# Patient Record
Sex: FEMALE | Race: WHITE
[De-identification: names, ages, dates, MRNs, and addresses within clinical notes are randomized per-mention and may not be internally consistent; named-entity substitution may affect disease eponyms.]

---

## 2019-10-14 ENCOUNTER — HOSPITAL ENCOUNTER (INPATIENT)
Dept: HOSPITAL 72 - EMR | Age: 64
LOS: 39 days | Discharge: NURSING, REHAB OR LONG-TERM CARE | DRG: 5 | End: 2019-11-22
Payer: COMMERCIAL

## 2019-10-14 VITALS — SYSTOLIC BLOOD PRESSURE: 122 MMHG | DIASTOLIC BLOOD PRESSURE: 65 MMHG

## 2019-10-14 VITALS — DIASTOLIC BLOOD PRESSURE: 51 MMHG | SYSTOLIC BLOOD PRESSURE: 122 MMHG

## 2019-10-14 VITALS — HEIGHT: 65 IN | BODY MASS INDEX: 26.66 KG/M2 | WEIGHT: 160 LBS

## 2019-10-14 VITALS — SYSTOLIC BLOOD PRESSURE: 145 MMHG | DIASTOLIC BLOOD PRESSURE: 65 MMHG

## 2019-10-14 VITALS — DIASTOLIC BLOOD PRESSURE: 74 MMHG | SYSTOLIC BLOOD PRESSURE: 121 MMHG

## 2019-10-14 VITALS — DIASTOLIC BLOOD PRESSURE: 64 MMHG | SYSTOLIC BLOOD PRESSURE: 120 MMHG

## 2019-10-14 VITALS — DIASTOLIC BLOOD PRESSURE: 84 MMHG | SYSTOLIC BLOOD PRESSURE: 176 MMHG

## 2019-10-14 VITALS — SYSTOLIC BLOOD PRESSURE: 139 MMHG | DIASTOLIC BLOOD PRESSURE: 77 MMHG

## 2019-10-14 VITALS — DIASTOLIC BLOOD PRESSURE: 37 MMHG | SYSTOLIC BLOOD PRESSURE: 118 MMHG

## 2019-10-14 DIAGNOSIS — G93.89: ICD-10-CM

## 2019-10-14 DIAGNOSIS — R62.7: ICD-10-CM

## 2019-10-14 DIAGNOSIS — J69.0: ICD-10-CM

## 2019-10-14 DIAGNOSIS — N17.9: ICD-10-CM

## 2019-10-14 DIAGNOSIS — E11.21: ICD-10-CM

## 2019-10-14 DIAGNOSIS — Z99.11: ICD-10-CM

## 2019-10-14 DIAGNOSIS — N39.0: ICD-10-CM

## 2019-10-14 DIAGNOSIS — L89.616: ICD-10-CM

## 2019-10-14 DIAGNOSIS — J96.01: ICD-10-CM

## 2019-10-14 DIAGNOSIS — A41.9: Primary | ICD-10-CM

## 2019-10-14 DIAGNOSIS — E86.0: ICD-10-CM

## 2019-10-14 DIAGNOSIS — R71.0: ICD-10-CM

## 2019-10-14 DIAGNOSIS — R79.89: ICD-10-CM

## 2019-10-14 DIAGNOSIS — D47.3: ICD-10-CM

## 2019-10-14 DIAGNOSIS — G93.41: ICD-10-CM

## 2019-10-14 DIAGNOSIS — N18.9: ICD-10-CM

## 2019-10-14 DIAGNOSIS — K29.70: ICD-10-CM

## 2019-10-14 DIAGNOSIS — R19.00: ICD-10-CM

## 2019-10-14 DIAGNOSIS — D64.9: ICD-10-CM

## 2019-10-14 DIAGNOSIS — B96.20: ICD-10-CM

## 2019-10-14 DIAGNOSIS — E83.52: ICD-10-CM

## 2019-10-14 DIAGNOSIS — I50.33: ICD-10-CM

## 2019-10-14 DIAGNOSIS — I13.0: ICD-10-CM

## 2019-10-14 DIAGNOSIS — J15.9: ICD-10-CM

## 2019-10-14 DIAGNOSIS — E11.65: ICD-10-CM

## 2019-10-14 DIAGNOSIS — R13.10: ICD-10-CM

## 2019-10-14 DIAGNOSIS — E87.5: ICD-10-CM

## 2019-10-14 LAB
% IRON SATURATION: 21 % (ref 15–50)
ADD MANUAL DIFF: YES
ADD MANUAL DIFF: YES
ALBUMIN SERPL-MCNC: 1.6 G/DL (ref 3.4–5)
ALBUMIN SERPL-MCNC: 1.8 G/DL (ref 3.4–5)
ALBUMIN/GLOB SERPL: 0.3 {RATIO} (ref 1–2.7)
ALBUMIN/GLOB SERPL: 0.3 {RATIO} (ref 1–2.7)
ALP SERPL-CCNC: 106 U/L (ref 46–116)
ALP SERPL-CCNC: 119 U/L (ref 46–116)
ALT SERPL-CCNC: 26 U/L (ref 12–78)
ALT SERPL-CCNC: 58 U/L (ref 12–78)
ANION GAP SERPL CALC-SCNC: 17 MMOL/L (ref 5–15)
ANION GAP SERPL CALC-SCNC: 8 MMOL/L (ref 5–15)
APPEARANCE UR: (no result)
APTT PPP: YELLOW S
AST SERPL-CCNC: 22 U/L (ref 15–37)
AST SERPL-CCNC: 67 U/L (ref 15–37)
BILIRUB SERPL-MCNC: 0.3 MG/DL (ref 0.2–1)
BILIRUB SERPL-MCNC: 0.4 MG/DL (ref 0.2–1)
BUN SERPL-MCNC: 52 MG/DL (ref 7–18)
BUN SERPL-MCNC: 52 MG/DL (ref 7–18)
CALCIUM SERPL-MCNC: 8.3 MG/DL (ref 8.5–10.1)
CALCIUM SERPL-MCNC: 9.4 MG/DL (ref 8.5–10.1)
CHLORIDE SERPL-SCNC: 101 MMOL/L (ref 98–107)
CHLORIDE SERPL-SCNC: 104 MMOL/L (ref 98–107)
CHOLEST SERPL-MCNC: 107 MG/DL (ref ?–200)
CK MB SERPL-MCNC: 0.9 NG/ML (ref 0–3.6)
CK SERPL-CCNC: 23 U/L (ref 26–308)
CO2 SERPL-SCNC: 19 MMOL/L (ref 21–32)
CO2 SERPL-SCNC: 22 MMOL/L (ref 21–32)
CREAT SERPL-MCNC: 2 MG/DL (ref 0.55–1.3)
CREAT SERPL-MCNC: 2 MG/DL (ref 0.55–1.3)
ERYTHROCYTE [DISTWIDTH] IN BLOOD BY AUTOMATED COUNT: 11.1 % (ref 11.6–14.8)
ERYTHROCYTE [DISTWIDTH] IN BLOOD BY AUTOMATED COUNT: 12.3 % (ref 11.6–14.8)
FERRITIN SERPL-MCNC: 1443 NG/ML (ref 8–388)
GLOBULIN SER-MCNC: 5.3 G/DL
GLOBULIN SER-MCNC: 6.1 G/DL
GLUCOSE UR STRIP-MCNC: (no result) MG/DL
HCT VFR BLD CALC: 21.1 % (ref 37–47)
HCT VFR BLD CALC: 25.6 % (ref 37–47)
HDLC SERPL-MCNC: 19 MG/DL (ref 40–60)
HGB BLD-MCNC: 6.7 G/DL (ref 12–16)
HGB BLD-MCNC: 8.1 G/DL (ref 12–16)
IRON SERPL-MCNC: 29 UG/DL (ref 50–175)
KETONES UR QL STRIP: NEGATIVE
LEUKOCYTE ESTERASE UR QL STRIP: (no result)
MCV RBC AUTO: 84 FL (ref 80–99)
MCV RBC AUTO: 84 FL (ref 80–99)
NITRITE UR QL STRIP: NEGATIVE
PH UR STRIP: 5 [PH] (ref 4.5–8)
PHOSPHATE SERPL-MCNC: 4.4 MG/DL (ref 2.5–4.9)
PLATELET # BLD: 450 K/UL (ref 150–450)
PLATELET # BLD: 608 K/UL (ref 150–450)
POTASSIUM SERPL-SCNC: 5.7 MMOL/L (ref 3.5–5.1)
POTASSIUM SERPL-SCNC: 6.1 MMOL/L (ref 3.5–5.1)
PROT UR QL STRIP: (no result)
RBC # BLD AUTO: 2.52 M/UL (ref 4.2–5.4)
RBC # BLD AUTO: 3.04 M/UL (ref 4.2–5.4)
SODIUM SERPL-SCNC: 135 MMOL/L (ref 136–145)
SODIUM SERPL-SCNC: 137 MMOL/L (ref 136–145)
SP GR UR STRIP: 1.02 (ref 1–1.03)
TIBC SERPL-MCNC: 141 UG/DL (ref 250–450)
TRIGL SERPL-MCNC: 178 MG/DL (ref 30–150)
UNSATURATED IRON BINDING: 112 UG/DL (ref 112–346)
UROBILINOGEN UR-MCNC: NORMAL MG/DL (ref 0–1)
WBC # BLD AUTO: 33.2 K/UL (ref 4.8–10.8)
WBC # BLD AUTO: 39.4 K/UL (ref 4.8–10.8)

## 2019-10-14 PROCEDURE — 94002 VENT MGMT INPAT INIT DAY: CPT

## 2019-10-14 PROCEDURE — 78807: CPT

## 2019-10-14 PROCEDURE — 76937 US GUIDE VASCULAR ACCESS: CPT

## 2019-10-14 PROCEDURE — 83550 IRON BINDING TEST: CPT

## 2019-10-14 PROCEDURE — 36415 COLL VENOUS BLD VENIPUNCTURE: CPT

## 2019-10-14 PROCEDURE — 36600 WITHDRAWAL OF ARTERIAL BLOOD: CPT

## 2019-10-14 PROCEDURE — 94003 VENT MGMT INPAT SUBQ DAY: CPT

## 2019-10-14 PROCEDURE — 93005 ELECTROCARDIOGRAM TRACING: CPT

## 2019-10-14 PROCEDURE — 87086 URINE CULTURE/COLONY COUNT: CPT

## 2019-10-14 PROCEDURE — 83880 ASSAY OF NATRIURETIC PEPTIDE: CPT

## 2019-10-14 PROCEDURE — 80164 ASSAY DIPROPYLACETIC ACD TOT: CPT

## 2019-10-14 PROCEDURE — 84300 ASSAY OF URINE SODIUM: CPT

## 2019-10-14 PROCEDURE — 94660 CPAP INITIATION&MGMT: CPT

## 2019-10-14 PROCEDURE — 86900 BLOOD TYPING SEROLOGIC ABO: CPT

## 2019-10-14 PROCEDURE — 74177 CT ABD & PELVIS W/CONTRAST: CPT

## 2019-10-14 PROCEDURE — 82248 BILIRUBIN DIRECT: CPT

## 2019-10-14 PROCEDURE — 71045 X-RAY EXAM CHEST 1 VIEW: CPT

## 2019-10-14 PROCEDURE — 80076 HEPATIC FUNCTION PANEL: CPT

## 2019-10-14 PROCEDURE — 76770 US EXAM ABDO BACK WALL COMP: CPT

## 2019-10-14 PROCEDURE — 96368 THER/DIAG CONCURRENT INF: CPT

## 2019-10-14 PROCEDURE — 82533 TOTAL CORTISOL: CPT

## 2019-10-14 PROCEDURE — 81050 URINALYSIS VOLUME MEASURE: CPT

## 2019-10-14 PROCEDURE — 82962 GLUCOSE BLOOD TEST: CPT

## 2019-10-14 PROCEDURE — 94664 DEMO&/EVAL PT USE INHALER: CPT

## 2019-10-14 PROCEDURE — 36569 INSJ PICC 5 YR+ W/O IMAGING: CPT

## 2019-10-14 PROCEDURE — 86140 C-REACTIVE PROTEIN: CPT

## 2019-10-14 PROCEDURE — 84156 ASSAY OF PROTEIN URINE: CPT

## 2019-10-14 PROCEDURE — 76856 US EXAM PELVIC COMPLETE: CPT

## 2019-10-14 PROCEDURE — 99291 CRITICAL CARE FIRST HOUR: CPT

## 2019-10-14 PROCEDURE — 96365 THER/PROPH/DIAG IV INF INIT: CPT

## 2019-10-14 PROCEDURE — 80162 ASSAY OF DIGOXIN TOTAL: CPT

## 2019-10-14 PROCEDURE — 85730 THROMBOPLASTIN TIME PARTIAL: CPT

## 2019-10-14 PROCEDURE — 84443 ASSAY THYROID STIM HORMONE: CPT

## 2019-10-14 PROCEDURE — 84550 ASSAY OF BLOOD/URIC ACID: CPT

## 2019-10-14 PROCEDURE — 82746 ASSAY OF FOLIC ACID SERUM: CPT

## 2019-10-14 PROCEDURE — 74230 X-RAY XM SWLNG FUNCJ C+: CPT

## 2019-10-14 PROCEDURE — 83036 HEMOGLOBIN GLYCOSYLATED A1C: CPT

## 2019-10-14 PROCEDURE — 93306 TTE W/DOPPLER COMPLETE: CPT

## 2019-10-14 PROCEDURE — 81003 URINALYSIS AUTO W/O SCOPE: CPT

## 2019-10-14 PROCEDURE — 83735 ASSAY OF MAGNESIUM: CPT

## 2019-10-14 PROCEDURE — 83615 LACTATE (LD) (LDH) ENZYME: CPT

## 2019-10-14 PROCEDURE — 87070 CULTURE OTHR SPECIMN AEROBIC: CPT

## 2019-10-14 PROCEDURE — 84436 ASSAY OF TOTAL THYROXINE: CPT

## 2019-10-14 PROCEDURE — 82607 VITAMIN B-12: CPT

## 2019-10-14 PROCEDURE — 87205 SMEAR GRAM STAIN: CPT

## 2019-10-14 PROCEDURE — 83690 ASSAY OF LIPASE: CPT

## 2019-10-14 PROCEDURE — 84439 ASSAY OF FREE THYROXINE: CPT

## 2019-10-14 PROCEDURE — 82150 ASSAY OF AMYLASE: CPT

## 2019-10-14 PROCEDURE — 86901 BLOOD TYPING SEROLOGIC RH(D): CPT

## 2019-10-14 PROCEDURE — 85610 PROTHROMBIN TIME: CPT

## 2019-10-14 PROCEDURE — 82270 OCCULT BLOOD FECES: CPT

## 2019-10-14 PROCEDURE — 80202 ASSAY OF VANCOMYCIN: CPT

## 2019-10-14 PROCEDURE — 82977 ASSAY OF GGT: CPT

## 2019-10-14 PROCEDURE — 86304 IMMUNOASSAY TUMOR CA 125: CPT

## 2019-10-14 PROCEDURE — 87081 CULTURE SCREEN ONLY: CPT

## 2019-10-14 PROCEDURE — 84484 ASSAY OF TROPONIN QUANT: CPT

## 2019-10-14 PROCEDURE — 80048 BASIC METABOLIC PNL TOTAL CA: CPT

## 2019-10-14 PROCEDURE — 85025 COMPLETE CBC W/AUTO DIFF WBC: CPT

## 2019-10-14 PROCEDURE — 86376 MICROSOMAL ANTIBODY EACH: CPT

## 2019-10-14 PROCEDURE — 71260 CT THORAX DX C+: CPT

## 2019-10-14 PROCEDURE — 87181 SC STD AGAR DILUTION PER AGT: CPT

## 2019-10-14 PROCEDURE — 82803 BLOOD GASES ANY COMBINATION: CPT

## 2019-10-14 PROCEDURE — 83605 ASSAY OF LACTIC ACID: CPT

## 2019-10-14 PROCEDURE — 82550 ASSAY OF CK (CPK): CPT

## 2019-10-14 PROCEDURE — 86920 COMPATIBILITY TEST SPIN: CPT

## 2019-10-14 PROCEDURE — 85651 RBC SED RATE NONAUTOMATED: CPT

## 2019-10-14 PROCEDURE — 80061 LIPID PANEL: CPT

## 2019-10-14 PROCEDURE — 87040 BLOOD CULTURE FOR BACTERIA: CPT

## 2019-10-14 PROCEDURE — 83540 ASSAY OF IRON: CPT

## 2019-10-14 PROCEDURE — 80053 COMPREHEN METABOLIC PANEL: CPT

## 2019-10-14 PROCEDURE — 83970 ASSAY OF PARATHORMONE: CPT

## 2019-10-14 PROCEDURE — 74018 RADEX ABDOMEN 1 VIEW: CPT

## 2019-10-14 PROCEDURE — 85007 BL SMEAR W/DIFF WBC COUNT: CPT

## 2019-10-14 PROCEDURE — 82728 ASSAY OF FERRITIN: CPT

## 2019-10-14 PROCEDURE — 82553 CREATINE MB FRACTION: CPT

## 2019-10-14 PROCEDURE — 94150 VITAL CAPACITY TEST: CPT

## 2019-10-14 PROCEDURE — 92950 HEART/LUNG RESUSCITATION CPR: CPT

## 2019-10-14 PROCEDURE — 89050 BODY FLUID CELL COUNT: CPT

## 2019-10-14 PROCEDURE — 93970 EXTREMITY STUDY: CPT

## 2019-10-14 PROCEDURE — 86850 RBC ANTIBODY SCREEN: CPT

## 2019-10-14 PROCEDURE — 84100 ASSAY OF PHOSPHORUS: CPT

## 2019-10-14 PROCEDURE — 84481 FREE ASSAY (FT-3): CPT

## 2019-10-14 PROCEDURE — 94640 AIRWAY INHALATION TREATMENT: CPT

## 2019-10-14 RX ADMIN — ENOXAPARIN SODIUM SCH MG: 30 INJECTION SUBCUTANEOUS at 09:30

## 2019-10-14 RX ADMIN — DEXTROSE MONOHYDRATE SCH MLS/HR: 50 INJECTION, SOLUTION INTRAVENOUS at 21:54

## 2019-10-14 RX ADMIN — INSULIN ASPART SCH UNITS: 100 INJECTION, SOLUTION INTRAVENOUS; SUBCUTANEOUS at 11:05

## 2019-10-14 RX ADMIN — DEXTROSE MONOHYDRATE SCH MLS/HR: 50 INJECTION, SOLUTION INTRAVENOUS at 14:00

## 2019-10-14 RX ADMIN — DOCUSATE SODIUM SCH MG: 100 CAPSULE, LIQUID FILLED ORAL at 17:34

## 2019-10-14 RX ADMIN — INSULIN ASPART SCH UNITS: 100 INJECTION, SOLUTION INTRAVENOUS; SUBCUTANEOUS at 21:00

## 2019-10-14 RX ADMIN — INSULIN ASPART SCH UNITS: 100 INJECTION, SOLUTION INTRAVENOUS; SUBCUTANEOUS at 17:36

## 2019-10-14 RX ADMIN — DOCUSATE SODIUM SCH MG: 100 CAPSULE, LIQUID FILLED ORAL at 12:11

## 2019-10-14 RX ADMIN — INSULIN ASPART SCH UNITS: 100 INJECTION, SOLUTION INTRAVENOUS; SUBCUTANEOUS at 12:13

## 2019-10-14 NOTE — CONSULTATION
DATE OF CONSULTATION:  10/14/2019

ENDOCRINOLOGY CONSULTATION



CONSULTING PHYSICIAN:  Riccardo Velez M.D.



REFERRING PHYSICIAN:  Garrick Keith M.D.



REASON FOR CONSULTATION:  Diabetes management.



HISTORY OF PRESENT ILLNESS:  This is a 64-year-old female with

history of diabetes, hypertension, and CHF, who resides in a skilled

nursing facility.  She was brought to the emergency department with

desaturation of 90% on room air.  The patient was placed on non-rebreather

and brought to the emergency room for evaluation and diagnosed with

pneumonia.  Glucose is over 400.  White count is 30,000 and lactic acid is

positive.  History was limited and mostly obtained from review of the

chart and medical records.



ALLERGY TO MEDICATIONS:  None.



MEDICATIONS:  Reviewed and reconciled.



FAMILY HISTORY:  Noncontributory.



PAST MEDICAL HISTORY:

1. Hypertension.

2. CHF.

3. Diabetes.



PAST SURGICAL HISTORY:  None.



SOCIAL HISTORY:  No smoking, alcohol, or drug use.  She is a skilled

nursing facility resident.



PHYSICAL EXAMINATION:

VITAL SIGNS:  Blood pressure is 160/80, pulse of 80, temperature 98.2, and

respiratory rate of 16.

HEENT:  Pupils reactive to light.  Sclerae anicteric.

NECK:  No JVD.

HEART:  Regular.

LUNGS:  Decreased breath sounds.  No accessory muscle use.  With rales and

rhonchi.

ABDOMEN:  Positive bowel sounds.

EXTREMITIES:  No clubbing, cyanosis, or edema.



LABORATORY VALUES:  Sodium 137, potassium 5.7, chloride 101, bicarb 19, BUN

52, creatinine 2.0, and glucose of 451.  Lactic acid 5.8.  WBC of 15,

hemoglobin 8, hematocrit 25, and platelet of 608,000.



DIAGNOSES:

1. Sepsis.

2. Acute kidney injury.

3. Lactic acidosis.

4. Diabetes, out of control.



PLAN:

1. Discontinue metformin.

2. IV hydration.

3. IV antibiotics.

4. Start basal bolus insulin in the form of Levemir and NovoLog.  The

dosage will be adjusted to control.



Thank you, Dr. Keith, for the courtesy of this consultation.









  ______________________________________________

  Riccardo Velez M.D.





DR:  RN/WESLEY

D:  10/14/2019 06:57

T:  10/14/2019 17:09

JOB#:  [id number]

CC:



ARIELLE

## 2019-10-14 NOTE — CONSULTATION
Consult Note


Consult Note





asked to evaluate at the request of Dr Keith for renal failure and 

HyperKalemia





non historian- Lethargic





ER:





Dyspnea/Respdistress





This is a 64-year-old female with history of diabetes, high blood pressure, CHF 

who presents with chief complaint of shortness of breath.  She is a nursing 

home patient.  Per EMS, oxygenation dropped down to 90% on room air.  She was 

placed on a nonrebreather and brought here for evaluation.  Onset tonight.  No 

fever chills but no nausea no vomiting.  No coughing.  Nothing made it better.  

Nothing made it worse.  History is limited in this patient because of her 

condition.


     No Known Allergies (Unverified , 10/14/19)








Past Medical History:  No History, Except For


Hx Cardiac Problems:  Yes - PNA, DYSPHAGIA, PU SACRUM


Hx Hypertension:  Yes


Hx Diabetes:  Yes - TYPE 2





examined


data reviewed





.


Assessment/Plan





Renal failure, Acute on Chronic


Dehydration


Severe Anemia


Sepsis / Pneumonia / UTI


DM, OOC


Proteinuria , Diabetic Nephropathy


Acute encephalopathy











Charles


Avoid Nephrotoxics


Anemia salas


2D echo


24 H urine protein


Keep BP and BS in check


Per orders











Lukasz Vizcaino MD Oct 14, 2019 11:02

## 2019-10-14 NOTE — NUR
ED Nurse Note:

ERMD NOTIFIED  OF REPEAT BG . RECEIVED ORDER FOR 10 UNITS INSULIN IV; 
NOTED CARRIED OUT.

## 2019-10-14 NOTE — NUR
HAND-OFF: 

Report given to CRISTIAN BELTRÁN RN. PATIENT ASLEEP, NO SIGNS OF DISTRESS NOTED. ENDORSED TO FOLLOW 
WITH SNF ON INFLUENZA VAC DATE.

## 2019-10-14 NOTE — NUR
NURSE NOTES:

Received patient from [facility]. Patient is sleeping comfortably in bed. NO signs and 
symptoms of pain or distress at this time. Safety precautions in place. will follow.

## 2019-10-14 NOTE — NUR
NURSE NOTES:

Received call from lab re: critical labs hgb and K level. patient asymptomatic no bleeding 
noted. VSS. 

Dr. Keith made aware of lab results. Md ordered 2 units PRBC and kayexalate 30 g stat. 
noted and carried out.message left to Dr. Vizcaino about lab results as per Dr. Keith. 
will follow.

## 2019-10-14 NOTE — NUR
NURSE NOTES:



CALLED [facility] REGARDING INFLUENZA AND PNA VAC DATES. SPOKE WITH MAY SHE SAID TO 
CALL AFTER 0900 AND ASK FOR CORONA. ENDORSED INCOMING RN TO FOLLOW UP

## 2019-10-14 NOTE — GENERAL PROGRESS NOTE
Assessment/Plan


Assessment/Plan:


A/P: 


1- Sepsis


2- HyperKalemia


3- RF


4- Heart Failure





Plan:


Start Keyoxalate


Nephro notified


current empirical abx


Ok to proceed with blood transfusion 


Stop ATC


Remains in Serious medical condition





Subjective


Allergies:  


Coded Allergies:  


     No Known Allergies (Unverified , 10/14/19)





Objective





Last 24 Hour Vital Signs








  Date Time  Temp Pulse Resp B/P (MAP) Pulse Ox O2 Delivery O2 Flow Rate FiO2


 


10/14/19 12:00 96.8 67 18 145/65 (91) 94   


 


10/14/19 08:00 98.3 68 20 120/64 (82) 98   


 


10/14/19 08:00  56      


 


10/14/19 05:44      Nasal Cannula 2.0 


 


10/14/19 05:00  67      


 


10/14/19 04:45 97.6 66 20 122/51 (74) 99   


 


10/14/19 04:30 99.4 64 21 122/65 99 Room Air 2.0 28


 


10/14/19 04:15 99.4 64 21 122/65 99 Room Air 2.0 28


 


10/14/19 03:25 99.4 64 21 118/37 99 Room Air  


 


10/14/19 01:39 99.4       


 


10/14/19 01:33 98.1 75 36 176/84 95 Room Air  


 


10/14/19 01:33  75 36   Room Air 2.0 28


 


10/14/19 01:29  80 30  96 Nasal Cannula 2.0 28





  76 30  100   


 


10/14/19 01:29  76 30  100 Nasal Cannula 2.0 28


 


10/14/19 00:54 98.1 76 22 176/84 (114) 90 Room Air  

















Intake and Output  


 


 10/13/19 10/14/19





 19:00 07:00


 


Intake Total  0 ml


 


Balance  0 ml


 


  


 


Intake Oral  0 ml


 


# Voids  1








Laboratory Tests


10/14/19 01:14: Hemoglobin A1c 10.4H


10/14/19 01:15: 


White Blood Count 39.4*H, Red Blood Count 3.04L, Hemoglobin 8.1L, Hematocrit 

25.6L, Mean Corpuscular Volume 84, Mean Corpuscular Hemoglobin 26.7L, Mean 

Corpuscular Hemoglobin Concent 31.8L, Red Cell Distribution Width 12.3, 

Platelet Count 608H, Mean Platelet Volume 8.2, Neutrophils (%) (Auto) , 

Lymphocytes (%) (Auto) , Monocytes (%) (Auto) , Eosinophils (%) (Auto) , 

Basophils (%) (Auto) , Differential Total Cells Counted 100, Neutrophils % (

Manual) 91H, Lymphocytes % (Manual) 5L, Monocytes % (Manual) 4, Eosinophils % (

Manual) 0, Basophils % (Manual) 0, Band Neutrophils 0, Platelet Estimate 

IncreasedH, Platelet Morphology Normal, Hypochromasia 2+, Anisocytosis 1+, 

Urine Color Yellow, Urine Appearance Turbid, Urine pH 5, Urine Specific Gravity 

1.020, Urine Protein 3+H, Urine Glucose (UA) 2+H, Urine Ketones Negative, Urine 

Blood 3+H, Urine Nitrite Negative, Urine Bilirubin Negative, Urine Urobilinogen 

Normal, Urine Leukocyte Esterase 3+H, Urine RBC 20-30H, Urine WBC TntcH, Urine 

Squamous Epithelial Cells Few, Urine Bacteria ModerateH, Sodium Level 137, 

Potassium Level 5.7H, Chloride Level 101, Carbon Dioxide Level 19L, Anion Gap 

17H, Blood Urea Nitrogen 52H, Creatinine 2.0H, Estimat Glomerular Filtration 

Rate 24.9, Glucose Level 451H, Lactic Acid Level 5.80H, Calcium Level 9.4, 

Total Bilirubin 0.4, Aspartate Amino Transf (AST/SGOT) 22, Alanine 

Aminotransferase (ALT/SGPT) 26, Alkaline Phosphatase 119H, Total Creatine 

Kinase 23L, Creatine Kinase MB 0.9, Creatine Kinase MB Relative Index 3.9, 

Troponin I 0.048, Pro-B-Type Natriuretic Peptide 14204M, Total Protein 7.9, 

Albumin 1.8L, Globulin 6.1, Albumin/Globulin Ratio 0.3L


10/14/19 03:00: Lactic Acid Level 2.90H


10/14/19 11:25: 


White Blood Count 33.2*H, Red Blood Count 2.52L, Hemoglobin 6.7*L, Hematocrit 

21.1L, Mean Corpuscular Volume 84, Mean Corpuscular Hemoglobin 26.8L, Mean 

Corpuscular Hemoglobin Concent 31.9L, Red Cell Distribution Width 11.1L, 

Platelet Count 450, Mean Platelet Volume 8.4, Neutrophils (%) (Auto) , 

Lymphocytes (%) (Auto) , Monocytes (%) (Auto) , Eosinophils (%) (Auto) , 

Basophils (%) (Auto) , Differential Total Cells Counted 100, Neutrophils % (

Manual) 93H, Lymphocytes % (Manual) 6L, Monocytes % (Manual) 0L, Eosinophils % (

Manual) 0, Basophils % (Manual) 0, Band Neutrophils 0, Platelet Estimate 

Adequate, Platelet Morphology Normal, Sodium Level 135L, Potassium Level 6.1*H, 

Chloride Level 104, Carbon Dioxide Level 22, Anion Gap 8, Blood Urea Nitrogen 

52H, Creatinine 2.0H, Estimat Glomerular Filtration Rate 24.9, Glucose Level 

391H, Calcium Level 8.3L, Total Bilirubin 0.3, Aspartate Amino Transf (AST/SGOT

) 67H, Alanine Aminotransferase (ALT/SGPT) 58, Alkaline Phosphatase 106, Pro-B-

Type Natriuretic Peptide 07713V, Total Protein 6.9, Albumin 1.6L, Globulin 5.3, 

Albumin/Globulin Ratio 0.3L, Myelocytes % 1H, Red Blood Cell Morphology Normal, 

Uric Acid 10.0H, Phosphorus Level 4.4, Magnesium Level 2.8H, Iron Level 29L, 

Total Iron Binding Capacity 141L, Percent Iron Saturation 21, Unsaturated Iron 

Binding 112, Ferritin 1443H, C-Reactive Protein, Quantitative 28.7H, 

Triglycerides Level 178H, Cholesterol Level 107, LDL Cholesterol 59, HDL 

Cholesterol 19L, Cholesterol/HDL Ratio 5.6H, Vitamin B12 Level 1061H, Folate 

7.2L, Thyroid Stimulating Hormone (TSH) 0.018L


10/14/19 11:29: Urine Random Sodium 25


Height (Feet):  5


Height (Inches):  5.00


Weight (Pounds):  150











Garrick Keith MD Oct 14, 2019 14:21

## 2019-10-14 NOTE — CONSULTATION
History of Present Illness


General


Chief Complaint:  Dyspnea/Respdistress


Referring physician:  Dr. Keith


Reason for Consultation:  Possible pneumonia





Present Illness


Allergies:  


Coded Allergies:  


     No Known Allergies (Unverified , 10/14/19)





Medication History


Scheduled


Ascorbic Acid* (Vitamin C*), 250 MG ORAL BID, (Reported)


Aspirin* (Aspirin*), 81 MG ORAL DAILY, (Reported)


Atorvastatin Calcium* (Atorvastatin Calcium*), 10 MG ORAL BEDTIME, (Reported)


Clopidogrel* (Clopidogrel*), 75 MG ORAL DAILY, (Reported)


Divalproex Sodium* (Depakote*), 500 MG PO Q12HR, (Reported)


Docusate Sodium* (Docusate Sodium*), 100 MG ORAL BID, (Reported)


Donepezil Hcl* (Donepezil Hcl*), 5 MG ORAL DAILY, (Reported)


Furosemide* (Lasix*), 40 MG ORAL DAILY, (Reported)


Magnesium Hydroxide* (Milk Of Magnesia*), Unknown Dose ORAL DAILY, (Reported)


Metformin Hcl* (Metformin Hcl*), 1,000 MG ORAL BID, (Reported)


Olanzapine* (Zyprexa*), 10 MG ORAL DAILY, (Reported)





Scheduled PRN


Acetaminophen* (Acetaminophen 325MG Tablet*), 325 MG ORAL Q4H PRN for For Pain, 

(Reported)


Diphenhydramine Hcl* (Benadryl*), 25 MG ORAL Q6H PRN for Itching, (Reported)





Miscellaneous Medications


Multivitamin W-Minerals/Gin (Super Ginseng Multivit Cap), 1 EACH PO, (Reported)





Patient History


Healthcare decision maker





Resuscitation status


Full Code


Advanced Directive on File








Physical Exam





Last 24 Hour Vital Signs








  Date Time  Temp Pulse Resp B/P (MAP) Pulse Ox O2 Delivery O2 Flow Rate FiO2


 


10/14/19 12:00 96.8 67 18 145/65 (91) 94   


 


10/14/19 08:00 98.3 68 20 120/64 (82) 98   


 


10/14/19 08:00  56      


 


10/14/19 05:44      Nasal Cannula 2.0 


 


10/14/19 05:00  67      


 


10/14/19 04:45 97.6 66 20 122/51 (74) 99   


 


10/14/19 04:30 99.4 64 21 122/65 99 Room Air 2.0 28


 


10/14/19 04:15 99.4 64 21 122/65 99 Room Air 2.0 28


 


10/14/19 03:25 99.4 64 21 118/37 99 Room Air  


 


10/14/19 01:39 99.4       


 


10/14/19 01:33 98.1 75 36 176/84 95 Room Air  


 


10/14/19 01:33  75 36   Room Air 2.0 28


 


10/14/19 01:29  80 30  96 Nasal Cannula 2.0 28





  76 30  100   


 


10/14/19 01:29  76 30  100 Nasal Cannula 2.0 28


 


10/14/19 00:54 98.1 76 22 176/84 (114) 90 Room Air  

















Intake and Output  


 


 10/13/19 10/14/19





 19:00 07:00


 


Intake Total  0 ml


 


Balance  0 ml


 


  


 


Intake Oral  0 ml


 


# Voids  1











Laboratory Tests








Test


  10/14/19


01:14 10/14/19


01:15 10/14/19


03:00 10/14/19


11:25


 


Hemoglobin A1c


  10.4 %


(4.3-6.0)  H 


  


  


 


 


White Blood Count


  


  39.4 K/UL


(4.8-10.8)  *H 


  33.2 K/UL


(4.8-10.8)  *H


 


Red Blood Count


  


  3.04 M/UL


(4.20-5.40)  L 


  2.52 M/UL


(4.20-5.40)  L


 


Hemoglobin


  


  8.1 G/DL


(12.0-16.0)  L 


  6.7 G/DL


(12.0-16.0)  *L


 


Hematocrit


  


  25.6 %


(37.0-47.0)  L 


  21.1 %


(37.0-47.0)  L


 


Mean Corpuscular Volume  84 FL (80-99)    84 FL (80-99)  


 


Mean Corpuscular Hemoglobin


  


  26.7 PG


(27.0-31.0)  L 


  26.8 PG


(27.0-31.0)  L


 


Mean Corpuscular Hemoglobin


Concent 


  31.8 G/DL


(32.0-36.0)  L 


  31.9 G/DL


(32.0-36.0)  L


 


Red Cell Distribution Width


  


  12.3 %


(11.6-14.8) 


  11.1 %


(11.6-14.8)  L


 


Platelet Count


  


  608 K/UL


(150-450)  H 


  450 K/UL


(150-450)


 


Mean Platelet Volume


  


  8.2 FL


(6.5-10.1) 


  8.4 FL


(6.5-10.1)


 


Neutrophils (%) (Auto)


  


  % (45.0-75.0)


  


  % (45.0-75.0)


 


 


Lymphocytes (%) (Auto)


  


  % (20.0-45.0)


  


  % (20.0-45.0)


 


 


Monocytes (%) (Auto)   % (1.0-10.0)     % (1.0-10.0)  


 


Eosinophils (%) (Auto)   % (0.0-3.0)     % (0.0-3.0)  


 


Basophils (%) (Auto)   % (0.0-2.0)     % (0.0-2.0)  


 


Differential Total Cells


Counted 


  100  


  


  100  


 


 


Neutrophils % (Manual)  91 % (45-75)  H  93 % (45-75)  H


 


Lymphocytes % (Manual)  5 % (20-45)  L  6 % (20-45)  L


 


Monocytes % (Manual)  4 % (1-10)    0 % (1-10)  L


 


Eosinophils % (Manual)  0 % (0-3)    0 % (0-3)  


 


Basophils % (Manual)  0 % (0-2)    0 % (0-2)  


 


Band Neutrophils  0 % (0-8)    0 % (0-8)  


 


Platelet Estimate  Increased  H  Adequate  


 


Platelet Morphology  Normal    Normal  


 


Hypochromasia  2+    


 


Anisocytosis  1+    


 


Urine Color  Yellow    


 


Urine Appearance  Turbid    


 


Urine pH  5 (4.5-8.0)    


 


Urine Specific Gravity


  


  1.020


(1.005-1.035) 


  


 


 


Urine Protein


  


  3+ (NEGATIVE)


H 


  


 


 


Urine Glucose (UA)


  


  2+ (NEGATIVE)


H 


  


 


 


Urine Ketones


  


  Negative


(NEGATIVE) 


  


 


 


Urine Blood


  


  3+ (NEGATIVE)


H 


  


 


 


Urine Nitrite


  


  Negative


(NEGATIVE) 


  


 


 


Urine Bilirubin


  


  Negative


(NEGATIVE) 


  


 


 


Urine Urobilinogen


  


  Normal MG/DL


(0.0-1.0) 


  


 


 


Urine Leukocyte Esterase


  


  3+ (NEGATIVE)


H 


  


 


 


Urine RBC


  


  20-30 /HPF (0


- 2)  H 


  


 


 


Urine WBC


  


  Tntc /HPF (0 -


2)  H 


  


 


 


Urine Squamous Epithelial


Cells 


  Few /LPF


(NONE/OCC) 


  


 


 


Urine Bacteria


  


  Moderate /HPF


(NONE)  H 


  


 


 


Sodium Level


  


  137 MMOL/L


(136-145) 


  135 MMOL/L


(136-145)  L


 


Potassium Level


  


  5.7 MMOL/L


(3.5-5.1)  H 


  6.1 MMOL/L


(3.5-5.1)  *H


 


Chloride Level


  


  101 MMOL/L


() 


  104 MMOL/L


()


 


Carbon Dioxide Level


  


  19 MMOL/L


(21-32)  L 


  22 MMOL/L


(21-32)


 


Anion Gap


  


  17 mmol/L


(5-15)  H 


  8 mmol/L


(5-15)


 


Blood Urea Nitrogen


  


  52 mg/dL


(7-18)  H 


  52 mg/dL


(7-18)  H


 


Creatinine


  


  2.0 MG/DL


(0.55-1.30)  H 


  2.0 MG/DL


(0.55-1.30)  H


 


Estimat Glomerular Filtration


Rate 


  24.9 mL/min


(>60) 


  24.9 mL/min


(>60)


 


Glucose Level


  


  451 MG/DL


()  H 


  391 MG/DL


()  H


 


Lactic Acid Level


  


  5.80 mmol/L


(0.4-2.0)  H 2.90 mmol/L


(0.66-2.22)  H 


 


 


Calcium Level


  


  9.4 MG/DL


(8.5-10.1) 


  8.3 MG/DL


(8.5-10.1)  L


 


Total Bilirubin


  


  0.4 MG/DL


(0.2-1.0) 


  0.3 MG/DL


(0.2-1.0)


 


Aspartate Amino Transf


(AST/SGOT) 


  22 U/L (15-37)


  


  67 U/L (15-37)


H


 


Alanine Aminotransferase


(ALT/SGPT) 


  26 U/L (12-78)


  


  58 U/L (12-78)


 


 


Alkaline Phosphatase


  


  119 U/L


()  H 


  106 U/L


()


 


Total Creatine Kinase


  


  23 U/L


()  L 


  


 


 


Creatine Kinase MB


  


  0.9 NG/ML


(0.0-3.6) 


  


 


 


Creatine Kinase MB Relative


Index 


  3.9  


  


  


 


 


Troponin I


  


  0.048 ng/mL


(0.000-0.056) 


  


 


 


Pro-B-Type Natriuretic Peptide


  


  58836 pg/mL


(0-125)  H 


  00929 pg/mL


(0-125)  H


 


Total Protein


  


  7.9 G/DL


(6.4-8.2) 


  6.9 G/DL


(6.4-8.2)


 


Albumin


  


  1.8 G/DL


(3.4-5.0)  L 


  1.6 G/DL


(3.4-5.0)  L


 


Globulin  6.1 g/dL    5.3 g/dL  


 


Albumin/Globulin Ratio


  


  0.3 (1.0-2.7)


L 


  0.3 (1.0-2.7)


L


 


Myelocytes %    1 % (0-0)  H


 


Red Blood Cell Morphology    Normal  


 


Uric Acid


  


  


  


  10.0 MG/DL


(2.6-7.2)  H


 


Phosphorus Level


  


  


  


  4.4 MG/DL


(2.5-4.9)


 


Magnesium Level


  


  


  


  2.8 MG/DL


(1.8-2.4)  H


 


Iron Level


  


  


  


  29 ug/dL


()  L


 


Total Iron Binding Capacity


  


  


  


  141 ug/dL


(250-450)  L


 


Percent Iron Saturation    21 % (15-50)  


 


Unsaturated Iron Binding


  


  


  


  112 ug/dL


(112-346)


 


Ferritin


  


  


  


  1443 NG/ML


(8-388)  H


 


C-Reactive Protein,


Quantitative 


  


  


  28.7 mg/dL


(0.00-0.90)  H


 


Triglycerides Level


  


  


  


  178 MG/DL


()  H


 


Cholesterol Level


  


  


  


  107 MG/DL (<


200)


 


LDL Cholesterol


  


  


  


  59 mg/dL


(<100)


 


HDL Cholesterol


  


  


  


  19 MG/DL


(40-60)  L


 


Cholesterol/HDL Ratio


  


  


  


  5.6 (3.3-4.4)


H


 


Vitamin B12 Level


  


  


  


  1061 PG/ML


(193-986)  H


 


Folate


  


  


  


  7.2 NG/ML


(8.6-58.9)  L


 


Thyroid Stimulating Hormone


(TSH) 


  


  


  0.018 uiU/mL


(0.358-3.740)


 


Test


  10/14/19


11:29 


  


  


 


 


Urine Random Sodium


  25 mmol/L


() 


  


  


 








Height (Feet):  5


Height (Inches):  5.00


Weight (Pounds):  150


Medications





Current Medications








 Medications


  (Trade)  Dose


 Ordered  Sig/Winnie


 Route


 PRN Reason  Start Time


 Stop Time Status Last Admin


Dose Admin


 


 Acetaminophen


  (Tylenol)  650 mg  PRN  PRN


 RECTAL


 TEMP>100.5  10/14/19 03:00


     


 


 


 Albuterol/


 Ipratropium


  (Albuterol/


 Ipratropium)  3 ml  Q4H  PRN


 HHN


 Shortness of Breath  10/14/19 07:30


 10/19/19 07:29   


 


 


 Dextrose


  (Dextrose 50%)  25 ml  Q30M  PRN


 IV


 Hypoglycemia  10/14/19 07:00


 11/13/19 06:59   


 


 


 Dextrose


  (Dextrose 50%)  50 ml  Q30M  PRN


 IV


 Hypoglycemia  10/14/19 07:00


 11/13/19 06:59   


 


 


 Docusate Sodium


  (Colace)  100 mg  THREE TIMES A  DAY


 ORAL


   10/14/19 13:00


 11/13/19 12:59  10/14/19 12:11


 


 


 Enoxaparin Sodium


  (Lovenox)  30 mg  DAILY


 SUBQ


   10/14/19 09:00


 11/13/19 08:59  10/14/19 09:30


 


 


 Insulin Aspart


  (NovoLOG)    BEFORE MEALS AND  HS


 SUBQ


   10/14/19 11:30


 11/13/19 11:29  10/14/19 11:05


 


 


 Insulin Aspart


  (NovoLOG)  8 units  BEFORE  MEALS


 SUBQ


   10/14/19 11:30


 11/13/19 11:29  10/14/19 12:13


 


 


 Insulin Detemir


  (Levemir)  20 units  DAILY


 SUBQ


   10/14/19 10:30


 11/13/19 10:29  10/14/19 10:16


 


 


 Pantoprazole


  (Protonix)  40 mg  BID


 ORAL


   10/14/19 18:00


 11/13/19 08:59   


 


 


 Piperacillin Sod/


 Tazobactam Sod


 3.375 gm/Sodium


 Chloride  110 ml @ 


 27.5 mls/hr  EVERY 8  HOURS


 IVPB


   10/14/19 14:00


 10/19/19 13:59   


 


 


 Sodium Chloride  1,000 ml @ 


 75 mls/hr  [id number]


 IV


   10/15/19 07:30


 11/13/19 07:29   


 


 


 Vancomycin HCl


  (Vanco rx to


 dose)  1 ea  DAILY  PRN


 MISC


 Per rx protocol  10/14/19 07:30


 11/13/19 07:29   


 


 


 Vancomycin HCl


 750 mg/Sodium


 Chloride  275 ml @ 


 183.333


 mls/hr  Q24H


 IVPB


   10/15/19 09:00


 10/19/19 08:59   


 











Assessment/Plan


Assessment/Plan:


Hematology Consult





DOS: 10/14/19


Chief Complaint:  Dyspnea/Respdistress


RFC: ANemia eval and high wbc


REQ MD: CHERRY Keith





ID


This is a 64-year-old female with history of diabetes, high blood pressure, CHF 

who presents with chief complaint of shortness of breath.  She is a nursing 

home patient.  Per EMS, oxygenation dropped down to 90% on room air.  She was 

placed on a nonrebreather and brought here for evaluation.  Onset tonight.  No 

fever chills but no nausea no vomiting.  No coughing.  Nothing made it better.  

Nothing made it worse.  History is limited in this patient because of her 

condition.


Heme consulted as patient requires blood transfusion, and prbc has been ordered

, also noted to have chance on ckd





Coded Allergies:  


     No Known Allergies (Unverified , 10/14/19)





Patient History


Past Medical History:  see triage record, old chart reviewed, HTN, CHF


Past Surgical History:  other


Pertinent Family History:  none


Social History:  Denies: smoking


Pregnant Now:  No


Immunizations:  UTD


Reviewed Nursing Documentation:  PMH: Agreed; PSxH: Agreed





Nursing Documentation-PMH


Past Medical History:  No History, Except For


Hx Cardiac Problems:  Yes - PNA, DYSPHAGIA, PU SACRUM


Hx Hypertension:  Yes


Hx Diabetes:  Yes - TYPE 2





Review of Systems


Eye:  Denies: eye pain, blurred vision


ENT:  Denies: ear pain, nose congestion, throat swelling


Respiratory:  Reports: shortness of breath; Denies: cough


Cardiovascular:  Denies: chest pain, palpitations


Gastrointestinal:  Denies: abdominal pain, diarrhea, nausea, vomiting


Musculoskeletal:  Denies: back pain, joint pain


Skin:  Denies: rash


Neurological:  Denies: headache, numbness


Endocrine:  Denies: increased thirst, increased urine


Hematologic/Lymphatic:  Denies: easy bruising


All Other Systems:  negative except mentioned in HPI





Physical Exam:


Vitals: reviewed


General Appearance:  NAD


HEENT:  normocephalic, atraumatic


Neck:  non-tender, normal alignment


Respiratory/Chest:  normal breath sounds bilaterally


Cardiovascular/Chest:  normal peripheral pulses, normal rate


Abdomen:  normal bowel sounds, soft, nontender


Extremities:  normal range of motion





Labs: noted





Imaging: noted





Assessment and Recs:


# Anemia of chronic disease (or of iron deficiency) due to underlying chronic 

medical issues, multifactorial 


--> Anemia workup has been ordered, rule out gi bleed 


--> No evidence of hemolysis is noted, peripheral smear has been reviewed.


--> Hgb goal >7. Transfuse prn.


--> Epogen or iron at this time is not particularly indicated


--> Medications have been reviewed


--> low threshold for gi evaluation in case has occult +


--> bone marrow biopsy is not indicated given the other more likely causes


# Hypercalcemia - btw 10-11 range when corrected to alb


--> ivf have been started


--> if remains elevated, will get pth


# Leukocytosis/elevated white blood cell count, unspecified likely related to 

underlying reaction v more likely infection


--> have reviewed peripheral smear and bandemia/neutrophilia noted


--> continue antibiotics if they have been started by ID team


--> monitor for resolution


# Sepsis from pneumonia.  


--> pulm consulted, abx started


# CHANCE (acute kidney injury) on ckd


--> per renal


# UTI (urinary tract infection)


--> on abx


# Dehydration


--> on ivf


# Acute metabolic encephalopathy


--> likely due to pna





The timing of this note does not necessarily reflect the time of the patient 

was seen.





Greatly appreciate consultation.











Mike Pop MD Oct 14, 2019 15:47

## 2019-10-14 NOTE — HISTORY AND PHYSICAL REPORT
DATE OF ADMISSION:  10/14/2019



SOURCE OF INFORMATION:  Patient and EMR.



HISTORY OF PRESENT ILLNESS:  The patient is a 64-year-old misfortunate

white female, who is not verbally communicative.  The patient is suffering

from multiple medical problems including psychiatric and nonpsychiatric

dementia, chronic encephalopathy, and poor historian.  The patient has

been transferred from the facility secondary to the change in mental

status.  At the time of evaluation, the patient is not communicative

verbally.  Appears in mild distress.  At the initial evaluation in the ER,

blood pressure remained uncontrolled with leukocytosis of 39.4, hemoglobin

of 8.1, creatinine of 2, and potassium of 5.7.  Given the gravity of

serious multiple medical conditions, the patient was transferred to the

floor.



PAST MEDICAL HISTORY:  Including but not limited to chronic encephalopathy,

hyperlipidemia, hypertension, diabetes, and psychiatric disorder.



MEDICATIONS:  Current hospital medications including, but not limited to

acetaminophen, Zosyn, vancomycin, and sliding scale insulin.



SOCIAL HISTORY:  The patient is a resident of a skilled nursing facility.

No prior history of illicit drug abuse, smoking, or alcohol abuse has been

reported.



PHYSICAL EXAMINATION:

VITAL SIGNS:  Blood pressure 170/80, temperature 98.2, pulse rate 80, and

respiratory rate 18.

HEAD AND NECK:  Atraumatic and normocephalic.

CHEST:  Bronchial breathing sounds.

ABDOMEN:  Soft.  No organomegaly.

MUSCULOSKELETAL:  Diffuse 1+ or 2+ nonpitting edema in all four contracted

extremities.  The patient is not following the commands.  Limited

evaluation.

NEUROLOGY:  The patient is awake.  Not alert.  Not verbally

communicative.



LABORATORY DATA:  Labs dated October 14, 2019 shows sodium 137, potassium

4.7, BUN 52, and creatinine 2.  Hemoglobin A1c 10.4.  BNP 19,000.  Glucose

of 460.  Lactic acid of 5.8.



ASSESSMENT AND PLAN:

1. Sepsis.  Differential diagnosis is healthcare-associated pneumonia or

healthcare-associated urinary tract infection.  Work in progress.

2. Chronic encephalomalacia.

3. Acute on chronic anemia.

4. Renal failure, age indeterminate.

5. Congestive heart failure.

6. Hyperthyroidism.

7. Hyperkalemia.

8. Diabetes type 2, uncontrolled with A1c of 10.

9. Psychiatric disorder.

10. GI and DVT prophylaxis.



PLAN OF CARE:  I started the empiric antibiotic treatment changing to Zosyn

and vancomycin pending the cultures.  Pulmonary Critical Care, Infectious

Disease, and Nephrology have been consulted.



The time of this dictation does not reflect the actual time of

encounter.









  ______________________________________________

  Garrick Keith M.D.





DR:  ALEXANDER

D:  10/14/2019 14:21

T:  10/14/2019 18:28

JOB#:  [id number]

CC:



ARIELLE

## 2019-10-14 NOTE — NUR
CASE MANAGEMENT: INITIAL REVIEW



64 YR OLD FEMALE BIBA FROM [facility] 



CC: DYSPNEA/ REP. DISTRESS



SI: SEPSIS/ ACUTE RENAL FAILURE/ PNA /UTI/ ANEMIA/ CHANCE/ ACUTE ENCEPHALOPATHY 

98.1  76  22  176/84  90%RA

WBC 39.4; RBC 3.04; H/H 8.1/25.6; ; BUN 52; CREA 2.0; 



IS:      ALBUTEROL HHN X1

VA TYLENOL X1

IVF BOLUS X1

IV CEFEPIME X1

IV LEVOFLOXACIN 



**: 2E TELE UNIT



DCP: RETURN TO [facility] WHEN MEDICALLY CLEARED 



PLAN:  SP CX

START ZOSYN, VANCO

## 2019-10-14 NOTE — DIAGNOSTIC IMAGING REPORT
Indication: Shortness of breath

 

Technique: One view of the chest

 

Comparison: none

 

Findings: There is mild interstitial congestion. There is suggestion of bilateral

left greater than right hazy parenchymal opacities as well. The left hemidiaphragm is

somewhat obscured. The heart is probably normal in size

 

Impression: Evidence of of interstitial congestion and possibly hazy airspace

infiltrates versus edema

## 2019-10-14 NOTE — NUR
HAND-OFF: 

Report given to Anusha Thibodeaux. plan of care endorse. aware patient to receive 2nd unit of PRBC.

## 2019-10-14 NOTE — NUR
NURSE NOTES:

Received report from ELIZABETH Reed.  Pt in bed, resting, in no acute distress.  Breathing even and 
unlabored. will continue plan of care.

## 2019-10-14 NOTE — NUR
NURSE NOTES:



RECEIVED PATIENT FROM ER. PATIENT ASLEEP, PER ER NURSE ATIVAN IV WAS ADMINISTERED. PATIENT 
HAS NO BELONGINGS. PLACED PATIENT ON TELE, SR ON A MONITOR. NOTED REDNESS ON SACRAL AREA AND 
DTI ON RIGHT HEEL. PICTURE TAKEN. FALL PRECAUTIONS IN PLACE: CALL LIGHT WITHIN REACH, BED IN 
LOW POSITION AND BED ALARM ON.

## 2019-10-14 NOTE — HISTORY & PHYSICAL
History and Physical


History & Physicial


Seen and examined. Dict # [id number]











Garrick Keith MD Oct 14, 2019 14:19

## 2019-10-14 NOTE — EMERGENCY ROOM REPORT
History of Present Illness


General


Chief Complaint:  Dyspnea/Respdistress


Source:  Patient, Medical Record, EMS





Present Illness


HPI


This is a 64-year-old female with history of diabetes, high blood pressure, CHF 

who presents with chief complaint of shortness of breath.  She is a nursing 

home patient.  Per EMS, oxygenation dropped down to 90% on room air.  She was 

placed on a nonrebreather and brought here for evaluation.  Onset tonight.  No 

fever chills but no nausea no vomiting.  No coughing.  Nothing made it better.  

Nothing made it worse.  History is limited in this patient because of her 

condition.


Allergies:  


Coded Allergies:  


     No Known Allergies (Unverified , 10/14/19)





Patient History


Past Medical History:  see triage record, old chart reviewed, HTN, CHF


Past Surgical History:  other


Pertinent Family History:  none


Social History:  Denies: smoking


Pregnant Now:  No


Immunizations:  UTD


Reviewed Nursing Documentation:  PMH: Agreed; PSxH: Agreed





Nursing Documentation-PMH


Past Medical History:  No History, Except For


Hx Cardiac Problems:  Yes - PNA, DYSPHAGIA, PU SACRUM


Hx Hypertension:  Yes


Hx Diabetes:  Yes - TYPE 2





Review of Systems


Eye:  Denies: eye pain, blurred vision


ENT:  Denies: ear pain, nose congestion, throat swelling


Respiratory:  Reports: shortness of breath; Denies: cough


Cardiovascular:  Denies: chest pain, palpitations


Gastrointestinal:  Denies: abdominal pain, diarrhea, nausea, vomiting


Musculoskeletal:  Denies: back pain, joint pain


Skin:  Denies: rash


Neurological:  Denies: headache, numbness


Endocrine:  Denies: increased thirst, increased urine


Hematologic/Lymphatic:  Denies: easy bruising


All Other Systems:  negative except mentioned in HPI





Physical Exam





Vital Signs








  Date Time  Temp Pulse Resp B/P (MAP) Pulse Ox O2 Delivery O2 Flow Rate FiO2


 


10/14/19 00:54 98.1 76 22 176/84 (114) 90 Room Air  





Vitals with high blood pressure


Sp02 EP Interpretation:  abnormal


General Appearance:  mild distress


Head:  normocephalic, atraumatic


Eyes:  bilateral eye PERRL, bilateral eye EOMI


ENT:  hearing grossly normal, normal pharynx


Neck:  full range of motion, supple, no meningismus


Respiratory:  chest non-tender, respiratory distress, decreased breath sounds, 

accessory muscle use, rales, rhonchi


Cardiovascular #1:  regular rate, rhythm, no murmur


Gastrointestinal:  normal bowel sounds, non tender, no mass, no organomegaly, 

no bruit, non-distended


Musculoskeletal:  back normal, other - No edema


Psychiatric:  mood/affect normal





Procedures


Critical Care Time


Critical Care Time


Critical care is mandated in this patient who presented with sepsis from 

pneumonia.  Patient require my urgent intervention to attenuate the risks of 

metabolic collapse which may lead to cardiovascular collapse and death.  

Critical care time is 35 minutes excluding any reportable procedure.  Critical 

care time included evaluation, multiple reevaluation, looking at old charts, 

interpreting laboratory and diagnostic data, discussing case with patient and 

family and consultants, and charting.





Medical Decision Making


Diagnostic Impression:  


 Primary Impression:  


 Sepsis


 Qualified Codes:  A41.9 - Sepsis, unspecified organism; R65.20 - Severe sepsis 

without septic shock; N17.9 - Acute kidney failure, unspecified


 Additional Impressions:  


 Healthcare associated bacterial pneumonia


 CHANCE (acute kidney injury)


 UTI (urinary tract infection)


 Qualified Codes:  N30.00 - Acute cystitis without hematuria


 Dehydration


 ARF (acute renal failure)


 Qualified Codes:  N17.9 - Acute kidney failure, unspecified


 Anemia


 Qualified Codes:  D64.9 - Anemia, unspecified


 Acute metabolic encephalopathy


 Hyperglycemia due to type 2 diabetes mellitus


 Qualified Codes:  E11.65 - Type 2 diabetes mellitus with hyperglycemia; Z79.4 

- Long term (current) use of insulin


ER Course


Patient presents with respiratory distress and hypoxia.  Is secondary to sepsis 

H CAP.  White count is elevated.  Lactic acid is elevated.  Patient is more 

alert after IV fluid.  Wide spectrum antibiotic started.  She also has a 

urinary tract infection.  No evidence of TIA or CVA because she has no focal 

deficit.  Condition is guarded.  Patient will be admitted for IV antibiotics 

and IV hydration.  I contacted Dr. Keith for admission.


EKG Diagnostic Results


Rate:  normal


Rhythm:  NSR


ST Segments:  no acute changes





Rhythm Strip Diag. Results


EP Interpretation:  yes


Rate:  80


Rhythm:  NSR, no PVC's, no ectopy





Chest X-Ray Diagnostic Results


Chest X-Ray Diagnostic Results :  


   Chest X-Ray Ordered:  Yes


   # of Views/Limited/Complete:  1 View


   Indication:  Shortness of Breath


   EP Interpretation:  Yes


   Interpretation:  no effusion, other - RLL infiltrate


   Impression:  Other - RLL infiltrate


   Electronically Signed by:  Jorge Lucio MD





Last Vital Signs








  Date Time  Temp Pulse Resp B/P (MAP) Pulse Ox O2 Delivery O2 Flow Rate FiO2


 


10/14/19 00:54 98.1 76 22 176/84 (114) 90 Room Air  








Disposition:  ADMITTED AS INPATIENT


Condition:  Serious











Jorge Lucio MD Oct 14, 2019 01:13

## 2019-10-14 NOTE — CARDIOLOGY PROGRESS NOTE
Assessment/Plan


Assessment/Plan


diuretic if hypoxemic until sepsis resolves 


await echo 








[id number]





Objective





Last 24 Hour Vital Signs








  Date Time  Temp Pulse Resp B/P (MAP) Pulse Ox O2 Delivery O2 Flow Rate FiO2


 


10/14/19 19:49  78 20  96 Nasal Cannula 2.0 28


 


10/14/19 19:49     96 Nasal Cannula 2.0 28


 


10/14/19 16:00  77      


 


10/14/19 16:00 96.7 81 18 121/74 (90) 98   


 


10/14/19 12:00 96.8 67 18 145/65 (91) 94   


 


10/14/19 12:00  66      


 


10/14/19 09:00      Nasal Cannula 2.0 


 


10/14/19 08:00 98.3 68 20 120/64 (82) 98   


 


10/14/19 08:00  56      


 


10/14/19 05:44      Nasal Cannula 2.0 


 


10/14/19 05:00  67      


 


10/14/19 04:45 97.6 66 20 122/51 (74) 99   


 


10/14/19 04:30 99.4 64 21 122/65 99 Room Air 2.0 28


 


10/14/19 04:15 99.4 64 21 122/65 99 Room Air 2.0 28


 


10/14/19 03:25 99.4 64 21 118/37 99 Room Air  


 


10/14/19 01:39 99.4       


 


10/14/19 01:33 98.1 75 36 176/84 95 Room Air  


 


10/14/19 01:33  75 36   Room Air 2.0 28


 


10/14/19 01:29  80 30  96 Nasal Cannula 2.0 28





  76 30  100   


 


10/14/19 01:29  76 30  100 Nasal Cannula 2.0 28


 


10/14/19 00:54 98.1 76 22 176/84 (114) 90 Room Air  

















Intake and Output  


 


 10/13/19 10/14/19





 18:59 06:59


 


Intake Total  0 ml


 


Balance  0 ml


 


  


 


Intake Oral  0 ml


 


# Voids  1











Laboratory Tests








Test


  10/14/19


01:14 10/14/19


01:15 10/14/19


03:00 10/14/19


11:25


 


Hemoglobin A1c


  10.4 %


(4.3-6.0)  H 


  


  


 


 


White Blood Count


  


  39.4 K/UL


(4.8-10.8)  *H 


  33.2 K/UL


(4.8-10.8)  *H


 


Red Blood Count


  


  3.04 M/UL


(4.20-5.40)  L 


  2.52 M/UL


(4.20-5.40)  L


 


Hemoglobin


  


  8.1 G/DL


(12.0-16.0)  L 


  6.7 G/DL


(12.0-16.0)  *L


 


Hematocrit


  


  25.6 %


(37.0-47.0)  L 


  21.1 %


(37.0-47.0)  L


 


Mean Corpuscular Volume  84 FL (80-99)    84 FL (80-99)  


 


Mean Corpuscular Hemoglobin


  


  26.7 PG


(27.0-31.0)  L 


  26.8 PG


(27.0-31.0)  L


 


Mean Corpuscular Hemoglobin


Concent 


  31.8 G/DL


(32.0-36.0)  L 


  31.9 G/DL


(32.0-36.0)  L


 


Red Cell Distribution Width


  


  12.3 %


(11.6-14.8) 


  11.1 %


(11.6-14.8)  L


 


Platelet Count


  


  608 K/UL


(150-450)  H 


  450 K/UL


(150-450)


 


Mean Platelet Volume


  


  8.2 FL


(6.5-10.1) 


  8.4 FL


(6.5-10.1)


 


Neutrophils (%) (Auto)


  


  % (45.0-75.0)


  


  % (45.0-75.0)


 


 


Lymphocytes (%) (Auto)


  


  % (20.0-45.0)


  


  % (20.0-45.0)


 


 


Monocytes (%) (Auto)   % (1.0-10.0)     % (1.0-10.0)  


 


Eosinophils (%) (Auto)   % (0.0-3.0)     % (0.0-3.0)  


 


Basophils (%) (Auto)   % (0.0-2.0)     % (0.0-2.0)  


 


Differential Total Cells


Counted 


  100  


  


  100  


 


 


Neutrophils % (Manual)  91 % (45-75)  H  93 % (45-75)  H


 


Lymphocytes % (Manual)  5 % (20-45)  L  6 % (20-45)  L


 


Monocytes % (Manual)  4 % (1-10)    0 % (1-10)  L


 


Eosinophils % (Manual)  0 % (0-3)    0 % (0-3)  


 


Basophils % (Manual)  0 % (0-2)    0 % (0-2)  


 


Band Neutrophils  0 % (0-8)    0 % (0-8)  


 


Platelet Estimate  Increased  H  Adequate  


 


Platelet Morphology  Normal    Normal  


 


Hypochromasia  2+    


 


Anisocytosis  1+    


 


Urine Color  Yellow    


 


Urine Appearance  Turbid    


 


Urine pH  5 (4.5-8.0)    


 


Urine Specific Gravity


  


  1.020


(1.005-1.035) 


  


 


 


Urine Protein


  


  3+ (NEGATIVE)


H 


  


 


 


Urine Glucose (UA)


  


  2+ (NEGATIVE)


H 


  


 


 


Urine Ketones


  


  Negative


(NEGATIVE) 


  


 


 


Urine Blood


  


  3+ (NEGATIVE)


H 


  


 


 


Urine Nitrite


  


  Negative


(NEGATIVE) 


  


 


 


Urine Bilirubin


  


  Negative


(NEGATIVE) 


  


 


 


Urine Urobilinogen


  


  Normal MG/DL


(0.0-1.0) 


  


 


 


Urine Leukocyte Esterase


  


  3+ (NEGATIVE)


H 


  


 


 


Urine RBC


  


  20-30 /HPF (0


- 2)  H 


  


 


 


Urine WBC


  


  Tntc /HPF (0 -


2)  H 


  


 


 


Urine Squamous Epithelial


Cells 


  Few /LPF


(NONE/OCC) 


  


 


 


Urine Bacteria


  


  Moderate /HPF


(NONE)  H 


  


 


 


Sodium Level


  


  137 MMOL/L


(136-145) 


  135 MMOL/L


(136-145)  L


 


Potassium Level


  


  5.7 MMOL/L


(3.5-5.1)  H 


  6.1 MMOL/L


(3.5-5.1)  *H


 


Chloride Level


  


  101 MMOL/L


() 


  104 MMOL/L


()


 


Carbon Dioxide Level


  


  19 MMOL/L


(21-32)  L 


  22 MMOL/L


(21-32)


 


Anion Gap


  


  17 mmol/L


(5-15)  H 


  8 mmol/L


(5-15)


 


Blood Urea Nitrogen


  


  52 mg/dL


(7-18)  H 


  52 mg/dL


(7-18)  H


 


Creatinine


  


  2.0 MG/DL


(0.55-1.30)  H 


  2.0 MG/DL


(0.55-1.30)  H


 


Estimat Glomerular Filtration


Rate 


  24.9 mL/min


(>60) 


  24.9 mL/min


(>60)


 


Glucose Level


  


  451 MG/DL


()  H 


  391 MG/DL


()  H


 


Lactic Acid Level


  


  5.80 mmol/L


(0.4-2.0)  H 2.90 mmol/L


(0.66-2.22)  H 


 


 


Calcium Level


  


  9.4 MG/DL


(8.5-10.1) 


  8.3 MG/DL


(8.5-10.1)  L


 


Total Bilirubin


  


  0.4 MG/DL


(0.2-1.0) 


  0.3 MG/DL


(0.2-1.0)


 


Aspartate Amino Transf


(AST/SGOT) 


  22 U/L (15-37)


  


  67 U/L (15-37)


H


 


Alanine Aminotransferase


(ALT/SGPT) 


  26 U/L (12-78)


  


  58 U/L (12-78)


 


 


Alkaline Phosphatase


  


  119 U/L


()  H 


  106 U/L


()


 


Total Creatine Kinase


  


  23 U/L


()  L 


  


 


 


Creatine Kinase MB


  


  0.9 NG/ML


(0.0-3.6) 


  


 


 


Creatine Kinase MB Relative


Index 


  3.9  


  


  


 


 


Troponin I


  


  0.048 ng/mL


(0.000-0.056) 


  


 


 


Pro-B-Type Natriuretic Peptide


  


  09699 pg/mL


(0-125)  H 


  08622 pg/mL


(0-125)  H


 


Total Protein


  


  7.9 G/DL


(6.4-8.2) 


  6.9 G/DL


(6.4-8.2)


 


Albumin


  


  1.8 G/DL


(3.4-5.0)  L 


  1.6 G/DL


(3.4-5.0)  L


 


Globulin  6.1 g/dL    5.3 g/dL  


 


Albumin/Globulin Ratio


  


  0.3 (1.0-2.7)


L 


  0.3 (1.0-2.7)


L


 


Myelocytes %    1 % (0-0)  H


 


Red Blood Cell Morphology    Normal  


 


Uric Acid


  


  


  


  10.0 MG/DL


(2.6-7.2)  H


 


Phosphorus Level


  


  


  


  4.4 MG/DL


(2.5-4.9)


 


Magnesium Level


  


  


  


  2.8 MG/DL


(1.8-2.4)  H


 


Iron Level


  


  


  


  29 ug/dL


()  L


 


Total Iron Binding Capacity


  


  


  


  141 ug/dL


(250-450)  L


 


Percent Iron Saturation    21 % (15-50)  


 


Unsaturated Iron Binding


  


  


  


  112 ug/dL


(112-346)


 


Ferritin


  


  


  


  1443 NG/ML


(8-388)  H


 


C-Reactive Protein,


Quantitative 


  


  


  28.7 mg/dL


(0.00-0.90)  H


 


Triglycerides Level


  


  


  


  178 MG/DL


()  H


 


Cholesterol Level


  


  


  


  107 MG/DL (<


200)


 


LDL Cholesterol


  


  


  


  59 mg/dL


(<100)


 


HDL Cholesterol


  


  


  


  19 MG/DL


(40-60)  L


 


Cholesterol/HDL Ratio


  


  


  


  5.6 (3.3-4.4)


H


 


Vitamin B12 Level


  


  


  


  1061 PG/ML


(193-986)  H


 


Folate


  


  


  


  7.2 NG/ML


(8.6-58.9)  L


 


Thyroid Stimulating Hormone


(TSH) 


  


  


  0.018 uiU/mL


(0.358-3.740)


 


Digoxin Level


  


  


  


  < 0.2 NG/ML


(0.9-2.0)  L


 


Test


  10/14/19


11:29 


  


  


 


 


Urine Random Sodium


  25 mmol/L


() 


  


  


 

















Rob May MD Oct 14, 2019 20:56

## 2019-10-14 NOTE — NUR
NURSE NOTES:

Blood transfusion started at 1525. protocol followed. Consent obtained via telephone 
patients daughter. VSS. will folow.

## 2019-10-14 NOTE — NUR
ED Nurse Note:

BROUGHT IN BY AMBULANCE ROXYD RA 26 FROM [facility] WITH RESPIRATORY DISTRESS. SPO2 
90% ROOM AIR; AUDIBLE RHONCHI NOTED. BLOOD GLUCOSE AT TRIAGE 448. OPENS EYES 
SPONTANEOUSLY; REPEATS WORDS; AOX0; RESPONDS TO PAINFUL STIMULI. RECTAL TEMP 
101.4

## 2019-10-14 NOTE — NUR
*-* INSURANCE *-*



ALL AVAILABLE CLINICALS HAVE BEEN FAXED TO:



[facility]: MICHAEL ERVIN#[phone]

FAX#[phone] REVIEWS/CLINICALS 


-------------------------------------------------------------------------------

Addendum: 10/15/19 at 1001 by JACKI COMBS CM

-------------------------------------------------------------------------------

[facility]#[id number]

CM: Michael Ervin#[phone]

Fax#[phone]

## 2019-10-14 NOTE — CONSULTATION
History of Present Illness


General


Date patient seen:  Oct 14, 2019


Chief Complaint:  Dyspnea/Respdistress


Referring physician:  Dr. Keith


Reason for Consultation:  Possible pneumonia





Present Illness


HPI


65 y/o female w/ DM, CHF, HTN admitted with sepsis with possible pneumonia and 

UTI.  Encephalopathic and no further history available.  Noted with lactate 

elevation and severe leukocytosis in ED.  Broad-spectrum abx started.  Was on 

nonrebreather, now tolerating 2L nasal cannula.


Allergies:  


Coded Allergies:  


     No Known Allergies (Unverified , 10/14/19)





Medication History


Scheduled


Ascorbic Acid* (Vitamin C*), 250 MG ORAL BID, (Reported)


Aspirin* (Aspirin*), 81 MG ORAL DAILY, (Reported)


Atorvastatin Calcium* (Atorvastatin Calcium*), 10 MG ORAL BEDTIME, (Reported)


Clopidogrel* (Clopidogrel*), 75 MG ORAL DAILY, (Reported)


Divalproex Sodium* (Depakote*), 500 MG PO Q12HR, (Reported)


Docusate Sodium* (Docusate Sodium*), 100 MG ORAL BID, (Reported)


Donepezil Hcl* (Donepezil Hcl*), 5 MG ORAL DAILY, (Reported)


Furosemide* (Lasix*), 40 MG ORAL DAILY, (Reported)


Magnesium Hydroxide* (Milk Of Magnesia*), Unknown Dose ORAL DAILY, (Reported)


Metformin Hcl* (Metformin Hcl*), 1,000 MG ORAL BID, (Reported)


Olanzapine* (Zyprexa*), 10 MG ORAL DAILY, (Reported)





Scheduled PRN


Acetaminophen* (Acetaminophen 325MG Tablet*), 325 MG ORAL Q4H PRN for For Pain, 

(Reported)


Diphenhydramine Hcl* (Benadryl*), 25 MG ORAL Q6H PRN for Itching, (Reported)





Miscellaneous Medications


Multivitamin W-Minerals/Gin (Super Ginseng Multivit Cap), 1 EACH PO, (Reported)





Patient History


Limited by:  medical condition


History Provided By:  Medical Record


Healthcare decision maker





Resuscitation status


Full Code


Advanced Directive on File








Review of Systems


ROS Narrative


Unable to obtain





Physical Exam


General Appearance:  no apparent distress, confused


HEENT:  mucous membranes moist, supple


Neck:  supple


Respiratory/Chest:  decreased breath sounds


Cardiovascular/Chest:  regular rhythm


Abdomen:  non tender, soft


Extremities:  no edema





Last 24 Hour Vital Signs








  Date Time  Temp Pulse Resp B/P (MAP) Pulse Ox O2 Delivery O2 Flow Rate FiO2


 


10/14/19 08:00 98.3 68 20 120/64 (82) 98   


 


10/14/19 08:00  56      


 


10/14/19 05:44      Nasal Cannula 2.0 


 


10/14/19 05:00  67      


 


10/14/19 04:45 97.6 66 20 122/51 (74) 99   


 


10/14/19 04:30 99.4 64 21 122/65 99 Room Air 2.0 28


 


10/14/19 04:15 99.4 64 21 122/65 99 Room Air 2.0 28


 


10/14/19 03:25 99.4 64 21 118/37 99 Room Air  


 


10/14/19 01:39 99.4       


 


10/14/19 01:33 98.1 75 36 176/84 95 Room Air  


 


10/14/19 01:33  75 36   Room Air 2.0 28


 


10/14/19 01:29  80 30  96 Nasal Cannula 2.0 28





  76 30  100   


 


10/14/19 01:29  76 30  100 Nasal Cannula 2.0 28


 


10/14/19 00:54 98.1 76 22 176/84 (114) 90 Room Air  

















Intake and Output  


 


 10/13/19 10/14/19





 19:00 07:00


 


Intake Total  0 ml


 


Balance  0 ml


 


  


 


Intake Oral  0 ml


 


# Voids  1











Laboratory Tests








Test


  10/14/19


01:14 10/14/19


01:15 10/14/19


03:00 10/14/19


11:25


 


Hemoglobin A1c


  10.4 %


(4.3-6.0)  H 


  


  


 


 


White Blood Count


  


  39.4 K/UL


(4.8-10.8)  *H 


  33.2 K/UL


(4.8-10.8)  *H


 


Red Blood Count


  


  3.04 M/UL


(4.20-5.40)  L 


  2.52 M/UL


(4.20-5.40)  L


 


Hemoglobin


  


  8.1 G/DL


(12.0-16.0)  L 


  6.7 G/DL


(12.0-16.0)  *L


 


Hematocrit


  


  25.6 %


(37.0-47.0)  L 


  21.1 %


(37.0-47.0)  L


 


Mean Corpuscular Volume  84 FL (80-99)    84 FL (80-99)  


 


Mean Corpuscular Hemoglobin


  


  26.7 PG


(27.0-31.0)  L 


  26.8 PG


(27.0-31.0)  L


 


Mean Corpuscular Hemoglobin


Concent 


  31.8 G/DL


(32.0-36.0)  L 


  31.9 G/DL


(32.0-36.0)  L


 


Red Cell Distribution Width


  


  12.3 %


(11.6-14.8) 


  11.1 %


(11.6-14.8)  L


 


Platelet Count


  


  608 K/UL


(150-450)  H 


  450 K/UL


(150-450)


 


Mean Platelet Volume


  


  8.2 FL


(6.5-10.1) 


  8.4 FL


(6.5-10.1)


 


Neutrophils (%) (Auto)


  


  % (45.0-75.0)


  


  % (45.0-75.0)


 


 


Lymphocytes (%) (Auto)


  


  % (20.0-45.0)


  


  % (20.0-45.0)


 


 


Monocytes (%) (Auto)   % (1.0-10.0)     % (1.0-10.0)  


 


Eosinophils (%) (Auto)   % (0.0-3.0)     % (0.0-3.0)  


 


Basophils (%) (Auto)   % (0.0-2.0)     % (0.0-2.0)  


 


Differential Total Cells


Counted 


  100  


  


  


 


 


Neutrophils % (Manual)  91 % (45-75)  H  Pending  


 


Lymphocytes % (Manual)  5 % (20-45)  L  Pending  


 


Monocytes % (Manual)  4 % (1-10)    


 


Eosinophils % (Manual)  0 % (0-3)    


 


Basophils % (Manual)  0 % (0-2)    


 


Band Neutrophils  0 % (0-8)    


 


Platelet Estimate  Increased  H  Pending  


 


Platelet Morphology  Normal    Pending  


 


Hypochromasia  2+    


 


Anisocytosis  1+    


 


Urine Color  Yellow    


 


Urine Appearance  Turbid    


 


Urine pH  5 (4.5-8.0)    


 


Urine Specific Gravity


  


  1.020


(1.005-1.035) 


  


 


 


Urine Protein


  


  3+ (NEGATIVE)


H 


  


 


 


Urine Glucose (UA)


  


  2+ (NEGATIVE)


H 


  


 


 


Urine Ketones


  


  Negative


(NEGATIVE) 


  


 


 


Urine Blood


  


  3+ (NEGATIVE)


H 


  


 


 


Urine Nitrite


  


  Negative


(NEGATIVE) 


  


 


 


Urine Bilirubin


  


  Negative


(NEGATIVE) 


  


 


 


Urine Urobilinogen


  


  Normal MG/DL


(0.0-1.0) 


  


 


 


Urine Leukocyte Esterase


  


  3+ (NEGATIVE)


H 


  


 


 


Urine RBC


  


  20-30 /HPF (0


- 2)  H 


  


 


 


Urine WBC


  


  Tntc /HPF (0 -


2)  H 


  


 


 


Urine Squamous Epithelial


Cells 


  Few /LPF


(NONE/OCC) 


  


 


 


Urine Bacteria


  


  Moderate /HPF


(NONE)  H 


  


 


 


Sodium Level


  


  137 MMOL/L


(136-145) 


  135 MMOL/L


(136-145)  L


 


Potassium Level


  


  5.7 MMOL/L


(3.5-5.1)  H 


  6.1 MMOL/L


(3.5-5.1)  *H


 


Chloride Level


  


  101 MMOL/L


() 


  104 MMOL/L


()


 


Carbon Dioxide Level


  


  19 MMOL/L


(21-32)  L 


  22 MMOL/L


(21-32)


 


Anion Gap


  


  17 mmol/L


(5-15)  H 


  8 mmol/L


(5-15)


 


Blood Urea Nitrogen


  


  52 mg/dL


(7-18)  H 


  52 mg/dL


(7-18)  H


 


Creatinine


  


  2.0 MG/DL


(0.55-1.30)  H 


  2.0 MG/DL


(0.55-1.30)  H


 


Estimat Glomerular Filtration


Rate 


  24.9 mL/min


(>60) 


  24.9 mL/min


(>60)


 


Glucose Level


  


  451 MG/DL


()  H 


  391 MG/DL


()  H


 


Lactic Acid Level


  


  5.80 mmol/L


(0.4-2.0)  H 2.90 mmol/L


(0.66-2.22)  H 


 


 


Calcium Level


  


  9.4 MG/DL


(8.5-10.1) 


  8.3 MG/DL


(8.5-10.1)  L


 


Total Bilirubin


  


  0.4 MG/DL


(0.2-1.0) 


  0.3 MG/DL


(0.2-1.0)


 


Aspartate Amino Transf


(AST/SGOT) 


  22 U/L (15-37)


  


  67 U/L (15-37)


H


 


Alanine Aminotransferase


(ALT/SGPT) 


  26 U/L (12-78)


  


  58 U/L (12-78)


 


 


Alkaline Phosphatase


  


  119 U/L


()  H 


  106 U/L


()


 


Total Creatine Kinase


  


  23 U/L


()  L 


  


 


 


Creatine Kinase MB


  


  0.9 NG/ML


(0.0-3.6) 


  


 


 


Creatine Kinase MB Relative


Index 


  3.9  


  


  


 


 


Troponin I


  


  0.048 ng/mL


(0.000-0.056) 


  


 


 


Pro-B-Type Natriuretic Peptide


  


  65614 pg/mL


(0-125)  H 


  74377 pg/mL


(0-125)  H


 


Total Protein


  


  7.9 G/DL


(6.4-8.2) 


  6.9 G/DL


(6.4-8.2)


 


Albumin


  


  1.8 G/DL


(3.4-5.0)  L 


  1.6 G/DL


(3.4-5.0)  L


 


Globulin  6.1 g/dL    5.3 g/dL  


 


Albumin/Globulin Ratio


  


  0.3 (1.0-2.7)


L 


  0.3 (1.0-2.7)


L


 


Uric Acid


  


  


  


  10.0 MG/DL


(2.6-7.2)  H


 


Phosphorus Level


  


  


  


  4.4 MG/DL


(2.5-4.9)


 


Magnesium Level


  


  


  


  2.8 MG/DL


(1.8-2.4)  H


 


Iron Level


  


  


  


  29 ug/dL


()  L


 


Total Iron Binding Capacity


  


  


  


  141 ug/dL


(250-450)  L


 


Percent Iron Saturation    21 % (15-50)  


 


Unsaturated Iron Binding


  


  


  


  112 ug/dL


(112-346)


 


Ferritin


  


  


  


  1443 NG/ML


(8-388)  H


 


C-Reactive Protein,


Quantitative 


  


  


  28.7 mg/dL


(0.00-0.90)  H


 


Triglycerides Level


  


  


  


  178 MG/DL


()  H


 


Cholesterol Level


  


  


  


  107 MG/DL (<


200)


 


LDL Cholesterol


  


  


  


  59 mg/dL


(<100)


 


HDL Cholesterol


  


  


  


  19 MG/DL


(40-60)  L


 


Cholesterol/HDL Ratio


  


  


  


  5.6 (3.3-4.4)


H


 


Vitamin B12 Level


  


  


  


  1061 PG/ML


(193-986)  H


 


Folate


  


  


  


  7.2 NG/ML


(8.6-58.9)  L


 


Thyroid Stimulating Hormone


(TSH) 


  


  


  0.018 uiU/mL


(0.358-3.740)


 


Test


  10/14/19


11:29 


  


  


 


 


Urine Random Sodium


  25 mmol/L


() 


  


  


 








Height (Feet):  5


Height (Inches):  5.00


Weight (Pounds):  150


Medications





Current Medications








 Medications


  (Trade)  Dose


 Ordered  Sig/Winnie


 Route


 PRN Reason  Start Time


 Stop Time Status Last Admin


Dose Admin


 


 Acetaminophen


  (Tylenol)  650 mg  PRN  PRN


 RECTAL


 TEMP>100.5  10/14/19 03:00


     


 


 


 Albuterol/


 Ipratropium


  (Albuterol/


 Ipratropium)  3 ml  Q4H  PRN


 HHN


 Shortness of Breath  10/14/19 07:30


 10/19/19 07:29   


 


 


 Dextrose


  (Dextrose 50%)  25 ml  Q30M  PRN


 IV


 Hypoglycemia  10/14/19 07:00


 11/13/19 06:59   


 


 


 Dextrose


  (Dextrose 50%)  50 ml  Q30M  PRN


 IV


 Hypoglycemia  10/14/19 07:00


 11/13/19 06:59   


 


 


 Docusate Sodium


  (Colace)  100 mg  THREE TIMES A  DAY


 ORAL


   10/14/19 13:00


 11/13/19 12:59  10/14/19 12:11


 


 


 Enoxaparin Sodium


  (Lovenox)  30 mg  DAILY


 SUBQ


   10/14/19 09:00


 11/13/19 08:59  10/14/19 09:30


 


 


 Insulin Aspart


  (NovoLOG)    BEFORE MEALS AND  HS


 SUBQ


   10/14/19 11:30


 11/13/19 11:29  10/14/19 11:05


 


 


 Insulin Aspart


  (NovoLOG)  8 units  BEFORE  MEALS


 SUBQ


   10/14/19 11:30


 11/13/19 11:29  10/14/19 12:13


 


 


 Insulin Detemir


  (Levemir)  20 units  DAILY


 SUBQ


   10/14/19 10:30


 11/13/19 10:29  10/14/19 10:16


 


 


 Pantoprazole


  (Protonix)  40 mg  BID


 ORAL


   10/14/19 18:00


 11/13/19 08:59   


 


 


 Piperacillin Sod/


 Tazobactam Sod


 3.375 gm/Sodium


 Chloride  110 ml @ 


 27.5 mls/hr  EVERY 8  HOURS


 IVPB


   10/14/19 14:00


 10/19/19 13:59   


 


 


 Sodium Chloride  1,000 ml @ 


 75 mls/hr  [id number]


 IV


   10/15/19 07:30


 11/13/19 07:29   


 


 


 Vancomycin HCl


  (Vanco rx to


 dose)  1 ea  DAILY  PRN


 MISC


 Per rx protocol  10/14/19 07:30


 11/13/19 07:29   


 


 


 Vancomycin HCl


 750 mg/Dextrose  275 ml @ 


 183.333


 mls/hr  Q24H


 IVPB


   10/14/19 09:00


 10/19/19 08:59  10/14/19 09:27


 











Assessment/Plan


Assessment/Plan:


Problem list:


Sepsis


Possible pneumonia


Possible UTI


Acute hypoxemic respiratory failure


Acute encephalopathy


Hx CHF


HTN





Plan:


-cont broad-spectrum abx


-f/u cultures


-f/u CXR when able to be viewed


-monitor leukocytosis


-monitor volumes


-monitor encephalopathy











Kulwant Russ MD Oct 14, 2019 12:38

## 2019-10-14 NOTE — CONSULTATION
History of Present Illness


General


Date patient seen:  Oct 14, 2019


Time patient seen:  13:30


Chief Complaint:  Dyspnea/Respdistress


Referring physician:  Dr. Keith


Reason for Consultation:  Possible pneumonia





Present Illness


HPI


This is a 64-year-old female with PMH of  diabetes, high blood pressure, CHF 

was sent from nursing home for altered mental status and shortness of breath. 

Per EMS report her oxygenation dropped down to 90% on room air, onset last 

night , no report of aspiration .  She was placed on a nonrebreather and 

brought to [facility] ED for evaluation. CXR showed  interstitial congestion and 

possibly hazy airspace infiltrates , her WBC were significantly elevated 

concerning for sepsis , and UA showed strong evidence of infection , so she was 

started empirically on vancomycin and zosyn and admitted to [facility] . 

infectious disease consult was requested for antibiotics guidance and further 

care . as of note patient is nonverbal , couldn't provide any history , most 

details were obtained from medical records .


Allergies:  


Coded Allergies:  


     No Known Allergies (Unverified , 10/14/19)





Medication History


Scheduled


Ascorbic Acid* (Vitamin C*), 250 MG ORAL BID, (Reported)


Aspirin* (Aspirin*), 81 MG ORAL DAILY, (Reported)


Atorvastatin Calcium* (Atorvastatin Calcium*), 10 MG ORAL BEDTIME, (Reported)


Clopidogrel* (Clopidogrel*), 75 MG ORAL DAILY, (Reported)


Divalproex Sodium* (Depakote*), 500 MG PO Q12HR, (Reported)


Docusate Sodium* (Docusate Sodium*), 100 MG ORAL BID, (Reported)


Donepezil Hcl* (Donepezil Hcl*), 5 MG ORAL DAILY, (Reported)


Furosemide* (Lasix*), 40 MG ORAL DAILY, (Reported)


Magnesium Hydroxide* (Milk Of Magnesia*), Unknown Dose ORAL DAILY, (Reported)


Metformin Hcl* (Metformin Hcl*), 1,000 MG ORAL BID, (Reported)


Olanzapine* (Zyprexa*), 10 MG ORAL DAILY, (Reported)





Scheduled PRN


Acetaminophen* (Acetaminophen 325MG Tablet*), 325 MG ORAL Q4H PRN for For Pain, 

(Reported)


Diphenhydramine Hcl* (Benadryl*), 25 MG ORAL Q6H PRN for Itching, (Reported)





Miscellaneous Medications


Multivitamin W-Minerals/Gin (Super Ginseng Multivit Cap), 1 EACH PO, (Reported)





Patient History


Limited by:  medical condition


History Provided By:  Medical Record, EMS


Healthcare decision maker





Resuscitation status


Full Code


Advanced Directive on File








Past Medical/Surgical History


Past Medical/Surgical History:  


(1) Sepsis


(2) UTI (urinary tract infection)


(3) CHANCE (acute kidney injury)





Review of Systems


Constitutional:  Reports: sweats, fever, weakness


Eye:  Reports: no symptoms


ENT:  Reports: no symptoms


Respiratory:  Reports: cough, shortness of breath, wheezing


Cardiovascular:  Reports: no symptoms


Gastrointestinal:  Reports: no symptoms


Genitourinary:  Reports: frequency


Musculoskeletal:  Reports: no symptoms


Skin:  Reports: change in color, other - right heel DTI, sacral redness


Neurological:  Reports: other - encephalopathic, nonverbal


Endocrine:  Reports: no symptoms


Hematologic/Lymphatic:  Reports: anemia





Physical Exam


General Appearance:  WD/WN, no apparent distress, alert, lethargic, confused, 

mild distress


Lines, tubes and drains:  peripheral


HEENT:  normocephalic, atraumatic, anicteric, supple, no JVD


Neck:  non-tender, normal alignment, supple, normal inspection


Respiratory/Chest:  chest wall non-tender, no respiratory distress, no 

accessory muscle use, crackles/rales


Cardiovascular/Chest:  normal peripheral pulses, normal rate, regular rhythm, 

no JVD


Abdomen:  non tender, soft, no organomegaly, no mass, hypoactive bowel sounds, 

distended


Genitourinary/Rectal:  normal genital exam


Extremities:  normal inspection, non-pitting


Skin Exam:  warm/dry, other - red in the sacrum, with discoloration on the 

right heel


Neurologic:  alert, unresponsiveness


Musculoskeletal:  normal muscle bulk, no effusion





Last 24 Hour Vital Signs








  Date Time  Temp Pulse Resp B/P (MAP) Pulse Ox O2 Delivery O2 Flow Rate FiO2


 


10/14/19 16:00  77      


 


10/14/19 16:00 96.7 81 18 121/74 (90) 98   


 


10/14/19 12:00 96.8 67 18 145/65 (91) 94   


 


10/14/19 12:00  66      


 


10/14/19 09:00      Nasal Cannula 2.0 


 


10/14/19 08:00 98.3 68 20 120/64 (82) 98   


 


10/14/19 08:00  56      


 


10/14/19 05:44      Nasal Cannula 2.0 


 


10/14/19 05:00  67      


 


10/14/19 04:45 97.6 66 20 122/51 (74) 99   


 


10/14/19 04:30 99.4 64 21 122/65 99 Room Air 2.0 28


 


10/14/19 04:15 99.4 64 21 122/65 99 Room Air 2.0 28


 


10/14/19 03:25 99.4 64 21 118/37 99 Room Air  


 


10/14/19 01:39 99.4       


 


10/14/19 01:33 98.1 75 36 176/84 95 Room Air  


 


10/14/19 01:33  75 36   Room Air 2.0 28


 


10/14/19 01:29  80 30  96 Nasal Cannula 2.0 28





  76 30  100   


 


10/14/19 01:29  76 30  100 Nasal Cannula 2.0 28


 


10/14/19 00:54 98.1 76 22 176/84 (114) 90 Room Air  

















Intake and Output  


 


 10/13/19 10/14/19





 18:59 06:59


 


Intake Total  0 ml


 


Balance  0 ml


 


  


 


Intake Oral  0 ml


 


# Voids  1











Laboratory Tests








Test


  10/14/19


01:14 10/14/19


01:15 10/14/19


03:00 10/14/19


11:25


 


Hemoglobin A1c


  10.4 %


(4.3-6.0)  H 


  


  


 


 


White Blood Count


  


  39.4 K/UL


(4.8-10.8)  *H 


  33.2 K/UL


(4.8-10.8)  *H


 


Red Blood Count


  


  3.04 M/UL


(4.20-5.40)  L 


  2.52 M/UL


(4.20-5.40)  L


 


Hemoglobin


  


  8.1 G/DL


(12.0-16.0)  L 


  6.7 G/DL


(12.0-16.0)  *L


 


Hematocrit


  


  25.6 %


(37.0-47.0)  L 


  21.1 %


(37.0-47.0)  L


 


Mean Corpuscular Volume  84 FL (80-99)    84 FL (80-99)  


 


Mean Corpuscular Hemoglobin


  


  26.7 PG


(27.0-31.0)  L 


  26.8 PG


(27.0-31.0)  L


 


Mean Corpuscular Hemoglobin


Concent 


  31.8 G/DL


(32.0-36.0)  L 


  31.9 G/DL


(32.0-36.0)  L


 


Red Cell Distribution Width


  


  12.3 %


(11.6-14.8) 


  11.1 %


(11.6-14.8)  L


 


Platelet Count


  


  608 K/UL


(150-450)  H 


  450 K/UL


(150-450)


 


Mean Platelet Volume


  


  8.2 FL


(6.5-10.1) 


  8.4 FL


(6.5-10.1)


 


Neutrophils (%) (Auto)


  


  % (45.0-75.0)


  


  % (45.0-75.0)


 


 


Lymphocytes (%) (Auto)


  


  % (20.0-45.0)


  


  % (20.0-45.0)


 


 


Monocytes (%) (Auto)   % (1.0-10.0)     % (1.0-10.0)  


 


Eosinophils (%) (Auto)   % (0.0-3.0)     % (0.0-3.0)  


 


Basophils (%) (Auto)   % (0.0-2.0)     % (0.0-2.0)  


 


Differential Total Cells


Counted 


  100  


  


  100  


 


 


Neutrophils % (Manual)  91 % (45-75)  H  93 % (45-75)  H


 


Lymphocytes % (Manual)  5 % (20-45)  L  6 % (20-45)  L


 


Monocytes % (Manual)  4 % (1-10)    0 % (1-10)  L


 


Eosinophils % (Manual)  0 % (0-3)    0 % (0-3)  


 


Basophils % (Manual)  0 % (0-2)    0 % (0-2)  


 


Band Neutrophils  0 % (0-8)    0 % (0-8)  


 


Platelet Estimate  Increased  H  Adequate  


 


Platelet Morphology  Normal    Normal  


 


Hypochromasia  2+    


 


Anisocytosis  1+    


 


Urine Color  Yellow    


 


Urine Appearance  Turbid    


 


Urine pH  5 (4.5-8.0)    


 


Urine Specific Gravity


  


  1.020


(1.005-1.035) 


  


 


 


Urine Protein


  


  3+ (NEGATIVE)


H 


  


 


 


Urine Glucose (UA)


  


  2+ (NEGATIVE)


H 


  


 


 


Urine Ketones


  


  Negative


(NEGATIVE) 


  


 


 


Urine Blood


  


  3+ (NEGATIVE)


H 


  


 


 


Urine Nitrite


  


  Negative


(NEGATIVE) 


  


 


 


Urine Bilirubin


  


  Negative


(NEGATIVE) 


  


 


 


Urine Urobilinogen


  


  Normal MG/DL


(0.0-1.0) 


  


 


 


Urine Leukocyte Esterase


  


  3+ (NEGATIVE)


H 


  


 


 


Urine RBC


  


  20-30 /HPF (0


- 2)  H 


  


 


 


Urine WBC


  


  Tntc /HPF (0 -


2)  H 


  


 


 


Urine Squamous Epithelial


Cells 


  Few /LPF


(NONE/OCC) 


  


 


 


Urine Bacteria


  


  Moderate /HPF


(NONE)  H 


  


 


 


Sodium Level


  


  137 MMOL/L


(136-145) 


  135 MMOL/L


(136-145)  L


 


Potassium Level


  


  5.7 MMOL/L


(3.5-5.1)  H 


  6.1 MMOL/L


(3.5-5.1)  *H


 


Chloride Level


  


  101 MMOL/L


() 


  104 MMOL/L


()


 


Carbon Dioxide Level


  


  19 MMOL/L


(21-32)  L 


  22 MMOL/L


(21-32)


 


Anion Gap


  


  17 mmol/L


(5-15)  H 


  8 mmol/L


(5-15)


 


Blood Urea Nitrogen


  


  52 mg/dL


(7-18)  H 


  52 mg/dL


(7-18)  H


 


Creatinine


  


  2.0 MG/DL


(0.55-1.30)  H 


  2.0 MG/DL


(0.55-1.30)  H


 


Estimat Glomerular Filtration


Rate 


  24.9 mL/min


(>60) 


  24.9 mL/min


(>60)


 


Glucose Level


  


  451 MG/DL


()  H 


  391 MG/DL


()  H


 


Lactic Acid Level


  


  5.80 mmol/L


(0.4-2.0)  H 2.90 mmol/L


(0.66-2.22)  H 


 


 


Calcium Level


  


  9.4 MG/DL


(8.5-10.1) 


  8.3 MG/DL


(8.5-10.1)  L


 


Total Bilirubin


  


  0.4 MG/DL


(0.2-1.0) 


  0.3 MG/DL


(0.2-1.0)


 


Aspartate Amino Transf


(AST/SGOT) 


  22 U/L (15-37)


  


  67 U/L (15-37)


H


 


Alanine Aminotransferase


(ALT/SGPT) 


  26 U/L (12-78)


  


  58 U/L (12-78)


 


 


Alkaline Phosphatase


  


  119 U/L


()  H 


  106 U/L


()


 


Total Creatine Kinase


  


  23 U/L


()  L 


  


 


 


Creatine Kinase MB


  


  0.9 NG/ML


(0.0-3.6) 


  


 


 


Creatine Kinase MB Relative


Index 


  3.9  


  


  


 


 


Troponin I


  


  0.048 ng/mL


(0.000-0.056) 


  


 


 


Pro-B-Type Natriuretic Peptide


  


  58969 pg/mL


(0-125)  H 


  32925 pg/mL


(0-125)  H


 


Total Protein


  


  7.9 G/DL


(6.4-8.2) 


  6.9 G/DL


(6.4-8.2)


 


Albumin


  


  1.8 G/DL


(3.4-5.0)  L 


  1.6 G/DL


(3.4-5.0)  L


 


Globulin  6.1 g/dL    5.3 g/dL  


 


Albumin/Globulin Ratio


  


  0.3 (1.0-2.7)


L 


  0.3 (1.0-2.7)


L


 


Myelocytes %    1 % (0-0)  H


 


Red Blood Cell Morphology    Normal  


 


Uric Acid


  


  


  


  10.0 MG/DL


(2.6-7.2)  H


 


Phosphorus Level


  


  


  


  4.4 MG/DL


(2.5-4.9)


 


Magnesium Level


  


  


  


  2.8 MG/DL


(1.8-2.4)  H


 


Iron Level


  


  


  


  29 ug/dL


()  L


 


Total Iron Binding Capacity


  


  


  


  141 ug/dL


(250-450)  L


 


Percent Iron Saturation    21 % (15-50)  


 


Unsaturated Iron Binding


  


  


  


  112 ug/dL


(112-346)


 


Ferritin


  


  


  


  1443 NG/ML


(8-388)  H


 


C-Reactive Protein,


Quantitative 


  


  


  28.7 mg/dL


(0.00-0.90)  H


 


Triglycerides Level


  


  


  


  178 MG/DL


()  H


 


Cholesterol Level


  


  


  


  107 MG/DL (<


200)


 


LDL Cholesterol


  


  


  


  59 mg/dL


(<100)


 


HDL Cholesterol


  


  


  


  19 MG/DL


(40-60)  L


 


Cholesterol/HDL Ratio


  


  


  


  5.6 (3.3-4.4)


H


 


Vitamin B12 Level


  


  


  


  1061 PG/ML


(193-986)  H


 


Folate


  


  


  


  7.2 NG/ML


(8.6-58.9)  L


 


Thyroid Stimulating Hormone


(TSH) 


  


  


  0.018 uiU/mL


(0.358-3.740)


 


Digoxin Level


  


  


  


  < 0.2 NG/ML


(0.9-2.0)  L


 


Test


  10/14/19


11:29 


  


  


 


 


Urine Random Sodium


  25 mmol/L


() 


  


  


 








Height (Feet):  5


Height (Inches):  5.00


Weight (Pounds):  150


Medications





Current Medications








 Medications


  (Trade)  Dose


 Ordered  Sig/Winnie


 Route


 PRN Reason  Start Time


 Stop Time Status Last Admin


Dose Admin


 


 Acetaminophen


  (Tylenol)  650 mg  PRN  PRN


 RECTAL


 TEMP>100.5  10/14/19 03:00


     


 


 


 Albuterol/


 Ipratropium


  (Albuterol/


 Ipratropium)  3 ml  Q4H  PRN


 HHN


 Shortness of Breath  10/14/19 07:30


 10/19/19 07:29   


 


 


 Dextrose


  (Dextrose 50%)  25 ml  Q30M  PRN


 IV


 Hypoglycemia  10/14/19 07:00


 11/13/19 06:59   


 


 


 Dextrose


  (Dextrose 50%)  50 ml  Q30M  PRN


 IV


 Hypoglycemia  10/14/19 07:00


 11/13/19 06:59   


 


 


 Docusate Sodium


  (Colace)  100 mg  THREE TIMES A  DAY


 ORAL


   10/14/19 13:00


 11/13/19 12:59  10/14/19 17:34


 


 


 Enoxaparin Sodium


  (Lovenox)  30 mg  DAILY


 SUBQ


   10/14/19 09:00


 11/13/19 08:59  10/14/19 09:30


 


 


 Insulin Aspart


  (NovoLOG)    BEFORE MEALS AND  HS


 SUBQ


   10/14/19 11:30


 11/13/19 11:29  10/14/19 17:36


 


 


 Insulin Aspart


  (NovoLOG)  8 units  BEFORE  MEALS


 SUBQ


   10/14/19 11:30


 11/13/19 11:29  10/14/19 17:36


 


 


 Insulin Detemir


  (Levemir)  20 units  DAILY


 SUBQ


   10/14/19 10:30


 11/13/19 10:29  10/14/19 10:16


 


 


 Pantoprazole


  (Protonix)  40 mg  BID


 ORAL


   10/14/19 18:00


 11/13/19 08:59  10/14/19 17:34


 


 


 Piperacillin Sod/


 Tazobactam Sod


 3.375 gm/Sodium


 Chloride  110 ml @ 


 27.5 mls/hr  EVERY 8  HOURS


 IVPB


   10/14/19 14:00


 10/19/19 13:59   


 


 


 Sodium Chloride  1,000 ml @ 


 75 mls/hr  [id number]


 IV


   10/15/19 07:30


 11/13/19 07:29   


 


 


 Vancomycin HCl


  (Vanco rx to


 dose)  1 ea  DAILY  PRN


 MISC


 Per rx protocol  10/14/19 07:30


 11/13/19 07:29   


 


 


 Vancomycin HCl


 750 mg/Sodium


 Chloride  275 ml @ 


 183.333


 mls/hr  Q24H


 IVPB


   10/15/19 09:00


 10/19/19 08:59   


 











Assessment/Plan


Problem List:  


(1) Aspiration pneumonia


Assessment & Plan:  with B/L infiltrates, L>R suspect due to altered mental 

status , with hypoxemia and leukocytosis, continue vancomycin and zosyn 

empirically, send sputum culture , aspiration precaution


ICD Codes:  J69.0 - Pneumonitis due to inhalation of food and vomit


SNOMED:  800600622


Qualifiers:  


   


(2) UTI (urinary tract infection)


Assessment & Plan:  already on wide spectrum antibiotics , pending urine culture


ICD Codes:  N39.0 - Urinary tract infection, site not specified


SNOMED:  95356394


Qualifiers:  


   Qualified Codes:  N30.00 - Acute cystitis without hematuria


(3) Sepsis


Assessment & Plan:  due to the above, continue wide spectrum antibiotics 

pending cultures


ICD Codes:  A41.9 - Sepsis, unspecified organism


SNOMED:  18323753


Qualifiers:  


   Qualified Codes:  A41.9 - Sepsis, unspecified organism; R65.20 - Severe 

sepsis without septic shock; N17.9 - Acute kidney failure, unspecified


(4) Acute metabolic encephalopathy


Assessment & Plan:  due to the above, continue hydration and antibiotics, 

monitor in tele


ICD Codes:  G93.41 - Metabolic encephalopathy


SNOMED:  39635232, 904569091


(5) Hyperglycemia due to type 2 diabetes mellitus


Assessment & Plan:  recommend tight glycemic control to keep blood glucose 

between 100-140


ICD Codes:  E11.65 - Type 2 diabetes mellitus with hyperglycemia


SNOMED:  260827057110549, 71136521


Qualifiers:  


   Qualified Codes:  E11.65 - Type 2 diabetes mellitus with hyperglycemia; 

Z79.4 - Long term (current) use of insulin


(6) ARF (acute renal failure)


Assessment & Plan:  due to the above, continue hydration and renally dosed 

antibiotics, close  monitor of renal function


ICD Codes:  N17.9 - Acute kidney failure, unspecified


SNOMED:  60633331


Qualifiers:  


   Qualified Codes:  N17.9 - Acute kidney failure, unspecified


Status:  stable











Amie Burgos M.D. Oct 14, 2019 19:55

## 2019-10-15 VITALS — DIASTOLIC BLOOD PRESSURE: 66 MMHG | SYSTOLIC BLOOD PRESSURE: 139 MMHG

## 2019-10-15 VITALS — DIASTOLIC BLOOD PRESSURE: 57 MMHG | SYSTOLIC BLOOD PRESSURE: 103 MMHG

## 2019-10-15 VITALS — SYSTOLIC BLOOD PRESSURE: 146 MMHG | DIASTOLIC BLOOD PRESSURE: 85 MMHG

## 2019-10-15 VITALS — SYSTOLIC BLOOD PRESSURE: 140 MMHG | DIASTOLIC BLOOD PRESSURE: 72 MMHG

## 2019-10-15 VITALS — DIASTOLIC BLOOD PRESSURE: 64 MMHG | SYSTOLIC BLOOD PRESSURE: 151 MMHG

## 2019-10-15 VITALS — DIASTOLIC BLOOD PRESSURE: 69 MMHG | SYSTOLIC BLOOD PRESSURE: 141 MMHG

## 2019-10-15 LAB
ADD MANUAL DIFF: YES
ADD MANUAL DIFF: YES
ALBUMIN SERPL-MCNC: 1.5 G/DL (ref 3.4–5)
ALBUMIN/GLOB SERPL: 0.3 {RATIO} (ref 1–2.7)
ALP SERPL-CCNC: 105 U/L (ref 46–116)
ALT SERPL-CCNC: 88 U/L (ref 12–78)
ANION GAP SERPL CALC-SCNC: 10 MMOL/L (ref 5–15)
AST SERPL-CCNC: 69 U/L (ref 15–37)
BILIRUB SERPL-MCNC: 0.9 MG/DL (ref 0.2–1)
BUN SERPL-MCNC: 44 MG/DL (ref 7–18)
CALCIUM SERPL-MCNC: 8.5 MG/DL (ref 8.5–10.1)
CHLORIDE SERPL-SCNC: 109 MMOL/L (ref 98–107)
CK SERPL-CCNC: 26 U/L (ref 26–308)
CO2 SERPL-SCNC: 23 MMOL/L (ref 21–32)
CREAT SERPL-MCNC: 1.7 MG/DL (ref 0.55–1.3)
ERYTHROCYTE [DISTWIDTH] IN BLOOD BY AUTOMATED COUNT: 11.3 % (ref 11.6–14.8)
ERYTHROCYTE [DISTWIDTH] IN BLOOD BY AUTOMATED COUNT: 12 % (ref 11.6–14.8)
GAMMA GLUTAMYL TRANSPEPTIDASE: 29 U/L (ref 5–85)
GLOBULIN SER-MCNC: 5.1 G/DL
HCT VFR BLD CALC: 26.2 % (ref 37–47)
HCT VFR BLD CALC: 26.5 % (ref 37–47)
HGB BLD-MCNC: 8.6 G/DL (ref 12–16)
HGB BLD-MCNC: 9.1 G/DL (ref 12–16)
MCV RBC AUTO: 83 FL (ref 80–99)
MCV RBC AUTO: 84 FL (ref 80–99)
PHOSPHATE SERPL-MCNC: 3.7 MG/DL (ref 2.5–4.9)
PLATELET # BLD: 498 K/UL (ref 150–450)
PLATELET # BLD: 501 K/UL (ref 150–450)
POTASSIUM SERPL-SCNC: 4.1 MMOL/L (ref 3.5–5.1)
RBC # BLD AUTO: 3.14 M/UL (ref 4.2–5.4)
RBC # BLD AUTO: 3.18 M/UL (ref 4.2–5.4)
SODIUM SERPL-SCNC: 142 MMOL/L (ref 136–145)
WBC # BLD AUTO: 23.6 K/UL (ref 4.8–10.8)
WBC # BLD AUTO: 28.1 K/UL (ref 4.8–10.8)

## 2019-10-15 RX ADMIN — ENOXAPARIN SODIUM SCH MG: 30 INJECTION SUBCUTANEOUS at 09:02

## 2019-10-15 RX ADMIN — INSULIN ASPART SCH UNITS: 100 INJECTION, SOLUTION INTRAVENOUS; SUBCUTANEOUS at 16:30

## 2019-10-15 RX ADMIN — DEXTROSE MONOHYDRATE SCH MLS/HR: 50 INJECTION, SOLUTION INTRAVENOUS at 21:29

## 2019-10-15 RX ADMIN — DOCUSATE SODIUM SCH MG: 100 CAPSULE, LIQUID FILLED ORAL at 17:43

## 2019-10-15 RX ADMIN — INSULIN DETEMIR SCH UNITS: 100 INJECTION, SOLUTION SUBCUTANEOUS at 09:21

## 2019-10-15 RX ADMIN — INSULIN ASPART SCH UNITS: 100 INJECTION, SOLUTION INTRAVENOUS; SUBCUTANEOUS at 11:30

## 2019-10-15 RX ADMIN — DEXTROSE MONOHYDRATE SCH MLS/HR: 50 INJECTION, SOLUTION INTRAVENOUS at 14:22

## 2019-10-15 RX ADMIN — DIVALPROEX SODIUM SCH MG: 125 CAPSULE ORAL at 21:45

## 2019-10-15 RX ADMIN — INSULIN ASPART SCH UNITS: 100 INJECTION, SOLUTION INTRAVENOUS; SUBCUTANEOUS at 21:00

## 2019-10-15 RX ADMIN — ASPIRIN 81 MG SCH MG: 81 TABLET ORAL at 08:59

## 2019-10-15 RX ADMIN — INSULIN ASPART SCH UNITS: 100 INJECTION, SOLUTION INTRAVENOUS; SUBCUTANEOUS at 11:48

## 2019-10-15 RX ADMIN — DOCUSATE SODIUM SCH MG: 100 CAPSULE, LIQUID FILLED ORAL at 08:59

## 2019-10-15 RX ADMIN — DONEPEZIL HYDROCHLORIDE SCH MG: 5 TABLET, FILM COATED ORAL at 11:33

## 2019-10-15 RX ADMIN — SODIUM CHLORIDE SCH MLS/HR: 9 INJECTION, SOLUTION INTRAVENOUS at 08:59

## 2019-10-15 RX ADMIN — DOCUSATE SODIUM SCH MG: 100 CAPSULE, LIQUID FILLED ORAL at 13:00

## 2019-10-15 RX ADMIN — DEXTROSE MONOHYDRATE SCH MLS/HR: 50 INJECTION, SOLUTION INTRAVENOUS at 06:09

## 2019-10-15 RX ADMIN — INSULIN ASPART SCH UNITS: 100 INJECTION, SOLUTION INTRAVENOUS; SUBCUTANEOUS at 06:10

## 2019-10-15 RX ADMIN — INSULIN DETEMIR SCH UNITS: 100 INJECTION, SOLUTION SUBCUTANEOUS at 09:00

## 2019-10-15 RX ADMIN — INSULIN ASPART SCH UNITS: 100 INJECTION, SOLUTION INTRAVENOUS; SUBCUTANEOUS at 06:30

## 2019-10-15 NOTE — NUR
CASE MANAGEMENT: INITIAL REVIEW



10/15/19



SI: SEPSIS/ ACUTE RENAL FAILURE/ PNA /UTI/ ANEMIA/ CHANCE/ ACUTE ENCEPHALOPATHY 

98.0  85  19  139/66  99% NC 2L

WBC 39.4-28.1; RBC 3.14; H/H 9.1/26.2; ; BUN 44; CREA 1.7;

TROP 0.407; BNP 41124; C-R.PRO 22.7; MG 2.7, URIC ACID 9.1



IS:      IV VANCOMYCIN Q24HR

IV ZOSYN Q8HR

IVF NS @75

LOVENOX SQ QD

PROTONIX PO BID

NOVOLOG SQ AC&HS

LEVEMIR SQ QD

PLAVIX PO QD

ASA PO QD





**: 2E TELE UNIT



DCP: RETURN TO [facility] WHEN MEDICALLY CLEARED 



PLAN:  ECG

## 2019-10-15 NOTE — NUR
NURSE NOTES:WOUND CARE NOTES:Pt presented on admission with DTPI R heel. Blood filled 
blister with marginal erythema noted to heel. Pt exhibited agitation when minimally 
palpated. (L)2.2cm x (W)2.1cm

L heel is blanchable .

Non-blanchable erythema without induration noted to sacral cleft. 

No other areas of skin concerns noted.



Tx.Plan:Apply Betadine to R heel. Cover with Optifoam drsg. Change every 3 days and prn.

           Apply Cavilon Skin Barrier to L heel. Cover with Optifoam drsg. Change every 7 
days and prn.

           Apply Moisture Barrier Paste to buttocks. Cover sacral area with Optifoam drsg. 
Change every 3 days and prn.

           Reposition at least every 2hours or as tolerated.

           Off-load heels with pillow.

## 2019-10-15 NOTE — INFECTIOUS DISEASES PROG NOTE
Assessment/Plan


Problems:  


(1) Aspiration pneumonia


Assessment & Plan:  with B/L infiltrates, L>R suspect due to altered mental 

status , with hypoxemia and leukocytosis, continue vancomycin and zosyn 

empirically, send sputum culture , aspiration precaution 





(2) UTI (urinary tract infection)


Assessment & Plan:  already on wide spectrum antibiotics , pending urine 

culture 





(3) Sepsis


Assessment & Plan:  due to the above, continue wide spectrum antibiotics 

pending cultures 





(4) Acute metabolic encephalopathy


Assessment & Plan:  due to the above, continue hydration and antibiotics, 

monitor in tele 





(5) Hyperglycemia due to type 2 diabetes mellitus


Assessment & Plan:  recommend tight glycemic control to keep blood glucose 

between 100-140 





(6) ARF (acute renal failure)


Assessment & Plan:  due to the above, continue hydration and renally dosed 

antibiotics, close  monitor of renal function 








Subjective


Constitutional:  Reports: fatigue


HEENT:  Reports: no symptoms, congestion


Respiratory:  Reports: dry cough


Breasts:  Reports: no symptoms


Cardiovascular:  Reports: no symptoms


Gastrointestinal/Abdominal:  Reports: no symptoms


Genitourinary:  Reports: no symptoms


Neurologic:  Reports: no symptoms


Psychiatric:  Reports: anxiety


Skin:  Reports: ulcer


Endocrine:  Reports: no symptoms


Hematologic:  Reports: no symptoms


Musculoskeletal:  Reports: no symptoms


Allergies:  


Coded Allergies:  


     No Known Allergies (Unverified , 10/14/19)


Subjective


she was more awake and responsive today, afebrile





Objective


Vital Signs





Last 24 Hour Vital Signs








  Date Time  Temp Pulse Resp B/P (MAP) Pulse Ox O2 Delivery O2 Flow Rate FiO2


 


10/15/19 11:38  88      


 


10/15/19 08:52     98 Nasal Cannula 2.0 28


 


10/15/19 08:51  84 20  98 Nasal Cannula 2.0 28


 


10/15/19 08:00 98.0 85 19 139/66 (90) 99   


 


10/15/19 07:45  85      


 


10/15/19 04:00  81      


 


10/15/19 04:00 98.0 57 18 103/57 (72) 96   


 


10/15/19 00:00  83      


 


10/15/19 00:00 99.1 83 18 140/72 (94) 99   


 


10/14/19 21:00      Nasal Cannula 2.0 


 


10/14/19 20:00  86      


 


10/14/19 20:00 98.2 86 20 139/77 (97) 99   


 


10/14/19 19:49  78 20  96 Nasal Cannula 2.0 28


 


10/14/19 19:49     96 Nasal Cannula 2.0 28


 


10/14/19 16:00  77      


 


10/14/19 16:00 96.7 81 18 121/74 (90) 98   








Height (Feet):  5


Height (Inches):  5.00


Weight (Pounds):  150


General Appearance:  WD/WN, no acute distress


HEENT:  normocephalic, atraumatic, anicteric, mucous membranes moist, PERRL


Respiratory/Chest:  chest wall non-tender, normal breath sounds, no respiratory 

distress, no accessory muscle use, decreased breath sounds, crackles/rales


Cardiovascular:  normal peripheral pulses, normal rate, regular rhythm, no 

gallop/murmur, no JVD


Abdomen:  normal bowel sounds, soft, non tender, no organomegaly, non distended

, no mass, no scars


Genitourinary:  normal external genitalia


Extremities:  no cyanosis, no clubbing


Skin:  no rash, no lesions, ulcers


Neurologic/Psychiatric:  CNs II-XII grossly normal, no motor/sensory deficits, 

alert, responsive


Lymphatic:  no neck adenopathy, no groin adenopathy


Musculoskeletal:  normal muscle bulk, no effusion





Microbiology








 Date/Time


Source Procedure


Growth Status


 


 


 10/14/19 01:30


Blood Blood Culture - Preliminary


NO GROWTH AFTER 24 HOURS Resulted


 


 10/14/19 01:15


Blood Blood Culture - Preliminary


NO GROWTH AFTER 24 HOURS Resulted


 


 10/14/19 01:15


Urine,Clean Catch Urine Culture - Preliminary Resulted











Laboratory Tests








Test


  10/15/19


05:30


 


White Blood Count


  28.1 K/UL


(4.8-10.8)  *H


 


Red Blood Count


  3.14 M/UL


(4.20-5.40)  L


 


Hemoglobin


  9.1 G/DL


(12.0-16.0)  #L


 


Hematocrit


  26.2 %


(37.0-47.0)  L


 


Mean Corpuscular Volume 84 FL (80-99)  


 


Mean Corpuscular Hemoglobin


  28.9 PG


(27.0-31.0)


 


Mean Corpuscular Hemoglobin


Concent 34.6 G/DL


(32.0-36.0)


 


Red Cell Distribution Width


  12.0 %


(11.6-14.8)


 


Platelet Count


  498 K/UL


(150-450)  H


 


Mean Platelet Volume


  8.4 FL


(6.5-10.1)


 


Neutrophils (%) (Auto)


  % (45.0-75.0)


 


 


Lymphocytes (%) (Auto)


  % (20.0-45.0)


 


 


Monocytes (%) (Auto)  % (1.0-10.0)  


 


Eosinophils (%) (Auto)  % (0.0-3.0)  


 


Basophils (%) (Auto)  % (0.0-2.0)  


 


Differential Total Cells


Counted 100  


 


 


Neutrophils % (Manual) 88 % (45-75)  H


 


Lymphocytes % (Manual) 5 % (20-45)  L


 


Monocytes % (Manual) 7 % (1-10)  


 


Eosinophils % (Manual) 0 % (0-3)  


 


Basophils % (Manual) 0 % (0-2)  


 


Band Neutrophils 0 % (0-8)  


 


Platelet Estimate Adequate  


 


Platelet Morphology Normal  


 


Hypochromasia 1+  


 


Sodium Level


  142 MMOL/L


(136-145)


 


Potassium Level


  4.1 MMOL/L


(3.5-5.1)


 


Chloride Level


  109 MMOL/L


()  H


 


Carbon Dioxide Level


  23 MMOL/L


(21-32)


 


Anion Gap


  10 mmol/L


(5-15)


 


Blood Urea Nitrogen


  44 mg/dL


(7-18)  H


 


Creatinine


  1.7 MG/DL


(0.55-1.30)  H


 


Estimat Glomerular Filtration


Rate 30.1 mL/min


(>60)


 


Glucose Level


  78 MG/DL


()  #


 


Lactic Acid Level


  0.90 mmol/L


(0.4-2.0)


 


Uric Acid


  9.1 MG/DL


(2.6-7.2)  H


 


Calcium Level


  8.5 MG/DL


(8.5-10.1)


 


Phosphorus Level


  3.7 MG/DL


(2.5-4.9)


 


Magnesium Level


  2.7 MG/DL


(1.8-2.4)  H


 


Total Bilirubin


  0.9 MG/DL


(0.2-1.0)


 


Gamma Glutamyl Transpeptidase 29 U/L (5-85)  


 


Aspartate Amino Transf


(AST/SGOT) 69 U/L (15-37)


H


 


Alanine Aminotransferase


(ALT/SGPT) 88 U/L (12-78)


H


 


Alkaline Phosphatase


  105 U/L


()


 


Total Creatine Kinase


  26 U/L


()


 


Troponin I


  0.407 ng/mL


(0.000-0.056)


 


C-Reactive Protein,


Quantitative 22.7 mg/dL


(0.00-0.90)  H


 


Pro-B-Type Natriuretic Peptide


  76376 pg/mL


(0-125)  H


 


Total Protein


  6.6 G/DL


(6.4-8.2)


 


Albumin


  1.5 G/DL


(3.4-5.0)  L


 


Globulin 5.1 g/dL  


 


Albumin/Globulin Ratio


  0.3 (1.0-2.7)


L


 


Free Thyroxine


  1.08 NG/DL


(0.76-1.46)


 


Free Triiodothyronine


  1.6 pg/mL


(2.3-4.2)  L


 


Thyroid Stimulating


Immunoglobulin Pending  


 


 


Thyroglobulin Antibody Pending  











Current Medications








 Medications


  (Trade)  Dose


 Ordered  Sig/Winnie


 Route


 PRN Reason  Start Time


 Stop Time Status Last Admin


Dose Admin


 


 Acetaminophen


  (Tylenol)  650 mg  PRN  PRN


 RECTAL


 TEMP>100.5  10/14/19 03:00


     


 


 


 Albuterol/


 Ipratropium


  (Albuterol/


 Ipratropium)  3 ml  Q4H  PRN


 HHN


 Shortness of Breath  10/14/19 07:30


 10/19/19 07:29   


 


 


 Aspirin


  (ASA)  81 mg  DAILY


 ORAL


   10/15/19 09:00


 11/14/19 08:59  10/15/19 08:59


 


 


 Atorvastatin


 Calcium


  (Lipitor)  10 mg  BEDTIME


 ORAL


   10/14/19 21:00


 11/13/19 20:59  10/14/19 21:15


 


 


 Clopidogrel


 Bisulfate


  (Plavix)  75 mg  DAILY


 ORAL


   10/15/19 09:00


 11/14/19 08:59  10/15/19 08:59


 


 


 Dextrose


  (Dextrose 50%)  25 ml  Q30M  PRN


 IV


 Hypoglycemia  10/14/19 07:00


 11/13/19 06:59   


 


 


 Dextrose


  (Dextrose 50%)  50 ml  Q30M  PRN


 IV


 Hypoglycemia  10/14/19 07:00


 11/13/19 06:59   


 


 


 Divalproex Sodium


  (Depakote)  500 mg  Q12HR


 ORAL


   10/15/19 11:00


 11/14/19 10:59  10/15/19 11:33


 


 


 Docusate Sodium


  (Colace)  100 mg  THREE TIMES A  DAY


 ORAL


   10/14/19 13:00


 11/13/19 12:59  10/15/19 08:59


 


 


 Donepezil HCl


  (Aricept)  5 mg  DAILY


 ORAL


   10/15/19 11:00


 11/14/19 10:59  10/15/19 11:33


 


 


 Enoxaparin Sodium


  (Lovenox)  30 mg  DAILY


 SUBQ


   10/14/19 09:00


 11/13/19 08:59  10/15/19 09:02


 


 


 Insulin Aspart


  (NovoLOG)    BEFORE MEALS AND  HS


 SUBQ


   10/14/19 11:30


 11/13/19 11:29  10/14/19 17:36


 


 


 Insulin Aspart


  (NovoLOG)  6 units  BEFORE  MEALS


 SUBQ


   10/15/19 11:30


 11/13/19 11:29  10/15/19 11:48


 


 


 Insulin Detemir


  (Levemir)  18 units  DAILY


 SUBQ


   10/15/19 09:00


 11/13/19 10:29  10/15/19 09:21


 


 


 Olanzapine


  (ZyPREXA)  5 mg  DAILY


 ORAL


   10/15/19 11:00


 11/14/19 10:59   


 


 


 Pantoprazole


  (Protonix)  40 mg  BID


 ORAL


   10/14/19 18:00


 11/13/19 08:59  10/15/19 08:59


 


 


 Piperacillin Sod/


 Tazobactam Sod


 3.375 gm/Sodium


 Chloride  110 ml @ 


 27.5 mls/hr  EVERY 8  HOURS


 IVPB


   10/14/19 14:00


 10/19/19 13:59  10/15/19 06:09


 


 


 Sodium Chloride  1,000 ml @ 


 75 mls/hr  [id number]


 IV


   10/15/19 07:30


 11/13/19 07:29  10/15/19 06:47


 


 


 Vancomycin HCl


  (Vanco rx to


 dose)  1 ea  DAILY  PRN


 MISC


 Per rx protocol  10/14/19 07:30


 11/13/19 07:29   


 


 


 Vancomycin HCl


 750 mg/Sodium


 Chloride  275 ml @ 


 183.333


 mls/hr  Q24H


 IVPB


   10/15/19 09:00


 10/19/19 08:59  10/15/19 08:59


 

















Amie Burgos M.D. Oct 15, 2019 14:17

## 2019-10-15 NOTE — NUR
NURSE NOTES:

Received critical lab value @ 9:03am for troponin 0.407.

@9:08am Spoke with Dr. May, cardiologist.  Received order for EKG.

## 2019-10-15 NOTE — GENERAL PROGRESS NOTE
Assessment/Plan


Problem List:  


(1) Abnormal TSH


ICD Codes:  R79.89 - Other specified abnormal findings of blood chemistry


SNOMED:  084017311


(2) Hyperglycemia due to type 2 diabetes mellitus


ICD Codes:  E11.65 - Type 2 diabetes mellitus with hyperglycemia


SNOMED:  291670881625502, 29056819


Qualifiers:  


   Qualified Codes:  E11.65 - Type 2 diabetes mellitus with hyperglycemia; 

Z79.4 - Long term (current) use of insulin


(3) Acute metabolic encephalopathy


ICD Codes:  G93.41 - Metabolic encephalopathy


SNOMED:  74743495, 610300425


(4) ARF (acute renal failure)


ICD Codes:  N17.9 - Acute kidney failure, unspecified


SNOMED:  23509072


Qualifiers:  


   Qualified Codes:  N17.9 - Acute kidney failure, unspecified


(5) Healthcare associated bacterial pneumonia


ICD Codes:  J15.9 - Unspecified bacterial pneumonia


SNOMED:  701104205


Status:  stable


Assessment/Plan:


reduce Levemir to 18 units qam 


reduce Novolog to 6 units ac tid 


continue NISS ac / hs 





check free T4, free T3, ATPO, TSI





Subjective


Allergies:  


Coded Allergies:  


     No Known Allergies (Unverified , 10/14/19)


Subjective


events noted 


glucose values improved 











Item Value  Date Time


 


Bedside Blood Glucose 88 mg/dl 10/15/19 0630


 


Bedside Blood Glucose 105 mg/dl 10/14/19 2100


 


Bedside Blood Glucose 232 mg/dl H 10/14/19 1736


 


Bedside Blood Glucose 411 mg/dl H 10/14/19 1213


 


Bedside Blood Glucose 350 mg/dl H 10/14/19 1016


 


Bedside Blood Glucose 445 mg/dl H 10/14/19 0419











Objective





Last 24 Hour Vital Signs








  Date Time  Temp Pulse Resp B/P (MAP) Pulse Ox O2 Delivery O2 Flow Rate FiO2


 


10/15/19 04:00  81      


 


10/15/19 04:00 98.0 57 18 103/57 (72) 96   


 


10/15/19 00:00  83      


 


10/15/19 00:00 99.1 83 18 140/72 (94) 99   


 


10/14/19 21:00      Nasal Cannula 2.0 


 


10/14/19 20:00  86      


 


10/14/19 20:00 98.2 86 20 139/77 (97) 99   


 


10/14/19 19:49  78 20  96 Nasal Cannula 2.0 28


 


10/14/19 19:49     96 Nasal Cannula 2.0 28


 


10/14/19 16:00  77      


 


10/14/19 16:00 96.7 81 18 121/74 (90) 98   


 


10/14/19 12:00 96.8 67 18 145/65 (91) 94   


 


10/14/19 12:00  66      


 


10/14/19 09:00      Nasal Cannula 2.0 


 


10/14/19 08:00 98.3 68 20 120/64 (82) 98   


 


10/14/19 08:00  56      

















Intake and Output  


 


 10/14/19 10/15/19





 19:00 07:00


 


Intake Total 360 ml 


 


Output Total 300 ml 


 


Balance 60 ml 


 


  


 


Intake Oral 360 ml 


 


Output Urine Total 300 ml 








Laboratory Tests


10/14/19 11:25: 


White Blood Count 33.2*H, Red Blood Count 2.52L, Hemoglobin 6.7*L, Hematocrit 

21.1L, Mean Corpuscular Volume 84, Mean Corpuscular Hemoglobin 26.8L, Mean 

Corpuscular Hemoglobin Concent 31.9L, Red Cell Distribution Width 11.1L, 

Platelet Count 450, Mean Platelet Volume 8.4, Neutrophils (%) (Auto) , 

Lymphocytes (%) (Auto) , Monocytes (%) (Auto) , Eosinophils (%) (Auto) , 

Basophils (%) (Auto) , Differential Total Cells Counted 100, Neutrophils % (

Manual) 93H, Lymphocytes % (Manual) 6L, Monocytes % (Manual) 0L, Eosinophils % (

Manual) 0, Basophils % (Manual) 0, Myelocytes % 1H, Band Neutrophils 0, 

Platelet Estimate Adequate, Platelet Morphology Normal, Red Blood Cell 

Morphology Normal, Sodium Level 135L, Potassium Level 6.1*H, Chloride Level 104

, Carbon Dioxide Level 22, Anion Gap 8, Blood Urea Nitrogen 52H, Creatinine 2.0H

, Estimat Glomerular Filtration Rate 24.9, Glucose Level 391H, Uric Acid 10.0H, 

Calcium Level 8.3L, Phosphorus Level 4.4, Magnesium Level 2.8H, Iron Level 29L, 

Total Iron Binding Capacity 141L, Percent Iron Saturation 21, Unsaturated Iron 

Binding 112, Ferritin 1443H, Total Bilirubin 0.3, Aspartate Amino Transf (AST/

SGOT) 67H, Alanine Aminotransferase (ALT/SGPT) 58, Alkaline Phosphatase 106, C-

Reactive Protein, Quantitative 28.7H, Pro-B-Type Natriuretic Peptide 43694S, 

Total Protein 6.9, Albumin 1.6L, Globulin 5.3, Albumin/Globulin Ratio 0.3L, 

Triglycerides Level 178H, Cholesterol Level 107, LDL Cholesterol 59, HDL 

Cholesterol 19L, Cholesterol/HDL Ratio 5.6H, Vitamin B12 Level 1061H, Folate 

7.2L, Thyroid Stimulating Hormone (TSH) 0.018L, Digoxin Level < 0.2L


10/14/19 11:29: Urine Random Sodium 25


Height (Feet):  5


Height (Inches):  5.00


Weight (Pounds):  150


General Appearance:  no apparent distress


Neck:  normal alignment


Cardiovascular:  normal rate


Respiratory/Chest:  decreased breath sounds


Abdomen:  normal bowel sounds


Objective





Current Medications








 Medications


  (Trade)  Dose


 Ordered  Sig/Winnie


 Route


 PRN Reason  Start Time


 Stop Time Status Last Admin


Dose Admin


 


 Acetaminophen


  (Tylenol)  650 mg  PRN  PRN


 RECTAL


 TEMP>100.5  10/14/19 03:00


     


 


 


 Albuterol/


 Ipratropium


  (Albuterol/


 Ipratropium)  3 ml  Q4H  PRN


 HHN


 Shortness of Breath  10/14/19 07:30


 10/19/19 07:29   


 


 


 Aspirin


  (ASA)  81 mg  DAILY


 ORAL


   10/15/19 09:00


 11/14/19 08:59   


 


 


 Atorvastatin


 Calcium


  (Lipitor)  10 mg  BEDTIME


 ORAL


   10/14/19 21:00


 11/13/19 20:59  10/14/19 21:15


 


 


 Clopidogrel


 Bisulfate


  (Plavix)  75 mg  DAILY


 ORAL


   10/15/19 09:00


 11/14/19 08:59   


 


 


 Dextrose


  (Dextrose 50%)  25 ml  Q30M  PRN


 IV


 Hypoglycemia  10/14/19 07:00


 11/13/19 06:59   


 


 


 Dextrose


  (Dextrose 50%)  50 ml  Q30M  PRN


 IV


 Hypoglycemia  10/14/19 07:00


 11/13/19 06:59   


 


 


 Docusate Sodium


  (Colace)  100 mg  THREE TIMES A  DAY


 ORAL


   10/14/19 13:00


 11/13/19 12:59  10/14/19 17:34


 


 


 Enoxaparin Sodium


  (Lovenox)  30 mg  DAILY


 SUBQ


   10/14/19 09:00


 11/13/19 08:59  10/14/19 09:30


 


 


 Insulin Aspart


  (NovoLOG)    BEFORE MEALS AND  HS


 SUBQ


   10/14/19 11:30


 11/13/19 11:29  10/14/19 17:36


 


 


 Insulin Aspart


  (NovoLOG)  8 units  BEFORE  MEALS


 SUBQ


   10/14/19 11:30


 11/13/19 11:29  10/14/19 17:36


 


 


 Insulin Detemir


  (Levemir)  20 units  DAILY


 SUBQ


   10/14/19 10:30


 11/13/19 10:29  10/14/19 10:16


 


 


 Pantoprazole


  (Protonix)  40 mg  BID


 ORAL


   10/14/19 18:00


 11/13/19 08:59  10/14/19 17:34


 


 


 Piperacillin Sod/


 Tazobactam Sod


 3.375 gm/Sodium


 Chloride  110 ml @ 


 27.5 mls/hr  EVERY 8  HOURS


 IVPB


   10/14/19 14:00


 10/19/19 13:59  10/15/19 06:09


 


 


 Sodium Chloride  1,000 ml @ 


 75 mls/hr  [id number]


 IV


   10/15/19 07:30


 11/13/19 07:29   


 


 


 Vancomycin HCl


  (Vanco rx to


 dose)  1 ea  DAILY  PRN


 MISC


 Per rx protocol  10/14/19 07:30


 11/13/19 07:29   


 


 


 Vancomycin HCl


 750 mg/Sodium


 Chloride  275 ml @ 


 183.333


 mls/hr  Q24H


 IVPB


   10/15/19 09:00


 10/19/19 08:59   


 

















Riccardo Velez MD Oct 15, 2019 06:46

## 2019-10-15 NOTE — CARDIOLOGY PROGRESS NOTE
Assessment/Plan


Assessment/Plan


1. Reported history of congestive heart failure.


2. Abnormal cardiac enzyme demand related due to infection and profound anemia 


3. Agitation 


4. Urinary tract infection.


5. Acute renal failure.


6. Profound anemia.


7. Diabetes mellitus.


8. History of hypertension.


9. History of mood disorder.





trop min increased 


will check lv fucntion 


is on asa and plavix which i restarted yest 


telel personal reviewed sinus 


ekg reviewed no st t wave abn 


keep on same meds 


iv abx 


hgb is better


heme following





Subjective


ROS Limited/Unobtainable:  Yes


Subjective


agitated , fighting with rn not want to eat just want to drink water





Objective





Last 24 Hour Vital Signs








  Date Time  Temp Pulse Resp B/P (MAP) Pulse Ox O2 Delivery O2 Flow Rate FiO2


 


10/15/19 08:52     98 Nasal Cannula 2.0 28


 


10/15/19 08:51  84 20  98 Nasal Cannula 2.0 28


 


10/15/19 08:00 98.0 85 19 139/66 (90) 99   


 


10/15/19 04:00  81      


 


10/15/19 04:00 98.0 57 18 103/57 (72) 96   


 


10/15/19 00:00  83      


 


10/15/19 00:00 99.1 83 18 140/72 (94) 99   


 


10/14/19 21:00      Nasal Cannula 2.0 


 


10/14/19 20:00  86      


 


10/14/19 20:00 98.2 86 20 139/77 (97) 99   


 


10/14/19 19:49  78 20  96 Nasal Cannula 2.0 28


 


10/14/19 19:49     96 Nasal Cannula 2.0 28


 


10/14/19 16:00  77      


 


10/14/19 16:00 96.7 81 18 121/74 (90) 98   








General Appearance:  no apparent distress, alert, agitated


Cardiovascular:  normal rate


Respiratory/Chest:  lungs clear


Abdomen:  normal bowel sounds, non tender, soft


Extremities:  no swelling











Intake and Output  


 


 10/14/19 10/15/19





 19:00 07:00


 


Intake Total 360 ml 


 


Output Total 300 ml 700 ml


 


Balance 60 ml -700 ml


 


  


 


Intake Oral 360 ml 


 


Output Urine Total 300 ml 700 ml











Laboratory Tests








Test


  10/15/19


05:30


 


White Blood Count


  28.1 K/UL


(4.8-10.8)  *H


 


Red Blood Count


  3.14 M/UL


(4.20-5.40)  L


 


Hemoglobin


  9.1 G/DL


(12.0-16.0)  #L


 


Hematocrit


  26.2 %


(37.0-47.0)  L


 


Mean Corpuscular Volume 84 FL (80-99)  


 


Mean Corpuscular Hemoglobin


  28.9 PG


(27.0-31.0)


 


Mean Corpuscular Hemoglobin


Concent 34.6 G/DL


(32.0-36.0)


 


Red Cell Distribution Width


  12.0 %


(11.6-14.8)


 


Platelet Count


  498 K/UL


(150-450)  H


 


Mean Platelet Volume


  8.4 FL


(6.5-10.1)


 


Neutrophils (%) (Auto)


  % (45.0-75.0)


 


 


Lymphocytes (%) (Auto)


  % (20.0-45.0)


 


 


Monocytes (%) (Auto)  % (1.0-10.0)  


 


Eosinophils (%) (Auto)  % (0.0-3.0)  


 


Basophils (%) (Auto)  % (0.0-2.0)  


 


Differential Total Cells


Counted 100  


 


 


Neutrophils % (Manual) 88 % (45-75)  H


 


Lymphocytes % (Manual) 5 % (20-45)  L


 


Monocytes % (Manual) 7 % (1-10)  


 


Eosinophils % (Manual) 0 % (0-3)  


 


Basophils % (Manual) 0 % (0-2)  


 


Band Neutrophils 0 % (0-8)  


 


Platelet Estimate Adequate  


 


Platelet Morphology Normal  


 


Hypochromasia 1+  


 


Sodium Level


  142 MMOL/L


(136-145)


 


Potassium Level


  4.1 MMOL/L


(3.5-5.1)


 


Chloride Level


  109 MMOL/L


()  H


 


Carbon Dioxide Level


  23 MMOL/L


(21-32)


 


Anion Gap


  10 mmol/L


(5-15)


 


Blood Urea Nitrogen


  44 mg/dL


(7-18)  H


 


Creatinine


  1.7 MG/DL


(0.55-1.30)  H


 


Estimat Glomerular Filtration


Rate 30.1 mL/min


(>60)


 


Glucose Level


  78 MG/DL


()  #


 


Lactic Acid Level


  0.90 mmol/L


(0.4-2.0)


 


Uric Acid


  9.1 MG/DL


(2.6-7.2)  H


 


Calcium Level


  8.5 MG/DL


(8.5-10.1)


 


Phosphorus Level


  3.7 MG/DL


(2.5-4.9)


 


Magnesium Level


  2.7 MG/DL


(1.8-2.4)  H


 


Total Bilirubin


  0.9 MG/DL


(0.2-1.0)


 


Gamma Glutamyl Transpeptidase 29 U/L (5-85)  


 


Aspartate Amino Transf


(AST/SGOT) 69 U/L (15-37)


H


 


Alanine Aminotransferase


(ALT/SGPT) 88 U/L (12-78)


H


 


Alkaline Phosphatase


  105 U/L


()


 


Total Creatine Kinase


  26 U/L


()


 


Troponin I


  0.407 ng/mL


(0.000-0.056)


 


C-Reactive Protein,


Quantitative 22.7 mg/dL


(0.00-0.90)  H


 


Pro-B-Type Natriuretic Peptide


  06425 pg/mL


(0-125)  H


 


Total Protein


  6.6 G/DL


(6.4-8.2)


 


Albumin


  1.5 G/DL


(3.4-5.0)  L


 


Globulin 5.1 g/dL  


 


Albumin/Globulin Ratio


  0.3 (1.0-2.7)


L


 


Free Thyroxine


  1.08 NG/DL


(0.76-1.46)


 


Free Triiodothyronine


  1.6 pg/mL


(2.3-4.2)  L


 


Thyroid Stimulating


Immunoglobulin Pending  


 


 


Thyroglobulin Antibody Pending  











Microbiology








 Date/Time


Source Procedure


Growth Status


 


 


 10/14/19 01:30


Blood Blood Culture - Preliminary


NO GROWTH AFTER 24 HOURS Resulted


 


 10/14/19 01:15


Blood Blood Culture - Preliminary


NO GROWTH AFTER 24 HOURS Resulted


 


 10/14/19 01:15


Urine,Clean Catch Urine Culture - Preliminary Resulted

















Rob May MD Oct 15, 2019 13:04

## 2019-10-15 NOTE — GENERAL PROGRESS NOTE
Assessment/Plan


Status:  stable


Assessment/Plan:


S: Not verbal





O: poor historian, seems comfortable. IV sites are intact. Charles in place





PHYSICAL EXAMINATION:HEAD AND NECK:  Atraumatic and normocephalic.


CHEST:  Bronchial breathing sounds.ABDOMEN:  Soft.  No organomegaly.


MUSCULOSKELETAL:  Diffuse 1+ or 2+ nonpitting edema in all four contracted


extremities.  The patient is not following the commands.  Limited


evaluation.NEUROLOGY:  The patient is awake.  Not alert.  Not verbally


communicative.





LABORATORY DATA:  Labs dated October 15, 2019  reviewed





ASSESSMENT AND PLAN:


1. Sepsis.  Differential diagnosis is healthcare-associated pneumonia or


healthcare-associated urinary tract infection.  Work in progress.


2. Chronic encephalomalacia.


3. Acute on chronic anemia.


4. Renal failure, age indeterminate.


5. Congestive heart failure- preserved EF


6. Hyperthyroidism.


7. Hyperkalemia.


8. Diabetes type 2, uncontrolled with A1c of 10.


9. Psychiatric disorder.


10. GI and DVT prophylaxis.


11. PAH- Severe 





PLAN OF CARE: Notes from  Pulmonary Critical Care, Infectious


Disease, and Nephrology review


post Blood transfusion


improving Cr level





Subjective


Allergies:  


Coded Allergies:  


     No Known Allergies (Unverified , 10/14/19)





Objective





Last 24 Hour Vital Signs








  Date Time  Temp Pulse Resp B/P (MAP) Pulse Ox O2 Delivery O2 Flow Rate FiO2


 


10/15/19 08:52     98 Nasal Cannula 2.0 28


 


10/15/19 08:51  84 20  98 Nasal Cannula 2.0 28


 


10/15/19 08:00 98.0 85 19 139/66 (90) 99   


 


10/15/19 04:00  81      


 


10/15/19 04:00 98.0 57 18 103/57 (72) 96   


 


10/15/19 00:00  83      


 


10/15/19 00:00 99.1 83 18 140/72 (94) 99   


 


10/14/19 21:00      Nasal Cannula 2.0 


 


10/14/19 20:00  86      


 


10/14/19 20:00 98.2 86 20 139/77 (97) 99   


 


10/14/19 19:49  78 20  96 Nasal Cannula 2.0 28


 


10/14/19 19:49     96 Nasal Cannula 2.0 28


 


10/14/19 16:00  77      


 


10/14/19 16:00 96.7 81 18 121/74 (90) 98   


 


10/14/19 12:00 96.8 67 18 145/65 (91) 94   


 


10/14/19 12:00  66      

















Intake and Output  


 


 10/14/19 10/15/19





 19:00 07:00


 


Intake Total 360 ml 


 


Output Total 300 ml 700 ml


 


Balance 60 ml -700 ml


 


  


 


Intake Oral 360 ml 


 


Output Urine Total 300 ml 700 ml








Laboratory Tests


10/14/19 11:25: 


White Blood Count 33.2*H, Red Blood Count 2.52L, Hemoglobin 6.7*L, Hematocrit 

21.1L, Mean Corpuscular Volume 84, Mean Corpuscular Hemoglobin 26.8L, Mean 

Corpuscular Hemoglobin Concent 31.9L, Red Cell Distribution Width 11.1L, 

Platelet Count 450, Mean Platelet Volume 8.4, Neutrophils (%) (Auto) , 

Lymphocytes (%) (Auto) , Monocytes (%) (Auto) , Eosinophils (%) (Auto) , 

Basophils (%) (Auto) , Differential Total Cells Counted 100, Neutrophils % (

Manual) 93H, Lymphocytes % (Manual) 6L, Monocytes % (Manual) 0L, Eosinophils % (

Manual) 0, Basophils % (Manual) 0, Myelocytes % 1H, Band Neutrophils 0, 

Platelet Estimate Adequate, Platelet Morphology Normal, Red Blood Cell 

Morphology Normal, Sodium Level 135L, Potassium Level 6.1*H, Chloride Level 104

, Carbon Dioxide Level 22, Anion Gap 8, Blood Urea Nitrogen 52H, Creatinine 2.0H

, Estimat Glomerular Filtration Rate 24.9, Glucose Level 391H, Uric Acid 10.0H, 

Calcium Level 8.3L, Phosphorus Level 4.4, Magnesium Level 2.8H, Iron Level 29L, 

Total Iron Binding Capacity 141L, Percent Iron Saturation 21, Unsaturated Iron 

Binding 112, Ferritin 1443H, Total Bilirubin 0.3, Aspartate Amino Transf (AST/

SGOT) 67H, Alanine Aminotransferase (ALT/SGPT) 58, Alkaline Phosphatase 106, C-

Reactive Protein, Quantitative 28.7H, Pro-B-Type Natriuretic Peptide 39196A, 

Total Protein 6.9, Albumin 1.6L, Globulin 5.3, Albumin/Globulin Ratio 0.3L, 

Triglycerides Level 178H, Cholesterol Level 107, LDL Cholesterol 59, HDL 

Cholesterol 19L, Cholesterol/HDL Ratio 5.6H, Vitamin B12 Level 1061H, Folate 

7.2L, Thyroid Stimulating Hormone (TSH) 0.018L, Digoxin Level < 0.2L


10/14/19 11:29: Urine Random Sodium 25


10/15/19 05:30: 


White Blood Count 28.1*H, Red Blood Count 3.14L, Hemoglobin 9.1#L, Hematocrit 

26.2L, Mean Corpuscular Volume 84, Mean Corpuscular Hemoglobin 28.9, Mean 

Corpuscular Hemoglobin Concent 34.6, Red Cell Distribution Width 12.0, Platelet 

Count 498H, Mean Platelet Volume 8.4, Neutrophils (%) (Auto) , Lymphocytes (%) (

Auto) , Monocytes (%) (Auto) , Eosinophils (%) (Auto) , Basophils (%) (Auto) , 

Differential Total Cells Counted 100, Neutrophils % (Manual) 88H, Lymphocytes % 

(Manual) 5L, Monocytes % (Manual) 7, Eosinophils % (Manual) 0, Basophils % (

Manual) 0, Band Neutrophils 0, Platelet Estimate Adequate, Platelet Morphology 

Normal, Sodium Level 142, Potassium Level 4.1, Chloride Level 109H, Carbon 

Dioxide Level 23, Anion Gap 10, Blood Urea Nitrogen 44H, Creatinine 1.7H, 

Estimat Glomerular Filtration Rate 30.1, Glucose Level 78#, Uric Acid 9.1H, 

Calcium Level 8.5, Phosphorus Level 3.7, Magnesium Level 2.7H, Total Bilirubin 

0.9, Aspartate Amino Transf (AST/SGOT) 69H, Alanine Aminotransferase (ALT/SGPT) 

88H, Alkaline Phosphatase 105, C-Reactive Protein, Quantitative 22.7H, Pro-B-

Type Natriuretic Peptide 60948N, Total Protein 6.6, Albumin 1.5L, Globulin 5.1, 

Albumin/Globulin Ratio 0.3L, Hypochromasia 1+, Lactic Acid Level 0.90, Gamma 

Glutamyl Transpeptidase 29, Total Creatine Kinase 26, Troponin I 0.407H, Free 

Thyroxine 1.08, Free Triiodothyronine 1.6L, Thyroid Stimulating Immunoglobulin [

Pending], Thyroglobulin Antibody [Pending]


Height (Feet):  5


Height (Inches):  5.00


Weight (Pounds):  150











Garrick Keith MD Oct 15, 2019 10:46

## 2019-10-15 NOTE — NUR
NURSE NOTES:

Received pt from [facility]. Pt asleep. Bed in lowest position. Call light within reach. Will 
continue to monitor.

## 2019-10-15 NOTE — CONSULTATION
DATE OF CONSULTATION:  10/14/2019

CARDIOLOGY CONSULTATION



CONSULTING PHYSICIAN:  Rob May M.D.



REFERRING PHYSICIAN:  Garrick Keith M.D.



REASON FOR REFERRAL:  Possible congestive heart failure.



HISTORY OF PRESENT ILLNESS:  This is an elderly female, who is a resident

of a convalescent facility.  The patient is not able to provide any

meaningful history whatsoever; therefore, information was obtained from

review of patient's chart.  She has had a history of diabetes, heart

failure, and hypertension who has been admitted to the hospital with

possible pneumonia and urinary tract infection, was noted to be

encephalopathic in the emergency room and was not able to provide any

meaningful history.  She was noted to have significant leukocytosis as

well as evidence of lactic acid elevation.  The chest x-ray showed

possibility of congestion; therefore, this consultation requested.  The

patient herself is not able to provide any meaningful history whatsoever.

The chart indicates history of psychiatric disorder, mood disorder, and

agitation, has been on Depakote.  Chart also indicates the patient does

have a history of congestive heart failure, pneumonia, muscle weakness,

lack of coordination, dysphagia _____ to medications, pressure ulcers,

hypertensive heart disease, and diabetes mellitus.



ALLERGIES:  No known drug allergies.



SOCIAL HISTORY:  Resident of a convalescent facility, is again not able to

provide meaningful history.



MEDICATIONS:  Include aspirin 81 mg, Plavix 75 mg, Lasix 40 mg,

multivitamins, olanzapine, donepezil for dementia, Colace, vitamin C,

Depakote 500, Lipitor 10, metformin 1 gram twice a day, Toprol-XL 50 mg

daily, and oxygen as needed.



REVIEW OF SYSTEMS:  Unable to obtain.



PHYSICAL EXAMINATION:

GENERAL:  Shows to be an elderly female, in no respiratory distress.  She

is sleeping, not verbally communicating but easily agitable.

NECK:  Supple.  No jugular venous distention noted.

LUNGS:  Clear to auscultation to the extent able to examine anteriorly.

CARDIAC:  Regular rate and rhythm.  No heaves, thrills, or gallops noted.

ABDOMEN:  Soft, nontender.  Positive bowel sounds.

EXTREMITIES:  There is no edema.

NEUROLOGICALLY:  She is noncommunicative or responsive.



LABORATORY VALUES:  Chest x-ray shows evidence of interstitial congestion,

possibly hazy airspace infiltrate versus edema.  Blood tests, white count

of 39,000 down to 33,000, hemoglobin 8.1 down to 6.7, and platelet count

of 608,000 down to 450,000.  Sodium is 135, potassium 6.0, chloride 104,

bicarb 22, BUN of 52, creatinine 2.0, glucose of 391.  Hemoglobin A1c of

10.4 at the time of admission.  Lactic acid of 5.8, subsequently 2.0.

Uric acid of 10 and magnesium of 2.8.  Iron of 29, 21% saturation,

ferritin 1400.  AST and ALT within normal limits.  Troponin one set of

0.048.  ProBNP of 21,000.  Total cholesterol 107, triglycerides 178, LDL

of 19, HDL 19, LDL of 59.  B12 was normal.  TSH is 0.018.  Tox screen

digoxin level less than 0.12.  Urinalysis shows too numerous to count

WBC.



ASSESSMENT AND PLAN:

1. Reported history of congestive heart failure.

2. Abnormal chest x-ray.

3. Agitation.

4. Urinary tract infection.

5. Acute renal failure.

6. Profound anemia.

7. Diabetes mellitus.

8. History of hypertension.

9. History of mood disorder.



Dr. Keith, this patient was seen in cardiac consultation.

Unfortunately, it is difficult to tell whether she suffers from congestive

heart failure.  Her examination does not show significant crackles on lung

examination, but it is difficult to examine because she becomes agitated

easily.  Certainly does have laboratory values that may be consistent or

suggestive of heart failure, although in the setting of renal

insufficiency, the proBNP may decrease accuracy and specificity.

Electrocardiogram that she has done in the emergency room does not show

any evidence of coronary syndrome.  Her blood pressure at this time is

adequate, I think an echocardiogram should be ordered.  She is oxygenating

adequately at the present time.  If she does have evidence of hypoxemia,

then diuretics will be in order until further testing is carried out to

identify if there is any evidence of congestive heart failure.









  ______________________________________________

  Rob May M.D.





DR:  Leana

D:  10/14/2019 20:58

T:  10/15/2019 00:53

JOB#:  [id number]

CC:

## 2019-10-15 NOTE — NEPHROLOGY PROGRESS NOTE
Assessment/Plan


Problem List:  


(1) Renal failure (ARF), acute on chronic


(2) ARF (acute renal failure)


(3) Dehydration


(4) Acute metabolic encephalopathy


(5) Hyperglycemia due to type 2 diabetes mellitus


(6) UTI (urinary tract infection)


(7) Diabetic nephropathy


Assessment





Renal failure, Acute on Chronic


Dehydration


Severe Anemia


Sepsis / Pneumonia / UTI


DM, OOC


Proteinuria , Diabetic Nephropathy


Acute encephalopathy


Plan





Charles


Avoid Nephrotoxics


Anemia salas


2D echo


24 H urine protein


Keep BP and BS in check


I am resuming her psych meds fro [facility] for now


Per orders





Objective


Objective





Last 24 Hour Vital Signs








  Date Time  Temp Pulse Resp B/P (MAP) Pulse Ox O2 Delivery O2 Flow Rate FiO2


 


10/15/19 08:52     98 Nasal Cannula 2.0 28


 


10/15/19 08:51  84 20  98 Nasal Cannula 2.0 28


 


10/15/19 08:00 98.0 85 19 139/66 (90) 99   


 


10/15/19 04:00  81      


 


10/15/19 04:00 98.0 57 18 103/57 (72) 96   


 


10/15/19 00:00  83      


 


10/15/19 00:00 99.1 83 18 140/72 (94) 99   


 


10/14/19 21:00      Nasal Cannula 2.0 


 


10/14/19 20:00  86      


 


10/14/19 20:00 98.2 86 20 139/77 (97) 99   


 


10/14/19 19:49  78 20  96 Nasal Cannula 2.0 28


 


10/14/19 19:49     96 Nasal Cannula 2.0 28


 


10/14/19 16:00  77      


 


10/14/19 16:00 96.7 81 18 121/74 (90) 98   


 


10/14/19 12:00 96.8 67 18 145/65 (91) 94   


 


10/14/19 12:00  66      

















Intake and Output  


 


 10/14/19 10/15/19





 19:00 07:00


 


Intake Total 360 ml 


 


Output Total 300 ml 700 ml


 


Balance 60 ml -700 ml


 


  


 


Intake Oral 360 ml 


 


Output Urine Total 300 ml 700 ml








Laboratory Tests


10/14/19 11:25: 


White Blood Count 33.2*H, Red Blood Count 2.52L, Hemoglobin 6.7*L, Hematocrit 

21.1L, Mean Corpuscular Volume 84, Mean Corpuscular Hemoglobin 26.8L, Mean 

Corpuscular Hemoglobin Concent 31.9L, Red Cell Distribution Width 11.1L, 

Platelet Count 450, Mean Platelet Volume 8.4, Neutrophils (%) (Auto) , 

Lymphocytes (%) (Auto) , Monocytes (%) (Auto) , Eosinophils (%) (Auto) , 

Basophils (%) (Auto) , Differential Total Cells Counted 100, Neutrophils % (

Manual) 93H, Lymphocytes % (Manual) 6L, Monocytes % (Manual) 0L, Eosinophils % (

Manual) 0, Basophils % (Manual) 0, Myelocytes % 1H, Band Neutrophils 0, 

Platelet Estimate Adequate, Platelet Morphology Normal, Red Blood Cell 

Morphology Normal, Sodium Level 135L, Potassium Level 6.1*H, Chloride Level 104

, Carbon Dioxide Level 22, Anion Gap 8, Blood Urea Nitrogen 52H, Creatinine 2.0H

, Estimat Glomerular Filtration Rate 24.9, Glucose Level 391H, Uric Acid 10.0H, 

Calcium Level 8.3L, Phosphorus Level 4.4, Magnesium Level 2.8H, Iron Level 29L, 

Total Iron Binding Capacity 141L, Percent Iron Saturation 21, Unsaturated Iron 

Binding 112, Ferritin 1443H, Total Bilirubin 0.3, Aspartate Amino Transf (AST/

SGOT) 67H, Alanine Aminotransferase (ALT/SGPT) 58, Alkaline Phosphatase 106, C-

Reactive Protein, Quantitative 28.7H, Pro-B-Type Natriuretic Peptide 17944X, 

Total Protein 6.9, Albumin 1.6L, Globulin 5.3, Albumin/Globulin Ratio 0.3L, 

Triglycerides Level 178H, Cholesterol Level 107, LDL Cholesterol 59, HDL 

Cholesterol 19L, Cholesterol/HDL Ratio 5.6H, Vitamin B12 Level 1061H, Folate 

7.2L, Thyroid Stimulating Hormone (TSH) 0.018L, Digoxin Level < 0.2L


10/14/19 11:29: Urine Random Sodium 25


10/15/19 05:30: 


White Blood Count 28.1*H, Red Blood Count 3.14L, Hemoglobin 9.1#L, Hematocrit 

26.2L, Mean Corpuscular Volume 84, Mean Corpuscular Hemoglobin 28.9, Mean 

Corpuscular Hemoglobin Concent 34.6, Red Cell Distribution Width 12.0, Platelet 

Count 498H, Mean Platelet Volume 8.4, Neutrophils (%) (Auto) , Lymphocytes (%) (

Auto) , Monocytes (%) (Auto) , Eosinophils (%) (Auto) , Basophils (%) (Auto) , 

Differential Total Cells Counted 100, Neutrophils % (Manual) 88H, Lymphocytes % 

(Manual) 5L, Monocytes % (Manual) 7, Eosinophils % (Manual) 0, Basophils % (

Manual) 0, Band Neutrophils 0, Platelet Estimate Adequate, Platelet Morphology 

Normal, Sodium Level 142, Potassium Level 4.1, Chloride Level 109H, Carbon 

Dioxide Level 23, Anion Gap 10, Blood Urea Nitrogen 44H, Creatinine 1.7H, 

Estimat Glomerular Filtration Rate 30.1, Glucose Level 78#, Uric Acid 9.1H, 

Calcium Level 8.5, Phosphorus Level 3.7, Magnesium Level 2.7H, Total Bilirubin 

0.9, Aspartate Amino Transf (AST/SGOT) 69H, Alanine Aminotransferase (ALT/SGPT) 

88H, Alkaline Phosphatase 105, C-Reactive Protein, Quantitative 22.7H, Pro-B-

Type Natriuretic Peptide 68970B, Total Protein 6.6, Albumin 1.5L, Globulin 5.1, 

Albumin/Globulin Ratio 0.3L, Hypochromasia 1+, Lactic Acid Level 0.90, Gamma 

Glutamyl Transpeptidase 29, Total Creatine Kinase 26, Troponin I 0.407H, Free 

Thyroxine 1.08, Free Triiodothyronine 1.6L, Thyroid Stimulating Immunoglobulin [

Pending], Thyroglobulin Antibody [Pending]


Height (Feet):  5


Height (Inches):  5.00


Weight (Pounds):  150











Lukasz Vizcaino MD Oct 15, 2019 10:56

## 2019-10-15 NOTE — PULMONOLOGY PROGRESS NOTE
Assessment/Plan


Problems:  


(1) Healthcare associated bacterial pneumonia


(2) UTI (urinary tract infection)


(3) Sepsis


(4) Dehydration


(5) CHANCE (acute kidney injury)


(6) Acute metabolic encephalopathy


(7) Hyperglycemia due to type 2 diabetes mellitus


(8) Renal failure (ARF), acute on chronic


(9) Aspiration pneumonia


(10) Abnormal TSH


Assessment/Plan


Sepsis


Possible pneumonia


Possible UTI


Acute hypoxemic respiratory failure


Acute encephalopathy


Hx CHF


HTN


CHANCE 





Plan:


-cont broad-spectrum abx per ID


-f/u cultures


-Repeat CXR


-monitor leukocytosis


-monitor volumes and renal function, agree with mIVF


-monitor encephalopathy


-Asp precautions, SLP eval


-DVT Px: LMWH


-FC





Subjective


Allergies:  


Coded Allergies:  


     No Known Allergies (Unverified , 10/14/19)


Subjective


Tm 99.1 Hb 9.1 after PRBC Cr better


Confused TTE with PH no e/o elevated filling pressures


+ cough + SOB no FC





Objective





Last 24 Hour Vital Signs








  Date Time  Temp Pulse Resp B/P (MAP) Pulse Ox O2 Delivery O2 Flow Rate FiO2


 


10/15/19 08:52     98 Nasal Cannula 2.0 28


 


10/15/19 08:51  84 20  98 Nasal Cannula 2.0 28


 


10/15/19 08:00 98.0 85 19 139/66 (90) 99   


 


10/15/19 04:00  81      


 


10/15/19 04:00 98.0 57 18 103/57 (72) 96   


 


10/15/19 00:00  83      


 


10/15/19 00:00 99.1 83 18 140/72 (94) 99   


 


10/14/19 21:00      Nasal Cannula 2.0 


 


10/14/19 20:00  86      


 


10/14/19 20:00 98.2 86 20 139/77 (97) 99   


 


10/14/19 19:49  78 20  96 Nasal Cannula 2.0 28


 


10/14/19 19:49     96 Nasal Cannula 2.0 28


 


10/14/19 16:00  77      


 


10/14/19 16:00 96.7 81 18 121/74 (90) 98   


 


10/14/19 12:00 96.8 67 18 145/65 (91) 94   


 


10/14/19 12:00  66      

















Intake and Output  


 


 10/14/19 10/15/19





 19:00 07:00


 


Intake Total 360 ml 


 


Output Total 300 ml 700 ml


 


Balance 60 ml -700 ml


 


  


 


Intake Oral 360 ml 


 


Output Urine Total 300 ml 700 ml








General Appearance:  no acute distress, other - confused


HEENT:  normocephalic, atraumatic


Respiratory/Chest:  rhonchi


Cardiovascular:  normal peripheral pulses, normal rate, regular rhythm


Abdomen:  normal bowel sounds, soft, non tender, no organomegaly, non distended

, no mass


Extremities:  no cyanosis, no clubbing, no edema





Microbiology








 Date/Time


Source Procedure


Growth Status


 


 


 10/14/19 01:30


Blood Blood Culture - Preliminary


NO GROWTH AFTER 24 HOURS Resulted


 


 10/14/19 01:15


Blood Blood Culture - Preliminary


NO GROWTH AFTER 24 HOURS Resulted


 


 10/14/19 01:15


Urine,Clean Catch Urine Culture - Preliminary Resulted








Laboratory Tests


10/15/19 05:30: 


White Blood Count 28.1*H, Red Blood Count 3.14L, Hemoglobin 9.1#L, Hematocrit 

26.2L, Mean Corpuscular Volume 84, Mean Corpuscular Hemoglobin 28.9, Mean 

Corpuscular Hemoglobin Concent 34.6, Red Cell Distribution Width 12.0, Platelet 

Count 498H, Mean Platelet Volume 8.4, Neutrophils (%) (Auto) , Lymphocytes (%) (

Auto) , Monocytes (%) (Auto) , Eosinophils (%) (Auto) , Basophils (%) (Auto) , 

Differential Total Cells Counted 100, Neutrophils % (Manual) 88H, Lymphocytes % 

(Manual) 5L, Monocytes % (Manual) 7, Eosinophils % (Manual) 0, Basophils % (

Manual) 0, Band Neutrophils 0, Platelet Estimate Adequate, Platelet Morphology 

Normal, Hypochromasia 1+, Sodium Level 142, Potassium Level 4.1, Chloride Level 

109H, Carbon Dioxide Level 23, Anion Gap 10, Blood Urea Nitrogen 44H, 

Creatinine 1.7H, Estimat Glomerular Filtration Rate 30.1, Glucose Level 78#, 

Lactic Acid Level 0.90, Uric Acid 9.1H, Calcium Level 8.5, Phosphorus Level 3.7

, Magnesium Level 2.7H, Total Bilirubin 0.9, Gamma Glutamyl Transpeptidase 29, 

Aspartate Amino Transf (AST/SGOT) 69H, Alanine Aminotransferase (ALT/SGPT) 88H, 

Alkaline Phosphatase 105, Total Creatine Kinase 26, Troponin I 0.407H, C-

Reactive Protein, Quantitative 22.7H, Pro-B-Type Natriuretic Peptide 70808F, 

Total Protein 6.6, Albumin 1.5L, Globulin 5.1, Albumin/Globulin Ratio 0.3L, 

Free Thyroxine 1.08, Free Triiodothyronine 1.6L, Thyroid Stimulating 

Immunoglobulin [Pending], Thyroglobulin Antibody [Pending]





Current Medications








 Medications


  (Trade)  Dose


 Ordered  Sig/Winnie


 Route


 PRN Reason  Start Time


 Stop Time Status Last Admin


Dose Admin


 


 Acetaminophen


  (Tylenol)  650 mg  PRN  PRN


 RECTAL


 TEMP>100.5  10/14/19 03:00


     


 


 


 Albuterol/


 Ipratropium


  (Albuterol/


 Ipratropium)  3 ml  Q4H  PRN


 HHN


 Shortness of Breath  10/14/19 07:30


 10/19/19 07:29   


 


 


 Aspirin


  (ASA)  81 mg  DAILY


 ORAL


   10/15/19 09:00


 11/14/19 08:59  10/15/19 08:59


 


 


 Atorvastatin


 Calcium


  (Lipitor)  10 mg  BEDTIME


 ORAL


   10/14/19 21:00


 11/13/19 20:59  10/14/19 21:15


 


 


 Clopidogrel


 Bisulfate


  (Plavix)  75 mg  DAILY


 ORAL


   10/15/19 09:00


 11/14/19 08:59  10/15/19 08:59


 


 


 Dextrose


  (Dextrose 50%)  25 ml  Q30M  PRN


 IV


 Hypoglycemia  10/14/19 07:00


 11/13/19 06:59   


 


 


 Dextrose


  (Dextrose 50%)  50 ml  Q30M  PRN


 IV


 Hypoglycemia  10/14/19 07:00


 11/13/19 06:59   


 


 


 Divalproex Sodium


  (Depakote)  500 mg  Q12HR


 ORAL


   10/15/19 11:00


 11/14/19 10:59  10/15/19 11:33


 


 


 Docusate Sodium


  (Colace)  100 mg  THREE TIMES A  DAY


 ORAL


   10/14/19 13:00


 11/13/19 12:59  10/15/19 08:59


 


 


 Donepezil HCl


  (Aricept)  5 mg  DAILY


 ORAL


   10/15/19 11:00


 11/14/19 10:59  10/15/19 11:33


 


 


 Enoxaparin Sodium


  (Lovenox)  30 mg  DAILY


 SUBQ


   10/14/19 09:00


 11/13/19 08:59  10/15/19 09:02


 


 


 Insulin Aspart


  (NovoLOG)    BEFORE MEALS AND  HS


 SUBQ


   10/14/19 11:30


 11/13/19 11:29  10/14/19 17:36


 


 


 Insulin Aspart


  (NovoLOG)  6 units  BEFORE  MEALS


 SUBQ


   10/15/19 11:30


 11/13/19 11:29  10/15/19 11:48


 


 


 Insulin Detemir


  (Levemir)  18 units  DAILY


 SUBQ


   10/15/19 09:00


 11/13/19 10:29  10/15/19 09:21


 


 


 Olanzapine


  (ZyPREXA)  5 mg  DAILY


 ORAL


   10/15/19 11:00


 11/14/19 10:59   


 


 


 Pantoprazole


  (Protonix)  40 mg  BID


 ORAL


   10/14/19 18:00


 11/13/19 08:59  10/15/19 08:59


 


 


 Piperacillin Sod/


 Tazobactam Sod


 3.375 gm/Sodium


 Chloride  110 ml @ 


 27.5 mls/hr  EVERY 8  HOURS


 IVPB


   10/14/19 14:00


 10/19/19 13:59  10/15/19 06:09


 


 


 Sodium Chloride  1,000 ml @ 


 75 mls/hr  [id number]


 IV


   10/15/19 07:30


 11/13/19 07:29  10/15/19 06:47


 


 


 Vancomycin HCl


  (Vanco rx to


 dose)  1 ea  DAILY  PRN


 MISC


 Per rx protocol  10/14/19 07:30


 11/13/19 07:29   


 


 


 Vancomycin HCl


 750 mg/Sodium


 Chloride  275 ml @ 


 183.333


 mls/hr  Q24H


 IVPB


   10/15/19 09:00


 10/19/19 08:59  10/15/19 08:59


 

















Sammy Torres MD Oct 15, 2019 11:52

## 2019-10-15 NOTE — DIAGNOSTIC IMAGING REPORT
Indication: Cough

 

Technique: One view of the chest

 

Comparison: 10/14/2019

 

Findings: The heart is enlarged. There are small bilateral pleural effusions. There

is bilateral interstitial congestion and possibly some airspace edema. Findings are

unchanged

 

Impression: Unchanged, over one day, findings as above.

## 2019-10-16 VITALS — DIASTOLIC BLOOD PRESSURE: 67 MMHG | SYSTOLIC BLOOD PRESSURE: 146 MMHG

## 2019-10-16 VITALS — DIASTOLIC BLOOD PRESSURE: 62 MMHG | SYSTOLIC BLOOD PRESSURE: 119 MMHG

## 2019-10-16 VITALS — DIASTOLIC BLOOD PRESSURE: 63 MMHG | SYSTOLIC BLOOD PRESSURE: 136 MMHG

## 2019-10-16 VITALS — SYSTOLIC BLOOD PRESSURE: 121 MMHG | DIASTOLIC BLOOD PRESSURE: 58 MMHG

## 2019-10-16 VITALS — DIASTOLIC BLOOD PRESSURE: 66 MMHG | SYSTOLIC BLOOD PRESSURE: 146 MMHG

## 2019-10-16 VITALS — DIASTOLIC BLOOD PRESSURE: 61 MMHG | SYSTOLIC BLOOD PRESSURE: 141 MMHG

## 2019-10-16 LAB
ADD MANUAL DIFF: YES
ALBUMIN SERPL-MCNC: 1.3 G/DL (ref 3.4–5)
ALBUMIN/GLOB SERPL: 0.3 {RATIO} (ref 1–2.7)
ALP SERPL-CCNC: 99 U/L (ref 46–116)
ALT SERPL-CCNC: 54 U/L (ref 12–78)
ANION GAP SERPL CALC-SCNC: 11 MMOL/L (ref 5–15)
AST SERPL-CCNC: 33 U/L (ref 15–37)
BILIRUB SERPL-MCNC: 0.5 MG/DL (ref 0.2–1)
BUN SERPL-MCNC: 29 MG/DL (ref 7–18)
CALCIUM SERPL-MCNC: 7.9 MG/DL (ref 8.5–10.1)
CHLORIDE SERPL-SCNC: 108 MMOL/L (ref 98–107)
CO2 SERPL-SCNC: 20 MMOL/L (ref 21–32)
CREAT SERPL-MCNC: 1.4 MG/DL (ref 0.55–1.3)
ERYTHROCYTE [DISTWIDTH] IN BLOOD BY AUTOMATED COUNT: 12.8 % (ref 11.6–14.8)
GLOBULIN SER-MCNC: 4.7 G/DL
HCT VFR BLD CALC: 25 % (ref 37–47)
HGB BLD-MCNC: 8.4 G/DL (ref 12–16)
MCV RBC AUTO: 85 FL (ref 80–99)
PHOSPHATE SERPL-MCNC: 3.6 MG/DL (ref 2.5–4.9)
PLATELET # BLD: 484 K/UL (ref 150–450)
POTASSIUM SERPL-SCNC: 3.5 MMOL/L (ref 3.5–5.1)
RBC # BLD AUTO: 2.95 M/UL (ref 4.2–5.4)
SODIUM SERPL-SCNC: 139 MMOL/L (ref 136–145)
WBC # BLD AUTO: 29.3 K/UL (ref 4.8–10.8)

## 2019-10-16 RX ADMIN — DIVALPROEX SODIUM SCH MG: 125 CAPSULE ORAL at 09:38

## 2019-10-16 RX ADMIN — DEXTROSE MONOHYDRATE SCH MLS/HR: 50 INJECTION, SOLUTION INTRAVENOUS at 06:00

## 2019-10-16 RX ADMIN — DOCUSATE SODIUM SCH MG: 100 CAPSULE, LIQUID FILLED ORAL at 18:05

## 2019-10-16 RX ADMIN — DOCUSATE SODIUM SCH MG: 100 CAPSULE, LIQUID FILLED ORAL at 09:37

## 2019-10-16 RX ADMIN — INSULIN ASPART SCH UNITS: 100 INJECTION, SOLUTION INTRAVENOUS; SUBCUTANEOUS at 21:05

## 2019-10-16 RX ADMIN — DIVALPROEX SODIUM SCH MG: 125 CAPSULE ORAL at 21:03

## 2019-10-16 RX ADMIN — DONEPEZIL HYDROCHLORIDE SCH MG: 5 TABLET, FILM COATED ORAL at 09:38

## 2019-10-16 RX ADMIN — ENOXAPARIN SODIUM SCH MG: 30 INJECTION SUBCUTANEOUS at 09:40

## 2019-10-16 RX ADMIN — LACTULOSE PRN GM: 20 SOLUTION ORAL at 15:21

## 2019-10-16 RX ADMIN — MEROPENEM SCH MLS/HR: 1 INJECTION INTRAVENOUS at 13:25

## 2019-10-16 RX ADMIN — INSULIN ASPART SCH UNITS: 100 INJECTION, SOLUTION INTRAVENOUS; SUBCUTANEOUS at 13:23

## 2019-10-16 RX ADMIN — INSULIN ASPART SCH UNITS: 100 INJECTION, SOLUTION INTRAVENOUS; SUBCUTANEOUS at 16:30

## 2019-10-16 RX ADMIN — ASPIRIN 81 MG SCH MG: 81 TABLET ORAL at 09:38

## 2019-10-16 RX ADMIN — SODIUM CHLORIDE SCH MLS/HR: 9 INJECTION, SOLUTION INTRAVENOUS at 10:26

## 2019-10-16 RX ADMIN — DOCUSATE SODIUM SCH MG: 100 CAPSULE, LIQUID FILLED ORAL at 13:24

## 2019-10-16 RX ADMIN — INSULIN DETEMIR SCH UNITS: 100 INJECTION, SOLUTION SUBCUTANEOUS at 10:04

## 2019-10-16 RX ADMIN — INSULIN ASPART SCH UNITS: 100 INJECTION, SOLUTION INTRAVENOUS; SUBCUTANEOUS at 06:30

## 2019-10-16 NOTE — NUR
NURSE NOTES:

Dr. May made aware patient troponin level trending down from yesterday 0.407 to today 
0.200. No order given at this time. Will continue to monitor.

## 2019-10-16 NOTE — GENERAL PROGRESS NOTE
Assessment/Plan


Problem List:  


(1) Abnormal TSH


ICD Codes:  R79.89 - Other specified abnormal findings of blood chemistry


SNOMED:  072081948


(2) Hyperglycemia due to type 2 diabetes mellitus


ICD Codes:  E11.65 - Type 2 diabetes mellitus with hyperglycemia


SNOMED:  236487634549995, [id number]


Qualifiers:  


   Qualified Codes:  E11.65 - Type 2 diabetes mellitus with hyperglycemia; 

Z79.4 - Long term (current) use of insulin


(3) Acute metabolic encephalopathy


ICD Codes:  G93.41 - Metabolic encephalopathy


SNOMED:  31307842, [id number]


(4) ARF (acute renal failure)


ICD Codes:  N17.9 - Acute kidney failure, unspecified


SNOMED:  05265328


Qualifiers:  


   Qualified Codes:  N17.9 - Acute kidney failure, unspecified


(5) Healthcare associated bacterial pneumonia


ICD Codes:  J15.9 - Unspecified bacterial pneumonia


SNOMED:  661799128


Status:  stable


Assessment/Plan:


reduce Levemir to 10 units qam 


DC  Novolog  6 units ac tid 


continue NISS ac / hs 





low TSH, normal free T4 and low free T3 most likely due to non thyroidal 

illness 


no need for thyroid medications for now 


thyroid antibodies are pending





Subjective


Allergies:  


Coded Allergies:  


     No Known Allergies (Unverified , 10/14/19)


All Systems:  reviewed and negative except above


Subjective


events noted 


glucose values on lower side 











Item Value  Date Time


 


Bedside Blood Glucose 86 mg/dl 10/16/19 0617


 


Bedside Blood Glucose 114 mg/dl 10/15/19 2100


 


Bedside Blood Glucose 90 mg/dl 10/15/19 1630


 


Bedside Blood Glucose 104 mg/dl 10/15/19 1148


 


Bedside Blood Glucose 88 mg/dl 10/15/19 0921


 


Bedside Blood Glucose 88 mg/dl 10/15/19 0630











Objective





Last 24 Hour Vital Signs








  Date Time  Temp Pulse Resp B/P (MAP) Pulse Ox O2 Delivery O2 Flow Rate FiO2


 


10/16/19 04:00 100.3 89 24 146/67 (93) 96   


 


10/16/19 04:00  89      


 


10/16/19 00:00  89      


 


10/16/19 00:00 100.1 92 24 136/63 (87) 96   


 


10/15/19 21:00      Nasal Cannula 2.0 


 


10/15/19 20:12     97 Nasal Cannula 2.0 28


 


10/15/19 20:12  90 20  97 Nasal Cannula 2.0 28


 


10/15/19 20:00 99.1 93 24 151/64 (93) 95   


 


10/15/19 20:00  95      


 


10/15/19 16:00 98.0 83 19 146/85 (105) 98   


 


10/15/19 15:17  92      


 


10/15/19 12:00 98.4 80 20 141/69 (93) 99   


 


10/15/19 11:38  88      


 


10/15/19 09:00      Nasal Cannula 2.0 


 


10/15/19 08:52     98 Nasal Cannula 2.0 28


 


10/15/19 08:51  84 20  98 Nasal Cannula 2.0 28


 


10/15/19 08:00 98.0 85 19 139/66 (90) 99   


 


10/15/19 07:45  85      

















Intake and Output  


 


 10/15/19 10/16/19





 18:59 06:59


 


Intake Total 485.0 ml 


 


Output Total  600 ml


 


Balance 485.0 ml -600 ml


 


  


 


Intake Oral 280 ml 


 


IV Total 205.0 ml 


 


Output Urine Total  600 ml








Laboratory Tests


10/15/19 16:00: 


White Blood Count 23.6*H, Red Blood Count 3.18L, Hemoglobin 8.6L, Hematocrit 

26.5L, Mean Corpuscular Volume 83, Mean Corpuscular Hemoglobin 27.1, Mean 

Corpuscular Hemoglobin Concent 32.5, Red Cell Distribution Width 11.3L, 

Platelet Count 501H, Mean Platelet Volume 8.7, Neutrophils (%) (Auto) , 

Lymphocytes (%) (Auto) , Monocytes (%) (Auto) , Eosinophils (%) (Auto) , 

Basophils (%) (Auto) , Differential Total Cells Counted 100, Neutrophils % (

Manual) 83H, Lymphocytes % (Manual) 4L, Monocytes % (Manual) 5, Eosinophils % (

Manual) 1, Basophils % (Manual) 0, Band Neutrophils 7, Platelet Estimate 

IncreasedH, Platelet Morphology Normal, Red Blood Cell Morphology Normal


Height (Feet):  5


Height (Inches):  5.00


Weight (Pounds):  150


General Appearance:  no apparent distress


Neck:  normal alignment


Cardiovascular:  normal rate


Respiratory/Chest:  lungs clear


Abdomen:  normal bowel sounds


Pelvis:  normal external exam


Objective





Current Medications








 Medications


  (Trade)  Dose


 Ordered  Sig/Winnie


 Route


 PRN Reason  Start Time


 Stop Time Status Last Admin


Dose Admin


 


 Acetaminophen


  (Tylenol)  650 mg  PRN  PRN


 RECTAL


 TEMP>100.5  10/14/19 03:00


     


 


 


 Albuterol/


 Ipratropium


  (Albuterol/


 Ipratropium)  3 ml  Q4H  PRN


 HHN


 Shortness of Breath  10/14/19 07:30


 10/19/19 07:29   


 


 


 Aspirin


  (ASA)  81 mg  DAILY


 ORAL


   10/15/19 09:00


 11/14/19 08:59  10/15/19 08:59


 


 


 Atorvastatin


 Calcium


  (Lipitor)  10 mg  BEDTIME


 ORAL


   10/14/19 21:00


 11/13/19 20:59  10/15/19 21:28


 


 


 Clopidogrel


 Bisulfate


  (Plavix)  75 mg  DAILY


 ORAL


   10/15/19 09:00


 11/14/19 08:59  10/15/19 08:59


 


 


 Dextrose


  (Dextrose 50%)  25 ml  Q30M  PRN


 IV


 Hypoglycemia  10/14/19 07:00


 11/13/19 06:59   


 


 


 Dextrose


  (Dextrose 50%)  50 ml  Q30M  PRN


 IV


 Hypoglycemia  10/14/19 07:00


 11/13/19 06:59   


 


 


 Divalproex Sodium


  (Depakote


 Sprinkles)  500 mg  Q12HR


 ORAL


   10/15/19 22:00


 11/14/19 21:59  10/15/19 21:45


 


 


 Docusate Sodium


  (Colace)  100 mg  THREE TIMES A  DAY


 ORAL


   10/14/19 13:00


 11/13/19 12:59  10/15/19 08:59


 


 


 Donepezil HCl


  (Aricept)  5 mg  DAILY


 ORAL


   10/15/19 11:00


 11/14/19 10:59  10/15/19 11:33


 


 


 Enoxaparin Sodium


  (Lovenox)  30 mg  DAILY


 SUBQ


   10/14/19 09:00


 11/13/19 08:59  10/15/19 09:02


 


 


 Insulin Aspart


  (NovoLOG)    BEFORE MEALS AND  HS


 SUBQ


   10/14/19 11:30


 11/13/19 11:29  10/14/19 17:36


 


 


 Insulin Aspart


  (NovoLOG)  6 units  BEFORE  MEALS


 SUBQ


   10/15/19 11:30


 11/13/19 11:29  10/15/19 11:48


 


 


 Insulin Detemir


  (Levemir)  18 units  DAILY


 SUBQ


   10/15/19 09:00


 11/13/19 10:29  10/15/19 09:21


 


 


 Olanzapine


  (ZyPREXA)  5 mg  DAILY


 ORAL


   10/15/19 11:00


 11/14/19 10:59   


 


 


 Pantoprazole


  (Protonix)  40 mg  BID


 ORAL


   10/14/19 18:00


 11/13/19 08:59  10/15/19 08:59


 


 


 Piperacillin Sod/


 Tazobactam Sod


 3.375 gm/Sodium


 Chloride  110 ml @ 


 27.5 mls/hr  EVERY 8  HOURS


 IVPB


   10/14/19 14:00


 10/19/19 13:59  10/15/19 21:29


 


 


 Sodium Chloride  1,000 ml @ 


 75 mls/hr  [id number]


 IV


   10/15/19 07:30


 11/13/19 07:29  10/15/19 21:29


 


 


 Vancomycin HCl


  (Vanco rx to


 dose)  1 ea  DAILY  PRN


 MISC


 Per rx protocol  10/14/19 07:30


 11/13/19 07:29   


 


 


 Vancomycin HCl


 750 mg/Sodium


 Chloride  275 ml @ 


 183.333


 mls/hr  Q24H


 IVPB


   10/15/19 09:00


 10/19/19 08:59  10/15/19 08:59


 

















Riccardo Velez MD Oct 16, 2019 07:11

## 2019-10-16 NOTE — GENERAL PROGRESS NOTE
Assessment/Plan


Status:  stable


Assessment/Plan:


S: Not verbal





O: poor historian, seems comfortable. IV sites are intact. Charles in place





PHYSICAL EXAMINATION:HEAD AND NECK:  Atraumatic and normocephalic.


CHEST:  Bronchial breathing sounds.ABDOMEN:  Soft.  No organomegaly.


MUSCULOSKELETAL:  Diffuse 1+ or 2+ nonpitting edema in all four contracted


extremities.  The patient is not following the commands.  Limited


evaluation.NEUROLOGY:  The patient is awake.  Not alert.  Not verbally


communicative.





LABORATORY DATA:  Labs dated October 16, 2019  reviewed





Meds: Reviewed and reconciled in the chart 





ASSESSMENT AND PLAN:


1. Sepsis.  Differential diagnosis is healthcare-associated pneumonia or


healthcare-associated urinary tract infection.  Work in progress.


2. Chronic encephalomalacia.


3. Acute on chronic anemia.


4. Renal failure, age indeterminate.


5. Congestive heart failure- preserved EF


6. Hyperthyroidism.


7. Hyperkalemia.


8. Diabetes type 2, uncontrolled with A1c of 10.


9. Psychiatric disorder.


10. GI and DVT prophylaxis.


11. PAH- Severe 





PLAN OF CARE: Notes from  Pulmonary Critical Care, Infectious


Disease, and Nephrology reviewed


post Blood transfusion


improving Cr level 


Stool for O/b


Panculture per ID, given the increase in WBC





Subjective


Allergies:  


Coded Allergies:  


     No Known Allergies (Unverified , 10/14/19)





Objective





Last 24 Hour Vital Signs








  Date Time  Temp Pulse Resp B/P (MAP) Pulse Ox O2 Delivery O2 Flow Rate FiO2


 


10/16/19 08:00 98.1 86 20 146/66 (92) 100   


 


10/16/19 04:00 100.3 89 24 146/67 (93) 96   


 


10/16/19 04:00  89      


 


10/16/19 00:00  89      


 


10/16/19 00:00 100.1 92 24 136/63 (87) 96   


 


10/15/19 21:00      Nasal Cannula 2.0 


 


10/15/19 20:12     97 Nasal Cannula 2.0 28


 


10/15/19 20:12  90 20  97 Nasal Cannula 2.0 28


 


10/15/19 20:00 99.1 93 24 151/64 (93) 95   


 


10/15/19 20:00  95      


 


10/15/19 16:00 98.0 83 19 146/85 (105) 98   


 


10/15/19 15:17  92      


 


10/15/19 12:00 98.4 80 20 141/69 (93) 99   


 


10/15/19 11:38  88      

















Intake and Output  


 


 10/15/19 10/16/19





 18:59 06:59


 


Intake Total 485.0 ml 


 


Output Total  600 ml


 


Balance 485.0 ml -600 ml


 


  


 


Intake Oral 280 ml 


 


IV Total 205.0 ml 


 


Output Urine Total  600 ml








Laboratory Tests


10/15/19 16:00: 


White Blood Count 23.6*H, Red Blood Count 3.18L, Hemoglobin 8.6L, Hematocrit 

26.5L, Mean Corpuscular Volume 83, Mean Corpuscular Hemoglobin 27.1, Mean 

Corpuscular Hemoglobin Concent 32.5, Red Cell Distribution Width 11.3L, 

Platelet Count 501H, Mean Platelet Volume 8.7, Neutrophils (%) (Auto) , 

Lymphocytes (%) (Auto) , Monocytes (%) (Auto) , Eosinophils (%) (Auto) , 

Basophils (%) (Auto) , Differential Total Cells Counted 100, Neutrophils % (

Manual) 83H, Lymphocytes % (Manual) 4L, Monocytes % (Manual) 5, Eosinophils % (

Manual) 1, Basophils % (Manual) 0, Band Neutrophils 7, Platelet Estimate 

IncreasedH, Platelet Morphology Normal, Red Blood Cell Morphology Normal


10/16/19 06:47: 


White Blood Count 29.3*H, Red Blood Count 2.95L, Hemoglobin 8.4L, Hematocrit 

25.0L, Mean Corpuscular Volume 85, Mean Corpuscular Hemoglobin 28.4, Mean 

Corpuscular Hemoglobin Concent 33.5, Red Cell Distribution Width 12.8, Platelet 

Count 484H, Mean Platelet Volume 7.9, Neutrophils (%) (Auto) , Lymphocytes (%) (

Auto) , Monocytes (%) (Auto) , Eosinophils (%) (Auto) , Basophils (%) (Auto) , 

Differential Total Cells Counted 100, Neutrophils % (Manual) 85H, Lymphocytes % 

(Manual) 3L, Monocytes % (Manual) 8, Eosinophils % (Manual) 2, Basophils % (

Manual) 1, Band Neutrophils 1, Platelet Estimate Adequate, Platelet Morphology 

Normal, Hypochromasia 2+, Anisocytosis 1+, Sodium Level 139, Potassium Level 3.5

, Chloride Level 108H, Carbon Dioxide Level 20L, Anion Gap 11, Blood Urea 

Nitrogen 29H, Creatinine 1.4H, Estimat Glomerular Filtration Rate 37.6, Glucose 

Level 83, Calcium Level 7.9L, Phosphorus Level 3.6, Magnesium Level 2.4, Total 

Bilirubin 0.5, Aspartate Amino Transf (AST/SGOT) 33, Alanine Aminotransferase (

ALT/SGPT) 54, Alkaline Phosphatase 99, Troponin I 0.200H, C-Reactive Protein, 

Quantitative 18.9H, Total Protein 6.0L, Albumin 1.3L, Globulin 4.7, Albumin/

Globulin Ratio 0.3L


Height (Feet):  5


Height (Inches):  5.00


Weight (Pounds):  151











Garrick Keith MD Oct 16, 2019 10:31

## 2019-10-16 NOTE — NUR
NURSE NOTES:

Dr. Burgos made aware of patient's WBC elevated from yesterday 23.6 to today 29.3, having low 
fever of 100.3 last night and now 99.0. Per Dr. Burgos, repeat CXR and order blood culture 
x2. Order noted, entered, carried out.

## 2019-10-16 NOTE — HEMATOLOGY/ONC PROGRESS NOTE
Assessment/Plan


Assessment/Plan





Assessment and Recs:


# Anemia of chronic disease due to underlying chronic medical issues, 

multifactorial 


--> Anemia workup has been ordered, rule out gi bleed --> ferritin is 1400+


--> No evidence of hemolysis is noted, peripheral smear has been reviewed.


--> Hgb goal >7. Transfuse prn.


--> Epogen or iron at this time is not particularly indicated


--> Medications have been reviewed


--> low threshold for gi evaluation in case has occult +


--> bone marrow biopsy is not indicated given the other more likely causes (if 

recurrent anemia) first time here


# Hypercalcemia - btw 10-11 range when corrected to alb


--> ivf have been started


--> if remains elevated, will get pth


# Leukocytosis/elevated white blood cell count, unspecified likely related to 

underlying reaction v more likely infection


--> have reviewed peripheral smear and bandemia/neutrophilia noted


--> continue antibiotics if they have been started by ID team (VANC/ZOSYN)


--> wbc 39-->28k-->29k


--> monitor for resolution


# Sepsis from pneumonia.  


--> pulm consulted, abx started


# CHANCE (acute kidney injury) on ckd


--> per renal


# UTI (urinary tract infection)


--> on abx per id


# Dehydration


--> on ivf


# Acute metabolic encephalopathy


--> likely due to pna


# Dvt ppx lovenox sq





The timing of this note does not necessarily reflect the time of the patient 

was seen.





Greatly appreciate consultation.





Subjective


Constitutional:  Denies: no symptoms, chills, fever, malaise, weakness, other


HEENT:  Denies: no symptoms, eye pain, blurred vision, tearing, double vision, 

ear pain, ear discharge, nose pain, nose congestion, throat pain, throat 

swelling, mouth pain, mouth swelling, other


Gastrointestinal/Abdominal:  Denies: no symptoms, abdomen distended, abdominal 

pain, black stools, tarry stools, blood in stool, constipated, diarrhea, 

difficulty swallowing, nausea, poor appetite, poor fluid intake, rectal bleeding

, vomiting, other


Genitourinary:  Denies: no symptoms, burning, discharge, frequency, flank pain, 

hematuria, incontinence, pain, urgency, other


Neurologic/Psychiatric:  Denies: no symptoms, anxiety, depressed, emotional 

problems, headache, numbness, paresthesia, pre-existing deficit, seizure, 

tingling, tremors, weakness, other


Endocrine:  Denies: no symptoms, excessive sweating, flushing, intolerance to 

cold, intolerance to heat, increased hunger, increased thirst, increased urine, 

unexplained weight gain, unexplained weight loss, other


Allergies:  


Coded Allergies:  


     No Known Allergies (Unverified , 10/14/19)


Subjective


10/14: hgb has improved, no bleeding, labs reivewed


10/15: no events to report


10/16: a+ o x1, no bleeding or chills noted, no major changes, occult pending





Objective


Objective





Current Medications








 Medications


  (Trade)  Dose


 Ordered  Sig/Winnie


 Route


 PRN Reason  Start Time


 Stop Time Status Last Admin


Dose Admin


 


 Acetaminophen


  (Tylenol)  650 mg  PRN  PRN


 RECTAL


 TEMP>100.5  10/14/19 03:00


     


 


 


 Albuterol/


 Ipratropium


  (Albuterol/


 Ipratropium)  3 ml  Q4H  PRN


 HHN


 Shortness of Breath  10/14/19 07:30


 10/19/19 07:29   


 


 


 Aspirin


  (ASA)  81 mg  DAILY


 ORAL


   10/15/19 09:00


 11/14/19 08:59  10/15/19 08:59


 


 


 Atorvastatin


 Calcium


  (Lipitor)  10 mg  BEDTIME


 ORAL


   10/14/19 21:00


 11/13/19 20:59  10/15/19 21:28


 


 


 Clopidogrel


 Bisulfate


  (Plavix)  75 mg  DAILY


 ORAL


   10/15/19 09:00


 11/14/19 08:59  10/15/19 08:59


 


 


 Dextrose


  (Dextrose 50%)  25 ml  Q30M  PRN


 IV


 Hypoglycemia  10/14/19 07:00


 11/13/19 06:59   


 


 


 Dextrose


  (Dextrose 50%)  50 ml  Q30M  PRN


 IV


 Hypoglycemia  10/14/19 07:00


 11/13/19 06:59   


 


 


 Divalproex Sodium


  (Depakote


 Sprinkles)  500 mg  Q12HR


 ORAL


   10/15/19 22:00


 11/14/19 21:59  10/15/19 21:45


 


 


 Docusate Sodium


  (Colace)  100 mg  THREE TIMES A  DAY


 ORAL


   10/14/19 13:00


 11/13/19 12:59  10/15/19 08:59


 


 


 Donepezil HCl


  (Aricept)  5 mg  DAILY


 ORAL


   10/15/19 11:00


 11/14/19 10:59  10/15/19 11:33


 


 


 Enoxaparin Sodium


  (Lovenox)  30 mg  DAILY


 SUBQ


   10/14/19 09:00


 11/13/19 08:59  10/15/19 09:02


 


 


 Insulin Aspart


  (NovoLOG)    BEFORE MEALS AND  HS


 SUBQ


   10/14/19 11:30


 11/13/19 11:29  10/14/19 17:36


 


 


 Insulin Detemir


  (Levemir)  10 units  DAILY


 SUBQ


   10/16/19 09:00


 11/13/19 10:29   


 


 


 Olanzapine


  (ZyPREXA)  5 mg  DAILY


 ORAL


   10/15/19 11:00


 11/14/19 10:59   


 


 


 Pantoprazole


  (Protonix)  40 mg  BID


 ORAL


   10/14/19 18:00


 11/13/19 08:59  10/15/19 08:59


 


 


 Piperacillin Sod/


 Tazobactam Sod


 3.375 gm/Sodium


 Chloride  110 ml @ 


 27.5 mls/hr  EVERY 8  HOURS


 IVPB


   10/14/19 14:00


 10/19/19 13:59  10/16/19 06:00


 


 


 Sodium Chloride  1,000 ml @ 


 75 mls/hr  [id number]


 IV


   10/15/19 07:30


 11/13/19 07:29  10/15/19 21:29


 


 


 Vancomycin HCl


  (Vanco rx to


 dose)  1 ea  DAILY  PRN


 MISC


 Per rx protocol  10/14/19 07:30


 11/13/19 07:29   


 


 


 Vancomycin HCl


 750 mg/Sodium


 Chloride  275 ml @ 


 183.333


 mls/hr  Q24H


 IVPB


   10/15/19 09:00


 10/19/19 08:59  10/15/19 08:59


 











Last 24 Hour Vital Signs








  Date Time  Temp Pulse Resp B/P (MAP) Pulse Ox O2 Delivery O2 Flow Rate FiO2


 


10/16/19 08:00 98.1 86 20 146/66 (92) 100   


 


10/16/19 04:00 100.3 89 24 146/67 (93) 96   


 


10/16/19 04:00  89      


 


10/16/19 00:00  89      


 


10/16/19 00:00 100.1 92 24 136/63 (87) 96   


 


10/15/19 21:00      Nasal Cannula 2.0 


 


10/15/19 20:12     97 Nasal Cannula 2.0 28


 


10/15/19 20:12  90 20  97 Nasal Cannula 2.0 28


 


10/15/19 20:00 99.1 93 24 151/64 (93) 95   


 


10/15/19 20:00  95      


 


10/15/19 16:00 98.0 83 19 146/85 (105) 98   


 


10/15/19 15:17  92      


 


10/15/19 12:00 98.4 80 20 141/69 (93) 99   


 


10/15/19 11:38  88      


 


10/15/19 09:00      Nasal Cannula 2.0 


 


10/15/19 08:52     98 Nasal Cannula 2.0 28


 


10/15/19 08:51  84 20  98 Nasal Cannula 2.0 28


 


10/15/19 08:00 98.0 85 19 139/66 (90) 99   


 


10/15/19 07:45  85      


 


10/15/19 04:00  81      


 


10/15/19 04:00 98.0 57 18 103/57 (72) 96   


 


10/15/19 00:00  83      


 


10/15/19 00:00 99.1 83 18 140/72 (94) 99   


 


10/14/19 21:00      Nasal Cannula 2.0 


 


10/14/19 20:00  86      


 


10/14/19 20:00 98.2 86 20 139/77 (97) 99   


 


10/14/19 19:49  78 20  96 Nasal Cannula 2.0 28


 


10/14/19 19:49     96 Nasal Cannula 2.0 28


 


10/14/19 16:00  77      


 


10/14/19 16:00 96.7 81 18 121/74 (90) 98   


 


10/14/19 12:00 96.8 67 18 145/65 (91) 94   


 


10/14/19 12:00  66      


 


10/14/19 09:00      Nasal Cannula 2.0 

















Intake and Output  


 


 10/15/19 10/16/19





 18:59 06:59


 


Intake Total 485.0 ml 


 


Output Total  600 ml


 


Balance 485.0 ml -600 ml


 


  


 


Intake Oral 280 ml 


 


IV Total 205.0 ml 


 


Output Urine Total  600 ml











Labs








Test


  10/14/19


01:14 10/14/19


01:15 10/14/19


03:00 10/14/19


11:25


 


Hemoglobin A1c


  10.4 %


(4.3-6.0) 


  


  


 


 


White Blood Count


  


  39.4 K/UL


(4.8-10.8) 


  33.2 K/UL


(4.8-10.8)


 


Red Blood Count


  


  3.04 M/UL


(4.20-5.40) 


  2.52 M/UL


(4.20-5.40)


 


Hemoglobin


  


  8.1 G/DL


(12.0-16.0) 


  6.7 G/DL


(12.0-16.0)


 


Hematocrit


  


  25.6 %


(37.0-47.0) 


  21.1 %


(37.0-47.0)


 


Mean Corpuscular Volume  84 FL (80-99)   84 FL (80-99) 


 


Mean Corpuscular Hemoglobin


  


  26.7 PG


(27.0-31.0) 


  26.8 PG


(27.0-31.0)


 


Mean Corpuscular Hemoglobin


Concent 


  31.8 G/DL


(32.0-36.0) 


  31.9 G/DL


(32.0-36.0)


 


Red Cell Distribution Width


  


  12.3 %


(11.6-14.8) 


  11.1 %


(11.6-14.8)


 


Platelet Count


  


  608 K/UL


(150-450) 


  450 K/UL


(150-450)


 


Mean Platelet Volume


  


  8.2 FL


(6.5-10.1) 


  8.4 FL


(6.5-10.1)


 


Neutrophils (%) (Auto)   % (45.0-75.0)    % (45.0-75.0) 


 


Lymphocytes (%) (Auto)   % (20.0-45.0)    % (20.0-45.0) 


 


Monocytes (%) (Auto)   % (1.0-10.0)    % (1.0-10.0) 


 


Eosinophils (%) (Auto)   % (0.0-3.0)    % (0.0-3.0) 


 


Basophils (%) (Auto)   % (0.0-2.0)    % (0.0-2.0) 


 


Differential Total Cells


Counted 


  100 


  


  100 


 


 


Neutrophils % (Manual)  91 % (45-75)   93 % (45-75) 


 


Lymphocytes % (Manual)  5 % (20-45)   6 % (20-45) 


 


Monocytes % (Manual)  4 % (1-10)   0 % (1-10) 


 


Eosinophils % (Manual)  0 % (0-3)   0 % (0-3) 


 


Basophils % (Manual)  0 % (0-2)   0 % (0-2) 


 


Band Neutrophils  0 % (0-8)   0 % (0-8) 


 


Platelet Estimate  Increased   Adequate 


 


Platelet Morphology  Normal   Normal 


 


Hypochromasia  2+   


 


Anisocytosis  1+   


 


Urine Color  Yellow   


 


Urine Appearance  Turbid   


 


Urine pH  5 (4.5-8.0)   


 


Urine Specific Gravity


  


  1.020


(1.005-1.035) 


  


 


 


Urine Protein  3+ (NEGATIVE)   


 


Urine Glucose (UA)  2+ (NEGATIVE)   


 


Urine Ketones


  


  Negative


(NEGATIVE) 


  


 


 


Urine Blood  3+ (NEGATIVE)   


 


Urine Nitrite


  


  Negative


(NEGATIVE) 


  


 


 


Urine Bilirubin


  


  Negative


(NEGATIVE) 


  


 


 


Urine Urobilinogen


  


  Normal MG/DL


(0.0-1.0) 


  


 


 


Urine Leukocyte Esterase  3+ (NEGATIVE)   


 


Urine RBC


  


  20-30 /HPF (0


- 2) 


  


 


 


Urine WBC


  


  Tntc /HPF (0 -


2) 


  


 


 


Urine Squamous Epithelial


Cells 


  Few /LPF


(NONE/OCC) 


  


 


 


Urine Bacteria


  


  Moderate /HPF


(NONE) 


  


 


 


Sodium Level


  


  137 MMOL/L


(136-145) 


  135 MMOL/L


(136-145)


 


Potassium Level


  


  5.7 MMOL/L


(3.5-5.1) 


  6.1 MMOL/L


(3.5-5.1)


 


Chloride Level


  


  101 MMOL/L


() 


  104 MMOL/L


()


 


Carbon Dioxide Level


  


  19 MMOL/L


(21-32) 


  22 MMOL/L


(21-32)


 


Anion Gap


  


  17 mmol/L


(5-15) 


  8 mmol/L


(5-15)


 


Blood Urea Nitrogen


  


  52 mg/dL


(7-18) 


  52 mg/dL


(7-18)


 


Creatinine


  


  2.0 MG/DL


(0.55-1.30) 


  2.0 MG/DL


(0.55-1.30)


 


Estimat Glomerular Filtration


Rate 


  24.9 mL/min


(>60) 


  24.9 mL/min


(>60)


 


Glucose Level


  


  451 MG/DL


() 


  391 MG/DL


()


 


Lactic Acid Level


  


  5.80 mmol/L


(0.4-2.0) 2.90 mmol/L


(0.66-2.22) 


 


 


Calcium Level


  


  9.4 MG/DL


(8.5-10.1) 


  8.3 MG/DL


(8.5-10.1)


 


Total Bilirubin


  


  0.4 MG/DL


(0.2-1.0) 


  0.3 MG/DL


(0.2-1.0)


 


Aspartate Amino Transf


(AST/SGOT) 


  22 U/L (15-37) 


  


  67 U/L (15-37) 


 


 


Alanine Aminotransferase


(ALT/SGPT) 


  26 U/L (12-78) 


  


  58 U/L (12-78) 


 


 


Alkaline Phosphatase


  


  119 U/L


() 


  106 U/L


()


 


Total Creatine Kinase


  


  23 U/L


() 


  


 


 


Creatine Kinase MB


  


  0.9 NG/ML


(0.0-3.6) 


  


 


 


Creatine Kinase MB Relative


Index 


  3.9 


  


  


 


 


Troponin I


  


  0.048 ng/mL


(0.000-0.056) 


  


 


 


Pro-B-Type Natriuretic Peptide


  


  24671 pg/mL


(0-125) 


  84313 pg/mL


(0-125)


 


Total Protein


  


  7.9 G/DL


(6.4-8.2) 


  6.9 G/DL


(6.4-8.2)


 


Albumin


  


  1.8 G/DL


(3.4-5.0) 


  1.6 G/DL


(3.4-5.0)


 


Globulin  6.1 g/dL   5.3 g/dL 


 


Albumin/Globulin Ratio  0.3 (1.0-2.7)   0.3 (1.0-2.7) 


 


Myelocytes %    1 % (0-0) 


 


Red Blood Cell Morphology    Normal 


 


Uric Acid


  


  


  


  10.0 MG/DL


(2.6-7.2)


 


Phosphorus Level


  


  


  


  4.4 MG/DL


(2.5-4.9)


 


Magnesium Level


  


  


  


  2.8 MG/DL


(1.8-2.4)


 


Iron Level


  


  


  


  29 ug/dL


()


 


Total Iron Binding Capacity


  


  


  


  141 ug/dL


(250-450)


 


Percent Iron Saturation    21 % (15-50) 


 


Unsaturated Iron Binding


  


  


  


  112 ug/dL


(112-346)


 


Ferritin


  


  


  


  1443 NG/ML


(8-388)


 


C-Reactive Protein,


Quantitative 


  


  


  28.7 mg/dL


(0.00-0.90)


 


Triglycerides Level


  


  


  


  178 MG/DL


()


 


Cholesterol Level


  


  


  


  107 MG/DL (<


200)


 


LDL Cholesterol


  


  


  


  59 mg/dL


(<100)


 


HDL Cholesterol


  


  


  


  19 MG/DL


(40-60)


 


Cholesterol/HDL Ratio    5.6 (3.3-4.4) 


 


Vitamin B12 Level


  


  


  


  1061 PG/ML


(193-986)


 


Folate


  


  


  


  7.2 NG/ML


(8.6-58.9)


 


Thyroid Stimulating Hormone


(TSH) 


  


  


  0.018 uiU/mL


(0.358-3.740)


 


Digoxin Level


  


  


  


  < 0.2 NG/ML


(0.9-2.0)


 


Test


  10/14/19


11:29 10/14/19


11:30 10/15/19


05:30 10/15/19


16:00


 


Urine Random Sodium


  25 mmol/L


() 


  


  


 


 


Urine Collection Time  24 HRS   


 


Urine Total Volume  1700 ML   


 


Urine Total Protein mg/dL  76 mg/dL   


 


Urine Total Protein 24 Hour


  


  1292.0 mg/24hr


(< 150) 


  


 


 


White Blood Count


  


  


  28.1 K/UL


(4.8-10.8) 23.6 K/UL


(4.8-10.8)


 


Red Blood Count


  


  


  3.14 M/UL


(4.20-5.40) 3.18 M/UL


(4.20-5.40)


 


Hemoglobin


  


  


  9.1 G/DL


(12.0-16.0) 8.6 G/DL


(12.0-16.0)


 


Hematocrit


  


  


  26.2 %


(37.0-47.0) 26.5 %


(37.0-47.0)


 


Mean Corpuscular Volume   84 FL (80-99)  83 FL (80-99) 


 


Mean Corpuscular Hemoglobin


  


  


  28.9 PG


(27.0-31.0) 27.1 PG


(27.0-31.0)


 


Mean Corpuscular Hemoglobin


Concent 


  


  34.6 G/DL


(32.0-36.0) 32.5 G/DL


(32.0-36.0)


 


Red Cell Distribution Width


  


  


  12.0 %


(11.6-14.8) 11.3 %


(11.6-14.8)


 


Platelet Count


  


  


  498 K/UL


(150-450) 501 K/UL


(150-450)


 


Mean Platelet Volume


  


  


  8.4 FL


(6.5-10.1) 8.7 FL


(6.5-10.1)


 


Neutrophils (%) (Auto)    % (45.0-75.0)   % (45.0-75.0) 


 


Lymphocytes (%) (Auto)    % (20.0-45.0)   % (20.0-45.0) 


 


Monocytes (%) (Auto)    % (1.0-10.0)   % (1.0-10.0) 


 


Eosinophils (%) (Auto)    % (0.0-3.0)   % (0.0-3.0) 


 


Basophils (%) (Auto)    % (0.0-2.0)   % (0.0-2.0) 


 


Differential Total Cells


Counted 


  


  100 


  100 


 


 


Neutrophils % (Manual)   88 % (45-75)  83 % (45-75) 


 


Lymphocytes % (Manual)   5 % (20-45)  4 % (20-45) 


 


Monocytes % (Manual)   7 % (1-10)  5 % (1-10) 


 


Eosinophils % (Manual)   0 % (0-3)  1 % (0-3) 


 


Basophils % (Manual)   0 % (0-2)  0 % (0-2) 


 


Band Neutrophils   0 % (0-8)  7 % (0-8) 


 


Platelet Estimate   Adequate  Increased 


 


Platelet Morphology   Normal  Normal 


 


Hypochromasia   1+  


 


Sodium Level


  


  


  142 MMOL/L


(136-145) 


 


 


Potassium Level


  


  


  4.1 MMOL/L


(3.5-5.1) 


 


 


Chloride Level


  


  


  109 MMOL/L


() 


 


 


Carbon Dioxide Level


  


  


  23 MMOL/L


(21-32) 


 


 


Anion Gap


  


  


  10 mmol/L


(5-15) 


 


 


Blood Urea Nitrogen


  


  


  44 mg/dL


(7-18) 


 


 


Creatinine


  


  


  1.7 MG/DL


(0.55-1.30) 


 


 


Estimat Glomerular Filtration


Rate 


  


  30.1 mL/min


(>60) 


 


 


Glucose Level


  


  


  78 MG/DL


() 


 


 


Lactic Acid Level


  


  


  0.90 mmol/L


(0.4-2.0) 


 


 


Uric Acid


  


  


  9.1 MG/DL


(2.6-7.2) 


 


 


Calcium Level


  


  


  8.5 MG/DL


(8.5-10.1) 


 


 


Phosphorus Level


  


  


  3.7 MG/DL


(2.5-4.9) 


 


 


Magnesium Level


  


  


  2.7 MG/DL


(1.8-2.4) 


 


 


Total Bilirubin


  


  


  0.9 MG/DL


(0.2-1.0) 


 


 


Gamma Glutamyl Transpeptidase   29 U/L (5-85)  


 


Aspartate Amino Transf


(AST/SGOT) 


  


  69 U/L (15-37) 


  


 


 


Alanine Aminotransferase


(ALT/SGPT) 


  


  88 U/L (12-78) 


  


 


 


Alkaline Phosphatase


  


  


  105 U/L


() 


 


 


Total Creatine Kinase


  


  


  26 U/L


() 


 


 


Troponin I


  


  


  0.407 ng/mL


(0.000-0.056) 


 


 


C-Reactive Protein,


Quantitative 


  


  22.7 mg/dL


(0.00-0.90) 


 


 


Pro-B-Type Natriuretic Peptide


  


  


  17718 pg/mL


(0-125) 


 


 


Total Protein


  


  


  6.6 G/DL


(6.4-8.2) 


 


 


Albumin


  


  


  1.5 G/DL


(3.4-5.0) 


 


 


Globulin   5.1 g/dL  


 


Albumin/Globulin Ratio   0.3 (1.0-2.7)  


 


Free Thyroxine


  


  


  1.08 NG/DL


(0.76-1.46) 


 


 


Free Triiodothyronine


  


  


  1.6 pg/mL


(2.3-4.2) 


 


 


Red Blood Cell Morphology    Normal 


 


Test


  10/16/19


06:47 


  


  


 


 


White Blood Count


  29.3 K/UL


(4.8-10.8) 


  


  


 


 


Red Blood Count


  2.95 M/UL


(4.20-5.40) 


  


  


 


 


Hemoglobin


  8.4 G/DL


(12.0-16.0) 


  


  


 


 


Hematocrit


  25.0 %


(37.0-47.0) 


  


  


 


 


Mean Corpuscular Volume 85 FL (80-99)    


 


Mean Corpuscular Hemoglobin


  28.4 PG


(27.0-31.0) 


  


  


 


 


Mean Corpuscular Hemoglobin


Concent 33.5 G/DL


(32.0-36.0) 


  


  


 


 


Red Cell Distribution Width


  12.8 %


(11.6-14.8) 


  


  


 


 


Platelet Count


  484 K/UL


(150-450) 


  


  


 


 


Mean Platelet Volume


  7.9 FL


(6.5-10.1) 


  


  


 


 


Neutrophils (%) (Auto)  % (45.0-75.0)    


 


Lymphocytes (%) (Auto)  % (20.0-45.0)    


 


Monocytes (%) (Auto)  % (1.0-10.0)    


 


Eosinophils (%) (Auto)  % (0.0-3.0)    


 


Basophils (%) (Auto)  % (0.0-2.0)    


 


Sodium Level


  139 MMOL/L


(136-145) 


  


  


 


 


Potassium Level


  3.5 MMOL/L


(3.5-5.1) 


  


  


 


 


Chloride Level


  108 MMOL/L


() 


  


  


 


 


Carbon Dioxide Level


  20 MMOL/L


(21-32) 


  


  


 


 


Anion Gap


  11 mmol/L


(5-15) 


  


  


 


 


Blood Urea Nitrogen


  29 mg/dL


(7-18) 


  


  


 


 


Creatinine


  1.4 MG/DL


(0.55-1.30) 


  


  


 


 


Estimat Glomerular Filtration


Rate 37.6 mL/min


(>60) 


  


  


 


 


Glucose Level


  83 MG/DL


() 


  


  


 


 


Calcium Level


  7.9 MG/DL


(8.5-10.1) 


  


  


 


 


Troponin I


  0.200 ng/mL


(0.000-0.056) 


  


  


 








Height (Feet):  5


Height (Inches):  5.00


Weight (Pounds):  151


Objective





Physical Exam:


Vitals: reviewed


General Appearance:  NAD


HEENT:  normocephalic, atraumatic


Neck:  non-tender, normal alignment


Respiratory/Chest:  normal breath sounds bilaterally


Cardiovascular/Chest: rrr


Abdomen:  normal bowel sounds, soft, nontender


Extremities:  normal range of motion











Mike Pop MD Oct 16, 2019 08:59

## 2019-10-16 NOTE — INFECTIOUS DISEASES PROG NOTE
Assessment/Plan


Problems:  


(1) Aspiration pneumonia


Assessment & Plan:  with B/L infiltrates, L>R suspect due to altered mental 

status , with hypoxemia and leukocytosis, continue vancomycin empirically, 

switch zosyn to meropenem , and repeat CXR  , aspiration precaution 





(2) UTI (urinary tract infection)


Assessment & Plan:  already on wide spectrum antibiotics , pending urine 

culture 





(3) Sepsis


Assessment & Plan:  due to the above, continue wide spectrum antibiotics 

pending cultures 





(4) Acute metabolic encephalopathy


Assessment & Plan:  due to the above, continue hydration and antibiotics, 

monitor in tele 





(5) Hyperglycemia due to type 2 diabetes mellitus


Assessment & Plan:  recommend tight glycemic control to keep blood glucose 

between 100-140 





(6) ARF (acute renal failure)


Assessment & Plan:  due to the above, continue hydration and renally dosed 

antibiotics, close  monitor of renal function 








Subjective


Constitutional:  Reports: fever, fatigue


HEENT:  Reports: no symptoms


Respiratory:  Reports: dry cough


Breasts:  Reports: no symptoms


Cardiovascular:  Reports: no symptoms


Gastrointestinal/Abdominal:  Reports: no symptoms


Genitourinary:  Reports: no symptoms


Neurologic:  Reports: weakness, confusion


Psychiatric:  Reports: no symptoms


Skin:  Reports: no symptoms


Endocrine:  Reports: no symptoms


Hematologic:  Reports: no symptoms


Musculoskeletal:  Reports: no symptoms


Allergies:  


Coded Allergies:  


     No Known Allergies (Unverified , 10/14/19)


Subjective


she was lethargic , but still responsive today, had low grade fever .





Objective


Vital Signs





Last 24 Hour Vital Signs








  Date Time  Temp Pulse Resp B/P (MAP) Pulse Ox O2 Delivery O2 Flow Rate FiO2


 


10/16/19 08:00  80      


 


10/16/19 08:00 98.1 86 20 146/66 (92) 100   


 


10/16/19 04:00 100.3 89 24 146/67 (93) 96   


 


10/16/19 04:00  89      


 


10/16/19 00:00  89      


 


10/16/19 00:00 100.1 92 24 136/63 (87) 96   


 


10/15/19 21:00      Nasal Cannula 2.0 


 


10/15/19 20:12     97 Nasal Cannula 2.0 28


 


10/15/19 20:12  90 20  97 Nasal Cannula 2.0 28


 


10/15/19 20:00 99.1 93 24 151/64 (93) 95   


 


10/15/19 20:00  95      


 


10/15/19 16:00 98.0 83 19 146/85 (105) 98   


 


10/15/19 15:17  92      


 


10/15/19 12:00 98.4 80 20 141/69 (93) 99   


 


10/15/19 11:38  88      








Height (Feet):  5


Height (Inches):  5.00


Weight (Pounds):  151


General Appearance:  WD/WN, no acute distress


HEENT:  normocephalic, atraumatic, anicteric, mucous membranes moist, PERRL, 

EOMI, pharynx normal, supple, no JVD


Respiratory/Chest:  chest wall non-tender, normal breath sounds, no respiratory 

distress, no accessory muscle use, decreased breath sounds


Cardiovascular:  normal peripheral pulses, normal rate, regular rhythm, no 

gallop/murmur, no JVD


Abdomen:  normal bowel sounds, soft, non tender, no organomegaly, non distended

, no mass, no scars


Genitourinary:  normal external genitalia


Extremities:  no cyanosis, no clubbing


Skin:  no rash, no lesions, ulcers


Neurologic/Psychiatric:  alert, responsive


Lymphatic:  no neck adenopathy, no groin adenopathy


Musculoskeletal:  normal muscle bulk, no effusion





Microbiology








 Date/Time


Source Procedure


Growth Status


 


 


 10/14/19 01:30


Blood Blood Culture - Preliminary


NO GROWTH AFTER 48 HOURS Resulted


 


 10/14/19 01:15


Blood Blood Culture - Preliminary


NO GROWTH AFTER 48 HOURS Resulted


 


 10/14/19 01:15


Nasal Nares MRSA Culture - Final


NO METHICILLIN RESISTANT STAPH AUREUS... Complete


 


 10/14/19 01:15


Urine,Clean Catch Urine Culture - Preliminary


Gram Negative Bacillus 1 Resulted


 


 10/14/19 01:15


Rectum - Final


NO CARBAPENEM-RESISTANT ENTEROBACTERI... Complete


 


 10/14/19 01:15


Rectum VRE Culture - Final


Enterococcus Faecalis - Vre Complete











Laboratory Tests








Test


  10/15/19


16:00 10/16/19


06:47


 


White Blood Count


  23.6 K/UL


(4.8-10.8)  *H 29.3 K/UL


(4.8-10.8)  *H


 


Red Blood Count


  3.18 M/UL


(4.20-5.40)  L 2.95 M/UL


(4.20-5.40)  L


 


Hemoglobin


  8.6 G/DL


(12.0-16.0)  L 8.4 G/DL


(12.0-16.0)  L


 


Hematocrit


  26.5 %


(37.0-47.0)  L 25.0 %


(37.0-47.0)  L


 


Mean Corpuscular Volume 83 FL (80-99)   85 FL (80-99)  


 


Mean Corpuscular Hemoglobin


  27.1 PG


(27.0-31.0) 28.4 PG


(27.0-31.0)


 


Mean Corpuscular Hemoglobin


Concent 32.5 G/DL


(32.0-36.0) 33.5 G/DL


(32.0-36.0)


 


Red Cell Distribution Width


  11.3 %


(11.6-14.8)  L 12.8 %


(11.6-14.8)


 


Platelet Count


  501 K/UL


(150-450)  H 484 K/UL


(150-450)  H


 


Mean Platelet Volume


  8.7 FL


(6.5-10.1) 7.9 FL


(6.5-10.1)


 


Neutrophils (%) (Auto)


  % (45.0-75.0)


  % (45.0-75.0)


 


 


Lymphocytes (%) (Auto)


  % (20.0-45.0)


  % (20.0-45.0)


 


 


Monocytes (%) (Auto)  % (1.0-10.0)    % (1.0-10.0)  


 


Eosinophils (%) (Auto)  % (0.0-3.0)    % (0.0-3.0)  


 


Basophils (%) (Auto)  % (0.0-2.0)    % (0.0-2.0)  


 


Differential Total Cells


Counted 100  


  100  


 


 


Neutrophils % (Manual) 83 % (45-75)  H 85 % (45-75)  H


 


Lymphocytes % (Manual) 4 % (20-45)  L 3 % (20-45)  L


 


Monocytes % (Manual) 5 % (1-10)   8 % (1-10)  


 


Eosinophils % (Manual) 1 % (0-3)   2 % (0-3)  


 


Basophils % (Manual) 0 % (0-2)   1 % (0-2)  


 


Band Neutrophils 7 % (0-8)   1 % (0-8)  


 


Platelet Estimate Increased  H Adequate  


 


Platelet Morphology Normal   Normal  


 


Red Blood Cell Morphology Normal   


 


Hypochromasia  2+  


 


Anisocytosis  1+  


 


Sodium Level


  


  139 MMOL/L


(136-145)


 


Potassium Level


  


  3.5 MMOL/L


(3.5-5.1)


 


Chloride Level


  


  108 MMOL/L


()  H


 


Carbon Dioxide Level


  


  20 MMOL/L


(21-32)  L


 


Anion Gap


  


  11 mmol/L


(5-15)


 


Blood Urea Nitrogen


  


  29 mg/dL


(7-18)  H


 


Creatinine


  


  1.4 MG/DL


(0.55-1.30)  H


 


Estimat Glomerular Filtration


Rate 


  37.6 mL/min


(>60)


 


Glucose Level


  


  83 MG/DL


()


 


Calcium Level


  


  7.9 MG/DL


(8.5-10.1)  L


 


Phosphorus Level


  


  3.6 MG/DL


(2.5-4.9)


 


Magnesium Level


  


  2.4 MG/DL


(1.8-2.4)


 


Total Bilirubin


  


  0.5 MG/DL


(0.2-1.0)


 


Aspartate Amino Transf


(AST/SGOT) 


  33 U/L (15-37)


 


 


Alanine Aminotransferase


(ALT/SGPT) 


  54 U/L (12-78)


 


 


Alkaline Phosphatase


  


  99 U/L


()


 


Troponin I


  


  0.200 ng/mL


(0.000-0.056)


 


C-Reactive Protein,


Quantitative 


  18.9 mg/dL


(0.00-0.90)  H


 


Total Protein


  


  6.0 G/DL


(6.4-8.2)  L


 


Albumin


  


  1.3 G/DL


(3.4-5.0)  L


 


Globulin  4.7 g/dL  


 


Albumin/Globulin Ratio


  


  0.3 (1.0-2.7)


L











Current Medications








 Medications


  (Trade)  Dose


 Ordered  Sig/Winnie


 Route


 PRN Reason  Start Time


 Stop Time Status Last Admin


Dose Admin


 


 Acetaminophen


  (Tylenol)  650 mg  PRN  PRN


 RECTAL


 TEMP>100.5  10/14/19 03:00


     


 


 


 Albuterol/


 Ipratropium


  (Albuterol/


 Ipratropium)  3 ml  Q4H  PRN


 HHN


 Shortness of Breath  10/14/19 07:30


 10/19/19 07:29   


 


 


 Aspirin


  (ASA)  81 mg  DAILY


 ORAL


   10/15/19 09:00


 11/14/19 08:59  10/16/19 09:38


 


 


 Atorvastatin


 Calcium


  (Lipitor)  10 mg  BEDTIME


 ORAL


   10/14/19 21:00


 11/13/19 20:59  10/15/19 21:28


 


 


 Clopidogrel


 Bisulfate


  (Plavix)  75 mg  DAILY


 ORAL


   10/15/19 09:00


 11/14/19 08:59  10/16/19 09:37


 


 


 Dextrose


  (Dextrose 50%)  25 ml  Q30M  PRN


 IV


 Hypoglycemia  10/14/19 07:00


 11/13/19 06:59   


 


 


 Dextrose


  (Dextrose 50%)  50 ml  Q30M  PRN


 IV


 Hypoglycemia  10/14/19 07:00


 11/13/19 06:59   


 


 


 Divalproex Sodium


  (Depakote


 Sprinkles)  500 mg  Q12HR


 ORAL


   10/15/19 22:00


 11/14/19 21:59  10/16/19 09:38


 


 


 Docusate Sodium


  (Colace)  100 mg  THREE TIMES A  DAY


 ORAL


   10/14/19 13:00


 11/13/19 12:59  10/16/19 09:37


 


 


 Donepezil HCl


  (Aricept)  5 mg  DAILY


 ORAL


   10/15/19 11:00


 11/14/19 10:59  10/16/19 09:38


 


 


 Enoxaparin Sodium


  (Lovenox)  30 mg  DAILY


 SUBQ


   10/14/19 09:00


 11/13/19 08:59  10/16/19 09:40


 


 


 Insulin Aspart


  (NovoLOG)    BEFORE MEALS AND  HS


 SUBQ


   10/14/19 11:30


 11/13/19 11:29  10/14/19 17:36


 


 


 Insulin Detemir


  (Levemir)  10 units  DAILY


 SUBQ


   10/16/19 09:00


 11/13/19 10:29  10/16/19 10:04


 


 


 Olanzapine


  (ZyPREXA)  5 mg  DAILY


 ORAL


   10/15/19 11:00


 11/14/19 10:59   


 


 


 Pantoprazole


  (Protonix)  40 mg  BID


 ORAL


   10/14/19 18:00


 11/13/19 08:59  10/16/19 09:38


 


 


 Piperacillin Sod/


 Tazobactam Sod


 3.375 gm/Sodium


 Chloride  110 ml @ 


 27.5 mls/hr  EVERY 8  HOURS


 IVPB


   10/14/19 14:00


 10/19/19 13:59  10/16/19 06:00


 


 


 Sodium Chloride  1,000 ml @ 


 75 mls/hr  [id number]


 IV


   10/15/19 07:30


 11/13/19 07:29  10/16/19 10:03


 


 


 Vancomycin HCl


  (Vanco rx to


 dose)  1 ea  DAILY  PRN


 MISC


 Per rx protocol  10/14/19 07:30


 11/13/19 07:29   


 


 


 Vancomycin HCl


 750 mg/Sodium


 Chloride  275 ml @ 


 183.333


 mls/hr  Q24H


 IVPB


   10/15/19 09:00


 10/19/19 08:59  10/16/19 10:26


 

















Amie Burgos M.D. Oct 16, 2019 11:18

## 2019-10-16 NOTE — NUR
CASE MANAGEMENT: REVIEW



10/16/19



SI: SEPSIS/ ACUTE RENAL FAILURE/ PNA /UTI/ ANEMIA/ CHANCE/ ACUTE ENCEPHALOPATHY 

98.1  86  20  146/66  100% NC 2L

WBC 39.4-29.3; RBC 2.95; H/H 8.4/25.0; ; BUN 29; CREA 1.4;

TROP 0.200; C-R.PRO 18.9; CA+ 7.9

**10/15- BNP 23044; TROP 0.407



IS:     * IV MEROPENEM

IV VANCOMYCIN Q24HR

IVF NS @75

LOVENOX SQ QD

PROTONIX PO BID

NOVOLOG SQ AC&HS

LEVEMIR SQ QD

PLAVIX PO QD

ASA PO QD

DEPAKOTE PO Q12HR

OLANZAPINE PO QD

DONEPEZIL PO QD





**: 2E TELE UNIT



DCP: RETURN TO [facility] WHEN MEDICALLY CLEARED 



PLAN:  SP THERAPY

## 2019-10-16 NOTE — NUR
ST NOTES:



REFERRED FOR SWALLOW EVALUATION BY DR MORALES, SEE FULL REPORT.



DYSPHAGIA RISK FACTORS FOR THIS 64 Y.O.F.:



ACUTE ISSUES: SEPSIS AND AMS MET ENCEPHALOPATHY, 

, PNA (HCAP ? ASP) RIGHT LOWER LOBE (AND H/O PNA), SOB RESP PROBLEMS 

(DESAT TO 90s AND ON NONREBREATHER AND RESP UP TO 24 LOW 18 ON 02 AT 2 

LITERS NC), AKF AND ACUTE RENAL FAILURE



RELEVANT MEDS: ZYPREXA AND ALBUTEROL



H/O OP DYSPHAGIA, DEMENTIA, SZ D/O, COGNITIVE-COMMUNICATION DEFICITS, 

HEART FAILURE, CARDIAC FAILURE AND D/O, SCHIOZPHRENIA AND MOOD D/O.



NO POLST/AD REGARDING TUBE FEEDING PREFERENCES.

AT SNF ON A CCHO PUREED AND NECTAR THICK LIQUIDS (UNTIL UPGRADED 10/7/19 

TO THIN LIQUIDS). HAD OROPHARYNGEAL DYSPHAGIA AND COGNITIVE-COMMUNICATIVE 

DEFICITS.  D/C SKILLED SERVICES. NOW ON CCHO-MED PUREED AND NECTAR THICK 

LIQUIDS WITH POOR INTAKE 0 TO 25%. PER RNIGNACIO, SLOW INTAKE BUT NO OVERT 

ASP WITH PUREED AND NECTAR THICK LIQUIDS AND CRUSHED MEDS AND APPLESAUCE

BUT THEN FALLS ASLEEP. PER CNA, MAIKOL, PT TOO SLEEPY TO FEED.



PATIENT WAS ALERT BUT HAS POOR EYE CONTACT, LOOKS STRAIGHT AHEAD AND ONLY 

SAYS A FEW WORDS (INTELLIGIBLE) WITH POOR VERBAL INITIATION. DID NOT 

FOLLOW ORAL COMMANDS BUT DID TAKE PO TRIALS. 2 LITERS NC AND RESP RATE AT 



INITIAL IMPRESSIONS:

S/S OF AT LEAST A MILD-MOD ORAL PREP AND OROPHARYNGEAL DYSPHAGIA WITH 

OVERALL INCREASED TRANSIT TIMES.



GIVEN THIN LIQUIDS VIA STRAW, NOT ABLE TO SUCK THROUGH STRAW. GIVEN SIP OF 

THIN LIQUIDS AND NECTAR THICK LIQUIDS VIA TSP/CUP TOOK 5 TO 10 SECONDS TO 

SWALLOW (WITH FAIR HYOLARYNGEAL EXCURSION) NO ORAL RESIDUE BUT HAD EXTRA 

SWALLOW WITH CUP LEVEL. 



GIVEN PUREED TSP, ALSO TOOK 5-10 SEC TO SWALLOW (WOULD CHEW UNNECESSARILY 

LIKELY ORAL SENSORY AWARENESS AND SENSATION DEFICIT), NO ORAL RESIDUE, 

EXTRA SWALLOW AT TIMES, NO OVERT ASPIRATION



WILL HOLD ON MASTICATED SOLIDS FOR NOW (TAKES TOO LONG WITH OTHER CONSISTENCIES AND HAS NO 
DENTITION/DENTURES).



POOR AND VERY SLOW INTAKE (ALSO PERIODS OF LETHARGY PER STAFF)



RECOMMENDATIONS:

CONSIDER COMPLETING MOD BARIUM SWALLOW STUDY TO FURTHER ASSESS SWALLOW, DETERMINE SILENT ASP 
RISK AND ATTEMPT TRIAL TX TECHNIQUES.



IF PO CONTINUE FOR QUALITY OF LIFE, [facility] 
NECTAR THICK SOUP CONSISTENCY WITH POSTED ASPIRATION PRECAUTIONS AND ONE TO ONE FEEDING.



SEND HIGH CALORIE SUPPLEMENTS AND DIET TYPE PER RD.



SKILLED DYSPHAGIA MANAGEMENT AND TX AND COGNITIVE-COMMUNICATION EVAL/TX FOR COMMUNICATION 
TIPS.



EDUCATED/TRAINED RN IGNACIO AND SLIME LASSITER IN POSTED PRECAUTIONS.



LEFT MESSAGE WITH DR MORALES

## 2019-10-16 NOTE — NEPHROLOGY PROGRESS NOTE
Assessment/Plan


Problem List:  


(1) Dehydration


(2) Renal failure (ARF), acute on chronic


(3) ARF (acute renal failure)


(4) Sepsis


(5) Acute metabolic encephalopathy


(6) Hyperglycemia due to type 2 diabetes mellitus


(7) UTI (urinary tract infection)


(8) Diabetic nephropathy


Assessment





Renal failure, Acute on Chronic


Dehydration


Severe Anemia


Sepsis / Pneumonia / UTI


DM, OOC


Proteinuria , Diabetic Nephropathy


Acute encephalopathy


Plan





WBCs remain elevated


Charles


Avoid Nephrotoxics


Anemia salas


2D echo


24 H urine protein


Keep BP and BS in check


I am resuming her psych meds fro [facility]CF for now


Per orders





Subjective


ROS Limited/Unobtainable:  No


Constitutional:  Reports: malaise





Objective


Objective





Last 24 Hour Vital Signs








  Date Time  Temp Pulse Resp B/P (MAP) Pulse Ox O2 Delivery O2 Flow Rate FiO2


 


10/16/19 11:18  85 18  98 Nasal Cannula 2.0 28


 


10/16/19 11:18     98 Nasal Cannula 2.0 28


 


10/16/19 08:00  80      


 


10/16/19 08:00 98.1 86 20 146/66 (92) 100   


 


10/16/19 04:00 100.3 89 24 146/67 (93) 96   


 


10/16/19 04:00  89      


 


10/16/19 00:00  89      


 


10/16/19 00:00 100.1 92 24 136/63 (87) 96   


 


10/15/19 21:00      Nasal Cannula 2.0 


 


10/15/19 20:12     97 Nasal Cannula 2.0 28


 


10/15/19 20:12  90 20  97 Nasal Cannula 2.0 28


 


10/15/19 20:00 99.1 93 24 151/64 (93) 95   


 


10/15/19 20:00  95      


 


10/15/19 16:00 98.0 83 19 146/85 (105) 98   


 


10/15/19 15:17  92      

















Intake and Output  


 


 10/15/19 10/16/19





 19:00 07:00


 


Intake Total 485.0 ml 


 


Output Total  600 ml


 


Balance 485.0 ml -600 ml


 


  


 


Intake Oral 280 ml 


 


IV Total 205.0 ml 


 


Output Urine Total  600 ml








Laboratory Tests


10/15/19 16:00: 


White Blood Count 23.6*H, Red Blood Count 3.18L, Hemoglobin 8.6L, Hematocrit 

26.5L, Mean Corpuscular Volume 83, Mean Corpuscular Hemoglobin 27.1, Mean 

Corpuscular Hemoglobin Concent 32.5, Red Cell Distribution Width 11.3L, 

Platelet Count 501H, Mean Platelet Volume 8.7, Neutrophils (%) (Auto) , 

Lymphocytes (%) (Auto) , Monocytes (%) (Auto) , Eosinophils (%) (Auto) , 

Basophils (%) (Auto) , Differential Total Cells Counted 100, Neutrophils % (

Manual) 83H, Lymphocytes % (Manual) 4L, Monocytes % (Manual) 5, Eosinophils % (

Manual) 1, Basophils % (Manual) 0, Band Neutrophils 7, Platelet Estimate 

IncreasedH, Platelet Morphology Normal, Red Blood Cell Morphology Normal


10/16/19 06:47: 


White Blood Count 29.3*H, Red Blood Count 2.95L, Hemoglobin 8.4L, Hematocrit 

25.0L, Mean Corpuscular Volume 85, Mean Corpuscular Hemoglobin 28.4, Mean 

Corpuscular Hemoglobin Concent 33.5, Red Cell Distribution Width 12.8, Platelet 

Count 484H, Mean Platelet Volume 7.9, Neutrophils (%) (Auto) , Lymphocytes (%) (

Auto) , Monocytes (%) (Auto) , Eosinophils (%) (Auto) , Basophils (%) (Auto) , 

Differential Total Cells Counted 100, Neutrophils % (Manual) 85H, Lymphocytes % 

(Manual) 3L, Monocytes % (Manual) 8, Eosinophils % (Manual) 2, Basophils % (

Manual) 1, Band Neutrophils 1, Platelet Estimate Adequate, Platelet Morphology 

Normal, Hypochromasia 2+, Anisocytosis 1+, Sodium Level 139, Potassium Level 3.5

, Chloride Level 108H, Carbon Dioxide Level 20L, Anion Gap 11, Blood Urea 

Nitrogen 29H, Creatinine 1.4H, Estimat Glomerular Filtration Rate 37.6, Glucose 

Level 83, Calcium Level 7.9L, Phosphorus Level 3.6, Magnesium Level 2.4, Total 

Bilirubin 0.5, Aspartate Amino Transf (AST/SGOT) 33, Alanine Aminotransferase (

ALT/SGPT) 54, Alkaline Phosphatase 99, Troponin I 0.200H, C-Reactive Protein, 

Quantitative 18.9H, Total Protein 6.0L, Albumin 1.3L, Globulin 4.7, Albumin/

Globulin Ratio 0.3L


Height (Feet):  5


Height (Inches):  5.00


Weight (Pounds):  151


General Appearance:  no apparent distress


Objective


no change











Lukasz Vizcaino MD Oct 16, 2019 12:56

## 2019-10-16 NOTE — NUR
NURSE NOTES:

Received report from ELIZABETH Lemus. Patient in bed resting, no active s/s cardiac, respiratory 
distress noticed at this time. Patient AOx1, SR with HR 89, open eyes spontaneously, on 2L 
oxygen via NC. Endorsed need of sputum culture, OB stool. IV on left FA 20G, Right hand 20G, 
asymptomatic, patent, intact, IV fluid running as prescribed rate. Endorsed MD aware of 
troponin level. Bed in lowest position, side rails upx2, call light within reach. Will 
continue to monitor.

## 2019-10-16 NOTE — NUR
NURSE NOTES:

Dr. Keith made aware patient abdomen distended, no grimace when pressed. Per Dr. Keith, 
lactulose TID 20mg prn, discontinue Charles Catheter and assess for urine, if not void, do 
bladder scanner. Order noted, entered, carried out.

## 2019-10-16 NOTE — NUR
RD ASSESSMENT & RECOMMENDATIONS

SEE CARE ACTIVITY FOR COMPLETE ASSESSMENT



DAILY ESTIMATED NEEDS:

Needs based on Wound, DM, Sepsis/ 68kg 

25-30  kcals/kg 

3063-8315  total kcals

1.25-2  g protein/kg

  g total protein 

25-30  mL/kg

1567-7768  total fluid mLs





NUTRITION DIAGNOSIS:

* Swallowing difficulty R/T dysaphia, poor dentition, dementia as

evidenced by on liquify pureed, NTL texture diet per SLP rec.

* Increased kcal/prot needs R/T wound healing and sepsis as evidenced by

admitted w/ DTPI R heel and non-blanchable sacral erythema, critically

elev wbc (29.3*), elev LA (5.8 -> wnl), Tmax 100.3.





CURRENT DIET:CCHO MED, liquify pureed w/ NTL + Glucerna TID    

 





PO DIET RECOMMENDATIONS:

CCHO MED/ texture per SLP  



 



ADDITIONAL RECOMMENDATIONS:

* Maintain Glucerna TID w/ meals 

* Wound healing: add MVI x1, Vit C 250mg QD 

                          : Zaki 1pkt BID if tolerated 

* Add folic acid 1mg QD (low folate 7.2) 

* Monitor BGs closely, for hypoglycemia w/ poor PO 

* Monitor PO intake and tolerance: poor PO + episodes of lethargy  

* Calibrated bedscale wt

## 2019-10-16 NOTE — PULMONOLOGY PROGRESS NOTE
Assessment/Plan


Problems:  


(1) Healthcare associated bacterial pneumonia


(2) UTI (urinary tract infection)


(3) Sepsis


(4) Dehydration


(5) CHANCE (acute kidney injury)


(6) Acute metabolic encephalopathy


(7) Hyperglycemia due to type 2 diabetes mellitus


(8) Renal failure (ARF), acute on chronic


(9) Aspiration pneumonia


(10) Abnormal TSH


Assessment/Plan


Sepsis


Possible pneumonia


Possible UTI


Acute hypoxemic respiratory failure


Acute encephalopathy


Hx CHF


HTN


CHANCE 





Plan:


-cont broad-spectrum abx per ID


-Monitor CXR


-monitor leukocytosis


-monitor volumes and renal function 


-monitor encephalopathy


-Asp precautions, SLP recs


-DVT Px: LMWH


-FC





Subjective


Allergies:  


Coded Allergies:  


     No Known Allergies (Unverified , 10/14/19)


Subjective


Tm 100.3


+ cough + SOB no FC





Objective





Last 24 Hour Vital Signs








  Date Time  Temp Pulse Resp B/P (MAP) Pulse Ox O2 Delivery O2 Flow Rate FiO2


 


10/16/19 16:00 97.5 86 20 119/62 (81) 100   


 


10/16/19 16:00  83      


 


10/16/19 12:00 97.8 81 20 121/58 (79) 99   


 


10/16/19 12:00  86      


 


10/16/19 11:18  85 18  98 Nasal Cannula 2.0 28


 


10/16/19 11:18     98 Nasal Cannula 2.0 28


 


10/16/19 09:00      Nasal Cannula 2.0 


 


10/16/19 08:00  80      


 


10/16/19 08:00 98.1 86 20 146/66 (92) 100   


 


10/16/19 04:00 100.3 89 24 146/67 (93) 96   


 


10/16/19 04:00  89      


 


10/16/19 00:00  89      


 


10/16/19 00:00 100.1 92 24 136/63 (87) 96   


 


10/15/19 21:00      Nasal Cannula 2.0 


 


10/15/19 20:12     97 Nasal Cannula 2.0 28


 


10/15/19 20:12  90 20  97 Nasal Cannula 2.0 28


 


10/15/19 20:00 99.1 93 24 151/64 (93) 95   


 


10/15/19 20:00  95      

















Intake and Output  


 


 10/15/19 10/16/19





 19:00 07:00


 


Intake Total 485.0 ml 


 


Output Total  600 ml


 


Balance 485.0 ml -600 ml


 


  


 


Intake Oral 280 ml 


 


IV Total 205.0 ml 


 


Output Urine Total  600 ml








General Appearance:  no acute distress, cachetic


HEENT:  normocephalic, atraumatic, anicteric, mucous membranes moist


Respiratory/Chest:  rhonchi


Cardiovascular:  normal peripheral pulses, normal rate, regular rhythm


Abdomen:  normal bowel sounds, soft, non tender, no organomegaly, non distended

, no mass


Extremities:  no cyanosis, no clubbing, no edema





Microbiology








 Date/Time


Source Procedure


Growth Status


 


 


 10/14/19 01:30


Blood Blood Culture - Preliminary


NO GROWTH AFTER 48 HOURS Resulted


 


 10/14/19 01:15


Blood Blood Culture - Preliminary


NO GROWTH AFTER 48 HOURS Resulted


 


 10/14/19 01:15


Nasal Nares MRSA Culture - Final


NO METHICILLIN RESISTANT STAPH AUREUS... Complete


 


 10/14/19 01:15


Urine,Clean Catch Urine Culture - Preliminary


Gram Negative Bacillus 1 Resulted


 


 10/14/19 01:15


Rectum - Final


NO CARBAPENEM-RESISTANT ENTEROBACTERI... Complete


 


 10/14/19 01:15


Rectum VRE Culture - Final


Enterococcus Faecalis - Vre Complete








Laboratory Tests


10/16/19 06:47: 


White Blood Count 29.3*H, Red Blood Count 2.95L, Hemoglobin 8.4L, Hematocrit 

25.0L, Mean Corpuscular Volume 85, Mean Corpuscular Hemoglobin 28.4, Mean 

Corpuscular Hemoglobin Concent 33.5, Red Cell Distribution Width 12.8, Platelet 

Count 484H, Mean Platelet Volume 7.9, Neutrophils (%) (Auto) , Lymphocytes (%) (

Auto) , Monocytes (%) (Auto) , Eosinophils (%) (Auto) , Basophils (%) (Auto) , 

Differential Total Cells Counted 100, Neutrophils % (Manual) 85H, Lymphocytes % 

(Manual) 3L, Monocytes % (Manual) 8, Eosinophils % (Manual) 2, Basophils % (

Manual) 1, Band Neutrophils 1, Platelet Estimate Adequate, Platelet Morphology 

Normal, Hypochromasia 2+, Anisocytosis 1+, Sodium Level 139, Potassium Level 3.5

, Chloride Level 108H, Carbon Dioxide Level 20L, Anion Gap 11, Blood Urea 

Nitrogen 29H, Creatinine 1.4H, Estimat Glomerular Filtration Rate 37.6, Glucose 

Level 83, Calcium Level 7.9L, Phosphorus Level 3.6, Magnesium Level 2.4, Total 

Bilirubin 0.5, Aspartate Amino Transf (AST/SGOT) 33, Alanine Aminotransferase (

ALT/SGPT) 54, Alkaline Phosphatase 99, Troponin I 0.200H, C-Reactive Protein, 

Quantitative 18.9H, Total Protein 6.0L, Albumin 1.3L, Globulin 4.7, Albumin/

Globulin Ratio 0.3L





Current Medications








 Medications


  (Trade)  Dose


 Ordered  Sig/Winnie


 Route


 PRN Reason  Start Time


 Stop Time Status Last Admin


Dose Admin


 


 Acetaminophen


  (Tylenol)  650 mg  PRN  PRN


 RECTAL


 TEMP>100.5  10/14/19 03:00


     


 


 


 Albuterol/


 Ipratropium


  (Albuterol/


 Ipratropium)  3 ml  Q4H  PRN


 HHN


 Shortness of Breath  10/14/19 07:30


 10/19/19 07:29   


 


 


 Aspirin


  (ASA)  81 mg  DAILY


 ORAL


   10/15/19 09:00


 11/14/19 08:59  10/16/19 09:38


 


 


 Atorvastatin


 Calcium


  (Lipitor)  10 mg  BEDTIME


 ORAL


   10/14/19 21:00


 11/13/19 20:59  10/15/19 21:28


 


 


 Clopidogrel


 Bisulfate


  (Plavix)  75 mg  DAILY


 ORAL


   10/15/19 09:00


 11/14/19 08:59  10/16/19 09:37


 


 


 Dextrose


  (Dextrose 50%)  25 ml  Q30M  PRN


 IV


 Hypoglycemia  10/14/19 07:00


 11/13/19 06:59   


 


 


 Dextrose


  (Dextrose 50%)  50 ml  Q30M  PRN


 IV


 Hypoglycemia  10/14/19 07:00


 11/13/19 06:59   


 


 


 Divalproex Sodium


  (Depakote


 Sprinkles)  500 mg  Q12HR


 ORAL


   10/15/19 22:00


 11/14/19 21:59  10/16/19 09:38


 


 


 Docusate Sodium


  (Colace)  100 mg  THREE TIMES A  DAY


 ORAL


   10/14/19 13:00


 11/13/19 12:59  10/16/19 13:24


 


 


 Donepezil HCl


  (Aricept)  5 mg  DAILY


 ORAL


   10/15/19 11:00


 11/14/19 10:59  10/16/19 09:38


 


 


 Enoxaparin Sodium


  (Lovenox)  30 mg  DAILY


 SUBQ


   10/14/19 09:00


 11/13/19 08:59  10/16/19 09:40


 


 


 Insulin Aspart


  (NovoLOG)    BEFORE MEALS AND  HS


 SUBQ


   10/14/19 11:30


 11/13/19 11:29  10/16/19 13:23


 


 


 Insulin Detemir


  (Levemir)  10 units  DAILY


 SUBQ


   10/16/19 09:00


 11/13/19 10:29  10/16/19 10:04


 


 


 Lactulose


  (Cephulac)  20 gm  TIDPRN  PRN


 ORAL


 Constipation  10/16/19 14:45


 11/15/19 14:44  10/16/19 15:21


 


 


 Meropenem 1 gm/


 Sodium Chloride  55 ml @ 


 110 mls/hr  Q12H


 IVPB


   10/16/19 14:00


 10/21/19 13:59  10/16/19 13:25


 


 


 Olanzapine


  (ZyPREXA)  5 mg  DAILY


 ORAL


   10/15/19 11:00


 11/14/19 10:59   


 


 


 Pantoprazole


  (Protonix)  40 mg  BID


 ORAL


   10/14/19 18:00


 11/13/19 08:59  10/16/19 09:38


 


 


 Sodium Chloride  1,000 ml @ 


 50 mls/hr  Q20H


 IV


   10/16/19 13:03


 11/15/19 13:02  10/16/19 13:21


 


 


 Vancomycin HCl


  (Vanco rx to


 dose)  1 ea  DAILY  PRN


 MISC


 Per rx protocol  10/14/19 07:30


 11/13/19 07:29   


 


 


 Vancomycin HCl


 750 mg/Sodium


 Chloride  275 ml @ 


 183.333


 mls/hr  Q24H


 IVPB


   10/15/19 09:00


 10/19/19 08:59  10/16/19 10:26


 

















Sammy Torres MD Oct 16, 2019 17:31

## 2019-10-16 NOTE — NUR
SPEECH PATHOLOGY:

PATIENT CLEARED FOR ST INTERVENTION BY [facility]



S:  PATIENT SEEN AT BEDSIDE.  SHE WAS ALERT/AWAKE, BUT MINIMALLY

    ABLE TO PARTICIPATE IN FORMAL TESTING TASKS



O:  COGNITIVE/LINGUISTIC ASSESSMENT



A:  PATIENT PRESENTS WITH SEVERE COGNITIVE/LINGUISTIC DEFICITS

    ABSENCE OF VISUAL ATTENDING TO SPEAKER OR VISUAL TRACKING SKILLS

    ABSENCE OF SPONTANEOUS SPEECH

    WHEN ASKED TO  IMITATE THE SPEAKER AND COUNT TO 5 SHE BEGAN

    PERSEVERATING ON 1,2....UNABLE TO COMPLETE THE TASK

    UNABLE TO FOLLOW 1/STEP COMMANDS

    ABSENCE OF VISUOCONSTRUCTIONAL SKILLS

 

P:  PATIENT PRESENTS WITH SEVERELY IMPAIRED COGNITIVE/LINGUISTIC

    SKILLS

    ALL WANTS/NEEDS MUST BE ANTICIPATED BY STAFF

    FURTHER SKILLED ST SERVICES ARE NOT INDICATED AT THIS TIME

    FOR COGNITIVE/LINGUISTIC INTERVENTION

## 2019-10-16 NOTE — NUR
NURSE NOTES:

Received patient from [facility]. Patient awake, alert, and talkative, resting comfortably in 
bed. No signs of distress or pain noted. Patient is confused, but able to make needs known 
to a limited degree. IV sites checked, patent and intact, no signs of erythema, bleeding, or 
infiltration. Bed in lowest position, brakes on, and call light within reach. Will continue 
with plan of care.

## 2019-10-16 NOTE — DIAGNOSTIC IMAGING REPORT
Indication: Shortness of breath

 

Technique: One view of the chest

 

Comparison: 10/15/2019

 

Findings: Bilateral interstitial and airspace edema, left pleural effusion persist,

unchanged. The heart is borderline enlarged.

 

Impression: Unchanged, over one day, findings as above.

## 2019-10-17 VITALS — SYSTOLIC BLOOD PRESSURE: 152 MMHG | DIASTOLIC BLOOD PRESSURE: 72 MMHG

## 2019-10-17 VITALS — SYSTOLIC BLOOD PRESSURE: 157 MMHG | DIASTOLIC BLOOD PRESSURE: 75 MMHG

## 2019-10-17 VITALS — DIASTOLIC BLOOD PRESSURE: 72 MMHG | SYSTOLIC BLOOD PRESSURE: 114 MMHG

## 2019-10-17 VITALS — SYSTOLIC BLOOD PRESSURE: 143 MMHG | DIASTOLIC BLOOD PRESSURE: 82 MMHG

## 2019-10-17 VITALS — DIASTOLIC BLOOD PRESSURE: 75 MMHG | SYSTOLIC BLOOD PRESSURE: 150 MMHG

## 2019-10-17 VITALS — DIASTOLIC BLOOD PRESSURE: 74 MMHG | SYSTOLIC BLOOD PRESSURE: 132 MMHG

## 2019-10-17 LAB
ADD MANUAL DIFF: YES
ALBUMIN SERPL-MCNC: 1.4 G/DL (ref 3.4–5)
ALBUMIN/GLOB SERPL: 0.3 {RATIO} (ref 1–2.7)
ALP SERPL-CCNC: 97 U/L (ref 46–116)
ALT SERPL-CCNC: 40 U/L (ref 12–78)
ANION GAP SERPL CALC-SCNC: 13 MMOL/L (ref 5–15)
AST SERPL-CCNC: 20 U/L (ref 15–37)
BILIRUB SERPL-MCNC: 0.4 MG/DL (ref 0.2–1)
BUN SERPL-MCNC: 25 MG/DL (ref 7–18)
CALCIUM SERPL-MCNC: 8.3 MG/DL (ref 8.5–10.1)
CHLORIDE SERPL-SCNC: 108 MMOL/L (ref 98–107)
CO2 SERPL-SCNC: 21 MMOL/L (ref 21–32)
CREAT SERPL-MCNC: 1.3 MG/DL (ref 0.55–1.3)
ERYTHROCYTE [DISTWIDTH] IN BLOOD BY AUTOMATED COUNT: 12.9 % (ref 11.6–14.8)
GLOBULIN SER-MCNC: 4.9 G/DL
HCT VFR BLD CALC: 26.1 % (ref 37–47)
HGB BLD-MCNC: 8.3 G/DL (ref 12–16)
MCV RBC AUTO: 85 FL (ref 80–99)
PHOSPHATE SERPL-MCNC: 3.7 MG/DL (ref 2.5–4.9)
PLATELET # BLD: 493 K/UL (ref 150–450)
POTASSIUM SERPL-SCNC: 3.6 MMOL/L (ref 3.5–5.1)
RBC # BLD AUTO: 3.07 M/UL (ref 4.2–5.4)
SODIUM SERPL-SCNC: 142 MMOL/L (ref 136–145)
WBC # BLD AUTO: 30.6 K/UL (ref 4.8–10.8)

## 2019-10-17 RX ADMIN — DIVALPROEX SODIUM SCH MG: 125 CAPSULE ORAL at 20:28

## 2019-10-17 RX ADMIN — ASPIRIN 81 MG SCH MG: 81 TABLET ORAL at 08:57

## 2019-10-17 RX ADMIN — DOCUSATE SODIUM SCH MG: 100 CAPSULE, LIQUID FILLED ORAL at 08:57

## 2019-10-17 RX ADMIN — INSULIN ASPART SCH UNITS: 100 INJECTION, SOLUTION INTRAVENOUS; SUBCUTANEOUS at 06:34

## 2019-10-17 RX ADMIN — INSULIN DETEMIR SCH UNITS: 100 INJECTION, SOLUTION SUBCUTANEOUS at 09:00

## 2019-10-17 RX ADMIN — INSULIN ASPART SCH UNITS: 100 INJECTION, SOLUTION INTRAVENOUS; SUBCUTANEOUS at 17:25

## 2019-10-17 RX ADMIN — INSULIN ASPART SCH UNITS: 100 INJECTION, SOLUTION INTRAVENOUS; SUBCUTANEOUS at 12:12

## 2019-10-17 RX ADMIN — DOCUSATE SODIUM SCH MG: 100 CAPSULE, LIQUID FILLED ORAL at 12:10

## 2019-10-17 RX ADMIN — MEROPENEM SCH MLS/HR: 1 INJECTION INTRAVENOUS at 13:44

## 2019-10-17 RX ADMIN — MEROPENEM SCH MLS/HR: 1 INJECTION INTRAVENOUS at 01:52

## 2019-10-17 RX ADMIN — DIVALPROEX SODIUM SCH MG: 125 CAPSULE ORAL at 08:58

## 2019-10-17 RX ADMIN — DONEPEZIL HYDROCHLORIDE SCH MG: 5 TABLET, FILM COATED ORAL at 08:57

## 2019-10-17 RX ADMIN — DOCUSATE SODIUM SCH MG: 100 CAPSULE, LIQUID FILLED ORAL at 17:18

## 2019-10-17 RX ADMIN — INSULIN ASPART SCH UNITS: 100 INJECTION, SOLUTION INTRAVENOUS; SUBCUTANEOUS at 20:32

## 2019-10-17 RX ADMIN — SODIUM CHLORIDE SCH MLS/HR: 9 INJECTION, SOLUTION INTRAVENOUS at 09:12

## 2019-10-17 RX ADMIN — ENOXAPARIN SODIUM SCH MG: 30 INJECTION SUBCUTANEOUS at 08:59

## 2019-10-17 NOTE — GENERAL PROGRESS NOTE
Assessment/Plan


Problem List:  


(1) Abnormal TSH


ICD Codes:  R79.89 - Other specified abnormal findings of blood chemistry


SNOMED:  763573181


(2) Hyperglycemia due to type 2 diabetes mellitus


ICD Codes:  E11.65 - Type 2 diabetes mellitus with hyperglycemia


SNOMED:  938688633393572, [id number]


Qualifiers:  


   Qualified Codes:  E11.65 - Type 2 diabetes mellitus with hyperglycemia; 

Z79.4 - Long term (current) use of insulin


(3) Acute metabolic encephalopathy


ICD Codes:  G93.41 - Metabolic encephalopathy


SNOMED:  01815581, [id number]


(4) ARF (acute renal failure)


ICD Codes:  N17.9 - Acute kidney failure, unspecified


SNOMED:  35360365


Qualifiers:  


   Qualified Codes:  N17.9 - Acute kidney failure, unspecified


(5) Healthcare associated bacterial pneumonia


ICD Codes:  J15.9 - Unspecified bacterial pneumonia


SNOMED:  547351741


Status:  stable


Assessment/Plan:


continue Levemir to 10 units qam 


continue NISS ac / hs 





low TSH, normal free T4 and low free T3 most likely due to non thyroidal 

illness 


no need for thyroid medications for now 


thyroid antibodies are pending





Subjective


Allergies:  


Coded Allergies:  


     No Known Allergies (Unverified , 10/14/19)


All Systems:  reviewed and negative except above


Subjective


events noted 


glucose values are stable 











Item Value  Date Time


 


Bedside Blood Glucose 158 mg/dl H 10/17/19 1212


 


Bedside Blood Glucose 141 mg/dl H 10/17/19 0900


 


Bedside Blood Glucose 142 mg/dl H 10/17/19 0634


 


Bedside Blood Glucose 162 mg/dl H 10/16/19 2105


 


Bedside Blood Glucose 91 mg/dl 10/16/19 1630


 


Bedside Blood Glucose 137 mg/dl H 10/16/19 1323


 


Bedside Blood Glucose 90 mg/dl 10/16/19 1004











Objective





Last 24 Hour Vital Signs








  Date Time  Temp Pulse Resp B/P (MAP) Pulse Ox O2 Delivery O2 Flow Rate FiO2


 


10/17/19 09:00      Nasal Cannula 2.0 


 


10/17/19 08:10     97 Nasal Cannula 2.0 28


 


10/17/19 08:10  77 18  96 Nasal Cannula 2.0 28


 


10/17/19 08:00  80      


 


10/17/19 08:00 98.1 87 20 132/74 (93) 95   


 


10/17/19 04:00 98.1 90 18 157/75 (102) 97   


 


10/17/19 04:00  89      


 


10/17/19 00:00  92      


 


10/17/19 00:00 98.0 94 20 152/72 (98) 94   


 


10/16/19 21:00      Nasal Cannula 2.0 


 


10/16/19 20:00 98.1 90 20 141/61 (87) 99   


 


10/16/19 20:00  86      


 


10/16/19 19:30  78 18  97 Nasal Cannula 2.0 28


 


10/16/19 19:30     97 Nasal Cannula 2.0 28


 


10/16/19 16:00 97.5 86 20 119/62 (81) 100   


 


10/16/19 16:00  83      

















Intake and Output  


 


 10/16/19 10/17/19





 18:59 06:59


 


Intake Total 600 ml 605 ml


 


Output Total 350 ml 


 


Balance 250 ml 605 ml


 


  


 


Intake Oral 600 ml 


 


IV Total  605 ml


 


Output Urine Total 350 ml 


 


# Voids  3


 


# Bowel Movements  1








Laboratory Tests


10/17/19 08:15: 


White Blood Count 30.6*H, Red Blood Count 3.07L, Hemoglobin 8.3L, Hematocrit 

26.1L, Mean Corpuscular Volume 85, Mean Corpuscular Hemoglobin 27.0, Mean 

Corpuscular Hemoglobin Concent 31.8L, Red Cell Distribution Width 12.9, 

Platelet Count 493H, Mean Platelet Volume 7.5, Neutrophils (%) (Auto) , 

Lymphocytes (%) (Auto) , Monocytes (%) (Auto) , Eosinophils (%) (Auto) , 

Basophils (%) (Auto) , Differential Total Cells Counted 100, Neutrophils % (

Manual) 96H, Lymphocytes % (Manual) 3L, Monocytes % (Manual) 1, Eosinophils % (

Manual) 0, Basophils % (Manual) 0, Band Neutrophils 0, Platelet Estimate 

Adequate, Platelet Morphology Normal, Hypochromasia 1+, Sodium Level 142, 

Potassium Level 3.6, Chloride Level 108H, Carbon Dioxide Level 21, Anion Gap 13

, Blood Urea Nitrogen 25H, Creatinine 1.3, Estimat Glomerular Filtration Rate 

41.0, Glucose Level 142H, Calcium Level 8.3L, Phosphorus Level 3.7, Magnesium 

Level 2.6H, Total Bilirubin 0.4, Aspartate Amino Transf (AST/SGOT) 20, Alanine 

Aminotransferase (ALT/SGPT) 40, Alkaline Phosphatase 97, C-Reactive Protein, 

Quantitative 20.3H, Pro-B-Type Natriuretic Peptide 05181F, Total Protein 6.3L, 

Albumin 1.4L, Globulin 4.9, Albumin/Globulin Ratio 0.3L, Thyroid Stimulating 

Hormone (TSH) < 0.010L, Vancomycin Level Trough 10.9, Valproic Acid (Depakene) 

Level 11L


Height (Feet):  5


Height (Inches):  5.00


Weight (Pounds):  151


General Appearance:  no apparent distress


Neck:  normal alignment


Cardiovascular:  normal rate


Respiratory/Chest:  lungs clear


Abdomen:  normal bowel sounds


Objective





Current Medications








 Medications


  (Trade)  Dose


 Ordered  Sig/Winnie


 Route


 PRN Reason  Start Time


 Stop Time Status Last Admin


Dose Admin


 


 Acetaminophen


  (Tylenol)  650 mg  PRN  PRN


 RECTAL


 TEMP>100.5  10/14/19 03:00


     


 


 


 Albuterol/


 Ipratropium


  (Albuterol/


 Ipratropium)  3 ml  Q4H  PRN


 HHN


 Shortness of Breath  10/14/19 07:30


 10/19/19 07:29   


 


 


 Aspirin


  (ASA)  81 mg  DAILY


 ORAL


   10/15/19 09:00


 11/14/19 08:59  10/17/19 08:57


 


 


 Atorvastatin


 Calcium


  (Lipitor)  10 mg  BEDTIME


 ORAL


   10/14/19 21:00


 11/13/19 20:59  10/16/19 21:02


 


 


 Clopidogrel


 Bisulfate


  (Plavix)  75 mg  DAILY


 ORAL


   10/15/19 09:00


 11/14/19 08:59  10/17/19 08:57


 


 


 Dextrose


  (Dextrose 50%)  25 ml  Q30M  PRN


 IV


 Hypoglycemia  10/14/19 07:00


 11/13/19 06:59   


 


 


 Dextrose


  (Dextrose 50%)  50 ml  Q30M  PRN


 IV


 Hypoglycemia  10/14/19 07:00


 11/13/19 06:59   


 


 


 Divalproex Sodium


  (Depakote


 Sprinkles)  500 mg  Q12HR


 ORAL


   10/15/19 22:00


 11/14/19 21:59  10/17/19 08:58


 


 


 Docusate Sodium


  (Colace)  100 mg  THREE TIMES A  DAY


 ORAL


   10/14/19 13:00


 11/13/19 12:59  10/17/19 12:10


 


 


 Donepezil HCl


  (Aricept)  5 mg  DAILY


 ORAL


   10/15/19 11:00


 11/14/19 10:59  10/17/19 08:57


 


 


 Enoxaparin Sodium


  (Lovenox)  30 mg  DAILY


 SUBQ


   10/14/19 09:00


 11/13/19 08:59  10/17/19 08:59


 


 


 Insulin Aspart


  (NovoLOG)    BEFORE MEALS AND  HS


 SUBQ


   10/14/19 11:30


 11/13/19 11:29  10/17/19 12:12


 


 


 Insulin Detemir


  (Levemir)  10 units  DAILY


 SUBQ


   10/16/19 09:00


 11/13/19 10:29  10/17/19 09:00


 


 


 Lactulose


  (Cephulac)  20 gm  TIDPRN  PRN


 ORAL


 Constipation  10/16/19 14:45


 11/15/19 14:44  10/16/19 15:21


 


 


 Meropenem 1 gm/


 Sodium Chloride  55 ml @ 


 110 mls/hr  Q12H


 IVPB


   10/16/19 14:00


 10/21/19 13:59  10/17/19 01:52


 


 


 Olanzapine


  (ZyPREXA)  5 mg  DAILY


 ORAL


   10/15/19 11:00


 11/14/19 10:59  10/17/19 08:57


 


 


 Pantoprazole


  (Protonix)  40 mg  BID


 ORAL


   10/14/19 18:00


 11/13/19 08:59  10/17/19 08:57


 


 


 Sodium Chloride  1,000 ml @ 


 50 mls/hr  Q20H


 IV


   10/16/19 13:03


 11/15/19 13:02  10/17/19 03:46


 


 


 Vancomycin HCl


  (Vanco rx to


 dose)  1 ea  DAILY  PRN


 MISC


 Per rx protocol  10/14/19 07:30


 11/13/19 07:29   


 


 


 Vancomycin HCl 1


 gm/Dextrose  275 ml @ 


 183.708


 mls/hr  Q24H


 IVPB


   10/18/19 09:00


 10/23/19 08:59   


 

















Riccardo Velez MD Oct 17, 2019 13:39

## 2019-10-17 NOTE — INFECTIOUS DISEASES PROG NOTE
Assessment/Plan


Problems:  


(1) Aspiration pneumonia


Assessment & Plan:  with B/L infiltrates, L>R suspect due to altered mental 

status , with hypoxemia and leukocytosis, continue vancomycin empirically, 

switch zosyn to meropenem , and repeat CXR  , aspiration precaution 





(2) UTI (urinary tract infection)


Assessment & Plan:  due to ESBL producing E coli already on meropenem to cover 

for sepsis and pneumonia too 





(3) Sepsis


Assessment & Plan:  due to the above, continue wide spectrum antibiotics 

pending repeated cultures 





(4) Acute metabolic encephalopathy


Assessment & Plan:  due to the above, continue hydration and antibiotics, 

monitor in tele 





(5) Hyperglycemia due to type 2 diabetes mellitus


Assessment & Plan:  recommend tight glycemic control to keep blood glucose 

between 100-140 





(6) ARF (acute renal failure)


Assessment & Plan:  due to the above, continue hydration and renally dosed 

antibiotics, close  monitor of renal function 








Subjective


Constitutional:  Reports: no symptoms


HEENT:  Reports: no symptoms


Respiratory:  Reports: no symptoms


Breasts:  Reports: no symptoms


Cardiovascular:  Reports: no symptoms


Gastrointestinal/Abdominal:  Reports: no symptoms


Genitourinary:  Reports: no symptoms


Neurologic:  Reports: no symptoms


Psychiatric:  Reports: no symptoms


Skin:  Reports: no symptoms


Endocrine:  Reports: no symptoms


Hematologic:  Reports: no symptoms


Musculoskeletal:  Reports: no symptoms


Allergies:  


Coded Allergies:  


     No Known Allergies (Unverified , 10/14/19)


Subjective


she was lethargic , but still responsive today, had low grade fever .





Objective


Vital Signs





Last 24 Hour Vital Signs








  Date Time  Temp Pulse Resp B/P (MAP) Pulse Ox O2 Delivery O2 Flow Rate FiO2


 


10/17/19 12:00 97.5 83 20 114/72 (86) 95   


 


10/17/19 12:00  75      


 


10/17/19 09:00      Nasal Cannula 2.0 


 


10/17/19 08:10     97 Nasal Cannula 2.0 28


 


10/17/19 08:10  77 18  96 Nasal Cannula 2.0 28


 


10/17/19 08:00  80      


 


10/17/19 08:00 98.1 87 20 132/74 (93) 95   


 


10/17/19 04:00 98.1 90 18 157/75 (102) 97   


 


10/17/19 04:00  89      


 


10/17/19 00:00  92      


 


10/17/19 00:00 98.0 94 20 152/72 (98) 94   


 


10/16/19 21:00      Nasal Cannula 2.0 


 


10/16/19 20:00 98.1 90 20 141/61 (87) 99   


 


10/16/19 20:00  86      


 


10/16/19 19:30  78 18  97 Nasal Cannula 2.0 28


 


10/16/19 19:30     97 Nasal Cannula 2.0 28


 


10/16/19 16:00 97.5 86 20 119/62 (81) 100   


 


10/16/19 16:00  83      








Height (Feet):  5


Height (Inches):  5.00


Weight (Pounds):  151


General Appearance:  WD/WN, no acute distress


HEENT:  normocephalic, atraumatic, anicteric, mucous membranes moist


Respiratory/Chest:  chest wall non-tender, normal breath sounds, no respiratory 

distress, no accessory muscle use, decreased breath sounds, crackles/rales


Cardiovascular:  normal peripheral pulses, normal rate, regular rhythm, no 

gallop/murmur, no JVD


Abdomen:  normal bowel sounds, soft, non tender, no organomegaly, non distended

, no mass, no scars


Extremities:  no cyanosis, no clubbing


Skin:  no rash, no lesions, ulcers


Neurologic/Psychiatric:  alert, responsive


Lymphatic:  no neck adenopathy, no groin adenopathy


Musculoskeletal:  normal muscle bulk, no effusion





Laboratory Tests








Test


  10/17/19


08:15


 


White Blood Count


  30.6 K/UL


(4.8-10.8)  *H


 


Red Blood Count


  3.07 M/UL


(4.20-5.40)  L


 


Hemoglobin


  8.3 G/DL


(12.0-16.0)  L


 


Hematocrit


  26.1 %


(37.0-47.0)  L


 


Mean Corpuscular Volume 85 FL (80-99)  


 


Mean Corpuscular Hemoglobin


  27.0 PG


(27.0-31.0)


 


Mean Corpuscular Hemoglobin


Concent 31.8 G/DL


(32.0-36.0)  L


 


Red Cell Distribution Width


  12.9 %


(11.6-14.8)


 


Platelet Count


  493 K/UL


(150-450)  H


 


Mean Platelet Volume


  7.5 FL


(6.5-10.1)


 


Neutrophils (%) (Auto)


  % (45.0-75.0)


 


 


Lymphocytes (%) (Auto)


  % (20.0-45.0)


 


 


Monocytes (%) (Auto)  % (1.0-10.0)  


 


Eosinophils (%) (Auto)  % (0.0-3.0)  


 


Basophils (%) (Auto)  % (0.0-2.0)  


 


Differential Total Cells


Counted 100  


 


 


Neutrophils % (Manual) 96 % (45-75)  H


 


Lymphocytes % (Manual) 3 % (20-45)  L


 


Monocytes % (Manual) 1 % (1-10)  


 


Eosinophils % (Manual) 0 % (0-3)  


 


Basophils % (Manual) 0 % (0-2)  


 


Band Neutrophils 0 % (0-8)  


 


Platelet Estimate Adequate  


 


Platelet Morphology Normal  


 


Hypochromasia 1+  


 


Sodium Level


  142 MMOL/L


(136-145)


 


Potassium Level


  3.6 MMOL/L


(3.5-5.1)


 


Chloride Level


  108 MMOL/L


()  H


 


Carbon Dioxide Level


  21 MMOL/L


(21-32)


 


Anion Gap


  13 mmol/L


(5-15)


 


Blood Urea Nitrogen


  25 mg/dL


(7-18)  H


 


Creatinine


  1.3 MG/DL


(0.55-1.30)


 


Estimat Glomerular Filtration


Rate 41.0 mL/min


(>60)


 


Glucose Level


  142 MG/DL


()  H


 


Calcium Level


  8.3 MG/DL


(8.5-10.1)  L


 


Phosphorus Level


  3.7 MG/DL


(2.5-4.9)


 


Magnesium Level


  2.6 MG/DL


(1.8-2.4)  H


 


Total Bilirubin


  0.4 MG/DL


(0.2-1.0)


 


Aspartate Amino Transf


(AST/SGOT) 20 U/L (15-37)


 


 


Alanine Aminotransferase


(ALT/SGPT) 40 U/L (12-78)


 


 


Alkaline Phosphatase


  97 U/L


()


 


C-Reactive Protein,


Quantitative 20.3 mg/dL


(0.00-0.90)  H


 


Pro-B-Type Natriuretic Peptide


  11270 pg/mL


(0-125)  H


 


Total Protein


  6.3 G/DL


(6.4-8.2)  L


 


Albumin


  1.4 G/DL


(3.4-5.0)  L


 


Globulin 4.9 g/dL  


 


Albumin/Globulin Ratio


  0.3 (1.0-2.7)


L


 


Thyroid Stimulating Hormone


(TSH) < 0.010 uiU/mL


(0.358-3.740)


 


Vancomycin Level Trough


  10.9 ug/mL


(5.0-12.0)


 


Valproic Acid (Depakene) Level


  11 MCG/ML


()  L











Current Medications








 Medications


  (Trade)  Dose


 Ordered  Sig/Winnie


 Route


 PRN Reason  Start Time


 Stop Time Status Last Admin


Dose Admin


 


 Acetaminophen


  (Tylenol)  650 mg  PRN  PRN


 RECTAL


 TEMP>100.5  10/14/19 03:00


     


 


 


 Albuterol/


 Ipratropium


  (Albuterol/


 Ipratropium)  3 ml  Q4H  PRN


 HHN


 Shortness of Breath  10/14/19 07:30


 10/19/19 07:29   


 


 


 Aspirin


  (ASA)  81 mg  DAILY


 ORAL


   10/15/19 09:00


 11/14/19 08:59  10/17/19 08:57


 


 


 Atorvastatin


 Calcium


  (Lipitor)  10 mg  BEDTIME


 ORAL


   10/14/19 21:00


 11/13/19 20:59  10/16/19 21:02


 


 


 Clopidogrel


 Bisulfate


  (Plavix)  75 mg  DAILY


 ORAL


   10/15/19 09:00


 11/14/19 08:59  10/17/19 08:57


 


 


 Dextrose


  (Dextrose 50%)  25 ml  Q30M  PRN


 IV


 Hypoglycemia  10/14/19 07:00


 11/13/19 06:59   


 


 


 Dextrose


  (Dextrose 50%)  50 ml  Q30M  PRN


 IV


 Hypoglycemia  10/14/19 07:00


 11/13/19 06:59   


 


 


 Divalproex Sodium


  (Depakote


 Sprinkles)  500 mg  Q12HR


 ORAL


   10/15/19 22:00


 11/14/19 21:59  10/17/19 08:58


 


 


 Docusate Sodium


  (Colace)  100 mg  THREE TIMES A  DAY


 ORAL


   10/14/19 13:00


 11/13/19 12:59  10/17/19 12:10


 


 


 Donepezil HCl


  (Aricept)  5 mg  DAILY


 ORAL


   10/15/19 11:00


 11/14/19 10:59  10/17/19 08:57


 


 


 Enoxaparin Sodium


  (Lovenox)  30 mg  DAILY


 SUBQ


   10/14/19 09:00


 11/13/19 08:59  10/17/19 08:59


 


 


 Insulin Aspart


  (NovoLOG)    BEFORE MEALS AND  HS


 SUBQ


   10/14/19 11:30


 11/13/19 11:29  10/17/19 12:12


 


 


 Insulin Detemir


  (Levemir)  10 units  DAILY


 SUBQ


   10/16/19 09:00


 11/13/19 10:29  10/17/19 09:00


 


 


 Lactulose


  (Cephulac)  20 gm  TIDPRN  PRN


 ORAL


 Constipation  10/16/19 14:45


 11/15/19 14:44  10/16/19 15:21


 


 


 Meropenem 1 gm/


 Sodium Chloride  55 ml @ 


 110 mls/hr  Q12H


 IVPB


   10/16/19 14:00


 10/21/19 13:59  10/17/19 13:44


 


 


 Olanzapine


  (ZyPREXA)  5 mg  DAILY


 ORAL


   10/15/19 11:00


 11/14/19 10:59  10/17/19 08:57


 


 


 Pantoprazole


  (Protonix)  40 mg  BID


 ORAL


   10/14/19 18:00


 11/13/19 08:59  10/17/19 08:57


 


 


 Sodium Chloride  1,000 ml @ 


 50 mls/hr  Q20H


 IV


   10/16/19 13:03


 11/15/19 13:02  10/17/19 03:46


 


 


 Vancomycin HCl


  (Vanco rx to


 dose)  1 ea  DAILY  PRN


 MISC


 Per rx protocol  10/14/19 07:30


 11/13/19 07:29   


 


 


 Vancomycin HCl 1


 gm/Dextrose  275 ml @ 


 183.708


 mls/hr  Q24H


 IVPB


   10/18/19 09:00


 10/23/19 08:59   


 

















Amie Burgos M.D. Oct 17, 2019 15:15

## 2019-10-17 NOTE — HEMATOLOGY/ONC PROGRESS NOTE
Assessment/Plan


Assessment/Plan





Assessment and Recs:


# Anemia of chronic disease due to underlying chronic medical issues, 

multifactorial 


--> Anemia workup has been ordered, rule out gi bleed --> ferritin is 1400+


--> No evidence of hemolysis is noted, peripheral smear has been reviewed.


--> Hgb goal >7. Transfuse prn.


--> Epogen or iron at this time is not particularly indicated


--> Medications have been reviewed


--> low threshold for gi evaluation in case has occult +


--> bone marrow biopsy is not indicated given the other more likely causes (if 

recurrent anemia) first time here


# Hypercalcemia - btw 10-11 range when corrected to alb


--> ivf have been started


--> if remains elevated, 7.9-->8.3


--> will get pth


# Leukocytosis/elevated white blood cell count, unspecified likely related to 

underlying reaction v more likely infection


--> have reviewed peripheral smear and bandemia/neutrophilia noted


--> continue antibiotics if they have been started by ID team (VANC/ZOSYN)


--> wbc 39-->28k-->29k->31k


--> monitor for resolution


# Sepsis from pneumonia.  


--> pulm consulted, abx started


# CHANCE (acute kidney injury) on ckd


--> per renal


# UTI (urinary tract infection)


--> on abx per id


# Dehydration


--> on ivf


# Acute metabolic encephalopathy


--> likely due to pna


# Dvt ppx lovenox sq





The timing of this note does not necessarily reflect the time of the patient 

was seen.





Greatly appreciate consultation.





Subjective


Constitutional:  Denies: no symptoms, chills, fever, malaise, weakness, other


HEENT:  Denies: no symptoms, eye pain, blurred vision, tearing, double vision, 

ear pain, ear discharge, nose pain, nose congestion, throat pain, throat 

swelling, mouth pain, mouth swelling, other


Cardiovascular:  Denies: no symptoms, chest pain, edema, irregular heart rate, 

lightheadedness, palpitations, syncope, other


Respiratory:  Denies: no symptoms, cough, shortness of breath, SOB with 

excertion, SOB at rest, sputum, wheezing, other


Gastrointestinal/Abdominal:  Denies: no symptoms, abdomen distended, abdominal 

pain, black stools, tarry stools, blood in stool, constipated, diarrhea, 

difficulty swallowing, nausea, poor appetite, poor fluid intake, rectal bleeding

, vomiting, other


Genitourinary:  Denies: no symptoms, burning, discharge, frequency, flank pain, 

hematuria, incontinence, pain, urgency, other


Neurologic/Psychiatric:  Denies: no symptoms, anxiety, depressed, emotional 

problems, headache, numbness, paresthesia, pre-existing deficit, seizure, 

tingling, tremors, weakness, other


Endocrine:  Denies: no symptoms, excessive sweating, flushing, intolerance to 

cold, intolerance to heat, increased hunger, increased thirst, increased urine, 

unexplained weight gain, unexplained weight loss, other


Allergies:  


Coded Allergies:  


     No Known Allergies (Unverified , 10/14/19)


Subjective


10/14: hgb has improved, no bleeding, labs reivewed


10/15: no events to report


10/16: a+ o x1, no bleeding or chills noted, no major changes, occult pending


10/17: no events noted, no bleeding, no chills, no hematemesis/hematochezia





Objective


Objective





Current Medications








 Medications


  (Trade)  Dose


 Ordered  Sig/Winnie


 Route


 PRN Reason  Start Time


 Stop Time Status Last Admin


Dose Admin


 


 Acetaminophen


  (Tylenol)  650 mg  PRN  PRN


 RECTAL


 TEMP>100.5  10/14/19 03:00


     


 


 


 Albuterol/


 Ipratropium


  (Albuterol/


 Ipratropium)  3 ml  Q4H  PRN


 HHN


 Shortness of Breath  10/14/19 07:30


 10/19/19 07:29   


 


 


 Aspirin


  (ASA)  81 mg  DAILY


 ORAL


   10/15/19 09:00


 11/14/19 08:59  10/17/19 08:57


 


 


 Atorvastatin


 Calcium


  (Lipitor)  10 mg  BEDTIME


 ORAL


   10/14/19 21:00


 11/13/19 20:59  10/16/19 21:02


 


 


 Clopidogrel


 Bisulfate


  (Plavix)  75 mg  DAILY


 ORAL


   10/15/19 09:00


 11/14/19 08:59  10/17/19 08:57


 


 


 Dextrose


  (Dextrose 50%)  25 ml  Q30M  PRN


 IV


 Hypoglycemia  10/14/19 07:00


 11/13/19 06:59   


 


 


 Dextrose


  (Dextrose 50%)  50 ml  Q30M  PRN


 IV


 Hypoglycemia  10/14/19 07:00


 11/13/19 06:59   


 


 


 Divalproex Sodium


  (Depakote


 Sprinkles)  500 mg  Q12HR


 ORAL


   10/15/19 22:00


 11/14/19 21:59  10/17/19 08:58


 


 


 Docusate Sodium


  (Colace)  100 mg  THREE TIMES A  DAY


 ORAL


   10/14/19 13:00


 11/13/19 12:59  10/17/19 12:10


 


 


 Donepezil HCl


  (Aricept)  5 mg  DAILY


 ORAL


   10/15/19 11:00


 11/14/19 10:59  10/17/19 08:57


 


 


 Enoxaparin Sodium


  (Lovenox)  30 mg  DAILY


 SUBQ


   10/14/19 09:00


 11/13/19 08:59  10/17/19 08:59


 


 


 Insulin Aspart


  (NovoLOG)    BEFORE MEALS AND  HS


 SUBQ


   10/14/19 11:30


 11/13/19 11:29  10/17/19 12:12


 


 


 Insulin Detemir


  (Levemir)  10 units  DAILY


 SUBQ


   10/16/19 09:00


 11/13/19 10:29  10/17/19 09:00


 


 


 Lactulose


  (Cephulac)  20 gm  TIDPRN  PRN


 ORAL


 Constipation  10/16/19 14:45


 11/15/19 14:44  10/16/19 15:21


 


 


 Meropenem 1 gm/


 Sodium Chloride  55 ml @ 


 110 mls/hr  Q12H


 IVPB


   10/16/19 14:00


 10/21/19 13:59  10/17/19 13:44


 


 


 Olanzapine


  (ZyPREXA)  5 mg  DAILY


 ORAL


   10/15/19 11:00


 11/14/19 10:59  10/17/19 08:57


 


 


 Pantoprazole


  (Protonix)  40 mg  BID


 ORAL


   10/14/19 18:00


 11/13/19 08:59  10/17/19 08:57


 


 


 Sodium Chloride  1,000 ml @ 


 50 mls/hr  Q20H


 IV


   10/16/19 13:03


 11/15/19 13:02  10/17/19 03:46


 


 


 Vancomycin HCl


  (Vanco rx to


 dose)  1 ea  DAILY  PRN


 MISC


 Per rx protocol  10/14/19 07:30


 11/13/19 07:29   


 


 


 Vancomycin HCl 1


 gm/Dextrose  275 ml @ 


 183.708


 mls/hr  Q24H


 IVPB


   10/18/19 09:00


 10/23/19 08:59   


 











Last 24 Hour Vital Signs








  Date Time  Temp Pulse Resp B/P (MAP) Pulse Ox O2 Delivery O2 Flow Rate FiO2


 


10/17/19 12:00 97.5 83 20 114/72 (86) 95   


 


10/17/19 12:00  75      


 


10/17/19 09:00      Nasal Cannula 2.0 


 


10/17/19 08:10     97 Nasal Cannula 2.0 28


 


10/17/19 08:10  77 18  96 Nasal Cannula 2.0 28


 


10/17/19 08:00  80      


 


10/17/19 08:00 98.1 87 20 132/74 (93) 95   


 


10/17/19 04:00 98.1 90 18 157/75 (102) 97   


 


10/17/19 04:00  89      


 


10/17/19 00:00  92      


 


10/17/19 00:00 98.0 94 20 152/72 (98) 94   


 


10/16/19 21:00      Nasal Cannula 2.0 


 


10/16/19 20:00 98.1 90 20 141/61 (87) 99   


 


10/16/19 20:00  86      


 


10/16/19 19:30  78 18  97 Nasal Cannula 2.0 28


 


10/16/19 19:30     97 Nasal Cannula 2.0 28


 


10/16/19 16:00 97.5 86 20 119/62 (81) 100   


 


10/16/19 16:00  83      


 


10/16/19 12:00 97.8 81 20 121/58 (79) 99   


 


10/16/19 12:00  86      


 


10/16/19 11:18  85 18  98 Nasal Cannula 2.0 28


 


10/16/19 11:18     98 Nasal Cannula 2.0 28


 


10/16/19 09:00      Nasal Cannula 2.0 


 


10/16/19 08:00  80      


 


10/16/19 08:00 98.1 86 20 146/66 (92) 100   


 


10/16/19 04:00 100.3 89 24 146/67 (93) 96   


 


10/16/19 04:00  89      


 


10/16/19 00:00  89      


 


10/16/19 00:00 100.1 92 24 136/63 (87) 96   


 


10/15/19 21:00      Nasal Cannula 2.0 


 


10/15/19 20:12     97 Nasal Cannula 2.0 28


 


10/15/19 20:12  90 20  97 Nasal Cannula 2.0 28


 


10/15/19 20:00 99.1 93 24 151/64 (93) 95   


 


10/15/19 20:00  95      


 


10/15/19 16:00 98.0 83 19 146/85 (105) 98   


 


10/15/19 15:17  92      

















Intake and Output  


 


 10/16/19 10/17/19





 18:59 06:59


 


Intake Total 600 ml 605 ml


 


Output Total 350 ml 


 


Balance 250 ml 605 ml


 


  


 


Intake Oral 600 ml 


 


IV Total  605 ml


 


Output Urine Total 350 ml 


 


# Voids  3


 


# Bowel Movements  1











Labs








Test


  10/15/19


05:30 10/15/19


16:00 10/16/19


06:47 10/17/19


08:15


 


White Blood Count


  28.1 K/UL


(4.8-10.8) 23.6 K/UL


(4.8-10.8) 29.3 K/UL


(4.8-10.8) 30.6 K/UL


(4.8-10.8)


 


Red Blood Count


  3.14 M/UL


(4.20-5.40) 3.18 M/UL


(4.20-5.40) 2.95 M/UL


(4.20-5.40) 3.07 M/UL


(4.20-5.40)


 


Hemoglobin


  9.1 G/DL


(12.0-16.0) 8.6 G/DL


(12.0-16.0) 8.4 G/DL


(12.0-16.0) 8.3 G/DL


(12.0-16.0)


 


Hematocrit


  26.2 %


(37.0-47.0) 26.5 %


(37.0-47.0) 25.0 %


(37.0-47.0) 26.1 %


(37.0-47.0)


 


Mean Corpuscular Volume 84 FL (80-99)  83 FL (80-99)  85 FL (80-99)  85 FL (80-

99) 


 


Mean Corpuscular Hemoglobin


  28.9 PG


(27.0-31.0) 27.1 PG


(27.0-31.0) 28.4 PG


(27.0-31.0) 27.0 PG


(27.0-31.0)


 


Mean Corpuscular Hemoglobin


Concent 34.6 G/DL


(32.0-36.0) 32.5 G/DL


(32.0-36.0) 33.5 G/DL


(32.0-36.0) 31.8 G/DL


(32.0-36.0)


 


Red Cell Distribution Width


  12.0 %


(11.6-14.8) 11.3 %


(11.6-14.8) 12.8 %


(11.6-14.8) 12.9 %


(11.6-14.8)


 


Platelet Count


  498 K/UL


(150-450) 501 K/UL


(150-450) 484 K/UL


(150-450) 493 K/UL


(150-450)


 


Mean Platelet Volume


  8.4 FL


(6.5-10.1) 8.7 FL


(6.5-10.1) 7.9 FL


(6.5-10.1) 7.5 FL


(6.5-10.1)


 


Neutrophils (%) (Auto)  % (45.0-75.0)   % (45.0-75.0)   % (45.0-75.0)   % (45.0-

75.0) 


 


Lymphocytes (%) (Auto)  % (20.0-45.0)   % (20.0-45.0)   % (20.0-45.0)   % (20.0-

45.0) 


 


Monocytes (%) (Auto)  % (1.0-10.0)   % (1.0-10.0)   % (1.0-10.0)   % (1.0-10.0) 


 


Eosinophils (%) (Auto)  % (0.0-3.0)   % (0.0-3.0)   % (0.0-3.0)   % (0.0-3.0) 


 


Basophils (%) (Auto)  % (0.0-2.0)   % (0.0-2.0)   % (0.0-2.0)   % (0.0-2.0) 


 


Differential Total Cells


Counted 100 


  100 


 


 


Neutrophils % (Manual) 88 % (45-75)  83 % (45-75)  85 % (45-75)  96 % (45-75) 


 


Lymphocytes % (Manual) 5 % (20-45)  4 % (20-45)  3 % (20-45)  3 % (20-45) 


 


Monocytes % (Manual) 7 % (1-10)  5 % (1-10)  8 % (1-10)  1 % (1-10) 


 


Eosinophils % (Manual) 0 % (0-3)  1 % (0-3)  2 % (0-3)  0 % (0-3) 


 


Basophils % (Manual) 0 % (0-2)  0 % (0-2)  1 % (0-2)  0 % (0-2) 


 


Band Neutrophils 0 % (0-8)  7 % (0-8)  1 % (0-8)  0 % (0-8) 


 


Platelet Estimate Adequate  Increased  Adequate  Adequate 


 


Platelet Morphology Normal  Normal  Normal  Normal 


 


Hypochromasia 1+   2+  1+ 


 


Sodium Level


  142 MMOL/L


(136-145) 


  139 MMOL/L


(136-145) 142 MMOL/L


(136-145)


 


Potassium Level


  4.1 MMOL/L


(3.5-5.1) 


  3.5 MMOL/L


(3.5-5.1) 3.6 MMOL/L


(3.5-5.1)


 


Chloride Level


  109 MMOL/L


() 


  108 MMOL/L


() 108 MMOL/L


()


 


Carbon Dioxide Level


  23 MMOL/L


(21-32) 


  20 MMOL/L


(21-32) 21 MMOL/L


(21-32)


 


Anion Gap


  10 mmol/L


(5-15) 


  11 mmol/L


(5-15) 13 mmol/L


(5-15)


 


Blood Urea Nitrogen


  44 mg/dL


(7-18) 


  29 mg/dL


(7-18) 25 mg/dL


(7-18)


 


Creatinine


  1.7 MG/DL


(0.55-1.30) 


  1.4 MG/DL


(0.55-1.30) 1.3 MG/DL


(0.55-1.30)


 


Estimat Glomerular Filtration


Rate 30.1 mL/min


(>60) 


  37.6 mL/min


(>60) 41.0 mL/min


(>60)


 


Glucose Level


  78 MG/DL


() 


  83 MG/DL


() 142 MG/DL


()


 


Lactic Acid Level


  0.90 mmol/L


(0.4-2.0) 


  


  


 


 


Uric Acid


  9.1 MG/DL


(2.6-7.2) 


  


  


 


 


Calcium Level


  8.5 MG/DL


(8.5-10.1) 


  7.9 MG/DL


(8.5-10.1) 8.3 MG/DL


(8.5-10.1)


 


Phosphorus Level


  3.7 MG/DL


(2.5-4.9) 


  3.6 MG/DL


(2.5-4.9) 3.7 MG/DL


(2.5-4.9)


 


Magnesium Level


  2.7 MG/DL


(1.8-2.4) 


  2.4 MG/DL


(1.8-2.4) 2.6 MG/DL


(1.8-2.4)


 


Total Bilirubin


  0.9 MG/DL


(0.2-1.0) 


  0.5 MG/DL


(0.2-1.0) 0.4 MG/DL


(0.2-1.0)


 


Gamma Glutamyl Transpeptidase 29 U/L (5-85)    


 


Aspartate Amino Transf


(AST/SGOT) 69 U/L (15-37) 


  


  33 U/L (15-37) 


  20 U/L (15-37) 


 


 


Alanine Aminotransferase


(ALT/SGPT) 88 U/L (12-78) 


  


  54 U/L (12-78) 


  40 U/L (12-78) 


 


 


Alkaline Phosphatase


  105 U/L


() 


  99 U/L


() 97 U/L


()


 


Total Creatine Kinase


  26 U/L


() 


  


  


 


 


Troponin I


  0.407 ng/mL


(0.000-0.056) 


  0.200 ng/mL


(0.000-0.056) 


 


 


C-Reactive Protein,


Quantitative 22.7 mg/dL


(0.00-0.90) 


  18.9 mg/dL


(0.00-0.90) 20.3 mg/dL


(0.00-0.90)


 


Pro-B-Type Natriuretic Peptide


  60256 pg/mL


(0-125) 


  


  83703 pg/mL


(0-125)


 


Total Protein


  6.6 G/DL


(6.4-8.2) 


  6.0 G/DL


(6.4-8.2) 6.3 G/DL


(6.4-8.2)


 


Albumin


  1.5 G/DL


(3.4-5.0) 


  1.3 G/DL


(3.4-5.0) 1.4 G/DL


(3.4-5.0)


 


Globulin 5.1 g/dL   4.7 g/dL  4.9 g/dL 


 


Albumin/Globulin Ratio 0.3 (1.0-2.7)   0.3 (1.0-2.7)  0.3 (1.0-2.7) 


 


Free Thyroxine


  1.08 NG/DL


(0.76-1.46) 


  


  


 


 


Free Triiodothyronine


  1.6 pg/mL


(2.3-4.2) 


  


  


 


 


Thyroglobulin Antibody


  251.0 IU/mL


(0.0-0.9) 


  


  


 


 


Red Blood Cell Morphology  Normal   


 


Anisocytosis   1+  


 


Thyroid Stimulating Hormone


(TSH) 


  


  


  < 0.010 uiU/mL


(0.358-3.740)


 


Vancomycin Level Trough


  


  


  


  10.9 ug/mL


(5.0-12.0)


 


Valproic Acid (Depakene) Level


  


  


  


  11 MCG/ML


()








Height (Feet):  5


Height (Inches):  5.00


Weight (Pounds):  151


Objective





Physical Exam:


Vitals: reviewed


General Appearance:  NAD


HEENT:  normocephalic, atraumatic


Neck:  non-tender, normal alignment


Respiratory/Chest:  normal breath sounds bilaterally


Cardiovascular/Chest: rrr


Abdomen:  normal bowel sounds, soft, nontender


Extremities:  normal range of motion











Mike Pop MD Oct 17, 2019 14:45

## 2019-10-17 NOTE — PULMONOLOGY PROGRESS NOTE
Assessment/Plan


Problems:  


(1) Healthcare associated bacterial pneumonia


(2) UTI (urinary tract infection)


(3) Sepsis


(4) Dehydration


(5) CHANCE (acute kidney injury)


(6) Acute metabolic encephalopathy


(7) Hyperglycemia due to type 2 diabetes mellitus


(8) Renal failure (ARF), acute on chronic


(9) Aspiration pneumonia


(10) Abnormal TSH


Assessment/Plan


Marked leukocytosis


Sepsis


Possible pneumonia


E coli UTI


Acute hypoxemic respiratory failure


Acute encephalopathy


Hx CHF


HTN


CHANCE 





Plan:


-CT CAP ordered


-Monitor WCt


-cont broad-spectrum abx per ID


-monitor volumes and renal function 


-monitor encephalopathy


-Asp precautions, SLP recs, VSS


-DVT Px: LMWH


-FC





Subjective


Allergies:  


Coded Allergies:  


     No Known Allergies (Unverified , 10/14/19)


Subjective


WCT 30 GINO otherwise


+ cough + SOB no FC





Objective





Last 24 Hour Vital Signs








  Date Time  Temp Pulse Resp B/P (MAP) Pulse Ox O2 Delivery O2 Flow Rate FiO2


 


10/17/19 12:00 97.5 83 20 114/72 (86) 95   


 


10/17/19 12:00  75      


 


10/17/19 09:00      Nasal Cannula 2.0 


 


10/17/19 08:10     97 Nasal Cannula 2.0 28


 


10/17/19 08:10  77 18  96 Nasal Cannula 2.0 28


 


10/17/19 08:00  80      


 


10/17/19 08:00 98.1 87 20 132/74 (93) 95   


 


10/17/19 04:00 98.1 90 18 157/75 (102) 97   


 


10/17/19 04:00  89      


 


10/17/19 00:00  92      


 


10/17/19 00:00 98.0 94 20 152/72 (98) 94   


 


10/16/19 21:00      Nasal Cannula 2.0 


 


10/16/19 20:00 98.1 90 20 141/61 (87) 99   


 


10/16/19 20:00  86      


 


10/16/19 19:30  78 18  97 Nasal Cannula 2.0 28


 


10/16/19 19:30     97 Nasal Cannula 2.0 28

















Intake and Output  


 


 10/16/19 10/17/19





 19:00 07:00


 


Intake Total 650 ml 555 ml


 


Output Total 350 ml 


 


Balance 300 ml 555 ml


 


  


 


Intake Oral 600 ml 


 


IV Total 50 ml 555 ml


 


Output Urine Total 350 ml 


 


# Voids  3


 


# Bowel Movements  1








General Appearance:  no acute distress, cachetic


HEENT:  normocephalic, atraumatic, anicteric, mucous membranes moist


Respiratory/Chest:  rhonchi


Cardiovascular:  normal peripheral pulses, normal rate, regular rhythm


Abdomen:  normal bowel sounds, soft, non tender, no organomegaly, non distended

, no mass


Extremities:  no cyanosis, no clubbing, no edema


Laboratory Tests


10/17/19 08:15: 


White Blood Count 30.6*H, Red Blood Count 3.07L, Hemoglobin 8.3L, Hematocrit 

26.1L, Mean Corpuscular Volume 85, Mean Corpuscular Hemoglobin 27.0, Mean 

Corpuscular Hemoglobin Concent 31.8L, Red Cell Distribution Width 12.9, 

Platelet Count 493H, Mean Platelet Volume 7.5, Neutrophils (%) (Auto) , 

Lymphocytes (%) (Auto) , Monocytes (%) (Auto) , Eosinophils (%) (Auto) , 

Basophils (%) (Auto) , Differential Total Cells Counted 100, Neutrophils % (

Manual) 96H, Lymphocytes % (Manual) 3L, Monocytes % (Manual) 1, Eosinophils % (

Manual) 0, Basophils % (Manual) 0, Band Neutrophils 0, Platelet Estimate 

Adequate, Platelet Morphology Normal, Hypochromasia 1+, Sodium Level 142, 

Potassium Level 3.6, Chloride Level 108H, Carbon Dioxide Level 21, Anion Gap 13

, Blood Urea Nitrogen 25H, Creatinine 1.3, Estimat Glomerular Filtration Rate 

41.0, Glucose Level 142H, Calcium Level 8.3L, Phosphorus Level 3.7, Magnesium 

Level 2.6H, Total Bilirubin 0.4, Aspartate Amino Transf (AST/SGOT) 20, Alanine 

Aminotransferase (ALT/SGPT) 40, Alkaline Phosphatase 97, C-Reactive Protein, 

Quantitative 20.3H, Pro-B-Type Natriuretic Peptide 73459V, Total Protein 6.3L, 

Albumin 1.4L, Globulin 4.9, Albumin/Globulin Ratio 0.3L, Thyroid Stimulating 

Hormone (TSH) < 0.010L, Vancomycin Level Trough 10.9, Valproic Acid (Depakene) 

Level 11L





Current Medications








 Medications


  (Trade)  Dose


 Ordered  Sig/Winnie


 Route


 PRN Reason  Start Time


 Stop Time Status Last Admin


Dose Admin


 


 Acetaminophen


  (Tylenol)  650 mg  PRN  PRN


 RECTAL


 TEMP>100.5  10/14/19 03:00


     


 


 


 Albuterol/


 Ipratropium


  (Albuterol/


 Ipratropium)  3 ml  Q4H  PRN


 HHN


 Shortness of Breath  10/14/19 07:30


 10/19/19 07:29   


 


 


 Aspirin


  (ASA)  81 mg  DAILY


 ORAL


   10/15/19 09:00


 11/14/19 08:59  10/17/19 08:57


 


 


 Atorvastatin


 Calcium


  (Lipitor)  10 mg  BEDTIME


 ORAL


   10/14/19 21:00


 11/13/19 20:59  10/16/19 21:02


 


 


 Clopidogrel


 Bisulfate


  (Plavix)  75 mg  DAILY


 ORAL


   10/15/19 09:00


 11/14/19 08:59  10/17/19 08:57


 


 


 Dextrose


  (Dextrose 50%)  25 ml  Q30M  PRN


 IV


 Hypoglycemia  10/14/19 07:00


 11/13/19 06:59   


 


 


 Dextrose


  (Dextrose 50%)  50 ml  Q30M  PRN


 IV


 Hypoglycemia  10/14/19 07:00


 11/13/19 06:59   


 


 


 Divalproex Sodium


  (Depakote


 Sprinkles)  500 mg  Q12HR


 ORAL


   10/15/19 22:00


 11/14/19 21:59  10/17/19 08:58


 


 


 Docusate Sodium


  (Colace)  100 mg  THREE TIMES A  DAY


 ORAL


   10/14/19 13:00


 11/13/19 12:59  10/17/19 12:10


 


 


 Donepezil HCl


  (Aricept)  5 mg  DAILY


 ORAL


   10/15/19 11:00


 11/14/19 10:59  10/17/19 08:57


 


 


 Enoxaparin Sodium


  (Lovenox)  30 mg  DAILY


 SUBQ


   10/14/19 09:00


 11/13/19 08:59  10/17/19 08:59


 


 


 Insulin Aspart


  (NovoLOG)    BEFORE MEALS AND  HS


 SUBQ


   10/14/19 11:30


 11/13/19 11:29  10/17/19 12:12


 


 


 Insulin Detemir


  (Levemir)  10 units  DAILY


 SUBQ


   10/16/19 09:00


 11/13/19 10:29  10/17/19 09:00


 


 


 Lactulose


  (Cephulac)  20 gm  TIDPRN  PRN


 ORAL


 Constipation  10/16/19 14:45


 11/15/19 14:44  10/16/19 15:21


 


 


 Meropenem 1 gm/


 Sodium Chloride  55 ml @ 


 110 mls/hr  Q12H


 IVPB


   10/16/19 14:00


 10/21/19 13:59  10/17/19 13:44


 


 


 Olanzapine


  (ZyPREXA)  5 mg  DAILY


 ORAL


   10/15/19 11:00


 11/14/19 10:59  10/17/19 08:57


 


 


 Pantoprazole


  (Protonix)  40 mg  BID


 ORAL


   10/14/19 18:00


 11/13/19 08:59  10/17/19 08:57


 


 


 Sodium Chloride  1,000 ml @ 


 50 mls/hr  Q20H


 IV


   10/16/19 13:03


 11/15/19 13:02  10/17/19 03:46


 


 


 Vancomycin HCl


  (Vanco rx to


 dose)  1 ea  DAILY  PRN


 MISC


 Per rx protocol  10/14/19 07:30


 11/13/19 07:29   


 


 


 Vancomycin HCl 1


 gm/Dextrose  275 ml @ 


 183.708


 mls/hr  Q24H


 IVPB


   10/18/19 09:00


 10/23/19 08:59   


 

















Sammy Torres MD Oct 17, 2019 16:18

## 2019-10-17 NOTE — CARDIOLOGY REPORT
--------------- APPROVED REPORT --------------





EXAM: Two-dimensional and M-mode echocardiogram with Doppler and color Doppler.



INDICATION

Congestive Heart Failure 



M-Mode DIMENSIONS 

IVSd1.0 (0.7-1.1cm)Left Atrium (MM)3.8 (1.6-4.0cm)

LVDd5.5 (3.5-5.6cm)Aortic Root2.4 (2.0-3.7cm)

PWd1.1 (0.7-1.1cm)Aortic Cusp Exc.1.6 (1.5-2.0cm)



LVDs4.1 (2.5-4.0cm)

PWs1.5 cm





Normal left ventricular chamber size, there is posterior and mid inferolateral wall 

hypokinesia

Left ventricular ejection fraction estimated to be 45%. Ischemic cardiomyopathy cannot be 

excluded.

Anterior Echo-free space, may be due to pericardial fat or effusion.

Mild left atrial enlargement.

Right cardiac chamber sizes are within normal limits.

Focal aortic valve sclerosis with adequate cusp excursion.

Thickened mitral valve leaflets with normal excursion.

Mitral annulus and aortic root calcification.

Normal pulmonic valve structure. 

Normal tricuspid valve structure. 

Normal IVC with mild physiologic collapse.



A  color flow and spectral Doppler study was performed and revealed:

Severe mitral regurgitation.

Mitral inflow indicate normal left ventricular diastolic function. 

Severe tricuspid regurgitation.

Tricuspid  systolic velocities suggests peak right ventricular systolic pressure of  69 

mmHg, consistent with severe pulmonary hypertension.

Trace pulmonic regurgitation present.

## 2019-10-17 NOTE — NUR
*-* INSURANCE *-*



ALL AVAILABLE CLINICALS HAVE BEEN FAXED TO:





Tracking#[id number]

CM: Lance

#[phone]

Fax#[phone]

## 2019-10-17 NOTE — NUR
NURSE NOTES:

Received report from Goldie/RN, Patient is asleep, Lying semi-rose's, resting comfortably. 
Respiration unlabored and even, No acute distress/SOB noted. IV site patent, asymptomatic. 
no bleeding or infiltration noted. Bed in lowest position and locked. Bed alarm engaged, 
side-rails up x3. Will continue plan of care.

## 2019-10-17 NOTE — NUR
CASE MANAGEMENT: REVIEW



10/17/19



SI: HC ASSOCIATED BACTERIAL PNA/ UTI / SEPSIS / ARF

98.1  87  20  132/74  95% NC 2L

WBC 39.4-29.3-30.6; RBC 3.07; H/H 8.3/26.1; ; BUN 25

TROP 0.200; C-R.PRO 20.3; CA+ 8.3; MG 2.6; BNP 2785

**10/16 TROP 0.200



IS:     IV MEROPENEM Q12HR

LACTULOSE PO TID/PRN

IV VANCOMYCIN Q24HR

IVF NS @75

LOVENOX SQ QD

PROTONIX PO BID

NOVOLOG SQ AC&HS

LEVEMIR SQ QD

PLAVIX PO QD

LOVENOX SQ QD

ASA PO QD

DEPAKOTE PO Q12HR

OLANZAPINE PO QD

DONEPEZIL PO QD

LIPITOR PO HS







**: 2E TELE UNIT



DCP: RETURN TO [facility] WHEN MEDICALLY CLEARED 



PLAN:  VENOUS DUPLEX 

SP THERAPY

## 2019-10-17 NOTE — CARDIOLOGY PROGRESS NOTE
Assessment/Plan


Assessment/Plan


1. acute congestive heart failure.


2. Abnormal cardiac enzyme demand related due to infection and profound anemia 


3. Agitation 


4. Urinary tract infection.


5. Acute renal failure.


6. Profound anemia.


7. Diabetes mellitus.


8. History of hypertension.


9. History of mood disorder.


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 





trop min increased 


echo noted 


is on asa and plavix which i restarted yest 


telel personal reviewed sinus 


ekg reviewed no st t wave abn 


keep on same meds 


iv abx 


wbc sig elevated 


cxr persoanlly reviewed 


will decrease ivf may need diurtics





Subjective


ROS Limited/Unobtainable:  Yes





Objective





Last 24 Hour Vital Signs








  Date Time  Temp Pulse Resp B/P (MAP) Pulse Ox O2 Delivery O2 Flow Rate FiO2


 


10/17/19 16:00  92      


 


10/17/19 16:00 97.2 92 20 143/82 (102) 98   


 


10/17/19 12:00 97.5 83 20 114/72 (86) 95   


 


10/17/19 12:00  75      


 


10/17/19 09:00      Nasal Cannula 2.0 


 


10/17/19 08:10     97 Nasal Cannula 2.0 28


 


10/17/19 08:10  77 18  96 Nasal Cannula 2.0 28


 


10/17/19 08:00  80      


 


10/17/19 08:00 98.1 87 20 132/74 (93) 95   


 


10/17/19 04:00 98.1 90 18 157/75 (102) 97   


 


10/17/19 04:00  89      


 


10/17/19 00:00  92      


 


10/17/19 00:00 98.0 94 20 152/72 (98) 94   


 


10/16/19 21:00      Nasal Cannula 2.0 








General Appearance:  no apparent distress, other - sleeping


Neck:  supple


Cardiovascular:  normal rate, regular rhythm


Respiratory/Chest:  decreased breath sounds - left base


Abdomen:  normal bowel sounds, non tender, soft


Extremities:  no swelling











Intake and Output  


 


 10/16/19 10/17/19





 19:00 07:00


 


Intake Total 650 ml 555 ml


 


Output Total 350 ml 


 


Balance 300 ml 555 ml


 


  


 


Intake Oral 600 ml 


 


IV Total 50 ml 555 ml


 


Output Urine Total 350 ml 


 


# Voids  3


 


# Bowel Movements  1











Laboratory Tests








Test


  10/17/19


08:15


 


White Blood Count


  30.6 K/UL


(4.8-10.8)  *H


 


Red Blood Count


  3.07 M/UL


(4.20-5.40)  L


 


Hemoglobin


  8.3 G/DL


(12.0-16.0)  L


 


Hematocrit


  26.1 %


(37.0-47.0)  L


 


Mean Corpuscular Volume 85 FL (80-99)  


 


Mean Corpuscular Hemoglobin


  27.0 PG


(27.0-31.0)


 


Mean Corpuscular Hemoglobin


Concent 31.8 G/DL


(32.0-36.0)  L


 


Red Cell Distribution Width


  12.9 %


(11.6-14.8)


 


Platelet Count


  493 K/UL


(150-450)  H


 


Mean Platelet Volume


  7.5 FL


(6.5-10.1)


 


Neutrophils (%) (Auto)


  % (45.0-75.0)


 


 


Lymphocytes (%) (Auto)


  % (20.0-45.0)


 


 


Monocytes (%) (Auto)  % (1.0-10.0)  


 


Eosinophils (%) (Auto)  % (0.0-3.0)  


 


Basophils (%) (Auto)  % (0.0-2.0)  


 


Differential Total Cells


Counted 100  


 


 


Neutrophils % (Manual) 96 % (45-75)  H


 


Lymphocytes % (Manual) 3 % (20-45)  L


 


Monocytes % (Manual) 1 % (1-10)  


 


Eosinophils % (Manual) 0 % (0-3)  


 


Basophils % (Manual) 0 % (0-2)  


 


Band Neutrophils 0 % (0-8)  


 


Platelet Estimate Adequate  


 


Platelet Morphology Normal  


 


Hypochromasia 1+  


 


Sodium Level


  142 MMOL/L


(136-145)


 


Potassium Level


  3.6 MMOL/L


(3.5-5.1)


 


Chloride Level


  108 MMOL/L


()  H


 


Carbon Dioxide Level


  21 MMOL/L


(21-32)


 


Anion Gap


  13 mmol/L


(5-15)


 


Blood Urea Nitrogen


  25 mg/dL


(7-18)  H


 


Creatinine


  1.3 MG/DL


(0.55-1.30)


 


Estimat Glomerular Filtration


Rate 41.0 mL/min


(>60)


 


Glucose Level


  142 MG/DL


()  H


 


Calcium Level


  8.3 MG/DL


(8.5-10.1)  L


 


Phosphorus Level


  3.7 MG/DL


(2.5-4.9)


 


Magnesium Level


  2.6 MG/DL


(1.8-2.4)  H


 


Total Bilirubin


  0.4 MG/DL


(0.2-1.0)


 


Aspartate Amino Transf


(AST/SGOT) 20 U/L (15-37)


 


 


Alanine Aminotransferase


(ALT/SGPT) 40 U/L (12-78)


 


 


Alkaline Phosphatase


  97 U/L


()


 


C-Reactive Protein,


Quantitative 20.3 mg/dL


(0.00-0.90)  H


 


Pro-B-Type Natriuretic Peptide


  52664 pg/mL


(0-125)  H


 


Total Protein


  6.3 G/DL


(6.4-8.2)  L


 


Albumin


  1.4 G/DL


(3.4-5.0)  L


 


Globulin 4.9 g/dL  


 


Albumin/Globulin Ratio


  0.3 (1.0-2.7)


L


 


Thyroid Stimulating Hormone


(TSH) < 0.010 uiU/mL


(0.358-3.740)


 


Vancomycin Level Trough


  10.9 ug/mL


(5.0-12.0)


 


Valproic Acid (Depakene) Level


  11 MCG/ML


()  L

















Rob May MD Oct 17, 2019 21:00

## 2019-10-17 NOTE — NEPHROLOGY PROGRESS NOTE
Assessment/Plan


Problem List:  


(1) Dehydration


(2) Renal failure (ARF), acute on chronic


(3) ARF (acute renal failure)


(4) Sepsis


(5) Acute metabolic encephalopathy


(6) Hyperglycemia due to type 2 diabetes mellitus


(7) UTI (urinary tract infection)


(8) Diabetic nephropathy


Assessment





Renal failure, Acute on Chronic


Dehydration


Severe Anemia


Sepsis / Pneumonia / UTI


DM, OOC


Proteinuria , Diabetic Nephropathy


Acute encephalopathy


Plan





WBCs remain elevated


Charles


Avoid Nephrotoxics


Anemia salas


2D echo


24 H urine protein


Keep BP and BS in check


I am resuming her psych meds fro [facility] for now


Per orders





Subjective


ROS Limited/Unobtainable:  No


Constitutional:  Reports: malaise, weakness





Objective


Objective





Last 24 Hour Vital Signs








  Date Time  Temp Pulse Resp B/P (MAP) Pulse Ox O2 Delivery O2 Flow Rate FiO2


 


10/17/19 12:00 97.5 83 20 114/72 (86) 95   


 


10/17/19 12:00  75      


 


10/17/19 09:00      Nasal Cannula 2.0 


 


10/17/19 08:10     97 Nasal Cannula 2.0 28


 


10/17/19 08:10  77 18  96 Nasal Cannula 2.0 28


 


10/17/19 08:00  80      


 


10/17/19 08:00 98.1 87 20 132/74 (93) 95   


 


10/17/19 04:00 98.1 90 18 157/75 (102) 97   


 


10/17/19 04:00  89      


 


10/17/19 00:00  92      


 


10/17/19 00:00 98.0 94 20 152/72 (98) 94   


 


10/16/19 21:00      Nasal Cannula 2.0 


 


10/16/19 20:00 98.1 90 20 141/61 (87) 99   


 


10/16/19 20:00  86      


 


10/16/19 19:30  78 18  97 Nasal Cannula 2.0 28


 


10/16/19 19:30     97 Nasal Cannula 2.0 28


 


10/16/19 16:00 97.5 86 20 119/62 (81) 100   


 


10/16/19 16:00  83      

















Intake and Output  


 


 10/16/19 10/17/19





 19:00 07:00


 


Intake Total 650 ml 555 ml


 


Output Total 350 ml 


 


Balance 300 ml 555 ml


 


  


 


Intake Oral 600 ml 


 


IV Total 50 ml 555 ml


 


Output Urine Total 350 ml 


 


# Voids  3


 


# Bowel Movements  1








Laboratory Tests


10/17/19 08:15: 


White Blood Count 30.6*H, Red Blood Count 3.07L, Hemoglobin 8.3L, Hematocrit 

26.1L, Mean Corpuscular Volume 85, Mean Corpuscular Hemoglobin 27.0, Mean 

Corpuscular Hemoglobin Concent 31.8L, Red Cell Distribution Width 12.9, 

Platelet Count 493H, Mean Platelet Volume 7.5, Neutrophils (%) (Auto) , 

Lymphocytes (%) (Auto) , Monocytes (%) (Auto) , Eosinophils (%) (Auto) , 

Basophils (%) (Auto) , Differential Total Cells Counted 100, Neutrophils % (

Manual) 96H, Lymphocytes % (Manual) 3L, Monocytes % (Manual) 1, Eosinophils % (

Manual) 0, Basophils % (Manual) 0, Band Neutrophils 0, Platelet Estimate 

Adequate, Platelet Morphology Normal, Hypochromasia 1+, Sodium Level 142, 

Potassium Level 3.6, Chloride Level 108H, Carbon Dioxide Level 21, Anion Gap 13

, Blood Urea Nitrogen 25H, Creatinine 1.3, Estimat Glomerular Filtration Rate 

41.0, Glucose Level 142H, Calcium Level 8.3L, Phosphorus Level 3.7, Magnesium 

Level 2.6H, Total Bilirubin 0.4, Aspartate Amino Transf (AST/SGOT) 20, Alanine 

Aminotransferase (ALT/SGPT) 40, Alkaline Phosphatase 97, C-Reactive Protein, 

Quantitative 20.3H, Pro-B-Type Natriuretic Peptide 60427Z, Total Protein 6.3L, 

Albumin 1.4L, Globulin 4.9, Albumin/Globulin Ratio 0.3L, Thyroid Stimulating 

Hormone (TSH) < 0.010L, Vancomycin Level Trough 10.9, Valproic Acid (Depakene) 

Level 11L


Height (Feet):  5


Height (Inches):  5.00


Weight (Pounds):  151


General Appearance:  no apparent distress


Objective


no change











Lukasz Vizcaino MD Oct 17, 2019 15:50

## 2019-10-17 NOTE — NUR
HAND-OFF: 

Report given to Goldie/RN, Patient is asleep, lying semi-rose's, No acute distress noted. 
Endorsed plan of care.

## 2019-10-17 NOTE — NUR
HAND-OFF: 

Report given to ELIZABETH Winters. Patient asleep, no signs of distress or pain. Plan of care 
endorsed.

## 2019-10-17 NOTE — CARDIOLOGY REPORT
--------------- APPROVED REPORT --------------





EKG Measurement

Heart [id number]

IN [id number]

[id number]

[id number]

[id number]





Sinus rhythm with premature atrial complexes



Abnormal ECG

## 2019-10-17 NOTE — NUR
NURSE NOTES:

Notified Dr. Burgos regarding WBC 30.6 and positive for ESBL urine. Patient is on Meropenem, 
No new order at this time.

## 2019-10-17 NOTE — GENERAL PROGRESS NOTE
Assessment/Plan


Status:  stable


Assessment/Plan:


S: Not verbal





O: poor historian, seems comfortable. IV sites are intact. Charles in place





PHYSICAL EXAMINATION:HEAD AND NECK:  Atraumatic and normocephalic.


CHEST:  Bronchial breathing sounds.ABDOMEN:  Soft.  No organomegaly.


MUSCULOSKELETAL:  Diffuse 1+ or 2+ nonpitting edema in all four contracted


extremities.  The patient is not following the commands.  Limited


evaluation.NEUROLOGY:  The patient is awake.  Not alert.  Not verbally


communicative.





LABORATORY DATA:  Labs dated October 16, 2019  reviewed





Meds: Reviewed and reconciled in the chart 





ASSESSMENT AND PLAN:


1. Sepsis.  Differential diagnosis is healthcare-associated pneumonia or


healthcare-associated urinary tract infection.  Work in progress.


2. Chronic encephalomalacia.


3. Acute on chronic anemia.


4. Renal failure, age indeterminate.


5. Congestive heart failure- preserved EF


6. Hyperthyroidism.


7. Hyperkalemia.


8. Diabetes type 2, uncontrolled with A1c of 10.


9. Psychiatric disorder.


10. GI and DVT prophylaxis.


11. PAH- Severe 





PLAN OF CARE:


 Notes from  Pulmonary Critical Care, Infectious


Disease, and Nephrology reviewed


post Blood transfusion


improving Cr level 


Stool for O/b


Panculture per ID, given the increase in WBC and low grade fever


I will check the Dupplex of all extremities





Subjective


Allergies:  


Coded Allergies:  


     No Known Allergies (Unverified , 10/14/19)





Objective





Last 24 Hour Vital Signs








  Date Time  Temp Pulse Resp B/P (MAP) Pulse Ox O2 Delivery O2 Flow Rate FiO2


 


10/17/19 04:00 98.1 90 18 157/75 (102) 97   


 


10/17/19 04:00  89      


 


10/17/19 00:00  92      


 


10/17/19 00:00 98.0 94 20 152/72 (98) 94   


 


10/16/19 21:00      Nasal Cannula 2.0 


 


10/16/19 20:00 98.1 90 20 141/61 (87) 99   


 


10/16/19 20:00  86      


 


10/16/19 19:30  78 18  97 Nasal Cannula 2.0 28


 


10/16/19 19:30     97 Nasal Cannula 2.0 28


 


10/16/19 16:00 97.5 86 20 119/62 (81) 100   


 


10/16/19 16:00  83      


 


10/16/19 12:00 97.8 81 20 121/58 (79) 99   


 


10/16/19 12:00  86      


 


10/16/19 11:18  85 18  98 Nasal Cannula 2.0 28


 


10/16/19 11:18     98 Nasal Cannula 2.0 28

















Intake and Output  


 


 10/16/19 10/17/19





 18:59 06:59


 


Intake Total 600 ml 605 ml


 


Output Total 350 ml 


 


Balance 250 ml 605 ml


 


  


 


Intake Oral 600 ml 


 


IV Total  605 ml


 


Output Urine Total 350 ml 


 


# Voids  3


 


# Bowel Movements  1








Laboratory Tests


10/17/19 08:15: 


White Blood Count 30.6*H, Red Blood Count 3.07L, Hemoglobin 8.3L, Hematocrit 

26.1L, Mean Corpuscular Volume 85, Mean Corpuscular Hemoglobin 27.0, Mean 

Corpuscular Hemoglobin Concent 31.8L, Red Cell Distribution Width 12.9, 

Platelet Count 493H, Mean Platelet Volume 7.5, Neutrophils (%) (Auto) , 

Lymphocytes (%) (Auto) , Monocytes (%) (Auto) , Eosinophils (%) (Auto) , 

Basophils (%) (Auto) , Neutrophils % (Manual) [Pending], Lymphocytes % (Manual) 

[Pending], Platelet Estimate [Pending], Platelet Morphology [Pending], Sodium 

Level 142, Potassium Level 3.6, Chloride Level 108H, Carbon Dioxide Level 21, 

Anion Gap 13, Blood Urea Nitrogen 25H, Creatinine 1.3, Estimat Glomerular 

Filtration Rate 41.0, Glucose Level 142H, Calcium Level 8.3L, Phosphorus Level 

3.7, Magnesium Level 2.6H, Total Bilirubin 0.4, Aspartate Amino Transf (AST/SGOT

) 20, Alanine Aminotransferase (ALT/SGPT) 40, Alkaline Phosphatase 97, C-

Reactive Protein, Quantitative 20.3H, Pro-B-Type Natriuretic Peptide 74727Y, 

Total Protein 6.3L, Albumin 1.4L, Globulin 4.9, Albumin/Globulin Ratio 0.3L, 

Thyroid Stimulating Hormone (TSH) [Pending], Vancomycin Level Trough 10.9, 

Valproic Acid (Depakene) Level 11L


Height (Feet):  5


Height (Inches):  5.00


Weight (Pounds):  151











Garrick Keith MD Oct 17, 2019 09:38

## 2019-10-17 NOTE — NUR
NURSE NOTES:

Received patient from ELIZABETH Winters. Patient resting comfortably in bed, alert, and talkative. 
No signs of distress or pain noted. Patient unable to make needs known. IV site checked, 
intact and patent, no signs of erythema, infiltration, or bleeding. Bed in lowest position, 
brakes on, bed alarm on, and call light within reach. Will continue with plan of care.

## 2019-10-18 VITALS — DIASTOLIC BLOOD PRESSURE: 71 MMHG | SYSTOLIC BLOOD PRESSURE: 159 MMHG

## 2019-10-18 VITALS — DIASTOLIC BLOOD PRESSURE: 71 MMHG | SYSTOLIC BLOOD PRESSURE: 104 MMHG

## 2019-10-18 VITALS — DIASTOLIC BLOOD PRESSURE: 70 MMHG | SYSTOLIC BLOOD PRESSURE: 155 MMHG

## 2019-10-18 VITALS — DIASTOLIC BLOOD PRESSURE: 70 MMHG | SYSTOLIC BLOOD PRESSURE: 145 MMHG

## 2019-10-18 VITALS — SYSTOLIC BLOOD PRESSURE: 128 MMHG | DIASTOLIC BLOOD PRESSURE: 78 MMHG

## 2019-10-18 VITALS — SYSTOLIC BLOOD PRESSURE: 136 MMHG | DIASTOLIC BLOOD PRESSURE: 63 MMHG

## 2019-10-18 RX ADMIN — NATEGLINIDE SCH MG: 60 TABLET ORAL at 11:42

## 2019-10-18 RX ADMIN — INSULIN DETEMIR SCH UNITS: 100 INJECTION, SOLUTION SUBCUTANEOUS at 08:31

## 2019-10-18 RX ADMIN — NATEGLINIDE SCH MG: 60 TABLET ORAL at 16:48

## 2019-10-18 RX ADMIN — DOCUSATE SODIUM SCH MG: 100 CAPSULE, LIQUID FILLED ORAL at 13:00

## 2019-10-18 RX ADMIN — INSULIN ASPART SCH UNITS: 100 INJECTION, SOLUTION INTRAVENOUS; SUBCUTANEOUS at 16:50

## 2019-10-18 RX ADMIN — ENOXAPARIN SODIUM SCH MG: 30 INJECTION SUBCUTANEOUS at 08:26

## 2019-10-18 RX ADMIN — MEROPENEM SCH MLS/HR: 1 INJECTION INTRAVENOUS at 14:57

## 2019-10-18 RX ADMIN — SODIUM CHLORIDE SCH MLS/HR: 9 INJECTION, SOLUTION INTRAVENOUS at 08:26

## 2019-10-18 RX ADMIN — DONEPEZIL HYDROCHLORIDE SCH MG: 5 TABLET, FILM COATED ORAL at 08:24

## 2019-10-18 RX ADMIN — INSULIN ASPART SCH UNITS: 100 INJECTION, SOLUTION INTRAVENOUS; SUBCUTANEOUS at 20:51

## 2019-10-18 RX ADMIN — MEROPENEM SCH MLS/HR: 1 INJECTION INTRAVENOUS at 01:01

## 2019-10-18 RX ADMIN — DIVALPROEX SODIUM SCH MG: 125 CAPSULE ORAL at 20:49

## 2019-10-18 RX ADMIN — DOCUSATE SODIUM SCH MG: 100 CAPSULE, LIQUID FILLED ORAL at 08:24

## 2019-10-18 RX ADMIN — INSULIN ASPART SCH UNITS: 100 INJECTION, SOLUTION INTRAVENOUS; SUBCUTANEOUS at 11:43

## 2019-10-18 RX ADMIN — INSULIN ASPART SCH UNITS: 100 INJECTION, SOLUTION INTRAVENOUS; SUBCUTANEOUS at 07:06

## 2019-10-18 RX ADMIN — ASPIRIN 81 MG SCH MG: 81 TABLET ORAL at 08:24

## 2019-10-18 RX ADMIN — DIVALPROEX SODIUM SCH MG: 125 CAPSULE ORAL at 08:25

## 2019-10-18 RX ADMIN — DOCUSATE SODIUM SCH MG: 100 CAPSULE, LIQUID FILLED ORAL at 17:27

## 2019-10-18 NOTE — NUR
NURSE NOTES: Report received from ELIZABETH Amezcua. Patient is lying comfortably in semi-fowlers 
with no signs of distress. A+Ox1, with no signs of pain/SOB. Respirations are even and 
unlabored on 2 L NC. IV site is intact and saline locked. Bed is at lowest position, brakes 
engaged, siderails x3, bed alarm on, and call light within reach. Pt is in stable condition 
at this time; will continue to monitor.

## 2019-10-18 NOTE — GENERAL PROGRESS NOTE
Assessment/Plan


Problem List:  


(1) Abnormal TSH


ICD Codes:  R79.89 - Other specified abnormal findings of blood chemistry


SNOMED:  284930017


(2) Hyperglycemia due to type 2 diabetes mellitus


ICD Codes:  E11.65 - Type 2 diabetes mellitus with hyperglycemia


SNOMED:  110827445468649, [id number]


Qualifiers:  


   Qualified Codes:  E11.65 - Type 2 diabetes mellitus with hyperglycemia; 

Z79.4 - Long term (current) use of insulin


(3) Acute metabolic encephalopathy


ICD Codes:  G93.41 - Metabolic encephalopathy


SNOMED:  54493208, 778454176


(4) ARF (acute renal failure)


ICD Codes:  N17.9 - Acute kidney failure, unspecified


SNOMED:  25733488


Qualifiers:  


   Qualified Codes:  N17.9 - Acute kidney failure, unspecified


(5) Healthcare associated bacterial pneumonia


ICD Codes:  J15.9 - Unspecified bacterial pneumonia


SNOMED:  332500631


Status:  stable


Assessment/Plan:


increase Levemir to 12 units qam 


add Starlix 60 mg ac tid 


continue NISS ac / hs 





low TSH, normal free T4 and low free T3 most likely due to non thyroidal 

illness 


no need for thyroid medications for now 


thyroid antibodies are pending





Subjective


Allergies:  


Coded Allergies:  


     No Known Allergies (Unverified , 10/14/19)


All Systems:  reviewed and negative except above


Subjective


events noted 


glucose values are elevated 











Item Value  Date Time


 


Bedside Blood Glucose 238 mg/dl H 10/18/19 0630


 


Bedside Blood Glucose 304 mg/dl H 10/17/19 2100


 


Bedside Blood Glucose 208 mg/dl H 10/17/19 1725


 


Bedside Blood Glucose 158 mg/dl H 10/17/19 1212


 


Bedside Blood Glucose 141 mg/dl H 10/17/19 0900


 


Bedside Blood Glucose 142 mg/dl H 10/17/19 0634











Objective





Last 24 Hour Vital Signs








  Date Time  Temp Pulse Resp B/P (MAP) Pulse Ox O2 Delivery O2 Flow Rate FiO2


 


10/18/19 04:00 98.1 77 19 136/63 (87) 99   


 


10/18/19 04:00  74      


 


10/18/19 00:00 98.8 81 18 104/71 (82) 98   


 


10/18/19 00:00  85      


 


10/17/19 21:00      Nasal Cannula 2.0 


 


10/17/19 20:00  109      


 


10/17/19 20:00 98.5 102 22 150/75 (100) 97   


 


10/17/19 19:01     95 Nasal Cannula 2.0 28


 


10/17/19 19:01  124 18  95 Nasal Cannula 2.0 28


 


10/17/19 16:00  92      


 


10/17/19 16:00 97.2 92 20 143/82 (102) 98   


 


10/17/19 12:00 97.5 83 20 114/72 (86) 95   


 


10/17/19 12:00  75      


 


10/17/19 09:00      Nasal Cannula 2.0 


 


10/17/19 08:10     97 Nasal Cannula 2.0 28


 


10/17/19 08:10  77 18  96 Nasal Cannula 2.0 28


 


10/17/19 08:00  80      


 


10/17/19 08:00 98.1 87 20 132/74 (93) 95   

















Intake and Output  


 


 10/17/19 10/18/19





 19:00 07:00


 


Intake Total  55 ml


 


Balance  55 ml


 


  


 


IV Total  55 ml


 


# Voids 2 1








Laboratory Tests


10/17/19 08:15: 


White Blood Count 30.6*H, Red Blood Count 3.07L, Hemoglobin 8.3L, Hematocrit 

26.1L, Mean Corpuscular Volume 85, Mean Corpuscular Hemoglobin 27.0, Mean 

Corpuscular Hemoglobin Concent 31.8L, Red Cell Distribution Width 12.9, 

Platelet Count 493H, Mean Platelet Volume 7.5, Neutrophils (%) (Auto) , 

Lymphocytes (%) (Auto) , Monocytes (%) (Auto) , Eosinophils (%) (Auto) , 

Basophils (%) (Auto) , Differential Total Cells Counted 100, Neutrophils % (

Manual) 96H, Lymphocytes % (Manual) 3L, Monocytes % (Manual) 1, Eosinophils % (

Manual) 0, Basophils % (Manual) 0, Band Neutrophils 0, Platelet Estimate 

Adequate, Platelet Morphology Normal, Hypochromasia 1+, Sodium Level 142, 

Potassium Level 3.6, Chloride Level 108H, Carbon Dioxide Level 21, Anion Gap 13

, Blood Urea Nitrogen 25H, Creatinine 1.3, Estimat Glomerular Filtration Rate 

41.0, Glucose Level 142H, Calcium Level 8.3L, Phosphorus Level 3.7, Magnesium 

Level 2.6H, Total Bilirubin 0.4, Aspartate Amino Transf (AST/SGOT) 20, Alanine 

Aminotransferase (ALT/SGPT) 40, Alkaline Phosphatase 97, C-Reactive Protein, 

Quantitative 20.3H, Pro-B-Type Natriuretic Peptide 26087C, Total Protein 6.3L, 

Albumin 1.4L, Globulin 4.9, Albumin/Globulin Ratio 0.3L, Thyroid Stimulating 

Hormone (TSH) < 0.010L, Vancomycin Level Trough 10.9, Valproic Acid (Depakene) 

Level 11L


Height (Feet):  5


Height (Inches):  5.00


Weight (Pounds):  151


General Appearance:  no apparent distress


Neck:  normal alignment


Cardiovascular:  normal rate


Respiratory/Chest:  lungs clear


Abdomen:  normal bowel sounds


Objective





Current Medications








 Medications


  (Trade)  Dose


 Ordered  Sig/Winnie


 Route


 PRN Reason  Start Time


 Stop Time Status Last Admin


Dose Admin


 


 Acetaminophen


  (Tylenol)  650 mg  PRN  PRN


 RECTAL


 TEMP>100.5  10/14/19 03:00


     


 


 


 Albuterol/


 Ipratropium


  (Albuterol/


 Ipratropium)  3 ml  Q4H  PRN


 HHN


 Shortness of Breath  10/14/19 07:30


 10/19/19 07:29   


 


 


 Aspirin


  (ASA)  81 mg  DAILY


 ORAL


   10/15/19 09:00


 11/14/19 08:59  10/17/19 08:57


 


 


 Atorvastatin


 Calcium


  (Lipitor)  10 mg  BEDTIME


 ORAL


   10/14/19 21:00


 11/13/19 20:59  10/17/19 20:28


 


 


 Clopidogrel


 Bisulfate


  (Plavix)  75 mg  DAILY


 ORAL


   10/15/19 09:00


 11/14/19 08:59  10/17/19 08:57


 


 


 Dextrose


  (Dextrose 50%)  25 ml  Q30M  PRN


 IV


 Hypoglycemia  10/14/19 07:00


 11/13/19 06:59   


 


 


 Dextrose


  (Dextrose 50%)  50 ml  Q30M  PRN


 IV


 Hypoglycemia  10/14/19 07:00


 11/13/19 06:59   


 


 


 Divalproex Sodium


  (Depakote


 Sprinkles)  500 mg  Q12HR


 ORAL


   10/15/19 22:00


 11/14/19 21:59  10/17/19 20:28


 


 


 Docusate Sodium


  (Colace)  100 mg  THREE TIMES A  DAY


 ORAL


   10/14/19 13:00


 11/13/19 12:59  10/17/19 17:18


 


 


 Donepezil HCl


  (Aricept)  5 mg  DAILY


 ORAL


   10/15/19 11:00


 11/14/19 10:59  10/17/19 08:57


 


 


 Enoxaparin Sodium


  (Lovenox)  30 mg  DAILY


 SUBQ


   10/14/19 09:00


 11/13/19 08:59  10/17/19 08:59


 


 


 Insulin Aspart


  (NovoLOG)    BEFORE MEALS AND  HS


 SUBQ


   10/14/19 11:30


 11/13/19 11:29  10/17/19 20:32


 


 


 Insulin Detemir


  (Levemir)  10 units  DAILY


 SUBQ


   10/16/19 09:00


 11/13/19 10:29  10/17/19 09:00


 


 


 Lactulose


  (Cephulac)  20 gm  TIDPRN  PRN


 ORAL


 Constipation  10/16/19 14:45


 11/15/19 14:44  10/16/19 15:21


 


 


 Lorazepam


  (Ativan 2mg/ml


 1ml)  1 mg  Q8HR  PRN


 IV


 For Anxiety  10/17/19 19:15


 10/24/19 19:14  10/18/19 01:12


 


 


 Meropenem 1 gm/


 Sodium Chloride  55 ml @ 


 110 mls/hr  Q12H


 IVPB


   10/16/19 14:00


 10/21/19 13:59  10/18/19 01:01


 


 


 Olanzapine


  (ZyPREXA)  5 mg  DAILY


 ORAL


   10/15/19 11:00


 11/14/19 10:59  10/17/19 08:57


 


 


 Pantoprazole


  (Protonix)  40 mg  BID


 ORAL


   10/14/19 18:00


 11/13/19 08:59  10/17/19 17:18


 


 


 Vancomycin HCl


  (Vanco rx to


 dose)  1 ea  DAILY  PRN


 MISC


 Per rx protocol  10/14/19 07:30


 11/13/19 07:29   


 


 


 Vancomycin HCl 1


 gm/Dextrose  275 ml @ 


 183.708


 mls/hr  Q24H


 IVPB


   10/18/19 09:00


 10/23/19 08:59   


 

















Riccardo Velez MD Oct 18, 2019 06:56

## 2019-10-18 NOTE — HEMATOLOGY/ONC PROGRESS NOTE
Assessment/Plan


Assessment/Plan





Assessment and Recs:


# Anemia of chronic disease due to underlying chronic medical issues, 

multifactorial 


--> Anemia workup has been ordered, rule out gi bleed --> ferritin is 1400+


--> No evidence of hemolysis is noted, peripheral smear has been reviewed.


--> Hgb goal >7. Transfuse prn.


--> Epogen or iron at this time is not particularly indicated


--> Medications have been reviewed


--> low threshold for gi evaluation in case has occult +


--> bone marrow biopsy is not indicated given the other more likely causes (if 

recurrent anemia) first time here


# Hypercalcemia - btw 10-11 range when corrected to alb


--> ivf have been started


--> if remains elevated, 7.9-->8.3


--> will get pth


# Leukocytosis/elevated white blood cell count, unspecified likely related to 

underlying reaction v more likely infection


--> have reviewed peripheral smear and bandemia/neutrophilia noted


--> continue antibiotics if they have been started by ID team (VANC/ZOSYN)--> 

vanc/linda


--> wbc 39-->28k-->29k->31k


--> monitor for resolution


# Sepsis from pneumonia.  


--> pulm consulted, abx started


# CHANCE (acute kidney injury) on ckd


--> per renal


# UTI (urinary tract infection)


--> on abx per id


# Dehydration


--> on ivf


# Acute metabolic encephalopathy


--> likely due to pna


# Dvt ppx lovenox sq





The timing of this note does not necessarily reflect the time of the patient 

was seen.





Greatly appreciate consultation.





Subjective


Constitutional:  Denies: no symptoms, chills, fever, malaise, weakness, other


HEENT:  Denies: no symptoms, eye pain, blurred vision, tearing, double vision, 

ear pain, ear discharge, nose pain, nose congestion, throat pain, throat 

swelling, mouth pain, mouth swelling, other


Cardiovascular:  Denies: no symptoms, chest pain, edema, irregular heart rate, 

lightheadedness, palpitations, syncope, other


Respiratory:  Denies: no symptoms, cough, shortness of breath, SOB with 

excertion, SOB at rest, sputum, wheezing, other


Endocrine:  Denies: no symptoms, excessive sweating, flushing, intolerance to 

cold, intolerance to heat, increased hunger, increased thirst, increased urine, 

unexplained weight gain, unexplained weight loss, other


Hematologic/Lymphatic:  Denies: no symptoms, anemia, easy bleeding, easy 

bruising, adenopathy, other


Allergies:  


Coded Allergies:  


     No Known Allergies (Unverified , 10/14/19)


Subjective


10/14: hgb has improved, no bleeding, labs reivewed


10/15: no events to report


10/16: a+ o x1, no bleeding or chills noted, no major changes, occult pending


10/17: no events noted, no bleeding, no chills, no hematemesis/hematochezia


10/18: remains confused, with abx, on nc





Objective


Objective





Current Medications








 Medications


  (Trade)  Dose


 Ordered  Sig/Winnie


 Route


 PRN Reason  Start Time


 Stop Time Status Last Admin


Dose Admin


 


 Acetaminophen


  (Tylenol)  650 mg  PRN  PRN


 RECTAL


 TEMP>100.5  10/14/19 03:00


     


 


 


 Albuterol/


 Ipratropium


  (Albuterol/


 Ipratropium)  3 ml  Q4H  PRN


 HHN


 Shortness of Breath  10/14/19 07:30


 10/19/19 07:29   


 


 


 Aspirin


  (ASA)  81 mg  DAILY


 ORAL


   10/15/19 09:00


 11/14/19 08:59  10/18/19 08:24


 


 


 Atorvastatin


 Calcium


  (Lipitor)  10 mg  BEDTIME


 ORAL


   10/14/19 21:00


 11/13/19 20:59  10/17/19 20:28


 


 


 Clopidogrel


 Bisulfate


  (Plavix)  75 mg  DAILY


 ORAL


   10/15/19 09:00


 11/14/19 08:59  10/18/19 08:24


 


 


 Dextrose


  (Dextrose 50%)  25 ml  Q30M  PRN


 IV


 Hypoglycemia  10/14/19 07:00


 11/13/19 06:59   


 


 


 Dextrose


  (Dextrose 50%)  50 ml  Q30M  PRN


 IV


 Hypoglycemia  10/14/19 07:00


 11/13/19 06:59   


 


 


 Divalproex Sodium


  (Depakote


 Sprinkles)  500 mg  Q12HR


 ORAL


   10/15/19 22:00


 11/14/19 21:59  10/18/19 08:25


 


 


 Docusate Sodium


  (Colace)  100 mg  THREE TIMES A  DAY


 ORAL


   10/14/19 13:00


 11/13/19 12:59  10/18/19 08:24


 


 


 Donepezil HCl


  (Aricept)  5 mg  DAILY


 ORAL


   10/15/19 11:00


 11/14/19 10:59  10/18/19 08:24


 


 


 Enoxaparin Sodium


  (Lovenox)  30 mg  DAILY


 SUBQ


   10/14/19 09:00


 11/13/19 08:59  10/18/19 08:26


 


 


 Insulin Aspart


  (NovoLOG)    BEFORE MEALS AND  HS


 SUBQ


   10/14/19 11:30


 11/13/19 11:29  10/18/19 11:43


 


 


 Insulin Detemir


  (Levemir)  10 units  DAILY


 SUBQ


   10/16/19 09:00


 11/13/19 10:29  10/18/19 08:31


 


 


 Lactulose


  (Cephulac)  20 gm  TIDPRN  PRN


 ORAL


 Constipation  10/16/19 14:45


 11/15/19 14:44  10/16/19 15:21


 


 


 Lorazepam


  (Ativan 2mg/ml


 1ml)  1 mg  Q8HR  PRN


 IV


 For Anxiety  10/17/19 19:15


 10/24/19 19:14  10/18/19 01:12


 


 


 Meropenem 1 gm/


 Sodium Chloride  55 ml @ 


 110 mls/hr  Q12H


 IVPB


   10/16/19 14:00


 10/21/19 13:59  10/18/19 01:01


 


 


 Nateglinide


  (Starlix)  60 mg  TIAC


 ORAL


   10/18/19 11:30


 11/17/19 11:29  10/18/19 11:42


 


 


 Olanzapine


  (ZyPREXA)  5 mg  DAILY


 ORAL


   10/15/19 11:00


 11/14/19 10:59  10/18/19 08:24


 


 


 Pantoprazole


  (Protonix)  40 mg  BID


 ORAL


   10/14/19 18:00


 11/13/19 08:59  10/18/19 08:35


 


 


 Vancomycin HCl


  (Vanco rx to


 dose)  1 ea  DAILY  PRN


 MISC


 Per rx protocol  10/14/19 07:30


 11/13/19 07:29   


 


 


 Vancomycin HCl 1


 gm/Dextrose  275 ml @ 


 183.708


 mls/hr  Q24H


 IVPB


   10/18/19 09:00


 10/23/19 08:59  10/18/19 08:26


 











Last 24 Hour Vital Signs








  Date Time  Temp Pulse Resp B/P (MAP) Pulse Ox O2 Delivery O2 Flow Rate FiO2


 


10/18/19 09:00      Nasal Cannula 2.0 


 


10/18/19 08:00  76      


 


10/18/19 08:00 98.5 84 20 155/70 (98) 98   


 


10/18/19 04:00 98.1 77 19 136/63 (87) 99   


 


10/18/19 04:00  74      


 


10/18/19 00:00 98.8 81 18 104/71 (82) 98   


 


10/18/19 00:00  85      


 


10/17/19 21:00      Nasal Cannula 2.0 


 


10/17/19 20:00  109      


 


10/17/19 20:00 98.5 102 22 150/75 (100) 97   


 


10/17/19 19:01     95 Nasal Cannula 2.0 28


 


10/17/19 19:01  124 18  95 Nasal Cannula 2.0 28


 


10/17/19 16:00  92      


 


10/17/19 16:00 97.2 92 20 143/82 (102) 98   


 


10/17/19 12:00 97.5 83 20 114/72 (86) 95   


 


10/17/19 12:00  75      


 


10/17/19 09:00      Nasal Cannula 2.0 


 


10/17/19 08:10     97 Nasal Cannula 2.0 28


 


10/17/19 08:10  77 18  96 Nasal Cannula 2.0 28


 


10/17/19 08:00  80      


 


10/17/19 08:00 98.1 87 20 132/74 (93) 95   


 


10/17/19 04:00 98.1 90 18 157/75 (102) 97   


 


10/17/19 04:00  89      


 


10/17/19 00:00  92      


 


10/17/19 00:00 98.0 94 20 152/72 (98) 94   


 


10/16/19 21:00      Nasal Cannula 2.0 


 


10/16/19 20:00 98.1 90 20 141/61 (87) 99   


 


10/16/19 20:00  86      


 


10/16/19 19:30  78 18  97 Nasal Cannula 2.0 28


 


10/16/19 19:30     97 Nasal Cannula 2.0 28


 


10/16/19 16:00 97.5 86 20 119/62 (81) 100   


 


10/16/19 16:00  83      


 


10/16/19 12:00 97.8 81 20 121/58 (79) 99   


 


10/16/19 12:00  86      

















Intake and Output  


 


 10/17/19 10/18/19





 18:59 06:59


 


Intake Total  55 ml


 


Balance  55 ml


 


  


 


IV Total  55 ml


 


# Voids 2 1











Labs








Test


  10/15/19


16:00 10/16/19


06:47 10/17/19


08:15


 


White Blood Count


  23.6 K/UL


(4.8-10.8) 29.3 K/UL


(4.8-10.8) 30.6 K/UL


(4.8-10.8)


 


Red Blood Count


  3.18 M/UL


(4.20-5.40) 2.95 M/UL


(4.20-5.40) 3.07 M/UL


(4.20-5.40)


 


Hemoglobin


  8.6 G/DL


(12.0-16.0) 8.4 G/DL


(12.0-16.0) 8.3 G/DL


(12.0-16.0)


 


Hematocrit


  26.5 %


(37.0-47.0) 25.0 %


(37.0-47.0) 26.1 %


(37.0-47.0)


 


Mean Corpuscular Volume 83 FL (80-99)  85 FL (80-99)  85 FL (80-99) 


 


Mean Corpuscular Hemoglobin


  27.1 PG


(27.0-31.0) 28.4 PG


(27.0-31.0) 27.0 PG


(27.0-31.0)


 


Mean Corpuscular Hemoglobin


Concent 32.5 G/DL


(32.0-36.0) 33.5 G/DL


(32.0-36.0) 31.8 G/DL


(32.0-36.0)


 


Red Cell Distribution Width


  11.3 %


(11.6-14.8) 12.8 %


(11.6-14.8) 12.9 %


(11.6-14.8)


 


Platelet Count


  501 K/UL


(150-450) 484 K/UL


(150-450) 493 K/UL


(150-450)


 


Mean Platelet Volume


  8.7 FL


(6.5-10.1) 7.9 FL


(6.5-10.1) 7.5 FL


(6.5-10.1)


 


Neutrophils (%) (Auto)  % (45.0-75.0)   % (45.0-75.0)   % (45.0-75.0) 


 


Lymphocytes (%) (Auto)  % (20.0-45.0)   % (20.0-45.0)   % (20.0-45.0) 


 


Monocytes (%) (Auto)  % (1.0-10.0)   % (1.0-10.0)   % (1.0-10.0) 


 


Eosinophils (%) (Auto)  % (0.0-3.0)   % (0.0-3.0)   % (0.0-3.0) 


 


Basophils (%) (Auto)  % (0.0-2.0)   % (0.0-2.0)   % (0.0-2.0) 


 


Differential Total Cells


Counted 100 


  100 


  100 


 


 


Neutrophils % (Manual) 83 % (45-75)  85 % (45-75)  96 % (45-75) 


 


Lymphocytes % (Manual) 4 % (20-45)  3 % (20-45)  3 % (20-45) 


 


Monocytes % (Manual) 5 % (1-10)  8 % (1-10)  1 % (1-10) 


 


Eosinophils % (Manual) 1 % (0-3)  2 % (0-3)  0 % (0-3) 


 


Basophils % (Manual) 0 % (0-2)  1 % (0-2)  0 % (0-2) 


 


Band Neutrophils 7 % (0-8)  1 % (0-8)  0 % (0-8) 


 


Platelet Estimate Increased  Adequate  Adequate 


 


Platelet Morphology Normal  Normal  Normal 


 


Red Blood Cell Morphology Normal   


 


Hypochromasia  2+  1+ 


 


Anisocytosis  1+  


 


Sodium Level


  


  139 MMOL/L


(136-145) 142 MMOL/L


(136-145)


 


Potassium Level


  


  3.5 MMOL/L


(3.5-5.1) 3.6 MMOL/L


(3.5-5.1)


 


Chloride Level


  


  108 MMOL/L


() 108 MMOL/L


()


 


Carbon Dioxide Level


  


  20 MMOL/L


(21-32) 21 MMOL/L


(21-32)


 


Anion Gap


  


  11 mmol/L


(5-15) 13 mmol/L


(5-15)


 


Blood Urea Nitrogen


  


  29 mg/dL


(7-18) 25 mg/dL


(7-18)


 


Creatinine


  


  1.4 MG/DL


(0.55-1.30) 1.3 MG/DL


(0.55-1.30)


 


Estimat Glomerular Filtration


Rate 


  37.6 mL/min


(>60) 41.0 mL/min


(>60)


 


Glucose Level


  


  83 MG/DL


() 142 MG/DL


()


 


Calcium Level


  


  7.9 MG/DL


(8.5-10.1) 8.3 MG/DL


(8.5-10.1)


 


Phosphorus Level


  


  3.6 MG/DL


(2.5-4.9) 3.7 MG/DL


(2.5-4.9)


 


Magnesium Level


  


  2.4 MG/DL


(1.8-2.4) 2.6 MG/DL


(1.8-2.4)


 


Total Bilirubin


  


  0.5 MG/DL


(0.2-1.0) 0.4 MG/DL


(0.2-1.0)


 


Aspartate Amino Transf


(AST/SGOT) 


  33 U/L (15-37) 


  20 U/L (15-37) 


 


 


Alanine Aminotransferase


(ALT/SGPT) 


  54 U/L (12-78) 


  40 U/L (12-78) 


 


 


Alkaline Phosphatase


  


  99 U/L


() 97 U/L


()


 


Troponin I


  


  0.200 ng/mL


(0.000-0.056) 


 


 


C-Reactive Protein,


Quantitative 


  18.9 mg/dL


(0.00-0.90) 20.3 mg/dL


(0.00-0.90)


 


Total Protein


  


  6.0 G/DL


(6.4-8.2) 6.3 G/DL


(6.4-8.2)


 


Albumin


  


  1.3 G/DL


(3.4-5.0) 1.4 G/DL


(3.4-5.0)


 


Globulin  4.7 g/dL  4.9 g/dL 


 


Albumin/Globulin Ratio  0.3 (1.0-2.7)  0.3 (1.0-2.7) 


 


Pro-B-Type Natriuretic Peptide


  


  


  82245 pg/mL


(0-125)


 


Thyroid Stimulating Hormone


(TSH) 


  


  < 0.010 uiU/mL


(0.358-3.740)


 


Vancomycin Level Trough


  


  


  10.9 ug/mL


(5.0-12.0)


 


Valproic Acid (Depakene) Level


  


  


  11 MCG/ML


()








Height (Feet):  5


Height (Inches):  5.00


Weight (Pounds):  151


Objective





Physical Exam:


Vitals: reviewed


General Appearance:  NAD


HEENT:  normocephalic, atraumatic


Neck:  non-tender, normal alignment


Respiratory/Chest:  normal breath sounds bilaterally


Cardiovascular/Chest: rrr


Abdomen:  normal bowel sounds, soft, nontender


Extremities:  normal range of motion











Mike Pop MD Oct 18, 2019 11:48

## 2019-10-18 NOTE — GENERAL PROGRESS NOTE
Assessment/Plan


Status:  stable


Assessment/Plan:


S: Not verbal





O: poor historian, seems comfortable. IV sites are intact. Charles in place





PHYSICAL EXAMINATION:HEAD AND NECK:  Atraumatic and normocephalic.


CHEST:  Bronchial breathing sounds.ABDOMEN:  Soft.  No organomegaly.


MUSCULOSKELETAL:  Diffuse 1+ or 2+ nonpitting edema in all four contracted


extremities.  The patient is not following the commands.  Limited


evaluation.NEUROLOGY:  The patient is awake.  Not alert.  Not verbally


communicative.





LABORATORY DATA:  Labs dated October 16, 2019  reviewed





Meds: Reviewed and reconciled in the chart 





ASSESSMENT AND PLAN:


1. Sepsis.  Differential diagnosis is healthcare-associated pneumonia or


healthcare-associated urinary tract infection.  Work in progress.


2. Chronic encephalomalacia.


3. Acute on chronic anemia.


4. Renal failure, age indeterminate.


5. Congestive heart failure- preserved EF


6. Hyperthyroidism.


7. Hyperkalemia.


8. Diabetes type 2, uncontrolled with A1c of 10.


9. Psychiatric disorder.


10. GI and DVT prophylaxis.


11. PAH- Severe 





PLAN OF CARE:


 Notes from  Pulmonary Critical Care, Infectious


Disease, and Nephrology reviewed


post Blood transfusion


improving Cr level 


Stool for O/b


Panculture per ID, given the increase in WBC and low grade fever


pending CT requested by pulmonary


Notes from pulmonary reveiwed 


I will check the Dupplex of all extremities





Subjective


Allergies:  


Coded Allergies:  


     No Known Allergies (Unverified , 10/14/19)





Objective





Last 24 Hour Vital Signs








  Date Time  Temp Pulse Resp B/P (MAP) Pulse Ox O2 Delivery O2 Flow Rate FiO2


 


10/18/19 08:00 98.5 84 20 155/70 (98) 98   


 


10/18/19 04:00 98.1 77 19 136/63 (87) 99   


 


10/18/19 04:00  74      


 


10/18/19 00:00 98.8 81 18 104/71 (82) 98   


 


10/18/19 00:00  85      


 


10/17/19 21:00      Nasal Cannula 2.0 


 


10/17/19 20:00  109      


 


10/17/19 20:00 98.5 102 22 150/75 (100) 97   


 


10/17/19 19:01     95 Nasal Cannula 2.0 28


 


10/17/19 19:01  124 18  95 Nasal Cannula 2.0 28


 


10/17/19 16:00  92      


 


10/17/19 16:00 97.2 92 20 143/82 (102) 98   


 


10/17/19 12:00 97.5 83 20 114/72 (86) 95   


 


10/17/19 12:00  75      

















Intake and Output  


 


 10/17/19 10/18/19





 18:59 06:59


 


Intake Total  55 ml


 


Balance  55 ml


 


  


 


IV Total  55 ml


 


# Voids 2 1








Height (Feet):  5


Height (Inches):  5.00


Weight (Pounds):  151











Garrick Keith MD Oct 18, 2019 10:16

## 2019-10-18 NOTE — DIAGNOSTIC IMAGING REPORT
Indication: Infection, sepsis. Abdominal pain. Pneumonia. 

 

Technique: CT of the chest, abdomen and pelvis utilizing automated exposure control

with intravenous contrast. Venous scanning performed. Axial, sagittal and coronal

reformats presented.

 

CT dose: Total DLP 1862.5 mGycm; CTDI vol 186 mGy

 

Comparison: None

 

Findings: 

Small moderate bilateral pleural effusions with adjacent airspace disease in the

bilateral lower lobes which may be related to compressive atelectasis versus

pneumonia. No evidence of pneumothorax. There is diffuse mild septal thickening

suggesting interstitial edema/fluid overload. Heart size within normal limits. There

are coronary arterial calcifications. No pericardial effusion identified. Thoracic

aorta is normal in caliber. There are mild atherosclerotic calcifications of thoracic

aorta. No evidence of fluoroscopic aortic dissection. The left vertebral artery is

hypoplastic compared to the right, possibly related to anatomic variants (right

vertebral artery dominance) or chronic occlusion of the left vertebral artery. Imaged

portions of the bilateral common carotid arteries patent. Small mediastinal lymph

nodes are noted, likely reactive in etiology. Right lobe of the thyroid is enlarged

and heterogeneous with some low-attenuation nodules and calcifications.

 

Liver, gallbladder, spleen and adrenal glands grossly unremarkable. Pancreas appears

mildly atrophic. Enhancement is uniform. No peripancreatic inflammatory changes or

fluid collections. No hydronephrosis or discrete urinary tract stone is identified.

Note that evaluation for small urinary tract stones is limited due to contrast in the

renal collecting system. There is nonspecific bilateral perinephric stranding. No

perinephric fluid collections. The bladder is mildly distended. A small focus of air

is noted within the bladder which may be related to recent catheterization. If there

is no history of such the possibility of a fistulous connection to the bladder cannot

be excluded. A large, rounded low-attenuation structure is noted in the pelvis

measuring 12.8 cm AP by 14.6 cm transverse and 12.8 cm craniocaudal. This seems to

arise from the left adnexal region although portions of it are inseparable from the

uterus. Additional intermediate to low attenuation structure noted in the adnexal

region measuring up to 3.8 cm. There is no evidence of small bowel obstruction.

Appendix is normal in caliber. No free intraperitoneal air or fluid. Severe

atherosclerotic disease noted of the infrarenal abdominal aorta with luminal

narrowing of the aorta up to 60-70% focally. There are degenerative changes of the

spine. No acute osseous abnormality.

 

 

 

IMPRESSION: 

*  Large low-attenuation mass in the pelvis measuring up to 14.6 cm, favor left

adnexal in origin. The attenuation is slightly higher than expected for simple fluid.

Recommend gynecology consultation/referral. Consider further evaluation with pelvic

ultrasound.

*  Small amount of fluid attenuation is also noted in the endometrial canal. This is

not expected in a postmenopausal female and warrants gynecologic follow-up with

possible further evaluation with ultrasound.

*  Mildly distended bladder with small foci of intraluminal air which may be related

to recent catheterization. If there is no history of such then the possibility of a

fistulous collection of the bladder can be considered.

*  Small to moderate bilateral pleural effusions with adjacent opacities in the

bilateral lower lobes which may be related to compressive atelectasis or pneumonia.

*  Findings suggesting interstitial edema/fluid overload.

*  Coronary arterial calcifications.

*  Left vertebral artery is hypoplastic compared to the right, possibly related to

anatomic variance (right vertebral artery dominance) or chronic occlusion of the left

vertebral artery.

*  Enlarged and heterogeneous right lobe of the thyroid with low-attenuation nodules

and calcifications. Recommend follow-up thyroid ultrasound. 

*  Small mediastinal lymph nodes, possibly reactive in etiology.

*  Severe atherosclerotic calcification of the infrarenal abdominal aorta resulting

in focal luminal narrowing of portions of the aorta up to 60-70% focally. 

Additional findings as above.

 

The CT scanner at [facility] is accredited by the [employer]

[employer] and the scans are performed using protocols designed to limit radiation

exposure to as low as reasonably achievable to attain images of sufficient resolution

adequate for diagnostic evaluation.

## 2019-10-18 NOTE — NUR
NURSE NOTES:

Received pt from [facility]. Pt awake, alert, and repeating words. Bed in lowest 
position. Call light within reach. Will continue to monitor..

## 2019-10-18 NOTE — INFECTIOUS DISEASES PROG NOTE
Assessment/Plan


Problems:  


(1) Aspiration pneumonia


Assessment & Plan:  with B/L infiltrates, L>R suspect due to altered mental 

status , with hypoxemia and leukocytosis, continue vancomycin AND meropenem 

empirically, repeated  CXR  showed no change , aspiration precaution 





(2) UTI (urinary tract infection)


Assessment & Plan:  due to ESBL producing E coli already on meropenem to cover 

for sepsis and pneumonia too 





(3) Sepsis


Assessment & Plan:  due to the above, continue wide spectrum antibiotics 

pending repeated cultures 





(4) Acute metabolic encephalopathy


Assessment & Plan:  due to the above, continue hydration and antibiotics, 

monitor in tele 





(5) Hyperglycemia due to type 2 diabetes mellitus


Assessment & Plan:  recommend tight glycemic control to keep blood glucose 

between 100-140 





(6) ARF (acute renal failure)


Assessment & Plan:  due to the above, continue hydration and renally dosed 

antibiotics, close  monitor of renal function 








Subjective


ROS Limited/Unobtainable:  Yes


Allergies:  


Coded Allergies:  


     No Known Allergies (Unverified , 10/14/19)


Subjective


she was lethargic , but still responsive , no fever or chills , no diarrhea, no 

cough or SOB





Objective


Vital Signs





Last 24 Hour Vital Signs








  Date Time  Temp Pulse Resp B/P (MAP) Pulse Ox O2 Delivery O2 Flow Rate FiO2


 


10/18/19 16:00 98.4 94 20 159/71 (100) 98   


 


10/18/19 12:00  79      


 


10/18/19 12:00 97.2 86 18 145/70 (95) 99   


 


10/18/19 09:00      Nasal Cannula 2.0 


 


10/18/19 08:00  76      


 


10/18/19 08:00 98.5 84 20 155/70 (98) 98   


 


10/18/19 04:00 98.1 77 19 136/63 (87) 99   


 


10/18/19 04:00  74      


 


10/18/19 00:00 98.8 81 18 104/71 (82) 98   


 


10/18/19 00:00  85      


 


10/17/19 21:00      Nasal Cannula 2.0 


 


10/17/19 20:00  109      


 


10/17/19 20:00 98.5 102 22 150/75 (100) 97   


 


10/17/19 19:01     95 Nasal Cannula 2.0 28


 


10/17/19 19:01  124 18  95 Nasal Cannula 2.0 28








Height (Feet):  5


Height (Inches):  5.00


Weight (Pounds):  151


General Appearance:  WD/WN, no acute distress


HEENT:  normocephalic, atraumatic, anicteric, mucous membranes moist, PERRL, 

EOMI, pharynx normal, supple, no JVD


Respiratory/Chest:  chest wall non-tender, no respiratory distress, no 

accessory muscle use, decreased breath sounds, crackles/rales


Cardiovascular:  normal peripheral pulses, normal rate, regular rhythm, no 

gallop/murmur, no JVD


Abdomen:  normal bowel sounds, soft, non tender, no organomegaly, non distended

, no mass, no scars


Genitourinary:  normal external genitalia


Extremities:  no cyanosis, no clubbing


Skin:  no rash, no lesions


Neurologic/Psychiatric:  alert, responsive


Lymphatic:  no neck adenopathy, no groin adenopathy


Musculoskeletal:  normal muscle bulk, no effusion





Microbiology








 Date/Time


Source Procedure


Growth Status


 


 


 10/16/19 11:35


Blood Blood Culture - Preliminary


NO GROWTH AFTER 24 HOURS Resulted


 


 10/16/19 11:20


Blood Blood Culture - Preliminary


NO GROWTH AFTER 24 HOURS Resulted











Current Medications








 Medications


  (Trade)  Dose


 Ordered  Sig/Winnie


 Route


 PRN Reason  Start Time


 Stop Time Status Last Admin


Dose Admin


 


 Acetaminophen


  (Tylenol)  650 mg  PRN  PRN


 RECTAL


 TEMP>100.5  10/14/19 03:00


     


 


 


 Albuterol/


 Ipratropium


  (Albuterol/


 Ipratropium)  3 ml  Q4H  PRN


 HHN


 Shortness of Breath  10/14/19 07:30


 10/19/19 07:29   


 


 


 Aspirin


  (ASA)  81 mg  DAILY


 ORAL


   10/15/19 09:00


 11/14/19 08:59  10/18/19 08:24


 


 


 Atorvastatin


 Calcium


  (Lipitor)  10 mg  BEDTIME


 ORAL


   10/14/19 21:00


 11/13/19 20:59  10/17/19 20:28


 


 


 Clopidogrel


 Bisulfate


  (Plavix)  75 mg  DAILY


 ORAL


   10/15/19 09:00


 11/14/19 08:59  10/18/19 08:24


 


 


 Dextrose


  (Dextrose 50%)  25 ml  Q30M  PRN


 IV


 Hypoglycemia  10/14/19 07:00


 11/13/19 06:59   


 


 


 Dextrose


  (Dextrose 50%)  50 ml  Q30M  PRN


 IV


 Hypoglycemia  10/14/19 07:00


 11/13/19 06:59   


 


 


 Divalproex Sodium


  (Depakote


 Sprinkles)  500 mg  Q12HR


 ORAL


   10/15/19 22:00


 11/14/19 21:59  10/18/19 08:25


 


 


 Docusate Sodium


  (Colace)  100 mg  THREE TIMES A  DAY


 ORAL


   10/14/19 13:00


 11/13/19 12:59  10/18/19 08:24


 


 


 Donepezil HCl


  (Aricept)  5 mg  DAILY


 ORAL


   10/15/19 11:00


 11/14/19 10:59  10/18/19 08:24


 


 


 Enoxaparin Sodium


  (Lovenox)  30 mg  DAILY


 SUBQ


   10/14/19 09:00


 11/13/19 08:59  10/18/19 08:26


 


 


 Insulin Aspart


  (NovoLOG)    BEFORE MEALS AND  HS


 SUBQ


   10/14/19 11:30


 11/13/19 11:29  10/18/19 16:50


 


 


 Insulin Detemir


  (Levemir)  10 units  DAILY


 SUBQ


   10/16/19 09:00


 11/13/19 10:29  10/18/19 08:31


 


 


 Lactulose


  (Cephulac)  20 gm  TIDPRN  PRN


 ORAL


 Constipation  10/16/19 14:45


 11/15/19 14:44  10/16/19 15:21


 


 


 Lorazepam


  (Ativan 2mg/ml


 1ml)  1 mg  Q8HR  PRN


 IV


 For Anxiety  10/17/19 19:15


 10/24/19 19:14  10/18/19 01:12


 


 


 Meropenem 1 gm/


 Sodium Chloride  55 ml @ 


 110 mls/hr  Q12H


 IVPB


   10/16/19 14:00


 10/21/19 13:59  10/18/19 14:57


 


 


 Nateglinide


  (Starlix)  60 mg  TIAC


 ORAL


   10/18/19 11:30


 11/17/19 11:29  10/18/19 16:48


 


 


 Olanzapine


  (ZyPREXA)  5 mg  DAILY


 ORAL


   10/15/19 11:00


 11/14/19 10:59  10/18/19 08:24


 


 


 Pantoprazole


  (Protonix)  40 mg  BID


 ORAL


   10/14/19 18:00


 11/13/19 08:59  10/18/19 08:35


 


 


 Vancomycin HCl


  (Vanco rx to


 dose)  1 ea  DAILY  PRN


 MISC


 Per rx protocol  10/14/19 07:30


 11/13/19 07:29   


 


 


 Vancomycin HCl 1


 gm/Dextrose  275 ml @ 


 183.708


 mls/hr  Q24H


 IVPB


   10/18/19 09:00


 10/23/19 08:59  10/18/19 08:26


 

















Amie Burgos M.D. Oct 18, 2019 17:12

## 2019-10-18 NOTE — NUR
CASE MANAGEMENT: REVIEW



10/18/19



SI: [facility]ACT[facility] PNA/ UTI / SEPSIS / ARF

98.5  84  20  155/70  98% NC 2L

**10/17-WBC 30.6; RBC 3.07; H/H 8.3/26.1; ; BUN 25; TSH 0.010







IS:     IV VANCOMYCIN Q24HR

IV MEROPENEM Q12HR

LACTULOSE PO TID/PRN

LOVENOX SQ QD

PLAVIX PO QD

PROTONIX PO BID

NOVOLOG SQ AC&HS

LEVEMIR SQ QD

STARLIX PO TIAC

DEPAKOTE PO Q12HR

OLANZAPINE PO QD

DONEPEZIL PO QD

LIPITOR PO HS

ASA PO QD



**: 2E TELE UNIT



DCP: RETURN TO [facility] WHEN MEDICALLY CLEARED 



PLAN:  CT ABD-INFECTION

ST VIDEO SWALLOW

STRESS NEG

SP CX

## 2019-10-18 NOTE — CONSULTATION
History of Present Illness


General


Chief Complaint:  Dyspnea/Respdistress


Referring physician:  Dr. Keith





Present Illness


HPI


64-year-old female with multiple medical comorbidities including but not 

limited to history of diabetes, high blood pressure, CHF who presents with 

respiratory insufficiency and shortness of breath.  Patient is a nursing home 

patient.  Per EMS, oxygenation dropped down to 90% on room air.  She was placed 

on a nonrebreather and brought to [facility] for evaluation.  No 

fever chills but no nausea no vomiting.  No coughing.  Nothing made it better.  

Nothing made it worse.  History is limited in this patient because of her 

condition.  During admission identified to have abnormal labs, leukocytosis, 

deep tissue pressure injury.  Surgery called to evaluate and assist with care.  

Patient seen, patient evaluated, chart reviewed.  Exam limited given patient's 

medical history


Allergies:  


Coded Allergies:  


     No Known Allergies (Unverified , 10/14/19)





Medication History


Scheduled


Ascorbic Acid* (Vitamin C*), 250 MG ORAL BID, (Reported)


Aspirin* (Aspirin*), 81 MG ORAL DAILY, (Reported)


Atorvastatin Calcium* (Atorvastatin Calcium*), 10 MG ORAL BEDTIME, (Reported)


Clopidogrel* (Clopidogrel*), 75 MG ORAL DAILY, (Reported)


Divalproex Sodium* (Depakote*), 500 MG PO Q12HR, (Reported)


Docusate Sodium* (Docusate Sodium*), 100 MG ORAL BID, (Reported)


Donepezil Hcl* (Donepezil Hcl*), 5 MG ORAL DAILY, (Reported)


Furosemide* (Lasix*), 40 MG ORAL DAILY, (Reported)


Magnesium Hydroxide* (Milk Of Magnesia*), Unknown Dose ORAL DAILY, (Reported)


Metformin Hcl* (Metformin Hcl*), 1,000 MG ORAL BID, (Reported)


Olanzapine* (Zyprexa*), 10 MG ORAL DAILY, (Reported)





Scheduled PRN


Acetaminophen* (Acetaminophen 325MG Tablet*), 325 MG ORAL Q4H PRN for For Pain, 

(Reported)


Diphenhydramine Hcl* (Benadryl*), 25 MG ORAL Q6H PRN for Itching, (Reported)





Miscellaneous Medications


Multivitamin W-Minerals/Gin (Super Ginseng Multivit Cap), 1 EACH PO, (Reported)





Patient History


Limited by:  medical condition


History Provided By:  Medical Record, PMD


Healthcare decision maker





Resuscitation status


Full Code


Advanced Directive on File








Past Medical/Surgical History


Past Medical/Surgical History:  


(1) Hyperglycemia due to type 2 diabetes mellitus


(2) Acute metabolic encephalopathy


(3) ARF (acute renal failure)


(4) Anemia


(5) UTI (urinary tract infection)


(6) Dehydration


(7) CHANCE (acute kidney injury)


(8) Sepsis


(9) Aspiration pneumonia


(10) Abnormal TSH


(11) Healthcare associated bacterial pneumonia


(12) Diabetic nephropathy


(13) Renal failure (ARF), acute on chronic





Review of Systems


ROS Narrative


Difficult to obtain given patient's medical condition





Physical Exam


General Appearance:  no apparent distress


Lines, tubes and drains:  peripheral


HEENT:  mucous membranes moist


Neck:  normal inspection


Respiratory/Chest:  no respiratory distress, no accessory muscle use, decreased 

breath sounds


Cardiovascular/Chest:  normal rate, regular rhythm


Abdomen:  soft, no organomegaly, no mass


Extremities:  normal inspection, inflammation, other


Skin Exam:  warm/dry


Neurologic:  alert





Last 24 Hour Vital Signs








  Date Time  Temp Pulse Resp B/P (MAP) Pulse Ox O2 Delivery O2 Flow Rate FiO2


 


10/18/19 12:00  79      


 


10/18/19 12:00 97.2 86 18 145/70 (95) 99   


 


10/18/19 09:00      Nasal Cannula 2.0 


 


10/18/19 08:00  76      


 


10/18/19 08:00 98.5 84 20 155/70 (98) 98   


 


10/18/19 04:00 98.1 77 19 136/63 (87) 99   


 


10/18/19 04:00  74      


 


10/18/19 00:00 98.8 81 18 104/71 (82) 98   


 


10/18/19 00:00  85      


 


10/17/19 21:00      Nasal Cannula 2.0 


 


10/17/19 20:00  109      


 


10/17/19 20:00 98.5 102 22 150/75 (100) 97   


 


10/17/19 19:01     95 Nasal Cannula 2.0 28


 


10/17/19 19:01  124 18  95 Nasal Cannula 2.0 28

















Intake and Output  


 


 10/17/19 10/18/19





 18:59 06:59


 


Intake Total  55 ml


 


Balance  55 ml


 


  


 


IV Total  55 ml


 


# Voids 2 1








Height (Feet):  5


Height (Inches):  5.00


Weight (Pounds):  151


Medications





Current Medications








 Medications


  (Trade)  Dose


 Ordered  Sig/Winnie


 Route


 PRN Reason  Start Time


 Stop Time Status Last Admin


Dose Admin


 


 Acetaminophen


  (Tylenol)  650 mg  PRN  PRN


 RECTAL


 TEMP>100.5  10/14/19 03:00


     


 


 


 Albuterol/


 Ipratropium


  (Albuterol/


 Ipratropium)  3 ml  Q4H  PRN


 HHN


 Shortness of Breath  10/14/19 07:30


 10/19/19 07:29   


 


 


 Aspirin


  (ASA)  81 mg  DAILY


 ORAL


   10/15/19 09:00


 11/14/19 08:59  10/18/19 08:24


 


 


 Atorvastatin


 Calcium


  (Lipitor)  10 mg  BEDTIME


 ORAL


   10/14/19 21:00


 11/13/19 20:59  10/17/19 20:28


 


 


 Clopidogrel


 Bisulfate


  (Plavix)  75 mg  DAILY


 ORAL


   10/15/19 09:00


 11/14/19 08:59  10/18/19 08:24


 


 


 Dextrose


  (Dextrose 50%)  25 ml  Q30M  PRN


 IV


 Hypoglycemia  10/14/19 07:00


 11/13/19 06:59   


 


 


 Dextrose


  (Dextrose 50%)  50 ml  Q30M  PRN


 IV


 Hypoglycemia  10/14/19 07:00


 11/13/19 06:59   


 


 


 Divalproex Sodium


  (Depakote


 Sprinkles)  500 mg  Q12HR


 ORAL


   10/15/19 22:00


 11/14/19 21:59  10/18/19 08:25


 


 


 Docusate Sodium


  (Colace)  100 mg  THREE TIMES A  DAY


 ORAL


   10/14/19 13:00


 11/13/19 12:59  10/18/19 08:24


 


 


 Donepezil HCl


  (Aricept)  5 mg  DAILY


 ORAL


   10/15/19 11:00


 11/14/19 10:59  10/18/19 08:24


 


 


 Enoxaparin Sodium


  (Lovenox)  30 mg  DAILY


 SUBQ


   10/14/19 09:00


 11/13/19 08:59  10/18/19 08:26


 


 


 Insulin Aspart


  (NovoLOG)    BEFORE MEALS AND  HS


 SUBQ


   10/14/19 11:30


 11/13/19 11:29  10/18/19 11:43


 


 


 Insulin Detemir


  (Levemir)  10 units  DAILY


 SUBQ


   10/16/19 09:00


 11/13/19 10:29  10/18/19 08:31


 


 


 Lactulose


  (Cephulac)  20 gm  TIDPRN  PRN


 ORAL


 Constipation  10/16/19 14:45


 11/15/19 14:44  10/16/19 15:21


 


 


 Lorazepam


  (Ativan 2mg/ml


 1ml)  1 mg  Q8HR  PRN


 IV


 For Anxiety  10/17/19 19:15


 10/24/19 19:14  10/18/19 01:12


 


 


 Meropenem 1 gm/


 Sodium Chloride  55 ml @ 


 110 mls/hr  Q12H


 IVPB


   10/16/19 14:00


 10/21/19 13:59  10/18/19 14:57


 


 


 Nateglinide


  (Starlix)  60 mg  TIAC


 ORAL


   10/18/19 11:30


 11/17/19 11:29  10/18/19 11:42


 


 


 Olanzapine


  (ZyPREXA)  5 mg  DAILY


 ORAL


   10/15/19 11:00


 11/14/19 10:59  10/18/19 08:24


 


 


 Pantoprazole


  (Protonix)  40 mg  BID


 ORAL


   10/14/19 18:00


 11/13/19 08:59  10/18/19 08:35


 


 


 Vancomycin HCl


  (Vanco rx to


 dose)  1 ea  DAILY  PRN


 MISC


 Per rx protocol  10/14/19 07:30


 11/13/19 07:29   


 


 


 Vancomycin HCl 1


 gm/Dextrose  275 ml @ 


 183.708


 mls/hr  Q24H


 IVPB


   10/18/19 09:00


 10/23/19 08:59  10/18/19 08:26


 











Assessment/Plan


Problem List:  


(1) Pressure injury of deep tissue


Assessment & Plan:  Pt presented on admission with DTPI R heel. Blood filled 

blister with marginal erythema noted to heel. Pt exhibited agitation when 

minimally palpated. (L)2.2cm x (W)2.1cm


L heel is blanchable .


Non-blanchable erythema without induration noted to sacral cleft. 


No other areas of skin concerns noted.





Tx.Plan:


Apply Betadine to R heel. Cover with Optifoam drsg. Change every 3 days and prn.


           


Apply Cavilon Skin Barrier to L heel. Cover with Optifoam drsg. Change every 7 

days and prn.


           


Apply Moisture Barrier Paste to buttocks. Cover sacral area with Optifoam drsg. 

Change every 3 days and prn.


           


Reposition at least every 2hours or as tolerated.


           


Off-load heels with pillow.


ICD Codes:  L89.96 - Pressure-induced deep tissue damage of unspecified site


SNOMED:  134519458


(2) Sepsis


Assessment & Plan:  Patient with low-grade fevers, anemia, leukocytosis 

persistent, elevated platelets, abnormal labs.


Etiology currently unknown and work-up in progress.  Labs noted and imaging 

that is available as noted.  Other imaging is pending and will be reviewed once 

available


Okay for diet


Antibiotics as per infectious disease 


local wound care as wounds do not seem to be the etiology of her sepsis


We will monitor and follow with recommendations


Thank you for allowing me to participate in patient's care


ICD Codes:  A41.9 - Sepsis, unspecified organism


SNOMED:  77855099


Qualifiers:  


   Qualified Codes:  A41.9 - Sepsis, unspecified organism; R65.20 - Severe 

sepsis without septic shock; N17.9 - Acute kidney failure, unspecified











Radhames Blas Oct 18, 2019 16:26

## 2019-10-18 NOTE — NUR
HAND-OFF: 

Report given to ELIZABETH Fine and ELIZABETH Pena. Patient asleep, no signs of distress or pain 
noted. Plan of care endorsed.

## 2019-10-18 NOTE — NUR
NURSE NOTES: Patient's abdomen distended. Bladder scan done and showed +999 ml. Told Dr. Vizcaino and he ordered casas. Casas inserted and drained yellow urine about 500 ml.

## 2019-10-18 NOTE — NUR
RD ASSESSMENT & RECOMMENDATIONS

SEE CARE ACTIVITY FOR COMPLETE ASSESSMENT



DAILY ESTIMATED NEEDS:

Needs based on Wound, DM, Sepsis/ 68kg 

25-30  kcals/kg 

1615-2614  total kcals

1.25-2  g protein/kg

  g total protein 

25-30  mL/kg

2253-6366  total fluid mLs



NUTRITION DIAGNOSIS:

* Swallowing difficulty R/T dysphagia, poor dentition, dementia as

evidenced by on liquify pureed, NTL texture diet per SLP rec.

* Increased kcal/prot needs R/T wound healing and sepsis as evidenced by

admitted w/ DTPI R heel and non-blanchable sacral erythema, critically

elev wbc (30.6*), elev LA (5.8 -> wnl), elev CRP, now afebrile.





CURRENT DIET:CCHO MED, liquify pureed w/ NTL + Glucerna TID    







PO DIET RECOMMENDATIONS:

CCHO MED/ texture per SLP + Maintain Glucerna TID w/ meals 



 



ADDITIONAL RECOMMENDATIONS:

* Calibrated bedscale wt 

* Wound healing: add MVI x1, Vit C 250mg QD 

                      : Zaki 1pkt BID if tolerated 

* Add folic acid 1mg QD (low folate 7.2) 

* Monitor BGs closely -> POC glu 142-304, uncontrolled 

* Monitor PO intake and tolerance: poor PO 

                    -> consider Greg Count x 48 hrs

## 2019-10-18 NOTE — PULMONOLOGY PROGRESS NOTE
Assessment/Plan


Problems:  


(1) Healthcare associated bacterial pneumonia


(2) UTI (urinary tract infection)


(3) Sepsis


(4) Dehydration


(5) CHANCE (acute kidney injury)


(6) Acute metabolic encephalopathy


(7) Hyperglycemia due to type 2 diabetes mellitus


(8) Renal failure (ARF), acute on chronic


(9) Aspiration pneumonia


(10) Abnormal TSH


Assessment/Plan


Marked leukocytosis


Sepsis


Possible pneumonia


E coli UTI


Acute hypoxemic respiratory failure


Acute encephalopathy


Hx CHF


HTN


CHANCE 





Plan:


-F/U CT CAP


-Monitor WCt


-Abx per ID


-monitor volumes and renal function, lasix 20 IV x 1


-monitor encephalopathy


-Asp precautions, SLP recs, VSS


-DVT Px: LMWH


-FC





Subjective


Allergies:  


Coded Allergies:  


     No Known Allergies (Unverified , 10/14/19)


Subjective


WCT 30 GINO otherwise


+ cough + SOB no FC





Objective





Last 24 Hour Vital Signs








  Date Time  Temp Pulse Resp B/P (MAP) Pulse Ox O2 Delivery O2 Flow Rate FiO2


 


10/18/19 12:00  79      


 


10/18/19 12:00 97.2 86 18 145/70 (95) 99   


 


10/18/19 09:00      Nasal Cannula 2.0 


 


10/18/19 08:00  76      


 


10/18/19 08:00 98.5 84 20 155/70 (98) 98   


 


10/18/19 04:00 98.1 77 19 136/63 (87) 99   


 


10/18/19 04:00  74      


 


10/18/19 00:00 98.8 81 18 104/71 (82) 98   


 


10/18/19 00:00  85      


 


10/17/19 21:00      Nasal Cannula 2.0 


 


10/17/19 20:00  109      


 


10/17/19 20:00 98.5 102 22 150/75 (100) 97   


 


10/17/19 19:01     95 Nasal Cannula 2.0 28


 


10/17/19 19:01  124 18  95 Nasal Cannula 2.0 28


 


10/17/19 16:00  92      


 


10/17/19 16:00 97.2 92 20 143/82 (102) 98   

















Intake and Output  


 


 10/17/19 10/18/19





 18:59 06:59


 


Intake Total  55 ml


 


Balance  55 ml


 


  


 


IV Total  55 ml


 


# Voids 2 1








General Appearance:  no acute distress, cachetic


HEENT:  normocephalic, atraumatic, anicteric, mucous membranes moist


Respiratory/Chest:  rhonchi


Cardiovascular:  normal peripheral pulses, normal rate, regular rhythm


Abdomen:  normal bowel sounds, soft, non tender, no organomegaly, non distended

, no mass


Extremities:  no cyanosis, no clubbing, no edema





Microbiology








 Date/Time


Source Procedure


Growth Status


 


 


 10/16/19 11:35


Blood Blood Culture - Preliminary


NO GROWTH AFTER 24 HOURS Resulted


 


 10/16/19 11:20


Blood Blood Culture - Preliminary


NO GROWTH AFTER 24 HOURS Resulted











Current Medications








 Medications


  (Trade)  Dose


 Ordered  Sig/Winnie


 Route


 PRN Reason  Start Time


 Stop Time Status Last Admin


Dose Admin


 


 Acetaminophen


  (Tylenol)  650 mg  PRN  PRN


 RECTAL


 TEMP>100.5  10/14/19 03:00


     


 


 


 Albuterol/


 Ipratropium


  (Albuterol/


 Ipratropium)  3 ml  Q4H  PRN


 HHN


 Shortness of Breath  10/14/19 07:30


 10/19/19 07:29   


 


 


 Aspirin


  (ASA)  81 mg  DAILY


 ORAL


   10/15/19 09:00


 11/14/19 08:59  10/18/19 08:24


 


 


 Atorvastatin


 Calcium


  (Lipitor)  10 mg  BEDTIME


 ORAL


   10/14/19 21:00


 11/13/19 20:59  10/17/19 20:28


 


 


 Clopidogrel


 Bisulfate


  (Plavix)  75 mg  DAILY


 ORAL


   10/15/19 09:00


 11/14/19 08:59  10/18/19 08:24


 


 


 Dextrose


  (Dextrose 50%)  25 ml  Q30M  PRN


 IV


 Hypoglycemia  10/14/19 07:00


 11/13/19 06:59   


 


 


 Dextrose


  (Dextrose 50%)  50 ml  Q30M  PRN


 IV


 Hypoglycemia  10/14/19 07:00


 11/13/19 06:59   


 


 


 Divalproex Sodium


  (Depakote


 Sprinkles)  500 mg  Q12HR


 ORAL


   10/15/19 22:00


 11/14/19 21:59  10/18/19 08:25


 


 


 Docusate Sodium


  (Colace)  100 mg  THREE TIMES A  DAY


 ORAL


   10/14/19 13:00


 11/13/19 12:59  10/18/19 08:24


 


 


 Donepezil HCl


  (Aricept)  5 mg  DAILY


 ORAL


   10/15/19 11:00


 11/14/19 10:59  10/18/19 08:24


 


 


 Enoxaparin Sodium


  (Lovenox)  30 mg  DAILY


 SUBQ


   10/14/19 09:00


 11/13/19 08:59  10/18/19 08:26


 


 


 Insulin Aspart


  (NovoLOG)    BEFORE MEALS AND  HS


 SUBQ


   10/14/19 11:30


 11/13/19 11:29  10/18/19 11:43


 


 


 Insulin Detemir


  (Levemir)  10 units  DAILY


 SUBQ


   10/16/19 09:00


 11/13/19 10:29  10/18/19 08:31


 


 


 Lactulose


  (Cephulac)  20 gm  TIDPRN  PRN


 ORAL


 Constipation  10/16/19 14:45


 11/15/19 14:44  10/16/19 15:21


 


 


 Lorazepam


  (Ativan 2mg/ml


 1ml)  1 mg  Q8HR  PRN


 IV


 For Anxiety  10/17/19 19:15


 10/24/19 19:14  10/18/19 01:12


 


 


 Meropenem 1 gm/


 Sodium Chloride  55 ml @ 


 110 mls/hr  Q12H


 IVPB


   10/16/19 14:00


 10/21/19 13:59  10/18/19 01:01


 


 


 Nateglinide


  (Starlix)  60 mg  TIAC


 ORAL


   10/18/19 11:30


 11/17/19 11:29  10/18/19 11:42


 


 


 Olanzapine


  (ZyPREXA)  5 mg  DAILY


 ORAL


   10/15/19 11:00


 11/14/19 10:59  10/18/19 08:24


 


 


 Pantoprazole


  (Protonix)  40 mg  BID


 ORAL


   10/14/19 18:00


 11/13/19 08:59  10/18/19 08:35


 


 


 Vancomycin HCl


  (Vanco rx to


 dose)  1 ea  DAILY  PRN


 MISC


 Per rx protocol  10/14/19 07:30


 11/13/19 07:29   


 


 


 Vancomycin HCl 1


 gm/Dextrose  275 ml @ 


 183.708


 mls/hr  Q24H


 IVPB


   10/18/19 09:00


 10/23/19 08:59  10/18/19 08:26


 

















Sammy Torres MD Oct 18, 2019 14:36

## 2019-10-18 NOTE — NEPHROLOGY PROGRESS NOTE
Assessment/Plan


Problem List:  


(1) Dehydration


(2) Renal failure (ARF), acute on chronic


(3) ARF (acute renal failure)


(4) Sepsis


(5) Acute metabolic encephalopathy


(6) Hyperglycemia due to type 2 diabetes mellitus


(7) UTI (urinary tract infection)


(8) Diabetic nephropathy


Assessment





Renal failure, Acute on Chronic resolved


Dehydration


Severe Anemia


Sepsis / Pneumonia / UTI


DM, OOC


Proteinuria , Diabetic Nephropathy


Acute encephalopathy


Plan





WBCs remain elevated


off [place], keeps pulling it out


Avoid Nephrotoxics


Anemia salas


2D echo


24 H urine protein


Keep BP and BS in check


I am resuming her psych meds fro [facility] for now


Per orders





Subjective


ROS Limited/Unobtainable:  No


Constitutional:  Reports: malaise, weakness





Objective


Objective





Last 24 Hour Vital Signs








  Date Time  Temp Pulse Resp B/P (MAP) Pulse Ox O2 Delivery O2 Flow Rate FiO2


 


10/18/19 09:00      Nasal Cannula 2.0 


 


10/18/19 08:00 98.5 84 20 155/70 (98) 98   


 


10/18/19 04:00 98.1 77 19 136/63 (87) 99   


 


10/18/19 04:00  74      


 


10/18/19 00:00 98.8 81 18 104/71 (82) 98   


 


10/18/19 00:00  85      


 


10/17/19 21:00      Nasal Cannula 2.0 


 


10/17/19 20:00  109      


 


10/17/19 20:00 98.5 102 22 150/75 (100) 97   


 


10/17/19 19:01     95 Nasal Cannula 2.0 28


 


10/17/19 19:01  124 18  95 Nasal Cannula 2.0 28


 


10/17/19 16:00  92      


 


10/17/19 16:00 97.2 92 20 143/82 (102) 98   


 


10/17/19 12:00 97.5 83 20 114/72 (86) 95   


 


10/17/19 12:00  75      

















Intake and Output  


 


 10/17/19 10/18/19





 18:59 06:59


 


Intake Total  55 ml


 


Balance  55 ml


 


  


 


IV Total  55 ml


 


# Voids 2 1








Height (Feet):  5


Height (Inches):  5.00


Weight (Pounds):  151


General Appearance:  no apparent distress


Cardiovascular:  normal rate


Respiratory/Chest:  decreased breath sounds


Abdomen:  soft


Objective


no change











Lukasz Vizcaino MD Oct 18, 2019 10:32

## 2019-10-18 NOTE — NUR
HAND-OFF: Report given to Mariah GREENBERG RN. Pt is in stable condition, sleeping. Plan of care 
endorsed.

## 2019-10-19 VITALS — DIASTOLIC BLOOD PRESSURE: 86 MMHG | SYSTOLIC BLOOD PRESSURE: 158 MMHG

## 2019-10-19 VITALS — DIASTOLIC BLOOD PRESSURE: 76 MMHG | SYSTOLIC BLOOD PRESSURE: 158 MMHG

## 2019-10-19 VITALS — SYSTOLIC BLOOD PRESSURE: 165 MMHG | DIASTOLIC BLOOD PRESSURE: 87 MMHG

## 2019-10-19 VITALS — SYSTOLIC BLOOD PRESSURE: 131 MMHG | DIASTOLIC BLOOD PRESSURE: 83 MMHG

## 2019-10-19 VITALS — SYSTOLIC BLOOD PRESSURE: 160 MMHG | DIASTOLIC BLOOD PRESSURE: 83 MMHG

## 2019-10-19 VITALS — DIASTOLIC BLOOD PRESSURE: 82 MMHG | SYSTOLIC BLOOD PRESSURE: 140 MMHG

## 2019-10-19 LAB
ADD MANUAL DIFF: YES
ALBUMIN SERPL-MCNC: 1.5 G/DL (ref 3.4–5)
ALBUMIN/GLOB SERPL: 0.3 {RATIO} (ref 1–2.7)
ALP SERPL-CCNC: 110 U/L (ref 46–116)
ALT SERPL-CCNC: 27 U/L (ref 12–78)
AMYLASE SERPL-CCNC: 20 U/L (ref 25–115)
ANION GAP SERPL CALC-SCNC: 13 MMOL/L (ref 5–15)
AST SERPL-CCNC: 16 U/L (ref 15–37)
BILIRUB SERPL-MCNC: 0.4 MG/DL (ref 0.2–1)
BUN SERPL-MCNC: 27 MG/DL (ref 7–18)
CALCIUM SERPL-MCNC: 8.6 MG/DL (ref 8.5–10.1)
CHLORIDE SERPL-SCNC: 111 MMOL/L (ref 98–107)
CO2 SERPL-SCNC: 22 MMOL/L (ref 21–32)
CREAT SERPL-MCNC: 1.6 MG/DL (ref 0.55–1.3)
ERYTHROCYTE [DISTWIDTH] IN BLOOD BY AUTOMATED COUNT: 13.1 % (ref 11.6–14.8)
GLOBULIN SER-MCNC: 4.6 G/DL
HCT VFR BLD CALC: 25 % (ref 37–47)
HGB BLD-MCNC: 8.4 G/DL (ref 12–16)
MCV RBC AUTO: 84 FL (ref 80–99)
PLATELET # BLD: 516 K/UL (ref 150–450)
POTASSIUM SERPL-SCNC: 3.5 MMOL/L (ref 3.5–5.1)
RBC # BLD AUTO: 2.97 M/UL (ref 4.2–5.4)
SODIUM SERPL-SCNC: 146 MMOL/L (ref 136–145)
WBC # BLD AUTO: 23.4 K/UL (ref 4.8–10.8)

## 2019-10-19 RX ADMIN — NATEGLINIDE SCH MG: 60 TABLET ORAL at 06:09

## 2019-10-19 RX ADMIN — DIVALPROEX SODIUM SCH MG: 125 CAPSULE ORAL at 09:14

## 2019-10-19 RX ADMIN — DIVALPROEX SODIUM SCH MG: 125 CAPSULE ORAL at 20:50

## 2019-10-19 RX ADMIN — DOCUSATE SODIUM SCH MG: 100 CAPSULE, LIQUID FILLED ORAL at 17:06

## 2019-10-19 RX ADMIN — INSULIN ASPART SCH UNITS: 100 INJECTION, SOLUTION INTRAVENOUS; SUBCUTANEOUS at 17:07

## 2019-10-19 RX ADMIN — DOCUSATE SODIUM SCH MG: 100 CAPSULE, LIQUID FILLED ORAL at 09:14

## 2019-10-19 RX ADMIN — MEROPENEM SCH MLS/HR: 1 INJECTION INTRAVENOUS at 02:00

## 2019-10-19 RX ADMIN — DOCUSATE SODIUM SCH MG: 100 CAPSULE, LIQUID FILLED ORAL at 13:39

## 2019-10-19 RX ADMIN — INSULIN DETEMIR SCH UNITS: 100 INJECTION, SOLUTION SUBCUTANEOUS at 09:16

## 2019-10-19 RX ADMIN — INSULIN ASPART SCH UNITS: 100 INJECTION, SOLUTION INTRAVENOUS; SUBCUTANEOUS at 21:03

## 2019-10-19 RX ADMIN — DONEPEZIL HYDROCHLORIDE SCH MG: 5 TABLET, FILM COATED ORAL at 09:14

## 2019-10-19 RX ADMIN — MEROPENEM SCH MLS/HR: 1 INJECTION INTRAVENOUS at 13:39

## 2019-10-19 RX ADMIN — ASPIRIN 81 MG SCH MG: 81 TABLET ORAL at 09:14

## 2019-10-19 RX ADMIN — INSULIN ASPART SCH UNITS: 100 INJECTION, SOLUTION INTRAVENOUS; SUBCUTANEOUS at 11:42

## 2019-10-19 RX ADMIN — ENOXAPARIN SODIUM SCH MG: 30 INJECTION SUBCUTANEOUS at 09:15

## 2019-10-19 RX ADMIN — SODIUM CHLORIDE SCH MLS/HR: 9 INJECTION, SOLUTION INTRAVENOUS at 09:15

## 2019-10-19 RX ADMIN — NATEGLINIDE SCH MG: 60 TABLET ORAL at 11:41

## 2019-10-19 RX ADMIN — INSULIN ASPART SCH UNITS: 100 INJECTION, SOLUTION INTRAVENOUS; SUBCUTANEOUS at 06:09

## 2019-10-19 RX ADMIN — NATEGLINIDE SCH MG: 60 TABLET ORAL at 17:06

## 2019-10-19 NOTE — HEMATOLOGY/ONC PROGRESS NOTE
Assessment/Plan


Assessment/Plan





Assessment and Recs:


# Anemia of chronic disease due to underlying chronic medical issues, 

multifactorial 


--> Anemia workup has been ordered, rule out gi bleed --> ferritin is 1400+


--> No evidence of hemolysis is noted, peripheral smear has been reviewed.


--> Hgb goal >7. Transfuse prn.


--> Epogen or iron at this time is not particularly indicated


--> Medications have been reviewed


--> low threshold for gi evaluation in case has occult +


--> bone marrow biopsy is not indicated given the other more likely causes (if 

recurrent anemia) first time here


--> hgb trend 8.4


# Hypercalcemia - btw 10-11 range when corrected to alb


--> ivf have been started


--> if remains elevated, 7.9-->8.3


--> will get pth


# Leukocytosis/elevated white blood cell count, unspecified likely related to 

underlying reaction v more likely infection


--> have reviewed peripheral smear and bandemia/neutrophilia noted


--> continue antibiotics if they have been started by ID team (VANC/ZOSYN)--> 

vanc/linda


--> wbc 39-->28k-->29k->31k-->23


--> monitor for resolution


# Sepsis from pneumonia.  


--> pulm consulted, abx started


# CHANCE (acute kidney injury) on ckd


--> per renal


# UTI (urinary tract infection)


--> on abx per id


# Dehydration


--> on ivf


# Acute metabolic encephalopathy


--> likely due to pna


# Dvt ppx lovenox sq





The timing of this note does not necessarily reflect the time of the patient 

was seen.





Greatly appreciate consultation.





Subjective


Respiratory:  Denies: no symptoms, cough, shortness of breath, SOB with 

excertion, SOB at rest, sputum, wheezing, other


Gastrointestinal/Abdominal:  Denies: no symptoms, abdomen distended, abdominal 

pain, black stools, tarry stools, blood in stool, constipated, diarrhea, 

difficulty swallowing, nausea, poor appetite, poor fluid intake, rectal bleeding

, vomiting, other


Genitourinary:  Denies: no symptoms, burning, discharge, frequency, flank pain, 

hematuria, incontinence, pain, urgency, other


Neurologic/Psychiatric:  Denies: no symptoms, anxiety, depressed, emotional 

problems, headache, numbness, paresthesia, pre-existing deficit, seizure, 

tingling, tremors, weakness, other


Endocrine:  Denies: no symptoms, excessive sweating, flushing, intolerance to 

cold, intolerance to heat, increased hunger, increased thirst, increased urine, 

unexplained weight gain, unexplained weight loss, other


Hematologic/Lymphatic:  Denies: no symptoms, anemia, easy bleeding, easy 

bruising, adenopathy, other


Allergies:  


Coded Allergies:  


     No Known Allergies (Unverified , 10/14/19)


Subjective


10/14: hgb has improved, no bleeding, labs reivewed


10/15: no events to report


10/16: a+ o x1, no bleeding or chills noted, no major changes, occult pending


10/17: no events noted, no bleeding, no chills, no hematemesis/hematochezia


10/18: remains confused, with abx, on nc


10/19: cr is worse, hgb 8.4, no bleeding, night sweats, chills





Objective


Objective





Current Medications








 Medications


  (Trade)  Dose


 Ordered  Sig/Winnie


 Route


 PRN Reason  Start Time


 Stop Time Status Last Admin


Dose Admin


 


 Acetaminophen


  (Tylenol)  650 mg  PRN  PRN


 RECTAL


 TEMP>100.5  10/14/19 03:00


     


 


 


 Aspirin


  (ASA)  81 mg  DAILY


 ORAL


   10/15/19 09:00


 11/14/19 08:59  10/19/19 09:14


 


 


 Atorvastatin


 Calcium


  (Lipitor)  10 mg  BEDTIME


 ORAL


   10/14/19 21:00


 11/13/19 20:59  10/18/19 20:49


 


 


 Clopidogrel


 Bisulfate


  (Plavix)  75 mg  DAILY


 ORAL


   10/15/19 09:00


 11/14/19 08:59  10/19/19 09:14


 


 


 Dextrose


  (Dextrose 50%)  25 ml  Q30M  PRN


 IV


 Hypoglycemia  10/14/19 07:00


 11/13/19 06:59   


 


 


 Dextrose


  (Dextrose 50%)  50 ml  Q30M  PRN


 IV


 Hypoglycemia  10/14/19 07:00


 11/13/19 06:59   


 


 


 Divalproex Sodium


  (Depakote


 Sprinkles)  500 mg  Q12HR


 ORAL


   10/15/19 22:00


 11/14/19 21:59  10/19/19 09:14


 


 


 Docusate Sodium


  (Colace)  100 mg  THREE TIMES A  DAY


 ORAL


   10/14/19 13:00


 11/13/19 12:59  10/19/19 09:14


 


 


 Donepezil HCl


  (Aricept)  5 mg  DAILY


 ORAL


   10/15/19 11:00


 11/14/19 10:59  10/19/19 09:14


 


 


 Enoxaparin Sodium


  (Lovenox)  30 mg  DAILY


 SUBQ


   10/14/19 09:00


 11/13/19 08:59  10/19/19 09:15


 


 


 Insulin Aspart


  (NovoLOG)    BEFORE MEALS AND  HS


 SUBQ


   10/14/19 11:30


 11/13/19 11:29  10/19/19 11:42


 


 


 Insulin Detemir


  (Levemir)  10 units  DAILY


 SUBQ


   10/16/19 09:00


 11/13/19 10:29  10/19/19 09:16


 


 


 Lactulose


  (Cephulac)  20 gm  TIDPRN  PRN


 ORAL


 Constipation  10/16/19 14:45


 11/15/19 14:44  10/16/19 15:21


 


 


 Lorazepam


  (Ativan 2mg/ml


 1ml)  1 mg  Q8HR  PRN


 IV


 For Anxiety  10/17/19 19:15


 10/24/19 19:14  10/18/19 01:12


 


 


 Meropenem 1 gm/


 Sodium Chloride  55 ml @ 


 110 mls/hr  Q12H


 IVPB


   10/16/19 14:00


 10/21/19 13:59  10/19/19 02:00


 


 


 Nateglinide


  (Starlix)  60 mg  TIAC


 ORAL


   10/18/19 11:30


 11/17/19 11:29  10/19/19 11:41


 


 


 Olanzapine


  (ZyPREXA)  5 mg  DAILY


 ORAL


   10/15/19 11:00


 11/14/19 10:59  10/19/19 09:14


 


 


 Pantoprazole


  (Protonix)  40 mg  BID


 ORAL


   10/14/19 18:00


 11/13/19 08:59  10/19/19 09:14


 


 


 Vancomycin HCl


  (Vanco rx to


 dose)  1 ea  DAILY  PRN


 MISC


 Per rx protocol  10/14/19 07:30


 11/13/19 07:29   


 


 


 Vancomycin HCl 1


 gm/Dextrose  275 ml @ 


 183.708


 mls/hr  Q24H


 IVPB


   10/18/19 09:00


 10/23/19 08:59  10/19/19 09:15


 











Last 24 Hour Vital Signs








  Date Time  Temp Pulse Resp B/P (MAP) Pulse Ox O2 Delivery O2 Flow Rate FiO2


 


10/19/19 09:00      Nasal Cannula 2.0 


 


10/19/19 08:00     98 Nasal Cannula 2.0 28


 


10/19/19 08:00  90      


 


10/19/19 08:00  93 20  98 Nasal Cannula 2.0 28


 


10/19/19 08:00 97.9 99 19 165/87 (113) 99   


 


10/19/19 04:00  93      


 


10/19/19 04:00 99.4 95 18 158/76 (103) 97   


 


10/19/19 00:00 98.5 97 20 158/86 (110) 98   


 


10/19/19 00:00  101      


 


10/18/19 21:00      Nasal Cannula 2.0 


 


10/18/19 20:00 99.0 97 20 128/78 (95) 98   


 


10/18/19 20:00  94      


 


10/18/19 19:35  97 18  98 Nasal Cannula 2.0 28


 


10/18/19 19:35     98 Nasal Cannula 2.0 28


 


10/18/19 16:00 98.4 94 20 159/71 (100) 98   


 


10/18/19 16:00  94      


 


10/18/19 12:00  79      


 


10/18/19 12:00 97.2 86 18 145/70 (95) 99   


 


10/18/19 09:00      Nasal Cannula 2.0 


 


10/18/19 08:00  76      


 


10/18/19 08:00 98.5 84 20 155/70 (98) 98   


 


10/18/19 04:00 98.1 77 19 136/63 (87) 99   


 


10/18/19 04:00  74      


 


10/18/19 00:00 98.8 81 18 104/71 (82) 98   


 


10/18/19 00:00  85      


 


10/17/19 21:00      Nasal Cannula 2.0 


 


10/17/19 20:00  109      


 


10/17/19 20:00 98.5 102 22 150/75 (100) 97   


 


10/17/19 19:01     95 Nasal Cannula 2.0 28


 


10/17/19 19:01  124 18  95 Nasal Cannula 2.0 28


 


10/17/19 16:00  92      


 


10/17/19 16:00 97.2 92 20 143/82 (102) 98   

















Intake and Output  


 


 10/18/19 10/19/19





 19:00 07:00


 


Intake Total 240 ml 


 


Output Total 350 ml 100 ml


 


Balance -110 ml -100 ml


 


  


 


Intake Oral 240 ml 


 


Output Urine Total 350 ml 100 ml











Labs








Test


  10/17/19


08:15 10/19/19


07:05


 


White Blood Count


  30.6 K/UL


(4.8-10.8) 23.4 K/UL


(4.8-10.8)


 


Red Blood Count


  3.07 M/UL


(4.20-5.40) 2.97 M/UL


(4.20-5.40)


 


Hemoglobin


  8.3 G/DL


(12.0-16.0) 8.4 G/DL


(12.0-16.0)


 


Hematocrit


  26.1 %


(37.0-47.0) 25.0 %


(37.0-47.0)


 


Mean Corpuscular Volume 85 FL (80-99)  84 FL (80-99) 


 


Mean Corpuscular Hemoglobin


  27.0 PG


(27.0-31.0) 28.2 PG


(27.0-31.0)


 


Mean Corpuscular Hemoglobin


Concent 31.8 G/DL


(32.0-36.0) 33.5 G/DL


(32.0-36.0)


 


Red Cell Distribution Width


  12.9 %


(11.6-14.8) 13.1 %


(11.6-14.8)


 


Platelet Count


  493 K/UL


(150-450) 516 K/UL


(150-450)


 


Mean Platelet Volume


  7.5 FL


(6.5-10.1) 7.3 FL


(6.5-10.1)


 


Neutrophils (%) (Auto)  % (45.0-75.0)   % (45.0-75.0) 


 


Lymphocytes (%) (Auto)  % (20.0-45.0)   % (20.0-45.0) 


 


Monocytes (%) (Auto)  % (1.0-10.0)   % (1.0-10.0) 


 


Eosinophils (%) (Auto)  % (0.0-3.0)   % (0.0-3.0) 


 


Basophils (%) (Auto)  % (0.0-2.0)   % (0.0-2.0) 


 


Differential Total Cells


Counted 100 


  100 


 


 


Neutrophils % (Manual) 96 % (45-75)  86 % (45-75) 


 


Lymphocytes % (Manual) 3 % (20-45)  7 % (20-45) 


 


Monocytes % (Manual) 1 % (1-10)  6 % (1-10) 


 


Eosinophils % (Manual) 0 % (0-3)  1 % (0-3) 


 


Basophils % (Manual) 0 % (0-2)  0 % (0-2) 


 


Band Neutrophils 0 % (0-8)  0 % (0-8) 


 


Platelet Estimate Adequate  Increased 


 


Platelet Morphology Normal  Normal 


 


Hypochromasia 1+  1+ 


 


Sodium Level


  142 MMOL/L


(136-145) 146 MMOL/L


(136-145)


 


Potassium Level


  3.6 MMOL/L


(3.5-5.1) 3.5 MMOL/L


(3.5-5.1)


 


Chloride Level


  108 MMOL/L


() 111 MMOL/L


()


 


Carbon Dioxide Level


  21 MMOL/L


(21-32) 22 MMOL/L


(21-32)


 


Anion Gap


  13 mmol/L


(5-15) 13 mmol/L


(5-15)


 


Blood Urea Nitrogen


  25 mg/dL


(7-18) 27 mg/dL


(7-18)


 


Creatinine


  1.3 MG/DL


(0.55-1.30) 1.6 MG/DL


(0.55-1.30)


 


Estimat Glomerular Filtration


Rate 41.0 mL/min


(>60) 32.3 mL/min


(>60)


 


Glucose Level


  142 MG/DL


() 110 MG/DL


()


 


Calcium Level


  8.3 MG/DL


(8.5-10.1) 8.6 MG/DL


(8.5-10.1)


 


Phosphorus Level


  3.7 MG/DL


(2.5-4.9) 


 


 


Magnesium Level


  2.6 MG/DL


(1.8-2.4) 


 


 


Total Bilirubin


  0.4 MG/DL


(0.2-1.0) 0.4 MG/DL


(0.2-1.0)


 


Aspartate Amino Transf


(AST/SGOT) 20 U/L (15-37) 


  16 U/L (15-37) 


 


 


Alanine Aminotransferase


(ALT/SGPT) 40 U/L (12-78) 


  27 U/L (12-78) 


 


 


Alkaline Phosphatase


  97 U/L


() 110 U/L


()


 


C-Reactive Protein,


Quantitative 20.3 mg/dL


(0.00-0.90) 


 


 


Pro-B-Type Natriuretic Peptide


  08733 pg/mL


(0-125) 


 


 


Total Protein


  6.3 G/DL


(6.4-8.2) 6.1 G/DL


(6.4-8.2)


 


Albumin


  1.4 G/DL


(3.4-5.0) 1.5 G/DL


(3.4-5.0)


 


Globulin 4.9 g/dL  4.6 g/dL 


 


Albumin/Globulin Ratio 0.3 (1.0-2.7)  0.3 (1.0-2.7) 


 


Thyroid Stimulating Hormone


(TSH) < 0.010 uiU/mL


(0.358-3.740) 


 


 


Vancomycin Level Trough


  10.9 ug/mL


(5.0-12.0) 


 


 


Valproic Acid (Depakene) Level


  11 MCG/ML


() 


 


 


Amylase Level


  


  20 U/L


()


 


Lipase


  


  38 U/L


()








Height (Feet):  5


Height (Inches):  5.00


Weight (Pounds):  151


Objective





Physical Exam:


Vitals: reviewed


General Appearance:  NAD


HEENT:  normocephalic, atraumatic


Neck:  non-tender, normal alignment


Respiratory/Chest:  normal breath sounds bilaterally


Cardiovascular/Chest: rrr


Abdomen:  normal bowel sounds, soft, nontender


Extremities:  normal range of motion











Mike Pop MD Oct 19, 2019 12:47

## 2019-10-19 NOTE — NUR
HAND-OFF: 

Report given to ELIZABETH Thibodeaux. Plan of care endorsed. Patient is in stable condition.

## 2019-10-19 NOTE — SURGERY PROGRESS NOTE
Surgery Progress Note


Subjective


Additional Comments


No acute events.  Leukocytosis trending down.  Anemia.  Renal insufficiency.  

Exam otherwise unchanged and stable.  No nausea vomiting fever chills





Objective





Last 24 Hour Vital Signs








  Date Time  Temp Pulse Resp B/P (MAP) Pulse Ox O2 Delivery O2 Flow Rate FiO2


 


10/19/19 08:00  90      


 


10/19/19 08:00 97.9 99 19 165/87 (113) 99   


 


10/19/19 04:00  93      


 


10/19/19 04:00 99.4 95 18 158/76 (103) 97   


 


10/19/19 00:00 98.5 97 20 158/86 (110) 98   


 


10/19/19 00:00  101      


 


10/18/19 21:00      Nasal Cannula 2.0 


 


10/18/19 20:00 99.0 97 20 128/78 (95) 98   


 


10/18/19 20:00  94      


 


10/18/19 19:35  97 18  98 Nasal Cannula 2.0 28


 


10/18/19 19:35     98 Nasal Cannula 2.0 28


 


10/18/19 16:00 98.4 94 20 159/71 (100) 98   


 


10/18/19 16:00  94      


 


10/18/19 12:00  79      


 


10/18/19 12:00 97.2 86 18 145/70 (95) 99   








I&O











Intake and Output  


 


 10/18/19 10/19/19





 19:00 07:00


 


Intake Total 240 ml 


 


Output Total 350 ml 100 ml


 


Balance -110 ml -100 ml


 


  


 


Intake Oral 240 ml 


 


Output Urine Total 350 ml 100 ml








Dressing:  dry


Wound:  clean


Cardiovascular:  RSR


Respiratory:  clear


Abdomen:  soft, non-tender, present bowel sounds


Extremities:  no cyanosis





Laboratory Tests








Test


  10/19/19


07:05


 


White Blood Count


  23.4 K/UL


(4.8-10.8)  *H


 


Red Blood Count


  2.97 M/UL


(4.20-5.40)  L


 


Hemoglobin


  8.4 G/DL


(12.0-16.0)  L


 


Hematocrit


  25.0 %


(37.0-47.0)  L


 


Mean Corpuscular Volume 84 FL (80-99)  


 


Mean Corpuscular Hemoglobin


  28.2 PG


(27.0-31.0)


 


Mean Corpuscular Hemoglobin


Concent 33.5 G/DL


(32.0-36.0)


 


Red Cell Distribution Width


  13.1 %


(11.6-14.8)


 


Platelet Count


  516 K/UL


(150-450)  H


 


Mean Platelet Volume


  7.3 FL


(6.5-10.1)


 


Neutrophils (%) (Auto)


  % (45.0-75.0)


 


 


Lymphocytes (%) (Auto)


  % (20.0-45.0)


 


 


Monocytes (%) (Auto)  % (1.0-10.0)  


 


Eosinophils (%) (Auto)  % (0.0-3.0)  


 


Basophils (%) (Auto)  % (0.0-2.0)  


 


Differential Total Cells


Counted 100  


 


 


Neutrophils % (Manual) 86 % (45-75)  H


 


Lymphocytes % (Manual) 7 % (20-45)  L


 


Monocytes % (Manual) 6 % (1-10)  


 


Eosinophils % (Manual) 1 % (0-3)  


 


Basophils % (Manual) 0 % (0-2)  


 


Band Neutrophils 0 % (0-8)  


 


Platelet Estimate Increased  H


 


Platelet Morphology Normal  


 


Hypochromasia 1+  


 


Sodium Level


  146 MMOL/L


(136-145)  H


 


Potassium Level


  3.5 MMOL/L


(3.5-5.1)


 


Chloride Level


  111 MMOL/L


()  H


 


Carbon Dioxide Level


  22 MMOL/L


(21-32)


 


Anion Gap


  13 mmol/L


(5-15)


 


Blood Urea Nitrogen


  27 mg/dL


(7-18)  H


 


Creatinine Pending  


 


Estimat Glomerular Filtration


Rate Pending  


 


 


Glucose Level


  110 MG/DL


()  H


 


Calcium Level


  8.6 MG/DL


(8.5-10.1)


 


Total Bilirubin


  0.4 MG/DL


(0.2-1.0)


 


Aspartate Amino Transf


(AST/SGOT) 16 U/L (15-37)


 


 


Alanine Aminotransferase


(ALT/SGPT) 27 U/L (12-78)


 


 


Alkaline Phosphatase


  110 U/L


()


 


Total Protein


  6.1 G/DL


(6.4-8.2)  L


 


Albumin


  1.5 G/DL


(3.4-5.0)  L


 


Globulin 4.6 g/dL  


 


Albumin/Globulin Ratio


  0.3 (1.0-2.7)


L


 


Amylase Level


  20 U/L


()  L


 


Lipase


  38 U/L


()  L











Plan


Problems:  


(1) Pressure injury of deep tissue


Assessment & Plan:  Pt presented on admission with DTPI R heel. Blood filled 

blister with marginal erythema noted to heel. Pt exhibited agitation when 

minimally palpated. (L)2.2cm x (W)2.1cm


L heel is blanchable .


Non-blanchable erythema without induration noted to sacral cleft. 


No other areas of skin concerns noted.





Tx.Plan:


Apply Betadine to R heel. Cover with Optifoam drsg. Change every 3 days and prn.


           


Apply Cavilon Skin Barrier to L heel. Cover with Optifoam drsg. Change every 7 

days and prn.


           


Apply Moisture Barrier Paste to buttocks. Cover sacral area with Optifoam drsg. 

Change every 3 days and prn.


           


Reposition at least every 2hours or as tolerated.


           


Off-load heels with pillow.





(2) Sepsis


Assessment & Plan:  Patient with low-grade fevers, anemia, leukocytosis 

persistent, elevated platelets, abnormal labs.


Etiology currently unknown and work-up in progress.  Labs noted and imaging 

that is available as noted.  Other imaging is pending and will be reviewed once 

available


Okay for diet


Antibiotics as per infectious disease 


local wound care as wounds do not seem to be the etiology of her sepsis


We will monitor and follow with recommendations


Thank you for allowing me to participate in patient's care














Radhames Blas Oct 19, 2019 10:35

## 2019-10-19 NOTE — NUR
NURSE NOTES:

Received report from ELIZABETH Lemus. Patient is in bed, awake and responsive. Breathing regular 
with no acute distress noted at this time. Bed is in lowest position, breaks engaged, and 
call light within reach at all times. Patient's IV is intact, running fluids at prescribed 
rate. Will continue to monitor.

## 2019-10-19 NOTE — INFECTIOUS DISEASES PROG NOTE
Assessment/Plan


Problems:  


(1) Aspiration pneumonia


Assessment & Plan:  with B/L infiltrates, L>R suspect due to altered mental 

status , with hypoxemia and leukocytosis, continue vancomycin AND meropenem 

empirically, repeated  CXR  showed no change , pending CT of the chest for 

further eval, aspiration precaution 





(2) UTI (urinary tract infection)


Assessment & Plan:  due to ESBL producing E coli already on meropenem to cover 

for sepsis and pneumonia too 





(3) Sepsis


Assessment & Plan:  due to the above, continue wide spectrum antibiotics 

pending repeated cultures 





(4) Acute metabolic encephalopathy


Assessment & Plan:  due to the above, continue hydration and antibiotics, 

monitor in tele 





(5) Hyperglycemia due to type 2 diabetes mellitus


Assessment & Plan:  recommend tight glycemic control to keep blood glucose 

between 100-140 





(6) ARF (acute renal failure)


Assessment & Plan:  due to the above, continue hydration and renally dosed 

antibiotics, close  monitor of renal function 








Subjective


ROS Limited/Unobtainable:  Yes


Allergies:  


Coded Allergies:  


     No Known Allergies (Unverified , 10/14/19)


Subjective


she was lethargic , but still responsive , no fever or chills , no diarrhea, no 

cough or SOB





Objective


Vital Signs





Last 24 Hour Vital Signs








  Date Time  Temp Pulse Resp B/P (MAP) Pulse Ox O2 Delivery O2 Flow Rate FiO2


 


10/19/19 12:00 97.8 97 18 140/82 (101) 97   


 


10/19/19 09:00      Nasal Cannula 2.0 


 


10/19/19 08:00     98 Nasal Cannula 2.0 28


 


10/19/19 08:00  90      


 


10/19/19 08:00  93 20  98 Nasal Cannula 2.0 28


 


10/19/19 08:00 97.9 99 19 165/87 (113) 99   


 


10/19/19 04:00  93      


 


10/19/19 04:00 99.4 95 18 158/76 (103) 97   


 


10/19/19 00:00 98.5 97 20 158/86 (110) 98   


 


10/19/19 00:00  101      


 


10/18/19 21:00      Nasal Cannula 2.0 


 


10/18/19 20:00 99.0 97 20 128/78 (95) 98   


 


10/18/19 20:00  94      


 


10/18/19 19:35  97 18  98 Nasal Cannula 2.0 28


 


10/18/19 19:35     98 Nasal Cannula 2.0 28


 


10/18/19 16:00 98.4 94 20 159/71 (100) 98   


 


10/18/19 16:00  94      








Height (Feet):  5


Height (Inches):  5.00


Weight (Pounds):  151


General Appearance:  WD/WN, no acute distress


HEENT:  normocephalic, atraumatic, anicteric, mucous membranes moist, EOMI, 

pharynx normal, supple, no JVD


Respiratory/Chest:  chest wall non-tender, no respiratory distress, no 

accessory muscle use, decreased breath sounds


Cardiovascular:  normal peripheral pulses, normal rate, regular rhythm, no 

gallop/murmur, no JVD


Abdomen:  normal bowel sounds, soft, non tender, no organomegaly, non distended

, no mass, no scars


Extremities:  no cyanosis, no clubbing


Skin:  no rash, no lesions, no ulcers


Neurologic/Psychiatric:  alert, responsive


Lymphatic:  no neck adenopathy, no groin adenopathy


Musculoskeletal:  normal muscle bulk, no effusion





Laboratory Tests








Test


  10/19/19


07:05


 


White Blood Count


  23.4 K/UL


(4.8-10.8)  *H


 


Red Blood Count


  2.97 M/UL


(4.20-5.40)  L


 


Hemoglobin


  8.4 G/DL


(12.0-16.0)  L


 


Hematocrit


  25.0 %


(37.0-47.0)  L


 


Mean Corpuscular Volume 84 FL (80-99)  


 


Mean Corpuscular Hemoglobin


  28.2 PG


(27.0-31.0)


 


Mean Corpuscular Hemoglobin


Concent 33.5 G/DL


(32.0-36.0)


 


Red Cell Distribution Width


  13.1 %


(11.6-14.8)


 


Platelet Count


  516 K/UL


(150-450)  H


 


Mean Platelet Volume


  7.3 FL


(6.5-10.1)


 


Neutrophils (%) (Auto)


  % (45.0-75.0)


 


 


Lymphocytes (%) (Auto)


  % (20.0-45.0)


 


 


Monocytes (%) (Auto)  % (1.0-10.0)  


 


Eosinophils (%) (Auto)  % (0.0-3.0)  


 


Basophils (%) (Auto)  % (0.0-2.0)  


 


Differential Total Cells


Counted 100  


 


 


Neutrophils % (Manual) 86 % (45-75)  H


 


Lymphocytes % (Manual) 7 % (20-45)  L


 


Monocytes % (Manual) 6 % (1-10)  


 


Eosinophils % (Manual) 1 % (0-3)  


 


Basophils % (Manual) 0 % (0-2)  


 


Band Neutrophils 0 % (0-8)  


 


Platelet Estimate Increased  H


 


Platelet Morphology Normal  


 


Hypochromasia 1+  


 


Sodium Level


  146 MMOL/L


(136-145)  H


 


Potassium Level


  3.5 MMOL/L


(3.5-5.1)


 


Chloride Level


  111 MMOL/L


()  H


 


Carbon Dioxide Level


  22 MMOL/L


(21-32)


 


Anion Gap


  13 mmol/L


(5-15)


 


Blood Urea Nitrogen


  27 mg/dL


(7-18)  H


 


Creatinine


  1.6 MG/DL


(0.55-1.30)  H


 


Estimat Glomerular Filtration


Rate 32.3 mL/min


(>60)


 


Glucose Level


  110 MG/DL


()  H


 


Calcium Level


  8.6 MG/DL


(8.5-10.1)


 


Calcium (Send out) Pending  


 


Total Bilirubin


  0.4 MG/DL


(0.2-1.0)


 


Aspartate Amino Transf


(AST/SGOT) 16 U/L (15-37)


 


 


Alanine Aminotransferase


(ALT/SGPT) 27 U/L (12-78)


 


 


Alkaline Phosphatase


  110 U/L


()


 


Total Protein


  6.1 G/DL


(6.4-8.2)  L


 


Albumin


  1.5 G/DL


(3.4-5.0)  L


 


Globulin 4.6 g/dL  


 


Albumin/Globulin Ratio


  0.3 (1.0-2.7)


L


 


Amylase Level


  20 U/L


()  L


 


Lipase


  38 U/L


()  L


 


Parathyroid Hormone (Intact) Pending  











Current Medications








 Medications


  (Trade)  Dose


 Ordered  Sig/Winnie


 Route


 PRN Reason  Start Time


 Stop Time Status Last Admin


Dose Admin


 


 Acetaminophen


  (Tylenol)  650 mg  PRN  PRN


 RECTAL


 TEMP>100.5  10/14/19 03:00


     


 


 


 Aspirin


  (ASA)  81 mg  DAILY


 ORAL


   10/15/19 09:00


 11/14/19 08:59  10/19/19 09:14


 


 


 Atorvastatin


 Calcium


  (Lipitor)  10 mg  BEDTIME


 ORAL


   10/14/19 21:00


 11/13/19 20:59  10/18/19 20:49


 


 


 Clopidogrel


 Bisulfate


  (Plavix)  75 mg  DAILY


 ORAL


   10/15/19 09:00


 11/14/19 08:59  10/19/19 09:14


 


 


 Dextrose


  (Dextrose 50%)  25 ml  Q30M  PRN


 IV


 Hypoglycemia  10/14/19 07:00


 11/13/19 06:59   


 


 


 Dextrose


  (Dextrose 50%)  50 ml  Q30M  PRN


 IV


 Hypoglycemia  10/14/19 07:00


 11/13/19 06:59   


 


 


 Divalproex Sodium


  (Depakote


 Sprinkles)  500 mg  Q12HR


 ORAL


   10/15/19 22:00


 11/14/19 21:59  10/19/19 09:14


 


 


 Docusate Sodium


  (Colace)  100 mg  THREE TIMES A  DAY


 ORAL


   10/14/19 13:00


 11/13/19 12:59  10/19/19 13:39


 


 


 Donepezil HCl


  (Aricept)  5 mg  DAILY


 ORAL


   10/15/19 11:00


 11/14/19 10:59  10/19/19 09:14


 


 


 Enoxaparin Sodium


  (Lovenox)  30 mg  DAILY


 SUBQ


   10/14/19 09:00


 11/13/19 08:59  10/19/19 09:15


 


 


 Insulin Aspart


  (NovoLOG)    BEFORE MEALS AND  HS


 SUBQ


   10/14/19 11:30


 11/13/19 11:29  10/19/19 11:42


 


 


 Insulin Detemir


  (Levemir)  10 units  DAILY


 SUBQ


   10/16/19 09:00


 11/13/19 10:29  10/19/19 09:16


 


 


 Lactulose


  (Cephulac)  20 gm  TIDPRN  PRN


 ORAL


 Constipation  10/16/19 14:45


 11/15/19 14:44  10/16/19 15:21


 


 


 Lorazepam


  (Ativan 2mg/ml


 1ml)  1 mg  Q8HR  PRN


 IV


 For Anxiety  10/17/19 19:15


 10/24/19 19:14  10/18/19 01:12


 


 


 Meropenem 1 gm/


 Sodium Chloride  55 ml @ 


 110 mls/hr  Q12H


 IVPB


   10/16/19 14:00


 10/21/19 13:59  10/19/19 13:39


 


 


 Nateglinide


  (Starlix)  60 mg  TIAC


 ORAL


   10/18/19 11:30


 11/17/19 11:29  10/19/19 11:41


 


 


 Olanzapine


  (ZyPREXA)  5 mg  DAILY


 ORAL


   10/15/19 11:00


 11/14/19 10:59  10/19/19 09:14


 


 


 Pantoprazole


  (Protonix)  40 mg  BID


 ORAL


   10/14/19 18:00


 11/13/19 08:59  10/19/19 09:14


 


 


 Vancomycin HCl


  (Vanco rx to


 dose)  1 ea  DAILY  PRN


 MISC


 Per rx protocol  10/14/19 07:30


 11/13/19 07:29   


 


 


 Vancomycin HCl 1


 gm/Dextrose  275 ml @ 


 183.708


 mls/hr  Q24H


 IVPB


   10/18/19 09:00


 10/23/19 08:59  10/19/19 09:15


 

















Amie Burgos M.D. Oct 19, 2019 14:59

## 2019-10-19 NOTE — CARDIOLOGY REPORT
--------------- APPROVED REPORT --------------





EKG Measurement

Heart [id number]

[place] [id number]

[id number]

[id number]

[id number]





Normal sinus rhythm

Possible Left atrial enlargement

Left ventricular hypertrophy

Possible Lateral infarct, age undetermined

Abnormal ECG

## 2019-10-19 NOTE — PULMONOLOGY PROGRESS NOTE
Assessment/Plan


Problems:  


(1) Healthcare associated bacterial pneumonia


(2) UTI (urinary tract infection)


(3) Sepsis


(4) Dehydration


(5) CHANCE (acute kidney injury)


(6) Acute metabolic encephalopathy


(7) Hyperglycemia due to type 2 diabetes mellitus


(8) Renal failure (ARF), acute on chronic


(9) Aspiration pneumonia


(10) Abnormal TSH


Assessment/Plan


Marked leukocytosis


Sepsis


Possible pneumonia


E coli UTI


Acute hypoxemic respiratory failure


Acute encephalopathy


Hx CHF


HTN


CHANCE 





Plan:


-F/U CT CAP


-Monitor WCt


-Abx per ID


-monitor volumes and renal function, PRN lasix   


-monitor encephalopathy


-Asp precautions, SLP recs, VSS


-DVT Px: LMWH


-FC





Subjective


Allergies:  


Coded Allergies:  


     No Known Allergies (Unverified , 10/14/19)


Subjective


WCT 23 CT pending GINO otherwise


+ cough + SOB no FC





Objective





Last 24 Hour Vital Signs








  Date Time  Temp Pulse Resp B/P (MAP) Pulse Ox O2 Delivery O2 Flow Rate FiO2


 


10/19/19 12:00 97.8 97 18 140/82 (101) 97   


 


10/19/19 09:00      Nasal Cannula 2.0 


 


10/19/19 08:00     98 Nasal Cannula 2.0 28


 


10/19/19 08:00  90      


 


10/19/19 08:00  93 20  98 Nasal Cannula 2.0 28


 


10/19/19 08:00 97.9 99 19 165/87 (113) 99   


 


10/19/19 04:00  93      


 


10/19/19 04:00 99.4 95 18 158/76 (103) 97   


 


10/19/19 00:00 98.5 97 20 158/86 (110) 98   


 


10/19/19 00:00  101      


 


10/18/19 21:00      Nasal Cannula 2.0 


 


10/18/19 20:00 99.0 97 20 128/78 (95) 98   


 


10/18/19 20:00  94      


 


10/18/19 19:35  97 18  98 Nasal Cannula 2.0 28


 


10/18/19 19:35     98 Nasal Cannula 2.0 28


 


10/18/19 16:00 98.4 94 20 159/71 (100) 98   


 


10/18/19 16:00  94      

















Intake and Output  


 


 10/18/19 10/19/19





 18:59 06:59


 


Intake Total 240 ml 


 


Output Total 350 ml 100 ml


 


Balance -110 ml -100 ml


 


  


 


Intake Oral 240 ml 


 


Output Urine Total 350 ml 100 ml








General Appearance:  no acute distress, cachetic


HEENT:  normocephalic, atraumatic, anicteric, mucous membranes moist


Respiratory/Chest:  rhonchi


Cardiovascular:  normal peripheral pulses, normal rate, regular rhythm


Abdomen:  normal bowel sounds, soft, non tender, no organomegaly, non distended

, no mass


Extremities:  no cyanosis, no clubbing, no edema


Laboratory Tests


10/19/19 07:05: 


White Blood Count 23.4*H, Red Blood Count 2.97L, Hemoglobin 8.4L, Hematocrit 

25.0L, Mean Corpuscular Volume 84, Mean Corpuscular Hemoglobin 28.2, Mean 

Corpuscular Hemoglobin Concent 33.5, Red Cell Distribution Width 13.1, Platelet 

Count 516H, Mean Platelet Volume 7.3, Neutrophils (%) (Auto) , Lymphocytes (%) (

Auto) , Monocytes (%) (Auto) , Eosinophils (%) (Auto) , Basophils (%) (Auto) , 

Differential Total Cells Counted 100, Neutrophils % (Manual) 86H, Lymphocytes % 

(Manual) 7L, Monocytes % (Manual) 6, Eosinophils % (Manual) 1, Basophils % (

Manual) 0, Band Neutrophils 0, Platelet Estimate IncreasedH, Platelet 

Morphology Normal, Hypochromasia 1+, Sodium Level 146H, Potassium Level 3.5, 

Chloride Level 111H, Carbon Dioxide Level 22, Anion Gap 13, Blood Urea Nitrogen 

27H, Creatinine 1.6H, Estimat Glomerular Filtration Rate 32.3, Glucose Level 

110H, Calcium Level 8.6, Calcium (Send out) [Pending], Total Bilirubin 0.4, 

Aspartate Amino Transf (AST/SGOT) 16, Alanine Aminotransferase (ALT/SGPT) 27, 

Alkaline Phosphatase 110, Total Protein 6.1L, Albumin 1.5L, Globulin 4.6, 

Albumin/Globulin Ratio 0.3L, Amylase Level 20L, Lipase 38L, Parathyroid Hormone 

(Intact) [Pending]





Current Medications








 Medications


  (Trade)  Dose


 Ordered  Sig/Winnie


 Route


 PRN Reason  Start Time


 Stop Time Status Last Admin


Dose Admin


 


 Acetaminophen


  (Tylenol)  650 mg  PRN  PRN


 RECTAL


 TEMP>100.5  10/14/19 03:00


     


 


 


 Aspirin


  (ASA)  81 mg  DAILY


 ORAL


   10/15/19 09:00


 11/14/19 08:59  10/19/19 09:14


 


 


 Atorvastatin


 Calcium


  (Lipitor)  10 mg  BEDTIME


 ORAL


   10/14/19 21:00


 11/13/19 20:59  10/18/19 20:49


 


 


 Clopidogrel


 Bisulfate


  (Plavix)  75 mg  DAILY


 ORAL


   10/15/19 09:00


 11/14/19 08:59  10/19/19 09:14


 


 


 Dextrose


  (Dextrose 50%)  25 ml  Q30M  PRN


 IV


 Hypoglycemia  10/14/19 07:00


 11/13/19 06:59   


 


 


 Dextrose


  (Dextrose 50%)  50 ml  Q30M  PRN


 IV


 Hypoglycemia  10/14/19 07:00


 11/13/19 06:59   


 


 


 Divalproex Sodium


  (Depakote


 Sprinkles)  500 mg  Q12HR


 ORAL


   10/15/19 22:00


 11/14/19 21:59  10/19/19 09:14


 


 


 Docusate Sodium


  (Colace)  100 mg  THREE TIMES A  DAY


 ORAL


   10/14/19 13:00


 11/13/19 12:59  10/19/19 13:39


 


 


 Donepezil HCl


  (Aricept)  5 mg  DAILY


 ORAL


   10/15/19 11:00


 11/14/19 10:59  10/19/19 09:14


 


 


 Enoxaparin Sodium


  (Lovenox)  30 mg  DAILY


 SUBQ


   10/14/19 09:00


 11/13/19 08:59  10/19/19 09:15


 


 


 Insulin Aspart


  (NovoLOG)    BEFORE MEALS AND  HS


 SUBQ


   10/14/19 11:30


 11/13/19 11:29  10/19/19 11:42


 


 


 Insulin Detemir


  (Levemir)  10 units  DAILY


 SUBQ


   10/16/19 09:00


 11/13/19 10:29  10/19/19 09:16


 


 


 Lactulose


  (Cephulac)  20 gm  TIDPRN  PRN


 ORAL


 Constipation  10/16/19 14:45


 11/15/19 14:44  10/16/19 15:21


 


 


 Lorazepam


  (Ativan 2mg/ml


 1ml)  1 mg  Q8HR  PRN


 IV


 For Anxiety  10/17/19 19:15


 10/24/19 19:14  10/18/19 01:12


 


 


 Meropenem 1 gm/


 Sodium Chloride  55 ml @ 


 110 mls/hr  Q12H


 IVPB


   10/16/19 14:00


 10/21/19 13:59  10/19/19 13:39


 


 


 Nateglinide


  (Starlix)  60 mg  TIAC


 ORAL


   10/18/19 11:30


 11/17/19 11:29  10/19/19 11:41


 


 


 Olanzapine


  (ZyPREXA)  5 mg  DAILY


 ORAL


   10/15/19 11:00


 11/14/19 10:59  10/19/19 09:14


 


 


 Pantoprazole


  (Protonix)  40 mg  BID


 ORAL


   10/14/19 18:00


 11/13/19 08:59  10/19/19 09:14


 


 


 Vancomycin HCl


  (Vanco rx to


 dose)  1 ea  DAILY  PRN


 MISC


 Per rx protocol  10/14/19 07:30


 11/13/19 07:29   


 


 


 Vancomycin HCl 1


 gm/Dextrose  275 ml @ 


 183.708


 mls/hr  Q24H


 IVPB


   10/18/19 09:00


 10/23/19 08:59  10/19/19 09:15


 

















Sammy Torres MD Oct 19, 2019 15:04

## 2019-10-19 NOTE — NEPHROLOGY PROGRESS NOTE
Assessment/Plan


Problem List:  


(1) Dehydration


(2) Renal failure (ARF), acute on chronic


(3) ARF (acute renal failure)


(4) Sepsis


(5) Acute metabolic encephalopathy


(6) Hyperglycemia due to type 2 diabetes mellitus


(7) UTI (urinary tract infection)


(8) Diabetic nephropathy


Assessment





Renal failure, Acute on Chronic resolved


Dehydration


Severe Anemia


Sepsis / Pneumonia / UTI


DM, OOC


Proteinuria , Diabetic Nephropathy


Acute encephalopathy


Plan





WBCs remain elevated but decreasing


has casas again due to retention


Avoid Nephrotoxics


Anemia salas


2D echo


24 H urine protein


Keep BP and BS in check


I am resuming her psych meds fro [facility]CF for now


Per orders





Subjective


ROS Limited/Unobtainable:  No


Constitutional:  Reports: malaise, weakness





Objective


Objective





Last 24 Hour Vital Signs








  Date Time  Temp Pulse Resp B/P (MAP) Pulse Ox O2 Delivery O2 Flow Rate FiO2


 


10/19/19 12:00 97.8 97 18 140/82 (101) 97   


 


10/19/19 12:00  97      


 


10/19/19 09:00      Nasal Cannula 2.0 


 


10/19/19 08:00     98 Nasal Cannula 2.0 28


 


10/19/19 08:00  90      


 


10/19/19 08:00  93 20  98 Nasal Cannula 2.0 28


 


10/19/19 08:00 97.9 99 19 165/87 (113) 99   


 


10/19/19 04:00  93      


 


10/19/19 04:00 99.4 95 18 158/76 (103) 97   


 


10/19/19 00:00 98.5 97 20 158/86 (110) 98   


 


10/19/19 00:00  101      


 


10/18/19 21:00      Nasal Cannula 2.0 


 


10/18/19 20:00 99.0 97 20 128/78 (95) 98   


 


10/18/19 20:00  94      


 


10/18/19 19:35  97 18  98 Nasal Cannula 2.0 28


 


10/18/19 19:35     98 Nasal Cannula 2.0 28

















Intake and Output  


 


 10/18/19 10/19/19





 18:59 06:59


 


Intake Total 240 ml 


 


Output Total 350 ml 100 ml


 


Balance -110 ml -100 ml


 


  


 


Intake Oral 240 ml 


 


Output Urine Total 350 ml 100 ml








Laboratory Tests


10/19/19 07:05: 


White Blood Count 23.4*H, Red Blood Count 2.97L, Hemoglobin 8.4L, Hematocrit 

25.0L, Mean Corpuscular Volume 84, Mean Corpuscular Hemoglobin 28.2, Mean 

Corpuscular Hemoglobin Concent 33.5, Red Cell Distribution Width 13.1, Platelet 

Count 516H, Mean Platelet Volume 7.3, Neutrophils (%) (Auto) , Lymphocytes (%) (

Auto) , Monocytes (%) (Auto) , Eosinophils (%) (Auto) , Basophils (%) (Auto) , 

Differential Total Cells Counted 100, Neutrophils % (Manual) 86H, Lymphocytes % 

(Manual) 7L, Monocytes % (Manual) 6, Eosinophils % (Manual) 1, Basophils % (

Manual) 0, Band Neutrophils 0, Platelet Estimate IncreasedH, Platelet 

Morphology Normal, Hypochromasia 1+, Sodium Level 146H, Potassium Level 3.5, 

Chloride Level 111H, Carbon Dioxide Level 22, Anion Gap 13, Blood Urea Nitrogen 

27H, Creatinine 1.6H, Estimat Glomerular Filtration Rate 32.3, Glucose Level 

110H, Calcium Level 8.6, Calcium (Send out) [Pending], Total Bilirubin 0.4, 

Aspartate Amino Transf (AST/SGOT) 16, Alanine Aminotransferase (ALT/SGPT) 27, 

Alkaline Phosphatase 110, Total Protein 6.1L, Albumin 1.5L, Globulin 4.6, 

Albumin/Globulin Ratio 0.3L, Amylase Level 20L, Lipase 38L, Parathyroid Hormone 

(Intact) [Pending]


Height (Feet):  5


Height (Inches):  5.00


Weight (Pounds):  151


General Appearance:  no apparent distress


Cardiovascular:  normal rate


Respiratory/Chest:  decreased breath sounds


Genitourinary/Rectal:  other - has casas


Objective


no change











Lukasz Vizcaino MD Oct 19, 2019 16:53

## 2019-10-19 NOTE — NUR
NURSE NOTES:

Report received from ELIZABETH Reed.  Pt in bed, resting.  In no acute distress noted.  Breathing 
even and unlabored.  Bed in lowest position.  Call lights within reach.  Will continue plan 
of care.

## 2019-10-20 VITALS — DIASTOLIC BLOOD PRESSURE: 54 MMHG | SYSTOLIC BLOOD PRESSURE: 149 MMHG

## 2019-10-20 VITALS — DIASTOLIC BLOOD PRESSURE: 70 MMHG | SYSTOLIC BLOOD PRESSURE: 145 MMHG

## 2019-10-20 VITALS — SYSTOLIC BLOOD PRESSURE: 156 MMHG | DIASTOLIC BLOOD PRESSURE: 83 MMHG

## 2019-10-20 VITALS — SYSTOLIC BLOOD PRESSURE: 148 MMHG | DIASTOLIC BLOOD PRESSURE: 74 MMHG

## 2019-10-20 VITALS — DIASTOLIC BLOOD PRESSURE: 77 MMHG | SYSTOLIC BLOOD PRESSURE: 161 MMHG

## 2019-10-20 VITALS — SYSTOLIC BLOOD PRESSURE: 157 MMHG | DIASTOLIC BLOOD PRESSURE: 71 MMHG

## 2019-10-20 LAB
ADD MANUAL DIFF: YES
ALBUMIN SERPL-MCNC: 1.5 G/DL (ref 3.4–5)
ALBUMIN/GLOB SERPL: 0.3 {RATIO} (ref 1–2.7)
ALP SERPL-CCNC: 110 U/L (ref 46–116)
ALT SERPL-CCNC: 31 U/L (ref 12–78)
ANION GAP SERPL CALC-SCNC: 14 MMOL/L (ref 5–15)
AST SERPL-CCNC: 19 U/L (ref 15–37)
BILIRUB SERPL-MCNC: 0.4 MG/DL (ref 0.2–1)
BUN SERPL-MCNC: 33 MG/DL (ref 7–18)
CALCIUM SERPL-MCNC: 8.4 MG/DL (ref 8.5–10.1)
CHLORIDE SERPL-SCNC: 110 MMOL/L (ref 98–107)
CO2 SERPL-SCNC: 22 MMOL/L (ref 21–32)
CREAT SERPL-MCNC: 2.7 MG/DL (ref 0.55–1.3)
ERYTHROCYTE [DISTWIDTH] IN BLOOD BY AUTOMATED COUNT: 12 % (ref 11.6–14.8)
GLOBULIN SER-MCNC: 4.8 G/DL
HCT VFR BLD CALC: 25.1 % (ref 37–47)
HGB BLD-MCNC: 8.4 G/DL (ref 12–16)
MCV RBC AUTO: 84 FL (ref 80–99)
PHOSPHATE SERPL-MCNC: 3.7 MG/DL (ref 2.5–4.9)
PLATELET # BLD: 467 K/UL (ref 150–450)
POTASSIUM SERPL-SCNC: 4.1 MMOL/L (ref 3.5–5.1)
RBC # BLD AUTO: 2.98 M/UL (ref 4.2–5.4)
SODIUM SERPL-SCNC: 146 MMOL/L (ref 136–145)
WBC # BLD AUTO: 25.8 K/UL (ref 4.8–10.8)

## 2019-10-20 RX ADMIN — DEXTROSE AND SODIUM CHLORIDE SCH MLS/HR: 5; .45 INJECTION, SOLUTION INTRAVENOUS at 12:11

## 2019-10-20 RX ADMIN — DIVALPROEX SODIUM SCH MG: 125 CAPSULE ORAL at 21:19

## 2019-10-20 RX ADMIN — NATEGLINIDE SCH MG: 60 TABLET ORAL at 06:41

## 2019-10-20 RX ADMIN — INSULIN ASPART SCH UNITS: 100 INJECTION, SOLUTION INTRAVENOUS; SUBCUTANEOUS at 17:10

## 2019-10-20 RX ADMIN — MEROPENEM SCH MLS/HR: 1 INJECTION INTRAVENOUS at 13:26

## 2019-10-20 RX ADMIN — DONEPEZIL HYDROCHLORIDE SCH MG: 5 TABLET, FILM COATED ORAL at 08:44

## 2019-10-20 RX ADMIN — INSULIN DETEMIR SCH UNITS: 100 INJECTION, SOLUTION SUBCUTANEOUS at 08:46

## 2019-10-20 RX ADMIN — ASPIRIN 81 MG SCH MG: 81 TABLET ORAL at 08:44

## 2019-10-20 RX ADMIN — DEXTROSE AND SODIUM CHLORIDE SCH MLS/HR: 5; .45 INJECTION, SOLUTION INTRAVENOUS at 21:38

## 2019-10-20 RX ADMIN — ENOXAPARIN SODIUM SCH MG: 30 INJECTION SUBCUTANEOUS at 09:00

## 2019-10-20 RX ADMIN — DOCUSATE SODIUM SCH MG: 100 CAPSULE, LIQUID FILLED ORAL at 17:07

## 2019-10-20 RX ADMIN — DOCUSATE SODIUM SCH MG: 100 CAPSULE, LIQUID FILLED ORAL at 13:25

## 2019-10-20 RX ADMIN — INSULIN ASPART SCH UNITS: 100 INJECTION, SOLUTION INTRAVENOUS; SUBCUTANEOUS at 21:22

## 2019-10-20 RX ADMIN — MEROPENEM SCH MLS/HR: 1 INJECTION INTRAVENOUS at 23:59

## 2019-10-20 RX ADMIN — INSULIN ASPART SCH UNITS: 100 INJECTION, SOLUTION INTRAVENOUS; SUBCUTANEOUS at 12:04

## 2019-10-20 RX ADMIN — DOCUSATE SODIUM SCH MG: 100 CAPSULE, LIQUID FILLED ORAL at 08:44

## 2019-10-20 RX ADMIN — MEROPENEM SCH MLS/HR: 1 INJECTION INTRAVENOUS at 03:19

## 2019-10-20 RX ADMIN — DIVALPROEX SODIUM SCH MG: 125 CAPSULE ORAL at 08:44

## 2019-10-20 RX ADMIN — INSULIN ASPART SCH UNITS: 100 INJECTION, SOLUTION INTRAVENOUS; SUBCUTANEOUS at 06:52

## 2019-10-20 NOTE — PULMONOLOGY PROGRESS NOTE
Assessment/Plan


Problems:  


(1) Healthcare associated bacterial pneumonia


(2) UTI (urinary tract infection)


(3) Sepsis


(4) Dehydration


(5) CHANCE (acute kidney injury)


(6) Acute metabolic encephalopathy


(7) Hyperglycemia due to type 2 diabetes mellitus


(8) Renal failure (ARF), acute on chronic


(9) Aspiration pneumonia


(10) Abnormal TSH


Assessment/Plan


Marked leukocytosis


Sepsis


Possible pneumonia


E coli UTI


Acute hypoxemic respiratory failure


Acute encephalopathy


Hx CHF


HTN


CHANCE 





Plan:


-F/U CT CAP


-Monitor WCt


-Abx per ID


-monitor volumes and renal function, F/U renal recs


-monitor encephalopathy


-Asp precautions, SLP recs 


-DVT Px: LMWH


-FC





Subjective


Allergies:  


Coded Allergies:  


     No Known Allergies (Unverified , 10/14/19)


Subjective


WCT inc Cr inc


+ cough + SOB no FC





Objective





Last 24 Hour Vital Signs








  Date Time  Temp Pulse Resp B/P (MAP) Pulse Ox O2 Delivery O2 Flow Rate FiO2


 


10/20/19 21:00      Nasal Cannula 2.0 


 


10/20/19 20:45     97 Nasal Cannula 2.0 28


 


10/20/19 20:00 99.6 98 20 156/83 (107) 98   


 


10/20/19 20:00  98      


 


10/20/19 16:00  88      


 


10/20/19 16:00 97.9 98 20 157/71 (99) 98   


 


10/20/19 12:00 98.0 99 20 161/77 (105) 99   


 


10/20/19 12:00  93      


 


10/20/19 09:00      Nasal Cannula 2.0 


 


10/20/19 08:00 98.2 94 20 145/70 (95) 98   


 


10/20/19 08:00  100      


 


10/20/19 04:00  109      


 


10/20/19 04:00 98.8 104 18 149/54 (85) 98   


 


10/20/19 00:00 98.9 93 18 148/74 (98) 97   


 


10/20/19 00:00  93      

















Intake and Output  


 


 10/19/19 10/20/19





 18:59 06:59


 


Intake Total  500 ml


 


Output Total  600 ml


 


Balance  -100 ml


 


  


 


Other  500 ml


 


Output Urine Total  600 ml








General Appearance:  no acute distress, cachetic


HEENT:  normocephalic, atraumatic, anicteric, mucous membranes moist


Respiratory/Chest:  rhonchi


Cardiovascular:  normal peripheral pulses, normal rate, regular rhythm


Abdomen:  normal bowel sounds, soft, non tender, no organomegaly, non distended

, no mass


Extremities:  no cyanosis, no clubbing, no edema


Laboratory Tests


10/20/19 06:28: 


White Blood Count 25.8*H, Red Blood Count 2.98L, Hemoglobin 8.4L, Hematocrit 

25.1L, Mean Corpuscular Volume 84, Mean Corpuscular Hemoglobin 28.1, Mean 

Corpuscular Hemoglobin Concent 33.3, Red Cell Distribution Width 12.0, Platelet 

Count 467H, Mean Platelet Volume 7.3, Neutrophils (%) (Auto) , Lymphocytes (%) (

Auto) , Monocytes (%) (Auto) , Eosinophils (%) (Auto) , Basophils (%) (Auto) , 

Differential Total Cells Counted 100, Neutrophils % (Manual) 92H, Lymphocytes % 

(Manual) 4L, Monocytes % (Manual) 4, Eosinophils % (Manual) 0, Basophils % (

Manual) 0, Band Neutrophils 0, Platelet Estimate Adequate, Platelet Morphology 

Normal, Hypochromasia 1+, Sodium Level 146H, Potassium Level 4.1, Chloride 

Level 110H, Carbon Dioxide Level 22, Anion Gap 14, Blood Urea Nitrogen 33H, 

Creatinine 2.7#H, Estimat Glomerular Filtration Rate 17.7, Glucose Level 136H, 

Uric Acid 10.0H, Calcium Level 8.4L, Phosphorus Level 3.7, Magnesium Level 2.6H

, Total Bilirubin 0.4, Aspartate Amino Transf (AST/SGOT) 19, Alanine 

Aminotransferase (ALT/SGPT) 31, Alkaline Phosphatase 110, Total Protein 6.3L, 

Albumin 1.5L, Globulin 4.8, Albumin/Globulin Ratio 0.3L


10/20/19 08:59: Random Vancomycin Level 26.7


10/20/19 10:16: Urine Random Sodium 40





Current Medications








 Medications


  (Trade)  Dose


 Ordered  Sig/Winnie


 Route


 PRN Reason  Start Time


 Stop Time Status Last Admin


Dose Admin


 


 Acetaminophen


  (Tylenol)  650 mg  PRN  PRN


 RECTAL


 TEMP>100.5  10/14/19 03:00


     


 


 


 Aspirin


  (ASA)  81 mg  DAILY


 ORAL


   10/15/19 09:00


 11/14/19 08:59  10/20/19 08:44


 


 


 Atorvastatin


 Calcium


  (Lipitor)  10 mg  BEDTIME


 ORAL


   10/14/19 21:00


 11/13/19 20:59  10/20/19 21:19


 


 


 Clopidogrel


 Bisulfate


  (Plavix)  75 mg  DAILY


 ORAL


   10/15/19 09:00


 11/14/19 08:59  10/20/19 08:44


 


 


 Dextrose


  (Dextrose 50%)  25 ml  Q30M  PRN


 IV


 Hypoglycemia  10/14/19 07:00


 11/13/19 06:59   


 


 


 Dextrose


  (Dextrose 50%)  50 ml  Q30M  PRN


 IV


 Hypoglycemia  10/14/19 07:00


 11/13/19 06:59   


 


 


 Dextrose/Sodium


 Chloride  1,000 ml @ 


 100 mls/hr  Q10H


 IV


   10/20/19 11:45


 11/19/19 11:44  10/20/19 21:38


 


 


 Divalproex Sodium


  (Depakote


 Sprinkles)  500 mg  Q12HR


 ORAL


   10/15/19 22:00


 11/14/19 21:59  10/20/19 21:19


 


 


 Docusate Sodium


  (Colace)  100 mg  THREE TIMES A  DAY


 ORAL


   10/14/19 13:00


 11/13/19 12:59  10/20/19 17:07


 


 


 Donepezil HCl


  (Aricept)  5 mg  DAILY


 ORAL


   10/15/19 11:00


 11/14/19 10:59  10/20/19 08:44


 


 


 Enoxaparin Sodium


  (Lovenox)  30 mg  DAILY


 SUBQ


   10/14/19 09:00


 11/13/19 08:59  10/19/19 09:15


 


 


 Insulin Aspart


  (NovoLOG)    BEFORE MEALS AND  HS


 SUBQ


   10/14/19 11:30


 11/13/19 11:29  10/20/19 21:22


 


 


 Insulin Detemir


  (Levemir)  10 units  DAILY


 SUBQ


   10/16/19 09:00


 11/13/19 10:29  10/20/19 08:46


 


 


 Lactulose


  (Cephulac)  20 gm  TIDPRN  PRN


 ORAL


 Constipation  10/16/19 14:45


 11/15/19 14:44  10/16/19 15:21


 


 


 Lorazepam


  (Ativan 2mg/ml


 1ml)  1 mg  Q8HR  PRN


 IV


 For Anxiety  10/17/19 19:15


 10/24/19 19:14  10/18/19 01:12


 


 


 Meropenem 1 gm/


 Sodium Chloride  55 ml @ 


 110 mls/hr  Q12H


 IVPB


   10/16/19 14:00


 10/23/19 13:59  10/20/19 13:26


 


 


 Nateglinide


  (Starlix)  120 mg  TIAC


 ORAL


   10/20/19 11:30


 11/19/19 11:29  10/20/19 17:07


 


 


 Olanzapine


  (ZyPREXA)  5 mg  DAILY


 ORAL


   10/15/19 11:00


 11/14/19 10:59  10/20/19 08:44


 


 


 Pantoprazole


  (Protonix)  40 mg  BID


 ORAL


   10/14/19 18:00


 11/13/19 08:59  10/20/19 17:07


 

















Sammy Torres MD Oct 20, 2019 22:25

## 2019-10-20 NOTE — GENERAL PROGRESS NOTE
Assessment/Plan


Problem List:  


(1) Abnormal TSH


ICD Codes:  R79.89 - Other specified abnormal findings of blood chemistry


SNOMED:  815471034


(2) Hyperglycemia due to type 2 diabetes mellitus


ICD Codes:  E11.65 - Type 2 diabetes mellitus with hyperglycemia


SNOMED:  674589966953113, [id number]


Qualifiers:  


   Qualified Codes:  E11.65 - Type 2 diabetes mellitus with hyperglycemia; 

Z79.4 - Long term (current) use of insulin


(3) Acute metabolic encephalopathy


ICD Codes:  G93.41 - Metabolic encephalopathy


SNOMED:  68178330, [id number]


(4) ARF (acute renal failure)


ICD Codes:  N17.9 - Acute kidney failure, unspecified


SNOMED:  60371466


Qualifiers:  


   Qualified Codes:  N17.9 - Acute kidney failure, unspecified


(5) Healthcare associated bacterial pneumonia


ICD Codes:  J15.9 - Unspecified bacterial pneumonia


SNOMED:  479697987


Status:  stable


Assessment/Plan:


continue Levemir 10 units qam 


increase Starlix 60 to 120 mg ac tid 


continue NISS ac / hs 





low TSH, normal free T4 and low free T3 most likely due to non thyroidal 

illness 


no need for thyroid medications for now 


thyroid antibodies are pending





Subjective


Allergies:  


Coded Allergies:  


     No Known Allergies (Unverified , 10/14/19)


All Systems:  reviewed and negative except above


Subjective


events noted 


glucose values are elevated before meals 














Item Value  Date Time


 


Bedside Blood Glucose 142 mg/dl H 10/20/19 0652


 


Bedside Blood Glucose 232 mg/dl H 10/19/19 2103


 


Bedside Blood Glucose 224 mg/dl H 10/19/19 1707


 


Bedside Blood Glucose 242 mg/dl H 10/19/19 1142











Objective





Last 24 Hour Vital Signs








  Date Time  Temp Pulse Resp B/P (MAP) Pulse Ox O2 Delivery O2 Flow Rate FiO2


 


10/20/19 00:00 98.9 93 18 148/74 (98) 97   


 


10/20/19 00:00  93      


 


10/19/19 21:00      Nasal Cannula 2.0 


 


10/19/19 20:00  88      


 


10/19/19 20:00 99.7 88 18 160/83 (108) 98   


 


10/19/19 19:48     98 Nasal Cannula 2.0 28


 


10/19/19 19:47  88 18  98 Nasal Cannula 2.0 28


 


10/19/19 16:00  95      


 


10/19/19 16:00 97.9 83 18 131/83 (99) 96   


 


10/19/19 12:00 97.8 97 18 140/82 (101) 97   


 


10/19/19 12:00  97      


 


10/19/19 09:00      Nasal Cannula 2.0 


 


10/19/19 08:00     98 Nasal Cannula 2.0 28


 


10/19/19 08:00  90      


 


10/19/19 08:00  93 20  98 Nasal Cannula 2.0 28


 


10/19/19 08:00 97.9 99 19 165/87 (113) 99   

















Intake and Output  


 


 10/19/19 10/20/19





 19:00 07:00


 


Intake Total  500 ml


 


Output Total  600 ml


 


Balance  -100 ml


 


  


 


Other  500 ml


 


Output Urine Total  600 ml








Height (Feet):  5


Height (Inches):  5.00


Weight (Pounds):  151


General Appearance:  no apparent distress


Neck:  normal alignment


Cardiovascular:  normal rate


Respiratory/Chest:  decreased breath sounds


Objective





Current Medications








 Medications


  (Trade)  Dose


 Ordered  Sig/Winnie


 Route


 PRN Reason  Start Time


 Stop Time Status Last Admin


Dose Admin


 


 Acetaminophen


  (Tylenol)  650 mg  PRN  PRN


 RECTAL


 TEMP>100.5  10/14/19 03:00


     


 


 


 Aspirin


  (ASA)  81 mg  DAILY


 ORAL


   10/15/19 09:00


 11/14/19 08:59  10/19/19 09:14


 


 


 Atorvastatin


 Calcium


  (Lipitor)  10 mg  BEDTIME


 ORAL


   10/14/19 21:00


 11/13/19 20:59  10/19/19 20:49


 


 


 Clopidogrel


 Bisulfate


  (Plavix)  75 mg  DAILY


 ORAL


   10/15/19 09:00


 11/14/19 08:59  10/19/19 09:14


 


 


 Dextrose


  (Dextrose 50%)  25 ml  Q30M  PRN


 IV


 Hypoglycemia  10/14/19 07:00


 11/13/19 06:59   


 


 


 Dextrose


  (Dextrose 50%)  50 ml  Q30M  PRN


 IV


 Hypoglycemia  10/14/19 07:00


 11/13/19 06:59   


 


 


 Divalproex Sodium


  (Depakote


 Sprinkles)  500 mg  Q12HR


 ORAL


   10/15/19 22:00


 11/14/19 21:59  10/19/19 20:50


 


 


 Docusate Sodium


  (Colace)  100 mg  THREE TIMES A  DAY


 ORAL


   10/14/19 13:00


 11/13/19 12:59  10/19/19 17:06


 


 


 Donepezil HCl


  (Aricept)  5 mg  DAILY


 ORAL


   10/15/19 11:00


 11/14/19 10:59  10/19/19 09:14


 


 


 Enoxaparin Sodium


  (Lovenox)  30 mg  DAILY


 SUBQ


   10/14/19 09:00


 11/13/19 08:59  10/19/19 09:15


 


 


 Insulin Aspart


  (NovoLOG)    BEFORE MEALS AND  HS


 SUBQ


   10/14/19 11:30


 11/13/19 11:29  10/20/19 06:52


 


 


 Insulin Detemir


  (Levemir)  10 units  DAILY


 SUBQ


   10/16/19 09:00


 11/13/19 10:29  10/19/19 09:16


 


 


 Lactulose


  (Cephulac)  20 gm  TIDPRN  PRN


 ORAL


 Constipation  10/16/19 14:45


 11/15/19 14:44  10/16/19 15:21


 


 


 Lorazepam


  (Ativan 2mg/ml


 1ml)  1 mg  Q8HR  PRN


 IV


 For Anxiety  10/17/19 19:15


 10/24/19 19:14  10/18/19 01:12


 


 


 Meropenem 1 gm/


 Sodium Chloride  55 ml @ 


 110 mls/hr  Q12H


 IVPB


   10/16/19 14:00


 10/21/19 13:59  10/20/19 03:19


 


 


 Nateglinide


  (Starlix)  60 mg  TIAC


 ORAL


   10/18/19 11:30


 11/17/19 11:29  10/20/19 06:41


 


 


 Olanzapine


  (ZyPREXA)  5 mg  DAILY


 ORAL


   10/15/19 11:00


 11/14/19 10:59  10/19/19 09:14


 


 


 Pantoprazole


  (Protonix)  40 mg  BID


 ORAL


   10/14/19 18:00


 11/13/19 08:59  10/19/19 17:06


 


 


 Vancomycin HCl


  (Vanco rx to


 dose)  1 ea  DAILY  PRN


 MISC


 Per rx protocol  10/14/19 07:30


 11/13/19 07:29   


 


 


 Vancomycin HCl 1


 gm/Dextrose  275 ml @ 


 183.708


 mls/hr  Q24H


 IVPB


   10/18/19 09:00


 10/23/19 08:59  10/19/19 09:15


 

















Riccardo Velez MD Oct 20, 2019 07:12

## 2019-10-20 NOTE — NEPHROLOGY PROGRESS NOTE
Assessment/Plan


Problem List:  


(1) Renal failure (ARF), acute on chronic


(2) Dehydration


(3) ARF (acute renal failure)


(4) Sepsis


(5) Acute metabolic encephalopathy


(6) Hyperglycemia due to type 2 diabetes mellitus


(7) UTI (urinary tract infection)


(8) Diabetic nephropathy


Assessment





Renal failure, Acute on Chronic resolved


Dehydration


Severe Anemia


Sepsis / Pneumonia / UTI


DM, OOC


Proteinuria , Diabetic Nephropathy


Acute encephalopathy


Plan


Cr rising


Will order urine studies and monitor renal parameters


kidney YOANDY


Hydrate


WBCs remain elevated but decreasing


has casas again due to retention


Avoid Nephrotoxics





previously


Anemia salas


2D echo


24 H urine protein


Keep BP and BS in check


I am resuming her psych meds fro [facility] for now


Per orders





Objective


Objective





Last 24 Hour Vital Signs








  Date Time  Temp Pulse Resp B/P (MAP) Pulse Ox O2 Delivery O2 Flow Rate FiO2


 


10/20/19 09:00      Nasal Cannula 2.0 


 


10/20/19 08:00 98.2 94 20 145/70 (95) 98   


 


10/20/19 08:00  100      


 


10/20/19 04:00  109      


 


10/20/19 04:00 98.8 104 18 149/54 (85) 98   


 


10/20/19 00:00 98.9 93 18 148/74 (98) 97   


 


10/20/19 00:00  93      


 


10/19/19 21:00      Nasal Cannula 2.0 


 


10/19/19 20:00  88      


 


10/19/19 20:00 99.7 88 18 160/83 (108) 98   


 


10/19/19 19:48     98 Nasal Cannula 2.0 28


 


10/19/19 19:47  88 18  98 Nasal Cannula 2.0 28


 


10/19/19 16:00  95      


 


10/19/19 16:00 97.9 83 18 131/83 (99) 96   


 


10/19/19 12:00 97.8 97 18 140/82 (101) 97   


 


10/19/19 12:00  97      

















Intake and Output  


 


 10/19/19 10/20/19





 19:00 07:00


 


Intake Total  500 ml


 


Output Total  600 ml


 


Balance  -100 ml


 


  


 


Other  500 ml


 


Output Urine Total  600 ml








Laboratory Tests


10/20/19 06:28: 


White Blood Count 25.8*H, Red Blood Count 2.98L, Hemoglobin 8.4L, Hematocrit 

25.1L, Mean Corpuscular Volume 84, Mean Corpuscular Hemoglobin 28.1, Mean 

Corpuscular Hemoglobin Concent 33.3, Red Cell Distribution Width 12.0, Platelet 

Count 467H, Mean Platelet Volume 7.3, Neutrophils (%) (Auto) , Lymphocytes (%) (

Auto) , Monocytes (%) (Auto) , Eosinophils (%) (Auto) , Basophils (%) (Auto) , 

Differential Total Cells Counted 100, Neutrophils % (Manual) 92H, Lymphocytes % 

(Manual) 4L, Monocytes % (Manual) 4, Eosinophils % (Manual) 0, Basophils % (

Manual) 0, Band Neutrophils 0, Platelet Estimate Adequate, Platelet Morphology 

Normal, Hypochromasia 1+, Sodium Level 146H, Potassium Level 4.1, Chloride 

Level 110H, Carbon Dioxide Level 22, Anion Gap 14, Blood Urea Nitrogen 33H, 

Creatinine 2.7#H, Estimat Glomerular Filtration Rate 17.7, Glucose Level 136H, 

Uric Acid 10.0H, Calcium Level 8.4L, Phosphorus Level 3.7, Magnesium Level 2.6H

, Total Bilirubin 0.4, Aspartate Amino Transf (AST/SGOT) 19, Alanine 

Aminotransferase (ALT/SGPT) 31, Alkaline Phosphatase 110, Total Protein 6.3L, 

Albumin 1.5L, Globulin 4.8, Albumin/Globulin Ratio 0.3L


10/20/19 08:59: Random Vancomycin Level 26.7


10/20/19 10:16: Urine Random Sodium 40


Height (Feet):  5


Height (Inches):  5.00


Weight (Pounds):  151


Objective


no change











Lukasz Vizcaino MD Oct 20, 2019 11:40

## 2019-10-20 NOTE — SURGERY PROGRESS NOTE
Surgery Progress Note


Subjective


Additional Comments


persistent leukocytosis


sandy


exam stable





Objective





Last 24 Hour Vital Signs








  Date Time  Temp Pulse Resp B/P (MAP) Pulse Ox O2 Delivery O2 Flow Rate FiO2


 


10/20/19 12:00 98.0 99 20 161/77 (105) 99   


 


10/20/19 12:00  93      


 


10/20/19 09:00      Nasal Cannula 2.0 


 


10/20/19 08:00 98.2 94 20 145/70 (95) 98   


 


10/20/19 08:00  100      


 


10/20/19 04:00  109      


 


10/20/19 04:00 98.8 104 18 149/54 (85) 98   


 


10/20/19 00:00 98.9 93 18 148/74 (98) 97   


 


10/20/19 00:00  93      


 


10/19/19 21:00      Nasal Cannula 2.0 


 


10/19/19 20:00  88      


 


10/19/19 20:00 99.7 88 18 160/83 (108) 98   


 


10/19/19 19:48     98 Nasal Cannula 2.0 28


 


10/19/19 19:47  88 18  98 Nasal Cannula 2.0 28








I&O











Intake and Output  


 


 10/19/19 10/20/19





 19:00 07:00


 


Intake Total  500 ml


 


Output Total  600 ml


 


Balance  -100 ml


 


  


 


Other  500 ml


 


Output Urine Total  600 ml








Dressing:  saturated


Wound:  clean


Cardiovascular:  RSR


Respiratory:  clear


Abdomen:  soft, non-tender, present bowel sounds


Extremities:  no cyanosis, other





Laboratory Tests








Test


  10/20/19


06:28 10/20/19


08:59 10/20/19


10:16


 


White Blood Count


  25.8 K/UL


(4.8-10.8)  *H 


  


 


 


Red Blood Count


  2.98 M/UL


(4.20-5.40)  L 


  


 


 


Hemoglobin


  8.4 G/DL


(12.0-16.0)  L 


  


 


 


Hematocrit


  25.1 %


(37.0-47.0)  L 


  


 


 


Mean Corpuscular Volume 84 FL (80-99)    


 


Mean Corpuscular Hemoglobin


  28.1 PG


(27.0-31.0) 


  


 


 


Mean Corpuscular Hemoglobin


Concent 33.3 G/DL


(32.0-36.0) 


  


 


 


Red Cell Distribution Width


  12.0 %


(11.6-14.8) 


  


 


 


Platelet Count


  467 K/UL


(150-450)  H 


  


 


 


Mean Platelet Volume


  7.3 FL


(6.5-10.1) 


  


 


 


Neutrophils (%) (Auto)


  % (45.0-75.0)


  


  


 


 


Lymphocytes (%) (Auto)


  % (20.0-45.0)


  


  


 


 


Monocytes (%) (Auto)  % (1.0-10.0)    


 


Eosinophils (%) (Auto)  % (0.0-3.0)    


 


Basophils (%) (Auto)  % (0.0-2.0)    


 


Differential Total Cells


Counted 100  


  


  


 


 


Neutrophils % (Manual) 92 % (45-75)  H  


 


Lymphocytes % (Manual) 4 % (20-45)  L  


 


Monocytes % (Manual) 4 % (1-10)    


 


Eosinophils % (Manual) 0 % (0-3)    


 


Basophils % (Manual) 0 % (0-2)    


 


Band Neutrophils 0 % (0-8)    


 


Platelet Estimate Adequate    


 


Platelet Morphology Normal    


 


Hypochromasia 1+    


 


Sodium Level


  146 MMOL/L


(136-145)  H 


  


 


 


Potassium Level


  4.1 MMOL/L


(3.5-5.1) 


  


 


 


Chloride Level


  110 MMOL/L


()  H 


  


 


 


Carbon Dioxide Level


  22 MMOL/L


(21-32) 


  


 


 


Anion Gap


  14 mmol/L


(5-15) 


  


 


 


Blood Urea Nitrogen


  33 mg/dL


(7-18)  H 


  


 


 


Creatinine


  2.7 MG/DL


(0.55-1.30)  #H 


  


 


 


Estimat Glomerular Filtration


Rate 17.7 mL/min


(>60) 


  


 


 


Glucose Level


  136 MG/DL


()  H 


  


 


 


Uric Acid


  10.0 MG/DL


(2.6-7.2)  H 


  


 


 


Calcium Level


  8.4 MG/DL


(8.5-10.1)  L 


  


 


 


Phosphorus Level


  3.7 MG/DL


(2.5-4.9) 


  


 


 


Magnesium Level


  2.6 MG/DL


(1.8-2.4)  H 


  


 


 


Total Bilirubin


  0.4 MG/DL


(0.2-1.0) 


  


 


 


Aspartate Amino Transf


(AST/SGOT) 19 U/L (15-37)


  


  


 


 


Alanine Aminotransferase


(ALT/SGPT) 31 U/L (12-78)


  


  


 


 


Alkaline Phosphatase


  110 U/L


() 


  


 


 


Total Protein


  6.3 G/DL


(6.4-8.2)  L 


  


 


 


Albumin


  1.5 G/DL


(3.4-5.0)  L 


  


 


 


Globulin 4.8 g/dL    


 


Albumin/Globulin Ratio


  0.3 (1.0-2.7)


L 


  


 


 


Random Vancomycin Level  26.7 ug/mL   


 


Urine Random Sodium


  


  


  40 mmol/L


()











Plan


Problems:  


(1) Pressure injury of deep tissue


Assessment & Plan:  Pt presented on admission with DTPI R heel. Blood filled 

blister with marginal erythema noted to heel. Pt exhibited agitation when 

minimally palpated. (L)2.2cm x (W)2.1cm


L heel is blanchable .


Non-blanchable erythema without induration noted to sacral cleft. 


No other areas of skin concerns noted.





Tx.Plan:


Apply Betadine to R heel. Cover with Optifoam drsg. Change every 3 days and prn.


           


Apply Cavilon Skin Barrier to L heel. Cover with Optifoam drsg. Change every 7 

days and prn.


           


Apply Moisture Barrier Paste to buttocks. Cover sacral area with Optifoam drsg. 

Change every 3 days and prn.


           


Reposition at least every 2hours or as tolerated.


           


Off-load heels with pillow.





(2) Sepsis


Assessment & Plan:  Patient with low-grade fevers, anemia, leukocytosis 

persistent, elevated platelets, abnormal labs.


Etiology currently unknown and work-up in progress.  Labs noted and imaging 

that is available as noted.  Other imaging is pending and will be reviewed once 

available


Okay for diet


Antibiotics as per infectious disease 


local wound care as wounds do not seem to be the etiology of her sepsis


We will monitor and follow with recommendations


Thank you for allowing me to participate in patient's care














Radhames Blas Oct 20, 2019 16:09

## 2019-10-20 NOTE — NUR
NURSE NOTES:

Received report from Anusha Thibodeaux. Patient is sleeping in bed. No signs or symptoms of 
distress. bed locked to lowest position, side rails X3 up. call bell within patients reach. 
will anticipate and attend to patients needs.

## 2019-10-20 NOTE — GENERAL PROGRESS NOTE
Assessment/Plan


Status:  stable


Assessment/Plan:


S: Not verbal





O: poor historian, seems comfortable. IV sites are intact. Charles in place





PHYSICAL EXAMINATION:HEAD AND NECK:  Atraumatic and normocephalic.


CHEST:  Bronchial breathing sounds.ABDOMEN:  Soft.  No organomegaly.


MUSCULOSKELETAL:  Diffuse 1+ or 2+ nonpitting edema in all four contracted


extremities.  The patient is not following the commands.  Limited


evaluation.NEUROLOGY:  The patient is awake.  Not alert.  Not verbally


communicative.





LABORATORY DATA:  Labs dated October 16, 2019  reviewed





Meds: Reviewed and reconciled in the chart 





ASSESSMENT AND PLAN:


1. Sepsis.  Differential diagnosis is healthcare-associated pneumonia or


healthcare-associated urinary tract infection.  Work in progress.


2. Chronic encephalomalacia.


3. Acute on chronic anemia.


4. Renal failure, age indeterminate.


5. Congestive heart failure- preserved EF


6. Hyperthyroidism.


7. Hyperkalemia.


8. Diabetes type 2, uncontrolled with A1c of 10.


9. Psychiatric disorder.


10. GI and DVT prophylaxis.


11. PAH- Severe 





PLAN OF CARE:


 Notes from  Pulmonary Critical Care, Infectious


Disease, and Nephrology reviewed


post Blood transfusion


modified abx per ID, given the increase in WBC and low grade fever


Notes from pulmonary reveiwed 


Worsening renal failure, will discuss with cardiology if could hold ASA





Subjective


Allergies:  


Coded Allergies:  


     No Known Allergies (Unverified , 10/14/19)





Objective





Last 24 Hour Vital Signs








  Date Time  Temp Pulse Resp B/P (MAP) Pulse Ox O2 Delivery O2 Flow Rate FiO2


 


10/20/19 08:00 98.2 94 20 145/70 (95) 98   


 


10/20/19 04:00  109      


 


10/20/19 04:00 98.8 104 18 149/54 (85) 98   


 


10/20/19 00:00 98.9 93 18 148/74 (98) 97   


 


10/20/19 00:00  93      


 


10/19/19 21:00      Nasal Cannula 2.0 


 


10/19/19 20:00  88      


 


10/19/19 20:00 99.7 88 18 160/83 (108) 98   


 


10/19/19 19:48     98 Nasal Cannula 2.0 28


 


10/19/19 19:47  88 18  98 Nasal Cannula 2.0 28


 


10/19/19 16:00  95      


 


10/19/19 16:00 97.9 83 18 131/83 (99) 96   


 


10/19/19 12:00 97.8 97 18 140/82 (101) 97   


 


10/19/19 12:00  97      

















Intake and Output  


 


 10/19/19 10/20/19





 19:00 07:00


 


Intake Total  500 ml


 


Output Total  600 ml


 


Balance  -100 ml


 


  


 


Other  500 ml


 


Output Urine Total  600 ml








Laboratory Tests


10/20/19 06:28: 


White Blood Count 25.8*H, Red Blood Count 2.98L, Hemoglobin 8.4L, Hematocrit 

25.1L, Mean Corpuscular Volume 84, Mean Corpuscular Hemoglobin 28.1, Mean 

Corpuscular Hemoglobin Concent 33.3, Red Cell Distribution Width 12.0, Platelet 

Count 467H, Mean Platelet Volume 7.3, Neutrophils (%) (Auto) , Lymphocytes (%) (

Auto) , Monocytes (%) (Auto) , Eosinophils (%) (Auto) , Basophils (%) (Auto) , 

Differential Total Cells Counted 100, Neutrophils % (Manual) 92H, Lymphocytes % 

(Manual) 4L, Monocytes % (Manual) 4, Eosinophils % (Manual) 0, Basophils % (

Manual) 0, Band Neutrophils 0, Platelet Estimate Adequate, Platelet Morphology 

Normal, Hypochromasia 1+, Sodium Level 146H, Potassium Level 4.1, Chloride 

Level 110H, Carbon Dioxide Level 22, Anion Gap 14, Blood Urea Nitrogen 33H, 

Creatinine 2.7#H, Estimat Glomerular Filtration Rate 17.7, Glucose Level 136H, 

Uric Acid 10.0H, Calcium Level 8.4L, Phosphorus Level 3.7, Magnesium Level 2.6H

, Total Bilirubin 0.4, Aspartate Amino Transf (AST/SGOT) 19, Alanine 

Aminotransferase (ALT/SGPT) 31, Alkaline Phosphatase 110, Total Protein 6.3L, 

Albumin 1.5L, Globulin 4.8, Albumin/Globulin Ratio 0.3L


10/20/19 08:59: Random Vancomycin Level 26.7


Height (Feet):  5


Height (Inches):  5.00


Weight (Pounds):  151











Garrick Keith MD Oct 20, 2019 09:58

## 2019-10-20 NOTE — NUR
NURSE NOTES:

patient noted with epistaxis as per report patient started pt started on night shift. Dr. Keith on the floor seen and examine patient aware of the epistaxis. requested to Rt to put 
patient on humidified oxygen. Ice packs applied to nasal area. will hold lovenox and monitor 
patient.

## 2019-10-20 NOTE — INFECTIOUS DISEASES PROG NOTE
Assessment/Plan


Problems:  


(1) Aspiration pneumonia


Assessment & Plan:  with B/L infiltrates, L>R suspect due to altered mental 

status , with hypoxemia and leukocytosis, continue vancomycin AND meropenem 

empirically, repeated  CXR  showed no change , pending CT of the chest for 

further eval, aspiration precaution 





(2) UTI (urinary tract infection)


Assessment & Plan:  due to ESBL producing E coli already on meropenem to cover 

for sepsis and pneumonia too 





(3) Sepsis


Assessment & Plan:  due to the above, with persistent leukocytosis inspit of 

being on wide spectrum antibiotics and negative repeated blood cultures, 

suspect bone marrow pathology such as CLL , recommend bone marrow eval if WBC 

doesn't normalize  





(4) Acute metabolic encephalopathy


Assessment & Plan:  due to the above, continue hydration and antibiotics, 

monitor in tele 





(5) Hyperglycemia due to type 2 diabetes mellitus


Assessment & Plan:  recommend tight glycemic control to keep blood glucose 

between 100-140 





(6) ARF (acute renal failure)


Assessment & Plan:  due to the above, continue hydration and renally dosed 

antibiotics, close  monitor of renal function , stop vancomycin 








Subjective


ROS Limited/Unobtainable:  Yes


Allergies:  


Coded Allergies:  


     No Known Allergies (Unverified , 10/14/19)


Subjective


she was lethargic , but responsive , no fever or chills , no diarrhea, no cough 

or SOB





Objective


Vital Signs





Last 24 Hour Vital Signs








  Date Time  Temp Pulse Resp B/P (MAP) Pulse Ox O2 Delivery O2 Flow Rate FiO2


 


10/20/19 20:45     97 Nasal Cannula 2.0 28


 


10/20/19 16:00  88      


 


10/20/19 16:00 97.9 98 20 157/71 (99) 98   


 


10/20/19 12:00 98.0 99 20 161/77 (105) 99   


 


10/20/19 12:00  93      


 


10/20/19 09:00      Nasal Cannula 2.0 


 


10/20/19 08:00 98.2 94 20 145/70 (95) 98   


 


10/20/19 08:00  100      


 


10/20/19 04:00  109      


 


10/20/19 04:00 98.8 104 18 149/54 (85) 98   


 


10/20/19 00:00 98.9 93 18 148/74 (98) 97   


 


10/20/19 00:00  93      








Height (Feet):  5


Height (Inches):  5.00


Weight (Pounds):  151


General Appearance:  WD/WN, no acute distress


HEENT:  normocephalic, atraumatic, anicteric, mucous membranes moist, PERRL


Respiratory/Chest:  chest wall non-tender, lungs clear, normal breath sounds, 

no respiratory distress, no accessory muscle use


Cardiovascular:  normal peripheral pulses, normal rate, regular rhythm, no 

gallop/murmur, no JVD


Abdomen:  normal bowel sounds, soft, non tender, no organomegaly, non distended

, no mass, no scars


Genitourinary:  normal external genitalia


Extremities:  no cyanosis, no clubbing


Skin:  no rash, no lesions, ulcers


Neurologic/Psychiatric:  alert, responsive


Lymphatic:  no neck adenopathy, no groin adenopathy


Musculoskeletal:  normal muscle bulk, no effusion





Laboratory Tests








Test


  10/20/19


06:28 10/20/19


08:59 10/20/19


10:16


 


White Blood Count


  25.8 K/UL


(4.8-10.8)  *H 


  


 


 


Red Blood Count


  2.98 M/UL


(4.20-5.40)  L 


  


 


 


Hemoglobin


  8.4 G/DL


(12.0-16.0)  L 


  


 


 


Hematocrit


  25.1 %


(37.0-47.0)  L 


  


 


 


Mean Corpuscular Volume 84 FL (80-99)    


 


Mean Corpuscular Hemoglobin


  28.1 PG


(27.0-31.0) 


  


 


 


Mean Corpuscular Hemoglobin


Concent 33.3 G/DL


(32.0-36.0) 


  


 


 


Red Cell Distribution Width


  12.0 %


(11.6-14.8) 


  


 


 


Platelet Count


  467 K/UL


(150-450)  H 


  


 


 


Mean Platelet Volume


  7.3 FL


(6.5-10.1) 


  


 


 


Neutrophils (%) (Auto)


  % (45.0-75.0)


  


  


 


 


Lymphocytes (%) (Auto)


  % (20.0-45.0)


  


  


 


 


Monocytes (%) (Auto)  % (1.0-10.0)    


 


Eosinophils (%) (Auto)  % (0.0-3.0)    


 


Basophils (%) (Auto)  % (0.0-2.0)    


 


Differential Total Cells


Counted 100  


  


  


 


 


Neutrophils % (Manual) 92 % (45-75)  H  


 


Lymphocytes % (Manual) 4 % (20-45)  L  


 


Monocytes % (Manual) 4 % (1-10)    


 


Eosinophils % (Manual) 0 % (0-3)    


 


Basophils % (Manual) 0 % (0-2)    


 


Band Neutrophils 0 % (0-8)    


 


Platelet Estimate Adequate    


 


Platelet Morphology Normal    


 


Hypochromasia 1+    


 


Sodium Level


  146 MMOL/L


(136-145)  H 


  


 


 


Potassium Level


  4.1 MMOL/L


(3.5-5.1) 


  


 


 


Chloride Level


  110 MMOL/L


()  H 


  


 


 


Carbon Dioxide Level


  22 MMOL/L


(21-32) 


  


 


 


Anion Gap


  14 mmol/L


(5-15) 


  


 


 


Blood Urea Nitrogen


  33 mg/dL


(7-18)  H 


  


 


 


Creatinine


  2.7 MG/DL


(0.55-1.30)  #H 


  


 


 


Estimat Glomerular Filtration


Rate 17.7 mL/min


(>60) 


  


 


 


Glucose Level


  136 MG/DL


()  H 


  


 


 


Uric Acid


  10.0 MG/DL


(2.6-7.2)  H 


  


 


 


Calcium Level


  8.4 MG/DL


(8.5-10.1)  L 


  


 


 


Phosphorus Level


  3.7 MG/DL


(2.5-4.9) 


  


 


 


Magnesium Level


  2.6 MG/DL


(1.8-2.4)  H 


  


 


 


Total Bilirubin


  0.4 MG/DL


(0.2-1.0) 


  


 


 


Aspartate Amino Transf


(AST/SGOT) 19 U/L (15-37)


  


  


 


 


Alanine Aminotransferase


(ALT/SGPT) 31 U/L (12-78)


  


  


 


 


Alkaline Phosphatase


  110 U/L


() 


  


 


 


Total Protein


  6.3 G/DL


(6.4-8.2)  L 


  


 


 


Albumin


  1.5 G/DL


(3.4-5.0)  L 


  


 


 


Globulin 4.8 g/dL    


 


Albumin/Globulin Ratio


  0.3 (1.0-2.7)


L 


  


 


 


Random Vancomycin Level  26.7 ug/mL   


 


Urine Random Sodium


  


  


  40 mmol/L


()











Current Medications








 Medications


  (Trade)  Dose


 Ordered  Sig/Winnie


 Route


 PRN Reason  Start Time


 Stop Time Status Last Admin


Dose Admin


 


 Acetaminophen


  (Tylenol)  650 mg  PRN  PRN


 RECTAL


 TEMP>100.5  10/14/19 03:00


     


 


 


 Aspirin


  (ASA)  81 mg  DAILY


 ORAL


   10/15/19 09:00


 11/14/19 08:59  10/20/19 08:44


 


 


 Atorvastatin


 Calcium


  (Lipitor)  10 mg  BEDTIME


 ORAL


   10/14/19 21:00


 11/13/19 20:59  10/20/19 21:19


 


 


 Clopidogrel


 Bisulfate


  (Plavix)  75 mg  DAILY


 ORAL


   10/15/19 09:00


 11/14/19 08:59  10/20/19 08:44


 


 


 Dextrose


  (Dextrose 50%)  25 ml  Q30M  PRN


 IV


 Hypoglycemia  10/14/19 07:00


 11/13/19 06:59   


 


 


 Dextrose


  (Dextrose 50%)  50 ml  Q30M  PRN


 IV


 Hypoglycemia  10/14/19 07:00


 11/13/19 06:59   


 


 


 Dextrose/Sodium


 Chloride  1,000 ml @ 


 100 mls/hr  Q10H


 IV


   10/20/19 11:45


 11/19/19 11:44  10/20/19 21:38


 


 


 Divalproex Sodium


  (Depakote


 Sprinkles)  500 mg  Q12HR


 ORAL


   10/15/19 22:00


 11/14/19 21:59  10/20/19 21:19


 


 


 Docusate Sodium


  (Colace)  100 mg  THREE TIMES A  DAY


 ORAL


   10/14/19 13:00


 11/13/19 12:59  10/20/19 17:07


 


 


 Donepezil HCl


  (Aricept)  5 mg  DAILY


 ORAL


   10/15/19 11:00


 11/14/19 10:59  10/20/19 08:44


 


 


 Enoxaparin Sodium


  (Lovenox)  30 mg  DAILY


 SUBQ


   10/14/19 09:00


 11/13/19 08:59  10/19/19 09:15


 


 


 Insulin Aspart


  (NovoLOG)    BEFORE MEALS AND  HS


 SUBQ


   10/14/19 11:30


 11/13/19 11:29  10/20/19 21:22


 


 


 Insulin Detemir


  (Levemir)  10 units  DAILY


 SUBQ


   10/16/19 09:00


 11/13/19 10:29  10/20/19 08:46


 


 


 Lactulose


  (Cephulac)  20 gm  TIDPRN  PRN


 ORAL


 Constipation  10/16/19 14:45


 11/15/19 14:44  10/16/19 15:21


 


 


 Lorazepam


  (Ativan 2mg/ml


 1ml)  1 mg  Q8HR  PRN


 IV


 For Anxiety  10/17/19 19:15


 10/24/19 19:14  10/18/19 01:12


 


 


 Meropenem 1 gm/


 Sodium Chloride  55 ml @ 


 110 mls/hr  Q12H


 IVPB


   10/16/19 14:00


 10/23/19 13:59  10/20/19 13:26


 


 


 Nateglinide


  (Starlix)  120 mg  TIAC


 ORAL


   10/20/19 11:30


 11/19/19 11:29  10/20/19 17:07


 


 


 Olanzapine


  (ZyPREXA)  5 mg  DAILY


 ORAL


   10/15/19 11:00


 11/14/19 10:59  10/20/19 08:44


 


 


 Pantoprazole


  (Protonix)  40 mg  BID


 ORAL


   10/14/19 18:00


 11/13/19 08:59  10/20/19 17:07


 


 


 Vancomycin HCl


  (Vanco rx to


 dose)  1 ea  DAILY  PRN


 MISC


 Per rx protocol  10/14/19 07:30


 11/13/19 07:29   


 

















Amie Burgos M.D. Oct 20, 2019 21:57

## 2019-10-20 NOTE — HEMATOLOGY/ONC PROGRESS NOTE
Assessment/Plan


Assessment/Plan





Assessment and Recs:


# Anemia of chronic disease due to underlying chronic medical issues, 

multifactorial 


--> Anemia workup has been ordered, rule out gi bleed --> ferritin is 1400+


--> No evidence of hemolysis is noted, peripheral smear has been reviewed.


--> Hgb goal >7. Transfuse prn.


--> Epogen or iron at this time is not particularly indicated


--> Medications have been reviewed


--> low threshold for gi evaluation in case has occult +


--> bone marrow biopsy is not indicated given the other more likely causes (if 

recurrent anemia) first time here


--> hgb trend 8.4


# Hypercalcemia - btw 10-11 range when corrected to alb


--> ivf have been started


--> if remains elevated, 7.9-->8.3


--> will get pth


# Leukocytosis/elevated white blood cell count, unspecified likely related to 

underlying reaction v more likely infection


--> have reviewed peripheral smear and bandemia/neutrophilia noted


--> continue antibiotics if they have been started by ID team (VANC/ZOSYN)--> 

vanc/linda


--> wbc 39-->28k-->29k->31k-->23-->26k


--> monitor for resolution


# Sepsis from pneumonia.  


--> pulm consulted, abx started


# CHANCE (acute kidney injury) on ckd


--> cr 1.6-->2.7


--> per renal


# UTI (urinary tract infection)


--> on abx per id


# Dehydration


--> on ivf


# Acute metabolic encephalopathy


--> likely due to pna


# Dvt ppx lovenox sq





The timing of this note does not necessarily reflect the time of the patient 

was seen.





Greatly appreciate consultation.





Subjective


Constitutional:  Denies: no symptoms, chills, fever, malaise, weakness, other


HEENT:  Denies: no symptoms, eye pain, blurred vision, tearing, double vision, 

ear pain, ear discharge, nose pain, nose congestion, throat pain, throat 

swelling, mouth pain, mouth swelling, other


Cardiovascular:  Denies: no symptoms, chest pain, edema, irregular heart rate, 

lightheadedness, palpitations, syncope, other


Respiratory:  Denies: no symptoms, cough, shortness of breath, SOB with 

excertion, SOB at rest, sputum, wheezing, other


Genitourinary:  Denies: no symptoms, burning, discharge, frequency, flank pain, 

hematuria, incontinence, pain, urgency, other


Neurologic/Psychiatric:  Denies: no symptoms, anxiety, depressed, emotional 

problems, headache, numbness, paresthesia, pre-existing deficit, seizure, 

tingling, tremors, weakness, other


Allergies:  


Coded Allergies:  


     No Known Allergies (Unverified , 10/14/19)


Subjective


10/14: hgb has improved, no bleeding, labs reivewed


10/15: no events to report


10/16: a+ o x1, no bleeding or chills noted, no major changes, occult pending


10/17: no events noted, no bleeding, no chills, no hematemesis/hematochezia


10/18: remains confused, with abx, on nc


10/19: cr is worse, hgb 8.4, no bleeding, night sweats, chills


10/20: no f/c, no night sweats, labs noted, cr worse





Objective


Objective





Current Medications








 Medications


  (Trade)  Dose


 Ordered  Sig/Winnie


 Route


 PRN Reason  Start Time


 Stop Time Status Last Admin


Dose Admin


 


 Acetaminophen


  (Tylenol)  650 mg  PRN  PRN


 RECTAL


 TEMP>100.5  10/14/19 03:00


     


 


 


 Aspirin


  (ASA)  81 mg  DAILY


 ORAL


   10/15/19 09:00


 11/14/19 08:59  10/20/19 08:44


 


 


 Atorvastatin


 Calcium


  (Lipitor)  10 mg  BEDTIME


 ORAL


   10/14/19 21:00


 11/13/19 20:59  10/19/19 20:49


 


 


 Clopidogrel


 Bisulfate


  (Plavix)  75 mg  DAILY


 ORAL


   10/15/19 09:00


 11/14/19 08:59  10/20/19 08:44


 


 


 Dextrose


  (Dextrose 50%)  25 ml  Q30M  PRN


 IV


 Hypoglycemia  10/14/19 07:00


 11/13/19 06:59   


 


 


 Dextrose


  (Dextrose 50%)  50 ml  Q30M  PRN


 IV


 Hypoglycemia  10/14/19 07:00


 11/13/19 06:59   


 


 


 Divalproex Sodium


  (Depakote


 Sprinkles)  500 mg  Q12HR


 ORAL


   10/15/19 22:00


 11/14/19 21:59  10/20/19 08:44


 


 


 Docusate Sodium


  (Colace)  100 mg  THREE TIMES A  DAY


 ORAL


   10/14/19 13:00


 11/13/19 12:59  10/20/19 08:44


 


 


 Donepezil HCl


  (Aricept)  5 mg  DAILY


 ORAL


   10/15/19 11:00


 11/14/19 10:59  10/20/19 08:44


 


 


 Enoxaparin Sodium


  (Lovenox)  30 mg  DAILY


 SUBQ


   10/14/19 09:00


 11/13/19 08:59  10/19/19 09:15


 


 


 Insulin Aspart


  (NovoLOG)    BEFORE MEALS AND  HS


 SUBQ


   10/14/19 11:30


 11/13/19 11:29  10/20/19 06:52


 


 


 Insulin Detemir


  (Levemir)  10 units  DAILY


 SUBQ


   10/16/19 09:00


 11/13/19 10:29  10/20/19 08:46


 


 


 Lactulose


  (Cephulac)  20 gm  TIDPRN  PRN


 ORAL


 Constipation  10/16/19 14:45


 11/15/19 14:44  10/16/19 15:21


 


 


 Lorazepam


  (Ativan 2mg/ml


 1ml)  1 mg  Q8HR  PRN


 IV


 For Anxiety  10/17/19 19:15


 10/24/19 19:14  10/18/19 01:12


 


 


 Meropenem 1 gm/


 Sodium Chloride  55 ml @ 


 110 mls/hr  Q12H


 IVPB


   10/16/19 14:00


 10/23/19 13:59  10/20/19 03:19


 


 


 Nateglinide


  (Starlix)  120 mg  TIAC


 ORAL


   10/20/19 11:30


 11/19/19 11:29   


 


 


 Olanzapine


  (ZyPREXA)  5 mg  DAILY


 ORAL


   10/15/19 11:00


 11/14/19 10:59  10/20/19 08:44


 


 


 Pantoprazole


  (Protonix)  40 mg  BID


 ORAL


   10/14/19 18:00


 11/13/19 08:59  10/20/19 08:44


 


 


 Vancomycin HCl


  (Vanco rx to


 dose)  1 ea  DAILY  PRN


 MISC


 Per rx protocol  10/14/19 07:30


 11/13/19 07:29   


 











Last 24 Hour Vital Signs








  Date Time  Temp Pulse Resp B/P (MAP) Pulse Ox O2 Delivery O2 Flow Rate FiO2


 


10/20/19 08:00 98.2 94 20 145/70 (95) 98   


 


10/20/19 04:00  109      


 


10/20/19 04:00 98.8 104 18 149/54 (85) 98   


 


10/20/19 00:00 98.9 93 18 148/74 (98) 97   


 


10/20/19 00:00  93      


 


10/19/19 21:00      Nasal Cannula 2.0 


 


10/19/19 20:00  88      


 


10/19/19 20:00 99.7 88 18 160/83 (108) 98   


 


10/19/19 19:48     98 Nasal Cannula 2.0 28


 


10/19/19 19:47  88 18  98 Nasal Cannula 2.0 28


 


10/19/19 16:00  95      


 


10/19/19 16:00 97.9 83 18 131/83 (99) 96   


 


10/19/19 12:00 97.8 97 18 140/82 (101) 97   


 


10/19/19 12:00  97      


 


10/19/19 09:00      Nasal Cannula 2.0 


 


10/19/19 08:00     98 Nasal Cannula 2.0 28


 


10/19/19 08:00  90      


 


10/19/19 08:00  93 20  98 Nasal Cannula 2.0 28


 


10/19/19 08:00 97.9 99 19 165/87 (113) 99   


 


10/19/19 04:00  93      


 


10/19/19 04:00 99.4 95 18 158/76 (103) 97   


 


10/19/19 00:00 98.5 97 20 158/86 (110) 98   


 


10/19/19 00:00  101      


 


10/18/19 21:00      Nasal Cannula 2.0 


 


10/18/19 20:00 99.0 97 20 128/78 (95) 98   


 


10/18/19 20:00  94      


 


10/18/19 19:35  97 18  98 Nasal Cannula 2.0 28


 


10/18/19 19:35     98 Nasal Cannula 2.0 28


 


10/18/19 16:00 98.4 94 20 159/71 (100) 98   


 


10/18/19 16:00  94      


 


10/18/19 12:00  79      


 


10/18/19 12:00 97.2 86 18 145/70 (95) 99   

















Intake and Output  


 


 10/19/19 10/20/19





 19:00 07:00


 


Intake Total  500 ml


 


Output Total  600 ml


 


Balance  -100 ml


 


  


 


Other  500 ml


 


Output Urine Total  600 ml











Labs








Test


  10/19/19


07:05 10/20/19


06:28 10/20/19


08:59


 


White Blood Count


  23.4 K/UL


(4.8-10.8) 25.8 K/UL


(4.8-10.8) 


 


 


Red Blood Count


  2.97 M/UL


(4.20-5.40) 2.98 M/UL


(4.20-5.40) 


 


 


Hemoglobin


  8.4 G/DL


(12.0-16.0) 8.4 G/DL


(12.0-16.0) 


 


 


Hematocrit


  25.0 %


(37.0-47.0) 25.1 %


(37.0-47.0) 


 


 


Mean Corpuscular Volume 84 FL (80-99)  84 FL (80-99)  


 


Mean Corpuscular Hemoglobin


  28.2 PG


(27.0-31.0) 28.1 PG


(27.0-31.0) 


 


 


Mean Corpuscular Hemoglobin


Concent 33.5 G/DL


(32.0-36.0) 33.3 G/DL


(32.0-36.0) 


 


 


Red Cell Distribution Width


  13.1 %


(11.6-14.8) 12.0 %


(11.6-14.8) 


 


 


Platelet Count


  516 K/UL


(150-450) 467 K/UL


(150-450) 


 


 


Mean Platelet Volume


  7.3 FL


(6.5-10.1) 7.3 FL


(6.5-10.1) 


 


 


Neutrophils (%) (Auto)  % (45.0-75.0)   % (45.0-75.0)  


 


Lymphocytes (%) (Auto)  % (20.0-45.0)   % (20.0-45.0)  


 


Monocytes (%) (Auto)  % (1.0-10.0)   % (1.0-10.0)  


 


Eosinophils (%) (Auto)  % (0.0-3.0)   % (0.0-3.0)  


 


Basophils (%) (Auto)  % (0.0-2.0)   % (0.0-2.0)  


 


Differential Total Cells


Counted 100 


  100 


  


 


 


Neutrophils % (Manual) 86 % (45-75)  92 % (45-75)  


 


Lymphocytes % (Manual) 7 % (20-45)  4 % (20-45)  


 


Monocytes % (Manual) 6 % (1-10)  4 % (1-10)  


 


Eosinophils % (Manual) 1 % (0-3)  0 % (0-3)  


 


Basophils % (Manual) 0 % (0-2)  0 % (0-2)  


 


Band Neutrophils 0 % (0-8)  0 % (0-8)  


 


Platelet Estimate Increased  Adequate  


 


Platelet Morphology Normal  Normal  


 


Hypochromasia 1+  1+  


 


Sodium Level


  146 MMOL/L


(136-145) 146 MMOL/L


(136-145) 


 


 


Potassium Level


  3.5 MMOL/L


(3.5-5.1) 4.1 MMOL/L


(3.5-5.1) 


 


 


Chloride Level


  111 MMOL/L


() 110 MMOL/L


() 


 


 


Carbon Dioxide Level


  22 MMOL/L


(21-32) 22 MMOL/L


(21-32) 


 


 


Anion Gap


  13 mmol/L


(5-15) 14 mmol/L


(5-15) 


 


 


Blood Urea Nitrogen


  27 mg/dL


(7-18) 33 mg/dL


(7-18) 


 


 


Creatinine


  1.6 MG/DL


(0.55-1.30) 2.7 MG/DL


(0.55-1.30) 


 


 


Estimat Glomerular Filtration


Rate 32.3 mL/min


(>60) 17.7 mL/min


(>60) 


 


 


Glucose Level


  110 MG/DL


() 136 MG/DL


() 


 


 


Calcium Level


  8.6 MG/DL


(8.5-10.1) 8.4 MG/DL


(8.5-10.1) 


 


 


Total Bilirubin


  0.4 MG/DL


(0.2-1.0) 0.4 MG/DL


(0.2-1.0) 


 


 


Aspartate Amino Transf


(AST/SGOT) 16 U/L (15-37) 


  19 U/L (15-37) 


  


 


 


Alanine Aminotransferase


(ALT/SGPT) 27 U/L (12-78) 


  31 U/L (12-78) 


  


 


 


Alkaline Phosphatase


  110 U/L


() 110 U/L


() 


 


 


Total Protein


  6.1 G/DL


(6.4-8.2) 6.3 G/DL


(6.4-8.2) 


 


 


Albumin


  1.5 G/DL


(3.4-5.0) 1.5 G/DL


(3.4-5.0) 


 


 


Globulin 4.6 g/dL  4.8 g/dL  


 


Albumin/Globulin Ratio 0.3 (1.0-2.7)  0.3 (1.0-2.7)  


 


Amylase Level


  20 U/L


() 


  


 


 


Lipase


  38 U/L


() 


  


 


 


Uric Acid


  


  10.0 MG/DL


(2.6-7.2) 


 


 


Phosphorus Level


  


  3.7 MG/DL


(2.5-4.9) 


 


 


Magnesium Level


  


  2.6 MG/DL


(1.8-2.4) 


 


 


Random Vancomycin Level   26.7 ug/mL 








Height (Feet):  5


Height (Inches):  5.00


Weight (Pounds):  151


Objective





Physical Exam:


Vitals: reviewed


General Appearance:  NAD


HEENT:  normocephalic, atraumatic


Neck:  non-tender, normal alignment


Respiratory/Chest:  normal breath sounds bilaterally


Cardiovascular/Chest: rrr


Abdomen:  normal bowel sounds, soft, nontender


Extremities:  normal range of motion











Mike Pop MD Oct 20, 2019 09:56

## 2019-10-20 NOTE — NUR
NURSE NOTES:

Report received from ELIZABETH Reed.  Pt in bed, resting.  In no acute distress, breathing even and 
unlabored.  On 02 2L/NC.  HOB elevated.  Bed in lowest position.  call lights within reach. 
Will continue plan of care.

## 2019-10-21 VITALS — DIASTOLIC BLOOD PRESSURE: 69 MMHG | SYSTOLIC BLOOD PRESSURE: 142 MMHG

## 2019-10-21 VITALS — SYSTOLIC BLOOD PRESSURE: 137 MMHG | DIASTOLIC BLOOD PRESSURE: 64 MMHG

## 2019-10-21 VITALS — SYSTOLIC BLOOD PRESSURE: 149 MMHG | DIASTOLIC BLOOD PRESSURE: 98 MMHG

## 2019-10-21 VITALS — DIASTOLIC BLOOD PRESSURE: 73 MMHG | SYSTOLIC BLOOD PRESSURE: 160 MMHG

## 2019-10-21 VITALS — DIASTOLIC BLOOD PRESSURE: 70 MMHG | SYSTOLIC BLOOD PRESSURE: 152 MMHG

## 2019-10-21 VITALS — SYSTOLIC BLOOD PRESSURE: 140 MMHG | DIASTOLIC BLOOD PRESSURE: 84 MMHG

## 2019-10-21 LAB
ADD MANUAL DIFF: YES
ALBUMIN SERPL-MCNC: 1.9 G/DL (ref 3.4–5)
ALBUMIN/GLOB SERPL: 0.4 {RATIO} (ref 1–2.7)
ALP SERPL-CCNC: 110 U/L (ref 46–116)
ALT SERPL-CCNC: 23 U/L (ref 12–78)
ANION GAP SERPL CALC-SCNC: 12 MMOL/L (ref 5–15)
AST SERPL-CCNC: 16 U/L (ref 15–37)
BILIRUB SERPL-MCNC: 0.4 MG/DL (ref 0.2–1)
BUN SERPL-MCNC: 38 MG/DL (ref 7–18)
CALCIUM SERPL-MCNC: 8.7 MG/DL (ref 8.5–10.1)
CHLORIDE SERPL-SCNC: 108 MMOL/L (ref 98–107)
CO2 SERPL-SCNC: 21 MMOL/L (ref 21–32)
CREAT SERPL-MCNC: 3.4 MG/DL (ref 0.55–1.3)
ERYTHROCYTE [DISTWIDTH] IN BLOOD BY AUTOMATED COUNT: 12.3 % (ref 11.6–14.8)
GLOBULIN SER-MCNC: 4.7 G/DL
HCT VFR BLD CALC: 25.8 % (ref 37–47)
HGB BLD-MCNC: 8.5 G/DL (ref 12–16)
MCV RBC AUTO: 86 FL (ref 80–99)
PHOSPHATE SERPL-MCNC: 4.3 MG/DL (ref 2.5–4.9)
PLATELET # BLD: 465 K/UL (ref 150–450)
POTASSIUM SERPL-SCNC: 4.3 MMOL/L (ref 3.5–5.1)
RBC # BLD AUTO: 3.02 M/UL (ref 4.2–5.4)
SODIUM SERPL-SCNC: 141 MMOL/L (ref 136–145)
WBC # BLD AUTO: 25.9 K/UL (ref 4.8–10.8)

## 2019-10-21 RX ADMIN — ENOXAPARIN SODIUM SCH MG: 30 INJECTION SUBCUTANEOUS at 08:45

## 2019-10-21 RX ADMIN — INSULIN ASPART SCH UNITS: 100 INJECTION, SOLUTION INTRAVENOUS; SUBCUTANEOUS at 11:46

## 2019-10-21 RX ADMIN — DOCUSATE SODIUM SCH MG: 100 CAPSULE, LIQUID FILLED ORAL at 13:36

## 2019-10-21 RX ADMIN — DONEPEZIL HYDROCHLORIDE SCH MG: 5 TABLET, FILM COATED ORAL at 08:44

## 2019-10-21 RX ADMIN — MEROPENEM SCH MLS/HR: 1 INJECTION INTRAVENOUS at 13:37

## 2019-10-21 RX ADMIN — ASPIRIN 81 MG SCH MG: 81 TABLET ORAL at 08:44

## 2019-10-21 RX ADMIN — MEROPENEM SCH MLS/HR: 1 INJECTION INTRAVENOUS at 01:20

## 2019-10-21 RX ADMIN — DOCUSATE SODIUM SCH MG: 100 CAPSULE, LIQUID FILLED ORAL at 08:44

## 2019-10-21 RX ADMIN — DOCUSATE SODIUM SCH MG: 100 CAPSULE, LIQUID FILLED ORAL at 18:04

## 2019-10-21 RX ADMIN — DIVALPROEX SODIUM SCH MG: 125 CAPSULE ORAL at 08:44

## 2019-10-21 RX ADMIN — DEXTROSE AND SODIUM CHLORIDE SCH MLS/HR: 5; .45 INJECTION, SOLUTION INTRAVENOUS at 18:04

## 2019-10-21 RX ADMIN — DIVALPROEX SODIUM SCH MG: 125 CAPSULE ORAL at 21:48

## 2019-10-21 RX ADMIN — DEXTROSE AND SODIUM CHLORIDE SCH MLS/HR: 5; .45 INJECTION, SOLUTION INTRAVENOUS at 08:44

## 2019-10-21 RX ADMIN — INSULIN DETEMIR SCH UNITS: 100 INJECTION, SOLUTION SUBCUTANEOUS at 11:45

## 2019-10-21 RX ADMIN — INSULIN ASPART SCH UNITS: 100 INJECTION, SOLUTION INTRAVENOUS; SUBCUTANEOUS at 16:28

## 2019-10-21 RX ADMIN — INSULIN ASPART SCH UNITS: 100 INJECTION, SOLUTION INTRAVENOUS; SUBCUTANEOUS at 21:34

## 2019-10-21 RX ADMIN — INSULIN ASPART SCH UNITS: 100 INJECTION, SOLUTION INTRAVENOUS; SUBCUTANEOUS at 05:37

## 2019-10-21 NOTE — NUR
NURSE NOTES:

Received pt and report from ELIZABETH Orona. Observed pt resting in bed with both eyes closed; 
arousable to voice and light shake. Pt is A/Ox0. Cardiac monitor is in placed, IV site 
intact, asymptomatic, and patent; currently running D5 1/2 NS @ 100cc/hr. Bed is in the 
lowest position and locked. Call light within reach. No signs/symptoms of acute distress 
noted at this time. Will continue plan of care.

## 2019-10-21 NOTE — PULMONOLOGY PROGRESS NOTE
Assessment/Plan


Problems:  


(1) Healthcare associated bacterial pneumonia


(2) UTI (urinary tract infection)


(3) Sepsis


(4) Dehydration


(5) CHANCE (acute kidney injury)


(6) Acute metabolic encephalopathy


(7) Hyperglycemia due to type 2 diabetes mellitus


(8) Renal failure (ARF), acute on chronic


(9) Aspiration pneumonia


(10) Abnormal TSH


Assessment/Plan


Marked leukocytosis


Sepsis


Possible pneumonia


E coli UTI


Acute hypoxemic respiratory failure


Acute encephalopathy


Hx CHF


HTN


CHANCE 





Plan:


-F/U CT CAP


-Monitor WCt


-Abx per ID


-monitor volumes and renal function, F/U renal recs, F/U renal US


-monitor encephalopathy


-Asp precautions, SLP recs 


-DVT Px: LMWH


-FC





Subjective


Allergies:  


Coded Allergies:  


     No Known Allergies (Unverified , 10/14/19)


Subjective


WCT inc Cr inc


+ cough + SOB no FC





Objective





Last 24 Hour Vital Signs








  Date Time  Temp Pulse Resp B/P (MAP) Pulse Ox O2 Delivery O2 Flow Rate FiO2


 


10/21/19 09:00      Nasal Cannula 2.0 


 


10/21/19 08:00 98.1 108 25 160/73 (102) 93   


 


10/21/19 04:00  90      


 


10/21/19 04:00 98.2 83 17 137/64 (88) 100   


 


10/21/19 00:00 98.9 95 21 140/84 (102) 99   


 


10/21/19 00:00  95      


 


10/20/19 21:00      Nasal Cannula 2.0 


 


10/20/19 20:45     97 Nasal Cannula 2.0 28


 


10/20/19 20:00 99.6 98 20 156/83 (107) 98   


 


10/20/19 20:00  98      


 


10/20/19 16:00  88      


 


10/20/19 16:00 97.9 98 20 157/71 (99) 98   

















Intake and Output  


 


 10/20/19 10/21/19





 19:00 07:00


 


Intake Total 240 ml 150 ml


 


Output Total 150 ml 200 ml


 


Balance 90 ml -50 ml


 


  


 


Intake Oral 240 ml 150 ml


 


Output Urine Total 150 ml 200 ml








General Appearance:  no acute distress, cachetic


HEENT:  normocephalic, atraumatic, anicteric, mucous membranes moist


Respiratory/Chest:  rhonchi


Cardiovascular:  normal peripheral pulses, normal rate, regular rhythm


Abdomen:  normal bowel sounds, soft, non tender, no organomegaly, non distended


Extremities:  no cyanosis, no clubbing, other - 1+ LETTY


Laboratory Tests


10/21/19 03:00: Urine Eosinophils None seen


10/21/19 06:36: 


White Blood Count 25.9*H, Red Blood Count 3.02L, Hemoglobin 8.5L, Hematocrit 

25.8L, Mean Corpuscular Volume 86, Mean Corpuscular Hemoglobin 28.4, Mean 

Corpuscular Hemoglobin Concent 33.1, Red Cell Distribution Width 12.3, Platelet 

Count 465H, Mean Platelet Volume 7.5, Neutrophils (%) (Auto) , Lymphocytes (%) (

Auto) , Monocytes (%) (Auto) , Eosinophils (%) (Auto) , Basophils (%) (Auto) , 

Differential Total Cells Counted 100, Neutrophils % (Manual) 86H, Lymphocytes % 

(Manual) 10L, Monocytes % (Manual) 3, Eosinophils % (Manual) 1, Basophils % (

Manual) 0, Band Neutrophils 0, Platelet Estimate Adequate, Platelet Morphology 

Normal, Hypochromasia 1+, Sodium Level 141, Potassium Level 4.3, Chloride Level 

108H, Carbon Dioxide Level 21, Anion Gap 12, Blood Urea Nitrogen 38H, 

Creatinine 3.4H, Estimat Glomerular Filtration Rate 13.5, Glucose Level 198H, 

Uric Acid 9.8H, Calcium Level 8.7, Phosphorus Level 4.3, Magnesium Level 2.5H, 

Total Bilirubin 0.4, Aspartate Amino Transf (AST/SGOT) 16, Alanine 

Aminotransferase (ALT/SGPT) 23, Alkaline Phosphatase 110, C-Reactive Protein, 

Quantitative 13.7H, Pro-B-Type Natriuretic Peptide > 45330O, Total Protein 6.6, 

Albumin 1.9L, Globulin 4.7, Albumin/Globulin Ratio 0.4L, Valproic Acid (Depakene

) Level 14L





Current Medications








 Medications


  (Trade)  Dose


 Ordered  Sig/Winnie


 Route


 PRN Reason  Start Time


 Stop Time Status Last Admin


Dose Admin


 


 Acetaminophen


  (Tylenol)  650 mg  PRN  PRN


 RECTAL


 TEMP>100.5  10/14/19 03:00


     


 


 


 Aspirin


  (ASA)  81 mg  DAILY


 ORAL


   10/15/19 09:00


 11/14/19 08:59  10/21/19 08:44


 


 


 Atorvastatin


 Calcium


  (Lipitor)  10 mg  BEDTIME


 ORAL


   10/14/19 21:00


 11/13/19 20:59  10/20/19 21:19


 


 


 Clopidogrel


 Bisulfate


  (Plavix)  75 mg  DAILY


 ORAL


   10/15/19 09:00


 11/14/19 08:59  10/21/19 08:44


 


 


 Dextrose


  (Dextrose 50%)  25 ml  Q30M  PRN


 IV


 Hypoglycemia  10/14/19 07:00


 11/13/19 06:59   


 


 


 Dextrose


  (Dextrose 50%)  50 ml  Q30M  PRN


 IV


 Hypoglycemia  10/14/19 07:00


 11/13/19 06:59   


 


 


 Dextrose/Sodium


 Chloride  1,000 ml @ 


 100 mls/hr  Q10H


 IV


   10/20/19 11:45


 11/19/19 11:44  10/21/19 08:44


 


 


 Divalproex Sodium


  (Depakote


 Sprinkles)  500 mg  Q12HR


 ORAL


   10/15/19 22:00


 11/14/19 21:59  10/21/19 08:44


 


 


 Docusate Sodium


  (Colace)  100 mg  THREE TIMES A  DAY


 ORAL


   10/14/19 13:00


 11/13/19 12:59  10/21/19 08:44


 


 


 Donepezil HCl


  (Aricept)  5 mg  DAILY


 ORAL


   10/15/19 11:00


 11/14/19 10:59  10/21/19 08:44


 


 


 Enoxaparin Sodium


  (Lovenox)  30 mg  DAILY


 SUBQ


   10/14/19 09:00


 11/13/19 08:59  10/21/19 08:45


 


 


 Furosemide


  (Lasix)  100 mg  ONCE


 IV


   10/21/19 11:30


 10/21/19 12:30  10/21/19 11:44


 


 


 Insulin Aspart


  (NovoLOG)    BEFORE MEALS AND  HS


 SUBQ


   10/14/19 11:30


 11/13/19 11:29  10/21/19 11:46


 


 


 Insulin Detemir


  (Levemir)  14 units  DAILY


 SUBQ


   10/21/19 09:00


 11/13/19 10:29  10/21/19 11:45


 


 


 Lactulose


  (Cephulac)  20 gm  TIDPRN  PRN


 ORAL


 Constipation  10/16/19 14:45


 11/15/19 14:44  10/16/19 15:21


 


 


 Lorazepam


  (Ativan 2mg/ml


 1ml)  1 mg  Q8HR  PRN


 IV


 For Anxiety  10/17/19 19:15


 10/24/19 19:14  10/18/19 01:12


 


 


 Meropenem 1 gm/


 Sodium Chloride  55 ml @ 


 110 mls/hr  Q12H


 IVPB


   10/16/19 14:00


 10/23/19 13:59  10/21/19 01:20


 


 


 Nateglinide


  (Starlix)  120 mg  TIAC


 ORAL


   10/20/19 11:30


 11/19/19 11:29  10/21/19 11:44


 


 


 Pantoprazole


  (Protonix)  40 mg  BID


 ORAL


   10/14/19 18:00


 11/13/19 08:59  10/21/19 08:44


 

















Sammy Torres MD Oct 21, 2019 12:19

## 2019-10-21 NOTE — HEMATOLOGY/ONC PROGRESS NOTE
Assessment/Plan


Assessment/Plan





Assessment and Recs:


# Anemia of chronic disease due to underlying chronic medical issues, 

multifactorial 


--> Anemia workup has been ordered, rule out gi bleed --> ferritin is 1400+


--> No evidence of hemolysis is noted, peripheral smear has been reviewed.


--> Hgb goal >7. Transfuse prn.


--> Epogen or iron at this time is not particularly indicated


--> Medications have been reviewed


--> low threshold for gi evaluation in case has occult +


--> bone marrow biopsy is not indicated given the other more likely causes (if 

recurrent anemia) first time here


--> hgb trend 8.4


# Hypercalcemia - btw 10-11 range when corrected to alb


--> ivf have been started


--> if remains elevated, 7.9-->8.3


--> will get pth


# Leukocytosis/elevated white blood cell count, unspecified likely related to 

underlying reaction v more likely infection


--> have reviewed peripheral smear and bandemia/neutrophilia noted


--> continue antibiotics if they have been started by ID team (VANC/ZOSYN)--> 

vanc/linda--> linda


--> wbc 39-->28k-->29k->31k-->23-->26k


--> monitor for resolution


--> CT C/A/P pending


# Sepsis from pneumonia.  


--> pulm consulted, abx started


# CHANCE (acute kidney injury) on ckd


--> cr 1.6-->2.7


--> per renal


# UTI (urinary tract infection)


--> on abx per id


# Dehydration


--> on ivf


# Acute metabolic encephalopathy


--> likely due to pna


# Dvt ppx lovenox sq





The timing of this note does not necessarily reflect the time of the patient 

was seen.





Greatly appreciate consultation.





Subjective


Constitutional:  Denies: no symptoms, chills, fever, malaise, weakness, other


Cardiovascular:  Denies: no symptoms, chest pain, edema, irregular heart rate, 

lightheadedness, palpitations, syncope, other


Neurologic/Psychiatric:  Denies: no symptoms, anxiety, depressed, emotional 

problems, headache, numbness, paresthesia, pre-existing deficit, seizure, 

tingling, tremors, weakness, other


Hematologic/Lymphatic:  Denies: no symptoms, anemia, easy bleeding, easy 

bruising, adenopathy, other


Allergies:  


Coded Allergies:  


     No Known Allergies (Unverified , 10/14/19)


Subjective


10/14: hgb has improved, no bleeding, labs reivewed


10/15: no events to report


10/16: a+ o x1, no bleeding or chills noted, no major changes, occult pending


10/17: no events noted, no bleeding, no chills, no hematemesis/hematochezia


10/18: remains confused, with abx, on nc


10/19: cr is worse, hgb 8.4, no bleeding, night sweats, chills


10/20: no f/c, no night sweats, labs noted, cr worse


10/21: no bleeding or chills, no night sweats, labs pending this am





Objective


Objective





Current Medications








 Medications


  (Trade)  Dose


 Ordered  Sig/Winnie


 Route


 PRN Reason  Start Time


 Stop Time Status Last Admin


Dose Admin


 


 Acetaminophen


  (Tylenol)  650 mg  PRN  PRN


 RECTAL


 TEMP>100.5  10/14/19 03:00


     


 


 


 Aspirin


  (ASA)  81 mg  DAILY


 ORAL


   10/15/19 09:00


 11/14/19 08:59  10/20/19 08:44


 


 


 Atorvastatin


 Calcium


  (Lipitor)  10 mg  BEDTIME


 ORAL


   10/14/19 21:00


 11/13/19 20:59  10/20/19 21:19


 


 


 Clopidogrel


 Bisulfate


  (Plavix)  75 mg  DAILY


 ORAL


   10/15/19 09:00


 11/14/19 08:59  10/20/19 08:44


 


 


 Dextrose


  (Dextrose 50%)  25 ml  Q30M  PRN


 IV


 Hypoglycemia  10/14/19 07:00


 11/13/19 06:59   


 


 


 Dextrose


  (Dextrose 50%)  50 ml  Q30M  PRN


 IV


 Hypoglycemia  10/14/19 07:00


 11/13/19 06:59   


 


 


 Dextrose/Sodium


 Chloride  1,000 ml @ 


 100 mls/hr  Q10H


 IV


   10/20/19 11:45


 11/19/19 11:44  10/20/19 21:38


 


 


 Divalproex Sodium


  (Depakote


 Sprinkles)  500 mg  Q12HR


 ORAL


   10/15/19 22:00


 11/14/19 21:59  10/20/19 21:19


 


 


 Docusate Sodium


  (Colace)  100 mg  THREE TIMES A  DAY


 ORAL


   10/14/19 13:00


 11/13/19 12:59  10/20/19 17:07


 


 


 Donepezil HCl


  (Aricept)  5 mg  DAILY


 ORAL


   10/15/19 11:00


 11/14/19 10:59  10/20/19 08:44


 


 


 Enoxaparin Sodium


  (Lovenox)  30 mg  DAILY


 SUBQ


   10/14/19 09:00


 11/13/19 08:59  10/19/19 09:15


 


 


 Insulin Aspart


  (NovoLOG)    BEFORE MEALS AND  HS


 SUBQ


   10/14/19 11:30


 11/13/19 11:29  10/21/19 05:37


 


 


 Insulin Detemir


  (Levemir)  14 units  DAILY


 SUBQ


   10/21/19 09:00


 11/13/19 10:29   


 


 


 Lactulose


  (Cephulac)  20 gm  TIDPRN  PRN


 ORAL


 Constipation  10/16/19 14:45


 11/15/19 14:44  10/16/19 15:21


 


 


 Lorazepam


  (Ativan 2mg/ml


 1ml)  1 mg  Q8HR  PRN


 IV


 For Anxiety  10/17/19 19:15


 10/24/19 19:14  10/18/19 01:12


 


 


 Meropenem 1 gm/


 Sodium Chloride  55 ml @ 


 110 mls/hr  Q12H


 IVPB


   10/16/19 14:00


 10/23/19 13:59  10/21/19 01:20


 


 


 Nateglinide


  (Starlix)  120 mg  TIAC


 ORAL


   10/20/19 11:30


 11/19/19 11:29  10/21/19 05:20


 


 


 Olanzapine


  (ZyPREXA)  5 mg  DAILY


 ORAL


   10/15/19 11:00


 11/14/19 10:59  10/20/19 08:44


 


 


 Pantoprazole


  (Protonix)  40 mg  BID


 ORAL


   10/14/19 18:00


 11/13/19 08:59  10/20/19 17:07


 











Last 24 Hour Vital Signs








  Date Time  Temp Pulse Resp B/P (MAP) Pulse Ox O2 Delivery O2 Flow Rate FiO2


 


10/21/19 04:00  90      


 


10/21/19 04:00 98.2 83 17 137/64 (88) 100   


 


10/21/19 00:00 98.9 95 21 140/84 (102) 99   


 


10/21/19 00:00  95      


 


10/20/19 21:00      Nasal Cannula 2.0 


 


10/20/19 20:45     97 Nasal Cannula 2.0 28


 


10/20/19 20:00 99.6 98 20 156/83 (107) 98   


 


10/20/19 20:00  98      


 


10/20/19 16:00  88      


 


10/20/19 16:00 97.9 98 20 157/71 (99) 98   


 


10/20/19 12:00 98.0 99 20 161/77 (105) 99   


 


10/20/19 12:00  93      


 


10/20/19 09:00      Nasal Cannula 2.0 


 


10/20/19 08:00 98.2 94 20 145/70 (95) 98   


 


10/20/19 08:00  100      


 


10/20/19 04:00  109      


 


10/20/19 04:00 98.8 104 18 149/54 (85) 98   


 


10/20/19 00:00 98.9 93 18 148/74 (98) 97   


 


10/20/19 00:00  93      


 


10/19/19 21:00      Nasal Cannula 2.0 


 


10/19/19 20:00  88      


 


10/19/19 20:00 99.7 88 18 160/83 (108) 98   


 


10/19/19 19:48     98 Nasal Cannula 2.0 28


 


10/19/19 19:47  88 18  98 Nasal Cannula 2.0 28


 


10/19/19 16:00  95      


 


10/19/19 16:00 97.9 83 18 131/83 (99) 96   


 


10/19/19 12:00 97.8 97 18 140/82 (101) 97   


 


10/19/19 12:00  97      


 


10/19/19 09:00      Nasal Cannula 2.0 


 


10/19/19 08:00     98 Nasal Cannula 2.0 28


 


10/19/19 08:00  90      


 


10/19/19 08:00  93 20  98 Nasal Cannula 2.0 28


 


10/19/19 08:00 97.9 99 19 165/87 (113) 99   

















Intake and Output  


 


 10/20/19 10/21/19





 19:00 07:00


 


Intake Total 240 ml 150 ml


 


Output Total 150 ml 200 ml


 


Balance 90 ml -50 ml


 


  


 


Intake Oral 240 ml 150 ml


 


Output Urine Total 150 ml 200 ml











Labs








Test


  10/19/19


07:05 10/20/19


06:28 10/20/19


08:59 10/20/19


10:16


 


White Blood Count


  23.4 K/UL


(4.8-10.8) 25.8 K/UL


(4.8-10.8) 


  


 


 


Red Blood Count


  2.97 M/UL


(4.20-5.40) 2.98 M/UL


(4.20-5.40) 


  


 


 


Hemoglobin


  8.4 G/DL


(12.0-16.0) 8.4 G/DL


(12.0-16.0) 


  


 


 


Hematocrit


  25.0 %


(37.0-47.0) 25.1 %


(37.0-47.0) 


  


 


 


Mean Corpuscular Volume 84 FL (80-99)  84 FL (80-99)   


 


Mean Corpuscular Hemoglobin


  28.2 PG


(27.0-31.0) 28.1 PG


(27.0-31.0) 


  


 


 


Mean Corpuscular Hemoglobin


Concent 33.5 G/DL


(32.0-36.0) 33.3 G/DL


(32.0-36.0) 


  


 


 


Red Cell Distribution Width


  13.1 %


(11.6-14.8) 12.0 %


(11.6-14.8) 


  


 


 


Platelet Count


  516 K/UL


(150-450) 467 K/UL


(150-450) 


  


 


 


Mean Platelet Volume


  7.3 FL


(6.5-10.1) 7.3 FL


(6.5-10.1) 


  


 


 


Neutrophils (%) (Auto)  % (45.0-75.0)   % (45.0-75.0)   


 


Lymphocytes (%) (Auto)  % (20.0-45.0)   % (20.0-45.0)   


 


Monocytes (%) (Auto)  % (1.0-10.0)   % (1.0-10.0)   


 


Eosinophils (%) (Auto)  % (0.0-3.0)   % (0.0-3.0)   


 


Basophils (%) (Auto)  % (0.0-2.0)   % (0.0-2.0)   


 


Differential Total Cells


Counted 100 


  100 


  


  


 


 


Neutrophils % (Manual) 86 % (45-75)  92 % (45-75)   


 


Lymphocytes % (Manual) 7 % (20-45)  4 % (20-45)   


 


Monocytes % (Manual) 6 % (1-10)  4 % (1-10)   


 


Eosinophils % (Manual) 1 % (0-3)  0 % (0-3)   


 


Basophils % (Manual) 0 % (0-2)  0 % (0-2)   


 


Band Neutrophils 0 % (0-8)  0 % (0-8)   


 


Platelet Estimate Increased  Adequate   


 


Platelet Morphology Normal  Normal   


 


Hypochromasia 1+  1+   


 


Sodium Level


  146 MMOL/L


(136-145) 146 MMOL/L


(136-145) 


  


 


 


Potassium Level


  3.5 MMOL/L


(3.5-5.1) 4.1 MMOL/L


(3.5-5.1) 


  


 


 


Chloride Level


  111 MMOL/L


() 110 MMOL/L


() 


  


 


 


Carbon Dioxide Level


  22 MMOL/L


(21-32) 22 MMOL/L


(21-32) 


  


 


 


Anion Gap


  13 mmol/L


(5-15) 14 mmol/L


(5-15) 


  


 


 


Blood Urea Nitrogen


  27 mg/dL


(7-18) 33 mg/dL


(7-18) 


  


 


 


Creatinine


  1.6 MG/DL


(0.55-1.30) 2.7 MG/DL


(0.55-1.30) 


  


 


 


Estimat Glomerular Filtration


Rate 32.3 mL/min


(>60) 17.7 mL/min


(>60) 


  


 


 


Glucose Level


  110 MG/DL


() 136 MG/DL


() 


  


 


 


Calcium Level


  8.6 MG/DL


(8.5-10.1) 8.4 MG/DL


(8.5-10.1) 


  


 


 


Total Bilirubin


  0.4 MG/DL


(0.2-1.0) 0.4 MG/DL


(0.2-1.0) 


  


 


 


Aspartate Amino Transf


(AST/SGOT) 16 U/L (15-37) 


  19 U/L (15-37) 


  


  


 


 


Alanine Aminotransferase


(ALT/SGPT) 27 U/L (12-78) 


  31 U/L (12-78) 


  


  


 


 


Alkaline Phosphatase


  110 U/L


() 110 U/L


() 


  


 


 


Total Protein


  6.1 G/DL


(6.4-8.2) 6.3 G/DL


(6.4-8.2) 


  


 


 


Albumin


  1.5 G/DL


(3.4-5.0) 1.5 G/DL


(3.4-5.0) 


  


 


 


Globulin 4.6 g/dL  4.8 g/dL   


 


Albumin/Globulin Ratio 0.3 (1.0-2.7)  0.3 (1.0-2.7)   


 


Amylase Level


  20 U/L


() 


  


  


 


 


Lipase


  38 U/L


() 


  


  


 


 


Uric Acid


  


  10.0 MG/DL


(2.6-7.2) 


  


 


 


Phosphorus Level


  


  3.7 MG/DL


(2.5-4.9) 


  


 


 


Magnesium Level


  


  2.6 MG/DL


(1.8-2.4) 


  


 


 


Random Vancomycin Level   26.7 ug/mL  


 


Urine Random Sodium


  


  


  


  40 mmol/L


()








Height (Feet):  5


Height (Inches):  5.00


Weight (Pounds):  151


Objective





Physical Exam:


Vitals: reviewed


General Appearance:  NAD


HEENT:  normocephalic, atraumatic


Neck:  non-tender, normal alignment


Respiratory/Chest:  normal breath sounds bilaterally


Cardiovascular/Chest: rrr


Abdomen:  normal bowel sounds, soft, nontender


Extremities:  normal range of motion











Mike Pop MD Oct 21, 2019 07:29

## 2019-10-21 NOTE — NUR
NURSE NOTES:

Patient stable with no s/sx of pain or distress. RR even and unlabored on RA. Bed low and 
locked. Charles draining well to gravity. Fluids infusing. Dr. Keith notified of WBC 25.9. 
Per MD call Aubrey. Will continue to monitor.

-------------------------------------------------------------------------------

Addendum: 10/21/19 at 1845 by DEAN SKINNER RN

-------------------------------------------------------------------------------

Unable to contact Dr. Burgos x2.

## 2019-10-21 NOTE — CARDIOLOGY REPORT
--------------- APPROVED REPORT --------------





EKG Measurement

Heart [id number]

NC [id number]

[id number]

[id number]

[id number]





Normal sinus rhythm

Possible Lateral infarct, age undetermined

Abnormal ECG

## 2019-10-21 NOTE — NUR
NURSE NOTES:

Pelvic US being performed. Unable to do complete due to pt unable to consent for 
transvaginal US. Dr. Hook called and message was left.

## 2019-10-21 NOTE — GENERAL PROGRESS NOTE
Assessment/Plan


Problem List:  


(1) Abnormal TSH


ICD Codes:  R79.89 - Other specified abnormal findings of blood chemistry


SNOMED:  118969378


(2) Hyperglycemia due to type 2 diabetes mellitus


ICD Codes:  E11.65 - Type 2 diabetes mellitus with hyperglycemia


SNOMED:  647430063629337, 60710808


Qualifiers:  


   Qualified Codes:  E11.65 - Type 2 diabetes mellitus with hyperglycemia; 

Z79.4 - Long term (current) use of insulin


(3) Acute metabolic encephalopathy


ICD Codes:  G93.41 - Metabolic encephalopathy


SNOMED:  77916917, 209703989


(4) ARF (acute renal failure)


ICD Codes:  N17.9 - Acute kidney failure, unspecified


SNOMED:  63210015


Qualifiers:  


   Qualified Codes:  N17.9 - Acute kidney failure, unspecified


(5) Healthcare associated bacterial pneumonia


ICD Codes:  J15.9 - Unspecified bacterial pneumonia


SNOMED:  193122017


Status:  stable


Assessment/Plan:


increase Levemir to 14 units qam 


continue Starlix 120 mg ac tid - hold if not eating 


continue NISS ac / hs 





low TSH, normal free T4 and low free T3 most likely due to non thyroidal 

illness 


no need for thyroid medications for now 


thyroid antibodies are pending





Subjective


ROS Limited/Unobtainable:  Yes


Allergies:  


Coded Allergies:  


     No Known Allergies (Unverified , 10/14/19)


Subjective


events noted 


glucose elevated on Dextrose 














Item Value  Date Time


 


Bedside Blood Glucose 232 mg/dl H 10/21/19 0537


 


Bedside Blood Glucose 194 mg/dl H 10/20/19 2122


 


Bedside Blood Glucose 130 mg/dl H 10/20/19 1710


 


Bedside Blood Glucose 156 mg/dl H 10/20/19 1204


 


Bedside Blood Glucose 142 mg/dl H 10/20/19 0846


 


Bedside Blood Glucose 142 mg/dl H 10/20/19 0652











Objective





Last 24 Hour Vital Signs








  Date Time  Temp Pulse Resp B/P (MAP) Pulse Ox O2 Delivery O2 Flow Rate FiO2


 


10/21/19 04:00  90      


 


10/21/19 00:00 98.9 95 21 140/84 (102) 99   


 


10/21/19 00:00  95      


 


10/20/19 21:00      Nasal Cannula 2.0 


 


10/20/19 20:45     97 Nasal Cannula 2.0 28


 


10/20/19 20:00 99.6 98 20 156/83 (107) 98   


 


10/20/19 20:00  98      


 


10/20/19 16:00  88      


 


10/20/19 16:00 97.9 98 20 157/71 (99) 98   


 


10/20/19 12:00 98.0 99 20 161/77 (105) 99   


 


10/20/19 12:00  93      


 


10/20/19 09:00      Nasal Cannula 2.0 


 


10/20/19 08:00 98.2 94 20 145/70 (95) 98   


 


10/20/19 08:00  100      

















Intake and Output  


 


 10/20/19 10/21/19





 19:00 07:00


 


Intake Total 240 ml 


 


Output Total 150 ml 


 


Balance 90 ml 


 


  


 


Intake Oral 240 ml 


 


Output Urine Total 150 ml 








Laboratory Tests


10/20/19 06:28: 


White Blood Count 25.8*H, Red Blood Count 2.98L, Hemoglobin 8.4L, Hematocrit 

25.1L, Mean Corpuscular Volume 84, Mean Corpuscular Hemoglobin 28.1, Mean 

Corpuscular Hemoglobin Concent 33.3, Red Cell Distribution Width 12.0, Platelet 

Count 467H, Mean Platelet Volume 7.3, Neutrophils (%) (Auto) , Lymphocytes (%) (

Auto) , Monocytes (%) (Auto) , Eosinophils (%) (Auto) , Basophils (%) (Auto) , 

Differential Total Cells Counted 100, Neutrophils % (Manual) 92H, Lymphocytes % 

(Manual) 4L, Monocytes % (Manual) 4, Eosinophils % (Manual) 0, Basophils % (

Manual) 0, Band Neutrophils 0, Platelet Estimate Adequate, Platelet Morphology 

Normal, Hypochromasia 1+, Sodium Level 146H, Potassium Level 4.1, Chloride 

Level 110H, Carbon Dioxide Level 22, Anion Gap 14, Blood Urea Nitrogen 33H, 

Creatinine 2.7#H, Estimat Glomerular Filtration Rate 17.7, Glucose Level 136H, 

Uric Acid 10.0H, Calcium Level 8.4L, Phosphorus Level 3.7, Magnesium Level 2.6H

, Total Bilirubin 0.4, Aspartate Amino Transf (AST/SGOT) 19, Alanine 

Aminotransferase (ALT/SGPT) 31, Alkaline Phosphatase 110, Total Protein 6.3L, 

Albumin 1.5L, Globulin 4.8, Albumin/Globulin Ratio 0.3L


10/20/19 08:59: Random Vancomycin Level 26.7


10/20/19 10:16: Urine Random Sodium 40


Height (Feet):  5


Height (Inches):  5.00


Weight (Pounds):  151


General Appearance:  lethargic


Neck:  normal alignment


Cardiovascular:  normal rate


Respiratory/Chest:  decreased breath sounds


Abdomen:  normal bowel sounds


Objective





Current Medications








 Medications


  (Trade)  Dose


 Ordered  Sig/Winnie


 Route


 PRN Reason  Start Time


 Stop Time Status Last Admin


Dose Admin


 


 Acetaminophen


  (Tylenol)  650 mg  PRN  PRN


 RECTAL


 TEMP>100.5  10/14/19 03:00


     


 


 


 Aspirin


  (ASA)  81 mg  DAILY


 ORAL


   10/15/19 09:00


 11/14/19 08:59  10/20/19 08:44


 


 


 Atorvastatin


 Calcium


  (Lipitor)  10 mg  BEDTIME


 ORAL


   10/14/19 21:00


 11/13/19 20:59  10/20/19 21:19


 


 


 Clopidogrel


 Bisulfate


  (Plavix)  75 mg  DAILY


 ORAL


   10/15/19 09:00


 11/14/19 08:59  10/20/19 08:44


 


 


 Dextrose


  (Dextrose 50%)  25 ml  Q30M  PRN


 IV


 Hypoglycemia  10/14/19 07:00


 11/13/19 06:59   


 


 


 Dextrose


  (Dextrose 50%)  50 ml  Q30M  PRN


 IV


 Hypoglycemia  10/14/19 07:00


 11/13/19 06:59   


 


 


 Dextrose/Sodium


 Chloride  1,000 ml @ 


 100 mls/hr  Q10H


 IV


   10/20/19 11:45


 11/19/19 11:44  10/20/19 21:38


 


 


 Divalproex Sodium


  (Depakote


 Sprinkles)  500 mg  Q12HR


 ORAL


   10/15/19 22:00


 11/14/19 21:59  10/20/19 21:19


 


 


 Docusate Sodium


  (Colace)  100 mg  THREE TIMES A  DAY


 ORAL


   10/14/19 13:00


 11/13/19 12:59  10/20/19 17:07


 


 


 Donepezil HCl


  (Aricept)  5 mg  DAILY


 ORAL


   10/15/19 11:00


 11/14/19 10:59  10/20/19 08:44


 


 


 Enoxaparin Sodium


  (Lovenox)  30 mg  DAILY


 SUBQ


   10/14/19 09:00


 11/13/19 08:59  10/19/19 09:15


 


 


 Insulin Aspart


  (NovoLOG)    BEFORE MEALS AND  HS


 SUBQ


   10/14/19 11:30


 11/13/19 11:29  10/21/19 05:37


 


 


 Insulin Detemir


  (Levemir)  10 units  DAILY


 SUBQ


   10/16/19 09:00


 11/13/19 10:29  10/20/19 08:46


 


 


 Lactulose


  (Cephulac)  20 gm  TIDPRN  PRN


 ORAL


 Constipation  10/16/19 14:45


 11/15/19 14:44  10/16/19 15:21


 


 


 Lorazepam


  (Ativan 2mg/ml


 1ml)  1 mg  Q8HR  PRN


 IV


 For Anxiety  10/17/19 19:15


 10/24/19 19:14  10/18/19 01:12


 


 


 Meropenem 1 gm/


 Sodium Chloride  55 ml @ 


 110 mls/hr  Q12H


 IVPB


   10/16/19 14:00


 10/23/19 13:59  10/21/19 01:20


 


 


 Nateglinide


  (Starlix)  120 mg  TIAC


 ORAL


   10/20/19 11:30


 11/19/19 11:29  10/21/19 05:20


 


 


 Olanzapine


  (ZyPREXA)  5 mg  DAILY


 ORAL


   10/15/19 11:00


 11/14/19 10:59  10/20/19 08:44


 


 


 Pantoprazole


  (Protonix)  40 mg  BID


 ORAL


   10/14/19 18:00


 11/13/19 08:59  10/20/19 17:07


 

















Riccardo Velez MD Oct 21, 2019 06:19

## 2019-10-21 NOTE — GENERAL PROGRESS NOTE
Assessment/Plan


Status:  stable


Assessment/Plan:


S: Not verbal





O: poor historian, seems comfortable. IV sites are intact. Charles in place





PHYSICAL EXAMINATION:HEAD AND NECK:  Atraumatic and normocephalic.


CHEST:  Bronchial breathing sounds.ABDOMEN:  Soft.  No organomegaly.


MUSCULOSKELETAL:  Diffuse 1+ or 2+ nonpitting edema in all four contracted


extremities.  The patient is not following the commands.  Limited


evaluation.NEUROLOGY:  The patient is awake.  Not alert.  Not verbally


communicative.





LABORATORY DATA:  Labs dated October 16, 2019  reviewed





Meds: Reviewed and reconciled in the chart 





Imaging: Pending CT abd





ASSESSMENT AND PLAN:


1. Sepsis.  Differential diagnosis is healthcare-associated pneumonia or


healthcare-associated urinary tract infection.  Work in progress.


2. Chronic encephalomalacia.


3. Acute on chronic anemia.


4. Renal failure, age indeterminate.


5. Congestive heart failure- preserved EF


6. Hyperthyroidism.


7. Hyperkalemia.


8. Diabetes type 2, uncontrolled with A1c of 10.


9. Psychiatric disorder.


10. GI and DVT prophylaxis.


11. PAH- Severe 





PLAN OF CARE:


 Notes from  Pulmonary Critical Care, Infectious


Disease, and Nephrology reviewed


post Blood transfusion


modified abx per ID, given the increase in WBC and low grade fever


Notes from pulmonary reveiwed 


Worsening renal failure





Subjective


Allergies:  


Coded Allergies:  


     No Known Allergies (Unverified , 10/14/19)





Objective





Last 24 Hour Vital Signs








  Date Time  Temp Pulse Resp B/P (MAP) Pulse Ox O2 Delivery O2 Flow Rate FiO2


 


10/21/19 12:00 98.2 91 28 152/70 (97) 95   


 


10/21/19 12:00  89      


 


10/21/19 09:00      Nasal Cannula 2.0 


 


10/21/19 08:00  125      


 


10/21/19 08:00 98.1 108 25 160/73 (102) 93   


 


10/21/19 04:00  90      


 


10/21/19 04:00 98.2 83 17 137/64 (88) 100   


 


10/21/19 00:00 98.9 95 21 140/84 (102) 99   


 


10/21/19 00:00  95      


 


10/20/19 21:00      Nasal Cannula 2.0 


 


10/20/19 20:45     97 Nasal Cannula 2.0 28


 


10/20/19 20:00 99.6 98 20 156/83 (107) 98   


 


10/20/19 20:00  98      


 


10/20/19 16:00  88      


 


10/20/19 16:00 97.9 98 20 157/71 (99) 98   

















Intake and Output  


 


 10/20/19 10/21/19





 19:00 07:00


 


Intake Total 240 ml 150 ml


 


Output Total 150 ml 200 ml


 


Balance 90 ml -50 ml


 


  


 


Intake Oral 240 ml 150 ml


 


Output Urine Total 150 ml 200 ml








Laboratory Tests


10/21/19 03:00: Urine Eosinophils None seen


10/21/19 06:36: 


White Blood Count 25.9*H, Red Blood Count 3.02L, Hemoglobin 8.5L, Hematocrit 

25.8L, Mean Corpuscular Volume 86, Mean Corpuscular Hemoglobin 28.4, Mean 

Corpuscular Hemoglobin Concent 33.1, Red Cell Distribution Width 12.3, Platelet 

Count 465H, Mean Platelet Volume 7.5, Neutrophils (%) (Auto) , Lymphocytes (%) (

Auto) , Monocytes (%) (Auto) , Eosinophils (%) (Auto) , Basophils (%) (Auto) , 

Differential Total Cells Counted 100, Neutrophils % (Manual) 86H, Lymphocytes % 

(Manual) 10L, Monocytes % (Manual) 3, Eosinophils % (Manual) 1, Basophils % (

Manual) 0, Band Neutrophils 0, Platelet Estimate Adequate, Platelet Morphology 

Normal, Hypochromasia 1+, Sodium Level 141, Potassium Level 4.3, Chloride Level 

108H, Carbon Dioxide Level 21, Anion Gap 12, Blood Urea Nitrogen 38H, 

Creatinine 3.4H, Estimat Glomerular Filtration Rate 13.5, Glucose Level 198H, 

Uric Acid 9.8H, Calcium Level 8.7, Phosphorus Level 4.3, Magnesium Level 2.5H, 

Total Bilirubin 0.4, Aspartate Amino Transf (AST/SGOT) 16, Alanine 

Aminotransferase (ALT/SGPT) 23, Alkaline Phosphatase 110, C-Reactive Protein, 

Quantitative 13.7H, Pro-B-Type Natriuretic Peptide > 67027M, Total Protein 6.6, 

Albumin 1.9L, Globulin 4.7, Albumin/Globulin Ratio 0.4L, Valproic Acid (Depakene

) Level 14L


Height (Feet):  5


Height (Inches):  5.00


Weight (Pounds):  151











Garrick Keith MD Oct 21, 2019 15:38

## 2019-10-21 NOTE — NUR
CASE MANAGEMENT: REVIEW



10/19/19



SI: HC ASSOCIATED BACTERIAL PNA/ UTI / SEPSIS / ARF

97.9  99  20  165/87  99% NC 2L

WBC 23.4; RBC 2.97; H/H 8.4/ 25.01; BUN 27. CREAT 1.6; LIPASES 38







IS:     IV VANCOMYCIN Q24HR

IV MEROPENEM Q12HR

LACTULOSE PO TID/PRN

LOVENOX SQ QD

PLAVIX PO QD

PROTONIX PO BID

NOVOLOG SQ AC&HS

LEVEMIR SQ QD

STARLIX PO TIAC

DEPAKOTE PO Q12HR

OLANZAPINE PO QD

DONEPEZIL PO QD

LIPITOR PO HS

ASA PO QD



**: 2E TELE UNIT



DCP: RETURN TO [facility] WHEN MEDICALLY CLEARED 



****************************************************************************



CASE MANAGEMENT: REVIEW



10/20/19



SI: HC ASSOCIATED BACTERIAL PNA/ UTI / SEPSIS / ARF

98.2  94  20  145/70  98% NC 2L

WBC 25.8; RBC 2.98; H/H 8.4/ 25.01; BUN 33; CREAT 2.7; C.R.PROT 13.7





IS:     IV DEXTROSE/NACL @100/HR

IV VANCOMYCIN Q24HR

IV MEROPENEM Q12HR

LACTULOSE PO TID/PRN

LOVENOX SQ QD

PLAVIX PO QD

PROTONIX PO BID

NOVOLOG SQ AC&HS

LEVEMIR SQ QD

STARLIX PO TIAC

DEPAKOTE PO Q12HR

OLANZAPINE PO QD

DONEPEZIL PO QD

LIPITOR PO HS

ASA PO QD



**: 2E TELE UNIT



DCP: RETURN TO [facility] WHEN MEDICALLY CLEARED 





**************************************************************************

CASE MANAGEMENT: REVIEW



10/21/19



SI: ASPIRATION PNA/ UTI / SEPSIS / ARF

98.1  108  25  160  73  93% NC 2L

BNP 70377; WBC 25.9; RBC 3.02; H/H 8.5/ 25.8; BUN 38; CREAT 3.4; C.R.PROT 13.7

; ; URIC ACID 9.8



IS:      IV LASIX X1

IV DEXTROSE/NACL @100/HR

IV VANCOMYCIN Q24HR

IV MEROPENEM Q12HR

LACTULOSE PO TID/PRN

LOVENOX SQ QD

PLAVIX PO QD

PROTONIX PO BID

NOVOLOG SQ AC&HS

LEVEMIR SQ QD

STARLIX PO TIAC

DEPAKOTE PO Q12HR

OLANZAPINE PO QD

DONEPEZIL PO QD

LIPITOR PO HS

ASA PO QD

COLACE PO TID



**: 2E TELE UNIT



DCP: RETURN TO [facility] WHEN MEDICALLY CLEARED 





PLAN: RENAL US

## 2019-10-21 NOTE — SURGERY PROGRESS NOTE
Surgery Progress Note


Subjective


Symptoms:  tolerating diet, passing flatus


Additional Comments


Persistent leukocytosis, anemia, abnormal labs.  Worsening renal insufficiency.

  Pending CT abdomen pelvis





Objective





Last 24 Hour Vital Signs








  Date Time  Temp Pulse Resp B/P (MAP) Pulse Ox O2 Delivery O2 Flow Rate FiO2


 


10/21/19 09:00      Nasal Cannula 2.0 


 


10/21/19 08:00 98.1 108 25 160/73 (102) 93   


 


10/21/19 04:00  90      


 


10/21/19 04:00 98.2 83 17 137/64 (88) 100   


 


10/21/19 00:00 98.9 95 21 140/84 (102) 99   


 


10/21/19 00:00  95      


 


10/20/19 21:00      Nasal Cannula 2.0 


 


10/20/19 20:45     97 Nasal Cannula 2.0 28


 


10/20/19 20:00 99.6 98 20 156/83 (107) 98   


 


10/20/19 20:00  98      


 


10/20/19 16:00  88      


 


10/20/19 16:00 97.9 98 20 157/71 (99) 98   


 


10/20/19 12:00 98.0 99 20 161/77 (105) 99   


 


10/20/19 12:00  93      








I&O











Intake and Output  


 


 10/20/19 10/21/19





 19:00 07:00


 


Intake Total 240 ml 150 ml


 


Output Total 150 ml 200 ml


 


Balance 90 ml -50 ml


 


  


 


Intake Oral 240 ml 150 ml


 


Output Urine Total 150 ml 200 ml








Dressing:  dry


Wound:  clean


Cardiovascular:  RSR


Respiratory:  clear


Abdomen:  soft, non-tender, present bowel sounds


Extremities:  no tenderness, no cyanosis





Laboratory Tests








Test


  10/21/19


03:00 10/21/19


06:36


 


Urine Eosinophils


  None seen


(NONE SEEN) 


 


 


White Blood Count


  


  25.9 K/UL


(4.8-10.8)  *H


 


Red Blood Count


  


  3.02 M/UL


(4.20-5.40)  L


 


Hemoglobin


  


  8.5 G/DL


(12.0-16.0)  L


 


Hematocrit


  


  25.8 %


(37.0-47.0)  L


 


Mean Corpuscular Volume  86 FL (80-99)  


 


Mean Corpuscular Hemoglobin


  


  28.4 PG


(27.0-31.0)


 


Mean Corpuscular Hemoglobin


Concent 


  33.1 G/DL


(32.0-36.0)


 


Red Cell Distribution Width


  


  12.3 %


(11.6-14.8)


 


Platelet Count


  


  465 K/UL


(150-450)  H


 


Mean Platelet Volume


  


  7.5 FL


(6.5-10.1)


 


Neutrophils (%) (Auto)


  


  % (45.0-75.0)


 


 


Lymphocytes (%) (Auto)


  


  % (20.0-45.0)


 


 


Monocytes (%) (Auto)   % (1.0-10.0)  


 


Eosinophils (%) (Auto)   % (0.0-3.0)  


 


Basophils (%) (Auto)   % (0.0-2.0)  


 


Differential Total Cells


Counted 


  100  


 


 


Neutrophils % (Manual)  86 % (45-75)  H


 


Lymphocytes % (Manual)  10 % (20-45)  L


 


Monocytes % (Manual)  3 % (1-10)  


 


Eosinophils % (Manual)  1 % (0-3)  


 


Basophils % (Manual)  0 % (0-2)  


 


Band Neutrophils  0 % (0-8)  


 


Platelet Estimate  Adequate  


 


Platelet Morphology  Normal  


 


Hypochromasia  1+  


 


Sodium Level


  


  141 MMOL/L


(136-145)


 


Potassium Level


  


  4.3 MMOL/L


(3.5-5.1)


 


Chloride Level


  


  108 MMOL/L


()  H


 


Carbon Dioxide Level


  


  21 MMOL/L


(21-32)


 


Anion Gap


  


  12 mmol/L


(5-15)


 


Blood Urea Nitrogen


  


  38 mg/dL


(7-18)  H


 


Creatinine


  


  3.4 MG/DL


(0.55-1.30)  H


 


Estimat Glomerular Filtration


Rate 


  13.5 mL/min


(>60)


 


Glucose Level


  


  198 MG/DL


()  H


 


Uric Acid


  


  9.8 MG/DL


(2.6-7.2)  H


 


Calcium Level


  


  8.7 MG/DL


(8.5-10.1)


 


Phosphorus Level


  


  4.3 MG/DL


(2.5-4.9)


 


Magnesium Level


  


  2.5 MG/DL


(1.8-2.4)  H


 


Total Bilirubin


  


  0.4 MG/DL


(0.2-1.0)


 


Aspartate Amino Transf


(AST/SGOT) 


  16 U/L (15-37)


 


 


Alanine Aminotransferase


(ALT/SGPT) 


  23 U/L (12-78)


 


 


Alkaline Phosphatase


  


  110 U/L


()


 


C-Reactive Protein,


Quantitative 


  13.7 mg/dL


(0.00-0.90)  H


 


Pro-B-Type Natriuretic Peptide


  


  > 15290 pg/mL


(0-125)  H


 


Total Protein


  


  6.6 G/DL


(6.4-8.2)


 


Albumin


  


  1.9 G/DL


(3.4-5.0)  L


 


Globulin  4.7 g/dL  


 


Albumin/Globulin Ratio


  


  0.4 (1.0-2.7)


L











Plan


Problems:  


(1) Pressure injury of deep tissue


Assessment & Plan:  Pt presented on admission with DTPI R heel. Blood filled 

blister with marginal erythema noted to heel. Pt exhibited agitation when 

minimally palpated. (L)2.2cm x (W)2.1cm


L heel is blanchable .


Non-blanchable erythema without induration noted to sacral cleft. 


No other areas of skin concerns noted.





Tx.Plan:


Apply Betadine to R heel. Cover with Optifoam drsg. Change every 3 days and prn.


           


Apply Cavilon Skin Barrier to L heel. Cover with Optifoam drsg. Change every 7 

days and prn.


           


Apply Moisture Barrier Paste to buttocks. Cover sacral area with Optifoam drsg. 

Change every 3 days and prn.


           


Reposition at least every 2hours or as tolerated.


           


Off-load heels with pillow.





(2) Sepsis


Assessment & Plan:  Patient with low-grade fevers, anemia, leukocytosis 

persistent, elevated platelets, abnormal labs.


Etiology currently unknown and work-up in progress.  Labs noted and imaging 

that is available as noted.  


Okay for diet


Pending CT scan


Antibiotics as per infectious disease 


local wound care as wounds do not seem to be the etiology of her sepsis


We will monitor and follow with recommendations


Thank you for allowing me to participate in patient's care














Radhames Blas Oct 21, 2019 11:11

## 2019-10-21 NOTE — DIAGNOSTIC IMAGING REPORT
Indication: Acute renal failure, abnormal renal function tests

 

Technique: Grayscale and duplex images of the kidneys, retroperitoneum, and bladder

were obtained.

 

Comparison: none            

 

Findings: Exam is somewhat limited due to patient condition. Left kidney are not

well-demonstrated. Right kidney measures 10.8 cm in length. Left kidney measures 11.4

cm in length. Both kidneys demonstrate normal echogenicity. No hydronephrosis.  No

focal abnormality. Normal inferior vena cava. Bladder is empty, contains a Charles

catheter. A large pelvic mass is demonstrated. This is described elsewhere. There are

bilateral pleural effusions cyst incidentally noted

 

Impression: Somewhat limited exam, as described

 

Unremarkable kidneys, negative for hydronephrosis

 

Empty bladder with a Charles catheter

 

Bilateral pleural effusions

 

Pelvic mass; see concurrent pelvic ultrasound for detailed description.

## 2019-10-21 NOTE — DIAGNOSTIC IMAGING REPORT
Indication: Abdominal distention, evaluation of abnormality demonstrated on recent CT

scan

 

Technique: Transabdominal images only. Patient unable to consent for transvaginal

images.

 

Comparison: Reference made to CT chest abdomen pelvis dated 10/18/2019

 

Findings: The uterus measures 9.4 cm in length by 2.8 cm AP. The endometrium measures

10 mm thick and is unusually echogenic for age. Neither ovary could be demonstrated.

 

Posterior and superior to the uterus just to the left of midline is a large cystic

mass which measures 13.7 x 12.9 cm. This demonstrates a debris fluid level.

 

Impression: 13 x 7 x 12.9 cm unilocular cystic mass, corresponding to lesion reported

on recent CT scan. Layering low level internal echoes likely represent bladder

debris.. This presumably represents a cystic ovarian lesion with debris or

hemorrhage, possibly neoplastic. Gynecological consultation is recommended

 

Endometrial endometrium is unusually thick for age.

## 2019-10-21 NOTE — CARDIOLOGY PROGRESS NOTE
Assessment/Plan


Assessment/Plan


1. acute congestive heart failure.


2. Abnormal cardiac enzyme demand related due to infection and profound anemia 


3. Agitation 


4. Urinary tract infection.


5. Acute renal failure.


6. Profound anemia.


7. Diabetes mellitus.


8. History of hypertension.


9. History of mood disorder.


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 


11. arf 


12. pelvic mass











not clear why she is dual antiplt agent as such i am not sure hwo safe it will 

be to long term dc both , however id concnern about contribuition of asa to arf 

i will stop the ecotrin short term , can stop plavix short term if surgery 

needed


echo noted 


is on asa and plavix 


telel personal reviewed sinus 


keep on same meds 


iv abx 


wbc sig elevated still  


ct  persoanlly reviewed 


may need diuretics





Subjective


ROS Limited/Unobtainable:  Yes





Objective





Last 24 Hour Vital Signs








  Date Time  Temp Pulse Resp B/P (MAP) Pulse Ox O2 Delivery O2 Flow Rate FiO2


 


10/21/19 16:00 98.2 87 24 142/69 (93) 94   


 


10/21/19 16:00  77      


 


10/21/19 12:00 98.2 91 28 152/70 (97) 95   


 


10/21/19 12:00  89      


 


10/21/19 09:00      Nasal Cannula 2.0 


 


10/21/19 08:00  125      


 


10/21/19 08:00 98.1 108 25 160/73 (102) 93   


 


10/21/19 04:00  90      


 


10/21/19 04:00 98.2 83 17 137/64 (88) 100   


 


10/21/19 00:00 98.9 95 21 140/84 (102) 99   


 


10/21/19 00:00  95      


 


10/20/19 21:00      Nasal Cannula 2.0 


 


10/20/19 20:45     97 Nasal Cannula 2.0 28








General Appearance:  no apparent distress, other - sleep











Intake and Output  


 


 10/20/19 10/21/19





 19:00 07:00


 


Intake Total 240 ml 250 ml


 


Output Total 150 ml 200 ml


 


Balance 90 ml 50 ml


 


  


 


Intake Oral 240 ml 150 ml


 


IV Total  100 ml


 


Output Urine Total 150 ml 200 ml











Laboratory Tests








Test


  10/21/19


03:00 10/21/19


06:36


 


Urine Eosinophils


  None seen


(NONE SEEN) 


 


 


White Blood Count


  


  25.9 K/UL


(4.8-10.8)  *H


 


Red Blood Count


  


  3.02 M/UL


(4.20-5.40)  L


 


Hemoglobin


  


  8.5 G/DL


(12.0-16.0)  L


 


Hematocrit


  


  25.8 %


(37.0-47.0)  L


 


Mean Corpuscular Volume  86 FL (80-99)  


 


Mean Corpuscular Hemoglobin


  


  28.4 PG


(27.0-31.0)


 


Mean Corpuscular Hemoglobin


Concent 


  33.1 G/DL


(32.0-36.0)


 


Red Cell Distribution Width


  


  12.3 %


(11.6-14.8)


 


Platelet Count


  


  465 K/UL


(150-450)  H


 


Mean Platelet Volume


  


  7.5 FL


(6.5-10.1)


 


Neutrophils (%) (Auto)


  


  % (45.0-75.0)


 


 


Lymphocytes (%) (Auto)


  


  % (20.0-45.0)


 


 


Monocytes (%) (Auto)   % (1.0-10.0)  


 


Eosinophils (%) (Auto)   % (0.0-3.0)  


 


Basophils (%) (Auto)   % (0.0-2.0)  


 


Differential Total Cells


Counted 


  100  


 


 


Neutrophils % (Manual)  86 % (45-75)  H


 


Lymphocytes % (Manual)  10 % (20-45)  L


 


Monocytes % (Manual)  3 % (1-10)  


 


Eosinophils % (Manual)  1 % (0-3)  


 


Basophils % (Manual)  0 % (0-2)  


 


Band Neutrophils  0 % (0-8)  


 


Platelet Estimate  Adequate  


 


Platelet Morphology  Normal  


 


Hypochromasia  1+  


 


Sodium Level


  


  141 MMOL/L


(136-145)


 


Potassium Level


  


  4.3 MMOL/L


(3.5-5.1)


 


Chloride Level


  


  108 MMOL/L


()  H


 


Carbon Dioxide Level


  


  21 MMOL/L


(21-32)


 


Anion Gap


  


  12 mmol/L


(5-15)


 


Blood Urea Nitrogen


  


  38 mg/dL


(7-18)  H


 


Creatinine


  


  3.4 MG/DL


(0.55-1.30)  H


 


Estimat Glomerular Filtration


Rate 


  13.5 mL/min


(>60)


 


Glucose Level


  


  198 MG/DL


()  H


 


Uric Acid


  


  9.8 MG/DL


(2.6-7.2)  H


 


Calcium Level


  


  8.7 MG/DL


(8.5-10.1)


 


Phosphorus Level


  


  4.3 MG/DL


(2.5-4.9)


 


Magnesium Level


  


  2.5 MG/DL


(1.8-2.4)  H


 


Total Bilirubin


  


  0.4 MG/DL


(0.2-1.0)


 


Aspartate Amino Transf


(AST/SGOT) 


  16 U/L (15-37)


 


 


Alanine Aminotransferase


(ALT/SGPT) 


  23 U/L (12-78)


 


 


Alkaline Phosphatase


  


  110 U/L


()


 


C-Reactive Protein,


Quantitative 


  13.7 mg/dL


(0.00-0.90)  H


 


Pro-B-Type Natriuretic Peptide


  


  > 38438 pg/mL


(0-125)  H


 


Total Protein


  


  6.6 G/DL


(6.4-8.2)


 


Albumin


  


  1.9 G/DL


(3.4-5.0)  L


 


Globulin  4.7 g/dL  


 


Albumin/Globulin Ratio


  


  0.4 (1.0-2.7)


L


 


Valproic Acid (Depakene) Level


  


  14 MCG/ML


()  L

















Rob May MD Oct 21, 2019 20:41

## 2019-10-21 NOTE — NUR
RD ASSESSMENT & RECOMMENDATIONS

SEE CARE ACTIVITY FOR COMPLETE ASSESSMENT



DAILY ESTIMATED NEEDS:

Needs based on Wound, DM, Sepsis/ 68kg 

25-30  kcals/kg 

8168-9586  total kcals

1.25-2  g protein/kg

  g total protein 

25-30  mL/kg

7768-9812  total fluid mLs





NUTRITION DIAGNOSIS:

* Swallowing difficulty R/T dysphagia, poor dentition, dementia as

evidenced by on liquify pureed, NTL texture diet per SLP rec.

* Increased kcal/prot needs R/T wound healing and sepsis as evidenced by

admitted w/ DTPI R heel and non-blanchable sacral erythema, critically

elev wbc (25.9*), elev LA (5.8 -> wnl), elev CRP, now afebrile.

* Altered nutrition related lab values R/T CHF, diabetes as evidenced by

elev BNP (>33971), A1C of 10.4, elev BGs and POC glu (130-264)





CURRENT DIET:CCHO MED, liquify pureed w/ NTL + Glucerna TID    





 

PO DIET RECOMMENDATIONS:

CCHO MED, LOW NA/ texture per SLP + Maintain Glucerna TID w/ meals 



 



ADDITIONAL RECOMMENDATIONS:

* Calibrated bedscale wt 

* Wound healing: add MVI x1, Vit C 250mg QD 

                      : Zaki 1pkt BID if tolerated 

* Add folic acid 1mg QD (low folate 7.2) 

* Consider adjusting IVF: On D5 IVF, Na now wnl, BGs elevated 

* Monitor PO intake closely -> improved 

* Monitor renal fxn: creat trending up

## 2019-10-21 NOTE — NEPHROLOGY PROGRESS NOTE
Assessment/Plan


Problem List:  


(1) Renal failure (ARF), acute on chronic


(2) Dehydration


(3) ARF (acute renal failure)


(4) Sepsis


(5) Acute metabolic encephalopathy


(6) Hyperglycemia due to type 2 diabetes mellitus


(7) UTI (urinary tract infection)


(8) Diabetic nephropathy


Assessment





Renal failure, Acute on Chronic resolved


Dehydration


Severe Anemia


Sepsis / Pneumonia / UTI


DM, OOC


Proteinuria , Diabetic Nephropathy


Acute encephalopathy


Plan








Cr rising


Will order urine studies and monitor renal parameters


kidney YOANDY


Hydrate, trial IV Lasix


WBCs remain elevated


has casas again due to retention


Avoid Nephrotoxics








previously


Anemia salas


2D echo


24 H urine protein


Keep BP and BS in check


I am holding  her psych meds due to low MS


Per orders





Subjective


ROS Limited/Unobtainable:  No


Constitutional:  Reports: malaise, weakness





Objective


Objective





Last 24 Hour Vital Signs








  Date Time  Temp Pulse Resp B/P (MAP) Pulse Ox O2 Delivery O2 Flow Rate FiO2


 


10/21/19 09:00      Nasal Cannula 2.0 


 


10/21/19 08:00 98.1 108 25 160/73 (102) 93   


 


10/21/19 04:00  90      


 


10/21/19 04:00 98.2 83 17 137/64 (88) 100   


 


10/21/19 00:00 98.9 95 21 140/84 (102) 99   


 


10/21/19 00:00  95      


 


10/20/19 21:00      Nasal Cannula 2.0 


 


10/20/19 20:45     97 Nasal Cannula 2.0 28


 


10/20/19 20:00 99.6 98 20 156/83 (107) 98   


 


10/20/19 20:00  98      


 


10/20/19 16:00  88      


 


10/20/19 16:00 97.9 98 20 157/71 (99) 98   


 


10/20/19 12:00 98.0 99 20 161/77 (105) 99   


 


10/20/19 12:00  93      

















Intake and Output  


 


 10/20/19 10/21/19





 19:00 07:00


 


Intake Total 240 ml 150 ml


 


Output Total 150 ml 200 ml


 


Balance 90 ml -50 ml


 


  


 


Intake Oral 240 ml 150 ml


 


Output Urine Total 150 ml 200 ml








Laboratory Tests


10/21/19 03:00: Urine Eosinophils None seen


10/21/19 06:36: 


White Blood Count 25.9*H, Red Blood Count 3.02L, Hemoglobin 8.5L, Hematocrit 

25.8L, Mean Corpuscular Volume 86, Mean Corpuscular Hemoglobin 28.4, Mean 

Corpuscular Hemoglobin Concent 33.1, Red Cell Distribution Width 12.3, Platelet 

Count 465H, Mean Platelet Volume 7.5, Neutrophils (%) (Auto) , Lymphocytes (%) (

Auto) , Monocytes (%) (Auto) , Eosinophils (%) (Auto) , Basophils (%) (Auto) , 

Differential Total Cells Counted 100, Neutrophils % (Manual) 86H, Lymphocytes % 

(Manual) 10L, Monocytes % (Manual) 3, Eosinophils % (Manual) 1, Basophils % (

Manual) 0, Band Neutrophils 0, Platelet Estimate Adequate, Platelet Morphology 

Normal, Hypochromasia 1+, Sodium Level 141, Potassium Level 4.3, Chloride Level 

108H, Carbon Dioxide Level 21, Anion Gap 12, Blood Urea Nitrogen 38H, 

Creatinine 3.4H, Estimat Glomerular Filtration Rate 13.5, Glucose Level 198H, 

Uric Acid 9.8H, Calcium Level 8.7, Phosphorus Level 4.3, Magnesium Level 2.5H, 

Total Bilirubin 0.4, Aspartate Amino Transf (AST/SGOT) 16, Alanine 

Aminotransferase (ALT/SGPT) 23, Alkaline Phosphatase 110, C-Reactive Protein, 

Quantitative 13.7H, Pro-B-Type Natriuretic Peptide > 50788N, Total Protein 6.6, 

Albumin 1.9L, Globulin 4.7, Albumin/Globulin Ratio 0.4L


Height (Feet):  5


Height (Inches):  5.00


Weight (Pounds):  151


General Appearance:  no apparent distress, lethargic


Cardiovascular:  tachycardia


Respiratory/Chest:  decreased breath sounds


Abdomen:  distended


Objective


no change











Lukasz Vizcaino MD Oct 21, 2019 11:37

## 2019-10-21 NOTE — INFECTIOUS DISEASES PROG NOTE
Assessment/Plan


Problems:  


(1) Aspiration pneumonia


Assessment & Plan:  with B/L infiltrates, L>R suspect due to altered mental 

status , with hypoxemia and leukocytosis, continue vancomycin AND meropenem 

empirically, repeated  CXR  showed no change , pending CT of the chest for 

further eval, aspiration precaution 





(2) UTI (urinary tract infection)


Assessment & Plan:  due to ESBL producing E coli already on meropenem to cover 

for sepsis and pneumonia too 





(3) Sepsis


Assessment & Plan:  due to the above, with persistent leukocytosis inspit of 

being on wide spectrum antibiotics and negative repeated blood cultures, 

suspect pelvic mass related or bone marrow pathology . recommend gynecology 

eval for pelvic mass 





(4) Acute metabolic encephalopathy


Assessment & Plan:  due to the above, continue hydration and antibiotics, 

monitor in tele 





(5) Hyperglycemia due to type 2 diabetes mellitus


Assessment & Plan:  recommend tight glycemic control to keep blood glucose 

between 100-140 





(6) ARF (acute renal failure)


Assessment & Plan:  due to the above, continue hydration and renally dosed 

antibiotics, close  monitor of renal function , stop vancomycin 





(7) Pelvic mass in female


Assessment & Plan:  to the left of the uterus, suspect malignancy , recommend OB

/GYN eval , and tumor markers , oncology is following 








Subjective


ROS Limited/Unobtainable:  Yes


Allergies:  


Coded Allergies:  


     No Known Allergies (Unverified , 10/14/19)


Subjective


she was lethargic , lying in bed,  but responsive , no fever or chills , no 

diarrhea, no cough or SOB





Objective


Vital Signs





Last 24 Hour Vital Signs








  Date Time  Temp Pulse Resp B/P (MAP) Pulse Ox O2 Delivery O2 Flow Rate FiO2


 


10/21/19 16:00 98.2 87 24 142/69 (93) 94   


 


10/21/19 12:00 98.2 91 28 152/70 (97) 95   


 


10/21/19 12:00  89      


 


10/21/19 09:00      Nasal Cannula 2.0 


 


10/21/19 08:00  125      


 


10/21/19 08:00 98.1 108 25 160/73 (102) 93   


 


10/21/19 04:00  90      


 


10/21/19 04:00 98.2 83 17 137/64 (88) 100   


 


10/21/19 00:00 98.9 95 21 140/84 (102) 99   


 


10/21/19 00:00  95      


 


10/20/19 21:00      Nasal Cannula 2.0 


 


10/20/19 20:45     97 Nasal Cannula 2.0 28


 


10/20/19 20:00 99.6 98 20 156/83 (107) 98   


 


10/20/19 20:00  98      








Height (Feet):  5


Height (Inches):  5.00


Weight (Pounds):  151


General Appearance:  WD/WN, no acute distress


HEENT:  normocephalic, atraumatic, anicteric, mucous membranes moist, PERRL


Respiratory/Chest:  chest wall non-tender, lungs clear, normal breath sounds, 

no respiratory distress, no accessory muscle use


Cardiovascular:  normal peripheral pulses, normal rate, regular rhythm, no 

gallop/murmur, no JVD


Abdomen:  normal bowel sounds, soft, non tender, no organomegaly, non distended

, no mass, no scars


Extremities:  no cyanosis, no clubbing


Skin:  no rash, no lesions, no ulcers


Neurologic/Psychiatric:  alert, responsive, other - confused


Lymphatic:  no neck adenopathy, no groin adenopathy


Musculoskeletal:  normal muscle bulk, no effusion





Laboratory Tests








Test


  10/21/19


03:00 10/21/19


06:36


 


Urine Eosinophils


  None seen


(NONE SEEN) 


 


 


White Blood Count


  


  25.9 K/UL


(4.8-10.8)  *H


 


Red Blood Count


  


  3.02 M/UL


(4.20-5.40)  L


 


Hemoglobin


  


  8.5 G/DL


(12.0-16.0)  L


 


Hematocrit


  


  25.8 %


(37.0-47.0)  L


 


Mean Corpuscular Volume  86 FL (80-99)  


 


Mean Corpuscular Hemoglobin


  


  28.4 PG


(27.0-31.0)


 


Mean Corpuscular Hemoglobin


Concent 


  33.1 G/DL


(32.0-36.0)


 


Red Cell Distribution Width


  


  12.3 %


(11.6-14.8)


 


Platelet Count


  


  465 K/UL


(150-450)  H


 


Mean Platelet Volume


  


  7.5 FL


(6.5-10.1)


 


Neutrophils (%) (Auto)


  


  % (45.0-75.0)


 


 


Lymphocytes (%) (Auto)


  


  % (20.0-45.0)


 


 


Monocytes (%) (Auto)   % (1.0-10.0)  


 


Eosinophils (%) (Auto)   % (0.0-3.0)  


 


Basophils (%) (Auto)   % (0.0-2.0)  


 


Differential Total Cells


Counted 


  100  


 


 


Neutrophils % (Manual)  86 % (45-75)  H


 


Lymphocytes % (Manual)  10 % (20-45)  L


 


Monocytes % (Manual)  3 % (1-10)  


 


Eosinophils % (Manual)  1 % (0-3)  


 


Basophils % (Manual)  0 % (0-2)  


 


Band Neutrophils  0 % (0-8)  


 


Platelet Estimate  Adequate  


 


Platelet Morphology  Normal  


 


Hypochromasia  1+  


 


Sodium Level


  


  141 MMOL/L


(136-145)


 


Potassium Level


  


  4.3 MMOL/L


(3.5-5.1)


 


Chloride Level


  


  108 MMOL/L


()  H


 


Carbon Dioxide Level


  


  21 MMOL/L


(21-32)


 


Anion Gap


  


  12 mmol/L


(5-15)


 


Blood Urea Nitrogen


  


  38 mg/dL


(7-18)  H


 


Creatinine


  


  3.4 MG/DL


(0.55-1.30)  H


 


Estimat Glomerular Filtration


Rate 


  13.5 mL/min


(>60)


 


Glucose Level


  


  198 MG/DL


()  H


 


Uric Acid


  


  9.8 MG/DL


(2.6-7.2)  H


 


Calcium Level


  


  8.7 MG/DL


(8.5-10.1)


 


Phosphorus Level


  


  4.3 MG/DL


(2.5-4.9)


 


Magnesium Level


  


  2.5 MG/DL


(1.8-2.4)  H


 


Total Bilirubin


  


  0.4 MG/DL


(0.2-1.0)


 


Aspartate Amino Transf


(AST/SGOT) 


  16 U/L (15-37)


 


 


Alanine Aminotransferase


(ALT/SGPT) 


  23 U/L (12-78)


 


 


Alkaline Phosphatase


  


  110 U/L


()


 


C-Reactive Protein,


Quantitative 


  13.7 mg/dL


(0.00-0.90)  H


 


Pro-B-Type Natriuretic Peptide


  


  > 74631 pg/mL


(0-125)  H


 


Total Protein


  


  6.6 G/DL


(6.4-8.2)


 


Albumin


  


  1.9 G/DL


(3.4-5.0)  L


 


Globulin  4.7 g/dL  


 


Albumin/Globulin Ratio


  


  0.4 (1.0-2.7)


L


 


Valproic Acid (Depakene) Level


  


  14 MCG/ML


()  L











Current Medications








 Medications


  (Trade)  Dose


 Ordered  Sig/Winnie


 Route


 PRN Reason  Start Time


 Stop Time Status Last Admin


Dose Admin


 


 Acetaminophen


  (Tylenol)  650 mg  PRN  PRN


 RECTAL


 TEMP>100.5  10/14/19 03:00


     


 


 


 Aspirin


  (ASA)  81 mg  DAILY


 ORAL


   10/15/19 09:00


 11/14/19 08:59  10/21/19 08:44


 


 


 Atorvastatin


 Calcium


  (Lipitor)  10 mg  BEDTIME


 ORAL


   10/14/19 21:00


 11/13/19 20:59  10/20/19 21:19


 


 


 Clopidogrel


 Bisulfate


  (Plavix)  75 mg  DAILY


 ORAL


   10/15/19 09:00


 11/14/19 08:59  10/21/19 08:44


 


 


 Dextrose


  (Dextrose 50%)  25 ml  Q30M  PRN


 IV


 Hypoglycemia  10/14/19 07:00


 11/13/19 06:59   


 


 


 Dextrose


  (Dextrose 50%)  50 ml  Q30M  PRN


 IV


 Hypoglycemia  10/14/19 07:00


 11/13/19 06:59   


 


 


 Dextrose/Sodium


 Chloride  1,000 ml @ 


 100 mls/hr  Q10H


 IV


   10/20/19 11:45


 11/19/19 11:44  10/21/19 08:44


 


 


 Divalproex Sodium


  (Depakote


 Sprinkles)  500 mg  Q12HR


 ORAL


   10/15/19 22:00


 11/14/19 21:59  10/21/19 08:44


 


 


 Docusate Sodium


  (Colace)  100 mg  THREE TIMES A  DAY


 ORAL


   10/14/19 13:00


 11/13/19 12:59  10/21/19 13:36


 


 


 Donepezil HCl


  (Aricept)  5 mg  DAILY


 ORAL


   10/15/19 11:00


 11/14/19 10:59  10/21/19 08:44


 


 


 Enoxaparin Sodium


  (Lovenox)  30 mg  DAILY


 SUBQ


   10/14/19 09:00


 11/13/19 08:59  10/21/19 08:45


 


 


 Insulin Aspart


  (NovoLOG)    BEFORE MEALS AND  HS


 SUBQ


   10/14/19 11:30


 11/13/19 11:29  10/21/19 11:46


 


 


 Insulin Detemir


  (Levemir)  14 units  DAILY


 SUBQ


   10/21/19 09:00


 11/13/19 10:29  10/21/19 11:45


 


 


 Lactulose


  (Cephulac)  20 gm  TIDPRN  PRN


 ORAL


 Constipation  10/16/19 14:45


 11/15/19 14:44  10/16/19 15:21


 


 


 Lorazepam


  (Ativan 2mg/ml


 1ml)  1 mg  Q8HR  PRN


 IV


 For Anxiety  10/17/19 19:15


 10/24/19 19:14  10/18/19 01:12


 


 


 Meropenem 1 gm/


 Sodium Chloride  55 ml @ 


 110 mls/hr  Q12H


 IVPB


   10/16/19 14:00


 10/23/19 13:59  10/21/19 13:37


 


 


 Nateglinide


  (Starlix)  120 mg  TIAC


 ORAL


   10/20/19 11:30


 11/19/19 11:29  10/21/19 11:44


 


 


 Pantoprazole


  (Protonix)  40 mg  BID


 ORAL


   10/14/19 18:00


 11/13/19 08:59  10/21/19 08:44


 

















Amie Burgos M.D. Oct 21, 2019 16:32

## 2019-10-22 VITALS — DIASTOLIC BLOOD PRESSURE: 66 MMHG | SYSTOLIC BLOOD PRESSURE: 134 MMHG

## 2019-10-22 VITALS — SYSTOLIC BLOOD PRESSURE: 148 MMHG | DIASTOLIC BLOOD PRESSURE: 82 MMHG

## 2019-10-22 VITALS — SYSTOLIC BLOOD PRESSURE: 136 MMHG | DIASTOLIC BLOOD PRESSURE: 70 MMHG

## 2019-10-22 VITALS — DIASTOLIC BLOOD PRESSURE: 86 MMHG | SYSTOLIC BLOOD PRESSURE: 158 MMHG

## 2019-10-22 VITALS — DIASTOLIC BLOOD PRESSURE: 87 MMHG | SYSTOLIC BLOOD PRESSURE: 150 MMHG

## 2019-10-22 VITALS — DIASTOLIC BLOOD PRESSURE: 82 MMHG | SYSTOLIC BLOOD PRESSURE: 136 MMHG

## 2019-10-22 LAB
ADD MANUAL DIFF: YES
ALBUMIN SERPL-MCNC: 1.7 G/DL (ref 3.4–5)
ALBUMIN/GLOB SERPL: 0.4 {RATIO} (ref 1–2.7)
ALP SERPL-CCNC: 93 U/L (ref 46–116)
ALT SERPL-CCNC: 21 U/L (ref 12–78)
ANION GAP SERPL CALC-SCNC: 12 MMOL/L (ref 5–15)
AST SERPL-CCNC: 17 U/L (ref 15–37)
BILIRUB SERPL-MCNC: 0.3 MG/DL (ref 0.2–1)
BUN SERPL-MCNC: 39 MG/DL (ref 7–18)
CALCIUM SERPL-MCNC: 8.5 MG/DL (ref 8.5–10.1)
CHLORIDE SERPL-SCNC: 107 MMOL/L (ref 98–107)
CO2 SERPL-SCNC: 22 MMOL/L (ref 21–32)
CREAT SERPL-MCNC: 3.1 MG/DL (ref 0.55–1.3)
ERYTHROCYTE [DISTWIDTH] IN BLOOD BY AUTOMATED COUNT: 13.6 % (ref 11.6–14.8)
GLOBULIN SER-MCNC: 4.3 G/DL
HCT VFR BLD CALC: 24.8 % (ref 37–47)
HGB BLD-MCNC: 8.2 G/DL (ref 12–16)
MCV RBC AUTO: 85 FL (ref 80–99)
PHOSPHATE SERPL-MCNC: 4.5 MG/DL (ref 2.5–4.9)
PLATELET # BLD: 406 K/UL (ref 150–450)
POTASSIUM SERPL-SCNC: 4.3 MMOL/L (ref 3.5–5.1)
RBC # BLD AUTO: 2.94 M/UL (ref 4.2–5.4)
SODIUM SERPL-SCNC: 141 MMOL/L (ref 136–145)
WBC # BLD AUTO: 22.1 K/UL (ref 4.8–10.8)

## 2019-10-22 RX ADMIN — ENOXAPARIN SODIUM SCH MG: 30 INJECTION SUBCUTANEOUS at 09:00

## 2019-10-22 RX ADMIN — MEROPENEM SCH MLS/HR: 1 INJECTION INTRAVENOUS at 02:14

## 2019-10-22 RX ADMIN — INSULIN ASPART SCH UNITS: 100 INJECTION, SOLUTION INTRAVENOUS; SUBCUTANEOUS at 16:30

## 2019-10-22 RX ADMIN — INSULIN ASPART SCH UNITS: 100 INJECTION, SOLUTION INTRAVENOUS; SUBCUTANEOUS at 06:17

## 2019-10-22 RX ADMIN — DEXTROSE AND SODIUM CHLORIDE SCH MLS/HR: 5; .45 INJECTION, SOLUTION INTRAVENOUS at 03:28

## 2019-10-22 RX ADMIN — MEROPENEM SCH MLS/HR: 1 INJECTION INTRAVENOUS at 15:58

## 2019-10-22 RX ADMIN — DOCUSATE SODIUM SCH MG: 100 CAPSULE, LIQUID FILLED ORAL at 12:16

## 2019-10-22 RX ADMIN — INSULIN ASPART SCH UNITS: 100 INJECTION, SOLUTION INTRAVENOUS; SUBCUTANEOUS at 11:30

## 2019-10-22 RX ADMIN — DEXTROSE AND SODIUM CHLORIDE SCH MLS/HR: 5; .45 INJECTION, SOLUTION INTRAVENOUS at 15:58

## 2019-10-22 RX ADMIN — DIVALPROEX SODIUM SCH MG: 125 CAPSULE ORAL at 09:12

## 2019-10-22 RX ADMIN — ASPIRIN 81 MG SCH MG: 81 TABLET ORAL at 09:11

## 2019-10-22 RX ADMIN — DOCUSATE SODIUM SCH MG: 100 CAPSULE, LIQUID FILLED ORAL at 09:11

## 2019-10-22 RX ADMIN — INSULIN DETEMIR SCH UNITS: 100 INJECTION, SOLUTION SUBCUTANEOUS at 11:14

## 2019-10-22 RX ADMIN — DIVALPROEX SODIUM SCH MG: 125 CAPSULE ORAL at 21:35

## 2019-10-22 RX ADMIN — DOCUSATE SODIUM SCH MG: 100 CAPSULE, LIQUID FILLED ORAL at 17:18

## 2019-10-22 RX ADMIN — DONEPEZIL HYDROCHLORIDE SCH MG: 5 TABLET, FILM COATED ORAL at 09:11

## 2019-10-22 RX ADMIN — INSULIN ASPART SCH UNITS: 100 INJECTION, SOLUTION INTRAVENOUS; SUBCUTANEOUS at 21:00

## 2019-10-22 NOTE — INFECTIOUS DISEASES PROG NOTE
Assessment/Plan


Problems:  


(1) Aspiration pneumonia


Assessment & Plan:  with B/L infiltrates, L>R suspect due to altered mental 

status , with hypoxemia and leukocytosis, continue meropenem empirically, 

repeated  CXR  showed no change , CT of the chest showed B/L effusion with 

basal infiltrates , aspiration precaution. continue meropenem for two weeks 

total . EOT 10/30/19  





(2) UTI (urinary tract infection)


Assessment & Plan:  due to ESBL producing E coli already on meropenem to cover 

for sepsis and pneumonia too for two weeks total  





(3) Sepsis


Assessment & Plan:  due to the above, with persistent leukocytosis inspit of 

being on wide spectrum antibiotics and negative repeated blood cultures, 

suspect pelvic mass related or bone marrow pathology . recommend gynecology 

eval for pelvic mass resection 





(4) Acute metabolic encephalopathy


Assessment & Plan:  due to the above, continue hydration and antibiotics, 

monitor in tele 





(5) Hyperglycemia due to type 2 diabetes mellitus


Assessment & Plan:  recommend tight glycemic control to keep blood glucose 

between 100-140 





(6) ARF (acute renal failure)


Assessment & Plan:  due to the above, continue hydration and renally dosed 

antibiotics, close  monitor of renal function , stop vancomycin 





(7) Pelvic mass in female


Assessment & Plan:  to the left of the uterus, suspect malignancy , recommend OB

/GYN eval , and tumor markers , oncology is following 








Subjective


ROS Limited/Unobtainable:  Yes


Allergies:  


Coded Allergies:  


     No Known Allergies (Unverified , 10/14/19)


Subjective


she was lethargic , lying in bed,  awake, and not  responsive , no fever or 

chills , no diarrhea, no cough or SOB





Objective


Vital Signs





Last 24 Hour Vital Signs








  Date Time  Temp Pulse Resp B/P (MAP) Pulse Ox O2 Delivery O2 Flow Rate FiO2


 


10/22/19 12:00 98.7 79 20 136/70 (92) 97   


 


10/22/19 12:00  76      


 


10/22/19 09:00 98.2 90 20 148/82 (104) 95   


 


10/22/19 09:00      Nasal Cannula 2.0 


 


10/22/19 08:00  88      


 


10/22/19 08:00     98 Nasal Cannula 2.0 28


 


10/22/19 04:00  87      


 


10/22/19 04:00 98.4 87 20 134/66 (88) 98   


 


10/22/19 00:00  87      


 


10/22/19 00:00 98.4 89 19 150/87 (108) 98   


 


10/21/19 21:00      Nasal Cannula 2.0 


 


10/21/19 20:00  86      


 


10/21/19 20:00 98.2 89 20 149/98 (115) 98   








Height (Feet):  5


Height (Inches):  5.00


Weight (Pounds):  151


General Appearance:  WD/WN, no acute distress


HEENT:  normocephalic, atraumatic, anicteric, mucous membranes moist, PERRL


Respiratory/Chest:  chest wall non-tender, lungs clear, normal breath sounds, 

no respiratory distress, no accessory muscle use


Cardiovascular:  normal peripheral pulses, normal rate, regular rhythm, no 

gallop/murmur, no JVD


Abdomen:  normal bowel sounds, soft, non tender, no organomegaly, non distended

, no mass, no scars


Genitourinary:  normal external genitalia


Extremities:  no cyanosis, no clubbing


Skin:  no rash, no lesions, no ulcers


Neurologic/Psychiatric:  alert, unresponsiveness


Lymphatic:  no neck adenopathy, no groin adenopathy


Musculoskeletal:  normal muscle bulk, no effusion





Laboratory Tests








Test


  10/22/19


04:10 10/22/19


06:25


 


Urine Eosinophils


  None seen


(NONE SEEN) 


 


 


White Blood Count


  


  22.1 K/UL


(4.8-10.8)  *H


 


Red Blood Count


  


  2.94 M/UL


(4.20-5.40)  L


 


Hemoglobin


  


  8.2 G/DL


(12.0-16.0)  L


 


Hematocrit


  


  24.8 %


(37.0-47.0)  L


 


Mean Corpuscular Volume  85 FL (80-99)  


 


Mean Corpuscular Hemoglobin


  


  28.1 PG


(27.0-31.0)


 


Mean Corpuscular Hemoglobin


Concent 


  33.2 G/DL


(32.0-36.0)


 


Red Cell Distribution Width


  


  13.6 %


(11.6-14.8)


 


Platelet Count


  


  406 K/UL


(150-450)


 


Mean Platelet Volume


  


  7.4 FL


(6.5-10.1)


 


Neutrophils (%) (Auto)


  


  % (45.0-75.0)


 


 


Lymphocytes (%) (Auto)


  


  % (20.0-45.0)


 


 


Monocytes (%) (Auto)   % (1.0-10.0)  


 


Eosinophils (%) (Auto)   % (0.0-3.0)  


 


Basophils (%) (Auto)   % (0.0-2.0)  


 


Differential Total Cells


Counted 


  100  


 


 


Neutrophils % (Manual)  80 % (45-75)  H


 


Lymphocytes % (Manual)  10 % (20-45)  L


 


Monocytes % (Manual)  5 % (1-10)  


 


Eosinophils % (Manual)  5 % (0-3)  H


 


Basophils % (Manual)  0 % (0-2)  


 


Band Neutrophils  0 % (0-8)  


 


Platelet Estimate  Adequate  


 


Platelet Morphology  Normal  


 


Hypochromasia  1+  


 


Sodium Level


  


  141 MMOL/L


(136-145)


 


Potassium Level


  


  4.3 MMOL/L


(3.5-5.1)


 


Chloride Level


  


  107 MMOL/L


()


 


Carbon Dioxide Level


  


  22 MMOL/L


(21-32)


 


Anion Gap


  


  12 mmol/L


(5-15)


 


Blood Urea Nitrogen


  


  39 mg/dL


(7-18)  H


 


Creatinine


  


  3.1 MG/DL


(0.55-1.30)  H


 


Estimat Glomerular Filtration


Rate 


  15.1 mL/min


(>60)


 


Glucose Level


  


  122 MG/DL


()  H


 


Uric Acid


  


  9.5 MG/DL


(2.6-7.2)  H


 


Calcium Level


  


  8.5 MG/DL


(8.5-10.1)


 


Phosphorus Level


  


  4.5 MG/DL


(2.5-4.9)


 


Magnesium Level


  


  2.3 MG/DL


(1.8-2.4)


 


Total Bilirubin


  


  0.3 MG/DL


(0.2-1.0)


 


Aspartate Amino Transf


(AST/SGOT) 


  17 U/L (15-37)


 


 


Alanine Aminotransferase


(ALT/SGPT) 


  21 U/L (12-78)


 


 


Alkaline Phosphatase


  


  93 U/L


()


 


C-Reactive Protein,


Quantitative 


  9.4 mg/dL


(0.00-0.90)  H


 


Pro-B-Type Natriuretic Peptide


  


  > 44295 pg/mL


(0-125)  H


 


Total Protein


  


  6.0 G/DL


(6.4-8.2)  L


 


Albumin


  


  1.7 G/DL


(3.4-5.0)  L


 


Globulin  4.3 g/dL  


 


Albumin/Globulin Ratio


  


  0.4 (1.0-2.7)


L


 


 Antigen  Pending  











Current Medications








 Medications


  (Trade)  Dose


 Ordered  Sig/Winnie


 Route


 PRN Reason  Start Time


 Stop Time Status Last Admin


Dose Admin


 


 Acetaminophen


  (Tylenol)  650 mg  PRN  PRN


 RECTAL


 TEMP>100.5  10/14/19 03:00


     


 


 


 Aspirin


  (ASA)  81 mg  DAILY


 ORAL


   10/15/19 09:00


 11/14/19 08:59  10/22/19 09:11


 


 


 Atorvastatin


 Calcium


  (Lipitor)  10 mg  BEDTIME


 ORAL


   10/14/19 21:00


 11/13/19 20:59  10/21/19 21:33


 


 


 Clopidogrel


 Bisulfate


  (Plavix)  75 mg  DAILY


 ORAL


   10/15/19 09:00


 11/14/19 08:59  10/22/19 09:11


 


 


 Dextrose


  (Dextrose 50%)  25 ml  Q30M  PRN


 IV


 Hypoglycemia  10/14/19 07:00


 11/13/19 06:59   


 


 


 Dextrose


  (Dextrose 50%)  50 ml  Q30M  PRN


 IV


 Hypoglycemia  10/14/19 07:00


 11/13/19 06:59   


 


 


 Dextrose/Sodium


 Chloride  1,000 ml @ 


 100 mls/hr  Q10H


 IV


   10/20/19 11:45


 11/19/19 11:44  10/22/19 15:58


 


 


 Divalproex Sodium


  (Depakote


 Sprinkles)  500 mg  Q12HR


 ORAL


   10/15/19 22:00


 11/14/19 21:59  10/22/19 09:12


 


 


 Docusate Sodium


  (Colace)  100 mg  THREE TIMES A  DAY


 ORAL


   10/14/19 13:00


 11/13/19 12:59  10/22/19 12:16


 


 


 Donepezil HCl


  (Aricept)  5 mg  DAILY


 ORAL


   10/15/19 11:00


 11/14/19 10:59  10/22/19 09:11


 


 


 Enoxaparin Sodium


  (Lovenox)  30 mg  DAILY


 SUBQ


   10/14/19 09:00


 11/13/19 08:59  10/21/19 08:45


 


 


 Insulin Aspart


  (NovoLOG)    BEFORE MEALS AND  HS


 SUBQ


   10/14/19 11:30


 11/13/19 11:29  10/22/19 06:17


 


 


 Insulin Detemir


  (Levemir)  14 units  DAILY


 SUBQ


   10/21/19 09:00


 11/13/19 10:29  10/22/19 11:14


 


 


 Lactulose


  (Cephulac)  20 gm  TIDPRN  PRN


 ORAL


 Constipation  10/16/19 14:45


 11/15/19 14:44  10/16/19 15:21


 


 


 Lorazepam


  (Ativan 2mg/ml


 1ml)  1 mg  Q8HR  PRN


 IV


 For Anxiety  10/17/19 19:15


 10/24/19 19:14  10/18/19 01:12


 


 


 Meropenem 1 gm/


 Sodium Chloride  55 ml @ 


 110 mls/hr  Q12H


 IVPB


   10/16/19 14:00


 10/23/19 13:59  10/22/19 15:58


 


 


 Nateglinide


  (Starlix)  120 mg  TIAC


 ORAL


   10/20/19 11:30


 11/19/19 11:29  10/22/19 12:16


 


 


 Pantoprazole


  (Protonix)  40 mg  BID


 ORAL


   10/14/19 18:00


 11/13/19 08:59  10/22/19 09:11


 

















Amie Burgos M.D. Oct 22, 2019 16:06

## 2019-10-22 NOTE — PULMONOLOGY PROGRESS NOTE
Assessment/Plan


Problems:  


(1) Healthcare associated bacterial pneumonia


(2) UTI (urinary tract infection)


(3) Sepsis


(4) Dehydration


(5) CHANCE (acute kidney injury)


(6) Acute metabolic encephalopathy


(7) Hyperglycemia due to type 2 diabetes mellitus


(8) Renal failure (ARF), acute on chronic


(9) Aspiration pneumonia


(10) Abnormal TSH


(11) Pelvic mass in female


Assessment/Plan


Marked leukocytosis


Sepsis


Possible pneumonia


E coli UTI


Acute hypoxemic respiratory failure


Acute encephalopathy


Hx CHF


HTN


CHANCE 


Pelvic mass/possible GYN malignancy vs cyst





Plan:


-Unclear why CT CAP not in EMR, stat read is in the paper chart and concern for 

pelvic malignancy noted


-Monitor WCt


-Abx per ID


-monitor volumes and renal function, F/U renal recs


-GYN evaluation


-monitor encephalopathy, ? neuro eval


-Asp precautions, SLP recs 


-DVT Px: LMWH


-FC





Subjective


Allergies:  


Coded Allergies:  


     No Known Allergies (Unverified , 10/14/19)


Subjective


WCT and Cr both better


CT noted with concern for gynecologic malignancy vs cyst


Less cough per RN + SOB no FC





Objective





Last 24 Hour Vital Signs








  Date Time  Temp Pulse Resp B/P (MAP) Pulse Ox O2 Delivery O2 Flow Rate FiO2


 


10/22/19 12:00 98.7 79 20 136/70 (92) 97   


 


10/22/19 12:00  76      


 


10/22/19 09:00 98.2 90 20 148/82 (104) 95   


 


10/22/19 09:00      Nasal Cannula 2.0 


 


10/22/19 08:00  88      


 


10/22/19 08:00     98 Nasal Cannula 2.0 28


 


10/22/19 04:00  87      


 


10/22/19 04:00 98.4 87 20 134/66 (88) 98   


 


10/22/19 00:00  87      


 


10/22/19 00:00 98.4 89 19 150/87 (108) 98   


 


10/21/19 21:00      Nasal Cannula 2.0 


 


10/21/19 20:00  86      


 


10/21/19 20:00 98.2 89 20 149/98 (115) 98   


 


10/21/19 16:00 98.2 87 24 142/69 (93) 94   


 


10/21/19 16:00  77      

















Intake and Output  


 


 10/21/19 10/22/19





 19:00 07:00


 


Intake Total 2255 ml 


 


Output Total 150 ml 400 ml


 


Balance 2105 ml -400 ml


 


  


 


Intake Oral 1150 ml 


 


IV Total 1105 ml 


 


Output Urine Total 150 ml 400 ml


 


# Bowel Movements  1








General Appearance:  no acute distress, cachetic


HEENT:  normocephalic, atraumatic


Respiratory/Chest:  rhonchi


Cardiovascular:  normal peripheral pulses, normal rate, regular rhythm


Abdomen:  normal bowel sounds, soft, non tender, no organomegaly, non distended

, no mass


Extremities:  no cyanosis, no clubbing, no edema


Laboratory Tests


10/22/19 04:10: Urine Eosinophils None seen


10/22/19 06:25: 


White Blood Count 22.1*H, Red Blood Count 2.94L, Hemoglobin 8.2L, Hematocrit 

24.8L, Mean Corpuscular Volume 85, Mean Corpuscular Hemoglobin 28.1, Mean 

Corpuscular Hemoglobin Concent 33.2, Red Cell Distribution Width 13.6, Platelet 

Count 406, Mean Platelet Volume 7.4, Neutrophils (%) (Auto) , Lymphocytes (%) (

Auto) , Monocytes (%) (Auto) , Eosinophils (%) (Auto) , Basophils (%) (Auto) , 

Differential Total Cells Counted 100, Neutrophils % (Manual) 80H, Lymphocytes % 

(Manual) 10L, Monocytes % (Manual) 5, Eosinophils % (Manual) 5H, Basophils % (

Manual) 0, Band Neutrophils 0, Platelet Estimate Adequate, Platelet Morphology 

Normal, Hypochromasia 1+, Sodium Level 141, Potassium Level 4.3, Chloride Level 

107, Carbon Dioxide Level 22, Anion Gap 12, Blood Urea Nitrogen 39H, Creatinine 

3.1H, Estimat Glomerular Filtration Rate 15.1, Glucose Level 122H, Uric Acid 

9.5H, Calcium Level 8.5, Phosphorus Level 4.5, Magnesium Level 2.3, Total 

Bilirubin 0.3, Aspartate Amino Transf (AST/SGOT) 17, Alanine Aminotransferase (

ALT/SGPT) 21, Alkaline Phosphatase 93, C-Reactive Protein, Quantitative 9.4H, 

Pro-B-Type Natriuretic Peptide > 53047H, Total Protein 6.0L, Albumin 1.7L, 

Globulin 4.3, Albumin/Globulin Ratio 0.4L,  Antigen [Pending]





Current Medications








 Medications


  (Trade)  Dose


 Ordered  Sig/Winnie


 Route


 PRN Reason  Start Time


 Stop Time Status Last Admin


Dose Admin


 


 Acetaminophen


  (Tylenol)  650 mg  PRN  PRN


 RECTAL


 TEMP>100.5  10/14/19 03:00


     


 


 


 Aspirin


  (ASA)  81 mg  DAILY


 ORAL


   10/15/19 09:00


 11/14/19 08:59  10/22/19 09:11


 


 


 Atorvastatin


 Calcium


  (Lipitor)  10 mg  BEDTIME


 ORAL


   10/14/19 21:00


 11/13/19 20:59  10/21/19 21:33


 


 


 Clopidogrel


 Bisulfate


  (Plavix)  75 mg  DAILY


 ORAL


   10/15/19 09:00


 11/14/19 08:59  10/22/19 09:11


 


 


 Dextrose


  (Dextrose 50%)  25 ml  Q30M  PRN


 IV


 Hypoglycemia  10/14/19 07:00


 11/13/19 06:59   


 


 


 Dextrose


  (Dextrose 50%)  50 ml  Q30M  PRN


 IV


 Hypoglycemia  10/14/19 07:00


 11/13/19 06:59   


 


 


 Dextrose/Sodium


 Chloride  1,000 ml @ 


 100 mls/hr  Q10H


 IV


   10/20/19 11:45


 11/19/19 11:44  10/22/19 03:28


 


 


 Divalproex Sodium


  (Depakote


 Sprinkles)  500 mg  Q12HR


 ORAL


   10/15/19 22:00


 11/14/19 21:59  10/22/19 09:12


 


 


 Docusate Sodium


  (Colace)  100 mg  THREE TIMES A  DAY


 ORAL


   10/14/19 13:00


 11/13/19 12:59  10/22/19 12:16


 


 


 Donepezil HCl


  (Aricept)  5 mg  DAILY


 ORAL


   10/15/19 11:00


 11/14/19 10:59  10/22/19 09:11


 


 


 Enoxaparin Sodium


  (Lovenox)  30 mg  DAILY


 SUBQ


   10/14/19 09:00


 11/13/19 08:59  10/21/19 08:45


 


 


 Insulin Aspart


  (NovoLOG)    BEFORE MEALS AND  HS


 SUBQ


   10/14/19 11:30


 11/13/19 11:29  10/22/19 06:17


 


 


 Insulin Detemir


  (Levemir)  14 units  DAILY


 SUBQ


   10/21/19 09:00


 11/13/19 10:29  10/22/19 11:14


 


 


 Lactulose


  (Cephulac)  20 gm  TIDPRN  PRN


 ORAL


 Constipation  10/16/19 14:45


 11/15/19 14:44  10/16/19 15:21


 


 


 Lorazepam


  (Ativan 2mg/ml


 1ml)  1 mg  Q8HR  PRN


 IV


 For Anxiety  10/17/19 19:15


 10/24/19 19:14  10/18/19 01:12


 


 


 Meropenem 1 gm/


 Sodium Chloride  55 ml @ 


 110 mls/hr  Q12H


 IVPB


   10/16/19 14:00


 10/23/19 13:59  10/22/19 02:14


 


 


 Nateglinide


  (Starlix)  120 mg  TIAC


 ORAL


   10/20/19 11:30


 11/19/19 11:29  10/22/19 12:16


 


 


 Pantoprazole


  (Protonix)  40 mg  BID


 ORAL


   10/14/19 18:00


 11/13/19 08:59  10/22/19 09:11


 

















Sammy Torres MD Oct 22, 2019 14:42

## 2019-10-22 NOTE — SURGERY PROGRESS NOTE
Surgery Progress Note


Subjective


Additional Comments


no acute events


labs noted


exam stable





Objective





Last 24 Hour Vital Signs








  Date Time  Temp Pulse Resp B/P (MAP) Pulse Ox O2 Delivery O2 Flow Rate FiO2


 


10/22/19 09:00 98.2 90 20 148/82 (104) 95   


 


10/22/19 09:00      Nasal Cannula 2.0 


 


10/22/19 08:00  88      


 


10/22/19 04:00  87      


 


10/22/19 04:00 98.4 87 20 134/66 (88) 98   


 


10/22/19 00:00  87      


 


10/22/19 00:00 98.4 89 19 150/87 (108) 98   


 


10/21/19 21:00      Nasal Cannula 2.0 


 


10/21/19 20:00  86      


 


10/21/19 20:00 98.2 89 20 149/98 (115) 98   


 


10/21/19 16:00 98.2 87 24 142/69 (93) 94   


 


10/21/19 16:00  77      








I&O











Intake and Output  


 


 10/21/19 10/22/19





 19:00 07:00


 


Intake Total 2255 ml 


 


Output Total 150 ml 400 ml


 


Balance 2105 ml -400 ml


 


  


 


Intake Oral 1150 ml 


 


IV Total 1105 ml 


 


Output Urine Total 150 ml 400 ml


 


# Bowel Movements  1








Dressing:  other


Wound:  other


Drains:  other


Cardiovascular:  RSR


Respiratory:  decreased breath sounds


Abdomen:  soft, present bowel sounds


Extremities:  no edema, no tenderness





Laboratory Tests








Test


  10/22/19


04:10 10/22/19


06:25


 


Urine Eosinophils


  None seen


(NONE SEEN) 


 


 


White Blood Count


  


  22.1 K/UL


(4.8-10.8)  *H


 


Red Blood Count


  


  2.94 M/UL


(4.20-5.40)  L


 


Hemoglobin


  


  8.2 G/DL


(12.0-16.0)  L


 


Hematocrit


  


  24.8 %


(37.0-47.0)  L


 


Mean Corpuscular Volume  85 FL (80-99)  


 


Mean Corpuscular Hemoglobin


  


  28.1 PG


(27.0-31.0)


 


Mean Corpuscular Hemoglobin


Concent 


  33.2 G/DL


(32.0-36.0)


 


Red Cell Distribution Width


  


  13.6 %


(11.6-14.8)


 


Platelet Count


  


  406 K/UL


(150-450)


 


Mean Platelet Volume


  


  7.4 FL


(6.5-10.1)


 


Neutrophils (%) (Auto)


  


  % (45.0-75.0)


 


 


Lymphocytes (%) (Auto)


  


  % (20.0-45.0)


 


 


Monocytes (%) (Auto)   % (1.0-10.0)  


 


Eosinophils (%) (Auto)   % (0.0-3.0)  


 


Basophils (%) (Auto)   % (0.0-2.0)  


 


Differential Total Cells


Counted 


  100  


 


 


Neutrophils % (Manual)  80 % (45-75)  H


 


Lymphocytes % (Manual)  10 % (20-45)  L


 


Monocytes % (Manual)  5 % (1-10)  


 


Eosinophils % (Manual)  5 % (0-3)  H


 


Basophils % (Manual)  0 % (0-2)  


 


Band Neutrophils  0 % (0-8)  


 


Platelet Estimate  Adequate  


 


Platelet Morphology  Normal  


 


Hypochromasia  1+  


 


Sodium Level


  


  141 MMOL/L


(136-145)


 


Potassium Level


  


  4.3 MMOL/L


(3.5-5.1)


 


Chloride Level


  


  107 MMOL/L


()


 


Carbon Dioxide Level


  


  22 MMOL/L


(21-32)


 


Anion Gap


  


  12 mmol/L


(5-15)


 


Blood Urea Nitrogen


  


  39 mg/dL


(7-18)  H


 


Creatinine


  


  3.1 MG/DL


(0.55-1.30)  H


 


Estimat Glomerular Filtration


Rate 


  15.1 mL/min


(>60)


 


Glucose Level


  


  122 MG/DL


()  H


 


Uric Acid


  


  9.5 MG/DL


(2.6-7.2)  H


 


Calcium Level


  


  8.5 MG/DL


(8.5-10.1)


 


Phosphorus Level


  


  4.5 MG/DL


(2.5-4.9)


 


Magnesium Level


  


  2.3 MG/DL


(1.8-2.4)


 


Total Bilirubin


  


  0.3 MG/DL


(0.2-1.0)


 


Aspartate Amino Transf


(AST/SGOT) 


  17 U/L (15-37)


 


 


Alanine Aminotransferase


(ALT/SGPT) 


  21 U/L (12-78)


 


 


Alkaline Phosphatase


  


  93 U/L


()


 


C-Reactive Protein,


Quantitative 


  9.4 mg/dL


(0.00-0.90)  H


 


Pro-B-Type Natriuretic Peptide


  


  > 18763 pg/mL


(0-125)  H


 


Total Protein


  


  6.0 G/DL


(6.4-8.2)  L


 


Albumin


  


  1.7 G/DL


(3.4-5.0)  L


 


Globulin  4.3 g/dL  


 


Albumin/Globulin Ratio


  


  0.4 (1.0-2.7)


L


 


 Antigen  Pending  











Plan


Problems:  


(1) Pressure injury of deep tissue


Assessment & Plan:  Pt presented on admission with DTPI R heel. Blood filled 

blister with marginal erythema noted to heel. Pt exhibited agitation when 

minimally palpated. (L)2.2cm x (W)2.1cm


L heel is blanchable .


Non-blanchable erythema without induration noted to sacral cleft. 


No other areas of skin concerns noted.





Tx.Plan:


Apply Betadine to R heel. Cover with Optifoam drsg. Change every 3 days and prn.


           


Apply Cavilon Skin Barrier to L heel. Cover with Optifoam drsg. Change every 7 

days and prn.


           


Apply Moisture Barrier Paste to buttocks. Cover sacral area with Optifoam drsg. 

Change every 3 days and prn.


           


Reposition at least every 2hours or as tolerated.


           


Off-load heels with pillow.





(2) Sepsis


Assessment & Plan:  Patient with low-grade fevers, anemia, leukocytosis 

persistent, elevated platelets, abnormal labs.


Etiology currently unknown and work-up in progress.  Labs noted and imaging 

that is available as noted.  


Okay for diet


Pending CT scan


Antibiotics as per infectious disease 


local wound care as wounds do not seem to be the etiology of her sepsis


We will monitor and follow with recommendations


Thank you for allowing me to participate in patient's care














Radhames Blas Oct 22, 2019 12:22

## 2019-10-22 NOTE — GENERAL PROGRESS NOTE
Assessment/Plan


Status:  stable


Assessment/Plan:


S: Not verbal





O: poor historian, seems comfortable. IV sites are intact. Charles in place





PHYSICAL EXAMINATION:HEAD AND NECK:  Atraumatic and normocephalic.


CHEST:  Bronchial breathing sounds.ABDOMEN:  Soft.  No organomegaly.


MUSCULOSKELETAL:  Diffuse 1+ or 2+ nonpitting edema in all four contracted


extremities.  The patient is not following the commands.  Limited


evaluation.NEUROLOGY:  The patient is awake.  Not alert.  Not verbally


communicative.





LABORATORY DATA:  Labs dated October 16, 2019  reviewed





Meds: Reviewed and reconciled in the chart 





Imaging: Pending CT abd





ASSESSMENT AND PLAN:


1. Sepsis.  Differential diagnosis is healthcare-associated pneumonia or


healthcare-associated urinary tract infection.  Work in progress.


2. Chronic encephalomalacia.


3. Acute on chronic anemia.


4. Renal failure, age indeterminate.


5. Congestive heart failure- preserved EF


6. Hyperthyroidism.


7. Hyperkalemia.


8. Diabetes type 2, uncontrolled with A1c of 10.


9. Psychiatric disorder.


10. GI and DVT prophylaxis.


11. PAH- Severe 





PLAN OF CARE:


 Notes from  Pulmonary Critical Care, Infectious


Disease, and Nephrology reviewed


post Blood transfusion


modified abx per ID, given the increase in WBC and low grade fever


Notes from pulmonary reveiwed 


Worsening renal failure





Subjective


Allergies:  


Coded Allergies:  


     No Known Allergies (Unverified , 10/14/19)





Objective





Last 24 Hour Vital Signs








  Date Time  Temp Pulse Resp B/P (MAP) Pulse Ox O2 Delivery O2 Flow Rate FiO2


 


10/22/19 16:00  89      


 


10/22/19 16:00 98.8 91 18 136/82 (100) 95   


 


10/22/19 12:00 98.7 79 20 136/70 (92) 97   


 


10/22/19 12:00  76      


 


10/22/19 09:00 98.2 90 20 148/82 (104) 95   


 


10/22/19 09:00      Nasal Cannula 2.0 


 


10/22/19 08:00  88      


 


10/22/19 08:00     98 Nasal Cannula 2.0 28


 


10/22/19 04:00  87      


 


10/22/19 04:00 98.4 87 20 134/66 (88) 98   


 


10/22/19 00:00  87      


 


10/22/19 00:00 98.4 89 19 150/87 (108) 98   

















Intake and Output  


 


 10/21/19 10/22/19





 19:00 07:00


 


Intake Total 2255 ml 


 


Output Total 150 ml 400 ml


 


Balance 2105 ml -400 ml


 


  


 


Intake Oral 1150 ml 


 


IV Total 1105 ml 


 


Output Urine Total 150 ml 400 ml


 


# Bowel Movements  1








Laboratory Tests


10/22/19 04:10: Urine Eosinophils None seen


10/22/19 06:25: 


White Blood Count 22.1*H, Red Blood Count 2.94L, Hemoglobin 8.2L, Hematocrit 

24.8L, Mean Corpuscular Volume 85, Mean Corpuscular Hemoglobin 28.1, Mean 

Corpuscular Hemoglobin Concent 33.2, Red Cell Distribution Width 13.6, Platelet 

Count 406, Mean Platelet Volume 7.4, Neutrophils (%) (Auto) , Lymphocytes (%) (

Auto) , Monocytes (%) (Auto) , Eosinophils (%) (Auto) , Basophils (%) (Auto) , 

Differential Total Cells Counted 100, Neutrophils % (Manual) 80H, Lymphocytes % 

(Manual) 10L, Monocytes % (Manual) 5, Eosinophils % (Manual) 5H, Basophils % (

Manual) 0, Band Neutrophils 0, Platelet Estimate Adequate, Platelet Morphology 

Normal, Hypochromasia 1+, Sodium Level 141, Potassium Level 4.3, Chloride Level 

107, Carbon Dioxide Level 22, Anion Gap 12, Blood Urea Nitrogen 39H, Creatinine 

3.1H, Estimat Glomerular Filtration Rate 15.1, Glucose Level 122H, Uric Acid 

9.5H, Calcium Level 8.5, Phosphorus Level 4.5, Magnesium Level 2.3, Total 

Bilirubin 0.3, Aspartate Amino Transf (AST/SGOT) 17, Alanine Aminotransferase (

ALT/SGPT) 21, Alkaline Phosphatase 93, C-Reactive Protein, Quantitative 9.4H, 

Pro-B-Type Natriuretic Peptide > 67243B, Total Protein 6.0L, Albumin 1.7L, 

Globulin 4.3, Albumin/Globulin Ratio 0.4L,  Antigen [Pending]


Height (Feet):  5


Height (Inches):  5.00


Weight (Pounds):  151











Garrick Keith MD Oct 22, 2019 22:03

## 2019-10-22 NOTE — HEMATOLOGY/ONC PROGRESS NOTE
Assessment/Plan


Assessment/Plan





Assessment and Recs:


# Anemia of chronic disease due to underlying chronic medical issues, 

multifactorial 


--> Anemia workup has been ordered, rule out gi bleed --> ferritin is 1400+


--> No evidence of hemolysis is noted, peripheral smear has been reviewed.


--> Hgb goal >7. Transfuse prn.


--> Epogen or iron at this time is not particularly indicated


--> Medications have been reviewed


--> low threshold for gi evaluation in case has occult +


--> bone marrow biopsy is not indicated given the other more likely causes (if 

recurrent anemia) first time here


--> hgb trend 8.4->8.5


# Pelvic mass on ct and us -- 13 x 7 x 12.9 cm unilocular cystic mass, 

corresponding to lesion reported on recent CT scan. Layering low level internal 

echoes likely represent bladder debris.. This presumably represents a cystic 

ovarian lesion with debris or hemorrhage, possibly neoplastic.


--> have ordered ca125


--> consider gyn eval


# Hypercalcemia - btw 10-11 range when corrected to alb


--> ivf have been started


--> if remains elevated, 7.9-->8.3


--> will get pth


# Leukocytosis/elevated white blood cell count, unspecified likely related to 

underlying reaction v more likely infection


--> have reviewed peripheral smear and bandemia/neutrophilia noted


--> continue antibiotics if they have been started by ID team (VANC/ZOSYN)--> 

vanc/linda--> linda


--> wbc 39-->28k-->29k->31k-->23-->26k


--> monitor for resolution


--> CT C/A/P Results pending


# Sepsis from pneumonia.  


--> pulm consulted, abx started


# CHANCE (acute kidney injury) on ckd


--> cr 1.6-->2.7


--> per renal


# UTI (urinary tract infection)


--> on abx per id


# Dehydration


--> on ivf


# Acute metabolic encephalopathy


--> likely due to pna


# Dvt ppx lovenox sq





The timing of this note does not necessarily reflect the time of the patient 

was seen.





Greatly appreciate consultation.





Subjective


Constitutional:  Denies: no symptoms, chills, fever, malaise, weakness, other


Cardiovascular:  Denies: no symptoms, chest pain, edema, irregular heart rate, 

lightheadedness, palpitations, syncope, other


Respiratory:  Denies: no symptoms, cough, shortness of breath, SOB with 

excertion, SOB at rest, sputum, wheezing, other


Gastrointestinal/Abdominal:  Denies: no symptoms, abdomen distended, abdominal 

pain, black stools, tarry stools, blood in stool, constipated, diarrhea, 

difficulty swallowing, nausea, poor appetite, poor fluid intake, rectal bleeding

, vomiting, other


Genitourinary:  Denies: no symptoms, burning, discharge, frequency, flank pain, 

hematuria, incontinence, pain, urgency, other


Endocrine:  Denies: no symptoms, excessive sweating, flushing, intolerance to 

cold, intolerance to heat, increased hunger, increased thirst, increased urine, 

unexplained weight gain, unexplained weight loss, other


Allergies:  


Coded Allergies:  


     No Known Allergies (Unverified , 10/14/19)


Subjective


10/14: hgb has improved, no bleeding, labs reivewed


10/15: no events to report


10/16: a+ o x1, no bleeding or chills noted, no major changes, occult pending


10/17: no events noted, no bleeding, no chills, no hematemesis/hematochezia


10/18: remains confused, with abx, on nc


10/19: cr is worse, hgb 8.4, no bleeding, night sweats, chills


10/20: no f/c, no night sweats, labs noted, cr worse


10/21: no bleeding or chills, no night sweats, labs pending this am


10/22: pelvic mass noted on imaging, no bleeding reported, ordered ca125





Objective


Objective





Current Medications








 Medications


  (Trade)  Dose


 Ordered  Sig/Winnie


 Route


 PRN Reason  Start Time


 Stop Time Status Last Admin


Dose Admin


 


 Acetaminophen


  (Tylenol)  650 mg  PRN  PRN


 RECTAL


 TEMP>100.5  10/14/19 03:00


     


 


 


 Aspirin


  (ASA)  81 mg  DAILY


 ORAL


   10/15/19 09:00


 11/14/19 08:59  10/21/19 08:44


 


 


 Atorvastatin


 Calcium


  (Lipitor)  10 mg  BEDTIME


 ORAL


   10/14/19 21:00


 11/13/19 20:59  10/21/19 21:33


 


 


 Clopidogrel


 Bisulfate


  (Plavix)  75 mg  DAILY


 ORAL


   10/15/19 09:00


 11/14/19 08:59  10/21/19 08:44


 


 


 Dextrose


  (Dextrose 50%)  25 ml  Q30M  PRN


 IV


 Hypoglycemia  10/14/19 07:00


 11/13/19 06:59   


 


 


 Dextrose


  (Dextrose 50%)  50 ml  Q30M  PRN


 IV


 Hypoglycemia  10/14/19 07:00


 11/13/19 06:59   


 


 


 Dextrose/Sodium


 Chloride  1,000 ml @ 


 100 mls/hr  Q10H


 IV


   10/20/19 11:45


 11/19/19 11:44  10/22/19 03:28


 


 


 Divalproex Sodium


  (Depakote


 Sprinkles)  500 mg  Q12HR


 ORAL


   10/15/19 22:00


 11/14/19 21:59  10/21/19 21:48


 


 


 Docusate Sodium


  (Colace)  100 mg  THREE TIMES A  DAY


 ORAL


   10/14/19 13:00


 11/13/19 12:59  10/21/19 18:04


 


 


 Donepezil HCl


  (Aricept)  5 mg  DAILY


 ORAL


   10/15/19 11:00


 11/14/19 10:59  10/21/19 08:44


 


 


 Enoxaparin Sodium


  (Lovenox)  30 mg  DAILY


 SUBQ


   10/14/19 09:00


 11/13/19 08:59  10/21/19 08:45


 


 


 Insulin Aspart


  (NovoLOG)    BEFORE MEALS AND  HS


 SUBQ


   10/14/19 11:30


 11/13/19 11:29  10/21/19 21:34


 


 


 Insulin Detemir


  (Levemir)  14 units  DAILY


 SUBQ


   10/21/19 09:00


 11/13/19 10:29  10/21/19 11:45


 


 


 Lactulose


  (Cephulac)  20 gm  TIDPRN  PRN


 ORAL


 Constipation  10/16/19 14:45


 11/15/19 14:44  10/16/19 15:21


 


 


 Lorazepam


  (Ativan 2mg/ml


 1ml)  1 mg  Q8HR  PRN


 IV


 For Anxiety  10/17/19 19:15


 10/24/19 19:14  10/18/19 01:12


 


 


 Meropenem 1 gm/


 Sodium Chloride  55 ml @ 


 110 mls/hr  Q12H


 IVPB


   10/16/19 14:00


 10/23/19 13:59  10/22/19 02:14


 


 


 Nateglinide


  (Starlix)  120 mg  TIAC


 ORAL


   10/20/19 11:30


 11/19/19 11:29  10/21/19 16:26


 


 


 Pantoprazole


  (Protonix)  40 mg  BID


 ORAL


   10/14/19 18:00


 11/13/19 08:59  10/21/19 18:04


 











Last 24 Hour Vital Signs








  Date Time  Temp Pulse Resp B/P (MAP) Pulse Ox O2 Delivery O2 Flow Rate FiO2


 


10/22/19 04:00  87      


 


10/22/19 04:00 98.4 87 20 134/66 (88) 98   


 


10/22/19 00:00  87      


 


10/22/19 00:00 98.4 89 19 150/87 (108) 98   


 


10/21/19 21:00      Nasal Cannula 2.0 


 


10/21/19 20:00  86      


 


10/21/19 20:00 98.2 89 20 149/98 (115) 98   


 


10/21/19 16:00 98.2 87 24 142/69 (93) 94   


 


10/21/19 16:00  77      


 


10/21/19 12:00 98.2 91 28 152/70 (97) 95   


 


10/21/19 12:00  89      


 


10/21/19 09:00      Nasal Cannula 2.0 


 


10/21/19 08:00  125      


 


10/21/19 08:00 98.1 108 25 160/73 (102) 93   


 


10/21/19 04:00  90      


 


10/21/19 04:00 98.2 83 17 137/64 (88) 100   


 


10/21/19 00:00 98.9 95 21 140/84 (102) 99   


 


10/21/19 00:00  95      


 


10/20/19 21:00      Nasal Cannula 2.0 


 


10/20/19 20:45     97 Nasal Cannula 2.0 28


 


10/20/19 20:00 99.6 98 20 156/83 (107) 98   


 


10/20/19 20:00  98      


 


10/20/19 16:00  88      


 


10/20/19 16:00 97.9 98 20 157/71 (99) 98   


 


10/20/19 12:00 98.0 99 20 161/77 (105) 99   


 


10/20/19 12:00  93      


 


10/20/19 09:00      Nasal Cannula 2.0 


 


10/20/19 08:00 98.2 94 20 145/70 (95) 98   


 


10/20/19 08:00  100      

















Intake and Output  


 


 10/21/19 10/22/19





 19:00 07:00


 


Intake Total 2255 ml 


 


Output Total 150 ml 


 


Balance 2105 ml 


 


  


 


Intake Oral 1150 ml 


 


IV Total 1105 ml 


 


Output Urine Total 150 ml 











Labs








Test


  10/19/19


07:05 10/20/19


06:28 10/20/19


08:59 10/20/19


10:16


 


White Blood Count


  23.4 K/UL


(4.8-10.8) 25.8 K/UL


(4.8-10.8) 


  


 


 


Red Blood Count


  2.97 M/UL


(4.20-5.40) 2.98 M/UL


(4.20-5.40) 


  


 


 


Hemoglobin


  8.4 G/DL


(12.0-16.0) 8.4 G/DL


(12.0-16.0) 


  


 


 


Hematocrit


  25.0 %


(37.0-47.0) 25.1 %


(37.0-47.0) 


  


 


 


Mean Corpuscular Volume 84 FL (80-99)  84 FL (80-99)   


 


Mean Corpuscular Hemoglobin


  28.2 PG


(27.0-31.0) 28.1 PG


(27.0-31.0) 


  


 


 


Mean Corpuscular Hemoglobin


Concent 33.5 G/DL


(32.0-36.0) 33.3 G/DL


(32.0-36.0) 


  


 


 


Red Cell Distribution Width


  13.1 %


(11.6-14.8) 12.0 %


(11.6-14.8) 


  


 


 


Platelet Count


  516 K/UL


(150-450) 467 K/UL


(150-450) 


  


 


 


Mean Platelet Volume


  7.3 FL


(6.5-10.1) 7.3 FL


(6.5-10.1) 


  


 


 


Neutrophils (%) (Auto)  % (45.0-75.0)   % (45.0-75.0)   


 


Lymphocytes (%) (Auto)  % (20.0-45.0)   % (20.0-45.0)   


 


Monocytes (%) (Auto)  % (1.0-10.0)   % (1.0-10.0)   


 


Eosinophils (%) (Auto)  % (0.0-3.0)   % (0.0-3.0)   


 


Basophils (%) (Auto)  % (0.0-2.0)   % (0.0-2.0)   


 


Differential Total Cells


Counted 100 


  100 


  


  


 


 


Neutrophils % (Manual) 86 % (45-75)  92 % (45-75)   


 


Lymphocytes % (Manual) 7 % (20-45)  4 % (20-45)   


 


Monocytes % (Manual) 6 % (1-10)  4 % (1-10)   


 


Eosinophils % (Manual) 1 % (0-3)  0 % (0-3)   


 


Basophils % (Manual) 0 % (0-2)  0 % (0-2)   


 


Band Neutrophils 0 % (0-8)  0 % (0-8)   


 


Platelet Estimate Increased  Adequate   


 


Platelet Morphology Normal  Normal   


 


Hypochromasia 1+  1+   


 


Sodium Level


  146 MMOL/L


(136-145) 146 MMOL/L


(136-145) 


  


 


 


Potassium Level


  3.5 MMOL/L


(3.5-5.1) 4.1 MMOL/L


(3.5-5.1) 


  


 


 


Chloride Level


  111 MMOL/L


() 110 MMOL/L


() 


  


 


 


Carbon Dioxide Level


  22 MMOL/L


(21-32) 22 MMOL/L


(21-32) 


  


 


 


Anion Gap


  13 mmol/L


(5-15) 14 mmol/L


(5-15) 


  


 


 


Blood Urea Nitrogen


  27 mg/dL


(7-18) 33 mg/dL


(7-18) 


  


 


 


Creatinine


  1.6 MG/DL


(0.55-1.30) 2.7 MG/DL


(0.55-1.30) 


  


 


 


Estimat Glomerular Filtration


Rate 32.3 mL/min


(>60) 17.7 mL/min


(>60) 


  


 


 


Glucose Level


  110 MG/DL


() 136 MG/DL


() 


  


 


 


Calcium Level


  8.6 MG/DL


(8.5-10.1) 8.4 MG/DL


(8.5-10.1) 


  


 


 


Total Bilirubin


  0.4 MG/DL


(0.2-1.0) 0.4 MG/DL


(0.2-1.0) 


  


 


 


Aspartate Amino Transf


(AST/SGOT) 16 U/L (15-37) 


  19 U/L (15-37) 


  


  


 


 


Alanine Aminotransferase


(ALT/SGPT) 27 U/L (12-78) 


  31 U/L (12-78) 


  


  


 


 


Alkaline Phosphatase


  110 U/L


() 110 U/L


() 


  


 


 


Total Protein


  6.1 G/DL


(6.4-8.2) 6.3 G/DL


(6.4-8.2) 


  


 


 


Albumin


  1.5 G/DL


(3.4-5.0) 1.5 G/DL


(3.4-5.0) 


  


 


 


Globulin 4.6 g/dL  4.8 g/dL   


 


Albumin/Globulin Ratio 0.3 (1.0-2.7)  0.3 (1.0-2.7)   


 


Amylase Level


  20 U/L


() 


  


  


 


 


Lipase


  38 U/L


() 


  


  


 


 


Uric Acid


  


  10.0 MG/DL


(2.6-7.2) 


  


 


 


Phosphorus Level


  


  3.7 MG/DL


(2.5-4.9) 


  


 


 


Magnesium Level


  


  2.6 MG/DL


(1.8-2.4) 


  


 


 


Random Vancomycin Level   26.7 ug/mL  


 


Urine Random Sodium


  


  


  


  40 mmol/L


()


 


Test


  10/21/19


03:00 10/21/19


06:36 10/22/19


04:10 


 


 


Urine Eosinophils


  None seen


(NONE SEEN) 


  


  


 


 


White Blood Count


  


  25.9 K/UL


(4.8-10.8) 


  


 


 


Red Blood Count


  


  3.02 M/UL


(4.20-5.40) 


  


 


 


Hemoglobin


  


  8.5 G/DL


(12.0-16.0) 


  


 


 


Hematocrit


  


  25.8 %


(37.0-47.0) 


  


 


 


Mean Corpuscular Volume  86 FL (80-99)   


 


Mean Corpuscular Hemoglobin


  


  28.4 PG


(27.0-31.0) 


  


 


 


Mean Corpuscular Hemoglobin


Concent 


  33.1 G/DL


(32.0-36.0) 


  


 


 


Red Cell Distribution Width


  


  12.3 %


(11.6-14.8) 


  


 


 


Platelet Count


  


  465 K/UL


(150-450) 


  


 


 


Mean Platelet Volume


  


  7.5 FL


(6.5-10.1) 


  


 


 


Neutrophils (%) (Auto)   % (45.0-75.0)   


 


Lymphocytes (%) (Auto)   % (20.0-45.0)   


 


Monocytes (%) (Auto)   % (1.0-10.0)   


 


Eosinophils (%) (Auto)   % (0.0-3.0)   


 


Basophils (%) (Auto)   % (0.0-2.0)   


 


Differential Total Cells


Counted 


  100 


  


  


 


 


Neutrophils % (Manual)  86 % (45-75)   


 


Lymphocytes % (Manual)  10 % (20-45)   


 


Monocytes % (Manual)  3 % (1-10)   


 


Eosinophils % (Manual)  1 % (0-3)   


 


Basophils % (Manual)  0 % (0-2)   


 


Band Neutrophils  0 % (0-8)   


 


Platelet Estimate  Adequate   


 


Platelet Morphology  Normal   


 


Hypochromasia  1+   


 


Sodium Level


  


  141 MMOL/L


(136-145) 


  


 


 


Potassium Level


  


  4.3 MMOL/L


(3.5-5.1) 


  


 


 


Chloride Level


  


  108 MMOL/L


() 


  


 


 


Carbon Dioxide Level


  


  21 MMOL/L


(21-32) 


  


 


 


Anion Gap


  


  12 mmol/L


(5-15) 


  


 


 


Blood Urea Nitrogen


  


  38 mg/dL


(7-18) 


  


 


 


Creatinine


  


  3.4 MG/DL


(0.55-1.30) 


  


 


 


Estimat Glomerular Filtration


Rate 


  13.5 mL/min


(>60) 


  


 


 


Glucose Level


  


  198 MG/DL


() 


  


 


 


Uric Acid


  


  9.8 MG/DL


(2.6-7.2) 


  


 


 


Calcium Level


  


  8.7 MG/DL


(8.5-10.1) 


  


 


 


Phosphorus Level


  


  4.3 MG/DL


(2.5-4.9) 


  


 


 


Magnesium Level


  


  2.5 MG/DL


(1.8-2.4) 


  


 


 


Total Bilirubin


  


  0.4 MG/DL


(0.2-1.0) 


  


 


 


Aspartate Amino Transf


(AST/SGOT) 


  16 U/L (15-37) 


  


  


 


 


Alanine Aminotransferase


(ALT/SGPT) 


  23 U/L (12-78) 


  


  


 


 


Alkaline Phosphatase


  


  110 U/L


() 


  


 


 


C-Reactive Protein,


Quantitative 


  13.7 mg/dL


(0.00-0.90) 


  


 


 


Pro-B-Type Natriuretic Peptide


  


  > 04134 pg/mL


(0-125) 


  


 


 


Total Protein


  


  6.6 G/DL


(6.4-8.2) 


  


 


 


Albumin


  


  1.9 G/DL


(3.4-5.0) 


  


 


 


Globulin  4.7 g/dL   


 


Albumin/Globulin Ratio  0.4 (1.0-2.7)   


 


Valproic Acid (Depakene) Level


  


  14 MCG/ML


() 


  


 








Height (Feet):  5


Height (Inches):  5.00


Weight (Pounds):  151


Objective





Physical Exam:


Vitals: reviewed


General Appearance:  NAD


HEENT:  normocephalic, atraumatic


Neck:  non-tender, normal alignment


Respiratory/Chest:  normal breath sounds bilaterally


Cardiovascular/Chest: rrr


Abdomen:  normal bowel sounds, soft, nontender


Extremities:  normal range of motion











Mike Pop MD Oct 22, 2019 05:45

## 2019-10-22 NOTE — NUR
CASE MANAGEMENT: REVIEW



10/22/19



SI: ASPIRATION PNA/ UTI / SEPSIS / ARF

98.2  90  20  134/66  98% NC 2L

WBC 22.1; RBC 2.94; H/H 8.2/24.8; BUN 39; CREA 3.1/ URIC ACID 9.5; 

C-REC PROT 9.4;  BNP >23578



IS:     

IV DEXTROSE/NACL @100/HR

IV VANCOMYCIN Q24HR

IV MEROPENEM Q12HR

LACTULOSE PO TID/PRN

LOVENOX SQ QD

PLAVIX PO QD

PROTONIX PO BID

NOVOLOG SQ AC&HS

LEVEMIR SQ QD

STARLIX PO TIAC

DEPAKOTE PO Q12HR

OLANZAPINE PO QD

LIPITOR PO HS

ASA PO QD

COLACE PO TID

IV ATIVAN Q8HR/PRN



**: 2E TELE UNIT



DCP: RETURN TO [facility] WHEN MEDICALLY CLEARED 





PLAN: LABS CA; GYN CONSULT

## 2019-10-22 NOTE — NUR
NURSE NOTES:

Received pt and report from ELIZABETH Hammer. Observed pt resting in bed with both eyes closed; 
arousable to voice and light shake. Pt is A/Ox0. Cardiac monitor is in placed, IV site 
intact, asymptomatic, and patent; currently running D5 1/2 NS @ 100cc/hr. Bed is in the 
lowest position and locked. Call light within reach. No signs/symptoms of acute distress 
noted at this time. Will continue plan of care.

## 2019-10-22 NOTE — NUR
*-* INSURANCE *-*



ALL AVAILABLE CLINICALS HAVE BEEN FAXED TO:





Tracking#[id number]

CM: Lance

#[phone]

Fax#[phone]

## 2019-10-22 NOTE — NUR
NURSE NOTES:

Report received from ELIZABETH Mason. Pt. AOx1. In 2L NC. No breathing difficulty noted. L FA 22g 
IV running D51/2 NS @ 100cc/hr. Bed on lowest position, side rails upx2, brakes engaged, 
alarm on. Call light placed next to L hand.

## 2019-10-22 NOTE — NEPHROLOGY PROGRESS NOTE
Assessment/Plan


Problem List:  


(1) Renal failure (ARF), acute on chronic


(2) Dehydration


(3) ARF (acute renal failure)


(4) Sepsis


(5) Acute metabolic encephalopathy


(6) Hyperglycemia due to type 2 diabetes mellitus


(7) UTI (urinary tract infection)


(8) Diabetic nephropathy


Assessment





Renal failure, Acute on Chronic resolved


Dehydration


Severe Anemia


Sepsis / Pneumonia / UTI


DM, OOC


Proteinuria , Diabetic Nephropathy


Acute encephalopathy


Plan








Cr lowering from 3.4 to 3.1


Will order urine studies and monitor renal parameters


kidney YOANDY


Hydrate, trial IV Lasix


WBCs remain elevated


has casas again due to retention


Avoid Nephrotoxics








previously


Anemia salas


2D echo


24 H urine protein


Keep BP and BS in check


I am holding  her psych meds due to low MS


Per orders





Subjective


ROS Limited/Unobtainable:  No


Constitutional:  Reports: malaise





Objective


Objective





Last 24 Hour Vital Signs








  Date Time  Temp Pulse Resp B/P (MAP) Pulse Ox O2 Delivery O2 Flow Rate FiO2


 


10/22/19 12:00 98.7 79 20 136/70 (92) 97   


 


10/22/19 09:00 98.2 90 20 148/82 (104) 95   


 


10/22/19 09:00      Nasal Cannula 2.0 


 


10/22/19 08:00  88      


 


10/22/19 04:00  87      


 


10/22/19 04:00 98.4 87 20 134/66 (88) 98   


 


10/22/19 00:00  87      


 


10/22/19 00:00 98.4 89 19 150/87 (108) 98   


 


10/21/19 21:00      Nasal Cannula 2.0 


 


10/21/19 20:00  86      


 


10/21/19 20:00 98.2 89 20 149/98 (115) 98   


 


10/21/19 16:00 98.2 87 24 142/69 (93) 94   


 


10/21/19 16:00  77      

















Intake and Output  


 


 10/21/19 10/22/19





 19:00 07:00


 


Intake Total 2255 ml 


 


Output Total 150 ml 400 ml


 


Balance 2105 ml -400 ml


 


  


 


Intake Oral 1150 ml 


 


IV Total 1105 ml 


 


Output Urine Total 150 ml 400 ml


 


# Bowel Movements  1








Laboratory Tests


10/22/19 04:10: Urine Eosinophils None seen


10/22/19 06:25: 


White Blood Count 22.1*H, Red Blood Count 2.94L, Hemoglobin 8.2L, Hematocrit 

24.8L, Mean Corpuscular Volume 85, Mean Corpuscular Hemoglobin 28.1, Mean 

Corpuscular Hemoglobin Concent 33.2, Red Cell Distribution Width 13.6, Platelet 

Count 406, Mean Platelet Volume 7.4, Neutrophils (%) (Auto) , Lymphocytes (%) (

Auto) , Monocytes (%) (Auto) , Eosinophils (%) (Auto) , Basophils (%) (Auto) , 

Differential Total Cells Counted 100, Neutrophils % (Manual) 80H, Lymphocytes % 

(Manual) 10L, Monocytes % (Manual) 5, Eosinophils % (Manual) 5H, Basophils % (

Manual) 0, Band Neutrophils 0, Platelet Estimate Adequate, Platelet Morphology 

Normal, Hypochromasia 1+, Sodium Level 141, Potassium Level 4.3, Chloride Level 

107, Carbon Dioxide Level 22, Anion Gap 12, Blood Urea Nitrogen 39H, Creatinine 

3.1H, Estimat Glomerular Filtration Rate 15.1, Glucose Level 122H, Uric Acid 

9.5H, Calcium Level 8.5, Phosphorus Level 4.5, Magnesium Level 2.3, Total 

Bilirubin 0.3, Aspartate Amino Transf (AST/SGOT) 17, Alanine Aminotransferase (

ALT/SGPT) 21, Alkaline Phosphatase 93, C-Reactive Protein, Quantitative 9.4H, 

Pro-B-Type Natriuretic Peptide > 33241Q, Total Protein 6.0L, Albumin 1.7L, 

Globulin 4.3, Albumin/Globulin Ratio 0.4L,  Antigen [Pending]


Height (Feet):  5


Height (Inches):  5.00


Weight (Pounds):  151


General Appearance:  no apparent distress


Objective


no change











Lukasz Vizcaino MD Oct 22, 2019 13:22

## 2019-10-22 NOTE — GENERAL PROGRESS NOTE
Assessment/Plan


Problem List:  


(1) Abnormal TSH


ICD Codes:  R79.89 - Other specified abnormal findings of blood chemistry


SNOMED:  551929354


(2) Hyperglycemia due to type 2 diabetes mellitus


ICD Codes:  E11.65 - Type 2 diabetes mellitus with hyperglycemia


SNOMED:  713196204827519, [id number]


Qualifiers:  


   Qualified Codes:  E11.65 - Type 2 diabetes mellitus with hyperglycemia; 

Z79.4 - Long term (current) use of insulin


(3) Acute metabolic encephalopathy


ICD Codes:  G93.41 - Metabolic encephalopathy


SNOMED:  36877695, 201653095


(4) ARF (acute renal failure)


ICD Codes:  N17.9 - Acute kidney failure, unspecified


SNOMED:  83269498


Qualifiers:  


   Qualified Codes:  N17.9 - Acute kidney failure, unspecified


(5) Healthcare associated bacterial pneumonia


ICD Codes:  J15.9 - Unspecified bacterial pneumonia


SNOMED:  186776340


Status:  stable


Assessment/Plan:


continue Levemir 14 units qam 


continue Starlix 120 mg ac tid - hold if not eating 


continue NISS ac / hs 





low TSH, normal free T4 and low free T3 most likely due to non thyroidal 

illness 


no need for thyroid medications for now 


thyroid antibodies are pending





Subjective


Allergies:  


Coded Allergies:  


     No Known Allergies (Unverified , 10/14/19)


Subjective


events noted 











Item Value  Date Time


 


Bedside Blood Glucose 127 mg/dl H 10/22/19 0617


 


Bedside Blood Glucose 183 mg/dl H 10/21/19 2134


 


Bedside Blood Glucose 297 mg/dl H 10/21/19 1630


 


Bedside Blood Glucose 264 mg/dl H 10/21/19 1146


 


Bedside Blood Glucose 232 mg/dl H 10/21/19 0630











Objective





Last 24 Hour Vital Signs








  Date Time  Temp Pulse Resp B/P (MAP) Pulse Ox O2 Delivery O2 Flow Rate FiO2


 


10/22/19 04:00  87      


 


10/22/19 04:00 98.4 87 20 134/66 (88) 98   


 


10/22/19 00:00  87      


 


10/22/19 00:00 98.4 89 19 150/87 (108) 98   


 


10/21/19 21:00      Nasal Cannula 2.0 


 


10/21/19 20:00  86      


 


10/21/19 20:00 98.2 89 20 149/98 (115) 98   


 


10/21/19 16:00 98.2 87 24 142/69 (93) 94   


 


10/21/19 16:00  77      


 


10/21/19 12:00 98.2 91 28 152/70 (97) 95   


 


10/21/19 12:00  89      


 


10/21/19 09:00      Nasal Cannula 2.0 


 


10/21/19 08:00  125      


 


10/21/19 08:00 98.1 108 25 160/73 (102) 93   

















Intake and Output  


 


 10/21/19 10/22/19





 19:00 07:00


 


Intake Total 2255 ml 


 


Output Total 150 ml 


 


Balance 2105 ml 


 


  


 


Intake Oral 1150 ml 


 


IV Total 1105 ml 


 


Output Urine Total 150 ml 








Laboratory Tests


10/21/19 06:36: 


White Blood Count 25.9*H, Red Blood Count 3.02L, Hemoglobin 8.5L, Hematocrit 

25.8L, Mean Corpuscular Volume 86, Mean Corpuscular Hemoglobin 28.4, Mean 

Corpuscular Hemoglobin Concent 33.1, Red Cell Distribution Width 12.3, Platelet 

Count 465H, Mean Platelet Volume 7.5, Neutrophils (%) (Auto) , Lymphocytes (%) (

Auto) , Monocytes (%) (Auto) , Eosinophils (%) (Auto) , Basophils (%) (Auto) , 

Differential Total Cells Counted 100, Neutrophils % (Manual) 86H, Lymphocytes % 

(Manual) 10L, Monocytes % (Manual) 3, Eosinophils % (Manual) 1, Basophils % (

Manual) 0, Band Neutrophils 0, Platelet Estimate Adequate, Platelet Morphology 

Normal, Hypochromasia 1+, Sodium Level 141, Potassium Level 4.3, Chloride Level 

108H, Carbon Dioxide Level 21, Anion Gap 12, Blood Urea Nitrogen 38H, 

Creatinine 3.4H, Estimat Glomerular Filtration Rate 13.5, Glucose Level 198H, 

Uric Acid 9.8H, Calcium Level 8.7, Phosphorus Level 4.3, Magnesium Level 2.5H, 

Total Bilirubin 0.4, Aspartate Amino Transf (AST/SGOT) 16, Alanine 

Aminotransferase (ALT/SGPT) 23, Alkaline Phosphatase 110, C-Reactive Protein, 

Quantitative 13.7H, Pro-B-Type Natriuretic Peptide > 69448P, Total Protein 6.6, 

Albumin 1.9L, Globulin 4.7, Albumin/Globulin Ratio 0.4L, Valproic Acid (Depakene

) Level 14L


10/22/19 04:10: Urine Eosinophils [Pending]


Height (Feet):  5


Height (Inches):  5.00


Weight (Pounds):  151


General Appearance:  no apparent distress


Neck:  normal alignment


Cardiovascular:  normal rate


Respiratory/Chest:  decreased breath sounds


Abdomen:  normal bowel sounds


Pelvis:  normal external exam


Objective





Current Medications








 Medications


  (Trade)  Dose


 Ordered  Sig/Winnie


 Route


 PRN Reason  Start Time


 Stop Time Status Last Admin


Dose Admin


 


 Acetaminophen


  (Tylenol)  650 mg  PRN  PRN


 RECTAL


 TEMP>100.5  10/14/19 03:00


     


 


 


 Aspirin


  (ASA)  81 mg  DAILY


 ORAL


   10/15/19 09:00


 11/14/19 08:59  10/21/19 08:44


 


 


 Atorvastatin


 Calcium


  (Lipitor)  10 mg  BEDTIME


 ORAL


   10/14/19 21:00


 11/13/19 20:59  10/21/19 21:33


 


 


 Clopidogrel


 Bisulfate


  (Plavix)  75 mg  DAILY


 ORAL


   10/15/19 09:00


 11/14/19 08:59  10/21/19 08:44


 


 


 Dextrose


  (Dextrose 50%)  25 ml  Q30M  PRN


 IV


 Hypoglycemia  10/14/19 07:00


 11/13/19 06:59   


 


 


 Dextrose


  (Dextrose 50%)  50 ml  Q30M  PRN


 IV


 Hypoglycemia  10/14/19 07:00


 11/13/19 06:59   


 


 


 Dextrose/Sodium


 Chloride  1,000 ml @ 


 100 mls/hr  Q10H


 IV


   10/20/19 11:45


 11/19/19 11:44  10/22/19 03:28


 


 


 Divalproex Sodium


  (Depakote


 Sprinkles)  500 mg  Q12HR


 ORAL


   10/15/19 22:00


 11/14/19 21:59  10/21/19 21:48


 


 


 Docusate Sodium


  (Colace)  100 mg  THREE TIMES A  DAY


 ORAL


   10/14/19 13:00


 11/13/19 12:59  10/21/19 18:04


 


 


 Donepezil HCl


  (Aricept)  5 mg  DAILY


 ORAL


   10/15/19 11:00


 11/14/19 10:59  10/21/19 08:44


 


 


 Enoxaparin Sodium


  (Lovenox)  30 mg  DAILY


 SUBQ


   10/14/19 09:00


 11/13/19 08:59  10/21/19 08:45


 


 


 Insulin Aspart


  (NovoLOG)    BEFORE MEALS AND  HS


 SUBQ


   10/14/19 11:30


 11/13/19 11:29  10/22/19 06:17


 


 


 Insulin Detemir


  (Levemir)  14 units  DAILY


 SUBQ


   10/21/19 09:00


 11/13/19 10:29  10/21/19 11:45


 


 


 Lactulose


  (Cephulac)  20 gm  TIDPRN  PRN


 ORAL


 Constipation  10/16/19 14:45


 11/15/19 14:44  10/16/19 15:21


 


 


 Lorazepam


  (Ativan 2mg/ml


 1ml)  1 mg  Q8HR  PRN


 IV


 For Anxiety  10/17/19 19:15


 10/24/19 19:14  10/18/19 01:12


 


 


 Meropenem 1 gm/


 Sodium Chloride  55 ml @ 


 110 mls/hr  Q12H


 IVPB


   10/16/19 14:00


 10/23/19 13:59  10/22/19 02:14


 


 


 Nateglinide


  (Starlix)  120 mg  TIAC


 ORAL


   10/20/19 11:30


 11/19/19 11:29  10/22/19 06:16


 


 


 Pantoprazole


  (Protonix)  40 mg  BID


 ORAL


   10/14/19 18:00


 11/13/19 08:59  10/21/19 18:04


 

















Riccardo Velez MD Oct 22, 2019 06:33

## 2019-10-22 NOTE — CARDIOLOGY PROGRESS NOTE
Assessment/Plan


Assessment/Plan


1. acute congestive heart failure.


2. Abnormal cardiac enzyme demand related due to infection and profound anemia 


3. Agitation 


4. Urinary tract infection.


5. Acute renal failure.


6. Profound anemia.


7. Diabetes mellitus.


8. History of hypertension.


9. History of mood disorder.


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 


11. arf 


12. pelvic mass











not clear why she is dual antiplt agent as such i am not sure how safe it will 

be to long term dc both , however if concern about contribution of asa to arf i 

will stop the ecotrin short term , can stop plavix short term if surgery needed


echo noted 


telel personal reviewed sinus 


keep on same meds 


iv abx 


wbc sig elevated still  but improving 


cr improved slightly , renal noted 


lung difficult to examin due to lack of effort


cxr in am





Subjective


ROS Limited/Unobtainable:  Yes





Objective





Last 24 Hour Vital Signs








  Date Time  Temp Pulse Resp B/P (MAP) Pulse Ox O2 Delivery O2 Flow Rate FiO2


 


10/22/19 12:00 98.7 79 20 136/70 (92) 97   


 


10/22/19 12:00  76      


 


10/22/19 09:00 98.2 90 20 148/82 (104) 95   


 


10/22/19 09:00      Nasal Cannula 2.0 


 


10/22/19 08:00  88      


 


10/22/19 08:00     98 Nasal Cannula 2.0 28


 


10/22/19 04:00  87      


 


10/22/19 04:00 98.4 87 20 134/66 (88) 98   


 


10/22/19 00:00  87      


 


10/22/19 00:00 98.4 89 19 150/87 (108) 98   


 


10/21/19 21:00      Nasal Cannula 2.0 


 


10/21/19 20:00  86      


 


10/21/19 20:00 98.2 89 20 149/98 (115) 98   








General Appearance:  no apparent distress, lethargic


Cardiovascular:  normal rate


Respiratory/Chest:  lungs clear


Abdomen:  normal bowel sounds, non tender, soft


Extremities:  no swelling











Intake and Output  


 


 10/21/19 10/22/19





 19:00 07:00


 


Intake Total 2255 ml 


 


Output Total 150 ml 400 ml


 


Balance 2105 ml -400 ml


 


  


 


Intake Oral 1150 ml 


 


IV Total 1105 ml 


 


Output Urine Total 150 ml 400 ml


 


# Bowel Movements  1











Laboratory Tests








Test


  10/22/19


04:10 10/22/19


06:25


 


Urine Eosinophils


  None seen


(NONE SEEN) 


 


 


White Blood Count


  


  22.1 K/UL


(4.8-10.8)  *H


 


Red Blood Count


  


  2.94 M/UL


(4.20-5.40)  L


 


Hemoglobin


  


  8.2 G/DL


(12.0-16.0)  L


 


Hematocrit


  


  24.8 %


(37.0-47.0)  L


 


Mean Corpuscular Volume  85 FL (80-99)  


 


Mean Corpuscular Hemoglobin


  


  28.1 PG


(27.0-31.0)


 


Mean Corpuscular Hemoglobin


Concent 


  33.2 G/DL


(32.0-36.0)


 


Red Cell Distribution Width


  


  13.6 %


(11.6-14.8)


 


Platelet Count


  


  406 K/UL


(150-450)


 


Mean Platelet Volume


  


  7.4 FL


(6.5-10.1)


 


Neutrophils (%) (Auto)


  


  % (45.0-75.0)


 


 


Lymphocytes (%) (Auto)


  


  % (20.0-45.0)


 


 


Monocytes (%) (Auto)   % (1.0-10.0)  


 


Eosinophils (%) (Auto)   % (0.0-3.0)  


 


Basophils (%) (Auto)   % (0.0-2.0)  


 


Differential Total Cells


Counted 


  100  


 


 


Neutrophils % (Manual)  80 % (45-75)  H


 


Lymphocytes % (Manual)  10 % (20-45)  L


 


Monocytes % (Manual)  5 % (1-10)  


 


Eosinophils % (Manual)  5 % (0-3)  H


 


Basophils % (Manual)  0 % (0-2)  


 


Band Neutrophils  0 % (0-8)  


 


Platelet Estimate  Adequate  


 


Platelet Morphology  Normal  


 


Hypochromasia  1+  


 


Sodium Level


  


  141 MMOL/L


(136-145)


 


Potassium Level


  


  4.3 MMOL/L


(3.5-5.1)


 


Chloride Level


  


  107 MMOL/L


()


 


Carbon Dioxide Level


  


  22 MMOL/L


(21-32)


 


Anion Gap


  


  12 mmol/L


(5-15)


 


Blood Urea Nitrogen


  


  39 mg/dL


(7-18)  H


 


Creatinine


  


  3.1 MG/DL


(0.55-1.30)  H


 


Estimat Glomerular Filtration


Rate 


  15.1 mL/min


(>60)


 


Glucose Level


  


  122 MG/DL


()  H


 


Uric Acid


  


  9.5 MG/DL


(2.6-7.2)  H


 


Calcium Level


  


  8.5 MG/DL


(8.5-10.1)


 


Phosphorus Level


  


  4.5 MG/DL


(2.5-4.9)


 


Magnesium Level


  


  2.3 MG/DL


(1.8-2.4)


 


Total Bilirubin


  


  0.3 MG/DL


(0.2-1.0)


 


Aspartate Amino Transf


(AST/SGOT) 


  17 U/L (15-37)


 


 


Alanine Aminotransferase


(ALT/SGPT) 


  21 U/L (12-78)


 


 


Alkaline Phosphatase


  


  93 U/L


()


 


C-Reactive Protein,


Quantitative 


  9.4 mg/dL


(0.00-0.90)  H


 


Pro-B-Type Natriuretic Peptide


  


  > 01422 pg/mL


(0-125)  H


 


Total Protein


  


  6.0 G/DL


(6.4-8.2)  L


 


Albumin


  


  1.7 G/DL


(3.4-5.0)  L


 


Globulin  4.3 g/dL  


 


Albumin/Globulin Ratio


  


  0.4 (1.0-2.7)


L


 


 Antigen  Pending  

















Rob May MD Oct 22, 2019 16:05

## 2019-10-23 VITALS — SYSTOLIC BLOOD PRESSURE: 163 MMHG | DIASTOLIC BLOOD PRESSURE: 79 MMHG

## 2019-10-23 VITALS — SYSTOLIC BLOOD PRESSURE: 129 MMHG | DIASTOLIC BLOOD PRESSURE: 53 MMHG

## 2019-10-23 VITALS — DIASTOLIC BLOOD PRESSURE: 72 MMHG | SYSTOLIC BLOOD PRESSURE: 150 MMHG

## 2019-10-23 VITALS — SYSTOLIC BLOOD PRESSURE: 140 MMHG | DIASTOLIC BLOOD PRESSURE: 70 MMHG

## 2019-10-23 VITALS — SYSTOLIC BLOOD PRESSURE: 153 MMHG | DIASTOLIC BLOOD PRESSURE: 72 MMHG

## 2019-10-23 VITALS — SYSTOLIC BLOOD PRESSURE: 151 MMHG | DIASTOLIC BLOOD PRESSURE: 91 MMHG

## 2019-10-23 LAB
ADD MANUAL DIFF: YES
ANION GAP SERPL CALC-SCNC: 12 MMOL/L (ref 5–15)
BUN SERPL-MCNC: 29 MG/DL (ref 7–18)
CALCIUM SERPL-MCNC: 8.7 MG/DL (ref 8.5–10.1)
CHLORIDE SERPL-SCNC: 106 MMOL/L (ref 98–107)
CO2 SERPL-SCNC: 22 MMOL/L (ref 21–32)
CREAT SERPL-MCNC: 2.2 MG/DL (ref 0.55–1.3)
ERYTHROCYTE [DISTWIDTH] IN BLOOD BY AUTOMATED COUNT: 13.8 % (ref 11.6–14.8)
HCT VFR BLD CALC: 26.7 % (ref 37–47)
HGB BLD-MCNC: 8.7 G/DL (ref 12–16)
MCV RBC AUTO: 84 FL (ref 80–99)
PLATELET # BLD: 440 K/UL (ref 150–450)
POTASSIUM SERPL-SCNC: 4 MMOL/L (ref 3.5–5.1)
RBC # BLD AUTO: 3.17 M/UL (ref 4.2–5.4)
SODIUM SERPL-SCNC: 140 MMOL/L (ref 136–145)
WBC # BLD AUTO: 19.3 K/UL (ref 4.8–10.8)

## 2019-10-23 RX ADMIN — INSULIN ASPART SCH UNITS: 100 INJECTION, SOLUTION INTRAVENOUS; SUBCUTANEOUS at 06:06

## 2019-10-23 RX ADMIN — INSULIN ASPART SCH UNITS: 100 INJECTION, SOLUTION INTRAVENOUS; SUBCUTANEOUS at 21:00

## 2019-10-23 RX ADMIN — INSULIN ASPART SCH UNITS: 100 INJECTION, SOLUTION INTRAVENOUS; SUBCUTANEOUS at 16:30

## 2019-10-23 RX ADMIN — DOCUSATE SODIUM SCH MG: 100 CAPSULE, LIQUID FILLED ORAL at 13:57

## 2019-10-23 RX ADMIN — DOCUSATE SODIUM SCH MG: 100 CAPSULE, LIQUID FILLED ORAL at 09:00

## 2019-10-23 RX ADMIN — DEXTROSE AND SODIUM CHLORIDE SCH MLS/HR: 5; .45 INJECTION, SOLUTION INTRAVENOUS at 00:07

## 2019-10-23 RX ADMIN — DONEPEZIL HYDROCHLORIDE SCH MG: 5 TABLET, FILM COATED ORAL at 09:32

## 2019-10-23 RX ADMIN — LACTULOSE PRN GM: 20 SOLUTION ORAL at 18:09

## 2019-10-23 RX ADMIN — ENOXAPARIN SODIUM SCH MG: 30 INJECTION SUBCUTANEOUS at 09:36

## 2019-10-23 RX ADMIN — DOCUSATE SODIUM SCH MG: 100 CAPSULE, LIQUID FILLED ORAL at 18:09

## 2019-10-23 RX ADMIN — MEROPENEM SCH MLS/HR: 1 INJECTION INTRAVENOUS at 02:41

## 2019-10-23 RX ADMIN — DIVALPROEX SODIUM SCH MG: 125 CAPSULE ORAL at 21:26

## 2019-10-23 RX ADMIN — INSULIN ASPART SCH UNITS: 100 INJECTION, SOLUTION INTRAVENOUS; SUBCUTANEOUS at 12:34

## 2019-10-23 RX ADMIN — DIVALPROEX SODIUM SCH MG: 125 CAPSULE ORAL at 09:33

## 2019-10-23 RX ADMIN — ASPIRIN 81 MG SCH MG: 81 TABLET ORAL at 09:33

## 2019-10-23 RX ADMIN — MEROPENEM SCH MLS/HR: 1 INJECTION INTRAVENOUS at 14:34

## 2019-10-23 NOTE — PULMONOLOGY PROGRESS NOTE
Assessment/Plan


Assessment/Plan


Pulmonary Progress Note





Assessment/Plan


Problems:  


(1) Healthcare associated bacterial pneumonia


(2) UTI (urinary tract infection)


(3) Sepsis


(4) Dehydration


(5) CHANCE (acute kidney injury)


(6) Acute metabolic encephalopathy


(7) Hyperglycemia due to type 2 diabetes mellitus


(8) Renal failure (ARF), acute on chronic


(9) Aspiration pneumonia


(10) Abnormal TSH


(11) Pelvic mass in female


Assessment/Plan


Leukocytosis


Sepsis


Possible pneumonia


E coli UTI


Acute hypoxemic respiratory failure


Acute encephalopathy


Hx CHF


HTN


CHANCE 


Pelvic mass/possible GYN malignancy vs cyst





Plan:


-concern for pelvic malignancy noted on CT AP


-Monitor WCt


-Abx per ID


-monitor volumes and renal function, F/U renal recs


-GYN evaluation


-monitor encephalopathy, ? neuro eval


-Asp precautions, SLP recs 


-DVT Px: LMWH


-FC





Subjective


Allergies:  


Coded Allergies:  


     No Known Allergies (Unverified , 10/14/19)


Subjective


WCT and Cr both better


CT noted with concern for gynecologic malignancy vs cyst


Improved Pulmonary status





Objective





Vital Signs Noted





General Appearance:  no acute distress, cachetic


HEENT:  normocephalic, atraumatic


Respiratory/Chest:  rhonchi


Cardiovascular:  normal peripheral pulses, normal rate, regular rhythm


Abdomen:  normal bowel sounds, soft, non tender, no organomegaly, non distended

, no mass


Extremities:  no cyanosis, no clubbing, no edema





Laboratory Tests Noted








Subjective


ROS Limited/Unobtainable:  No


Allergies:  


Coded Allergies:  


     No Known Allergies (Unverified , 10/14/19)





Objective





Last 24 Hour Vital Signs








  Date Time  Temp Pulse Resp B/P (MAP) Pulse Ox O2 Delivery O2 Flow Rate FiO2


 


10/23/19 08:00 98.3 87 22 153/72 (99) 97   


 


10/23/19 04:00  83      


 


10/23/19 04:00 98.4 81 22 129/53 (78) 99   


 


10/23/19 00:00 97.9 86 21 151/91 (111) 98   


 


10/23/19 00:00  84      


 


10/22/19 21:00      Nasal Cannula 2.0 


 


10/22/19 20:00  85      


 


10/22/19 20:00 97.7 90 22 158/86 (110) 98   


 


10/22/19 16:00  89      


 


10/22/19 16:00 98.8 91 18 136/82 (100) 95   


 


10/22/19 12:00 98.7 79 20 136/70 (92) 97   


 


10/22/19 12:00  76      

















Intake and Output  


 


 10/22/19 10/23/19





 19:00 07:00


 


Intake Total 240 ml 


 


Output Total 1000 ml 600 ml


 


Balance -760 ml -600 ml


 


  


 


Intake Oral 240 ml 


 


Output Urine Total 1000 ml 600 ml


 


# Bowel Movements  1








Laboratory Tests


10/23/19 02:35: Stool Occult Blood [Pending]


10/23/19 05:30: Urine Eosinophils None seen


10/23/19 06:17: 


White Blood Count 19.3H, Red Blood Count 3.17L, Hemoglobin 8.7L, Hematocrit 

26.7L, Mean Corpuscular Volume 84, Mean Corpuscular Hemoglobin 27.6, Mean 

Corpuscular Hemoglobin Concent 32.7, Red Cell Distribution Width 13.8, Platelet 

Count 440, Mean Platelet Volume 6.9, Neutrophils (%) (Auto) , Lymphocytes (%) (

Auto) , Monocytes (%) (Auto) , Eosinophils (%) (Auto) , Basophils (%) (Auto) , 

Differential Total Cells Counted 100, Neutrophils % (Manual) 70, Lymphocytes % (

Manual) 13L, Monocytes % (Manual) 12H, Eosinophils % (Manual) 4H, Basophils % (

Manual) 1, Band Neutrophils 0, Platelet Estimate Adequate, Platelet Morphology 

Normal, Sodium Level 140, Potassium Level 4.0, Chloride Level 106, Carbon 

Dioxide Level 22, Anion Gap 12, Blood Urea Nitrogen 29H, Creatinine 2.2H, 

Estimat Glomerular Filtration Rate 22.3, Glucose Level 91, Calcium Level 8.7





Current Medications








 Medications


  (Trade)  Dose


 Ordered  Sig/Winnie


 Route


 PRN Reason  Start Time


 Stop Time Status Last Admin


Dose Admin


 


 Acetaminophen


  (Tylenol)  650 mg  PRN  PRN


 RECTAL


 TEMP>100.5  10/14/19 03:00


     


 


 


 Aspirin


  (ASA)  81 mg  DAILY


 ORAL


   10/15/19 09:00


 11/14/19 08:59  10/23/19 09:33


 


 


 Atorvastatin


 Calcium


  (Lipitor)  10 mg  BEDTIME


 ORAL


   10/14/19 21:00


 11/13/19 20:59  10/22/19 21:35


 


 


 Clopidogrel


 Bisulfate


  (Plavix)  75 mg  DAILY


 ORAL


   10/15/19 09:00


 11/14/19 08:59  10/23/19 09:32


 


 


 Dextrose


  (Dextrose 50%)  25 ml  Q30M  PRN


 IV


 Hypoglycemia  10/14/19 07:00


 11/13/19 06:59   


 


 


 Dextrose


  (Dextrose 50%)  50 ml  Q30M  PRN


 IV


 Hypoglycemia  10/14/19 07:00


 11/13/19 06:59   


 


 


 Dextrose/Sodium


 Chloride  1,000 ml @ 


 100 mls/hr  Q10H


 IV


   10/20/19 11:45


 11/19/19 11:44  10/23/19 00:07


 


 


 Divalproex Sodium


  (Depakote


 Sprinkles)  500 mg  Q12HR


 ORAL


   10/15/19 22:00


 11/14/19 21:59  10/23/19 09:33


 


 


 Docusate Sodium


  (Colace)  100 mg  THREE TIMES A  DAY


 ORAL


   10/14/19 13:00


 11/13/19 12:59  10/22/19 17:18


 


 


 Donepezil HCl


  (Aricept)  5 mg  DAILY


 ORAL


   10/15/19 11:00


 11/14/19 10:59  10/23/19 09:32


 


 


 Enoxaparin Sodium


  (Lovenox)  30 mg  DAILY


 SUBQ


   10/14/19 09:00


 11/13/19 08:59  10/23/19 09:36


 


 


 Insulin Aspart


  (NovoLOG)    BEFORE MEALS AND  HS


 SUBQ


   10/14/19 11:30


 11/13/19 11:29  10/22/19 06:17


 


 


 Insulin Detemir


  (Levemir)  12 units  DAILY


 SUBQ


   10/23/19 09:00


 11/13/19 10:29  10/23/19 09:45


 


 


 Lactulose


  (Cephulac)  20 gm  TIDPRN  PRN


 ORAL


 Constipation  10/16/19 14:45


 11/15/19 14:44  10/16/19 15:21


 


 


 Lorazepam


  (Ativan 2mg/ml


 1ml)  1 mg  Q8HR  PRN


 IV


 For Anxiety  10/17/19 19:15


 10/24/19 19:14  10/18/19 01:12


 


 


 Meropenem 1 gm/


 Sodium Chloride  55 ml @ 


 110 mls/hr  Q12H


 IVPB


   10/16/19 14:00


 10/30/19 23:59  10/23/19 02:41


 


 


 Pantoprazole


  (Protonix)  40 mg  BID


 ORAL


   10/14/19 18:00


 11/13/19 08:59  10/23/19 09:33


 

















Delmer Main MD Oct 23, 2019 10:18

## 2019-10-23 NOTE — NUR
NURSE NOTES: Report received from ELIZABETH Mason. Patient sleeping comfortably. Easily awakened by 
name and tap on shoulder, denied pain, SOB or discomfort. RR 21. Bed on lowest position, 
side rails upx2, brakes engaged, alarm on. Call light placed within easy reach. New IV 
placed overnight at R FA 22g, site intact, running D5 1/2NS @ 100cc/hr.

## 2019-10-23 NOTE — SURGERY PROGRESS NOTE
Surgery Progress Note


Subjective


Additional Comments


no acute events


labs noted


exam stable





Objective





Last 24 Hour Vital Signs








  Date Time  Temp Pulse Resp B/P (MAP) Pulse Ox O2 Delivery O2 Flow Rate FiO2


 


10/23/19 12:00  89      


 


10/23/19 12:00 97.9 90 22 140/70 (93) 98   


 


10/23/19 09:00      Nasal Cannula 2.0 


 


10/23/19 08:00 98.3 87 22 153/72 (99) 97   


 


10/23/19 08:00  82      


 


10/23/19 04:00  83      


 


10/23/19 04:00 98.4 81 22 129/53 (78) 99   


 


10/23/19 00:00 97.9 86 21 151/91 (111) 98   


 


10/23/19 00:00  84      


 


10/22/19 21:00      Nasal Cannula 2.0 


 


10/22/19 20:00  85      


 


10/22/19 20:00 97.7 90 22 158/86 (110) 98   


 


10/22/19 16:00  89      


 


10/22/19 16:00 98.8 91 18 136/82 (100) 95   








I&O











Intake and Output  


 


 10/22/19 10/23/19





 19:00 07:00


 


Intake Total 240 ml 


 


Output Total 1000 ml 600 ml


 


Balance -760 ml -600 ml


 


  


 


Intake Oral 240 ml 


 


Output Urine Total 1000 ml 600 ml


 


# Bowel Movements  1








Dressing:  saturated


Wound:  other


Drains:  other


Cardiovascular:  RSR


Respiratory:  decreased breath sounds


Abdomen:  soft, present bowel sounds


Extremities:  no cyanosis





Laboratory Tests








Test


  10/23/19


02:35 10/23/19


05:30 10/23/19


06:17


 


Stool Occult Blood


  Negative


(NEGATIVE) 


  


 


 


Urine Eosinophils


  


  None seen


(NONE SEEN) 


 


 


White Blood Count


  


  


  19.3 K/UL


(4.8-10.8)  H


 


Red Blood Count


  


  


  3.17 M/UL


(4.20-5.40)  L


 


Hemoglobin


  


  


  8.7 G/DL


(12.0-16.0)  L


 


Hematocrit


  


  


  26.7 %


(37.0-47.0)  L


 


Mean Corpuscular Volume   84 FL (80-99)  


 


Mean Corpuscular Hemoglobin


  


  


  27.6 PG


(27.0-31.0)


 


Mean Corpuscular Hemoglobin


Concent 


  


  32.7 G/DL


(32.0-36.0)


 


Red Cell Distribution Width


  


  


  13.8 %


(11.6-14.8)


 


Platelet Count


  


  


  440 K/UL


(150-450)


 


Mean Platelet Volume


  


  


  6.9 FL


(6.5-10.1)


 


Neutrophils (%) (Auto)


  


  


  % (45.0-75.0)


 


 


Lymphocytes (%) (Auto)


  


  


  % (20.0-45.0)


 


 


Monocytes (%) (Auto)    % (1.0-10.0)  


 


Eosinophils (%) (Auto)    % (0.0-3.0)  


 


Basophils (%) (Auto)    % (0.0-2.0)  


 


Differential Total Cells


Counted 


  


  100  


 


 


Neutrophils % (Manual)   70 % (45-75)  


 


Lymphocytes % (Manual)   13 % (20-45)  L


 


Monocytes % (Manual)   12 % (1-10)  H


 


Eosinophils % (Manual)   4 % (0-3)  H


 


Basophils % (Manual)   1 % (0-2)  


 


Band Neutrophils   0 % (0-8)  


 


Platelet Estimate   Adequate  


 


Platelet Morphology   Normal  


 


Sodium Level


  


  


  140 MMOL/L


(136-145)


 


Potassium Level


  


  


  4.0 MMOL/L


(3.5-5.1)


 


Chloride Level


  


  


  106 MMOL/L


()


 


Carbon Dioxide Level


  


  


  22 MMOL/L


(21-32)


 


Anion Gap


  


  


  12 mmol/L


(5-15)


 


Blood Urea Nitrogen


  


  


  29 mg/dL


(7-18)  H


 


Creatinine


  


  


  2.2 MG/DL


(0.55-1.30)  H


 


Estimat Glomerular Filtration


Rate 


  


  22.3 mL/min


(>60)


 


Glucose Level


  


  


  91 MG/DL


()


 


Calcium Level


  


  


  8.7 MG/DL


(8.5-10.1)











Plan


Problems:  


(1) Pressure injury of deep tissue


Assessment & Plan:  Pt presented on admission with DTPI R heel. Blood filled 

blister with marginal erythema noted to heel. Pt exhibited agitation when 

minimally palpated. (L)2.2cm x (W)2.1cm


L heel is blanchable .


Non-blanchable erythema without induration noted to sacral cleft. 


No other areas of skin concerns noted.





Tx.Plan:


Apply Betadine to R heel. Cover with Optifoam drsg. Change every 3 days and prn.


           


Apply Cavilon Skin Barrier to L heel. Cover with Optifoam drsg. Change every 7 

days and prn.


           


Apply Moisture Barrier Paste to buttocks. Cover sacral area with Optifoam drsg. 

Change every 3 days and prn.


           


Reposition at least every 2hours or as tolerated.


           


Off-load heels with pillow.





(2) Sepsis


Assessment & Plan:  Patient with low-grade fevers, anemia, leukocytosis 

persistent, elevated platelets, abnormal labs.


Etiology currently unknown and work-up in progress.  Labs noted and imaging 

that is available as noted.  


Okay for diet


Pending CT scan


Antibiotics as per infectious disease 


local wound care as wounds do not seem to be the etiology of her sepsis


We will monitor and follow with recommendations


Thank you for allowing me to participate in patient's care














Radhames Blas Oct 23, 2019 14:36

## 2019-10-23 NOTE — NUR
CASE MANAGEMENT: REVIEW



10/23/19



SI: ASPIRATION PNA/ UTI / SEPSIS / ARF

98.3  87  22  153/72  97% NC 2L

WBC 19.3; RBC 3.17; H/H 8.7/26.7; BUN 29; CREA 2.2



IS:     

IV DEXTROSE/NACL @100/HR

IV VANCOMYCIN Q24HR

IV MEROPENEM Q12HR

LACTULOSE PO TID/PRN

LOVENOX SQ QD

PLAVIX PO QD

PROTONIX PO BID

NOVOLOG SQ AC&HS

LEVEMIR SQ QD

STARLIX PO TIAC

DEPAKOTE PO Q12HR

OLANZAPINE PO QD

LIPITOR PO HS

ASA PO QD

COLACE PO TID

IV ATIVAN Q8HR/PRN

DONEPEZIL PO QD



**: 2E TELE UNIT



DCP: RETURN TO [facility] WHEN MEDICALLY CLEARED 





PLAN: ONCOLOGY; GYN EVAL MASS ; CXR TODAY

## 2019-10-23 NOTE — GENERAL PROGRESS NOTE
Assessment/Plan


Problem List:  


(1) Abnormal TSH


ICD Codes:  R79.89 - Other specified abnormal findings of blood chemistry


SNOMED:  996218931


(2) Hyperglycemia due to type 2 diabetes mellitus


ICD Codes:  E11.65 - Type 2 diabetes mellitus with hyperglycemia


SNOMED:  [id number], [id number]


Qualifiers:  


   Qualified Codes:  E11.65 - Type 2 diabetes mellitus with hyperglycemia; 

Z79.4 - Long term (current) use of insulin


(3) Acute metabolic encephalopathy


ICD Codes:  G93.41 - Metabolic encephalopathy


SNOMED:  15466563, 890620359


(4) ARF (acute renal failure)


ICD Codes:  N17.9 - Acute kidney failure, unspecified


SNOMED:  39857494


Qualifiers:  


   Qualified Codes:  N17.9 - Acute kidney failure, unspecified


(5) Healthcare associated bacterial pneumonia


ICD Codes:  J15.9 - Unspecified bacterial pneumonia


SNOMED:  125217341


Status:  stable


Assessment/Plan:


reduce Levemir to 12 units qam 


DC Starlix 


continue NISS  





low TSH, normal free T4 and low free T3 most likely due to non thyroidal 

illness 


no need for thyroid medications for now 


thyroid antibodies are pending





Subjective


ROS Limited/Unobtainable:  Yes


Allergies:  


Coded Allergies:  


     No Known Allergies (Unverified , 10/14/19)


Subjective


glucose values are trending down 











Item Value  Date Time


 


Bedside Blood Glucose 109 mg/dl 10/23/19 0606


 


Bedside Blood Glucose 80 mg/dl 10/22/19 2100


 


Bedside Blood Glucose 102 mg/dl 10/22/19 1630


 


Bedside Blood Glucose 110 mg/dl 10/22/19 1130


 


Bedside Blood Glucose 127 mg/dl H 10/22/19 0630











Objective





Last 24 Hour Vital Signs








  Date Time  Temp Pulse Resp B/P (MAP) Pulse Ox O2 Delivery O2 Flow Rate FiO2


 


10/23/19 04:00  83      


 


10/23/19 04:00 98.4 81 22 129/53 (78) 99   


 


10/23/19 00:00 97.9 86 21 151/91 (111) 98   


 


10/23/19 00:00  84      


 


10/22/19 21:00      Nasal Cannula 2.0 


 


10/22/19 20:00  85      


 


10/22/19 20:00 97.7 90 22 158/86 (110) 98   


 


10/22/19 16:00  89      


 


10/22/19 16:00 98.8 91 18 136/82 (100) 95   


 


10/22/19 12:00 98.7 79 20 136/70 (92) 97   


 


10/22/19 12:00  76      


 


10/22/19 09:00 98.2 90 20 148/82 (104) 95   


 


10/22/19 09:00      Nasal Cannula 2.0 


 


10/22/19 08:00  88      


 


10/22/19 08:00     98 Nasal Cannula 2.0 28

















Intake and Output  


 


 10/22/19 10/23/19





 18:59 06:59


 


Intake Total 240 ml 


 


Output Total 1000 ml 


 


Balance -760 ml 


 


  


 


Intake Oral 240 ml 


 


Output Urine Total 1000 ml 


 


# Bowel Movements  1








Laboratory Tests


10/22/19 06:25: 


White Blood Count 22.1*H, Red Blood Count 2.94L, Hemoglobin 8.2L, Hematocrit 

24.8L, Mean Corpuscular Volume 85, Mean Corpuscular Hemoglobin 28.1, Mean 

Corpuscular Hemoglobin Concent 33.2, Red Cell Distribution Width 13.6, Platelet 

Count 406, Mean Platelet Volume 7.4, Neutrophils (%) (Auto) , Lymphocytes (%) (

Auto) , Monocytes (%) (Auto) , Eosinophils (%) (Auto) , Basophils (%) (Auto) , 

Differential Total Cells Counted 100, Neutrophils % (Manual) 80H, Lymphocytes % 

(Manual) 10L, Monocytes % (Manual) 5, Eosinophils % (Manual) 5H, Basophils % (

Manual) 0, Band Neutrophils 0, Platelet Estimate Adequate, Platelet Morphology 

Normal, Hypochromasia 1+, Sodium Level 141, Potassium Level 4.3, Chloride Level 

107, Carbon Dioxide Level 22, Anion Gap 12, Blood Urea Nitrogen 39H, Creatinine 

3.1H, Estimat Glomerular Filtration Rate 15.1, Glucose Level 122H, Uric Acid 

9.5H, Calcium Level 8.5, Phosphorus Level 4.5, Magnesium Level 2.3, Total 

Bilirubin 0.3, Aspartate Amino Transf (AST/SGOT) 17, Alanine Aminotransferase (

ALT/SGPT) 21, Alkaline Phosphatase 93, C-Reactive Protein, Quantitative 9.4H, 

Pro-B-Type Natriuretic Peptide > 11417B, Total Protein 6.0L, Albumin 1.7L, 

Globulin 4.3, Albumin/Globulin Ratio 0.4L,  Antigen [Pending]


10/23/19 02:35: Stool Occult Blood [Pending]


10/23/19 05:30: Urine Eosinophils [Pending]


Height (Feet):  5


Height (Inches):  5.00


Weight (Pounds):  151


General Appearance:  lethargic


Neck:  normal alignment


Cardiovascular:  normal rate


Respiratory/Chest:  decreased breath sounds


Abdomen:  normal bowel sounds


Pelvis:  normal external exam


Objective





Current Medications








 Medications


  (Trade)  Dose


 Ordered  Sig/Winnie


 Route


 PRN Reason  Start Time


 Stop Time Status Last Admin


Dose Admin


 


 Acetaminophen


  (Tylenol)  650 mg  PRN  PRN


 RECTAL


 TEMP>100.5  10/14/19 03:00


     


 


 


 Aspirin


  (ASA)  81 mg  DAILY


 ORAL


   10/15/19 09:00


 11/14/19 08:59  10/22/19 09:11


 


 


 Atorvastatin


 Calcium


  (Lipitor)  10 mg  BEDTIME


 ORAL


   10/14/19 21:00


 11/13/19 20:59  10/22/19 21:35


 


 


 Clopidogrel


 Bisulfate


  (Plavix)  75 mg  DAILY


 ORAL


   10/15/19 09:00


 11/14/19 08:59  10/22/19 09:11


 


 


 Dextrose


  (Dextrose 50%)  25 ml  Q30M  PRN


 IV


 Hypoglycemia  10/14/19 07:00


 11/13/19 06:59   


 


 


 Dextrose


  (Dextrose 50%)  50 ml  Q30M  PRN


 IV


 Hypoglycemia  10/14/19 07:00


 11/13/19 06:59   


 


 


 Dextrose/Sodium


 Chloride  1,000 ml @ 


 100 mls/hr  Q10H


 IV


   10/20/19 11:45


 11/19/19 11:44  10/23/19 00:07


 


 


 Divalproex Sodium


  (Depakote


 Sprinkles)  500 mg  Q12HR


 ORAL


   10/15/19 22:00


 11/14/19 21:59  10/22/19 21:35


 


 


 Docusate Sodium


  (Colace)  100 mg  THREE TIMES A  DAY


 ORAL


   10/14/19 13:00


 11/13/19 12:59  10/22/19 17:18


 


 


 Donepezil HCl


  (Aricept)  5 mg  DAILY


 ORAL


   10/15/19 11:00


 11/14/19 10:59  10/22/19 09:11


 


 


 Enoxaparin Sodium


  (Lovenox)  30 mg  DAILY


 SUBQ


   10/14/19 09:00


 11/13/19 08:59  10/21/19 08:45


 


 


 Insulin Aspart


  (NovoLOG)    BEFORE MEALS AND  HS


 SUBQ


   10/14/19 11:30


 11/13/19 11:29  10/22/19 06:17


 


 


 Insulin Detemir


  (Levemir)  14 units  DAILY


 SUBQ


   10/21/19 09:00


 11/13/19 10:29  10/22/19 11:14


 


 


 Lactulose


  (Cephulac)  20 gm  TIDPRN  PRN


 ORAL


 Constipation  10/16/19 14:45


 11/15/19 14:44  10/16/19 15:21


 


 


 Lorazepam


  (Ativan 2mg/ml


 1ml)  1 mg  Q8HR  PRN


 IV


 For Anxiety  10/17/19 19:15


 10/24/19 19:14  10/18/19 01:12


 


 


 Meropenem 1 gm/


 Sodium Chloride  55 ml @ 


 110 mls/hr  Q12H


 IVPB


   10/16/19 14:00


 10/23/19 13:59  10/23/19 02:41


 


 


 Nateglinide


  (Starlix)  120 mg  TIAC


 ORAL


   10/20/19 11:30


 11/19/19 11:29  10/23/19 06:06


 


 


 Pantoprazole


  (Protonix)  40 mg  BID


 ORAL


   10/14/19 18:00


 11/13/19 08:59  10/22/19 17:18


 

















Riccardo Velez MD Oct 23, 2019 06:11

## 2019-10-23 NOTE — NEPHROLOGY PROGRESS NOTE
Assessment/Plan


Problem List:  


(1) Renal failure (ARF), acute on chronic


(2) Dehydration


(3) ARF (acute renal failure)


(4) Sepsis


(5) Acute metabolic encephalopathy


(6) Hyperglycemia due to type 2 diabetes mellitus


(7) UTI (urinary tract infection)


(8) Diabetic nephropathy


Assessment





Renal failure, Acute on Chronic resolved


Dehydration


Severe Anemia


Sepsis / Pneumonia / UTI


DM, OOC


Proteinuria , Diabetic Nephropathy


Acute encephalopathy


Plan








Cr lowering 


Will order urine studies and monitor renal parameters


kidney YOANDY noted


lower iv fluids rate


WBCs lowering


has casas again due to retention


Avoid Nephrotoxics








previously


Anemia salas


2D echo


24 H urine protein


Keep BP and BS in check


I am holding  her psych meds due to low MS


Per orders





Subjective


ROS Limited/Unobtainable:  No


Constitutional:  Reports: malaise





Objective


Objective





Last 24 Hour Vital Signs








  Date Time  Temp Pulse Resp B/P (MAP) Pulse Ox O2 Delivery O2 Flow Rate FiO2


 


10/23/19 08:00 98.3 87 22 153/72 (99) 97   


 


10/23/19 08:00  82      


 


10/23/19 04:00  83      


 


10/23/19 04:00 98.4 81 22 129/53 (78) 99   


 


10/23/19 00:00 97.9 86 21 151/91 (111) 98   


 


10/23/19 00:00  84      


 


10/22/19 21:00      Nasal Cannula 2.0 


 


10/22/19 20:00  85      


 


10/22/19 20:00 97.7 90 22 158/86 (110) 98   


 


10/22/19 16:00  89      


 


10/22/19 16:00 98.8 91 18 136/82 (100) 95   


 


10/22/19 12:00 98.7 79 20 136/70 (92) 97   


 


10/22/19 12:00  76      

















Intake and Output  


 


 10/22/19 10/23/19





 19:00 07:00


 


Intake Total 240 ml 


 


Output Total 1000 ml 600 ml


 


Balance -760 ml -600 ml


 


  


 


Intake Oral 240 ml 


 


Output Urine Total 1000 ml 600 ml


 


# Bowel Movements  1








Laboratory Tests


10/23/19 02:35: Stool Occult Blood [Pending]


10/23/19 05:30: Urine Eosinophils None seen


10/23/19 06:17: 


White Blood Count 19.3H, Red Blood Count 3.17L, Hemoglobin 8.7L, Hematocrit 

26.7L, Mean Corpuscular Volume 84, Mean Corpuscular Hemoglobin 27.6, Mean 

Corpuscular Hemoglobin Concent 32.7, Red Cell Distribution Width 13.8, Platelet 

Count 440, Mean Platelet Volume 6.9, Neutrophils (%) (Auto) , Lymphocytes (%) (

Auto) , Monocytes (%) (Auto) , Eosinophils (%) (Auto) , Basophils (%) (Auto) , 

Differential Total Cells Counted 100, Neutrophils % (Manual) 70, Lymphocytes % (

Manual) 13L, Monocytes % (Manual) 12H, Eosinophils % (Manual) 4H, Basophils % (

Manual) 1, Band Neutrophils 0, Platelet Estimate Adequate, Platelet Morphology 

Normal, Sodium Level 140, Potassium Level 4.0, Chloride Level 106, Carbon 

Dioxide Level 22, Anion Gap 12, Blood Urea Nitrogen 29H, Creatinine 2.2H, 

Estimat Glomerular Filtration Rate 22.3, Glucose Level 91, Calcium Level 8.7


Height (Feet):  5


Height (Inches):  5.00


Weight (Pounds):  180


General Appearance:  no apparent distress, other - more responsive


Cardiovascular:  normal rate


Respiratory/Chest:  decreased breath sounds


Abdomen:  distended


Objective


no change











Lukasz Vizcaino MD Oct 23, 2019 10:26

## 2019-10-23 NOTE — NUR
ST NOTES:

COMPLETED MODIFIED BARIUM SWALLOW STUDY, SEE FULL REPORT TO FOLLOW.



GIVEN THIN LIQUIDS (TSP, TSP, CUP, STRAW 2 SEPARATE SIPS DO NOT COMPLETE 

SEQUENTIALLY)

NECTAR THICK LIQUIDS (TSP, CUP, STRAW ONE SIP ONLY) 

HONEY THICK LIQUID TSP AND PUDDING TSP



INITIAL IMPRESSIONS:

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 

TIMES DUE TO SENSORIMOTOR DEFICITS AND COMPOUNDED BY PSYCHIATRIC, 

COGNITIVE-BEHAVIORAL (poor following commands/strategies and talks with 

food/liquid residue in her mouth) AND RESPIRATORY COORDINATION DEFICITS 

(increased SOB and Resp rate noted needs to rest). 

(LEVEL 4 ON THE DYSPHAGIA OUTCOME SEVERITY SCALE OR ANGEL)



THIN LIQUIDS 

CUP - TRACE AUDIBLE WEAK COUGH WITH ? EJECTION ASPIRATION ON SECOND 

SWALLOW (TO CLEAR MILD ORAL RESIDUE THAT SPILLED OVER INTO PYRIFORM 

SINUSES 2ND SWALLOW ALSO DELAYED DUE TO POOR PHARYNGEAL SENSATION).

TSP AND STRAW ONE SIP AT A TIME (X2) NO ASPIRATION NOR SIGNIFICANT 

PENETRATION.

WATER WASH SIP VIA CUP (TO CLEAR PUDDING MIN RESIDUE - NO ASP/PENETRATION



NECTAR THICK LIQUIDS

TSP/CUP/STRAW ONE SIP (DID NOT COMPLETE SEQUENTIAL)

NO ASPIRATION BUT NOR SIGNIFICANT PENETRATION (ONLY HAD TRACE SILENT 

PENETRATION ON BACKSIDE OF LARYNGEAL SURFACE OF EPIGLOTTIS W/O EJECTION) 



HONEY THICK LIQUID TSP AND PUDDING TSP

NO ASPIRATION BUT NOR SIGNIFICANT PENETRATION (ONLY HAD TRACE SILENT 

PENETRATION ON BACKSIDE OF LARYNGEAL SURFACE OF EPIGLOTTIS W/O EJECTION)



EDENTULOUS AND KEPT SAYING SHE WANTED TO LAY DOWN SO SHE WAS NOT GIVEN 

MASTICATED SOLIDS 



OTHER DEFICITS/COMPONENTS THAT INCREASE ASPIRATION RISK AND REDUCE SWALLOW 

EFFICIENCY:

Oral Impairment 

Lip Closure

Tongue Control

Bolus transport / lingual motion BRIGITTE pureed 4 seconds first swallow and 

more than 4 sec second swallow to clear oral residue. Honey tsp 2 seconds 

first swallow then did not swallow again to clear oral residue even with 

max cues) 

Oral residue and reduced oral sensation for residue

Init. pharyngeal swallow

Pharyngeal Impairment 

Laryngeal elevation (LE)

Ant. hyoid excursion

Epiglottic movement

Late Laryngeal vestibule closure

Pharyngeal stripping wave

Pharyngoesophageal segment opening

Tongue base retraction

Pharyngeal residue

Decreases pharyngeal sensation



GROSSLY FUNCTIONAL ESOPHAGEAL PHASE IN LATERAL LIMITED VIEW.

 

TRIAL TX POOR ABILITY TO FOLLOW COMMANDS FOR CHIN TUCK, EFFORTFUL BREATH 

HOLD, AND SOMETIMES WILL NOT SWALLOW AGAIN TO CLEAR OROPHARYNGEAL RESIDUE

BENEFITS FROM SOME CUES FOR EXTRA SWALLOW, NEEDS MORE TIME TO SWALLOW, 

LIQUID WASHES HELP, CUES TO NOT TALK, SMALLER AMOUNTS TSP SAFER IF SHE 

DOES TAKE WATER AS SHE ALWAYS ASKS FOR WATER BUT AVOID CUP/STRAW FOR NOW),

NECTAR THICK LIQUIDS SAFEST FOR MEAL LEVEL. AVOID CHEWABLE SOLIDS DUE TO 

TALKING WITH FOOD IN MOUTH. 



AT RISK FOR CHRONIC TRACE ASPIRATION WITH ALL CONSISTENCIES PARTICULARLY 

IF SWALLOW STRATEGIES AND ASPIRATION PRECAUTIONS ARE NOT USED.



RECOMMENDATIONS:

CONSIDER CONTINUING WITH PO INTAKE ON CURRENT LIQUIFIED PUREED LIKE NECTAR THICK SOUP DIET 
WITH POSTED AND UPDATED ASPIRATION PRECAUTIONS AND ONE TO ONE FEEDING.

PATIENT IS ALWAYS ASKING FOR WATER AND SHE SHOULD BE ALLOWED TO HAVE THIS AT TSP LEVEL.



D/W RN SOSNA AND EDUCATED/TRAINED HER IN UPDATED PRECAUTIONS.

## 2019-10-23 NOTE — HEMATOLOGY/ONC PROGRESS NOTE
Assessment/Plan


Assessment/Plan





Assessment and Recs:


# Anemia of chronic disease due to underlying chronic medical issues, 

multifactorial 


--> Anemia workup has been ordered, rule out gi bleed --> ferritin is 1400+


--> No evidence of hemolysis is noted, peripheral smear has been reviewed.


--> Hgb goal >7. Transfuse prn.


--> Epogen or iron at this time is not particularly indicated


--> Medications have been reviewed


--> low threshold for gi evaluation in case has occult +


--> bone marrow biopsy is not indicated given the other more likely causes (if 

recurrent anemia) first time here


--> hgb trend 8.4->8.5


# Pelvic mass on ct and us -- 13 x 7 x 12.9 cm unilocular cystic mass, 

corresponding to lesion reported on recent CT scan. Layering low level internal 

echoes likely represent bladder debris.. This presumably represents a cystic 

ovarian lesion with debris or hemorrhage, possibly neoplastic.


--> CA-125 is 216! elevated


--> consider gyn eval


# Hypercalcemia - btw 10-11 range when corrected to alb


--> ivf have been started


--> if remains elevated, 7.9-->8.3


--> will get pth


# Leukocytosis/elevated white blood cell count, unspecified likely related to 

underlying reaction v more likely infection


--> have reviewed peripheral smear and bandemia/neutrophilia noted


--> continue antibiotics if they have been started by ID team (VANC/ZOSYN)--> 

vanc/linda--> linda


--> wbc 39-->28k-->29k->31k-->23-->26k-->19k


--> monitor for resolution


--> CT C/A/P Results reviewed


# Sepsis from pneumonia.  


--> pulm consulted, abx started


# CHANCE (acute kidney injury) on ckd


--> cr 1.6-->2.7-->2.2


--> per renal


# UTI (urinary tract infection)


--> on abx per id


# Dehydration


--> on ivf


# Acute metabolic encephalopathy


--> likely due to pna


# Dvt ppx lovenox sq





The timing of this note does not necessarily reflect the time of the patient 

was seen.





Greatly appreciate consultation.





Subjective


Cardiovascular:  Denies: no symptoms, chest pain, edema, irregular heart rate, 

lightheadedness, palpitations, syncope, other


Respiratory:  Denies: no symptoms, cough, shortness of breath, SOB with 

excertion, SOB at rest, sputum, wheezing, other


Gastrointestinal/Abdominal:  Denies: no symptoms, abdomen distended, abdominal 

pain, black stools, tarry stools, blood in stool, constipated, diarrhea, 

difficulty swallowing, nausea, poor appetite, poor fluid intake, rectal bleeding

, vomiting, other


Genitourinary:  Denies: no symptoms, burning, discharge, frequency, flank pain, 

hematuria, incontinence, pain, urgency, other


Hematologic/Lymphatic:  Denies: no symptoms, anemia, easy bleeding, easy 

bruising, adenopathy, other


Allergies:  


Coded Allergies:  


     No Known Allergies (Unverified , 10/14/19)


Subjective


10/14: hgb has improved, no bleeding, labs reivewed


10/15: no events to report


10/16: a+ o x1, no bleeding or chills noted, no major changes, occult pending


10/17: no events noted, no bleeding, no chills, no hematemesis/hematochezia


10/18: remains confused, with abx, on nc


10/19: cr is worse, hgb 8.4, no bleeding, night sweats, chills


10/20: no f/c, no night sweats, labs noted, cr worse


10/21: no bleeding or chills, no night sweats, labs pending this am


10/22: pelvic mass noted on imaging, no bleeding reported, ordered ca125


10/23: no events, remains lethargic, is on abx, seen by surg





Objective


Objective





Current Medications








 Medications


  (Trade)  Dose


 Ordered  Sig/Winnie


 Route


 PRN Reason  Start Time


 Stop Time Status Last Admin


Dose Admin


 


 Acetaminophen


  (Tylenol)  650 mg  PRN  PRN


 RECTAL


 TEMP>100.5  10/14/19 03:00


     


 


 


 Aspirin


  (ASA)  81 mg  DAILY


 ORAL


   10/15/19 09:00


 11/14/19 08:59  10/23/19 09:33


 


 


 Atorvastatin


 Calcium


  (Lipitor)  10 mg  BEDTIME


 ORAL


   10/14/19 21:00


 11/13/19 20:59  10/22/19 21:35


 


 


 Barium Sulfate


  (Varibar Honey)  250 ml  NOW  PRN


 MC


 RAD  10/23/19 12:00


 10/26/19 11:54   


 


 


 Barium Sulfate


  (Varibar Nectar)  240 ml  NOW  PRN


 


 RAD  10/23/19 12:00


 10/26/19 11:54   


 


 


 Barium Sulfate


  (Varibar Pudding)  230 ml  NOW  PRN


 


 RAD  10/23/19 12:00


 10/26/19 11:54   


 


 


 Clopidogrel


 Bisulfate


  (Plavix)  75 mg  DAILY


 ORAL


   10/15/19 09:00


 11/14/19 08:59  10/23/19 09:32


 


 


 Dextrose


  (Dextrose 50%)  25 ml  Q30M  PRN


 IV


 Hypoglycemia  10/14/19 07:00


 11/13/19 06:59   


 


 


 Dextrose


  (Dextrose 50%)  50 ml  Q30M  PRN


 IV


 Hypoglycemia  10/14/19 07:00


 11/13/19 06:59   


 


 


 Dextrose/Sodium


 Chloride  1,000 ml @ 


 60 mls/hr  [id number]


 IV


   10/23/19 10:25


 11/22/19 10:24  10/23/19 11:42


 


 


 Divalproex Sodium


  (Depakote


 Sprinkles)  500 mg  Q12HR


 ORAL


   10/15/19 22:00


 11/14/19 21:59  10/23/19 09:33


 


 


 Docusate Sodium


  (Colace)  100 mg  THREE TIMES A  DAY


 ORAL


   10/14/19 13:00


 11/13/19 12:59  10/23/19 18:09


 


 


 Donepezil HCl


  (Aricept)  5 mg  DAILY


 ORAL


   10/15/19 11:00


 11/14/19 10:59  10/23/19 09:32


 


 


 Enoxaparin Sodium


  (Lovenox)  30 mg  DAILY


 SUBQ


   10/14/19 09:00


 11/13/19 08:59  10/23/19 09:36


 


 


 Insulin Aspart


  (NovoLOG)    BEFORE MEALS AND  HS


 SUBQ


   10/14/19 11:30


 11/13/19 11:29  10/23/19 12:34


 


 


 Insulin Detemir


  (Levemir)  12 units  DAILY


 SUBQ


   10/23/19 09:00


 11/13/19 10:29  10/23/19 09:45


 


 


 Lactulose


  (Cephulac)  20 gm  TIDPRN  PRN


 ORAL


 Constipation  10/16/19 14:45


 11/15/19 14:44  10/23/19 18:09


 


 


 Lorazepam


  (Ativan 2mg/ml


 1ml)  1 mg  Q8HR  PRN


 IV


 For Anxiety  10/17/19 19:15


 10/24/19 19:14  10/18/19 01:12


 


 


 Meropenem 1 gm/


 Sodium Chloride  55 ml @ 


 110 mls/hr  Q12H


 IVPB


   10/16/19 14:00


 10/30/19 23:59  10/23/19 14:34


 


 


 Pantoprazole


  (Protonix)  40 mg  BID


 ORAL


   10/14/19 18:00


 11/13/19 08:59  10/23/19 18:09


 











Last 24 Hour Vital Signs








  Date Time  Temp Pulse Resp B/P (MAP) Pulse Ox O2 Delivery O2 Flow Rate FiO2


 


10/23/19 16:00 98.1 64 22 150/72 (98) 98   


 


10/23/19 16:00  93      


 


10/23/19 12:00  89      


 


10/23/19 12:00 97.9 90 22 140/70 (93) 98   


 


10/23/19 09:00      Nasal Cannula 2.0 


 


10/23/19 08:00 98.3 87 22 153/72 (99) 97   


 


10/23/19 08:00  82      


 


10/23/19 04:00  83      


 


10/23/19 04:00 98.4 81 22 129/53 (78) 99   


 


10/23/19 00:00 97.9 86 21 151/91 (111) 98   


 


10/23/19 00:00  84      


 


10/22/19 21:00      Nasal Cannula 2.0 


 


10/22/19 20:00  85      


 


10/22/19 20:00 97.7 90 22 158/86 (110) 98   


 


10/22/19 16:00  89      


 


10/22/19 16:00 98.8 91 18 136/82 (100) 95   


 


10/22/19 12:00 98.7 79 20 136/70 (92) 97   


 


10/22/19 12:00  76      


 


10/22/19 09:00 98.2 90 20 148/82 (104) 95   


 


10/22/19 09:00      Nasal Cannula 2.0 


 


10/22/19 08:00  88      


 


10/22/19 08:00     98 Nasal Cannula 2.0 28


 


10/22/19 04:00  87      


 


10/22/19 04:00 98.4 87 20 134/66 (88) 98   


 


10/22/19 00:00  87      


 


10/22/19 00:00 98.4 89 19 150/87 (108) 98   


 


10/21/19 21:00      Nasal Cannula 2.0 


 


10/21/19 20:00  86      


 


10/21/19 20:00 98.2 89 20 149/98 (115) 98   

















Intake and Output  


 


 10/22/19 10/23/19





 19:00 07:00


 


Intake Total 240 ml 


 


Output Total 1000 ml 600 ml


 


Balance -760 ml -600 ml


 


  


 


Intake Oral 240 ml 


 


Output Urine Total 1000 ml 600 ml


 


# Bowel Movements  1











Labs








Test


  10/21/19


03:00 10/21/19


06:36 10/22/19


04:10 10/22/19


06:25


 


Urine Eosinophils


  None seen


(NONE SEEN) 


  None seen


(NONE SEEN) 


 


 


White Blood Count


  


  25.9 K/UL


(4.8-10.8) 


  22.1 K/UL


(4.8-10.8)


 


Red Blood Count


  


  3.02 M/UL


(4.20-5.40) 


  2.94 M/UL


(4.20-5.40)


 


Hemoglobin


  


  8.5 G/DL


(12.0-16.0) 


  8.2 G/DL


(12.0-16.0)


 


Hematocrit


  


  25.8 %


(37.0-47.0) 


  24.8 %


(37.0-47.0)


 


Mean Corpuscular Volume  86 FL (80-99)   85 FL (80-99) 


 


Mean Corpuscular Hemoglobin


  


  28.4 PG


(27.0-31.0) 


  28.1 PG


(27.0-31.0)


 


Mean Corpuscular Hemoglobin


Concent 


  33.1 G/DL


(32.0-36.0) 


  33.2 G/DL


(32.0-36.0)


 


Red Cell Distribution Width


  


  12.3 %


(11.6-14.8) 


  13.6 %


(11.6-14.8)


 


Platelet Count


  


  465 K/UL


(150-450) 


  406 K/UL


(150-450)


 


Mean Platelet Volume


  


  7.5 FL


(6.5-10.1) 


  7.4 FL


(6.5-10.1)


 


Neutrophils (%) (Auto)   % (45.0-75.0)    % (45.0-75.0) 


 


Lymphocytes (%) (Auto)   % (20.0-45.0)    % (20.0-45.0) 


 


Monocytes (%) (Auto)   % (1.0-10.0)    % (1.0-10.0) 


 


Eosinophils (%) (Auto)   % (0.0-3.0)    % (0.0-3.0) 


 


Basophils (%) (Auto)   % (0.0-2.0)    % (0.0-2.0) 


 


Differential Total Cells


Counted 


  100 


  


  100 


 


 


Neutrophils % (Manual)  86 % (45-75)   80 % (45-75) 


 


Lymphocytes % (Manual)  10 % (20-45)   10 % (20-45) 


 


Monocytes % (Manual)  3 % (1-10)   5 % (1-10) 


 


Eosinophils % (Manual)  1 % (0-3)   5 % (0-3) 


 


Basophils % (Manual)  0 % (0-2)   0 % (0-2) 


 


Band Neutrophils  0 % (0-8)   0 % (0-8) 


 


Platelet Estimate  Adequate   Adequate 


 


Platelet Morphology  Normal   Normal 


 


Hypochromasia  1+   1+ 


 


Sodium Level


  


  141 MMOL/L


(136-145) 


  141 MMOL/L


(136-145)


 


Potassium Level


  


  4.3 MMOL/L


(3.5-5.1) 


  4.3 MMOL/L


(3.5-5.1)


 


Chloride Level


  


  108 MMOL/L


() 


  107 MMOL/L


()


 


Carbon Dioxide Level


  


  21 MMOL/L


(21-32) 


  22 MMOL/L


(21-32)


 


Anion Gap


  


  12 mmol/L


(5-15) 


  12 mmol/L


(5-15)


 


Blood Urea Nitrogen


  


  38 mg/dL


(7-18) 


  39 mg/dL


(7-18)


 


Creatinine


  


  3.4 MG/DL


(0.55-1.30) 


  3.1 MG/DL


(0.55-1.30)


 


Estimat Glomerular Filtration


Rate 


  13.5 mL/min


(>60) 


  15.1 mL/min


(>60)


 


Glucose Level


  


  198 MG/DL


() 


  122 MG/DL


()


 


Uric Acid


  


  9.8 MG/DL


(2.6-7.2) 


  9.5 MG/DL


(2.6-7.2)


 


Calcium Level


  


  8.7 MG/DL


(8.5-10.1) 


  8.5 MG/DL


(8.5-10.1)


 


Phosphorus Level


  


  4.3 MG/DL


(2.5-4.9) 


  4.5 MG/DL


(2.5-4.9)


 


Magnesium Level


  


  2.5 MG/DL


(1.8-2.4) 


  2.3 MG/DL


(1.8-2.4)


 


Total Bilirubin


  


  0.4 MG/DL


(0.2-1.0) 


  0.3 MG/DL


(0.2-1.0)


 


Aspartate Amino Transf


(AST/SGOT) 


  16 U/L (15-37) 


  


  17 U/L (15-37) 


 


 


Alanine Aminotransferase


(ALT/SGPT) 


  23 U/L (12-78) 


  


  21 U/L (12-78) 


 


 


Alkaline Phosphatase


  


  110 U/L


() 


  93 U/L


()


 


C-Reactive Protein,


Quantitative 


  13.7 mg/dL


(0.00-0.90) 


  9.4 mg/dL


(0.00-0.90)


 


Pro-B-Type Natriuretic Peptide


  


  > 40921 pg/mL


(0-125) 


  > 86959 pg/mL


(0-125)


 


Total Protein


  


  6.6 G/DL


(6.4-8.2) 


  6.0 G/DL


(6.4-8.2)


 


Albumin


  


  1.9 G/DL


(3.4-5.0) 


  1.7 G/DL


(3.4-5.0)


 


Globulin  4.7 g/dL   4.3 g/dL 


 


Albumin/Globulin Ratio  0.4 (1.0-2.7)   0.4 (1.0-2.7) 


 


Valproic Acid (Depakene) Level


  


  14 MCG/ML


() 


  


 


 


 Antigen


  


  


  


  216.7 U/mL


(0.0-38.1)


 


Test


  10/23/19


02:35 10/23/19


05:30 10/23/19


06:17 


 


 


Stool Occult Blood


  Negative


(NEGATIVE) 


  


  


 


 


Urine Eosinophils


  


  None seen


(NONE SEEN) 


  


 


 


White Blood Count


  


  


  19.3 K/UL


(4.8-10.8) 


 


 


Red Blood Count


  


  


  3.17 M/UL


(4.20-5.40) 


 


 


Hemoglobin


  


  


  8.7 G/DL


(12.0-16.0) 


 


 


Hematocrit


  


  


  26.7 %


(37.0-47.0) 


 


 


Mean Corpuscular Volume   84 FL (80-99)  


 


Mean Corpuscular Hemoglobin


  


  


  27.6 PG


(27.0-31.0) 


 


 


Mean Corpuscular Hemoglobin


Concent 


  


  32.7 G/DL


(32.0-36.0) 


 


 


Red Cell Distribution Width


  


  


  13.8 %


(11.6-14.8) 


 


 


Platelet Count


  


  


  440 K/UL


(150-450) 


 


 


Mean Platelet Volume


  


  


  6.9 FL


(6.5-10.1) 


 


 


Neutrophils (%) (Auto)    % (45.0-75.0)  


 


Lymphocytes (%) (Auto)    % (20.0-45.0)  


 


Monocytes (%) (Auto)    % (1.0-10.0)  


 


Eosinophils (%) (Auto)    % (0.0-3.0)  


 


Basophils (%) (Auto)    % (0.0-2.0)  


 


Differential Total Cells


Counted 


  


  100 


  


 


 


Neutrophils % (Manual)   70 % (45-75)  


 


Lymphocytes % (Manual)   13 % (20-45)  


 


Monocytes % (Manual)   12 % (1-10)  


 


Eosinophils % (Manual)   4 % (0-3)  


 


Basophils % (Manual)   1 % (0-2)  


 


Band Neutrophils   0 % (0-8)  


 


Platelet Estimate   Adequate  


 


Platelet Morphology   Normal  


 


Sodium Level


  


  


  140 MMOL/L


(136-145) 


 


 


Potassium Level


  


  


  4.0 MMOL/L


(3.5-5.1) 


 


 


Chloride Level


  


  


  106 MMOL/L


() 


 


 


Carbon Dioxide Level


  


  


  22 MMOL/L


(21-32) 


 


 


Anion Gap


  


  


  12 mmol/L


(5-15) 


 


 


Blood Urea Nitrogen


  


  


  29 mg/dL


(7-18) 


 


 


Creatinine


  


  


  2.2 MG/DL


(0.55-1.30) 


 


 


Estimat Glomerular Filtration


Rate 


  


  22.3 mL/min


(>60) 


 


 


Glucose Level


  


  


  91 MG/DL


() 


 


 


Calcium Level


  


  


  8.7 MG/DL


(8.5-10.1) 


 








Height (Feet):  5


Height (Inches):  5.00


Weight (Pounds):  180


Objective





Physical Exam:


Vitals: reviewed


General Appearance:  NAD


HEENT:  normocephalic, atraumatic


Neck:  non-tender, normal alignment


Respiratory/Chest:  normal breath sounds bilaterally


Cardiovascular/Chest: rrr


Abdomen:  normal bowel sounds, soft, nontender


Extremities:  normal range of motion











Mike Pop MD Oct 23, 2019 19:53

## 2019-10-23 NOTE — NUR
ST NOTES:



WEEKLY SWALLOW/SPEECH THERAPY SUMMARY:

PATIENT SEEN FOR DYSPHAGIA AND COMMUNICATION-COGNITIVE DEFICITS (SEE 

REPORTS). 



SPEECH STATUS:

THE PATIENT WAS ALERT BUT WOULD SOMETIMES CLOSE HER EYES. SHE SAID 

UNINTELLIGIBLE WORDS "WADI WADI WADI WHY" AND WHEN I ELEVATED HER HOB SHE 

SAID "I JUST LAY DOWN". 



SWALLOW STATUS: TOOK TRIAL PO OF NECTAR THICK ORANGE JUICE VIA CUP WITH GOOD LIP CLOSURE, 
SWALLOWED WITHIN 4 SECONDS  WITH FAIR HYOLARYNGEAL EXCURSION (AFTER MAKING CHEWING MOVEMENTS 
WITH TONGUE AND LIPS), SWALLOWED AGAIN. NO OVERT ASPIRATION (2 TRIALS). 



PER CNA (CHEL) PATIENT WILL KEEP HER MOUTH CLOSED WHEN SHE DOESN'T WANT MORE PO INTAKE. NO 
OVERT S/S OF ASPIRATION AND SWALLOW APPEARED TIMELY PER CNA.



GOALS NOT CONSISTENTLY MET FOR INTAKE 0/10/30/50/100% ON LIQUIFIED PUREED 

LIKE NECTAR THICK LIQUIDS TSP ONLY. 

GOALS MET FOR NEW STAFF EDUCATED/TRAINED IN POSTED ASPIRATION PRECAUTIONS.



MODIFIED BARIUM SWALLOW STUDY ORDERED BY DR MORALES BUT NOT YET COMPLETED 

DUE TO SCHEDULE CONFLICTS.  WILL TRY TO COMPLETE TODAY.



PER MOST RECENT CXR:

Service Date:10/23/19

:1955Report Date:10/23/19

Procedure: XRAY Chest 1v

Indication: Reason For Exam: SOB

Technique:  Single AP view of the chest.

Comparison: Chest radiograph dated 10/16/2019

Findings:

The cardiomediastinal silhouette is unchanged. Persistent interstitial 

edema with worsening layering right pleural effusion and associated patchy 

airspace opacities. Small left pleural effusion with patchy airspace 

opacity. No pneumothorax.. No acute osseous abnormality.

IMPRESSION:

1.  Persistent pulmonary edema with worsening right layering pleural 

effusion and small left pleural effusion.

2.  Patchy bibasilar airspace opacities may represent combination of 

atelectasis and pleural fluid, but pneumonia should be excluded clinically.



PLAN:

MBSS TODAY OR TOMORROW IF POSSIBLE (DO AS OP IF DC DO NOT HOLD UP DC FOR 

THIS STUDY)

DR MORALES WROTE ORDER AND WILL TELL ELIZABETH DAWN.

## 2019-10-23 NOTE — GENERAL PROGRESS NOTE
Assessment/Plan


Status:  stable


Assessment/Plan:


S: Not verbal





O: poor historian, seems comfortable. IV sites are intact. Charles in place





PHYSICAL EXAMINATION:HEAD AND NECK:  Atraumatic and normocephalic.


CHEST:  Bronchial breathing sounds.ABDOMEN:  Soft.  No organomegaly.


MUSCULOSKELETAL:  Diffuse 1+ or 2+ nonpitting edema in all four contracted


extremities.  The patient is not following the commands.  Limited


evaluation.NEUROLOGY:  The patient is awake.  Not alert.  Not verbally


communicative.





LABORATORY DATA:  Labs dated October 16, 2019  reviewed





Meds: Reviewed and reconciled in the chart , including ASA and plavix 





Imaging:  CT abd reviewed 





ASSESSMENT AND PLAN:


1. Sepsis.  Differential diagnosis is healthcare-associated pneumonia or


healthcare-associated urinary tract infection.  Work in progress.


2. Chronic encephalomalacia.


3. Acute on chronic anemia.


4. Renal failure, age indeterminate.


5. Congestive heart failure- preserved EF


6. Hyperthyroidism.


7. Hyperkalemia.


8. Diabetes type 2, uncontrolled with A1c of 10.


9. Psychiatric disorder.


10. GI and DVT prophylaxis.


11. PAH- Severe 





PLAN OF CARE:


 Notes from  Pulmonary Critical Care, Infectious


Disease, and Nephrology reviewed


post Blood transfusion


modified abx per ID, given the increase in WBC and low grade fever


Improving leukocytosis and  renal failure





Subjective


Allergies:  


Coded Allergies:  


     No Known Allergies (Unverified , 10/14/19)





Objective





Last 24 Hour Vital Signs








  Date Time  Temp Pulse Resp B/P (MAP) Pulse Ox O2 Delivery O2 Flow Rate FiO2


 


10/23/19 09:00      Nasal Cannula 2.0 


 


10/23/19 08:00 98.3 87 22 153/72 (99) 97   


 


10/23/19 08:00  82      


 


10/23/19 04:00  83      


 


10/23/19 04:00 98.4 81 22 129/53 (78) 99   


 


10/23/19 00:00 97.9 86 21 151/91 (111) 98   


 


10/23/19 00:00  84      


 


10/22/19 21:00      Nasal Cannula 2.0 


 


10/22/19 20:00  85      


 


10/22/19 20:00 97.7 90 22 158/86 (110) 98   


 


10/22/19 16:00  89      


 


10/22/19 16:00 98.8 91 18 136/82 (100) 95   


 


10/22/19 12:00 98.7 79 20 136/70 (92) 97   


 


10/22/19 12:00  76      

















Intake and Output  


 


 10/22/19 10/23/19





 19:00 07:00


 


Intake Total 240 ml 


 


Output Total 1000 ml 600 ml


 


Balance -760 ml -600 ml


 


  


 


Intake Oral 240 ml 


 


Output Urine Total 1000 ml 600 ml


 


# Bowel Movements  1








Laboratory Tests


10/23/19 02:35: Stool Occult Blood [Pending]


10/23/19 05:30: Urine Eosinophils None seen


10/23/19 06:17: 


White Blood Count 19.3H, Red Blood Count 3.17L, Hemoglobin 8.7L, Hematocrit 

26.7L, Mean Corpuscular Volume 84, Mean Corpuscular Hemoglobin 27.6, Mean 

Corpuscular Hemoglobin Concent 32.7, Red Cell Distribution Width 13.8, Platelet 

Count 440, Mean Platelet Volume 6.9, Neutrophils (%) (Auto) , Lymphocytes (%) (

Auto) , Monocytes (%) (Auto) , Eosinophils (%) (Auto) , Basophils (%) (Auto) , 

Differential Total Cells Counted 100, Neutrophils % (Manual) 70, Lymphocytes % (

Manual) 13L, Monocytes % (Manual) 12H, Eosinophils % (Manual) 4H, Basophils % (

Manual) 1, Band Neutrophils 0, Platelet Estimate Adequate, Platelet Morphology 

Normal, Sodium Level 140, Potassium Level 4.0, Chloride Level 106, Carbon 

Dioxide Level 22, Anion Gap 12, Blood Urea Nitrogen 29H, Creatinine 2.2H, 

Estimat Glomerular Filtration Rate 22.3, Glucose Level 91, Calcium Level 8.7


Height (Feet):  5


Height (Inches):  5.00


Weight (Pounds):  180











Garrick Keith MD Oct 23, 2019 10:42

## 2019-10-23 NOTE — NUR
*-* INSURANCE *-*



UPDATED CLINICALS AND REVIEWS HAVE BEEN FAXED TO:



Tracking#[id number]

CM: Lance

#[phone]

Fax#[phone]

-------------------------------------------------------------------------------

Addendum: 10/23/19 at 1442 by JACKI COMBS CM

-------------------------------------------------------------------------------

SPOKE TO LEROY:LANCE AND HE STATED HE HAS BEEN RECEIVING CLINICALS

## 2019-10-23 NOTE — DIAGNOSTIC IMAGING REPORT
Indication: Reason For Exam: SOB

 

Technique:  Single AP view of the chest.

 

Comparison: Chest radiograph dated 10/16/2019

 

Findings:

 

The cardiomediastinal silhouette is unchanged. Persistent interstitial edema with

worsening layering right pleural effusion and associated patchy airspace opacities.

Small left pleural effusion with patchy airspace opacity. No pneumothorax.. No acute

osseous abnormality.

 

IMPRESSION:

 

1.  Persistent pulmonary edema with worsening right layering pleural effusion and

small left pleural effusion.

2.  Patchy bibasilar airspace opacities may represent combination of atelectasis and

pleural fluid, but pneumonia should be excluded clinically.

## 2019-10-23 NOTE — INFECTIOUS DISEASES PROG NOTE
Assessment/Plan


Problems:  


(1) Aspiration pneumonia


Assessment & Plan:  with B/L infiltrates, L>R suspect due to altered mental 

status , with hypoxemia and leukocytosis, continue meropenem empirically, 

repeated  CXR  showed no change , CT of the chest showed B/L effusion with 

basal infiltrates , aspiration precaution. continue meropenem for two weeks 

total . EOT 10/30/19  





(2) UTI (urinary tract infection)


Assessment & Plan:  due to ESBL producing E coli already on meropenem to cover 

for sepsis and pneumonia too for two weeks total  





(3) Sepsis


Assessment & Plan:  due to the above, with persistent leukocytosis inspit of 

being on wide spectrum antibiotics and negative repeated blood cultures, 

suspect pelvic mass related or bone marrow pathology . recommend gynecology 

eval for pelvic mass resection 





(4) Acute metabolic encephalopathy


Assessment & Plan:  due to the above, continue hydration and antibiotics, 

monitor in tele 





(5) Hyperglycemia due to type 2 diabetes mellitus


Assessment & Plan:  recommend tight glycemic control to keep blood glucose 

between 100-140 





(6) ARF (acute renal failure)


Assessment & Plan:  due to the above, continue hydration and renally dosed 

antibiotics, close  monitor of renal function , stop vancomycin 





(7) Pelvic mass in female


Assessment & Plan:  to the left of the uterus, suspect malignancy , recommend OB

/GYN eval , and tumor markers , oncology is following 








Subjective


ROS Limited/Unobtainable:  Yes


Allergies:  


Coded Allergies:  


     No Known Allergies (Unverified , 10/14/19)


Subjective


she was lethargic , lying in bed,  awake, and not  responsive , no fever or 

chills , no diarrhea, no cough or SOB





Objective


Vital Signs





Last 24 Hour Vital Signs








  Date Time  Temp Pulse Resp B/P (MAP) Pulse Ox O2 Delivery O2 Flow Rate FiO2


 


10/23/19 12:00  89      


 


10/23/19 12:00 97.9 90 22 140/70 (93) 98   


 


10/23/19 09:00      Nasal Cannula 2.0 


 


10/23/19 08:00 98.3 87 22 153/72 (99) 97   


 


10/23/19 08:00  82      


 


10/23/19 04:00  83      


 


10/23/19 04:00 98.4 81 22 129/53 (78) 99   


 


10/23/19 00:00 97.9 86 21 151/91 (111) 98   


 


10/23/19 00:00  84      


 


10/22/19 21:00      Nasal Cannula 2.0 


 


10/22/19 20:00  85      


 


10/22/19 20:00 97.7 90 22 158/86 (110) 98   








Height (Feet):  5


Height (Inches):  5.00


Weight (Pounds):  180


General Appearance:  WD/WN, no acute distress


HEENT:  normocephalic, atraumatic, anicteric, mucous membranes moist, PERRL


Respiratory/Chest:  chest wall non-tender, lungs clear, normal breath sounds, 

no respiratory distress, no accessory muscle use


Cardiovascular:  normal peripheral pulses, normal rate, regular rhythm, no 

gallop/murmur, no JVD


Abdomen:  normal bowel sounds, soft, non tender, no organomegaly, non distended

, no mass, no scars


Extremities:  no cyanosis, no clubbing


Skin:  no rash, no lesions, no ulcers


Neurologic/Psychiatric:  alert, unresponsiveness


Lymphatic:  no neck adenopathy, no groin adenopathy


Musculoskeletal:  normal muscle bulk, no effusion





Laboratory Tests








Test


  10/23/19


02:35 10/23/19


05:30 10/23/19


06:17


 


Stool Occult Blood


  Negative


(NEGATIVE) 


  


 


 


Urine Eosinophils


  


  None seen


(NONE SEEN) 


 


 


White Blood Count


  


  


  19.3 K/UL


(4.8-10.8)  H


 


Red Blood Count


  


  


  3.17 M/UL


(4.20-5.40)  L


 


Hemoglobin


  


  


  8.7 G/DL


(12.0-16.0)  L


 


Hematocrit


  


  


  26.7 %


(37.0-47.0)  L


 


Mean Corpuscular Volume   84 FL (80-99)  


 


Mean Corpuscular Hemoglobin


  


  


  27.6 PG


(27.0-31.0)


 


Mean Corpuscular Hemoglobin


Concent 


  


  32.7 G/DL


(32.0-36.0)


 


Red Cell Distribution Width


  


  


  13.8 %


(11.6-14.8)


 


Platelet Count


  


  


  440 K/UL


(150-450)


 


Mean Platelet Volume


  


  


  6.9 FL


(6.5-10.1)


 


Neutrophils (%) (Auto)


  


  


  % (45.0-75.0)


 


 


Lymphocytes (%) (Auto)


  


  


  % (20.0-45.0)


 


 


Monocytes (%) (Auto)    % (1.0-10.0)  


 


Eosinophils (%) (Auto)    % (0.0-3.0)  


 


Basophils (%) (Auto)    % (0.0-2.0)  


 


Differential Total Cells


Counted 


  


  100  


 


 


Neutrophils % (Manual)   70 % (45-75)  


 


Lymphocytes % (Manual)   13 % (20-45)  L


 


Monocytes % (Manual)   12 % (1-10)  H


 


Eosinophils % (Manual)   4 % (0-3)  H


 


Basophils % (Manual)   1 % (0-2)  


 


Band Neutrophils   0 % (0-8)  


 


Platelet Estimate   Adequate  


 


Platelet Morphology   Normal  


 


Sodium Level


  


  


  140 MMOL/L


(136-145)


 


Potassium Level


  


  


  4.0 MMOL/L


(3.5-5.1)


 


Chloride Level


  


  


  106 MMOL/L


()


 


Carbon Dioxide Level


  


  


  22 MMOL/L


(21-32)


 


Anion Gap


  


  


  12 mmol/L


(5-15)


 


Blood Urea Nitrogen


  


  


  29 mg/dL


(7-18)  H


 


Creatinine


  


  


  2.2 MG/DL


(0.55-1.30)  H


 


Estimat Glomerular Filtration


Rate 


  


  22.3 mL/min


(>60)


 


Glucose Level


  


  


  91 MG/DL


()


 


Calcium Level


  


  


  8.7 MG/DL


(8.5-10.1)











Current Medications








 Medications


  (Trade)  Dose


 Ordered  Sig/Winnie


 Route


 PRN Reason  Start Time


 Stop Time Status Last Admin


Dose Admin


 


 Acetaminophen


  (Tylenol)  650 mg  PRN  PRN


 RECTAL


 TEMP>100.5  10/14/19 03:00


     


 


 


 Aspirin


  (ASA)  81 mg  DAILY


 ORAL


   10/15/19 09:00


 11/14/19 08:59  10/23/19 09:33


 


 


 Atorvastatin


 Calcium


  (Lipitor)  10 mg  BEDTIME


 ORAL


   10/14/19 21:00


 11/13/19 20:59  10/22/19 21:35


 


 


 Barium Sulfate


  (Varibar Honey)  250 ml  NOW  PRN


 MC


 RAD  10/23/19 12:00


 10/26/19 11:54   


 


 


 Barium Sulfate


  (Varibar Nectar)  240 ml  NOW  PRN


 MC


 RAD  10/23/19 12:00


 10/26/19 11:54   


 


 


 Barium Sulfate


  (Varibar Pudding)  230 ml  NOW  PRN


 MC


 RAD  10/23/19 12:00


 10/26/19 11:54   


 


 


 Clopidogrel


 Bisulfate


  (Plavix)  75 mg  DAILY


 ORAL


   10/15/19 09:00


 11/14/19 08:59  10/23/19 09:32


 


 


 Dextrose


  (Dextrose 50%)  25 ml  Q30M  PRN


 IV


 Hypoglycemia  10/14/19 07:00


 11/13/19 06:59   


 


 


 Dextrose


  (Dextrose 50%)  50 ml  Q30M  PRN


 IV


 Hypoglycemia  10/14/19 07:00


 11/13/19 06:59   


 


 


 Dextrose/Sodium


 Chloride  1,000 ml @ 


 60 mls/hr  [id number]


 IV


   10/23/19 10:25


 11/22/19 10:24  10/23/19 11:42


 


 


 Divalproex Sodium


  (Depakote


 Sprinkles)  500 mg  Q12HR


 ORAL


   10/15/19 22:00


 11/14/19 21:59  10/23/19 09:33


 


 


 Docusate Sodium


  (Colace)  100 mg  THREE TIMES A  DAY


 ORAL


   10/14/19 13:00


 11/13/19 12:59  10/23/19 13:57


 


 


 Donepezil HCl


  (Aricept)  5 mg  DAILY


 ORAL


   10/15/19 11:00


 11/14/19 10:59  10/23/19 09:32


 


 


 Enoxaparin Sodium


  (Lovenox)  30 mg  DAILY


 SUBQ


   10/14/19 09:00


 11/13/19 08:59  10/23/19 09:36


 


 


 Insulin Aspart


  (NovoLOG)    BEFORE MEALS AND  HS


 SUBQ


   10/14/19 11:30


 11/13/19 11:29  10/23/19 12:34


 


 


 Insulin Detemir


  (Levemir)  12 units  DAILY


 SUBQ


   10/23/19 09:00


 11/13/19 10:29  10/23/19 09:45


 


 


 Lactulose


  (Cephulac)  20 gm  TIDPRN  PRN


 ORAL


 Constipation  10/16/19 14:45


 11/15/19 14:44  10/16/19 15:21


 


 


 Lorazepam


  (Ativan 2mg/ml


 1ml)  1 mg  Q8HR  PRN


 IV


 For Anxiety  10/17/19 19:15


 10/24/19 19:14  10/18/19 01:12


 


 


 Meropenem 1 gm/


 Sodium Chloride  55 ml @ 


 110 mls/hr  Q12H


 IVPB


   10/16/19 14:00


 10/30/19 23:59  10/23/19 14:34


 


 


 Pantoprazole


  (Protonix)  40 mg  BID


 ORAL


   10/14/19 18:00


 11/13/19 08:59  10/23/19 09:33


 

















Amie Burgos M.D. Oct 23, 2019 16:14

## 2019-10-24 VITALS — DIASTOLIC BLOOD PRESSURE: 57 MMHG | SYSTOLIC BLOOD PRESSURE: 140 MMHG

## 2019-10-24 VITALS — DIASTOLIC BLOOD PRESSURE: 49 MMHG | SYSTOLIC BLOOD PRESSURE: 121 MMHG

## 2019-10-24 VITALS — SYSTOLIC BLOOD PRESSURE: 142 MMHG | DIASTOLIC BLOOD PRESSURE: 79 MMHG

## 2019-10-24 VITALS — SYSTOLIC BLOOD PRESSURE: 140 MMHG | DIASTOLIC BLOOD PRESSURE: 57 MMHG

## 2019-10-24 VITALS — SYSTOLIC BLOOD PRESSURE: 131 MMHG | DIASTOLIC BLOOD PRESSURE: 89 MMHG

## 2019-10-24 VITALS — DIASTOLIC BLOOD PRESSURE: 56 MMHG | SYSTOLIC BLOOD PRESSURE: 136 MMHG

## 2019-10-24 VITALS — SYSTOLIC BLOOD PRESSURE: 134 MMHG | DIASTOLIC BLOOD PRESSURE: 51 MMHG

## 2019-10-24 VITALS — SYSTOLIC BLOOD PRESSURE: 124 MMHG | DIASTOLIC BLOOD PRESSURE: 49 MMHG

## 2019-10-24 VITALS — SYSTOLIC BLOOD PRESSURE: 115 MMHG | DIASTOLIC BLOOD PRESSURE: 47 MMHG

## 2019-10-24 VITALS — DIASTOLIC BLOOD PRESSURE: 60 MMHG | SYSTOLIC BLOOD PRESSURE: 127 MMHG

## 2019-10-24 VITALS — DIASTOLIC BLOOD PRESSURE: 52 MMHG | SYSTOLIC BLOOD PRESSURE: 141 MMHG

## 2019-10-24 VITALS — DIASTOLIC BLOOD PRESSURE: 52 MMHG | SYSTOLIC BLOOD PRESSURE: 133 MMHG

## 2019-10-24 VITALS — DIASTOLIC BLOOD PRESSURE: 42 MMHG | SYSTOLIC BLOOD PRESSURE: 133 MMHG

## 2019-10-24 VITALS — DIASTOLIC BLOOD PRESSURE: 71 MMHG | SYSTOLIC BLOOD PRESSURE: 139 MMHG

## 2019-10-24 VITALS — SYSTOLIC BLOOD PRESSURE: 123 MMHG | DIASTOLIC BLOOD PRESSURE: 49 MMHG

## 2019-10-24 VITALS — DIASTOLIC BLOOD PRESSURE: 58 MMHG | SYSTOLIC BLOOD PRESSURE: 134 MMHG

## 2019-10-24 VITALS — DIASTOLIC BLOOD PRESSURE: 71 MMHG | SYSTOLIC BLOOD PRESSURE: 138 MMHG

## 2019-10-24 VITALS — DIASTOLIC BLOOD PRESSURE: 49 MMHG | SYSTOLIC BLOOD PRESSURE: 124 MMHG

## 2019-10-24 VITALS — DIASTOLIC BLOOD PRESSURE: 55 MMHG | SYSTOLIC BLOOD PRESSURE: 117 MMHG

## 2019-10-24 VITALS — DIASTOLIC BLOOD PRESSURE: 43 MMHG | SYSTOLIC BLOOD PRESSURE: 121 MMHG

## 2019-10-24 VITALS — DIASTOLIC BLOOD PRESSURE: 53 MMHG | SYSTOLIC BLOOD PRESSURE: 144 MMHG

## 2019-10-24 VITALS — DIASTOLIC BLOOD PRESSURE: 54 MMHG | SYSTOLIC BLOOD PRESSURE: 130 MMHG

## 2019-10-24 VITALS — DIASTOLIC BLOOD PRESSURE: 58 MMHG | SYSTOLIC BLOOD PRESSURE: 150 MMHG

## 2019-10-24 LAB
ADD MANUAL DIFF: YES
ALBUMIN SERPL-MCNC: 1.6 G/DL (ref 3.4–5)
ALBUMIN/GLOB SERPL: 0.4 {RATIO} (ref 1–2.7)
ALP SERPL-CCNC: 87 U/L (ref 46–116)
ALT SERPL-CCNC: 18 U/L (ref 12–78)
ANION GAP SERPL CALC-SCNC: 12 MMOL/L (ref 5–15)
AST SERPL-CCNC: 17 U/L (ref 15–37)
BILIRUB SERPL-MCNC: 0.4 MG/DL (ref 0.2–1)
BUN SERPL-MCNC: 22 MG/DL (ref 7–18)
CALCIUM SERPL-MCNC: 8.6 MG/DL (ref 8.5–10.1)
CHLORIDE SERPL-SCNC: 106 MMOL/L (ref 98–107)
CO2 SERPL-SCNC: 22 MMOL/L (ref 21–32)
CREAT SERPL-MCNC: 1.6 MG/DL (ref 0.55–1.3)
ERYTHROCYTE [DISTWIDTH] IN BLOOD BY AUTOMATED COUNT: 13.4 % (ref 11.6–14.8)
GLOBULIN SER-MCNC: 4.4 G/DL
HCT VFR BLD CALC: 24.4 % (ref 37–47)
HGB BLD-MCNC: 8.2 G/DL (ref 12–16)
MCV RBC AUTO: 83 FL (ref 80–99)
PHOSPHATE SERPL-MCNC: 4.4 MG/DL (ref 2.5–4.9)
PLATELET # BLD: 421 K/UL (ref 150–450)
POTASSIUM SERPL-SCNC: 3.6 MMOL/L (ref 3.5–5.1)
RBC # BLD AUTO: 2.94 M/UL (ref 4.2–5.4)
SODIUM SERPL-SCNC: 140 MMOL/L (ref 136–145)
WBC # BLD AUTO: 24 K/UL (ref 4.8–10.8)

## 2019-10-24 RX ADMIN — DOCUSATE SODIUM SCH MG: 100 CAPSULE, LIQUID FILLED ORAL at 13:00

## 2019-10-24 RX ADMIN — INSULIN ASPART SCH UNITS: 100 INJECTION, SOLUTION INTRAVENOUS; SUBCUTANEOUS at 16:30

## 2019-10-24 RX ADMIN — ASPIRIN 81 MG SCH MG: 81 TABLET ORAL at 08:44

## 2019-10-24 RX ADMIN — INSULIN ASPART SCH UNITS: 100 INJECTION, SOLUTION INTRAVENOUS; SUBCUTANEOUS at 11:30

## 2019-10-24 RX ADMIN — MEROPENEM SCH MLS/HR: 1 INJECTION INTRAVENOUS at 14:20

## 2019-10-24 RX ADMIN — DEXTROSE AND SODIUM CHLORIDE SCH MLS/HR: 5; .45 INJECTION, SOLUTION INTRAVENOUS at 03:45

## 2019-10-24 RX ADMIN — DEXTROSE AND SODIUM CHLORIDE SCH MLS/HR: 5; .45 INJECTION, SOLUTION INTRAVENOUS at 16:50

## 2019-10-24 RX ADMIN — DIVALPROEX SODIUM SCH MG: 125 CAPSULE ORAL at 08:45

## 2019-10-24 RX ADMIN — DONEPEZIL HYDROCHLORIDE SCH MG: 5 TABLET, FILM COATED ORAL at 08:44

## 2019-10-24 RX ADMIN — MEROPENEM SCH MLS/HR: 1 INJECTION INTRAVENOUS at 01:11

## 2019-10-24 RX ADMIN — DIVALPROEX SODIUM SCH MG: 125 CAPSULE ORAL at 20:30

## 2019-10-24 RX ADMIN — INSULIN ASPART SCH UNITS: 100 INJECTION, SOLUTION INTRAVENOUS; SUBCUTANEOUS at 20:29

## 2019-10-24 RX ADMIN — DOCUSATE SODIUM SCH MG: 100 CAPSULE, LIQUID FILLED ORAL at 17:01

## 2019-10-24 RX ADMIN — INSULIN ASPART SCH UNITS: 100 INJECTION, SOLUTION INTRAVENOUS; SUBCUTANEOUS at 06:29

## 2019-10-24 RX ADMIN — DOCUSATE SODIUM SCH MG: 100 CAPSULE, LIQUID FILLED ORAL at 08:44

## 2019-10-24 RX ADMIN — INSULIN DETEMIR SCH UNITS: 100 INJECTION, SOLUTION SUBCUTANEOUS at 09:09

## 2019-10-24 RX ADMIN — ENOXAPARIN SODIUM SCH MG: 30 INJECTION SUBCUTANEOUS at 08:46

## 2019-10-24 NOTE — GENERAL PROGRESS NOTE
Assessment/Plan


Status:  stable


Assessment/Plan:


S: Not verbal





O: poor historian, in mild respiratory failure . IV sites are intact. Charles in 

place





PHYSICAL EXAMINATION:HEAD AND NECK:  Atraumatic and normocephalic.


CHEST:  Bronchial breathing sounds.ABDOMEN:  Soft.  No organomegaly.


MUSCULOSKELETAL:  Diffuse 1+ or 2+ nonpitting edema in all four contracted


extremities.  The patient is not following the commands.  Limited


evaluation.NEUROLOGY:  The patient is awake.  Not alert.  Not verbally


communicative.





LABORATORY DATA:  Labs dated October 16, 2019  reviewed





Meds: Reviewed and reconciled in the chart , including ASA and plavix 





Imaging:  CT abd reviewed 





ASSESSMENT AND PLAN:


1. Sepsis.  Differential diagnosis is healthcare-associated pneumonia or


healthcare-associated urinary tract infection.  Work in progress.


2. Acute hypoxemic respiratory failure


3. Acute chf exacerbation - Diastolic


4. Chronic encephalomalacia.


3. Acute on chronic anemia.


4. Renal failure, age indeterminate.


6. Hyperthyroidism.


7. Hyperkalemia.


8. Diabetes type 2, uncontrolled with A1c of 10.


9. Psychiatric disorder.


10. GI and DVT prophylaxis.


11. PAH- Severe 





PLAN OF CARE:


On diuretic


current ICU mgt and empirical abx treatment 


post Blood transfusion


Improving leukocytosis and  renal failure





Subjective


Allergies:  


Coded Allergies:  


     No Known Allergies (Unverified , 10/14/19)





Objective





Last 24 Hour Vital Signs








  Date Time  Temp Pulse Resp B/P (MAP) Pulse Ox O2 Delivery O2 Flow Rate FiO2


 


10/24/19 09:00  71 16 124/49 (74) 100   


 


10/24/19 08:00      Nasal Cannula 2.0 


 


10/24/19 08:00  72      


 


10/24/19 08:00 97.5 68 20 121/43 (69) 99   


 


10/24/19 07:00  74 19 124/49 (74) 99   


 


10/24/19 06:00  76 18 117/55 (75) 98   


 


10/24/19 05:00  75 18 121/49 (73) 97   


 


10/24/19 04:00  86      


 


10/24/19 04:00      Nasal Cannula 2.0 


 


10/24/19 04:00  89 24 133/42 (72) 100   


 


10/24/19 03:51  92      


 


10/24/19 03:30 98.5 106 33 136/56 (82) 100   


 


10/24/19 03:15  106 33 139/71 (93) 98   


 


10/24/19 03:03  120      


 


10/24/19 00:35 97.9 90 22 142/79 (100) 97   


 


10/24/19 00:34  93      


 


10/23/19 20:39      Nasal Cannula 2.0 


 


10/23/19 20:39 99.2 91 20 163/79 (107) 95   


 


10/23/19 20:00  93      


 


10/23/19 19:58     98 Nasal Cannula 2.0 28


 


10/23/19 16:00 98.1 64 22 150/72 (98) 98   


 


10/23/19 16:00  93      


 


10/23/19 12:00  89      


 


10/23/19 12:00 97.9 90 22 140/70 (93) 98   

















Intake and Output  


 


 10/23/19 10/24/19





 19:00 07:00


 


Intake Total  1215 ml


 


Output Total  2300 ml


 


Balance  -1085 ml


 


  


 


Intake Oral  800 ml


 


IV Total  415 ml


 


Output Urine Total  2300 ml


 


# Bowel Movements 1 4








Laboratory Tests


10/24/19 02:36: 


Arterial Blood pH 7.317L, Arterial Blood Partial Pressure CO2 39.1, Arterial 

Blood Partial Pressure O2 77.0, Arterial Blood HCO3 19.6L, Arterial Blood 

Oxygen Saturation 93.7L, Arterial Blood Base Excess -6.1L, Graham Test 


10/24/19 03:55: 


White Blood Count 24.0*H, Red Blood Count 2.94L, Hemoglobin 8.2L, Hematocrit 

24.4L, Mean Corpuscular Volume 83, Mean Corpuscular Hemoglobin 27.9, Mean 

Corpuscular Hemoglobin Concent 33.5, Red Cell Distribution Width 13.4, Platelet 

Count 421, Mean Platelet Volume 7.0, Neutrophils (%) (Auto) , Lymphocytes (%) (

Auto) , Monocytes (%) (Auto) , Eosinophils (%) (Auto) , Basophils (%) (Auto) , 

Differential Total Cells Counted 100, Neutrophils % (Manual) 89H, Lymphocytes % 

(Manual) 6L, Monocytes % (Manual) 4, Eosinophils % (Manual) 1, Basophils % (

Manual) 0, Band Neutrophils 0, Platelet Estimate Adequate, Platelet Morphology 

Normal, Hypochromasia 1+, Sodium Level 140, Potassium Level 3.6, Chloride Level 

106, Carbon Dioxide Level 22, Anion Gap 12, Blood Urea Nitrogen 22H, Creatinine 

1.6H, Estimat Glomerular Filtration Rate 32.3, Glucose Level 124H, Uric Acid 6.8

, Calcium Level 8.6, Phosphorus Level 4.4, Magnesium Level 1.7L, Total 

Bilirubin 0.4, Aspartate Amino Transf (AST/SGOT) 17, Alanine Aminotransferase (

ALT/SGPT) 18, Alkaline Phosphatase 87, C-Reactive Protein, Quantitative 8.6H, 

Pro-B-Type Natriuretic Peptide 94909H, Total Protein 6.0L, Albumin 1.6L, 

Globulin 4.4, Albumin/Globulin Ratio 0.4L


Height (Feet):  5


Height (Inches):  5.00


Weight (Pounds):  180











Garrick Keith MD Oct 24, 2019 09:51

## 2019-10-24 NOTE — NUR
NURSE NOTES:

Dr Vyas here to see pt.   recommends downgrading pt, will ask primary.  Will continue 
to monitor.

## 2019-10-24 NOTE — NUR
Social Work

This SW met with patient who is currently in the ICU; appears to be confused and requires 24 
hour care. This SW spoke with daughter, Aly Perez 2  who explains she and 
son, Speedy Alvarenga () are joint decision makers of patient. Daughter explains they 
are wanting full code, full treatment, but would not want long term intubation. Patient is a 
long term resident at [facility], while family are requesting a transfer back there when 
medically stable.  Daughter explains patient has a long history of mental health concerns 
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 
psychiatric facilities. According to daughter, daughter and son were not raised by patient 
due to this. Daughter resides in [place], while son lives in [place] and more aware of 
patients current circumstances. Daughter however, explains she will contact then nurses here 
and at the nursing to gain reports on patients progress.  No other needs or concerns present 
at this time.

## 2019-10-24 NOTE — NUR
CASE MANAGEMENT: REVIEW



10/24/19



SI: ASPIRATION PNA/ UTI / SEPSIS / ARF

97.5  68  20  121/43  99% NC 2L

BNP 49235;  WBC 24; RBC 2.94; H/H 8.2/24.4; BUN 22; CREA 1.6; C-REC PROT 8.6



IS:     

IV DEXTROSE/NACL @100/HR

IV MEROPENEM Q12HR

LACTULOSE PO TID/PRN

LOVENOX SQ QD

PLAVIX PO QD

PROTONIX PO BID

NOVOLOG SQ AC&HS

LEVEMIR SQ QD

STARLIX PO TIAC

DEPAKOTE PO Q12HR

OLANZAPINE PO QD

LIPITOR PO HS

ASA PO QD

COLACE PO TID

IV ATIVAN Q8HR/PRN

DONEPEZIL PO QD



**: TX ICU



DCP: RETURN TO [facility] WHEN MEDICALLY CLEARED 



RRT CALLED LAST NIGHT

## 2019-10-24 NOTE — NUR
NURSE NOTES:

RECEIVED PATIENT FROM [facility]/TELE NURSE VIA HOSPITAL BED. PATIENT LETHARGIC, NO RESPONSE TO 
NAME, WEAKLY RESPONSE TO TACTILE STIMULI, OPEN EYES, UNDETERMINED TO LIGHT, TACHYPNEA ON O2 
2LPM VIA NC, NO COUGH SIGN, O2 SATURATION OVER 95% NOTED, HEART RATE 120'S, ST NOTED, 
ABDOMEN SOFT, NON TENDER, NO N/V NOTED, NORMAL BOWEL MOVEMENT STATUS, F/C INTACT AND PATENT, 
YELLOW URINE OUTED, PERIPHERAL LINE TO RIGHT FA 22G AND LEFT WRIST AREA 24G, INTACT AND 
PATENT, ON IV FLUID D5W 1/2NS AT 60ML/HR, MADE LOWER BED POSITION, ON BED ALARM AND LOCKED, 
PROVIDED CALL LIGHT WITHIN REACH, TAKEN WOUND PHOTO PER HOSPITAL PROTOCOLS, WILL CONTINUE TO 
MONITOR.

## 2019-10-24 NOTE — NUR
NURSE NOTES:

Dr Burgos here to see pt.  Dr ordered indium scan for pelvic mass and elevated WBC.  Nuc med 
called and they said indium scan process needs to start in the am and takes 2 days to 
process.  Nuc Med also said no consent needs to be signed.  RN to follow up scan in am.  Dr Blas also here to see pt, no new orders.  No acute distress.  Pt able to wake up when 
Dr came to exam but still lethargic.  Will continue to monitor.

## 2019-10-24 NOTE — NUR
NURSE NOTES:

Report received from Bartolo RN.  Pt lethargic, opens eyes at times, nonverbal and doesnt 
follow commands.  Pt SR on cardiac monitor.  Pt on 2L O2 saturating 99%.  Charles noted and 
intact draining clear, yellow urine to gravity.  Safety measures in place with bed locked 
and in lowest position, side rails x3 up and bed alarm on.  Will continue to monitor and 
continue plan of care.

## 2019-10-24 NOTE — SURGERY PROGRESS NOTE
Surgery Progress Note


Subjective


Additional Comments


respiratory congested


leukocytosis


transferred to ICU for monitoring


exam stable


pelvic mass





Objective





Last 24 Hour Vital Signs








  Date Time  Temp Pulse Resp B/P (MAP) Pulse Ox O2 Delivery O2 Flow Rate FiO2


 


10/24/19 17:00  78 20 140/57 (84) 100   


 


10/24/19 16:00 97.5 75 20 144/53 (83) 100   


 


10/24/19 16:00  64      


 


10/24/19 15:00  73 17 141/52 (81) 100   


 


10/24/19 14:00  76 20 138/71 (93) 93   


 


10/24/19 13:00  64 20 115/47 (69) 100   


 


10/24/19 12:00  68      


 


10/24/19 12:00 97.7 70 17 123/49 (73) 100   


 


10/24/19 11:00  75 20 133/52 (79) 99   


 


10/24/19 10:00  81 18 150/58 (88) 100   


 


10/24/19 09:00  71 16 124/49 (74) 100   


 


10/24/19 08:00      Nasal Cannula 2.0 


 


10/24/19 08:00  72      


 


10/24/19 08:00 97.5 68 20 121/43 (69) 99   


 


10/24/19 08:00  66 17  100 Nasal Cannula 2.0 28


 


10/24/19 08:00     100 Nasal Cannula 2.0 28


 


10/24/19 07:00  74 19 124/49 (74) 99   


 


10/24/19 06:00  76 18 117/55 (75) 98   


 


10/24/19 05:00  75 18 121/49 (73) 97   


 


10/24/19 04:00  86      


 


10/24/19 04:00      Nasal Cannula 2.0 


 


10/24/19 04:00  89 24 133/42 (72) 100   


 


10/24/19 03:51  92      


 


10/24/19 03:30 98.5 106 33 136/56 (82) 100   


 


10/24/19 03:15  106 33 139/71 (93) 98   


 


10/24/19 03:03  120      


 


10/24/19 00:35 97.9 90 22 142/79 (100) 97   


 


10/24/19 00:34  93      


 


10/23/19 20:39      Nasal Cannula 2.0 


 


10/23/19 20:39 99.2 91 20 163/79 (107) 95   


 


10/23/19 20:00  93      


 


10/23/19 19:58     98 Nasal Cannula 2.0 28








I&O











Intake and Output  


 


 10/23/19 10/24/19





 18:59 06:59


 


Intake Total  1215 ml


 


Output Total  2100 ml


 


Balance  -885 ml


 


  


 


Intake Oral  800 ml


 


IV Total  415 ml


 


Output Urine Total  2100 ml


 


# Bowel Movements 1 4








Cardiovascular:  RSR


Respiratory:  decreased breath sounds


Abdomen:  soft, present bowel sounds, other


Extremities:  edema, no tenderness, no cyanosis





Laboratory Tests








Test


  10/24/19


02:36 10/24/19


03:55


 


Arterial Blood pH


  7.317


(7.350-7.450) 


 


 


Arterial Blood Partial


Pressure CO2 39.1 mmHg


(35.0-45.0) 


 


 


Arterial Blood Partial


Pressure O2 77.0 mmHg


(75.0-100.0) 


 


 


Arterial Blood HCO3


  19.6 mmol/L


(22.0-26.0)  L 


 


 


Arterial Blood Oxygen


Saturation 93.7 %


()  L 


 


 


Arterial Blood Base Excess -6.1 (-2-2)  L 


 


Graham Test    


 


White Blood Count


  


  24.0 K/UL


(4.8-10.8)  *H


 


Red Blood Count


  


  2.94 M/UL


(4.20-5.40)  L


 


Hemoglobin


  


  8.2 G/DL


(12.0-16.0)  L


 


Hematocrit


  


  24.4 %


(37.0-47.0)  L


 


Mean Corpuscular Volume  83 FL (80-99)  


 


Mean Corpuscular Hemoglobin


  


  27.9 PG


(27.0-31.0)


 


Mean Corpuscular Hemoglobin


Concent 


  33.5 G/DL


(32.0-36.0)


 


Red Cell Distribution Width


  


  13.4 %


(11.6-14.8)


 


Platelet Count


  


  421 K/UL


(150-450)


 


Mean Platelet Volume


  


  7.0 FL


(6.5-10.1)


 


Neutrophils (%) (Auto)


  


  % (45.0-75.0)


 


 


Lymphocytes (%) (Auto)


  


  % (20.0-45.0)


 


 


Monocytes (%) (Auto)   % (1.0-10.0)  


 


Eosinophils (%) (Auto)   % (0.0-3.0)  


 


Basophils (%) (Auto)   % (0.0-2.0)  


 


Differential Total Cells


Counted 


  100  


 


 


Neutrophils % (Manual)  89 % (45-75)  H


 


Lymphocytes % (Manual)  6 % (20-45)  L


 


Monocytes % (Manual)  4 % (1-10)  


 


Eosinophils % (Manual)  1 % (0-3)  


 


Basophils % (Manual)  0 % (0-2)  


 


Band Neutrophils  0 % (0-8)  


 


Platelet Estimate  Adequate  


 


Platelet Morphology  Normal  


 


Hypochromasia  1+  


 


Sodium Level


  


  140 MMOL/L


(136-145)


 


Potassium Level


  


  3.6 MMOL/L


(3.5-5.1)


 


Chloride Level


  


  106 MMOL/L


()


 


Carbon Dioxide Level


  


  22 MMOL/L


(21-32)


 


Anion Gap


  


  12 mmol/L


(5-15)


 


Blood Urea Nitrogen


  


  22 mg/dL


(7-18)  H


 


Creatinine


  


  1.6 MG/DL


(0.55-1.30)  H


 


Estimat Glomerular Filtration


Rate 


  32.3 mL/min


(>60)


 


Glucose Level


  


  124 MG/DL


()  H


 


Uric Acid


  


  6.8 MG/DL


(2.6-7.2)


 


Calcium Level


  


  8.6 MG/DL


(8.5-10.1)


 


Phosphorus Level


  


  4.4 MG/DL


(2.5-4.9)


 


Magnesium Level


  


  1.7 MG/DL


(1.8-2.4)  L


 


Total Bilirubin


  


  0.4 MG/DL


(0.2-1.0)


 


Aspartate Amino Transf


(AST/SGOT) 


  17 U/L (15-37)


 


 


Alanine Aminotransferase


(ALT/SGPT) 


  18 U/L (12-78)


 


 


Alkaline Phosphatase


  


  87 U/L


()


 


C-Reactive Protein,


Quantitative 


  8.6 mg/dL


(0.00-0.90)  H


 


Pro-B-Type Natriuretic Peptide


  


  94470 pg/mL


(0-125)  H


 


Total Protein


  


  6.0 G/DL


(6.4-8.2)  L


 


Albumin


  


  1.6 G/DL


(3.4-5.0)  L


 


Globulin  4.4 g/dL  


 


Albumin/Globulin Ratio


  


  0.4 (1.0-2.7)


L











Plan


Problems:  


(1) Pressure injury of deep tissue


Assessment & Plan:  Pt presented on admission with DTPI R heel. Blood filled 

blister with marginal erythema noted to heel. Pt exhibited agitation when 

minimally palpated. (L)2.2cm x (W)2.1cm


L heel is blanchable .


Non-blanchable erythema without induration noted to sacral cleft. 


No other areas of skin concerns noted.





Tx.Plan:


Apply Betadine to R heel. Cover with Optifoam drsg. Change every 3 days and prn.


           


Apply Cavilon Skin Barrier to L heel. Cover with Optifoam drsg. Change every 7 

days and prn.


           


Apply Moisture Barrier Paste to buttocks. Cover sacral area with Optifoam drsg. 

Change every 3 days and prn.


           


Reposition at least every 2hours or as tolerated.


           


Off-load heels with pillow.





(2) Sepsis


Assessment & Plan:  Patient with low-grade fevers, anemia, leukocytosis 

persistent, elevated platelets, abnormal labs.


Etiology currently unknown and work-up in progress.  Labs noted and imaging 

that is available as noted.  


Okay for diet


CT noted


US noted


Impression: 13 x 7 x 12.9 cm unilocular cystic mass, corresponding to lesion 

reported


on recent CT scan. Layering low level internal echoes likely represent bladder


debris.. This presumably represents a cystic ovarian lesion with debris or


hemorrhage, possibly neoplastic. Gynecological consultation is recommended


Antibiotics as per infectious disease 


local wound care as wounds do not seem to be the etiology of her sepsis


We will monitor and follow with recommendations


Thank you for allowing me to participate in patient's care














Radhames Blas Oct 24, 2019 17:50

## 2019-10-24 NOTE — NUR
NURSE NOTES:

Patient asks for water; nectar thick liquids given by teaspoons. Cleaned and repositioned. 
All needs are met. Patient is comfortable and stable. Bed on locked, low and alarm is 
activated.

## 2019-10-24 NOTE — NUR
NURSE NOTES:

Vital signs are stable. Gag reflex and swallowing assessed; patient able to swallow small 
teaspoons of nectar thick liquids. Patient is able to voice "water", "more" and "that's 
enough". PO meds are given. Patient is stable.

## 2019-10-24 NOTE — NUR
*-* INSURANCE *-*



UPDATED CLINICALS AND REVIEWS HAVE BEEN FAXED TO:



Tracking#[id number]

CM: Lance

#[phone]

Fax#[phone]

## 2019-10-24 NOTE — NUR
NURSE NOTES:

Pt resting comfortably. Awaiting tele bed.  No acute distress.  VSS.  Will continue to 
monitor.

## 2019-10-24 NOTE — DIAGNOSTIC IMAGING REPORT
Indication: Shortness of breath

 

Technique: One view of the chest

 

Comparison: And 20/3/2019

 

Findings: Bilateral pleural effusions and bilateral interstitial congestion persist.

Heart remains enlarged. Findings are unchanged

 

Impression:  Unchanged, over one day, findings as above.

 

This agrees with the preliminary interpretation provided overnight by [facility]

teleradiology service.

## 2019-10-24 NOTE — NEPHROLOGY PROGRESS NOTE
Assessment/Plan


Problem List:  


(1) Renal failure (ARF), acute on chronic


(2) Dehydration


(3) ARF (acute renal failure)


(4) Sepsis


(5) Acute metabolic encephalopathy


(6) Hyperglycemia due to type 2 diabetes mellitus


(7) UTI (urinary tract infection)


(8) Diabetic nephropathy


Assessment





Renal failure, Acute on Chronic resolved


Dehydration


Severe Anemia


Sepsis / Pneumonia / UTI


DM, OOC


Proteinuria , Diabetic Nephropathy


Acute encephalopathy


Plan





in ICu , unclear why





Cr lowering 


Will order urine studies and monitor renal parameters


kidney YOANDY noted


lower iv fluids rate


WBCs rising


has casas again due to retention


Avoid Nephrotoxics








previously


Anemia salas


2D echo


24 H urine protein


Keep BP and BS in check


I am holding  her psych meds due to low MS


Per orders





Subjective


ROS Limited/Unobtainable:  No


Constitutional:  Reports: malaise





Objective


Objective





Last 24 Hour Vital Signs








  Date Time  Temp Pulse Resp B/P (MAP) Pulse Ox O2 Delivery O2 Flow Rate FiO2


 


10/24/19 11:00  75 20 133/52 (79) 99   


 


10/24/19 10:00  81 18 150/58 (88) 100   


 


10/24/19 09:00  71 16 124/49 (74) 100   


 


10/24/19 08:00      Nasal Cannula 2.0 


 


10/24/19 08:00  72      


 


10/24/19 08:00 97.5 68 20 121/43 (69) 99   


 


10/24/19 08:00  66 17  100 Nasal Cannula 2.0 28


 


10/24/19 08:00     100 Nasal Cannula 2.0 28


 


10/24/19 07:00  74 19 124/49 (74) 99   


 


10/24/19 06:00  76 18 117/55 (75) 98   


 


10/24/19 05:00  75 18 121/49 (73) 97   


 


10/24/19 04:00  86      


 


10/24/19 04:00      Nasal Cannula 2.0 


 


10/24/19 04:00  89 24 133/42 (72) 100   


 


10/24/19 03:51  92      


 


10/24/19 03:30 98.5 106 33 136/56 (82) 100   


 


10/24/19 03:15  106 33 139/71 (93) 98   


 


10/24/19 03:03  120      


 


10/24/19 00:35 97.9 90 22 142/79 (100) 97   


 


10/24/19 00:34  93      


 


10/23/19 20:39      Nasal Cannula 2.0 


 


10/23/19 20:39 99.2 91 20 163/79 (107) 95   


 


10/23/19 20:00  93      


 


10/23/19 19:58     98 Nasal Cannula 2.0 28


 


10/23/19 16:00 98.1 64 22 150/72 (98) 98   


 


10/23/19 16:00  93      


 


10/23/19 12:00  89      


 


10/23/19 12:00 97.9 90 22 140/70 (93) 98   

















Intake and Output  


 


 10/23/19 10/24/19





 19:00 07:00


 


Intake Total  1215 ml


 


Output Total  2300 ml


 


Balance  -1085 ml


 


  


 


Intake Oral  800 ml


 


IV Total  415 ml


 


Output Urine Total  2300 ml


 


# Bowel Movements 1 4








Laboratory Tests


10/24/19 02:36: 


Arterial Blood pH 7.317L, Arterial Blood Partial Pressure CO2 39.1, Arterial 

Blood Partial Pressure O2 77.0, Arterial Blood HCO3 19.6L, Arterial Blood 

Oxygen Saturation 93.7L, Arterial Blood Base Excess -6.1L, Graham Test 


10/24/19 03:55: 


White Blood Count 24.0*H, Red Blood Count 2.94L, Hemoglobin 8.2L, Hematocrit 

24.4L, Mean Corpuscular Volume 83, Mean Corpuscular Hemoglobin 27.9, Mean 

Corpuscular Hemoglobin Concent 33.5, Red Cell Distribution Width 13.4, Platelet 

Count 421, Mean Platelet Volume 7.0, Neutrophils (%) (Auto) , Lymphocytes (%) (

Auto) , Monocytes (%) (Auto) , Eosinophils (%) (Auto) , Basophils (%) (Auto) , 

Differential Total Cells Counted 100, Neutrophils % (Manual) 89H, Lymphocytes % 

(Manual) 6L, Monocytes % (Manual) 4, Eosinophils % (Manual) 1, Basophils % (

Manual) 0, Band Neutrophils 0, Platelet Estimate Adequate, Platelet Morphology 

Normal, Hypochromasia 1+, Sodium Level 140, Potassium Level 3.6, Chloride Level 

106, Carbon Dioxide Level 22, Anion Gap 12, Blood Urea Nitrogen 22H, Creatinine 

1.6H, Estimat Glomerular Filtration Rate 32.3, Glucose Level 124H, Uric Acid 6.8

, Calcium Level 8.6, Phosphorus Level 4.4, Magnesium Level 1.7L, Total 

Bilirubin 0.4, Aspartate Amino Transf (AST/SGOT) 17, Alanine Aminotransferase (

ALT/SGPT) 18, Alkaline Phosphatase 87, C-Reactive Protein, Quantitative 8.6H, 

Pro-B-Type Natriuretic Peptide 11381S, Total Protein 6.0L, Albumin 1.6L, 

Globulin 4.4, Albumin/Globulin Ratio 0.4L


Height (Feet):  5


Height (Inches):  5.00


Weight (Pounds):  180


General Appearance:  no apparent distress


Cardiovascular:  normal rate


Respiratory/Chest:  decreased breath sounds


Abdomen:  distended


Objective


no change











Lukasz Vizcaino MD Oct 24, 2019 11:46

## 2019-10-24 NOTE — GENERAL PROGRESS NOTE
Assessment/Plan


Problem List:  


(1) Abnormal TSH


ICD Codes:  R79.89 - Other specified abnormal findings of blood chemistry


SNOMED:  541759365


(2) Hyperglycemia due to type 2 diabetes mellitus


ICD Codes:  E11.65 - Type 2 diabetes mellitus with hyperglycemia


SNOMED:  031922542381679, 80240307


Qualifiers:  


   Qualified Codes:  E11.65 - Type 2 diabetes mellitus with hyperglycemia; 

Z79.4 - Long term (current) use of insulin


(3) Acute metabolic encephalopathy


ICD Codes:  G93.41 - Metabolic encephalopathy


SNOMED:  25126843, 420928890


(4) ARF (acute renal failure)


ICD Codes:  N17.9 - Acute kidney failure, unspecified


SNOMED:  27890164


Qualifiers:  


   Qualified Codes:  N17.9 - Acute kidney failure, unspecified


(5) Healthcare associated bacterial pneumonia


ICD Codes:  J15.9 - Unspecified bacterial pneumonia


SNOMED:  591982304


Status:  stable


Assessment/Plan:


continue Levemir 12 units qam 


continue NISS





Subjective


Allergies:  


Coded Allergies:  


     No Known Allergies (Unverified , 10/14/19)


Subjective


glucose values are stable 











Item Value  Date Time


 


Bedside Blood Glucose 116 mg/dl 10/24/19 0545


 


Bedside Blood Glucose 96 mg/dl 10/23/19 2100


 


Bedside Blood Glucose 89 mg/dl 10/23/19 1630


 


Bedside Blood Glucose 146 mg/dl H 10/23/19 1234


 


Bedside Blood Glucose 122 mg/dl H 10/23/19 0945


 


Bedside Blood Glucose 109 mg/dl 10/23/19 0630











Objective





Last 24 Hour Vital Signs








  Date Time  Temp Pulse Resp B/P (MAP) Pulse Ox O2 Delivery O2 Flow Rate FiO2


 


10/24/19 06:00  76 18 117/55 (75) 98   


 


10/24/19 05:00  75 18 121/49 (73) 97   


 


10/24/19 04:00  86      


 


10/24/19 04:00      Nasal Cannula 2.0 


 


10/24/19 04:00  89 24 133/42 (72) 100   


 


10/24/19 03:51  92      


 


10/24/19 03:30 98.5 106 33 136/56 (82) 100   


 


10/24/19 03:15  106 33 139/71 (93) 98   


 


10/24/19 03:03  120      


 


10/24/19 00:35 97.9 90 22 142/79 (100) 97   


 


10/24/19 00:34  93      


 


10/23/19 20:39      Nasal Cannula 2.0 


 


10/23/19 20:39 99.2 91 20 163/79 (107) 95   


 


10/23/19 20:00  93      


 


10/23/19 19:58     98 Nasal Cannula 2.0 28


 


10/23/19 16:00 98.1 64 22 150/72 (98) 98   


 


10/23/19 16:00  93      


 


10/23/19 12:00  89      


 


10/23/19 12:00 97.9 90 22 140/70 (93) 98   


 


10/23/19 09:00      Nasal Cannula 2.0 


 


10/23/19 08:00 98.3 87 22 153/72 (99) 97   


 


10/23/19 08:00  82      

















Intake and Output  


 


 10/23/19 10/24/19





 19:00 07:00


 


Intake Total  1215 ml


 


Output Total  1850 ml


 


Balance  -635 ml


 


  


 


Intake Oral  800 ml


 


IV Total  415 ml


 


Output Urine Total  1850 ml


 


# Bowel Movements 1 4








Laboratory Tests


10/24/19 02:36: 


Arterial Blood pH 7.317L, Arterial Blood Partial Pressure CO2 39.1, Arterial 

Blood Partial Pressure O2 77.0, Arterial Blood HCO3 19.6L, Arterial Blood 

Oxygen Saturation 93.7L, Arterial Blood Base Excess -6.1L, Graham Test 


10/24/19 03:55: 


White Blood Count 24.0*H, Red Blood Count 2.94L, Hemoglobin 8.2L, Hematocrit 

24.4L, Mean Corpuscular Volume 83, Mean Corpuscular Hemoglobin 27.9, Mean 

Corpuscular Hemoglobin Concent 33.5, Red Cell Distribution Width 13.4, Platelet 

Count 421, Mean Platelet Volume 7.0, Neutrophils (%) (Auto) , Lymphocytes (%) (

Auto) , Monocytes (%) (Auto) , Eosinophils (%) (Auto) , Basophils (%) (Auto) , 

Neutrophils % (Manual) [Pending], Lymphocytes % (Manual) [Pending], Platelet 

Estimate [Pending], Platelet Morphology [Pending], Sodium Level 140, Potassium 

Level 3.6, Chloride Level 106, Carbon Dioxide Level 22, Anion Gap 12, Blood 

Urea Nitrogen 22H, Creatinine 1.6H, Estimat Glomerular Filtration Rate 32.3, 

Glucose Level 124H, Uric Acid 6.8, Calcium Level 8.6, Phosphorus Level 4.4, 

Magnesium Level 1.7L, Total Bilirubin 0.4, Aspartate Amino Transf (AST/SGOT) 17

, Alanine Aminotransferase (ALT/SGPT) 18, Alkaline Phosphatase 87, C-Reactive 

Protein, Quantitative 8.6H, Pro-B-Type Natriuretic Peptide 21620W, Total 

Protein 6.0L, Albumin 1.6L, Globulin 4.4, Albumin/Globulin Ratio 0.4L


Height (Feet):  5


Height (Inches):  5.00


Weight (Pounds):  180


General Appearance:  no apparent distress


Neck:  normal alignment


Cardiovascular:  normal rate


Respiratory/Chest:  decreased breath sounds


Abdomen:  normal bowel sounds


Objective





Current Medications








 Medications


  (Trade)  Dose


 Ordered  Sig/Winnie


 Route


 PRN Reason  Start Time


 Stop Time Status Last Admin


Dose Admin


 


 Acetaminophen


  (Tylenol)  650 mg  PRN  PRN


 RECTAL


 TEMP>100.5  10/25/19 03:00


     


 


 


 Aspirin


  (ASA)  81 mg  DAILY


 ORAL


   10/24/19 09:00


 11/14/19 08:59   


 


 


 Atorvastatin


 Calcium


  (Lipitor)  10 mg  BEDTIME


 ORAL


   10/24/19 21:00


 11/13/19 20:59   


 


 


 Barium Sulfate


  (Varibar Honey)  250 ml  NOW  PRN


 MC


 RAD  10/24/19 12:00


 10/26/19 11:54   


 


 


 Barium Sulfate


  (Varibar Nectar)  240 ml  NOW  PRN


 MC


 RAD  10/24/19 12:00


 10/26/19 11:54   


 


 


 Barium Sulfate


  (Varibar Pudding)  230 ml  NOW  PRN


 MC


 RAD  10/24/19 12:00


 10/26/19 11:54   


 


 


 Clopidogrel


 Bisulfate


  (Plavix)  75 mg  DAILY


 ORAL


   10/24/19 09:00


 11/14/19 08:59   


 


 


 Dextrose


  (Dextrose 50%)  25 ml  Q30M  PRN


 IV


 Hypoglycemia  10/24/19 04:00


 11/13/19 06:59   


 


 


 Dextrose


  (Dextrose 50%)  50 ml  Q30M  PRN


 IV


 Hypoglycemia  10/24/19 04:00


 11/13/19 06:59   


 


 


 Dextrose/Sodium


 Chloride  1,000 ml @ 


 60 mls/hr  [id number]


 IV


   10/24/19 03:45


 11/22/19 10:24   


 


 


 Divalproex Sodium


  (Depakote


 Sprinkles)  500 mg  Q12HR


 ORAL


   10/24/19 09:00


 11/14/19 21:59   


 


 


 Docusate Sodium


  (Colace)  100 mg  THREE TIMES A  DAY


 ORAL


   10/24/19 09:00


 11/13/19 12:59   


 


 


 Donepezil HCl


  (Aricept)  5 mg  DAILY


 ORAL


   10/24/19 09:00


 11/14/19 10:59   


 


 


 Enoxaparin Sodium


  (Lovenox)  30 mg  DAILY


 SUBQ


   10/24/19 09:00


 11/13/19 08:59   


 


 


 Insulin Aspart


  (NovoLOG)    BEFORE MEALS AND  HS


 SUBQ


   10/24/19 06:30


 11/13/19 11:29   


 


 


 Insulin Detemir


  (Levemir)  12 units  DAILY


 SUBQ


   10/24/19 09:00


 11/13/19 10:29   


 


 


 Lactulose


  (Cephulac)  20 gm  TIDPRN  PRN


 ORAL


 Constipation  10/24/19 04:00


 11/15/19 03:59   


 


 


 Lorazepam


  (Ativan 2mg/ml


 1ml)  1 mg  Q8H  PRN


 IV


 For Anxiety  10/24/19 04:00


 10/31/19 03:59   


 


 


 Meropenem 1 gm/


 Sodium Chloride  55 ml @ 


 110 mls/hr  Q12H


 IVPB


   10/24/19 14:00


 10/30/19 23:59   


 


 


 Pantoprazole


  (Protonix)  40 mg  BID


 ORAL


   10/24/19 03:45


 11/13/19 08:59   


 

















Riccardo Velez MD Oct 24, 2019 06:20

## 2019-10-24 NOTE — INFECTIOUS DISEASES PROG NOTE
Assessment/Plan


Problems:  


(1) Aspiration pneumonia


Assessment & Plan:  with B/L infiltrates, L>R suspect due to altered mental 

status , with hypoxemia and leukocytosis, continue meropenem empirically, 

repeated  CXR  showed no change , CT of the chest showed B/L effusion with 

basal infiltrates , aspiration precaution. continue meropenem for two weeks 

total . EOT 10/30/19  





(2) UTI (urinary tract infection)


Assessment & Plan:  due to ESBL producing E coli already on meropenem to cover 

for sepsis and pneumonia too for two weeks total  





(3) Sepsis


Assessment & Plan:  due to the above, with persistent leukocytosis inspit of 

being on wide spectrum antibiotics and negative repeated blood cultures, 

suspect pelvic mass related or bone marrow pathology . recommend gynecology 

eval for pelvic mass resection . will order indium scan to evaluate the pelvic 

mass and rule out deep abscess. unfortunately we are unable to reach her 

daughter to sign the consent, and no other family member is available to do so, 

will order the test as medical necessity. D/W CHARGE NURSE 





(4) Acute metabolic encephalopathy


Assessment & Plan:  due to the above, continue hydration and antibiotics, 

monitor in tele 





(5) Hyperglycemia due to type 2 diabetes mellitus


Assessment & Plan:  recommend tight glycemic control to keep blood glucose 

between 100-140 





(6) ARF (acute renal failure)


Assessment & Plan:  due to the above, continue hydration and renally dosed 

antibiotics, close  monitor of renal function , stop vancomycin 





(7) Pelvic mass in female


Assessment & Plan:  to the left of the uterus, suspect malignancy , recommend OB

/GYN eval , and tumor markers , oncology is following 








Subjective


ROS Limited/Unobtainable:  Yes


Allergies:  


Coded Allergies:  


     No Known Allergies (Unverified , 10/14/19)


Subjective


she was transferred to ICU for hypoxemia , still lethargic , lying in bed,  

awake, and responsive , no fever or chills , no diarrhea, has cough but no SOB





Objective


Vital Signs





Last 24 Hour Vital Signs








  Date Time  Temp Pulse Resp B/P (MAP) Pulse Ox O2 Delivery O2 Flow Rate FiO2


 


10/24/19 15:00  73 17 141/52 (81) 100   


 


10/24/19 14:00  76 20 138/71 (93) 93   


 


10/24/19 13:00  64 20 115/47 (69) 100   


 


10/24/19 12:00  68      


 


10/24/19 12:00 97.7 70 17 123/49 (73) 100   


 


10/24/19 11:00  75 20 133/52 (79) 99   


 


10/24/19 10:00  81 18 150/58 (88) 100   


 


10/24/19 09:00  71 16 124/49 (74) 100   


 


10/24/19 08:00      Nasal Cannula 2.0 


 


10/24/19 08:00  72      


 


10/24/19 08:00 97.5 68 20 121/43 (69) 99   


 


10/24/19 08:00  66 17  100 Nasal Cannula 2.0 28


 


10/24/19 08:00     100 Nasal Cannula 2.0 28


 


10/24/19 07:00  74 19 124/49 (74) 99   


 


10/24/19 06:00  76 18 117/55 (75) 98   


 


10/24/19 05:00  75 18 121/49 (73) 97   


 


10/24/19 04:00  86      


 


10/24/19 04:00      Nasal Cannula 2.0 


 


10/24/19 04:00  89 24 133/42 (72) 100   


 


10/24/19 03:51  92      


 


10/24/19 03:30 98.5 106 33 136/56 (82) 100   


 


10/24/19 03:15  106 33 139/71 (93) 98   


 


10/24/19 03:03  120      


 


10/24/19 00:35 97.9 90 22 142/79 (100) 97   


 


10/24/19 00:34  93      


 


10/23/19 20:39      Nasal Cannula 2.0 


 


10/23/19 20:39 99.2 91 20 163/79 (107) 95   


 


10/23/19 20:00  93      


 


10/23/19 19:58     98 Nasal Cannula 2.0 28


 


10/23/19 16:00 98.1 64 22 150/72 (98) 98   


 


10/23/19 16:00  93      








Height (Feet):  5


Height (Inches):  5.00


Weight (Pounds):  180


General Appearance:  WD/WN, no acute distress


HEENT:  normocephalic, atraumatic, anicteric, mucous membranes moist, EOMI, 

pharynx normal, supple, no JVD


Respiratory/Chest:  chest wall non-tender, no respiratory distress, no 

accessory muscle use, decreased breath sounds, crackles/rales


Cardiovascular:  normal peripheral pulses, normal rate, regular rhythm, no 

gallop/murmur, no JVD


Abdomen:  normal bowel sounds, soft, non tender, no organomegaly, non distended

, no mass, no scars


Extremities:  no cyanosis, no clubbing


Skin:  no rash, no lesions, no ulcers


Neurologic/Psychiatric:  alert, oriented x 3, responsive


Lymphatic:  no neck adenopathy, no groin adenopathy


Musculoskeletal:  normal muscle bulk, no effusion





Laboratory Tests








Test


  10/24/19


02:36 10/24/19


03:55


 


Arterial Blood pH


  7.317


(7.350-7.450) 


 


 


Arterial Blood Partial


Pressure CO2 39.1 mmHg


(35.0-45.0) 


 


 


Arterial Blood Partial


Pressure O2 77.0 mmHg


(75.0-100.0) 


 


 


Arterial Blood HCO3


  19.6 mmol/L


(22.0-26.0)  L 


 


 


Arterial Blood Oxygen


Saturation 93.7 %


()  L 


 


 


Arterial Blood Base Excess -6.1 (-2-2)  L 


 


Graham Test    


 


White Blood Count


  


  24.0 K/UL


(4.8-10.8)  *H


 


Red Blood Count


  


  2.94 M/UL


(4.20-5.40)  L


 


Hemoglobin


  


  8.2 G/DL


(12.0-16.0)  L


 


Hematocrit


  


  24.4 %


(37.0-47.0)  L


 


Mean Corpuscular Volume  83 FL (80-99)  


 


Mean Corpuscular Hemoglobin


  


  27.9 PG


(27.0-31.0)


 


Mean Corpuscular Hemoglobin


Concent 


  33.5 G/DL


(32.0-36.0)


 


Red Cell Distribution Width


  


  13.4 %


(11.6-14.8)


 


Platelet Count


  


  421 K/UL


(150-450)


 


Mean Platelet Volume


  


  7.0 FL


(6.5-10.1)


 


Neutrophils (%) (Auto)


  


  % (45.0-75.0)


 


 


Lymphocytes (%) (Auto)


  


  % (20.0-45.0)


 


 


Monocytes (%) (Auto)   % (1.0-10.0)  


 


Eosinophils (%) (Auto)   % (0.0-3.0)  


 


Basophils (%) (Auto)   % (0.0-2.0)  


 


Differential Total Cells


Counted 


  100  


 


 


Neutrophils % (Manual)  89 % (45-75)  H


 


Lymphocytes % (Manual)  6 % (20-45)  L


 


Monocytes % (Manual)  4 % (1-10)  


 


Eosinophils % (Manual)  1 % (0-3)  


 


Basophils % (Manual)  0 % (0-2)  


 


Band Neutrophils  0 % (0-8)  


 


Platelet Estimate  Adequate  


 


Platelet Morphology  Normal  


 


Hypochromasia  1+  


 


Sodium Level


  


  140 MMOL/L


(136-145)


 


Potassium Level


  


  3.6 MMOL/L


(3.5-5.1)


 


Chloride Level


  


  106 MMOL/L


()


 


Carbon Dioxide Level


  


  22 MMOL/L


(21-32)


 


Anion Gap


  


  12 mmol/L


(5-15)


 


Blood Urea Nitrogen


  


  22 mg/dL


(7-18)  H


 


Creatinine


  


  1.6 MG/DL


(0.55-1.30)  H


 


Estimat Glomerular Filtration


Rate 


  32.3 mL/min


(>60)


 


Glucose Level


  


  124 MG/DL


()  H


 


Uric Acid


  


  6.8 MG/DL


(2.6-7.2)


 


Calcium Level


  


  8.6 MG/DL


(8.5-10.1)


 


Phosphorus Level


  


  4.4 MG/DL


(2.5-4.9)


 


Magnesium Level


  


  1.7 MG/DL


(1.8-2.4)  L


 


Total Bilirubin


  


  0.4 MG/DL


(0.2-1.0)


 


Aspartate Amino Transf


(AST/SGOT) 


  17 U/L (15-37)


 


 


Alanine Aminotransferase


(ALT/SGPT) 


  18 U/L (12-78)


 


 


Alkaline Phosphatase


  


  87 U/L


()


 


C-Reactive Protein,


Quantitative 


  8.6 mg/dL


(0.00-0.90)  H


 


Pro-B-Type Natriuretic Peptide


  


  98442 pg/mL


(0-125)  H


 


Total Protein


  


  6.0 G/DL


(6.4-8.2)  L


 


Albumin


  


  1.6 G/DL


(3.4-5.0)  L


 


Globulin  4.4 g/dL  


 


Albumin/Globulin Ratio


  


  0.4 (1.0-2.7)


L











Current Medications








 Medications


  (Trade)  Dose


 Ordered  Sig/Winnie


 Route


 PRN Reason  Start Time


 Stop Time Status Last Admin


Dose Admin


 


 Acetaminophen


  (Tylenol)  650 mg  PRN  PRN


 RECTAL


 TEMP>100.5  10/25/19 03:00


     


 


 


 Albuterol/


 Ipratropium


  (Albuterol/


 Ipratropium)  3 ml  Q4HRT  PRN


 HHN


 SHORTNESS OF BREATH  10/24/19 07:00


 10/29/19 06:59   


 


 


 Aspirin


  (ASA)  81 mg  DAILY


 ORAL


   10/24/19 09:00


 11/14/19 08:59  10/24/19 08:44


 


 


 Atorvastatin


 Calcium


  (Lipitor)  10 mg  BEDTIME


 ORAL


   10/24/19 21:00


 11/13/19 20:59   


 


 


 Barium Sulfate


  (Varibar Honey)  250 ml  NOW  PRN


 MC


 RAD  10/24/19 12:00


 10/26/19 11:54   


 


 


 Barium Sulfate


  (Varibar Nectar)  240 ml  NOW  PRN


 MC


 RAD  10/24/19 12:00


 10/26/19 11:54   


 


 


 Barium Sulfate


  (Varibar Pudding)  230 ml  NOW  PRN


 MC


 RAD  10/24/19 12:00


 10/26/19 11:54   


 


 


 Clopidogrel


 Bisulfate


  (Plavix)  75 mg  DAILY


 ORAL


   10/24/19 09:00


 11/14/19 08:59  10/24/19 08:45


 


 


 Dextrose


  (Dextrose 50%)  25 ml  Q30M  PRN


 IV


 Hypoglycemia  10/24/19 04:00


 11/13/19 06:59   


 


 


 Dextrose


  (Dextrose 50%)  50 ml  Q30M  PRN


 IV


 Hypoglycemia  10/24/19 04:00


 11/13/19 06:59   


 


 


 Dextrose/Sodium


 Chloride  1,000 ml @ 


 60 mls/hr  [id number]


 IV


   10/24/19 03:45


 11/22/19 10:24   


 


 


 Divalproex Sodium


  (Depakote


 Sprinkles)  500 mg  Q12HR


 ORAL


   10/24/19 09:00


 11/14/19 21:59  10/24/19 08:45


 


 


 Docusate Sodium


  (Colace)  100 mg  THREE TIMES A  DAY


 ORAL


   10/24/19 09:00


 11/13/19 12:59  10/24/19 08:44


 


 


 Donepezil HCl


  (Aricept)  5 mg  DAILY


 ORAL


   10/24/19 09:00


 11/14/19 10:59  10/24/19 08:44


 


 


 Enoxaparin Sodium


  (Lovenox)  30 mg  DAILY


 SUBQ


   10/24/19 09:00


 11/13/19 08:59  10/24/19 08:46


 


 


 Furosemide


  (Lasix)  20 mg  BID


 IV


   10/24/19 09:00


 11/23/19 08:59  10/24/19 08:45


 


 


 Heparin Sodium


  (Porcine)


  (Heparin)  2,000 unit  ONCE  PRN


 INJ


 for scan  10/24/19 13:45


 10/24/19 23:59   


 


 


 Insulin Aspart


  (NovoLOG)    BEFORE MEALS AND  HS


 SUBQ


   10/24/19 06:30


 11/13/19 11:29   


 


 


 Insulin Detemir


  (Levemir)  12 units  DAILY


 SUBQ


   10/24/19 09:00


 11/13/19 10:29  10/24/19 09:09


 


 


 Lactulose


  (Cephulac)  20 gm  TIDPRN  PRN


 ORAL


 Constipation  10/24/19 04:00


 11/15/19 03:59   


 


 


 Lorazepam


  (Ativan 2mg/ml


 1ml)  1 mg  Q8H  PRN


 IV


 For Anxiety  10/24/19 04:00


 10/31/19 03:59   


 


 


 Meropenem 1 gm/


 Sodium Chloride  55 ml @ 


 110 mls/hr  Q12H


 IVPB


   10/24/19 14:00


 10/30/19 23:59  10/24/19 14:20


 


 


 Pantoprazole


  (Protonix)  40 mg  BID


 ORAL


   10/24/19 03:45


 11/13/19 08:59  10/24/19 08:51


 

















Amie Burgos M.D. Oct 24, 2019 15:22

## 2019-10-24 NOTE — NUR
SWALLOW / SPEECH THERAPY NOTE:



SWALLOW STATUS:

PATIENT HAD A CHANGE IN MEDICAL STATUS AND WENT TO ICU. SOB AND OTHER 

ISSUES. PER RNLEIGH, PATIENT STILL ON LIQUIFIED PUREED LIKE NECTAR 

THICK SOUP CONSISTENCY. REPOSTED THE ASPIRATION PRECAUTIONS AND 

EDUCATED/TRAINED RN IN PRECAUTIONS. PATIENT NOT ALERT ENOUGH FOR PO TRIALS 

WITH SLP. REVIEWED VIDEO IMAGES WITH RN AND COMPLETED MODIFIED BARIUM 

SWALLOW STUDY REPORT. 



PLAN: 

CONTINUE WITH CURRENT DIET/LIQUIDS WITH POSTED PRECAUTIONS

CONTINUE WITH PLACE OF CARE

## 2019-10-24 NOTE — HEMATOLOGY/ONC PROGRESS NOTE
Assessment/Plan


Assessment/Plan





Assessment and Recs:


# Anemia of chronic disease due to underlying chronic medical issues, 

multifactorial 


--> Anemia workup has been ordered, rule out gi bleed --> ferritin is 1400+


--> No evidence of hemolysis is noted, peripheral smear has been reviewed.


--> Hgb goal >7. Transfuse prn.


--> Epogen or iron at this time is not particularly indicated


--> Medications have been reviewed


--> low threshold for gi evaluation in case has occult +


--> bone marrow biopsy is not indicated given the other more likely causes (if 

recurrent anemia) first time here


--> hgb trend 8.4->8.5-->8.2


# Pelvic mass on ct and us -- 13 x 7 x 12.9 cm unilocular cystic mass, 

corresponding to lesion reported on recent CT scan. Layering low level internal 

echoes likely represent bladder debris.. This presumably represents a cystic 

ovarian lesion with debris or hemorrhage, possibly neoplastic.


--> CA-125 is 216! elevated


--> consider gyn eval


# Hypercalcemia - btw 10-11 range when corrected to alb


--> ivf have been started


--> if remains elevated, 7.9-->8.3


--> will get pth


# Leukocytosis/elevated white blood cell count, unspecified likely related to 

underlying reaction v more likely infection


--> have reviewed peripheral smear and bandemia/neutrophilia noted


--> continue antibiotics if they have been started by ID team (VANC/ZOSYN)--> 

vanc/linda--> linda


--> wbc 39-->28k-->29k->31k-->23-->26k-->19k-->24k


--> monitor for resolution


--> CT C/A/P Results reviewed


# Sepsis from pneumonia.  


--> pulm consulted, abx started


# CHANCE (acute kidney injury) on ckd


--> cr 1.6-->2.7-->2.2


--> per renal


# UTI (urinary tract infection)


--> on abx per id


# Dehydration


--> on ivf


# Acute metabolic encephalopathy


--> likely due to pna


# Dvt ppx lovenox sq





The timing of this note does not necessarily reflect the time of the patient 

was seen.





Greatly appreciate consultation.





Subjective


Respiratory:  Denies: no symptoms, cough, shortness of breath, SOB with 

excertion, SOB at rest, sputum, wheezing, other


Gastrointestinal/Abdominal:  Denies: no symptoms, abdomen distended, abdominal 

pain, black stools, tarry stools, blood in stool, constipated, diarrhea, 

difficulty swallowing, nausea, poor appetite, poor fluid intake, rectal bleeding

, vomiting, other


Genitourinary:  Denies: no symptoms, burning, discharge, frequency, flank pain, 

hematuria, incontinence, pain, urgency, other


Endocrine:  Denies: no symptoms, excessive sweating, flushing, intolerance to 

cold, intolerance to heat, increased hunger, increased thirst, increased urine, 

unexplained weight gain, unexplained weight loss, other


Hematologic/Lymphatic:  Denies: no symptoms, anemia, easy bleeding, easy 

bruising, adenopathy, other


Allergies:  


Coded Allergies:  


     No Known Allergies (Unverified , 10/14/19)


Subjective


10/14: hgb has improved, no bleeding, labs reivewed


10/15: no events to report


10/16: a+ o x1, no bleeding or chills noted, no major changes, occult pending


10/17: no events noted, no bleeding, no chills, no hematemesis/hematochezia


10/18: remains confused, with abx, on nc


10/19: cr is worse, hgb 8.4, no bleeding, night sweats, chills


10/20: no f/c, no night sweats, labs noted, cr worse


10/21: no bleeding or chills, no night sweats, labs pending this am


10/22: pelvic mass noted on imaging, no bleeding reported, ordered ca125


10/23: no events, remains lethargic, is on abx, seen by surg


10/24: with raid response overnight, rr high as well as bp, vidal to icu





Objective


Objective





Current Medications








 Medications


  (Trade)  Dose


 Ordered  Sig/Winnie


 Route


 PRN Reason  Start Time


 Stop Time Status Last Admin


Dose Admin


 


 Acetaminophen


  (Tylenol)  650 mg  PRN  PRN


 RECTAL


 TEMP>100.5  10/25/19 03:00


     


 


 


 Aspirin


  (ASA)  81 mg  DAILY


 ORAL


   10/24/19 09:00


 11/14/19 08:59   


 


 


 Atorvastatin


 Calcium


  (Lipitor)  10 mg  BEDTIME


 ORAL


   10/24/19 21:00


 11/13/19 20:59   


 


 


 Barium Sulfate


  (Varibar Honey)  250 ml  NOW  PRN


 MC


 RAD  10/24/19 12:00


 10/26/19 11:54   


 


 


 Barium Sulfate


  (Varibar Nectar)  240 ml  NOW  PRN


 MC


 RAD  10/24/19 12:00


 10/26/19 11:54   


 


 


 Barium Sulfate


  (Varibar Pudding)  230 ml  NOW  PRN


 


 RAD  10/24/19 12:00


 10/26/19 11:54   


 


 


 Clopidogrel


 Bisulfate


  (Plavix)  75 mg  DAILY


 ORAL


   10/24/19 09:00


 11/14/19 08:59   


 


 


 Dextrose


  (Dextrose 50%)  25 ml  Q30M  PRN


 IV


 Hypoglycemia  10/24/19 04:00


 11/13/19 06:59   


 


 


 Dextrose


  (Dextrose 50%)  50 ml  Q30M  PRN


 IV


 Hypoglycemia  10/24/19 04:00


 11/13/19 06:59   


 


 


 Dextrose/Sodium


 Chloride  1,000 ml @ 


 60 mls/hr  [id number]


 IV


   10/24/19 03:45


 11/22/19 10:24   


 


 


 Divalproex Sodium


  (Depakote


 Sprinkles)  500 mg  Q12HR


 ORAL


   10/24/19 09:00


 11/14/19 21:59   


 


 


 Docusate Sodium


  (Colace)  100 mg  THREE TIMES A  DAY


 ORAL


   10/24/19 09:00


 11/13/19 12:59   


 


 


 Donepezil HCl


  (Aricept)  5 mg  DAILY


 ORAL


   10/24/19 09:00


 11/14/19 10:59   


 


 


 Enoxaparin Sodium


  (Lovenox)  30 mg  DAILY


 SUBQ


   10/24/19 09:00


 11/13/19 08:59   


 


 


 Insulin Aspart


  (NovoLOG)    BEFORE MEALS AND  HS


 SUBQ


   10/24/19 06:30


 11/13/19 11:29   


 


 


 Insulin Detemir


  (Levemir)  12 units  DAILY


 SUBQ


   10/24/19 09:00


 11/13/19 10:29   


 


 


 Lactulose


  (Cephulac)  20 gm  TIDPRN  PRN


 ORAL


 Constipation  10/24/19 04:00


 11/15/19 03:59   


 


 


 Lorazepam


  (Ativan 2mg/ml


 1ml)  1 mg  Q8H  PRN


 IV


 For Anxiety  10/24/19 04:00


 10/31/19 03:59   


 


 


 Meropenem 1 gm/


 Sodium Chloride  55 ml @ 


 110 mls/hr  Q12H


 IVPB


   10/24/19 14:00


 10/30/19 23:59   


 


 


 Pantoprazole


  (Protonix)  40 mg  BID


 ORAL


   10/24/19 03:45


 11/13/19 08:59   


 











Last 24 Hour Vital Signs








  Date Time  Temp Pulse Resp B/P (MAP) Pulse Ox O2 Delivery O2 Flow Rate FiO2


 


10/24/19 05:00  75 18 121/49 (73) 97   


 


10/24/19 04:00  86      


 


10/24/19 04:00      Nasal Cannula 2.0 


 


10/24/19 04:00  89 24 133/42 (72) 100   


 


10/24/19 03:30 98.5 106 33 136/56 (82) 100   


 


10/24/19 03:15  106 33 139/71 (93) 98   


 


10/24/19 00:35 97.9 90 22 142/79 (100) 97   


 


10/24/19 00:34  93      


 


10/23/19 20:39      Nasal Cannula 2.0 


 


10/23/19 20:39 99.2 91 20 163/79 (107) 95   


 


10/23/19 20:00  93      


 


10/23/19 19:58     98 Nasal Cannula 2.0 28


 


10/23/19 16:00 98.1 64 22 150/72 (98) 98   


 


10/23/19 16:00  93      


 


10/23/19 12:00  89      


 


10/23/19 12:00 97.9 90 22 140/70 (93) 98   


 


10/23/19 09:00      Nasal Cannula 2.0 


 


10/23/19 08:00 98.3 87 22 153/72 (99) 97   


 


10/23/19 08:00  82      


 


10/23/19 04:00  83      


 


10/23/19 04:00 98.4 81 22 129/53 (78) 99   


 


10/23/19 00:00 97.9 86 21 151/91 (111) 98   


 


10/23/19 00:00  84      


 


10/22/19 21:00      Nasal Cannula 2.0 


 


10/22/19 20:00  85      


 


10/22/19 20:00 97.7 90 22 158/86 (110) 98   


 


10/22/19 16:00  89      


 


10/22/19 16:00 98.8 91 18 136/82 (100) 95   


 


10/22/19 12:00 98.7 79 20 136/70 (92) 97   


 


10/22/19 12:00  76      


 


10/22/19 09:00 98.2 90 20 148/82 (104) 95   


 


10/22/19 09:00      Nasal Cannula 2.0 


 


10/22/19 08:00  88      


 


10/22/19 08:00     98 Nasal Cannula 2.0 28

















Intake and Output  


 


 10/23/19 10/24/19





 19:00 07:00


 


Intake Total  1215 ml


 


Output Total  1850 ml


 


Balance  -635 ml


 


  


 


Intake Oral  800 ml


 


IV Total  415 ml


 


Output Urine Total  1850 ml


 


# Bowel Movements 1 4











Labs








Test


  10/21/19


06:36 10/22/19


04:10 10/22/19


06:25 10/23/19


02:35


 


White Blood Count


  25.9 K/UL


(4.8-10.8) 


  22.1 K/UL


(4.8-10.8) 


 


 


Red Blood Count


  3.02 M/UL


(4.20-5.40) 


  2.94 M/UL


(4.20-5.40) 


 


 


Hemoglobin


  8.5 G/DL


(12.0-16.0) 


  8.2 G/DL


(12.0-16.0) 


 


 


Hematocrit


  25.8 %


(37.0-47.0) 


  24.8 %


(37.0-47.0) 


 


 


Mean Corpuscular Volume 86 FL (80-99)   85 FL (80-99)  


 


Mean Corpuscular Hemoglobin


  28.4 PG


(27.0-31.0) 


  28.1 PG


(27.0-31.0) 


 


 


Mean Corpuscular Hemoglobin


Concent 33.1 G/DL


(32.0-36.0) 


  33.2 G/DL


(32.0-36.0) 


 


 


Red Cell Distribution Width


  12.3 %


(11.6-14.8) 


  13.6 %


(11.6-14.8) 


 


 


Platelet Count


  465 K/UL


(150-450) 


  406 K/UL


(150-450) 


 


 


Mean Platelet Volume


  7.5 FL


(6.5-10.1) 


  7.4 FL


(6.5-10.1) 


 


 


Neutrophils (%) (Auto)  % (45.0-75.0)    % (45.0-75.0)  


 


Lymphocytes (%) (Auto)  % (20.0-45.0)    % (20.0-45.0)  


 


Monocytes (%) (Auto)  % (1.0-10.0)    % (1.0-10.0)  


 


Eosinophils (%) (Auto)  % (0.0-3.0)    % (0.0-3.0)  


 


Basophils (%) (Auto)  % (0.0-2.0)    % (0.0-2.0)  


 


Differential Total Cells


Counted 100 


  


  100 


  


 


 


Neutrophils % (Manual) 86 % (45-75)   80 % (45-75)  


 


Lymphocytes % (Manual) 10 % (20-45)   10 % (20-45)  


 


Monocytes % (Manual) 3 % (1-10)   5 % (1-10)  


 


Eosinophils % (Manual) 1 % (0-3)   5 % (0-3)  


 


Basophils % (Manual) 0 % (0-2)   0 % (0-2)  


 


Band Neutrophils 0 % (0-8)   0 % (0-8)  


 


Platelet Estimate Adequate   Adequate  


 


Platelet Morphology Normal   Normal  


 


Hypochromasia 1+   1+  


 


Sodium Level


  141 MMOL/L


(136-145) 


  141 MMOL/L


(136-145) 


 


 


Potassium Level


  4.3 MMOL/L


(3.5-5.1) 


  4.3 MMOL/L


(3.5-5.1) 


 


 


Chloride Level


  108 MMOL/L


() 


  107 MMOL/L


() 


 


 


Carbon Dioxide Level


  21 MMOL/L


(21-32) 


  22 MMOL/L


(21-32) 


 


 


Anion Gap


  12 mmol/L


(5-15) 


  12 mmol/L


(5-15) 


 


 


Blood Urea Nitrogen


  38 mg/dL


(7-18) 


  39 mg/dL


(7-18) 


 


 


Creatinine


  3.4 MG/DL


(0.55-1.30) 


  3.1 MG/DL


(0.55-1.30) 


 


 


Estimat Glomerular Filtration


Rate 13.5 mL/min


(>60) 


  15.1 mL/min


(>60) 


 


 


Glucose Level


  198 MG/DL


() 


  122 MG/DL


() 


 


 


Uric Acid


  9.8 MG/DL


(2.6-7.2) 


  9.5 MG/DL


(2.6-7.2) 


 


 


Calcium Level


  8.7 MG/DL


(8.5-10.1) 


  8.5 MG/DL


(8.5-10.1) 


 


 


Phosphorus Level


  4.3 MG/DL


(2.5-4.9) 


  4.5 MG/DL


(2.5-4.9) 


 


 


Magnesium Level


  2.5 MG/DL


(1.8-2.4) 


  2.3 MG/DL


(1.8-2.4) 


 


 


Total Bilirubin


  0.4 MG/DL


(0.2-1.0) 


  0.3 MG/DL


(0.2-1.0) 


 


 


Aspartate Amino Transf


(AST/SGOT) 16 U/L (15-37) 


  


  17 U/L (15-37) 


  


 


 


Alanine Aminotransferase


(ALT/SGPT) 23 U/L (12-78) 


  


  21 U/L (12-78) 


  


 


 


Alkaline Phosphatase


  110 U/L


() 


  93 U/L


() 


 


 


C-Reactive Protein,


Quantitative 13.7 mg/dL


(0.00-0.90) 


  9.4 mg/dL


(0.00-0.90) 


 


 


Pro-B-Type Natriuretic Peptide


  > 72860 pg/mL


(0-125) 


  > 21375 pg/mL


(0-125) 


 


 


Total Protein


  6.6 G/DL


(6.4-8.2) 


  6.0 G/DL


(6.4-8.2) 


 


 


Albumin


  1.9 G/DL


(3.4-5.0) 


  1.7 G/DL


(3.4-5.0) 


 


 


Globulin 4.7 g/dL   4.3 g/dL  


 


Albumin/Globulin Ratio 0.4 (1.0-2.7)   0.4 (1.0-2.7)  


 


Valproic Acid (Depakene) Level


  14 MCG/ML


() 


  


  


 


 


Urine Eosinophils


  


  None seen


(NONE SEEN) 


  


 


 


 Antigen


  


  


  216.7 U/mL


(0.0-38.1) 


 


 


Stool Occult Blood


  


  


  


  Negative


(NEGATIVE)


 


Test


  10/23/19


05:30 10/23/19


06:17 10/24/19


02:36 10/24/19


03:55


 


Urine Eosinophils


  None seen


(NONE SEEN) 


  


  


 


 


White Blood Count


  


  19.3 K/UL


(4.8-10.8) 


  24.0 K/UL


(4.8-10.8)


 


Red Blood Count


  


  3.17 M/UL


(4.20-5.40) 


  2.94 M/UL


(4.20-5.40)


 


Hemoglobin


  


  8.7 G/DL


(12.0-16.0) 


  8.2 G/DL


(12.0-16.0)


 


Hematocrit


  


  26.7 %


(37.0-47.0) 


  24.4 %


(37.0-47.0)


 


Mean Corpuscular Volume  84 FL (80-99)   83 FL (80-99) 


 


Mean Corpuscular Hemoglobin


  


  27.6 PG


(27.0-31.0) 


  27.9 PG


(27.0-31.0)


 


Mean Corpuscular Hemoglobin


Concent 


  32.7 G/DL


(32.0-36.0) 


  33.5 G/DL


(32.0-36.0)


 


Red Cell Distribution Width


  


  13.8 %


(11.6-14.8) 


  13.4 %


(11.6-14.8)


 


Platelet Count


  


  440 K/UL


(150-450) 


  421 K/UL


(150-450)


 


Mean Platelet Volume


  


  6.9 FL


(6.5-10.1) 


  7.0 FL


(6.5-10.1)


 


Neutrophils (%) (Auto)   % (45.0-75.0)    % (45.0-75.0) 


 


Lymphocytes (%) (Auto)   % (20.0-45.0)    % (20.0-45.0) 


 


Monocytes (%) (Auto)   % (1.0-10.0)    % (1.0-10.0) 


 


Eosinophils (%) (Auto)   % (0.0-3.0)    % (0.0-3.0) 


 


Basophils (%) (Auto)   % (0.0-2.0)    % (0.0-2.0) 


 


Differential Total Cells


Counted 


  100 


  


  


 


 


Neutrophils % (Manual)  70 % (45-75)   


 


Lymphocytes % (Manual)  13 % (20-45)   


 


Monocytes % (Manual)  12 % (1-10)   


 


Eosinophils % (Manual)  4 % (0-3)   


 


Basophils % (Manual)  1 % (0-2)   


 


Band Neutrophils  0 % (0-8)   


 


Platelet Estimate  Adequate   


 


Platelet Morphology  Normal   


 


Sodium Level


  


  140 MMOL/L


(136-145) 


  140 MMOL/L


(136-145)


 


Potassium Level


  


  4.0 MMOL/L


(3.5-5.1) 


  3.6 MMOL/L


(3.5-5.1)


 


Chloride Level


  


  106 MMOL/L


() 


  106 MMOL/L


()


 


Carbon Dioxide Level


  


  22 MMOL/L


(21-32) 


  22 MMOL/L


(21-32)


 


Anion Gap


  


  12 mmol/L


(5-15) 


  12 mmol/L


(5-15)


 


Blood Urea Nitrogen


  


  29 mg/dL


(7-18) 


  22 mg/dL


(7-18)


 


Creatinine


  


  2.2 MG/DL


(0.55-1.30) 


  1.6 MG/DL


(0.55-1.30)


 


Estimat Glomerular Filtration


Rate 


  22.3 mL/min


(>60) 


  32.3 mL/min


(>60)


 


Glucose Level


  


  91 MG/DL


() 


  124 MG/DL


()


 


Calcium Level


  


  8.7 MG/DL


(8.5-10.1) 


  8.6 MG/DL


(8.5-10.1)


 


Arterial Blood pH


  


  


  7.317


(7.350-7.450) 


 


 


Arterial Blood Partial


Pressure CO2 


  


  39.1 mmHg


(35.0-45.0) 


 


 


Arterial Blood Partial


Pressure O2 


  


  77.0 mmHg


(75.0-100.0) 


 


 


Arterial Blood HCO3


  


  


  19.6 mmol/L


(22.0-26.0) 


 


 


Arterial Blood Oxygen


Saturation 


  


  93.7 %


() 


 


 


Arterial Blood Base Excess   -6.1 (-2-2)  


 


Graham Test     


 


Uric Acid


  


  


  


  6.8 MG/DL


(2.6-7.2)


 


Phosphorus Level


  


  


  


  4.4 MG/DL


(2.5-4.9)


 


Magnesium Level


  


  


  


  1.7 MG/DL


(1.8-2.4)


 


Total Bilirubin


  


  


  


  0.4 MG/DL


(0.2-1.0)


 


Aspartate Amino Transf


(AST/SGOT) 


  


  


  17 U/L (15-37) 


 


 


Alanine Aminotransferase


(ALT/SGPT) 


  


  


  18 U/L (12-78) 


 


 


Alkaline Phosphatase


  


  


  


  87 U/L


()


 


C-Reactive Protein,


Quantitative 


  


  


  8.6 mg/dL


(0.00-0.90)


 


Pro-B-Type Natriuretic Peptide


  


  


  


  60373 pg/mL


(0-125)


 


Total Protein


  


  


  


  6.0 G/DL


(6.4-8.2)


 


Albumin


  


  


  


  1.6 G/DL


(3.4-5.0)


 


Globulin    4.4 g/dL 


 


Albumin/Globulin Ratio    0.4 (1.0-2.7) 








Height (Feet):  5


Height (Inches):  5.00


Weight (Pounds):  180


Objective





Physical Exam:


Vitals: reviewed


General Appearance:  NAD


HEENT:  normocephalic, atraumatic


Neck:  non-tender, normal alignment


Respiratory/Chest:  normal breath sounds bilaterally


Cardiovascular/Chest: rrr


Abdomen:  normal bowel sounds, soft, nontender


Extremities:  normal range of motion











Mike Pop MD Oct 24, 2019 05:46

## 2019-10-24 NOTE — NUR
RAPID RESPONSE:

See Rapid Response sheet which remains on paper.rrt called patient in respiratory distress, 
vs taken bp 222/110, hr 132, rr 32/min sat 93 on 2l nc, cxr ordered, abg done, transfered to 
icu per rrt team, Dr Torres called back at 0300 and informed, ordered lasix  iv, message 
left for dr Keith re rrt, no response. Dr Torres informed re abg result and htn . message 
left for Speedy Alvraenga, son.

## 2019-10-24 NOTE — DIAGNOSTIC IMAGING REPORT
Indications: Dysphagia

 

Technique: Patient ingested multiple substances under the supervision of speech

pathology. Video fluoroscopic recording performed.

 

Total fluoroscopy time 219 seconds.

Total dose area product  0.29115 mGycm2

Total number of images-13

 

Comparison: none

 

Findings: At least 2 episodes of aspiration of thin liquid barium is demonstrated.

Multiple episodes of penetration are also demonstrated no definite aspiration or

penetration of other substances is demonstrated. Early pooling of puree in the

piriform sinus is demonstrated.

 

Impression: Positive aspiration of thin liquid barium

 

Please refer to speech pathology report for more detailed analysis

## 2019-10-25 VITALS — SYSTOLIC BLOOD PRESSURE: 144 MMHG | DIASTOLIC BLOOD PRESSURE: 51 MMHG

## 2019-10-25 VITALS — DIASTOLIC BLOOD PRESSURE: 50 MMHG | SYSTOLIC BLOOD PRESSURE: 135 MMHG

## 2019-10-25 VITALS — DIASTOLIC BLOOD PRESSURE: 47 MMHG | SYSTOLIC BLOOD PRESSURE: 134 MMHG

## 2019-10-25 VITALS — SYSTOLIC BLOOD PRESSURE: 122 MMHG | DIASTOLIC BLOOD PRESSURE: 64 MMHG

## 2019-10-25 VITALS — SYSTOLIC BLOOD PRESSURE: 155 MMHG | DIASTOLIC BLOOD PRESSURE: 57 MMHG

## 2019-10-25 VITALS — DIASTOLIC BLOOD PRESSURE: 56 MMHG | SYSTOLIC BLOOD PRESSURE: 162 MMHG

## 2019-10-25 VITALS — SYSTOLIC BLOOD PRESSURE: 136 MMHG | DIASTOLIC BLOOD PRESSURE: 53 MMHG

## 2019-10-25 VITALS — SYSTOLIC BLOOD PRESSURE: 143 MMHG | DIASTOLIC BLOOD PRESSURE: 52 MMHG

## 2019-10-25 VITALS — SYSTOLIC BLOOD PRESSURE: 152 MMHG | DIASTOLIC BLOOD PRESSURE: 58 MMHG

## 2019-10-25 VITALS — SYSTOLIC BLOOD PRESSURE: 140 MMHG | DIASTOLIC BLOOD PRESSURE: 52 MMHG

## 2019-10-25 VITALS — DIASTOLIC BLOOD PRESSURE: 54 MMHG | SYSTOLIC BLOOD PRESSURE: 133 MMHG

## 2019-10-25 VITALS — SYSTOLIC BLOOD PRESSURE: 133 MMHG | DIASTOLIC BLOOD PRESSURE: 60 MMHG

## 2019-10-25 VITALS — SYSTOLIC BLOOD PRESSURE: 143 MMHG | DIASTOLIC BLOOD PRESSURE: 55 MMHG

## 2019-10-25 VITALS — DIASTOLIC BLOOD PRESSURE: 57 MMHG | SYSTOLIC BLOOD PRESSURE: 143 MMHG

## 2019-10-25 VITALS — SYSTOLIC BLOOD PRESSURE: 153 MMHG | DIASTOLIC BLOOD PRESSURE: 56 MMHG

## 2019-10-25 VITALS — DIASTOLIC BLOOD PRESSURE: 54 MMHG | SYSTOLIC BLOOD PRESSURE: 162 MMHG

## 2019-10-25 VITALS — DIASTOLIC BLOOD PRESSURE: 63 MMHG | SYSTOLIC BLOOD PRESSURE: 155 MMHG

## 2019-10-25 VITALS — SYSTOLIC BLOOD PRESSURE: 136 MMHG | DIASTOLIC BLOOD PRESSURE: 61 MMHG

## 2019-10-25 VITALS — DIASTOLIC BLOOD PRESSURE: 87 MMHG | SYSTOLIC BLOOD PRESSURE: 147 MMHG

## 2019-10-25 VITALS — DIASTOLIC BLOOD PRESSURE: 71 MMHG | SYSTOLIC BLOOD PRESSURE: 160 MMHG

## 2019-10-25 VITALS — SYSTOLIC BLOOD PRESSURE: 135 MMHG | DIASTOLIC BLOOD PRESSURE: 50 MMHG

## 2019-10-25 VITALS — DIASTOLIC BLOOD PRESSURE: 76 MMHG | SYSTOLIC BLOOD PRESSURE: 165 MMHG

## 2019-10-25 VITALS — SYSTOLIC BLOOD PRESSURE: 136 MMHG | DIASTOLIC BLOOD PRESSURE: 57 MMHG

## 2019-10-25 VITALS — DIASTOLIC BLOOD PRESSURE: 68 MMHG | SYSTOLIC BLOOD PRESSURE: 147 MMHG

## 2019-10-25 LAB
ANION GAP SERPL CALC-SCNC: 10 MMOL/L (ref 5–15)
BUN SERPL-MCNC: 17 MG/DL (ref 7–18)
CALCIUM SERPL-MCNC: 8.6 MG/DL (ref 8.5–10.1)
CHLORIDE SERPL-SCNC: 107 MMOL/L (ref 98–107)
CO2 SERPL-SCNC: 26 MMOL/L (ref 21–32)
CREAT SERPL-MCNC: 1.2 MG/DL (ref 0.55–1.3)
POTASSIUM SERPL-SCNC: 3.8 MMOL/L (ref 3.5–5.1)
SODIUM SERPL-SCNC: 143 MMOL/L (ref 136–145)

## 2019-10-25 RX ADMIN — DEXTROSE AND SODIUM CHLORIDE SCH MLS/HR: 5; .45 INJECTION, SOLUTION INTRAVENOUS at 09:06

## 2019-10-25 RX ADMIN — INSULIN ASPART SCH UNITS: 100 INJECTION, SOLUTION INTRAVENOUS; SUBCUTANEOUS at 12:06

## 2019-10-25 RX ADMIN — ENOXAPARIN SODIUM SCH MG: 30 INJECTION SUBCUTANEOUS at 09:02

## 2019-10-25 RX ADMIN — DONEPEZIL HYDROCHLORIDE SCH MG: 5 TABLET, FILM COATED ORAL at 09:01

## 2019-10-25 RX ADMIN — DOCUSATE SODIUM SCH MG: 100 CAPSULE, LIQUID FILLED ORAL at 12:53

## 2019-10-25 RX ADMIN — ASPIRIN 81 MG SCH MG: 81 TABLET ORAL at 09:01

## 2019-10-25 RX ADMIN — DIVALPROEX SODIUM SCH MG: 125 CAPSULE ORAL at 09:00

## 2019-10-25 RX ADMIN — DOCUSATE SODIUM SCH MG: 100 CAPSULE, LIQUID FILLED ORAL at 09:01

## 2019-10-25 RX ADMIN — MEROPENEM SCH MLS/HR: 1 INJECTION INTRAVENOUS at 13:50

## 2019-10-25 RX ADMIN — INSULIN ASPART SCH UNITS: 100 INJECTION, SOLUTION INTRAVENOUS; SUBCUTANEOUS at 05:48

## 2019-10-25 RX ADMIN — DIVALPROEX SODIUM SCH MG: 125 CAPSULE ORAL at 20:47

## 2019-10-25 RX ADMIN — MEROPENEM SCH MLS/HR: 1 INJECTION INTRAVENOUS at 01:42

## 2019-10-25 RX ADMIN — INSULIN ASPART SCH UNITS: 100 INJECTION, SOLUTION INTRAVENOUS; SUBCUTANEOUS at 20:47

## 2019-10-25 RX ADMIN — INSULIN DETEMIR SCH UNITS: 100 INJECTION, SOLUTION SUBCUTANEOUS at 09:13

## 2019-10-25 RX ADMIN — DOCUSATE SODIUM SCH MG: 100 CAPSULE, LIQUID FILLED ORAL at 17:21

## 2019-10-25 RX ADMIN — INSULIN ASPART SCH UNITS: 100 INJECTION, SOLUTION INTRAVENOUS; SUBCUTANEOUS at 16:30

## 2019-10-25 NOTE — NUR
NURSE NOTES:

Dr. Connor DC'd maintenance fluids 

Dr. Pimentel ordered a set of Blood cultures 

Patient repositioned and provided oral care. 

Patient is awake at this time. Vitals are stable.

## 2019-10-25 NOTE — NUR
NURSE NOTES:

Adls done, pt turned and repositioned.HOB elevated to prevent aspiration.No significant 
change of condition at this time.Call light within easy reach.Will continue same care plan

## 2019-10-25 NOTE — NUR
NURSE NOTES:

Glucose 97, no coverage provided. Patient awake, vitals are stable. Will continue to 
monitor.

## 2019-10-25 NOTE — NUR
NURSE NOTES:

Spoke with Dr. Burgos regarding the Indium Scan-made him aware that procedure needs consent 
and patient is not able to consent. Stated he will put a note on his progress note regarding 
the necessity for the procedure

## 2019-10-25 NOTE — NUR
NURSE NOTES:

Pt asleep with no apparent acute distress.Dressing changed. HOb elevated to prevent 
aspiration.No major change in condition at this time. Will continue to monitor.Call light 
within easy reach

## 2019-10-25 NOTE — NUR
NURSE NOTES:

Pt turned and repositioned, still noted with poor appetite.Consumed only 10%.HOB elevate to 
prevent aspiration.Will continue to monitor.No significant change of condition at this 
time.Still waiting for tech (Yeny) to draw the blood for indium scan.Dr Burgos wrote the 
consent for Indium scan because family is reachable and does not return call.Will continue 
same care plan

## 2019-10-25 NOTE — NUR
NURSE NOTES:

Called Dr Main regarding pt different anticoagulant Lovenox, Plavix and Aspirin, as stated 
its ok to give.

## 2019-10-25 NOTE — GENERAL PROGRESS NOTE
Assessment/Plan


Problem List:  


(1) Abnormal TSH


ICD Codes:  R79.89 - Other specified abnormal findings of blood chemistry


SNOMED:  351577153


(2) Hyperglycemia due to type 2 diabetes mellitus


ICD Codes:  E11.65 - Type 2 diabetes mellitus with hyperglycemia


SNOMED:  450412066943101, 41160981


Qualifiers:  


   Qualified Codes:  E11.65 - Type 2 diabetes mellitus with hyperglycemia; 

Z79.4 - Long term (current) use of insulin


(3) Acute metabolic encephalopathy


ICD Codes:  G93.41 - Metabolic encephalopathy


SNOMED:  99749984, 881864965


(4) ARF (acute renal failure)


ICD Codes:  N17.9 - Acute kidney failure, unspecified


SNOMED:  54821555


Qualifiers:  


   Qualified Codes:  N17.9 - Acute kidney failure, unspecified


(5) Healthcare associated bacterial pneumonia


ICD Codes:  J15.9 - Unspecified bacterial pneumonia


SNOMED:  294534862


Status:  stable


Assessment/Plan:


continue Levemir 12 units qam 


continue NISS





Subjective


Allergies:  


Coded Allergies:  


     No Known Allergies (Unverified , 10/14/19)


Subjective


glucose values are stable 











Item Value  Date Time


 


Bedside Blood Glucose 136 mg/dl H 10/25/19 0548


 


Bedside Blood Glucose 132 mg/dl H 10/24/19 2100


 


Bedside Blood Glucose 118 mg/dl 10/24/19 1630


 


Bedside Blood Glucose 131 mg/dl H 10/24/19 1130


 


Bedside Blood Glucose 124 mg/dl H 10/24/19 0909


 


Bedside Blood Glucose 116 mg/dl 10/24/19 0629











Objective





Last 24 Hour Vital Signs








  Date Time  Temp Pulse Resp B/P (MAP) Pulse Ox O2 Delivery O2 Flow Rate FiO2


 


10/25/19 06:00  78 24 135/50 (78) 98   


 


10/25/19 05:00  81 22 155/57 (89) 100   


 


10/25/19 04:00 98.7 82 23 122/64 (83) 99   


 


10/25/19 04:00      Nasal Cannula 2.0 


 


10/25/19 03:40  83      


 


10/25/19 03:00  83 21 143/52 (82) 100   


 


10/25/19 02:00  85 22 147/68 (94) 99   


 


10/25/19 01:00  83 23 133/60 (84) 97   


 


10/25/19 00:00      Nasal Cannula 2.0 


 


10/25/19 00:00 98.2 79 24 136/61 (86) 96   


 


10/24/19 23:49  79      


 


10/24/19 23:00  76 21 127/60 (82) 100   


 


10/24/19 22:00  82 21 131/89 (103) 100   


 


10/24/19 21:00  79 21 134/51 (78) 100   


 


10/24/19 20:00      Nasal Cannula 2.0 


 


10/24/19 20:00 98.0 79 20 134/58 (83) 100   


 


10/24/19 19:50  82      


 


10/24/19 19:03     100 Nasal Cannula 2.0 28


 


10/24/19 19:02  78 20  100 Nasal Cannula 2.0 28


 


10/24/19 19:00  81 23 140/57 (84) 100   


 


10/24/19 18:00  75 19 130/54 (79) 100   


 


10/24/19 17:00  78 20 140/57 (84) 100   


 


10/24/19 16:00 97.5 75 20 144/53 (83) 100   


 


10/24/19 16:00  64      


 


10/24/19 15:00  73 17 141/52 (81) 100   


 


10/24/19 14:00  76 20 138/71 (93) 93   


 


10/24/19 13:00  64 20 115/47 (69) 100   


 


10/24/19 12:00  68      


 


10/24/19 12:00 97.7 70 17 123/49 (73) 100   


 


10/24/19 11:00  75 20 133/52 (79) 99   


 


10/24/19 10:00  81 18 150/58 (88) 100   


 


10/24/19 09:00  71 16 124/49 (74) 100   


 


10/24/19 08:00      Nasal Cannula 2.0 


 


10/24/19 08:00  72      


 


10/24/19 08:00 97.5 68 20 121/43 (69) 99   


 


10/24/19 08:00  66 17  100 Nasal Cannula 2.0 28


 


10/24/19 08:00     100 Nasal Cannula 2.0 28


 


10/24/19 07:00  74 19 124/49 (74) 99   

















Intake and Output  


 


 10/24/19 10/25/19





 18:59 06:59


 


Intake Total 125 ml 905 ml


 


Output Total 1500 ml 1450 ml


 


Balance -1375 ml -545 ml


 


  


 


Intake Oral 0 ml 250 ml


 


IV Total 125 ml 655 ml


 


Output Urine Total 1500 ml 1450 ml


 


# Bowel Movements 1 4








Height (Feet):  5


Height (Inches):  5.00


Weight (Pounds):  180


General Appearance:  no apparent distress


Neck:  normal alignment


Cardiovascular:  normal rate


Respiratory/Chest:  lungs clear


Abdomen:  normal bowel sounds


Pelvis:  normal external exam


Objective





Current Medications








 Medications


  (Trade)  Dose


 Ordered  Sig/Winnie


 Route


 PRN Reason  Start Time


 Stop Time Status Last Admin


Dose Admin


 


 Acetaminophen


  (Tylenol)  650 mg  PRN  PRN


 RECTAL


 TEMP>100.5  10/25/19 03:00


     


 


 


 Albuterol/


 Ipratropium


  (Albuterol/


 Ipratropium)  3 ml  Q4HRT  PRN


 HHN


 SHORTNESS OF BREATH  10/24/19 07:00


 10/29/19 06:59   


 


 


 Aspirin


  (ASA)  81 mg  DAILY


 ORAL


   10/24/19 09:00


 11/14/19 08:59  10/24/19 08:44


 


 


 Atorvastatin


 Calcium


  (Lipitor)  10 mg  BEDTIME


 ORAL


   10/24/19 21:00


 11/13/19 20:59  10/24/19 20:30


 


 


 Barium Sulfate


  (Varibar Honey)  250 ml  NOW  PRN


 [facility]


 RAD  10/24/19 12:00


 10/26/19 11:54   


 


 


 Barium Sulfate


  (Varibar Nectar)  240 ml  NOW  PRN


 [facility]


 RAD  10/24/19 12:00


 10/26/19 11:54   


 


 


 Barium Sulfate


  (Varibar Pudding)  230 ml  NOW  PRN


 [facility]


 RAD  10/24/19 12:00


 10/26/19 11:54   


 


 


 Clopidogrel


 Bisulfate


  (Plavix)  75 mg  DAILY


 ORAL


   10/24/19 09:00


 11/14/19 08:59  10/24/19 08:45


 


 


 Dextrose


  (Dextrose 50%)  25 ml  Q30M  PRN


 IV


 Hypoglycemia  10/24/19 04:00


 11/13/19 06:59   


 


 


 Dextrose


  (Dextrose 50%)  50 ml  Q30M  PRN


 IV


 Hypoglycemia  10/24/19 04:00


 11/13/19 06:59   


 


 


 Dextrose/Sodium


 Chloride  1,000 ml @ 


 60 mls/hr  [id number]


 IV


   10/24/19 03:45


 11/22/19 10:24  10/24/19 16:50


 


 


 Divalproex Sodium


  (Depakote


 Sprinkles)  500 mg  Q12HR


 ORAL


   10/24/19 09:00


 11/14/19 21:59  10/24/19 20:30


 


 


 Docusate Sodium


  (Colace)  100 mg  THREE TIMES A  DAY


 ORAL


   10/24/19 09:00


 11/13/19 12:59  10/24/19 08:44


 


 


 Donepezil HCl


  (Aricept)  5 mg  DAILY


 ORAL


   10/24/19 09:00


 11/14/19 10:59  10/24/19 08:44


 


 


 Enoxaparin Sodium


  (Lovenox)  30 mg  DAILY


 SUBQ


   10/24/19 09:00


 11/13/19 08:59  10/24/19 08:46


 


 


 Furosemide


  (Lasix)  20 mg  BID


 IV


   10/24/19 09:00


 11/23/19 08:59  10/24/19 17:06


 


 


 Insulin Aspart


  (NovoLOG)    BEFORE MEALS AND  HS


 SUBQ


   10/24/19 06:30


 11/13/19 11:29   


 


 


 Insulin Detemir


  (Levemir)  12 units  DAILY


 SUBQ


   10/24/19 09:00


 11/13/19 10:29  10/24/19 09:09


 


 


 Lactulose


  (Cephulac)  20 gm  TIDPRN  PRN


 ORAL


 Constipation  10/24/19 04:00


 11/15/19 03:59   


 


 


 Lorazepam


  (Ativan 2mg/ml


 1ml)  1 mg  Q8H  PRN


 IV


 For Anxiety  10/24/19 04:00


 10/31/19 03:59   


 


 


 Meropenem 1 gm/


 Sodium Chloride  55 ml @ 


 110 mls/hr  Q12H


 IVPB


   10/24/19 14:00


 10/30/19 23:59  10/25/19 01:42


 


 


 Pantoprazole


  (Protonix)  40 mg  BID


 ORAL


   10/24/19 03:45


 11/13/19 08:59  10/24/19 08:51


 

















Riccardo Velez MD Oct 25, 2019 06:14

## 2019-10-25 NOTE — NUR
NURSE NOTES:

Patient turned and repositioned.Deep suction done and tolerated well. Per [facility] radiology 
tech, Indium scan can not be done because cut off time is 1230.Charge nurse made aware and 
will follow up with Dr Burgos.

## 2019-10-25 NOTE — NUR
NURSE NOTES:

Patient is unable to swallow on demand. Patient failed video swallow eval today. Patient is 
not fully alert and oriented. inserted NGT at right nare for PO medications only. Abdomen 
Xray ordered for proper confirmation.  Patient was suctioned orally and deep suctione also 
was provided using aseptic technique, minimal secretions noted at Deep suctioning.

## 2019-10-25 NOTE — NUR
NURSE NOTES:

Patient repositioned and given oral care. NGT placement results came back and confirmed 
placement. HR has remained NSR, BP stable, no respiratory distress. Afebrile.

## 2019-10-25 NOTE — NUR
NURSE NOTES:

Pt asleep when received, no apparent acute distress.Pending transfer to tele. Pt is afebrile 
and alert to name only.On nasal canula at 2LPM.HOB elevated to prevent aspiration.Lungs 
sounds slightly diminished, bowel sounds present in all 4 quadrants.Abdomen soft and non 
distended with bowel sounds present in all 4 quadrants.Mouth care done,turned and 
repositioned for skin management.RFA/22G saline lock and LW/24G running D51/2 NS at 
60cc/hr.Pt has F/C draining yellowish urine with no apparent sediments.Received call from 
Yeny from radiology regarding the Indium scan.Per Yeny she needs a consent to proceed with 
the procedure.Left message to daughter Chris Lu regarding the consent.Awaiting call 
back.Bed locked in low position.3/4 siderails up for safety and repositioning.Call light 
within easy reach.Kept clean , dry and comfortable.Will continue to monitor

## 2019-10-25 NOTE — NUR
NURSE NOTES:

Pt awake, noted with poor food intake.Consumed 10% at breakfast.Fluids encouraged as 
tolerated , no significant change at this time.Turned and repositioned.HOB elevated at 35 
degree.Will continue same care plan.Call light within easy reach.

## 2019-10-25 NOTE — NUR
NURSE NOTES:

Dr. Burgos made aware that Indium Scan not done today- per [facility] Med tech([facility]) cut off 
time at 1230. Made aware the Indium Scan will be done on monday.

## 2019-10-25 NOTE — NEPHROLOGY PROGRESS NOTE
Assessment/Plan


Problem List:  


(1) Renal failure (ARF), acute on chronic


(2) Dehydration


(3) ARF (acute renal failure)


(4) Sepsis


(5) Acute metabolic encephalopathy


(6) Hyperglycemia due to type 2 diabetes mellitus


(7) UTI (urinary tract infection)


(8) Diabetic nephropathy


Assessment





Renal failure, Acute on Chronic resolved


Dehydration


Severe Anemia


Sepsis / Pneumonia / UTI


DM, OOC


Proteinuria , Diabetic Nephropathy


Acute encephalopathy


Plan





in ICu , due transfer





Cr lowering 


Will order urine studies and monitor renal parameters


kidney YOANDY noted


lower iv fluids rate


WBCs rising


has casas again due to retention


Avoid Nephrotoxics








previously


Anemia salas


2D echo


24 H urine protein


Keep BP and BS in check


I am holding  her psych meds due to low MS


Per orders





Subjective


ROS Limited/Unobtainable:  No


Constitutional:  Reports: malaise, weakness





Objective


Objective





Last 24 Hour Vital Signs








  Date Time  Temp Pulse Resp B/P (MAP) Pulse Ox O2 Delivery O2 Flow Rate FiO2


 


10/25/19 11:00  82 23 136/53 (80) 95   


 


10/25/19 10:00  85 23 147/87 (107) 94   


 


10/25/19 09:00  78 21 133/54 (80) 100   


 


10/25/19 08:00 98.4 77 20 143/57 (85) 100   


 


10/25/19 08:00  79      


 


10/25/19 07:02     100 Nasal Cannula 2.0 28


 


10/25/19 07:02  80 21  100 Nasal Cannula 2.0 28


 


10/25/19 07:00  81 22 135/50 (78) 99   


 


10/25/19 06:00  78 24 135/50 (78) 98   


 


10/25/19 05:00  81 22 155/57 (89) 100   


 


10/25/19 04:00 98.7 82 23 122/64 (83) 99   


 


10/25/19 04:00      Nasal Cannula 2.0 


 


10/25/19 03:40  83      


 


10/25/19 03:00  83 21 143/52 (82) 100   


 


10/25/19 02:00  85 22 147/68 (94) 99   


 


10/25/19 01:00  83 23 133/60 (84) 97   


 


10/25/19 00:00      Nasal Cannula 2.0 


 


10/25/19 00:00 98.2 79 24 136/61 (86) 96   


 


10/24/19 23:49  79      


 


10/24/19 23:00  76 21 127/60 (82) 100   


 


10/24/19 22:00  82 21 131/89 (103) 100   


 


10/24/19 21:00  79 21 134/51 (78) 100   


 


10/24/19 20:00      Nasal Cannula 2.0 


 


10/24/19 20:00 98.0 79 20 134/58 (83) 100   


 


10/24/19 19:50  82      


 


10/24/19 19:03     100 Nasal Cannula 2.0 28


 


10/24/19 19:02  78 20  100 Nasal Cannula 2.0 28


 


10/24/19 19:00  81 23 140/57 (84) 100   


 


10/24/19 18:00  75 19 130/54 (79) 100   


 


10/24/19 17:00  78 20 140/57 (84) 100   


 


10/24/19 16:00 97.5 75 20 144/53 (83) 100   


 


10/24/19 16:00  64      


 


10/24/19 15:00  73 17 141/52 (81) 100   


 


10/24/19 14:00  76 20 138/71 (93) 93   


 


10/24/19 13:00  64 20 115/47 (69) 100   


 


10/24/19 12:00  68      


 


10/24/19 12:00 97.7 70 17 123/49 (73) 100   

















Intake and Output  


 


 10/24/19 10/25/19





 19:00 07:00


 


Intake Total 185 ml 1025 ml


 


Output Total 1450 ml 1525 ml


 


Balance -1265 ml -500 ml


 


  


 


Intake Oral 0 ml 250 ml


 


IV Total 185 ml 775 ml


 


Output Urine Total 1450 ml 1525 ml


 


# Bowel Movements 1 4








Laboratory Tests


10/25/19 09:30: 


Sodium Level 143, Potassium Level 3.8, Chloride Level 107, Carbon Dioxide Level 

26, Anion Gap 10, Blood Urea Nitrogen 17, Creatinine 1.2, Estimat Glomerular 

Filtration Rate 45.0, Glucose Level 127H, Calcium Level 8.6


Height (Feet):  5


Height (Inches):  5.00


Weight (Pounds):  198


Objective


no change











Lukasz Vizcaino MD Oct 25, 2019 11:36

## 2019-10-25 NOTE — SURGERY PROGRESS NOTE
Surgery Progress Note


Subjective


Additional Comments


afebrile


HD stable


leukocytosis worsening


Scan per ID


will await results


pelvic mass ? possible etiology





Objective





Last 24 Hour Vital Signs








  Date Time  Temp Pulse Resp B/P (MAP) Pulse Ox O2 Delivery O2 Flow Rate FiO2


 


10/25/19 15:00  85 21 162/54 (90) 100   


 


10/25/19 14:00  80 22 152/58 (89) 100   


 


10/25/19 13:00  77 20 134/47 (76) 100   


 


10/25/19 12:00  77 20 143/57 (85) 100   


 


10/25/19 12:00      Nasal Cannula 2.0 


 


10/25/19 12:00 98.1 76 20 136/53 (80) 94   


 


10/25/19 12:00  73      


 


10/25/19 11:00  82 23 136/53 (80) 95   


 


10/25/19 10:00  85 23 147/87 (107) 94   


 


10/25/19 09:00  78 21 133/54 (80) 100   


 


10/25/19 08:00      Nasal Cannula 2.0 


 


10/25/19 08:00 98.4 77 20 143/57 (85) 100   


 


10/25/19 08:00  79      


 


10/25/19 07:02     100 Nasal Cannula 2.0 28


 


10/25/19 07:02  80 21  100 Nasal Cannula 2.0 28


 


10/25/19 07:00  81 22 135/50 (78) 99   


 


10/25/19 06:00  78 24 135/50 (78) 98   


 


10/25/19 05:00  81 22 155/57 (89) 100   


 


10/25/19 04:00 98.7 82 23 122/64 (83) 99   


 


10/25/19 04:00      Nasal Cannula 2.0 


 


10/25/19 03:40  83      


 


10/25/19 03:00  83 21 143/52 (82) 100   


 


10/25/19 02:00  85 22 147/68 (94) 99   


 


10/25/19 01:00  83 23 133/60 (84) 97   


 


10/25/19 00:00      Nasal Cannula 2.0 


 


10/25/19 00:00 98.2 79 24 136/61 (86) 96   


 


10/24/19 23:49  79      


 


10/24/19 23:00  76 21 127/60 (82) 100   


 


10/24/19 22:00  82 21 131/89 (103) 100   


 


10/24/19 21:00  79 21 134/51 (78) 100   


 


10/24/19 20:00      Nasal Cannula 2.0 


 


10/24/19 20:00 98.0 79 20 134/58 (83) 100   


 


10/24/19 19:50  82      


 


10/24/19 19:03     100 Nasal Cannula 2.0 28


 


10/24/19 19:02  78 20  100 Nasal Cannula 2.0 28


 


10/24/19 19:00  81 23 140/57 (84) 100   


 


10/24/19 18:00  75 19 130/54 (79) 100   


 


10/24/19 17:00  78 20 140/57 (84) 100   








I&O











Intake and Output  


 


 10/24/19 10/25/19





 19:00 07:00


 


Intake Total 185 ml 1025 ml


 


Output Total 1450 ml 1525 ml


 


Balance -1265 ml -500 ml


 


  


 


Intake Oral 0 ml 250 ml


 


IV Total 185 ml 775 ml


 


Output Urine Total 1450 ml 1525 ml


 


# Bowel Movements 1 4








Cardiovascular:  RSR


Respiratory:  decreased breath sounds


Abdomen:  soft, non-tender, present bowel sounds, non-distended


Extremities:  no tenderness, no cyanosis





Laboratory Tests








Test


  10/25/19


09:30


 


Sodium Level


  143 MMOL/L


(136-145)


 


Potassium Level


  3.8 MMOL/L


(3.5-5.1)


 


Chloride Level


  107 MMOL/L


()


 


Carbon Dioxide Level


  26 MMOL/L


(21-32)


 


Anion Gap


  10 mmol/L


(5-15)


 


Blood Urea Nitrogen


  17 mg/dL


(7-18)


 


Creatinine


  1.2 MG/DL


(0.55-1.30)


 


Estimat Glomerular Filtration


Rate 45.0 mL/min


(>60)


 


Glucose Level


  127 MG/DL


()  H


 


Calcium Level


  8.6 MG/DL


(8.5-10.1)











Plan


Problems:  


(1) Pressure injury of deep tissue


Assessment & Plan:  Pt presented on admission with DTPI R heel. Blood filled 

blister with marginal erythema noted to heel. Pt exhibited agitation when 

minimally palpated. (L)2.2cm x (W)2.1cm


L heel is blanchable .


Non-blanchable erythema without induration noted to sacral cleft. 


No other areas of skin concerns noted.





Tx.Plan:


Apply Betadine to R heel. Cover with Optifoam drsg. Change every 3 days and prn.


           


Apply Cavilon Skin Barrier to L heel. Cover with Optifoam drsg. Change every 7 

days and prn.


           


Apply Moisture Barrier Paste to buttocks. Cover sacral area with Optifoam drsg. 

Change every 3 days and prn.


           


Reposition at least every 2hours or as tolerated.


           


Off-load heels with pillow.





(2) Sepsis


Assessment & Plan:  Patient with low-grade fevers, anemia, leukocytosis 

persistent, elevated platelets, abnormal labs.


Etiology currently unknown and work-up in progress.  Labs noted and imaging 

that is available as noted.  


Okay for diet


CT noted


US noted


Impression: 13 x 7 x 12.9 cm unilocular cystic mass, corresponding to lesion 

reported


on recent CT scan. Layering low level internal echoes likely represent bladder


debris.. This presumably represents a cystic ovarian lesion with debris or


hemorrhage, possibly neoplastic. Gynecological consultation is recommended


Antibiotics as per infectious disease 


local wound care as wounds do not seem to be the etiology of her sepsis


We will monitor and follow with recommendations


Thank you for allowing me to participate in patient's care














Radhames Blas Oct 25, 2019 16:19

## 2019-10-25 NOTE — PULMONOLOGY PROGRESS NOTE
Assessment/Plan


Assessment/Plan


Pulmonary Progress Note





Assessment/Plan


Problems:  


(1) Healthcare associated bacterial pneumonia, RRT overnight


(2) UTI (urinary tract infection)


(3) Sepsis


(4) Dehydration


(5) CHANCE (acute kidney injury)


(6) Acute metabolic encephalopathy


(7) Hyperglycemia due to type 2 diabetes mellitus


(8) Renal failure (ARF), acute on chronic


(9) Aspiration pneumonia


(10) Abnormal TSH


(11) Pelvic mass in female


912) Congestive Heart Failure


Assessment/Plan


Leukocytosis


Sepsis


Possible pneumonia


E coli UTI


Acute hypoxemic respiratory failure


Acute encephalopathy


Hx CHF - diurese PRN


HTN


CHANCE 


Pelvic mass/possible GYN malignancy vs cyst


PPX on Lovenox





Plan:


-concern for pelvic malignancy noted on CT AP


-Monitor WCt


-Abx per ID


-monitor volumes and renal function, F/U renal recs


-GYN evaluation


-monitor encephalopathy, ? neuro eval


-Asp precautions, SLP recs 


-DVT Px: LMWH


-FC


- Diurese PRN


-BiPAP PRN


- HHN


- O2 PRN





ICU care





Subjective


Allergies:  


Coded Allergies:  


     No Known Allergies (Unverified , 10/14/19)


Subjective


WCT and Cr both better


CT noted with concern for gynecologic malignancy vs cyst


Improved Pulmonary status this morning





Objective





Vital Signs Noted





General Appearance:  no acute distress, cachetic


HEENT:  normocephalic, atraumatic


Respiratory/Chest:  occasional rhonchi


Cardiovascular:  normal peripheral pulses, normal rate, regular rhythm


Abdomen:  normal bowel sounds, soft, non tender, no organomegaly, non distended

, no mass


Extremities:  no cyanosis, no clubbing, no edema





Laboratory Tests Noted





CXR:


Findings: Bilateral pleural effusions and bilateral interstitial congestion 

persist.


Heart remains enlarged. Findings are unchanged


 


Impression:  Unchanged, over one day








Subjective


ROS Limited/Unobtainable:  No


Allergies:  


Coded Allergies:  


     No Known Allergies (Unverified , 10/14/19)





Objective





Last 24 Hour Vital Signs








  Date Time  Temp Pulse Resp B/P (MAP) Pulse Ox O2 Delivery O2 Flow Rate FiO2


 


10/25/19 18:59     99 Nasal Cannula 2.0 28


 


10/25/19 18:59  87 24  98 Nasal Cannula 2.0 28


 


10/25/19 18:00  84 27 140/52 (81) 100   


 


10/25/19 17:00  86 27 153/56 (88) 100   


 


10/25/19 16:00 98.8 93 31 162/56 (91) 99   


 


10/25/19 16:00  91      


 


10/25/19 15:00  85 21 162/54 (90) 100   


 


10/25/19 14:00  80 22 152/58 (89) 100   


 


10/25/19 13:00  77 20 134/47 (76) 100   


 


10/25/19 12:00  77 20 143/57 (85) 100   


 


10/25/19 12:00      Nasal Cannula 2.0 


 


10/25/19 12:00 98.1 76 20 136/53 (80) 94   


 


10/25/19 12:00  73      


 


10/25/19 11:00  82 23 136/53 (80) 95   


 


10/25/19 10:00  85 23 147/87 (107) 94   


 


10/25/19 09:00  78 21 133/54 (80) 100   


 


10/25/19 08:00      Nasal Cannula 2.0 


 


10/25/19 08:00 98.4 77 20 143/57 (85) 100   


 


10/25/19 08:00  79      


 


10/25/19 07:02     100 Nasal Cannula 2.0 28


 


10/25/19 07:02  80 21  100 Nasal Cannula 2.0 28


 


10/25/19 07:00  81 22 135/50 (78) 99   


 


10/25/19 06:00  78 24 135/50 (78) 98   


 


10/25/19 05:00  81 22 155/57 (89) 100   


 


10/25/19 04:00 98.7 82 23 122/64 (83) 99   


 


10/25/19 04:00      Nasal Cannula 2.0 


 


10/25/19 03:40  83      


 


10/25/19 03:00  83 21 143/52 (82) 100   


 


10/25/19 02:00  85 22 147/68 (94) 99   


 


10/25/19 01:00  83 23 133/60 (84) 97   


 


10/25/19 00:00      Nasal Cannula 2.0 


 


10/25/19 00:00 98.2 79 24 136/61 (86) 96   


 


10/24/19 23:49  79      


 


10/24/19 23:00  76 21 127/60 (82) 100   


 


10/24/19 22:00  82 21 131/89 (103) 100   


 


10/24/19 21:00  79 21 134/51 (78) 100   


 


10/24/19 20:00      Nasal Cannula 2.0 


 


10/24/19 20:00 98.0 79 20 134/58 (83) 100   

















Intake and Output  


 


 10/24/19 10/25/19





 18:59 06:59


 


Intake Total 125 ml 1025 ml


 


Output Total 1500 ml 1600 ml


 


Balance -1375 ml -575 ml


 


  


 


Intake Oral 0 ml 250 ml


 


IV Total 125 ml 775 ml


 


Output Urine Total 1500 ml 1600 ml


 


# Bowel Movements 1 4








Laboratory Tests


10/25/19 09:30: 


Sodium Level 143, Potassium Level 3.8, Chloride Level 107, Carbon Dioxide Level 

26, Anion Gap 10, Blood Urea Nitrogen 17, Creatinine 1.2, Estimat Glomerular 

Filtration Rate 45.0, Glucose Level 127H, Calcium Level 8.6





Current Medications








 Medications


  (Trade)  Dose


 Ordered  Sig/Winnie


 Route


 PRN Reason  Start Time


 Stop Time Status Last Admin


Dose Admin


 


 Acetaminophen


  (Tylenol)  650 mg  PRN  PRN


 RECTAL


 TEMP>100.5  10/25/19 03:00


     


 


 


 Acetaminophen


  (Tylenol)  650 mg  Q4H  PRN


 ORAL


 Mild Pain/Temp > 100.5  10/25/19 18:30


 11/24/19 18:29   


 


 


 Albuterol/


 Ipratropium


  (Albuterol/


 Ipratropium)  3 ml  Q4HRT  PRN


 HHN


 SHORTNESS OF BREATH  10/24/19 07:00


 10/29/19 06:59   


 


 


 Aspirin


  (ASA)  81 mg  DAILY


 ORAL


   10/24/19 09:00


 11/14/19 08:59  10/25/19 09:01


 


 


 Atorvastatin


 Calcium


  (Lipitor)  10 mg  BEDTIME


 ORAL


   10/24/19 21:00


 11/13/19 20:59  10/24/19 20:30


 


 


 Barium Sulfate


  (Varibar Honey)  250 ml  NOW  PRN


 MC


 RAD  10/24/19 12:00


 10/26/19 11:54   


 


 


 Barium Sulfate


  (Varibar Nectar)  240 ml  NOW  PRN


 MC


 RAD  10/24/19 12:00


 10/26/19 11:54   


 


 


 Barium Sulfate


  (Varibar Pudding)  230 ml  NOW  PRN


 MC


 RAD  10/24/19 12:00


 10/26/19 11:54   


 


 


 Clopidogrel


 Bisulfate


  (Plavix)  75 mg  DAILY


 ORAL


   10/24/19 09:00


 11/14/19 08:59  10/25/19 09:01


 


 


 Dextrose


  (Dextrose 50%)  25 ml  Q30M  PRN


 IV


 Hypoglycemia  10/24/19 04:00


 11/13/19 06:59   


 


 


 Dextrose


  (Dextrose 50%)  50 ml  Q30M  PRN


 IV


 Hypoglycemia  10/24/19 04:00


 11/13/19 06:59   


 


 


 Dextrose/Sodium


 Chloride  1,000 ml @ 


 60 mls/hr  [id number]


 IV


   10/24/19 03:45


 11/22/19 10:24  10/25/19 09:06


 


 


 Divalproex Sodium


  (Depakote


 Sprinkles)  500 mg  Q12HR


 ORAL


   10/24/19 09:00


 11/14/19 21:59  10/25/19 09:00


 


 


 Docusate Sodium


  (Colace)  100 mg  THREE TIMES A  DAY


 ORAL


   10/24/19 09:00


 11/13/19 12:59  10/25/19 09:01


 


 


 Donepezil HCl


  (Aricept)  5 mg  DAILY


 ORAL


   10/24/19 09:00


 11/14/19 10:59  10/25/19 09:01


 


 


 Enoxaparin Sodium


  (Lovenox)  30 mg  DAILY


 SUBQ


   10/24/19 09:00


 11/13/19 08:59  10/25/19 09:02


 


 


 Furosemide


  (Lasix)  20 mg  BID


 IV


   10/24/19 09:00


 11/23/19 08:59  10/25/19 17:40


 


 


 Heparin Sodium


  (Porcine)


  (Heparin)  2,000 unit  ONCE  PRN


 INJ


 for scan  10/25/19 11:00


 10/25/19 23:59   


 


 


 Insulin Aspart


  (NovoLOG)    BEFORE MEALS AND  HS


 SUBQ


   10/24/19 06:30


 11/13/19 11:29  10/25/19 12:06


 


 


 Insulin Detemir


  (Levemir)  12 units  DAILY


 SUBQ


   10/24/19 09:00


 11/13/19 10:29  10/25/19 09:13


 


 


 Lactulose


  (Cephulac)  20 gm  TIDPRN  PRN


 ORAL


 Constipation  10/24/19 04:00


 11/15/19 03:59   


 


 


 Lorazepam


  (Ativan 2mg/ml


 1ml)  1 mg  Q8H  PRN


 IV


 For Anxiety  10/24/19 04:00


 10/31/19 03:59  10/25/19 16:18


 


 


 Meropenem 1 gm/


 Sodium Chloride  55 ml @ 


 110 mls/hr  Q12H


 IVPB


   10/24/19 14:00


 10/30/19 23:59  10/25/19 13:50


 


 


 Pantoprazole


  (Protonix)  40 mg  BID


 ORAL


   10/24/19 03:45


 11/13/19 08:59  10/25/19 17:40


 

















Delmer Main MD Oct 25, 2019 19:52

## 2019-10-25 NOTE — NUR
NURSE NOTES:

Radiology tech arrived and stated she will not start the procedure of indium scan until 
consent received.Dr Burgos paged by charge nurse, will follow up new order.Still no feedback 
from family.

## 2019-10-25 NOTE — CARDIOLOGY PROGRESS NOTE
Assessment/Plan


Assessment/Plan


1. acute congestive heart failure.


2. Abnormal cardiac enzyme demand related due to infection and profound anemia 


3. Agitation 


4. Urinary tract infection.


5. Acute renal failure.


6. Profound anemia.


7. Diabetes mellitus.


8. History of hypertension.


9. History of mood disorder.


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 


11. arf 


12. pelvic mass


13  sepsis 


14. pneumonia 











not clear why she is dual antiplt agent as such i am not sure how safe it will 

be to long term d


echo noted 


telel personal reviewed sinus 


iv abx 


wbc sig elevated still  but improving 


cr improved 


no in icu 


bp i s hgher 


cxr notred will dc ivf will need to consider diurtics





Subjective


ROS Limited/Unobtainable:  Yes





Objective





Last 24 Hour Vital Signs








  Date Time  Temp Pulse Resp B/P (MAP) Pulse Ox O2 Delivery O2 Flow Rate FiO2


 


10/25/19 18:59     99 Nasal Cannula 2.0 28


 


10/25/19 18:59  87 24  98 Nasal Cannula 2.0 28


 


10/25/19 18:00  84 27 140/52 (81) 100   


 


10/25/19 17:00  86 27 153/56 (88) 100   


 


10/25/19 16:00 98.8 93 31 162/56 (91) 99   


 


10/25/19 16:00  91      


 


10/25/19 15:00  85 21 162/54 (90) 100   


 


10/25/19 14:00  80 22 152/58 (89) 100   


 


10/25/19 13:00  77 20 134/47 (76) 100   


 


10/25/19 12:00  77 20 143/57 (85) 100   


 


10/25/19 12:00      Nasal Cannula 2.0 


 


10/25/19 12:00 98.1 76 20 136/53 (80) 94   


 


10/25/19 12:00  73      


 


10/25/19 11:00  82 23 136/53 (80) 95   


 


10/25/19 10:00  85 23 147/87 (107) 94   


 


10/25/19 09:00  78 21 133/54 (80) 100   


 


10/25/19 08:00      Nasal Cannula 2.0 


 


10/25/19 08:00 98.4 77 20 143/57 (85) 100   


 


10/25/19 08:00  79      


 


10/25/19 07:02     100 Nasal Cannula 2.0 28


 


10/25/19 07:02  80 21  100 Nasal Cannula 2.0 28


 


10/25/19 07:00  81 22 135/50 (78) 99   


 


10/25/19 06:00  78 24 135/50 (78) 98   


 


10/25/19 05:00  81 22 155/57 (89) 100   


 


10/25/19 04:00 98.7 82 23 122/64 (83) 99   


 


10/25/19 04:00      Nasal Cannula 2.0 


 


10/25/19 03:40  83      


 


10/25/19 03:00  83 21 143/52 (82) 100   


 


10/25/19 02:00  85 22 147/68 (94) 99   


 


10/25/19 01:00  83 23 133/60 (84) 97   


 


10/25/19 00:00      Nasal Cannula 2.0 


 


10/25/19 00:00 98.2 79 24 136/61 (86) 96   


 


10/24/19 23:49  79      


 


10/24/19 23:00  76 21 127/60 (82) 100   


 


10/24/19 22:00  82 21 131/89 (103) 100   


 


10/24/19 21:00  79 21 134/51 (78) 100   

















Intake and Output  


 


 10/24/19 10/25/19





 18:59 06:59


 


Intake Total 125 ml 1025 ml


 


Output Total 1500 ml 1600 ml


 


Balance -1375 ml -575 ml


 


  


 


Intake Oral 0 ml 250 ml


 


IV Total 125 ml 775 ml


 


Output Urine Total 1500 ml 1600 ml


 


# Bowel Movements 1 4











Laboratory Tests








Test


  10/25/19


09:30


 


Sodium Level


  143 MMOL/L


(136-145)


 


Potassium Level


  3.8 MMOL/L


(3.5-5.1)


 


Chloride Level


  107 MMOL/L


()


 


Carbon Dioxide Level


  26 MMOL/L


(21-32)


 


Anion Gap


  10 mmol/L


(5-15)


 


Blood Urea Nitrogen


  17 mg/dL


(7-18)


 


Creatinine


  1.2 MG/DL


(0.55-1.30)


 


Estimat Glomerular Filtration


Rate 45.0 mL/min


(>60)


 


Glucose Level


  127 MG/DL


()  H


 


Calcium Level


  8.6 MG/DL


(8.5-10.1)

















Rob May MD Oct 25, 2019 20:04

## 2019-10-25 NOTE — NUR
*-* INSURANCE *-*



UPDATED CLINICALS AND REVIEWS HAVE BEEN FAXED TO:



Tracking#[id number]

CM: Lance

#[phone]

Fax#[phone]

## 2019-10-25 NOTE — NUR
NURSE NOTES:

Patient is talking more, oriented x2. Bed bath, linen change, water, turn, repositioning 
provided. Patient is comfortable showing no signs of pain or distress.

## 2019-10-25 NOTE — NUR
CASE MANAGEMENT:REVIEW



10/25/19

SI: ASPIRATION PNA. SEPSIS

98.1   76  20  136/53  94% ON 2L/NC

WBC+24.0   H/H-8.2/24.4  





IS;IV MEROPENEM Q12

IVF@60/HR

ASA PO QD

PLAVIX PO QD

ARICEPT PO QD

LOVENOX SQ QD

IV LASIX BID

**: ICU STATUS

## 2019-10-25 NOTE — NUR
NURSE NOTES:

Patient is awake and asking for water, turned and repositioned. Shows no signs of pain or 
distress. Will continue care.

## 2019-10-25 NOTE — HEMATOLOGY/ONC PROGRESS NOTE
Assessment/Plan


Assessment/Plan





Assessment and Recs:


# Anemia of chronic disease due to underlying chronic medical issues, 

multifactorial 


--> Anemia workup has been ordered, rule out gi bleed --> ferritin is 1400+


--> No evidence of hemolysis is noted, peripheral smear has been reviewed.


--> Hgb goal >7. Transfuse prn.


--> Epogen or iron at this time is not particularly indicated


--> Medications have been reviewed


--> low threshold for gi evaluation in case has occult +


--> bone marrow biopsy is not indicated given the other more likely causes (if 

recurrent anemia) first time here


--> hgb trend 8.4->8.5-->8.2


# Pelvic mass on ct and us -- 13 x 7 x 12.9 cm unilocular cystic mass, 

corresponding to lesion reported on recent CT scan. Layering low level internal 

echoes likely represent bladder debris.. This presumably represents a cystic 

ovarian lesion with debris or hemorrhage, possibly neoplastic.


--> CA-125 is 216! elevated


--> consider gyn eval


# Hypercalcemia - btw 10-11 range when corrected to alb


--> ivf have been started


--> if remains elevated, 7.9-->8.3


--> will get pth


# Leukocytosis/elevated white blood cell count, unspecified likely related to 

underlying reaction v more likely infection


--> have reviewed peripheral smear and bandemia/neutrophilia noted


--> continue antibiotics if they have been started by ID team (VANC/ZOSYN)--> 

vanc/linda--> linda


--> wbc 39-->28k-->29k->31k-->23-->26k-->19k-->24k


--> monitor for resolution


--> CT C/A/P Results reviewed


--> FOR INdium bone scan


# Sepsis from pneumonia.  


--> pulm consulted, abx started


# CHANCE (acute kidney injury) on ckd


--> cr 1.6-->2.7-->2.2->1.2


--> per renal


# UTI (urinary tract infection)


--> on abx per id


# Dehydration


--> on ivf


# Acute metabolic encephalopathy


--> likely due to pna


# Dvt ppx lovenox sq





The timing of this note does not necessarily reflect the time of the patient 

was seen.





Greatly appreciate consultation.





Subjective


HEENT:  Denies: no symptoms, eye pain, blurred vision, tearing, double vision, 

ear pain, ear discharge, nose pain, nose congestion, throat pain, throat 

swelling, mouth pain, mouth swelling, other


Respiratory:  Denies: no symptoms, cough, shortness of breath, SOB with 

excertion, SOB at rest, sputum, wheezing, other


Gastrointestinal/Abdominal:  Denies: no symptoms, abdomen distended, abdominal 

pain, black stools, tarry stools, blood in stool, constipated, diarrhea, 

difficulty swallowing, nausea, poor appetite, poor fluid intake, rectal bleeding

, vomiting, other


Endocrine:  Denies: no symptoms, excessive sweating, flushing, intolerance to 

cold, intolerance to heat, increased hunger, increased thirst, increased urine, 

unexplained weight gain, unexplained weight loss, other


Hematologic/Lymphatic:  Denies: no symptoms, anemia, easy bleeding, easy 

bruising, adenopathy, other


Allergies:  


Coded Allergies:  


     No Known Allergies (Unverified , 10/14/19)


Subjective


10/14: hgb has improved, no bleeding, labs reivewed


10/15: no events to report


10/16: a+ o x1, no bleeding or chills noted, no major changes, occult pending


10/17: no events noted, no bleeding, no chills, no hematemesis/hematochezia


10/18: remains confused, with abx, on nc


10/19: cr is worse, hgb 8.4, no bleeding, night sweats, chills


10/20: no f/c, no night sweats, labs noted, cr worse


10/21: no bleeding or chills, no night sweats, labs pending this am


10/22: pelvic mass noted on imaging, no bleeding reported, ordered ca125


10/23: no events, remains lethargic, is on abx, seen by surg


10/24: with raid response overnight, rr high as well as bp, vidal to icu


10/25: no events, no bleeding, to undergo a indium scan with id





Objective


Objective





Current Medications








 Medications


  (Trade)  Dose


 Ordered  Sig/Winnie


 Route


 PRN Reason  Start Time


 Stop Time Status Last Admin


Dose Admin


 


 Acetaminophen


  (Tylenol)  650 mg  PRN  PRN


 RECTAL


 TEMP>100.5  10/25/19 03:00


     


 


 


 Albuterol/


 Ipratropium


  (Albuterol/


 Ipratropium)  3 ml  Q4HRT  PRN


 HHN


 SHORTNESS OF BREATH  10/24/19 07:00


 10/29/19 06:59   


 


 


 Aspirin


  (ASA)  81 mg  DAILY


 ORAL


   10/24/19 09:00


 11/14/19 08:59  10/25/19 09:01


 


 


 Atorvastatin


 Calcium


  (Lipitor)  10 mg  BEDTIME


 ORAL


   10/24/19 21:00


 11/13/19 20:59  10/24/19 20:30


 


 


 Barium Sulfate


  (Varibar Honey)  250 ml  NOW  PRN


 MC


 RAD  10/24/19 12:00


 10/26/19 11:54   


 


 


 Barium Sulfate


  (Varibar Nectar)  240 ml  NOW  PRN


 MC


 RAD  10/24/19 12:00


 10/26/19 11:54   


 


 


 Barium Sulfate


  (Varibar Pudding)  230 ml  NOW  PRN


 


 RAD  10/24/19 12:00


 10/26/19 11:54   


 


 


 Clopidogrel


 Bisulfate


  (Plavix)  75 mg  DAILY


 ORAL


   10/24/19 09:00


 11/14/19 08:59  10/25/19 09:01


 


 


 Dextrose


  (Dextrose 50%)  25 ml  Q30M  PRN


 IV


 Hypoglycemia  10/24/19 04:00


 11/13/19 06:59   


 


 


 Dextrose


  (Dextrose 50%)  50 ml  Q30M  PRN


 IV


 Hypoglycemia  10/24/19 04:00


 11/13/19 06:59   


 


 


 Dextrose/Sodium


 Chloride  1,000 ml @ 


 60 mls/hr  [id number]


 IV


   10/24/19 03:45


 11/22/19 10:24  10/25/19 09:06


 


 


 Divalproex Sodium


  (Depakote


 Sprinkles)  500 mg  Q12HR


 ORAL


   10/24/19 09:00


 11/14/19 21:59  10/25/19 09:00


 


 


 Docusate Sodium


  (Colace)  100 mg  THREE TIMES A  DAY


 ORAL


   10/24/19 09:00


 11/13/19 12:59  10/25/19 09:01


 


 


 Donepezil HCl


  (Aricept)  5 mg  DAILY


 ORAL


   10/24/19 09:00


 11/14/19 10:59  10/25/19 09:01


 


 


 Enoxaparin Sodium


  (Lovenox)  30 mg  DAILY


 SUBQ


   10/24/19 09:00


 11/13/19 08:59  10/25/19 09:02


 


 


 Furosemide


  (Lasix)  20 mg  BID


 IV


   10/24/19 09:00


 11/23/19 08:59  10/25/19 09:14


 


 


 Heparin Sodium


  (Porcine)


  (Heparin)  2,000 unit  ONCE  PRN


 INJ


 for scan  10/25/19 11:00


 10/25/19 23:59   


 


 


 Insulin Aspart


  (NovoLOG)    BEFORE MEALS AND  HS


 SUBQ


   10/24/19 06:30


 11/13/19 11:29  10/25/19 12:06


 


 


 Insulin Detemir


  (Levemir)  12 units  DAILY


 SUBQ


   10/24/19 09:00


 11/13/19 10:29  10/25/19 09:13


 


 


 Lactulose


  (Cephulac)  20 gm  TIDPRN  PRN


 ORAL


 Constipation  10/24/19 04:00


 11/15/19 03:59   


 


 


 Lorazepam


  (Ativan 2mg/ml


 1ml)  1 mg  Q8H  PRN


 IV


 For Anxiety  10/24/19 04:00


 10/31/19 03:59   


 


 


 Meropenem 1 gm/


 Sodium Chloride  55 ml @ 


 110 mls/hr  Q12H


 IVPB


   10/24/19 14:00


 10/30/19 23:59  10/25/19 13:50


 


 


 Pantoprazole


  (Protonix)  40 mg  BID


 ORAL


   10/24/19 03:45


 11/13/19 08:59  10/25/19 09:01


 











Last 24 Hour Vital Signs








  Date Time  Temp Pulse Resp B/P (MAP) Pulse Ox O2 Delivery O2 Flow Rate FiO2


 


10/25/19 13:00  77 20 134/47 (76) 100   


 


10/25/19 12:00  77 20 143/57 (85) 100   


 


10/25/19 12:00 98.1 76 20 136/53 (80) 94   


 


10/25/19 12:00  73      


 


10/25/19 11:00  82 23 136/53 (80) 95   


 


10/25/19 10:00  85 23 147/87 (107) 94   


 


10/25/19 09:00  78 21 133/54 (80) 100   


 


10/25/19 08:00 98.4 77 20 143/57 (85) 100   


 


10/25/19 08:00  79      


 


10/25/19 07:02     100 Nasal Cannula 2.0 28


 


10/25/19 07:02  80 21  100 Nasal Cannula 2.0 28


 


10/25/19 07:00  81 22 135/50 (78) 99   


 


10/25/19 06:00  78 24 135/50 (78) 98   


 


10/25/19 05:00  81 22 155/57 (89) 100   


 


10/25/19 04:00 98.7 82 23 122/64 (83) 99   


 


10/25/19 04:00      Nasal Cannula 2.0 


 


10/25/19 03:40  83      


 


10/25/19 03:00  83 21 143/52 (82) 100   


 


10/25/19 02:00  85 22 147/68 (94) 99   


 


10/25/19 01:00  83 23 133/60 (84) 97   


 


10/25/19 00:00      Nasal Cannula 2.0 


 


10/25/19 00:00 98.2 79 24 136/61 (86) 96   


 


10/24/19 23:49  79      


 


10/24/19 23:00  76 21 127/60 (82) 100   


 


10/24/19 22:00  82 21 131/89 (103) 100   


 


10/24/19 21:00  79 21 134/51 (78) 100   


 


10/24/19 20:00      Nasal Cannula 2.0 


 


10/24/19 20:00 98.0 79 20 134/58 (83) 100   


 


10/24/19 19:50  82      


 


10/24/19 19:03     100 Nasal Cannula 2.0 28


 


10/24/19 19:02  78 20  100 Nasal Cannula 2.0 28


 


10/24/19 19:00  81 23 140/57 (84) 100   


 


10/24/19 18:00  75 19 130/54 (79) 100   


 


10/24/19 17:00  78 20 140/57 (84) 100   


 


10/24/19 16:00 97.5 75 20 144/53 (83) 100   


 


10/24/19 16:00  64      


 


10/24/19 15:00  73 17 141/52 (81) 100   


 


10/24/19 14:00  76 20 138/71 (93) 93   


 


10/24/19 13:00  64 20 115/47 (69) 100   


 


10/24/19 12:00  68      


 


10/24/19 12:00 97.7 70 17 123/49 (73) 100   


 


10/24/19 11:00  75 20 133/52 (79) 99   


 


10/24/19 10:00  81 18 150/58 (88) 100   


 


10/24/19 09:00  71 16 124/49 (74) 100   


 


10/24/19 08:00      Nasal Cannula 2.0 


 


10/24/19 08:00  72      


 


10/24/19 08:00 97.5 68 20 121/43 (69) 99   


 


10/24/19 08:00  66 17  100 Nasal Cannula 2.0 28


 


10/24/19 08:00     100 Nasal Cannula 2.0 28


 


10/24/19 07:00  74 19 124/49 (74) 99   


 


10/24/19 06:00  76 18 117/55 (75) 98   


 


10/24/19 05:00  75 18 121/49 (73) 97   


 


10/24/19 04:00  86      


 


10/24/19 04:00      Nasal Cannula 2.0 


 


10/24/19 04:00  89 24 133/42 (72) 100   


 


10/24/19 03:51  92      


 


10/24/19 03:30 98.5 106 33 136/56 (82) 100   


 


10/24/19 03:15  106 33 139/71 (93) 98   


 


10/24/19 03:03  120      


 


10/24/19 00:35 97.9 90 22 142/79 (100) 97   


 


10/24/19 00:34  93      


 


10/23/19 20:39      Nasal Cannula 2.0 


 


10/23/19 20:39 99.2 91 20 163/79 (107) 95   


 


10/23/19 20:00  93      


 


10/23/19 19:58     98 Nasal Cannula 2.0 28


 


10/23/19 16:00 98.1 64 22 150/72 (98) 98   


 


10/23/19 16:00  93      

















Intake and Output  


 


 10/24/19 10/25/19





 19:00 07:00


 


Intake Total 185 ml 1025 ml


 


Output Total 1450 ml 1525 ml


 


Balance -1265 ml -500 ml


 


  


 


Intake Oral 0 ml 250 ml


 


IV Total 185 ml 775 ml


 


Output Urine Total 1450 ml 1525 ml


 


# Bowel Movements 1 4











Labs








Test


  10/23/19


02:35 10/23/19


05:30 10/23/19


06:17 10/24/19


02:36


 


Stool Occult Blood


  Negative


(NEGATIVE) 


  


  


 


 


Urine Eosinophils


  


  None seen


(NONE SEEN) 


  


 


 


White Blood Count


  


  


  19.3 K/UL


(4.8-10.8) 


 


 


Red Blood Count


  


  


  3.17 M/UL


(4.20-5.40) 


 


 


Hemoglobin


  


  


  8.7 G/DL


(12.0-16.0) 


 


 


Hematocrit


  


  


  26.7 %


(37.0-47.0) 


 


 


Mean Corpuscular Volume   84 FL (80-99)  


 


Mean Corpuscular Hemoglobin


  


  


  27.6 PG


(27.0-31.0) 


 


 


Mean Corpuscular Hemoglobin


Concent 


  


  32.7 G/DL


(32.0-36.0) 


 


 


Red Cell Distribution Width


  


  


  13.8 %


(11.6-14.8) 


 


 


Platelet Count


  


  


  440 K/UL


(150-450) 


 


 


Mean Platelet Volume


  


  


  6.9 FL


(6.5-10.1) 


 


 


Neutrophils (%) (Auto)    % (45.0-75.0)  


 


Lymphocytes (%) (Auto)    % (20.0-45.0)  


 


Monocytes (%) (Auto)    % (1.0-10.0)  


 


Eosinophils (%) (Auto)    % (0.0-3.0)  


 


Basophils (%) (Auto)    % (0.0-2.0)  


 


Differential Total Cells


Counted 


  


  100 


  


 


 


Neutrophils % (Manual)   70 % (45-75)  


 


Lymphocytes % (Manual)   13 % (20-45)  


 


Monocytes % (Manual)   12 % (1-10)  


 


Eosinophils % (Manual)   4 % (0-3)  


 


Basophils % (Manual)   1 % (0-2)  


 


Band Neutrophils   0 % (0-8)  


 


Platelet Estimate   Adequate  


 


Platelet Morphology   Normal  


 


Sodium Level


  


  


  140 MMOL/L


(136-145) 


 


 


Potassium Level


  


  


  4.0 MMOL/L


(3.5-5.1) 


 


 


Chloride Level


  


  


  106 MMOL/L


() 


 


 


Carbon Dioxide Level


  


  


  22 MMOL/L


(21-32) 


 


 


Anion Gap


  


  


  12 mmol/L


(5-15) 


 


 


Blood Urea Nitrogen


  


  


  29 mg/dL


(7-18) 


 


 


Creatinine


  


  


  2.2 MG/DL


(0.55-1.30) 


 


 


Estimat Glomerular Filtration


Rate 


  


  22.3 mL/min


(>60) 


 


 


Glucose Level


  


  


  91 MG/DL


() 


 


 


Calcium Level


  


  


  8.7 MG/DL


(8.5-10.1) 


 


 


Arterial Blood pH


  


  


  


  7.317


(7.350-7.450)


 


Arterial Blood Partial


Pressure CO2 


  


  


  39.1 mmHg


(35.0-45.0)


 


Arterial Blood Partial


Pressure O2 


  


  


  77.0 mmHg


(75.0-100.0)


 


Arterial Blood HCO3


  


  


  


  19.6 mmol/L


(22.0-26.0)


 


Arterial Blood Oxygen


Saturation 


  


  


  93.7 %


()


 


Arterial Blood Base Excess    -6.1 (-2-2) 


 


Graham Test     


 


Test


  10/24/19


03:55 10/25/19


09:30 


  


 


 


White Blood Count


  24.0 K/UL


(4.8-10.8) 


  


  


 


 


Red Blood Count


  2.94 M/UL


(4.20-5.40) 


  


  


 


 


Hemoglobin


  8.2 G/DL


(12.0-16.0) 


  


  


 


 


Hematocrit


  24.4 %


(37.0-47.0) 


  


  


 


 


Mean Corpuscular Volume 83 FL (80-99)    


 


Mean Corpuscular Hemoglobin


  27.9 PG


(27.0-31.0) 


  


  


 


 


Mean Corpuscular Hemoglobin


Concent 33.5 G/DL


(32.0-36.0) 


  


  


 


 


Red Cell Distribution Width


  13.4 %


(11.6-14.8) 


  


  


 


 


Platelet Count


  421 K/UL


(150-450) 


  


  


 


 


Mean Platelet Volume


  7.0 FL


(6.5-10.1) 


  


  


 


 


Neutrophils (%) (Auto)  % (45.0-75.0)    


 


Lymphocytes (%) (Auto)  % (20.0-45.0)    


 


Monocytes (%) (Auto)  % (1.0-10.0)    


 


Eosinophils (%) (Auto)  % (0.0-3.0)    


 


Basophils (%) (Auto)  % (0.0-2.0)    


 


Differential Total Cells


Counted 100 


  


  


  


 


 


Neutrophils % (Manual) 89 % (45-75)    


 


Lymphocytes % (Manual) 6 % (20-45)    


 


Monocytes % (Manual) 4 % (1-10)    


 


Eosinophils % (Manual) 1 % (0-3)    


 


Basophils % (Manual) 0 % (0-2)    


 


Band Neutrophils 0 % (0-8)    


 


Platelet Estimate Adequate    


 


Platelet Morphology Normal    


 


Hypochromasia 1+    


 


Sodium Level


  140 MMOL/L


(136-145) 143 MMOL/L


(136-145) 


  


 


 


Potassium Level


  3.6 MMOL/L


(3.5-5.1) 3.8 MMOL/L


(3.5-5.1) 


  


 


 


Chloride Level


  106 MMOL/L


() 107 MMOL/L


() 


  


 


 


Carbon Dioxide Level


  22 MMOL/L


(21-32) 26 MMOL/L


(21-32) 


  


 


 


Anion Gap


  12 mmol/L


(5-15) 10 mmol/L


(5-15) 


  


 


 


Blood Urea Nitrogen


  22 mg/dL


(7-18) 17 mg/dL


(7-18) 


  


 


 


Creatinine


  1.6 MG/DL


(0.55-1.30) 1.2 MG/DL


(0.55-1.30) 


  


 


 


Estimat Glomerular Filtration


Rate 32.3 mL/min


(>60) 45.0 mL/min


(>60) 


  


 


 


Glucose Level


  124 MG/DL


() 127 MG/DL


() 


  


 


 


Uric Acid


  6.8 MG/DL


(2.6-7.2) 


  


  


 


 


Calcium Level


  8.6 MG/DL


(8.5-10.1) 8.6 MG/DL


(8.5-10.1) 


  


 


 


Phosphorus Level


  4.4 MG/DL


(2.5-4.9) 


  


  


 


 


Magnesium Level


  1.7 MG/DL


(1.8-2.4) 


  


  


 


 


Total Bilirubin


  0.4 MG/DL


(0.2-1.0) 


  


  


 


 


Aspartate Amino Transf


(AST/SGOT) 17 U/L (15-37) 


  


  


  


 


 


Alanine Aminotransferase


(ALT/SGPT) 18 U/L (12-78) 


  


  


  


 


 


Alkaline Phosphatase


  87 U/L


() 


  


  


 


 


C-Reactive Protein,


Quantitative 8.6 mg/dL


(0.00-0.90) 


  


  


 


 


Pro-B-Type Natriuretic Peptide


  98860 pg/mL


(0-125) 


  


  


 


 


Total Protein


  6.0 G/DL


(6.4-8.2) 


  


  


 


 


Albumin


  1.6 G/DL


(3.4-5.0) 


  


  


 


 


Globulin 4.4 g/dL    


 


Albumin/Globulin Ratio 0.4 (1.0-2.7)    








Height (Feet):  5


Height (Inches):  5.00


Weight (Pounds):  198


Objective





Physical Exam:


Vitals: reviewed


General Appearance:  NAD


HEENT:  normocephalic, atraumatic


Neck:  non-tender, normal alignment


Respiratory/Chest:  normal breath sounds bilaterally


Cardiovascular/Chest: rrr


Abdomen:  normal bowel sounds, soft, nontender


Extremities:  normal range of motion











Mike Pop MD Oct 25, 2019 15:01

## 2019-10-25 NOTE — NUR
RD ASSESSMENT & RECOMMENDATIONS

SEE CARE ACTIVITY FOR COMPLETE ASSESSMENT



DAILY ESTIMATED NEEDS:

Needs based on Wound, DM, Sepsis/ 68kg 

25-30  kcals/kg 

3508-2829  total kcals

1.25-2  g protein/kg

  g total protein 

25-30  mL/kg

8801-0324  total fluid mLs



NUTRITION DIAGNOSIS:

* Swallowing difficulty R/T dysphagia, poor dentition, dementia as

evidenced by on liquify pureed, NTL texture diet per SLP rec.

* Increased kcal/prot needs R/T wound healing and sepsis as evidenced by

admitted w/ DTPI R heel and non-blanchable sacral erythema, critically

elev wbc (24.0*), elev LA (5.8 -> wnl), elev CRP, now afebrile.

* Altered nutrition related lab values R/T CHF, diabetes as evidenced by

elev BNP (>00477), A1C of 10.4, elev BGs and POC glu (130-264)



(CURRENT DIET:CCHO MED, liquify pureed w/ NTL + Glucerna TID)





PO DIET RECOMMENDATIONS:

Liberalized diet to REGULAR/texture per SLP + Glucerna TID w/ meals 







ADDITIONAL RECOMMENDATIONS:

* Calibrated bedscale wt 

* Wound healing: add MVI x1, Vit C 250mg QD 

                          : Zaki 1pkt BID if tolerated 

* Add folic acid 1mg QD (low folate 7.2) 

* Consider adjusting IVF: On D5 IVF, Na now wnl, BGs elevated 

* Monitor PO intake closely -> remains poor 

* Monitor renal fxn: BUN/ creat wnl  

* Consult RD w/ continued poor po intake for non oral feeds/ TF recs if part of POC

## 2019-10-25 NOTE — INFECTIOUS DISEASES PROG NOTE
Assessment/Plan


Problems:  


(1) Aspiration pneumonia


Assessment & Plan:  with B/L infiltrates, L>R suspect due to altered mental 

status , with hypoxemia and leukocytosis, continue meropenem empirically, 

repeated  CXR  showed no change , CT of the chest showed B/L effusion with 

basal infiltrates , aspiration precaution. continue meropenem for two weeks 

total . EOT 10/30/19  





(2) UTI (urinary tract infection)


Assessment & Plan:  due to ESBL producing E coli already on meropenem to cover 

for sepsis and pneumonia too for two weeks total  





(3) Sepsis


Assessment & Plan:  due to the above, with persistent leukocytosis inspit of 

being on wide spectrum antibiotics and negative repeated blood cultures, 

suspect pelvic mass related or bone marrow pathology . recommend gynecology 

eval for pelvic mass resection . will order indium scan to evaluate the pelvic 

mass and rule out deep abscess. unfortunately we are unable to reach her 

daughter to sign the consent, and no other family member is available to do so, 

will order the test as medical necessity. D/W CHARGE NURSE 





(4) Acute metabolic encephalopathy


Assessment & Plan:  due to the above, continue hydration and antibiotics, 

monitor in tele 





(5) Hyperglycemia due to type 2 diabetes mellitus


Assessment & Plan:  recommend tight glycemic control to keep blood glucose 

between 100-140 





(6) ARF (acute renal failure)


Assessment & Plan:  due to the above, continue hydration and renally dosed 

antibiotics, close  monitor of renal function , stop vancomycin 





(7) Pelvic mass in female


Assessment & Plan:  to the left of the uterus, suspect malignancy , recommend OB

/GYN eval , and tumor markers , oncology is following 








Subjective


ROS Limited/Unobtainable:  Yes


Allergies:  


Coded Allergies:  


     No Known Allergies (Unverified , 10/14/19)


Subjective


she was STILL in  ICU for hypoxemia ,  lethargic , lying in bed,  and minimally 

responsive , no fever or chills , no diarrhea, has cough but no SOB





Objective


Vital Signs





Last 24 Hour Vital Signs








  Date Time  Temp Pulse Resp B/P (MAP) Pulse Ox O2 Delivery O2 Flow Rate FiO2


 


10/25/19 18:59     99 Nasal Cannula 2.0 28


 


10/25/19 18:59  87 24  98 Nasal Cannula 2.0 28


 


10/25/19 18:00  84 27 140/52 (81) 100   


 


10/25/19 17:00  86 27 153/56 (88) 100   


 


10/25/19 16:00 98.8 93 31 162/56 (91) 99   


 


10/25/19 16:00  91      


 


10/25/19 15:00  85 21 162/54 (90) 100   


 


10/25/19 14:00  80 22 152/58 (89) 100   


 


10/25/19 13:00  77 20 134/47 (76) 100   


 


10/25/19 12:00  77 20 143/57 (85) 100   


 


10/25/19 12:00      Nasal Cannula 2.0 


 


10/25/19 12:00 98.1 76 20 136/53 (80) 94   


 


10/25/19 12:00  73      


 


10/25/19 11:00  82 23 136/53 (80) 95   


 


10/25/19 10:00  85 23 147/87 (107) 94   


 


10/25/19 09:00  78 21 133/54 (80) 100   


 


10/25/19 08:00      Nasal Cannula 2.0 


 


10/25/19 08:00 98.4 77 20 143/57 (85) 100   


 


10/25/19 08:00  79      


 


10/25/19 07:02     100 Nasal Cannula 2.0 28


 


10/25/19 07:02  80 21  100 Nasal Cannula 2.0 28


 


10/25/19 07:00  81 22 135/50 (78) 99   


 


10/25/19 06:00  78 24 135/50 (78) 98   


 


10/25/19 05:00  81 22 155/57 (89) 100   


 


10/25/19 04:00 98.7 82 23 122/64 (83) 99   


 


10/25/19 04:00      Nasal Cannula 2.0 


 


10/25/19 03:40  83      


 


10/25/19 03:00  83 21 143/52 (82) 100   


 


10/25/19 02:00  85 22 147/68 (94) 99   


 


10/25/19 01:00  83 23 133/60 (84) 97   


 


10/25/19 00:00      Nasal Cannula 2.0 


 


10/25/19 00:00 98.2 79 24 136/61 (86) 96   


 


10/24/19 23:49  79      


 


10/24/19 23:00  76 21 127/60 (82) 100   


 


10/24/19 22:00  82 21 131/89 (103) 100   


 


10/24/19 21:00  79 21 134/51 (78) 100   








Height (Feet):  5


Height (Inches):  5.00


Weight (Pounds):  198


General Appearance:  WD/WN, no acute distress


HEENT:  normocephalic, atraumatic, anicteric, mucous membranes moist, PERRL


Respiratory/Chest:  no respiratory distress, no accessory muscle use, decreased 

breath sounds, crackles/rales


Cardiovascular:  normal peripheral pulses, normal rate, regular rhythm, no 

gallop/murmur, no JVD


Abdomen:  normal bowel sounds, soft, non tender, no organomegaly, non distended

, no mass, no scars


Genitourinary:  normal external genitalia


Extremities:  no cyanosis, no clubbing


Skin:  no rash, no lesions, no ulcers


Neurologic/Psychiatric:  alert


Lymphatic:  no neck adenopathy, no groin adenopathy


Musculoskeletal:  normal muscle bulk, no effusion





Laboratory Tests








Test


  10/25/19


09:30


 


Sodium Level


  143 MMOL/L


(136-145)


 


Potassium Level


  3.8 MMOL/L


(3.5-5.1)


 


Chloride Level


  107 MMOL/L


()


 


Carbon Dioxide Level


  26 MMOL/L


(21-32)


 


Anion Gap


  10 mmol/L


(5-15)


 


Blood Urea Nitrogen


  17 mg/dL


(7-18)


 


Creatinine


  1.2 MG/DL


(0.55-1.30)


 


Estimat Glomerular Filtration


Rate 45.0 mL/min


(>60)


 


Glucose Level


  127 MG/DL


()  H


 


Calcium Level


  8.6 MG/DL


(8.5-10.1)











Current Medications








 Medications


  (Trade)  Dose


 Ordered  Sig/Winnie


 Route


 PRN Reason  Start Time


 Stop Time Status Last Admin


Dose Admin


 


 Acetaminophen


  (Tylenol)  650 mg  PRN  PRN


 RECTAL


 TEMP>100.5  10/25/19 03:00


     


 


 


 Acetaminophen


  (Tylenol)  650 mg  Q4H  PRN


 ORAL


 Mild Pain/Temp > 100.5  10/25/19 18:30


 11/24/19 18:29   


 


 


 Albuterol/


 Ipratropium


  (Albuterol/


 Ipratropium)  3 ml  Q4HRT  PRN


 HHN


 SHORTNESS OF BREATH  10/24/19 07:00


 10/29/19 06:59   


 


 


 Aspirin


  (ASA)  81 mg  DAILY


 ORAL


   10/24/19 09:00


 11/14/19 08:59  10/25/19 09:01


 


 


 Atorvastatin


 Calcium


  (Lipitor)  10 mg  BEDTIME


 ORAL


   10/24/19 21:00


 11/13/19 20:59  10/24/19 20:30


 


 


 Barium Sulfate


  (Varibar Honey)  250 ml  NOW  PRN


 MC


 RAD  10/24/19 12:00


 10/26/19 11:54   


 


 


 Barium Sulfate


  (Varibar Nectar)  240 ml  NOW  PRN


 MC


 RAD  10/24/19 12:00


 10/26/19 11:54   


 


 


 Barium Sulfate


  (Varibar Pudding)  230 ml  NOW  PRN


 [facility]


 RAD  10/24/19 12:00


 10/26/19 11:54   


 


 


 Clopidogrel


 Bisulfate


  (Plavix)  75 mg  DAILY


 ORAL


   10/24/19 09:00


 11/14/19 08:59  10/25/19 09:01


 


 


 Dextrose


  (Dextrose 50%)  25 ml  Q30M  PRN


 IV


 Hypoglycemia  10/24/19 04:00


 11/13/19 06:59   


 


 


 Dextrose


  (Dextrose 50%)  50 ml  Q30M  PRN


 IV


 Hypoglycemia  10/24/19 04:00


 11/13/19 06:59   


 


 


 Dextrose/Sodium


 Chloride  1,000 ml @ 


 60 mls/hr  [id number]


 IV


   10/24/19 03:45


 11/22/19 10:24  10/25/19 09:06


 


 


 Divalproex Sodium


  (Depakote


 Sprinkles)  500 mg  Q12HR


 ORAL


   10/24/19 09:00


 11/14/19 21:59  10/25/19 09:00


 


 


 Docusate Sodium


  (Colace)  100 mg  THREE TIMES A  DAY


 ORAL


   10/24/19 09:00


 11/13/19 12:59  10/25/19 09:01


 


 


 Donepezil HCl


  (Aricept)  5 mg  DAILY


 ORAL


   10/24/19 09:00


 11/14/19 10:59  10/25/19 09:01


 


 


 Enoxaparin Sodium


  (Lovenox)  30 mg  DAILY


 SUBQ


   10/24/19 09:00


 11/13/19 08:59  10/25/19 09:02


 


 


 Furosemide


  (Lasix)  20 mg  BID


 IV


   10/24/19 09:00


 11/23/19 08:59  10/25/19 17:40


 


 


 Heparin Sodium


  (Porcine)


  (Heparin)  2,000 unit  ONCE  PRN


 INJ


 for scan  10/25/19 11:00


 10/25/19 23:59   


 


 


 Insulin Aspart


  (NovoLOG)    BEFORE MEALS AND  HS


 SUBQ


   10/24/19 06:30


 11/13/19 11:29  10/25/19 12:06


 


 


 Insulin Detemir


  (Levemir)  12 units  DAILY


 SUBQ


   10/24/19 09:00


 11/13/19 10:29  10/25/19 09:13


 


 


 Lactulose


  (Cephulac)  20 gm  TIDPRN  PRN


 ORAL


 Constipation  10/24/19 04:00


 11/15/19 03:59   


 


 


 Lorazepam


  (Ativan 2mg/ml


 1ml)  1 mg  Q8H  PRN


 IV


 For Anxiety  10/24/19 04:00


 10/31/19 03:59  10/25/19 16:18


 


 


 Meropenem 1 gm/


 Sodium Chloride  55 ml @ 


 110 mls/hr  Q12H


 IVPB


   10/24/19 14:00


 10/30/19 23:59  10/25/19 13:50


 


 


 Pantoprazole


  (Protonix)  40 mg  BID


 ORAL


   10/24/19 03:45


 11/13/19 08:59  10/25/19 17:40


 

















Amie Burgos M.D. Oct 25, 2019 20:05

## 2019-10-26 VITALS — DIASTOLIC BLOOD PRESSURE: 51 MMHG | SYSTOLIC BLOOD PRESSURE: 136 MMHG

## 2019-10-26 VITALS — DIASTOLIC BLOOD PRESSURE: 65 MMHG | SYSTOLIC BLOOD PRESSURE: 136 MMHG

## 2019-10-26 VITALS — DIASTOLIC BLOOD PRESSURE: 65 MMHG | SYSTOLIC BLOOD PRESSURE: 101 MMHG

## 2019-10-26 VITALS — SYSTOLIC BLOOD PRESSURE: 150 MMHG | DIASTOLIC BLOOD PRESSURE: 54 MMHG

## 2019-10-26 VITALS — SYSTOLIC BLOOD PRESSURE: 121 MMHG | DIASTOLIC BLOOD PRESSURE: 47 MMHG

## 2019-10-26 VITALS — SYSTOLIC BLOOD PRESSURE: 132 MMHG | DIASTOLIC BLOOD PRESSURE: 49 MMHG

## 2019-10-26 VITALS — SYSTOLIC BLOOD PRESSURE: 99 MMHG | DIASTOLIC BLOOD PRESSURE: 71 MMHG

## 2019-10-26 VITALS — SYSTOLIC BLOOD PRESSURE: 97 MMHG | DIASTOLIC BLOOD PRESSURE: 73 MMHG

## 2019-10-26 VITALS — SYSTOLIC BLOOD PRESSURE: 131 MMHG | DIASTOLIC BLOOD PRESSURE: 53 MMHG

## 2019-10-26 VITALS — DIASTOLIC BLOOD PRESSURE: 56 MMHG | SYSTOLIC BLOOD PRESSURE: 135 MMHG

## 2019-10-26 VITALS — SYSTOLIC BLOOD PRESSURE: 135 MMHG | DIASTOLIC BLOOD PRESSURE: 52 MMHG

## 2019-10-26 VITALS — DIASTOLIC BLOOD PRESSURE: 60 MMHG | SYSTOLIC BLOOD PRESSURE: 138 MMHG

## 2019-10-26 VITALS — SYSTOLIC BLOOD PRESSURE: 131 MMHG | DIASTOLIC BLOOD PRESSURE: 74 MMHG

## 2019-10-26 VITALS — DIASTOLIC BLOOD PRESSURE: 65 MMHG | SYSTOLIC BLOOD PRESSURE: 124 MMHG

## 2019-10-26 VITALS — SYSTOLIC BLOOD PRESSURE: 139 MMHG | DIASTOLIC BLOOD PRESSURE: 59 MMHG

## 2019-10-26 VITALS — DIASTOLIC BLOOD PRESSURE: 59 MMHG | SYSTOLIC BLOOD PRESSURE: 141 MMHG

## 2019-10-26 VITALS — SYSTOLIC BLOOD PRESSURE: 134 MMHG | DIASTOLIC BLOOD PRESSURE: 57 MMHG

## 2019-10-26 VITALS — SYSTOLIC BLOOD PRESSURE: 146 MMHG | DIASTOLIC BLOOD PRESSURE: 62 MMHG

## 2019-10-26 LAB
ADD MANUAL DIFF: YES
ALBUMIN SERPL-MCNC: 1.6 G/DL (ref 3.4–5)
ALP SERPL-CCNC: 79 U/L (ref 46–116)
ALT SERPL-CCNC: 14 U/L (ref 12–78)
ANION GAP SERPL CALC-SCNC: 7 MMOL/L (ref 5–15)
AST SERPL-CCNC: 17 U/L (ref 15–37)
BILIRUB DIRECT SERPL-MCNC: < 0.1 MG/DL (ref 0–0.3)
BILIRUB SERPL-MCNC: 0.3 MG/DL (ref 0.2–1)
BUN SERPL-MCNC: 13 MG/DL (ref 7–18)
CALCIUM SERPL-MCNC: 8 MG/DL (ref 8.5–10.1)
CHLORIDE SERPL-SCNC: 105 MMOL/L (ref 98–107)
CO2 SERPL-SCNC: 27 MMOL/L (ref 21–32)
CREAT SERPL-MCNC: 1.1 MG/DL (ref 0.55–1.3)
ERYTHROCYTE [DISTWIDTH] IN BLOOD BY AUTOMATED COUNT: 13.2 % (ref 11.6–14.8)
HCT VFR BLD CALC: 23.4 % (ref 37–47)
HGB BLD-MCNC: 7.7 G/DL (ref 12–16)
MCV RBC AUTO: 83 FL (ref 80–99)
PHOSPHATE SERPL-MCNC: 2.9 MG/DL (ref 2.5–4.9)
PLATELET # BLD: 384 K/UL (ref 150–450)
POTASSIUM SERPL-SCNC: 3.4 MMOL/L (ref 3.5–5.1)
RBC # BLD AUTO: 2.81 M/UL (ref 4.2–5.4)
SODIUM SERPL-SCNC: 139 MMOL/L (ref 136–145)
WBC # BLD AUTO: 15.4 K/UL (ref 4.8–10.8)

## 2019-10-26 RX ADMIN — LORAZEPAM PRN MG: 2 INJECTION, SOLUTION INTRAMUSCULAR; INTRAVENOUS at 20:11

## 2019-10-26 RX ADMIN — INSULIN ASPART SCH UNITS: 100 INJECTION, SOLUTION INTRAVENOUS; SUBCUTANEOUS at 06:17

## 2019-10-26 RX ADMIN — DOCUSATE SODIUM SCH MG: 50 LIQUID ORAL at 13:00

## 2019-10-26 RX ADMIN — DIVALPROEX SODIUM SCH MG: 125 CAPSULE ORAL at 20:11

## 2019-10-26 RX ADMIN — INSULIN ASPART SCH UNITS: 100 INJECTION, SOLUTION INTRAVENOUS; SUBCUTANEOUS at 11:30

## 2019-10-26 RX ADMIN — MEROPENEM SCH MLS/HR: 1 INJECTION INTRAVENOUS at 02:00

## 2019-10-26 RX ADMIN — INSULIN ASPART SCH UNITS: 100 INJECTION, SOLUTION INTRAVENOUS; SUBCUTANEOUS at 20:12

## 2019-10-26 RX ADMIN — ENOXAPARIN SODIUM SCH MG: 30 INJECTION SUBCUTANEOUS at 08:46

## 2019-10-26 RX ADMIN — INSULIN DETEMIR SCH UNITS: 100 INJECTION, SOLUTION SUBCUTANEOUS at 08:59

## 2019-10-26 RX ADMIN — DOCUSATE SODIUM SCH MG: 50 LIQUID ORAL at 08:43

## 2019-10-26 NOTE — NUR
NURSE NOTES: Patient's having shortness of breath, lungs sound present with crackles and 
diaphoretic. O2 is 85% on 2L nasal cannula, BP was 165/84, , Temp 99.2, RR 22. Nurse 
called respiratory therapist to suction patient. Nurse put her on non-rebreather oxygen 
mask. RT was able to suction out secretion and she's in labor breathing. Dr. Castano 
ordered ABG and X-ray chest STAT. Orders acknowledged and carried out. Nurse contacted Dr. Main regarding the patient's condition. Dr. Main ordered patient to be on continuous BiPAP 
and transferred to [facility] right away, Lasix 40mg IVP once state and another ABG drawn half an 
hour after using BiPap. BP came back to 118/65, O2 100%, RR 20, . Orders acknowledged 
and carried out. Patient is condition going back to stable condition.

## 2019-10-26 NOTE — SURGERY PROGRESS NOTE
Surgery Progress Note


Subjective


Additional Comments


NG tube in place.  Start tube feeds.  Labs noted.  Exam unchanged.





Objective





Last 24 Hour Vital Signs








  Date Time  Temp Pulse Resp B/P (MAP) Pulse Ox O2 Delivery O2 Flow Rate FiO2


 


10/26/19 12:00  81      


 


10/26/19 12:00 98.0 78 19 131/53 (79) 98   


 


10/26/19 11:00  72 18 121/47 (71) 99   


 


10/26/19 10:00  77 21 135/56 (82) 99   


 


10/26/19 09:00  80 22 135/52 (79) 99   


 


10/26/19 08:00  78      


 


10/26/19 08:00 98.2 79 20 132/49 (76) 99   


 


10/26/19 07:00  84 20 138/60 (86) 96   


 


10/26/19 06:00  77 21 134/57 (82) 97   


 


10/26/19 05:00  79 16 136/51 (79) 93   


 


10/26/19 04:00  75      


 


10/26/19 04:00      Nasal Cannula 2.0 


 


10/26/19 04:00 98.6 88 28 139/59 (85) 93   


 


10/26/19 03:00  85 28 136/65 (88) 99   


 


10/26/19 02:00  84 32 146/62 (90) 99   


 


10/26/19 01:00  84 30 141/59 (86) 99   


 


10/26/19 00:00      Nasal Cannula 2.0 


 


10/26/19 00:00  85 31 150/54 (86) 99   


 


10/26/19 00:00  79      


 


10/25/19 23:00  87 26 160/71 (100) 97   


 


10/25/19 22:00  82 25 144/51 (82) 100   


 


10/25/19 21:00  86 29 155/63 (93) 97   


 


10/25/19 20:00      Nasal Cannula 2.0 


 


10/25/19 20:00  85      


 


10/25/19 20:00 98.2 98 29 165/76 (105) 96   


 


10/25/19 19:00  87 28 143/55 (84) 99   


 


10/25/19 18:59     99 Nasal Cannula 2.0 28


 


10/25/19 18:59  87 24  98 Nasal Cannula 2.0 28


 


10/25/19 18:00  84 27 140/52 (81) 100   


 


10/25/19 17:00  86 27 153/56 (88) 100   


 


10/25/19 16:00 98.8 93 31 162/56 (91) 99   


 


10/25/19 16:00  91      


 


10/25/19 15:00  85 21 162/54 (90) 100   








I&O











Intake and Output  


 


 10/25/19 10/26/19





 19:00 07:00


 


Intake Total 895 ml 185 ml


 


Output Total 720 ml 625 ml


 


Balance 175 ml -440 ml


 


  


 


Intake Oral 240 ml 


 


Free Water  130 ml


 


IV Total 655 ml 55 ml


 


Output Urine Total 720 ml 625 ml


 


# Bowel Movements 3 








Dressing:  other


Wound:  other


Cardiovascular:  RSR


Respiratory:  decreased breath sounds


Abdomen:  soft, present bowel sounds, non-distended


Extremities:  no cyanosis





Laboratory Tests








Test


  10/26/19


04:00


 


White Blood Count


  15.4 K/UL


(4.8-10.8)  H


 


Red Blood Count


  2.81 M/UL


(4.20-5.40)  L


 


Hemoglobin


  7.7 G/DL


(12.0-16.0)  L


 


Hematocrit


  23.4 %


(37.0-47.0)  L


 


Mean Corpuscular Volume 83 FL (80-99)  


 


Mean Corpuscular Hemoglobin


  27.3 PG


(27.0-31.0)


 


Mean Corpuscular Hemoglobin


Concent 32.8 G/DL


(32.0-36.0)


 


Red Cell Distribution Width


  13.2 %


(11.6-14.8)


 


Platelet Count


  384 K/UL


(150-450)


 


Mean Platelet Volume


  7.3 FL


(6.5-10.1)


 


Neutrophils (%) (Auto)


  % (45.0-75.0)


 


 


Lymphocytes (%) (Auto)


  % (20.0-45.0)


 


 


Monocytes (%) (Auto)  % (1.0-10.0)  


 


Eosinophils (%) (Auto)  % (0.0-3.0)  


 


Basophils (%) (Auto)  % (0.0-2.0)  


 


Differential Total Cells


Counted 100  


 


 


Neutrophils % (Manual) 76 % (45-75)  H


 


Lymphocytes % (Manual) 16 % (20-45)  L


 


Monocytes % (Manual) 4 % (1-10)  


 


Eosinophils % (Manual) 2 % (0-3)  


 


Basophils % (Manual) 2 % (0-2)  


 


Band Neutrophils 0 % (0-8)  


 


Platelet Estimate Adequate  


 


Platelet Morphology Normal  


 


Hypochromasia 3+  


 


Anisocytosis 1+  


 


Spherocytes 1+  


 


Sodium Level


  139 MMOL/L


(136-145)


 


Potassium Level


  3.4 MMOL/L


(3.5-5.1)  L


 


Chloride Level


  105 MMOL/L


()


 


Carbon Dioxide Level


  27 MMOL/L


(21-32)


 


Anion Gap


  7 mmol/L


(5-15)


 


Blood Urea Nitrogen


  13 mg/dL


(7-18)


 


Creatinine


  1.1 MG/DL


(0.55-1.30)


 


Estimat Glomerular Filtration


Rate 49.7 mL/min


(>60)


 


Glucose Level


  78 MG/DL


()


 


Calcium Level


  8.0 MG/DL


(8.5-10.1)  L


 


Phosphorus Level


  2.9 MG/DL


(2.5-4.9)


 


Magnesium Level


  1.4 MG/DL


(1.8-2.4)  L


 


Total Bilirubin


  0.3 MG/DL


(0.2-1.0)


 


Direct Bilirubin


  < 0.1 MG/DL


(0.0-0.3)


 


Aspartate Amino Transf


(AST/SGOT) 17 U/L (15-37)


 


 


Alanine Aminotransferase


(ALT/SGPT) 14 U/L (12-78)


 


 


Alkaline Phosphatase


  79 U/L


()


 


Total Protein


  5.7 G/DL


(6.4-8.2)  L


 


Albumin


  1.6 G/DL


(3.4-5.0)  L











Plan


Problems:  


(1) Pressure injury of deep tissue


Assessment & Plan:  Pt presented on admission with DTPI R heel. Blood filled 

blister with marginal erythema noted to heel. Pt exhibited agitation when 

minimally palpated. (L)2.2cm x (W)2.1cm


L heel is blanchable .


Non-blanchable erythema without induration noted to sacral cleft. 


No other areas of skin concerns noted.





Tx.Plan:


Apply Betadine to R heel. Cover with Optifoam drsg. Change every 3 days and prn.


           


Apply Cavilon Skin Barrier to L heel. Cover with Optifoam drsg. Change every 7 

days and prn.


           


Apply Moisture Barrier Paste to buttocks. Cover sacral area with Optifoam drsg. 

Change every 3 days and prn.


           


Reposition at least every 2hours or as tolerated.


           


Off-load heels with pillow.





(2) Sepsis


Assessment & Plan:  Patient with low-grade fevers, anemia, leukocytosis 

persistent, elevated platelets, abnormal labs.


Etiology currently unknown and work-up in progress.  Labs noted and imaging 

that is available as noted.  


Okay for diet


CT noted


US noted


Impression: 13 x 7 x 12.9 cm unilocular cystic mass, corresponding to lesion 

reported


on recent CT scan. Layering low level internal echoes likely represent bladder


debris.. This presumably represents a cystic ovarian lesion with debris or


hemorrhage, possibly neoplastic. Gynecological consultation is recommended


Antibiotics as per infectious disease 


local wound care as wounds do not seem to be the etiology of her sepsis


We will monitor and follow with recommendations


Thank you for allowing me to participate in patient's care














Radhames Blas Oct 26, 2019 14:08

## 2019-10-26 NOTE — NUR
NURSE NOTES:

Patient cleaned and repositioned. VSS, no new changes, patient sleeping but easily 
arousable. Blood drawn and sent to lab.

## 2019-10-26 NOTE — NUR
TRANSFER TO FLOOR:

Patient transferred to 2E/206-2. Report given to ELIZABETH Keen. No belongings. Endorsed plan of 
care.

## 2019-10-26 NOTE — INFECTIOUS DISEASES PROG NOTE
Assessment/Plan


Problems:  


(1) Aspiration pneumonia


Assessment & Plan:  with B/L infiltrates, L>R suspect due to altered mental 

status , with hypoxemia and leukocytosis, continue meropenem empirically, 

repeated  CXR  showed no change , CT of the chest showed B/L effusion with 

basal infiltrates , aspiration precaution. continue meropenem for two weeks 

total . EOT 10/30/19  





(2) UTI (urinary tract infection)


Assessment & Plan:  due to ESBL producing E coli already on meropenem to cover 

for sepsis and pneumonia too for two weeks total  





(3) Sepsis


Assessment & Plan:  due to the above, with persistent leukocytosis inspit of 

being on wide spectrum antibiotics and negative repeated blood cultures, 

suspect pelvic mass related or bone marrow pathology . recommend gynecology 

eval for pelvic mass resection . will order indium scan to evaluate the pelvic 

mass and rule out deep abscess. unfortunately we are unable to reach her 

daughter to sign the consent, and no other family member is available to do so, 

will order the test as medical necessity. D/W CHARGE NURSE 





(4) Acute metabolic encephalopathy


Assessment & Plan:  due to the above, continue hydration and antibiotics, 

monitor in tele 





(5) Hyperglycemia due to type 2 diabetes mellitus


Assessment & Plan:  recommend tight glycemic control to keep blood glucose 

between 100-140 





(6) ARF (acute renal failure)


Assessment & Plan:  due to the above, continue hydration and renally dosed 

antibiotics, close  monitor of renal function , stop vancomycin 





(7) Pelvic mass in female


Assessment & Plan:  to the left of the uterus, suspect malignancy , recommend OB

/GYN eval , and tumor markers , oncology is following 








Subjective


ROS Limited/Unobtainable:  Yes


Allergies:  


Coded Allergies:  


     No Known Allergies (Unverified , 10/14/19)


Subjective


she was STILL in  ICU lethargic , lying in bed,  and minimally responsive , no 

fever or chills , no diarrhea, has cough but no SOB





Objective


Vital Signs





Last 24 Hour Vital Signs








  Date Time  Temp Pulse Resp B/P (MAP) Pulse Ox O2 Delivery O2 Flow Rate FiO2


 


10/26/19 12:00  81      


 


10/26/19 12:00 98.0 78 19 131/53 (79) 98   


 


10/26/19 11:00  72 18 121/47 (71) 99   


 


10/26/19 10:00  77 21 135/56 (82) 99   


 


10/26/19 09:00  80 22 135/52 (79) 99   


 


10/26/19 08:00  78      


 


10/26/19 08:00 98.2 79 20 132/49 (76) 99   


 


10/26/19 07:00  84 20 138/60 (86) 96   


 


10/26/19 06:00  77 21 134/57 (82) 97   


 


10/26/19 05:00  79 16 136/51 (79) 93   


 


10/26/19 04:00  75      


 


10/26/19 04:00      Nasal Cannula 2.0 


 


10/26/19 04:00 98.6 88 28 139/59 (85) 93   


 


10/26/19 03:00  85 28 136/65 (88) 99   


 


10/26/19 02:00  84 32 146/62 (90) 99   


 


10/26/19 01:00  84 30 141/59 (86) 99   


 


10/26/19 00:00      Nasal Cannula 2.0 


 


10/26/19 00:00  85 31 150/54 (86) 99   


 


10/26/19 00:00  79      


 


10/25/19 23:00  87 26 160/71 (100) 97   


 


10/25/19 22:00  82 25 144/51 (82) 100   


 


10/25/19 21:00  86 29 155/63 (93) 97   


 


10/25/19 20:00      Nasal Cannula 2.0 


 


10/25/19 20:00  85      


 


10/25/19 20:00 98.2 98 29 165/76 (105) 96   


 


10/25/19 19:00  87 28 143/55 (84) 99   


 


10/25/19 18:59     99 Nasal Cannula 2.0 28


 


10/25/19 18:59  87 24  98 Nasal Cannula 2.0 28


 


10/25/19 18:00  84 27 140/52 (81) 100   


 


10/25/19 17:00  86 27 153/56 (88) 100   


 


10/25/19 16:00 98.8 93 31 162/56 (91) 99   


 


10/25/19 16:00  91      


 


10/25/19 15:00  85 21 162/54 (90) 100   








Height (Feet):  5


Height (Inches):  5.00


Weight (Pounds):  199


General Appearance:  WD/WN, no acute distress


HEENT:  normocephalic, atraumatic, anicteric, mucous membranes moist, PERRL


Respiratory/Chest:  chest wall non-tender, normal breath sounds, no respiratory 

distress, no accessory muscle use, decreased breath sounds, crackles/rales


Cardiovascular:  normal peripheral pulses, normal rate, regular rhythm, no 

gallop/murmur, no JVD


Abdomen:  normal bowel sounds, soft, non tender, no organomegaly, non distended

, no mass, no scars


Genitourinary:  normal external genitalia


Extremities:  no cyanosis, no clubbing


Skin:  no rash, no lesions, ulcers


Neurologic/Psychiatric:  unresponsiveness


Lymphatic:  no neck adenopathy, no groin adenopathy


Musculoskeletal:  normal muscle bulk, no effusion





Laboratory Tests








Test


  10/26/19


04:00


 


White Blood Count


  15.4 K/UL


(4.8-10.8)  H


 


Red Blood Count


  2.81 M/UL


(4.20-5.40)  L


 


Hemoglobin


  7.7 G/DL


(12.0-16.0)  L


 


Hematocrit


  23.4 %


(37.0-47.0)  L


 


Mean Corpuscular Volume 83 FL (80-99)  


 


Mean Corpuscular Hemoglobin


  27.3 PG


(27.0-31.0)


 


Mean Corpuscular Hemoglobin


Concent 32.8 G/DL


(32.0-36.0)


 


Red Cell Distribution Width


  13.2 %


(11.6-14.8)


 


Platelet Count


  384 K/UL


(150-450)


 


Mean Platelet Volume


  7.3 FL


(6.5-10.1)


 


Neutrophils (%) (Auto)


  % (45.0-75.0)


 


 


Lymphocytes (%) (Auto)


  % (20.0-45.0)


 


 


Monocytes (%) (Auto)  % (1.0-10.0)  


 


Eosinophils (%) (Auto)  % (0.0-3.0)  


 


Basophils (%) (Auto)  % (0.0-2.0)  


 


Differential Total Cells


Counted 100  


 


 


Neutrophils % (Manual) 76 % (45-75)  H


 


Lymphocytes % (Manual) 16 % (20-45)  L


 


Monocytes % (Manual) 4 % (1-10)  


 


Eosinophils % (Manual) 2 % (0-3)  


 


Basophils % (Manual) 2 % (0-2)  


 


Band Neutrophils 0 % (0-8)  


 


Platelet Estimate Adequate  


 


Platelet Morphology Normal  


 


Hypochromasia 3+  


 


Anisocytosis 1+  


 


Spherocytes 1+  


 


Sodium Level


  139 MMOL/L


(136-145)


 


Potassium Level


  3.4 MMOL/L


(3.5-5.1)  L


 


Chloride Level


  105 MMOL/L


()


 


Carbon Dioxide Level


  27 MMOL/L


(21-32)


 


Anion Gap


  7 mmol/L


(5-15)


 


Blood Urea Nitrogen


  13 mg/dL


(7-18)


 


Creatinine


  1.1 MG/DL


(0.55-1.30)


 


Estimat Glomerular Filtration


Rate 49.7 mL/min


(>60)


 


Glucose Level


  78 MG/DL


()


 


Calcium Level


  8.0 MG/DL


(8.5-10.1)  L


 


Phosphorus Level


  2.9 MG/DL


(2.5-4.9)


 


Magnesium Level


  1.4 MG/DL


(1.8-2.4)  L


 


Total Bilirubin


  0.3 MG/DL


(0.2-1.0)


 


Direct Bilirubin


  < 0.1 MG/DL


(0.0-0.3)


 


Aspartate Amino Transf


(AST/SGOT) 17 U/L (15-37)


 


 


Alanine Aminotransferase


(ALT/SGPT) 14 U/L (12-78)


 


 


Alkaline Phosphatase


  79 U/L


()


 


Total Protein


  5.7 G/DL


(6.4-8.2)  L


 


Albumin


  1.6 G/DL


(3.4-5.0)  L











Current Medications








 Medications


  (Trade)  Dose


 Ordered  Sig/Winnie


 Route


 PRN Reason  Start Time


 Stop Time Status Last Admin


Dose Admin


 


 Acetaminophen


  (Tylenol)  650 mg  PRN  PRN


 RECTAL


 TEMP>100.5  10/27/19 03:00


   UNV  


 


 


 Acetaminophen


  (Tylenol)  650 mg  Q4H  PRN


 NG


 Mild Pain/Temp > 100.5  10/26/19 16:00


 11/25/19 07:59 UNV  


 


 


 Albuterol/


 Ipratropium


  (Albuterol/


 Ipratropium)  3 ml  Q4HRT  PRN


 HHN


 SHORTNESS OF BREATH  10/26/19 15:00


 10/29/19 06:59 UNV  


 


 


 Aspirin


  (ASA)  81 mg  DAILY


 NG


   10/27/19 09:00


 11/14/19 08:59 UNV  


 


 


 Atorvastatin


 Calcium


  (Lipitor)  10 mg  BEDTIME


 NG


   10/26/19 21:00


 11/13/19 20:59 UNV  


 


 


 Clopidogrel


 Bisulfate


  (Plavix)  75 mg  DAILY


 NG


   10/27/19 09:00


 11/14/19 08:59 UNV  


 


 


 Dextrose


  (Dextrose 50%)  25 ml  Q30M  PRN


 IV


 Hypoglycemia  10/26/19 14:30


 11/13/19 06:59 UNV  


 


 


 Dextrose


  (Dextrose 50%)  50 ml  Q30M  PRN


 IV


 Hypoglycemia  10/26/19 14:30


 11/13/19 06:59 UNV  


 


 


 Divalproex Sodium


  (Depakote


 Sprinkles)  500 mg  Q12HR


 NG


   10/26/19 21:00


 11/14/19 21:59 UNV  


 


 


 Docusate Sodium


  (Colace)  100 mg  THREE TIMES A  DAY


 NG


   10/26/19 18:00


 11/25/19 08:59 UNV  


 


 


 Enoxaparin Sodium


  (Lovenox)  30 mg  DAILY


 SUBQ


   10/27/19 09:00


 11/13/19 08:59 UNV  


 


 


 Famotidine


  (Pepcid)  20 mg  BID


 GT


   10/26/19 18:00


 11/25/19 17:59 UNV  


 


 


 Furosemide


  (Lasix)  40 mg  DAILY


 NG


   10/27/19 09:00


 11/26/19 08:59 UNV  


 


 


 Insulin Aspart


  (NovoLOG)    BEFORE MEALS AND  HS


 SUBQ


   10/26/19 16:30


 11/13/19 11:29 UNV  


 


 


 Insulin Detemir


  (Levemir)  12 units  DAILY


 SUBQ


   10/27/19 09:00


 11/13/19 10:29 UNV  


 


 


 Lactulose


  (Cephulac)  20 gm  TIDPRN  PRN


 NG


 Constipation  10/27/19 08:00


 11/15/19 03:59 UNV  


 


 


 Lorazepam


  (Ativan 2mg/ml


 1ml)  1 mg  Q8H  PRN


 IV


 For Anxiety  10/26/19 20:00


 10/31/19 03:59 UNV  


 


 


 Magnesium Sulfate  100 ml @ 


 100 mls/hr  Q1H


 IVPB


   10/26/19 14:45


 10/26/19 15:44 UNV  


 


 


 Meropenem 1 gm/


 Sodium Chloride  55 ml @ 


 110 mls/hr  Q12H


 IVPB


   10/27/19 02:00


 10/30/19 23:59 UNV  


 


 


 Potassium Chloride  100 ml @ 


 100 mls/hr  Q1H


 IV


   10/26/19 14:30


 10/26/19 15:29 UNV  


 

















Amie Burgos M.D. Oct 26, 2019 14:52

## 2019-10-26 NOTE — NUR
NURSE NOTES:

Received report from ELIZABETH Rainey. Patient asleep and responsive to verbal and tactile stimuli. 
SR on the monitor. O2 2L/min via NC. Charles intact and draining with yellow color urine. 
Right wrist IV 22G intact and patent. NG tube intact and in place. Awaiting for tube feeding 
order. Bilateral elbow splint restraints on and bilateral radial pulse palpable and hands 
are warm to touch. Kept dry, clean and comfortable. Will continue plan of care.

## 2019-10-26 NOTE — NEPHROLOGY PROGRESS NOTE
Assessment/Plan


Problem List:  


(1) Renal failure (ARF), acute on chronic


(2) Dehydration


(3) ARF (acute renal failure)


(4) Sepsis


(5) Acute metabolic encephalopathy


(6) Hyperglycemia due to type 2 diabetes mellitus


(7) UTI (urinary tract infection)


(8) Diabetic nephropathy


Assessment





Renal failure, Acute on Chronic resolved


Dehydration


Severe Anemia


Sepsis / Pneumonia / UTI


DM, OOC


Proteinuria , Diabetic Nephropathy


Acute encephalopathy


Plan





due transfer out ICU


change meds to NGT as possible


Cr lowering 


Will order urine studies and monitor renal parameters


kidney YOANDY noted


lower iv fluids rate


WBCs rising


has casas again due to retention


Avoid Nephrotoxics








previously


Anemia salas


2D echo


24 H urine protein


Keep BP and BS in check


I am holding  her psych meds due to low MS


Per orders





Subjective


ROS Limited/Unobtainable:  No


Constitutional:  Reports: malaise





Objective


Objective





Last 24 Hour Vital Signs








  Date Time  Temp Pulse Resp B/P (MAP) Pulse Ox O2 Delivery O2 Flow Rate FiO2


 


10/26/19 09:00  80 22 135/52 (79) 99   


 


10/26/19 08:00 98.2 79 20 132/49 (76) 99   


 


10/26/19 07:00  84 20 138/60 (86) 96   


 


10/26/19 06:00  77 21 134/57 (82) 97   


 


10/26/19 05:00  79 16 136/51 (79) 93   


 


10/26/19 04:00  75      


 


10/26/19 04:00      Nasal Cannula 2.0 


 


10/26/19 04:00 98.6 88 28 139/59 (85) 93   


 


10/26/19 03:00  85 28 136/65 (88) 99   


 


10/26/19 02:00  84 32 146/62 (90) 99   


 


10/26/19 01:00  84 30 141/59 (86) 99   


 


10/26/19 00:00      Nasal Cannula 2.0 


 


10/26/19 00:00  85 31 150/54 (86) 99   


 


10/26/19 00:00  79      


 


10/25/19 23:00  87 26 160/71 (100) 97   


 


10/25/19 22:00  82 25 144/51 (82) 100   


 


10/25/19 21:00  86 29 155/63 (93) 97   


 


10/25/19 20:00      Nasal Cannula 2.0 


 


10/25/19 20:00  85      


 


10/25/19 20:00 98.2 98 29 165/76 (105) 96   


 


10/25/19 19:00  87 28 143/55 (84) 99   


 


10/25/19 18:59     99 Nasal Cannula 2.0 28


 


10/25/19 18:59  87 24  98 Nasal Cannula 2.0 28


 


10/25/19 18:00  84 27 140/52 (81) 100   


 


10/25/19 17:00  86 27 153/56 (88) 100   


 


10/25/19 16:00 98.8 93 31 162/56 (91) 99   


 


10/25/19 16:00  91      


 


10/25/19 15:00  85 21 162/54 (90) 100   


 


10/25/19 14:00  80 22 152/58 (89) 100   


 


10/25/19 13:00  77 20 134/47 (76) 100   


 


10/25/19 12:00  77 20 143/57 (85) 100   


 


10/25/19 12:00      Nasal Cannula 2.0 


 


10/25/19 12:00 98.1 76 20 136/53 (80) 94   


 


10/25/19 12:00  73      


 


10/25/19 11:00  82 23 136/53 (80) 95   

















Intake and Output  


 


 10/25/19 10/26/19





 19:00 07:00


 


Intake Total 895 ml 185 ml


 


Output Total 720 ml 625 ml


 


Balance 175 ml -440 ml


 


  


 


Intake Oral 240 ml 


 


Free Water  130 ml


 


IV Total 655 ml 55 ml


 


Output Urine Total 720 ml 625 ml


 


# Bowel Movements 3 








Laboratory Tests


10/26/19 04:00: 


White Blood Count 15.4H, Red Blood Count 2.81L, Hemoglobin 7.7L, Hematocrit 

23.4L, Mean Corpuscular Volume 83, Mean Corpuscular Hemoglobin 27.3, Mean 

Corpuscular Hemoglobin Concent 32.8, Red Cell Distribution Width 13.2, Platelet 

Count 384, Mean Platelet Volume 7.3, Neutrophils (%) (Auto) , Lymphocytes (%) (

Auto) , Monocytes (%) (Auto) , Eosinophils (%) (Auto) , Basophils (%) (Auto) , 

Differential Total Cells Counted 100, Neutrophils % (Manual) 76H, Lymphocytes % 

(Manual) 16L, Monocytes % (Manual) 4, Eosinophils % (Manual) 2, Basophils % (

Manual) 2, Band Neutrophils 0, Platelet Estimate Adequate, Platelet Morphology 

Normal, Hypochromasia 3+, Anisocytosis 1+, Spherocytes 1+, Sodium Level 139, 

Potassium Level 3.4L, Chloride Level 105, Carbon Dioxide Level 27, Anion Gap 7, 

Blood Urea Nitrogen 13, Creatinine 1.1, Estimat Glomerular Filtration Rate 49.7

, Glucose Level 78, Calcium Level 8.0L, Phosphorus Level 2.9, Magnesium Level 

1.4L, Total Bilirubin 0.3, Direct Bilirubin < 0.1, Aspartate Amino Transf (AST/

SGOT) 17, Alanine Aminotransferase (ALT/SGPT) 14, Alkaline Phosphatase 79, 

Total Protein 5.7L, Albumin 1.6L


Height (Feet):  5


Height (Inches):  5.00


Weight (Pounds):  198


General Appearance:  no apparent distress


EENT:  other - NGT


Respiratory/Chest:  decreased breath sounds


Abdomen:  distended


Objective


no change











Lukasz Vizcaino MD Oct 26, 2019 10:38

## 2019-10-26 NOTE — NUR
NURSE NOTES:



Received transfer report from ELIZABETH Keen. Patient appears to be asleep, responsive to verbal 
and tactile stimuli. SR on cardiac monitor. Charles intact and draining with yellow color 
urine. Right wrist IV 22G intact and patent. NG tube intact and in place- tube feeds on hold 
while pt on continuous BiPAP. Bilateral elbow splint restraints on and bilateral radial 
pulse palpable and hands are warm to touch. Kept dry, clean and comfortable. Will resume 
plan of care.

## 2019-10-26 NOTE — NUR
RESPIRATORY NOTE:

Received pt on BiPAP 15/5, backup rate 14, 30%. Pt on a Full Face mask, skin intact, no 
redness/breakdowns noted. Foam tape applied on pt's forehead/cheeks/chin to prevent 
irritations. Pt asleep/disoriented. B/S vickie. rales, nonproductive cough. BiPAP plugged into 
red outlet, alarms on & audible. Pt in no apparent distress at this time. Will continue plan 
of care.

## 2019-10-26 NOTE — NUR
NURSE NOTES: Nurse report given by ELIZABETH Chávez. Patient's in stable condition, no s/s of 
distress or SOB, AO x 1, no pain based on FLACC assessment. Patient's belonging list went 
over with ELIZABETH Chávez and signed by both nurses at bedside. Bed low and locked, call light 
within reach, side rails x 3 and padded for seizure precaution, safety precaution applied. 
Patient's casas is intact and draining well. Patient's NG tube is running with Glucerna 1.5 
at 30mls/hr. Will continue to monitor.

## 2019-10-26 NOTE — NUR
NURSE NOTES:

Patient received from Evelin JOHNSON. Patient is newly transfer around 1800 from telemetry due to 
low Spo2. Patient is non-verbal, can move extremities independently at times. Patient is on 
continuous BIPAP at 15/5 and at 30% FiO2. Patient has glucerna 1.5 at 30ml/hr but it is on 
hold for now due to the usage of the bipap. R FA 22G SL. Side rails are padded. Patient has 
Charles catheter and bag hanging below waist line. Wounds noted, see WCP. WSafety measures are 
in place. Will continue to monitor.

## 2019-10-26 NOTE — NUR
HAND-OFF: 

Report given to ELIZABETH Waters from [facility]. Patient's in stable condition, AO x 1. Belonging list 
went over and signed by both nurses. Cardiac monitor is still applied and patient has oxygen 
while transferring to [facility]. Plan of care endorsed.

## 2019-10-26 NOTE — NUR
NURSE NOTES:

Patient repositioned and provided oral care. Patient appears in non distress at this time. 
Vitas have remained stable. All due medications given. no acute changes

## 2019-10-26 NOTE — CARDIOLOGY PROGRESS NOTE
Assessment/Plan


Assessment/Plan


the patient is in severe respiratory distress, sounds like pulmonary edema, 

will give Lasix and do CXR stat, ABg also was ordered





Subjective


Subjective


the patient is unresponsive she sometimes makes incoherent sounds





Objective





Last 24 Hour Vital Signs








  Date Time  Temp Pulse Resp B/P (MAP) Pulse Ox O2 Delivery O2 Flow Rate FiO2


 


10/26/19 20:40  84 24  98 Full Face  30


 


10/26/19 20:29 98.8 85 20 97/73 (81) 100   


 


10/26/19 20:00  80      


 


10/26/19 20:00 99.2 97 16 101/65 (77) 100   


 


10/26/19 18:48     98 Bi-Pap  30


 


10/26/19 18:35 98.7 97 20 99/71 (80) 99   


 


10/26/19 18:29  102 22  100 Full Face  30


 


10/26/19 16:00 98.0 95 20 131/74 (93) 100   


 


10/26/19 14:30 98.3 85 19 124/65 (84) 100   


 


10/26/19 14:15  91      


 


10/26/19 12:00  81      


 


10/26/19 12:00 98.0 78 19 131/53 (79) 98   


 


10/26/19 11:00  72 18 121/47 (71) 99   


 


10/26/19 10:00  77 21 135/56 (82) 99   


 


10/26/19 09:00  80 22 135/52 (79) 99   


 


10/26/19 08:00  78      


 


10/26/19 08:00 98.2 79 20 132/49 (76) 99   


 


10/26/19 07:00  84 20 138/60 (86) 96   


 


10/26/19 06:00  77 21 134/57 (82) 97   


 


10/26/19 05:00  79 16 136/51 (79) 93   


 


10/26/19 04:00  75      


 


10/26/19 04:00      Nasal Cannula 2.0 


 


10/26/19 04:00 98.6 88 28 139/59 (85) 93   


 


10/26/19 03:00  85 28 136/65 (88) 99   


 


10/26/19 02:00  84 32 146/62 (90) 99   


 


10/26/19 01:00  84 30 141/59 (86) 99   


 


10/26/19 00:00      Nasal Cannula 2.0 


 


10/26/19 00:00  85 31 150/54 (86) 99   


 


10/26/19 00:00  79      


 


10/25/19 23:00  87 26 160/71 (100) 97   








General Appearance:  severe distress, other - unreponsive


EENT:  other - eyes a re closed


Neck:  non-tender, JVD


Rhythm:  ST


Cardiovascular:  regular rhythm


Respiratory/Chest:  crackles/rales, other - throughout lung fields


Abdomen:  soft


Extremities:  trace edema











Intake and Output  


 


 10/25/19 10/26/19





 18:59 06:59


 


Intake Total 955 ml 185 ml


 


Output Total 755 ml 620 ml


 


Balance 200 ml -435 ml


 


  


 


Intake Oral 240 ml 


 


Free Water  130 ml


 


IV Total 715 ml 55 ml


 


Output Urine Total 755 ml 620 ml


 


# Bowel Movements 3 











Laboratory Tests








Test


  10/26/19


04:00 10/26/19


16:51 10/26/19


18:52


 


White Blood Count


  15.4 K/UL


(4.8-10.8)  H 


  


 


 


Red Blood Count


  2.81 M/UL


(4.20-5.40)  L 


  


 


 


Hemoglobin


  7.7 G/DL


(12.0-16.0)  L 


  


 


 


Hematocrit


  23.4 %


(37.0-47.0)  L 


  


 


 


Mean Corpuscular Volume 83 FL (80-99)    


 


Mean Corpuscular Hemoglobin


  27.3 PG


(27.0-31.0) 


  


 


 


Mean Corpuscular Hemoglobin


Concent 32.8 G/DL


(32.0-36.0) 


  


 


 


Red Cell Distribution Width


  13.2 %


(11.6-14.8) 


  


 


 


Platelet Count


  384 K/UL


(150-450) 


  


 


 


Mean Platelet Volume


  7.3 FL


(6.5-10.1) 


  


 


 


Neutrophils (%) (Auto)


  % (45.0-75.0)


  


  


 


 


Lymphocytes (%) (Auto)


  % (20.0-45.0)


  


  


 


 


Monocytes (%) (Auto)  % (1.0-10.0)    


 


Eosinophils (%) (Auto)  % (0.0-3.0)    


 


Basophils (%) (Auto)  % (0.0-2.0)    


 


Differential Total Cells


Counted 100  


  


  


 


 


Neutrophils % (Manual) 76 % (45-75)  H  


 


Lymphocytes % (Manual) 16 % (20-45)  L  


 


Monocytes % (Manual) 4 % (1-10)    


 


Eosinophils % (Manual) 2 % (0-3)    


 


Basophils % (Manual) 2 % (0-2)    


 


Band Neutrophils 0 % (0-8)    


 


Platelet Estimate Adequate    


 


Platelet Morphology Normal    


 


Hypochromasia 3+    


 


Anisocytosis 1+    


 


Spherocytes 1+    


 


Sodium Level


  139 MMOL/L


(136-145) 


  


 


 


Potassium Level


  3.4 MMOL/L


(3.5-5.1)  L 


  


 


 


Chloride Level


  105 MMOL/L


() 


  


 


 


Carbon Dioxide Level


  27 MMOL/L


(21-32) 


  


 


 


Anion Gap


  7 mmol/L


(5-15) 


  


 


 


Blood Urea Nitrogen


  13 mg/dL


(7-18) 


  


 


 


Creatinine


  1.1 MG/DL


(0.55-1.30) 


  


 


 


Estimat Glomerular Filtration


Rate 49.7 mL/min


(>60) 


  


 


 


Glucose Level


  78 MG/DL


() 


  


 


 


Calcium Level


  8.0 MG/DL


(8.5-10.1)  L 


  


 


 


Phosphorus Level


  2.9 MG/DL


(2.5-4.9) 


  


 


 


Magnesium Level


  1.4 MG/DL


(1.8-2.4)  L 


  


 


 


Total Bilirubin


  0.3 MG/DL


(0.2-1.0) 


  


 


 


Direct Bilirubin


  < 0.1 MG/DL


(0.0-0.3) 


  


 


 


Aspartate Amino Transf


(AST/SGOT) 17 U/L (15-37)


  


  


 


 


Alanine Aminotransferase


(ALT/SGPT) 14 U/L (12-78)


  


  


 


 


Alkaline Phosphatase


  79 U/L


() 


  


 


 


Total Protein


  5.7 G/DL


(6.4-8.2)  L 


  


 


 


Albumin


  1.6 G/DL


(3.4-5.0)  L 


  


 


 


Arterial Blood pH


  


  7.325


(7.350-7.450) 7.452


(7.350-7.450)


 


Arterial Blood Partial


Pressure CO2 


  44.2 mmHg


(35.0-45.0) 40.1 mmHg


(35.0-45.0)


 


Arterial Blood Partial


Pressure O2 


  267.2 mmHg


(75.0-100.0)  H 65.8 mmHg


(75.0-100.0)  L


 


Arterial Blood HCO3


  


  22.5 mmol/L


(22.0-26.0) 27.4 mmol/L


(22.0-26.0)  H


 


Arterial Blood Oxygen


Saturation 


  99.2 %


() 91.8 %


()  L


 


Arterial Blood Base Excess  -3.4 (-2-2)  L 3.2 (-2-2)  H


 


Graham Test  Positive   Positive  

















Eyad,Katia Y. MD Oct 26, 2019 22:54

## 2019-10-26 NOTE — NUR
NURSE NOTES:

Seen by Dr. Vizcaino and informed K, Mg level, NGT and tube feeding. New order carried out.

## 2019-10-26 NOTE — DIAGNOSTIC IMAGING REPORT
--------------- APPROVED REPORT --------------





CPT Code: 69641



Present Symptoms

Comments: BILATERAL LEGS PAIN.





BILATERAL: Imaging reveals a patent deep venous system bilaterally. There is no evidence 

of thrombus within the femoral, popliteal or tibial segments. The greater saphenous veins 

are also within normal limits. Doppler indicates normal spontaneous flow within these 

segments.

## 2019-10-26 NOTE — DIAGNOSTIC IMAGING REPORT
History: COPD

 

Exam: XR CXR 1 VIEW

 

Comparison: 10 24 2019

 

FINDINGS:

 

Nasogastric tube tip now seen body of the stomach.  Bilateral pleural 

effusions again suggested.  The appearance of bilateral ill-defined 

perihilar opacity may be mild increased.  Cardiac silhouette appears 

unchanged.

 

IMPRESSION:

 

Nasogastric tube tip now seen body of the stomach.  

 

Bilateral pleural effusions again suggested.  

 

The appearance of bilateral ill-defined perihilar opacity may be mild 

increased.

## 2019-10-26 NOTE — NUR
NURSE NOTES:

Seen by Dr. Pop and assessed. Informed H&H level today. NNO at this time. Will 
continue plan of care.

## 2019-10-26 NOTE — HEMATOLOGY/ONC PROGRESS NOTE
Assessment/Plan


Assessment/Plan





Assessment and Recs:


# Anemia of chronic disease due to underlying chronic medical issues, 

multifactorial 


--> Anemia workup has been ordered, rule out gi bleed --> ferritin is 1400+


--> No evidence of hemolysis is noted, peripheral smear has been reviewed.


--> Hgb goal >7. Transfuse prn.


--> Epogen or iron at this time is not particularly indicated


--> Medications have been reviewed


--> low threshold for gi evaluation in case has occult +


--> bone marrow biopsy is not indicated given the other more likely causes (if 

recurrent anemia) first time here


--> hgb trend 8.4->8.5-->8.2->7.7


# Pelvic mass on ct and us -- 13 x 7 x 12.9 cm unilocular cystic mass, 

corresponding to lesion reported on recent CT scan. Layering low level internal 

echoes likely represent bladder debris.. This presumably represents a cystic 

ovarian lesion with debris or hemorrhage, possibly neoplastic.


--> CA-125 is 216! elevated


--> consider gyn eval


# Hypercalcemia - btw 10-11 range when corrected to alb


--> ivf have been started


--> if remains elevated, 7.9-->8.3


--> will get pth


# Leukocytosis/elevated white blood cell count, unspecified likely related to 

underlying reaction v more likely infection


--> have reviewed peripheral smear and bandemia/neutrophilia noted


--> continue antibiotics if they have been started by ID team (VANC/ZOSYN)--> 

vanc/linda--> linda


--> wbc 39-->28k-->29k->31k-->23-->26k-->19k-->24k-->15.4


--> monitor for resolution


--> CT C/A/P Results reviewed


--> FOR INdium bone scan


# Sepsis from pneumonia.  


--> pulm consulted, abx started


# CHANCE (acute kidney injury) on ckd


--> cr 1.6-->2.7-->2.2->1.2


--> per renal


# UTI (urinary tract infection)


--> on abx per id


# Dehydration


--> on ivf


# Acute metabolic encephalopathy


--> likely due to pna


# Dvt ppx lovenox sq





The timing of this note does not necessarily reflect the time of the patient 

was seen.





Greatly appreciate consultation.





Subjective


Constitutional:  Denies: no symptoms, chills, fever, malaise, weakness, other


HEENT:  Denies: no symptoms, eye pain, blurred vision, tearing, double vision, 

ear pain, ear discharge, nose pain, nose congestion, throat pain, throat 

swelling, mouth pain, mouth swelling, other


Cardiovascular:  Denies: no symptoms, chest pain, edema, irregular heart rate, 

lightheadedness, palpitations, syncope, other


Gastrointestinal/Abdominal:  Denies: no symptoms, abdomen distended, abdominal 

pain, black stools, tarry stools, blood in stool, constipated, diarrhea, 

difficulty swallowing, nausea, poor appetite, poor fluid intake, rectal bleeding

, vomiting, other


Genitourinary:  Denies: no symptoms, burning, discharge, frequency, flank pain, 

hematuria, incontinence, pain, urgency, other


Neurologic/Psychiatric:  Denies: no symptoms, anxiety, depressed, emotional 

problems, headache, numbness, paresthesia, pre-existing deficit, seizure, 

tingling, tremors, weakness, other


Endocrine:  Denies: no symptoms, excessive sweating, flushing, intolerance to 

cold, intolerance to heat, increased hunger, increased thirst, increased urine, 

unexplained weight gain, unexplained weight loss, other


Allergies:  


Coded Allergies:  


     No Known Allergies (Unverified , 10/14/19)


Subjective


10/14: hgb has improved, no bleeding, labs reivewed


10/15: no events to report


10/16: a+ o x1, no bleeding or chills noted, no major changes, occult pending


10/17: no events noted, no bleeding, no chills, no hematemesis/hematochezia


10/18: remains confused, with abx, on nc


10/19: cr is worse, hgb 8.4, no bleeding, night sweats, chills


10/20: no f/c, no night sweats, labs noted, cr worse


10/21: no bleeding or chills, no night sweats, labs pending this am


10/22: pelvic mass noted on imaging, no bleeding reported, ordered ca125


10/23: no events, remains lethargic, is on abx, seen by surg


10/24: with raid response overnight, rr high as well as bp, vidal to icu


10/25: no events, no bleeding, to undergo a indium scan with id


10/26: comfortable, electrolytes reviewed, given mag and k





Objective


Objective





Current Medications








 Medications


  (Trade)  Dose


 Ordered  Sig/Winnie


 Route


 PRN Reason  Start Time


 Stop Time Status Last Admin


Dose Admin


 


 Acetaminophen


  (Tylenol)  650 mg  PRN  PRN


 RECTAL


 TEMP>100.5  10/25/19 03:00


     


 


 


 Acetaminophen


  (Tylenol)  650 mg  Q4H  PRN


 NG


 Mild Pain/Temp > 100.5  10/26/19 08:00


 11/25/19 07:59   


 


 


 Albuterol/


 Ipratropium


  (Albuterol/


 Ipratropium)  3 ml  Q4HRT  PRN


 HHN


 SHORTNESS OF BREATH  10/24/19 07:00


 10/29/19 06:59   


 


 


 Aspirin


  (ASA)  81 mg  DAILY


 NG


   10/26/19 09:00


 11/14/19 08:59  10/26/19 08:45


 


 


 Atorvastatin


 Calcium


  (Lipitor)  10 mg  BEDTIME


 NG


   10/26/19 21:00


 11/13/19 20:59   


 


 


 Clopidogrel


 Bisulfate


  (Plavix)  75 mg  DAILY


 NG


   10/26/19 09:00


 11/14/19 08:59  10/26/19 08:44


 


 


 Dextrose


  (Dextrose 50%)  25 ml  Q30M  PRN


 IV


 Hypoglycemia  10/24/19 04:00


 11/13/19 06:59   


 


 


 Dextrose


  (Dextrose 50%)  50 ml  Q30M  PRN


 IV


 Hypoglycemia  10/24/19 04:00


 11/13/19 06:59   


 


 


 Divalproex Sodium


  (Depakote


 Sprinkles)  500 mg  Q12HR


 NG


   10/26/19 09:00


 11/14/19 21:59  10/26/19 08:44


 


 


 Docusate Sodium


  (Colace)  100 mg  THREE TIMES A  DAY


 NG


   10/26/19 09:00


 11/25/19 08:59  10/26/19 08:43


 


 


 Enoxaparin Sodium


  (Lovenox)  30 mg  DAILY


 SUBQ


   10/24/19 09:00


 11/13/19 08:59  10/26/19 08:46


 


 


 Famotidine


  (Pepcid)  20 mg  BID


 GT


   10/26/19 18:00


 11/25/19 17:59   


 


 


 Furosemide


  (Lasix)  40 mg  DAILY


 NG


   10/27/19 09:00


 11/26/19 08:59   


 


 


 Insulin Aspart


  (NovoLOG)    BEFORE MEALS AND  HS


 SUBQ


   10/24/19 06:30


 11/13/19 11:29  10/25/19 12:06


 


 


 Insulin Detemir


  (Levemir)  12 units  DAILY


 SUBQ


   10/24/19 09:00


 11/13/19 10:29  10/25/19 09:13


 


 


 Lactulose


  (Cephulac)  20 gm  TIDPRN  PRN


 NG


 Constipation  10/26/19 08:00


 11/15/19 03:59   


 


 


 Lorazepam


  (Ativan 2mg/ml


 1ml)  1 mg  Q8H  PRN


 IV


 For Anxiety  10/24/19 04:00


 10/31/19 03:59  10/25/19 16:18


 


 


 Magnesium Sulfate  100 ml @ 


 100 mls/hr  Q1H


 IVPB


   10/26/19 10:45


 10/26/19 14:44  10/26/19 11:55


 


 


 Meropenem 1 gm/


 Sodium Chloride  55 ml @ 


 110 mls/hr  Q12H


 IVPB


   10/24/19 14:00


 10/30/19 23:59  10/26/19 02:00


 


 


 Potassium Chloride  100 ml @ 


 100 mls/hr  Q1H


 IV


   10/26/19 10:30


 10/26/19 14:29  10/26/19 11:55


 











Last 24 Hour Vital Signs








  Date Time  Temp Pulse Resp B/P (MAP) Pulse Ox O2 Delivery O2 Flow Rate FiO2


 


10/26/19 12:00 98.0 78 19 131/53 (79) 98   


 


10/26/19 11:00  72 18 121/47 (71) 99   


 


10/26/19 10:00  77 21 135/56 (82) 99   


 


10/26/19 09:00  80 22 135/52 (79) 99   


 


10/26/19 08:00  78      


 


10/26/19 08:00 98.2 79 20 132/49 (76) 99   


 


10/26/19 07:00  84 20 138/60 (86) 96   


 


10/26/19 06:00  77 21 134/57 (82) 97   


 


10/26/19 05:00  79 16 136/51 (79) 93   


 


10/26/19 04:00  75      


 


10/26/19 04:00      Nasal Cannula 2.0 


 


10/26/19 04:00 98.6 88 28 139/59 (85) 93   


 


10/26/19 03:00  85 28 136/65 (88) 99   


 


10/26/19 02:00  84 32 146/62 (90) 99   


 


10/26/19 01:00  84 30 141/59 (86) 99   


 


10/26/19 00:00      Nasal Cannula 2.0 


 


10/26/19 00:00  85 31 150/54 (86) 99   


 


10/26/19 00:00  79      


 


10/25/19 23:00  87 26 160/71 (100) 97   


 


10/25/19 22:00  82 25 144/51 (82) 100   


 


10/25/19 21:00  86 29 155/63 (93) 97   


 


10/25/19 20:00      Nasal Cannula 2.0 


 


10/25/19 20:00  85      


 


10/25/19 20:00 98.2 98 29 165/76 (105) 96   


 


10/25/19 19:00  87 28 143/55 (84) 99   


 


10/25/19 18:59     99 Nasal Cannula 2.0 28


 


10/25/19 18:59  87 24  98 Nasal Cannula 2.0 28


 


10/25/19 18:00  84 27 140/52 (81) 100   


 


10/25/19 17:00  86 27 153/56 (88) 100   


 


10/25/19 16:00 98.8 93 31 162/56 (91) 99   


 


10/25/19 16:00  91      


 


10/25/19 15:00  85 21 162/54 (90) 100   


 


10/25/19 14:00  80 22 152/58 (89) 100   


 


10/25/19 13:00  77 20 134/47 (76) 100   


 


10/25/19 12:00  77 20 143/57 (85) 100   


 


10/25/19 12:00      Nasal Cannula 2.0 


 


10/25/19 12:00 98.1 76 20 136/53 (80) 94   


 


10/25/19 12:00  73      


 


10/25/19 11:00  82 23 136/53 (80) 95   


 


10/25/19 10:00  85 23 147/87 (107) 94   


 


10/25/19 09:00  78 21 133/54 (80) 100   


 


10/25/19 08:00      Nasal Cannula 2.0 


 


10/25/19 08:00 98.4 77 20 143/57 (85) 100   


 


10/25/19 08:00  79      


 


10/25/19 07:02     100 Nasal Cannula 2.0 28


 


10/25/19 07:02  80 21  100 Nasal Cannula 2.0 28


 


10/25/19 07:00  81 22 135/50 (78) 99   


 


10/25/19 06:00  78 24 135/50 (78) 98   


 


10/25/19 05:00  81 22 155/57 (89) 100   


 


10/25/19 04:00 98.7 82 23 122/64 (83) 99   


 


10/25/19 04:00      Nasal Cannula 2.0 


 


10/25/19 03:40  83      


 


10/25/19 03:00  83 21 143/52 (82) 100   


 


10/25/19 02:00  85 22 147/68 (94) 99   


 


10/25/19 01:00  83 23 133/60 (84) 97   


 


10/25/19 00:00      Nasal Cannula 2.0 


 


10/25/19 00:00 98.2 79 24 136/61 (86) 96   


 


10/24/19 23:49  79      


 


10/24/19 23:00  76 21 127/60 (82) 100   


 


10/24/19 22:00  82 21 131/89 (103) 100   


 


10/24/19 21:00  79 21 134/51 (78) 100   


 


10/24/19 20:00      Nasal Cannula 2.0 


 


10/24/19 20:00 98.0 79 20 134/58 (83) 100   


 


10/24/19 19:50  82      


 


10/24/19 19:03     100 Nasal Cannula 2.0 28


 


10/24/19 19:02  78 20  100 Nasal Cannula 2.0 28


 


10/24/19 19:00  81 23 140/57 (84) 100   


 


10/24/19 18:00  75 19 130/54 (79) 100   


 


10/24/19 17:00  78 20 140/57 (84) 100   


 


10/24/19 16:00 97.5 75 20 144/53 (83) 100   


 


10/24/19 16:00  64      


 


10/24/19 15:00  73 17 141/52 (81) 100   


 


10/24/19 14:00  76 20 138/71 (93) 93   


 


10/24/19 13:00  64 20 115/47 (69) 100   

















Intake and Output  


 


 10/25/19 10/26/19





 19:00 07:00


 


Intake Total 895 ml 185 ml


 


Output Total 720 ml 625 ml


 


Balance 175 ml -440 ml


 


  


 


Intake Oral 240 ml 


 


Free Water  130 ml


 


IV Total 655 ml 55 ml


 


Output Urine Total 720 ml 625 ml


 


# Bowel Movements 3 











Labs








Test


  10/24/19


02:36 10/24/19


03:55 10/25/19


09:30 10/26/19


04:00


 


Arterial Blood pH


  7.317


(7.350-7.450) 


  


  


 


 


Arterial Blood Partial


Pressure CO2 39.1 mmHg


(35.0-45.0) 


  


  


 


 


Arterial Blood Partial


Pressure O2 77.0 mmHg


(75.0-100.0) 


  


  


 


 


Arterial Blood HCO3


  19.6 mmol/L


(22.0-26.0) 


  


  


 


 


Arterial Blood Oxygen


Saturation 93.7 %


() 


  


  


 


 


Arterial Blood Base Excess -6.1 (-2-2)    


 


Graham Test     


 


White Blood Count


  


  24.0 K/UL


(4.8-10.8) 


  15.4 K/UL


(4.8-10.8)


 


Red Blood Count


  


  2.94 M/UL


(4.20-5.40) 


  2.81 M/UL


(4.20-5.40)


 


Hemoglobin


  


  8.2 G/DL


(12.0-16.0) 


  7.7 G/DL


(12.0-16.0)


 


Hematocrit


  


  24.4 %


(37.0-47.0) 


  23.4 %


(37.0-47.0)


 


Mean Corpuscular Volume  83 FL (80-99)   83 FL (80-99) 


 


Mean Corpuscular Hemoglobin


  


  27.9 PG


(27.0-31.0) 


  27.3 PG


(27.0-31.0)


 


Mean Corpuscular Hemoglobin


Concent 


  33.5 G/DL


(32.0-36.0) 


  32.8 G/DL


(32.0-36.0)


 


Red Cell Distribution Width


  


  13.4 %


(11.6-14.8) 


  13.2 %


(11.6-14.8)


 


Platelet Count


  


  421 K/UL


(150-450) 


  384 K/UL


(150-450)


 


Mean Platelet Volume


  


  7.0 FL


(6.5-10.1) 


  7.3 FL


(6.5-10.1)


 


Neutrophils (%) (Auto)   % (45.0-75.0)    % (45.0-75.0) 


 


Lymphocytes (%) (Auto)   % (20.0-45.0)    % (20.0-45.0) 


 


Monocytes (%) (Auto)   % (1.0-10.0)    % (1.0-10.0) 


 


Eosinophils (%) (Auto)   % (0.0-3.0)    % (0.0-3.0) 


 


Basophils (%) (Auto)   % (0.0-2.0)    % (0.0-2.0) 


 


Differential Total Cells


Counted 


  100 


  


  100 


 


 


Neutrophils % (Manual)  89 % (45-75)   76 % (45-75) 


 


Lymphocytes % (Manual)  6 % (20-45)   16 % (20-45) 


 


Monocytes % (Manual)  4 % (1-10)   4 % (1-10) 


 


Eosinophils % (Manual)  1 % (0-3)   2 % (0-3) 


 


Basophils % (Manual)  0 % (0-2)   2 % (0-2) 


 


Band Neutrophils  0 % (0-8)   0 % (0-8) 


 


Platelet Estimate  Adequate   Adequate 


 


Platelet Morphology  Normal   Normal 


 


Hypochromasia  1+   3+ 


 


Sodium Level


  


  140 MMOL/L


(136-145) 143 MMOL/L


(136-145) 139 MMOL/L


(136-145)


 


Potassium Level


  


  3.6 MMOL/L


(3.5-5.1) 3.8 MMOL/L


(3.5-5.1) 3.4 MMOL/L


(3.5-5.1)


 


Chloride Level


  


  106 MMOL/L


() 107 MMOL/L


() 105 MMOL/L


()


 


Carbon Dioxide Level


  


  22 MMOL/L


(21-32) 26 MMOL/L


(21-32) 27 MMOL/L


(21-32)


 


Anion Gap


  


  12 mmol/L


(5-15) 10 mmol/L


(5-15) 7 mmol/L


(5-15)


 


Blood Urea Nitrogen


  


  22 mg/dL


(7-18) 17 mg/dL


(7-18) 13 mg/dL


(7-18)


 


Creatinine


  


  1.6 MG/DL


(0.55-1.30) 1.2 MG/DL


(0.55-1.30) 1.1 MG/DL


(0.55-1.30)


 


Estimat Glomerular Filtration


Rate 


  32.3 mL/min


(>60) 45.0 mL/min


(>60) 49.7 mL/min


(>60)


 


Glucose Level


  


  124 MG/DL


() 127 MG/DL


() 78 MG/DL


()


 


Uric Acid


  


  6.8 MG/DL


(2.6-7.2) 


  


 


 


Calcium Level


  


  8.6 MG/DL


(8.5-10.1) 8.6 MG/DL


(8.5-10.1) 8.0 MG/DL


(8.5-10.1)


 


Phosphorus Level


  


  4.4 MG/DL


(2.5-4.9) 


  2.9 MG/DL


(2.5-4.9)


 


Magnesium Level


  


  1.7 MG/DL


(1.8-2.4) 


  1.4 MG/DL


(1.8-2.4)


 


Total Bilirubin


  


  0.4 MG/DL


(0.2-1.0) 


  0.3 MG/DL


(0.2-1.0)


 


Aspartate Amino Transf


(AST/SGOT) 


  17 U/L (15-37) 


  


  17 U/L (15-37) 


 


 


Alanine Aminotransferase


(ALT/SGPT) 


  18 U/L (12-78) 


  


  14 U/L (12-78) 


 


 


Alkaline Phosphatase


  


  87 U/L


() 


  79 U/L


()


 


C-Reactive Protein,


Quantitative 


  8.6 mg/dL


(0.00-0.90) 


  


 


 


Pro-B-Type Natriuretic Peptide


  


  18522 pg/mL


(0-125) 


  


 


 


Total Protein


  


  6.0 G/DL


(6.4-8.2) 


  5.7 G/DL


(6.4-8.2)


 


Albumin


  


  1.6 G/DL


(3.4-5.0) 


  1.6 G/DL


(3.4-5.0)


 


Globulin  4.4 g/dL   


 


Albumin/Globulin Ratio  0.4 (1.0-2.7)   


 


Anisocytosis    1+ 


 


Spherocytes    1+ 


 


Direct Bilirubin


  


  


  


  < 0.1 MG/DL


(0.0-0.3)








Height (Feet):  5


Height (Inches):  5.00


Weight (Pounds):  199


Objective





Physical Exam:


Vitals: reviewed


General Appearance:  NAD


HEENT:  normocephalic, atraumatic


Neck:  non-tender, normal alignment


Respiratory/Chest:  normal breath sounds bilaterally


Cardiovascular/Chest: rrr


Abdomen:  normal bowel sounds, soft, nontender


Extremities:  normal range of motion











Mike Pop MD Oct 26, 2019 12:26

## 2019-10-26 NOTE — NUR
NURSE NOTES:

Patient noted to be agitated and rolling side to side. Patient trying to remove ngt. 
Repositioned patient for comfort. Glucose noted to be 183, will hold coverage for since 
since patient is on continuos BIPAP and feeds will be on hold during this time.

## 2019-10-26 NOTE — NUR
CASE MANAGEMENT: REVIEW



SI: SEPSIS . PNA . ACUTE RENAL FAILURE . ACUTE METABOLIC ENCEPHALOPATHY 

T 98.2 HR 84 RR 20 /49 SAT 99% NC/2L

WBC 15.4 H/H 7.7/23.4 







IS: LASIX NGT

MAG SULFATE IV @100ML/HR

KCl 10MEQ IVF @ 100ML/HR

PLAVIX NGT QD

NGT FEEDING 





***ICU STATUS***

DCP: PATIENT IS FROM [facility]

## 2019-10-26 NOTE — NUR
NURSE NOTES:

Patient repositioned. Patient sleeping at this time. Glucose is 82, no coverage given. 
Orange juice given via NGT. Vitals remains stable. Will continue to monitor.

## 2019-10-27 VITALS — SYSTOLIC BLOOD PRESSURE: 110 MMHG | DIASTOLIC BLOOD PRESSURE: 70 MMHG

## 2019-10-27 VITALS — SYSTOLIC BLOOD PRESSURE: 140 MMHG | DIASTOLIC BLOOD PRESSURE: 72 MMHG

## 2019-10-27 VITALS — DIASTOLIC BLOOD PRESSURE: 64 MMHG | SYSTOLIC BLOOD PRESSURE: 142 MMHG

## 2019-10-27 VITALS — SYSTOLIC BLOOD PRESSURE: 136 MMHG | DIASTOLIC BLOOD PRESSURE: 62 MMHG

## 2019-10-27 VITALS — DIASTOLIC BLOOD PRESSURE: 69 MMHG | SYSTOLIC BLOOD PRESSURE: 145 MMHG

## 2019-10-27 VITALS — SYSTOLIC BLOOD PRESSURE: 101 MMHG | DIASTOLIC BLOOD PRESSURE: 68 MMHG

## 2019-10-27 LAB
ADD MANUAL DIFF: NO
ALBUMIN SERPL-MCNC: 1.8 G/DL (ref 3.4–5)
ALBUMIN/GLOB SERPL: 0.4 {RATIO} (ref 1–2.7)
ALP SERPL-CCNC: 87 U/L (ref 46–116)
ALT SERPL-CCNC: 13 U/L (ref 12–78)
ANION GAP SERPL CALC-SCNC: 11 MMOL/L (ref 5–15)
AST SERPL-CCNC: 16 U/L (ref 15–37)
BASOPHILS NFR BLD AUTO: 0.9 % (ref 0–2)
BILIRUB SERPL-MCNC: 0.5 MG/DL (ref 0.2–1)
BUN SERPL-MCNC: 15 MG/DL (ref 7–18)
CALCIUM SERPL-MCNC: 8.9 MG/DL (ref 8.5–10.1)
CHLORIDE SERPL-SCNC: 104 MMOL/L (ref 98–107)
CO2 SERPL-SCNC: 27 MMOL/L (ref 21–32)
CREAT SERPL-MCNC: 1.1 MG/DL (ref 0.55–1.3)
EOSINOPHIL NFR BLD AUTO: 3 % (ref 0–3)
ERYTHROCYTE [DISTWIDTH] IN BLOOD BY AUTOMATED COUNT: 13.2 % (ref 11.6–14.8)
GLOBULIN SER-MCNC: 4.4 G/DL
HCT VFR BLD CALC: 25 % (ref 37–47)
HGB BLD-MCNC: 8.2 G/DL (ref 12–16)
LYMPHOCYTES NFR BLD AUTO: 18.1 % (ref 20–45)
MCV RBC AUTO: 84 FL (ref 80–99)
MONOCYTES NFR BLD AUTO: 4.7 % (ref 1–10)
NEUTROPHILS NFR BLD AUTO: 73.4 % (ref 45–75)
PLATELET # BLD: 371 K/UL (ref 150–450)
POTASSIUM SERPL-SCNC: 4.2 MMOL/L (ref 3.5–5.1)
RBC # BLD AUTO: 2.97 M/UL (ref 4.2–5.4)
SODIUM SERPL-SCNC: 142 MMOL/L (ref 136–145)
WBC # BLD AUTO: 17 K/UL (ref 4.8–10.8)

## 2019-10-27 RX ADMIN — FUROSEMIDE SCH MG: 40 TABLET ORAL at 09:08

## 2019-10-27 RX ADMIN — ENOXAPARIN SODIUM SCH MG: 30 INJECTION SUBCUTANEOUS at 09:10

## 2019-10-27 RX ADMIN — ASPIRIN 81 MG SCH MG: 81 TABLET ORAL at 09:08

## 2019-10-27 RX ADMIN — INSULIN ASPART SCH UNITS: 100 INJECTION, SOLUTION INTRAVENOUS; SUBCUTANEOUS at 06:30

## 2019-10-27 RX ADMIN — INSULIN ASPART SCH UNITS: 100 INJECTION, SOLUTION INTRAVENOUS; SUBCUTANEOUS at 18:00

## 2019-10-27 RX ADMIN — MEROPENEM SCH MLS/HR: 1 INJECTION INTRAVENOUS at 13:06

## 2019-10-27 RX ADMIN — DOCUSATE SODIUM SCH MG: 50 LIQUID ORAL at 22:14

## 2019-10-27 RX ADMIN — LORAZEPAM PRN MG: 2 INJECTION, SOLUTION INTRAMUSCULAR; INTRAVENOUS at 23:05

## 2019-10-27 RX ADMIN — DOCUSATE SODIUM SCH MG: 50 LIQUID ORAL at 13:06

## 2019-10-27 RX ADMIN — INSULIN ASPART SCH UNITS: 100 INJECTION, SOLUTION INTRAVENOUS; SUBCUTANEOUS at 17:14

## 2019-10-27 RX ADMIN — DIVALPROEX SODIUM SCH MG: 125 CAPSULE ORAL at 09:09

## 2019-10-27 RX ADMIN — DIVALPROEX SODIUM SCH MG: 125 CAPSULE ORAL at 20:47

## 2019-10-27 RX ADMIN — INSULIN ASPART SCH UNITS: 100 INJECTION, SOLUTION INTRAVENOUS; SUBCUTANEOUS at 11:30

## 2019-10-27 RX ADMIN — MEROPENEM SCH MLS/HR: 1 INJECTION INTRAVENOUS at 02:36

## 2019-10-27 NOTE — NUR
NURSE NOTES:

Patient received from Arlin JOHNSON. Patient is non-verbal but can independently move 
extremities. Patient is on oxygen via nasal cannula 2L with orders fir night time BIPAP at 
15/5 and at 30% FiO2. Patient has glucerna 1.5 at 30ml/hr via NG tube. 

R FA 22G and R Hand 22G IV catheters noted. Both are patent, asymptomatic and remain intact. 
Freedom splints applied for patient safety as she tries to remove medical equipment. Checked 
upper extremities for CMS all of which remain intact.  

Side rails are padded per protocol and seizure precautions observed. Patient has Charles 
catheter for acute urinary retention draining to gravity. Wounds noted, see WCP. Safety 
measures and aspiration precautions observed. Will continue to monitor.

## 2019-10-27 NOTE — NUR
NURSE NOTES:

Patient noted to be anxious and restless. Patient continues to pull at medical devices 
despite freedom splints in place. Ativan provided. Will continue to monitor.

## 2019-10-27 NOTE — NUR
RESPIRATORY NOTE:



Patient taken of BiPAP per Dr. Main's order. Placed on NC 2L. Patient is maintaining normal 
saturations. There is no shortness of breath or respiratory distress noted. Will will 
continue to monitor.

## 2019-10-27 NOTE — NEPHROLOGY PROGRESS NOTE
Assessment/Plan


Problem List:  


(1) Renal failure (ARF), acute on chronic


Assessment:  resolved





(2) Dehydration


(3) ARF (acute renal failure)


(4) Sepsis


(5) Acute metabolic encephalopathy


(6) Hyperglycemia due to type 2 diabetes mellitus


(7) UTI (urinary tract infection)


(8) Diabetic nephropathy


Assessment





Renal failure, Acute on Chronic resolved


Dehydration


Severe Anemia


Sepsis / Pneumonia / UTI


DM, OOC


Proteinuria , Diabetic Nephropathy


Acute encephalopathy


Plan





due transfer out ICU


Per pulmonary


change meds to NGT as possible


Cr lowering 


Will order urine studies and monitor renal parameters


kidney YOANDY noted


lower iv fluids rate


WBCs rising


has casas again due to retention


Avoid Nephrotoxics








previously


Anemia salas


2D echo


24 H urine protein


Keep BP and BS in check


I am holding  her psych meds due to low MS


Per orders





Subjective


ROS Limited/Unobtainable:  Yes





Objective


Objective





Last 24 Hour Vital Signs








  Date Time  Temp Pulse Resp B/P (MAP) Pulse Ox O2 Delivery O2 Flow Rate FiO2


 


10/27/19 11:00  87 25  96 Full Face  30


 


10/27/19 09:17  80 19  94 Full Face  30


 


10/27/19 08:00 98.6 81 20 136/62 (86) 96   


 


10/27/19 08:00      Bi-pap  


 


10/27/19 08:00  91      


 


10/27/19 07:13     95 Bi-Pap  30


 


10/27/19 07:05  83 24  95 Full Face  30


 


10/27/19 05:08  80 21  96 Full Face  30


 


10/27/19 04:00 98.9 79 16 110/70 (83) 95   


 


10/27/19 04:00  91      


 


10/27/19 02:32  83 21  97 Full Face  30


 


10/27/19 01:00  82 25  100 Full Face  30


 


10/27/19 00:00  83      


 


10/27/19 00:00 98.1 80 15 101/68 (79) 100   


 


10/26/19 22:48  80 26  99 Full Face  30


 


10/26/19 20:40  84 24  98 Full Face  30


 


10/26/19 20:29 98.8 85 20 97/73 (81) 100   


 


10/26/19 20:00  80      


 


10/26/19 20:00 99.2 97 16 101/65 (77) 100   


 


10/26/19 18:48     98 Bi-Pap  30


 


10/26/19 18:35 98.7 97 20 99/71 (80) 99   


 


10/26/19 18:29  102 22  100 Full Face  30


 


10/26/19 16:00 98.0 95 20 131/74 (93) 100   


 


10/26/19 14:30 98.3 85 19 124/65 (84) 100   


 


10/26/19 14:15  91      


 


10/26/19 12:00  81      


 


10/26/19 12:00 98.0 78 19 131/53 (79) 98   

















Intake and Output  


 


 10/26/19 10/27/19





 19:00 07:00


 


Intake Total 750 ml 55 ml


 


Output Total 1375 ml 1500 ml


 


Balance -625 ml -1445 ml


 


  


 


Free Water 30 ml 


 


IV Total 600 ml 55 ml


 


Tube Feeding 120 ml 


 


Output Urine Total 1375 ml 1500 ml








Laboratory Tests


10/26/19 16:51: 


Arterial Blood pH 7.325L, Arterial Blood Partial Pressure CO2 44.2, Arterial 

Blood Partial Pressure O2 267.2H, Arterial Blood HCO3 22.5, Arterial Blood 

Oxygen Saturation 99.2, Arterial Blood Base Excess -3.4L, Graham Test Positive


10/26/19 18:52: 


Arterial Blood pH 7.452H, Arterial Blood Partial Pressure CO2 40.1, Arterial 

Blood Partial Pressure O2 65.8L, Arterial Blood HCO3 27.4H, Arterial Blood 

Oxygen Saturation 91.8L, Arterial Blood Base Excess 3.2H, Graham Test Positive


10/27/19 04:00: 


White Blood Count 17.0H, Red Blood Count 2.97L, Hemoglobin 8.2L, Hematocrit 

25.0L, Mean Corpuscular Volume 84, Mean Corpuscular Hemoglobin 27.5, Mean 

Corpuscular Hemoglobin Concent 32.7, Red Cell Distribution Width 13.2, Platelet 

Count 371, Mean Platelet Volume 6.9, Neutrophils (%) (Auto) 73.4, Lymphocytes (%

) (Auto) 18.1L, Monocytes (%) (Auto) 4.7, Eosinophils (%) (Auto) 3.0, Basophils 

(%) (Auto) 0.9, Sodium Level 142, Potassium Level 4.2, Chloride Level 104, 

Carbon Dioxide Level 27, Anion Gap 11, Blood Urea Nitrogen 15, Creatinine 1.1, 

Estimat Glomerular Filtration Rate 49.7, Glucose Level 91, Calcium Level 8.9, 

Total Bilirubin 0.5, Aspartate Amino Transf (AST/SGOT) 16, Alanine 

Aminotransferase (ALT/SGPT) 13, Alkaline Phosphatase 87, Total Protein 6.2L, 

Albumin 1.8L, Globulin 4.4, Albumin/Globulin Ratio 0.4L


Height (Feet):  5


Height (Inches):  5.00


Weight (Pounds):  198


General Appearance:  moderate distress


EENT:  other - on BVIPAP


Cardiovascular:  normal rate


Respiratory/Chest:  decreased breath sounds


Abdomen:  distended


Objective


no change











Lukasz Vizcaino MD Oct 27, 2019 11:42

## 2019-10-27 NOTE — NUR
NURSE NOTES:

Patient repositioned, patient remains awake at this time. On continues bipap, saturating 
95%. Blood pressure stable, afebrile no distress, will continue to monitor.

## 2019-10-27 NOTE — PULMONOLOGY PROGRESS NOTE
Assessment/Plan


Assessment/Plan


Pulmonary Progress Note





Assessment/Plan


Problems:  


(1) Healthcare associated bacterial pneumonia


(2) UTI (urinary tract infection)


(3) Sepsis


(4) Dehydration


(5) CHANCE (acute kidney injury)


(6) Acute metabolic encephalopathy


(7) Hyperglycemia due to type 2 diabetes mellitus


(8) Renal failure (ARF), acute on chronic


(9) Aspiration pneumonia


(10) Abnormal TSH


(11) Pelvic mass in female


(12) Congestive Heart Failure


(13) Anemia


Assessment/Plan


Leukocytosis


Sepsis


Anemia - monitor labs, TF PRN


Possible pneumonia


E coli UTI


Acute hypoxemic respiratory failure


Acute encephalopathy


Hx CHF - diurese PRN


HTN


CHANCE 


Pelvic mass/possible GYN malignancy vs cyst


PPX on Lovenox





Plan:


-concern for pelvic malignancy noted on CT AP


-Monitor labs


- Diurese PRN


- BiPAP PRN


- HHN


- O2 PRN


-Abx per ID


-monitor volumes and renal function, F/U renal recs


-GYN evaluation


-monitor encephalopathy, ? neuro eval


-Asp precautions, SLP recs 


-DVT Px: LMWH


-FC











Subjective


Allergies:  


Coded Allergies:  


     No Known Allergies (Unverified , 10/14/19)


Subjective


WCT and Cr both better


CT noted with concern for gynecologic malignancy vs cyst


Improved Pulmonary status this morning





Objective





Vital Signs Noted





General Appearance:  no acute distress, cachetic


HEENT:  normocephalic, atraumatic


Respiratory/Chest:  occasional rhonchi


Cardiovascular:  normal peripheral pulses, normal rate, regular rhythm


Abdomen:  normal bowel sounds, soft, non tender, no organomegaly, non distended

, no mass


Extremities:  no cyanosis, no clubbing, no edema





Laboratory Tests Noted





CXR:


Findings: Bilateral pleural effusions and bilateral interstitial congestion 

persist.


Heart remains enlarged. Findings are unchanged


 


Impression:  Unchanged, over one day








Subjective


ROS Limited/Unobtainable:  No


Allergies:  


Coded Allergies:  


     No Known Allergies (Unverified , 10/14/19)





Objective





Last 24 Hour Vital Signs








  Date Time  Temp Pulse Resp B/P (MAP) Pulse Ox O2 Delivery O2 Flow Rate FiO2


 


10/27/19 13:11  77 27  98 Facial  30


 


10/27/19 12:24      Bi-pap  


 


10/27/19 12:00 98.4 79 17 145/69 (94) 98   


 


10/27/19 11:43  78      


 


10/27/19 11:00  87 25  96 Full Face  30


 


10/27/19 09:17  80 19  94 Full Face  30


 


10/27/19 08:00 98.6 81 20 136/62 (86) 96   


 


10/27/19 08:00      Bi-pap  


 


10/27/19 08:00  91      


 


10/27/19 07:13     95 Bi-Pap  30


 


10/27/19 07:05  83 24  95 Full Face  30


 


10/27/19 05:08  80 21  96 Full Face  30


 


10/27/19 04:00 98.9 79 16 110/70 (83) 95   


 


10/27/19 04:00  91      


 


10/27/19 02:32  83 21  97 Full Face  30


 


10/27/19 01:00  82 25  100 Full Face  30


 


10/27/19 00:00  83      


 


10/27/19 00:00 98.1 80 15 101/68 (79) 100   


 


10/26/19 22:48  80 26  99 Full Face  30


 


10/26/19 20:40  84 24  98 Full Face  30


 


10/26/19 20:29 98.8 85 20 97/73 (81) 100   


 


10/26/19 20:00  80      


 


10/26/19 20:00 99.2 97 16 101/65 (77) 100   


 


10/26/19 18:48     98 Bi-Pap  30


 


10/26/19 18:35 98.7 97 20 99/71 (80) 99   


 


10/26/19 18:29  102 22  100 Full Face  30


 


10/26/19 16:00 98.0 95 20 131/74 (93) 100   


 


10/26/19 14:30 98.3 85 19 124/65 (84) 100   


 


10/26/19 14:15  91      

















Intake and Output  


 


 10/26/19 10/27/19





 19:00 07:00


 


Intake Total 750 ml 55 ml


 


Output Total 1375 ml 1500 ml


 


Balance -625 ml -1445 ml


 


  


 


Free Water 30 ml 


 


IV Total 600 ml 55 ml


 


Tube Feeding 120 ml 


 


Output Urine Total 1375 ml 1500 ml











Microbiology








 Date/Time


Source Procedure


Growth Status


 


 


 10/25/19 20:45


Blood Blood Culture - Preliminary


NO GROWTH AFTER 24 HOURS Resulted


 


 10/25/19 20:30


Blood Blood Culture - Preliminary


NO GROWTH AFTER 24 HOURS Resulted








Laboratory Tests


10/26/19 16:51: 


Arterial Blood pH 7.325L, Arterial Blood Partial Pressure CO2 44.2, Arterial 

Blood Partial Pressure O2 267.2H, Arterial Blood HCO3 22.5, Arterial Blood 

Oxygen Saturation 99.2, Arterial Blood Base Excess -3.4L, Graham Test Positive


10/26/19 18:52: 


Arterial Blood pH 7.452H, Arterial Blood Partial Pressure CO2 40.1, Arterial 

Blood Partial Pressure O2 65.8L, Arterial Blood HCO3 27.4H, Arterial Blood 

Oxygen Saturation 91.8L, Arterial Blood Base Excess 3.2H, Graham Test Positive


10/27/19 04:00: 


White Blood Count 17.0H, Red Blood Count 2.97L, Hemoglobin 8.2L, Hematocrit 

25.0L, Mean Corpuscular Volume 84, Mean Corpuscular Hemoglobin 27.5, Mean 

Corpuscular Hemoglobin Concent 32.7, Red Cell Distribution Width 13.2, Platelet 

Count 371, Mean Platelet Volume 6.9, Neutrophils (%) (Auto) 73.4, Lymphocytes (%

) (Auto) 18.1L, Monocytes (%) (Auto) 4.7, Eosinophils (%) (Auto) 3.0, Basophils 

(%) (Auto) 0.9, Sodium Level 142, Potassium Level 4.2, Chloride Level 104, 

Carbon Dioxide Level 27, Anion Gap 11, Blood Urea Nitrogen 15, Creatinine 1.1, 

Estimat Glomerular Filtration Rate 49.7, Glucose Level 91, Calcium Level 8.9, 

Total Bilirubin 0.5, Aspartate Amino Transf (AST/SGOT) 16, Alanine 

Aminotransferase (ALT/SGPT) 13, Alkaline Phosphatase 87, Total Protein 6.2L, 

Albumin 1.8L, Globulin 4.4, Albumin/Globulin Ratio 0.4L





Current Medications








 Medications


  (Trade)  Dose


 Ordered  Sig/Winnie


 Route


 PRN Reason  Start Time


 Stop Time Status Last Admin


Dose Admin


 


 Acetaminophen


  (Tylenol)  650 mg  PRN  PRN


 RECTAL


 TEMP>100.5  10/26/19 19:45


     


 


 


 Acetaminophen


  (Tylenol)  650 mg  Q4H  PRN


 NG


 Mild Pain/Temp > 100.5  10/26/19 19:45


 11/25/19 19:44   


 


 


 Albuterol/


 Ipratropium


  (Albuterol/


 Ipratropium)  3 ml  Q4H  PRN


 HHN


 SHORTNESS OF BREATH  10/26/19 19:45


 10/31/19 19:44   


 


 


 Aspirin


  (ASA)  81 mg  DAILY


 NG


   10/27/19 09:00


 11/14/19 08:59  10/27/19 09:08


 


 


 Atorvastatin


 Calcium


  (Lipitor)  10 mg  BEDTIME


 NG


   10/26/19 21:00


 11/13/19 20:59  10/26/19 20:12


 


 


 Clopidogrel


 Bisulfate


  (Plavix)  75 mg  DAILY


 NG


   10/27/19 09:00


 11/14/19 08:59  10/27/19 09:09


 


 


 Dextrose


  (Dextrose 50%)  25 ml  Q30M  PRN


 IV


 Hypoglycemia  10/26/19 19:30


 11/13/19 06:59   


 


 


 Dextrose


  (Dextrose 50%)  50 ml  Q30M  PRN


 IV


 Hypoglycemia  10/26/19 19:30


 11/13/19 06:59   


 


 


 Divalproex Sodium


  (Depakote


 Sprinkles)  500 mg  Q12HR


 NG


   10/26/19 21:00


 11/14/19 21:59  10/27/19 09:09


 


 


 Docusate Sodium


  (Colace)  100 mg  EVERY 8  HOURS


 NG


   10/27/19 14:00


 11/25/19 08:59  10/27/19 13:06


 


 


 Enoxaparin Sodium


  (Lovenox)  30 mg  DAILY


 SUBQ


   10/27/19 09:00


 11/13/19 08:59  10/27/19 09:10


 


 


 Famotidine


  (Pepcid)  20 mg  BID


 GT


   10/27/19 09:00


 11/25/19 17:59  10/27/19 09:09


 


 


 Furosemide


  (Lasix)  40 mg  DAILY


 NG


   10/27/19 09:00


 11/26/19 08:59  10/27/19 09:08


 


 


 Insulin Aspart


  (NovoLOG)    BEFORE MEALS AND  HS


 SUBQ


   10/26/19 21:00


 11/13/19 11:29   


 


 


 Insulin Detemir


  (Levemir)  12 units  DAILY


 SUBQ


   10/27/19 09:00


 11/13/19 10:29  10/27/19 09:11


 


 


 Lactulose


  (Cephulac)  20 gm  Q8H  PRN


 NG


 Constipation  10/26/19 19:45


 11/25/19 19:44   


 


 


 Lorazepam


  (Ativan 2mg/ml


 1ml)  1 mg  Q8H  PRN


 IV


 For Anxiety  10/26/19 19:45


 10/31/19 19:44  10/26/19 20:11


 


 


 Meropenem 1 gm/


 Sodium Chloride  55 ml @ 


 110 mls/hr  Q12H


 IVPB


   10/27/19 02:00


 10/30/19 23:59  10/27/19 13:06


 

















Delmer Main MD Oct 27, 2019 14:01

## 2019-10-27 NOTE — NUR
NURSE NOTES:

Patient was able to pull IV out. New IV was inserted at R hand 22G and R FA 22G, both are SL 
at this time. Patient cleaned and repositioned, making good urine output. Patient remains 
awake at this time and yelling repetitively "water water water". Patients feeds remains on 
hold for now while on continuos BIPAP. Oral care was provided, HR remains NSR and BP remains 
stable.  Will continue to monitor.

## 2019-10-27 NOTE — SURGERY PROGRESS NOTE
Surgery Progress Note


Subjective


Additional Comments


No acute events.  On BiPAP.  Labs noted.  Micro noted.  Exam stable.





Objective





Last 24 Hour Vital Signs








  Date Time  Temp Pulse Resp B/P (MAP) Pulse Ox O2 Delivery O2 Flow Rate FiO2


 


10/27/19 12:24      Bi-pap  


 


10/27/19 12:00 98.4 79 17 145/69 (94) 98   


 


10/27/19 11:00  87 25  96 Full Face  30


 


10/27/19 09:17  80 19  94 Full Face  30


 


10/27/19 08:00 98.6 81 20 136/62 (86) 96   


 


10/27/19 08:00      Bi-pap  


 


10/27/19 08:00  91      


 


10/27/19 07:13     95 Bi-Pap  30


 


10/27/19 07:05  83 24  95 Full Face  30


 


10/27/19 05:08  80 21  96 Full Face  30


 


10/27/19 04:00 98.9 79 16 110/70 (83) 95   


 


10/27/19 04:00  91      


 


10/27/19 02:32  83 21  97 Full Face  30


 


10/27/19 01:00  82 25  100 Full Face  30


 


10/27/19 00:00  83      


 


10/27/19 00:00 98.1 80 15 101/68 (79) 100   


 


10/26/19 22:48  80 26  99 Full Face  30


 


10/26/19 20:40  84 24  98 Full Face  30


 


10/26/19 20:29 98.8 85 20 97/73 (81) 100   


 


10/26/19 20:00  80      


 


10/26/19 20:00 99.2 97 16 101/65 (77) 100   


 


10/26/19 18:48     98 Bi-Pap  30


 


10/26/19 18:35 98.7 97 20 99/71 (80) 99   


 


10/26/19 18:29  102 22  100 Full Face  30


 


10/26/19 16:00 98.0 95 20 131/74 (93) 100   


 


10/26/19 14:30 98.3 85 19 124/65 (84) 100   


 


10/26/19 14:15  91      








I&O











Intake and Output  


 


 10/26/19 10/27/19





 19:00 07:00


 


Intake Total 750 ml 55 ml


 


Output Total 1375 ml 1500 ml


 


Balance -625 ml -1445 ml


 


  


 


Free Water 30 ml 


 


IV Total 600 ml 55 ml


 


Tube Feeding 120 ml 


 


Output Urine Total 1375 ml 1500 ml








Dressing:  saturated


Wound:  other


Drains:  other


Cardiovascular:  RSR


Respiratory:  decreased breath sounds


Abdomen:  soft, present bowel sounds, non-distended


Extremities:  no cyanosis





Laboratory Tests








Test


  10/26/19


16:51 10/26/19


18:52 10/27/19


04:00


 


Arterial Blood pH


  7.325


(7.350-7.450) 7.452


(7.350-7.450) 


 


 


Arterial Blood Partial


Pressure CO2 44.2 mmHg


(35.0-45.0) 40.1 mmHg


(35.0-45.0) 


 


 


Arterial Blood Partial


Pressure O2 267.2 mmHg


(75.0-100.0)  H 65.8 mmHg


(75.0-100.0)  L 


 


 


Arterial Blood HCO3


  22.5 mmol/L


(22.0-26.0) 27.4 mmol/L


(22.0-26.0)  H 


 


 


Arterial Blood Oxygen


Saturation 99.2 %


() 91.8 %


()  L 


 


 


Arterial Blood Base Excess -3.4 (-2-2)  L 3.2 (-2-2)  H 


 


Graham Test Positive   Positive   


 


White Blood Count


  


  


  17.0 K/UL


(4.8-10.8)  H


 


Red Blood Count


  


  


  2.97 M/UL


(4.20-5.40)  L


 


Hemoglobin


  


  


  8.2 G/DL


(12.0-16.0)  L


 


Hematocrit


  


  


  25.0 %


(37.0-47.0)  L


 


Mean Corpuscular Volume   84 FL (80-99)  


 


Mean Corpuscular Hemoglobin


  


  


  27.5 PG


(27.0-31.0)


 


Mean Corpuscular Hemoglobin


Concent 


  


  32.7 G/DL


(32.0-36.0)


 


Red Cell Distribution Width


  


  


  13.2 %


(11.6-14.8)


 


Platelet Count


  


  


  371 K/UL


(150-450)


 


Mean Platelet Volume


  


  


  6.9 FL


(6.5-10.1)


 


Neutrophils (%) (Auto)


  


  


  73.4 %


(45.0-75.0)


 


Lymphocytes (%) (Auto)


  


  


  18.1 %


(20.0-45.0)  L


 


Monocytes (%) (Auto)


  


  


  4.7 %


(1.0-10.0)


 


Eosinophils (%) (Auto)


  


  


  3.0 %


(0.0-3.0)


 


Basophils (%) (Auto)


  


  


  0.9 %


(0.0-2.0)


 


Sodium Level


  


  


  142 MMOL/L


(136-145)


 


Potassium Level


  


  


  4.2 MMOL/L


(3.5-5.1)


 


Chloride Level


  


  


  104 MMOL/L


()


 


Carbon Dioxide Level


  


  


  27 MMOL/L


(21-32)


 


Anion Gap


  


  


  11 mmol/L


(5-15)


 


Blood Urea Nitrogen


  


  


  15 mg/dL


(7-18)


 


Creatinine


  


  


  1.1 MG/DL


(0.55-1.30)


 


Estimat Glomerular Filtration


Rate 


  


  49.7 mL/min


(>60)


 


Glucose Level


  


  


  91 MG/DL


()


 


Calcium Level


  


  


  8.9 MG/DL


(8.5-10.1)


 


Total Bilirubin


  


  


  0.5 MG/DL


(0.2-1.0)


 


Aspartate Amino Transf


(AST/SGOT) 


  


  16 U/L (15-37)


 


 


Alanine Aminotransferase


(ALT/SGPT) 


  


  13 U/L (12-78)


 


 


Alkaline Phosphatase


  


  


  87 U/L


()


 


Total Protein


  


  


  6.2 G/DL


(6.4-8.2)  L


 


Albumin


  


  


  1.8 G/DL


(3.4-5.0)  L


 


Globulin   4.4 g/dL  


 


Albumin/Globulin Ratio


  


  


  0.4 (1.0-2.7)


L











Plan


Problems:  


(1) Pressure injury of deep tissue


Assessment & Plan:  Pt presented on admission with DTPI R heel. Blood filled 

blister with marginal erythema noted to heel. Pt exhibited agitation when 

minimally palpated. (L)2.2cm x (W)2.1cm


L heel is blanchable .


Non-blanchable erythema without induration noted to sacral cleft. 


No other areas of skin concerns noted.





Tx.Plan:


Apply Betadine to R heel. Cover with Optifoam drsg. Change every 3 days and prn.


           


Apply Cavilon Skin Barrier to L heel. Cover with Optifoam drsg. Change every 7 

days and prn.


           


Apply Moisture Barrier Paste to buttocks. Cover sacral area with Optifoam drsg. 

Change every 3 days and prn.


           


Reposition at least every 2hours or as tolerated.


           


Off-load heels with pillow.





(2) Sepsis


Assessment & Plan:  Patient with low-grade fevers, anemia, leukocytosis 

persistent, elevated platelets, abnormal labs.


Etiology currently unknown and work-up in progress.  Labs noted and imaging 

that is available as noted.  


Okay for diet


CT noted


US noted


Impression: 13 x 7 x 12.9 cm unilocular cystic mass, corresponding to lesion 

reported


on recent CT scan. Layering low level internal echoes likely represent bladder


debris.. This presumably represents a cystic ovarian lesion with debris or


hemorrhage, possibly neoplastic. Gynecological consultation is recommended


Antibiotics as per infectious disease 


local wound care as wounds do not seem to be the etiology of her sepsis


We will monitor and follow with recommendations


Thank you for allowing me to participate in patient's care














Radhames Blas Oct 27, 2019 12:43

## 2019-10-27 NOTE — NUR
NURSE NOTES:



Received patient in bed. Asleep. Easy to arouse. On bipap. Contact isolation observed.

## 2019-10-27 NOTE — CARDIOLOGY PROGRESS NOTE
Assessment/Plan


Assessment/Plan


respiratory distress, today improved on Bipap, probably combination of CHF and 

pneumonia. continue diuresis





Subjective


Subjective


the patient is on bipap, she is unreponsive





Objective





Last 24 Hour Vital Signs








  Date Time  Temp Pulse Resp B/P (MAP) Pulse Ox O2 Delivery O2 Flow Rate FiO2


 


10/27/19 20:00 99.3 89 20 140/72 (94) 100   


 


10/27/19 16:00       2.0 


 


10/27/19 16:00 98.8 83 19 142/64 (90) 98   


 


10/27/19 16:00      Nasal Cannula 2.0 


 


10/27/19 15:58        30


 


10/27/19 15:26  79      


 


10/27/19 15:10  80 20  100 Facial  30


 


10/27/19 13:11  77 27  98 Facial  30


 


10/27/19 12:24      Bi-pap  


 


10/27/19 12:00 98.4 79 17 145/69 (94) 98   


 


10/27/19 12:00        30


 


10/27/19 11:43  78      


 


10/27/19 11:00  87 25  96 Full Face  30


 


10/27/19 09:17  80 19  94 Full Face  30


 


10/27/19 08:00        30


 


10/27/19 08:00 98.6 81 20 136/62 (86) 96   


 


10/27/19 08:00      Bi-pap  


 


10/27/19 08:00  91      


 


10/27/19 07:13     95 Bi-Pap  30


 


10/27/19 07:05  83 24  95 Full Face  30


 


10/27/19 05:08  80 21  96 Full Face  30


 


10/27/19 04:00 98.9 79 16 110/70 (83) 95   


 


10/27/19 04:00  91      


 


10/27/19 02:32  83 21  97 Full Face  30


 


10/27/19 01:00  82 25  100 Full Face  30


 


10/27/19 00:00  83      


 


10/27/19 00:00 98.1 80 15 101/68 (79) 100   


 


10/26/19 22:48  80 26  99 Full Face  30








General Appearance:  other - opn Bipap


EENT:  PERRL/EOMI


Neck:  JVD


Rhythm:  NSR


Cardiovascular:  tachycardia


Respiratory/Chest:  crackles/rales, rhonchi - bilaterally


Abdomen:  soft


Extremities:  other - arms are edematous











Intake and Output  


 


 10/26/19 10/27/19





 19:00 07:00


 


Intake Total 750 ml 55 ml


 


Output Total 1375 ml 1500 ml


 


Balance -625 ml -1445 ml


 


  


 


Free Water 30 ml 


 


IV Total 600 ml 55 ml


 


Tube Feeding 120 ml 


 


Output Urine Total 1375 ml 1500 ml











Laboratory Tests








Test


  10/27/19


04:00


 


White Blood Count


  17.0 K/UL


(4.8-10.8)  H


 


Red Blood Count


  2.97 M/UL


(4.20-5.40)  L


 


Hemoglobin


  8.2 G/DL


(12.0-16.0)  L


 


Hematocrit


  25.0 %


(37.0-47.0)  L


 


Mean Corpuscular Volume 84 FL (80-99)  


 


Mean Corpuscular Hemoglobin


  27.5 PG


(27.0-31.0)


 


Mean Corpuscular Hemoglobin


Concent 32.7 G/DL


(32.0-36.0)


 


Red Cell Distribution Width


  13.2 %


(11.6-14.8)


 


Platelet Count


  371 K/UL


(150-450)


 


Mean Platelet Volume


  6.9 FL


(6.5-10.1)


 


Neutrophils (%) (Auto)


  73.4 %


(45.0-75.0)


 


Lymphocytes (%) (Auto)


  18.1 %


(20.0-45.0)  L


 


Monocytes (%) (Auto)


  4.7 %


(1.0-10.0)


 


Eosinophils (%) (Auto)


  3.0 %


(0.0-3.0)


 


Basophils (%) (Auto)


  0.9 %


(0.0-2.0)


 


Sodium Level


  142 MMOL/L


(136-145)


 


Potassium Level


  4.2 MMOL/L


(3.5-5.1)


 


Chloride Level


  104 MMOL/L


()


 


Carbon Dioxide Level


  27 MMOL/L


(21-32)


 


Anion Gap


  11 mmol/L


(5-15)


 


Blood Urea Nitrogen


  15 mg/dL


(7-18)


 


Creatinine


  1.1 MG/DL


(0.55-1.30)


 


Estimat Glomerular Filtration


Rate 49.7 mL/min


(>60)


 


Glucose Level


  91 MG/DL


()


 


Calcium Level


  8.9 MG/DL


(8.5-10.1)


 


Total Bilirubin


  0.5 MG/DL


(0.2-1.0)


 


Aspartate Amino Transf


(AST/SGOT) 16 U/L (15-37)


 


 


Alanine Aminotransferase


(ALT/SGPT) 13 U/L (12-78)


 


 


Alkaline Phosphatase


  87 U/L


()


 


Total Protein


  6.2 G/DL


(6.4-8.2)  L


 


Albumin


  1.8 G/DL


(3.4-5.0)  L


 


Globulin 4.4 g/dL  


 


Albumin/Globulin Ratio


  0.4 (1.0-2.7)


L











Microbiology








 Date/Time


Source Procedure


Growth Status


 


 


 10/25/19 20:45


Blood Blood Culture - Preliminary


NO GROWTH AFTER 24 HOURS Resulted


 


 10/25/19 20:30


Blood Blood Culture - Preliminary


NO GROWTH AFTER 24 HOURS Resulted

















Katia Castano MD Oct 27, 2019 21:43

## 2019-10-27 NOTE — NUR
RESPIRATORY NOTE:



Patient received on BiPAP with current ordered settings. Patient taken off full face mask to 
asses the skin. There is no redness or skin breakdown noted. Cleaned face and re-taped face. 
Patient placed back on BiPAP. There are alarms are functional and audible. The BiPAP is 
connected to a red. Patient appears comfortable at this time. Will continue to monitor.

## 2019-10-27 NOTE — INFECTIOUS DISEASES PROG NOTE
Assessment/Plan


Problems:  


(1) Aspiration pneumonia


Assessment & Plan:  with B/L infiltrates, L>R suspect due to altered mental 

status , with hypoxemia and leukocytosis, continue meropenem empirically, 

repeated  CXR  showed no change , CT of the chest showed B/L effusion with 

basal infiltrates , aspiration precaution. continue meropenem for two weeks 

total . EOT 10/30/19  





(2) UTI (urinary tract infection)


Assessment & Plan:  due to ESBL producing E coli already on meropenem to cover 

for sepsis and pneumonia too for two weeks total  





(3) Sepsis


Assessment & Plan:  due to the above, with persistent leukocytosis inspit of 

being on wide spectrum antibiotics and negative repeated blood cultures, 

suspect pelvic mass related or bone marrow pathology . recommend gynecology 

eval for pelvic mass resection . will order indium scan to evaluate the pelvic 

mass and rule out deep abscess. unfortunately we are unable to reach her 

daughter to sign the consent, and no other family member is available to do so, 

will order the test as medical necessity. D/W CHARGE NURSE 





(4) Acute metabolic encephalopathy


Assessment & Plan:  due to the above, continue hydration and antibiotics, 

monitor in tele 





(5) Hyperglycemia due to type 2 diabetes mellitus


Assessment & Plan:  recommend tight glycemic control to keep blood glucose 

between 100-140 





(6) ARF (acute renal failure)


Assessment & Plan:  due to the above, continue hydration and renally dosed 

antibiotics, close  monitor of renal function , stop vancomycin 





(7) Pelvic mass in female


Assessment & Plan:  to the left of the uterus, suspect malignancy , recommend OB

/GYN eval , and tumor markers , oncology is following 








Subjective


ROS Limited/Unobtainable:  Yes


Allergies:  


Coded Allergies:  


     No Known Allergies (Unverified , 10/14/19)


Subjective


she was transferred out of ICU , started on BIPAP, still lethargic ,and 

minimally responsive , no fever or chills , no diarrhea, has cough but no SOB





Objective


Vital Signs





Last 24 Hour Vital Signs








  Date Time  Temp Pulse Resp B/P (MAP) Pulse Ox O2 Delivery O2 Flow Rate FiO2


 


10/27/19 16:00       2.0 


 


10/27/19 16:00 98.8 83 19 142/64 (90) 98   


 


10/27/19 16:00      Nasal Cannula 2.0 


 


10/27/19 15:58        30


 


10/27/19 15:26  79      


 


10/27/19 15:10  80 20  100 Facial  30


 


10/27/19 13:11  77 27  98 Facial  30


 


10/27/19 12:24      Bi-pap  


 


10/27/19 12:00 98.4 79 17 145/69 (94) 98   


 


10/27/19 12:00        30


 


10/27/19 11:43  78      


 


10/27/19 11:00  87 25  96 Full Face  30


 


10/27/19 09:17  80 19  94 Full Face  30


 


10/27/19 08:00        30


 


10/27/19 08:00 98.6 81 20 136/62 (86) 96   


 


10/27/19 08:00      Bi-pap  


 


10/27/19 08:00  91      


 


10/27/19 07:13     95 Bi-Pap  30


 


10/27/19 07:05  83 24  95 Full Face  30


 


10/27/19 05:08  80 21  96 Full Face  30


 


10/27/19 04:00 98.9 79 16 110/70 (83) 95   


 


10/27/19 04:00  91      


 


10/27/19 02:32  83 21  97 Full Face  30


 


10/27/19 01:00  82 25  100 Full Face  30


 


10/27/19 00:00  83      


 


10/27/19 00:00 98.1 80 15 101/68 (79) 100   


 


10/26/19 22:48  80 26  99 Full Face  30


 


10/26/19 20:40  84 24  98 Full Face  30


 


10/26/19 20:29 98.8 85 20 97/73 (81) 100   


 


10/26/19 20:00  80      


 


10/26/19 20:00 99.2 97 16 101/65 (77) 100   








Height (Feet):  5


Height (Inches):  5.00


Weight (Pounds):  198


General Appearance:  WD/WN, no acute distress


HEENT:  normocephalic, atraumatic, anicteric, mucous membranes moist, PERRL


Respiratory/Chest:  normal breath sounds, no respiratory distress, no accessory 

muscle use, decreased breath sounds, crackles/rales


Cardiovascular:  normal peripheral pulses, normal rate, regular rhythm, no 

gallop/murmur, no JVD


Abdomen:  normal bowel sounds, soft, non tender, no organomegaly, non distended

, no mass, no scars


Genitourinary:  normal external genitalia


Extremities:  no cyanosis, no clubbing


Skin:  no rash, no lesions, ulcers


Neurologic/Psychiatric:  alert, unresponsiveness


Lymphatic:  no neck adenopathy, no groin adenopathy


Musculoskeletal:  normal muscle bulk, no effusion





Microbiology








 Date/Time


Source Procedure


Growth Status


 


 


 10/25/19 20:45


Blood Blood Culture - Preliminary


NO GROWTH AFTER 24 HOURS Resulted


 


 10/25/19 20:30


Blood Blood Culture - Preliminary


NO GROWTH AFTER 24 HOURS Resulted











Laboratory Tests








Test


  10/27/19


04:00


 


White Blood Count


  17.0 K/UL


(4.8-10.8)  H


 


Red Blood Count


  2.97 M/UL


(4.20-5.40)  L


 


Hemoglobin


  8.2 G/DL


(12.0-16.0)  L


 


Hematocrit


  25.0 %


(37.0-47.0)  L


 


Mean Corpuscular Volume 84 FL (80-99)  


 


Mean Corpuscular Hemoglobin


  27.5 PG


(27.0-31.0)


 


Mean Corpuscular Hemoglobin


Concent 32.7 G/DL


(32.0-36.0)


 


Red Cell Distribution Width


  13.2 %


(11.6-14.8)


 


Platelet Count


  371 K/UL


(150-450)


 


Mean Platelet Volume


  6.9 FL


(6.5-10.1)


 


Neutrophils (%) (Auto)


  73.4 %


(45.0-75.0)


 


Lymphocytes (%) (Auto)


  18.1 %


(20.0-45.0)  L


 


Monocytes (%) (Auto)


  4.7 %


(1.0-10.0)


 


Eosinophils (%) (Auto)


  3.0 %


(0.0-3.0)


 


Basophils (%) (Auto)


  0.9 %


(0.0-2.0)


 


Sodium Level


  142 MMOL/L


(136-145)


 


Potassium Level


  4.2 MMOL/L


(3.5-5.1)


 


Chloride Level


  104 MMOL/L


()


 


Carbon Dioxide Level


  27 MMOL/L


(21-32)


 


Anion Gap


  11 mmol/L


(5-15)


 


Blood Urea Nitrogen


  15 mg/dL


(7-18)


 


Creatinine


  1.1 MG/DL


(0.55-1.30)


 


Estimat Glomerular Filtration


Rate 49.7 mL/min


(>60)


 


Glucose Level


  91 MG/DL


()


 


Calcium Level


  8.9 MG/DL


(8.5-10.1)


 


Total Bilirubin


  0.5 MG/DL


(0.2-1.0)


 


Aspartate Amino Transf


(AST/SGOT) 16 U/L (15-37)


 


 


Alanine Aminotransferase


(ALT/SGPT) 13 U/L (12-78)


 


 


Alkaline Phosphatase


  87 U/L


()


 


Total Protein


  6.2 G/DL


(6.4-8.2)  L


 


Albumin


  1.8 G/DL


(3.4-5.0)  L


 


Globulin 4.4 g/dL  


 


Albumin/Globulin Ratio


  0.4 (1.0-2.7)


L











Current Medications








 Medications


  (Trade)  Dose


 Ordered  Sig/Winnie


 Route


 PRN Reason  Start Time


 Stop Time Status Last Admin


Dose Admin


 


 Acetaminophen


  (Tylenol)  650 mg  PRN  PRN


 RECTAL


 TEMP>100.5  10/26/19 19:45


     


 


 


 Acetaminophen


  (Tylenol)  650 mg  Q4H  PRN


 NG


 Mild Pain/Temp > 100.5  10/26/19 19:45


 11/25/19 19:44   


 


 


 Albuterol/


 Ipratropium


  (Albuterol/


 Ipratropium)  3 ml  Q4H  PRN


 HHN


 SHORTNESS OF BREATH  10/26/19 19:45


 10/31/19 19:44   


 


 


 Aspirin


  (ASA)  81 mg  DAILY


 NG


   10/27/19 09:00


 11/14/19 08:59  10/27/19 09:08


 


 


 Atorvastatin


 Calcium


  (Lipitor)  10 mg  BEDTIME


 NG


   10/26/19 21:00


 11/13/19 20:59  10/26/19 20:12


 


 


 Clopidogrel


 Bisulfate


  (Plavix)  75 mg  DAILY


 NG


   10/27/19 09:00


 11/14/19 08:59  10/27/19 09:09


 


 


 Dextrose


  (Dextrose 50%)  25 ml  Q30M  PRN


 IV


 Hypoglycemia  10/26/19 19:30


 11/13/19 06:59   


 


 


 Dextrose


  (Dextrose 50%)  50 ml  Q30M  PRN


 IV


 Hypoglycemia  10/26/19 19:30


 11/13/19 06:59   


 


 


 Divalproex Sodium


  (Depakote


 Sprinkles)  500 mg  Q12HR


 NG


   10/26/19 21:00


 11/14/19 21:59  10/27/19 09:09


 


 


 Docusate Sodium


  (Colace)  100 mg  EVERY 8  HOURS


 NG


   10/27/19 14:00


 11/25/19 08:59  10/27/19 13:06


 


 


 Enoxaparin Sodium


  (Lovenox)  30 mg  DAILY


 SUBQ


   10/27/19 09:00


 11/13/19 08:59  10/27/19 09:10


 


 


 Famotidine


  (Pepcid)  20 mg  EVERY 12  HOURS


 GT


   10/27/19 21:00


 11/25/19 17:59   


 


 


 Furosemide


  (Lasix)  40 mg  DAILY


 NG


   10/27/19 09:00


 11/26/19 08:59  10/27/19 09:08


 


 


 Insulin Aspart


  (NovoLOG)    EVERY 6  HOURS


 SUBQ


   10/27/19 18:00


 11/13/19 11:29   


 


 


 Insulin Detemir


  (Levemir)  12 units  DAILY


 SUBQ


   10/27/19 09:00


 11/13/19 10:29  10/27/19 09:11


 


 


 Lactulose


  (Cephulac)  20 gm  Q8H  PRN


 NG


 Constipation  10/26/19 19:45


 11/25/19 19:44   


 


 


 Lorazepam


  (Ativan 2mg/ml


 1ml)  1 mg  Q8H  PRN


 IV


 For Anxiety  10/26/19 19:45


 10/31/19 19:44  10/26/19 20:11


 


 


 Meropenem 1 gm/


 Sodium Chloride  55 ml @ 


 110 mls/hr  Q12H


 IVPB


   10/27/19 02:00


 10/30/19 23:59  10/27/19 13:06


 

















Amie Burgos M.D. Oct 27, 2019 19:43

## 2019-10-27 NOTE — NUR
NURSE NOTES:

Patient sleeping at this time, no acute distress, easily arousable. Patient repositioned 
will continue to monitor.

## 2019-10-27 NOTE — GENERAL PROGRESS NOTE
Assessment/Plan


Problem List:  


(1) Abnormal TSH


ICD Codes:  R79.89 - Other specified abnormal findings of blood chemistry


SNOMED:  763092222


(2) Hyperglycemia due to type 2 diabetes mellitus


ICD Codes:  E11.65 - Type 2 diabetes mellitus with hyperglycemia


SNOMED:  819228262341714, 71566989


Qualifiers:  


   Qualified Codes:  E11.65 - Type 2 diabetes mellitus with hyperglycemia; 

Z79.4 - Long term (current) use of insulin


(3) Acute metabolic encephalopathy


ICD Codes:  G93.41 - Metabolic encephalopathy


SNOMED:  22972621, 878806738


(4) ARF (acute renal failure)


ICD Codes:  N17.9 - Acute kidney failure, unspecified


SNOMED:  26354407


Qualifiers:  


   Qualified Codes:  N17.9 - Acute kidney failure, unspecified


(5) Healthcare associated bacterial pneumonia


ICD Codes:  J15.9 - Unspecified bacterial pneumonia


SNOMED:  635677405


Status:  stable


Assessment/Plan:


continue Levemir 12 units qam 


continue NISS





Subjective


ROS Limited/Unobtainable:  Yes


Allergies:  


Coded Allergies:  


     No Known Allergies (Unverified , 10/14/19)


Subjective


events noted 


NPO on BiPAP 














Item Value  Date Time


 


Bedside Blood Glucose 96 mg/dl 10/27/19 1148


 


Bedside Blood Glucose 124 mg/dl H 10/27/19 0911


 


Bedside Blood Glucose 124 mg/dl H 10/27/19 0630


 


Bedside Blood Glucose 183 mg/dl H 10/26/19 2100


 


Bedside Blood Glucose 171 mg/dl H 10/26/19 1814











Objective





Last 24 Hour Vital Signs








  Date Time  Temp Pulse Resp B/P (MAP) Pulse Ox O2 Delivery O2 Flow Rate FiO2


 


10/27/19 12:24      Bi-pap  


 


10/27/19 12:00 98.4 79 17 145/69 (94) 98   


 


10/27/19 11:00  87 25  96 Full Face  30


 


10/27/19 09:17  80 19  94 Full Face  30


 


10/27/19 08:00 98.6 81 20 136/62 (86) 96   


 


10/27/19 08:00      Bi-pap  


 


10/27/19 08:00  91      


 


10/27/19 07:13     95 Bi-Pap  30


 


10/27/19 07:05  83 24  95 Full Face  30


 


10/27/19 05:08  80 21  96 Full Face  30


 


10/27/19 04:00 98.9 79 16 110/70 (83) 95   


 


10/27/19 04:00  91      


 


10/27/19 02:32  83 21  97 Full Face  30


 


10/27/19 01:00  82 25  100 Full Face  30


 


10/27/19 00:00  83      


 


10/27/19 00:00 98.1 80 15 101/68 (79) 100   


 


10/26/19 22:48  80 26  99 Full Face  30


 


10/26/19 20:40  84 24  98 Full Face  30


 


10/26/19 20:29 98.8 85 20 97/73 (81) 100   


 


10/26/19 20:00  80      


 


10/26/19 20:00 99.2 97 16 101/65 (77) 100   


 


10/26/19 18:48     98 Bi-Pap  30


 


10/26/19 18:35 98.7 97 20 99/71 (80) 99   


 


10/26/19 18:29  102 22  100 Full Face  30


 


10/26/19 16:00 98.0 95 20 131/74 (93) 100   


 


10/26/19 14:30 98.3 85 19 124/65 (84) 100   


 


10/26/19 14:15  91      

















Intake and Output  


 


 10/26/19 10/27/19





 19:00 07:00


 


Intake Total 750 ml 55 ml


 


Output Total 1375 ml 1500 ml


 


Balance -625 ml -1445 ml


 


  


 


Free Water 30 ml 


 


IV Total 600 ml 55 ml


 


Tube Feeding 120 ml 


 


Output Urine Total 1375 ml 1500 ml








Laboratory Tests


10/26/19 16:51: 


Arterial Blood pH 7.325L, Arterial Blood Partial Pressure CO2 44.2, Arterial 

Blood Partial Pressure O2 267.2H, Arterial Blood HCO3 22.5, Arterial Blood 

Oxygen Saturation 99.2, Arterial Blood Base Excess -3.4L, Graham Test Positive


10/26/19 18:52: 


Arterial Blood pH 7.452H, Arterial Blood Partial Pressure CO2 40.1, Arterial 

Blood Partial Pressure O2 65.8L, Arterial Blood HCO3 27.4H, Arterial Blood 

Oxygen Saturation 91.8L, Arterial Blood Base Excess 3.2H, Graham Test Positive


10/27/19 04:00: 


White Blood Count 17.0H, Red Blood Count 2.97L, Hemoglobin 8.2L, Hematocrit 

25.0L, Mean Corpuscular Volume 84, Mean Corpuscular Hemoglobin 27.5, Mean 

Corpuscular Hemoglobin Concent 32.7, Red Cell Distribution Width 13.2, Platelet 

Count 371, Mean Platelet Volume 6.9, Neutrophils (%) (Auto) 73.4, Lymphocytes (%

) (Auto) 18.1L, Monocytes (%) (Auto) 4.7, Eosinophils (%) (Auto) 3.0, Basophils 

(%) (Auto) 0.9, Sodium Level 142, Potassium Level 4.2, Chloride Level 104, 

Carbon Dioxide Level 27, Anion Gap 11, Blood Urea Nitrogen 15, Creatinine 1.1, 

Estimat Glomerular Filtration Rate 49.7, Glucose Level 91, Calcium Level 8.9, 

Total Bilirubin 0.5, Aspartate Amino Transf (AST/SGOT) 16, Alanine 

Aminotransferase (ALT/SGPT) 13, Alkaline Phosphatase 87, Total Protein 6.2L, 

Albumin 1.8L, Globulin 4.4, Albumin/Globulin Ratio 0.4L


Height (Feet):  5


Height (Inches):  5.00


Weight (Pounds):  198


General Appearance:  lethargic


Neck:  normal alignment


Cardiovascular:  normal rate


Respiratory/Chest:  decreased breath sounds


Abdomen:  normal bowel sounds


Edema:  1+ Arm (L), 1+ Arm (R), 1+ Leg (L), 1+ Leg (R), 1+ Pedal (L), 1+ Pedal (

R), 1+ Generalized


Objective





Current Medications








 Medications


  (Trade)  Dose


 Ordered  Sig/Winnie


 Route


 PRN Reason  Start Time


 Stop Time Status Last Admin


Dose Admin


 


 Acetaminophen


  (Tylenol)  650 mg  PRN  PRN


 RECTAL


 TEMP>100.5  10/26/19 19:45


     


 


 


 Acetaminophen


  (Tylenol)  650 mg  Q4H  PRN


 NG


 Mild Pain/Temp > 100.5  10/26/19 19:45


 11/25/19 19:44   


 


 


 Albuterol/


 Ipratropium


  (Albuterol/


 Ipratropium)  3 ml  Q4H  PRN


 HHN


 SHORTNESS OF BREATH  10/26/19 19:45


 10/31/19 19:44   


 


 


 Aspirin


  (ASA)  81 mg  DAILY


 NG


   10/27/19 09:00


 11/14/19 08:59  10/27/19 09:08


 


 


 Atorvastatin


 Calcium


  (Lipitor)  10 mg  BEDTIME


 NG


   10/26/19 21:00


 11/13/19 20:59  10/26/19 20:12


 


 


 Clopidogrel


 Bisulfate


  (Plavix)  75 mg  DAILY


 NG


   10/27/19 09:00


 11/14/19 08:59  10/27/19 09:09


 


 


 Dextrose


  (Dextrose 50%)  25 ml  Q30M  PRN


 IV


 Hypoglycemia  10/26/19 19:30


 11/13/19 06:59   


 


 


 Dextrose


  (Dextrose 50%)  50 ml  Q30M  PRN


 IV


 Hypoglycemia  10/26/19 19:30


 11/13/19 06:59   


 


 


 Divalproex Sodium


  (Depakote


 Sprinkles)  500 mg  Q12HR


 NG


   10/26/19 21:00


 11/14/19 21:59  10/27/19 09:09


 


 


 Docusate Sodium


  (Colace)  100 mg  EVERY 8  HOURS


 NG


   10/27/19 14:00


 11/25/19 08:59   


 


 


 Enoxaparin Sodium


  (Lovenox)  30 mg  DAILY


 SUBQ


   10/27/19 09:00


 11/13/19 08:59  10/27/19 09:10


 


 


 Famotidine


  (Pepcid)  20 mg  BID


 GT


   10/27/19 09:00


 11/25/19 17:59  10/27/19 09:09


 


 


 Furosemide


  (Lasix)  40 mg  DAILY


 NG


   10/27/19 09:00


 11/26/19 08:59  10/27/19 09:08


 


 


 Insulin Aspart


  (NovoLOG)    BEFORE MEALS AND  HS


 SUBQ


   10/26/19 21:00


 11/13/19 11:29   


 


 


 Insulin Detemir


  (Levemir)  12 units  DAILY


 SUBQ


   10/27/19 09:00


 11/13/19 10:29  10/27/19 09:11


 


 


 Lactulose


  (Cephulac)  20 gm  Q8H  PRN


 NG


 Constipation  10/26/19 19:45


 11/25/19 19:44   


 


 


 Lorazepam


  (Ativan 2mg/ml


 1ml)  1 mg  Q8H  PRN


 IV


 For Anxiety  10/26/19 19:45


 10/31/19 19:44  10/26/19 20:11


 


 


 Meropenem 1 gm/


 Sodium Chloride  55 ml @ 


 110 mls/hr  Q12H


 IVPB


   10/27/19 02:00


 10/30/19 23:59  10/27/19 02:36


 

















Riccardo Velez MD Oct 27, 2019 13:07

## 2019-10-28 VITALS — DIASTOLIC BLOOD PRESSURE: 66 MMHG | SYSTOLIC BLOOD PRESSURE: 94 MMHG

## 2019-10-28 VITALS — DIASTOLIC BLOOD PRESSURE: 71 MMHG | SYSTOLIC BLOOD PRESSURE: 133 MMHG

## 2019-10-28 VITALS — DIASTOLIC BLOOD PRESSURE: 99 MMHG | SYSTOLIC BLOOD PRESSURE: 147 MMHG

## 2019-10-28 VITALS — DIASTOLIC BLOOD PRESSURE: 83 MMHG | SYSTOLIC BLOOD PRESSURE: 131 MMHG

## 2019-10-28 VITALS — DIASTOLIC BLOOD PRESSURE: 96 MMHG | SYSTOLIC BLOOD PRESSURE: 160 MMHG

## 2019-10-28 VITALS — DIASTOLIC BLOOD PRESSURE: 108 MMHG | SYSTOLIC BLOOD PRESSURE: 154 MMHG

## 2019-10-28 LAB
% IRON SATURATION: 19 % (ref 15–50)
ADD MANUAL DIFF: NO
ALBUMIN SERPL-MCNC: 1.7 G/DL (ref 3.4–5)
ALBUMIN/GLOB SERPL: 0.4 {RATIO} (ref 1–2.7)
ALP SERPL-CCNC: 79 U/L (ref 46–116)
ALT SERPL-CCNC: 14 U/L (ref 12–78)
ANION GAP SERPL CALC-SCNC: 7 MMOL/L (ref 5–15)
AST SERPL-CCNC: 16 U/L (ref 15–37)
BILIRUB SERPL-MCNC: 0.5 MG/DL (ref 0.2–1)
BUN SERPL-MCNC: 13 MG/DL (ref 7–18)
CALCIUM SERPL-MCNC: 8.3 MG/DL (ref 8.5–10.1)
CHLORIDE SERPL-SCNC: 103 MMOL/L (ref 98–107)
CO2 SERPL-SCNC: 29 MMOL/L (ref 21–32)
CREAT SERPL-MCNC: 1 MG/DL (ref 0.55–1.3)
ERYTHROCYTE [DISTWIDTH] IN BLOOD BY AUTOMATED COUNT: 13.2 % (ref 11.6–14.8)
FERRITIN SERPL-MCNC: 442 NG/ML (ref 8–388)
GLOBULIN SER-MCNC: 4.1 G/DL
HCT VFR BLD CALC: 24.2 % (ref 37–47)
HGB BLD-MCNC: 7.9 G/DL (ref 12–16)
IRON SERPL-MCNC: 28 UG/DL (ref 50–175)
MCV RBC AUTO: 82 FL (ref 80–99)
PHOSPHATE SERPL-MCNC: 2.5 MG/DL (ref 2.5–4.9)
PLATELET # BLD: 396 K/UL (ref 150–450)
POTASSIUM SERPL-SCNC: 3.6 MMOL/L (ref 3.5–5.1)
RBC # BLD AUTO: 2.96 M/UL (ref 4.2–5.4)
SODIUM SERPL-SCNC: 139 MMOL/L (ref 136–145)
TIBC SERPL-MCNC: 149 UG/DL (ref 250–450)
UNSATURATED IRON BINDING: 121 UG/DL (ref 112–346)
WBC # BLD AUTO: 15.8 K/UL (ref 4.8–10.8)

## 2019-10-28 RX ADMIN — FUROSEMIDE SCH MG: 40 TABLET ORAL at 08:46

## 2019-10-28 RX ADMIN — MEROPENEM SCH MLS/HR: 1 INJECTION INTRAVENOUS at 01:47

## 2019-10-28 RX ADMIN — DIVALPROEX SODIUM SCH MG: 125 CAPSULE ORAL at 21:46

## 2019-10-28 RX ADMIN — LORAZEPAM PRN MG: 2 INJECTION, SOLUTION INTRAMUSCULAR; INTRAVENOUS at 16:21

## 2019-10-28 RX ADMIN — MEROPENEM SCH MLS/HR: 1 INJECTION INTRAVENOUS at 15:23

## 2019-10-28 RX ADMIN — DOCUSATE SODIUM SCH MG: 50 LIQUID ORAL at 21:46

## 2019-10-28 RX ADMIN — DIVALPROEX SODIUM SCH MG: 125 CAPSULE ORAL at 08:45

## 2019-10-28 RX ADMIN — INSULIN ASPART SCH UNITS: 100 INJECTION, SOLUTION INTRAVENOUS; SUBCUTANEOUS at 23:59

## 2019-10-28 RX ADMIN — ASPIRIN 81 MG SCH MG: 81 TABLET ORAL at 08:45

## 2019-10-28 RX ADMIN — INSULIN ASPART SCH UNITS: 100 INJECTION, SOLUTION INTRAVENOUS; SUBCUTANEOUS at 00:00

## 2019-10-28 RX ADMIN — INSULIN ASPART SCH UNITS: 100 INJECTION, SOLUTION INTRAVENOUS; SUBCUTANEOUS at 18:00

## 2019-10-28 RX ADMIN — DOCUSATE SODIUM SCH MG: 50 LIQUID ORAL at 05:36

## 2019-10-28 RX ADMIN — ENOXAPARIN SODIUM SCH MG: 30 INJECTION SUBCUTANEOUS at 08:47

## 2019-10-28 RX ADMIN — INSULIN ASPART SCH UNITS: 100 INJECTION, SOLUTION INTRAVENOUS; SUBCUTANEOUS at 12:00

## 2019-10-28 RX ADMIN — DOCUSATE SODIUM SCH MG: 50 LIQUID ORAL at 15:23

## 2019-10-28 RX ADMIN — INSULIN ASPART SCH UNITS: 100 INJECTION, SOLUTION INTRAVENOUS; SUBCUTANEOUS at 06:00

## 2019-10-28 NOTE — GENERAL PROGRESS NOTE
Assessment/Plan


Status:  not improved


Assessment/Plan:


S: Not verbal





O: poor historian, seems comfortable  . IV sites are intact. Charles in place





PHYSICAL EXAMINATION:HEAD AND NECK:  Atraumatic and normocephalic.


CHEST:  Bronchial breathing sounds.ABDOMEN:  Soft.  No organomegaly.


MUSCULOSKELETAL:  Diffuse 1+ or 2+ nonpitting edema in all four contracted


extremities.  The patient is not following the commands.  Limited


evaluation.NEUROLOGY:  The patient is awake.  Not alert.  Not verbally


communicative.





LABORATORY DATA:  Labs dated October 28, 2019  reviewed





Meds: Reviewed and reconciled in the chart , including ASA and plavix 





Imaging:  CT abd reviewed 





ASSESSMENT AND PLAN:


1. Sepsis.  Differential diagnosis is healthcare-associated pneumonia or


healthcare-associated urinary tract infection.  Work in progress.


2. Acute hypoxemic respiratory failure


3. Acute chf exacerbation - Diastolic


4. Chronic encephalomalacia.


3. Acute on chronic anemia.


4. Renal failure, age indeterminate.


6. Hyperthyroidism.


7. Hyperkalemia.


8. Diabetes type 2, uncontrolled with A1c of 10.


9. Psychiatric disorder.


10. GI and DVT prophylaxis.


11. PAH- Severe 





PLAN OF CARE:


Improving leukocytosis and  renal failure


Failed swallow eval, need for alternative feeding route?


Out patient followup for abnormal incidental imaging finding





Subjective


Allergies:  


Coded Allergies:  


     No Known Allergies (Unverified , 10/14/19)





Objective





Last 24 Hour Vital Signs








  Date Time  Temp Pulse Resp B/P (MAP) Pulse Ox O2 Delivery O2 Flow Rate FiO2


 


10/28/19 12:00 97.8 88 20 147/99 (115) 98   


 


10/28/19 12:00      Nasal Cannula 2.0 


 


10/28/19 08:00      Nasal Cannula 2.0 


 


10/28/19 08:00 98.2 98 20 154/108 (123) 98   


 


10/28/19 07:58  76      


 


10/28/19 05:16  99 18  89 Facial  30


 


10/28/19 04:00        30


 


10/28/19 04:00      Nasal Cannula 2.0 


 


10/28/19 04:00 99.1 88 20 113/96 (102) 96   


 


10/28/19 03:27  86      


 


10/28/19 02:49  88 20  97 Facial  30


 


10/28/19 00:00      Nasal Cannula 2.0 


 


10/28/19 00:00 99.3 86 20 133/71 (91) 100   


 


10/28/19 00:00        30


 


10/27/19 23:48  78 19  100 Facial  30


 


10/27/19 23:26  91      


 


10/27/19 22:00       2.0 


 


10/27/19 20:00      Nasal Cannula 2.0 


 


10/27/19 20:00 99.3 89 20 140/72 (94) 100   


 


10/27/19 19:34  85      


 


10/27/19 18:39     98 Nasal Cannula 2.0 28


 


10/27/19 16:00       2.0 


 


10/27/19 16:00 98.8 83 19 142/64 (90) 98   


 


10/27/19 16:00      Nasal Cannula 2.0 


 


10/27/19 15:58        30


 


10/27/19 15:26  79      


 


10/27/19 15:10  80 20  100 Facial  30


 


10/27/19 13:11  77 27  98 Facial  30


 


10/27/19 12:24      Bi-pap  

















Intake and Output  


 


 10/27/19 10/28/19





 19:00 07:00


 


Intake Total 145 ml 175 ml


 


Output Total 1200 ml 300 ml


 


Balance -1055 ml -125 ml


 


  


 


IV Total 55 ml 55 ml


 


Tube Feeding 90 ml 120 ml


 


Output Urine Total 1200 ml 300 ml


 


# Voids  1


 


# Bowel Movements  2








Laboratory Tests


10/28/19 03:35: 


White Blood Count 15.8H, Red Blood Count 2.96L, Hemoglobin 7.9L, Hematocrit 

24.2L, Mean Corpuscular Volume 82, Mean Corpuscular Hemoglobin 26.7L, Mean 

Corpuscular Hemoglobin Concent 32.6, Red Cell Distribution Width 13.2, Platelet 

Count 396, Mean Platelet Volume 7.1, Neutrophils (%) (Auto) , Lymphocytes (%) (

Auto) , Monocytes (%) (Auto) , Eosinophils (%) (Auto) , Basophils (%) (Auto) , 

Sodium Level 139, Potassium Level 3.6, Chloride Level 103, Carbon Dioxide Level 

29, Anion Gap 7, Blood Urea Nitrogen 13, Creatinine 1.0, Estimat Glomerular 

Filtration Rate 55.5, Glucose Level 71L, Calcium Level 8.3L, Phosphorus Level 

2.5, Magnesium Level 1.9, Iron Level 28L, Total Iron Binding Capacity 149L, 

Percent Iron Saturation 19, Unsaturated Iron Binding 121, Ferritin 442H, Total 

Bilirubin 0.5, Aspartate Amino Transf (AST/SGOT) 16, Alanine Aminotransferase (

ALT/SGPT) 14, Alkaline Phosphatase 79, Total Protein 5.8L, Albumin 1.7L, 

Globulin 4.1, Albumin/Globulin Ratio 0.4L, Vitamin B12 Level 936, Folate 4.9L


Height (Feet):  5


Height (Inches):  5.00


Weight (Pounds):  198











Garrick Keith MD Oct 28, 2019 12:16

## 2019-10-28 NOTE — NUR
NURSE NOTES:

Freedom splints remain on for patient safety. Patient continue to try to remove medical 
equipment despite application of freedom restraints. Patient routinely assessed throughout 
shift for sensation, color and circulation of bilateral upper extremities. All criteria 
remain intact. Skin remains intact on bilateral upper extremities. Patients vital signs 
remain stable. No signs of cardiac or pulmonary distress noted. No evidence of acute 
bleeding noted.

## 2019-10-28 NOTE — NUR
RESPIRATORY NOTE:

Received pt on BiPAP 15/5, backup rate 14, 30%. Pt on a Facial mask, skin intact, no 
redness/breakdowns noted. Protect-a-gel applied on pt's nosebridge/cheeks to prevent 
irritations from the mask. Pt asleep/disoriented. B/S vickie. rales, nonproductive cough. BiPAP 
plugged into red outlet, alarms on & audible. Pt in no apparent distress at this time. Will 
continue plan of care.

## 2019-10-28 NOTE — GENERAL PROGRESS NOTE
Assessment/Plan


Problem List:  


(1) Abnormal TSH


ICD Codes:  R79.89 - Other specified abnormal findings of blood chemistry


SNOMED:  516525518


(2) Hyperglycemia due to type 2 diabetes mellitus


ICD Codes:  E11.65 - Type 2 diabetes mellitus with hyperglycemia


SNOMED:  714150415771517, 88418634


Qualifiers:  


   Qualified Codes:  E11.65 - Type 2 diabetes mellitus with hyperglycemia; 

Z79.4 - Long term (current) use of insulin


(3) Acute metabolic encephalopathy


ICD Codes:  G93.41 - Metabolic encephalopathy


SNOMED:  04168727, 126258271


(4) ARF (acute renal failure)


ICD Codes:  N17.9 - Acute kidney failure, unspecified


SNOMED:  03723998


Qualifiers:  


   Qualified Codes:  N17.9 - Acute kidney failure, unspecified


(5) Healthcare associated bacterial pneumonia


ICD Codes:  J15.9 - Unspecified bacterial pneumonia


SNOMED:  071316430


Status:  not improved


Assessment/Plan:


DC Levemir 


continue NISS





Subjective


ROS Limited/Unobtainable:  Yes


Allergies:  


Coded Allergies:  


     No Known Allergies (Unverified , 10/14/19)


Subjective


events noted 


NPO on BiPAP 


glucose values on lower side 











Item Value  Date Time


 


Bedside Blood Glucose 100 mg/dl 10/28/19 0600


 


Bedside Blood Glucose 81 mg/dl 10/28/19 0000


 


Bedside Blood Glucose 88 mg/dl 10/27/19 1824


 


Bedside Blood Glucose 96 mg/dl 10/27/19 1148











Objective





Last 24 Hour Vital Signs








  Date Time  Temp Pulse Resp B/P (MAP) Pulse Ox O2 Delivery O2 Flow Rate FiO2


 


10/28/19 05:16  99 18  89 Facial  30


 


10/28/19 04:00        30


 


10/28/19 04:00      Nasal Cannula 2.0 


 


10/28/19 04:00 99.1 88 20 113/96 (102) 96   


 


10/28/19 03:27  86      


 


10/28/19 02:49  88 20  97 Facial  30


 


10/28/19 00:00      Nasal Cannula 2.0 


 


10/28/19 00:00 99.3 86 20 133/71 (91) 100   


 


10/28/19 00:00        30


 


10/27/19 23:48  78 19  100 Facial  30


 


10/27/19 23:26  91      


 


10/27/19 22:00       2.0 


 


10/27/19 20:00      Nasal Cannula 2.0 


 


10/27/19 20:00 99.3 89 20 140/72 (94) 100   


 


10/27/19 19:34  85      


 


10/27/19 18:39     98 Nasal Cannula 2.0 28


 


10/27/19 16:00       2.0 


 


10/27/19 16:00 98.8 83 19 142/64 (90) 98   


 


10/27/19 16:00      Nasal Cannula 2.0 


 


10/27/19 15:58        30


 


10/27/19 15:26  79      


 


10/27/19 15:10  80 20  100 Facial  30


 


10/27/19 13:11  77 27  98 Facial  30


 


10/27/19 12:24      Bi-pap  


 


10/27/19 12:00 98.4 79 17 145/69 (94) 98   


 


10/27/19 12:00        30


 


10/27/19 11:43  78      


 


10/27/19 11:00  87 25  96 Full Face  30


 


10/27/19 09:17  80 19  94 Full Face  30


 


10/27/19 08:00        30


 


10/27/19 08:00 98.6 81 20 136/62 (86) 96   


 


10/27/19 08:00      Bi-pap  


 


10/27/19 08:00  91      


 


10/27/19 07:13     95 Bi-Pap  30


 


10/27/19 07:05  83 24  95 Full Face  30

















Intake and Output  


 


 10/27/19 10/28/19





 18:59 06:59


 


Intake Total 115 ml 205 ml


 


Output Total  1500 ml


 


Balance 115 ml -1295 ml


 


  


 


IV Total 55 ml 55 ml


 


Tube Feeding 60 ml 150 ml


 


Output Urine Total  1500 ml


 


# Voids  1


 


# Bowel Movements  2








Laboratory Tests


10/28/19 03:35: 


White Blood Count 15.8H, Red Blood Count 2.96L, Hemoglobin 7.9L, Hematocrit 

24.2L, Mean Corpuscular Volume 82, Mean Corpuscular Hemoglobin 26.7L, Mean 

Corpuscular Hemoglobin Concent 32.6, Red Cell Distribution Width 13.2, Platelet 

Count 396, Mean Platelet Volume 7.1, Neutrophils (%) (Auto) , Lymphocytes (%) (

Auto) , Monocytes (%) (Auto) , Eosinophils (%) (Auto) , Basophils (%) (Auto) , 

Sodium Level 139, Potassium Level 3.6, Chloride Level 103, Carbon Dioxide Level 

29, Anion Gap 7, Blood Urea Nitrogen 13, Creatinine 1.0, Estimat Glomerular 

Filtration Rate 55.5, Glucose Level 71L, Calcium Level 8.3L, Total Bilirubin 0.5

, Aspartate Amino Transf (AST/SGOT) 16, Alanine Aminotransferase (ALT/SGPT) 14, 

Alkaline Phosphatase 79, Total Protein 5.8L, Albumin 1.7L, Globulin 4.1, Albumin

/Globulin Ratio 0.4L


Height (Feet):  5


Height (Inches):  5.00


Weight (Pounds):  198


General Appearance:  lethargic


Neck:  normal alignment


Cardiovascular:  normal rate


Respiratory/Chest:  decreased breath sounds


Abdomen:  normal bowel sounds


Edema:  1+ Arm (L), 1+ Arm (R), 1+ Leg (L), 1+ Leg (R), 1+ Pedal (L), 1+ Pedal (

R), 1+ Generalized


Objective





Current Medications








 Medications


  (Trade)  Dose


 Ordered  Sig/Winnie


 Route


 PRN Reason  Start Time


 Stop Time Status Last Admin


Dose Admin


 


 Acetaminophen


  (Tylenol)  650 mg  PRN  PRN


 RECTAL


 TEMP>100.5  10/26/19 19:45


     


 


 


 Acetaminophen


  (Tylenol)  650 mg  Q4H  PRN


 NG


 Mild Pain/Temp > 100.5  10/26/19 19:45


 11/25/19 19:44   


 


 


 Albuterol/


 Ipratropium


  (Albuterol/


 Ipratropium)  3 ml  Q4H  PRN


 HHN


 SHORTNESS OF BREATH  10/26/19 19:45


 10/31/19 19:44   


 


 


 Aspirin


  (ASA)  81 mg  DAILY


 NG


   10/27/19 09:00


 11/14/19 08:59  10/27/19 09:08


 


 


 Atorvastatin


 Calcium


  (Lipitor)  10 mg  BEDTIME


 NG


   10/26/19 21:00


 11/13/19 20:59  10/27/19 20:43


 


 


 Clopidogrel


 Bisulfate


  (Plavix)  75 mg  DAILY


 NG


   10/27/19 09:00


 11/14/19 08:59  10/27/19 09:09


 


 


 Dextrose


  (Dextrose 50%)  25 ml  Q30M  PRN


 IV


 Hypoglycemia  10/26/19 19:30


 11/13/19 06:59   


 


 


 Dextrose


  (Dextrose 50%)  50 ml  Q30M  PRN


 IV


 Hypoglycemia  10/26/19 19:30


 11/13/19 06:59   


 


 


 Divalproex Sodium


  (Depakote


 Sprinkles)  500 mg  Q12HR


 NG


   10/26/19 21:00


 11/14/19 21:59  10/27/19 20:47


 


 


 Docusate Sodium


  (Colace)  100 mg  EVERY 8  HOURS


 NG


   10/27/19 14:00


 11/25/19 08:59  10/28/19 05:36


 


 


 Enoxaparin Sodium


  (Lovenox)  30 mg  DAILY


 SUBQ


   10/27/19 09:00


 11/13/19 08:59  10/27/19 09:10


 


 


 Famotidine


  (Pepcid)  20 mg  EVERY 12  HOURS


 GT


   10/27/19 21:00


 11/25/19 17:59  10/27/19 20:48


 


 


 Furosemide


  (Lasix)  40 mg  DAILY


 NG


   10/27/19 09:00


 11/26/19 08:59  10/27/19 09:08


 


 


 Insulin Aspart


  (NovoLOG)    EVERY 6  HOURS


 SUBQ


   10/27/19 18:00


 11/13/19 11:29   


 


 


 Insulin Detemir


  (Levemir)  12 units  DAILY


 SUBQ


   10/27/19 09:00


 11/13/19 10:29  10/27/19 09:11


 


 


 Lactulose


  (Cephulac)  20 gm  Q8H  PRN


 NG


 Constipation  10/26/19 19:45


 11/25/19 19:44   


 


 


 Lorazepam


  (Ativan 2mg/ml


 1ml)  1 mg  Q8H  PRN


 IV


 For Anxiety  10/26/19 19:45


 10/31/19 19:44  10/27/19 23:05


 


 


 Meropenem 1 gm/


 Sodium Chloride  55 ml @ 


 110 mls/hr  Q12H


 IVPB


   10/27/19 02:00


 10/30/19 23:59  10/28/19 01:47


 

















Riccardo Velez MD Oct 28, 2019 06:11

## 2019-10-28 NOTE — INFECTIOUS DISEASES PROG NOTE
Assessment/Plan


Problems:  


(1) Aspiration pneumonia


Assessment & Plan:  with B/L infiltrates, L>R suspect due to altered mental 

status , with hypoxemia and leukocytosis, continue meropenem empirically, 

repeated  CXR  showed no change , CT of the chest showed B/L effusion with 

basal infiltrates , aspiration precaution. continue meropenem for two weeks 

total . EOT 10/30/19  





(2) UTI (urinary tract infection)


Assessment & Plan:  due to ESBL producing E coli already on meropenem to cover 

for sepsis and pneumonia too for two weeks total  





(3) Sepsis


Assessment & Plan:  due to the above, with persistent leukocytosis inspit of 

being on wide spectrum antibiotics and negative repeated blood cultures, 

suspect pelvic mass related or bone marrow pathology . recommend gynecology 

eval for pelvic mass resection . will order indium scan to evaluate the pelvic 

mass and rule out deep abscess. unfortunately we are unable to reach her 

daughter to sign the consent, and no other family member is available to do so, 

will order the test as medical necessity. D/W CHARGE NURSE 





(4) Acute metabolic encephalopathy


Assessment & Plan:  due to the above, continue hydration and antibiotics, 

monitor in tele 





(5) Hyperglycemia due to type 2 diabetes mellitus


Assessment & Plan:  recommend tight glycemic control to keep blood glucose 

between 100-140 





(6) ARF (acute renal failure)


Assessment & Plan:  due to the above, continue hydration and renally dosed 

antibiotics, close  monitor of renal function , stop vancomycin 





(7) Pelvic mass in female


Assessment & Plan:  to the left of the uterus, suspect malignancy , recommend OB

/GYN eval , and tumor markers , oncology is following 








Subjective


ROS Limited/Unobtainable:  Yes


Allergies:  


Coded Allergies:  


     No Known Allergies (Unverified , 10/14/19)


Subjective


she was IN SDU , off  BIPAP, still lethargic ,and minimally responsive , no 

fever or chills , no diarrhea, has cough but no SOB





Objective


Vital Signs





Last 24 Hour Vital Signs








  Date Time  Temp Pulse Resp B/P (MAP) Pulse Ox O2 Delivery O2 Flow Rate FiO2


 


10/28/19 12:00 97.8 88 20 147/99 (115) 98   


 


10/28/19 12:00      Nasal Cannula 2.0 


 


10/28/19 08:00      Nasal Cannula 2.0 


 


10/28/19 08:00 98.2 98 20 154/108 (123) 98   


 


10/28/19 07:58  76      


 


10/28/19 05:16  99 18  89 Facial  30


 


10/28/19 04:00        30


 


10/28/19 04:00      Nasal Cannula 2.0 


 


10/28/19 04:00 99.1 88 20 113/96 (102) 96   


 


10/28/19 03:27  86      


 


10/28/19 02:49  88 20  97 Facial  30


 


10/28/19 00:00      Nasal Cannula 2.0 


 


10/28/19 00:00 99.3 86 20 133/71 (91) 100   


 


10/28/19 00:00        30


 


10/27/19 23:48  78 19  100 Facial  30


 


10/27/19 23:26  91      


 


10/27/19 22:00       2.0 


 


10/27/19 20:00      Nasal Cannula 2.0 


 


10/27/19 20:00 99.3 89 20 140/72 (94) 100   


 


10/27/19 19:34  85      


 


10/27/19 18:39     98 Nasal Cannula 2.0 28


 


10/27/19 16:00       2.0 


 


10/27/19 16:00 98.8 83 19 142/64 (90) 98   


 


10/27/19 16:00      Nasal Cannula 2.0 


 


10/27/19 15:58        30


 


10/27/19 15:26  79      


 


10/27/19 15:10  80 20  100 Facial  30








Height (Feet):  5


Height (Inches):  5.00


Weight (Pounds):  198


General Appearance:  WD/WN, no acute distress


HEENT:  normocephalic, atraumatic, anicteric, mucous membranes moist


Respiratory/Chest:  no respiratory distress, no accessory muscle use, decreased 

breath sounds, crackles/rales


Cardiovascular:  normal peripheral pulses, normal rate, regular rhythm, no 

gallop/murmur, no JVD


Abdomen:  normal bowel sounds, soft, non tender, no organomegaly, non distended

, no mass, no scars


Genitourinary:  normal external genitalia


Extremities:  no cyanosis, no clubbing


Skin:  no rash, no lesions, no ulcers


Neurologic/Psychiatric:  alert, responsive


Lymphatic:  no neck adenopathy, no groin adenopathy


Musculoskeletal:  normal muscle bulk, no effusion





Microbiology








 Date/Time


Source Procedure


Growth Status


 


 


 10/25/19 20:45


Blood Blood Culture - Preliminary


NO GROWTH AFTER 48 HOURS Resulted


 


 10/25/19 20:30


Blood Blood Culture - Preliminary


NO GROWTH AFTER 48 HOURS Resulted











Laboratory Tests








Test


  10/28/19


03:35


 


White Blood Count


  15.8 K/UL


(4.8-10.8)  H


 


Red Blood Count


  2.96 M/UL


(4.20-5.40)  L


 


Hemoglobin


  7.9 G/DL


(12.0-16.0)  L


 


Hematocrit


  24.2 %


(37.0-47.0)  L


 


Mean Corpuscular Volume 82 FL (80-99)  


 


Mean Corpuscular Hemoglobin


  26.7 PG


(27.0-31.0)  L


 


Mean Corpuscular Hemoglobin


Concent 32.6 G/DL


(32.0-36.0)


 


Red Cell Distribution Width


  13.2 %


(11.6-14.8)


 


Platelet Count


  396 K/UL


(150-450)


 


Mean Platelet Volume


  7.1 FL


(6.5-10.1)


 


Neutrophils (%) (Auto)


  % (45.0-75.0)


 


 


Lymphocytes (%) (Auto)


  % (20.0-45.0)


 


 


Monocytes (%) (Auto)  % (1.0-10.0)  


 


Eosinophils (%) (Auto)  % (0.0-3.0)  


 


Basophils (%) (Auto)  % (0.0-2.0)  


 


Sodium Level


  139 MMOL/L


(136-145)


 


Potassium Level


  3.6 MMOL/L


(3.5-5.1)


 


Chloride Level


  103 MMOL/L


()


 


Carbon Dioxide Level


  29 MMOL/L


(21-32)


 


Anion Gap


  7 mmol/L


(5-15)


 


Blood Urea Nitrogen


  13 mg/dL


(7-18)


 


Creatinine


  1.0 MG/DL


(0.55-1.30)


 


Estimat Glomerular Filtration


Rate 55.5 mL/min


(>60)


 


Glucose Level


  71 MG/DL


()  L


 


Calcium Level


  8.3 MG/DL


(8.5-10.1)  L


 


Phosphorus Level


  2.5 MG/DL


(2.5-4.9)


 


Magnesium Level


  1.9 MG/DL


(1.8-2.4)


 


Iron Level


  28 ug/dL


()  L


 


Total Iron Binding Capacity


  149 ug/dL


(250-450)  L


 


Percent Iron Saturation 19 % (15-50)  


 


Unsaturated Iron Binding


  121 ug/dL


(112-346)


 


Ferritin


  442 NG/ML


(8-388)  H


 


Total Bilirubin


  0.5 MG/DL


(0.2-1.0)


 


Aspartate Amino Transf


(AST/SGOT) 16 U/L (15-37)


 


 


Alanine Aminotransferase


(ALT/SGPT) 14 U/L (12-78)


 


 


Alkaline Phosphatase


  79 U/L


()


 


Total Protein


  5.8 G/DL


(6.4-8.2)  L


 


Albumin


  1.7 G/DL


(3.4-5.0)  L


 


Globulin 4.1 g/dL  


 


Albumin/Globulin Ratio


  0.4 (1.0-2.7)


L


 


Vitamin B12 Level


  936 PG/ML


(193-986)


 


Folate


  4.9 NG/ML


(8.6-58.9)  L











Current Medications








 Medications


  (Trade)  Dose


 Ordered  Sig/Winnie


 Route


 PRN Reason  Start Time


 Stop Time Status Last Admin


Dose Admin


 


 Acetaminophen


  (Tylenol)  650 mg  PRN  PRN


 RECTAL


 TEMP>100.5  10/26/19 19:45


     


 


 


 Acetaminophen


  (Tylenol)  650 mg  Q4H  PRN


 NG


 Mild Pain/Temp > 100.5  10/26/19 19:45


 11/25/19 19:44   


 


 


 Albuterol/


 Ipratropium


  (Albuterol/


 Ipratropium)  3 ml  Q4H  PRN


 HHN


 SHORTNESS OF BREATH  10/26/19 19:45


 10/31/19 19:44   


 


 


 Aspirin


  (ASA)  81 mg  DAILY


 NG


   10/27/19 09:00


 11/14/19 08:59  10/28/19 08:45


 


 


 Atorvastatin


 Calcium


  (Lipitor)  10 mg  BEDTIME


 NG


   10/26/19 21:00


 11/13/19 20:59  10/27/19 20:43


 


 


 Clopidogrel


 Bisulfate


  (Plavix)  75 mg  DAILY


 NG


   10/27/19 09:00


 11/14/19 08:59  10/28/19 08:45


 


 


 Dextrose


  (Dextrose 50%)  25 ml  Q30M  PRN


 IV


 Hypoglycemia  10/26/19 19:30


 11/13/19 06:59   


 


 


 Dextrose


  (Dextrose 50%)  50 ml  Q30M  PRN


 IV


 Hypoglycemia  10/26/19 19:30


 11/13/19 06:59   


 


 


 Divalproex Sodium


  (Depakote


 Sprinkles)  500 mg  Q12HR


 NG


   10/26/19 21:00


 11/14/19 21:59  10/28/19 08:45


 


 


 Docusate Sodium


  (Colace)  100 mg  EVERY 8  HOURS


 NG


   10/27/19 14:00


 11/25/19 08:59  10/28/19 05:36


 


 


 Enoxaparin Sodium


  (Lovenox)  30 mg  DAILY


 SUBQ


   10/27/19 09:00


 11/13/19 08:59  10/28/19 08:47


 


 


 Famotidine


  (Pepcid)  20 mg  EVERY 12  HOURS


 GT


   10/27/19 21:00


 11/25/19 17:59  10/28/19 08:46


 


 


 Furosemide


  (Lasix)  40 mg  DAILY


 NG


   10/27/19 09:00


 11/26/19 08:59  10/28/19 08:46


 


 


 Insulin Aspart


  (NovoLOG)    EVERY 6  HOURS


 SUBQ


   10/27/19 18:00


 11/13/19 11:29   


 


 


 Lactulose


  (Cephulac)  20 gm  Q8H  PRN


 NG


 Constipation  10/26/19 19:45


 11/25/19 19:44   


 


 


 Lorazepam


  (Ativan 2mg/ml


 1ml)  1 mg  Q8H  PRN


 IV


 For Anxiety  10/26/19 19:45


 10/31/19 19:44  10/27/19 23:05


 


 


 Meropenem 1 gm/


 Sodium Chloride  55 ml @ 


 110 mls/hr  Q12H


 IVPB


   10/27/19 02:00


 10/30/19 23:59  10/28/19 01:47


 

















Amie Burgos M.D. Oct 28, 2019 13:17

## 2019-10-28 NOTE — NUR
HAND-OFF: 

Report given to ELIZABETH Michelle. [place] splints remain on for patient safety. Patient continue 
to try to remove medical equipment despite application of freedom restraints. Patient 
routinely assessed throughout shift for sensation, color and circulation of bilateral upper 
extremities. All criteria remain intact. Skin remains intact on bilateral upper extremities. 
Patients vital signs remain stable. No signs of cardiac or pulmonary distress noted. No 
evidence of acute bleeding noted. Endorsed to oncoming RN pending unit of packed RBCs to be 
transfused. Made lab aware of ABO specimen collection. Patient remains in bed. Bed is in 
lowest position, safety wheels engaged, side rails up x3, bed alarm activated and call light 
within reach.

## 2019-10-28 NOTE — NUR
NURSE NOTES:

Patient put on Bipap per MD orders. Glucerna 1.5 feeding withheld while patient is on Bipap 
per MD order. Will continue to monitor.

## 2019-10-28 NOTE — NEPHROLOGY PROGRESS NOTE
Assessment/Plan


Problem List:  


(1) Renal failure (ARF), acute on chronic


Assessment:  resolved





(2) Dehydration


(3) ARF (acute renal failure)


(4) Sepsis


(5) Acute metabolic encephalopathy


(6) Hyperglycemia due to type 2 diabetes mellitus


(7) UTI (urinary tract infection)


(8) Diabetic nephropathy


Assessment





Renal failure, Acute on Chronic resolved


Dehydration


Severe Anemia


Sepsis / Pneumonia / UTI


DM, OOC


Proteinuria , Diabetic Nephropathy


Acute encephalopathy


Plan





in [place]


Per pulmonary


change meds to NGT as possible


Cr lowering 


Will order urine studies and monitor renal parameters


kidney YOANDY noted


lower iv fluids rate


WBCs rising


has casas again due to retention


Avoid Nephrotoxics








previously


Anemia salas


2D echo


24 H urine protein


Keep BP and BS in check


I am holding  her psych meds due to low MS


Per orders





Subjective


ROS Limited/Unobtainable:  Yes





Objective


Objective





Last 24 Hour Vital Signs








  Date Time  Temp Pulse Resp B/P (MAP) Pulse Ox O2 Delivery O2 Flow Rate FiO2


 


10/28/19 12:00 97.8 88 20 147/99 (115) 98   


 


10/28/19 12:00      Nasal Cannula 2.0 


 


10/28/19 11:39  88      


 


10/28/19 08:00      Nasal Cannula 2.0 


 


10/28/19 08:00 98.2 98 20 154/108 (123) 98   


 


10/28/19 07:58  76      


 


10/28/19 05:16  99 18  89 Facial  30


 


10/28/19 04:00        30


 


10/28/19 04:00      Nasal Cannula 2.0 


 


10/28/19 04:00 99.1 88 20 113/96 (102) 96   


 


10/28/19 03:27  86      


 


10/28/19 02:49  88 20  97 Facial  30


 


10/28/19 00:00      Nasal Cannula 2.0 


 


10/28/19 00:00 99.3 86 20 133/71 (91) 100   


 


10/28/19 00:00        30


 


10/27/19 23:48  78 19  100 Facial  30


 


10/27/19 23:26  91      


 


10/27/19 22:00       2.0 


 


10/27/19 20:00      Nasal Cannula 2.0 


 


10/27/19 20:00 99.3 89 20 140/72 (94) 100   


 


10/27/19 19:34  85      


 


10/27/19 18:39     98 Nasal Cannula 2.0 28


 


10/27/19 16:00       2.0 


 


10/27/19 16:00 98.8 83 19 142/64 (90) 98   


 


10/27/19 16:00      Nasal Cannula 2.0 


 


10/27/19 15:58        30


 


10/27/19 15:26  79      


 


10/27/19 15:10  80 20  100 Facial  30

















Intake and Output  


 


 10/27/19 10/28/19





 19:00 07:00


 


Intake Total 145 ml 175 ml


 


Output Total 1200 ml 300 ml


 


Balance -1055 ml -125 ml


 


  


 


IV Total 55 ml 55 ml


 


Tube Feeding 90 ml 120 ml


 


Output Urine Total 1200 ml 300 ml


 


# Voids  1


 


# Bowel Movements  2








Laboratory Tests


10/28/19 03:35: 


White Blood Count 15.8H, Red Blood Count 2.96L, Hemoglobin 7.9L, Hematocrit 

24.2L, Mean Corpuscular Volume 82, Mean Corpuscular Hemoglobin 26.7L, Mean 

Corpuscular Hemoglobin Concent 32.6, Red Cell Distribution Width 13.2, Platelet 

Count 396, Mean Platelet Volume 7.1, Neutrophils (%) (Auto) , Lymphocytes (%) (

Auto) , Monocytes (%) (Auto) , Eosinophils (%) (Auto) , Basophils (%) (Auto) , 

Sodium Level 139, Potassium Level 3.6, Chloride Level 103, Carbon Dioxide Level 

29, Anion Gap 7, Blood Urea Nitrogen 13, Creatinine 1.0, Estimat Glomerular 

Filtration Rate 55.5, Glucose Level 71L, Calcium Level 8.3L, Phosphorus Level 

2.5, Magnesium Level 1.9, Iron Level 28L, Total Iron Binding Capacity 149L, 

Percent Iron Saturation 19, Unsaturated Iron Binding 121, Ferritin 442H, Total 

Bilirubin 0.5, Aspartate Amino Transf (AST/SGOT) 16, Alanine Aminotransferase (

ALT/SGPT) 14, Alkaline Phosphatase 79, Total Protein 5.8L, Albumin 1.7L, 

Globulin 4.1, Albumin/Globulin Ratio 0.4L, Vitamin B12 Level 936, Folate 4.9L


Height (Feet):  5


Height (Inches):  5.00


Weight (Pounds):  198


General Appearance:  mild distress


EENT:  other - bipap


Cardiovascular:  tachycardia


Respiratory/Chest:  decreased breath sounds


Abdomen:  distended


Objective


no change











Lukasz Vizcaino MD Oct 28, 2019 14:31

## 2019-10-28 NOTE — NUR
NURSE NOTES:

Dr Pop made away of Hgb of 7.9 and Hct of 24.2 since patient has pre-existing cardiac 
and pulmonary conditions. Will continue to monitor.

## 2019-10-28 NOTE — NUR
NURSE NOTES:

Patient provided with a full bed bath. Charles bad clasp cracked and urine noted on floor. 
Total output noted as 300mL and one additional occurrence. Patient is clean, dry and resting 
in bed. Bipap remains applied. Bed is in lowest position, wheels locked, call light within 
reach, side rails up x3 and bed alarm activated. Will continue to monitor.

## 2019-10-28 NOTE — NUR
CASE MANAGEMENT: REVIEW



10/27/19



SI: SEPSIS . PNA . ACUTE RENAL FAILURE . ACUTE METABOLIC ENCEPHALOPATHY 

98.6  91  20  136/62  96% BIPAP FIO2 30 

WBC 17.0; RBC 2.97;  H/H 8.2/25.0 





IS:     IV MEROPENEM Q12HR

LASIX NGT QD

LOVENOX SQ QD

PLAVIX NGT QD

NGT FEEDING 

LIPITOR NG HS

DEPAKOTE NG Q12HR

IV ATIVAN Q8/PRN





**: 2W STEP DOWN UNIT



DCP: PATIENT IS FROM [facility] 



********************************************************************



CASE MANAGEMENT: REVIEW



10/28/19



SI: SEPSIS . PNA . ACUTE RENAL FAILURE . ACUTE METABOLIC ENCEPHALOPATHY 

98.2  98  20  154/108  98% NC 2L

WBC 15.8; RBC 2.96; H/H 7.9/24.2; FE 28; TIBC 149, FOLATE 4.9; FERR 442

BG 71





IS:      IV MEROPENEM Q12HR

LASIX NGT QD

MAG SULFATE IV @100ML/HR

KCl 10MEQ IVF @ 100ML/HR

LOVENOX SQ QD

LIPITOR NG HS

NOVOLOG SQ Q6HR

DEPAKOTE NGT Q12HR

PEPCID GT Q12HR

COLACE NGT Q8HR

ASA NG QD

PLAVIX NGT QD

NGT FEEDING 





**: 2W STEP DOWN UNIT



DCP: PATIENT IS FROM [facility]

## 2019-10-28 NOTE — PULMONOLOGY PROGRESS NOTE
Assessment/Plan


Assessment/Plan


Pulmonary Progress Note





Assessment/Plan


Problems:  


(1) Healthcare associated bacterial pneumonia


(2) UTI (urinary tract infection)


(3) Sepsis


(4) Dehydration


(5) CHANCE (acute kidney injury)


(6) Acute metabolic encephalopathy


(7) Hyperglycemia due to type 2 diabetes mellitus


(8) Renal failure (ARF), acute on chronic


(9) Aspiration pneumonia


(10) Abnormal TSH


(11) Pelvic mass in female


(12) Congestive Heart Failure


(13) Anemia


Assessment/Plan


Leukocytosis


Sepsis


Anemia - monitor labs, TF PRN


Possible pneumonia


E coli UTI


Acute hypoxemic respiratory failure


Acute encephalopathy


Hx CHF - diurese PRN


HTN


CHANCE 


Pelvic mass/possible GYN malignancy vs cyst


PPX on Lovenox





Plan:


-concern for pelvic malignancy noted on CT AP


-Monitor labs


- Diurese PRN


- BiPAP PRN


- HHN


- O2 PRN


-Abx per ID


-monitor volumes and renal function, F/U renal recs


-GYN evaluation


-monitor encephalopathy, ? neuro eval


-Asp precautions, SLP recs 


-DVT Px: LMWH


-FC











Subjective


Allergies:  


Coded Allergies:  


     No Known Allergies (Unverified , 10/14/19)


Subjective


WCT and Cr both better


CT noted with concern for gynecologic malignancy vs cyst


Improved Pulmonary status this morning





Objective





Vital Signs Noted





General Appearance:  no acute distress, cachetic


HEENT:  normocephalic, atraumatic


Respiratory/Chest:  occasional rhonchi


Cardiovascular:  normal peripheral pulses, normal rate, regular rhythm


Abdomen:  normal bowel sounds, soft, non tender, no organomegaly, non distended

, no mass


Extremities:  no cyanosis, no clubbing, no edema





Laboratory Tests Noted





CXR:


Findings: Bilateral pleural effusions and bilateral interstitial congestion 

persist.


Heart remains enlarged. Findings are unchanged


 


Impression:  Unchanged, over one day








Subjective


ROS Limited/Unobtainable:  No


Allergies:  


Coded Allergies:  


     No Known Allergies (Unverified , 10/14/19)





Objective





Last 24 Hour Vital Signs








  Date Time  Temp Pulse Resp B/P (MAP) Pulse Ox O2 Delivery O2 Flow Rate FiO2


 


10/28/19 21:04  80 20  98 Facial  30


 


10/28/19 20:00        30


 


10/28/19 20:00      Nasal Cannula 2.0 


 


10/28/19 20:00 97.5 84 22 131/83 (99) 99   


 


10/28/19 18:55     96 Bi-Pap  30


 


10/28/19 18:55  74 19  96 Facial  30


 


10/28/19 16:37  113 24  94 Facial  40


 


10/28/19 16:00 97.5 84 22 94/66 (75) 95   


 


10/28/19 16:00      Nasal Cannula 2.0 


 


10/28/19 15:55  91      


 


10/28/19 12:00 97.8 88 20 147/99 (115) 98   


 


10/28/19 12:00      Nasal Cannula 2.0 


 


10/28/19 11:39  88      


 


10/28/19 08:00      Nasal Cannula 2.0 


 


10/28/19 08:00 98.2 98 20 154/108 (123) 98   


 


10/28/19 07:59     96 Bi-Pap  30


 


10/28/19 07:58  76      


 


10/28/19 05:16  99 18  89 Facial  30


 


10/28/19 04:00        30


 


10/28/19 04:00      Nasal Cannula 2.0 


 


10/28/19 04:00 99.1 88 20 113/96 (102) 96   


 


10/28/19 03:27  86      


 


10/28/19 02:49  88 20  97 Facial  30


 


10/28/19 00:00      Nasal Cannula 2.0 


 


10/28/19 00:00 99.3 86 20 133/71 (91) 100   


 


10/28/19 00:00        30


 


10/27/19 23:48  78 19  100 Facial  30


 


10/27/19 23:26  91      

















Intake and Output  


 


 10/27/19 10/28/19





 19:00 07:00


 


Intake Total 145 ml 175 ml


 


Output Total 1200 ml 300 ml


 


Balance -1055 ml -125 ml


 


  


 


IV Total 55 ml 55 ml


 


Tube Feeding 90 ml 120 ml


 


Output Urine Total 1200 ml 300 ml


 


# Voids  1


 


# Bowel Movements  2








Laboratory Tests


10/28/19 03:35: 


White Blood Count 15.8H, Red Blood Count 2.96L, Hemoglobin 7.9L, Hematocrit 

24.2L, Mean Corpuscular Volume 82, Mean Corpuscular Hemoglobin 26.7L, Mean 

Corpuscular Hemoglobin Concent 32.6, Red Cell Distribution Width 13.2, Platelet 

Count 396, Mean Platelet Volume 7.1, Neutrophils (%) (Auto) , Lymphocytes (%) (

Auto) , Monocytes (%) (Auto) , Eosinophils (%) (Auto) , Basophils (%) (Auto) , 

Sodium Level 139, Potassium Level 3.6, Chloride Level 103, Carbon Dioxide Level 

29, Anion Gap 7, Blood Urea Nitrogen 13, Creatinine 1.0, Estimat Glomerular 

Filtration Rate 55.5, Glucose Level 71L, Calcium Level 8.3L, Phosphorus Level 

2.5, Magnesium Level 1.9, Iron Level 28L, Total Iron Binding Capacity 149L, 

Percent Iron Saturation 19, Unsaturated Iron Binding 121, Ferritin 442H, Total 

Bilirubin 0.5, Aspartate Amino Transf (AST/SGOT) 16, Alanine Aminotransferase (

ALT/SGPT) 14, Alkaline Phosphatase 79, Total Protein 5.8L, Albumin 1.7L, 

Globulin 4.1, Albumin/Globulin Ratio 0.4L, Vitamin B12 Level 936, Folate 4.9L





Current Medications








 Medications


  (Trade)  Dose


 Ordered  Sig/Winnie


 Route


 PRN Reason  Start Time


 Stop Time Status Last Admin


Dose Admin


 


 Acetaminophen


  (Tylenol)  650 mg  PRN  PRN


 RECTAL


 TEMP>100.5  10/26/19 19:45


     


 


 


 Acetaminophen


  (Tylenol)  650 mg  Q4H  PRN


 NG


 Mild Pain/Temp > 100.5  10/26/19 19:45


 11/25/19 19:44   


 


 


 Albuterol/


 Ipratropium


  (Albuterol/


 Ipratropium)  3 ml  Q4H  PRN


 HHN


 SHORTNESS OF BREATH  10/26/19 19:45


 10/31/19 19:44   


 


 


 Aspirin


  (ASA)  81 mg  DAILY


 NG


   10/27/19 09:00


 11/14/19 08:59  10/28/19 08:45


 


 


 Atorvastatin


 Calcium


  (Lipitor)  10 mg  BEDTIME


 NG


   10/26/19 21:00


 11/13/19 20:59  10/28/19 21:45


 


 


 Clopidogrel


 Bisulfate


  (Plavix)  75 mg  DAILY


 NG


   10/27/19 09:00


 11/14/19 08:59  10/28/19 08:45


 


 


 Dextrose


  (Dextrose 50%)  25 ml  Q30M  PRN


 IV


 Hypoglycemia  10/26/19 19:30


 11/13/19 06:59   


 


 


 Dextrose


  (Dextrose 50%)  50 ml  Q30M  PRN


 IV


 Hypoglycemia  10/26/19 19:30


 11/13/19 06:59   


 


 


 Divalproex Sodium


  (Depakote


 Sprinkles)  500 mg  Q12HR


 NG


   10/26/19 21:00


 11/14/19 21:59  10/28/19 21:46


 


 


 Docusate Sodium


  (Colace)  100 mg  EVERY 8  HOURS


 NG


   10/27/19 14:00


 11/25/19 08:59  10/28/19 21:46


 


 


 Enoxaparin Sodium


  (Lovenox)  30 mg  DAILY


 SUBQ


   10/27/19 09:00


 11/13/19 08:59  10/28/19 08:47


 


 


 Famotidine


  (Pepcid)  20 mg  EVERY 12  HOURS


 GT


   10/27/19 21:00


 11/25/19 17:59  10/28/19 21:45


 


 


 Furosemide


  (Lasix)  40 mg  DAILY


 NG


   10/27/19 09:00


 11/26/19 08:59  10/28/19 08:46


 


 


 Insulin Aspart


  (NovoLOG)    EVERY 6  HOURS


 SUBQ


   10/27/19 18:00


 11/13/19 11:29   


 


 


 Lactulose


  (Cephulac)  20 gm  Q8H  PRN


 NG


 Constipation  10/26/19 19:45


 11/25/19 19:44   


 


 


 Lorazepam


  (Ativan 2mg/ml


 1ml)  1 mg  Q8H  PRN


 IV


 For Anxiety  10/26/19 19:45


 10/31/19 19:44  10/28/19 16:21


 


 


 Meropenem 1 gm/


 Sodium Chloride  55 ml @ 


 110 mls/hr  Q12H


 IVPB


   10/27/19 02:00


 10/30/19 23:59  10/28/19 15:23


 

















Delmer Main MD Oct 28, 2019 22:04

## 2019-10-28 NOTE — NUR
NURSE NOTES:

Received bedside report from Lisbeth JOHNSON. Pt. in bed, sleeping but arousable. On 
Bipap at present with setting of 15/5 Fi O2 30%. No grimacing noted. GTF on hold at present 
due to pt. on Bipap. IV site at right FA #22g. and right hand #22g. in placed SL. Bilateral 
freedon spint restrains in placed. CMS intact. F/C in placed patent/intact draining yellow 
colored urine. Bed in low position, locked. Padded side rails in placed. Call light within 
reach. Will cont. to monitor.

## 2019-10-28 NOTE — NUR
NURSE NOTES:

received pt from the [facility] RN., pt is sleeping and resting on the bed. no report dysrhythmia 
from the previous shift. Right FA 22G and Right HAnd 22G IV site intact, clean,. and patent. 
no SOB noted, pt has Bipap on O2sat is 94%. Ng tube site intact, feeding on held due to pt 
is on Bipap. restrain is on, Bilateral radial pulse indicated, skin is intact on retrain. 
bed at the lowest position,alarmed, and locked. will continue with plan of care.

## 2019-10-28 NOTE — CARDIOLOGY PROGRESS NOTE
Assessment/Plan


Assessment/Plan


1. acute congestive heart failure.


2. Abnormal cardiac enzyme demand related due to infection and profound anemia 


3. Agitation 


4. Urinary tract infection.


5. Acute renal failure.


6. Profound anemia.


7. Diabetes mellitus.


8. History of hypertension.


9. History of mood disorder.


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 


11. arf 


12. pelvic mass


13  sepsis 


14. pneumonia 











not clear why she is dual antiplt agent as such i am not sure how safe it will 

be to long term d


echo noted 


telel personal reviewed sinus 


iv abx 


wbc improving 


cr improved 


cxr in am





Subjective


ROS Limited/Unobtainable:  Yes





Objective





Last 24 Hour Vital Signs








  Date Time  Temp Pulse Resp B/P (MAP) Pulse Ox O2 Delivery O2 Flow Rate FiO2


 


10/28/19 18:55     96 Bi-Pap  30


 


10/28/19 18:55  74 19  96 Facial  30


 


10/28/19 16:37  113 24  94 Facial  40


 


10/28/19 16:00 97.5 84 22 94/66 (75) 95   


 


10/28/19 16:00      Nasal Cannula 2.0 


 


10/28/19 15:55  91      


 


10/28/19 12:00 97.8 88 20 147/99 (115) 98   


 


10/28/19 12:00      Nasal Cannula 2.0 


 


10/28/19 11:39  88      


 


10/28/19 08:00      Nasal Cannula 2.0 


 


10/28/19 08:00 98.2 98 20 154/108 (123) 98   


 


10/28/19 07:59     96 Bi-Pap  30


 


10/28/19 07:58  76      


 


10/28/19 05:16  99 18  89 Facial  30


 


10/28/19 04:00        30


 


10/28/19 04:00      Nasal Cannula 2.0 


 


10/28/19 04:00 99.1 88 20 113/96 (102) 96   


 


10/28/19 03:27  86      


 


10/28/19 02:49  88 20  97 Facial  30


 


10/28/19 00:00      Nasal Cannula 2.0 


 


10/28/19 00:00 99.3 86 20 133/71 (91) 100   


 


10/28/19 00:00        30


 


10/27/19 23:48  78 19  100 Facial  30


 


10/27/19 23:26  91      


 


10/27/19 22:00       2.0 


 


10/27/19 20:00      Nasal Cannula 2.0 


 


10/27/19 20:00 99.3 89 20 140/72 (94) 100   








General Appearance:  no apparent distress, patient on isolation, other - on 

bipap


Cardiovascular:  normal rate


Respiratory/Chest:  lungs clear - ant


Abdomen:  normal bowel sounds, non tender, soft


Extremities:  no swelling











Intake and Output  


 


 10/27/19 10/28/19





 19:00 07:00


 


Intake Total 145 ml 175 ml


 


Output Total 1200 ml 300 ml


 


Balance -1055 ml -125 ml


 


  


 


IV Total 55 ml 55 ml


 


Tube Feeding 90 ml 120 ml


 


Output Urine Total 1200 ml 300 ml


 


# Voids  1


 


# Bowel Movements  2











Laboratory Tests








Test


  10/28/19


03:35


 


White Blood Count


  15.8 K/UL


(4.8-10.8)  H


 


Red Blood Count


  2.96 M/UL


(4.20-5.40)  L


 


Hemoglobin


  7.9 G/DL


(12.0-16.0)  L


 


Hematocrit


  24.2 %


(37.0-47.0)  L


 


Mean Corpuscular Volume 82 FL (80-99)  


 


Mean Corpuscular Hemoglobin


  26.7 PG


(27.0-31.0)  L


 


Mean Corpuscular Hemoglobin


Concent 32.6 G/DL


(32.0-36.0)


 


Red Cell Distribution Width


  13.2 %


(11.6-14.8)


 


Platelet Count


  396 K/UL


(150-450)


 


Mean Platelet Volume


  7.1 FL


(6.5-10.1)


 


Neutrophils (%) (Auto)


  % (45.0-75.0)


 


 


Lymphocytes (%) (Auto)


  % (20.0-45.0)


 


 


Monocytes (%) (Auto)  % (1.0-10.0)  


 


Eosinophils (%) (Auto)  % (0.0-3.0)  


 


Basophils (%) (Auto)  % (0.0-2.0)  


 


Sodium Level


  139 MMOL/L


(136-145)


 


Potassium Level


  3.6 MMOL/L


(3.5-5.1)


 


Chloride Level


  103 MMOL/L


()


 


Carbon Dioxide Level


  29 MMOL/L


(21-32)


 


Anion Gap


  7 mmol/L


(5-15)


 


Blood Urea Nitrogen


  13 mg/dL


(7-18)


 


Creatinine


  1.0 MG/DL


(0.55-1.30)


 


Estimat Glomerular Filtration


Rate 55.5 mL/min


(>60)


 


Glucose Level


  71 MG/DL


()  L


 


Calcium Level


  8.3 MG/DL


(8.5-10.1)  L


 


Phosphorus Level


  2.5 MG/DL


(2.5-4.9)


 


Magnesium Level


  1.9 MG/DL


(1.8-2.4)


 


Iron Level


  28 ug/dL


()  L


 


Total Iron Binding Capacity


  149 ug/dL


(250-450)  L


 


Percent Iron Saturation 19 % (15-50)  


 


Unsaturated Iron Binding


  121 ug/dL


(112-346)


 


Ferritin


  442 NG/ML


(8-388)  H


 


Total Bilirubin


  0.5 MG/DL


(0.2-1.0)


 


Aspartate Amino Transf


(AST/SGOT) 16 U/L (15-37)


 


 


Alanine Aminotransferase


(ALT/SGPT) 14 U/L (12-78)


 


 


Alkaline Phosphatase


  79 U/L


()


 


Total Protein


  5.8 G/DL


(6.4-8.2)  L


 


Albumin


  1.7 G/DL


(3.4-5.0)  L


 


Globulin 4.1 g/dL  


 


Albumin/Globulin Ratio


  0.4 (1.0-2.7)


L


 


Vitamin B12 Level


  936 PG/ML


(193-986)


 


Folate


  4.9 NG/ML


(8.6-58.9)  L











Microbiology








 Date/Time


Source Procedure


Growth Status


 


 


 10/25/19 20:45


Blood Blood Culture - Preliminary


NO GROWTH AFTER 48 HOURS Resulted


 


 10/25/19 20:30


Blood Blood Culture - Preliminary


NO GROWTH AFTER 48 HOURS Resulted

















Rob May MD Oct 28, 2019 19:59

## 2019-10-28 NOTE — NUR
NURSE NOTES:

Dr Pop ordered 1 unit of packed RBCs to be transfused. Order placed, lab made away of 
the need to type and screen and signed consent form is in patient's chart. Will continue to 
monitor.

## 2019-10-28 NOTE — NUR
[facility]ium WBC Scan: Blood drawn and sent to [facility] for Indium-111 WBC labeling. Return 
time for labeled WBCs is 3-6 hours.

-------------------------------------------------------------------------------

Addendum: 10/28/19 at 1533 by OCTAVIO QUACH

-------------------------------------------------------------------------------

Pt re-injected with Indium-111 labeled WBCs. Scanning will be done tomorrow per protocol.

## 2019-10-28 NOTE — SURGERY PROGRESS NOTE
Surgery Progress Note


Subjective


Additional Comments


off bipap.  on nc.  wounds eval and dressings change with nursing staff.  labs 

slowly improving





Objective





Last 24 Hour Vital Signs








  Date Time  Temp Pulse Resp B/P (MAP) Pulse Ox O2 Delivery O2 Flow Rate FiO2


 


10/28/19 12:00 97.8 88 20 147/99 (115) 98   


 


10/28/19 12:00      Nasal Cannula 2.0 


 


10/28/19 11:39  88      


 


10/28/19 08:00      Nasal Cannula 2.0 


 


10/28/19 08:00 98.2 98 20 154/108 (123) 98   


 


10/28/19 07:58  76      


 


10/28/19 05:16  99 18  89 Facial  30


 


10/28/19 04:00        30


 


10/28/19 04:00      Nasal Cannula 2.0 


 


10/28/19 04:00 99.1 88 20 113/96 (102) 96   


 


10/28/19 03:27  86      


 


10/28/19 02:49  88 20  97 Facial  30


 


10/28/19 00:00      Nasal Cannula 2.0 


 


10/28/19 00:00 99.3 86 20 133/71 (91) 100   


 


10/28/19 00:00        30


 


10/27/19 23:48  78 19  100 Facial  30


 


10/27/19 23:26  91      


 


10/27/19 22:00       2.0 


 


10/27/19 20:00      Nasal Cannula 2.0 


 


10/27/19 20:00 99.3 89 20 140/72 (94) 100   


 


10/27/19 19:34  85      


 


10/27/19 18:39     98 Nasal Cannula 2.0 28


 


10/27/19 16:00       2.0 


 


10/27/19 16:00 98.8 83 19 142/64 (90) 98   


 


10/27/19 16:00      Nasal Cannula 2.0 


 


10/27/19 15:58        30


 


10/27/19 15:26  79      








I&O











Intake and Output  


 


 10/27/19 10/28/19





 19:00 07:00


 


Intake Total 145 ml 175 ml


 


Output Total 1200 ml 300 ml


 


Balance -1055 ml -125 ml


 


  


 


IV Total 55 ml 55 ml


 


Tube Feeding 90 ml 120 ml


 


Output Urine Total 1200 ml 300 ml


 


# Voids  1


 


# Bowel Movements  2








Dressing:  saturated


Wound:  other


Drains:  other


Cardiovascular:  RSR


Respiratory:  decreased breath sounds


Abdomen:  soft, present bowel sounds, other, non-distended


Extremities:  no tenderness, no cyanosis





Laboratory Tests








Test


  10/28/19


03:35


 


White Blood Count


  15.8 K/UL


(4.8-10.8)  H


 


Red Blood Count


  2.96 M/UL


(4.20-5.40)  L


 


Hemoglobin


  7.9 G/DL


(12.0-16.0)  L


 


Hematocrit


  24.2 %


(37.0-47.0)  L


 


Mean Corpuscular Volume 82 FL (80-99)  


 


Mean Corpuscular Hemoglobin


  26.7 PG


(27.0-31.0)  L


 


Mean Corpuscular Hemoglobin


Concent 32.6 G/DL


(32.0-36.0)


 


Red Cell Distribution Width


  13.2 %


(11.6-14.8)


 


Platelet Count


  396 K/UL


(150-450)


 


Mean Platelet Volume


  7.1 FL


(6.5-10.1)


 


Neutrophils (%) (Auto)


  % (45.0-75.0)


 


 


Lymphocytes (%) (Auto)


  % (20.0-45.0)


 


 


Monocytes (%) (Auto)  % (1.0-10.0)  


 


Eosinophils (%) (Auto)  % (0.0-3.0)  


 


Basophils (%) (Auto)  % (0.0-2.0)  


 


Sodium Level


  139 MMOL/L


(136-145)


 


Potassium Level


  3.6 MMOL/L


(3.5-5.1)


 


Chloride Level


  103 MMOL/L


()


 


Carbon Dioxide Level


  29 MMOL/L


(21-32)


 


Anion Gap


  7 mmol/L


(5-15)


 


Blood Urea Nitrogen


  13 mg/dL


(7-18)


 


Creatinine


  1.0 MG/DL


(0.55-1.30)


 


Estimat Glomerular Filtration


Rate 55.5 mL/min


(>60)


 


Glucose Level


  71 MG/DL


()  L


 


Calcium Level


  8.3 MG/DL


(8.5-10.1)  L


 


Phosphorus Level


  2.5 MG/DL


(2.5-4.9)


 


Magnesium Level


  1.9 MG/DL


(1.8-2.4)


 


Iron Level


  28 ug/dL


()  L


 


Total Iron Binding Capacity


  149 ug/dL


(250-450)  L


 


Percent Iron Saturation 19 % (15-50)  


 


Unsaturated Iron Binding


  121 ug/dL


(112-346)


 


Ferritin


  442 NG/ML


(8-388)  H


 


Total Bilirubin


  0.5 MG/DL


(0.2-1.0)


 


Aspartate Amino Transf


(AST/SGOT) 16 U/L (15-37)


 


 


Alanine Aminotransferase


(ALT/SGPT) 14 U/L (12-78)


 


 


Alkaline Phosphatase


  79 U/L


()


 


Total Protein


  5.8 G/DL


(6.4-8.2)  L


 


Albumin


  1.7 G/DL


(3.4-5.0)  L


 


Globulin 4.1 g/dL  


 


Albumin/Globulin Ratio


  0.4 (1.0-2.7)


L


 


Vitamin B12 Level


  936 PG/ML


(193-986)


 


Folate


  4.9 NG/ML


(8.6-58.9)  L











Plan


Problems:  


(1) Pressure injury of deep tissue


Assessment & Plan:  Pt presented on admission with DTPI R heel. Blood filled 

blister with marginal erythema noted to heel. Pt exhibited agitation when 

minimally palpated. (L)2.2cm x (W)2.1cm


L heel is blanchable .


Non-blanchable erythema without induration noted to sacral cleft. 


No other areas of skin concerns noted.





Tx.Plan:


Apply Betadine to R heel. Cover with Optifoam drsg. Change every 3 days and prn.


           


Apply Cavilon Skin Barrier to L heel. Cover with Optifoam drsg. Change every 7 

days and prn.


           


Apply Moisture Barrier Paste to buttocks. Cover sacral area with Optifoam drsg. 

Change every 3 days and prn.


           


Reposition at least every 2hours or as tolerated.


           


Off-load heels with pillow.





(2) Sepsis


Assessment & Plan:  Patient with low-grade fevers, anemia, leukocytosis 

persistent, elevated platelets, abnormal labs.


Etiology currently unknown and work-up in progress.  Labs noted and imaging 

that is available as noted.  


Okay for diet


CT noted


US noted


Impression: 13 x 7 x 12.9 cm unilocular cystic mass, corresponding to lesion 

reported


on recent CT scan. Layering low level internal echoes likely represent bladder


debris.. This presumably represents a cystic ovarian lesion with debris or


hemorrhage, possibly neoplastic. Gynecological consultation is recommended


Antibiotics as per infectious disease 


local wound care as wounds do not seem to be the etiology of her sepsis


We will monitor and follow with recommendations


Thank you for allowing me to participate in patient's care














Radhames Blas Oct 28, 2019 15:22

## 2019-10-29 VITALS — SYSTOLIC BLOOD PRESSURE: 101 MMHG | DIASTOLIC BLOOD PRESSURE: 72 MMHG

## 2019-10-29 VITALS — DIASTOLIC BLOOD PRESSURE: 41 MMHG | SYSTOLIC BLOOD PRESSURE: 95 MMHG

## 2019-10-29 VITALS — DIASTOLIC BLOOD PRESSURE: 72 MMHG | SYSTOLIC BLOOD PRESSURE: 96 MMHG

## 2019-10-29 VITALS — DIASTOLIC BLOOD PRESSURE: 66 MMHG | SYSTOLIC BLOOD PRESSURE: 117 MMHG

## 2019-10-29 VITALS — DIASTOLIC BLOOD PRESSURE: 70 MMHG | SYSTOLIC BLOOD PRESSURE: 96 MMHG

## 2019-10-29 VITALS — DIASTOLIC BLOOD PRESSURE: 87 MMHG | SYSTOLIC BLOOD PRESSURE: 119 MMHG

## 2019-10-29 RX ADMIN — MEROPENEM SCH MLS/HR: 1 INJECTION INTRAVENOUS at 15:23

## 2019-10-29 RX ADMIN — INSULIN ASPART SCH UNITS: 100 INJECTION, SOLUTION INTRAVENOUS; SUBCUTANEOUS at 12:49

## 2019-10-29 RX ADMIN — DOCUSATE SODIUM SCH MG: 50 LIQUID ORAL at 22:13

## 2019-10-29 RX ADMIN — INSULIN ASPART SCH UNITS: 100 INJECTION, SOLUTION INTRAVENOUS; SUBCUTANEOUS at 06:55

## 2019-10-29 RX ADMIN — DIVALPROEX SODIUM SCH MG: 125 CAPSULE ORAL at 20:17

## 2019-10-29 RX ADMIN — DIVALPROEX SODIUM SCH MG: 125 CAPSULE ORAL at 09:05

## 2019-10-29 RX ADMIN — FUROSEMIDE SCH MG: 40 TABLET ORAL at 09:05

## 2019-10-29 RX ADMIN — ASPIRIN 81 MG SCH MG: 81 TABLET ORAL at 09:05

## 2019-10-29 RX ADMIN — DOCUSATE SODIUM SCH MG: 50 LIQUID ORAL at 14:00

## 2019-10-29 RX ADMIN — INSULIN ASPART SCH UNITS: 100 INJECTION, SOLUTION INTRAVENOUS; SUBCUTANEOUS at 18:32

## 2019-10-29 RX ADMIN — DOCUSATE SODIUM SCH MG: 50 LIQUID ORAL at 06:56

## 2019-10-29 RX ADMIN — MEROPENEM SCH MLS/HR: 1 INJECTION INTRAVENOUS at 01:54

## 2019-10-29 RX ADMIN — ENOXAPARIN SODIUM SCH MG: 30 INJECTION SUBCUTANEOUS at 09:04

## 2019-10-29 RX ADMIN — INSULIN ASPART SCH UNITS: 100 INJECTION, SOLUTION INTRAVENOUS; SUBCUTANEOUS at 23:57

## 2019-10-29 NOTE — NUR
NURSE NOTES:

Received patient from Viviana JOHNSON. Patient is awake, alert and oriented x1, speech is 
repetitive and garbled. Receiving oxygen via nasal cannula at 2L/min, patient tolerating 
well, no signs of respiratory distress, O2 Saturation 98%. NGT is inserted in the right 
nares. Freedom splints are on bilateral arms, assessed pulse bilateral radial pulse, both 
left and right radial pulse is bounding. Patient has a Charles Catheter that is patent and 
draining.  IV site is right forearm 22g, patent and asymptomatic. Bed is locked, placed in 
lowest position, side rails up x3, bed alarm on, call light within reach. Will continue to 
monitor.

## 2019-10-29 NOTE — NUR
NURSE NOTES:

Received bedside report from Morena JOHNSON. Pt. in bed, sleeping but arousable. On Bipap at 
present with setting of 15/5 Fi O2 30%. No grimacing noted. On GTF Glucerna 1.5 at 25cc/hr. 
HOB elevated at all times.  IV site at right FA #22g. placed SL. Bilateral freedon spint 
restrains in placed. CMS intact. F/C in placed patent/intact draining yellow colored urine. 
Bed in low position, locked. Padded side rails in placed. Call light within reach. Will 
cont. to monitor.

## 2019-10-29 NOTE — PULMONOLOGY PROGRESS NOTE
Assessment/Plan


Problems:  


(1) Healthcare associated bacterial pneumonia


(2) UTI (urinary tract infection)


(3) Sepsis


(4) Dehydration


(5) CHANCE (acute kidney injury)


(6) Acute metabolic encephalopathy


(7) Hyperglycemia due to type 2 diabetes mellitus


(8) Renal failure (ARF), acute on chronic


(9) Aspiration pneumonia


(10) Abnormal TSH


(11) Pelvic mass in female


Assessment/Plan


Marked leukocytosis


Sepsis


Possible pneumonia


E coli UTI


Acute hypoxemic respiratory failure


Acute encephalopathy


Hx CHF


HTN


CHANCE 


Pelvic mass/possible GYN malignancy vs cyst





Plan:


-Monitor WCt


-Abx per ID


-WBC scan today


-BMBx??


-monitor volumes and renal function, F/U renal recs


-GYN evaluation, possible as OP


-monitor encephalopathy, ? neuro eval


-NPO, NGTF's, ? GT, SLP recs


-DVT Px: LMWH


-FC





Subjective


Allergies:  


Coded Allergies:  


     No Known Allergies (Unverified , 10/14/19)


Subjective


WCT 15 BiPAP ON O2 needs stable CXR unchanged


Occ cough + SOB no FC





Objective





Last 24 Hour Vital Signs








  Date Time  Temp Pulse Resp B/P (MAP) Pulse Ox O2 Delivery O2 Flow Rate FiO2


 


10/29/19 12:00 98.1 80 21 101/72 (82) 98   


 


10/29/19 08:00 98.2 86 21 95/41 (59) 97   


 


10/29/19 08:00      Nasal Cannula 2.0 


 


10/29/19 07:59  83      


 


10/29/19 07:56     98 Nasal Cannula 2.0 28


 


10/29/19 05:28  68      


 


10/29/19 05:23  87 18  99 Facial  30


 


10/29/19 04:00 97.4 84 20 117/66 (83) 99   


 


10/29/19 04:00      Nasal Cannula 2.0 


 


10/29/19 02:38  77 23  100 Facial  30


 


10/29/19 01:39  74 17  100 Facial  30


 


10/29/19 00:00 97.7 84 20 96/72 (80) 100   


 


10/29/19 00:00      Nasal Cannula 2.0 


 


10/28/19 23:58  80 26  99 Facial  30


 


10/28/19 23:26  79      


 


10/28/19 21:04  80 20  98 Facial  30


 


10/28/19 20:00        30


 


10/28/19 20:00      Nasal Cannula 2.0 


 


10/28/19 20:00 97.5 84 22 131/83 (99) 99   


 


10/28/19 19:34  75      


 


10/28/19 18:55     96 Bi-Pap  30


 


10/28/19 18:55  74 19  96 Facial  30


 


10/28/19 16:37  113 24  94 Facial  40


 


10/28/19 16:00 97.5 84 22 94/66 (75) 95   


 


10/28/19 16:00      Nasal Cannula 2.0 


 


10/28/19 15:55  91      

















Intake and Output  


 


 10/28/19 10/29/19





 19:00 07:00


 


Intake Total  495 ml


 


Output Total  300 ml


 


Balance  195 ml


 


  


 


Free Water  240 ml


 


IV Total  55 ml


 


Tube Feeding  200 ml


 


Output Urine Total  300 ml


 


# Bowel Movements  1








General Appearance:  no acute distress, cachetic


HEENT:  normocephalic, atraumatic, anicteric, mucous membranes moist


Respiratory/Chest:  crackles/rales


Cardiovascular:  normal peripheral pulses, normal rate, regular rhythm


Abdomen:  normal bowel sounds, soft, non tender, no organomegaly, non distended

, no mass


Extremities:  no cyanosis, no clubbing, other - 1+ edema





Current Medications








 Medications


  (Trade)  Dose


 Ordered  Sig/Winnie


 Route


 PRN Reason  Start Time


 Stop Time Status Last Admin


Dose Admin


 


 Acetaminophen


  (Tylenol)  650 mg  PRN  PRN


 RECTAL


 TEMP>100.5  10/26/19 19:45


     


 


 


 Acetaminophen


  (Tylenol)  650 mg  Q4H  PRN


 NG


 Mild Pain/Temp > 100.5  10/26/19 19:45


 11/25/19 19:44   


 


 


 Albuterol/


 Ipratropium


  (Albuterol/


 Ipratropium)  3 ml  Q4H  PRN


 HHN


 SHORTNESS OF BREATH  10/26/19 19:45


 10/31/19 19:44   


 


 


 Aspirin


  (ASA)  81 mg  DAILY


 NG


   10/27/19 09:00


 11/14/19 08:59  10/29/19 09:05


 


 


 Atorvastatin


 Calcium


  (Lipitor)  10 mg  BEDTIME


 NG


   10/26/19 21:00


 11/13/19 20:59  10/28/19 21:45


 


 


 Clopidogrel


 Bisulfate


  (Plavix)  75 mg  DAILY


 NG


   10/27/19 09:00


 11/14/19 08:59  10/29/19 09:05


 


 


 Dextrose


  (Dextrose 50%)  25 ml  Q30M  PRN


 IV


 Hypoglycemia  10/26/19 19:30


 11/13/19 06:59   


 


 


 Dextrose


  (Dextrose 50%)  50 ml  Q30M  PRN


 IV


 Hypoglycemia  10/26/19 19:30


 11/13/19 06:59   


 


 


 Divalproex Sodium


  (Depakote


 Sprinkles)  500 mg  Q12HR


 NG


   10/26/19 21:00


 11/14/19 21:59  10/29/19 09:05


 


 


 Docusate Sodium


  (Colace)  100 mg  EVERY 8  HOURS


 NG


   10/27/19 14:00


 11/25/19 08:59  10/29/19 06:56


 


 


 Enoxaparin Sodium


  (Lovenox)  30 mg  DAILY


 SUBQ


   10/27/19 09:00


 11/13/19 08:59  10/29/19 09:04


 


 


 Famotidine


  (Pepcid)  20 mg  EVERY 12  HOURS


 GT


   10/27/19 21:00


 11/25/19 17:59  10/29/19 09:04


 


 


 Furosemide


  (Lasix)  40 mg  DAILY


 NG


   10/30/19 09:00


 11/26/19 08:59   


 


 


 Insulin Aspart


  (NovoLOG)    EVERY 6  HOURS


 SUBQ


   10/27/19 18:00


 11/13/19 11:29  10/29/19 12:49


 


 


 Lactulose


  (Cephulac)  20 gm  Q8H  PRN


 NG


 Constipation  10/26/19 19:45


 11/25/19 19:44   


 


 


 Lorazepam


  (Ativan 2mg/ml


 1ml)  1 mg  Q8H  PRN


 IV


 For Anxiety  10/26/19 19:45


 10/31/19 19:44  10/28/19 16:21


 


 


 Meropenem 1 gm/


 Sodium Chloride  55 ml @ 


 110 mls/hr  Q12H


 IVPB


   10/27/19 02:00


 10/30/19 23:59  10/29/19 01:54


 

















Sammy Torres MD Oct 29, 2019 12:56

## 2019-10-29 NOTE — NUR
RESPIRATORY NOTE:

removed pt off bipap and placed on 2L NC. spo2 98%, with no resp distress. pt breath sounds 
bilaterally diminished. will cont to monitor. 

-------------------------------------------------------------------------------

Addendum: 10/29/19 at 0758 by MEGAN CORTEZ RT

-------------------------------------------------------------------------------

no redness or skin wounds visible around facial area when removing bipap mask.

## 2019-10-29 NOTE — NUR
NURSE NOTES:

Called Juan Alvarenga (son) of pt. [phone] and left a message. Awaiting for response.

## 2019-10-29 NOTE — CARDIOLOGY PROGRESS NOTE
Assessment/Plan


Assessment/Plan


1. acute congestive heart failure


2. Abnormal cardiac enzyme demand related due to infection and profound anemia 


3. Agitation 


4. Urinary tract infection.


5. Acute renal failure.


6. Profound anemia.


7. Diabetes mellitus.


8. History of hypertension.


9. History of mood disorder.


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 


11. arf 


12. pelvic mass


13  sepsis 


14. pneumonia 











not clear why she is dual antiplt agent as such i am not sure how safe it will 

be to long term 


echorpero to shwoe mild systolic dysfucntion 


telel personal reviewed sinus 


iv abx 


wbc improving 


cr improved


cxr reviewed personally 


bp borderline 


d/w dr plummer who is planning a peg will need asa and palvix held 


remian agitatble and nto communicative 


per rn sat





Subjective


ROS Limited/Unobtainable:  Yes





Objective





Last 24 Hour Vital Signs








  Date Time  Temp Pulse Resp B/P (MAP) Pulse Ox O2 Delivery O2 Flow Rate FiO2


 


10/29/19 12:00 98.1 80 21 101/72 (82) 98   


 


10/29/19 12:00      Nasal Cannula 2.0 


 


10/29/19 11:38  76      


 


10/29/19 08:00 98.2 86 21 95/41 (59) 97   


 


10/29/19 08:00      Nasal Cannula 2.0 


 


10/29/19 07:59  83      


 


10/29/19 07:56     98 Nasal Cannula 2.0 28


 


10/29/19 05:28  68      


 


10/29/19 05:23  87 18  99 Facial  30


 


10/29/19 04:00 97.4 84 20 117/66 (83) 99   


 


10/29/19 04:00      Nasal Cannula 2.0 


 


10/29/19 02:38  77 23  100 Facial  30


 


10/29/19 01:39  74 17  100 Facial  30


 


10/29/19 00:00 97.7 84 20 96/72 (80) 100   


 


10/29/19 00:00      Nasal Cannula 2.0 


 


10/28/19 23:58  80 26  99 Facial  30


 


10/28/19 23:26  79      


 


10/28/19 21:04  80 20  98 Facial  30


 


10/28/19 20:00        30


 


10/28/19 20:00      Nasal Cannula 2.0 


 


10/28/19 20:00 97.5 84 22 131/83 (99) 99   


 


10/28/19 19:34  75      


 


10/28/19 18:55     96 Bi-Pap  30


 


10/28/19 18:55  74 19  96 Facial  30


 


10/28/19 16:37  113 24  94 Facial  40


 


10/28/19 16:00 97.5 84 22 94/66 (75) 95   


 


10/28/19 16:00      Nasal Cannula 2.0 


 


10/28/19 15:55  91      








General Appearance:  no apparent distress, patient on isolation


Cardiovascular:  normal rate


Respiratory/Chest:  lungs clear - ant


Abdomen:  normal bowel sounds, non tender, soft


Extremities:  trace edema











Intake and Output  


 


 10/28/19 10/29/19





 19:00 07:00


 


Intake Total  495 ml


 


Output Total  300 ml


 


Balance  195 ml


 


  


 


Free Water  240 ml


 


IV Total  55 ml


 


Tube Feeding  200 ml


 


Output Urine Total  300 ml


 


# Bowel Movements  1

















Rob May MD Oct 29, 2019 15:15

## 2019-10-29 NOTE — INFECTIOUS DISEASES PROG NOTE
Assessment/Plan


Problems:  


(1) Aspiration pneumonia


Assessment & Plan:  with B/L infiltrates, L>R suspect due to altered mental 

status , with hypoxemia and leukocytosis, continue meropenem empirically, 

repeated  CXR  showed no change , CT of the chest showed B/L effusion with 

basal infiltrates , aspiration precaution. continue meropenem for two weeks 

total . EOT 10/30/19  





(2) UTI (urinary tract infection)


Assessment & Plan:  due to ESBL producing E coli already on meropenem to cover 

for sepsis and pneumonia too for two weeks total  





(3) Sepsis


Assessment & Plan:  due to the above, with persistent leukocytosis inspit of 

being on wide spectrum antibiotics and negative repeated blood cultures, 

suspect pelvic mass related or bone marrow pathology . recommend gynecology 

eval for pelvic mass resection . will order indium scan to evaluate the pelvic 

mass and rule out deep abscess. unfortunately we are unable to reach her 

daughter to sign the consent, and no other family member is available to do so, 

will order the test as medical necessity. D/W CHARGE NURSE 





(4) Acute metabolic encephalopathy


Assessment & Plan:  due to the above, continue hydration and antibiotics, 

monitor in tele 





(5) Hyperglycemia due to type 2 diabetes mellitus


Assessment & Plan:  recommend tight glycemic control to keep blood glucose 

between 100-140 





(6) ARF (acute renal failure)


Assessment & Plan:  due to the above, continue hydration and renally dosed 

antibiotics, close  monitor of renal function , stop vancomycin 





(7) Pelvic mass in female


Assessment & Plan:  to the left of the uterus, suspect malignancy , recommend OB

/GYN eval , and tumor markers , oncology is following 








Subjective


ROS Limited/Unobtainable:  Yes


Allergies:  


Coded Allergies:  


     No Known Allergies (Unverified , 10/14/19)


Subjective


she was IN SDU , off  BIPAP, still lethargic ,and minimally responsive , no 

fever or chills , no diarrhea, has cough but no SOB





Objective


Vital Signs





Last 24 Hour Vital Signs








  Date Time  Temp Pulse Resp B/P (MAP) Pulse Ox O2 Delivery O2 Flow Rate FiO2


 


10/29/19 19:33     95 Nasal Cannula 2.0 28


 


10/29/19 16:00      Nasal Cannula 2.0 


 


10/29/19 16:00 98.1 62 20 96/70 (79) 97   


 


10/29/19 15:33  95      


 


10/29/19 12:00 98.1 80 21 101/72 (82) 98   


 


10/29/19 12:00      Nasal Cannula 2.0 


 


10/29/19 11:38  76      


 


10/29/19 08:00 98.2 86 21 95/41 (59) 97   


 


10/29/19 08:00      Nasal Cannula 2.0 


 


10/29/19 07:59  83      


 


10/29/19 07:56     98 Nasal Cannula 2.0 28


 


10/29/19 05:28  68      


 


10/29/19 05:23  87 18  99 Facial  30


 


10/29/19 04:00 97.4 84 20 117/66 (83) 99   


 


10/29/19 04:00      Nasal Cannula 2.0 


 


10/29/19 02:38  77 23  100 Facial  30


 


10/29/19 01:39  74 17  100 Facial  30


 


10/29/19 00:00 97.7 84 20 96/72 (80) 100   


 


10/29/19 00:00      Nasal Cannula 2.0 


 


10/28/19 23:58  80 26  99 Facial  30


 


10/28/19 23:26  79      


 


10/28/19 21:04  80 20  98 Facial  30


 


10/28/19 20:00        30


 


10/28/19 20:00      Nasal Cannula 2.0 


 


10/28/19 20:00 97.5 84 22 131/83 (99) 99   








Height (Feet):  5


Height (Inches):  5.00


Weight (Pounds):  194


General Appearance:  WD/WN, no acute distress


HEENT:  normocephalic, anicteric, mucous membranes moist, pharynx normal, supple


Respiratory/Chest:  normal breath sounds, no respiratory distress, no accessory 

muscle use, decreased breath sounds


Cardiovascular:  normal peripheral pulses, normal rate, regular rhythm, no 

gallop/murmur, no JVD


Abdomen:  normal bowel sounds, soft, non tender, no organomegaly, non distended

, no mass, no scars


Genitourinary:  normal external genitalia


Extremities:  no cyanosis, no clubbing


Skin:  no rash, no lesions, ulcers


Neurologic/Psychiatric:  alert, unresponsiveness


Lymphatic:  no neck adenopathy


Musculoskeletal:  no effusion





Current Medications








 Medications


  (Trade)  Dose


 Ordered  Sig/Winnie


 Route


 PRN Reason  Start Time


 Stop Time Status Last Admin


Dose Admin


 


 Acetaminophen


  (Tylenol)  650 mg  PRN  PRN


 RECTAL


 TEMP>100.5  10/26/19 19:45


     


 


 


 Acetaminophen


  (Tylenol)  650 mg  Q4H  PRN


 NG


 Mild Pain/Temp > 100.5  10/26/19 19:45


 11/25/19 19:44   


 


 


 Albuterol/


 Ipratropium


  (Albuterol/


 Ipratropium)  3 ml  Q4H  PRN


 HHN


 SHORTNESS OF BREATH  10/26/19 19:45


 10/31/19 19:44   


 


 


 Aspirin


  (ASA)  81 mg  DAILY


 NG


   10/27/19 09:00


 11/14/19 08:59  10/29/19 09:05


 


 


 Atorvastatin


 Calcium


  (Lipitor)  10 mg  BEDTIME


 NG


   10/26/19 21:00


 11/13/19 20:59  10/28/19 21:45


 


 


 Clopidogrel


 Bisulfate


  (Plavix)  75 mg  DAILY


 NG


   10/27/19 09:00


 11/14/19 08:59  10/29/19 09:05


 


 


 Dextrose


  (Dextrose 50%)  25 ml  Q30M  PRN


 IV


 Hypoglycemia  10/26/19 19:30


 11/13/19 06:59   


 


 


 Dextrose


  (Dextrose 50%)  50 ml  Q30M  PRN


 IV


 Hypoglycemia  10/26/19 19:30


 11/13/19 06:59   


 


 


 Divalproex Sodium


  (Depakote


 Sprinkles)  500 mg  Q12HR


 NG


   10/26/19 21:00


 11/14/19 21:59  10/29/19 09:05


 


 


 Docusate Sodium


  (Colace)  100 mg  EVERY 8  HOURS


 NG


   10/27/19 14:00


 11/25/19 08:59  10/29/19 06:56


 


 


 Enoxaparin Sodium


  (Lovenox)  30 mg  DAILY


 SUBQ


   10/27/19 09:00


 11/13/19 08:59  10/29/19 09:04


 


 


 Famotidine


  (Pepcid)  20 mg  EVERY 12  HOURS


 GT


   10/27/19 21:00


 11/25/19 17:59  10/29/19 09:04


 


 


 Furosemide


  (Lasix)  40 mg  DAILY


 NG


   10/30/19 09:00


 11/26/19 08:59   


 


 


 Insulin Aspart


  (NovoLOG)    EVERY 6  HOURS


 SUBQ


   10/27/19 18:00


 11/13/19 11:29  10/29/19 18:32


 


 


 Lactulose


  (Cephulac)  20 gm  Q8H  PRN


 NG


 Constipation  10/26/19 19:45


 11/25/19 19:44   


 


 


 Lorazepam


  (Ativan 2mg/ml


 1ml)  1 mg  Q8H  PRN


 IV


 For Anxiety  10/26/19 19:45


 10/31/19 19:44  10/28/19 16:21


 


 


 Meropenem 1 gm/


 Sodium Chloride  55 ml @ 


 110 mls/hr  Q12H


 IVPB


   10/27/19 02:00


 10/30/19 23:59  10/29/19 15:23


 

















Amie Burgos M.D. Oct 29, 2019 19:35

## 2019-10-29 NOTE — DIAGNOSTIC IMAGING REPORT
Indication: Dyspnea

 

Technique: One view of the chest

 

Comparison: 10/26/2019

 

Findings: Nasogastric tube is again demonstrated, tip apparently projecting beyond

the edge of the image although not well-demonstrated. Bilateral interstitial and

airspace disease and bilateral pleural effusions persist, probably not significantly

changed. The heart is borderline enlarged.

 

Impression:  Unchanged, over 3 days, findings as above.

## 2019-10-29 NOTE — NUR
NURSE NOTES:

spoke to Joseluis Marsh  regarding prolonged restrains (Q72HR), the computer 
system automatically canceled for 10/28/19 2137 order. per Joseluis, he will try to figure out 
the reason why it got canceled. will wait for call. 



for now, put an prolonged restrain order on 10/29/19 0000 as temporary. will follow up.

## 2019-10-29 NOTE — DIAGNOSTIC IMAGING REPORT
Indication: Pelvic mass

 

Technique: In vitro labeling white blood cells using 550 uCi indium-111. Cells

reinjected, scans of the whole body as well as spot images of the pelvis obtained at

24 hours

 

Comparison: Reference made to pelvic ultrasound dated 10/21/2019, chest abdomen

pelvis CT dated 10/18/2019

 

Findings: On the AP and oblique views of the pelvis, there is what appears to be a

rim of activity corresponding in location to the soft tissue rim of the large cystic

mass described on recent CT and ultrasound.

 

Normal physiologic uptake is seen in the liver, spleen, and bone marrow. No other

unusual foci of increased uptake are demonstrated.

 

Impression: Rim of activity within the pelvis, corresponding to expected location of

the soft tissue component of the cystic pelvic mass described on recent imaging

studies. This presumably indicates leukocyte accumulation within the wall of the

cyst. However, the significance of this finding is uncertain.

 

No other significant abnormality demonstrated.

## 2019-10-29 NOTE — NUR
NURSE NOTES:

Made phone call and left message to son named Speedy Alvarenga, regarding consent for EGD with 
possible biopsy, Percutaneous endoscopic Gastronomy with possible feed tube. Awaiting call 
back.

## 2019-10-29 NOTE — NUR
NURSE NOTES:

prolong restrain(Q72HR) order date should be 10/28/19 @ 2137 but computer system keep 
canceling it. endorsed to next nurse regarding restrain order error. IT (help desk) will 
call back regarding this error. spoke to Joseluis Guzman.

## 2019-10-29 NOTE — NUR
CASE MANAGEMENT: REVIEW



10/29/19



SI:       SEPSIS/ PNA/  ACUTE RENAL FAILURE/  ACUTE METABOLIC ENCEPHALOPATHY 

98.2  86  21  95/41  97% NC 2L; FI02 28

**10/28 WBC 15.8; RBC 2.96; H/H 7.9/24.2; FE 28; TIBC 149, FOLATE 4.9; FERR 442; BG 71





IS:      IV MEROPENEM Q12HR

LASIX NGT QD

LOVENOX SQ QD

LIPITOR NG HS

NOVOLOG SQ Q6HR

DEPAKOTE NGT Q12HR

PEPCID GT Q12HR

COLACE NGT Q8HR

ASA NG QD

PLAVIX NGT QD

NGT FEEDING 





**: 2W STEP DOWN UNIT



DCP: RETURN TO [facility] 



PLAN: EGD W/ BX 

PEG W/ POSS FEEDING

## 2019-10-29 NOTE — NUR
*-* INSURANCE *-*



UPDATED CLINICALS AND REVIEWS HAVE BEEN FAXED TO:



Tracking#[id number]

CM: Lance

#[phone]

Fax#[phone]

-------------------------------------------------------------------------------

Addendum: 10/29/19 at 1313 by JACKI COMBS CM

-------------------------------------------------------------------------------

SPOKE TO NC: LANCE HE STATED HE HAS BEEN RECEIVING CLINICALS MENTIONED UPDATED WERE JUST 
FAXED.

## 2019-10-29 NOTE — SURGERY PROGRESS NOTE
Surgery Progress Note


Subjective


Additional Comments


tolerating ng tube feeds


failed swallow


agree with GI that patient needs a PEG tube


pending clearance and consent





Objective





Last 24 Hour Vital Signs








  Date Time  Temp Pulse Resp B/P (MAP) Pulse Ox O2 Delivery O2 Flow Rate FiO2


 


10/29/19 12:00 98.1 80 21 101/72 (82) 98   


 


10/29/19 08:00 98.2 86 21 95/41 (59) 97   


 


10/29/19 08:00      Nasal Cannula 2.0 


 


10/29/19 07:59  83      


 


10/29/19 07:56     98 Nasal Cannula 2.0 28


 


10/29/19 05:28  68      


 


10/29/19 05:23  87 18  99 Facial  30


 


10/29/19 04:00 97.4 84 20 117/66 (83) 99   


 


10/29/19 04:00      Nasal Cannula 2.0 


 


10/29/19 02:38  77 23  100 Facial  30


 


10/29/19 01:39  74 17  100 Facial  30


 


10/29/19 00:00 97.7 84 20 96/72 (80) 100   


 


10/29/19 00:00      Nasal Cannula 2.0 


 


10/28/19 23:58  80 26  99 Facial  30


 


10/28/19 23:26  79      


 


10/28/19 21:04  80 20  98 Facial  30


 


10/28/19 20:00        30


 


10/28/19 20:00      Nasal Cannula 2.0 


 


10/28/19 20:00 97.5 84 22 131/83 (99) 99   


 


10/28/19 19:34  75      


 


10/28/19 18:55     96 Bi-Pap  30


 


10/28/19 18:55  74 19  96 Facial  30


 


10/28/19 16:37  113 24  94 Facial  40


 


10/28/19 16:00 97.5 84 22 94/66 (75) 95   


 


10/28/19 16:00      Nasal Cannula 2.0 


 


10/28/19 15:55  91      








I&O











Intake and Output  


 


 10/28/19 10/29/19





 19:00 07:00


 


Intake Total  495 ml


 


Output Total  300 ml


 


Balance  195 ml


 


  


 


Free Water  240 ml


 


IV Total  55 ml


 


Tube Feeding  200 ml


 


Output Urine Total  300 ml


 


# Bowel Movements  1








Dressing:  saturated


Wound:  other


Drains:  other


Cardiovascular:  RSR


Respiratory:  decreased breath sounds


Abdomen:  soft, present bowel sounds, non-distended


Extremities:  no cyanosis, other





Plan


Problems:  


(1) Pressure injury of deep tissue


Assessment & Plan:  Pt presented on admission with DTPI R heel. Blood filled 

blister with marginal erythema noted to heel. Pt exhibited agitation when 

minimally palpated. (L)2.2cm x (W)2.1cm


L heel is blanchable .


Non-blanchable erythema without induration noted to sacral cleft. 


No other areas of skin concerns noted.





Tx.Plan:


Apply Betadine to R heel. Cover with Optifoam drsg. Change every 3 days and prn.


           


Apply Cavilon Skin Barrier to L heel. Cover with Optifoam drsg. Change every 7 

days and prn.


           


Apply Moisture Barrier Paste to buttocks. Cover sacral area with Optifoam drsg. 

Change every 3 days and prn.


           


Reposition at least every 2hours or as tolerated.


           


Off-load heels with pillow.





(2) Sepsis


Assessment & Plan:  Patient with low-grade fevers, anemia, leukocytosis 

persistent, elevated platelets, abnormal labs.


Etiology currently unknown and work-up in progress.  Labs noted and imaging 

that is available as noted.  


Okay for diet


CT noted


US noted


Impression: 13 x 7 x 12.9 cm unilocular cystic mass, corresponding to lesion 

reported


on recent CT scan. Layering low level internal echoes likely represent bladder


debris.. This presumably represents a cystic ovarian lesion with debris or


hemorrhage, possibly neoplastic. Gynecological consultation is recommended


Antibiotics as per infectious disease 


local wound care as wounds do not seem to be the etiology of her sepsis


PEG tube if consented and cleared


cont with tube feeds


supplementation for healing 


We will monitor and follow with recommendations


Thank you for allowing me to participate in patient's care














Radhames Blas Oct 29, 2019 12:58

## 2019-10-29 NOTE — GENERAL PROGRESS NOTE
Assessment/Plan


Status:  not improved


Assessment/Plan:


(1) Healthcare associated bacterial pneumonia


(2) UTI (urinary tract infection)


(3) Sepsis


(4) Dehydration


(5) CHANCE (acute kidney injury)


(6) Acute metabolic encephalopathy


(7) Hyperglycemia due to type 2 diabetes mellitus


(8) Renal failure (ARF), acute on chronic


(9) Aspiration pneumonia


(10) Abnormal TSH


(11) Pelvic mass in female


(12) Congestive Heart Failure


(13) Anemia


(14) Dysphagia





on NGTF


on /off on BIPAP


currently on 4 lit  oxygen


failed swallow eval


on plavix





plan PEG if cleared by cardiology to hold plavix for 3 days and if family 

consent





Subjective


ROS Limited/Unobtainable:  No


Allergies:  


Coded Allergies:  


     No Known Allergies (Unverified , 10/14/19)





Objective





Last 24 Hour Vital Signs








  Date Time  Temp Pulse Resp B/P (MAP) Pulse Ox O2 Delivery O2 Flow Rate FiO2


 


10/29/19 08:00 98.2 86 21 95/41 (59) 97   


 


10/29/19 07:56     98 Nasal Cannula 2.0 28


 


10/29/19 05:28  68      


 


10/29/19 05:23  87 18  99 Facial  30


 


10/29/19 04:00 97.4 84 20 117/66 (83) 99   


 


10/29/19 04:00      Nasal Cannula 2.0 


 


10/29/19 02:38  77 23  100 Facial  30


 


10/29/19 01:39  74 17  100 Facial  30


 


10/29/19 00:00 97.7 84 20 96/72 (80) 100   


 


10/29/19 00:00      Nasal Cannula 2.0 


 


10/28/19 23:58  80 26  99 Facial  30


 


10/28/19 23:26  79      


 


10/28/19 21:04  80 20  98 Facial  30


 


10/28/19 20:00        30


 


10/28/19 20:00      Nasal Cannula 2.0 


 


10/28/19 20:00 97.5 84 22 131/83 (99) 99   


 


10/28/19 19:34  75      


 


10/28/19 18:55     96 Bi-Pap  30


 


10/28/19 18:55  74 19  96 Facial  30


 


10/28/19 16:37  113 24  94 Facial  40


 


10/28/19 16:00 97.5 84 22 94/66 (75) 95   


 


10/28/19 16:00      Nasal Cannula 2.0 


 


10/28/19 15:55  91      


 


10/28/19 12:00 97.8 88 20 147/99 (115) 98   


 


10/28/19 12:00      Nasal Cannula 2.0 


 


10/28/19 11:39  88      

















Intake and Output  


 


 10/28/19 10/29/19





 19:00 07:00


 


Intake Total  495 ml


 


Output Total  300 ml


 


Balance  195 ml


 


  


 


Free Water  240 ml


 


IV Total  55 ml


 


Tube Feeding  200 ml


 


Output Urine Total  300 ml


 


# Bowel Movements  1








Height (Feet):  5


Height (Inches):  5.00


Weight (Pounds):  198


General Appearance:  no apparent distress


EENT:  normal ENT inspection


Neck:  supple


Cardiovascular:  tachycardia


Respiratory/Chest:  decreased breath sounds


Abdomen:  normal bowel sounds, non tender, soft


Extremities:  non-tender











Storm Turk MD Oct 29, 2019 09:53

## 2019-10-29 NOTE — GENERAL PROGRESS NOTE
Assessment/Plan


Status:  not improved


Assessment/Plan:


S: Not verbal





O: poor historian, seems comfortable  . IV sites are intact. Charles in place





PHYSICAL EXAMINATION:HEAD AND NECK:  Atraumatic and normocephalic.


CHEST:  Bronchial breathing sounds.ABDOMEN:  Soft.  No organomegaly.


MUSCULOSKELETAL:  Diffuse 1+ or 2+ nonpitting edema in all four contracted


extremities.  The patient is not following the commands.  Limited


evaluation.NEUROLOGY:  The patient is awake.  Not alert.  Not verbally


communicative.





LABORATORY DATA:  Labs dated October 28, 2019  reviewed





Meds: Reviewed and reconciled in the chart , including ASA and plavix 





Imaging:  CXR reviewed 





ASSESSMENT AND PLAN:


1. Sepsis.  Differential diagnosis is healthcare-associated pneumonia or


healthcare-associated urinary tract infection.  Work in progress.


2. Acute hypoxemic respiratory failure


3. Acute chf exacerbation - Diastolic


4. Chronic encephalomalacia.


3. Acute on chronic anemia.


4. Renal failure, age indeterminate.


6. Hyperthyroidism.


7. Hyperkalemia.


8. Diabetes type 2, uncontrolled with A1c of 10.


9. Psychiatric disorder.


10. GI and DVT prophylaxis.


11. PAH- Severe 





PLAN OF CARE:


Improving leukocytosis and  renal failure


Failed swallow eval, need for alternative feeding route?


Out patient followup for abnormal incidental imaging finding 


consulted GI for PEG assessment





Subjective


Allergies:  


Coded Allergies:  


     No Known Allergies (Unverified , 10/14/19)





Objective





Last 24 Hour Vital Signs








  Date Time  Temp Pulse Resp B/P (MAP) Pulse Ox O2 Delivery O2 Flow Rate FiO2


 


10/29/19 07:56     98 Nasal Cannula 2.0 28


 


10/29/19 05:28  68      


 


10/29/19 05:23  87 18  99 Facial  30


 


10/29/19 04:00 97.4 84 20 117/66 (83) 99   


 


10/29/19 04:00      Nasal Cannula 2.0 


 


10/29/19 02:38  77 23  100 Facial  30


 


10/29/19 01:39  74 17  100 Facial  30


 


10/29/19 00:00 97.7 84 20 96/72 (80) 100   


 


10/29/19 00:00      Nasal Cannula 2.0 


 


10/28/19 23:58  80 26  99 Facial  30


 


10/28/19 23:26  79      


 


10/28/19 21:04  80 20  98 Facial  30


 


10/28/19 20:00        30


 


10/28/19 20:00      Nasal Cannula 2.0 


 


10/28/19 20:00 97.5 84 22 131/83 (99) 99   


 


10/28/19 19:34  75      


 


10/28/19 18:55     96 Bi-Pap  30


 


10/28/19 18:55  74 19  96 Facial  30


 


10/28/19 16:37  113 24  94 Facial  40


 


10/28/19 16:00 97.5 84 22 94/66 (75) 95   


 


10/28/19 16:00      Nasal Cannula 2.0 


 


10/28/19 15:55  91      


 


10/28/19 12:00 97.8 88 20 147/99 (115) 98   


 


10/28/19 12:00      Nasal Cannula 2.0 


 


10/28/19 11:39  88      

















Intake and Output  


 


 10/28/19 10/29/19





 18:59 06:59


 


Intake Total  495 ml


 


Output Total  300 ml


 


Balance  195 ml


 


  


 


Free Water  240 ml


 


IV Total  55 ml


 


Tube Feeding  200 ml


 


Output Urine Total  300 ml


 


# Bowel Movements  1








Height (Feet):  5


Height (Inches):  5.00


Weight (Pounds):  198











Garrick Keith MD Oct 29, 2019 09:02

## 2019-10-29 NOTE — HEMATOLOGY/ONC PROGRESS NOTE
Assessment/Plan


Assessment/Plan





Assessment and Recs:


# Anemia of chronic disease due to underlying chronic medical issues, 

multifactorial 


--> Anemia workup has been ordered, rule out gi bleed --> ferritin is 1400+


--> No evidence of hemolysis is noted, peripheral smear has been reviewed.


--> Hgb goal >7. Transfuse prn.


--> Epogen or iron at this time is not particularly indicated


--> Medications have been reviewed


--> low threshold for gi evaluation in case has occult +


--> bone marrow biopsy is not indicated given the other more likely causes (if 

recurrent anemia) first time here


--> hgb trend 8.4->8.5-->8.2->7.7-->7.9


# Pelvic mass on ct and us -- 13 x 7 x 12.9 cm unilocular cystic mass, 

corresponding to lesion reported on recent CT scan. Layering low level internal 

echoes likely represent bladder debris.. This presumably represents a cystic 

ovarian lesion with debris or hemorrhage, possibly neoplastic.


--> CA-125 is 216! elevated


--> consider gyn eval


# Hypercalcemia - btw 10-11 range when corrected to alb


--> ivf have been started


--> if remains elevated, 7.9-->8.3


--> will get pth


# Leukocytosis/elevated white blood cell count, unspecified likely related to 

underlying reaction v more likely infection


--> have reviewed peripheral smear and bandemia/neutrophilia noted


--> continue antibiotics if they have been started by ID team (VANC/ZOSYN)--> 

vanc/linda--> linda


--> wbc 39-->28k-->29k->31k-->23-->26k-->19k-->24k-->15.4-->16


--> monitor for resolution


--> CT C/A/P Results reviewed


--> FOR INdium bone scan done


# Sepsis from pneumonia.  


--> pulm consulted, abx started


--> on bipap


# CHANCE (acute kidney injury) on ckd


--> cr 1.6-->2.7-->2.2->1.2


--> per renal


# UTI (urinary tract infection)


--> on abx per id


# Dehydration


--> on ivf


# Acute metabolic encephalopathy


--> likely due to pna


# Dvt ppx lovenox sq





The timing of this note does not necessarily reflect the time of the patient 

was seen.





Greatly appreciate consultation.





Subjective


Constitutional:  Denies: no symptoms, chills, fever, malaise, weakness, other


HEENT:  Denies: no symptoms, eye pain, blurred vision, tearing, double vision, 

ear pain, ear discharge, nose pain, nose congestion, throat pain, throat 

swelling, mouth pain, mouth swelling, other


Respiratory:  Denies: no symptoms, cough, shortness of breath, SOB with 

excertion, SOB at rest, sputum, wheezing, other


Gastrointestinal/Abdominal:  Denies: no symptoms, abdomen distended, abdominal 

pain, black stools, tarry stools, blood in stool, constipated, diarrhea, 

difficulty swallowing, nausea, poor appetite, poor fluid intake, rectal bleeding

, vomiting, other


Genitourinary:  Denies: no symptoms, burning, discharge, frequency, flank pain, 

hematuria, incontinence, pain, urgency, other


Neurologic/Psychiatric:  Denies: no symptoms, anxiety, depressed, emotional 

problems, headache, numbness, paresthesia, pre-existing deficit, seizure, 

tingling, tremors, weakness, other


Endocrine:  Denies: no symptoms, excessive sweating, flushing, intolerance to 

cold, intolerance to heat, increased hunger, increased thirst, increased urine, 

unexplained weight gain, unexplained weight loss, other


Allergies:  


Coded Allergies:  


     No Known Allergies (Unverified , 10/14/19)


Subjective


10/14: hgb has improved, no bleeding, labs reivewed


10/15: no events to report


10/16: a+ o x1, no bleeding or chills noted, no major changes, occult pending


10/17: no events noted, no bleeding, no chills, no hematemesis/hematochezia


10/18: remains confused, with abx, on nc


10/19: cr is worse, hgb 8.4, no bleeding, night sweats, chills


10/20: no f/c, no night sweats, labs noted, cr worse


10/21: no bleeding or chills, no night sweats, labs pending this am


10/22: pelvic mass noted on imaging, no bleeding reported, ordered ca125


10/23: no events, remains lethargic, is on abx, seen by surg


10/24: with raid response overnight, rr high as well as bp, vidal to icu


10/25: no events, no bleeding, to undergo a indium scan with id


10/26: comfortable, electrolytes reviewed, given mag and k


10/29: back on bipap with gt feeds, dw rn this am





Objective


Objective





Current Medications








 Medications


  (Trade)  Dose


 Ordered  Sig/Winnie


 Route


 PRN Reason  Start Time


 Stop Time Status Last Admin


Dose Admin


 


 Acetaminophen


  (Tylenol)  650 mg  PRN  PRN


 RECTAL


 TEMP>100.5  10/26/19 19:45


     


 


 


 Acetaminophen


  (Tylenol)  650 mg  Q4H  PRN


 NG


 Mild Pain/Temp > 100.5  10/26/19 19:45


 11/25/19 19:44   


 


 


 Albuterol/


 Ipratropium


  (Albuterol/


 Ipratropium)  3 ml  Q4H  PRN


 HHN


 SHORTNESS OF BREATH  10/26/19 19:45


 10/31/19 19:44   


 


 


 Aspirin


  (ASA)  81 mg  DAILY


 NG


   10/27/19 09:00


 11/14/19 08:59  10/29/19 09:05


 


 


 Atorvastatin


 Calcium


  (Lipitor)  10 mg  BEDTIME


 NG


   10/26/19 21:00


 11/13/19 20:59  10/28/19 21:45


 


 


 Clopidogrel


 Bisulfate


  (Plavix)  75 mg  DAILY


 NG


   10/27/19 09:00


 11/14/19 08:59  10/29/19 09:05


 


 


 Dextrose


  (Dextrose 50%)  25 ml  Q30M  PRN


 IV


 Hypoglycemia  10/26/19 19:30


 11/13/19 06:59   


 


 


 Dextrose


  (Dextrose 50%)  50 ml  Q30M  PRN


 IV


 Hypoglycemia  10/26/19 19:30


 11/13/19 06:59   


 


 


 Divalproex Sodium


  (Depakote


 Sprinkles)  500 mg  Q12HR


 NG


   10/26/19 21:00


 11/14/19 21:59  10/29/19 09:05


 


 


 Docusate Sodium


  (Colace)  100 mg  EVERY 8  HOURS


 NG


   10/27/19 14:00


 11/25/19 08:59  10/29/19 06:56


 


 


 Enoxaparin Sodium


  (Lovenox)  30 mg  DAILY


 SUBQ


   10/27/19 09:00


 11/13/19 08:59  10/29/19 09:04


 


 


 Famotidine


  (Pepcid)  20 mg  EVERY 12  HOURS


 GT


   10/27/19 21:00


 11/25/19 17:59  10/29/19 09:04


 


 


 Furosemide


  (Lasix)  40 mg  DAILY


 NG


   10/30/19 09:00


 11/26/19 08:59   


 


 


 Insulin Aspart


  (NovoLOG)    EVERY 6  HOURS


 SUBQ


   10/27/19 18:00


 11/13/19 11:29  10/29/19 12:49


 


 


 Lactulose


  (Cephulac)  20 gm  Q8H  PRN


 NG


 Constipation  10/26/19 19:45


 11/25/19 19:44   


 


 


 Lorazepam


  (Ativan 2mg/ml


 1ml)  1 mg  Q8H  PRN


 IV


 For Anxiety  10/26/19 19:45


 10/31/19 19:44  10/28/19 16:21


 


 


 Meropenem 1 gm/


 Sodium Chloride  55 ml @ 


 110 mls/hr  Q12H


 IVPB


   10/27/19 02:00


 10/30/19 23:59  10/29/19 15:23


 











Last 24 Hour Vital Signs








  Date Time  Temp Pulse Resp B/P (MAP) Pulse Ox O2 Delivery O2 Flow Rate FiO2


 


10/29/19 16:00      Nasal Cannula 2.0 


 


10/29/19 16:00 98.1 62 20 96/70 (79) 97   


 


10/29/19 12:00 98.1 80 21 101/72 (82) 98   


 


10/29/19 12:00      Nasal Cannula 2.0 


 


10/29/19 11:38  76      


 


10/29/19 08:00 98.2 86 21 95/41 (59) 97   


 


10/29/19 08:00      Nasal Cannula 2.0 


 


10/29/19 07:59  83      


 


10/29/19 07:56     98 Nasal Cannula 2.0 28


 


10/29/19 05:28  68      


 


10/29/19 05:23  87 18  99 Facial  30


 


10/29/19 04:00 97.4 84 20 117/66 (83) 99   


 


10/29/19 04:00      Nasal Cannula 2.0 


 


10/29/19 02:38  77 23  100 Facial  30


 


10/29/19 01:39  74 17  100 Facial  30


 


10/29/19 00:00 97.7 84 20 96/72 (80) 100   


 


10/29/19 00:00      Nasal Cannula 2.0 


 


10/28/19 23:58  80 26  99 Facial  30


 


10/28/19 23:26  79      


 


10/28/19 21:04  80 20  98 Facial  30


 


10/28/19 20:00        30


 


10/28/19 20:00      Nasal Cannula 2.0 


 


10/28/19 20:00 97.5 84 22 131/83 (99) 99   


 


10/28/19 19:34  75      


 


10/28/19 18:55     96 Bi-Pap  30


 


10/28/19 18:55  74 19  96 Facial  30


 


10/28/19 16:37  113 24  94 Facial  40


 


10/28/19 16:00 97.5 84 22 94/66 (75) 95   


 


10/28/19 16:00      Nasal Cannula 2.0 


 


10/28/19 15:55  91      


 


10/28/19 12:00 97.8 88 20 147/99 (115) 98   


 


10/28/19 12:00      Nasal Cannula 2.0 


 


10/28/19 11:39  88      


 


10/28/19 08:00      Nasal Cannula 2.0 


 


10/28/19 08:00 98.2 98 20 154/108 (123) 98   


 


10/28/19 07:59     96 Bi-Pap  30


 


10/28/19 07:58  76      


 


10/28/19 05:16  99 18  89 Facial  30


 


10/28/19 04:00        30


 


10/28/19 04:00      Nasal Cannula 2.0 


 


10/28/19 04:00 99.1 88 20 113/96 (102) 96   


 


10/28/19 03:27  86      


 


10/28/19 02:49  88 20  97 Facial  30


 


10/28/19 00:00      Nasal Cannula 2.0 


 


10/28/19 00:00 99.3 86 20 133/71 (91) 100   


 


10/28/19 00:00        30


 


10/27/19 23:48  78 19  100 Facial  30


 


10/27/19 23:26  91      


 


10/27/19 22:00       2.0 


 


10/27/19 20:00      Nasal Cannula 2.0 


 


10/27/19 20:00 99.3 89 20 140/72 (94) 100   


 


10/27/19 19:34  85      


 


10/27/19 18:39     98 Nasal Cannula 2.0 28

















Intake and Output  


 


 10/28/19 10/29/19





 19:00 07:00


 


Intake Total  520 ml


 


Output Total  300 ml


 


Balance  220 ml


 


  


 


Free Water  240 ml


 


IV Total  55 ml


 


Tube Feeding  225 ml


 


Output Urine Total  300 ml


 


# Bowel Movements  1











Labs








Test


  10/26/19


18:52 10/27/19


04:00 10/28/19


03:35


 


Arterial Blood pH


  7.452


(7.350-7.450) 


  


 


 


Arterial Blood Partial


Pressure CO2 40.1 mmHg


(35.0-45.0) 


  


 


 


Arterial Blood Partial


Pressure O2 65.8 mmHg


(75.0-100.0) 


  


 


 


Arterial Blood HCO3


  27.4 mmol/L


(22.0-26.0) 


  


 


 


Arterial Blood Oxygen


Saturation 91.8 %


() 


  


 


 


Arterial Blood Base Excess 3.2 (-2-2)   


 


Graham Test Positive   


 


White Blood Count


  


  17.0 K/UL


(4.8-10.8) 15.8 K/UL


(4.8-10.8)


 


Red Blood Count


  


  2.97 M/UL


(4.20-5.40) 2.96 M/UL


(4.20-5.40)


 


Hemoglobin


  


  8.2 G/DL


(12.0-16.0) 7.9 G/DL


(12.0-16.0)


 


Hematocrit


  


  25.0 %


(37.0-47.0) 24.2 %


(37.0-47.0)


 


Mean Corpuscular Volume  84 FL (80-99)  82 FL (80-99) 


 


Mean Corpuscular Hemoglobin


  


  27.5 PG


(27.0-31.0) 26.7 PG


(27.0-31.0)


 


Mean Corpuscular Hemoglobin


Concent 


  32.7 G/DL


(32.0-36.0) 32.6 G/DL


(32.0-36.0)


 


Red Cell Distribution Width


  


  13.2 %


(11.6-14.8) 13.2 %


(11.6-14.8)


 


Platelet Count


  


  371 K/UL


(150-450) 396 K/UL


(150-450)


 


Mean Platelet Volume


  


  6.9 FL


(6.5-10.1) 7.1 FL


(6.5-10.1)


 


Neutrophils (%) (Auto)


  


  73.4 %


(45.0-75.0)  % (45.0-75.0) 


 


 


Lymphocytes (%) (Auto)


  


  18.1 %


(20.0-45.0)  % (20.0-45.0) 


 


 


Monocytes (%) (Auto)


  


  4.7 %


(1.0-10.0)  % (1.0-10.0) 


 


 


Eosinophils (%) (Auto)


  


  3.0 %


(0.0-3.0)  % (0.0-3.0) 


 


 


Basophils (%) (Auto)


  


  0.9 %


(0.0-2.0)  % (0.0-2.0) 


 


 


Sodium Level


  


  142 MMOL/L


(136-145) 139 MMOL/L


(136-145)


 


Potassium Level


  


  4.2 MMOL/L


(3.5-5.1) 3.6 MMOL/L


(3.5-5.1)


 


Chloride Level


  


  104 MMOL/L


() 103 MMOL/L


()


 


Carbon Dioxide Level


  


  27 MMOL/L


(21-32) 29 MMOL/L


(21-32)


 


Anion Gap


  


  11 mmol/L


(5-15) 7 mmol/L


(5-15)


 


Blood Urea Nitrogen


  


  15 mg/dL


(7-18) 13 mg/dL


(7-18)


 


Creatinine


  


  1.1 MG/DL


(0.55-1.30) 1.0 MG/DL


(0.55-1.30)


 


Estimat Glomerular Filtration


Rate 


  49.7 mL/min


(>60) 55.5 mL/min


(>60)


 


Glucose Level


  


  91 MG/DL


() 71 MG/DL


()


 


Calcium Level


  


  8.9 MG/DL


(8.5-10.1) 8.3 MG/DL


(8.5-10.1)


 


Total Bilirubin


  


  0.5 MG/DL


(0.2-1.0) 0.5 MG/DL


(0.2-1.0)


 


Aspartate Amino Transf


(AST/SGOT) 


  16 U/L (15-37) 


  16 U/L (15-37) 


 


 


Alanine Aminotransferase


(ALT/SGPT) 


  13 U/L (12-78) 


  14 U/L (12-78) 


 


 


Alkaline Phosphatase


  


  87 U/L


() 79 U/L


()


 


Total Protein


  


  6.2 G/DL


(6.4-8.2) 5.8 G/DL


(6.4-8.2)


 


Albumin


  


  1.8 G/DL


(3.4-5.0) 1.7 G/DL


(3.4-5.0)


 


Globulin  4.4 g/dL  4.1 g/dL 


 


Albumin/Globulin Ratio  0.4 (1.0-2.7)  0.4 (1.0-2.7) 


 


Phosphorus Level


  


  


  2.5 MG/DL


(2.5-4.9)


 


Magnesium Level


  


  


  1.9 MG/DL


(1.8-2.4)


 


Iron Level


  


  


  28 ug/dL


()


 


Total Iron Binding Capacity


  


  


  149 ug/dL


(250-450)


 


Percent Iron Saturation   19 % (15-50) 


 


Unsaturated Iron Binding


  


  


  121 ug/dL


(112-346)


 


Ferritin


  


  


  442 NG/ML


(8-388)


 


Vitamin B12 Level


  


  


  936 PG/ML


(193-986)


 


Folate


  


  


  4.9 NG/ML


(8.6-58.9)








Height (Feet):  5


Height (Inches):  5.00


Weight (Pounds):  194


Objective





Physical Exam:


Vitals: reviewed


General Appearance:  NAD


HEENT:  normocephalic, atraumatic


Neck:  non-tender, normal alignment


Respiratory/Chest:  normal breath sounds bilaterally


Cardiovascular/Chest: rrr


Abdomen:  normal bowel sounds, soft, nontender


Extremities:  normal range of motion











Mike Pop MD Oct 29, 2019 17:17

## 2019-10-29 NOTE — NUR
RD ASSESSMENT & RECOMMENDATIONS

SEE CARE ACTIVITY FOR COMPLETE ASSESSMENT



DAILY ESTIMATED NEEDS:

Needs based on Wound, DM, pulmonary, sepsis/ 68kg 

25-30  kcals/kg 

4000-0892  total kcals

1.25-2  g protein/kg

  g total protein 

25-30  mL/kg

8175-8676  total fluid mLs



NUTRITION DIAGNOSIS:

* Swallowing difficulty R/T dysaphgia, poor dentition, dementia as

evidenced by on liquify pureed, NTL texture diet per SLP rec-> now on NNGT

feeds.

* Increased kcal/prot needs R/T wound healing and sepsis as evidenced by

admitted w/ DTPI R heel and non-blanchable sacral erythema, critically

elev wbc (24.0*), elev LA (5.8 -> wnl), elev CRP, now afebrile.

* Altered nutrition related lab values R/T CHF, diabetes as evidenced by

elev BNP (>17276), A1C of 10.4, elev BGs and POC glu (130-264)





(CURRENT TF: Glucerna 1.5 @30)





ENTERAL NUTRITION RECOMMENDATIONS:

BOLUS REC: Glucerna 1.5 @190ml/hr for 2 hrs TID   

                 to provide 1140ml, 1710 kcal, 94g pro, 865ml free H2O 



- W/ extensive Bipap use, rec bolus feeds to meet est nutritional needs

- Run Glucerna 1.5 @190ml/hr for 2 hrs at a time TID / BOLUS.

- TF at goal to meet 100% est needs

- HOB over 45 degrees/ flush per MD

***

For continuous feeds: rec Glucerna 1.5 @50ml/hr x24 hrs to provide 1200ml,

1800 kcal, 99g pro, 911ml free H2O.







ADDITIONAL RECOMMENDATIONS:

* Calibrated bedscale wt w/ added P200 mattress  

* Wound healing: add MVI x1, Vit C 250mg QD 

                        Zaki 1pkt BID if tolerated 

* Add folic acid 1mg QD (low folate 7.2) 

* Monitor for hypoglycemia 

* Monitor PO intake closely -> remains poor-> NOW ON NGT feeds  

* Monitor renal fxn: BUN/ creat wnl

## 2019-10-29 NOTE — GENERAL PROGRESS NOTE
Assessment/Plan


Problem List:  


(1) Abnormal TSH


ICD Codes:  R79.89 - Other specified abnormal findings of blood chemistry


SNOMED:  854769002


(2) Hyperglycemia due to type 2 diabetes mellitus


ICD Codes:  E11.65 - Type 2 diabetes mellitus with hyperglycemia


SNOMED:  330187685152669, 78750066


Qualifiers:  


   Qualified Codes:  E11.65 - Type 2 diabetes mellitus with hyperglycemia; 

Z79.4 - Long term (current) use of insulin


(3) Acute metabolic encephalopathy


ICD Codes:  G93.41 - Metabolic encephalopathy


SNOMED:  68823917, 870436144


(4) ARF (acute renal failure)


ICD Codes:  N17.9 - Acute kidney failure, unspecified


SNOMED:  43137083


Qualifiers:  


   Qualified Codes:  N17.9 - Acute kidney failure, unspecified


(5) Healthcare associated bacterial pneumonia


ICD Codes:  J15.9 - Unspecified bacterial pneumonia


SNOMED:  145224990


Status:  not improved


Assessment/Plan:


no need for basal insulin for now 


continue NISS





Subjective


ROS Limited/Unobtainable:  Yes


Allergies:  


Coded Allergies:  


     No Known Allergies (Unverified , 10/14/19)


Subjective


events noted 


on BiPAP 


glucose values are stable 











Item Value  Date Time


 


Bedside Blood Glucose 191 mg/dl H 10/29/19 0546


 


Bedside Blood Glucose 158 mg/dl H 10/28/19 2359


 


Bedside Blood Glucose 167 mg/dl H 10/28/19 1800


 


Bedside Blood Glucose 104 mg/dl 10/28/19 1200


 


Bedside Blood Glucose 100 mg/dl 10/28/19 0600


 


Bedside Blood Glucose 81 mg/dl 10/28/19 0000











Objective





Last 24 Hour Vital Signs








  Date Time  Temp Pulse Resp B/P (MAP) Pulse Ox O2 Delivery O2 Flow Rate FiO2


 


10/29/19 05:28  68      


 


10/29/19 05:23  87 18  99 Facial  30


 


10/29/19 04:00 97.4 84 20 117/66 (83) 99   


 


10/29/19 04:00      Nasal Cannula 2.0 


 


10/29/19 02:38  77 23  100 Facial  30


 


10/29/19 01:39  74 17  100 Facial  30


 


10/29/19 00:00 97.7 84 20 96/72 (80) 100   


 


10/29/19 00:00      Nasal Cannula 2.0 


 


10/28/19 23:58  80 26  99 Facial  30


 


10/28/19 23:26  79      


 


10/28/19 21:04  80 20  98 Facial  30


 


10/28/19 20:00        30


 


10/28/19 20:00      Nasal Cannula 2.0 


 


10/28/19 20:00 97.5 84 22 131/83 (99) 99   


 


10/28/19 19:34  75      


 


10/28/19 18:55     96 Bi-Pap  30


 


10/28/19 18:55  74 19  96 Facial  30


 


10/28/19 16:37  113 24  94 Facial  40


 


10/28/19 16:00 97.5 84 22 94/66 (75) 95   


 


10/28/19 16:00      Nasal Cannula 2.0 


 


10/28/19 15:55  91      


 


10/28/19 12:00 97.8 88 20 147/99 (115) 98   


 


10/28/19 12:00      Nasal Cannula 2.0 


 


10/28/19 11:39  88      


 


10/28/19 08:00      Nasal Cannula 2.0 


 


10/28/19 08:00 98.2 98 20 154/108 (123) 98   


 


10/28/19 07:59     96 Bi-Pap  30


 


10/28/19 07:58  76      

















Intake and Output  


 


 10/28/19 10/29/19





 18:59 06:59


 


Intake Total  55 ml


 


Balance  55 ml


 


  


 


IV Total  55 ml








Height (Feet):  5


Height (Inches):  5.00


Weight (Pounds):  198


General Appearance:  lethargic


Neck:  normal alignment


Respiratory/Chest:  decreased breath sounds


Abdomen:  normal bowel sounds


Edema:  1+ Arm (L), 1+ Arm (R), 1+ Leg (L), 1+ Leg (R), 1+ Pedal (L), 1+ Pedal (

R), 1+ Generalized


Objective





Current Medications








 Medications


  (Trade)  Dose


 Ordered  Sig/Winnie


 Route


 PRN Reason  Start Time


 Stop Time Status Last Admin


Dose Admin


 


 Acetaminophen


  (Tylenol)  650 mg  PRN  PRN


 RECTAL


 TEMP>100.5  10/26/19 19:45


     


 


 


 Acetaminophen


  (Tylenol)  650 mg  Q4H  PRN


 NG


 Mild Pain/Temp > 100.5  10/26/19 19:45


 11/25/19 19:44   


 


 


 Albuterol/


 Ipratropium


  (Albuterol/


 Ipratropium)  3 ml  Q4H  PRN


 HHN


 SHORTNESS OF BREATH  10/26/19 19:45


 10/31/19 19:44   


 


 


 Aspirin


  (ASA)  81 mg  DAILY


 NG


   10/27/19 09:00


 11/14/19 08:59  10/28/19 08:45


 


 


 Atorvastatin


 Calcium


  (Lipitor)  10 mg  BEDTIME


 NG


   10/26/19 21:00


 11/13/19 20:59  10/28/19 21:45


 


 


 Clopidogrel


 Bisulfate


  (Plavix)  75 mg  DAILY


 NG


   10/27/19 09:00


 11/14/19 08:59  10/28/19 08:45


 


 


 Dextrose


  (Dextrose 50%)  25 ml  Q30M  PRN


 IV


 Hypoglycemia  10/26/19 19:30


 11/13/19 06:59   


 


 


 Dextrose


  (Dextrose 50%)  50 ml  Q30M  PRN


 IV


 Hypoglycemia  10/26/19 19:30


 11/13/19 06:59   


 


 


 Divalproex Sodium


  (Depakote


 Sprinkles)  500 mg  Q12HR


 NG


   10/26/19 21:00


 11/14/19 21:59  10/28/19 21:46


 


 


 Docusate Sodium


  (Colace)  100 mg  EVERY 8  HOURS


 NG


   10/27/19 14:00


 11/25/19 08:59  10/28/19 21:46


 


 


 Enoxaparin Sodium


  (Lovenox)  30 mg  DAILY


 SUBQ


   10/27/19 09:00


 11/13/19 08:59  10/28/19 08:47


 


 


 Famotidine


  (Pepcid)  20 mg  EVERY 12  HOURS


 GT


   10/27/19 21:00


 11/25/19 17:59  10/28/19 21:45


 


 


 Furosemide


  (Lasix)  40 mg  DAILY


 NG


   10/27/19 09:00


 11/26/19 08:59  10/28/19 08:46


 


 


 Insulin Aspart


  (NovoLOG)    EVERY 6  HOURS


 SUBQ


   10/27/19 18:00


 11/13/19 11:29  10/28/19 23:59


 


 


 Lactulose


  (Cephulac)  20 gm  Q8H  PRN


 NG


 Constipation  10/26/19 19:45


 11/25/19 19:44   


 


 


 Lorazepam


  (Ativan 2mg/ml


 1ml)  1 mg  Q8H  PRN


 IV


 For Anxiety  10/26/19 19:45


 10/31/19 19:44  10/28/19 16:21


 


 


 Meropenem 1 gm/


 Sodium Chloride  55 ml @ 


 110 mls/hr  Q12H


 IVPB


   10/27/19 02:00


 10/30/19 23:59  10/29/19 01:54


 

















Riccardo Velez MD Oct 29, 2019 06:23

## 2019-10-29 NOTE — NEPHROLOGY PROGRESS NOTE
Assessment/Plan


Problem List:  


(1) Renal failure (ARF), acute on chronic


Assessment:  resolved





(2) Dehydration


(3) ARF (acute renal failure)


(4) Sepsis


(5) Acute metabolic encephalopathy


(6) Hyperglycemia due to type 2 diabetes mellitus


(7) UTI (urinary tract infection)


(8) Diabetic nephropathy


Assessment





Renal failure, Acute on Chronic resolved


Dehydration


Severe Anemia


Sepsis / Pneumonia / UTI


DM, OOC


Proteinuria , Diabetic Nephropathy


Acute encephalopathy


Plan





in [place]


Per pulmonary


change meds to NGT as possible


Cr lowering 


Will order urine studies and monitor renal parameters


kidney YOANDY noted


lower iv fluids rate


WBCs rising


has casas again due to retention


Avoid Nephrotoxics








previously


Anemia salas


2D echo


24 H urine protein


Keep BP and BS in check


I am holding  her psych meds due to low MS


Per orders





Subjective


ROS Limited/Unobtainable:  No


Constitutional:  Reports: malaise, weakness





Objective


Objective





Last 24 Hour Vital Signs








  Date Time  Temp Pulse Resp B/P (MAP) Pulse Ox O2 Delivery O2 Flow Rate FiO2


 


10/29/19 12:00 98.1 80 21 101/72 (82) 98   


 


10/29/19 08:00 98.2 86 21 95/41 (59) 97   


 


10/29/19 08:00      Nasal Cannula 2.0 


 


10/29/19 07:59  83      


 


10/29/19 07:56     98 Nasal Cannula 2.0 28


 


10/29/19 05:28  68      


 


10/29/19 05:23  87 18  99 Facial  30


 


10/29/19 04:00 97.4 84 20 117/66 (83) 99   


 


10/29/19 04:00      Nasal Cannula 2.0 


 


10/29/19 02:38  77 23  100 Facial  30


 


10/29/19 01:39  74 17  100 Facial  30


 


10/29/19 00:00 97.7 84 20 96/72 (80) 100   


 


10/29/19 00:00      Nasal Cannula 2.0 


 


10/28/19 23:58  80 26  99 Facial  30


 


10/28/19 23:26  79      


 


10/28/19 21:04  80 20  98 Facial  30


 


10/28/19 20:00        30


 


10/28/19 20:00      Nasal Cannula 2.0 


 


10/28/19 20:00 97.5 84 22 131/83 (99) 99   


 


10/28/19 19:34  75      


 


10/28/19 18:55     96 Bi-Pap  30


 


10/28/19 18:55  74 19  96 Facial  30


 


10/28/19 16:37  113 24  94 Facial  40


 


10/28/19 16:00 97.5 84 22 94/66 (75) 95   


 


10/28/19 16:00      Nasal Cannula 2.0 


 


10/28/19 15:55  91      

















Intake and Output  


 


 10/28/19 10/29/19





 19:00 07:00


 


Intake Total  495 ml


 


Output Total  300 ml


 


Balance  195 ml


 


  


 


Free Water  240 ml


 


IV Total  55 ml


 


Tube Feeding  200 ml


 


Output Urine Total  300 ml


 


# Bowel Movements  1








Height (Feet):  5


Height (Inches):  5.00


Weight (Pounds):  194


General Appearance:  no apparent distress


EENT:  other - NGT


Cardiovascular:  normal rate


Respiratory/Chest:  decreased breath sounds


Abdomen:  distended


Objective


no change











Lukasz Vizcaino MD Oct 29, 2019 12:44

## 2019-10-29 NOTE — NUR
RESPIRATORY NOTE:

pt placed on BIPAP for SOB & nightly use. Pt now on BiPAP 15/5, backup rate 14, 50%. Pt on 
a Facial mask, skin intact, no redness/breakdowns noted. Protect-a-gel applied on 
nosebridge/cheeks to prevent mask irritations. Pt disoriented, labored breathing, increased 
HR, diaphoretic before placing her on the machine. B/S vickie. rales, nonproductie cough. BiPAP 
plugged into red outlet, alarms on & audible. Pt tolerating BiPAP well. Will continue plan 
of care.

## 2019-10-30 VITALS — DIASTOLIC BLOOD PRESSURE: 86 MMHG | SYSTOLIC BLOOD PRESSURE: 153 MMHG

## 2019-10-30 VITALS — DIASTOLIC BLOOD PRESSURE: 88 MMHG | SYSTOLIC BLOOD PRESSURE: 165 MMHG

## 2019-10-30 VITALS — SYSTOLIC BLOOD PRESSURE: 153 MMHG | DIASTOLIC BLOOD PRESSURE: 65 MMHG

## 2019-10-30 VITALS — SYSTOLIC BLOOD PRESSURE: 171 MMHG | DIASTOLIC BLOOD PRESSURE: 86 MMHG

## 2019-10-30 VITALS — DIASTOLIC BLOOD PRESSURE: 58 MMHG | SYSTOLIC BLOOD PRESSURE: 94 MMHG

## 2019-10-30 VITALS — DIASTOLIC BLOOD PRESSURE: 72 MMHG | SYSTOLIC BLOOD PRESSURE: 108 MMHG

## 2019-10-30 LAB
ADD MANUAL DIFF: NO
ANION GAP SERPL CALC-SCNC: 10 MMOL/L (ref 5–15)
BASOPHILS NFR BLD AUTO: 1.2 % (ref 0–2)
BUN SERPL-MCNC: 14 MG/DL (ref 7–18)
CALCIUM SERPL-MCNC: 8.9 MG/DL (ref 8.5–10.1)
CHLORIDE SERPL-SCNC: 104 MMOL/L (ref 98–107)
CO2 SERPL-SCNC: 30 MMOL/L (ref 21–32)
CREAT SERPL-MCNC: 0.9 MG/DL (ref 0.55–1.3)
EOSINOPHIL NFR BLD AUTO: 3.5 % (ref 0–3)
ERYTHROCYTE [DISTWIDTH] IN BLOOD BY AUTOMATED COUNT: 12.1 % (ref 11.6–14.8)
HCT VFR BLD CALC: 27.7 % (ref 37–47)
HGB BLD-MCNC: 9.5 G/DL (ref 12–16)
LYMPHOCYTES NFR BLD AUTO: 16.6 % (ref 20–45)
MCV RBC AUTO: 82 FL (ref 80–99)
MONOCYTES NFR BLD AUTO: 4.3 % (ref 1–10)
NEUTROPHILS NFR BLD AUTO: 74.4 % (ref 45–75)
PLATELET # BLD: 357 K/UL (ref 150–450)
POTASSIUM SERPL-SCNC: 3.6 MMOL/L (ref 3.5–5.1)
RBC # BLD AUTO: 3.39 M/UL (ref 4.2–5.4)
SODIUM SERPL-SCNC: 144 MMOL/L (ref 136–145)
WBC # BLD AUTO: 13.9 K/UL (ref 4.8–10.8)

## 2019-10-30 RX ADMIN — DIVALPROEX SODIUM SCH MG: 125 CAPSULE ORAL at 20:30

## 2019-10-30 RX ADMIN — DOCUSATE SODIUM SCH MG: 50 LIQUID ORAL at 05:58

## 2019-10-30 RX ADMIN — LORAZEPAM PRN MG: 2 INJECTION, SOLUTION INTRAMUSCULAR; INTRAVENOUS at 14:14

## 2019-10-30 RX ADMIN — MEROPENEM SCH MLS/HR: 1 INJECTION INTRAVENOUS at 02:29

## 2019-10-30 RX ADMIN — INSULIN ASPART SCH UNITS: 100 INJECTION, SOLUTION INTRAVENOUS; SUBCUTANEOUS at 12:00

## 2019-10-30 RX ADMIN — FUROSEMIDE SCH MG: 40 TABLET ORAL at 10:06

## 2019-10-30 RX ADMIN — ENOXAPARIN SODIUM SCH MG: 30 INJECTION SUBCUTANEOUS at 08:19

## 2019-10-30 RX ADMIN — DIVALPROEX SODIUM SCH MG: 125 CAPSULE ORAL at 10:04

## 2019-10-30 RX ADMIN — DOCUSATE SODIUM SCH MG: 50 LIQUID ORAL at 14:13

## 2019-10-30 RX ADMIN — INSULIN ASPART SCH UNITS: 100 INJECTION, SOLUTION INTRAVENOUS; SUBCUTANEOUS at 06:00

## 2019-10-30 RX ADMIN — INSULIN ASPART SCH UNITS: 100 INJECTION, SOLUTION INTRAVENOUS; SUBCUTANEOUS at 18:18

## 2019-10-30 RX ADMIN — MEROPENEM SCH MLS/HR: 1 INJECTION INTRAVENOUS at 16:26

## 2019-10-30 RX ADMIN — ASPIRIN 81 MG SCH MG: 81 TABLET ORAL at 08:26

## 2019-10-30 RX ADMIN — DOCUSATE SODIUM SCH MG: 50 LIQUID ORAL at 21:46

## 2019-10-30 NOTE — NUR
NURSE NOTES:

On assessment patient's blood pressure was 153/97. Prescribed Hydralazine was given. Will 
continue to monitor.

## 2019-10-30 NOTE — NUR
CASE MANAGEMENT: REVIEW



10/29/19



SI:      HC PNA; UTI ; SEPSIS/ ACUTE RENAL FAILURE/  ACUTE METABOLIC ENCEPHALOPATHY 

97.7  128  33  108/72  96% NC 2L FI02 28-40

**10/28 WBC 15.8; RBC 2.96; H/H 7.9/24.2; FE 28; TIBC 149, FOLATE 4.9; FERR 442; BG 71





IS:      IV MEROPENEM Q12HR

LASIX NGT QD

LOVENOX SQ QD

LIPITOR NG HS

NOVOLOG SQ Q6HR

DEPAKOTE NGT Q12HR

PEPCID GT Q12HR

COLACE NGT Q8HR

ASA NG QD

PLAVIX NGT QD

NGT FEEDING 





**: 2W STEP DOWN UNIT



DCP: RETURN TO [facility] 



PLAN: HOLD ASA AND PLAVIX FOR PEG FRIDAY

-------------------------------------------------------------------------------

Addendum: 10/30/19 at 1240 by ROCIO BLACK LVN

-------------------------------------------------------------------------------

CASE MANAGEMENT: REVIEW



10/30/19



SI:      HC PNA; UTI ; SEPSIS/ ACUTE RENAL FAILURE/  ACUTE METABOLIC ENCEPHALOPATHY 

97.7  128  33  108/72  96% NC 2L FI02 28-40

**10/28 WBC 15.8; RBC 2.96; H/H 7.9/24.2; FE 28; TIBC 149, FOLATE 4.9; FERR 442; BG 71





IS:      IV MEROPENEM Q12HR

LASIX NGT QD

LOVENOX SQ QD

LIPITOR NG HS

NOVOLOG SQ Q6HR

DEPAKOTE NGT Q12HR

PEPCID GT Q12HR

COLACE NGT Q8HR

ASA NG QD

PLAVIX NGT QD

NGT FEEDING 





**: 2W STEP DOWN UNIT



DCP: RETURN TO [facility] 



PLAN: HOLD ASA AND PLAVIX FOR PEG FRIDAY

## 2019-10-30 NOTE — NUR
NURSE NOTES:

Received patient from Neg[facility] JOHNSON. Patient is awake, alert and oriented x1. Receiving 
oxygen via nasal cannula at 3L/min, patient tolerating well, no signs of respiratory 
distress. NGT is inserted in the right nares receiving Glucerna 1.5 at 25cc/hr. Freedom 
splints are on bilateral arms,bilateral radial pulse bounding. Patient has a Charles Catheter 
that is patent and draining.  IV site is right forearm 22g, patent and asymptomatic. Bed is 
locked, placed in lowest position, side rails up x3, bed alarm on, call light within reach. 
Will continue to monitor.

## 2019-10-30 NOTE — NUR
NURSE NOTES:

LATE ENTRY:

MD DE LA ROSA HERE TO SEE PT. WAS INFORMED THAT EGD WILL BE DONE ON FRIDAY, T.F RESUMED. NO 
NEW ORDERS AT THIS TIME.

## 2019-10-30 NOTE — GENERAL PROGRESS NOTE
Assessment/Plan


Problem List:  


(1) Abnormal TSH


ICD Codes:  R79.89 - Other specified abnormal findings of blood chemistry


SNOMED:  979951912


(2) Hyperglycemia due to type 2 diabetes mellitus


ICD Codes:  E11.65 - Type 2 diabetes mellitus with hyperglycemia


SNOMED:  452668261703970, [id number]


Qualifiers:  


   Qualified Codes:  E11.65 - Type 2 diabetes mellitus with hyperglycemia; 

Z79.4 - Long term (current) use of insulin


(3) Acute metabolic encephalopathy


ICD Codes:  G93.41 - Metabolic encephalopathy


SNOMED:  06207140, 442903084


(4) ARF (acute renal failure)


ICD Codes:  N17.9 - Acute kidney failure, unspecified


SNOMED:  11025308


Qualifiers:  


   Qualified Codes:  N17.9 - Acute kidney failure, unspecified


(5) Healthcare associated bacterial pneumonia


ICD Codes:  J15.9 - Unspecified bacterial pneumonia


SNOMED:  344806157


Status:  not improved


Assessment/Plan:


no need for basal insulin - glucose values are stable 


continue NISS every 6 hours - reduce dosage





Subjective


ROS Limited/Unobtainable:  Yes


Allergies:  


Coded Allergies:  


     No Known Allergies (Unverified , 10/14/19)


Subjective


events noted 











Item Value  Date Time


 


Bedside Blood Glucose 71 mg/dl 10/30/19 0600


 


Bedside Blood Glucose 116 mg/dl 10/29/19 1832


 


Bedside Blood Glucose 130 mg/dl H 10/29/19 1249


 


Bedside Blood Glucose 114 mg/dl 10/29/19 0655


 


Bedside Blood Glucose 158 mg/dl H 10/28/19 2359











Objective





Last 24 Hour Vital Signs








  Date Time  Temp Pulse Resp B/P (MAP) Pulse Ox O2 Delivery O2 Flow Rate FiO2


 


10/30/19 05:44  78 20  98 Facial  40


 


10/30/19 04:00      Nasal Cannula 2.0 


 


10/30/19 04:00 97.7 80 21 108/72 (84) 98   


 


10/30/19 04:00  86      


 


10/30/19 03:16  85 26  96 Facial  40


 


10/30/19 01:24  79 24  95 Facial  40


 


10/30/19 00:53  80 25  96 Facial  40


 


10/30/19 00:00 98.1 82 20 94/58 (70) 100   


 


10/30/19 00:00  85      


 


10/30/19 00:00      Nasal Cannula 2.0 


 


10/29/19 22:50  106 25  97 Facial  50


 


10/29/19 22:00        30


 


10/29/19 20:36  130 31  94 Facial  50


 


10/29/19 20:00  101      


 


10/29/19 20:00 98.4 75 22 119/87 (98) 98   


 


10/29/19 20:00      Nasal Cannula 2.0 


 


10/29/19 19:33     95 Nasal Cannula 2.0 28


 


10/29/19 16:00      Nasal Cannula 2.0 


 


10/29/19 16:00 98.1 62 20 96/70 (79) 97   


 


10/29/19 15:33  95      


 


10/29/19 12:00 98.1 80 21 101/72 (82) 98   


 


10/29/19 12:00      Nasal Cannula 2.0 


 


10/29/19 11:38  76      


 


10/29/19 08:00 98.2 86 21 95/41 (59) 97   


 


10/29/19 08:00      Nasal Cannula 2.0 


 


10/29/19 07:59  83      


 


10/29/19 07:56     98 Nasal Cannula 2.0 28

















Intake and Output  


 


 10/29/19 10/30/19





 18:59 06:59


 


Intake Total 300 ml 100 ml


 


Output Total 450 ml 450 ml


 


Balance -150 ml -350 ml


 


  


 


Free Water 100 ml 100 ml


 


Tube Feeding 200 ml 


 


Output Urine Total 450 ml 450 ml


 


# Bowel Movements  2








Height (Feet):  5


Height (Inches):  5.00


Weight (Pounds):  188


General Appearance:  lethargic


Cardiovascular:  normal rate


Respiratory/Chest:  decreased breath sounds


Abdomen:  normal bowel sounds


Pelvis:  normal external exam


Edema:  1+ Arm (L), 1+ Arm (R), 1+ Leg (L), 1+ Leg (R), 1+ Pedal (L), 1+ Pedal (

R), 1+ Generalized


Objective





Current Medications








 Medications


  (Trade)  Dose


 Ordered  Sig/Winnie


 Route


 PRN Reason  Start Time


 Stop Time Status Last Admin


Dose Admin


 


 Acetaminophen


  (Tylenol)  650 mg  PRN  PRN


 RECTAL


 TEMP>100.5  10/26/19 19:45


     


 


 


 Acetaminophen


  (Tylenol)  650 mg  Q4H  PRN


 NG


 Mild Pain/Temp > 100.5  10/26/19 19:45


 11/25/19 19:44   


 


 


 Albuterol/


 Ipratropium


  (Albuterol/


 Ipratropium)  3 ml  Q4H  PRN


 HHN


 SHORTNESS OF BREATH  10/26/19 19:45


 10/31/19 19:44   


 


 


 Aspirin


  (ASA)  81 mg  DAILY


 NG


   10/27/19 09:00


 11/14/19 08:59  10/29/19 09:05


 


 


 Atorvastatin


 Calcium


  (Lipitor)  10 mg  BEDTIME


 NG


   10/26/19 21:00


 11/13/19 20:59  10/29/19 20:17


 


 


 Clopidogrel


 Bisulfate


  (Plavix)  75 mg  DAILY


 NG


   10/27/19 09:00


 11/14/19 08:59  10/29/19 09:05


 


 


 Dextrose


  (Dextrose 50%)  25 ml  Q30M  PRN


 IV


 Hypoglycemia  10/26/19 19:30


 11/13/19 06:59   


 


 


 Dextrose


  (Dextrose 50%)  50 ml  Q30M  PRN


 IV


 Hypoglycemia  10/26/19 19:30


 11/13/19 06:59   


 


 


 Divalproex Sodium


  (Depakote


 Sprinkles)  500 mg  Q12HR


 NG


   10/26/19 21:00


 11/14/19 21:59  10/29/19 20:17


 


 


 Docusate Sodium


  (Colace)  100 mg  EVERY 8  HOURS


 NG


   10/27/19 14:00


 11/25/19 08:59  10/30/19 05:58


 


 


 Enoxaparin Sodium


  (Lovenox)  30 mg  DAILY


 SUBQ


   10/27/19 09:00


 11/13/19 08:59  10/29/19 09:04


 


 


 Famotidine


  (Pepcid)  20 mg  EVERY 12  HOURS


 GT


   10/27/19 21:00


 11/25/19 17:59  10/29/19 20:17


 


 


 Furosemide


  (Lasix)  40 mg  DAILY


 NG


   10/30/19 09:00


 11/26/19 08:59   


 


 


 Insulin Aspart


  (NovoLOG)    EVERY 6  HOURS


 SUBQ


   10/27/19 18:00


 11/13/19 11:29  10/29/19 23:57


 


 


 Lactulose


  (Cephulac)  20 gm  Q8H  PRN


 NG


 Constipation  10/26/19 19:45


 11/25/19 19:44   


 


 


 Lorazepam


  (Ativan 2mg/ml


 1ml)  1 mg  Q8H  PRN


 IV


 For Anxiety  10/26/19 19:45


 10/31/19 19:44  10/28/19 16:21


 


 


 Meropenem 1 gm/


 Sodium Chloride  55 ml @ 


 110 mls/hr  Q12H


 IVPB


   10/27/19 02:00


 10/30/19 23:59  10/30/19 02:29


 

















Riccardo Velez MD Oct 30, 2019 06:34

## 2019-10-30 NOTE — HEMATOLOGY/ONC PROGRESS NOTE
Assessment/Plan


Assessment/Plan





Assessment and Recs:


# Anemia of chronic disease due to underlying chronic medical issues, 

multifactorial 


--> Anemia workup has been ordered, rule out gi bleed --> ferritin is 1400+


--> No evidence of hemolysis is noted, peripheral smear has been reviewed.


--> Hgb goal >7. Transfuse prn.


--> Epogen or iron at this time is not particularly indicated


--> Medications have been reviewed


--> low threshold for gi evaluation in case has occult +


--> bone marrow biopsy is not indicated given the other more likely causes (if 

recurrent anemia) first time here


--> hgb trend 8.4->8.5-->8.2->7.7-->7.9


--> FOLIC ACID 1mg po daily started


# Pelvic mass on ct and us -- 13 x 7 x 12.9 cm unilocular cystic mass, 

corresponding to lesion reported on recent CT scan. Layering low level internal 

echoes likely represent bladder debris.. This presumably represents a cystic 

ovarian lesion with debris or hemorrhage, possibly neoplastic.


--> CA-125 is 216! elevated


--> consider gyn eval


# Hypercalcemia - btw 10-11 range when corrected to alb


--> ivf have been started


--> if remains elevated, 7.9-->8.3


--> will get pth


# Leukocytosis/elevated white blood cell count, unspecified likely related to 

underlying reaction v more likely infection


--> have reviewed peripheral smear and bandemia/neutrophilia noted


--> continue antibiotics if they have been started by ID team (VANC/ZOSYN)--> 

vanc/linda--> linda


--> wbc 39-->28k-->29k->31k-->23-->26k-->19k-->24k-->15.4-->16-->14


--> monitor for resolution


--> CT C/A/P Results reviewed


--> FOR INdium bone scan done -> Rim of activity within the pelvis, 

corresponding to expected location of the soft tissue component of the cystic 

pelvic mass described on recent imaging studies.


--> again consider gyn eval


# Sepsis from pneumonia.  


--> pulm consulted, abx started


--> on bipap


# CHANCE (acute kidney injury) on ckd


--> cr 1.6-->2.7-->2.2->1.2


--> per renal


# UTI (urinary tract infection)


--> on abx per id


# Dehydration


--> on ivf


# Acute metabolic encephalopathy


--> likely due to pna


# Dvt ppx lovenox sq





The timing of this note does not necessarily reflect the time of the patient 

was seen.





Greatly appreciate consultation.





Subjective


Constitutional:  Denies: no symptoms, chills, fever, malaise, weakness, other


HEENT:  Denies: no symptoms, eye pain, blurred vision, tearing, double vision, 

ear pain, ear discharge, nose pain, nose congestion, throat pain, throat 

swelling, mouth pain, mouth swelling, other


Respiratory:  Denies: no symptoms, cough, shortness of breath, SOB with 

excertion, SOB at rest, sputum, wheezing, other


Gastrointestinal/Abdominal:  Denies: no symptoms, abdomen distended, abdominal 

pain, black stools, tarry stools, blood in stool, constipated, diarrhea, 

difficulty swallowing, nausea, poor appetite, poor fluid intake, rectal bleeding

, vomiting, other


Genitourinary:  Denies: no symptoms, burning, discharge, frequency, flank pain, 

hematuria, incontinence, pain, urgency, other


Neurologic/Psychiatric:  Denies: no symptoms, anxiety, depressed, emotional 

problems, headache, numbness, paresthesia, pre-existing deficit, seizure, 

tingling, tremors, weakness, other


Endocrine:  Denies: no symptoms, excessive sweating, flushing, intolerance to 

cold, intolerance to heat, increased hunger, increased thirst, increased urine, 

unexplained weight gain, unexplained weight loss, other


Allergies:  


Coded Allergies:  


     No Known Allergies (Unverified , 10/14/19)


Subjective


10/14: hgb has improved, no bleeding, labs reivewed


10/15: no events to report


10/16: a+ o x1, no bleeding or chills noted, no major changes, occult pending


10/17: no events noted, no bleeding, no chills, no hematemesis/hematochezia


10/18: remains confused, with abx, on nc


10/19: cr is worse, hgb 8.4, no bleeding, night sweats, chills


10/20: no f/c, no night sweats, labs noted, cr worse


10/21: no bleeding or chills, no night sweats, labs pending this am


10/22: pelvic mass noted on imaging, no bleeding reported, ordered ca125


10/23: no events, remains lethargic, is on abx, seen by surg


10/24: with raid response overnight, rr high as well as bp, vidal to icu


10/25: no events, no bleeding, to undergo a indium scan with id


10/26: comfortable, electrolytes reviewed, given mag and k


10/29: back on bipap with gt feeds, dw rn this am


10/30: remains very confused, no f/c, no bleeding, with urinary retention, 

folic acid started





Objective


Objective





Current Medications








 Medications


  (Trade)  Dose


 Ordered  Sig/Winnie


 Route


 PRN Reason  Start Time


 Stop Time Status Last Admin


Dose Admin


 


 Acetaminophen


  (Tylenol)  650 mg  PRN  PRN


 RECTAL


 TEMP>100.5  10/26/19 19:45


     


 


 


 Acetaminophen


  (Tylenol)  650 mg  Q4H  PRN


 NG


 Mild Pain/Temp > 100.5  10/26/19 19:45


 11/25/19 19:44  10/30/19 14:13


 


 


 Albuterol/


 Ipratropium


  (Albuterol/


 Ipratropium)  3 ml  Q4H  PRN


 HHN


 SHORTNESS OF BREATH  10/26/19 19:45


 10/31/19 19:44   


 


 


 Aspirin


  (ASA)  81 mg  DAILY


 NG


   10/27/19 09:00


 11/14/19 08:59  10/29/19 09:05


 


 


 Atorvastatin


 Calcium


  (Lipitor)  10 mg  BEDTIME


 NG


   10/26/19 21:00


 11/13/19 20:59  10/29/19 20:17


 


 


 Clopidogrel


 Bisulfate


  (Plavix)  75 mg  DAILY


 NG


   10/27/19 09:00


 11/14/19 08:59  10/29/19 09:05


 


 


 Dextrose


  (Dextrose 50%)  25 ml  Q30M  PRN


 IV


 Hypoglycemia  10/26/19 19:30


 11/13/19 06:59   


 


 


 Dextrose


  (Dextrose 50%)  50 ml  Q30M  PRN


 IV


 Hypoglycemia  10/26/19 19:30


 11/13/19 06:59   


 


 


 Divalproex Sodium


  (Depakote


 Sprinkles)  500 mg  Q12HR


 NG


   10/26/19 21:00


 11/14/19 21:59  10/30/19 10:04


 


 


 Docusate Sodium


  (Colace)  100 mg  EVERY 8  HOURS


 NG


   10/27/19 14:00


 11/25/19 08:59  10/30/19 14:13


 


 


 Enoxaparin Sodium


  (Lovenox)  30 mg  DAILY


 SUBQ


   10/27/19 09:00


 11/13/19 08:59  10/29/19 09:04


 


 


 Famotidine


  (Pepcid)  20 mg  EVERY 12  HOURS


 GT


   10/27/19 21:00


 11/25/19 17:59  10/30/19 10:05


 


 


 Furosemide


  (Lasix)  40 mg  DAILY


 NG


   10/30/19 09:00


 11/26/19 08:59  10/30/19 10:06


 


 


 Insulin Aspart


  (NovoLOG)    EVERY 6  HOURS


 SUBQ


   10/30/19 12:00


 11/29/19 11:59   


 


 


 Lactulose


  (Cephulac)  20 gm  Q8H  PRN


 NG


 Constipation  10/26/19 19:45


 11/25/19 19:44   


 


 


 Lorazepam


  (Ativan 2mg/ml


 1ml)  1 mg  Q8H  PRN


 IV


 For Anxiety  10/26/19 19:45


 10/31/19 19:44  10/30/19 14:14


 


 


 Meropenem 1 gm/


 Sodium Chloride  55 ml @ 


 110 mls/hr  Q12H


 IVPB


   10/27/19 02:00


 10/30/19 23:59  10/30/19 02:29


 











Last 24 Hour Vital Signs








  Date Time  Temp Pulse Resp B/P (MAP) Pulse Ox O2 Delivery O2 Flow Rate FiO2


 


10/30/19 12:00      Bi-pap  


 


10/30/19 11:43  83      


 


10/30/19 11:33  101 18  97 Facial  40


 


10/30/19 09:01  88 18  96 Full Face  40


 


10/30/19 08:00      Nasal Cannula 3.0 


 


10/30/19 08:00 97.1 106 22 165/88 (113) 95   


 


10/30/19 08:00  127      


 


10/30/19 07:55  128 33  96 Full Face  40


 


10/30/19 07:12     97 Nasal Cannula 2.0 28


 


10/30/19 05:44  78 20  98 Facial  40


 


10/30/19 04:00      Nasal Cannula 2.0 


 


10/30/19 04:00 97.7 80 21 108/72 (84) 98   


 


10/30/19 04:00  86      


 


10/30/19 03:16  85 26  96 Facial  40


 


10/30/19 01:24  79 24  95 Facial  40


 


10/30/19 00:53  80 25  96 Facial  40


 


10/30/19 00:00 98.1 82 20 94/58 (70) 100   


 


10/30/19 00:00  85      


 


10/30/19 00:00      Nasal Cannula 2.0 


 


10/29/19 22:50  106 25  97 Facial  50


 


10/29/19 22:00        30


 


10/29/19 20:36  130 31  94 Facial  50


 


10/29/19 20:00  101      


 


10/29/19 20:00 98.4 75 22 119/87 (98) 98   


 


10/29/19 20:00      Nasal Cannula 2.0 


 


10/29/19 19:33     95 Nasal Cannula 2.0 28


 


10/29/19 16:00      Nasal Cannula 2.0 


 


10/29/19 16:00 98.1 62 20 96/70 (79) 97   


 


10/29/19 15:33  95      


 


10/29/19 12:00 98.1 80 21 101/72 (82) 98   


 


10/29/19 12:00      Nasal Cannula 2.0 


 


10/29/19 11:38  76      


 


10/29/19 08:00 98.2 86 21 95/41 (59) 97   


 


10/29/19 08:00      Nasal Cannula 2.0 


 


10/29/19 07:59  83      


 


10/29/19 07:56     98 Nasal Cannula 2.0 28


 


10/29/19 05:28  68      


 


10/29/19 05:23  87 18  99 Facial  30


 


10/29/19 04:00 97.4 84 20 117/66 (83) 99   


 


10/29/19 04:00      Nasal Cannula 2.0 


 


10/29/19 02:38  77 23  100 Facial  30


 


10/29/19 01:39  74 17  100 Facial  30


 


10/29/19 00:00 97.7 84 20 96/72 (80) 100   


 


10/29/19 00:00      Nasal Cannula 2.0 


 


10/28/19 23:58  80 26  99 Facial  30


 


10/28/19 23:26  79      


 


10/28/19 21:04  80 20  98 Facial  30


 


10/28/19 20:00        30


 


10/28/19 20:00      Nasal Cannula 2.0 


 


10/28/19 20:00 97.5 84 22 131/83 (99) 99   


 


10/28/19 19:34  75      


 


10/28/19 18:55     96 Bi-Pap  30


 


10/28/19 18:55  74 19  96 Facial  30


 


10/28/19 16:37  113 24  94 Facial  40


 


10/28/19 16:00 97.5 84 22 94/66 (75) 95   


 


10/28/19 16:00      Nasal Cannula 2.0 


 


10/28/19 15:55  91      

















Intake and Output  


 


 10/29/19 10/30/19





 19:00 07:00


 


Intake Total 275 ml 100 ml


 


Output Total 450 ml 450 ml


 


Balance -175 ml -350 ml


 


  


 


Free Water 100 ml 100 ml


 


Tube Feeding 175 ml 


 


Output Urine Total 450 ml 450 ml


 


# Bowel Movements  2











Labs








Test


  10/28/19


03:35


 


White Blood Count


  15.8 K/UL


(4.8-10.8)


 


Red Blood Count


  2.96 M/UL


(4.20-5.40)


 


Hemoglobin


  7.9 G/DL


(12.0-16.0)


 


Hematocrit


  24.2 %


(37.0-47.0)


 


Mean Corpuscular Volume 82 FL (80-99) 


 


Mean Corpuscular Hemoglobin


  26.7 PG


(27.0-31.0)


 


Mean Corpuscular Hemoglobin


Concent 32.6 G/DL


(32.0-36.0)


 


Red Cell Distribution Width


  13.2 %


(11.6-14.8)


 


Platelet Count


  396 K/UL


(150-450)


 


Mean Platelet Volume


  7.1 FL


(6.5-10.1)


 


Neutrophils (%) (Auto)  % (45.0-75.0) 


 


Lymphocytes (%) (Auto)  % (20.0-45.0) 


 


Monocytes (%) (Auto)  % (1.0-10.0) 


 


Eosinophils (%) (Auto)  % (0.0-3.0) 


 


Basophils (%) (Auto)  % (0.0-2.0) 


 


Sodium Level


  139 MMOL/L


(136-145)


 


Potassium Level


  3.6 MMOL/L


(3.5-5.1)


 


Chloride Level


  103 MMOL/L


()


 


Carbon Dioxide Level


  29 MMOL/L


(21-32)


 


Anion Gap


  7 mmol/L


(5-15)


 


Blood Urea Nitrogen


  13 mg/dL


(7-18)


 


Creatinine


  1.0 MG/DL


(0.55-1.30)


 


Estimat Glomerular Filtration


Rate 55.5 mL/min


(>60)


 


Glucose Level


  71 MG/DL


()


 


Calcium Level


  8.3 MG/DL


(8.5-10.1)


 


Phosphorus Level


  2.5 MG/DL


(2.5-4.9)


 


Magnesium Level


  1.9 MG/DL


(1.8-2.4)


 


Iron Level


  28 ug/dL


()


 


Total Iron Binding Capacity


  149 ug/dL


(250-450)


 


Percent Iron Saturation 19 % (15-50) 


 


Unsaturated Iron Binding


  121 ug/dL


(112-346)


 


Ferritin


  442 NG/ML


(8-388)


 


Total Bilirubin


  0.5 MG/DL


(0.2-1.0)


 


Aspartate Amino Transf


(AST/SGOT) 16 U/L (15-37) 


 


 


Alanine Aminotransferase


(ALT/SGPT) 14 U/L (12-78) 


 


 


Alkaline Phosphatase


  79 U/L


()


 


Total Protein


  5.8 G/DL


(6.4-8.2)


 


Albumin


  1.7 G/DL


(3.4-5.0)


 


Globulin 4.1 g/dL 


 


Albumin/Globulin Ratio 0.4 (1.0-2.7) 


 


Vitamin B12 Level


  936 PG/ML


(193-986)


 


Folate


  4.9 NG/ML


(8.6-58.9)








Height (Feet):  5


Height (Inches):  5.00


Weight (Pounds):  188


Objective





Physical Exam:


Vitals: reviewed


General Appearance:  NAD


HEENT:  normocephalic, atraumatic


Neck:  non-tender, normal alignment


Respiratory/Chest:  normal breath sounds bilaterally


Cardiovascular/Chest: rrr


Abdomen:  normal bowel sounds, soft, nontender


Extremities:  normal range of motion











Mike Pop MD Oct 30, 2019 14:32

## 2019-10-30 NOTE — NUR
*-* INSURANCE *-*



UPDATED CLINICALS AND REVIEWS HAVE BEEN FAXED TO:



Tracking#[id number]

CM: Lance

#[phone]

Fax#[phone]

-------------------------------------------------------------------------------

Addendum: 10/30/19 at 1537 by JACKI COMBS CM

-------------------------------------------------------------------------------

SPOKE TO LANCE AT  HE CONFIRMED THAT HE HAS BEEN RECEIVING CLINICALS.

## 2019-10-30 NOTE — INFECTIOUS DISEASES PROG NOTE
Assessment/Plan


Assessment/Plan


A;


Sepsis


Pneumonia


UTI


Encephalopathy


DM type 2


Acute renal failure


Anemia


VRE colonization


Pelvic mass


P:


Continue Meropenem





Subjective


ROS Limited/Unobtainable:  Yes


Constitutional:  Denies: fever


Neurologic:  Reports: confusion


Allergies:  


Coded Allergies:  


     No Known Allergies (Unverified , 10/14/19)





Objective


Vital Signs





Last 24 Hour Vital Signs








  Date Time  Temp Pulse Resp B/P (MAP) Pulse Ox O2 Delivery O2 Flow Rate FiO2


 


10/30/19 12:00      Bi-pap  


 


10/30/19 11:43  83      


 


10/30/19 11:33  101 18  97 Facial  40


 


10/30/19 09:01  88 18  96 Full Face  40


 


10/30/19 08:00      Nasal Cannula 3.0 


 


10/30/19 08:00 97.1 106 22 165/88 (113) 95   


 


10/30/19 08:00  127      


 


10/30/19 07:55  128 33  96 Full Face  40


 


10/30/19 07:12     97 Nasal Cannula 2.0 28


 


10/30/19 05:44  78 20  98 Facial  40


 


10/30/19 04:00      Nasal Cannula 2.0 


 


10/30/19 04:00 97.7 80 21 108/72 (84) 98   


 


10/30/19 04:00  86      


 


10/30/19 03:16  85 26  96 Facial  40


 


10/30/19 01:24  79 24  95 Facial  40


 


10/30/19 00:53  80 25  96 Facial  40


 


10/30/19 00:00 98.1 82 20 94/58 (70) 100   


 


10/30/19 00:00  85      


 


10/30/19 00:00      Nasal Cannula 2.0 


 


10/29/19 22:50  106 25  97 Facial  50


 


10/29/19 22:00        30


 


10/29/19 20:36  130 31  94 Facial  50


 


10/29/19 20:00  101      


 


10/29/19 20:00 98.4 75 22 119/87 (98) 98   


 


10/29/19 20:00      Nasal Cannula 2.0 


 


10/29/19 19:33     95 Nasal Cannula 2.0 28


 


10/29/19 16:00      Nasal Cannula 2.0 


 


10/29/19 16:00 98.1 62 20 96/70 (79) 97   


 


10/29/19 15:33  95      








Height (Feet):  5


Height (Inches):  5.00


Weight (Pounds):  188


General Appearance:  no acute distress


Respiratory/Chest:  other - few rhonci, oxygen by nasal cannula


Cardiovascular:  normal rate


Abdomen:  soft, non tender, other - NG tube feeding


Extremities:  no edema


Neurologic/Psychiatric:  alert, disoriented





Current Medications








 Medications


  (Trade)  Dose


 Ordered  Sig/Winnie


 Route


 PRN Reason  Start Time


 Stop Time Status Last Admin


Dose Admin


 


 Acetaminophen


  (Tylenol)  650 mg  PRN  PRN


 RECTAL


 TEMP>100.5  10/26/19 19:45


     


 


 


 Acetaminophen


  (Tylenol)  650 mg  Q4H  PRN


 NG


 Mild Pain/Temp > 100.5  10/26/19 19:45


 11/25/19 19:44  10/30/19 14:13


 


 


 Albuterol/


 Ipratropium


  (Albuterol/


 Ipratropium)  3 ml  Q4H  PRN


 HHN


 SHORTNESS OF BREATH  10/26/19 19:45


 10/31/19 19:44   


 


 


 Aspirin


  (ASA)  81 mg  DAILY


 NG


   10/27/19 09:00


 11/14/19 08:59  10/29/19 09:05


 


 


 Atorvastatin


 Calcium


  (Lipitor)  10 mg  BEDTIME


 NG


   10/26/19 21:00


 11/13/19 20:59  10/29/19 20:17


 


 


 Clopidogrel


 Bisulfate


  (Plavix)  75 mg  DAILY


 NG


   10/27/19 09:00


 11/14/19 08:59  10/29/19 09:05


 


 


 Dextrose


  (Dextrose 50%)  25 ml  Q30M  PRN


 IV


 Hypoglycemia  10/26/19 19:30


 11/13/19 06:59   


 


 


 Dextrose


  (Dextrose 50%)  50 ml  Q30M  PRN


 IV


 Hypoglycemia  10/26/19 19:30


 11/13/19 06:59   


 


 


 Divalproex Sodium


  (Depakote


 Sprinkles)  500 mg  Q12HR


 NG


   10/26/19 21:00


 11/14/19 21:59  10/30/19 10:04


 


 


 Docusate Sodium


  (Colace)  100 mg  EVERY 8  HOURS


 NG


   10/27/19 14:00


 11/25/19 08:59  10/30/19 14:13


 


 


 Enoxaparin Sodium


  (Lovenox)  30 mg  DAILY


 SUBQ


   10/27/19 09:00


 11/13/19 08:59  10/29/19 09:04


 


 


 Famotidine


  (Pepcid)  20 mg  EVERY 12  HOURS


 GT


   10/27/19 21:00


 11/25/19 17:59  10/30/19 10:05


 


 


 Folic Acid


  (Folate)  1 mg  DAILY


 ORAL


   10/31/19 09:00


 11/30/19 08:59   


 


 


 Furosemide


  (Lasix)  40 mg  DAILY


 NG


   10/30/19 09:00


 11/26/19 08:59  10/30/19 10:06


 


 


 Insulin Aspart


  (NovoLOG)    EVERY 6  HOURS


 SUBQ


   10/30/19 12:00


 11/29/19 11:59   


 


 


 Lactulose


  (Cephulac)  20 gm  Q8H  PRN


 NG


 Constipation  10/26/19 19:45


 11/25/19 19:44   


 


 


 Lorazepam


  (Ativan 2mg/ml


 1ml)  1 mg  Q8H  PRN


 IV


 For Anxiety  10/26/19 19:45


 10/31/19 19:44  10/30/19 14:14


 


 


 Meropenem 1 gm/


 Sodium Chloride  55 ml @ 


 110 mls/hr  Q12H


 IVPB


   10/27/19 02:00


 10/30/19 23:59  10/30/19 02:29


 

















Renny Garay MD Oct 30, 2019 15:15

## 2019-10-30 NOTE — NUR
NURSE NOTES:

PT IN BED. RESPONSIVE TO SHAKING. A/OX1. PT ON  3LNC, SATING 99%.  RHONCHI BILATERAL, NASAL  
SECRETIONS  SCANT,THICK. ABDOMEN SOFT, BOWEL SOUNDS HYPOACTIVE. T.F GLUCERNA 1.5 RUNNING AT 
25ML/HR. NO RESIDUAL. NO BM, SMALL SMEAR, LIGHT BROWN. COLACE WAS GIVEN. BLADDER FLAT, SNYDER 
CATH PRESENT, DRAINING YELLOW URINE. BILATERAL RADIAL AND PEDAL PULSES WEAK. ELBOW 
RESTRAINTS PRESENT. CIRCULATION CHECK AND PROM PROVIDED. PT CONTINUES TO REACH FOR IV 
TUBING. CONTACT AND SZ PRECAUTIONS IN PLACE. HOB 30. BED LOCKED, IN LOW POSITION AND ALARM 
ON. WILL CONTINUE TO MONITOR PT.

## 2019-10-30 NOTE — NUR
RESPIRATORY NOTE:





Patient received on BiPAP with current ordered settings. Face mask was removed to clean face 
and relieve some pressure from the face. There was no redness or skin breakdown noted. Face 
was re-taped and placed back on the BiPAP. Patient is tolerating well and maintaining sats 
within normal range. Will continue to monitor.

## 2019-10-30 NOTE — NUR
NURSE NOTES:

Patient was placed on BiPAP as ordered for nights. NGT feeding held. Will continue to 
monitor.

## 2019-10-30 NOTE — NEPHROLOGY PROGRESS NOTE
Assessment/Plan


Problem List:  


(1) Renal failure (ARF), acute on chronic


Assessment:  resolved





(2) Dehydration


(3) ARF (acute renal failure)


(4) Sepsis


(5) Acute metabolic encephalopathy


(6) Hyperglycemia due to type 2 diabetes mellitus


(7) UTI (urinary tract infection)


(8) Diabetic nephropathy


Assessment





Renal failure, Acute on Chronic resolved


Dehydration


Severe Anemia


Sepsis / Pneumonia / UTI


DM, OOC


Proteinuria , Diabetic Nephropathy


Acute encephalopathy


Plan





in Obi - doing poorly


Per pulmonary


change meds to NGT as possible


Cr lowering 


Will order urine studies and monitor renal parameters


kidney YOANDY noted


lower iv fluids rate


WBCs rising


has casas again due to retention


Avoid Nephrotoxics








previously


Anemia salas


2D echo


24 H urine protein


Keep BP and BS in check


I am holding  her psych meds due to low MS


Per orders





Subjective


ROS Limited/Unobtainable:  Yes





Objective


Objective





Last 24 Hour Vital Signs








  Date Time  Temp Pulse Resp B/P (MAP) Pulse Ox O2 Delivery O2 Flow Rate FiO2


 


10/30/19 11:33  101 18  97 Facial  40


 


10/30/19 09:01  88 18  96 Facial  40


 


10/30/19 08:00  127      


 


10/30/19 07:55  128 33  96 Facial  40


 


10/30/19 07:12     97 Nasal Cannula 2.0 28


 


10/30/19 05:44  78 20  98 Facial  40


 


10/30/19 04:00      Nasal Cannula 2.0 


 


10/30/19 04:00 97.7 80 21 108/72 (84) 98   


 


10/30/19 04:00  86      


 


10/30/19 03:16  85 26  96 Facial  40


 


10/30/19 01:24  79 24  95 Facial  40


 


10/30/19 00:53  80 25  96 Facial  40


 


10/30/19 00:00 98.1 82 20 94/58 (70) 100   


 


10/30/19 00:00  85      


 


10/30/19 00:00      Nasal Cannula 2.0 


 


10/29/19 22:50  106 25  97 Facial  50


 


10/29/19 22:00        30


 


10/29/19 20:36  130 31  94 Facial  50


 


10/29/19 20:00  101      


 


10/29/19 20:00 98.4 75 22 119/87 (98) 98   


 


10/29/19 20:00      Nasal Cannula 2.0 


 


10/29/19 19:33     95 Nasal Cannula 2.0 28


 


10/29/19 16:00      Nasal Cannula 2.0 


 


10/29/19 16:00 98.1 62 20 96/70 (79) 97   


 


10/29/19 15:33  95      

















Intake and Output  


 


 10/29/19 10/30/19





 18:59 06:59


 


Intake Total 300 ml 100 ml


 


Output Total 450 ml 450 ml


 


Balance -150 ml -350 ml


 


  


 


Free Water 100 ml 100 ml


 


Tube Feeding 200 ml 


 


Output Urine Total 450 ml 450 ml


 


# Bowel Movements  2








Height (Feet):  5


Height (Inches):  5.00


Weight (Pounds):  188


General Appearance:  mild distress


EENT:  other - BIPAP


Neck:  limited range of motion


Cardiovascular:  tachycardia


Respiratory/Chest:  decreased breath sounds


Abdomen:  distended


Objective


no change











Lukasz Vizcaino MD Oct 30, 2019 12:26

## 2019-10-30 NOTE — CARDIOLOGY PROGRESS NOTE
Assessment/Plan


Assessment/Plan


1. acute congestive heart failure


2. Abnormal cardiac enzyme demand related due to infection and profound anemia 


3. Agitation 


4. Urinary tract infection.


5. Acute renal failure.


6. Profound anemia.


7. Diabetes mellitus.


8. History of hypertension.


9. History of mood disorder.


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 


11. arf 


12. pelvic mass


13  sepsis 


14. pneumonia 











not clear why she is dual antiplt agent as such i am not sure how safe it will 

be to long term 


echo mild systolic dysfucntion 


telel personal reviewed sinus 


iv abx 


wbc improving 


cr improved


cxr reviewed personally 


bp borderline 


d/w dr plummer who is planning a peg will need asa and palvix held 


remian agitatble and nto communicative 


norvasc for htn will use iv hydralazien prn





Subjective


ROS Limited/Unobtainable:  Yes





Objective





Last 24 Hour Vital Signs








  Date Time  Temp Pulse Resp B/P (MAP) Pulse Ox O2 Delivery O2 Flow Rate FiO2


 


10/30/19 18:08  95  160/78    


 


10/30/19 16:00 97.8 96 21 153/65 (94) 92   


 


10/30/19 16:00      Nasal Cannula 3.0 


 


10/30/19 16:00  88      


 


10/30/19 12:00 98.1 120 23 171/86 (114) 92   


 


10/30/19 12:00      Bi-pap  


 


10/30/19 11:43  83      


 


10/30/19 11:33  101 18  97 Facial  40


 


10/30/19 09:01  88 18  96 Full Face  40


 


10/30/19 08:00      Nasal Cannula 3.0 


 


10/30/19 08:00 97.1 106 22 165/88 (113) 95   


 


10/30/19 08:00  127      


 


10/30/19 07:55  128 33  96 Full Face  40


 


10/30/19 07:12     97 Nasal Cannula 2.0 28


 


10/30/19 05:44  78 20  98 Facial  40


 


10/30/19 04:00      Nasal Cannula 2.0 


 


10/30/19 04:00 97.7 80 21 108/72 (84) 98   


 


10/30/19 04:00  86      


 


10/30/19 03:16  85 26  96 Facial  40


 


10/30/19 01:24  79 24  95 Facial  40


 


10/30/19 00:53  80 25  96 Facial  40


 


10/30/19 00:00 98.1 82 20 94/58 (70) 100   


 


10/30/19 00:00  85      


 


10/30/19 00:00      Nasal Cannula 2.0 


 


10/29/19 22:50  106 25  97 Facial  50


 


10/29/19 22:00        30


 


10/29/19 20:36  130 31  94 Facial  50


 


10/29/19 20:00  101      


 


10/29/19 20:00 98.4 75 22 119/87 (98) 98   


 


10/29/19 20:00      Nasal Cannula 2.0 


 


10/29/19 19:33     95 Nasal Cannula 2.0 28








General Appearance:  no apparent distress


Cardiovascular:  normal rate


Respiratory/Chest:  lungs clear


Abdomen:  non tender, soft


Extremities:  no swelling











Intake and Output  


 


 10/29/19 10/30/19





 19:00 07:00


 


Intake Total 275 ml 100 ml


 


Output Total 450 ml 450 ml


 


Balance -175 ml -350 ml


 


  


 


Free Water 100 ml 100 ml


 


Tube Feeding 175 ml 


 


Output Urine Total 450 ml 450 ml


 


# Voids  30


 


# Bowel Movements  2











Laboratory Tests








Test


  10/30/19


18:30


 


White Blood Count


  13.9 K/UL


(4.8-10.8)  H


 


Red Blood Count


  3.39 M/UL


(4.20-5.40)  L


 


Hemoglobin


  9.5 G/DL


(12.0-16.0)  L


 


Hematocrit


  27.7 %


(37.0-47.0)  L


 


Mean Corpuscular Volume 82 FL (80-99)  


 


Mean Corpuscular Hemoglobin


  28.1 PG


(27.0-31.0)


 


Mean Corpuscular Hemoglobin


Concent 34.4 G/DL


(32.0-36.0)


 


Red Cell Distribution Width


  12.1 %


(11.6-14.8)


 


Platelet Count


  357 K/UL


(150-450)


 


Mean Platelet Volume


  7.1 FL


(6.5-10.1)


 


Neutrophils (%) (Auto)


  74.4 %


(45.0-75.0)


 


Lymphocytes (%) (Auto)


  16.6 %


(20.0-45.0)  L


 


Monocytes (%) (Auto)


  4.3 %


(1.0-10.0)


 


Eosinophils (%) (Auto)


  3.5 %


(0.0-3.0)  H


 


Basophils (%) (Auto)


  1.2 %


(0.0-2.0)


 


Miscellaneous Test 2 Pending  


 


Sodium Level Pending  


 


Potassium Level Pending  


 


Chloride Level Pending  


 


Carbon Dioxide Level Pending  


 


Blood Urea Nitrogen Pending  


 


Creatinine Pending  


 


Estimat Glomerular Filtration


Rate Pending  


 


 


Glucose Level Pending  


 


Calcium Level Pending  

















Rob May MD Oct 30, 2019 19:10

## 2019-10-30 NOTE — NUR
NURSE NOTES:

PT IN BED, RESTING. RESPONSIVE TO SHAKING, VERY FATIGUED. A/OX1. NO C/O PAIN. PUPILS 2MM 
SLUGGISH. PT ON BIPAP 15/5, FI02 30%SATING 95%.  PT LESS CONGESTED, RHONCHI BILATERAL, NASAL 
 SECRETIONS  REMAIN THICK , PINK TINGED W/ RED SPECKS.  ABDOMEN SOFT, BOWEL SOUNDS 
HYPOACTIVE. T.F GLUCERNA 1.5 RUNNING AT 20ML/HR.  NGT IN LEFT NARES. NO RESIDUAL. NO BM AT 
THIS TIME. BLADDER FLAT, SNYDER CATH PRESENT, DRAINING YELLOW URINE. BILATERAL RADIAL AND 
PEDAL PULSES WEAK. NO JVD, PITTING EDEMA NOTED. CAP REFILL <3SEC. SKIN-LARGE ECCHYMOSIS ON 
LFT HAND, SACRAL WOUND WITH OPTIFOAM, DTI HEELS. ELEVATED ON PILLOWS. ELBOW RESTRAINTS 
PRESENT. CIRCULATION CHECK AND PROM PROVIDED. PT CONTINUES TO REACH FOR IV TUBING. CONTACT 
AND SZ PRECAUTIONS IN PLACE. HOB 30. BED LOCKED, IN LOW POSITION AND ALARM ON. SIDE RAILS 
X3, ZONE 2. WILL CONTINUE TO MONITOR PT. EDUCATION GIVEN REGARDING PLAN OF CARE, EGD WILL 
TAKE PLACE ON FRIDAY.

## 2019-10-30 NOTE — NUR
NURSE NOTES:

LATE ENTRY:

TECH HERE TO DO PICC LINE. WILL PLACE IN KELSI, DOUBLE LUMEN. PT VSS.

-------------------------------------------------------------------------------

Addendum: 10/30/19 at 1749 by Negra Sanchez RN

-------------------------------------------------------------------------------

WRONG PT

## 2019-10-30 NOTE — NUR
NURSE NOTES:

LATE ENTRY:

NOTED PT YELLING OUT, ENTERED ROOM, PT DIAPHORETIC, YELLING  "MONEY MONEY" AND SWINGING 
ARMS. PT REPOSITIONED, HOB 40, ACCUCHK 133, O2SAT READING 86%, RR 25. PT SUCTIONED, PT 
REMAINED 87%,  TAKEN OFF 3L NC , PLACED ON  BIPAP FULL FACE MASK  15/5, FI02 30%. WILL 
CONTINUE TO MONITOR PT.

## 2019-10-30 NOTE — PULMONOLOGY PROGRESS NOTE
Assessment/Plan


Problems:  


(1) Healthcare associated bacterial pneumonia


(2) UTI (urinary tract infection)


(3) Sepsis


(4) Dehydration


(5) CHANCE (acute kidney injury)


(6) Acute metabolic encephalopathy


(7) Hyperglycemia due to type 2 diabetes mellitus


(8) Renal failure (ARF), acute on chronic


(9) Aspiration pneumonia


(10) Abnormal TSH


(11) Pelvic mass in female


Assessment/Plan


Marked leukocytosis S/P WBC scan with uptake in pelvis, ? correspondence to 

pelvic mass


Sepsis


Possible pneumonia


E coli UTI


Acute hypoxemic respiratory failure


Acute encephalopathy


Hx CHF


HTN


CHANCE - RESOVLED


Pelvic mass/possible GYN malignancy vs cyst





Plan:


-Optimize pulmonary hygiene/mobilize as tolerated


-BiPAP PRN and qHS


-Titrate down FiO2 to keep SaO2 > 90%


Monitor WCt, RFS sent for JAK2 mutation, ? BMBx - will ask Dr. Pop


-Abx per ID


-monitor volumes and renal function, F/U renal recs


-GYN evaluation 


-monitor encephalopathy, ? neuro eval


-NPO, NGTF's, ? GT (possibly later this week), SLP recs


-DVT Px: LMWH


-FC





Subjective


Allergies:  


Coded Allergies:  


     No Known Allergies (Unverified , 10/14/19)


Subjective


No CBC


On/off BiPAP and FM


WBC scan with uptake in pelvis


Katalina NGTF's not really interactive but per report was yelling earlier


Per RN occ cough no FC no wheezing





Objective





Last 24 Hour Vital Signs








  Date Time  Temp Pulse Resp B/P (MAP) Pulse Ox O2 Delivery O2 Flow Rate FiO2


 


10/30/19 16:00 97.8 96 21 153/65 (94) 92   


 


10/30/19 16:00      Nasal Cannula 3.0 


 


10/30/19 16:00  88      


 


10/30/19 12:00 98.1 120 23 171/86 (114) 92   


 


10/30/19 12:00      Bi-pap  


 


10/30/19 11:43  83      


 


10/30/19 11:33  101 18  97 Facial  40


 


10/30/19 09:01  88 18  96 Full Face  40


 


10/30/19 08:00      Nasal Cannula 3.0 


 


10/30/19 08:00 97.1 106 22 165/88 (113) 95   


 


10/30/19 08:00  127      


 


10/30/19 07:55  128 33  96 Full Face  40


 


10/30/19 07:12     97 Nasal Cannula 2.0 28


 


10/30/19 05:44  78 20  98 Facial  40


 


10/30/19 04:00      Nasal Cannula 2.0 


 


10/30/19 04:00 97.7 80 21 108/72 (84) 98   


 


10/30/19 04:00  86      


 


10/30/19 03:16  85 26  96 Facial  40


 


10/30/19 01:24  79 24  95 Facial  40


 


10/30/19 00:53  80 25  96 Facial  40


 


10/30/19 00:00 98.1 82 20 94/58 (70) 100   


 


10/30/19 00:00  85      


 


10/30/19 00:00      Nasal Cannula 2.0 


 


10/29/19 22:50  106 25  97 Facial  50


 


10/29/19 22:00        30


 


10/29/19 20:36  130 31  94 Facial  50


 


10/29/19 20:00  101      


 


10/29/19 20:00 98.4 75 22 119/87 (98) 98   


 


10/29/19 20:00      Nasal Cannula 2.0 


 


10/29/19 19:33     95 Nasal Cannula 2.0 28

















Intake and Output  


 


 10/29/19 10/30/19





 19:00 07:00


 


Intake Total 275 ml 100 ml


 


Output Total 450 ml 450 ml


 


Balance -175 ml -350 ml


 


  


 


Free Water 100 ml 100 ml


 


Tube Feeding 175 ml 


 


Output Urine Total 450 ml 450 ml


 


# Bowel Movements  2








General Appearance:  no acute distress, cachetic


HEENT:  normocephalic, atraumatic, anicteric, mucous membranes moist


Respiratory/Chest:  rhonchi


Cardiovascular:  normal peripheral pulses, normal rate, regular rhythm


Abdomen:  normal bowel sounds, soft, non tender, no organomegaly, non distended

, no mass


Extremities:  no cyanosis, no clubbing, no edema





Current Medications








 Medications


  (Trade)  Dose


 Ordered  Sig/Winnie


 Route


 PRN Reason  Start Time


 Stop Time Status Last Admin


Dose Admin


 


 Acetaminophen


  (Tylenol)  650 mg  PRN  PRN


 RECTAL


 TEMP>100.5  10/26/19 19:45


     


 


 


 Acetaminophen


  (Tylenol)  650 mg  Q4H  PRN


 NG


 Mild Pain/Temp > 100.5  10/26/19 19:45


 11/25/19 19:44  10/30/19 14:13


 


 


 Albuterol/


 Ipratropium


  (Albuterol/


 Ipratropium)  3 ml  Q4H  PRN


 HHN


 SHORTNESS OF BREATH  10/26/19 19:45


 10/31/19 19:44   


 


 


 Amlodipine


 Besylate


  (Norvasc)  2.5 mg  BID


 NG


   10/30/19 18:00


 11/29/19 17:59   


 


 


 Aspirin


  (ASA)  81 mg  DAILY


 NG


   10/27/19 09:00


 11/14/19 08:59  10/29/19 09:05


 


 


 Atorvastatin


 Calcium


  (Lipitor)  10 mg  BEDTIME


 NG


   10/26/19 21:00


 11/13/19 20:59  10/29/19 20:17


 


 


 Clopidogrel


 Bisulfate


  (Plavix)  75 mg  DAILY


 NG


   10/31/19 09:00


 11/14/19 08:59   


 


 


 Dextrose


  (Dextrose 50%)  25 ml  Q30M  PRN


 IV


 Hypoglycemia  10/26/19 19:30


 11/13/19 06:59   


 


 


 Dextrose


  (Dextrose 50%)  50 ml  Q30M  PRN


 IV


 Hypoglycemia  10/26/19 19:30


 11/13/19 06:59   


 


 


 Divalproex Sodium


  (Depakote


 Sprinkles)  500 mg  Q12HR


 NG


   10/26/19 21:00


 11/14/19 21:59  10/30/19 10:04


 


 


 Docusate Sodium


  (Colace)  100 mg  EVERY 8  HOURS


 NG


   10/27/19 14:00


 11/25/19 08:59  10/30/19 14:13


 


 


 Enoxaparin Sodium


  (Lovenox)  30 mg  DAILY


 SUBQ


   10/27/19 09:00


 11/13/19 08:59  10/29/19 09:04


 


 


 Famotidine


  (Pepcid)  20 mg  EVERY 12  HOURS


 GT


   10/27/19 21:00


 11/25/19 17:59  10/30/19 10:05


 


 


 Folic Acid


  (Folate)  1 mg  DAILY


 ORAL


   10/31/19 09:00


 11/30/19 08:59   


 


 


 Furosemide


  (Lasix)  40 mg  DAILY


 NG


   10/30/19 09:00


 11/26/19 08:59  10/30/19 10:06


 


 


 Insulin Aspart


  (NovoLOG)    EVERY 6  HOURS


 SUBQ


   10/30/19 12:00


 11/29/19 11:59   


 


 


 Lactulose


  (Cephulac)  20 gm  Q8H  PRN


 NG


 Constipation  10/26/19 19:45


 11/25/19 19:44   


 


 


 Lorazepam


  (Ativan 2mg/ml


 1ml)  1 mg  Q8H  PRN


 IV


 For Anxiety  10/26/19 19:45


 10/31/19 19:44  10/30/19 14:14


 


 


 Meropenem 1 gm/


 Sodium Chloride  55 ml @ 


 110 mls/hr  Q12H


 IVPB


   10/27/19 02:00


 10/30/19 23:59  10/30/19 16:26


 

















Sammy Torres MD Oct 30, 2019 17:09

## 2019-10-30 NOTE — NUR
NURSE NOTES:

LATE ENTY:

RECEIVED CALL FROM MD GATES, PT WILL HAVE EGD W/ GT ON FRIDAY, CONSENT OBTAINED AND IN 
CHART. RECEIVED ORDER TO HOLD PLAVIX UNTIL AFTER FRIDAY AND RESUME TUBE FEEDINGS.

## 2019-10-30 NOTE — GENERAL PROGRESS NOTE
Assessment/Plan


Status:  not improved


Assessment/Plan:


S: Not verbal





O: poor historian, seems comfortable  . IV sites are intact. O2 NC in place. 

Charles in place





PHYSICAL EXAMINATION:HEAD AND NECK:  Atraumatic and normocephalic.


CHEST:  Bronchial breathing sounds.ABDOMEN:  Soft.  No organomegaly.


MUSCULOSKELETAL:  Diffuse 1+ or 2+ nonpitting edema in all four contracted


extremities.  The patient is not following the commands.  Limited


evaluation.NEUROLOGY:  The patient is awake.  Not alert.  Not verbally


communicative.





LABORATORY DATA:  Labs dated October 28, 2019  reviewed





Meds: Reviewed and reconciled in the chart , including ASA and plavix 





Imaging:  CXR reviewed 





ASSESSMENT AND PLAN:


1. Sepsis.  Differential diagnosis is healthcare-associated pneumonia or


healthcare-associated urinary tract infection.  Work in progress.


2. Acute hypoxemic respiratory failure


3. Acute chf exacerbation - Diastolic


4. Chronic encephalomalacia.


3. Acute on chronic anemia.


4. Renal failure, age indeterminate.


6. Hyperthyroidism.


7. Hyperkalemia.


8. Diabetes type 2, uncontrolled with A1c of 10.


9. Psychiatric disorder.


10. GI and DVT prophylaxis.


11. PAH- Severe 





PLAN OF CARE:


Improving leukocytosis and  renal failure


Failed swallow eval, need for alternative feeding route


Out patient followup for abnormal incidental imaging finding 


consulted GI for PEG assessment. NOtes from GI and Cardio reviewed





Subjective


Allergies:  


Coded Allergies:  


     No Known Allergies (Unverified , 10/14/19)





Objective





Last 24 Hour Vital Signs








  Date Time  Temp Pulse Resp B/P (MAP) Pulse Ox O2 Delivery O2 Flow Rate FiO2


 


10/30/19 09:01  88 18  96 Facial  40


 


10/30/19 07:55  128 33  96 Facial  40


 


10/30/19 07:12     97 Nasal Cannula 2.0 28


 


10/30/19 05:44  78 20  98 Facial  40


 


10/30/19 04:00      Nasal Cannula 2.0 


 


10/30/19 04:00 97.7 80 21 108/72 (84) 98   


 


10/30/19 04:00  86      


 


10/30/19 03:16  85 26  96 Facial  40


 


10/30/19 01:24  79 24  95 Facial  40


 


10/30/19 00:53  80 25  96 Facial  40


 


10/30/19 00:00 98.1 82 20 94/58 (70) 100   


 


10/30/19 00:00  85      


 


10/30/19 00:00      Nasal Cannula 2.0 


 


10/29/19 22:50  106 25  97 Facial  50


 


10/29/19 22:00        30


 


10/29/19 20:36  130 31  94 Facial  50


 


10/29/19 20:00  101      


 


10/29/19 20:00 98.4 75 22 119/87 (98) 98   


 


10/29/19 20:00      Nasal Cannula 2.0 


 


10/29/19 19:33     95 Nasal Cannula 2.0 28


 


10/29/19 16:00      Nasal Cannula 2.0 


 


10/29/19 16:00 98.1 62 20 96/70 (79) 97   


 


10/29/19 15:33  95      


 


10/29/19 12:00 98.1 80 21 101/72 (82) 98   


 


10/29/19 12:00      Nasal Cannula 2.0 


 


10/29/19 11:38  76      

















Intake and Output  


 


 10/29/19 10/30/19





 18:59 06:59


 


Intake Total 300 ml 100 ml


 


Output Total 450 ml 450 ml


 


Balance -150 ml -350 ml


 


  


 


Free Water 100 ml 100 ml


 


Tube Feeding 200 ml 


 


Output Urine Total 450 ml 450 ml


 


# Bowel Movements  2








Height (Feet):  5


Height (Inches):  5.00


Weight (Pounds):  188











Garrick Keith MD Oct 30, 2019 09:47

## 2019-10-30 NOTE — NUR
NURSE NOTES:

On reassessment after administering Hydralazine, patient's blood pressure was 128/76. 
Patient showing no signs of distress. Will continue to monitor.

## 2019-10-30 NOTE — GENERAL PROGRESS NOTE
Assessment/Plan


Status:  not improved


Assessment/Plan:


(1) Healthcare associated bacterial pneumonia


(2) UTI (urinary tract infection)


(3) Sepsis


(4) Dehydration


(5) CHANCE (acute kidney injury)


(6) Acute metabolic encephalopathy


(7) Hyperglycemia due to type 2 diabetes mellitus


(8) Renal failure (ARF), acute on chronic


(9) Aspiration pneumonia


(10) Abnormal TSH


(11) Pelvic mass in female


(12) Congestive Heart Failure


(13) Anemia


(14) Dysphagia





on NGTF


on /off on BIPAP


currently on 4 lit  oxygen


failed swallow eval


on plavix





d/w cardiology


plavix on hold


plan PEG for Friday





Subjective


ROS Limited/Unobtainable:  No


Allergies:  


Coded Allergies:  


     No Known Allergies (Unverified , 10/14/19)





Objective





Last 24 Hour Vital Signs








  Date Time  Temp Pulse Resp B/P (MAP) Pulse Ox O2 Delivery O2 Flow Rate FiO2


 


10/30/19 09:01  88 18  96 Facial  40


 


10/30/19 07:55  128 33  96 Facial  40


 


10/30/19 07:12     97 Nasal Cannula 2.0 28


 


10/30/19 05:44  78 20  98 Facial  40


 


10/30/19 04:00      Nasal Cannula 2.0 


 


10/30/19 04:00 97.7 80 21 108/72 (84) 98   


 


10/30/19 04:00  86      


 


10/30/19 03:16  85 26  96 Facial  40


 


10/30/19 01:24  79 24  95 Facial  40


 


10/30/19 00:53  80 25  96 Facial  40


 


10/30/19 00:00 98.1 82 20 94/58 (70) 100   


 


10/30/19 00:00  85      


 


10/30/19 00:00      Nasal Cannula 2.0 


 


10/29/19 22:50  106 25  97 Facial  50


 


10/29/19 22:00        30


 


10/29/19 20:36  130 31  94 Facial  50


 


10/29/19 20:00  101      


 


10/29/19 20:00 98.4 75 22 119/87 (98) 98   


 


10/29/19 20:00      Nasal Cannula 2.0 


 


10/29/19 19:33     95 Nasal Cannula 2.0 28


 


10/29/19 16:00      Nasal Cannula 2.0 


 


10/29/19 16:00 98.1 62 20 96/70 (79) 97   


 


10/29/19 15:33  95      


 


10/29/19 12:00 98.1 80 21 101/72 (82) 98   


 


10/29/19 12:00      Nasal Cannula 2.0 


 


10/29/19 11:38  76      

















Intake and Output  


 


 10/29/19 10/30/19





 18:59 06:59


 


Intake Total 300 ml 100 ml


 


Output Total 450 ml 450 ml


 


Balance -150 ml -350 ml


 


  


 


Free Water 100 ml 100 ml


 


Tube Feeding 200 ml 


 


Output Urine Total 450 ml 450 ml


 


# Bowel Movements  2








Height (Feet):  5


Height (Inches):  5.00


Weight (Pounds):  188


General Appearance:  no apparent distress


EENT:  normal ENT inspection


Neck:  supple


Cardiovascular:  normal rate


Respiratory/Chest:  decreased breath sounds


Abdomen:  normal bowel sounds, non tender, soft


Extremities:  non-tender











Storm Turk MD Oct 30, 2019 09:56

## 2019-10-30 NOTE — NUR
NURSE NOTES:

LATE ENTRY:

RECEIVED REPORT FROM LAZARUS RN. PT IN BED, WIGGLING FEET AND TRYING TO DANGLE FOOT OUT BED. PT 
REORIENTED. RESPONSIVE TO NAME , DROWSY A/OX1, DOES NOT C/O PAIN. PUPILS 3MM SLUGGISH. PT ON 
3LNC, SATING LOW 90'S. /88. . PT SOUNDS CONGESTED, RHONCHI BILATERAL, NASAL 
SUCTION PROVIDED, SECRETIONS THICK , PINK TINGED W/ RED SPECKS. PT YELLS OUT PERIODICALLY.  
ABDOMEN DISTENDED, SOFT, BOWEL SOUNDS HYPOACTIVE. T.F ON HOLD PRIOR TO PROCEDURE. GT IN LEFT 
NARES CLAMPED. NO BM AT THIS TIME. BLADDER FLAT, SNYDER CATH PRESENT, DRAINING YELLOW URINE. 
BILATERAL RADIAL AND PEDAL PULSES WEAK. NO JVD, PITTING EDEMA NOTED. CAP REFILL <3SEC. 
SKIN-LARGE ECCHYMOSIS ON LFT HAND, SACRAL WOUND WITH OPTIFOAM, DTI HEELS. ELEVATED ON 
PILLOWS. CONTACT AND SZ PRECAUTIONS IN PLACE. HOB 30. BED LOCKED, IN LOW POSITION AND ALARM 
ON. SIDE RAILS X3, ZONE 2. WILL CONTINUE TO MONITOR PT. EDUCATION GIVEN REGARDING PLAN OF 
CARE TODAY, POSSIBLE EGD.

## 2019-10-30 NOTE — SURGERY PROGRESS NOTE
Surgery Progress Note


Subjective


Additional Comments


On BiPAP today.  Tube feeds on hold.  Plan for PEG on Friday.  Labs noted.





Objective





Last 24 Hour Vital Signs








  Date Time  Temp Pulse Resp B/P (MAP) Pulse Ox O2 Delivery O2 Flow Rate FiO2


 


10/30/19 19:25     95 Nasal Cannula 2.0 28


 


10/30/19 18:08  95  160/78    


 


10/30/19 16:00 97.8 96 21 153/65 (94) 92   


 


10/30/19 16:00      Nasal Cannula 3.0 


 


10/30/19 16:00  88      


 


10/30/19 12:00 98.1 120 23 171/86 (114) 92   


 


10/30/19 12:00      Bi-pap  


 


10/30/19 11:43  83      


 


10/30/19 11:33  101 18  97 Facial  40


 


10/30/19 09:01  88 18  96 Full Face  40


 


10/30/19 08:00      Nasal Cannula 3.0 


 


10/30/19 08:00 97.1 106 22 165/88 (113) 95   


 


10/30/19 08:00  127      


 


10/30/19 07:55  128 33  96 Full Face  40


 


10/30/19 07:12     97 Nasal Cannula 2.0 28


 


10/30/19 05:44  78 20  98 Facial  40


 


10/30/19 04:00      Nasal Cannula 2.0 


 


10/30/19 04:00 97.7 80 21 108/72 (84) 98   


 


10/30/19 04:00  86      


 


10/30/19 03:16  85 26  96 Facial  40


 


10/30/19 01:24  79 24  95 Facial  40


 


10/30/19 00:53  80 25  96 Facial  40


 


10/30/19 00:00 98.1 82 20 94/58 (70) 100   


 


10/30/19 00:00  85      


 


10/30/19 00:00      Nasal Cannula 2.0 


 


10/29/19 22:50  106 25  97 Facial  50


 


10/29/19 22:00        30


 


10/29/19 20:36  130 31  94 Facial  50








I&O











Intake and Output  


 


 10/29/19 10/30/19





 19:00 07:00


 


Intake Total 275 ml 100 ml


 


Output Total 450 ml 450 ml


 


Balance -175 ml -350 ml


 


  


 


Free Water 100 ml 100 ml


 


Tube Feeding 175 ml 


 


Output Urine Total 450 ml 450 ml


 


# Voids  30


 


# Bowel Movements  2








Dressing:  saturated


Wound:  other


Drains:  other


Cardiovascular:  RSR


Respiratory:  decreased breath sounds


Abdomen:  soft, non-tender, decreased bowel sounds


Extremities:  no cyanosis, other





Laboratory Tests








Test


  10/30/19


18:30


 


White Blood Count


  13.9 K/UL


(4.8-10.8)  H


 


Red Blood Count


  3.39 M/UL


(4.20-5.40)  L


 


Hemoglobin


  9.5 G/DL


(12.0-16.0)  L


 


Hematocrit


  27.7 %


(37.0-47.0)  L


 


Mean Corpuscular Volume 82 FL (80-99)  


 


Mean Corpuscular Hemoglobin


  28.1 PG


(27.0-31.0)


 


Mean Corpuscular Hemoglobin


Concent 34.4 G/DL


(32.0-36.0)


 


Red Cell Distribution Width


  12.1 %


(11.6-14.8)


 


Platelet Count


  357 K/UL


(150-450)


 


Mean Platelet Volume


  7.1 FL


(6.5-10.1)


 


Neutrophils (%) (Auto)


  74.4 %


(45.0-75.0)


 


Lymphocytes (%) (Auto)


  16.6 %


(20.0-45.0)  L


 


Monocytes (%) (Auto)


  4.3 %


(1.0-10.0)


 


Eosinophils (%) (Auto)


  3.5 %


(0.0-3.0)  H


 


Basophils (%) (Auto)


  1.2 %


(0.0-2.0)


 


Miscellaneous Test 2 Pending  


 


Sodium Level


  144 MMOL/L


(136-145)


 


Potassium Level


  3.6 MMOL/L


(3.5-5.1)


 


Chloride Level


  104 MMOL/L


()


 


Carbon Dioxide Level


  30 MMOL/L


(21-32)


 


Anion Gap


  10 mmol/L


(5-15)


 


Blood Urea Nitrogen


  14 mg/dL


(7-18)


 


Creatinine


  0.9 MG/DL


(0.55-1.30)


 


Estimat Glomerular Filtration


Rate > 60 mL/min


(>60)


 


Glucose Level


  131 MG/DL


()  H


 


Calcium Level


  8.9 MG/DL


(8.5-10.1)











Plan


Problems:  


(1) Pressure injury of deep tissue


Assessment & Plan:  Pt presented on admission with DTPI R heel. Blood filled 

blister with marginal erythema noted to heel. Pt exhibited agitation when 

minimally palpated. (L)2.2cm x (W)2.1cm


L heel is blanchable .


Non-blanchable erythema without induration noted to sacral cleft. 


No other areas of skin concerns noted.





Tx.Plan:


Apply Betadine to R heel. Cover with Optifoam drsg. Change every 3 days and prn.


           


Apply Cavilon Skin Barrier to L heel. Cover with Optifoam drsg. Change every 7 

days and prn.


           


Apply Moisture Barrier Paste to buttocks. Cover sacral area with Optifoam drsg. 

Change every 3 days and prn.


           


Reposition at least every 2hours or as tolerated.


           


Off-load heels with pillow.





(2) Sepsis


Assessment & Plan:  Patient with low-grade fevers, anemia, leukocytosis 

persistent, elevated platelets, abnormal labs.


Etiology currently unknown and work-up in progress.  Labs noted and imaging 

that is available as noted.  


Okay for diet


CT noted


US noted


Impression: 13 x 7 x 12.9 cm unilocular cystic mass, corresponding to lesion 

reported


on recent CT scan. Layering low level internal echoes likely represent bladder


debris.. This presumably represents a cystic ovarian lesion with debris or


hemorrhage, possibly neoplastic. Gynecological consultation is recommended


Antibiotics as per infectious disease 


local wound care as wounds do not seem to be the etiology of her sepsis


PEG tube if consented and cleared plan for Friday


cont with tube feeds


supplementation for healing 


We will monitor and follow with recommendations


Thank you for allowing me to participate in patient's care














Radhames Blas Oct 30, 2019 20:20

## 2019-10-30 NOTE — NUR
D/C SWALLOW/SPEECH THERAPY SUMMARY:

SEEN FOR DYSPHAGIA, SEE SWALLOW EVALUATION AND MOD BARIUM SWALLOW STUDY REPORTS.



PATIENT ON TUBE FEEDINGS AND HAD CHANGE IN MEDICAL STATUS (ON CHARLIE NOW).

PLEASE RECONSULT AND WRITE ANOTHER ORDER FOR F/UP WITH DYSPHAGIA MANAGMENT 

AND TX (INCLUDING ORAL GRAT WHEN OFF BIPAP AND READY) 



PLAN:

D/C FROM SKILLED ST SERVICES AT THIS TIME UNTIL RECONSULTED IF INDICATED 

AND READY

## 2019-10-31 VITALS — DIASTOLIC BLOOD PRESSURE: 76 MMHG | SYSTOLIC BLOOD PRESSURE: 128 MMHG

## 2019-10-31 VITALS — SYSTOLIC BLOOD PRESSURE: 120 MMHG | DIASTOLIC BLOOD PRESSURE: 58 MMHG

## 2019-10-31 VITALS — DIASTOLIC BLOOD PRESSURE: 65 MMHG | SYSTOLIC BLOOD PRESSURE: 108 MMHG

## 2019-10-31 VITALS — DIASTOLIC BLOOD PRESSURE: 75 MMHG | SYSTOLIC BLOOD PRESSURE: 101 MMHG

## 2019-10-31 VITALS — SYSTOLIC BLOOD PRESSURE: 148 MMHG | DIASTOLIC BLOOD PRESSURE: 85 MMHG

## 2019-10-31 VITALS — SYSTOLIC BLOOD PRESSURE: 110 MMHG | DIASTOLIC BLOOD PRESSURE: 62 MMHG

## 2019-10-31 LAB
ADD MANUAL DIFF: YES
ALBUMIN SERPL-MCNC: 1.9 G/DL (ref 3.4–5)
ALBUMIN/GLOB SERPL: 0.4 {RATIO} (ref 1–2.7)
ALP SERPL-CCNC: 96 U/L (ref 46–116)
ALT SERPL-CCNC: 13 U/L (ref 12–78)
ANION GAP SERPL CALC-SCNC: 12 MMOL/L (ref 5–15)
APPEARANCE UR: (no result)
APTT PPP: YELLOW S
AST SERPL-CCNC: 22 U/L (ref 15–37)
BILIRUB SERPL-MCNC: 0.8 MG/DL (ref 0.2–1)
BUN SERPL-MCNC: 15 MG/DL (ref 7–18)
CALCIUM SERPL-MCNC: 8.7 MG/DL (ref 8.5–10.1)
CHLORIDE SERPL-SCNC: 105 MMOL/L (ref 98–107)
CO2 SERPL-SCNC: 24 MMOL/L (ref 21–32)
CREAT SERPL-MCNC: 1 MG/DL (ref 0.55–1.3)
ERYTHROCYTE [DISTWIDTH] IN BLOOD BY AUTOMATED COUNT: 13.4 % (ref 11.6–14.8)
GLOBULIN SER-MCNC: 4.5 G/DL
GLUCOSE UR STRIP-MCNC: NEGATIVE MG/DL
HCT VFR BLD CALC: 29.9 % (ref 37–47)
HGB BLD-MCNC: 10.1 G/DL (ref 12–16)
KETONES UR QL STRIP: (no result)
LEUKOCYTE ESTERASE UR QL STRIP: (no result)
MCV RBC AUTO: 82 FL (ref 80–99)
NITRITE UR QL STRIP: NEGATIVE
PH UR STRIP: 6 [PH] (ref 4.5–8)
PHOSPHATE SERPL-MCNC: 3 MG/DL (ref 2.5–4.9)
PLATELET # BLD: 392 K/UL (ref 150–450)
POTASSIUM SERPL-SCNC: 4 MMOL/L (ref 3.5–5.1)
PROT UR QL STRIP: (no result)
RBC # BLD AUTO: 3.65 M/UL (ref 4.2–5.4)
SODIUM SERPL-SCNC: 141 MMOL/L (ref 136–145)
SP GR UR STRIP: 1.01 (ref 1–1.03)
UROBILINOGEN UR-MCNC: 8 MG/DL (ref 0–1)
WBC # BLD AUTO: 23.1 K/UL (ref 4.8–10.8)

## 2019-10-31 RX ADMIN — DOCUSATE SODIUM SCH MG: 50 LIQUID ORAL at 22:08

## 2019-10-31 RX ADMIN — ENOXAPARIN SODIUM SCH MG: 30 INJECTION SUBCUTANEOUS at 08:49

## 2019-10-31 RX ADMIN — INSULIN ASPART SCH UNITS: 100 INJECTION, SOLUTION INTRAVENOUS; SUBCUTANEOUS at 17:17

## 2019-10-31 RX ADMIN — INSULIN ASPART SCH UNITS: 100 INJECTION, SOLUTION INTRAVENOUS; SUBCUTANEOUS at 00:23

## 2019-10-31 RX ADMIN — ASPIRIN 81 MG SCH MG: 81 TABLET ORAL at 08:49

## 2019-10-31 RX ADMIN — DOCUSATE SODIUM SCH MG: 50 LIQUID ORAL at 06:10

## 2019-10-31 RX ADMIN — DIVALPROEX SODIUM SCH MG: 125 CAPSULE ORAL at 20:39

## 2019-10-31 RX ADMIN — DOCUSATE SODIUM SCH MG: 50 LIQUID ORAL at 13:42

## 2019-10-31 RX ADMIN — LORAZEPAM PRN MG: 2 INJECTION, SOLUTION INTRAMUSCULAR; INTRAVENOUS at 21:15

## 2019-10-31 RX ADMIN — DIVALPROEX SODIUM SCH MG: 125 CAPSULE ORAL at 08:48

## 2019-10-31 RX ADMIN — INSULIN ASPART SCH UNITS: 100 INJECTION, SOLUTION INTRAVENOUS; SUBCUTANEOUS at 06:11

## 2019-10-31 RX ADMIN — LORAZEPAM PRN MG: 2 INJECTION, SOLUTION INTRAMUSCULAR; INTRAVENOUS at 11:18

## 2019-10-31 RX ADMIN — DEXTROSE MONOHYDRATE SCH MLS/HR: 50 INJECTION, SOLUTION INTRAVENOUS at 13:42

## 2019-10-31 RX ADMIN — FUROSEMIDE SCH MG: 40 TABLET ORAL at 08:48

## 2019-10-31 RX ADMIN — DEXTROSE MONOHYDRATE SCH MLS/HR: 50 INJECTION, SOLUTION INTRAVENOUS at 22:08

## 2019-10-31 RX ADMIN — INSULIN ASPART SCH UNITS: 100 INJECTION, SOLUTION INTRAVENOUS; SUBCUTANEOUS at 11:18

## 2019-10-31 NOTE — NUR
HAND-OFF: 

Report given to ELIZABETH Jordan.Patient is resting in bed. Patient is currently on nasal cannula 3L, 
patient tolerating oxygen well, no signs of respiratory distress noted. No signs of cardiac 
distress noted. Freedom splints are applied for patient safety. Bilateral arms assessed 
,bilateral radial pulses bounding, skin remains intact and no decrease in movement or 
sensation noted. Patient has a Charles Catheter that is patent and draining to gravity. IV 
site is right forearm 22g, patent and asymptomatic. Bed is locked, placed in lowest 
position, side rails up x3 and padded per protocol, bed alarm on, call light within reach. 
Safety, aspiration and seizure precautions maintained.

## 2019-10-31 NOTE — GENERAL PROGRESS NOTE
Assessment/Plan


Problem List:  


(1) Abnormal TSH


ICD Codes:  R79.89 - Other specified abnormal findings of blood chemistry


SNOMED:  537455263


(2) Hyperglycemia due to type 2 diabetes mellitus


ICD Codes:  E11.65 - Type 2 diabetes mellitus with hyperglycemia


SNOMED:  538443747693606, [id number]


Qualifiers:  


   Qualified Codes:  E11.65 - Type 2 diabetes mellitus with hyperglycemia; 

Z79.4 - Long term (current) use of insulin


(3) Acute metabolic encephalopathy


ICD Codes:  G93.41 - Metabolic encephalopathy


SNOMED:  10377999, 952262204


(4) ARF (acute renal failure)


ICD Codes:  N17.9 - Acute kidney failure, unspecified


SNOMED:  73953571


Qualifiers:  


   Qualified Codes:  N17.9 - Acute kidney failure, unspecified


(5) Healthcare associated bacterial pneumonia


ICD Codes:  J15.9 - Unspecified bacterial pneumonia


SNOMED:  089983866


Status:  not improved


Assessment/Plan:


no need for basal insulin - glucose values are stable 


continue NISS every 6 hours 


hypoglycemia protocol in order





Subjective


ROS Limited/Unobtainable:  Yes


Allergies:  


Coded Allergies:  


     No Known Allergies (Unverified , 10/14/19)


Subjective


events noted 











Item Value  Date Time


 


Bedside Blood Glucose 166 mg/dl H 10/31/19 0611


 


Bedside Blood Glucose 228 mg/dl H 10/31/19 0023


 


Bedside Blood Glucose 107 mg/dl 10/30/19 1200


 


Bedside Blood Glucose 122 mg/dl H 10/30/19 1818


 


Bedside Blood Glucose 71 mg/dl 10/30/19 0600











Objective





Last 24 Hour Vital Signs








  Date Time  Temp Pulse Resp B/P (MAP) Pulse Ox O2 Delivery O2 Flow Rate FiO2


 


10/31/19 06:00       3.0 


 


10/31/19 05:00        30


 


10/31/19 04:49  87 18  98 Full Face  40


 


10/31/19 04:00      Nasal Cannula 3.0 


 


10/31/19 04:00 97.7 81 23 101/75 (84) 99   


 


10/31/19 03:40  93      


 


10/31/19 02:58  91 20  98 Full Face  40


 


10/31/19 00:45  101 32  97 Full Face  40


 


10/31/19 00:00 96.3 126 26 128/76 (93) 98   


 


10/31/19 00:00      Nasal Cannula 3.0 


 


10/30/19 23:26  127      


 


10/30/19 22:56  121 35  96 Full Face  40


 


10/30/19 22:48    153/97    


 


10/30/19 22:00        30


 


10/30/19 21:49  127 36  98 Full Face  40


 


10/30/19 20:00 98.1 97 25 153/86 (108) 93   


 


10/30/19 20:00      Nasal Cannula 3.0 


 


10/30/19 19:30  93      


 


10/30/19 19:25     95 Nasal Cannula 2.0 28


 


10/30/19 18:08  95  160/78    


 


10/30/19 16:00 97.8 96 21 153/65 (94) 92   


 


10/30/19 16:00      Nasal Cannula 3.0 


 


10/30/19 16:00  88      


 


10/30/19 12:00 98.1 120 23 171/86 (114) 92   


 


10/30/19 12:00      Bi-pap  


 


10/30/19 11:43  83      


 


10/30/19 11:33  101 18  97 Facial  40


 


10/30/19 09:01  88 18  96 Full Face  40


 


10/30/19 08:00      Nasal Cannula 3.0 


 


10/30/19 08:00 97.1 106 22 165/88 (113) 95   


 


10/30/19 08:00  127      


 


10/30/19 07:55  128 33  96 Full Face  40


 


10/30/19 07:12     97 Nasal Cannula 2.0 28

















Intake and Output  


 


 10/30/19 10/31/19





 19:00 07:00


 


Intake Total 220 ml 60 ml


 


Output Total  500 ml


 


Balance 220 ml -440 ml


 


  


 


IV Total 110 ml 


 


Tube Feeding 110 ml 60 ml


 


Output Urine Total  500 ml


 


# Voids 385 


 


# Bowel Movements  2








Laboratory Tests


10/30/19 18:30: 


White Blood Count 13.9H, Red Blood Count 3.39L, Hemoglobin 9.5L, Hematocrit 

27.7L, Mean Corpuscular Volume 82, Mean Corpuscular Hemoglobin 28.1, Mean 

Corpuscular Hemoglobin Concent 34.4, Red Cell Distribution Width 12.1, Platelet 

Count 357, Mean Platelet Volume 7.1, Neutrophils (%) (Auto) 74.4, Lymphocytes (%

) (Auto) 16.6L, Monocytes (%) (Auto) 4.3, Eosinophils (%) (Auto) 3.5H, 

Basophils (%) (Auto) 1.2, Miscellaneous Test 2 [Pending], Sodium Level 144, 

Potassium Level 3.6, Chloride Level 104, Carbon Dioxide Level 30, Anion Gap 10, 

Blood Urea Nitrogen 14, Creatinine 0.9, Estimat Glomerular Filtration Rate > 60

, Glucose Level 131H, Calcium Level 8.9


10/31/19 03:45: 


White Blood Count 23.1#*H, Red Blood Count 3.65L, Hemoglobin 10.1L, Hematocrit 

29.9L, Mean Corpuscular Volume 82, Mean Corpuscular Hemoglobin 27.7, Mean 

Corpuscular Hemoglobin Concent 33.7, Red Cell Distribution Width 13.4, Platelet 

Count 392, Mean Platelet Volume 7.0, Neutrophils (%) (Auto) , Lymphocytes (%) (

Auto) , Monocytes (%) (Auto) , Eosinophils (%) (Auto) , Basophils (%) (Auto) , 

Sodium Level 141, Potassium Level 4.0, Chloride Level 105, Carbon Dioxide Level 

24, Anion Gap 12, Blood Urea Nitrogen 15, Creatinine 1.0, Estimat Glomerular 

Filtration Rate 55.5, Glucose Level 162H, Calcium Level 8.7, Neutrophils % (

Manual) [Pending], Lymphocytes % (Manual) [Pending], Platelet Estimate [Pending]

, Platelet Morphology [Pending], Phosphorus Level 3.0, Magnesium Level 1.6L, 

Total Bilirubin 0.8, Aspartate Amino Transf (AST/SGOT) 22, Alanine 

Aminotransferase (ALT/SGPT) 13, Alkaline Phosphatase 96, C-Reactive Protein, 

Quantitative 9.5H, Pro-B-Type Natriuretic Peptide > 99954A, Total Protein 6.4, 

Albumin 1.9L, Globulin 4.5, Albumin/Globulin Ratio 0.4L


Height (Feet):  5


Height (Inches):  5.00


Weight (Pounds):  186


General Appearance:  lethargic


Neck:  normal alignment


Cardiovascular:  bradycardia


Respiratory/Chest:  decreased breath sounds


Abdomen:  normal bowel sounds


Objective





Current Medications








 Medications


  (Trade)  Dose


 Ordered  Sig/Winnie


 Route


 PRN Reason  Start Time


 Stop Time Status Last Admin


Dose Admin


 


 Acetaminophen


  (Tylenol)  650 mg  PRN  PRN


 RECTAL


 TEMP>100.5  10/26/19 19:45


     


 


 


 Acetaminophen


  (Tylenol)  650 mg  Q4H  PRN


 NG


 Mild Pain/Temp > 100.5  10/26/19 19:45


 11/25/19 19:44  10/30/19 14:13


 


 


 Albuterol/


 Ipratropium


  (Albuterol/


 Ipratropium)  3 ml  Q4H  PRN


 HHN


 SHORTNESS OF BREATH  10/26/19 19:45


 10/31/19 19:44   


 


 


 Amlodipine


 Besylate


  (Norvasc)  2.5 mg  BID


 NG


   10/30/19 18:00


 11/29/19 17:59  10/30/19 18:08


 


 


 Aspirin


  (ASA)  81 mg  DAILY


 NG


   10/27/19 09:00


 11/14/19 08:59  10/29/19 09:05


 


 


 Atorvastatin


 Calcium


  (Lipitor)  10 mg  BEDTIME


 NG


   10/26/19 21:00


 11/13/19 20:59  10/30/19 20:30


 


 


 Clopidogrel


 Bisulfate


  (Plavix)  75 mg  DAILY


 NG


   10/31/19 09:00


 11/14/19 08:59   


 


 


 Dextrose


  (Dextrose 50%)  25 ml  Q30M  PRN


 IV


 Hypoglycemia  10/26/19 19:30


 11/13/19 06:59   


 


 


 Dextrose


  (Dextrose 50%)  50 ml  Q30M  PRN


 IV


 Hypoglycemia  10/26/19 19:30


 11/13/19 06:59   


 


 


 Divalproex Sodium


  (Depakote


 Sprinkles)  500 mg  Q12HR


 NG


   10/26/19 21:00


 11/14/19 21:59  10/30/19 20:30


 


 


 Docusate Sodium


  (Colace)  100 mg  EVERY 8  HOURS


 NG


   10/27/19 14:00


 11/25/19 08:59  10/31/19 06:10


 


 


 Enoxaparin Sodium


  (Lovenox)  30 mg  DAILY


 SUBQ


   10/27/19 09:00


 11/13/19 08:59  10/29/19 09:04


 


 


 Famotidine


  (Pepcid)  20 mg  EVERY 12  HOURS


 GT


   10/27/19 21:00


 11/25/19 17:59  10/30/19 20:30


 


 


 Folic Acid


  (Folate)  1 mg  DAILY


 ORAL


   10/31/19 09:00


 11/30/19 08:59   


 


 


 Furosemide


  (Lasix)  40 mg  DAILY


 NG


   10/30/19 09:00


 11/26/19 08:59  10/30/19 10:06


 


 


 Hydralazine HCl


  (Apresoline)  10 mg  Q4H  PRN


 IV


 sbp greater [id number]  10/30/19 19:12


 11/29/19 19:11  10/30/19 22:48


 


 


 Insulin Aspart


  (NovoLOG)    EVERY 6  HOURS


 SUBQ


   10/30/19 12:00


 11/29/19 11:59  10/31/19 06:11


 


 


 Lactulose


  (Cephulac)  20 gm  Q8H  PRN


 NG


 Constipation  10/26/19 19:45


 11/25/19 19:44   


 


 


 Lorazepam


  (Ativan 2mg/ml


 1ml)  1 mg  Q8H  PRN


 IV


 For Anxiety  10/26/19 19:45


 10/31/19 19:44  10/30/19 14:14


 

















Riccardo Velez MD Oct 31, 2019 06:37

## 2019-10-31 NOTE — NEPHROLOGY PROGRESS NOTE
Assessment/Plan


Problem List:  


(1) Renal failure (ARF), acute on chronic


Assessment:  resolved





(2) Dehydration


(3) ARF (acute renal failure)


(4) Sepsis


(5) Acute metabolic encephalopathy


(6) Hyperglycemia due to type 2 diabetes mellitus


(7) UTI (urinary tract infection)


(8) Diabetic nephropathy


Assessment





Renal failure, Acute on Chronic resolved


Dehydration


Severe Anemia


Sepsis / Pneumonia / UTI


DM, OOC


Proteinuria , Diabetic Nephropathy


Acute encephalopathy


Plan





in Obi - doing poorly


Mag IV


Per pulmonary


change meds to NGT as possible


Cr lowering 


Will order urine studies and monitor renal parameters


kidney YOANDY noted


lower iv fluids rate


WBCs rising


has casas again due to retention


Avoid Nephrotoxics








previously


Anemia salas


2D echo


24 H urine protein


Keep BP and BS in check


I am holding  her psych meds due to low MS


Per orders





Subjective


ROS Limited/Unobtainable:  Yes





Objective


Objective





Last 24 Hour Vital Signs








  Date Time  Temp Pulse Resp B/P (MAP) Pulse Ox O2 Delivery O2 Flow Rate FiO2


 


10/31/19 08:48  96  148/85    


 


10/31/19 08:00 98.3 96 24 148/85 (106) 98   


 


10/31/19 08:00      Nasal Cannula 3.0 


 


10/31/19 06:00       3.0 


 


10/31/19 05:00        30


 


10/31/19 04:49  87 18  98 Full Face  40


 


10/31/19 04:00      Nasal Cannula 3.0 


 


10/31/19 04:00 97.7 81 23 101/75 (84) 99   


 


10/31/19 03:40  93      


 


10/31/19 02:58  91 20  98 Full Face  40


 


10/31/19 00:45  101 32  97 Full Face  40


 


10/31/19 00:00 96.3 126 26 128/76 (93) 98   


 


10/31/19 00:00      Nasal Cannula 3.0 


 


10/30/19 23:26  127      


 


10/30/19 22:56  121 35  96 Full Face  40


 


10/30/19 22:48    153/97    


 


10/30/19 22:00        30


 


10/30/19 21:49  127 36  98 Full Face  40


 


10/30/19 20:00 98.1 97 25 153/86 (108) 93   


 


10/30/19 20:00      Nasal Cannula 3.0 


 


10/30/19 19:30  93      


 


10/30/19 19:25     95 Nasal Cannula 2.0 28


 


10/30/19 18:08  95  160/78    


 


10/30/19 16:00 97.8 96 21 153/65 (94) 92   


 


10/30/19 16:00      Nasal Cannula 3.0 


 


10/30/19 16:00  88      


 


10/30/19 12:00 98.1 120 23 171/86 (114) 92   


 


10/30/19 12:00      Bi-pap  


 


10/30/19 11:43  83      


 


10/30/19 11:33  101 18  97 Facial  40

















Intake and Output  


 


 10/30/19 10/31/19





 18:59 06:59


 


Intake Total 200 ml 80 ml


 


Output Total  500 ml


 


Balance 200 ml -420 ml


 


  


 


IV Total 110 ml 


 


Tube Feeding 90 ml 80 ml


 


Output Urine Total  500 ml


 


# Voids 415 


 


# Bowel Movements  2








Laboratory Tests


10/30/19 18:30: 


White Blood Count 13.9H, Red Blood Count 3.39L, Hemoglobin 9.5L, Hematocrit 

27.7L, Mean Corpuscular Volume 82, Mean Corpuscular Hemoglobin 28.1, Mean 

Corpuscular Hemoglobin Concent 34.4, Red Cell Distribution Width 12.1, Platelet 

Count 357, Mean Platelet Volume 7.1, Neutrophils (%) (Auto) 74.4, Lymphocytes (%

) (Auto) 16.6L, Monocytes (%) (Auto) 4.3, Eosinophils (%) (Auto) 3.5H, 

Basophils (%) (Auto) 1.2, Miscellaneous Test 2 [Pending], Sodium Level 144, 

Potassium Level 3.6, Chloride Level 104, Carbon Dioxide Level 30, Anion Gap 10, 

Blood Urea Nitrogen 14, Creatinine 0.9, Estimat Glomerular Filtration Rate > 60

, Glucose Level 131H, Calcium Level 8.9


10/31/19 03:45: 


White Blood Count 23.1#*H, Red Blood Count 3.65L, Hemoglobin 10.1L, Hematocrit 

29.9L, Mean Corpuscular Volume 82, Mean Corpuscular Hemoglobin 27.7, Mean 

Corpuscular Hemoglobin Concent 33.7, Red Cell Distribution Width 13.4, Platelet 

Count 392, Mean Platelet Volume 7.0, Neutrophils (%) (Auto) , Lymphocytes (%) (

Auto) , Monocytes (%) (Auto) , Eosinophils (%) (Auto) , Basophils (%) (Auto) , 

Sodium Level 141, Potassium Level 4.0, Chloride Level 105, Carbon Dioxide Level 

24, Anion Gap 12, Blood Urea Nitrogen 15, Creatinine 1.0, Estimat Glomerular 

Filtration Rate 55.5, Glucose Level 162H, Calcium Level 8.7, Differential Total 

Cells Counted 100, Neutrophils % (Manual) 90H, Lymphocytes % (Manual) 4L, 

Monocytes % (Manual) 3, Eosinophils % (Manual) 2, Basophils % (Manual) 1, Band 

Neutrophils 0, Platelet Estimate Adequate, Platelet Morphology Normal, 

Hypochromasia 1+, Anisocytosis 1+, Phosphorus Level 3.0, Magnesium Level 1.6L, 

Total Bilirubin 0.8, Aspartate Amino Transf (AST/SGOT) 22, Alanine 

Aminotransferase (ALT/SGPT) 13, Alkaline Phosphatase 96, C-Reactive Protein, 

Quantitative 9.5H, Pro-B-Type Natriuretic Peptide > [id number], Total Protein 6.4, 

Albumin 1.9L, Globulin 4.5, Albumin/Globulin Ratio 0.4L


10/31/19 10:15: 


Urine Color [Pending], Urine Appearance [Pending], Urine pH [Pending], Urine 

Specific Gravity [Pending], Urine Protein [Pending], Urine Glucose (UA) [Pending

], Urine Ketones [Pending], Urine Blood [Pending], Urine Nitrite [Pending], 

Urine Bilirubin [Pending], Urine Urobilinogen [Pending], Urine Leukocyte 

Esterase [Pending]


Height (Feet):  5


Height (Inches):  5.00


Weight (Pounds):  186


General Appearance:  no apparent distress, lethargic


EENT:  other - NGT +


Cardiovascular:  tachycardia


Respiratory/Chest:  decreased breath sounds


Abdomen:  distended


Objective


no change











Lukasz Vizcaino MD Oct 31, 2019 11:05

## 2019-10-31 NOTE — DIAGNOSTIC IMAGING REPORT
Indication: Dyspnea

 

Comparison:  10/29/2019

 

A single view chest radiograph was obtained.

 

Findings:

 

There is diffuse airspace opacification of both lungs with hazy vessel margins, mild

cardiomegaly. In addition there is obscuring of the diaphragm secondary to pleural

effusions bilaterally. NG tube is in good position.

 

IMPRESSION:

 

Moderate to severe pulmonary edema. Bilateral pleural effusions

## 2019-10-31 NOTE — GENERAL PROGRESS NOTE
Assessment/Plan


Status:  not improved


Assessment/Plan:


(1) Healthcare associated bacterial pneumonia


(2) UTI (urinary tract infection)


(3) Sepsis


(4) Dehydration


(5) CHANCE (acute kidney injury)


(6) Acute metabolic encephalopathy


(7) Hyperglycemia due to type 2 diabetes mellitus


(8) Renal failure (ARF), acute on chronic


(9) Aspiration pneumonia


(10) Abnormal TSH


(11) Pelvic mass in female


(12) Congestive Heart Failure


(13) Anemia


(14) Dysphagia





on NGTF


on /off on BIPAP


currently on 4 lit  oxygen


failed swallow eval


on plavix





d/w cardiology


plavix on hold


plan PEG for tomorrow





Subjective


ROS Limited/Unobtainable:  No


Allergies:  


Coded Allergies:  


     No Known Allergies (Unverified , 10/14/19)





Objective





Last 24 Hour Vital Signs








  Date Time  Temp Pulse Resp B/P (MAP) Pulse Ox O2 Delivery O2 Flow Rate FiO2


 


10/31/19 12:00       3.0 


 


10/31/19 12:00      Nasal Cannula 3.0 


 


10/31/19 08:48  96  148/85    


 


10/31/19 08:00 98.3 96 24 148/85 (106) 98   


 


10/31/19 08:00  90      


 


10/31/19 08:00      Nasal Cannula 3.0 


 


10/31/19 06:00       3.0 


 


10/31/19 05:00        30


 


10/31/19 04:49  87 18  98 Full Face  40


 


10/31/19 04:00      Nasal Cannula 3.0 


 


10/31/19 04:00 97.7 81 23 101/75 (84) 99   


 


10/31/19 03:40  93      


 


10/31/19 02:58  91 20  98 Full Face  40


 


10/31/19 00:45  101 32  97 Full Face  40


 


10/31/19 00:00 96.3 126 26 128/76 (93) 98   


 


10/31/19 00:00      Nasal Cannula 3.0 


 


10/30/19 23:26  127      


 


10/30/19 22:56  121 35  96 Full Face  40


 


10/30/19 22:48    153/97    


 


10/30/19 22:00        30


 


10/30/19 21:49  127 36  98 Full Face  40


 


10/30/19 20:00 98.1 97 25 153/86 (108) 93   


 


10/30/19 20:00      Nasal Cannula 3.0 


 


10/30/19 19:30  93      


 


10/30/19 19:25     95 Nasal Cannula 2.0 28


 


10/30/19 18:08  95  160/78    


 


10/30/19 16:00 97.8 96 21 153/65 (94) 92   


 


10/30/19 16:00      Nasal Cannula 3.0 


 


10/30/19 16:00  88      

















Intake and Output  


 


 10/30/19 10/31/19





 18:59 06:59


 


Intake Total 200 ml 80 ml


 


Output Total  500 ml


 


Balance 200 ml -420 ml


 


  


 


IV Total 110 ml 


 


Tube Feeding 90 ml 80 ml


 


Output Urine Total  500 ml


 


# Voids 415 


 


# Bowel Movements  2








Laboratory Tests


10/30/19 18:30: 


White Blood Count 13.9H, Red Blood Count 3.39L, Hemoglobin 9.5L, Hematocrit 

27.7L, Mean Corpuscular Volume 82, Mean Corpuscular Hemoglobin 28.1, Mean 

Corpuscular Hemoglobin Concent 34.4, Red Cell Distribution Width 12.1, Platelet 

Count 357, Mean Platelet Volume 7.1, Neutrophils (%) (Auto) 74.4, Lymphocytes (%

) (Auto) 16.6L, Monocytes (%) (Auto) 4.3, Eosinophils (%) (Auto) 3.5H, 

Basophils (%) (Auto) 1.2, Miscellaneous Test 2 [Pending], Sodium Level 144, 

Potassium Level 3.6, Chloride Level 104, Carbon Dioxide Level 30, Anion Gap 10, 

Blood Urea Nitrogen 14, Creatinine 0.9, Estimat Glomerular Filtration Rate > 60

, Glucose Level 131H, Calcium Level 8.9


10/31/19 03:45: 


White Blood Count 23.1#*H, Red Blood Count 3.65L, Hemoglobin 10.1L, Hematocrit 

29.9L, Mean Corpuscular Volume 82, Mean Corpuscular Hemoglobin 27.7, Mean 

Corpuscular Hemoglobin Concent 33.7, Red Cell Distribution Width 13.4, Platelet 

Count 392, Mean Platelet Volume 7.0, Neutrophils (%) (Auto) , Lymphocytes (%) (

Auto) , Monocytes (%) (Auto) , Eosinophils (%) (Auto) , Basophils (%) (Auto) , 

Sodium Level 141, Potassium Level 4.0, Chloride Level 105, Carbon Dioxide Level 

24, Anion Gap 12, Blood Urea Nitrogen 15, Creatinine 1.0, Estimat Glomerular 

Filtration Rate 55.5, Glucose Level 162H, Calcium Level 8.7, Differential Total 

Cells Counted 100, Neutrophils % (Manual) 90H, Lymphocytes % (Manual) 4L, 

Monocytes % (Manual) 3, Eosinophils % (Manual) 2, Basophils % (Manual) 1, Band 

Neutrophils 0, Platelet Estimate Adequate, Platelet Morphology Normal, 

Hypochromasia 1+, Anisocytosis 1+, Phosphorus Level 3.0, Magnesium Level 1.6L, 

Total Bilirubin 0.8, Aspartate Amino Transf (AST/SGOT) 22, Alanine 

Aminotransferase (ALT/SGPT) 13, Alkaline Phosphatase 96, C-Reactive Protein, 

Quantitative 9.5H, Pro-B-Type Natriuretic Peptide > [id number], Total Protein 6.4, 

Albumin 1.9L, Globulin 4.5, Albumin/Globulin Ratio 0.4L


10/31/19 10:15: 


Urine Color Yellow, Urine Appearance Cloudy, Urine pH 6, Urine Specific Gravity 

1.015, Urine Protein 3+H, Urine Glucose (UA) Negative, Urine Ketones 3+H, Urine 

Blood 2+H, Urine Nitrite Negative, Urine Bilirubin Negative, Urine Urobilinogen 

8H, Urine Leukocyte Esterase 3+H, Urine RBC 5-10H, Urine WBC TntcH, Urine 

Squamous Epithelial Cells ManyH, Urine Bacteria Few


Height (Feet):  5


Height (Inches):  5.00


Weight (Pounds):  154


General Appearance:  lethargic


EENT:  normal ENT inspection


Neck:  supple


Cardiovascular:  normal rate


Respiratory/Chest:  decreased breath sounds


Abdomen:  normal bowel sounds, non tender, soft


Extremities:  non-tender











Storm Turk MD Oct 31, 2019 12:20

## 2019-10-31 NOTE — INFECTIOUS DISEASES PROG NOTE
Assessment/Plan


Assessment/Plan


A;


Sepsis


Leukocytosis worsening


Pneumonia


UTI


Encephalopathy


DM type 2


Acute renal failure


Anemia


VRE colonization


Pelvic mass


P:


Repeat CXR , UA & urine culture


Start on Zosyn





Subjective


ROS Limited/Unobtainable:  Yes


Constitutional:  Denies: fever


Gastrointestinal/Abdominal:  Denies: diarrhea


Neurologic:  Reports: confusion, other - on restraint


Allergies:  


Coded Allergies:  


     No Known Allergies (Unverified , 10/14/19)





Objective


Vital Signs





Last 24 Hour Vital Signs








  Date Time  Temp Pulse Resp B/P (MAP) Pulse Ox O2 Delivery O2 Flow Rate FiO2


 


10/31/19 08:48  96  148/85    


 


10/31/19 08:00 98.3 96 24 148/85 (106) 98   


 


10/31/19 06:00       3.0 


 


10/31/19 05:00        30


 


10/31/19 04:49  87 18  98 Full Face  40


 


10/31/19 04:00      Nasal Cannula 3.0 


 


10/31/19 04:00 97.7 81 23 101/75 (84) 99   


 


10/31/19 03:40  93      


 


10/31/19 02:58  91 20  98 Full Face  40


 


10/31/19 00:45  101 32  97 Full Face  40


 


10/31/19 00:00 96.3 126 26 128/76 (93) 98   


 


10/31/19 00:00      Nasal Cannula 3.0 


 


10/30/19 23:26  127      


 


10/30/19 22:56  121 35  96 Full Face  40


 


10/30/19 22:48    153/97    


 


10/30/19 22:00        30


 


10/30/19 21:49  127 36  98 Full Face  40


 


10/30/19 20:00 98.1 97 25 153/86 (108) 93   


 


10/30/19 20:00      Nasal Cannula 3.0 


 


10/30/19 19:30  93      


 


10/30/19 19:25     95 Nasal Cannula 2.0 28


 


10/30/19 18:08  95  160/78    


 


10/30/19 16:00 97.8 96 21 153/65 (94) 92   


 


10/30/19 16:00      Nasal Cannula 3.0 


 


10/30/19 16:00  88      


 


10/30/19 12:00 98.1 120 23 171/86 (114) 92   


 


10/30/19 12:00      Bi-pap  


 


10/30/19 11:43  83      


 


10/30/19 11:33  101 18  97 Facial  40








Height (Feet):  5


Height (Inches):  5.00


Weight (Pounds):  186


General Appearance:  no acute distress


HEENT:  mucous membranes moist


Respiratory/Chest:  decreased breath sounds


Cardiovascular:  normal rate


Abdomen:  soft, non tender, other - NG tube feeding


Extremities:  no edema


Neurologic/Psychiatric:  aphasia, other - opens eyes





Laboratory Tests








Test


  10/30/19


18:30 10/31/19


03:45


 


White Blood Count


  13.9 K/UL


(4.8-10.8)  H 23.1 K/UL


(4.8-10.8)  #*H


 


Red Blood Count


  3.39 M/UL


(4.20-5.40)  L 3.65 M/UL


(4.20-5.40)  L


 


Hemoglobin


  9.5 G/DL


(12.0-16.0)  L 10.1 G/DL


(12.0-16.0)  L


 


Hematocrit


  27.7 %


(37.0-47.0)  L 29.9 %


(37.0-47.0)  L


 


Mean Corpuscular Volume 82 FL (80-99)   82 FL (80-99)  


 


Mean Corpuscular Hemoglobin


  28.1 PG


(27.0-31.0) 27.7 PG


(27.0-31.0)


 


Mean Corpuscular Hemoglobin


Concent 34.4 G/DL


(32.0-36.0) 33.7 G/DL


(32.0-36.0)


 


Red Cell Distribution Width


  12.1 %


(11.6-14.8) 13.4 %


(11.6-14.8)


 


Platelet Count


  357 K/UL


(150-450) 392 K/UL


(150-450)


 


Mean Platelet Volume


  7.1 FL


(6.5-10.1) 7.0 FL


(6.5-10.1)


 


Neutrophils (%) (Auto)


  74.4 %


(45.0-75.0) % (45.0-75.0)


 


 


Lymphocytes (%) (Auto)


  16.6 %


(20.0-45.0)  L % (20.0-45.0)


 


 


Monocytes (%) (Auto)


  4.3 %


(1.0-10.0)  % (1.0-10.0)  


 


 


Eosinophils (%) (Auto)


  3.5 %


(0.0-3.0)  H  % (0.0-3.0)  


 


 


Basophils (%) (Auto)


  1.2 %


(0.0-2.0)  % (0.0-2.0)  


 


 


Miscellaneous Test 2 Pending   


 


Sodium Level


  144 MMOL/L


(136-145) 141 MMOL/L


(136-145)


 


Potassium Level


  3.6 MMOL/L


(3.5-5.1) 4.0 MMOL/L


(3.5-5.1)


 


Chloride Level


  104 MMOL/L


() 105 MMOL/L


()


 


Carbon Dioxide Level


  30 MMOL/L


(21-32) 24 MMOL/L


(21-32)


 


Anion Gap


  10 mmol/L


(5-15) 12 mmol/L


(5-15)


 


Blood Urea Nitrogen


  14 mg/dL


(7-18) 15 mg/dL


(7-18)


 


Creatinine


  0.9 MG/DL


(0.55-1.30) 1.0 MG/DL


(0.55-1.30)


 


Estimat Glomerular Filtration


Rate > 60 mL/min


(>60) 55.5 mL/min


(>60)


 


Glucose Level


  131 MG/DL


()  H 162 MG/DL


()  H


 


Calcium Level


  8.9 MG/DL


(8.5-10.1) 8.7 MG/DL


(8.5-10.1)


 


Differential Total Cells


Counted 


  100  


 


 


Neutrophils % (Manual)  90 % (45-75)  H


 


Lymphocytes % (Manual)  4 % (20-45)  L


 


Monocytes % (Manual)  3 % (1-10)  


 


Eosinophils % (Manual)  2 % (0-3)  


 


Basophils % (Manual)  1 % (0-2)  


 


Band Neutrophils  0 % (0-8)  


 


Platelet Estimate  Adequate  


 


Platelet Morphology  Normal  


 


Hypochromasia  1+  


 


Anisocytosis  1+  


 


Phosphorus Level


  


  3.0 MG/DL


(2.5-4.9)


 


Magnesium Level


  


  1.6 MG/DL


(1.8-2.4)  L


 


Total Bilirubin


  


  0.8 MG/DL


(0.2-1.0)


 


Aspartate Amino Transf


(AST/SGOT) 


  22 U/L (15-37)


 


 


Alanine Aminotransferase


(ALT/SGPT) 


  13 U/L (12-78)


 


 


Alkaline Phosphatase


  


  96 U/L


()


 


C-Reactive Protein,


Quantitative 


  9.5 mg/dL


(0.00-0.90)  H


 


Pro-B-Type Natriuretic Peptide


  


  > 54524 pg/mL


(0-125)  H


 


Total Protein


  


  6.4 G/DL


(6.4-8.2)


 


Albumin


  


  1.9 G/DL


(3.4-5.0)  L


 


Globulin  4.5 g/dL  


 


Albumin/Globulin Ratio


  


  0.4 (1.0-2.7)


L











Current Medications








 Medications


  (Trade)  Dose


 Ordered  Sig/Winnie


 Route


 PRN Reason  Start Time


 Stop Time Status Last Admin


Dose Admin


 


 Acetaminophen


  (Tylenol)  650 mg  PRN  PRN


 RECTAL


 TEMP>100.5  10/26/19 19:45


     


 


 


 Acetaminophen


  (Tylenol)  650 mg  Q4H  PRN


 NG


 Mild Pain/Temp > 100.5  10/26/19 19:45


 11/25/19 19:44  10/30/19 14:13


 


 


 Albuterol/


 Ipratropium


  (Albuterol/


 Ipratropium)  3 ml  Q4H  PRN


 HHN


 SHORTNESS OF BREATH  10/26/19 19:45


 10/31/19 19:44   


 


 


 Amlodipine


 Besylate


  (Norvasc)  2.5 mg  BID


 NG


   10/30/19 18:00


 11/29/19 17:59  10/31/19 08:48


 


 


 Aspirin


  (ASA)  81 mg  DAILY


 NG


   10/27/19 09:00


 11/14/19 08:59  10/29/19 09:05


 


 


 Atorvastatin


 Calcium


  (Lipitor)  10 mg  BEDTIME


 NG


   10/26/19 21:00


 11/13/19 20:59  10/30/19 20:30


 


 


 Clopidogrel


 Bisulfate


  (Plavix)  75 mg  DAILY


 NG


   10/31/19 09:00


 11/14/19 08:59   


 


 


 Dextrose


  (Dextrose 50%)  25 ml  Q30M  PRN


 IV


 Hypoglycemia  10/26/19 19:30


 11/13/19 06:59   


 


 


 Dextrose


  (Dextrose 50%)  50 ml  Q30M  PRN


 IV


 Hypoglycemia  10/26/19 19:30


 11/13/19 06:59   


 


 


 Divalproex Sodium


  (Depakote


 Sprinkles)  500 mg  Q12HR


 NG


   10/26/19 21:00


 11/14/19 21:59  10/31/19 08:48


 


 


 Docusate Sodium


  (Colace)  100 mg  EVERY 8  HOURS


 NG


   10/27/19 14:00


 11/25/19 08:59  10/31/19 06:10


 


 


 Enoxaparin Sodium


  (Lovenox)  30 mg  DAILY


 SUBQ


   10/27/19 09:00


 11/13/19 08:59  10/29/19 09:04


 


 


 Famotidine


  (Pepcid)  20 mg  EVERY 12  HOURS


 GT


   10/27/19 21:00


 11/25/19 17:59  10/31/19 08:48


 


 


 Folic Acid


  (Folate)  1 mg  DAILY


 ORAL


   10/31/19 09:00


 11/30/19 08:59  10/31/19 08:48


 


 


 Furosemide


  (Lasix)  40 mg  DAILY


 NG


   10/30/19 09:00


 11/26/19 08:59  10/31/19 08:48


 


 


 Hydralazine HCl


  (Apresoline)  10 mg  Q4H  PRN


 IV


 sbp greater [id number]  10/30/19 19:12


 11/29/19 19:11  10/30/19 22:48


 


 


 Insulin Aspart


  (NovoLOG)    EVERY 6  HOURS


 SUBQ


   10/30/19 12:00


 11/29/19 11:59  10/31/19 06:11


 


 


 Lactulose


  (Cephulac)  20 gm  Q8H  PRN


 NG


 Constipation  10/26/19 19:45


 11/25/19 19:44   


 


 


 Lorazepam


  (Ativan 2mg/ml


 1ml)  1 mg  Q8H  PRN


 IV


 For Anxiety  10/26/19 19:45


 10/31/19 19:44  10/30/19 14:14


 


 


 Magnesium Sulfate  100 ml @ 


 100 mls/hr  Q1H


 IVPB


   10/31/19 09:00


 10/31/19 10:59   


 

















Renny Garay MD Oct 31, 2019 09:45

## 2019-10-31 NOTE — NUR
NURSE NOTES:

Called Dr Renny Garay in regards to WBC being elevated at 23.1 (on 10/30/19 was 13.9) and 
Magnesium level is now 1.6

Dr Garay to assess patient, no further orders at this time. Endorsed to oncoming RN

## 2019-10-31 NOTE — SURGERY PROGRESS NOTE
Surgery Progress Note


Subjective


Symptoms:  improved


Additional Comments


on NC.  TF at 30cc/hr and tolerating


exam stable


dressings saturated





Objective





Last 24 Hour Vital Signs








  Date Time  Temp Pulse Resp B/P (MAP) Pulse Ox O2 Delivery O2 Flow Rate FiO2


 


10/31/19 16:00      Nasal Cannula 2.0 


 


10/31/19 16:00       2.0 


 


10/31/19 12:00  100      


 


10/31/19 12:00       3.0 


 


10/31/19 12:00      Nasal Cannula 3.0 


 


10/31/19 12:00 98.6 102 25 120/58 (78) 96   


 


10/31/19 08:48  96  148/85    


 


10/31/19 08:00 98.3 96 24 148/85 (106) 98   


 


10/31/19 08:00  90      


 


10/31/19 08:00      Nasal Cannula 3.0 


 


10/31/19 06:00       3.0 


 


10/31/19 05:00        30


 


10/31/19 04:49  87 18  98 Full Face  40


 


10/31/19 04:00      Nasal Cannula 3.0 


 


10/31/19 04:00 97.7 81 23 101/75 (84) 99   


 


10/31/19 03:40  93      


 


10/31/19 02:58  91 20  98 Full Face  40


 


10/31/19 00:45  101 32  97 Full Face  40


 


10/31/19 00:00 96.3 126 26 128/76 (93) 98   


 


10/31/19 00:00      Nasal Cannula 3.0 


 


10/30/19 23:26  127      


 


10/30/19 22:56  121 35  96 Full Face  40


 


10/30/19 22:48    153/97    


 


10/30/19 22:00        30


 


10/30/19 21:49  127 36  98 Full Face  40


 


10/30/19 20:00 98.1 97 25 153/86 (108) 93   


 


10/30/19 20:00      Nasal Cannula 3.0 


 


10/30/19 19:30  93      


 


10/30/19 19:25     95 Nasal Cannula 2.0 28


 


10/30/19 18:08  95  160/78    








I&O











Intake and Output  


 


 10/30/19 10/31/19





 18:59 06:59


 


Intake Total 200 ml 80 ml


 


Output Total  500 ml


 


Balance 200 ml -420 ml


 


  


 


IV Total 110 ml 


 


Tube Feeding 90 ml 80 ml


 


Output Urine Total  500 ml


 


# Voids 415 


 


# Bowel Movements  2








Dressing:  saturated


Wound:  other


Drains:  other


Cardiovascular:  RSR


Respiratory:  decreased breath sounds


Abdomen:  soft, present bowel sounds, non-distended


Extremities:  no tenderness, no cyanosis





Laboratory Tests








Test


  10/30/19


18:30 10/31/19


03:45 10/31/19


10:15


 


White Blood Count


  13.9 K/UL


(4.8-10.8)  H 23.1 K/UL


(4.8-10.8)  #*H 


 


 


Red Blood Count


  3.39 M/UL


(4.20-5.40)  L 3.65 M/UL


(4.20-5.40)  L 


 


 


Hemoglobin


  9.5 G/DL


(12.0-16.0)  L 10.1 G/DL


(12.0-16.0)  L 


 


 


Hematocrit


  27.7 %


(37.0-47.0)  L 29.9 %


(37.0-47.0)  L 


 


 


Mean Corpuscular Volume 82 FL (80-99)   82 FL (80-99)   


 


Mean Corpuscular Hemoglobin


  28.1 PG


(27.0-31.0) 27.7 PG


(27.0-31.0) 


 


 


Mean Corpuscular Hemoglobin


Concent 34.4 G/DL


(32.0-36.0) 33.7 G/DL


(32.0-36.0) 


 


 


Red Cell Distribution Width


  12.1 %


(11.6-14.8) 13.4 %


(11.6-14.8) 


 


 


Platelet Count


  357 K/UL


(150-450) 392 K/UL


(150-450) 


 


 


Mean Platelet Volume


  7.1 FL


(6.5-10.1) 7.0 FL


(6.5-10.1) 


 


 


Neutrophils (%) (Auto)


  74.4 %


(45.0-75.0) % (45.0-75.0)


  


 


 


Lymphocytes (%) (Auto)


  16.6 %


(20.0-45.0)  L % (20.0-45.0)


  


 


 


Monocytes (%) (Auto)


  4.3 %


(1.0-10.0)  % (1.0-10.0)  


  


 


 


Eosinophils (%) (Auto)


  3.5 %


(0.0-3.0)  H  % (0.0-3.0)  


  


 


 


Basophils (%) (Auto)


  1.2 %


(0.0-2.0)  % (0.0-2.0)  


  


 


 


Miscellaneous Test 2 Pending    


 


Sodium Level


  144 MMOL/L


(136-145) 141 MMOL/L


(136-145) 


 


 


Potassium Level


  3.6 MMOL/L


(3.5-5.1) 4.0 MMOL/L


(3.5-5.1) 


 


 


Chloride Level


  104 MMOL/L


() 105 MMOL/L


() 


 


 


Carbon Dioxide Level


  30 MMOL/L


(21-32) 24 MMOL/L


(21-32) 


 


 


Anion Gap


  10 mmol/L


(5-15) 12 mmol/L


(5-15) 


 


 


Blood Urea Nitrogen


  14 mg/dL


(7-18) 15 mg/dL


(7-18) 


 


 


Creatinine


  0.9 MG/DL


(0.55-1.30) 1.0 MG/DL


(0.55-1.30) 


 


 


Estimat Glomerular Filtration


Rate > 60 mL/min


(>60) 55.5 mL/min


(>60) 


 


 


Glucose Level


  131 MG/DL


()  H 162 MG/DL


()  H 


 


 


Calcium Level


  8.9 MG/DL


(8.5-10.1) 8.7 MG/DL


(8.5-10.1) 


 


 


Differential Total Cells


Counted 


  100  


  


 


 


Neutrophils % (Manual)  90 % (45-75)  H 


 


Lymphocytes % (Manual)  4 % (20-45)  L 


 


Monocytes % (Manual)  3 % (1-10)   


 


Eosinophils % (Manual)  2 % (0-3)   


 


Basophils % (Manual)  1 % (0-2)   


 


Band Neutrophils  0 % (0-8)   


 


Platelet Estimate  Adequate   


 


Platelet Morphology  Normal   


 


Hypochromasia  1+   


 


Anisocytosis  1+   


 


Phosphorus Level


  


  3.0 MG/DL


(2.5-4.9) 


 


 


Magnesium Level


  


  1.6 MG/DL


(1.8-2.4)  L 


 


 


Total Bilirubin


  


  0.8 MG/DL


(0.2-1.0) 


 


 


Aspartate Amino Transf


(AST/SGOT) 


  22 U/L (15-37)


  


 


 


Alanine Aminotransferase


(ALT/SGPT) 


  13 U/L (12-78)


  


 


 


Alkaline Phosphatase


  


  96 U/L


() 


 


 


C-Reactive Protein,


Quantitative 


  9.5 mg/dL


(0.00-0.90)  H 


 


 


Pro-B-Type Natriuretic Peptide


  


  > 59763 pg/mL


(0-125)  H 


 


 


Total Protein


  


  6.4 G/DL


(6.4-8.2) 


 


 


Albumin


  


  1.9 G/DL


(3.4-5.0)  L 


 


 


Globulin  4.5 g/dL   


 


Albumin/Globulin Ratio


  


  0.4 (1.0-2.7)


L 


 


 


Urine Color   Yellow  


 


Urine Appearance   Cloudy  


 


Urine pH   6 (4.5-8.0)  


 


Urine Specific Gravity


  


  


  1.015


(1.005-1.035)


 


Urine Protein


  


  


  3+ (NEGATIVE)


H


 


Urine Glucose (UA)


  


  


  Negative


(NEGATIVE)


 


Urine Ketones


  


  


  3+ (NEGATIVE)


H


 


Urine Blood


  


  


  2+ (NEGATIVE)


H


 


Urine Nitrite


  


  


  Negative


(NEGATIVE)


 


Urine Bilirubin


  


  


  Negative


(NEGATIVE)


 


Urine Urobilinogen


  


  


  8 MG/DL


(0.0-1.0)  H


 


Urine Leukocyte Esterase


  


  


  3+ (NEGATIVE)


H


 


Urine RBC


  


  


  5-10 /HPF (0 -


2)  H


 


Urine WBC


  


  


  Tntc /HPF (0 -


2)  H


 


Urine Squamous Epithelial


Cells 


  


  Many /LPF


(NONE/OCC)  H


 


Urine Bacteria


  


  


  Few /HPF


(NONE)











Plan


Problems:  


(1) Pressure injury of deep tissue


Assessment & Plan:  Pt presented on admission with DTPI R heel. Blood filled 

blister with marginal erythema noted to heel. Pt exhibited agitation when 

minimally palpated. (L)2.2cm x (W)2.1cm


L heel is blanchable .


Non-blanchable erythema without induration noted to sacral cleft. 


No other areas of skin concerns noted.





Tx.Plan:


Apply Betadine to R heel. Cover with Optifoam drsg. Change every 3 days and prn.


           


Apply Cavilon Skin Barrier to L heel. Cover with Optifoam drsg. Change every 7 

days and prn.


           


Apply Moisture Barrier Paste to buttocks. Cover sacral area with Optifoam drsg. 

Change every 3 days and prn.


           


Reposition at least every 2hours or as tolerated.


           


Off-load heels with pillow.





(2) Sepsis


Assessment & Plan:  Patient with low-grade fevers, anemia, leukocytosis 

persistent, elevated platelets, abnormal labs.


Etiology currently unknown and work-up in progress.  Labs noted and imaging 

that is available as noted.  


Okay for diet


CT noted


US noted


Impression: 13 x 7 x 12.9 cm unilocular cystic mass, corresponding to lesion 

reported


on recent CT scan. Layering low level internal echoes likely represent bladder


debris.. This presumably represents a cystic ovarian lesion with debris or


hemorrhage, possibly neoplastic. Gynecological consultation is recommended


Antibiotics as per infectious disease 


local wound care as wounds do not seem to be the etiology of her sepsis


PEG tube if consented and cleared plan for Friday


cont with tube feeds


supplementation for healing 


We will monitor and follow with recommendations


Thank you for allowing me to participate in patient's care














Radhames Blas Oct 31, 2019 16:18

## 2019-10-31 NOTE — NUR
NURSE NOTES:

Received pt from [facility]. Pt is in bed AAOX-1, vss with no signs of distress. Pt is SR, NC 
2L, g-tube running Glucerna 1.2 30ml/hr, skin issues noted. IV rt forearm 22g patent and 
intact, and. Pt on cardiac monitor. Bed at its lowest position, call light in reach, and x3 
bed rails are up.

## 2019-10-31 NOTE — NUR
NURSE NOTES:

Flushed casas with irrigation water 30cc. Still noted hematuria. Will continue to monitor.

## 2019-10-31 NOTE — NUR
NURSE NOTES:

Received patient from [facility]. Patient is sleeping in bed. Patient is currently on Bipap 
15/5 FiO2 30%, patient tolerating Bipap well, no signs of respiratory distress noted. NGT is 
inserted in the right nares Glucerna 1.5 at 25cc/hr is on hold per MD order while patient is 
on Bipap. Freedom splints are applied for patient safety. Bilateral arms assessed ,bilateral 
radial pulses bounding, skin remains intact and no decrease in movement or sensation noted. 
Patient has a Charles Catheter that is patent and draining to gravity.  IV site is right 
forearm 22g, patent and asymptomatic. Bed is locked, placed in lowest position, side rails 
up x3 and padded per protocol, bed alarm on, call light within reach. Safety, aspiration and 
seizure precautions maintained. Will continue to monitor.

## 2019-10-31 NOTE — NUR
NURSE NOTES:

Left a message with Dr. Keith to prescribe Lorazepam 1mg every 4 hours, PRN. Dr Keith 
called back to approve and confirm prescription.

## 2019-10-31 NOTE — NUR
CASE MANAGEMENT: REVIEW



10/31/19



SI:      HC PNA; UTI ; SEPSIS/ ACUTE RENAL FAILURE/  ACUTE METABOLIC ENCEPHALOPATHY 

98.3   96   24   148/85  98% NC 3L

WBC 23.1; RBC 3.65; H/H 710.1/29.9; MG 1.6; BNP>21714; C-R.PROT 9.5





IS:      IV ZOSYN Q8HR

IV MEROPENEM Q12HR

LACTULOSE NG Q8/PRN 

LASIX NGT QD

LOVENOX SQ QD

IV HYDRALAZINE Q4/PRN

NORVASC NG BID 

LIPITOR NG HS

NOVOLOG SQ Q6HR

DEPAKOTE NGT Q12HR

PEPCID GT Q12HR

COLACE NGT Q8HR

ASA NG QD

PLAVIX NGT QD

NGT FEEDING 

FOLATE PO QD





**: 2W STEP DOWN UNIT



DCP: RETURN TO [facility] 



PLAN: HOLD ASA AND PLAVIX FOR PEG FRIDAY

URIN CX PENDING 

CXR FOR TODAY



-------------------------------------------------------------------------------

Addendum: 10/31/19 at 1135 by ROCIO BLACK LVN

-------------------------------------------------------------------------------

CASE MANAGEMENT: REVIEW



10/31/19



SI:      HC PNA; UTI ; SEPSIS/ ACUTE RENAL FAILURE/  ACUTE METABOLIC ENCEPHALOPATHY 

98.3   96   24   148/85  98% NC 3L

WBC 23.1; RBC 3.65; H/H 10.1/29.9; MG 1.6; BNP>39637; C-R.PROT 9.5





IS:      IV ZOSYN Q8HR

IV MEROPENEM Q12HR

LACTULOSE NG Q8/PRN 

LASIX NGT QD

LOVENOX SQ QD

IV HYDRALAZINE Q4/PRN

NORVASC NG BID 

LIPITOR NG HS

NOVOLOG SQ Q6HR

DEPAKOTE NGT Q12HR

PEPCID GT Q12HR

COLACE NGT Q8HR

ASA NG QD

PLAVIX NGT QD

NGT FEEDING 

FOLATE PO QD





**: 2W STEP DOWN UNIT



DCP: RETURN TO [facility] 



PLAN: HOLD ASA AND PLAVIX FOR PEG FRIDAY

URIN CX PENDING 

CXR FOR TODAY

## 2019-10-31 NOTE — NUR
NURSE NOTES:

Seen by Dr. May and assessed. Told him lasix 40mg iv was given early. Canceled lasix 
40mg iv once order per Dr. May.

## 2019-10-31 NOTE — PULMONOLOGY PROGRESS NOTE
Assessment/Plan


Problems:  


(1) Healthcare associated bacterial pneumonia


(2) UTI (urinary tract infection)


(3) Sepsis


(4) Dehydration


(5) CHANCE (acute kidney injury)


(6) Acute metabolic encephalopathy


(7) Hyperglycemia due to type 2 diabetes mellitus


(8) Renal failure (ARF), acute on chronic


(9) Aspiration pneumonia


(10) Abnormal TSH


(11) Pelvic mass in female


Assessment/Plan


Marked leukocytosis S/P WBC scan with uptake in pelvis, ? correspondence to 

pelvic mass


Sepsis


Possible pneumonia


E coli UTI


Acute hypoxemic respiratory failure


Acute encephalopathy


Hx CHF


HTN


CHANCE - RESOVLED


Pelvic mass/possible GYN malignancy vs cyst





Plan:


-Optimize pulmonary hygiene/mobilize as tolerated


-BiPAP PRN and qHS


-Titrate down FiO2 to keep SaO2 > 90%


-Monitor WCt, RFS sent for JAK2 mutation, per Dr. Pop BMBx if unrevealing


-Abx per ID, F/U Cx's


-monitor volumes and renal function, will given an additional dose of lasix IV, 

F/U renal recs


-GYN evaluation 


-monitor encephalopathy, ? neuro eval


-NPO, NGTF's, PEG tommorrow, SLP recs


-DVT Px: LMWH


-FC





Subjective


Allergies:  


Coded Allergies:  


     No Known Allergies (Unverified , 10/14/19)


Subjective


WCT 23 + UTI started on Zosyn CXR with PVC


On/off BiPAP and FM


Katalina NGTF's not really interactive 


Per RN occ cough no FC no wheezing





Objective





Last 24 Hour Vital Signs








  Date Time  Temp Pulse Resp B/P (MAP) Pulse Ox O2 Delivery O2 Flow Rate FiO2


 


10/31/19 12:00  100      


 


10/31/19 12:00       3.0 


 


10/31/19 12:00      Nasal Cannula 3.0 


 


10/31/19 12:00 98.6 102 25 120/58 (78) 96   


 


10/31/19 08:48  96  148/85    


 


10/31/19 08:00 98.3 96 24 148/85 (106) 98   


 


10/31/19 08:00  90      


 


10/31/19 08:00      Nasal Cannula 3.0 


 


10/31/19 06:00       3.0 


 


10/31/19 05:00        30


 


10/31/19 04:49  87 18  98 Full Face  40


 


10/31/19 04:00      Nasal Cannula 3.0 


 


10/31/19 04:00 97.7 81 23 101/75 (84) 99   


 


10/31/19 03:40  93      


 


10/31/19 02:58  91 20  98 Full Face  40


 


10/31/19 00:45  101 32  97 Full Face  40


 


10/31/19 00:00 96.3 126 26 128/76 (93) 98   


 


10/31/19 00:00      Nasal Cannula 3.0 


 


10/30/19 23:26  127      


 


10/30/19 22:56  121 35  96 Full Face  40


 


10/30/19 22:48    153/97    


 


10/30/19 22:00        30


 


10/30/19 21:49  127 36  98 Full Face  40


 


10/30/19 20:00 98.1 97 25 153/86 (108) 93   


 


10/30/19 20:00      Nasal Cannula 3.0 


 


10/30/19 19:30  93      


 


10/30/19 19:25     95 Nasal Cannula 2.0 28


 


10/30/19 18:08  95  160/78    


 


10/30/19 16:00 97.8 96 21 153/65 (94) 92   


 


10/30/19 16:00      Nasal Cannula 3.0 


 


10/30/19 16:00  88      

















Intake and Output  


 


 10/30/19 10/31/19





 18:59 06:59


 


Intake Total 200 ml 80 ml


 


Output Total  500 ml


 


Balance 200 ml -420 ml


 


  


 


IV Total 110 ml 


 


Tube Feeding 90 ml 80 ml


 


Output Urine Total  500 ml


 


# Voids 415 


 


# Bowel Movements  2








General Appearance:  no acute distress, cachetic


HEENT:  normocephalic, atraumatic, anicteric, mucous membranes moist


Respiratory/Chest:  crackles/rales


Cardiovascular:  normal peripheral pulses, normal rate, regular rhythm


Abdomen:  normal bowel sounds, soft, non tender, no organomegaly, non distended

, no mass


Extremities:  no cyanosis, no clubbing, no edema


Laboratory Tests


10/30/19 18:30: 


White Blood Count 13.9H, Red Blood Count 3.39L, Hemoglobin 9.5L, Hematocrit 

27.7L, Mean Corpuscular Volume 82, Mean Corpuscular Hemoglobin 28.1, Mean 

Corpuscular Hemoglobin Concent 34.4, Red Cell Distribution Width 12.1, Platelet 

Count 357, Mean Platelet Volume 7.1, Neutrophils (%) (Auto) 74.4, Lymphocytes (%

) (Auto) 16.6L, Monocytes (%) (Auto) 4.3, Eosinophils (%) (Auto) 3.5H, 

Basophils (%) (Auto) 1.2, Miscellaneous Test 2 [Pending], Sodium Level 144, 

Potassium Level 3.6, Chloride Level 104, Carbon Dioxide Level 30, Anion Gap 10, 

Blood Urea Nitrogen 14, Creatinine 0.9, Estimat Glomerular Filtration Rate > 60

, Glucose Level 131H, Calcium Level 8.9


10/31/19 03:45: 


White Blood Count 23.1#*H, Red Blood Count 3.65L, Hemoglobin 10.1L, Hematocrit 

29.9L, Mean Corpuscular Volume 82, Mean Corpuscular Hemoglobin 27.7, Mean 

Corpuscular Hemoglobin Concent 33.7, Red Cell Distribution Width 13.4, Platelet 

Count 392, Mean Platelet Volume 7.0, Neutrophils (%) (Auto) , Lymphocytes (%) (

Auto) , Monocytes (%) (Auto) , Eosinophils (%) (Auto) , Basophils (%) (Auto) , 

Sodium Level 141, Potassium Level 4.0, Chloride Level 105, Carbon Dioxide Level 

24, Anion Gap 12, Blood Urea Nitrogen 15, Creatinine 1.0, Estimat Glomerular 

Filtration Rate 55.5, Glucose Level 162H, Calcium Level 8.7, Differential Total 

Cells Counted 100, Neutrophils % (Manual) 90H, Lymphocytes % (Manual) 4L, 

Monocytes % (Manual) 3, Eosinophils % (Manual) 2, Basophils % (Manual) 1, Band 

Neutrophils 0, Platelet Estimate Adequate, Platelet Morphology Normal, 

Hypochromasia 1+, Anisocytosis 1+, Phosphorus Level 3.0, Magnesium Level 1.6L, 

Total Bilirubin 0.8, Aspartate Amino Transf (AST/SGOT) 22, Alanine 

Aminotransferase (ALT/SGPT) 13, Alkaline Phosphatase 96, C-Reactive Protein, 

Quantitative 9.5H, Pro-B-Type Natriuretic Peptide > [id number], Total Protein 6.4, 

Albumin 1.9L, Globulin 4.5, Albumin/Globulin Ratio 0.4L


10/31/19 10:15: 


Urine Color Yellow, Urine Appearance Cloudy, Urine pH 6, Urine Specific Gravity 

1.015, Urine Protein 3+H, Urine Glucose (UA) Negative, Urine Ketones 3+H, Urine 

Blood 2+H, Urine Nitrite Negative, Urine Bilirubin Negative, Urine Urobilinogen 

8H, Urine Leukocyte Esterase 3+H, Urine RBC 5-10H, Urine WBC TntcH, Urine 

Squamous Epithelial Cells ManyH, Urine Bacteria Few





Current Medications








 Medications


  (Trade)  Dose


 Ordered  Sig/Winnie


 Route


 PRN Reason  Start Time


 Stop Time Status Last Admin


Dose Admin


 


 Acetaminophen


  (Tylenol)  650 mg  PRN  PRN


 RECTAL


 TEMP>100.5  10/26/19 19:45


     


 


 


 Acetaminophen


  (Tylenol)  650 mg  Q4H  PRN


 NG


 Mild Pain/Temp > 100.5  10/26/19 19:45


 11/25/19 19:44  10/30/19 14:13


 


 


 Albuterol/


 Ipratropium


  (Albuterol/


 Ipratropium)  3 ml  Q4H  PRN


 HHN


 SHORTNESS OF BREATH  10/26/19 19:45


 10/31/19 19:44   


 


 


 Amlodipine


 Besylate


  (Norvasc)  2.5 mg  BID


 NG


   10/30/19 18:00


 11/29/19 17:59  10/31/19 08:48


 


 


 Aspirin


  (ASA)  81 mg  DAILY


 NG


   10/27/19 09:00


 11/14/19 08:59  10/29/19 09:05


 


 


 Atorvastatin


 Calcium


  (Lipitor)  10 mg  BEDTIME


 NG


   10/26/19 21:00


 11/13/19 20:59  10/30/19 20:30


 


 


 Clopidogrel


 Bisulfate


  (Plavix)  75 mg  DAILY


 NG


   10/31/19 09:00


 11/14/19 08:59   


 


 


 Dextrose


  (Dextrose 50%)  25 ml  Q30M  PRN


 IV


 Hypoglycemia  10/26/19 19:30


 11/13/19 06:59   


 


 


 Dextrose


  (Dextrose 50%)  50 ml  Q30M  PRN


 IV


 Hypoglycemia  10/26/19 19:30


 11/13/19 06:59   


 


 


 Divalproex Sodium


  (Depakote


 Sprinkles)  500 mg  Q12HR


 NG


   10/26/19 21:00


 11/14/19 21:59  10/31/19 08:48


 


 


 Docusate Sodium


  (Colace)  100 mg  EVERY 8  HOURS


 NG


   10/27/19 14:00


 11/25/19 08:59  10/31/19 13:42


 


 


 Enoxaparin Sodium


  (Lovenox)  30 mg  DAILY


 SUBQ


   10/27/19 09:00


 11/13/19 08:59  10/29/19 09:04


 


 


 Famotidine


  (Pepcid)  20 mg  EVERY 12  HOURS


 GT


   10/27/19 21:00


 11/25/19 17:59  10/31/19 08:48


 


 


 Folic Acid


  (Folate)  1 mg  DAILY


 ORAL


   10/31/19 09:00


 11/30/19 08:59  10/31/19 08:48


 


 


 Furosemide


  (Lasix)  40 mg  DAILY


 NG


   10/30/19 09:00


 11/26/19 08:59  10/31/19 08:48


 


 


 Hydralazine HCl


  (Apresoline)  10 mg  Q4H  PRN


 IV


 sbp greater [id number]  10/30/19 19:12


 11/29/19 19:11  10/30/19 22:48


 


 


 Insulin Aspart


  (NovoLOG)    EVERY 6  HOURS


 SUBQ


   10/30/19 12:00


 11/29/19 11:59  10/31/19 11:18


 


 


 Lactulose


  (Cephulac)  20 gm  Q8H  PRN


 NG


 Constipation  10/26/19 19:45


 11/25/19 19:44   


 


 


 Lorazepam


  (Ativan 2mg/ml


 1ml)  1 mg  Q8H  PRN


 IV


 For Anxiety  10/26/19 19:45


 10/31/19 19:44  10/31/19 11:18


 


 


 Piperacillin Sod/


 Tazobactam Sod


 3.375 gm/Sodium


 Chloride  110 ml @ 


 27.5 mls/hr  EVERY 8  HOURS


 IVPB


   10/31/19 14:00


 11/5/19 13:59  10/31/19 13:42


 

















Sammy Torres MD Oct 31, 2019 14:44

## 2019-10-31 NOTE — HEMATOLOGY/ONC PROGRESS NOTE
Assessment/Plan


Assessment/Plan





Assessment and Recs:


# Anemia of chronic disease due to underlying chronic medical issues, 

multifactorial 


--> Anemia workup has been ordered, rule out gi bleed --> ferritin is 1400+


--> No evidence of hemolysis is noted, peripheral smear has been reviewed.


--> Hgb goal >7. Transfuse prn.


--> Epogen or iron at this time is not particularly indicated


--> Medications have been reviewed


--> low threshold for gi evaluation in case has occult +


--> bone marrow biopsy is not indicated given the other more likely causes (if 

recurrent anemia) first time here


--> hgb trend 8.4->8.5-->8.2->7.7-->7.9-->10.1


--> FOLIC ACID 1mg po daily started


# Leukocytosis/elevated white blood cell count, unspecified likely related to 

underlying reaction v more likely infection


--> have reviewed peripheral smear and bandemia/neutrophilia noted


--> continue antibiotics if they have been started by ID team (VANC/ZOSYN)--> 

vanc/linda--> linda


--> wbc 39-->28k-->29k->31k-->23-->26k-->19k-->24k-->15.4-->16-->14-->23k


--> monitor for resolution


--> CT C/A/P Results reviewed


--> FOR INdium bone scan done -> Rim of activity within the pelvis, 

corresponding to expected location of the soft tissue component of the cystic 

pelvic mass described on recent imaging studies.


--> again consider gyn eval


--> if doesn't improve by friday, may consider a bone marrow biopsy, have dw 

pcp and pulm


# Pelvic mass on ct and us -- 13 x 7 x 12.9 cm unilocular cystic mass, 

corresponding to lesion reported on recent CT scan. Layering low level internal 

echoes likely represent bladder debris.. This presumably represents a cystic 

ovarian lesion with debris or hemorrhage, possibly neoplastic.


--> CA-125 is 216! elevated


--> consider gyn eval


# Hypercalcemia - btw 10-11 range when corrected to alb


--> ivf have been started


--> if remains elevated, 7.9-->8.3


--> will get pth


# Sepsis from pneumonia.  


--> pulm consulted, abx started


--> on bipap


# CHANCE (acute kidney injury) on ckd


--> cr 1.6-->2.7-->2.2->1.2


--> per renal


# UTI (urinary tract infection)


--> on abx per id


# Dehydration


--> on ivf


# Acute metabolic encephalopathy


--> likely due to pna


# Dvt ppx lovenox sq





The timing of this note does not necessarily reflect the time of the patient 

was seen.





Greatly appreciate consultation.





Subjective


HEENT:  Denies: no symptoms, eye pain, blurred vision, tearing, double vision, 

ear pain, ear discharge, nose pain, nose congestion, throat pain, throat 

swelling, mouth pain, mouth swelling, other


Cardiovascular:  Denies: no symptoms, chest pain, edema, irregular heart rate, 

lightheadedness, palpitations, syncope, other


Respiratory:  Denies: no symptoms, cough, shortness of breath, SOB with 

excertion, SOB at rest, sputum, wheezing, other


Neurologic/Psychiatric:  Denies: no symptoms, anxiety, depressed, emotional 

problems, headache, numbness, paresthesia, pre-existing deficit, seizure, 

tingling, tremors, weakness, other


Endocrine:  Denies: no symptoms, excessive sweating, flushing, intolerance to 

cold, intolerance to heat, increased hunger, increased thirst, increased urine, 

unexplained weight gain, unexplained weight loss, other


Hematologic/Lymphatic:  Denies: no symptoms, anemia, easy bleeding, easy 

bruising, adenopathy, other


Allergies:  


Coded Allergies:  


     No Known Allergies (Unverified , 10/14/19)


Subjective


10/14: hgb has improved, no bleeding, labs reivewed


10/15: no events to report


10/16: a+ o x1, no bleeding or chills noted, no major changes, occult pending


10/17: no events noted, no bleeding, no chills, no hematemesis/hematochezia


10/18: remains confused, with abx, on nc


10/19: cr is worse, hgb 8.4, no bleeding, night sweats, chills


10/20: no f/c, no night sweats, labs noted, cr worse


10/21: no bleeding or chills, no night sweats, labs pending this am


10/22: pelvic mass noted on imaging, no bleeding reported, ordered ca125


10/23: no events, remains lethargic, is on abx, seen by surg


10/24: with raid response overnight, rr high as well as bp, vidal to icu


10/25: no events, no bleeding, to undergo a indium scan with id


10/26: comfortable, electrolytes reviewed, given mag and k


10/29: back on bipap with gt feeds, dw rn this am


10/30: remains very confused, no f/c, no bleeding, with urinary retention, 

folic acid started


10/31: sleeping, is on bipap, no events, wbc is higher this am





Objective


Objective





Current Medications








 Medications


  (Trade)  Dose


 Ordered  Sig/Winnie


 Route


 PRN Reason  Start Time


 Stop Time Status Last Admin


Dose Admin


 


 Acetaminophen


  (Tylenol)  650 mg  PRN  PRN


 RECTAL


 TEMP>100.5  10/26/19 19:45


     


 


 


 Acetaminophen


  (Tylenol)  650 mg  Q4H  PRN


 NG


 Mild Pain/Temp > 100.5  10/26/19 19:45


 11/25/19 19:44  10/30/19 14:13


 


 


 Albuterol/


 Ipratropium


  (Albuterol/


 Ipratropium)  3 ml  Q4H  PRN


 HHN


 SHORTNESS OF BREATH  10/26/19 19:45


 10/31/19 19:44   


 


 


 Amlodipine


 Besylate


  (Norvasc)  2.5 mg  BID


 NG


   10/30/19 18:00


 11/29/19 17:59  10/30/19 18:08


 


 


 Aspirin


  (ASA)  81 mg  DAILY


 NG


   10/27/19 09:00


 11/14/19 08:59  10/29/19 09:05


 


 


 Atorvastatin


 Calcium


  (Lipitor)  10 mg  BEDTIME


 NG


   10/26/19 21:00


 11/13/19 20:59  10/30/19 20:30


 


 


 Clopidogrel


 Bisulfate


  (Plavix)  75 mg  DAILY


 NG


   10/31/19 09:00


 11/14/19 08:59   


 


 


 Dextrose


  (Dextrose 50%)  25 ml  Q30M  PRN


 IV


 Hypoglycemia  10/26/19 19:30


 11/13/19 06:59   


 


 


 Dextrose


  (Dextrose 50%)  50 ml  Q30M  PRN


 IV


 Hypoglycemia  10/26/19 19:30


 11/13/19 06:59   


 


 


 Divalproex Sodium


  (Depakote


 Sprinkles)  500 mg  Q12HR


 NG


   10/26/19 21:00


 11/14/19 21:59  10/30/19 20:30


 


 


 Docusate Sodium


  (Colace)  100 mg  EVERY 8  HOURS


 NG


   10/27/19 14:00


 11/25/19 08:59  10/30/19 21:46


 


 


 Enoxaparin Sodium


  (Lovenox)  30 mg  DAILY


 SUBQ


   10/27/19 09:00


 11/13/19 08:59  10/29/19 09:04


 


 


 Famotidine


  (Pepcid)  20 mg  EVERY 12  HOURS


 GT


   10/27/19 21:00


 11/25/19 17:59  10/30/19 20:30


 


 


 Folic Acid


  (Folate)  1 mg  DAILY


 ORAL


   10/31/19 09:00


 11/30/19 08:59   


 


 


 Furosemide


  (Lasix)  40 mg  DAILY


 NG


   10/30/19 09:00


 11/26/19 08:59  10/30/19 10:06


 


 


 Hydralazine HCl


  (Apresoline)  10 mg  Q4H  PRN


 IV


 sbp greater [id number]  10/30/19 19:12


 11/29/19 19:11  10/30/19 22:48


 


 


 Insulin Aspart


  (NovoLOG)    EVERY 6  HOURS


 SUBQ


   10/30/19 12:00


 11/29/19 11:59  10/31/19 00:23


 


 


 Lactulose


  (Cephulac)  20 gm  Q8H  PRN


 NG


 Constipation  10/26/19 19:45


 11/25/19 19:44   


 


 


 Lorazepam


  (Ativan 2mg/ml


 1ml)  1 mg  Q8H  PRN


 IV


 For Anxiety  10/26/19 19:45


 10/31/19 19:44  10/30/19 14:14


 











Last 24 Hour Vital Signs








  Date Time  Temp Pulse Resp B/P (MAP) Pulse Ox O2 Delivery O2 Flow Rate FiO2


 


10/31/19 04:49  87 18  98 Full Face  40


 


10/31/19 04:00      Nasal Cannula 3.0 


 


10/31/19 04:00 97.7 81 23 101/75 (84) 99   


 


10/31/19 03:40  93      


 


10/31/19 02:58  91 20  98 Full Face  40


 


10/31/19 00:45  101 32  97 Full Face  40


 


10/31/19 00:00 96.3 126 26 128/76 (93) 98   


 


10/31/19 00:00      Nasal Cannula 3.0 


 


10/30/19 23:26  127      


 


10/30/19 22:56  121 35  96 Full Face  40


 


10/30/19 22:48    153/97    


 


10/30/19 22:00        30


 


10/30/19 21:49  127 36  98 Full Face  40


 


10/30/19 20:00 98.1 97 25 153/86 (108) 93   


 


10/30/19 20:00      Nasal Cannula 3.0 


 


10/30/19 19:30  93      


 


10/30/19 19:25     95 Nasal Cannula 2.0 28


 


10/30/19 18:08  95  160/78    


 


10/30/19 16:00 97.8 96 21 153/65 (94) 92   


 


10/30/19 16:00      Nasal Cannula 3.0 


 


10/30/19 16:00  88      


 


10/30/19 12:00 98.1 120 23 171/86 (114) 92   


 


10/30/19 12:00      Bi-pap  


 


10/30/19 11:43  83      


 


10/30/19 11:33  101 18  97 Facial  40


 


10/30/19 09:01  88 18  96 Full Face  40


 


10/30/19 08:00      Nasal Cannula 3.0 


 


10/30/19 08:00 97.1 106 22 165/88 (113) 95   


 


10/30/19 08:00  127      


 


10/30/19 07:55  128 33  96 Full Face  40


 


10/30/19 07:12     97 Nasal Cannula 2.0 28


 


10/30/19 05:44  78 20  98 Facial  40


 


10/30/19 04:00      Nasal Cannula 2.0 


 


10/30/19 04:00 97.7 80 21 108/72 (84) 98   


 


10/30/19 04:00  86      


 


10/30/19 03:16  85 26  96 Facial  40


 


10/30/19 01:24  79 24  95 Facial  40


 


10/30/19 00:53  80 25  96 Facial  40


 


10/30/19 00:00 98.1 82 20 94/58 (70) 100   


 


10/30/19 00:00  85      


 


10/30/19 00:00      Nasal Cannula 2.0 


 


10/29/19 22:50  106 25  97 Facial  50


 


10/29/19 22:00        30


 


10/29/19 20:36  130 31  94 Facial  50


 


10/29/19 20:00  101      


 


10/29/19 20:00 98.4 75 22 119/87 (98) 98   


 


10/29/19 20:00      Nasal Cannula 2.0 


 


10/29/19 19:33     95 Nasal Cannula 2.0 28


 


10/29/19 16:00      Nasal Cannula 2.0 


 


10/29/19 16:00 98.1 62 20 96/70 (79) 97   


 


10/29/19 15:33  95      


 


10/29/19 12:00 98.1 80 21 101/72 (82) 98   


 


10/29/19 12:00      Nasal Cannula 2.0 


 


10/29/19 11:38  76      


 


10/29/19 08:00 98.2 86 21 95/41 (59) 97   


 


10/29/19 08:00      Nasal Cannula 2.0 


 


10/29/19 07:59  83      


 


10/29/19 07:56     98 Nasal Cannula 2.0 28

















Intake and Output  


 


 10/30/19 10/31/19





 19:00 07:00


 


Intake Total 220 ml 60 ml


 


Balance 220 ml 60 ml


 


  


 


IV Total 110 ml 


 


Tube Feeding 110 ml 60 ml


 


# Voids 385 


 


# Bowel Movements  1











Labs








Test


  10/30/19


18:30 10/31/19


03:45


 


White Blood Count


  13.9 K/UL


(4.8-10.8) 23.1 K/UL


(4.8-10.8)


 


Red Blood Count


  3.39 M/UL


(4.20-5.40) 3.65 M/UL


(4.20-5.40)


 


Hemoglobin


  9.5 G/DL


(12.0-16.0) 10.1 G/DL


(12.0-16.0)


 


Hematocrit


  27.7 %


(37.0-47.0) 29.9 %


(37.0-47.0)


 


Mean Corpuscular Volume 82 FL (80-99)  82 FL (80-99) 


 


Mean Corpuscular Hemoglobin


  28.1 PG


(27.0-31.0) 27.7 PG


(27.0-31.0)


 


Mean Corpuscular Hemoglobin


Concent 34.4 G/DL


(32.0-36.0) 33.7 G/DL


(32.0-36.0)


 


Red Cell Distribution Width


  12.1 %


(11.6-14.8) 13.4 %


(11.6-14.8)


 


Platelet Count


  357 K/UL


(150-450) 392 K/UL


(150-450)


 


Mean Platelet Volume


  7.1 FL


(6.5-10.1) 7.0 FL


(6.5-10.1)


 


Neutrophils (%) (Auto)


  74.4 %


(45.0-75.0)  % (45.0-75.0) 


 


 


Lymphocytes (%) (Auto)


  16.6 %


(20.0-45.0)  % (20.0-45.0) 


 


 


Monocytes (%) (Auto)


  4.3 %


(1.0-10.0)  % (1.0-10.0) 


 


 


Eosinophils (%) (Auto)


  3.5 %


(0.0-3.0)  % (0.0-3.0) 


 


 


Basophils (%) (Auto)


  1.2 %


(0.0-2.0)  % (0.0-2.0) 


 


 


Sodium Level


  144 MMOL/L


(136-145) 141 MMOL/L


(136-145)


 


Potassium Level


  3.6 MMOL/L


(3.5-5.1) 4.0 MMOL/L


(3.5-5.1)


 


Chloride Level


  104 MMOL/L


() 105 MMOL/L


()


 


Carbon Dioxide Level


  30 MMOL/L


(21-32) 24 MMOL/L


(21-32)


 


Anion Gap


  10 mmol/L


(5-15) 12 mmol/L


(5-15)


 


Blood Urea Nitrogen


  14 mg/dL


(7-18) 15 mg/dL


(7-18)


 


Creatinine


  0.9 MG/DL


(0.55-1.30) 1.0 MG/DL


(0.55-1.30)


 


Estimat Glomerular Filtration


Rate > 60 mL/min


(>60) 55.5 mL/min


(>60)


 


Glucose Level


  131 MG/DL


() 162 MG/DL


()


 


Calcium Level


  8.9 MG/DL


(8.5-10.1) 8.7 MG/DL


(8.5-10.1)


 


Phosphorus Level


  


  3.0 MG/DL


(2.5-4.9)


 


Magnesium Level


  


  1.6 MG/DL


(1.8-2.4)


 


Total Bilirubin


  


  0.8 MG/DL


(0.2-1.0)


 


Aspartate Amino Transf


(AST/SGOT) 


  22 U/L (15-37) 


 


 


Alanine Aminotransferase


(ALT/SGPT) 


  13 U/L (12-78) 


 


 


Alkaline Phosphatase


  


  96 U/L


()


 


C-Reactive Protein,


Quantitative 


  9.5 mg/dL


(0.00-0.90)


 


Pro-B-Type Natriuretic Peptide


  


  > 22264 pg/mL


(0-125)


 


Total Protein


  


  6.4 G/DL


(6.4-8.2)


 


Albumin


  


  1.9 G/DL


(3.4-5.0)


 


Globulin  4.5 g/dL 


 


Albumin/Globulin Ratio  0.4 (1.0-2.7) 








Height (Feet):  5


Height (Inches):  5.00


Weight (Pounds):  186


Objective





Physical Exam:


Vitals: reviewed


General Appearance:  NAD


HEENT:  normocephalic, atraumatic


Neck:  non-tender, normal alignment


Respiratory/Chest:  normal breath sounds bilaterally ++ bipap


Cardiovascular/Chest: rrr


Abdomen:  normal bowel sounds, soft, nontender


Extremities:  normal range of motion











Mike Pop MD Oct 31, 2019 05:52

## 2019-10-31 NOTE — NUR
NURSE NOTES:

Bed bath given and kept dry, clean and comfortable. No distress/SOB noted. Will continue 
plan of care.

## 2019-10-31 NOTE — NUR
NURSE NOTES:

Received report from ELIZABETH Eric. Patient awake and opens eyes without tracking. O2 sat 98% 
with O2 3L/min via NC. NGT intact and running with glucerna 1.5 30ml/hr. Right FA 22G 
intact, clean and SL. Kept dry, clean ,comfortable and HOB>30. Will continue plan of care.

## 2019-10-31 NOTE — CARDIOLOGY PROGRESS NOTE
Assessment/Plan


Assessment/Plan


1. acute congestive heart failure


2. Abnormal cardiac enzyme demand related due to infection and profound anemia 


3. Agitation 


4. Urinary tract infection.


5. Acute renal failure.


6. Profound anemia.


7. Diabetes mellitus.


8. History of hypertension.


9. History of mood disorder.


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 


11. arf 


12. pelvic mass


13  sepsis 


14. pneumonia 











not clear why she is dual antiplt agent as such i am not sure how safe it will 

be to long term 


echo mild systolic dysfucntion 


telel personal reviewed sinus 


iv abx 


wbc up agian 





cxr reviewed personally 


bp borderline 


d/w dr plummer who is planning a peg will need asa and palvix held 


iv lasix tonite and in am 


labs in am





Subjective


ROS Limited/Unobtainable:  Yes





Objective





Last 24 Hour Vital Signs








  Date Time  Temp Pulse Resp B/P (MAP) Pulse Ox O2 Delivery O2 Flow Rate FiO2


 


10/31/19 17:19  101  110/62    


 


10/31/19 16:00      Nasal Cannula 2.0 


 


10/31/19 16:00  93      


 


10/31/19 16:00       2.0 


 


10/31/19 16:00 98.9 101 24 110/62 (78) 95   


 


10/31/19 12:00  100      


 


10/31/19 12:00       3.0 


 


10/31/19 12:00      Nasal Cannula 3.0 


 


10/31/19 12:00 98.6 102 25 120/58 (78) 96   


 


10/31/19 08:48  96  148/85    


 


10/31/19 08:00 98.3 96 24 148/85 (106) 98   


 


10/31/19 08:00  90      


 


10/31/19 08:00      Nasal Cannula 3.0 


 


10/31/19 06:00       3.0 


 


10/31/19 05:00        30


 


10/31/19 04:49  87 18  98 Full Face  40


 


10/31/19 04:00      Nasal Cannula 3.0 


 


10/31/19 04:00 97.7 81 23 101/75 (84) 99   


 


10/31/19 03:40  93      


 


10/31/19 02:58  91 20  98 Full Face  40


 


10/31/19 00:45  101 32  97 Full Face  40


 


10/31/19 00:00 96.3 126 26 128/76 (93) 98   


 


10/31/19 00:00      Nasal Cannula 3.0 


 


10/30/19 23:26  127      


 


10/30/19 22:56  121 35  96 Full Face  40


 


10/30/19 22:48    153/97    


 


10/30/19 22:00        30


 


10/30/19 21:49  127 36  98 Full Face  40


 


10/30/19 20:00 98.1 97 25 153/86 (108) 93   


 


10/30/19 20:00      Nasal Cannula 3.0 


 


10/30/19 19:30  93      


 


10/30/19 19:25     95 Nasal Cannula 2.0 28








General Appearance:  other


Cardiovascular:  normal rate


Respiratory/Chest:  lungs clear


Abdomen:  non tender, soft


Extremities:  no swelling











Intake and Output  


 


 10/30/19 10/31/19





 19:00 07:00


 


Intake Total 220 ml 80 ml


 


Output Total  500 ml


 


Balance 220 ml -420 ml


 


  


 


IV Total 110 ml 


 


Tube Feeding 110 ml 80 ml


 


Output Urine Total  500 ml


 


# Voids 385 


 


# Bowel Movements  2











Laboratory Tests








Test


  10/31/19


03:45 10/31/19


10:15


 


White Blood Count


  23.1 K/UL


(4.8-10.8)  #*H 


 


 


Red Blood Count


  3.65 M/UL


(4.20-5.40)  L 


 


 


Hemoglobin


  10.1 G/DL


(12.0-16.0)  L 


 


 


Hematocrit


  29.9 %


(37.0-47.0)  L 


 


 


Mean Corpuscular Volume 82 FL (80-99)   


 


Mean Corpuscular Hemoglobin


  27.7 PG


(27.0-31.0) 


 


 


Mean Corpuscular Hemoglobin


Concent 33.7 G/DL


(32.0-36.0) 


 


 


Red Cell Distribution Width


  13.4 %


(11.6-14.8) 


 


 


Platelet Count


  392 K/UL


(150-450) 


 


 


Mean Platelet Volume


  7.0 FL


(6.5-10.1) 


 


 


Neutrophils (%) (Auto)


  % (45.0-75.0)


  


 


 


Lymphocytes (%) (Auto)


  % (20.0-45.0)


  


 


 


Monocytes (%) (Auto)  % (1.0-10.0)   


 


Eosinophils (%) (Auto)  % (0.0-3.0)   


 


Basophils (%) (Auto)  % (0.0-2.0)   


 


Differential Total Cells


Counted 100  


  


 


 


Neutrophils % (Manual) 90 % (45-75)  H 


 


Lymphocytes % (Manual) 4 % (20-45)  L 


 


Monocytes % (Manual) 3 % (1-10)   


 


Eosinophils % (Manual) 2 % (0-3)   


 


Basophils % (Manual) 1 % (0-2)   


 


Band Neutrophils 0 % (0-8)   


 


Platelet Estimate Adequate   


 


Platelet Morphology Normal   


 


Hypochromasia 1+   


 


Anisocytosis 1+   


 


Sodium Level


  141 MMOL/L


(136-145) 


 


 


Potassium Level


  4.0 MMOL/L


(3.5-5.1) 


 


 


Chloride Level


  105 MMOL/L


() 


 


 


Carbon Dioxide Level


  24 MMOL/L


(21-32) 


 


 


Anion Gap


  12 mmol/L


(5-15) 


 


 


Blood Urea Nitrogen


  15 mg/dL


(7-18) 


 


 


Creatinine


  1.0 MG/DL


(0.55-1.30) 


 


 


Estimat Glomerular Filtration


Rate 55.5 mL/min


(>60) 


 


 


Glucose Level


  162 MG/DL


()  H 


 


 


Calcium Level


  8.7 MG/DL


(8.5-10.1) 


 


 


Phosphorus Level


  3.0 MG/DL


(2.5-4.9) 


 


 


Magnesium Level


  1.6 MG/DL


(1.8-2.4)  L 


 


 


Total Bilirubin


  0.8 MG/DL


(0.2-1.0) 


 


 


Aspartate Amino Transf


(AST/SGOT) 22 U/L (15-37)


  


 


 


Alanine Aminotransferase


(ALT/SGPT) 13 U/L (12-78)


  


 


 


Alkaline Phosphatase


  96 U/L


() 


 


 


C-Reactive Protein,


Quantitative 9.5 mg/dL


(0.00-0.90)  H 


 


 


Pro-B-Type Natriuretic Peptide


  > 13763 pg/mL


(0-125)  H 


 


 


Total Protein


  6.4 G/DL


(6.4-8.2) 


 


 


Albumin


  1.9 G/DL


(3.4-5.0)  L 


 


 


Globulin 4.5 g/dL   


 


Albumin/Globulin Ratio


  0.4 (1.0-2.7)


L 


 


 


Urine Color  Yellow  


 


Urine Appearance  Cloudy  


 


Urine pH  6 (4.5-8.0)  


 


Urine Specific Gravity


  


  1.015


(1.005-1.035)


 


Urine Protein


  


  3+ (NEGATIVE)


H


 


Urine Glucose (UA)


  


  Negative


(NEGATIVE)


 


Urine Ketones


  


  3+ (NEGATIVE)


H


 


Urine Blood


  


  2+ (NEGATIVE)


H


 


Urine Nitrite


  


  Negative


(NEGATIVE)


 


Urine Bilirubin


  


  Negative


(NEGATIVE)


 


Urine Urobilinogen


  


  8 MG/DL


(0.0-1.0)  H


 


Urine Leukocyte Esterase


  


  3+ (NEGATIVE)


H


 


Urine RBC


  


  5-10 /HPF (0 -


2)  H


 


Urine WBC


  


  Tntc /HPF (0 -


2)  H


 


Urine Squamous Epithelial


Cells 


  Many /LPF


(NONE/OCC)  H


 


Urine Bacteria


  


  Few /HPF


(NONE)

















Rob May MD Oct 31, 2019 18:33

## 2019-11-01 VITALS — SYSTOLIC BLOOD PRESSURE: 110 MMHG | DIASTOLIC BLOOD PRESSURE: 54 MMHG

## 2019-11-01 VITALS — SYSTOLIC BLOOD PRESSURE: 146 MMHG | DIASTOLIC BLOOD PRESSURE: 79 MMHG

## 2019-11-01 VITALS — SYSTOLIC BLOOD PRESSURE: 130 MMHG | DIASTOLIC BLOOD PRESSURE: 86 MMHG

## 2019-11-01 VITALS — SYSTOLIC BLOOD PRESSURE: 148 MMHG | DIASTOLIC BLOOD PRESSURE: 64 MMHG

## 2019-11-01 VITALS — SYSTOLIC BLOOD PRESSURE: 138 MMHG | DIASTOLIC BLOOD PRESSURE: 71 MMHG

## 2019-11-01 VITALS — SYSTOLIC BLOOD PRESSURE: 102 MMHG | DIASTOLIC BLOOD PRESSURE: 75 MMHG

## 2019-11-01 VITALS — SYSTOLIC BLOOD PRESSURE: 106 MMHG | DIASTOLIC BLOOD PRESSURE: 70 MMHG

## 2019-11-01 VITALS — DIASTOLIC BLOOD PRESSURE: 77 MMHG | SYSTOLIC BLOOD PRESSURE: 135 MMHG

## 2019-11-01 VITALS — SYSTOLIC BLOOD PRESSURE: 139 MMHG | DIASTOLIC BLOOD PRESSURE: 80 MMHG

## 2019-11-01 VITALS — SYSTOLIC BLOOD PRESSURE: 110 MMHG | DIASTOLIC BLOOD PRESSURE: 60 MMHG

## 2019-11-01 LAB
ADD MANUAL DIFF: NO
ALBUMIN SERPL-MCNC: 1.9 G/DL (ref 3.4–5)
ALBUMIN/GLOB SERPL: 0.5 {RATIO} (ref 1–2.7)
ALP SERPL-CCNC: 80 U/L (ref 46–116)
ALT SERPL-CCNC: 13 U/L (ref 12–78)
ANION GAP SERPL CALC-SCNC: 7 MMOL/L (ref 5–15)
AST SERPL-CCNC: 19 U/L (ref 15–37)
BILIRUB SERPL-MCNC: 0.8 MG/DL (ref 0.2–1)
BUN SERPL-MCNC: 16 MG/DL (ref 7–18)
CALCIUM SERPL-MCNC: 8.5 MG/DL (ref 8.5–10.1)
CHLORIDE SERPL-SCNC: 105 MMOL/L (ref 98–107)
CO2 SERPL-SCNC: 30 MMOL/L (ref 21–32)
CREAT SERPL-MCNC: 1 MG/DL (ref 0.55–1.3)
ERYTHROCYTE [DISTWIDTH] IN BLOOD BY AUTOMATED COUNT: 13.5 % (ref 11.6–14.8)
GLOBULIN SER-MCNC: 4.1 G/DL
HCT VFR BLD CALC: 26 % (ref 37–47)
HGB BLD-MCNC: 8.5 G/DL (ref 12–16)
MCV RBC AUTO: 83 FL (ref 80–99)
PLATELET # BLD: 369 K/UL (ref 150–450)
POTASSIUM SERPL-SCNC: 3.5 MMOL/L (ref 3.5–5.1)
RBC # BLD AUTO: 3.14 M/UL (ref 4.2–5.4)
SODIUM SERPL-SCNC: 142 MMOL/L (ref 136–145)
WBC # BLD AUTO: 14.7 K/UL (ref 4.8–10.8)

## 2019-11-01 PROCEDURE — 0DH63UZ INSERTION OF FEEDING DEVICE INTO STOMACH, PERCUTANEOUS APPROACH: ICD-10-PCS

## 2019-11-01 RX ADMIN — ASPIRIN 81 MG SCH MG: 81 TABLET ORAL at 09:00

## 2019-11-01 RX ADMIN — INSULIN ASPART SCH UNITS: 100 INJECTION, SOLUTION INTRAVENOUS; SUBCUTANEOUS at 12:44

## 2019-11-01 RX ADMIN — ENOXAPARIN SODIUM SCH MG: 30 INJECTION SUBCUTANEOUS at 09:00

## 2019-11-01 RX ADMIN — INSULIN ASPART SCH UNITS: 100 INJECTION, SOLUTION INTRAVENOUS; SUBCUTANEOUS at 17:31

## 2019-11-01 RX ADMIN — INSULIN ASPART SCH UNITS: 100 INJECTION, SOLUTION INTRAVENOUS; SUBCUTANEOUS at 23:53

## 2019-11-01 RX ADMIN — DIVALPROEX SODIUM SCH MG: 125 CAPSULE ORAL at 09:49

## 2019-11-01 RX ADMIN — DIVALPROEX SODIUM SCH MG: 125 CAPSULE ORAL at 21:40

## 2019-11-01 RX ADMIN — DEXTROSE MONOHYDRATE SCH MLS/HR: 50 INJECTION, SOLUTION INTRAVENOUS at 14:12

## 2019-11-01 RX ADMIN — FUROSEMIDE SCH MG: 40 TABLET ORAL at 09:49

## 2019-11-01 RX ADMIN — INSULIN ASPART SCH UNITS: 100 INJECTION, SOLUTION INTRAVENOUS; SUBCUTANEOUS at 06:00

## 2019-11-01 RX ADMIN — DOCUSATE SODIUM SCH MG: 50 LIQUID ORAL at 21:40

## 2019-11-01 RX ADMIN — LORAZEPAM PRN MG: 2 INJECTION, SOLUTION INTRAMUSCULAR; INTRAVENOUS at 05:26

## 2019-11-01 RX ADMIN — DEXTROSE MONOHYDRATE SCH MLS/HR: 50 INJECTION, SOLUTION INTRAVENOUS at 05:26

## 2019-11-01 RX ADMIN — DOCUSATE SODIUM SCH MG: 50 LIQUID ORAL at 05:25

## 2019-11-01 RX ADMIN — INSULIN ASPART SCH UNITS: 100 INJECTION, SOLUTION INTRAVENOUS; SUBCUTANEOUS at 00:00

## 2019-11-01 RX ADMIN — DOCUSATE SODIUM SCH MG: 50 LIQUID ORAL at 14:12

## 2019-11-01 NOTE — NEPHROLOGY PROGRESS NOTE
Assessment/Plan


Problem List:  


(1) Renal failure (ARF), acute on chronic


Assessment:  resolved





(2) Dehydration


(3) ARF (acute renal failure)


(4) Sepsis


(5) Acute metabolic encephalopathy


(6) Hyperglycemia due to type 2 diabetes mellitus


(7) UTI (urinary tract infection)


(8) Diabetic nephropathy


Assessment





Renal failure, Acute on Chronic resolved


Dehydration


Severe Anemia


Sepsis / Pneumonia / UTI


DM, OOC


Proteinuria , Diabetic Nephropathy


Acute encephalopathy


Plan





has PEg


Per pulmonary





Cr lowering 


Will order urine studies and monitor renal parameters


kidney YOANDY noted


lower iv fluids rate


WBCs rising


has casas again due to retention


Avoid Nephrotoxics








previously


Anemia salas


2D echo


24 H urine protein


Keep BP and BS in check


I am holding  her psych meds due to low MS


Per orders





Subjective


ROS Limited/Unobtainable:  No


Constitutional:  Reports: malaise





Objective


Objective





Last 24 Hour Vital Signs








  Date Time  Temp Pulse Resp B/P (MAP) Pulse Ox O2 Delivery O2 Flow Rate FiO2


 


11/1/19 09:37  88   100   


 


11/1/19 09:00  92  118/68    


 


11/1/19 08:45 97.4 88 16 146/79 100 Nasal Cannula 4 


 


11/1/19 08:39  91 14  98   


 


11/1/19 08:35  90 13 148/64 100 Nasal Cannula 4 


 


11/1/19 08:30  89 15 138/71 100 Nasal Cannula 4 


 


11/1/19 08:23 97.5 91 14 139/80 100 Nasal Cannula 4 


 


11/1/19 08:00       2.0 


 


11/1/19 07:12     96 Nasal Cannula 2.0 28


 


11/1/19 04:00 97.9 98 36 110/60 (77) 99   


 


11/1/19 04:00        40


 


11/1/19 04:00      Bi-pap  


 


11/1/19 03:44  94      


 


11/1/19 03:00  80 34  99 Full Face  40


 


11/1/19 01:02  71 32  98 Full Face  40


 


11/1/19 00:00  96      


 


11/1/19 00:00      Bi-pap  


 


11/1/19 00:00 98.2 100 35 130/86 (101) 97   


 


10/31/19 22:29  84 19  99 Full Face  40


 


10/31/19 20:29     95 Nasal Cannula 2.0 28


 


10/31/19 20:01        40


 


10/31/19 20:00 98.6 101 24 108/65 (79) 95   


 


10/31/19 20:00      Nasal Cannula 2.0 


 


10/31/19 20:00        30


 


10/31/19 19:51  109      


 


10/31/19 17:19  101  110/62    


 


10/31/19 16:00      Nasal Cannula 2.0 


 


10/31/19 16:00  93      


 


10/31/19 16:00       2.0 


 


10/31/19 16:00 98.9 101 24 110/62 (78) 95   


 


10/31/19 12:00  100      


 


10/31/19 12:00       3.0 


 


10/31/19 12:00      Nasal Cannula 3.0 


 


10/31/19 12:00 98.6 102 25 120/58 (78) 96   

















Intake and Output  


 


 10/31/19 11/1/19





 19:00 07:00


 


Intake Total 710.0 ml 183.87782 ml


 


Output Total 400 ml 500 ml


 


Balance 310.0 ml -316.44733 ml


 


  


 


Free Water 40 ml 


 


IV Total 310.0 ml 153.96215 ml


 


Tube Feeding 360 ml 30 ml


 


Output Urine Total 400 ml 500 ml


 


# Voids  1








Laboratory Tests


11/1/19 03:25: 


White Blood Count 14.7H, Red Blood Count 3.14L, Hemoglobin 8.5L, Hematocrit 

26.0L, Mean Corpuscular Volume 83, Mean Corpuscular Hemoglobin 27.0, Mean 

Corpuscular Hemoglobin Concent 32.6, Red Cell Distribution Width 13.5, Platelet 

Count 369, Mean Platelet Volume 6.8, Neutrophils (%) (Auto) , Lymphocytes (%) (

Auto) , Monocytes (%) (Auto) , Eosinophils (%) (Auto) , Basophils (%) (Auto) , 

Activated Partial Thromboplast Time 33, Sodium Level 142, Potassium Level 3.5, 

Chloride Level 105, Carbon Dioxide Level 30, Anion Gap 7, Blood Urea Nitrogen 16

, Creatinine 1.0, Estimat Glomerular Filtration Rate 55.5, Glucose Level 206H, 

Calcium Level 8.5, Total Bilirubin 0.8, Aspartate Amino Transf (AST/SGOT) 19, 

Alanine Aminotransferase (ALT/SGPT) 13, Alkaline Phosphatase 80, Total Protein 

6.0L, Albumin 1.9L, Globulin 4.1, Albumin/Globulin Ratio 0.5L


Height (Feet):  5


Height (Inches):  5.00


Weight (Pounds):  194


General Appearance:  no apparent distress


Cardiovascular:  normal rate


Respiratory/Chest:  decreased breath sounds


Abdomen:  other - has PEG


Objective


no change











Lukasz Vizcaino MD Nov 1, 2019 11:08

## 2019-11-01 NOTE — NUR
NURSE NOTES:

Received patient in bed with SOB, awaiting for RT. O2 NC on at 2LPMGT feeding on. HOB 
elevated. Bed locked in lowest position. Call light within reach. Will continue plan of 
care.

## 2019-11-01 NOTE — NUR
NURSE NOTES:

Patient is drowsy, unable to follow commands and make needs known. Patient is breathing on 
bipap @ this time. No s/s of respiratory distress noted. 20 Fr GT in place with clean 
dressing. HoB elevated. Charles catheter patent and draining well. Right forearm 22g IV site 
asymptomatic. Bed locked in lowest position with padded side rails up x 3. Patient on low 
air loss mattress. All needs attended to. Call light within reach. Will continue to monitor.

## 2019-11-01 NOTE — GENERAL PROGRESS NOTE
Assessment/Plan


Problem List:  


(1) Abnormal TSH


ICD Codes:  R79.89 - Other specified abnormal findings of blood chemistry


SNOMED:  533853067


(2) Hyperglycemia due to type 2 diabetes mellitus


ICD Codes:  E11.65 - Type 2 diabetes mellitus with hyperglycemia


SNOMED:  091048207081531, 95862522


Qualifiers:  


   Qualified Codes:  E11.65 - Type 2 diabetes mellitus with hyperglycemia; 

Z79.4 - Long term (current) use of insulin


(3) Acute metabolic encephalopathy


ICD Codes:  G93.41 - Metabolic encephalopathy


SNOMED:  89066632, 525330456


(4) ARF (acute renal failure)


ICD Codes:  N17.9 - Acute kidney failure, unspecified


SNOMED:  70541251


Qualifiers:  


   Qualified Codes:  N17.9 - Acute kidney failure, unspecified


(5) Healthcare associated bacterial pneumonia


ICD Codes:  J15.9 - Unspecified bacterial pneumonia


SNOMED:  866393473


Status:  not improved


Assessment/Plan:


add Levemir 6 units daily 


continue NISS every 6 hours 


hypoglycemia protocol in order





Subjective


ROS Limited/Unobtainable:  Yes


Allergies:  


Coded Allergies:  


     No Known Allergies (Unverified , 10/14/19)


Subjective


events noted 


glucose values on higher side 











Item Value  Date Time


 


Bedside Blood Glucose 213 mg/dl H 11/1/19 0600


 


Bedside Blood Glucose 223 mg/dl H 11/1/19 0000


 


Bedside Blood Glucose 190 mg/dl H 10/31/19 1800


 


Bedside Blood Glucose 192 mg/dl H 10/31/19 1200


 


Bedside Blood Glucose 166 mg/dl H 10/31/19 0611


 


Bedside Blood Glucose 228 mg/dl H 10/31/19 0023











Objective





Last 24 Hour Vital Signs








  Date Time  Temp Pulse Resp B/P (MAP) Pulse Ox O2 Delivery O2 Flow Rate FiO2


 


11/1/19 04:00 97.9 98 36 110/60 (77) 99   


 


11/1/19 04:00        40


 


11/1/19 04:00      Bi-pap  


 


11/1/19 03:44  94      


 


11/1/19 03:00  80 34  99 Full Face  40


 


11/1/19 01:02  71 32  98 Full Face  40


 


11/1/19 00:00  96      


 


11/1/19 00:00      Bi-pap  


 


11/1/19 00:00 98.2 100 35 130/86 (101) 97   


 


10/31/19 22:29  84 19  99 Full Face  40


 


10/31/19 20:29     95 Nasal Cannula 2.0 28


 


10/31/19 20:01        40


 


10/31/19 20:00 98.6 101 24 108/65 (79) 95   


 


10/31/19 20:00      Nasal Cannula 2.0 


 


10/31/19 20:00        30


 


10/31/19 19:51  109      


 


10/31/19 17:19  101  110/62    


 


10/31/19 16:00      Nasal Cannula 2.0 


 


10/31/19 16:00  93      


 


10/31/19 16:00       2.0 


 


10/31/19 16:00 98.9 101 24 110/62 (78) 95   


 


10/31/19 12:00  100      


 


10/31/19 12:00       3.0 


 


10/31/19 12:00      Nasal Cannula 3.0 


 


10/31/19 12:00 98.6 102 25 120/58 (78) 96   


 


10/31/19 08:48  96  148/85    


 


10/31/19 08:00 98.3 96 24 148/85 (106) 98   


 


10/31/19 08:00  90      


 


10/31/19 08:00      Nasal Cannula 3.0 

















Intake and Output  


 


 10/31/19 11/1/19





 19:00 07:00


 


Intake Total 710.0 ml 140.0 ml


 


Output Total 400 ml 500 ml


 


Balance 310.0 ml -360.0 ml


 


  


 


Free Water 40 ml 


 


IV Total 310.0 ml 110.0 ml


 


Tube Feeding 360 ml 30 ml


 


Output Urine Total 400 ml 500 ml


 


# Voids  1








Laboratory Tests


10/31/19 10:15: 


Urine Color Yellow, Urine Appearance Cloudy, Urine pH 6, Urine Specific Gravity 

1.015, Urine Protein 3+H, Urine Glucose (UA) Negative, Urine Ketones 3+H, Urine 

Blood 2+H, Urine Nitrite Negative, Urine Bilirubin Negative, Urine Urobilinogen 

8H, Urine Leukocyte Esterase 3+H, Urine RBC 5-10H, Urine WBC TntcH, Urine 

Squamous Epithelial Cells ManyH, Urine Bacteria Few


11/1/19 03:25: 


White Blood Count 14.7H, Red Blood Count 3.14L, Hemoglobin 8.5L, Hematocrit 

26.0L, Mean Corpuscular Volume 83, Mean Corpuscular Hemoglobin 27.0, Mean 

Corpuscular Hemoglobin Concent 32.6, Red Cell Distribution Width 13.5, Platelet 

Count 369, Mean Platelet Volume 6.8, Neutrophils (%) (Auto) , Lymphocytes (%) (

Auto) , Monocytes (%) (Auto) , Eosinophils (%) (Auto) , Basophils (%) (Auto) , 

Activated Partial Thromboplast Time 33, Sodium Level 142, Potassium Level 3.5, 

Chloride Level 105, Carbon Dioxide Level 30, Anion Gap 7, Blood Urea Nitrogen 16

, Creatinine 1.0, Estimat Glomerular Filtration Rate 55.5, Glucose Level 206H, 

Calcium Level 8.5, Total Bilirubin 0.8, Aspartate Amino Transf (AST/SGOT) 19, 

Alanine Aminotransferase (ALT/SGPT) 13, Alkaline Phosphatase 80, Total Protein 

6.0L, Albumin 1.9L, Globulin 4.1, Albumin/Globulin Ratio 0.5L


Height (Feet):  5


Height (Inches):  5.00


Weight (Pounds):  194


General Appearance:  lethargic


Cardiovascular:  normal rate


Respiratory/Chest:  decreased breath sounds


Abdomen:  normal bowel sounds


Objective





Current Medications








 Medications


  (Trade)  Dose


 Ordered  Sig/Winnie


 Route


 PRN Reason  Start Time


 Stop Time Status Last Admin


Dose Admin


 


 Acetaminophen


  (Tylenol)  650 mg  PRN  PRN


 RECTAL


 TEMP>100.5  10/26/19 19:45


     


 


 


 Acetaminophen


  (Tylenol)  650 mg  Q4H  PRN


 NG


 Mild Pain/Temp > 100.5  10/26/19 19:45


 11/25/19 19:44  10/30/19 14:13


 


 


 Amlodipine


 Besylate


  (Norvasc)  2.5 mg  BID


 NG


   10/30/19 18:00


 11/29/19 17:59  10/31/19 08:48


 


 


 Aspirin


  (ASA)  81 mg  DAILY


 NG


   10/27/19 09:00


 11/14/19 08:59  10/29/19 09:05


 


 


 Atorvastatin


 Calcium


  (Lipitor)  10 mg  BEDTIME


 NG


   10/26/19 21:00


 11/13/19 20:59  10/31/19 20:38


 


 


 Clopidogrel


 Bisulfate


  (Plavix)  75 mg  DAILY


 NG


   10/31/19 09:00


 11/14/19 08:59   


 


 


 Dextrose


  (Dextrose 50%)  25 ml  Q30M  PRN


 IV


 Hypoglycemia  10/26/19 19:30


 11/13/19 06:59   


 


 


 Dextrose


  (Dextrose 50%)  50 ml  Q30M  PRN


 IV


 Hypoglycemia  10/26/19 19:30


 11/13/19 06:59   


 


 


 Divalproex Sodium


  (Depakote


 Sprinkles)  500 mg  Q12HR


 NG


   10/26/19 21:00


 11/14/19 21:59  10/31/19 20:39


 


 


 Docusate Sodium


  (Colace)  100 mg  EVERY 8  HOURS


 NG


   10/27/19 14:00


 11/25/19 08:59  11/1/19 05:25


 


 


 Enoxaparin Sodium


  (Lovenox)  30 mg  DAILY


 SUBQ


   10/27/19 09:00


 11/13/19 08:59  10/29/19 09:04


 


 


 Famotidine


  (Pepcid)  20 mg  EVERY 12  HOURS


 GT


   10/27/19 21:00


 11/25/19 17:59  10/31/19 20:38


 


 


 Folic Acid


  (Folate)  1 mg  DAILY


 ORAL


   10/31/19 09:00


 11/30/19 08:59  10/31/19 08:48


 


 


 Furosemide


  (Lasix)  40 mg  DAILY


 NG


   10/30/19 09:00


 11/26/19 08:59  10/31/19 08:48


 


 


 Hydralazine HCl


  (Apresoline)  10 mg  Q4H  PRN


 IV


 sbp greater [id number]  10/30/19 19:12


 11/29/19 19:11  10/30/19 22:48


 


 


 Insulin Aspart


  (NovoLOG)    EVERY 6  HOURS


 SUBQ


   10/30/19 12:00


 11/29/19 11:59  10/31/19 17:17


 


 


 Lactulose


  (Cephulac)  20 gm  Q8H  PRN


 NG


 Constipation  10/26/19 19:45


 11/25/19 19:44   


 


 


 Lorazepam


  (Ativan 2mg/ml


 1ml)  1 mg  Q4H  PRN


 IV


 For Anxiety  10/31/19 21:15


 11/7/19 21:14  11/1/19 05:26


 


 


 Piperacillin Sod/


 Tazobactam Sod


 3.375 gm/Sodium


 Chloride  110 ml @ 


 27.5 mls/hr  EVERY 8  HOURS


 IVPB


   10/31/19 14:00


 11/5/19 13:59  11/1/19 05:26


 

















Riccardo Velez MD Nov 1, 2019 06:25

## 2019-11-01 NOTE — 48 HOUR POST ANESTHESIA EVAL
Post Anesthesia Evaluation


Procedure:  peg placement


Date of Evaluation:  Nov 1, 2019


Time of Evaluation:  09:36


Blood Pressure Systolic:  146


0:  79


Pulse Rate:  88


O2 Sat by Pulse Oximetry:  100


Airway:  patent


Nausea:  No


Vomiting:  No


Hydration Status:  adequate


Mental Status/LOC:  patient returned to baseline


Follow-up Care/Observations:


na


Post-Anesthesia Complications:


none


Follow-up care needed:  N/A











Dinorah Hope CRNA Nov 1, 2019 09:37

## 2019-11-01 NOTE — PROCEDURE NOTE
DATE OF PROCEDURE:  11/01/2019

SURGEON:  Storm Turk M.D.



PROCEDURE:  Upper endoscopy with PEG placement.



ANESTHESIA:  Per Dinorah BUTTERFIELD.



INSTRUMENT:  Olympus adult flexible upper endoscope.



INDICATION:  Dysphagia and failure to thrive.



REASON FOR PROCEDURE:  The procedure, risks, benefits, and possible

consequences, including hemorrhage, aspiration, perforation and infection,

and alternative treatments, were explained to the patient/legal guardian

by Dr. Storm Turk and the patient/legal guardian understood and

accepted these risks.



PROCEDURE IN DETAIL:  After informed consent was obtained and the patient

was adequately sedated, Olympus upper endoscope was advanced from mouth

into the second portion of the duodenum and retroflexion was performed in

the stomach.



Under endoscopic guidance, under sterile condition, a 20-Turkmen pull

type of G-tube was successfully placed in the epigastric area.  The

distance from the tip of the tube to skin was about 2.5 cm in size.  The

patient tolerated procedure very well without any complications.



SUMMARY OF FINDINGS:  Status post successful PEG placement.



RECOMMENDATIONS:

1. Abdominal binder.

2. Elevate the head of the bed at all times.

3. G-tube flush.

4. G-tube care.

5. Start tube feeding later today.

6. The patient currently on antibiotics, which we will continue.









  ______________________________________________

  Storm Turk M.D.





DR:  EMILY

D:  11/01/2019 08:21

T:  11/02/2019 02:06

JOB#:  [id number]

CC:

## 2019-11-01 NOTE — NUR
NURSE NOTES:



Received report from THOMAS Gay RN. Patient is drowsy, unable to follow commands and make 
needs known. Patient receiving O2 via nasal cannula @ 2L/min, no s/s of respiratory distress 
noted. 20 Fr GT in place with clean dressing. HoB elevated. Charles catheter patent and 
draining well. Right forearm 22g IV site infusing Zosyn @ 27.5 cc/hr, asymptomatic. 
Bilateral freedom splints in place, skin intact, peripheral pulses present, no redness or 
edema noted. Educated on removal criteria, patient unable to comprehend at this time. Bed 
locked in lowest position with padded side rails up x 3. Patient on low air loss mattress. 
All needs attended to. Call light within reach. Will continue to monitor.

## 2019-11-01 NOTE — NUR
NURSE NOTES:

Patient respiratory rate at 30 bpm. O2 sat at 97. RT made aware by charge nurse. RT to come 
and place bipap machine on patient.

## 2019-11-01 NOTE — HEMATOLOGY/ONC PROGRESS NOTE
Assessment/Plan


Assessment/Plan





Assessment and Recs:


# Anemia of chronic disease due to underlying chronic medical issues, 

multifactorial 


--> Anemia workup has been ordered, rule out gi bleed --> ferritin is 1400+


--> No evidence of hemolysis is noted, peripheral smear has been reviewed.


--> Hgb goal >7. Transfuse prn.


--> Epogen or iron at this time is not particularly indicated


--> Medications have been reviewed


--> low threshold for gi evaluation in case has occult +


--> bone marrow biopsy is not indicated given the other more likely causes (if 

recurrent anemia) first time here


--> hgb trend 8.4->8.5-->8.2->7.7-->7.9-->10.1


--> FOLIC ACID 1mg po daily started


# Leukocytosis/elevated white blood cell count, unspecified likely related to 

underlying reaction v more likely infection


--> have reviewed peripheral smear and bandemia/neutrophilia noted


--> continue antibiotics if they have been started by ID team (VANC/ZOSYN)--> 

vanc/linda--> linda


--> wbc 39-->28k-->29k->31k-->23-->26k-->19k-->24k-->15.4-->16-->14-->23k-->14


--> monitor for resolution


--> CT C/A/P Results reviewed


--> FOR INdium bone scan done -> Rim of activity within the pelvis, 

corresponding to expected location of the soft tissue component of the cystic 

pelvic mass described on recent imaging studies.


--> again consider gyn eval


--> if doesn't improve by monday, may consider a bone marrow biopsy, have dw 

pcp and pulm--> has slowly come down over last 1-2 weeks


# Pelvic mass on ct and us -- 13 x 7 x 12.9 cm unilocular cystic mass, 

corresponding to lesion reported on recent CT scan. Layering low level internal 

echoes likely represent bladder debris.. This presumably represents a cystic 

ovarian lesion with debris or hemorrhage, possibly neoplastic.


--> CA-125 is 216! elevated


--> consider gyn eval


# Hypercalcemia - btw 10-11 range when corrected to alb


--> ivf have been started


--> if remains elevated, 7.9-->8.3


--> will get pth


# Sepsis from pneumonia.  


--> pulm consulted, abx started


--> on bipap


# CHANCE (acute kidney injury) on ckd


--> cr 1.6-->2.7-->2.2->1.2


--> per renal


# UTI (urinary tract infection)


--> on abx per id


# Dehydration


--> on ivf


# Acute metabolic encephalopathy


--> likely due to pna


# Dvt ppx lovenox sq





The timing of this note does not necessarily reflect the time of the patient 

was seen.





Greatly appreciate consultation.





Subjective


Constitutional:  Denies: no symptoms, chills, fever, malaise, weakness, other


HEENT:  Denies: no symptoms, eye pain, blurred vision, tearing, double vision, 

ear pain, ear discharge, nose pain, nose congestion, throat pain, throat 

swelling, mouth pain, mouth swelling, other


Gastrointestinal/Abdominal:  Denies: no symptoms, abdomen distended, abdominal 

pain, black stools, tarry stools, blood in stool, constipated, diarrhea, 

difficulty swallowing, nausea, poor appetite, poor fluid intake, rectal bleeding

, vomiting, other


Genitourinary:  Denies: no symptoms, burning, discharge, frequency, flank pain, 

hematuria, incontinence, pain, urgency, other


Neurologic/Psychiatric:  Denies: no symptoms, anxiety, depressed, emotional 

problems, headache, numbness, paresthesia, pre-existing deficit, seizure, 

tingling, tremors, weakness, other


Endocrine:  Denies: no symptoms, excessive sweating, flushing, intolerance to 

cold, intolerance to heat, increased hunger, increased thirst, increased urine, 

unexplained weight gain, unexplained weight loss, other


Allergies:  


Coded Allergies:  


     No Known Allergies (Unverified , 10/14/19)


Subjective


10/14: hgb has improved, no bleeding, labs reivewed


10/15: no events to report


10/16: a+ o x1, no bleeding or chills noted, no major changes, occult pending


10/17: no events noted, no bleeding, no chills, no hematemesis/hematochezia


10/18: remains confused, with abx, on nc


10/19: cr is worse, hgb 8.4, no bleeding, night sweats, chills


10/20: no f/c, no night sweats, labs noted, cr worse


10/21: no bleeding or chills, no night sweats, labs pending this am


10/22: pelvic mass noted on imaging, no bleeding reported, ordered ca125


10/23: no events, remains lethargic, is on abx, seen by surg


10/24: with raid response overnight, rr high as well as bp, vidal to icu


10/25: no events, no bleeding, to undergo a indium scan with id


10/26: comfortable, electrolytes reviewed, given mag and k


10/29: back on bipap with gt feeds, dw rn this am


10/30: remains very confused, no f/c, no bleeding, with urinary retention, 

folic acid started


10/31: sleeping, is on bipap, no events, wbc is higher this am


11/1: no events to report, no bleeding, wbc still high but better





Objective


Objective





Current Medications








 Medications


  (Trade)  Dose


 Ordered  Sig/Winnie


 Route


 PRN Reason  Start Time


 Stop Time Status Last Admin


Dose Admin


 


 Acetaminophen


  (Tylenol)  650 mg  PRN  PRN


 RECTAL


 TEMP>100.5  10/26/19 19:45


     


 


 


 Acetaminophen


  (Tylenol)  650 mg  Q4H  PRN


 NG


 Mild Pain/Temp > 100.5  10/26/19 19:45


 11/25/19 19:44  10/30/19 14:13


 


 


 Amlodipine


 Besylate


  (Norvasc)  2.5 mg  BID


 NG


   10/30/19 18:00


 11/29/19 17:59  10/31/19 08:48


 


 


 Aspirin


  (ASA)  81 mg  DAILY


 NG


   10/27/19 09:00


 11/14/19 08:59  10/29/19 09:05


 


 


 Atorvastatin


 Calcium


  (Lipitor)  10 mg  BEDTIME


 NG


   10/26/19 21:00


 11/13/19 20:59  10/31/19 20:38


 


 


 Clopidogrel


 Bisulfate


  (Plavix)  75 mg  DAILY


 NG


   10/31/19 09:00


 11/14/19 08:59   


 


 


 Dextrose


  (Dextrose 50%)  25 ml  Q30M  PRN


 IV


 Hypoglycemia  11/1/19 06:30


 12/1/19 06:29   


 


 


 Dextrose


  (Dextrose 50%)  50 ml  Q30M  PRN


 IV


 Hypoglycemia  11/1/19 06:30


 12/1/19 06:29   


 


 


 Divalproex Sodium


  (Depakote


 Sprinkles)  500 mg  Q12HR


 NG


   10/26/19 21:00


 11/14/19 21:59  11/1/19 09:49


 


 


 Docusate Sodium


  (Colace)  100 mg  EVERY 8  HOURS


 NG


   10/27/19 14:00


 11/25/19 08:59  11/1/19 05:25


 


 


 Enoxaparin Sodium


  (Lovenox)  30 mg  DAILY


 SUBQ


   10/27/19 09:00


 11/13/19 08:59  10/29/19 09:04


 


 


 Famotidine


  (Pepcid)  20 mg  EVERY 12  HOURS


 GT


   10/27/19 21:00


 11/25/19 17:59  11/1/19 09:49


 


 


 Folic Acid


  (Folate)  1 mg  DAILY


 ORAL


   10/31/19 09:00


 11/30/19 08:59  11/1/19 09:49


 


 


 Furosemide


  (Lasix)  40 mg  DAILY


 NG


   10/30/19 09:00


 11/26/19 08:59  11/1/19 09:49


 


 


 Hydralazine HCl


  (Apresoline)  10 mg  Q4H  PRN


 IV


 sbp greater [id number]  10/30/19 19:12


 11/29/19 19:11  10/30/19 22:48


 


 


 Insulin Aspart


  (NovoLOG)    EVERY 6  HOURS


 SUBQ


   10/30/19 12:00


 11/29/19 11:59  10/31/19 17:17


 


 


 Insulin Detemir


  (Levemir)  6 units  DAILY


 SUBQ


   11/1/19 09:00


 12/1/19 08:59  11/1/19 09:57


 


 


 Lactulose


  (Cephulac)  20 gm  Q8H  PRN


 NG


 Constipation  10/26/19 19:45


 11/25/19 19:44   


 


 


 Lorazepam


  (Ativan 2mg/ml


 1ml)  1 mg  Q4H  PRN


 IV


 For Anxiety  10/31/19 21:15


 11/7/19 21:14  11/1/19 05:26


 


 


 Piperacillin Sod/


 Tazobactam Sod


 3.375 gm/Sodium


 Chloride  110 ml @ 


 27.5 mls/hr  EVERY 8  HOURS


 IVPB


   10/31/19 14:00


 11/5/19 13:59  11/1/19 05:26


 











Last 24 Hour Vital Signs








  Date Time  Temp Pulse Resp B/P (MAP) Pulse Ox O2 Delivery O2 Flow Rate FiO2


 


11/1/19 09:37  88   100   


 


11/1/19 09:00  92  118/68    


 


11/1/19 08:45 97.4 88 16 146/79 100 Nasal Cannula 4 


 


11/1/19 08:39  91 14  98   


 


11/1/19 08:35  90 13 148/64 100 Nasal Cannula 4 


 


11/1/19 08:30  89 15 138/71 100 Nasal Cannula 4 


 


11/1/19 08:23 97.5 91 14 139/80 100 Nasal Cannula 4 


 


11/1/19 08:00       2.0 


 


11/1/19 07:12     96 Nasal Cannula 2.0 28


 


11/1/19 04:00 97.9 98 36 110/60 (77) 99   


 


11/1/19 04:00        40


 


11/1/19 04:00      Bi-pap  


 


11/1/19 03:44  94      


 


11/1/19 03:00  80 34  99 Full Face  40


 


11/1/19 01:02  71 32  98 Full Face  40


 


11/1/19 00:00  96      


 


11/1/19 00:00      Bi-pap  


 


11/1/19 00:00 98.2 100 35 130/86 (101) 97   


 


10/31/19 22:29  84 19  99 Full Face  40


 


10/31/19 20:29     95 Nasal Cannula 2.0 28


 


10/31/19 20:01        40


 


10/31/19 20:00 98.6 101 24 108/65 (79) 95   


 


10/31/19 20:00      Nasal Cannula 2.0 


 


10/31/19 20:00        30


 


10/31/19 19:51  109      


 


10/31/19 17:19  101  110/62    


 


10/31/19 16:00      Nasal Cannula 2.0 


 


10/31/19 16:00  93      


 


10/31/19 16:00       2.0 


 


10/31/19 16:00 98.9 101 24 110/62 (78) 95   


 


10/31/19 12:00  100      


 


10/31/19 12:00       3.0 


 


10/31/19 12:00      Nasal Cannula 3.0 


 


10/31/19 12:00 98.6 102 25 120/58 (78) 96   


 


10/31/19 08:48  96  148/85    


 


10/31/19 08:00 98.3 96 24 148/85 (106) 98   


 


10/31/19 08:00  90      


 


10/31/19 08:00      Nasal Cannula 3.0 


 


10/31/19 06:00       3.0 


 


10/31/19 05:00        30


 


10/31/19 04:49  87 18  98 Full Face  40


 


10/31/19 04:00      Nasal Cannula 3.0 


 


10/31/19 04:00 97.7 81 23 101/75 (84) 99   


 


10/31/19 03:40  93      


 


10/31/19 02:58  91 20  98 Full Face  40


 


10/31/19 00:45  101 32  97 Full Face  40


 


10/31/19 00:00 96.3 126 26 128/76 (93) 98   


 


10/31/19 00:00      Nasal Cannula 3.0 


 


10/30/19 23:26  127      


 


10/30/19 22:56  121 35  96 Full Face  40


 


10/30/19 22:48    153/97    


 


10/30/19 22:00        30


 


10/30/19 21:49  127 36  98 Full Face  40


 


10/30/19 20:00 98.1 97 25 153/86 (108) 93   


 


10/30/19 20:00      Nasal Cannula 3.0 


 


10/30/19 19:30  93      


 


10/30/19 19:25     95 Nasal Cannula 2.0 28


 


10/30/19 18:08  95  160/78    


 


10/30/19 16:00 97.8 96 21 153/65 (94) 92   


 


10/30/19 16:00      Nasal Cannula 3.0 


 


10/30/19 16:00  88      


 


10/30/19 12:00 98.1 120 23 171/86 (114) 92   


 


10/30/19 12:00      Bi-pap  


 


10/30/19 11:43  83      


 


10/30/19 11:33  101 18  97 Facial  40

















Intake and Output  


 


 10/31/19 11/1/19





 19:00 07:00


 


Intake Total 710.0 ml 183.97904 ml


 


Output Total 400 ml 500 ml


 


Balance 310.0 ml -316.08268 ml


 


  


 


Free Water 40 ml 


 


IV Total 310.0 ml 153.74232 ml


 


Tube Feeding 360 ml 30 ml


 


Output Urine Total 400 ml 500 ml


 


# Voids  1











Labs








Test


  10/30/19


18:30 10/31/19


03:45 10/31/19


10:15 11/1/19


03:25


 


White Blood Count


  13.9 K/UL


(4.8-10.8) 23.1 K/UL


(4.8-10.8) 


  14.7 K/UL


(4.8-10.8)


 


Red Blood Count


  3.39 M/UL


(4.20-5.40) 3.65 M/UL


(4.20-5.40) 


  3.14 M/UL


(4.20-5.40)


 


Hemoglobin


  9.5 G/DL


(12.0-16.0) 10.1 G/DL


(12.0-16.0) 


  8.5 G/DL


(12.0-16.0)


 


Hematocrit


  27.7 %


(37.0-47.0) 29.9 %


(37.0-47.0) 


  26.0 %


(37.0-47.0)


 


Mean Corpuscular Volume 82 FL (80-99)  82 FL (80-99)   83 FL (80-99) 


 


Mean Corpuscular Hemoglobin


  28.1 PG


(27.0-31.0) 27.7 PG


(27.0-31.0) 


  27.0 PG


(27.0-31.0)


 


Mean Corpuscular Hemoglobin


Concent 34.4 G/DL


(32.0-36.0) 33.7 G/DL


(32.0-36.0) 


  32.6 G/DL


(32.0-36.0)


 


Red Cell Distribution Width


  12.1 %


(11.6-14.8) 13.4 %


(11.6-14.8) 


  13.5 %


(11.6-14.8)


 


Platelet Count


  357 K/UL


(150-450) 392 K/UL


(150-450) 


  369 K/UL


(150-450)


 


Mean Platelet Volume


  7.1 FL


(6.5-10.1) 7.0 FL


(6.5-10.1) 


  6.8 FL


(6.5-10.1)


 


Neutrophils (%) (Auto)


  74.4 %


(45.0-75.0)  % (45.0-75.0) 


  


   % (45.0-75.0) 


 


 


Lymphocytes (%) (Auto)


  16.6 %


(20.0-45.0)  % (20.0-45.0) 


  


   % (20.0-45.0) 


 


 


Monocytes (%) (Auto)


  4.3 %


(1.0-10.0)  % (1.0-10.0) 


  


   % (1.0-10.0) 


 


 


Eosinophils (%) (Auto)


  3.5 %


(0.0-3.0)  % (0.0-3.0) 


  


   % (0.0-3.0) 


 


 


Basophils (%) (Auto)


  1.2 %


(0.0-2.0)  % (0.0-2.0) 


  


   % (0.0-2.0) 


 


 


Sodium Level


  144 MMOL/L


(136-145) 141 MMOL/L


(136-145) 


  142 MMOL/L


(136-145)


 


Potassium Level


  3.6 MMOL/L


(3.5-5.1) 4.0 MMOL/L


(3.5-5.1) 


  3.5 MMOL/L


(3.5-5.1)


 


Chloride Level


  104 MMOL/L


() 105 MMOL/L


() 


  105 MMOL/L


()


 


Carbon Dioxide Level


  30 MMOL/L


(21-32) 24 MMOL/L


(21-32) 


  30 MMOL/L


(21-32)


 


Anion Gap


  10 mmol/L


(5-15) 12 mmol/L


(5-15) 


  7 mmol/L


(5-15)


 


Blood Urea Nitrogen


  14 mg/dL


(7-18) 15 mg/dL


(7-18) 


  16 mg/dL


(7-18)


 


Creatinine


  0.9 MG/DL


(0.55-1.30) 1.0 MG/DL


(0.55-1.30) 


  1.0 MG/DL


(0.55-1.30)


 


Estimat Glomerular Filtration


Rate > 60 mL/min


(>60) 55.5 mL/min


(>60) 


  55.5 mL/min


(>60)


 


Glucose Level


  131 MG/DL


() 162 MG/DL


() 


  206 MG/DL


()


 


Calcium Level


  8.9 MG/DL


(8.5-10.1) 8.7 MG/DL


(8.5-10.1) 


  8.5 MG/DL


(8.5-10.1)


 


Differential Total Cells


Counted 


  100 


  


  


 


 


Neutrophils % (Manual)  90 % (45-75)   


 


Lymphocytes % (Manual)  4 % (20-45)   


 


Monocytes % (Manual)  3 % (1-10)   


 


Eosinophils % (Manual)  2 % (0-3)   


 


Basophils % (Manual)  1 % (0-2)   


 


Band Neutrophils  0 % (0-8)   


 


Platelet Estimate  Adequate   


 


Platelet Morphology  Normal   


 


Hypochromasia  1+   


 


Anisocytosis  1+   


 


Phosphorus Level


  


  3.0 MG/DL


(2.5-4.9) 


  


 


 


Magnesium Level


  


  1.6 MG/DL


(1.8-2.4) 


  


 


 


Total Bilirubin


  


  0.8 MG/DL


(0.2-1.0) 


  0.8 MG/DL


(0.2-1.0)


 


Aspartate Amino Transf


(AST/SGOT) 


  22 U/L (15-37) 


  


  19 U/L (15-37) 


 


 


Alanine Aminotransferase


(ALT/SGPT) 


  13 U/L (12-78) 


  


  13 U/L (12-78) 


 


 


Alkaline Phosphatase


  


  96 U/L


() 


  80 U/L


()


 


C-Reactive Protein,


Quantitative 


  9.5 mg/dL


(0.00-0.90) 


  


 


 


Pro-B-Type Natriuretic Peptide


  


  > 24176 pg/mL


(0-125) 


  


 


 


Total Protein


  


  6.4 G/DL


(6.4-8.2) 


  6.0 G/DL


(6.4-8.2)


 


Albumin


  


  1.9 G/DL


(3.4-5.0) 


  1.9 G/DL


(3.4-5.0)


 


Globulin  4.5 g/dL   4.1 g/dL 


 


Albumin/Globulin Ratio  0.4 (1.0-2.7)   0.5 (1.0-2.7) 


 


Urine Color   Yellow  


 


Urine Appearance   Cloudy  


 


Urine pH   6 (4.5-8.0)  


 


Urine Specific Gravity


  


  


  1.015


(1.005-1.035) 


 


 


Urine Protein   3+ (NEGATIVE)  


 


Urine Glucose (UA)


  


  


  Negative


(NEGATIVE) 


 


 


Urine Ketones   3+ (NEGATIVE)  


 


Urine Blood   2+ (NEGATIVE)  


 


Urine Nitrite


  


  


  Negative


(NEGATIVE) 


 


 


Urine Bilirubin


  


  


  Negative


(NEGATIVE) 


 


 


Urine Urobilinogen


  


  


  8 MG/DL


(0.0-1.0) 


 


 


Urine Leukocyte Esterase   3+ (NEGATIVE)  


 


Urine RBC


  


  


  5-10 /HPF (0 -


2) 


 


 


Urine WBC


  


  


  Tntc /HPF (0 -


2) 


 


 


Urine Squamous Epithelial


Cells 


  


  Many /LPF


(NONE/OCC) 


 


 


Urine Bacteria


  


  


  Few /HPF


(NONE) 


 


 


Activated Partial


Thromboplast Time 


  


  


  33 SEC (23-33) 


 








Height (Feet):  5


Height (Inches):  5.00


Weight (Pounds):  194


Objective





Physical Exam:


Vitals: reviewed


General Appearance:  NAD


HEENT:  normocephalic, atraumatic


Neck:  non-tender, normal alignment


Respiratory/Chest:  normal breath sounds bilaterally ++ bipap


Cardiovascular/Chest: rrr


Abdomen:  normal bowel sounds, soft, nontender


Extremities:  normal range of motion











Mike Pop MD Nov 1, 2019 10:54

## 2019-11-01 NOTE — NUR
NURSE NOTES:



Received report from MERARI Arrieta RN. Patient asleep, responsive to auditory and tactile 
stimuli, unable to follow commands and make needs known. Patient receiving O2 via nasal 
cannula @ 2L/min, no s/s of respiratory distress noted. Right NGT in place, feeding held for 
GT placement today. HoB elevated. Charles catheter patent and draining well. Right forearm 22g 
IV site infusing Zosyn @ 27.5 cc/hr, asymptomatic. Bilateral freedom splints in place, skin 
intact, peripheral pulses present, no redness or edema noted. Educated on removal criteria, 
patient unable to comprehend at this time.Bed locked in lowest position with padded side 
rails up x 3. Patient on low air loss mattress. All needs attended to. Call light within 
reach. Will continue to monitor.

## 2019-11-01 NOTE — NUR
CASE MANAGEMENT: REVIEW



11/1/19



SI:      PNA / SEPSIS/ACUTE METABOLIC ENCEPHALOPATHY 

97.4   88   16   146/79  100% NC 4L

WBC 14.7; RBC 3.14; H/H 8.5/26.0; 





IS:      IV ZOSYN Q8HR

NG LASIX QD

FOLATE PO QD

LOVENOX SQ QD

IV HYDRALAZINE Q4/PRN

NORVASC NG BID 

LIPITOR NG HS

NOVOLOG SQ Q6HR

DEPAKOTE NGT Q12HR

PEPCID GT Q12HR

COLACE NGT Q8HR

ASA NG QD

NGT FEEDING 

FOLATE PO QD





**: 2W STEP DOWN UNIT



DCP: RETURN TO [facility] 



PLAN: EGD TODAY

IV LASIX X1 10/31/19

## 2019-11-01 NOTE — NUR
NURSE NOTES:

received pt in stable condition, no sob. appears asleep, hob elevated 30 degrees, abd binder 
on. bed in lowest position, locked.

## 2019-11-01 NOTE — NUR
RD ASSESSMENT & RECOMMENDATIONS

SEE CARE ACTIVITY FOR COMPLETE ASSESSMENT



DAILY ESTIMATED NEEDS:

Needs based on Wound, DM, pulmonary, sepsis/ 68kg 

25-30  kcals/kg 

6343-5885  total kcals

1.25-2  g protein/kg

  g total protein 

25-30  mL/kg

5596-7236  total fluid mLs



NUTRITION DIAGNOSIS:

* Swallowing difficulty R/T dysaphgia, poor dentition, dementia as

evidenced by on liquify pureed, NTL texture diet per SLP rec-> now on NNGT

feeds-> now s/p PEG placement.

* Increased kcal/prot needs R/T wound healing and sepsis as evidenced by

admitted w/ DTPI R heel and non-blanchable sacral erythema, critically

elev wbc (24.0*), elev LA (5.8 -> wnl), elev CRP, now afebrile.

* Altered nutrition related lab values R/T CHF, diabetes as evidenced by

elev BNP (>30705), A1C of 10.4, elev BGs and POC glu (130-264)





CURRENT TF: Glucerna 1.2 @60 





ENTERAL NUTRITION RECOMMENDATIONS:

Glucerna 1.5 @50ml/hr x24 hrs  

to provide 1200ml, 1800 kcal, 99g pro, 911ml free H2O 



- Rec TF change to Glucerna 1.5 for low volume, less free fluid

- HOB over 45 degrees/ flush per MD







ADDITIONAL RECOMMENDATIONS:

* Calibrated bedscale wt w/ added P200 mattress  

* Wound healing: add MVI x1, Vit C 250mg QD 

                        Zaki 1pkt BID if tolerated 

* Add folic acid 1mg QD (low folate 7.2) 

* Monitor renal fxn: BUN/ creat wnl  

. 

.

## 2019-11-01 NOTE — INFECTIOUS DISEASES PROG NOTE
Assessment/Plan


Problems:  


(1) Aspiration pneumonia


Assessment & Plan:  with B/L infiltrates, L>R suspect due to altered mental 

status , with hypoxemia and leukocytosis, continue meropenem empirically, 

repeated  CXR  showed no change , CT of the chest showed B/L effusion with 

basal infiltrates , aspiration precaution. S/P meropenem for two weeks total . 

EOT 10/30/19. WILL MONITOR CLINICALLY OFF ANTIBIOTICS   





(2) UTI (urinary tract infection)


Assessment & Plan:  due to ESBL producing E coli , s/p meropenem to cover for 

sepsis and pneumonia too for two weeks total  





(3) Sepsis


Assessment & Plan:  ruled out with negative blood culture , and persistent 

leukocytosis inspit of being on wide spectrum antibiotics , suspect pelvic mass 

related or bone marrow pathology . recommend gynecology eval for pelvic mass 

resection . indium scan showed rim enhancing at the same  pelvic mass with no 

deep abscess. need gynecology work up and possible resection of the mass 





(4) Acute metabolic encephalopathy


Assessment & Plan:  due to the above, continue hydration and antibiotics, 

monitor in tele 





(5) Hyperglycemia due to type 2 diabetes mellitus


Assessment & Plan:  recommend tight glycemic control to keep blood glucose 

between 100-140 





(6) ARF (acute renal failure)


Assessment & Plan:  due to the above, continue hydration and renally dosed 

antibiotics, close  monitor of renal function , stop vancomycin 





(7) Pelvic mass in female


Assessment & Plan:  to the left of the uterus, suspect malignancy , recommend OB

/GYN eval , and tumor markers , oncology is following 








Subjective


ROS Limited/Unobtainable:  Yes


Allergies:  


Coded Allergies:  


     No Known Allergies (Unverified , 10/14/19)


Subjective


she was IN SDU , off  BIPAP, still lethargic ,and minimally responsive , no 

fever or chills , no diarrhea, has bloody suctioning before , no cough ,  no SOB





Objective


Vital Signs





Last 24 Hour Vital Signs








  Date Time  Temp Pulse Resp B/P (MAP) Pulse Ox O2 Delivery O2 Flow Rate FiO2


 


11/1/19 12:00       2.0 


 


11/1/19 11:37  90      


 


11/1/19 09:37  88   100   


 


11/1/19 09:00  92  118/68    


 


11/1/19 08:45 97.4 88 16 146/79 100 Nasal Cannula 4 


 


11/1/19 08:39  91 14  98   


 


11/1/19 08:35  90 13 148/64 100 Nasal Cannula 4 


 


11/1/19 08:30  89 15 138/71 100 Nasal Cannula 4 


 


11/1/19 08:23 97.5 91 14 139/80 100 Nasal Cannula 4 


 


11/1/19 08:00      Nasal Cannula 2.0 


 


11/1/19 08:00       2.0 


 


11/1/19 07:50  95      


 


11/1/19 07:12     96 Nasal Cannula 2.0 28


 


11/1/19 04:00 97.9 98 36 110/60 (77) 99   


 


11/1/19 04:00        40


 


11/1/19 04:00      Bi-pap  


 


11/1/19 03:44  94      


 


11/1/19 03:00  80 34  99 Full Face  40


 


11/1/19 01:02  71 32  98 Full Face  40


 


11/1/19 00:00  96      


 


11/1/19 00:00      Bi-pap  


 


11/1/19 00:00 98.2 100 35 130/86 (101) 97   


 


10/31/19 22:29  84 19  99 Full Face  40


 


10/31/19 20:29     95 Nasal Cannula 2.0 28


 


10/31/19 20:01        40


 


10/31/19 20:00 98.6 101 24 108/65 (79) 95   


 


10/31/19 20:00      Nasal Cannula 2.0 


 


10/31/19 20:00        30


 


10/31/19 19:51  109      


 


10/31/19 17:19  101  110/62    


 


10/31/19 16:00      Nasal Cannula 2.0 


 


10/31/19 16:00  93      


 


10/31/19 16:00       2.0 


 


10/31/19 16:00 98.9 101 24 110/62 (78) 95   








Height (Feet):  5


Height (Inches):  5.00


Weight (Pounds):  194


General Appearance:  WD/WN, no acute distress


HEENT:  normocephalic, atraumatic, anicteric, mucous membranes moist, PERRL, 

EOMI, pharynx normal, supple, no JVD, status post trach


Respiratory/Chest:  chest wall non-tender, lungs clear, normal breath sounds, 

no respiratory distress, no accessory muscle use


Cardiovascular:  normal peripheral pulses, normal rate, regular rhythm, no 

gallop/murmur, no JVD


Abdomen:  normal bowel sounds, soft, non tender, no organomegaly, non distended

, no mass, no scars


Genitourinary:  normal external genitalia


Extremities:  no cyanosis, no clubbing


Skin:  no rash, no lesions, ulcers


Neurologic/Psychiatric:  alert, unresponsiveness


Lymphatic:  no neck adenopathy, no groin adenopathy


Musculoskeletal:  normal muscle bulk, no effusion





Microbiology








 Date/Time


Source Procedure


Growth Status


 


 


 10/31/19 10:15


Urine,Clean Catch Urine Culture - Preliminary Resulted











Laboratory Tests








Test


  11/1/19


03:25


 


White Blood Count


  14.7 K/UL


(4.8-10.8)  H


 


Red Blood Count


  3.14 M/UL


(4.20-5.40)  L


 


Hemoglobin


  8.5 G/DL


(12.0-16.0)  L


 


Hematocrit


  26.0 %


(37.0-47.0)  L


 


Mean Corpuscular Volume 83 FL (80-99)  


 


Mean Corpuscular Hemoglobin


  27.0 PG


(27.0-31.0)


 


Mean Corpuscular Hemoglobin


Concent 32.6 G/DL


(32.0-36.0)


 


Red Cell Distribution Width


  13.5 %


(11.6-14.8)


 


Platelet Count


  369 K/UL


(150-450)


 


Mean Platelet Volume


  6.8 FL


(6.5-10.1)


 


Neutrophils (%) (Auto)


  % (45.0-75.0)


 


 


Lymphocytes (%) (Auto)


  % (20.0-45.0)


 


 


Monocytes (%) (Auto)  % (1.0-10.0)  


 


Eosinophils (%) (Auto)  % (0.0-3.0)  


 


Basophils (%) (Auto)  % (0.0-2.0)  


 


Activated Partial


Thromboplast Time 33 SEC (23-33)


 


 


Sodium Level


  142 MMOL/L


(136-145)


 


Potassium Level


  3.5 MMOL/L


(3.5-5.1)


 


Chloride Level


  105 MMOL/L


()


 


Carbon Dioxide Level


  30 MMOL/L


(21-32)


 


Anion Gap


  7 mmol/L


(5-15)


 


Blood Urea Nitrogen


  16 mg/dL


(7-18)


 


Creatinine


  1.0 MG/DL


(0.55-1.30)


 


Estimat Glomerular Filtration


Rate 55.5 mL/min


(>60)


 


Glucose Level


  206 MG/DL


()  H


 


Calcium Level


  8.5 MG/DL


(8.5-10.1)


 


Total Bilirubin


  0.8 MG/DL


(0.2-1.0)


 


Aspartate Amino Transf


(AST/SGOT) 19 U/L (15-37)


 


 


Alanine Aminotransferase


(ALT/SGPT) 13 U/L (12-78)


 


 


Alkaline Phosphatase


  80 U/L


()


 


Total Protein


  6.0 G/DL


(6.4-8.2)  L


 


Albumin


  1.9 G/DL


(3.4-5.0)  L


 


Globulin 4.1 g/dL  


 


Albumin/Globulin Ratio


  0.5 (1.0-2.7)


L











Current Medications








 Medications


  (Trade)  Dose


 Ordered  Sig/Winnie


 Route


 PRN Reason  Start Time


 Stop Time Status Last Admin


Dose Admin


 


 Acetaminophen


  (Tylenol)  650 mg  PRN  PRN


 RECTAL


 TEMP>100.5  10/26/19 19:45


     


 


 


 Acetaminophen


  (Tylenol)  650 mg  Q4H  PRN


 NG


 Mild Pain/Temp > 100.5  10/26/19 19:45


 11/25/19 19:44  10/30/19 14:13


 


 


 Amlodipine


 Besylate


  (Norvasc)  2.5 mg  BID


 NG


   10/30/19 18:00


 11/29/19 17:59  10/31/19 08:48


 


 


 Aspirin


  (ASA)  81 mg  DAILY


 NG


   10/27/19 09:00


 11/14/19 08:59  10/29/19 09:05


 


 


 Atorvastatin


 Calcium


  (Lipitor)  10 mg  BEDTIME


 NG


   10/26/19 21:00


 11/13/19 20:59  10/31/19 20:38


 


 


 Clopidogrel


 Bisulfate


  (Plavix)  75 mg  DAILY


 NG


   10/31/19 09:00


 11/14/19 08:59   


 


 


 Dextrose


  (Dextrose 50%)  25 ml  Q30M  PRN


 IV


 Hypoglycemia  11/1/19 06:30


 12/1/19 06:29   


 


 


 Dextrose


  (Dextrose 50%)  50 ml  Q30M  PRN


 IV


 Hypoglycemia  11/1/19 06:30


 12/1/19 06:29   


 


 


 Divalproex Sodium


  (Depakote


 Sprinkles)  500 mg  Q12HR


 NG


   10/26/19 21:00


 11/14/19 21:59  11/1/19 09:49


 


 


 Docusate Sodium


  (Colace)  100 mg  EVERY 8  HOURS


 NG


   10/27/19 14:00


 11/25/19 08:59  11/1/19 14:12


 


 


 Enoxaparin Sodium


  (Lovenox)  30 mg  DAILY


 SUBQ


   10/27/19 09:00


 11/13/19 08:59  10/29/19 09:04


 


 


 Famotidine


  (Pepcid)  20 mg  EVERY 12  HOURS


 GT


   10/27/19 21:00


 11/25/19 17:59  11/1/19 09:49


 


 


 Folic Acid


  (Folate)  1 mg  DAILY


 ORAL


   10/31/19 09:00


 11/30/19 08:59  11/1/19 09:49


 


 


 Furosemide


  (Lasix)  40 mg  DAILY


 NG


   10/30/19 09:00


 11/26/19 08:59  11/1/19 09:49


 


 


 Hydralazine HCl


  (Apresoline)  10 mg  Q4H  PRN


 IV


 sbp greater [id number]  10/30/19 19:12


 11/29/19 19:11  10/30/19 22:48


 


 


 Insulin Aspart


  (NovoLOG)    EVERY 6  HOURS


 SUBQ


   10/30/19 12:00


 11/29/19 11:59  11/1/19 12:44


 


 


 Insulin Detemir


  (Levemir)  6 units  DAILY


 SUBQ


   11/1/19 09:00


 12/1/19 08:59  11/1/19 09:57


 


 


 Lactulose


  (Cephulac)  20 gm  Q8H  PRN


 NG


 Constipation  10/26/19 19:45


 11/25/19 19:44   


 


 


 Lorazepam


  (Ativan 2mg/ml


 1ml)  1 mg  Q4H  PRN


 IV


 For Anxiety  10/31/19 21:15


 11/7/19 21:14  11/1/19 05:26


 


 


 Piperacillin Sod/


 Tazobactam Sod


 3.375 gm/Sodium


 Chloride  110 ml @ 


 27.5 mls/hr  EVERY 8  HOURS


 IVPB


   10/31/19 14:00


 11/5/19 13:59  11/1/19 14:12


 

















Amie Burgos M.D. Nov 1, 2019 15:38

## 2019-11-01 NOTE — NUR
RESPIRATORY NOTE:

received pt off bipap and on 2L NC. no redness or skin tears visible from wearing of the 
mask. will cont to monitor

## 2019-11-01 NOTE — ENDOSCOPY PROCEDURE NOTE
Endoscopy Procedure Note


General


Indication for Procedure:  dysphagia


Procedures Performed:  EGD, PEG


Operative Findings/Diagnosis:  same


Specimen:  none


Pt Tolerated Procedure Well:  Yes


Estimated Blood Loss:  none





Anesthesia


Anesthesiologist:  leandro


Anesthesia:  MAC





Inserted Devices


Implant(s) used?:  No





GI Core Measures


50 yrs or older w/o bx or poly:  Not Applicable


10yrs. F/U recommended:  Not Applicable











Storm Turk MD Nov 1, 2019 08:20

## 2019-11-01 NOTE — PULMONOLOGY PROGRESS NOTE
Assessment/Plan


Problems:  


(1) Healthcare associated bacterial pneumonia


(2) UTI (urinary tract infection)


(3) Sepsis


(4) Dehydration


(5) CHANCE (acute kidney injury)


(6) Acute metabolic encephalopathy


(7) Hyperglycemia due to type 2 diabetes mellitus


(8) Renal failure (ARF), acute on chronic


(9) Aspiration pneumonia


(10) Abnormal TSH


(11) Pelvic mass in female


(12) PEG (percutaneous endoscopic gastrostomy) status


Assessment/Plan


Marked leukocytosis S/P WBC scan with uptake in pelvis, ? correspondence to 

pelvic mass


Sepsis


Possible pneumonia


E coli UTI


Acute hypoxemic respiratory failure


Acute encephalopathy


Hx CHF


HTN


CHANCE - RESOVLED


Pelvic mass/possible GYN malignancy vs cyst


Dysphagia S/P PEG





Plan:


-Optimize pulmonary hygiene/mobilize as tolerated


-BiPAP PRN and qHS


-Titrate down FiO2 to keep SaO2 > 90%


-Monitor WCt, RFS sent for JAK2 mutation, per Dr. Pop BMBx if unrevealing


-Abx per ID, F/U Cx's


-monitor volumes and renal function, F/U renal recs


-GYN evaluation 


-monitor encephalopathy, ? neuro eval


-NPO, GTF's


-DVT Px: LMWH


-FC





Subjective


Allergies:  


Coded Allergies:  


     No Known Allergies (Unverified , 10/14/19)


Subjective


AFVSS O2 needs stable off BiPAP


S/P EGD/PEG


Not really interactive 


No SOB no cough no FC no wheezing





Objective





Last 24 Hour Vital Signs








  Date Time  Temp Pulse Resp B/P (MAP) Pulse Ox O2 Delivery O2 Flow Rate FiO2


 


11/1/19 08:45 97.4 88 16 146/79 100 Nasal Cannula 4 


 


11/1/19 08:39  91 14  98   


 


11/1/19 08:35  90 13 148/64 100 Nasal Cannula 4 


 


11/1/19 08:30  89 15 138/71 100 Nasal Cannula 4 


 


11/1/19 08:23 97.5 91 14 139/80 100 Nasal Cannula 4 


 


11/1/19 07:12     96 Nasal Cannula 2.0 28


 


11/1/19 04:00 97.9 98 36 110/60 (77) 99   


 


11/1/19 04:00        40


 


11/1/19 04:00      Bi-pap  


 


11/1/19 03:44  94      


 


11/1/19 03:00  80 34  99 Full Face  40


 


11/1/19 01:02  71 32  98 Full Face  40


 


11/1/19 00:00  96      


 


11/1/19 00:00      Bi-pap  


 


11/1/19 00:00 98.2 100 35 130/86 (101) 97   


 


10/31/19 22:29  84 19  99 Full Face  40


 


10/31/19 20:29     95 Nasal Cannula 2.0 28


 


10/31/19 20:01        40


 


10/31/19 20:00 98.6 101 24 108/65 (79) 95   


 


10/31/19 20:00      Nasal Cannula 2.0 


 


10/31/19 20:00        30


 


10/31/19 19:51  109      


 


10/31/19 17:19  101  110/62    


 


10/31/19 16:00      Nasal Cannula 2.0 


 


10/31/19 16:00  93      


 


10/31/19 16:00       2.0 


 


10/31/19 16:00 98.9 101 24 110/62 (78) 95   


 


10/31/19 12:00  100      


 


10/31/19 12:00       3.0 


 


10/31/19 12:00      Nasal Cannula 3.0 


 


10/31/19 12:00 98.6 102 25 120/58 (78) 96   

















Intake and Output  


 


 10/31/19 11/1/19





 19:00 07:00


 


Intake Total 710.0 ml 183.70180 ml


 


Output Total 400 ml 500 ml


 


Balance 310.0 ml -316.09103 ml


 


  


 


Free Water 40 ml 


 


IV Total 310.0 ml 153.75614 ml


 


Tube Feeding 360 ml 30 ml


 


Output Urine Total 400 ml 500 ml


 


# Voids  1








General Appearance:  no acute distress, cachetic


HEENT:  normocephalic, atraumatic, anicteric, mucous membranes moist


Respiratory/Chest:  rhonchi


Cardiovascular:  normal peripheral pulses, normal rate, regular rhythm


Abdomen:  normal bowel sounds, soft, non tender, no organomegaly, non distended

, no mass, other - GT, wound dressed


Extremities:  no cyanosis, no clubbing, no edema





Microbiology








 Date/Time


Source Procedure


Growth Status


 


 


 10/31/19 10:15


Urine,Clean Catch Urine Culture - Preliminary Resulted








Laboratory Tests


10/31/19 10:15: 


Urine Color Yellow, Urine Appearance Cloudy, Urine pH 6, Urine Specific Gravity 

1.015, Urine Protein 3+H, Urine Glucose (UA) Negative, Urine Ketones 3+H, Urine 

Blood 2+H, Urine Nitrite Negative, Urine Bilirubin Negative, Urine Urobilinogen 

8H, Urine Leukocyte Esterase 3+H, Urine RBC 5-10H, Urine WBC TntcH, Urine 

Squamous Epithelial Cells ManyH, Urine Bacteria Few


11/1/19 03:25: 


White Blood Count 14.7H, Red Blood Count 3.14L, Hemoglobin 8.5L, Hematocrit 

26.0L, Mean Corpuscular Volume 83, Mean Corpuscular Hemoglobin 27.0, Mean 

Corpuscular Hemoglobin Concent 32.6, Red Cell Distribution Width 13.5, Platelet 

Count 369, Mean Platelet Volume 6.8, Neutrophils (%) (Auto) , Lymphocytes (%) (

Auto) , Monocytes (%) (Auto) , Eosinophils (%) (Auto) , Basophils (%) (Auto) , 

Activated Partial Thromboplast Time 33, Sodium Level 142, Potassium Level 3.5, 

Chloride Level 105, Carbon Dioxide Level 30, Anion Gap 7, Blood Urea Nitrogen 16

, Creatinine 1.0, Estimat Glomerular Filtration Rate 55.5, Glucose Level 206H, 

Calcium Level 8.5, Total Bilirubin 0.8, Aspartate Amino Transf (AST/SGOT) 19, 

Alanine Aminotransferase (ALT/SGPT) 13, Alkaline Phosphatase 80, Total Protein 

6.0L, Albumin 1.9L, Globulin 4.1, Albumin/Globulin Ratio 0.5L





Current Medications








 Medications


  (Trade)  Dose


 Ordered  Sig/Winnie


 Route


 PRN Reason  Start Time


 Stop Time Status Last Admin


Dose Admin


 


 Acetaminophen


  (Tylenol)  650 mg  PRN  PRN


 RECTAL


 TEMP>100.5  10/26/19 19:45


     


 


 


 Acetaminophen


  (Tylenol)  650 mg  Q4H  PRN


 NG


 Mild Pain/Temp > 100.5  10/26/19 19:45


 11/25/19 19:44  10/30/19 14:13


 


 


 Amlodipine


 Besylate


  (Norvasc)  2.5 mg  BID


 NG


   10/30/19 18:00


 11/29/19 17:59  10/31/19 08:48


 


 


 Aspirin


  (ASA)  81 mg  DAILY


 NG


   10/27/19 09:00


 11/14/19 08:59  10/29/19 09:05


 


 


 Atorvastatin


 Calcium


  (Lipitor)  10 mg  BEDTIME


 NG


   10/26/19 21:00


 11/13/19 20:59  10/31/19 20:38


 


 


 Clopidogrel


 Bisulfate


  (Plavix)  75 mg  DAILY


 NG


   10/31/19 09:00


 11/14/19 08:59   


 


 


 Dextrose


  (Dextrose 50%)  25 ml  Q30M  PRN


 IV


 Hypoglycemia  11/1/19 06:30


 12/1/19 06:29   


 


 


 Dextrose


  (Dextrose 50%)  50 ml  Q30M  PRN


 IV


 Hypoglycemia  11/1/19 06:30


 12/1/19 06:29   


 


 


 Divalproex Sodium


  (Depakote


 Sprinkles)  500 mg  Q12HR


 NG


   10/26/19 21:00


 11/14/19 21:59  10/31/19 20:39


 


 


 Docusate Sodium


  (Colace)  100 mg  EVERY 8  HOURS


 NG


   10/27/19 14:00


 11/25/19 08:59  11/1/19 05:25


 


 


 Enoxaparin Sodium


  (Lovenox)  30 mg  DAILY


 SUBQ


   10/27/19 09:00


 11/13/19 08:59  10/29/19 09:04


 


 


 Famotidine


  (Pepcid)  20 mg  EVERY 12  HOURS


 GT


   10/27/19 21:00


 11/25/19 17:59  10/31/19 20:38


 


 


 Folic Acid


  (Folate)  1 mg  DAILY


 ORAL


   10/31/19 09:00


 11/30/19 08:59  10/31/19 08:48


 


 


 Furosemide


  (Lasix)  40 mg  DAILY


 NG


   10/30/19 09:00


 11/26/19 08:59  10/31/19 08:48


 


 


 Hydralazine HCl


  (Apresoline)  10 mg  Q4H  PRN


 IV


 sbp greater [id number]  10/30/19 19:12


 11/29/19 19:11  10/30/19 22:48


 


 


 Insulin Aspart


  (NovoLOG)    EVERY 6  HOURS


 SUBQ


   10/30/19 12:00


 11/29/19 11:59  10/31/19 17:17


 


 


 Insulin Detemir


  (Levemir)  6 units  DAILY


 SUBQ


   11/1/19 09:00


 12/1/19 08:59   


 


 


 Lactulose


  (Cephulac)  20 gm  Q8H  PRN


 NG


 Constipation  10/26/19 19:45


 11/25/19 19:44   


 


 


 Lorazepam


  (Ativan 2mg/ml


 1ml)  1 mg  Q4H  PRN


 IV


 For Anxiety  10/31/19 21:15


 11/7/19 21:14  11/1/19 05:26


 


 


 Piperacillin Sod/


 Tazobactam Sod


 3.375 gm/Sodium


 Chloride  110 ml @ 


 27.5 mls/hr  EVERY 8  HOURS


 IVPB


   10/31/19 14:00


 11/5/19 13:59  11/1/19 05:26


 

















Sammy Torres MD Nov 1, 2019 09:12

## 2019-11-01 NOTE — SURGERY PROGRESS NOTE
Surgery Progress Note


Subjective


Additional Comments


g tube placed


doing well


labs noted


exam stable


downgrade today





Objective





Last 24 Hour Vital Signs








  Date Time  Temp Pulse Resp B/P (MAP) Pulse Ox O2 Delivery O2 Flow Rate FiO2


 


11/1/19 12:00       2.0 


 


11/1/19 11:37  90      


 


11/1/19 09:37  88   100   


 


11/1/19 09:00  92  118/68    


 


11/1/19 08:45 97.4 88 16 146/79 100 Nasal Cannula 4 


 


11/1/19 08:39  91 14  98   


 


11/1/19 08:35  90 13 148/64 100 Nasal Cannula 4 


 


11/1/19 08:30  89 15 138/71 100 Nasal Cannula 4 


 


11/1/19 08:23 97.5 91 14 139/80 100 Nasal Cannula 4 


 


11/1/19 08:00      Nasal Cannula 2.0 


 


11/1/19 08:00       2.0 


 


11/1/19 07:50  95      


 


11/1/19 07:12     96 Nasal Cannula 2.0 28


 


11/1/19 04:00 97.9 98 36 110/60 (77) 99   


 


11/1/19 04:00        40


 


11/1/19 04:00      Bi-pap  


 


11/1/19 03:44  94      


 


11/1/19 03:00  80 34  99 Full Face  40


 


11/1/19 01:02  71 32  98 Full Face  40


 


11/1/19 00:00  96      


 


11/1/19 00:00      Bi-pap  


 


11/1/19 00:00 98.2 100 35 130/86 (101) 97   


 


10/31/19 22:29  84 19  99 Full Face  40


 


10/31/19 20:29     95 Nasal Cannula 2.0 28


 


10/31/19 20:01        40


 


10/31/19 20:00 98.6 101 24 108/65 (79) 95   


 


10/31/19 20:00      Nasal Cannula 2.0 


 


10/31/19 20:00        30


 


10/31/19 19:51  109      


 


10/31/19 17:19  101  110/62    


 


10/31/19 16:00      Nasal Cannula 2.0 


 


10/31/19 16:00  93      


 


10/31/19 16:00       2.0 


 


10/31/19 16:00 98.9 101 24 110/62 (78) 95   








I&O











Intake and Output  


 


 10/31/19 11/1/19





 18:59 06:59


 


Intake Total 660.0 ml 225.35509 ml


 


Output Total 400 ml 500 ml


 


Balance 260.0 ml -274.10120 ml


 


  


 


Free Water  40 ml


 


IV Total 310.0 ml 125.09635 ml


 


Tube Feeding 350 ml 60 ml


 


Output Urine Total 400 ml 500 ml


 


# Voids  1








Dressing:  saturated


Wound:  other


Drains:  other


Cardiovascular:  RSR


Respiratory:  decreased breath sounds


Abdomen:  soft, present bowel sounds, other, non-distended


Extremities:  no tenderness, no cyanosis





Laboratory Tests








Test


  11/1/19


03:25


 


White Blood Count


  14.7 K/UL


(4.8-10.8)  H


 


Red Blood Count


  3.14 M/UL


(4.20-5.40)  L


 


Hemoglobin


  8.5 G/DL


(12.0-16.0)  L


 


Hematocrit


  26.0 %


(37.0-47.0)  L


 


Mean Corpuscular Volume 83 FL (80-99)  


 


Mean Corpuscular Hemoglobin


  27.0 PG


(27.0-31.0)


 


Mean Corpuscular Hemoglobin


Concent 32.6 G/DL


(32.0-36.0)


 


Red Cell Distribution Width


  13.5 %


(11.6-14.8)


 


Platelet Count


  369 K/UL


(150-450)


 


Mean Platelet Volume


  6.8 FL


(6.5-10.1)


 


Neutrophils (%) (Auto)


  % (45.0-75.0)


 


 


Lymphocytes (%) (Auto)


  % (20.0-45.0)


 


 


Monocytes (%) (Auto)  % (1.0-10.0)  


 


Eosinophils (%) (Auto)  % (0.0-3.0)  


 


Basophils (%) (Auto)  % (0.0-2.0)  


 


Activated Partial


Thromboplast Time 33 SEC (23-33)


 


 


Sodium Level


  142 MMOL/L


(136-145)


 


Potassium Level


  3.5 MMOL/L


(3.5-5.1)


 


Chloride Level


  105 MMOL/L


()


 


Carbon Dioxide Level


  30 MMOL/L


(21-32)


 


Anion Gap


  7 mmol/L


(5-15)


 


Blood Urea Nitrogen


  16 mg/dL


(7-18)


 


Creatinine


  1.0 MG/DL


(0.55-1.30)


 


Estimat Glomerular Filtration


Rate 55.5 mL/min


(>60)


 


Glucose Level


  206 MG/DL


()  H


 


Calcium Level


  8.5 MG/DL


(8.5-10.1)


 


Total Bilirubin


  0.8 MG/DL


(0.2-1.0)


 


Aspartate Amino Transf


(AST/SGOT) 19 U/L (15-37)


 


 


Alanine Aminotransferase


(ALT/SGPT) 13 U/L (12-78)


 


 


Alkaline Phosphatase


  80 U/L


()


 


Total Protein


  6.0 G/DL


(6.4-8.2)  L


 


Albumin


  1.9 G/DL


(3.4-5.0)  L


 


Globulin 4.1 g/dL  


 


Albumin/Globulin Ratio


  0.5 (1.0-2.7)


L











Plan


Problems:  


(1) Pressure injury of deep tissue


Assessment & Plan:  Pt presented on admission with DTPI R heel. Blood filled 

blister with marginal erythema noted to heel. Pt exhibited agitation when 

minimally palpated. (L)2.2cm x (W)2.1cm


L heel is blanchable .


Non-blanchable erythema without induration noted to sacral cleft. 


No other areas of skin concerns noted.





Tx.Plan:


Apply Betadine to R heel. Cover with Optifoam drsg. Change every 3 days and prn.


           


Apply Cavilon Skin Barrier to L heel. Cover with Optifoam drsg. Change every 7 

days and prn.


           


Apply Moisture Barrier Paste to buttocks. Cover sacral area with Optifoam drsg. 

Change every 3 days and prn.


           


Reposition at least every 2hours or as tolerated.


           


Off-load heels with pillow.





(2) Sepsis


Assessment & Plan:  Patient with low-grade fevers, anemia, leukocytosis 

persistent, elevated platelets, abnormal labs.


Etiology currently unknown and work-up in progress.  Labs noted and imaging 

that is available as noted.  


Okay for diet


CT noted


US noted


Impression: 13 x 7 x 12.9 cm unilocular cystic mass, corresponding to lesion 

reported


on recent CT scan. Layering low level internal echoes likely represent bladder


debris.. This presumably represents a cystic ovarian lesion with debris or


hemorrhage, possibly neoplastic. Gynecological consultation is recommended


Antibiotics as per infectious disease 


local wound care as wounds do not seem to be the etiology of her sepsis


PEG tube ; start feeds soon 


cont with tube feeds


supplementation for healing 


We will monitor and follow with recommendations


Thank you for allowing me to participate in patient's care














Radhames Blas Nov 1, 2019 15:33

## 2019-11-01 NOTE — NUR
TRANSFER TO FLOOR:



Patient transferred to med-surg room 403-2, per Dr. Keith. Report given to ELIZABETH Singer. No 
belongings. Medications given to ELIZABETH Singer.

## 2019-11-01 NOTE — PRE-PROCEDURE NOTE/ATTESTATION
Pre-Procedure Note/Attestation


Complete Prior to Procedure


Planned Procedure:  not applicable


Procedure Narrative:


egd/peg





Indications for Procedure


Pre-Operative Diagnosis:


dysphagia





Attestation


I attest that I discussed the nature of the procedure; its benefits; risks and 

complications; and alternatives (and the risks and benefits of such alternatives

), prior to the procedure, with the patient (or the patient's legal 

representative).





I attest that, if there was a reasonable possibility of needing a blood 

transfusion, the patient (or the patient's legal representative) was given the 

[facility] standardized written summary, pursuant 

to the [place] Blood Safety Act ([place] Health and Safety Code # 1645, as 

amended).





I attest that I re-evaluated the patient just prior to the surgery and that 

there has been no change in the patient's H&P, except as documented below:











Storm Turk MD Nov 1, 2019 08:02

## 2019-11-01 NOTE — NUR
NURSE NOTES:



Patient is calm at this time. Educated on removal criteria for freedom splints, unable to 
comprehend but patient does not attempt to pull on devices. Restraints discontinued.

## 2019-11-01 NOTE — NUR
NURSE NOTES:

PT NOTED TO HAVE SOB, 97% ON 2LNC, RT CALLED , AWAITING PT, PT CURRENTLY HAS AN ACTIVE BIPAP 
PRN ORDER 


-------------------------------------------------------------------------------

Addendum: 11/01/19 at 1706 by SOUTH BENOIT RN

-------------------------------------------------------------------------------

RR [id number]

## 2019-11-01 NOTE — IMMEDIATE POST-OP EVALUATION
Immediate Post-Op Evalulation


Immediate Post-Op Evalulation


Procedure:  peg placement


Date of Evaluation:  Nov 1, 2019


Time of Evaluation:  08:38


IV Fluids:  200


Blood Products:  0


Blood Pressure Systolic:  155


Blood Pressure Diastolic:  88


Pulse Rate:  91


Respiratory Rate:  14


O2 Sat by Pulse Oximetry:  98


Temperature (Fahrenheit):  97.5


Nausea:  No


Vomiting:  No


Patient Status:  awake, reacts


Hydration Status:  adequate


Drug:  Dinorah Mcguire CRNA Nov 1, 2019 08:39

## 2019-11-01 NOTE — NUR
*-* INSURANCE *-*



UPDATED CLINICALS AND REVIEWS HAVE BEEN FAXED TO:





Tracking#[id number]

CM: Lance

#[phone]

Fax#[phone]

## 2019-11-01 NOTE — ANETHESIA PREOPERATIVE EVAL
Anesthesia Pre-op PMH/ROS


General


Date of Evaluation:  Nov 1, 2019


Time of Evaluation:  08:00


Anesthesiologist:  urbano


ASA Score:  ASA 3


Mallampati Score


Class I : Soft palate, uvula, fauces, pillars visible


Class II: Soft palate, uvula, fauces visible


Class III: Soft palate, base of uvula visible


Class IV: Only hard plate visible


Mallampati Classification:  Class II


Surgeon:  kavitha


Diagnosis:  Failure to thrive


Surgical Procedure:  PEG placement


Anesthesia History:  none


Family History:  no anesthesia problems


Allergies:  


Coded Allergies:  


     No Known Allergies (Unverified , 10/14/19)


Patient NPO?:  Yes


NPO Date:  Nov 1, 2019


NPO Time:  00:01





Past Medical History


Cardiovascular:  Reports: HTN


Pulmonary:  Reports: other - CHF; sepsis; Pneumonia; 


   Denies: asthma, COPD, FIOR


Gastrointestinal/Genitourinary:  Reports: GERD; 


   Denies: CRI, ESRD, other


Neurologic/Psychiatric:  Reports: dementia; 


   Denies: CVA, depression/anxiety, TIA, other


Endocrine:  Reports: DM; 


   Denies: hypothyroidism, steroids, other


HEENT:  Denies: cataract (L), cataract (R), glaucoma, Yuhaaviatam (L), Yuhaaviatam (R), other


Hematology/Immune:  Reports: anemia; 


   Denies: DVT, bleeding disorder, other


Other:  obesity - unknown





Anesthesia Pre-op Phys. Exam


Physician Exam





Last Vital Signs








  Date Time  Temp Pulse Resp B/P (MAP) Pulse Ox O2 Delivery O2 Flow Rate FiO2


 


11/1/19 07:12     96 Nasal Cannula 2.0 28


 


11/1/19 04:00 97.9 98 36 110/60 (77)    








Constitutional:  NAD


Neurologic:  other - confused and combative, on restraints


Cardiovascular:  RRR


Respiratory:  CTA


Gastrointestinal:  S/NT/ND





Airway Exam


Mallampati Classification


3


Mallampati Score:  Class II


MO:  limited


ROM:  limited


Dentures:  no upper, no lower





Anesthesia Pre-op A/P


Labs





Hematology








Test


  11/1/19


03:25


 


White Blood Count


  14.7 K/UL


(4.8-10.8)  H


 


Red Blood Count


  3.14 M/UL


(4.20-5.40)  L


 


Hemoglobin


  8.5 G/DL


(12.0-16.0)  L


 


Hematocrit


  26.0 %


(37.0-47.0)  L


 


Mean Corpuscular Volume 83 FL (80-99)  


 


Mean Corpuscular Hemoglobin


  27.0 PG


(27.0-31.0)


 


Mean Corpuscular Hemoglobin


Concent 32.6 G/DL


(32.0-36.0)


 


Red Cell Distribution Width


  13.5 %


(11.6-14.8)


 


Platelet Count


  369 K/UL


(150-450)


 


Mean Platelet Volume


  6.8 FL


(6.5-10.1)


 


Neutrophils (%) (Auto)


  % (45.0-75.0)


 


 


Lymphocytes (%) (Auto)


  % (20.0-45.0)


 


 


Monocytes (%) (Auto)  % (1.0-10.0)  


 


Eosinophils (%) (Auto)  % (0.0-3.0)  


 


Basophils (%) (Auto)  % (0.0-2.0)  








Coagulation








Test


  11/1/19


03:25


 


Activated Partial


Thromboplast Time 33 SEC (23-33)


 








Chemistry








Test


  11/1/19


03:25


 


Sodium Level


  142 MMOL/L


(136-145)


 


Potassium Level


  3.5 MMOL/L


(3.5-5.1)


 


Chloride Level


  105 MMOL/L


()


 


Carbon Dioxide Level


  30 MMOL/L


(21-32)


 


Anion Gap


  7 mmol/L


(5-15)


 


Blood Urea Nitrogen


  16 mg/dL


(7-18)


 


Creatinine


  1.0 MG/DL


(0.55-1.30)


 


Estimat Glomerular Filtration


Rate 55.5 mL/min


(>60)


 


Glucose Level


  206 MG/DL


()  H


 


Calcium Level


  8.5 MG/DL


(8.5-10.1)


 


Total Bilirubin


  0.8 MG/DL


(0.2-1.0)


 


Aspartate Amino Transf


(AST/SGOT) 19 U/L (15-37)


 


 


Alanine Aminotransferase


(ALT/SGPT) 13 U/L (12-78)


 


 


Alkaline Phosphatase


  80 U/L


()


 


Total Protein


  6.0 G/DL


(6.4-8.2)  L


 


Albumin


  1.9 G/DL


(3.4-5.0)  L


 


Globulin 4.1 g/dL  


 


Albumin/Globulin Ratio


  0.5 (1.0-2.7)


L











Studies


Pre-op Studies:  EKG - SR





Risk Assessment & Plan


Plan:


MAC


Status Change Before Surgery:  No





Pre-Antibiotics


Drug:  zosyn


Given Within 1 Hr of Incision:  Yes


Time Given:  08:00











Dinorah Hope CRNA Nov 1, 2019 08:38

## 2019-11-02 VITALS — DIASTOLIC BLOOD PRESSURE: 74 MMHG | SYSTOLIC BLOOD PRESSURE: 114 MMHG

## 2019-11-02 VITALS — DIASTOLIC BLOOD PRESSURE: 67 MMHG | SYSTOLIC BLOOD PRESSURE: 115 MMHG

## 2019-11-02 VITALS — SYSTOLIC BLOOD PRESSURE: 133 MMHG | DIASTOLIC BLOOD PRESSURE: 84 MMHG

## 2019-11-02 VITALS — DIASTOLIC BLOOD PRESSURE: 85 MMHG | SYSTOLIC BLOOD PRESSURE: 135 MMHG

## 2019-11-02 VITALS — SYSTOLIC BLOOD PRESSURE: 147 MMHG | DIASTOLIC BLOOD PRESSURE: 66 MMHG

## 2019-11-02 VITALS — DIASTOLIC BLOOD PRESSURE: 77 MMHG | SYSTOLIC BLOOD PRESSURE: 149 MMHG

## 2019-11-02 RX ADMIN — DOCUSATE SODIUM SCH MG: 50 LIQUID ORAL at 21:21

## 2019-11-02 RX ADMIN — INSULIN ASPART SCH UNITS: 100 INJECTION, SOLUTION INTRAVENOUS; SUBCUTANEOUS at 18:14

## 2019-11-02 RX ADMIN — DOCUSATE SODIUM SCH MG: 50 LIQUID ORAL at 05:19

## 2019-11-02 RX ADMIN — FUROSEMIDE SCH MG: 40 TABLET ORAL at 21:20

## 2019-11-02 RX ADMIN — DIVALPROEX SODIUM SCH MG: 125 CAPSULE ORAL at 21:00

## 2019-11-02 RX ADMIN — INSULIN ASPART SCH UNITS: 100 INJECTION, SOLUTION INTRAVENOUS; SUBCUTANEOUS at 05:26

## 2019-11-02 RX ADMIN — DIVALPROEX SODIUM SCH MG: 125 CAPSULE ORAL at 08:33

## 2019-11-02 RX ADMIN — DIVALPROEX SODIUM SCH MG: 125 CAPSULE ORAL at 21:23

## 2019-11-02 RX ADMIN — DEXTROSE AND SODIUM CHLORIDE SCH MLS/HR: 5; .45 INJECTION, SOLUTION INTRAVENOUS at 17:04

## 2019-11-02 RX ADMIN — DOCUSATE SODIUM SCH MG: 50 LIQUID ORAL at 14:35

## 2019-11-02 RX ADMIN — FUROSEMIDE SCH MG: 40 TABLET ORAL at 21:00

## 2019-11-02 RX ADMIN — INSULIN ASPART SCH UNITS: 100 INJECTION, SOLUTION INTRAVENOUS; SUBCUTANEOUS at 12:15

## 2019-11-02 NOTE — NUR
NURSE NOTES:

Paged Dr. May with regards to patient's situation. Per Dr. May, stat CBC. Noted 
and carried out. 

-------------------------------------------------------------------------------

Addendum: 11/02/19 at 2251 by Silvia Arriola RN

-------------------------------------------------------------------------------

NURSE NOTES:

Dr. May made aware to hold anticoagulation medications.

## 2019-11-02 NOTE — NUR
NURSE NOTES:

Per Dr. Keith, with new orders to do type and cross match 2 units of PRBC and hold for 
transfusion, HH for esme PISANO. Noted and carried out.

## 2019-11-02 NOTE — CARDIOLOGY PROGRESS NOTE
Assessment/Plan


Assessment/Plan


1. acute congestive heart failure


2. Abnormal cardiac enzyme demand related due to infection and profound anemia 


3. Agitation 


4. Urinary tract infection.


5. Acute renal failure.


6. Profound anemia.


7. Diabetes mellitus.


8. History of hypertension.


9. History of mood disorder.


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 


11. arf 


12. pelvic mass


13  sepsis 


14. pneumonia 











not clear why she is dual antiplt agent as such i am not sure how safe it will 

be to long term 


echo mild systolic dysfucntion 


transfered off tele now on med surge 


iv abx 


wbc up agian  


s/p peg resuem dapt when ok with gi  


lasix iv bid for 3 dosase 


cxr in am





Subjective


ROS Limited/Unobtainable:  Yes





Objective





Last 24 Hour Vital Signs








  Date Time  Temp Pulse Resp B/P (MAP) Pulse Ox O2 Delivery O2 Flow Rate FiO2


 


11/2/19 12:00        40


 


11/2/19 11:57  92 28  98   


 


11/2/19 11:30 98.4 100 36 135/85 (102) 88   


 


11/2/19 09:00      Nasal Cannula 2.0 


 


11/2/19 08:57     95 Nasal Cannula 4.0 36


 


11/2/19 08:39  104  115/67    


 


11/2/19 08:00 97.8 104 22 115/67 (83) 95   


 


11/2/19 08:00        40


 


11/2/19 05:20  90 29  97 Full Face  40


 


11/2/19 04:00 98.9 96 25 149/77 (101) 98   


 


11/2/19 04:00       2.0 


 


11/2/19 01:37  93 34  96 Full Face  40


 


11/2/19 00:00      Nasal Cannula 2.0 


 


11/2/19 00:00 99.0 88 24 114/74 (87) 97   


 


11/1/19 22:41  92 29  97 Full Face  40


 


11/1/19 21:00      Nasal Cannula 2.0 


 


11/1/19 21:00       2.0 


 


11/1/19 21:00      Nasal Cannula 2.0 


 


11/1/19 21:00      Nasal Cannula 2.0 


 


11/1/19 20:00 98.2 92 25 135/77 (96) 98   


 


11/1/19 18:40  96 26  96 Full Face  40


 


11/1/19 18:40     96 Bi-Pap  40


 


11/1/19 17:34  93  140/79    


 


11/1/19 16:00       2.0 


 


11/1/19 15:45 98.0 96 22 130/70 (90) 96   


 


11/1/19 15:45      Nasal Cannula 2.0 








General Appearance:  lethargic, other - on bibpap


Cardiovascular:  normal rate


Respiratory/Chest:  lungs clear


Abdomen:  soft


Extremities:  no swelling











Intake and Output  


 


 11/1/19 11/2/19





 19:00 07:00


 


Intake Total 669.28275 ml 585 ml


 


Output Total 400 ml 


 


Balance 269.29342 ml 585 ml


 


  


 


Free Water 150 ml 210 ml


 


IV Total 444.24305 ml 


 


Tube Feeding 15 ml 315 ml


 


Other 60 ml 60 ml


 


Output Urine Total 400 ml 











Laboratory Tests








Test


  11/2/19


11:10


 


Arterial Blood pH


  7.434


(7.350-7.450)


 


Arterial Blood Partial


Pressure CO2 44.8 mmHg


(35.0-45.0)


 


Arterial Blood Partial


Pressure O2 58.8 mmHg


(75.0-100.0)  L


 


Arterial Blood HCO3


  29.3 mmol/L


(22.0-26.0)  H


 


Arterial Blood Oxygen


Saturation 88.9 %


()  *L


 


Arterial Blood Base Excess 4.6 (-2-2)  H


 


Graham Test Positive  











Microbiology








 Date/Time


Source Procedure


Growth Status


 


 


 10/31/19 10:15


Urine,Clean Catch Urine Culture - Preliminary


YEAST Resulted

















Rob May MD Nov 2, 2019 12:25

## 2019-11-02 NOTE — NUR
NURSE NOTES:

Received patient from [facility]AB[facility] Bland. Patient is awake, nonverbal. lying in semi rose's; 
resting comfortably. A/Ox0. No signs of distress noted. Checked IV site and flushed. No 
erythema, bleeding or infiltration noted. On PEG tube with no gastric residual noted. Bed at 
lowest position, brakes on, siderails x3. Call light within reach. Will continue to monitor. 

-------------------------------------------------------------------------------

Addendum: 11/03/19 at 0434 by Silvia Arriola RN

-------------------------------------------------------------------------------

Padded siderails on for seizure precaution. On P200 mattress for wound management.

## 2019-11-02 NOTE — NUR
NURSE NOTES:

Paged Dr. Keith regarding patient's gastric residual volume of 50cc, bright red in color. 
Per Dr. Keith, hold all anticoagulation medications and notify cardiologist and GI. Noted 
and carried out.

## 2019-11-02 NOTE — SURGERY PROGRESS NOTE
Surgery Progress Note


Subjective


Additional Comments


improving


labs improved


exam better


respiratory improved


dressings saturated and being changed





Objective





Last 24 Hour Vital Signs








  Date Time  Temp Pulse Resp B/P (MAP) Pulse Ox O2 Delivery O2 Flow Rate FiO2


 


11/2/19 08:57     95 Nasal Cannula 4.0 36


 


11/2/19 08:39  104  115/67    


 


11/2/19 08:00 97.8 104 22 115/67 (83) 95   


 


11/2/19 05:20  90 29  97 Full Face  40


 


11/2/19 04:00 98.9 96 25 149/77 (101) 98   


 


11/2/19 04:00       2.0 


 


11/2/19 01:37  93 34  96 Full Face  40


 


11/2/19 00:00      Nasal Cannula 2.0 


 


11/2/19 00:00 99.0 88 24 114/74 (87) 97   


 


11/1/19 22:41  92 29  97 Full Face  40


 


11/1/19 21:00      Nasal Cannula 2.0 


 


11/1/19 21:00       2.0 


 


11/1/19 21:00      Nasal Cannula 2.0 


 


11/1/19 21:00      Nasal Cannula 2.0 


 


11/1/19 20:00 98.2 92 25 135/77 (96) 98   


 


11/1/19 18:40  96 26  96 Full Face  40


 


11/1/19 18:40     96 Bi-Pap  40


 


11/1/19 17:34  93  140/79    


 


11/1/19 16:00       2.0 


 


11/1/19 15:45 98.0 96 22 130/70 (90) 96   


 


11/1/19 15:45      Nasal Cannula 2.0 


 


11/1/19 12:00 98.8 96 22 102/75 (84) 96   


 


11/1/19 12:00      Nasal Cannula 2.0 


 


11/1/19 12:00       2.0 


 


11/1/19 11:37  90      








I&O











Intake and Output  


 


 11/1/19 11/2/19





 19:00 07:00


 


Intake Total 669.07124 ml 585 ml


 


Output Total 400 ml 


 


Balance 269.96991 ml 585 ml


 


  


 


Free Water 150 ml 210 ml


 


IV Total 444.17857 ml 


 


Tube Feeding 15 ml 315 ml


 


Other 60 ml 60 ml


 


Output Urine Total 400 ml 








Dressing:  saturated


Wound:  other


Drains:  other


Cardiovascular:  RSR


Respiratory:  decreased breath sounds


Abdomen:  soft, present bowel sounds, other, non-distended


Extremities:  no tenderness, no cyanosis





Plan


Problems:  


(1) Pressure injury of deep tissue


Assessment & Plan:  Pt presented on admission with DTPI R heel. Blood filled 

blister with marginal erythema noted to heel. Pt exhibited agitation when 

minimally palpated. (L)2.2cm x (W)2.1cm


L heel is blanchable .


Non-blanchable erythema without induration noted to sacral cleft. 


No other areas of skin concerns noted.





Tx.Plan:


Apply Betadine to R heel. Cover with Optifoam drsg. Change every 3 days and prn.


           


Apply Cavilon Skin Barrier to L heel. Cover with Optifoam drsg. Change every 7 

days and prn.


           


Apply Moisture Barrier Paste to buttocks. Cover sacral area with Optifoam drsg. 

Change every 3 days and prn.


           


Reposition at least every 2hours or as tolerated.


           


Off-load heels with pillow.





(2) Sepsis


Assessment & Plan:  Patient with low-grade fevers, anemia, leukocytosis 

persistent, elevated platelets, abnormal labs.


Etiology currently unknown and work-up in progress.  Labs noted and imaging 

that is available as noted.  


Okay for diet


CT noted


US noted


Impression: 13 x 7 x 12.9 cm unilocular cystic mass, corresponding to lesion 

reported


on recent CT scan. Layering low level internal echoes likely represent bladder


debris.. This presumably represents a cystic ovarian lesion with debris or


hemorrhage, possibly neoplastic. Gynecological consultation is recommended


Antibiotics as per infectious disease 


local wound care as wounds do not seem to be the etiology of her sepsis


start feeds via peg


doing well


improving


cont with tube feeds


supplementation for healing 


We will monitor and follow with recommendations


Thank you for allowing me to participate in patient's care














Radhames Blas Nov 2, 2019 11:24

## 2019-11-02 NOTE — NUR
NURSE NOTES:

GTUBE DRESSING CHANGED. SITE WITH MINIMAL DRIED BLOOD. CLEANSED WITH SOAP AND WATER. PT 
TOLERATING GTUBE FEED INCREASE TO 40CC/H. IN NO APPARENT DISTRESS AT THIS TIME. RR AND PULSE 
WNL. PT IN HIGH KENT'S POSITION WITH HOB ELEVATED. ON BIPAP. WILL CONTINUE TO MONITOR.

## 2019-11-02 NOTE — NUR
NURSE NOTES:

Received patient from ELIZAB[facility] Lynch.  Patient is drowsy, unable to follow commands and make needs 
known. Patient is breathing on bipap @ this time. Patient is slightly tachypnea at 22 
respiration rate.  20 Fr GT in place with clean dressing. HoB elevated. Charles catheter 
patent and draining well. Right forearm 22g IV site asymptomatic. Bed locked in lowest 
position with padded side rails up x 3. Patient on low air loss mattress. All needs attended 
to. Call light within reach. Will continue to monitor.

## 2019-11-02 NOTE — NEPHROLOGY PROGRESS NOTE
Assessment/Plan


Problem List:  


(1) Renal failure (ARF), acute on chronic


Assessment:  resolved





(2) Dehydration


(3) ARF (acute renal failure)


(4) Sepsis


(5) Acute metabolic encephalopathy


(6) Hyperglycemia due to type 2 diabetes mellitus


(7) UTI (urinary tract infection)


(8) Diabetic nephropathy


Assessment





Renal failure, Acute on Chronic resolved


Dehydration


Severe Anemia


Sepsis / Pneumonia / UTI


DM, OOC


Proteinuria , Diabetic Nephropathy


Acute encephalopathy


Plan





has PEg


Per pulmonary





Cr lowering 


Will order urine studies and monitor renal parameters


kidney YOANDY noted


lower iv fluids rate


WBCs rising


has casas again due to retention


Avoid Nephrotoxics








previously


Anemia salas


2D echo


24 H urine protein


Keep BP and BS in check


I am holding  her psych meds due to low MS


Per orders





Subjective


ROS Limited/Unobtainable:  No


Constitutional:  Reports: malaise





Objective


Objective





Last 24 Hour Vital Signs








  Date Time  Temp Pulse Resp B/P (MAP) Pulse Ox O2 Delivery O2 Flow Rate FiO2


 


11/2/19 08:57     95 Nasal Cannula 4.0 36


 


11/2/19 08:39  104  115/67    


 


11/2/19 05:20  90 29  97 Full Face  40


 


11/2/19 04:00 98.9 96 25 149/77 (101) 98   


 


11/2/19 04:00       2.0 


 


11/2/19 01:37  93 34  96 Full Face  40


 


11/2/19 00:00      Nasal Cannula 2.0 


 


11/2/19 00:00 99.0 88 24 114/74 (87) 97   


 


11/1/19 22:41  92 29  97 Full Face  40


 


11/1/19 21:00      Nasal Cannula 2.0 


 


11/1/19 21:00       2.0 


 


11/1/19 21:00      Nasal Cannula 2.0 


 


11/1/19 21:00      Nasal Cannula 2.0 


 


11/1/19 20:00 98.2 92 25 135/77 (96) 98   


 


11/1/19 18:40  96 26  96 Full Face  40


 


11/1/19 18:40     96 Bi-Pap  40


 


11/1/19 17:34  93  140/79    


 


11/1/19 16:00       2.0 


 


11/1/19 15:45 98.0 96 22 130/70 (90) 96   


 


11/1/19 15:45      Nasal Cannula 2.0 


 


11/1/19 12:00 98.8 96 22 102/75 (84) 96   


 


11/1/19 12:00      Nasal Cannula 2.0 


 


11/1/19 12:00       2.0 


 


11/1/19 11:37  90      

















Intake and Output  


 


 11/1/19 11/2/19





 19:00 07:00


 


Intake Total 669.68990 ml 585 ml


 


Output Total 400 ml 


 


Balance 269.91846 ml 585 ml


 


  


 


Free Water 150 ml 210 ml


 


IV Total 444.29885 ml 


 


Tube Feeding 15 ml 315 ml


 


Other 60 ml 60 ml


 


Output Urine Total 400 ml 








Height (Feet):  5


Height (Inches):  5.00


Weight (Pounds):  161


General Appearance:  no apparent distress


Cardiovascular:  normal rate, tachycardia - at times


Respiratory/Chest:  decreased breath sounds


Abdomen:  distended, other - PEG


Objective


no change











Lukasz Vizcaino MD Nov 2, 2019 10:28

## 2019-11-02 NOTE — NUR
NURSE NOTES:

DR MORALES AT BEDSIDE AND MADE AWARE OF EPISODE OF DESATURATION TODAY, WITH INCREASE IN RR 
AND PULSE. PER DR MORALES, PT NEEDS TO BE TRANSFERRED TO TELEMETRY FOR CARDIAC MONITORING 
AND GAVE RN ORDER. HOUSE SUP COREY AND TRISHA DIA MADE AWARE.

## 2019-11-02 NOTE — GENERAL PROGRESS NOTE
Assessment/Plan


Status:  not improved


Assessment/Plan:


Assessment


(1) pneumonia


(2) UTI


(3) Sepsis


(4) Dehydration


(5) CHANCE 


(6) Acute metabolic encephalopathy


(7) Hyperglycemia due to type 2 diabetes mellitus


(8) Renal failure (ARF), acute on chronic


(9) Aspiration pneumonia


(10) Abnormal TSH


(11) Pelvic mass in female


(12) Congestive Heart Failure


(13) Anemia


(14) Dysphagia





Recommendations


Hold TF until resp status stable


IVF


BIPAP


Elevate HOB


Plavix and ASA





Subjective


Allergies:  


Coded Allergies:  


     No Known Allergies (Unverified , 10/14/19)


Subjective


above noted


unresponsive


on BIPAP


s/p PEG





Objective





Last 24 Hour Vital Signs








  Date Time  Temp Pulse Resp B/P (MAP) Pulse Ox O2 Delivery O2 Flow Rate FiO2


 


11/2/19 16:00        40


 


11/2/19 16:00 98.2 94 20 133/84 (100) 93   


 


11/2/19 14:38  109 31  95 Facial  40


 


11/2/19 14:01  112 31  94 Facial  40


 


11/2/19 12:00        40


 


11/2/19 11:57  92 28  98   


 


11/2/19 11:30 98.4 100 36 135/85 (102) 88   


 


11/2/19 11:05  114 37  96 Facial  40


 


11/2/19 09:00      Nasal Cannula 2.0 


 


11/2/19 08:57     95 Nasal Cannula 4.0 36


 


11/2/19 08:39  104  115/67    


 


11/2/19 08:00 97.8 104 22 115/67 (83) 95   


 


11/2/19 08:00        40


 


11/2/19 05:20  90 29  97 Full Face  40


 


11/2/19 04:00 98.9 96 25 149/77 (101) 98   


 


11/2/19 04:00       2.0 


 


11/2/19 01:37  93 34  96 Full Face  40


 


11/2/19 00:00      Nasal Cannula 2.0 


 


11/2/19 00:00 99.0 88 24 114/74 (87) 97   


 


11/1/19 22:41  92 29  97 Full Face  40


 


11/1/19 21:00      Nasal Cannula 2.0 


 


11/1/19 21:00       2.0 


 


11/1/19 21:00      Nasal Cannula 2.0 


 


11/1/19 21:00      Nasal Cannula 2.0 


 


11/1/19 20:00 98.2 92 25 135/77 (96) 98   


 


11/1/19 18:40  96 26  96 Full Face  40


 


11/1/19 18:40     96 Bi-Pap  40


 


11/1/19 17:34  93  140/79    

















Intake and Output  


 


 11/1/19 11/2/19





 18:59 06:59


 


Intake Total 696.07281 ml 555 ml


 


Output Total 400 ml 


 


Balance 296.05720 ml 555 ml


 


  


 


Free Water 150 ml 210 ml


 


IV Total 471.73190 ml 


 


Tube Feeding 15 ml 285 ml


 


Other 60 ml 60 ml


 


Output Urine Total 400 ml 








Laboratory Tests


11/2/19 11:10: 


Arterial Blood pH 7.434, Arterial Blood Partial Pressure CO2 44.8, Arterial 

Blood Partial Pressure O2 58.8L, Arterial Blood HCO3 29.3H, Arterial Blood 

Oxygen Saturation 88.9*L, Arterial Blood Base Excess 4.6H, Graham Test Positive


Height (Feet):  5


Height (Inches):  5.00


Weight (Pounds):  169


Objective


Elderly WW


BIPAP


Neck supple


CTA


RR


abd soft


no edema











Bladimir Encarnacion MD Nov 2, 2019 16:48

## 2019-11-02 NOTE — INFECTIOUS DISEASES PROG NOTE
Assessment/Plan


Problems:  


(1) Aspiration pneumonia


Assessment & Plan:  with B/L infiltrates, L>R suspect due to altered mental 

status , with hypoxemia and leukocytosis, continue meropenem empirically, 

repeated  CXR  showed no change , CT of the chest showed B/L effusion with 

basal infiltrates , aspiration precaution. S/P meropenem for two weeks total . 

EOT 10/30/19. WILL MONITOR CLINICALLY OFF ANTIBIOTICS   





(2) UTI (urinary tract infection)


Assessment & Plan:  due to ESBL producing E coli , s/p meropenem to cover for 

sepsis and pneumonia too for two weeks total  





(3) Sepsis


Assessment & Plan:  ruled out with negative blood culture , and persistent 

leukocytosis inspit of being on wide spectrum antibiotics , suspect pelvic mass 

related or bone marrow pathology . recommend gynecology eval for pelvic mass 

resection . indium scan showed rim enhancing at the same  pelvic mass with no 

deep abscess. need gynecology work up and possible resection of the mass 





(4) Acute metabolic encephalopathy


Assessment & Plan:  due to the above, continue hydration and antibiotics, 

monitor in tele 





(5) Hyperglycemia due to type 2 diabetes mellitus


Assessment & Plan:  recommend tight glycemic control to keep blood glucose 

between 100-140 





(6) ARF (acute renal failure)


Assessment & Plan:  due to the above, continue hydration and renally dosed 

antibiotics, close  monitor of renal function , stop vancomycin 





(7) Pelvic mass in female


Assessment & Plan:  to the left of the uterus, suspect malignancy , recommend OB

/GYN eval , and tumor markers , oncology is following 





(8) Respiratory failure


Assessment & Plan:  with CO2 retention, restarted on BIPAP, monitor CXR and ABG 

, pulmonary is following 








Subjective


ROS Limited/Unobtainable:  Yes


Allergies:  


Coded Allergies:  


     No Known Allergies (Unverified , 10/14/19)


Subjective


she was transferred out of SDU , restarted on BIPAP, still lethargic , and 

minimally responsive , no fever or chills , no diarrhea, no cough or phlegm ,  

no SOB





Objective


Vital Signs





Last 24 Hour Vital Signs








  Date Time  Temp Pulse Resp B/P (MAP) Pulse Ox O2 Delivery O2 Flow Rate FiO2


 


11/2/19 14:38  109 31  95 Facial  40


 


11/2/19 14:01  112 31  94 Facial  40


 


11/2/19 12:00        40


 


11/2/19 11:57  92 28  98   


 


11/2/19 11:30 98.4 100 36 135/85 (102) 88   


 


11/2/19 11:05  114 37  96 Facial  40


 


11/2/19 09:00      Nasal Cannula 2.0 


 


11/2/19 08:57     95 Nasal Cannula 4.0 36


 


11/2/19 08:39  104  115/67    


 


11/2/19 08:00 97.8 104 22 115/67 (83) 95   


 


11/2/19 08:00        40


 


11/2/19 05:20  90 29  97 Full Face  40


 


11/2/19 04:00 98.9 96 25 149/77 (101) 98   


 


11/2/19 04:00       2.0 


 


11/2/19 01:37  93 34  96 Full Face  40


 


11/2/19 00:00      Nasal Cannula 2.0 


 


11/2/19 00:00 99.0 88 24 114/74 (87) 97   


 


11/1/19 22:41  92 29  97 Full Face  40


 


11/1/19 21:00      Nasal Cannula 2.0 


 


11/1/19 21:00       2.0 


 


11/1/19 21:00      Nasal Cannula 2.0 


 


11/1/19 21:00      Nasal Cannula 2.0 


 


11/1/19 20:00 98.2 92 25 135/77 (96) 98   


 


11/1/19 18:40  96 26  96 Full Face  40


 


11/1/19 18:40     96 Bi-Pap  40


 


11/1/19 17:34  93  140/79    


 


11/1/19 16:00       2.0 


 


11/1/19 15:45 98.0 96 22 130/70 (90) 96   


 


11/1/19 15:45      Nasal Cannula 2.0 








Height (Feet):  5


Height (Inches):  5.00


Weight (Pounds):  169


General Appearance:  WD/WN, no acute distress


HEENT:  normocephalic, mucous membranes moist, PERRL


Respiratory/Chest:  chest wall non-tender, normal breath sounds, no respiratory 

distress, no accessory muscle use, decreased breath sounds, crackles/rales


Cardiovascular:  normal peripheral pulses, normal rate, regular rhythm, no 

gallop/murmur, no JVD


Abdomen:  normal bowel sounds, soft, non tender, no organomegaly, non distended

, no mass, no scars


Genitourinary:  normal external genitalia


Extremities:  no cyanosis, no clubbing


Skin:  no rash, no lesions


Neurologic/Psychiatric:  unresponsiveness


Lymphatic:  no neck adenopathy, no groin adenopathy


Musculoskeletal:  normal muscle bulk, no effusion





Microbiology








 Date/Time


Source Procedure


Growth Status


 


 


 10/31/19 10:15


Urine,Clean Catch Urine Culture - Preliminary


YEAST Resulted











Laboratory Tests








Test


  11/2/19


11:10


 


Arterial Blood pH


  7.434


(7.350-7.450)


 


Arterial Blood Partial


Pressure CO2 44.8 mmHg


(35.0-45.0)


 


Arterial Blood Partial


Pressure O2 58.8 mmHg


(75.0-100.0)  L


 


Arterial Blood HCO3


  29.3 mmol/L


(22.0-26.0)  H


 


Arterial Blood Oxygen


Saturation 88.9 %


()  *L


 


Arterial Blood Base Excess 4.6 (-2-2)  H


 


Graham Test Positive  











Current Medications








 Medications


  (Trade)  Dose


 Ordered  Sig/Winnie


 Route


 PRN Reason  Start Time


 Stop Time Status Last Admin


Dose Admin


 


 Acetaminophen


  (Tylenol)  650 mg  Q4H  PRN


 NG


 Mild Pain/Temp > 100.5  11/1/19 19:45


 11/25/19 19:44   


 


 


 Acetaminophen


  (Tylenol)  650 mg  Q4H  PRN


 RECTAL


 TEMP>100.5  11/1/19 19:45


     


 


 


 Amlodipine


 Besylate


  (Norvasc)  2.5 mg  BID


 NG


   11/1/19 18:00


 11/29/19 17:59  11/1/19 17:34


 


 


 Aspirin


  (ASA)  81 mg  DAILY


 NG


   11/2/19 09:00


 11/14/19 08:59  11/2/19 08:33


 


 


 Atorvastatin


 Calcium


  (Lipitor)  10 mg  BEDTIME


 NG


   11/1/19 21:00


 11/13/19 20:59  11/1/19 21:39


 


 


 Clopidogrel


 Bisulfate


  (Plavix)  75 mg  DAILY


 NG


   11/2/19 09:00


 11/14/19 08:59  11/2/19 08:34


 


 


 Dextrose


  (Dextrose 50%)  25 ml  Q30M  PRN


 IV


 Hypoglycemia  11/1/19 16:30


 12/1/19 06:29   


 


 


 Dextrose


  (Dextrose 50%)  50 ml  Q30M  PRN


 IV


 Hypoglycemia  11/1/19 16:30


 12/1/19 06:29   


 


 


 Divalproex Sodium


  (Depakote


 Sprinkles)  500 mg  Q12HR


 NG


   11/1/19 21:00


 11/14/19 21:59  11/2/19 08:33


 


 


 Docusate Sodium


  (Colace)  100 mg  EVERY 8  HOURS


 NG


   11/1/19 22:00


 11/25/19 08:59  11/2/19 14:35


 


 


 Enoxaparin Sodium


  (Lovenox)  30 mg  DAILY


 SUBQ


   11/2/19 09:00


 11/13/19 08:59  11/2/19 08:35


 


 


 Famotidine


  (Pepcid)  20 mg  EVERY 12  HOURS


 GT


   11/1/19 21:00


 11/25/19 17:59  11/2/19 08:33


 


 


 Folic Acid


  (Folate)  1 mg  DAILY


 ORAL


   11/2/19 09:00


 11/30/19 08:59  11/2/19 08:33


 


 


 Furosemide


  (Lasix)  40 mg  BID


 NG


   11/2/19 18:00


 12/2/19 17:59   


 


 


 Furosemide


  (Lasix)  40 mg  ONCE


 IV


   11/2/19 14:15


 11/2/19 15:15  11/2/19 14:35


 


 


 Insulin Aspart


  (NovoLOG)    EVERY 6  HOURS


 SUBQ


   11/1/19 18:00


 11/29/19 11:59  11/2/19 12:15


 


 


 Insulin Detemir


  (Levemir)  6 units  DAILY


 SUBQ


   11/2/19 09:00


 12/1/19 08:59  11/2/19 08:38


 


 


 Lactulose


  (Cephulac)  20 gm  Q8H  PRN


 NG


 Constipation  11/1/19 16:15


 11/25/19 16:14   


 


 


 Lorazepam


  (Ativan 2mg/ml


 1ml)  1 mg  Q4H  PRN


 IV


 For Anxiety  11/1/19 17:15


 11/7/19 21:14  11/2/19 06:55


 

















Amie Burgos M.D. Nov 2, 2019 15:15

## 2019-11-02 NOTE — NUR
NURSE NOTES:

PT WAS DESATURATING ON NC 4L, 88% WITH INCREASED RR 34-36. PT WAS DEEP SUCTIONED BUT STILL 
WITH INCREASED RR. PT PUT ON SIMPLE FACE MASK AT 8L O2, O2 SAT WENT UP TO 92% BUT RR STILL 
IN THE HIGH 30s. DR LAMB WAS AT BEDSIDE WITH ORDER FOR STAT CXR AND STAT ABG. RESPIRATORY 
THERAPIST CALLED. PT PUT BACK ON BIPAP. RN RELAYED ABG RESULTS TO COVERING MD MILLER WITH NO 
NEW ORDERS. KEEP ON BIPAP AND MONITOR. PT NOW SATURATING AT 92-94% ON BIPAP WITH RR BETWEEN 
20-24. WILL CONTINUE TO MONITOR.

## 2019-11-02 NOTE — DIAGNOSTIC IMAGING REPORT
EXAM:

  XR Chest, 1 View

 

CLINICAL HISTORY:

  Shortness of breath

 

TECHNIQUE:

  Frontal view of the chest.

 

COMPARISON:

  Chest x-rays dated 10 31 19

 

FINDINGS:

  Lungs:  Persistent moderate to severe bilateral pulmonary edema, not 

significant changed compared to the prior exam.

  Pleural space:  Small bilateral pleural effusions, also not 

significantly changed.

  Heart:  Unremarkable.  No cardiomegaly.

  Mediastinum:  Unremarkable.

  Bones joints:  Unremarkable.

 

IMPRESSION:     

1.  Persistent moderate to severe bilateral pulmonary edema, not 

significantly changed compared to the prior exam.

2.  Small bilateral pleural effusions, also not significantly changed.

## 2019-11-02 NOTE — GENERAL PROGRESS NOTE
Assessment/Plan


Status:  not improved


Assessment/Plan:


S: Not verbal





O: poor historian, respiratory distress  . IV sites are intact. BIPAP in place. 

Charles in place





PHYSICAL EXAMINATION:HEAD AND NECK:  Atraumatic and normocephalic.


CHEST:  Bronchial breathing sounds.ABDOMEN:  Soft.  No organomegaly.


MUSCULOSKELETAL:  Diffuse 1+ or 2+ nonpitting edema in all four contracted


extremities.  The patient is not following the commands.  Limited


evaluation.NEUROLOGY:  The patient is awake.  Not alert.  Not verbally


communicative.





LABORATORY DATA:  Labs dated Nov 2, 2019  reviewed





Meds: Reviewed and reconciled in the chart , including ASA and plavix 





Imaging:  CXR reviewed 





ASSESSMENT AND PLAN:


1. Sepsis.  Differential diagnosis is healthcare-associated pneumonia or


healthcare-associated urinary tract infection.  Work in progress.


2. Acute hypoxemic respiratory failure


3. Acute chf exacerbation - Diastolic


4. Chronic encephalomalacia.


3. Acute on chronic anemia.


4. Renal failure, age indeterminate.


6. Hyperthyroidism.


7. Hyperkalemia.


8. Diabetes type 2, uncontrolled with A1c of 10.


9. Psychiatric disorder.


10. GI and DVT prophylaxis.


11. PAH- Severe 





PLAN OF CARE:


Improving leukocytosis and  renal failure


Post PEG tube placement 


Out patient followup for abnormal incidental imaging finding 


STAT CXR and ABG





Subjective


Allergies:  


Coded Allergies:  


     No Known Allergies (Unverified , 10/14/19)





Objective





Last 24 Hour Vital Signs








  Date Time  Temp Pulse Resp B/P (MAP) Pulse Ox O2 Delivery O2 Flow Rate FiO2


 


11/2/19 16:00        40


 


11/2/19 16:00 98.2 94 20 133/84 (100) 93   


 


11/2/19 14:38  109 31  95 Facial  40


 


11/2/19 14:01  112 31  94 Facial  40


 


11/2/19 12:00        40


 


11/2/19 11:57  92 28  98   


 


11/2/19 11:30 98.4 100 36 135/85 (102) 88   


 


11/2/19 11:05  114 37  96 Facial  40


 


11/2/19 09:00      Nasal Cannula 2.0 


 


11/2/19 08:57     95 Nasal Cannula 4.0 36


 


11/2/19 08:39  104  115/67    


 


11/2/19 08:00 97.8 104 22 115/67 (83) 95   


 


11/2/19 08:00        40


 


11/2/19 05:20  90 29  97 Full Face  40


 


11/2/19 04:00 98.9 96 25 149/77 (101) 98   


 


11/2/19 04:00       2.0 


 


11/2/19 01:37  93 34  96 Full Face  40


 


11/2/19 00:00      Nasal Cannula 2.0 


 


11/2/19 00:00 99.0 88 24 114/74 (87) 97   


 


11/1/19 22:41  92 29  97 Full Face  40


 


11/1/19 21:00      Nasal Cannula 2.0 


 


11/1/19 21:00       2.0 


 


11/1/19 21:00      Nasal Cannula 2.0 


 


11/1/19 21:00      Nasal Cannula 2.0 


 


11/1/19 20:00 98.2 92 25 135/77 (96) 98   


 


11/1/19 18:40  96 26  96 Full Face  40


 


11/1/19 18:40     96 Bi-Pap  40


 


11/1/19 17:34  93  140/79    

















Intake and Output  


 


 11/1/19 11/2/19





 19:00 07:00


 


Intake Total 669.00154 ml 585 ml


 


Output Total 400 ml 


 


Balance 269.87857 ml 585 ml


 


  


 


Free Water 150 ml 210 ml


 


IV Total 444.89740 ml 


 


Tube Feeding 15 ml 315 ml


 


Other 60 ml 60 ml


 


Output Urine Total 400 ml 








Laboratory Tests


11/2/19 11:10: 


Arterial Blood pH 7.434, Arterial Blood Partial Pressure CO2 44.8, Arterial 

Blood Partial Pressure O2 58.8L, Arterial Blood HCO3 29.3H, Arterial Blood 

Oxygen Saturation 88.9*L, Arterial Blood Base Excess 4.6H, Graham Test Positive


Height (Feet):  5


Height (Inches):  5.00


Weight (Pounds):  169











Garrick Keith MD Nov 2, 2019 16:41

## 2019-11-02 NOTE — NUR
CASE MANAGEMENT: REVIEW







SI:  PNA . SEPSIS . ACUTE METABOLIC ENCEPHALOPATHY 

EGD / PEG PLACEMENT 11/01 

T 98.4  RR 36 /67 SAT 95% NC4L

ABG: PO2 58.8 HCO3 29.3 O2 SAT 88.9 





IS: LOVENOX SUBQ QD

PLAVIX GT QD 

NORVASC GT BID 

LIPITOR GT  QHS

NOVOLOG SUBQ Q6HR

GT FEEDING 



***MED/SURG UNIT STATUS***

DCP: PATIENT IS FROM  [facility]

## 2019-11-02 NOTE — PULMONOLOGY PROGRESS NOTE
Assessment/Plan


Problems:  


(1) Healthcare associated bacterial pneumonia


(2) UTI (urinary tract infection)


(3) Sepsis


(4) Dehydration


(5) CHANCE (acute kidney injury)


(6) Acute metabolic encephalopathy


(7) Hyperglycemia due to type 2 diabetes mellitus


(8) Renal failure (ARF), acute on chronic


(9) Aspiration pneumonia


(10) Abnormal TSH


(11) Pelvic mass in female


(12) PEG (percutaneous endoscopic gastrostomy) status


Assessment/Plan


Marked leukocytosis S/P WBC scan with uptake in pelvis, ? correspondence to 

pelvic mass


Sepsis


Possible pneumonia


E coli UTI


Acute hypoxemic respiratory failure


Acute encephalopathy


Hx CHF


HTN


CHANCE - RESOVLED


Pelvic mass/possible GYN malignancy vs cyst


Dysphagia S/P PEG





Plan:


-CXR


-Optimize pulmonary hygiene/mobilize as tolerated


-BiPAP PRN and qHS


-Titrate down FiO2 to keep SaO2 > 90%


-Monitor WCt, RFS sent for JAK2 mutation, per Dr. Pop BMBx if unrevealing


-Monitor off Abx per ID, F/U Cx's


-monitor volumes and renal function, F/U renal recs, continue Lasix 40 PO BID, 

will give another 40 IV x 1 now


-GYN evaluation 


-monitor encephalopathy, ? neuro eval


-NPO, GTF's


-DVT Px: LMWH


-FC





Subjective


Allergies:  


Coded Allergies:  


     No Known Allergies (Unverified , 10/14/19)


Subjective


AFVSS TTF inc O2 needs back on BiPAP


7.44/44/58/28/89


CXR pending


Not really interactive 


No SOB no cough no FC no wheezing





Objective





Last 24 Hour Vital Signs








  Date Time  Temp Pulse Resp B/P (MAP) Pulse Ox O2 Delivery O2 Flow Rate FiO2


 


11/2/19 12:00        40


 


11/2/19 11:57  92 28  98   


 


11/2/19 11:30 98.4 100 36 135/85 (102) 88   


 


11/2/19 09:00      Nasal Cannula 2.0 


 


11/2/19 08:57     95 Nasal Cannula 4.0 36


 


11/2/19 08:39  104  115/67    


 


11/2/19 08:00 97.8 104 22 115/67 (83) 95   


 


11/2/19 08:00        40


 


11/2/19 05:20  90 29  97 Full Face  40


 


11/2/19 04:00 98.9 96 25 149/77 (101) 98   


 


11/2/19 04:00       2.0 


 


11/2/19 01:37  93 34  96 Full Face  40


 


11/2/19 00:00      Nasal Cannula 2.0 


 


11/2/19 00:00 99.0 88 24 114/74 (87) 97   


 


11/1/19 22:41  92 29  97 Full Face  40


 


11/1/19 21:00      Nasal Cannula 2.0 


 


11/1/19 21:00       2.0 


 


11/1/19 21:00      Nasal Cannula 2.0 


 


11/1/19 21:00      Nasal Cannula 2.0 


 


11/1/19 20:00 98.2 92 25 135/77 (96) 98   


 


11/1/19 18:40  96 26  96 Full Face  40


 


11/1/19 18:40     96 Bi-Pap  40


 


11/1/19 17:34  93  140/79    


 


11/1/19 16:00       2.0 


 


11/1/19 15:45 98.0 96 22 130/70 (90) 96   


 


11/1/19 15:45      Nasal Cannula 2.0 

















Intake and Output  


 


 11/1/19 11/2/19





 19:00 07:00


 


Intake Total 669.35743 ml 585 ml


 


Output Total 400 ml 


 


Balance 269.96047 ml 585 ml


 


  


 


Free Water 150 ml 210 ml


 


IV Total 444.74341 ml 


 


Tube Feeding 15 ml 315 ml


 


Other 60 ml 60 ml


 


Output Urine Total 400 ml 








General Appearance:  no acute distress, cachetic


HEENT:  normocephalic, atraumatic, anicteric, mucous membranes moist


Respiratory/Chest:  crackles/rales, rhonchi


Cardiovascular:  normal peripheral pulses, normal rate, regular rhythm


Abdomen:  normal bowel sounds, soft, non tender, no organomegaly, non distended

, no mass, other - G


Extremities:  no cyanosis, no clubbing, no edema





Microbiology








 Date/Time


Source Procedure


Growth Status


 


 


 10/31/19 10:15


Urine,Clean Catch Urine Culture - Preliminary


YEAST Resulted








Laboratory Tests


11/2/19 11:10: 


Arterial Blood pH 7.434, Arterial Blood Partial Pressure CO2 44.8, Arterial 

Blood Partial Pressure O2 58.8L, Arterial Blood HCO3 29.3H, Arterial Blood 

Oxygen Saturation 88.9*L, Arterial Blood Base Excess 4.6H, Graham Test Positive





Current Medications








 Medications


  (Trade)  Dose


 Ordered  Sig/Winnie


 Route


 PRN Reason  Start Time


 Stop Time Status Last Admin


Dose Admin


 


 Acetaminophen


  (Tylenol)  650 mg  Q4H  PRN


 NG


 Mild Pain/Temp > 100.5  11/1/19 19:45


 11/25/19 19:44   


 


 


 Acetaminophen


  (Tylenol)  650 mg  Q4H  PRN


 RECTAL


 TEMP>100.5  11/1/19 19:45


     


 


 


 Amlodipine


 Besylate


  (Norvasc)  2.5 mg  BID


 NG


   11/1/19 18:00


 11/29/19 17:59  11/1/19 17:34


 


 


 Aspirin


  (ASA)  81 mg  DAILY


 NG


   11/2/19 09:00


 11/14/19 08:59  11/2/19 08:33


 


 


 Atorvastatin


 Calcium


  (Lipitor)  10 mg  BEDTIME


 NG


   11/1/19 21:00


 11/13/19 20:59  11/1/19 21:39


 


 


 Clopidogrel


 Bisulfate


  (Plavix)  75 mg  DAILY


 NG


   11/2/19 09:00


 11/14/19 08:59  11/2/19 08:34


 


 


 Dextrose


  (Dextrose 50%)  25 ml  Q30M  PRN


 IV


 Hypoglycemia  11/1/19 16:30


 12/1/19 06:29   


 


 


 Dextrose


  (Dextrose 50%)  50 ml  Q30M  PRN


 IV


 Hypoglycemia  11/1/19 16:30


 12/1/19 06:29   


 


 


 Divalproex Sodium


  (Depakote


 Sprinkles)  500 mg  Q12HR


 NG


   11/1/19 21:00


 11/14/19 21:59  11/2/19 08:33


 


 


 Docusate Sodium


  (Colace)  100 mg  EVERY 8  HOURS


 NG


   11/1/19 22:00


 11/25/19 08:59  11/2/19 05:19


 


 


 Enoxaparin Sodium


  (Lovenox)  30 mg  DAILY


 SUBQ


   11/2/19 09:00


 11/13/19 08:59  11/2/19 08:35


 


 


 Famotidine


  (Pepcid)  20 mg  EVERY 12  HOURS


 GT


   11/1/19 21:00


 11/25/19 17:59  11/2/19 08:33


 


 


 Folic Acid


  (Folate)  1 mg  DAILY


 ORAL


   11/2/19 09:00


 11/30/19 08:59  11/2/19 08:33


 


 


 Furosemide


  (Lasix)  40 mg  BID


 NG


   11/2/19 18:00


 12/2/19 17:59   


 


 


 Insulin Aspart


  (NovoLOG)    EVERY 6  HOURS


 SUBQ


   11/1/19 18:00


 11/29/19 11:59  11/2/19 12:15


 


 


 Insulin Detemir


  (Levemir)  6 units  DAILY


 SUBQ


   11/2/19 09:00


 12/1/19 08:59  11/2/19 08:38


 


 


 Lactulose


  (Cephulac)  20 gm  Q8H  PRN


 NG


 Constipation  11/1/19 16:15


 11/25/19 16:14   


 


 


 Lorazepam


  (Ativan 2mg/ml


 1ml)  1 mg  Q4H  PRN


 IV


 For Anxiety  11/1/19 17:15


 11/7/19 21:14  11/2/19 06:55


 

















Sammy Torres MD Nov 2, 2019 14:11

## 2019-11-03 VITALS — SYSTOLIC BLOOD PRESSURE: 147 MMHG | DIASTOLIC BLOOD PRESSURE: 65 MMHG

## 2019-11-03 VITALS — DIASTOLIC BLOOD PRESSURE: 106 MMHG | SYSTOLIC BLOOD PRESSURE: 141 MMHG

## 2019-11-03 VITALS — DIASTOLIC BLOOD PRESSURE: 68 MMHG | SYSTOLIC BLOOD PRESSURE: 152 MMHG

## 2019-11-03 VITALS — DIASTOLIC BLOOD PRESSURE: 63 MMHG | SYSTOLIC BLOOD PRESSURE: 142 MMHG

## 2019-11-03 VITALS — DIASTOLIC BLOOD PRESSURE: 69 MMHG | SYSTOLIC BLOOD PRESSURE: 154 MMHG

## 2019-11-03 VITALS — DIASTOLIC BLOOD PRESSURE: 66 MMHG | SYSTOLIC BLOOD PRESSURE: 146 MMHG

## 2019-11-03 VITALS — DIASTOLIC BLOOD PRESSURE: 87 MMHG | SYSTOLIC BLOOD PRESSURE: 154 MMHG

## 2019-11-03 VITALS — DIASTOLIC BLOOD PRESSURE: 77 MMHG | SYSTOLIC BLOOD PRESSURE: 144 MMHG

## 2019-11-03 VITALS — DIASTOLIC BLOOD PRESSURE: 99 MMHG | SYSTOLIC BLOOD PRESSURE: 153 MMHG

## 2019-11-03 VITALS — SYSTOLIC BLOOD PRESSURE: 157 MMHG | DIASTOLIC BLOOD PRESSURE: 71 MMHG

## 2019-11-03 VITALS — DIASTOLIC BLOOD PRESSURE: 85 MMHG | SYSTOLIC BLOOD PRESSURE: 158 MMHG

## 2019-11-03 VITALS — DIASTOLIC BLOOD PRESSURE: 81 MMHG | SYSTOLIC BLOOD PRESSURE: 141 MMHG

## 2019-11-03 VITALS — SYSTOLIC BLOOD PRESSURE: 159 MMHG | DIASTOLIC BLOOD PRESSURE: 89 MMHG

## 2019-11-03 VITALS — SYSTOLIC BLOOD PRESSURE: 152 MMHG | DIASTOLIC BLOOD PRESSURE: 64 MMHG

## 2019-11-03 LAB
ADD MANUAL DIFF: YES
ADD MANUAL DIFF: YES
ALBUMIN SERPL-MCNC: 2.1 G/DL (ref 3.4–5)
ALBUMIN/GLOB SERPL: 0.4 {RATIO} (ref 1–2.7)
ALP SERPL-CCNC: 97 U/L (ref 46–116)
ALT SERPL-CCNC: 14 U/L (ref 12–78)
ANION GAP SERPL CALC-SCNC: 10 MMOL/L (ref 5–15)
ANION GAP SERPL CALC-SCNC: 10 MMOL/L (ref 5–15)
AST SERPL-CCNC: 15 U/L (ref 15–37)
BILIRUB SERPL-MCNC: 0.7 MG/DL (ref 0.2–1)
BUN SERPL-MCNC: 24 MG/DL (ref 7–18)
BUN SERPL-MCNC: 24 MG/DL (ref 7–18)
CALCIUM SERPL-MCNC: 9.3 MG/DL (ref 8.5–10.1)
CALCIUM SERPL-MCNC: 9.5 MG/DL (ref 8.5–10.1)
CHLORIDE SERPL-SCNC: 106 MMOL/L (ref 98–107)
CHLORIDE SERPL-SCNC: 107 MMOL/L (ref 98–107)
CO2 SERPL-SCNC: 30 MMOL/L (ref 21–32)
CO2 SERPL-SCNC: 30 MMOL/L (ref 21–32)
CREAT SERPL-MCNC: 1.2 MG/DL (ref 0.55–1.3)
CREAT SERPL-MCNC: 1.2 MG/DL (ref 0.55–1.3)
ERYTHROCYTE [DISTWIDTH] IN BLOOD BY AUTOMATED COUNT: 12.7 % (ref 11.6–14.8)
ERYTHROCYTE [DISTWIDTH] IN BLOOD BY AUTOMATED COUNT: 14.6 % (ref 11.6–14.8)
GLOBULIN SER-MCNC: 4.9 G/DL
HCT VFR BLD CALC: 28.2 % (ref 37–47)
HCT VFR BLD CALC: 28.3 % (ref 37–47)
HGB BLD-MCNC: 9.1 G/DL (ref 12–16)
HGB BLD-MCNC: 9.3 G/DL (ref 12–16)
MCV RBC AUTO: 83 FL (ref 80–99)
MCV RBC AUTO: 85 FL (ref 80–99)
PLATELET # BLD: 434 K/UL (ref 150–450)
PLATELET # BLD: 444 K/UL (ref 150–450)
POTASSIUM SERPL-SCNC: 3.4 MMOL/L (ref 3.5–5.1)
POTASSIUM SERPL-SCNC: 3.5 MMOL/L (ref 3.5–5.1)
RBC # BLD AUTO: 3.32 M/UL (ref 4.2–5.4)
RBC # BLD AUTO: 3.4 M/UL (ref 4.2–5.4)
SODIUM SERPL-SCNC: 146 MMOL/L (ref 136–145)
SODIUM SERPL-SCNC: 147 MMOL/L (ref 136–145)
WBC # BLD AUTO: 15.3 K/UL (ref 4.8–10.8)
WBC # BLD AUTO: 17.3 K/UL (ref 4.8–10.8)

## 2019-11-03 PROCEDURE — 0BH17EZ INSERTION OF ENDOTRACHEAL AIRWAY INTO TRACHEA, VIA NATURAL OR ARTIFICIAL OPENING: ICD-10-PCS

## 2019-11-03 PROCEDURE — 5A1955Z RESPIRATORY VENTILATION, GREATER THAN 96 CONSECUTIVE HOURS: ICD-10-PCS

## 2019-11-03 RX ADMIN — DIVALPROEX SODIUM SCH MG: 125 CAPSULE ORAL at 09:24

## 2019-11-03 RX ADMIN — INSULIN ASPART SCH UNITS: 100 INJECTION, SOLUTION INTRAVENOUS; SUBCUTANEOUS at 06:24

## 2019-11-03 RX ADMIN — INSULIN ASPART SCH UNITS: 100 INJECTION, SOLUTION INTRAVENOUS; SUBCUTANEOUS at 17:23

## 2019-11-03 RX ADMIN — PANTOPRAZOLE SODIUM SCH MG: 40 INJECTION, POWDER, FOR SOLUTION INTRAVENOUS at 22:39

## 2019-11-03 RX ADMIN — FUROSEMIDE SCH MG: 40 TABLET ORAL at 09:24

## 2019-11-03 RX ADMIN — INSULIN ASPART SCH UNITS: 100 INJECTION, SOLUTION INTRAVENOUS; SUBCUTANEOUS at 13:40

## 2019-11-03 RX ADMIN — FUROSEMIDE SCH MG: 40 TABLET ORAL at 22:40

## 2019-11-03 RX ADMIN — DEXTROSE AND SODIUM CHLORIDE SCH MLS/HR: 5; .45 INJECTION, SOLUTION INTRAVENOUS at 06:23

## 2019-11-03 RX ADMIN — DOCUSATE SODIUM SCH MG: 50 LIQUID ORAL at 13:38

## 2019-11-03 RX ADMIN — DOCUSATE SODIUM SCH MG: 50 LIQUID ORAL at 06:00

## 2019-11-03 RX ADMIN — DOCUSATE SODIUM SCH MG: 50 LIQUID ORAL at 22:38

## 2019-11-03 RX ADMIN — DIVALPROEX SODIUM SCH MG: 125 CAPSULE ORAL at 22:39

## 2019-11-03 RX ADMIN — INSULIN ASPART SCH UNITS: 100 INJECTION, SOLUTION INTRAVENOUS; SUBCUTANEOUS at 01:23

## 2019-11-03 NOTE — NUR
NURSE NOTES:

Dr. Torres made aware patient on BIPAP during the day 15/5, FiO2 80 at this time, O2 sat 
92%. Per Dr. Torres, ok toe wean off BIPAP if able, keep patient on tele. RT made aware.

## 2019-11-03 NOTE — CARDIOLOGY PROGRESS NOTE
Assessment/Plan


Assessment/Plan


1. acute congestive heart failure


2. Abnormal cardiac enzyme demand related due to infection and profound anemia 


3. Agitation 


4. Urinary tract infection.


5. Acute renal failure.


6. Profound anemia.


7. Diabetes mellitus.


8. History of hypertension.


9. History of mood disorder.


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 


11. arf 


12. pelvic mass


13  sepsis 


14. pneumonia 











not clear why she is dual antiplt agent as such i am not sure how safe it will 

be to long term but short time ok to stop 


echo mild systolic dysfucntion 


transfered back to tle 


iv abx 


wbc up agian  


s/p peg  


lasix iv bid for 3 dosase 


i was called multipl time last tneti dn throuyght eearly am hours regardin 

gtube issue and bright red blood in the g tube aspirate 


thya had not been able to contact gi 


i ordered cbc and was called with result did not appear to have dropped sig 


this am hgb stable 


await gi imput 


we dcd all anti plt lat tnied


tele sinus 


remain on bipap 


cxr noted 


keep in neg fluid balance





Subjective


ROS Limited/Unobtainable:  Yes





Objective





Last 24 Hour Vital Signs








  Date Time  Temp Pulse Resp B/P (MAP) Pulse Ox O2 Delivery O2 Flow Rate FiO2


 


11/3/19 09:24  90  159/89    


 


11/3/19 09:00      Bi-pap  


 


11/3/19 08:56  90 25  100 Full Face  60


 


11/3/19 08:00  87      


 


11/3/19 08:00        60


 


11/3/19 08:00 97.5 89 22 159/89 (112) 94   


 


11/3/19 06:50     94 Bi-Pap  60


 


11/3/19 06:50  100 31  94 Facial  60


 


11/3/19 05:28  84 24  98 Facial  40


 


11/3/19 04:00 98.2 101 19 153/99 (117) 95   


 


11/3/19 04:00  99      


 


11/3/19 04:00        40


 


11/3/19 03:25  114 30  97 Facial  60


 


11/3/19 01:25  98 30  94 Facial  60


 


11/3/19 00:00  108      


 


11/3/19 00:00 98.4 106 18 141/81 (101) 93   


 


11/3/19 00:00        40


 


11/2/19 23:10  111 39  95 Facial  60


 


11/2/19 21:06  122 38  94 Facial  60


 


11/2/19 21:00      Nasal Cannula 2.0 


 


11/2/19 20:00  103      


 


11/2/19 20:00 98.7 103 19 147/66 (93) 99   


 


11/2/19 19:29     97 Bi-Pap  40


 


11/2/19 19:29  98 22  97 Facial  40


 


11/2/19 17:38  117 36  97 Facial  40


 


11/2/19 17:29  94  133/84    


 


11/2/19 16:08  98      


 


11/2/19 16:00        40


 


11/2/19 16:00 98.2 94 20 133/84 (100) 93   


 


11/2/19 14:38  109 31  95 Facial  40


 


11/2/19 14:01  112 31  94 Facial  40


 


11/2/19 12:00        40


 


11/2/19 11:57  92 28  98   


 


11/2/19 11:30 98.4 100 36 135/85 (102) 88   








General Appearance:  other - awake on bipap but not responsive


Neck:  supple


Cardiovascular:  normal rate


Respiratory/Chest:  rhonchi - bilaterally


Abdomen:  normal bowel sounds, non tender, soft


Extremities:  no swelling











Intake and Output  


 


 11/2/19 11/3/19





 19:00 07:00


 


Intake Total 660 ml 


 


Output Total  875 ml


 


Balance 660 ml -875 ml


 


  


 


Free Water 240 ml 


 


Tube Feeding 420 ml 


 


Output Urine Total  875 ml











Laboratory Tests








Test


  11/2/19


11:10 11/3/19


05:50


 


Arterial Blood pH


  7.434


(7.350-7.450) 


 


 


Arterial Blood Partial


Pressure CO2 44.8 mmHg


(35.0-45.0) 


 


 


Arterial Blood Partial


Pressure O2 58.8 mmHg


(75.0-100.0)  L 


 


 


Arterial Blood HCO3


  29.3 mmol/L


(22.0-26.0)  H 


 


 


Arterial Blood Oxygen


Saturation 88.9 %


()  *L 


 


 


Arterial Blood Base Excess 4.6 (-2-2)  H 


 


Graham Test Positive   


 


White Blood Count


  


  17.3 K/UL


(4.8-10.8)  H


 


Red Blood Count


  


  3.32 M/UL


(4.20-5.40)  L


 


Hemoglobin


  


  9.1 G/DL


(12.0-16.0)  L


 


Hematocrit


  


  28.2 %


(37.0-47.0)  L


 


Mean Corpuscular Volume  85 FL (80-99)  


 


Mean Corpuscular Hemoglobin


  


  27.6 PG


(27.0-31.0)


 


Mean Corpuscular Hemoglobin


Concent 


  32.4 G/DL


(32.0-36.0)


 


Red Cell Distribution Width


  


  14.6 %


(11.6-14.8)


 


Platelet Count


  


  434 K/UL


(150-450)


 


Mean Platelet Volume


  


  7.0 FL


(6.5-10.1)


 


Neutrophils (%) (Auto)


  


  % (45.0-75.0)


 


 


Lymphocytes (%) (Auto)


  


  % (20.0-45.0)


 


 


Monocytes (%) (Auto)   % (1.0-10.0)  


 


Eosinophils (%) (Auto)   % (0.0-3.0)  


 


Basophils (%) (Auto)   % (0.0-2.0)  


 


Differential Total Cells


Counted 


  100  


 


 


Neutrophils % (Manual)  90 % (45-75)  H


 


Lymphocytes % (Manual)  6 % (20-45)  L


 


Monocytes % (Manual)  4 % (1-10)  


 


Eosinophils % (Manual)  0 % (0-3)  


 


Basophils % (Manual)  0 % (0-2)  


 


Band Neutrophils  0 % (0-8)  


 


Platelet Estimate  Adequate  


 


Platelet Morphology  Normal  


 


Anisocytosis  1+  


 


Sodium Level


  


  147 MMOL/L


(136-145)  H


 


Potassium Level


  


  3.4 MMOL/L


(3.5-5.1)  L


 


Chloride Level


  


  107 MMOL/L


()


 


Carbon Dioxide Level


  


  30 MMOL/L


(21-32)


 


Anion Gap


  


  10 mmol/L


(5-15)


 


Blood Urea Nitrogen


  


  24 mg/dL


(7-18)  H


 


Creatinine


  


  1.2 MG/DL


(0.55-1.30)


 


Estimat Glomerular Filtration


Rate 


  45.0 mL/min


(>60)


 


Glucose Level


  


  305 MG/DL


()  H


 


Calcium Level


  


  9.3 MG/DL


(8.5-10.1)


 


Magnesium Level


  


  2.1 MG/DL


(1.8-2.4)

















Rob May MD Nov 3, 2019 11:12

## 2019-11-03 NOTE — NUR
NURSE NOTES:

Per Dr. Keith, put patient back on aspirin 81mg Daily G-tube, Plavix 75mg Daily G-tube. 
Order entered, noted, and carried out.

## 2019-11-03 NOTE — NEPHROLOGY PROGRESS NOTE
Assessment/Plan


Problem List:  


(1) Renal failure (ARF), acute on chronic


Assessment:  resolved





(2) Dehydration


(3) ARF (acute renal failure)


(4) Sepsis


(5) Acute metabolic encephalopathy


(6) Hyperglycemia due to type 2 diabetes mellitus


(7) UTI (urinary tract infection)


(8) Diabetic nephropathy


Assessment





Renal failure, Acute on Chronic resolved


Dehydration


Severe Anemia


Sepsis / Pneumonia / UTI


DM, OOC


Proteinuria , Diabetic Nephropathy


Acute encephalopathy


Plan


Stop IV fluid-


Lasix


has PEg


Per pulmonary





Cr lowering 


Will order urine studies and monitor renal parameters


kidney YOANDY noted


lower iv fluids rate


WBCs rising


has casas again due to retention


Avoid Nephrotoxics








previously


Anemia salas


2D echo


24 H urine protein


Keep BP and BS in check


I am holding  her psych meds due to low MS


Per orders





Subjective


ROS Limited/Unobtainable:  Yes





Objective


Objective





Last 24 Hour Vital Signs








  Date Time  Temp Pulse Resp B/P (MAP) Pulse Ox O2 Delivery O2 Flow Rate FiO2


 


11/3/19 12:59  104 36  100 Full Face  60


 


11/3/19 12:00 97.6 110 28 154/87 (109) 92   


 


11/3/19 12:00  97      


 


11/3/19 12:00        60


 


11/3/19 11:06  94 31  100 Full Face  60


 


11/3/19 09:24  90  159/89    


 


11/3/19 09:00      Bi-pap  


 


11/3/19 08:56  90 25  100 Full Face  60


 


11/3/19 08:00  87      


 


11/3/19 08:00        60


 


11/3/19 08:00 97.5 89 22 159/89 (112) 94   


 


11/3/19 06:50     94 Bi-Pap  60


 


11/3/19 06:50  100 31  94 Facial  60


 


11/3/19 05:28  84 24  98 Facial  40


 


11/3/19 04:00 98.2 101 19 153/99 (117) 95   


 


11/3/19 04:00  99      


 


11/3/19 04:00        40


 


11/3/19 03:25  114 30  97 Facial  60


 


11/3/19 01:25  98 30  94 Facial  60


 


11/3/19 00:00  108      


 


11/3/19 00:00 98.4 106 18 141/81 (101) 93   


 


11/3/19 00:00        40


 


11/2/19 23:10  111 39  95 Facial  60


 


11/2/19 21:06  122 38  94 Facial  60


 


11/2/19 21:00      Nasal Cannula 2.0 


 


11/2/19 20:00  103      


 


11/2/19 20:00 98.7 103 19 147/66 (93) 99   


 


11/2/19 19:29     97 Bi-Pap  40


 


11/2/19 19:29  98 22  97 Facial  40


 


11/2/19 17:38  117 36  97 Facial  40


 


11/2/19 17:29  94  133/84    


 


11/2/19 16:08  98      


 


11/2/19 16:00        40


 


11/2/19 16:00 98.2 94 20 133/84 (100) 93   


 


11/2/19 14:38  109 31  95 Facial  40

















Intake and Output  


 


 11/2/19 11/3/19





 19:00 07:00


 


Intake Total 660 ml 


 


Output Total  875 ml


 


Balance 660 ml -875 ml


 


  


 


Free Water 240 ml 


 


Tube Feeding 420 ml 


 


Output Urine Total  875 ml








Laboratory Tests


11/3/19 05:50: 


White Blood Count 17.3H, Red Blood Count 3.32L, Hemoglobin 9.1L, Hematocrit 

28.2L, Mean Corpuscular Volume 85, Mean Corpuscular Hemoglobin 27.6, Mean 

Corpuscular Hemoglobin Concent 32.4, Red Cell Distribution Width 14.6, Platelet 

Count 434, Mean Platelet Volume 7.0, Neutrophils (%) (Auto) , Lymphocytes (%) (

Auto) , Monocytes (%) (Auto) , Eosinophils (%) (Auto) , Basophils (%) (Auto) , 

Differential Total Cells Counted 100, Neutrophils % (Manual) 90H, Lymphocytes % 

(Manual) 6L, Monocytes % (Manual) 4, Eosinophils % (Manual) 0, Basophils % (

Manual) 0, Band Neutrophils 0, Platelet Estimate Adequate, Platelet Morphology 

Normal, Anisocytosis 1+, Sodium Level 147H, Potassium Level 3.4L, Chloride 

Level 107, Carbon Dioxide Level 30, Anion Gap 10, Blood Urea Nitrogen 24H, 

Creatinine 1.2, Estimat Glomerular Filtration Rate 45.0, Glucose Level 305H, 

Calcium Level 9.3, Magnesium Level 2.1


Height (Feet):  5


Height (Inches):  5.00


Weight (Pounds):  169


General Appearance:  no apparent distress


EENT:  other - BIPAP


Cardiovascular:  tachycardia


Respiratory/Chest:  decreased breath sounds


Abdomen:  distended


Objective


no change











Lukasz Vizcaino MD Nov 3, 2019 14:19

## 2019-11-03 NOTE — NUR
NURSE NOTES:

Received report from ELIZABETH Vega. Patient in bed resting, no active s/s cardiac, 
respiratory distress noticed at this time. Patient AOx0, SR with HR 84. Patient on BIPAP at 
this time. Endorsed last night bright red residual from G-tube, MD made aware NPO status. 
Patient on P200 mattress. Endorsed EGD schedule for Monday 11/4/19, holding for PRBC at this 
time. Bed in lowest position, side rails upx3,padded, call light within reach. Will continue 
to monitor.

## 2019-11-03 NOTE — DIAGNOSTIC IMAGING REPORT
Indication: Dyspnea

 

Comparison:  11/2/2019

 

A single view chest radiograph was obtained.

 

Findings:

 

Diffuse airspace disease noted bilaterally. Endotracheal tube is 4 cm above the

kimberlyn in good position. Heart size is relatively stable. The exam is limited by

artifact.

 

IMPRESSION:

 

Pulmonary edema unchanged. Endotracheal tube in good position

## 2019-11-03 NOTE — HEMATOLOGY/ONC PROGRESS NOTE
Assessment/Plan


Assessment/Plan





Assessment and Recs:


# Anemia of chronic disease due to underlying chronic medical issues, 

multifactorial 


--> Anemia workup has been ordered, rule out gi bleed --> ferritin is 1400+


--> No evidence of hemolysis is noted, peripheral smear has been reviewed.


--> Hgb goal >7. Transfuse prn.


--> Epogen or iron at this time is not particularly indicated


--> Medications have been reviewed


--> low threshold for gi evaluation in case has occult +


--> bone marrow biopsy is not indicated given the other more likely causes (if 

recurrent anemia) first time here


--> hgb trend 8.4->8.5-->8.2->7.7-->7.9-->10.1-->9.1


--> FOLIC ACID 1mg po daily started


# Leukocytosis/elevated white blood cell count, unspecified likely related to 

underlying reaction v more likely infection


--> have reviewed peripheral smear and bandemia/neutrophilia noted


--> continue antibiotics if they have been started by ID team (VANC/ZOSYN)--> 

vanc/linda--> linda


--> wbc 39-->28k-->29k->31k-->23-->26k-->19k-->24k-->15.4-->16-->14-->23k-->14--

>17


--> monitor for resolution


--> CT C/A/P Results reviewed


--> FOR INdium bone scan done -> Rim of activity within the pelvis, 

corresponding to expected location of the soft tissue component of the cystic 

pelvic mass described on recent imaging studies.


--> again consider gyn eval


--> if doesn't improve by monday, may consider a bone marrow biopsy, have dw 

pcp and pulm--> has slowly come down over last 1-2 weeks (will eval 11/4)


# Pelvic mass on ct and us -- 13 x 7 x 12.9 cm unilocular cystic mass, 

corresponding to lesion reported on recent CT scan. Layering low level internal 

echoes likely represent bladder debris.. This presumably represents a cystic 

ovarian lesion with debris or hemorrhage, possibly neoplastic.


--> CA-125 is 216! elevated


--> consider gyn eval


# Hypercalcemia - btw 10-11 range when corrected to alb


--> ivf have been started


--> if remains elevated, 7.9-->8.3


--> will get pth


# Sepsis from pneumonia.  


--> pulm consulted, abx started


--> on bipap


# CHANCE (acute kidney injury) on ckd


--> cr 1.6-->2.7-->2.2->1.2


--> per renal


# UTI (urinary tract infection)


--> on abx per id


# Dehydration


--> on ivf


# Acute metabolic encephalopathy


--> likely due to pna


# Dvt ppx lovenox sq





The timing of this note does not necessarily reflect the time of the patient 

was seen.





Greatly appreciate consultation.





Subjective


Constitutional:  Denies: no symptoms, chills, fever, malaise, weakness, other


HEENT:  Denies: no symptoms, eye pain, blurred vision, tearing, double vision, 

ear pain, ear discharge, nose pain, nose congestion, throat pain, throat 

swelling, mouth pain, mouth swelling, other


Cardiovascular:  Denies: no symptoms, chest pain, edema, irregular heart rate, 

lightheadedness, palpitations, syncope, other


Gastrointestinal/Abdominal:  Denies: no symptoms, abdomen distended, abdominal 

pain, black stools, tarry stools, blood in stool, constipated, diarrhea, 

difficulty swallowing, nausea, poor appetite, poor fluid intake, rectal bleeding

, vomiting, other


Genitourinary:  Denies: no symptoms, burning, discharge, frequency, flank pain, 

hematuria, incontinence, pain, urgency, other


Endocrine:  Denies: no symptoms, excessive sweating, flushing, intolerance to 

cold, intolerance to heat, increased hunger, increased thirst, increased urine, 

unexplained weight gain, unexplained weight loss, other


Hematologic/Lymphatic:  Denies: no symptoms, anemia, easy bleeding, easy 

bruising, adenopathy, other


Allergies:  


Coded Allergies:  


     No Known Allergies (Unverified , 10/14/19)


Subjective


10/14: hgb has improved, no bleeding, labs reivewed


10/15: no events to report


10/16: a+ o x1, no bleeding or chills noted, no major changes, occult pending


10/17: no events noted, no bleeding, no chills, no hematemesis/hematochezia


10/18: remains confused, with abx, on nc


10/19: cr is worse, hgb 8.4, no bleeding, night sweats, chills


10/20: no f/c, no night sweats, labs noted, cr worse


10/21: no bleeding or chills, no night sweats, labs pending this am


10/22: pelvic mass noted on imaging, no bleeding reported, ordered ca125


10/23: no events, remains lethargic, is on abx, seen by surg


10/24: with raid response overnight, rr high as well as bp, vidal to icu


10/25: no events, no bleeding, to undergo a indium scan with id


10/26: comfortable, electrolytes reviewed, given mag and k


10/29: back on bipap with gt feeds, dw rn this am


10/30: remains very confused, no f/c, no bleeding, with urinary retention, 

folic acid started


10/31: sleeping, is on bipap, no events, wbc is higher this am


11/1: no events to report, no bleeding, wbc still high but better


11/3: no bleeding, no night sweats, s/p peg, alert





Objective


Objective





Current Medications








 Medications


  (Trade)  Dose


 Ordered  Sig/Winnie


 Route


 PRN Reason  Start Time


 Stop Time Status Last Admin


Dose Admin


 


 Acetaminophen


  (Tylenol)  650 mg  Q4H  PRN


 NG


 Mild Pain/Temp > 100.5  11/2/19 15:45


 11/25/19 19:44   


 


 


 Acetaminophen


  (Tylenol)  650 mg  Q4H  PRN


 RECTAL


 TEMP>100.5  11/2/19 15:45


 12/2/19 15:44   


 


 


 Amlodipine


 Besylate


  (Norvasc)  2.5 mg  BID


 NG


   11/2/19 18:00


 11/29/19 17:59   


 


 


 Atorvastatin


 Calcium


  (Lipitor)  10 mg  BEDTIME


 NG


   11/2/19 21:00


 11/13/19 20:59   


 


 


 Dextrose


  (Dextrose 50%)  25 ml  Q30M  PRN


 IV


 Hypoglycemia  11/2/19 15:30


 12/1/19 06:29   


 


 


 Dextrose


  (Dextrose 50%)  50 ml  Q30M  PRN


 IV


 Hypoglycemia  11/2/19 15:30


 12/1/19 06:29   


 


 


 Dextrose/Sodium


 Chloride  1,000 ml @ 


 75 mls/hr  [id number]


 IV


   11/2/19 16:45


 12/2/19 16:44  11/3/19 06:23


 


 


 Divalproex Sodium


  (Depakote


 Sprinkles)  500 mg  Q12HR


 NG


   11/2/19 21:00


 11/14/19 21:59   


 


 


 Docusate Sodium


  (Colace)  100 mg  EVERY 8  HOURS


 NG


   11/2/19 22:00


 11/25/19 08:59  11/2/19 21:21


 


 


 Famotidine


  (Pepcid)  20 mg  EVERY 12  HOURS


 GT


   11/2/19 21:00


 11/25/19 17:59   


 


 


 Folic Acid


  (Folate)  1 mg  DAILY


 ORAL


   11/3/19 09:00


 11/30/19 08:59   


 


 


 Furosemide


  (Lasix)  40 mg  Q12HR


 NG


   11/2/19 21:00


 12/2/19 20:59   


 


 


 Insulin Aspart


  (NovoLOG)    EVERY 6  HOURS


 SUBQ


   11/2/19 18:00


 11/29/19 11:59  11/3/19 06:24


 


 


 Insulin Detemir


  (Levemir)  6 units  DAILY


 SUBQ


   11/3/19 09:00


 12/1/19 08:59   


 


 


 Lactulose


  (Cephulac)  20 gm  Q8H  PRN


 NG


 Constipation  11/2/19 16:15


 11/25/19 16:14   


 


 


 Lorazepam


  (Ativan 2mg/ml


 1ml)  1 mg  Q4H  PRN


 IV


 For Anxiety  11/2/19 16:00


 11/7/19 15:59   


 


 


 Pantoprazole


  (Protonix)  40 mg  EVERY 12  HOURS


 IVP


   11/3/19 09:00


 12/3/19 08:59   


 











Last 24 Hour Vital Signs








  Date Time  Temp Pulse Resp B/P (MAP) Pulse Ox O2 Delivery O2 Flow Rate FiO2


 


11/3/19 06:50     94 Bi-Pap  60


 


11/3/19 06:50  100 31  94 Facial  60


 


11/3/19 05:28  84 24  98 Facial  40


 


11/3/19 04:00 98.2 101 19 153/99 (117) 95   


 


11/3/19 04:00  99      


 


11/3/19 04:00        40


 


11/3/19 03:25  114 30  97 Facial  60


 


11/3/19 01:25  98 30  94 Facial  60


 


11/3/19 00:00  108      


 


11/3/19 00:00 98.4 106 18 141/81 (101) 93   


 


11/3/19 00:00        40


 


11/2/19 23:10  111 39  95 Facial  60


 


11/2/19 21:06  122 38  94 Facial  60


 


11/2/19 21:00      Nasal Cannula 2.0 


 


11/2/19 20:00  103      


 


11/2/19 20:00 98.7 103 19 147/66 (93) 99   


 


11/2/19 19:29     97 Bi-Pap  40


 


11/2/19 19:29  98 22  97 Facial  40


 


11/2/19 17:38  117 36  97 Facial  40


 


11/2/19 17:29  94  133/84    


 


11/2/19 16:08  98      


 


11/2/19 16:00        40


 


11/2/19 16:00 98.2 94 20 133/84 (100) 93   


 


11/2/19 14:38  109 31  95 Facial  40


 


11/2/19 14:01  112 31  94 Facial  40


 


11/2/19 12:00        40


 


11/2/19 11:57  92 28  98   


 


11/2/19 11:30 98.4 100 36 135/85 (102) 88   


 


11/2/19 11:05  114 37  96 Facial  40


 


11/2/19 09:00      Nasal Cannula 2.0 


 


11/2/19 08:57     95 Nasal Cannula 4.0 36


 


11/2/19 08:39  104  115/67    


 


11/2/19 08:00 97.8 104 22 115/67 (83) 95   


 


11/2/19 08:00        40


 


11/2/19 05:20  90 29  97 Full Face  40


 


11/2/19 04:00 98.9 96 25 149/77 (101) 98   


 


11/2/19 04:00       2.0 


 


11/2/19 01:37  93 34  96 Full Face  40


 


11/2/19 00:00      Nasal Cannula 2.0 


 


11/2/19 00:00 99.0 88 24 114/74 (87) 97   


 


11/1/19 22:41  92 29  97 Full Face  40


 


11/1/19 21:00      Nasal Cannula 2.0 


 


11/1/19 21:00       2.0 


 


11/1/19 21:00      Nasal Cannula 2.0 


 


11/1/19 21:00      Nasal Cannula 2.0 


 


11/1/19 20:00 98.2 92 25 135/77 (96) 98   


 


11/1/19 18:40  96 26  96 Full Face  40


 


11/1/19 18:40     96 Bi-Pap  40


 


11/1/19 17:34  93  140/79    


 


11/1/19 16:00       2.0 


 


11/1/19 15:45 98.0 96 22 130/70 (90) 96   


 


11/1/19 15:45      Nasal Cannula 2.0 


 


11/1/19 12:00 98.8 96 22 102/75 (84) 96   


 


11/1/19 12:00      Nasal Cannula 2.0 


 


11/1/19 12:00       2.0 


 


11/1/19 11:37  90      


 


11/1/19 09:37  88   100   


 


11/1/19 09:00  92  118/68    


 


11/1/19 08:45 97.4 88 16 146/79 100 Nasal Cannula 4 


 


11/1/19 08:39  91 14  98   


 


11/1/19 08:35  90 13 148/64 100 Nasal Cannula 4 


 


11/1/19 08:30  89 15 138/71 100 Nasal Cannula 4 


 


11/1/19 08:23 97.5 91 14 139/80 100 Nasal Cannula 4 

















Intake and Output  


 


 11/2/19 11/3/19





 19:00 07:00


 


Intake Total 660 ml 


 


Output Total  875 ml


 


Balance 660 ml -875 ml


 


  


 


Free Water 240 ml 


 


Tube Feeding 420 ml 


 


Output Urine Total  875 ml











Labs








Test


  10/31/19


10:15 11/1/19


03:25 11/2/19


11:10 11/3/19


05:50


 


Urine Color Yellow    


 


Urine Appearance Cloudy    


 


Urine pH 6 (4.5-8.0)    


 


Urine Specific Gravity


  1.015


(1.005-1.035) 


  


  


 


 


Urine Protein 3+ (NEGATIVE)    


 


Urine Glucose (UA)


  Negative


(NEGATIVE) 


  


  


 


 


Urine Ketones 3+ (NEGATIVE)    


 


Urine Blood 2+ (NEGATIVE)    


 


Urine Nitrite


  Negative


(NEGATIVE) 


  


  


 


 


Urine Bilirubin


  Negative


(NEGATIVE) 


  


  


 


 


Urine Urobilinogen


  8 MG/DL


(0.0-1.0) 


  


  


 


 


Urine Leukocyte Esterase 3+ (NEGATIVE)    


 


Urine RBC


  5-10 /HPF (0 -


2) 


  


  


 


 


Urine WBC


  Tntc /HPF (0 -


2) 


  


  


 


 


Urine Squamous Epithelial


Cells Many /LPF


(NONE/OCC) 


  


  


 


 


Urine Bacteria


  Few /HPF


(NONE) 


  


  


 


 


White Blood Count


  


  14.7 K/UL


(4.8-10.8) 


  17.3 K/UL


(4.8-10.8)


 


Red Blood Count


  


  3.14 M/UL


(4.20-5.40) 


  3.32 M/UL


(4.20-5.40)


 


Hemoglobin


  


  8.5 G/DL


(12.0-16.0) 


  9.1 G/DL


(12.0-16.0)


 


Hematocrit


  


  26.0 %


(37.0-47.0) 


  28.2 %


(37.0-47.0)


 


Mean Corpuscular Volume  83 FL (80-99)   85 FL (80-99) 


 


Mean Corpuscular Hemoglobin


  


  27.0 PG


(27.0-31.0) 


  27.6 PG


(27.0-31.0)


 


Mean Corpuscular Hemoglobin


Concent 


  32.6 G/DL


(32.0-36.0) 


  32.4 G/DL


(32.0-36.0)


 


Red Cell Distribution Width


  


  13.5 %


(11.6-14.8) 


  14.6 %


(11.6-14.8)


 


Platelet Count


  


  369 K/UL


(150-450) 


  434 K/UL


(150-450)


 


Mean Platelet Volume


  


  6.8 FL


(6.5-10.1) 


  7.0 FL


(6.5-10.1)


 


Neutrophils (%) (Auto)   % (45.0-75.0)    % (45.0-75.0) 


 


Lymphocytes (%) (Auto)   % (20.0-45.0)    % (20.0-45.0) 


 


Monocytes (%) (Auto)   % (1.0-10.0)    % (1.0-10.0) 


 


Eosinophils (%) (Auto)   % (0.0-3.0)    % (0.0-3.0) 


 


Basophils (%) (Auto)   % (0.0-2.0)    % (0.0-2.0) 


 


Activated Partial


Thromboplast Time 


  33 SEC (23-33) 


  


  


 


 


Sodium Level


  


  142 MMOL/L


(136-145) 


  147 MMOL/L


(136-145)


 


Potassium Level


  


  3.5 MMOL/L


(3.5-5.1) 


  3.4 MMOL/L


(3.5-5.1)


 


Chloride Level


  


  105 MMOL/L


() 


  107 MMOL/L


()


 


Carbon Dioxide Level


  


  30 MMOL/L


(21-32) 


  30 MMOL/L


(21-32)


 


Anion Gap


  


  7 mmol/L


(5-15) 


  10 mmol/L


(5-15)


 


Blood Urea Nitrogen


  


  16 mg/dL


(7-18) 


  24 mg/dL


(7-18)


 


Creatinine


  


  1.0 MG/DL


(0.55-1.30) 


  1.2 MG/DL


(0.55-1.30)


 


Estimat Glomerular Filtration


Rate 


  55.5 mL/min


(>60) 


  45.0 mL/min


(>60)


 


Glucose Level


  


  206 MG/DL


() 


  305 MG/DL


()


 


Calcium Level


  


  8.5 MG/DL


(8.5-10.1) 


  9.3 MG/DL


(8.5-10.1)


 


Total Bilirubin


  


  0.8 MG/DL


(0.2-1.0) 


  


 


 


Aspartate Amino Transf


(AST/SGOT) 


  19 U/L (15-37) 


  


  


 


 


Alanine Aminotransferase


(ALT/SGPT) 


  13 U/L (12-78) 


  


  


 


 


Alkaline Phosphatase


  


  80 U/L


() 


  


 


 


Total Protein


  


  6.0 G/DL


(6.4-8.2) 


  


 


 


Albumin


  


  1.9 G/DL


(3.4-5.0) 


  


 


 


Globulin  4.1 g/dL   


 


Albumin/Globulin Ratio  0.5 (1.0-2.7)   


 


Arterial Blood pH


  


  


  7.434


(7.350-7.450) 


 


 


Arterial Blood Partial


Pressure CO2 


  


  44.8 mmHg


(35.0-45.0) 


 


 


Arterial Blood Partial


Pressure O2 


  


  58.8 mmHg


(75.0-100.0) 


 


 


Arterial Blood HCO3


  


  


  29.3 mmol/L


(22.0-26.0) 


 


 


Arterial Blood Oxygen


Saturation 


  


  88.9 %


() 


 


 


Arterial Blood Base Excess   4.6 (-2-2)  


 


Graham Test   Positive  


 


Magnesium Level


  


  


  


  2.1 MG/DL


(1.8-2.4)








Height (Feet):  5


Height (Inches):  5.00


Weight (Pounds):  169


Objective





Physical Exam:


Vitals: reviewed


General Appearance:  NAD


HEENT:  normocephalic, atraumatic


Neck:  non-tender, normal alignment


Respiratory/Chest:  normal breath sounds bilaterally ++ nc


Cardiovascular/Chest: rrr


Abdomen:  normal bowel sounds, soft, nontender ++ peg


Extremities:  normal range of motion











Mike Pop MD Nov 3, 2019 08:02

## 2019-11-03 NOTE — NUR
NURSE NOTES:

Called and spoke with NP Sami at this time. Notified him patient is post code and now in the 
ICU. Patient has orders for EGD tomorrow. patient abdomen also noted to be very distended. 
No new orders at this time.

## 2019-11-03 NOTE — NUR
NURSE NOTES:

Patient s/p RRT and code blue from Telemetry room 204-2 under the care of Dr. Keith. 
Patient lethargic intubated by Dr. Sharpe ETT 7.5/23cm lip line ac 14, , fi02 60% peep 
5 satting 100%. IV line on right F/A infiltrated. will insert new IV line. GT intact abdomen 
large and distended no residual. no s/s of hypo/hyperglycemia. ST on cardiac monitor HR 
117.On P 200 for wound management. skin warm and dry to touch. Will continue plan of care.

## 2019-11-03 NOTE — NUR
NURSE NOTES:

Reported all labs and abg result to Dr. Torres per Dr. Torres decrease the fi02 to keep 
saturation above 90%.

## 2019-11-03 NOTE — NUR
NURSE NOTES:

Called and spoke with MD Torres at this time about Versed gtt being ordered by ER MD, 
Stated it is okay to continue with that order at this time.

## 2019-11-03 NOTE — NUR
NURSE NOTES:

Received report from ELIZABETH Car. Patient is in bed resting; no signs of distress noted. A/Ox0. 
Patient is on BIPAP at this time. On G tube with no gastric residual volume. On P200 
mattress for wound management and padded siderails for seizure precaution. Bed at lowest 
position, brakes on, siderailsx3. Call light within reach. Will continue to monitor. 

-------------------------------------------------------------------------------

Addendum: 11/03/19 at 2309 by Silvia Arriola RN

-------------------------------------------------------------------------------

Checked IV site and flushed. No erythema, bleeding or infiltration noted.

## 2019-11-03 NOTE — NUR
NURSE NOTES:

Son Juan Alvarenga call back and spoke with in about his mother condition. Made him aware that he 
is welcome to come and shee his mother at anytime. Stated he will call his sister and 
discuss what they will be doing.

## 2019-11-03 NOTE — SURGERY PROGRESS NOTE
Surgery Progress Note


Subjective


Additional Comments


back to tele


labs noted


exam stable


O2





Objective





Last 24 Hour Vital Signs








  Date Time  Temp Pulse Resp B/P (MAP) Pulse Ox O2 Delivery O2 Flow Rate FiO2


 


11/3/19 12:59  104 36  100 Full Face  60


 


11/3/19 12:00 97.6 110 28 154/87 (109) 92   


 


11/3/19 12:00  97      


 


11/3/19 12:00        60


 


11/3/19 11:06  94 31  100 Full Face  60


 


11/3/19 09:24  90  159/89    


 


11/3/19 09:00      Bi-pap  


 


11/3/19 08:56  90 25  100 Full Face  60


 


11/3/19 08:00  87      


 


11/3/19 08:00        60


 


11/3/19 08:00 97.5 89 22 159/89 (112) 94   


 


11/3/19 06:50     94 Bi-Pap  60


 


11/3/19 06:50  100 31  94 Facial  60


 


11/3/19 05:28  84 24  98 Facial  40


 


11/3/19 04:00 98.2 101 19 153/99 (117) 95   


 


11/3/19 04:00  99      


 


11/3/19 04:00        40


 


11/3/19 03:25  114 30  97 Facial  60


 


11/3/19 01:25  98 30  94 Facial  60


 


11/3/19 00:00  108      


 


11/3/19 00:00 98.4 106 18 141/81 (101) 93   


 


11/3/19 00:00        40


 


11/2/19 23:10  111 39  95 Facial  60


 


11/2/19 21:06  122 38  94 Facial  60


 


11/2/19 21:00      Nasal Cannula 2.0 


 


11/2/19 20:00  103      


 


11/2/19 20:00 98.7 103 19 147/66 (93) 99   


 


11/2/19 19:29     97 Bi-Pap  40


 


11/2/19 19:29  98 22  97 Facial  40


 


11/2/19 17:38  117 36  97 Facial  40


 


11/2/19 17:29  94  133/84    


 


11/2/19 16:08  98      


 


11/2/19 16:00        40


 


11/2/19 16:00 98.2 94 20 133/84 (100) 93   


 


11/2/19 14:38  109 31  95 Facial  40


 


11/2/19 14:01  112 31  94 Facial  40








I&O











Intake and Output  


 


 11/2/19 11/3/19





 19:00 07:00


 


Intake Total 660 ml 


 


Output Total  875 ml


 


Balance 660 ml -875 ml


 


  


 


Free Water 240 ml 


 


Tube Feeding 420 ml 


 


Output Urine Total  875 ml








Dressing:  saturated


Wound:  other


Drains:  other


Cardiovascular:  RSR


Respiratory:  decreased breath sounds


Abdomen:  soft, present bowel sounds, non-distended


Extremities:  no cyanosis





Laboratory Tests








Test


  11/3/19


05:50


 


White Blood Count


  17.3 K/UL


(4.8-10.8)  H


 


Red Blood Count


  3.32 M/UL


(4.20-5.40)  L


 


Hemoglobin


  9.1 G/DL


(12.0-16.0)  L


 


Hematocrit


  28.2 %


(37.0-47.0)  L


 


Mean Corpuscular Volume 85 FL (80-99)  


 


Mean Corpuscular Hemoglobin


  27.6 PG


(27.0-31.0)


 


Mean Corpuscular Hemoglobin


Concent 32.4 G/DL


(32.0-36.0)


 


Red Cell Distribution Width


  14.6 %


(11.6-14.8)


 


Platelet Count


  434 K/UL


(150-450)


 


Mean Platelet Volume


  7.0 FL


(6.5-10.1)


 


Neutrophils (%) (Auto)


  % (45.0-75.0)


 


 


Lymphocytes (%) (Auto)


  % (20.0-45.0)


 


 


Monocytes (%) (Auto)  % (1.0-10.0)  


 


Eosinophils (%) (Auto)  % (0.0-3.0)  


 


Basophils (%) (Auto)  % (0.0-2.0)  


 


Differential Total Cells


Counted 100  


 


 


Neutrophils % (Manual) 90 % (45-75)  H


 


Lymphocytes % (Manual) 6 % (20-45)  L


 


Monocytes % (Manual) 4 % (1-10)  


 


Eosinophils % (Manual) 0 % (0-3)  


 


Basophils % (Manual) 0 % (0-2)  


 


Band Neutrophils 0 % (0-8)  


 


Platelet Estimate Adequate  


 


Platelet Morphology Normal  


 


Anisocytosis 1+  


 


Sodium Level


  147 MMOL/L


(136-145)  H


 


Potassium Level


  3.4 MMOL/L


(3.5-5.1)  L


 


Chloride Level


  107 MMOL/L


()


 


Carbon Dioxide Level


  30 MMOL/L


(21-32)


 


Anion Gap


  10 mmol/L


(5-15)


 


Blood Urea Nitrogen


  24 mg/dL


(7-18)  H


 


Creatinine


  1.2 MG/DL


(0.55-1.30)


 


Estimat Glomerular Filtration


Rate 45.0 mL/min


(>60)


 


Glucose Level


  305 MG/DL


()  H


 


Calcium Level


  9.3 MG/DL


(8.5-10.1)


 


Magnesium Level


  2.1 MG/DL


(1.8-2.4)











Plan


Problems:  


(1) Pressure injury of deep tissue


Assessment & Plan:  Pt presented on admission with DTPI R heel. Blood filled 

blister with marginal erythema noted to heel. Pt exhibited agitation when 

minimally palpated. (L)2.2cm x (W)2.1cm


L heel is blanchable .


Non-blanchable erythema without induration noted to sacral cleft. 


No other areas of skin concerns noted.





Tx.Plan:


Apply Betadine to R heel. Cover with Optifoam drsg. Change every 3 days and prn.


           


Apply Cavilon Skin Barrier to L heel. Cover with Optifoam drsg. Change every 7 

days and prn.


           


Apply Moisture Barrier Paste to buttocks. Cover sacral area with Optifoam drsg. 

Change every 3 days and prn.


           


Reposition at least every 2hours or as tolerated.


           


Off-load heels with pillow.





(2) Sepsis


Assessment & Plan:  Patient with low-grade fevers, anemia, leukocytosis 

persistent, elevated platelets, abnormal labs.


Etiology currently unknown and work-up in progress.  Labs noted and imaging 

that is available as noted.  


Okay for diet


CT noted


US noted


Impression: 13 x 7 x 12.9 cm unilocular cystic mass, corresponding to lesion 

reported


on recent CT scan. Layering low level internal echoes likely represent bladder


debris.. This presumably represents a cystic ovarian lesion with debris or


hemorrhage, possibly neoplastic. Gynecological consultation is recommended


Antibiotics as per infectious disease 


local wound care as wounds do not seem to be the etiology of her sepsis


start feeds via peg


doing well


improving


cont with tube feeds


supplementation for healing 


We will monitor and follow with recommendations


Thank you for allowing me to participate in patient's care














Radhames Blas Nov 3, 2019 13:07

## 2019-11-03 NOTE — NUR
NURSE NOTES:

1948 Per tech, patient was bradycardia on the monitor, 36 bpm. Went to patient's room. 
Patient was unresponsive, cold, and clammy, pale lips, RR=4. Immediately called rapid 
response.

1951 Rapid response team came and took over. 

1952 Code blue initiated.

1955 ROSC done. Patient was intubated by ED doctor, and ordered to transfer patient to ICU. 

2000 Patient was transferred to ICU with cardiac monitor accompanied by RRT.

2010 Charge nurse called Dr. Keith and informed patient's situation. Per Dr. Keith, to 
call Dr. Torres. 

2020 Called Dr. Torres and informed patient was transferred to ICU with new orders. 

2030 Report given to ICU ELIZABETH Winter. Plan of care endorsed.

## 2019-11-03 NOTE — NUR
NURSE NOTES:

Dr. Keith made aware patient hgb 9.1, hct 28.2, no residual from g-tube at this time. Per 
Dr. Keith, no transfusion at this time. Order noted, entered, carried out.

## 2019-11-03 NOTE — EMERGENCY ROOM REPORT
Physical Exam


Called for Code Blue on 2E.


Patient on BiPAP.  Probs with sats.


Tachy then unresponsive.


CPR begun.


Last 24 Hour Vital Signs








  Date Time  Temp Pulse Resp B/P (MAP) Pulse Ox O2 Delivery O2 Flow Rate FiO2


 


11/3/19 19:29  94 30  95 Full Face  60


 


11/3/19 19:29     95 Bi-Pap  60


 


11/3/19 17:23  103  158/85    


 


11/3/19 17:10  99 34  99 Full Face  60


 


11/3/19 16:00        60


 


11/3/19 16:00  94      


 


11/3/19 16:00 97.7 103 28 158/85 (109) 92   


 


11/3/19 15:35        80


 


11/3/19 15:21  102 36  95 Full Face  60


 


11/3/19 12:59  104 36  100 Full Face  60


 


11/3/19 12:00 97.6 110 28 154/87 (109) 92   


 


11/3/19 12:00  97      


 


11/3/19 12:00        60


 


11/3/19 11:06  94 31  100 Full Face  60


 


11/3/19 09:24  90  159/89    


 


11/3/19 09:00      Bi-pap  


 


11/3/19 08:56  90 25  100 Full Face  60


 


11/3/19 08:00  87      


 


11/3/19 08:00        60


 


11/3/19 08:00 97.5 89 22 159/89 (112) 94   


 


11/3/19 06:50     94 Bi-Pap  60


 


11/3/19 06:50  100 31  94 Facial  60


 


11/3/19 05:28  84 24  98 Facial  40


 


11/3/19 04:00 98.2 101 19 153/99 (117) 95   


 


11/3/19 04:00  99      


 


11/3/19 04:00        40


 


11/3/19 03:25  114 30  97 Facial  60


 


11/3/19 01:25  98 30  94 Facial  60


 


11/3/19 00:00  108      


 


11/3/19 00:00 98.4 106 18 141/81 (101) 93   


 


11/3/19 00:00        40


 


11/2/19 23:10  111 39  95 Facial  60


 


11/2/19 21:06  122 38  94 Facial  60


 


11/2/19 21:00      Nasal Cannula 2.0 








Sp02 EP Interpretation:  reviewed, abnormal - Interpreted as low by me


General Appearance:  Stupor


Head:  normocephalic, atraumatic


Eyes:  bilateral eye normal inspection, bilateral eye other - Doll's eyes


ENT:  moist mucus membranes, other - Thick mucoid secretions


Neck:  supple


Respiratory:  decreased breath sounds, other - Assisted ventilations with bag 

valve mask


Cardiovascular #1:  other - Initially no pulses


Cardiovascular #2:  2+ femoral (R) - With CPR


Gastrointestinal:  non-distended, overweight


Musculoskeletal:  other - Flaccid


Neurologic:  other - Unresponsive


Psychiatric:  other - Unresponsive


Skin:  cyanosis, warm/dry


CPR/Code Blue


CPR/Code Blue Narrative


CPR begun 19: 53


CPR supervised by me.


With CPR pulses.


Donn pulses femoral me.


Atropine 1 mg.


ROSC at 19: 55.


Patient intubated by me.


Increased pulse rate.


Patient transferred to ICU.


Orders for ventilator, blood gas, chest x-ray, EKG and labs.





Intubation


Intubation :  


   Consent:  Emergent


   Intubation Method:  orotracheal


   Tube Size (cm):  7.5 - 23 cm


   Medications:  Other - None


   Breath Sounds after Intubation:  equal


   Intubation Complications:  no complications


   Post Intubation Xray:  Yes


   Attempts:  One


   Patient Tolerated:  Well


   Complications:  None





Medical Decision Making


Diagnostic Impression:  


 Primary Impression:  


 Respiratory arrest


 Additional Impressions:  


 Cardiopulmonary arrest with successful resuscitation


 Bilateral pulmonary infiltrates on CXR


 Pulmonary edema


 Qualified Codes:  J81.0 - Acute pulmonary edema


ER Course


Patient on BiPAP and became unresponsive.





See CODE BLUE notes.





Transfer to ICU


Rhythm Strip Diag. Results


EP Interpretation:  yes


Rhythm:  no PVC's, no ectopy, other - A fib





Chest X-Ray Diagnostic Results


Chest X-Ray Diagnostic Results :  


   Chest X-Ray Ordered:  Yes


   # of Views/Limited/Complete:  1 View


   Indication:  Other


   EP Interpretation:  Yes


   Interpretation:  no effusion, no pneumothorax, other - bilateral infiltrates


   Impression:  Other


   Electronically Signed by:  Electronically signed by Delmer Sharpe MD


Status:  improved


Disposition:  ADMITTED AS INPATIENT


Condition:  Critical


Referrals:  


[facility],REFERRING (PCP)











Delmer Sharpe MD Nov 3, 2019 20:11

## 2019-11-03 NOTE — PULMONOLOGY PROGRESS NOTE
Assessment/Plan


Problems:  


(1) Healthcare associated bacterial pneumonia


(2) UTI (urinary tract infection)


(3) Sepsis


(4) Dehydration


(5) CHANCE (acute kidney injury)


(6) Acute metabolic encephalopathy


(7) Hyperglycemia due to type 2 diabetes mellitus


(8) Renal failure (ARF), acute on chronic


(9) Aspiration pneumonia


(10) Abnormal TSH


(11) Pelvic mass in female


(12) PEG (percutaneous endoscopic gastrostomy) status


Assessment/Plan


Marked leukocytosis S/P WBC scan with uptake in pelvis, ? correspondence to 

pelvic mass


Sepsis


Possible pneumonia


E coli UTI


Acute hypoxemic respiratory failure


Acute encephalopathy


Hx CHF


HTN


CHANCE - RESOVLED


Pelvic mass/possible GYN malignancy vs cyst


Dysphagia S/P PEG





Plan:


-Optimize pulmonary hygiene/mobilize as tolerated


-BiPAP PRN and qHS


-Titrate down FiO2 to keep SaO2 > 90%


-Monitor WCt, RFS sent for JAK2 mutation, per Dr. Pop BMBx if unrevealing


-Monitor off Abx per ID, F/U Cx's


-monitor volumes and renal function, F/U renal recs, continue Lasix 40 PO BID, D

/C IVF


-?    GYN evaluation 


-monitor encephalopathy, ? neuro eval


-NPO, GTF's


-DVT Px: LMWH


-FC





Subjective


Allergies:  


Coded Allergies:  


     No Known Allergies (Unverified , 10/14/19)


Subjective


AFVSS TTF-tele


Unchanged O2 needs on and off    BiPAP


Not really interactive 


No SOB no cough no FC no wheezing





Objective





Last 24 Hour Vital Signs








  Date Time  Temp Pulse Resp B/P (MAP) Pulse Ox O2 Delivery O2 Flow Rate FiO2


 


11/3/19 12:59  104 36  100 Full Face  60


 


11/3/19 12:00 97.6 110 28 154/87 (109) 92   


 


11/3/19 12:00  97      


 


11/3/19 12:00        60


 


11/3/19 11:06  94 31  100 Full Face  60


 


11/3/19 09:24  90  159/89    


 


11/3/19 09:00      Bi-pap  


 


11/3/19 08:56  90 25  100 Full Face  60


 


11/3/19 08:00  87      


 


11/3/19 08:00        60


 


11/3/19 08:00 97.5 89 22 159/89 (112) 94   


 


11/3/19 06:50     94 Bi-Pap  60


 


11/3/19 06:50  100 31  94 Facial  60


 


11/3/19 05:28  84 24  98 Facial  40


 


11/3/19 04:00 98.2 101 19 153/99 (117) 95   


 


11/3/19 04:00  99      


 


11/3/19 04:00        40


 


11/3/19 03:25  114 30  97 Facial  60


 


11/3/19 01:25  98 30  94 Facial  60


 


11/3/19 00:00  108      


 


11/3/19 00:00 98.4 106 18 141/81 (101) 93   


 


11/3/19 00:00        40


 


11/2/19 23:10  111 39  95 Facial  60


 


11/2/19 21:06  122 38  94 Facial  60


 


11/2/19 21:00      Nasal Cannula 2.0 


 


11/2/19 20:00  103      


 


11/2/19 20:00 98.7 103 19 147/66 (93) 99   


 


11/2/19 19:29     97 Bi-Pap  40


 


11/2/19 19:29  98 22  97 Facial  40


 


11/2/19 17:38  117 36  97 Facial  40


 


11/2/19 17:29  94  133/84    


 


11/2/19 16:08  98      


 


11/2/19 16:00        40


 


11/2/19 16:00 98.2 94 20 133/84 (100) 93   


 


11/2/19 14:38  109 31  95 Facial  40


 


11/2/19 14:01  112 31  94 Facial  40

















Intake and Output  


 


 11/2/19 11/3/19





 19:00 07:00


 


Intake Total 660 ml 


 


Output Total  875 ml


 


Balance 660 ml -875 ml


 


  


 


Free Water 240 ml 


 


Tube Feeding 420 ml 


 


Output Urine Total  875 ml








General Appearance:  no acute distress, cachetic


HEENT:  normocephalic, atraumatic, anicteric, mucous membranes moist


Respiratory/Chest:  crackles/rales


Cardiovascular:  normal peripheral pulses, normal rate, regular rhythm


Abdomen:  normal bowel sounds, soft, non tender, no organomegaly, non distended

, no mass, other - GT


Extremities:  no cyanosis, no clubbing, no edema


Laboratory Tests


11/3/19 05:50: 


White Blood Count 17.3H, Red Blood Count 3.32L, Hemoglobin 9.1L, Hematocrit 

28.2L, Mean Corpuscular Volume 85, Mean Corpuscular Hemoglobin 27.6, Mean 

Corpuscular Hemoglobin Concent 32.4, Red Cell Distribution Width 14.6, Platelet 

Count 434, Mean Platelet Volume 7.0, Neutrophils (%) (Auto) , Lymphocytes (%) (

Auto) , Monocytes (%) (Auto) , Eosinophils (%) (Auto) , Basophils (%) (Auto) , 

Differential Total Cells Counted 100, Neutrophils % (Manual) 90H, Lymphocytes % 

(Manual) 6L, Monocytes % (Manual) 4, Eosinophils % (Manual) 0, Basophils % (

Manual) 0, Band Neutrophils 0, Platelet Estimate Adequate, Platelet Morphology 

Normal, Anisocytosis 1+, Sodium Level 147H, Potassium Level 3.4L, Chloride 

Level 107, Carbon Dioxide Level 30, Anion Gap 10, Blood Urea Nitrogen 24H, 

Creatinine 1.2, Estimat Glomerular Filtration Rate 45.0, Glucose Level 305H, 

Calcium Level 9.3, Magnesium Level 2.1





Current Medications








 Medications


  (Trade)  Dose


 Ordered  Sig/Winnie


 Route


 PRN Reason  Start Time


 Stop Time Status Last Admin


Dose Admin


 


 Acetaminophen


  (Tylenol)  650 mg  Q4H  PRN


 NG


 Mild Pain/Temp > 100.5  11/2/19 15:45


 11/25/19 19:44   


 


 


 Acetaminophen


  (Tylenol)  650 mg  Q4H  PRN


 RECTAL


 TEMP>100.5  11/2/19 15:45


 12/2/19 15:44   


 


 


 Amlodipine


 Besylate


  (Norvasc)  2.5 mg  BID


 NG


   11/2/19 18:00


 11/29/19 17:59  11/3/19 09:24


 


 


 Aspirin


  (ASA)  81 mg  DAILY


 NG


   11/4/19 09:00


 12/4/19 08:59   


 


 


 Atorvastatin


 Calcium


  (Lipitor)  10 mg  BEDTIME


 NG


   11/2/19 21:00


 11/13/19 20:59   


 


 


 Clopidogrel


 Bisulfate


  (Plavix)  75 mg  DAILY


 GT


   11/4/19 09:00


 12/4/19 08:59   


 


 


 Dextrose


  (Dextrose 50%)  25 ml  Q30M  PRN


 IV


 Hypoglycemia  11/2/19 15:30


 12/1/19 06:29   


 


 


 Dextrose


  (Dextrose 50%)  50 ml  Q30M  PRN


 IV


 Hypoglycemia  11/2/19 15:30


 12/1/19 06:29   


 


 


 Dextrose/Sodium


 Chloride  1,000 ml @ 


 75 mls/hr  [id number]


 IV


   11/2/19 16:45


 12/2/19 16:44  11/3/19 06:23


 


 


 Divalproex Sodium


  (Depakote


 Sprinkles)  500 mg  Q12HR


 NG


   11/2/19 21:00


 11/14/19 21:59  11/3/19 09:24


 


 


 Docusate Sodium


  (Colace)  100 mg  EVERY 8  HOURS


 NG


   11/2/19 22:00


 11/25/19 08:59  11/2/19 21:21


 


 


 Famotidine


  (Pepcid)  20 mg  EVERY 12  HOURS


 GT


   11/2/19 21:00


 11/25/19 17:59  11/3/19 09:24


 


 


 Folic Acid


  (Folate)  1 mg  DAILY


 ORAL


   11/3/19 09:00


 11/30/19 08:59  11/3/19 09:24


 


 


 Furosemide


  (Lasix)  40 mg  Q12HR


 NG


   11/2/19 21:00


 12/2/19 20:59  11/3/19 09:24


 


 


 Insulin Aspart


  (NovoLOG)    EVERY 6  HOURS


 SUBQ


   11/2/19 18:00


 11/29/19 11:59  11/3/19 06:24


 


 


 Insulin Detemir


  (Levemir)  6 units  DAILY


 SUBQ


   11/3/19 09:00


 12/1/19 08:59  11/3/19 09:47


 


 


 Lactulose


  (Cephulac)  20 gm  Q8H  PRN


 NG


 Constipation  11/2/19 16:15


 11/25/19 16:14   


 


 


 Lorazepam


  (Ativan 2mg/ml


 1ml)  1 mg  Q4H  PRN


 IV


 For Anxiety  11/2/19 16:00


 11/7/19 15:59   


 


 


 Pantoprazole


  (Protonix)  40 mg  EVERY 12  HOURS


 IVP


   11/3/19 09:00


 12/3/19 08:59  11/3/19 09:23


 

















Sammy Torres MD Nov 3, 2019 13:15

## 2019-11-03 NOTE — GENERAL PROGRESS NOTE
Assessment/Plan


Status:  not improved


Assessment/Plan:


Assessment


(1) pneumonia


(2) UTI


(3) Sepsis


(4) Dehydration


(5) CHANCE 


(6) Acute metabolic encephalopathy


(7) Hyperglycemia due to type 2 diabetes mellitus


(8) Renal failure (ARF), acute on chronic


(9) Aspiration pneumonia


(10) Abnormal TSH


(11) Pelvic mass in female


(12) Congestive Heart Failure


(13) Anemia


(14) Dysphagia - s/p GT, some GT bloody aspirate noted, now resolved





Recommendations


Hold TF - will assess to restart tomorrow


IVF


BIPAP


Elevate HOB


OK to resume Plavix and ASA


PPI and H2B





Subjective


Allergies:  


Coded Allergies:  


     No Known Allergies (Unverified , 10/14/19)


Subjective


called by RN


some red blood GT aspirate noted overnight


anticoagulation held


GT flushed by GI at bedside - no further blood noted





Objective





Last 24 Hour Vital Signs








  Date Time  Temp Pulse Resp B/P (MAP) Pulse Ox O2 Delivery O2 Flow Rate FiO2


 


11/3/19 15:35        80


 


11/3/19 15:21  102 36  95 Full Face  60


 


11/3/19 12:59  104 36  100 Full Face  60


 


11/3/19 12:00 97.6 110 28 154/87 (109) 92   


 


11/3/19 12:00  97      


 


11/3/19 12:00        60


 


11/3/19 11:06  94 31  100 Full Face  60


 


11/3/19 09:24  90  159/89    


 


11/3/19 09:00      Bi-pap  


 


11/3/19 08:56  90 25  100 Full Face  60


 


11/3/19 08:00  87      


 


11/3/19 08:00        60


 


11/3/19 08:00 97.5 89 22 159/89 (112) 94   


 


11/3/19 06:50     94 Bi-Pap  60


 


11/3/19 06:50  100 31  94 Facial  60


 


11/3/19 05:28  84 24  98 Facial  40


 


11/3/19 04:00 98.2 101 19 153/99 (117) 95   


 


11/3/19 04:00  99      


 


11/3/19 04:00        40


 


11/3/19 03:25  114 30  97 Facial  60


 


11/3/19 01:25  98 30  94 Facial  60


 


11/3/19 00:00  108      


 


11/3/19 00:00 98.4 106 18 141/81 (101) 93   


 


11/3/19 00:00        40


 


11/2/19 23:10  111 39  95 Facial  60


 


11/2/19 21:06  122 38  94 Facial  60


 


11/2/19 21:00      Nasal Cannula 2.0 


 


11/2/19 20:00  103      


 


11/2/19 20:00 98.7 103 19 147/66 (93) 99   


 


11/2/19 19:29     97 Bi-Pap  40


 


11/2/19 19:29  98 22  97 Facial  40


 


11/2/19 17:38  117 36  97 Facial  40


 


11/2/19 17:29  94  133/84    


 


11/2/19 16:08  98      


 


11/2/19 16:00        40


 


11/2/19 16:00 98.2 94 20 133/84 (100) 93   

















Intake and Output  


 


 11/2/19 11/3/19





 19:00 07:00


 


Intake Total 660 ml 


 


Output Total  875 ml


 


Balance 660 ml -875 ml


 


  


 


Free Water 240 ml 


 


Tube Feeding 420 ml 


 


Output Urine Total  875 ml








Laboratory Tests


11/3/19 05:50: 


White Blood Count 17.3H, Red Blood Count 3.32L, Hemoglobin 9.1L, Hematocrit 

28.2L, Mean Corpuscular Volume 85, Mean Corpuscular Hemoglobin 27.6, Mean 

Corpuscular Hemoglobin Concent 32.4, Red Cell Distribution Width 14.6, Platelet 

Count 434, Mean Platelet Volume 7.0, Neutrophils (%) (Auto) , Lymphocytes (%) (

Auto) , Monocytes (%) (Auto) , Eosinophils (%) (Auto) , Basophils (%) (Auto) , 

Differential Total Cells Counted 100, Neutrophils % (Manual) 90H, Lymphocytes % 

(Manual) 6L, Monocytes % (Manual) 4, Eosinophils % (Manual) 0, Basophils % (

Manual) 0, Band Neutrophils 0, Platelet Estimate Adequate, Platelet Morphology 

Normal, Anisocytosis 1+, Sodium Level 147H, Potassium Level 3.4L, Chloride 

Level 107, Carbon Dioxide Level 30, Anion Gap 10, Blood Urea Nitrogen 24H, 

Creatinine 1.2, Estimat Glomerular Filtration Rate 45.0, Glucose Level 305H, 

Calcium Level 9.3, Magnesium Level 2.1


Height (Feet):  5


Height (Inches):  5.00


Weight (Pounds):  169


Objective


Elderly WW


BIPAP


Neck supple


CTA


RR


abd soft, (+) GT


no edema











Khorrami,Payman MD Nov 3, 2019 15:55

## 2019-11-03 NOTE — GENERAL PROGRESS NOTE
Assessment/Plan


Status:  not improved


Assessment/Plan:


S: limited wording  verbal today. 





O: poor historian, on BIPAP gabo today  . IV sites are intact.  Charles in place





PHYSICAL EXAMINATION:HEAD AND NECK:  Atraumatic and normocephalic.


CHEST:  Bronchial breathing sounds.ABDOMEN:  Soft.  No organomegaly.


MUSCULOSKELETAL:  Diffuse 1+ or 2+ nonpitting edema in all four contracted


extremities.  The patient is not following the commands.  Limited


evaluation.NEUROLOGY:  The patient is awake.  Not alert.  Not verbally


communicative.





LABORATORY DATA:  Labs dated Nov 3, 2019  reviewed





Meds: Reviewed and reconciled in the chart , including ASA and plavix 





Imaging:  CXR reviewed 





ASSESSMENT AND PLAN:


1. Sepsis.  Differential diagnosis is healthcare-associated pneumonia or


healthcare-associated urinary tract infection.  Work in progress.


2. Acute hypoxemic respiratory failure


3. Acute chf exacerbation - Diastolic


4. Chronic encephalomalacia.


3. Acute on chronic anemia.


4. Renal failure, age indeterminate.


6. Hyperthyroidism.


7. Hyperkalemia.


8. Diabetes type 2, uncontrolled with A1c of 10.


9. Psychiatric disorder.


10. GI and DVT prophylaxis.


11. PAH- Severe 





PLAN OF CARE:


Worsening leukocytosis 


Post PEG tube placement 


Out patient followup for abnormal incidental imaging finding ( possibility of 

CA cant be excluded 


on BIPAP seems comfortable, will f/u with pulmonary


Acute drop in Hgb and observation of UGI bleeding, stable now will resume DAPT





Subjective


Allergies:  


Coded Allergies:  


     No Known Allergies (Unverified , 10/14/19)





Objective





Last 24 Hour Vital Signs








  Date Time  Temp Pulse Resp B/P (MAP) Pulse Ox O2 Delivery O2 Flow Rate FiO2


 


11/3/19 12:59  104 36  100 Full Face  60


 


11/3/19 12:00 97.6 110 28 154/87 (109) 92   


 


11/3/19 12:00  97      


 


11/3/19 12:00        60


 


11/3/19 11:06  94 31  100 Full Face  60


 


11/3/19 09:24  90  159/89    


 


11/3/19 09:00      Bi-pap  


 


11/3/19 08:56  90 25  100 Full Face  60


 


11/3/19 08:00  87      


 


11/3/19 08:00        60


 


11/3/19 08:00 97.5 89 22 159/89 (112) 94   


 


11/3/19 06:50     94 Bi-Pap  60


 


11/3/19 06:50  100 31  94 Facial  60


 


11/3/19 05:28  84 24  98 Facial  40


 


11/3/19 04:00 98.2 101 19 153/99 (117) 95   


 


11/3/19 04:00  99      


 


11/3/19 04:00        40


 


11/3/19 03:25  114 30  97 Facial  60


 


11/3/19 01:25  98 30  94 Facial  60


 


11/3/19 00:00  108      


 


11/3/19 00:00 98.4 106 18 141/81 (101) 93   


 


11/3/19 00:00        40


 


11/2/19 23:10  111 39  95 Facial  60


 


11/2/19 21:06  122 38  94 Facial  60


 


11/2/19 21:00      Nasal Cannula 2.0 


 


11/2/19 20:00  103      


 


11/2/19 20:00 98.7 103 19 147/66 (93) 99   


 


11/2/19 19:29     97 Bi-Pap  40


 


11/2/19 19:29  98 22  97 Facial  40


 


11/2/19 17:38  117 36  97 Facial  40


 


11/2/19 17:29  94  133/84    


 


11/2/19 16:08  98      


 


11/2/19 16:00        40


 


11/2/19 16:00 98.2 94 20 133/84 (100) 93   


 


11/2/19 14:38  109 31  95 Facial  40


 


11/2/19 14:01  112 31  94 Facial  40

















Intake and Output  


 


 11/2/19 11/3/19





 19:00 07:00


 


Intake Total 660 ml 


 


Output Total  875 ml


 


Balance 660 ml -875 ml


 


  


 


Free Water 240 ml 


 


Tube Feeding 420 ml 


 


Output Urine Total  875 ml








Laboratory Tests


11/3/19 05:50: 


White Blood Count 17.3H, Red Blood Count 3.32L, Hemoglobin 9.1L, Hematocrit 

28.2L, Mean Corpuscular Volume 85, Mean Corpuscular Hemoglobin 27.6, Mean 

Corpuscular Hemoglobin Concent 32.4, Red Cell Distribution Width 14.6, Platelet 

Count 434, Mean Platelet Volume 7.0, Neutrophils (%) (Auto) , Lymphocytes (%) (

Auto) , Monocytes (%) (Auto) , Eosinophils (%) (Auto) , Basophils (%) (Auto) , 

Differential Total Cells Counted 100, Neutrophils % (Manual) 90H, Lymphocytes % 

(Manual) 6L, Monocytes % (Manual) 4, Eosinophils % (Manual) 0, Basophils % (

Manual) 0, Band Neutrophils 0, Platelet Estimate Adequate, Platelet Morphology 

Normal, Anisocytosis 1+, Sodium Level 147H, Potassium Level 3.4L, Chloride 

Level 107, Carbon Dioxide Level 30, Anion Gap 10, Blood Urea Nitrogen 24H, 

Creatinine 1.2, Estimat Glomerular Filtration Rate 45.0, Glucose Level 305H, 

Calcium Level 9.3, Magnesium Level 2.1


Height (Feet):  5


Height (Inches):  5.00


Weight (Pounds):  169











Garrick Keith MD Nov 3, 2019 13:11

## 2019-11-03 NOTE — NUR
NURSE NOTES:

Paged Dr. Gallardo with regards to patient's situation, with new orders. Noted and carried out.

## 2019-11-03 NOTE — NUR
NURSE NOTES:

Called son Speedy Yost @ [phone]

Called Daughter Zuleima Perez @ [phone]

Message left to call nursing station back

## 2019-11-03 NOTE — NUR
NURSE NOTES:

Dr. Encarnacion at the bedside, per Dr. Encarnacion, no bleeding at this time, NPO except med at 
this time. Will continue to monitor.

## 2019-11-03 NOTE — NUR
CASE MANAGEMENT: REVIEW



11/3/2019



SI:  SEPSIS.

T 97.7  RR 28 B/P 158/85 SATS 92% ON 95% ON FULL FACE FIO2 60 

WBC 17.3  K 3.4 BUN 24  





IS: LOVENOX SUBQ QD

PLAVIX GT QD 

NORVASC GT BID 

LIPITOR GT  QHS

NOVOLOG SUBQ Q6HR

GT FEEDING 



***MED/SURG UNIT STATUS***



DCP: PATIENT IS FROM  [facility]

## 2019-11-03 NOTE — INFECTIOUS DISEASES PROG NOTE
Assessment/Plan


Problems:  


(1) Aspiration pneumonia


Assessment & Plan:  with B/L infiltrates, L>R suspect due to altered mental 

status , with hypoxemia and leukocytosis, repeated  CXR  showed no change , CT 

of the chest showed B/L effusion with basal infiltrates , aspiration 

precaution. S/P meropenem for two weeks total . EOT 10/30/19. WILL MONITOR 

CLINICALLY OFF ANTIBIOTICS   





(2) UTI (urinary tract infection)


Assessment & Plan:  due to ESBL producing E coli , s/p meropenem to cover for 

sepsis and pneumonia too for two weeks total  





(3) Sepsis


Assessment & Plan:  ruled out with negative blood culture , and persistent 

leukocytosis inspit of being on wide spectrum antibiotics , suspect pelvic mass 

related or bone marrow pathology . recommend gynecology eval for pelvic mass 

resection . indium scan showed rim enhancing at the same  pelvic mass with no 

deep abscess. need gynecology work up and possible resection of the mass 





(4) Acute metabolic encephalopathy


Assessment & Plan:  due to the above, continue hydration and antibiotics, 

monitor in tele 





(5) Hyperglycemia due to type 2 diabetes mellitus


Assessment & Plan:  recommend tight glycemic control to keep blood glucose 

between 100-140 





(6) ARF (acute renal failure)


Assessment & Plan:  due to the above, continue hydration and renally dosed 

antibiotics, close  monitor of renal function , stop vancomycin 





(7) Pelvic mass in female


Assessment & Plan:  to the left of the uterus, suspect malignancy , recommend OB

/GYN eval , and tumor markers , oncology is following 





(8) Respiratory failure


Assessment & Plan:  with CO2 retention, restarted on BIPAP, monitor CXR and ABG 

, pulmonary is following 








Subjective


ROS Limited/Unobtainable:  Yes


Allergies:  


Coded Allergies:  


     No Known Allergies (Unverified , 10/14/19)


Subjective


she was comfortable now out of SDU , off BIPAP, on full face mask , still 

lethargic , and minimally responsive , no fever or chills , no diarrhea, no 

cough or phlegm ,  no SOB





Objective


Vital Signs





Last 24 Hour Vital Signs








  Date Time  Temp Pulse Resp B/P (MAP) Pulse Ox O2 Delivery O2 Flow Rate FiO2


 


11/3/19 17:23  103  158/85    


 


11/3/19 17:10  99 34  99 Full Face  60


 


11/3/19 16:00        60


 


11/3/19 16:00  94      


 


11/3/19 16:00 97.7 103 28 158/85 (109) 92   


 


11/3/19 15:35        80


 


11/3/19 15:21  102 36  95 Full Face  60


 


11/3/19 12:59  104 36  100 Full Face  60


 


11/3/19 12:00 97.6 110 28 154/87 (109) 92   


 


11/3/19 12:00  97      


 


11/3/19 12:00        60


 


11/3/19 11:06  94 31  100 Full Face  60


 


11/3/19 09:24  90  159/89    


 


11/3/19 09:00      Bi-pap  


 


11/3/19 08:56  90 25  100 Full Face  60


 


11/3/19 08:00  87      


 


11/3/19 08:00        60


 


11/3/19 08:00 97.5 89 22 159/89 (112) 94   


 


11/3/19 06:50     94 Bi-Pap  60


 


11/3/19 06:50  100 31  94 Facial  60


 


11/3/19 05:28  84 24  98 Facial  40


 


11/3/19 04:00 98.2 101 19 153/99 (117) 95   


 


11/3/19 04:00  99      


 


11/3/19 04:00        40


 


11/3/19 03:25  114 30  97 Facial  60


 


11/3/19 01:25  98 30  94 Facial  60


 


11/3/19 00:00  108      


 


11/3/19 00:00 98.4 106 18 141/81 (101) 93   


 


11/3/19 00:00        40


 


11/2/19 23:10  111 39  95 Facial  60


 


11/2/19 21:06  122 38  94 Facial  60


 


11/2/19 21:00      Nasal Cannula 2.0 


 


11/2/19 20:00  103      


 


11/2/19 20:00 98.7 103 19 147/66 (93) 99   


 


11/2/19 19:29     97 Bi-Pap  40


 


11/2/19 19:29  98 22  97 Facial  40








Height (Feet):  5


Height (Inches):  5.00


Weight (Pounds):  169


General Appearance:  WD/WN, no acute distress


HEENT:  normocephalic, atraumatic, anicteric, mucous membranes moist, PERRL


Respiratory/Chest:  chest wall non-tender, no respiratory distress, no 

accessory muscle use, decreased breath sounds, crackles/rales


Cardiovascular:  normal peripheral pulses, normal rate, regular rhythm, no 

gallop/murmur, no JVD


Abdomen:  normal bowel sounds, soft, non tender, no organomegaly, non distended

, no mass, no scars


Genitourinary:  normal external genitalia


Extremities:  no cyanosis, no clubbing


Skin:  no rash, no lesions, ulcers


Neurologic/Psychiatric:  alert, unresponsiveness


Lymphatic:  no neck adenopathy, no groin adenopathy


Musculoskeletal:  normal muscle bulk, no effusion





Laboratory Tests








Test


  11/3/19


05:50


 


White Blood Count


  17.3 K/UL


(4.8-10.8)  H


 


Red Blood Count


  3.32 M/UL


(4.20-5.40)  L


 


Hemoglobin


  9.1 G/DL


(12.0-16.0)  L


 


Hematocrit


  28.2 %


(37.0-47.0)  L


 


Mean Corpuscular Volume 85 FL (80-99)  


 


Mean Corpuscular Hemoglobin


  27.6 PG


(27.0-31.0)


 


Mean Corpuscular Hemoglobin


Concent 32.4 G/DL


(32.0-36.0)


 


Red Cell Distribution Width


  14.6 %


(11.6-14.8)


 


Platelet Count


  434 K/UL


(150-450)


 


Mean Platelet Volume


  7.0 FL


(6.5-10.1)


 


Neutrophils (%) (Auto)


  % (45.0-75.0)


 


 


Lymphocytes (%) (Auto)


  % (20.0-45.0)


 


 


Monocytes (%) (Auto)  % (1.0-10.0)  


 


Eosinophils (%) (Auto)  % (0.0-3.0)  


 


Basophils (%) (Auto)  % (0.0-2.0)  


 


Differential Total Cells


Counted 100  


 


 


Neutrophils % (Manual) 90 % (45-75)  H


 


Lymphocytes % (Manual) 6 % (20-45)  L


 


Monocytes % (Manual) 4 % (1-10)  


 


Eosinophils % (Manual) 0 % (0-3)  


 


Basophils % (Manual) 0 % (0-2)  


 


Band Neutrophils 0 % (0-8)  


 


Platelet Estimate Adequate  


 


Platelet Morphology Normal  


 


Anisocytosis 1+  


 


Sodium Level


  147 MMOL/L


(136-145)  H


 


Potassium Level


  3.4 MMOL/L


(3.5-5.1)  L


 


Chloride Level


  107 MMOL/L


()


 


Carbon Dioxide Level


  30 MMOL/L


(21-32)


 


Anion Gap


  10 mmol/L


(5-15)


 


Blood Urea Nitrogen


  24 mg/dL


(7-18)  H


 


Creatinine


  1.2 MG/DL


(0.55-1.30)


 


Estimat Glomerular Filtration


Rate 45.0 mL/min


(>60)


 


Glucose Level


  305 MG/DL


()  H


 


Calcium Level


  9.3 MG/DL


(8.5-10.1)


 


Magnesium Level


  2.1 MG/DL


(1.8-2.4)











Current Medications








 Medications


  (Trade)  Dose


 Ordered  Sig/Winnie


 Route


 PRN Reason  Start Time


 Stop Time Status Last Admin


Dose Admin


 


 Acetaminophen


  (Tylenol)  650 mg  Q4H  PRN


 NG


 Mild Pain/Temp > 100.5  11/2/19 15:45


 11/25/19 19:44   


 


 


 Acetaminophen


  (Tylenol)  650 mg  Q4H  PRN


 RECTAL


 TEMP>100.5  11/2/19 15:45


 12/2/19 15:44   


 


 


 Amlodipine


 Besylate


  (Norvasc)  2.5 mg  BID


 NG


   11/2/19 18:00


 11/29/19 17:59  11/3/19 17:23


 


 


 Atorvastatin


 Calcium


  (Lipitor)  10 mg  BEDTIME


 NG


   11/2/19 21:00


 11/13/19 20:59   


 


 


 Dextrose


  (Dextrose 50%)  25 ml  Q30M  PRN


 IV


 Hypoglycemia  11/2/19 15:30


 12/1/19 06:29   


 


 


 Dextrose


  (Dextrose 50%)  50 ml  Q30M  PRN


 IV


 Hypoglycemia  11/2/19 15:30


 12/1/19 06:29   


 


 


 Divalproex Sodium


  (Depakote


 Sprinkles)  500 mg  Q12HR


 NG


   11/2/19 21:00


 11/14/19 21:59  11/3/19 09:24


 


 


 Docusate Sodium


  (Colace)  100 mg  EVERY 8  HOURS


 NG


   11/2/19 22:00


 11/25/19 08:59  11/3/19 13:38


 


 


 Famotidine


  (Pepcid)  20 mg  EVERY 12  HOURS


 GT


   11/2/19 21:00


 11/25/19 17:59  11/3/19 09:24


 


 


 Folic Acid


  (Folate)  1 mg  DAILY


 ORAL


   11/3/19 09:00


 11/30/19 08:59  11/3/19 09:24


 


 


 Furosemide


  (Lasix)  40 mg  Q12HR


 NG


   11/2/19 21:00


 12/2/19 20:59  11/3/19 09:24


 


 


 Insulin Aspart


  (NovoLOG)    EVERY 6  HOURS


 SUBQ


   11/2/19 18:00


 11/29/19 11:59  11/3/19 13:40


 


 


 Insulin Detemir


  (Levemir)  6 units  DAILY


 SUBQ


   11/3/19 09:00


 12/1/19 08:59  11/3/19 09:47


 


 


 Lactulose


  (Cephulac)  20 gm  Q8H  PRN


 NG


 Constipation  11/2/19 16:15


 11/25/19 16:14   


 


 


 Lorazepam


  (Ativan 2mg/ml


 1ml)  1 mg  Q4H  PRN


 IV


 For Anxiety  11/2/19 16:00


 11/7/19 15:59   


 


 


 Pantoprazole


  (Protonix)  40 mg  EVERY 12  HOURS


 IVP


   11/3/19 09:00


 12/3/19 08:59  11/3/19 09:23


 

















Amie Burgos M.D. Nov 3, 2019 18:47

## 2019-11-03 NOTE — NUR
NURSE NOTES:

Daughter Aly Perez Called back and notified her for patient change in condition at this 
time. Update on care given. She will try to contact her brother Speedy also.

## 2019-11-04 VITALS — DIASTOLIC BLOOD PRESSURE: 49 MMHG | SYSTOLIC BLOOD PRESSURE: 127 MMHG

## 2019-11-04 VITALS — DIASTOLIC BLOOD PRESSURE: 59 MMHG | SYSTOLIC BLOOD PRESSURE: 140 MMHG

## 2019-11-04 VITALS — SYSTOLIC BLOOD PRESSURE: 140 MMHG | DIASTOLIC BLOOD PRESSURE: 58 MMHG

## 2019-11-04 VITALS — DIASTOLIC BLOOD PRESSURE: 74 MMHG | SYSTOLIC BLOOD PRESSURE: 157 MMHG

## 2019-11-04 VITALS — SYSTOLIC BLOOD PRESSURE: 138 MMHG | DIASTOLIC BLOOD PRESSURE: 49 MMHG

## 2019-11-04 VITALS — SYSTOLIC BLOOD PRESSURE: 146 MMHG | DIASTOLIC BLOOD PRESSURE: 56 MMHG

## 2019-11-04 VITALS — SYSTOLIC BLOOD PRESSURE: 148 MMHG | DIASTOLIC BLOOD PRESSURE: 123 MMHG

## 2019-11-04 VITALS — SYSTOLIC BLOOD PRESSURE: 139 MMHG | DIASTOLIC BLOOD PRESSURE: 58 MMHG

## 2019-11-04 VITALS — DIASTOLIC BLOOD PRESSURE: 62 MMHG | SYSTOLIC BLOOD PRESSURE: 156 MMHG

## 2019-11-04 VITALS — DIASTOLIC BLOOD PRESSURE: 57 MMHG | SYSTOLIC BLOOD PRESSURE: 137 MMHG

## 2019-11-04 VITALS — SYSTOLIC BLOOD PRESSURE: 138 MMHG | DIASTOLIC BLOOD PRESSURE: 51 MMHG

## 2019-11-04 VITALS — DIASTOLIC BLOOD PRESSURE: 66 MMHG | SYSTOLIC BLOOD PRESSURE: 159 MMHG

## 2019-11-04 VITALS — DIASTOLIC BLOOD PRESSURE: 58 MMHG | SYSTOLIC BLOOD PRESSURE: 149 MMHG

## 2019-11-04 VITALS — SYSTOLIC BLOOD PRESSURE: 143 MMHG | DIASTOLIC BLOOD PRESSURE: 86 MMHG

## 2019-11-04 VITALS — DIASTOLIC BLOOD PRESSURE: 60 MMHG | SYSTOLIC BLOOD PRESSURE: 142 MMHG

## 2019-11-04 VITALS — SYSTOLIC BLOOD PRESSURE: 131 MMHG | DIASTOLIC BLOOD PRESSURE: 51 MMHG

## 2019-11-04 VITALS — SYSTOLIC BLOOD PRESSURE: 128 MMHG | DIASTOLIC BLOOD PRESSURE: 57 MMHG

## 2019-11-04 VITALS — DIASTOLIC BLOOD PRESSURE: 48 MMHG | SYSTOLIC BLOOD PRESSURE: 136 MMHG

## 2019-11-04 VITALS — SYSTOLIC BLOOD PRESSURE: 154 MMHG | DIASTOLIC BLOOD PRESSURE: 64 MMHG

## 2019-11-04 VITALS — SYSTOLIC BLOOD PRESSURE: 136 MMHG | DIASTOLIC BLOOD PRESSURE: 50 MMHG

## 2019-11-04 VITALS — DIASTOLIC BLOOD PRESSURE: 88 MMHG | SYSTOLIC BLOOD PRESSURE: 121 MMHG

## 2019-11-04 VITALS — DIASTOLIC BLOOD PRESSURE: 58 MMHG | SYSTOLIC BLOOD PRESSURE: 150 MMHG

## 2019-11-04 VITALS — SYSTOLIC BLOOD PRESSURE: 147 MMHG | DIASTOLIC BLOOD PRESSURE: 56 MMHG

## 2019-11-04 VITALS — DIASTOLIC BLOOD PRESSURE: 56 MMHG | SYSTOLIC BLOOD PRESSURE: 140 MMHG

## 2019-11-04 VITALS — SYSTOLIC BLOOD PRESSURE: 144 MMHG | DIASTOLIC BLOOD PRESSURE: 63 MMHG

## 2019-11-04 LAB
ADD MANUAL DIFF: YES
ALBUMIN SERPL-MCNC: 1.8 G/DL (ref 3.4–5)
ALBUMIN/GLOB SERPL: 0.4 {RATIO} (ref 1–2.7)
ALP SERPL-CCNC: 77 U/L (ref 46–116)
ALT SERPL-CCNC: 13 U/L (ref 12–78)
ANION GAP SERPL CALC-SCNC: 8 MMOL/L (ref 5–15)
AST SERPL-CCNC: 24 U/L (ref 15–37)
BILIRUB SERPL-MCNC: 0.7 MG/DL (ref 0.2–1)
BUN SERPL-MCNC: 23 MG/DL (ref 7–18)
CALCIUM SERPL-MCNC: 9 MG/DL (ref 8.5–10.1)
CHLORIDE SERPL-SCNC: 110 MMOL/L (ref 98–107)
CO2 SERPL-SCNC: 30 MMOL/L (ref 21–32)
CREAT SERPL-MCNC: 1 MG/DL (ref 0.55–1.3)
ERYTHROCYTE [DISTWIDTH] IN BLOOD BY AUTOMATED COUNT: 14.2 % (ref 11.6–14.8)
GLOBULIN SER-MCNC: 4.3 G/DL
HCT VFR BLD CALC: 24 % (ref 37–47)
HGB BLD-MCNC: 7.8 G/DL (ref 12–16)
LDH SERPL L TO P-CCNC: 506 U/L (ref 81–234)
MCV RBC AUTO: 84 FL (ref 80–99)
PLATELET # BLD: 419 K/UL (ref 150–450)
POTASSIUM SERPL-SCNC: 3.6 MMOL/L (ref 3.5–5.1)
RBC # BLD AUTO: 2.87 M/UL (ref 4.2–5.4)
SODIUM SERPL-SCNC: 148 MMOL/L (ref 136–145)
WBC # BLD AUTO: 12.5 K/UL (ref 4.8–10.8)

## 2019-11-04 RX ADMIN — INSULIN DETEMIR SCH UNITS: 100 INJECTION, SOLUTION SUBCUTANEOUS at 09:16

## 2019-11-04 RX ADMIN — MEROPENEM SCH MLS/HR: 1 INJECTION INTRAVENOUS at 17:30

## 2019-11-04 RX ADMIN — INSULIN ASPART SCH UNITS: 100 INJECTION, SOLUTION INTRAVENOUS; SUBCUTANEOUS at 00:25

## 2019-11-04 RX ADMIN — LORAZEPAM PRN MG: 2 INJECTION, SOLUTION INTRAMUSCULAR; INTRAVENOUS at 00:37

## 2019-11-04 RX ADMIN — PANTOPRAZOLE SODIUM SCH MG: 40 INJECTION, POWDER, FOR SOLUTION INTRAVENOUS at 09:04

## 2019-11-04 RX ADMIN — INSULIN ASPART SCH UNITS: 100 INJECTION, SOLUTION INTRAVENOUS; SUBCUTANEOUS at 06:52

## 2019-11-04 RX ADMIN — INSULIN ASPART SCH UNITS: 100 INJECTION, SOLUTION INTRAVENOUS; SUBCUTANEOUS at 12:38

## 2019-11-04 RX ADMIN — DOCUSATE SODIUM SCH MG: 50 LIQUID ORAL at 21:16

## 2019-11-04 RX ADMIN — FUROSEMIDE SCH MG: 40 TABLET ORAL at 09:04

## 2019-11-04 RX ADMIN — PANTOPRAZOLE SODIUM SCH MG: 40 INJECTION, POWDER, FOR SOLUTION INTRAVENOUS at 21:15

## 2019-11-04 RX ADMIN — FUROSEMIDE SCH MG: 40 TABLET ORAL at 21:16

## 2019-11-04 RX ADMIN — SODIUM CHLORIDE SCH MLS/HR: 9 INJECTION, SOLUTION INTRAVENOUS at 19:18

## 2019-11-04 RX ADMIN — DIVALPROEX SODIUM SCH MG: 125 CAPSULE ORAL at 09:05

## 2019-11-04 RX ADMIN — DIVALPROEX SODIUM SCH MG: 125 CAPSULE ORAL at 21:15

## 2019-11-04 RX ADMIN — SODIUM CHLORIDE SCH MG: 0.9 INJECTION INTRAVENOUS at 21:15

## 2019-11-04 RX ADMIN — DOCUSATE SODIUM SCH MG: 50 LIQUID ORAL at 06:51

## 2019-11-04 RX ADMIN — LORAZEPAM PRN MG: 2 INJECTION, SOLUTION INTRAMUSCULAR; INTRAVENOUS at 11:39

## 2019-11-04 RX ADMIN — INSULIN ASPART SCH UNITS: 100 INJECTION, SOLUTION INTRAVENOUS; SUBCUTANEOUS at 20:17

## 2019-11-04 RX ADMIN — DOCUSATE SODIUM SCH MG: 50 LIQUID ORAL at 17:29

## 2019-11-04 RX ADMIN — INSULIN DETEMIR SCH UNITS: 100 INJECTION, SOLUTION SUBCUTANEOUS at 20:17

## 2019-11-04 RX ADMIN — SODIUM CHLORIDE SCH MG: 0.9 INJECTION INTRAVENOUS at 17:29

## 2019-11-04 NOTE — GENERAL PROGRESS NOTE
Assessment/Plan


Status:  not improved


Assessment/Plan:


patient seen and examined. Full dictation in progress. I discussed with Giovana 

in HIM department, she notified me about the software issue that doesn't let 

Progress notes show in system.





Subjective


Allergies:  


Coded Allergies:  


     No Known Allergies (Unverified , 10/14/19)





Objective





Last 24 Hour Vital Signs








  Date Time  Temp Pulse Resp B/P (MAP) Pulse Ox O2 Delivery O2 Flow Rate FiO2


 


11/4/19 10:39  77 14     50


 


11/4/19 09:20  80 18     50


 


11/4/19 09:04  75  140/56    


 


11/4/19 09:00  75 14 140/56 (84) 100   


 


11/4/19 08:00      Endotracheal Tube  


 


11/4/19 08:00        50


 


11/4/19 08:00  80 15 149/58 (88) 100   


 


11/4/19 07:13  91 19     50


 


11/4/19 07:00 98.7 92 18 159/66 (97) 100   


 


11/4/19 06:00  71 14 136/48 (77) 97   


 


11/4/19 05:00  88 17 143/86 (105) 100   


 


11/4/19 05:00  87 20     50


 


11/4/19 04:43  91 21 121/88 (99) 100   


 


11/4/19 04:00 98.2 88 16 148/123 (131) 100   


 


11/4/19 04:00        60


 


11/4/19 04:00  89      


 


11/4/19 04:00      Endotracheal Tube  


 


11/4/19 03:00  76 14 139/58 (85) 99   


 


11/4/19 02:45  76 14     50


 


11/4/19 02:00  79 14 144/63 (90) 100   


 


11/4/19 01:08  89 14     50


 


11/4/19 01:00  90 16 154/64 (94) 100   


 


11/4/19 00:00      Endotracheal Tube  


 


11/4/19 00:00        60


 


11/4/19 00:00 98.5 90 14 157/74 (101) 100   


 


11/3/19 23:30  90 16 157/71 (99) 100   


 


11/3/19 23:14  91      


 


11/3/19 23:00  92 18 154/69 (97) 100   


 


11/3/19 22:54  87 14     60


 


11/3/19 22:30  91 16 152/64 (93) 100   


 


11/3/19 22:00  92 14 152/68 (96) 100   


 


11/3/19 21:30  93 14 142/63 (89) 100   


 


11/3/19 21:15  94 10 147/65 (92) 100   


 


11/3/19 21:13  94 14     80


 


11/3/19 21:00  95 13 146/66 (92) 100   


 


11/3/19 20:46  98 19 144/77 (99) 93   


 


11/3/19 20:28  102 15 141/106 (118) 100   


 


11/3/19 20:22        80


 


11/3/19 20:22        80


 


11/3/19 20:18  110      


 


11/3/19 20:06        100


 


11/3/19 20:06 98.5       


 


11/3/19 20:06  104 16     100


 


11/3/19 19:29  94 30  95 Full Face  60


 


11/3/19 19:29     95 Bi-Pap  60


 


11/3/19 17:23  103  158/85    


 


11/3/19 17:10  99 34  99 Full Face  60


 


11/3/19 16:00        60


 


11/3/19 16:00  94      


 


11/3/19 16:00 97.7 103 28 158/85 (109) 92   


 


11/3/19 15:35        80


 


11/3/19 15:21  102 36  95 Full Face  60


 


11/3/19 12:59  104 36  100 Full Face  60


 


11/3/19 12:00 97.6 110 28 154/87 (109) 92   


 


11/3/19 12:00  97      


 


11/3/19 12:00        60


 


11/3/19 11:06  94 31  100 Full Face  60

















Intake and Output  


 


 11/3/19 11/4/19





 19:00 07:00


 


  


 


# Voids 3 53


 


# Bowel Movements  1








Laboratory Tests


11/3/19 20:09: 


Arterial Blood pH 7.340L, Arterial Blood Partial Pressure CO2 58.0*H, Arterial 

Blood Partial Pressure O2 184.8H, Arterial Blood HCO3 30.6H, Arterial Blood 

Oxygen Saturation 98.7, Arterial Blood Base Excess 3.9H, Graham Test Positive


11/3/19 20:30: 


White Blood Count 15.3H, Red Blood Count 3.40L, Hemoglobin 9.3L, Hematocrit 

28.3L, Mean Corpuscular Volume 83, Mean Corpuscular Hemoglobin 27.5, Mean 

Corpuscular Hemoglobin Concent 33.0, Red Cell Distribution Width 12.7, Platelet 

Count 444, Mean Platelet Volume 6.9, Neutrophils (%) (Auto) , Lymphocytes (%) (

Auto) , Monocytes (%) (Auto) , Eosinophils (%) (Auto) , Basophils (%) (Auto) , 

Differential Total Cells Counted 100, Neutrophils % (Manual) 88H, Lymphocytes % 

(Manual) 10L, Monocytes % (Manual) 2, Eosinophils % (Manual) 0, Basophils % (

Manual) 0, Band Neutrophils 0, Platelet Estimate IncreasedH, Platelet 

Morphology Normal, Red Blood Cell Morphology Normal, Sodium Level 146H, 

Potassium Level 3.5, Chloride Level 106, Carbon Dioxide Level 30, Anion Gap 10, 

Blood Urea Nitrogen 24H, Creatinine 1.2, Estimat Glomerular Filtration Rate 45.0

, Glucose Level 291H, Calcium Level 9.5, Magnesium Level 2.1, Total Bilirubin 

0.7, Aspartate Amino Transf (AST/SGOT) 15, Alanine Aminotransferase (ALT/SGPT) 

14, Alkaline Phosphatase 97, Troponin I 0.179H, Pro-B-Type Natriuretic Peptide 

> 39963S, Total Protein 7.0, Albumin 2.1L, Globulin 4.9, Albumin/Globulin Ratio 

0.4L


11/3/19 21:30: 


Arterial Blood pH 7.419, Arterial Blood Partial Pressure CO2 50.2H, Arterial 

Blood Partial Pressure O2 112.5H, Arterial Blood HCO3 31.8H, Arterial Blood 

Oxygen Saturation 97.6, Arterial Blood Base Excess 6.4H, Graham Test Positive


11/4/19 07:40: 


Arterial Blood pH 7.506H, Arterial Blood Partial Pressure CO2 39.2, Arterial 

Blood Partial Pressure O2 80.7, Arterial Blood HCO3 30.3H, Arterial Blood 

Oxygen Saturation 95.8, Arterial Blood Base Excess 6.7H, Graham Test Positive


11/4/19 09:10: 


White Blood Count 12.5H, Red Blood Count 2.87L, Hemoglobin 7.8L, Hematocrit 

24.0L, Mean Corpuscular Volume 84, Mean Corpuscular Hemoglobin 27.3, Mean 

Corpuscular Hemoglobin Concent 32.6, Red Cell Distribution Width 14.2, Platelet 

Count 419, Mean Platelet Volume 7.0, Neutrophils (%) (Auto) , Lymphocytes (%) (

Auto) , Monocytes (%) (Auto) , Eosinophils (%) (Auto) , Basophils (%) (Auto) , 

Differential Total Cells Counted 100, Neutrophils % (Manual) 70, Lymphocytes % (

Manual) 18L, Monocytes % (Manual) 10, Eosinophils % (Manual) 2, Basophils % (

Manual) 0, Band Neutrophils 0, Platelet Estimate Adequate, Platelet Morphology 

Normal, Sodium Level 148H, Potassium Level 3.6, Chloride Level 110H, Carbon 

Dioxide Level 30, Anion Gap 8, Blood Urea Nitrogen 23H, Creatinine 1.0, Estimat 

Glomerular Filtration Rate 55.5, Glucose Level 221H, Calcium Level 9.0, Total 

Bilirubin 0.7, Aspartate Amino Transf (AST/SGOT) 24, Alanine Aminotransferase (

ALT/SGPT) 13, Alkaline Phosphatase 77, Lactate Dehydrogenase 506H, Total 

Protein 6.1L, Albumin 1.8L, Globulin 4.3, Albumin/Globulin Ratio 0.4L, Cortisol 

[Pending]


Height (Feet):  5


Height (Inches):  5.00


Weight (Pounds):  169











aGrrick Keith MD Nov 4, 2019 10:44

## 2019-11-04 NOTE — NUR
NURSE NOTES:

received report from Alli JOHNSON. Patient in bed sleeping easily arousable to verbal and 
tactile stimuli. patient with good and strong hand .  ETT 7.5/23cm lip line ac 14, TV 
550, fi02 60% peep 5 satting 100%. IV line intact on left wrist and left hand, TKO.  GT 
intact abdomen large and distended no residual.NPO. with abdominal binder.  no s/s of 
hypo/hyperglycemia. SR on cardiac monitor HR 80.On P 200 for wound management. skin warm and 
dry to touch.bed alarm on. bed lock and in low position. Contact isolation maintained and 
observed. Will continue plan of care.

## 2019-11-04 NOTE — INFECTIOUS DISEASES PROG NOTE
Assessment/Plan


Problems:  


(1) Aspiration pneumonia


Assessment & Plan:  with B/L infiltrates, hypoxemia and leukocytosis, CXR  

showed interstitial infiltrates , will order sputum culture and start 

vancomycin with meropenem  empirically . aspiration precaution. 





(2) Respiratory failure


Assessment & Plan:  with maegan cardia and S/P code blue, was intubated and 

started on mechanical ventilation , monitor CXR and ABG , pulmonary is 

following 





(3) UTI (urinary tract infection)


Assessment & Plan:  due to candida lusitaneae , S/P casas catheter removal , no 

specific therapy needed for now 





(4) Acute metabolic encephalopathy


Assessment & Plan:  due to the above, continue hydration and antibiotics, 

monitor in tele 





(5) Hyperglycemia due to type 2 diabetes mellitus


Assessment & Plan:  recommend tight glycemic control to keep blood glucose 

between 100-140 





(6) ARF (acute renal failure)


Assessment & Plan:  due to the above, continue hydration and renally dosed 

antibiotics, close  monitor of renal function , stop vancomycin 





(7) Pelvic mass in female


Assessment & Plan:  to the left of the uterus, suspect malignancy , recommend OB

/GYN eval , and tumor markers , oncology is following 








Subjective


ROS Limited/Unobtainable:  Yes


Allergies:  


Coded Allergies:  


     No Known Allergies (Unverified , 10/14/19)


Subjective


she was intubated and sedated on ativan, transferred to ICU after she became 

bradycardic and had code blue , unresponsive , no fever or chills , no diarrhea

, no secretions from the ET tube on mechanical ventilation





Objective


Vital Signs





Last 24 Hour Vital Signs








  Date Time  Temp Pulse Resp B/P (MAP) Pulse Ox O2 Delivery O2 Flow Rate FiO2


 


11/4/19 14:31  70 14     60


 


11/4/19 13:10  74 14     60


 


11/4/19 13:00  77 14 146/56 (86) 100   


 


11/4/19 12:22        60


 


11/4/19 12:00  73      


 


11/4/19 12:00      Endotracheal Tube  


 


11/4/19 12:00        60


 


11/4/19 12:00 97.1 73 13 138/51 (80) 98   


 


11/4/19 11:00  87 15 142/60 (87)    


 


11/4/19 10:39  77 14     50


 


11/4/19 10:00  87 19 156/62 (93)    


 


11/4/19 09:20  80 18     50


 


11/4/19 09:04  75  140/56    


 


11/4/19 09:00  75 14 140/56 (84) 100   


 


11/4/19 08:00      Endotracheal Tube  


 


11/4/19 08:00        50


 


11/4/19 08:00  80 15 149/58 (88) 100   


 


11/4/19 08:00  88      


 


11/4/19 07:13  91 19     50


 


11/4/19 07:00 98.7 92 18 159/66 (97) 100   


 


11/4/19 06:00  71 14 136/48 (77) 97   


 


11/4/19 05:00  88 17 143/86 (105) 100   


 


11/4/19 05:00  87 20     50


 


11/4/19 04:43  91 21 121/88 (99) 100   


 


11/4/19 04:00 98.2 88 16 148/123 (131) 100   


 


11/4/19 04:00        60


 


11/4/19 04:00  89      


 


11/4/19 04:00      Endotracheal Tube  


 


11/4/19 03:00  76 14 139/58 (85) 99   


 


11/4/19 02:45  76 14     50


 


11/4/19 02:00  79 14 144/63 (90) 100   


 


11/4/19 01:08  89 14     50


 


11/4/19 01:00  90 16 154/64 (94) 100   


 


11/4/19 00:00      Endotracheal Tube  


 


11/4/19 00:00        60


 


11/4/19 00:00 98.5 90 14 157/74 (101) 100   


 


11/3/19 23:30  90 16 157/71 (99) 100   


 


11/3/19 23:14  91      


 


11/3/19 23:00  92 18 154/69 (97) 100   


 


11/3/19 22:54  87 14     60


 


11/3/19 22:30  91 16 152/64 (93) 100   


 


11/3/19 22:00  92 14 152/68 (96) 100   


 


11/3/19 21:30  93 14 142/63 (89) 100   


 


11/3/19 21:15  94 10 147/65 (92) 100   


 


11/3/19 21:13  94 14     80


 


11/3/19 21:00  95 13 146/66 (92) 100   


 


11/3/19 20:46  98 19 144/77 (99) 93   


 


11/3/19 20:28  102 15 141/106 (118) 100   


 


11/3/19 20:22        80


 


11/3/19 20:22        80


 


11/3/19 20:18  110      


 


11/3/19 20:06        100


 


11/3/19 20:06 98.5       


 


11/3/19 20:06  104 16     100


 


11/3/19 19:29  94 30  95 Full Face  60


 


11/3/19 19:29     95 Bi-Pap  60


 


11/3/19 17:23  103  158/85    


 


11/3/19 17:10  99 34  99 Full Face  60


 


11/3/19 16:00        60


 


11/3/19 16:00  94      


 


11/3/19 16:00 97.7 103 28 158/85 (109) 92   


 


11/3/19 15:35        80


 


11/3/19 15:21  102 36  95 Full Face  60








Height (Feet):  5


Height (Inches):  5.00


Weight (Pounds):  166


General Appearance:  WD/WN, no acute distress


HEENT:  normocephalic, atraumatic, anicteric, mucous membranes moist, supple, 

no JVD


Respiratory/Chest:  no respiratory distress, no accessory muscle use, decreased 

breath sounds, crackles/rales


Cardiovascular:  normal peripheral pulses, normal rate, regular rhythm, no 

gallop/murmur, no JVD


Abdomen:  normal bowel sounds, soft, non tender, no organomegaly, non distended

, no mass, no scars


Extremities:  no cyanosis, no clubbing


Skin:  no rash, no lesions, ulcers


Neurologic/Psychiatric:  alert, responsive


Lymphatic:  no neck adenopathy, no groin adenopathy


Musculoskeletal:  normal muscle bulk, no effusion





Laboratory Tests








Test


  11/3/19


20:09 11/3/19


20:30 11/3/19


21:30 11/4/19


07:40


 


Arterial Blood pH


  7.340


(7.350-7.450) 


  7.419


(7.350-7.450) 7.506


(7.350-7.450)


 


Arterial Blood Partial


Pressure CO2 58.0 mmHg


(35.0-45.0)  *H 


  50.2 mmHg


(35.0-45.0)  H 39.2 mmHg


(35.0-45.0)


 


Arterial Blood Partial


Pressure O2 184.8 mmHg


(75.0-100.0)  H 


  112.5 mmHg


(75.0-100.0)  H 80.7 mmHg


(75.0-100.0)


 


Arterial Blood HCO3


  30.6 mmol/L


(22.0-26.0)  H 


  31.8 mmol/L


(22.0-26.0)  H 30.3 mmol/L


(22.0-26.0)  H


 


Arterial Blood Oxygen


Saturation 98.7 %


() 


  97.6 %


() 95.8 %


()


 


Arterial Blood Base Excess 3.9 (-2-2)  H  6.4 (-2-2)  H 6.7 (-2-2)  H


 


Graham Test Positive    Positive   Positive  


 


White Blood Count


  


  15.3 K/UL


(4.8-10.8)  H 


  


 


 


Red Blood Count


  


  3.40 M/UL


(4.20-5.40)  L 


  


 


 


Hemoglobin


  


  9.3 G/DL


(12.0-16.0)  L 


  


 


 


Hematocrit


  


  28.3 %


(37.0-47.0)  L 


  


 


 


Mean Corpuscular Volume  83 FL (80-99)    


 


Mean Corpuscular Hemoglobin


  


  27.5 PG


(27.0-31.0) 


  


 


 


Mean Corpuscular Hemoglobin


Concent 


  33.0 G/DL


(32.0-36.0) 


  


 


 


Red Cell Distribution Width


  


  12.7 %


(11.6-14.8) 


  


 


 


Platelet Count


  


  444 K/UL


(150-450) 


  


 


 


Mean Platelet Volume


  


  6.9 FL


(6.5-10.1) 


  


 


 


Neutrophils (%) (Auto)


  


  % (45.0-75.0)


  


  


 


 


Lymphocytes (%) (Auto)


  


  % (20.0-45.0)


  


  


 


 


Monocytes (%) (Auto)   % (1.0-10.0)    


 


Eosinophils (%) (Auto)   % (0.0-3.0)    


 


Basophils (%) (Auto)   % (0.0-2.0)    


 


Differential Total Cells


Counted 


  100  


  


  


 


 


Neutrophils % (Manual)  88 % (45-75)  H  


 


Lymphocytes % (Manual)  10 % (20-45)  L  


 


Monocytes % (Manual)  2 % (1-10)    


 


Eosinophils % (Manual)  0 % (0-3)    


 


Basophils % (Manual)  0 % (0-2)    


 


Band Neutrophils  0 % (0-8)    


 


Platelet Estimate  Increased  H  


 


Platelet Morphology  Normal    


 


Red Blood Cell Morphology  Normal    


 


Sodium Level


  


  146 MMOL/L


(136-145)  H 


  


 


 


Potassium Level


  


  3.5 MMOL/L


(3.5-5.1) 


  


 


 


Chloride Level


  


  106 MMOL/L


() 


  


 


 


Carbon Dioxide Level


  


  30 MMOL/L


(21-32) 


  


 


 


Anion Gap


  


  10 mmol/L


(5-15) 


  


 


 


Blood Urea Nitrogen


  


  24 mg/dL


(7-18)  H 


  


 


 


Creatinine


  


  1.2 MG/DL


(0.55-1.30) 


  


 


 


Estimat Glomerular Filtration


Rate 


  45.0 mL/min


(>60) 


  


 


 


Glucose Level


  


  291 MG/DL


()  H 


  


 


 


Calcium Level


  


  9.5 MG/DL


(8.5-10.1) 


  


 


 


Magnesium Level


  


  2.1 MG/DL


(1.8-2.4) 


  


 


 


Total Bilirubin


  


  0.7 MG/DL


(0.2-1.0) 


  


 


 


Aspartate Amino Transf


(AST/SGOT) 


  15 U/L (15-37)


  


  


 


 


Alanine Aminotransferase


(ALT/SGPT) 


  14 U/L (12-78)


  


  


 


 


Alkaline Phosphatase


  


  97 U/L


() 


  


 


 


Troponin I


  


  0.179 ng/mL


(0.000-0.056) 


  


 


 


Pro-B-Type Natriuretic Peptide


  


  > 94931 pg/mL


(0-125)  H 


  


 


 


Total Protein


  


  7.0 G/DL


(6.4-8.2) 


  


 


 


Albumin


  


  2.1 G/DL


(3.4-5.0)  L 


  


 


 


Globulin  4.9 g/dL    


 


Albumin/Globulin Ratio


  


  0.4 (1.0-2.7)


L 


  


 


 


Test


  11/4/19


09:10 


  


  


 


 


White Blood Count


  12.5 K/UL


(4.8-10.8)  H 


  


  


 


 


Red Blood Count


  2.87 M/UL


(4.20-5.40)  L 


  


  


 


 


Hemoglobin


  7.8 G/DL


(12.0-16.0)  L 


  


  


 


 


Hematocrit


  24.0 %


(37.0-47.0)  L 


  


  


 


 


Mean Corpuscular Volume 84 FL (80-99)     


 


Mean Corpuscular Hemoglobin


  27.3 PG


(27.0-31.0) 


  


  


 


 


Mean Corpuscular Hemoglobin


Concent 32.6 G/DL


(32.0-36.0) 


  


  


 


 


Red Cell Distribution Width


  14.2 %


(11.6-14.8) 


  


  


 


 


Platelet Count


  419 K/UL


(150-450) 


  


  


 


 


Mean Platelet Volume


  7.0 FL


(6.5-10.1) 


  


  


 


 


Neutrophils (%) (Auto)


  % (45.0-75.0)


  


  


  


 


 


Lymphocytes (%) (Auto)


  % (20.0-45.0)


  


  


  


 


 


Monocytes (%) (Auto)  % (1.0-10.0)     


 


Eosinophils (%) (Auto)  % (0.0-3.0)     


 


Basophils (%) (Auto)  % (0.0-2.0)     


 


Differential Total Cells


Counted 100  


  


  


  


 


 


Neutrophils % (Manual) 70 % (45-75)     


 


Lymphocytes % (Manual) 18 % (20-45)  L   


 


Monocytes % (Manual) 10 % (1-10)     


 


Eosinophils % (Manual) 2 % (0-3)     


 


Basophils % (Manual) 0 % (0-2)     


 


Band Neutrophils 0 % (0-8)     


 


Platelet Estimate Adequate     


 


Platelet Morphology Normal     


 


Sodium Level


  148 MMOL/L


(136-145)  H 


  


  


 


 


Potassium Level


  3.6 MMOL/L


(3.5-5.1) 


  


  


 


 


Chloride Level


  110 MMOL/L


()  H 


  


  


 


 


Carbon Dioxide Level


  30 MMOL/L


(21-32) 


  


  


 


 


Anion Gap


  8 mmol/L


(5-15) 


  


  


 


 


Blood Urea Nitrogen


  23 mg/dL


(7-18)  H 


  


  


 


 


Creatinine


  1.0 MG/DL


(0.55-1.30) 


  


  


 


 


Estimat Glomerular Filtration


Rate 55.5 mL/min


(>60) 


  


  


 


 


Glucose Level


  221 MG/DL


()  H 


  


  


 


 


Calcium Level


  9.0 MG/DL


(8.5-10.1) 


  


  


 


 


Total Bilirubin


  0.7 MG/DL


(0.2-1.0) 


  


  


 


 


Aspartate Amino Transf


(AST/SGOT) 24 U/L (15-37)


  


  


  


 


 


Alanine Aminotransferase


(ALT/SGPT) 13 U/L (12-78)


  


  


  


 


 


Alkaline Phosphatase


  77 U/L


() 


  


  


 


 


Lactate Dehydrogenase


  506 U/L


()  H 


  


  


 


 


Total Protein


  6.1 G/DL


(6.4-8.2)  L 


  


  


 


 


Albumin


  1.8 G/DL


(3.4-5.0)  L 


  


  


 


 


Globulin 4.3 g/dL     


 


Albumin/Globulin Ratio


  0.4 (1.0-2.7)


L 


  


  


 


 


Cortisol Pending     











Current Medications








 Medications


  (Trade)  Dose


 Ordered  Sig/Winnie


 Route


 PRN Reason  Start Time


 Stop Time Status Last Admin


Dose Admin


 


 Acetaminophen


  (Tylenol)  650 mg  Q4H  PRN


 NG


 Mild Pain/Temp > 100.5  11/3/19 20:30


 11/25/19 20:29   


 


 


 Acetaminophen


  (Tylenol)  650 mg  Q4H  PRN


 RECTAL


 TEMP>100.5  11/3/19 20:30


 12/2/19 20:29   


 


 


 Amlodipine


 Besylate


  (Norvasc)  2.5 mg  BID


 NG


   11/4/19 09:00


 11/29/19 17:59  11/4/19 09:04


 


 


 Atorvastatin


 Calcium


  (Lipitor)  10 mg  BEDTIME


 NG


   11/3/19 21:00


 11/13/19 20:59  11/3/19 22:39


 


 


 Dextrose


  (Dextrose 50%)  25 ml  Q30M  PRN


 IV


 Hypoglycemia  11/3/19 20:30


 12/1/19 06:29   


 


 


 Dextrose


  (Dextrose 50%)  50 ml  Q30M  PRN


 IV


 Hypoglycemia  11/3/19 20:30


 12/1/19 06:29   


 


 


 Dextrose/Sodium


 Chloride  1,000 ml @ 


 40 mls/hr  Q24H


 IV


   11/4/19 07:59


 12/4/19 07:58  11/4/19 09:03


 


 


 Divalproex Sodium


  (Depakote


 Sprinkles)  500 mg  Q12HR


 NG


   11/3/19 21:00


 11/14/19 21:59  11/4/19 09:05


 


 


 Docusate Sodium


  (Colace)  100 mg  EVERY 8  HOURS


 NG


   11/3/19 22:00


 11/25/19 08:59  11/4/19 06:51


 


 


 Famotidine


  (Pepcid)  20 mg  EVERY 12  HOURS


 GT


   11/3/19 21:00


 11/25/19 17:59  11/4/19 09:04


 


 


 Folic Acid


  (Folate)  1 mg  DAILY


 ORAL


   11/4/19 09:00


 11/30/19 08:59  11/4/19 09:04


 


 


 Furosemide


  (Lasix)  40 mg  Q12HR


 NG


   11/3/19 21:00


 12/2/19 20:59  11/4/19 09:04


 


 


 Insulin Aspart


  (NovoLOG)    EVERY 6  HOURS


 SUBQ


   11/4/19 00:00


 11/29/19 11:59  11/4/19 12:38


 


 


 Insulin Detemir


  (Levemir)  8 units  BID


 SUBQ


   11/4/19 09:00


 12/1/19 08:59  11/4/19 09:16


 


 


 Lactulose


  (Cephulac)  20 gm  Q8H  PRN


 NG


 Constipation  11/3/19 20:30


 12/3/19 20:29   


 


 


 Lorazepam


  (Ativan 2mg/ml


 1ml)  1 mg  Q4H  PRN


 IV


 For Anxiety  11/3/19 20:30


 11/10/19 20:29  11/4/19 11:39


 


 


 Midazolam HCl  100 ml @ 0


 mls/hr  Q24H  PRN


 IV


 To Patient Comfort  11/3/19 20:35


 11/10/19 20:34   


 


 


 Pantoprazole


  (Protonix)  40 mg  EVERY 12  HOURS


 IVP


   11/3/19 21:00


 12/3/19 08:59  11/4/19 09:04


 

















Amie Burgos M.D. Nov 4, 2019 15:23

## 2019-11-04 NOTE — NUR
NURSE NOTES:

Late entry: PT and report given by ELIZABETH Winter; PT received with bilateral restraints 
sleeping; reported that PT on restraints for safety and screaming; VS stable; PT intubated 
ETT 7.5; 23cm @ lip, AC 14; ; Fio2 50%; peep 5; no S/S of respiratory distress noted. 
PT is NPO status, HR on monitor 88 SR, PT has L-wrist 22g running TKO. Will get orders for 
fluids, monitor PT for agitation. Will continue to monitor PT.

## 2019-11-04 NOTE — PULMONOLGY CRITICAL CARE NOTE
Critical Care - Asmt/Plan


Assessment/Plan:


Pulmonary CCM Progress Note 





Assessment/Plan


Problems:  


(1) Healthcare/aspiration associated bacterial pneumonia


(2) UTI (urinary tract infection)


(3) Sepsis


(4) Dehydration


(5) CHANCE (acute kidney injury)


(6) Acute metabolic encephalopathy


(7) Hyperglycemia due to type 2 diabetes mellitus


(8) Renal failure (ARF), acute on chronic


(9) Ventilator dependant Respiratory Failure


(10) Abnormal TSH


(11) Pelvic mass in female


(12) PEG (percutaneous endoscopic gastrostomy) status


Assessment/Plan


Marked leukocytosis S/P WBC scan with uptake in pelvis, has pelvic mass


Sepsis


Possible pneumonia


E coli UTI


Acute hypoxemic respiratory failure


S/p code blue - now VDRF


Acute encephalopathy


Hx CHF


HTN


CHANCE improving


Pelvic mass/possible GYN malignancy vs cyst


Dysphagia S/P PEG





Plan:


-Wean FIO2, ABG PRN


-Optimize pulmonary hygiene


-Continue ACVC, reduce VT


-Titrate down FiO2 to keep SaO2 > 90%


-Monitor WCt, RFS sent for JAK2 mutation, per Dr. Pop BMBx if unrevealing


-Abx per ID, F/U Cx's


-monitor volumes and renal function, F/U renal recs


-monitor encephalopathyl


-NPO, GTF's


-DVT Px


-FC





Subjective


Allergies:  


Coded Allergies:  


     No Known Allergies


Subjective


Sedated on the Ventilator





Objective





Vital Signs Noted





General Appearance:  Sedated, cachetic


HEENT:  normocephalic, atraumatic, anicteric, mucous membranes moist


Respiratory/Chest:  occasional rhonchi


Cardiovascular:  normal peripheral pulses, normal rate, regular rhythm


Abdomen:  normal bowel sounds, soft, non tender, no organomegaly, non distended

, no mass, other - G


Extremities:  no cyanosis, no clubbing, no edema





Microbiology noted








 Date/Time


Source Procedure


Growth Status


 


 


 10/31/19 10:15


Urine,Clean Catch Urine Culture - Preliminary


YEAST Resulted








Laboratory Tests Noted





Critical Care - Objective





Last 24 Hour Vital Signs








  Date Time  Temp Pulse Resp B/P (MAP) Pulse Ox O2 Delivery O2 Flow Rate FiO2


 


11/4/19 12:22        60


 


11/4/19 12:00        60


 


11/4/19 11:00  87 15 142/60 (87)    


 


11/4/19 10:39  77 14     50


 


11/4/19 10:00  87 19 156/62 (93)    


 


11/4/19 09:20  80 18     50


 


11/4/19 09:04  75  140/56    


 


11/4/19 09:00  75 14 140/56 (84) 100   


 


11/4/19 08:00      Endotracheal Tube  


 


11/4/19 08:00        50


 


11/4/19 08:00  80 15 149/58 (88) 100   


 


11/4/19 08:00  88      


 


11/4/19 07:13  91 19     50


 


11/4/19 07:00 98.7 92 18 159/66 (97) 100   


 


11/4/19 06:00  71 14 136/48 (77) 97   


 


11/4/19 05:00  88 17 143/86 (105) 100   


 


11/4/19 05:00  87 20     50


 


11/4/19 04:43  91 21 121/88 (99) 100   


 


11/4/19 04:00 98.2 88 16 148/123 (131) 100   


 


11/4/19 04:00        60


 


11/4/19 04:00  89      


 


11/4/19 04:00      Endotracheal Tube  


 


11/4/19 03:00  76 14 139/58 (85) 99   


 


11/4/19 02:45  76 14     50


 


11/4/19 02:00  79 14 144/63 (90) 100   


 


11/4/19 01:08  89 14     50


 


11/4/19 01:00  90 16 154/64 (94) 100   


 


11/4/19 00:00      Endotracheal Tube  


 


11/4/19 00:00        60


 


11/4/19 00:00 98.5 90 14 157/74 (101) 100   


 


11/3/19 23:30  90 16 157/71 (99) 100   


 


11/3/19 23:14  91      


 


11/3/19 23:00  92 18 154/69 (97) 100   


 


11/3/19 22:54  87 14     60


 


11/3/19 22:30  91 16 152/64 (93) 100   


 


11/3/19 22:00  92 14 152/68 (96) 100   


 


11/3/19 21:30  93 14 142/63 (89) 100   


 


11/3/19 21:15  94 10 147/65 (92) 100   


 


11/3/19 21:13  94 14     80


 


11/3/19 21:00  95 13 146/66 (92) 100   


 


11/3/19 20:46  98 19 144/77 (99) 93   


 


11/3/19 20:28  102 15 141/106 (118) 100   


 


11/3/19 20:22        80


 


11/3/19 20:22        80


 


11/3/19 20:18  110      


 


11/3/19 20:06        100


 


11/3/19 20:06 98.5       


 


11/3/19 20:06  104 16     100


 


11/3/19 19:29  94 30  95 Full Face  60


 


11/3/19 19:29     95 Bi-Pap  60


 


11/3/19 17:23  103  158/85    


 


11/3/19 17:10  99 34  99 Full Face  60


 


11/3/19 16:00        60


 


11/3/19 16:00  94      


 


11/3/19 16:00 97.7 103 28 158/85 (109) 92   


 


11/3/19 15:35        80


 


11/3/19 15:21  102 36  95 Full Face  60


 


11/3/19 12:59  104 36  100 Full Face  60








Accucheck:  227





Critical Care - Subjective


ROS Limited/Unobtainable:  No


FI02:  60


Vent Support Breath Rate:  14


Vent Support Mode:  AC


Vent Tidal Volume:  550


Sputum Amount:  Scant


PEEP:  5.0


PIP:  38


Tube Feeding Amount:  50


I&O:











Intake and Output  


 


 11/3/19 11/4/19





 19:00 07:00


 


  


 


# Voids 3 53


 


# Bowel Movements  1








ET-Tube:  7.5


ET Position:  23











Delmer Main MD Nov 4, 2019 12:41

## 2019-11-04 NOTE — NUR
NURSE NOTES:

Turned and repositioned. No s/s of acute distress noted. No n/v. No diarrhea.Frequent visual 
checks continued. will continue to monitor patient.

## 2019-11-04 NOTE — GI PROGRESS NOTE
Assessment/Plan


Problems:  


(1) PEG (percutaneous endoscopic gastrostomy) status


ICD Codes:  Z93.1 - Gastrostomy status


SNOMED:  447923211, [id number]


(2) Anemia


ICD Codes:  D64.9 - Anemia, unspecified


SNOMED:  499341768


Qualifiers:  


   Qualified Codes:  D64.9 - Anemia, unspecified


(3) Dehydration


ICD Codes:  E86.0 - Dehydration


SNOMED:  09978502, [id number]


Status:  unchanged


Status Narrative


Discussed with Dr. Turk.


Assessment/Plan


Assessment


(1) pneumonia


(2) UTI


(3) Sepsis


(4) Dehydration


(5) CHANCE 


(6) Acute metabolic encephalopathy


(7) Hyperglycemia due to type 2 diabetes mellitus


(8) Renal failure (ARF), acute on chronic


(9) Aspiration pneumonia


(10) Abnormal TSH


(11) Pelvic mass in female


(12) Congestive Heart Failure


(13) Anemia


(14) Dysphagia - s/p GT





Recommendations


RRT called on patient yesterday.  EGD cancelled, GT had bloody aspirate which 

seems to have resolved.


Hold TF - anticipate restart tomorrow


IVF


BIPAP


Elevate HOB


OK to resume Plavix and ASA


PPI and H2B


will follow up





The patient was seen and examined at bedside and all new and available data was 

reviewed in the patients chart. I agree with the above findings, impression 

and plan.  (Patient seen earlier today. Signature stamp does not reflect 

patient encounter time.). - Storm Turk MD





Subjective


Subjective


Limited





Objective





Last 24 Hour Vital Signs








  Date Time  Temp Pulse Resp B/P (MAP) Pulse Ox O2 Delivery O2 Flow Rate FiO2


 


11/4/19 13:00  77 14 146/56 (86) 100   


 


11/4/19 12:22        60


 


11/4/19 12:00  73      


 


11/4/19 12:00      Endotracheal Tube  


 


11/4/19 12:00        60


 


11/4/19 12:00 97.1 73 13 138/51 (80) 98   


 


11/4/19 11:00  87 15 142/60 (87)    


 


11/4/19 10:39  77 14     50


 


11/4/19 10:00  87 19 156/62 (93)    


 


11/4/19 09:20  80 18     50


 


11/4/19 09:04  75  140/56    


 


11/4/19 09:00  75 14 140/56 (84) 100   


 


11/4/19 08:00      Endotracheal Tube  


 


11/4/19 08:00        50


 


11/4/19 08:00  80 15 149/58 (88) 100   


 


11/4/19 08:00  88      


 


11/4/19 07:13  91 19     50


 


11/4/19 07:00 98.7 92 18 159/66 (97) 100   


 


11/4/19 06:00  71 14 136/48 (77) 97   


 


11/4/19 05:00  88 17 143/86 (105) 100   


 


11/4/19 05:00  87 20     50


 


11/4/19 04:43  91 21 121/88 (99) 100   


 


11/4/19 04:00 98.2 88 16 148/123 (131) 100   


 


11/4/19 04:00        60


 


11/4/19 04:00  89      


 


11/4/19 04:00      Endotracheal Tube  


 


11/4/19 03:00  76 14 139/58 (85) 99   


 


11/4/19 02:45  76 14     50


 


11/4/19 02:00  79 14 144/63 (90) 100   


 


11/4/19 01:08  89 14     50


 


11/4/19 01:00  90 16 154/64 (94) 100   


 


11/4/19 00:00      Endotracheal Tube  


 


11/4/19 00:00        60


 


11/4/19 00:00 98.5 90 14 157/74 (101) 100   


 


11/3/19 23:30  90 16 157/71 (99) 100   


 


11/3/19 23:14  91      


 


11/3/19 23:00  92 18 154/69 (97) 100   


 


11/3/19 22:54  87 14     60


 


11/3/19 22:30  91 16 152/64 (93) 100   


 


11/3/19 22:00  92 14 152/68 (96) 100   


 


11/3/19 21:30  93 14 142/63 (89) 100   


 


11/3/19 21:15  94 10 147/65 (92) 100   


 


11/3/19 21:13  94 14     80


 


11/3/19 21:00  95 13 146/66 (92) 100   


 


11/3/19 20:46  98 19 144/77 (99) 93   


 


11/3/19 20:28  102 15 141/106 (118) 100   


 


11/3/19 20:22        80


 


11/3/19 20:22        80


 


11/3/19 20:18  110      


 


11/3/19 20:06        100


 


11/3/19 20:06 98.5       


 


11/3/19 20:06  104 16     100


 


11/3/19 19:29  94 30  95 Full Face  60


 


11/3/19 19:29     95 Bi-Pap  60


 


11/3/19 17:23  103  158/85    


 


11/3/19 17:10  99 34  99 Full Face  60


 


11/3/19 16:00        60


 


11/3/19 16:00  94      


 


11/3/19 16:00 97.7 103 28 158/85 (109) 92   


 


11/3/19 15:35        80


 


11/3/19 15:21  102 36  95 Full Face  60

















Intake and Output  


 


 11/3/19 11/4/19





 19:00 07:00


 


  


 


# Voids 3 53


 


# Bowel Movements  1











Laboratory Tests








Test


  11/3/19


20:09 11/3/19


20:30 11/3/19


21:30 11/4/19


07:40


 


Arterial Blood pH


  7.340


(7.350-7.450) 


  7.419


(7.350-7.450) 7.506


(7.350-7.450)


 


Arterial Blood Partial


Pressure CO2 58.0 mmHg


(35.0-45.0)  *H 


  50.2 mmHg


(35.0-45.0)  H 39.2 mmHg


(35.0-45.0)


 


Arterial Blood Partial


Pressure O2 184.8 mmHg


(75.0-100.0)  H 


  112.5 mmHg


(75.0-100.0)  H 80.7 mmHg


(75.0-100.0)


 


Arterial Blood HCO3


  30.6 mmol/L


(22.0-26.0)  H 


  31.8 mmol/L


(22.0-26.0)  H 30.3 mmol/L


(22.0-26.0)  H


 


Arterial Blood Oxygen


Saturation 98.7 %


() 


  97.6 %


() 95.8 %


()


 


Arterial Blood Base Excess 3.9 (-2-2)  H  6.4 (-2-2)  H 6.7 (-2-2)  H


 


Graham Test Positive    Positive   Positive  


 


White Blood Count


  


  15.3 K/UL


(4.8-10.8)  H 


  


 


 


Red Blood Count


  


  3.40 M/UL


(4.20-5.40)  L 


  


 


 


Hemoglobin


  


  9.3 G/DL


(12.0-16.0)  L 


  


 


 


Hematocrit


  


  28.3 %


(37.0-47.0)  L 


  


 


 


Mean Corpuscular Volume  83 FL (80-99)    


 


Mean Corpuscular Hemoglobin


  


  27.5 PG


(27.0-31.0) 


  


 


 


Mean Corpuscular Hemoglobin


Concent 


  33.0 G/DL


(32.0-36.0) 


  


 


 


Red Cell Distribution Width


  


  12.7 %


(11.6-14.8) 


  


 


 


Platelet Count


  


  444 K/UL


(150-450) 


  


 


 


Mean Platelet Volume


  


  6.9 FL


(6.5-10.1) 


  


 


 


Neutrophils (%) (Auto)


  


  % (45.0-75.0)


  


  


 


 


Lymphocytes (%) (Auto)


  


  % (20.0-45.0)


  


  


 


 


Monocytes (%) (Auto)   % (1.0-10.0)    


 


Eosinophils (%) (Auto)   % (0.0-3.0)    


 


Basophils (%) (Auto)   % (0.0-2.0)    


 


Differential Total Cells


Counted 


  100  


  


  


 


 


Neutrophils % (Manual)  88 % (45-75)  H  


 


Lymphocytes % (Manual)  10 % (20-45)  L  


 


Monocytes % (Manual)  2 % (1-10)    


 


Eosinophils % (Manual)  0 % (0-3)    


 


Basophils % (Manual)  0 % (0-2)    


 


Band Neutrophils  0 % (0-8)    


 


Platelet Estimate  Increased  H  


 


Platelet Morphology  Normal    


 


Red Blood Cell Morphology  Normal    


 


Sodium Level


  


  146 MMOL/L


(136-145)  H 


  


 


 


Potassium Level


  


  3.5 MMOL/L


(3.5-5.1) 


  


 


 


Chloride Level


  


  106 MMOL/L


() 


  


 


 


Carbon Dioxide Level


  


  30 MMOL/L


(21-32) 


  


 


 


Anion Gap


  


  10 mmol/L


(5-15) 


  


 


 


Blood Urea Nitrogen


  


  24 mg/dL


(7-18)  H 


  


 


 


Creatinine


  


  1.2 MG/DL


(0.55-1.30) 


  


 


 


Estimat Glomerular Filtration


Rate 


  45.0 mL/min


(>60) 


  


 


 


Glucose Level


  


  291 MG/DL


()  H 


  


 


 


Calcium Level


  


  9.5 MG/DL


(8.5-10.1) 


  


 


 


Magnesium Level


  


  2.1 MG/DL


(1.8-2.4) 


  


 


 


Total Bilirubin


  


  0.7 MG/DL


(0.2-1.0) 


  


 


 


Aspartate Amino Transf


(AST/SGOT) 


  15 U/L (15-37)


  


  


 


 


Alanine Aminotransferase


(ALT/SGPT) 


  14 U/L (12-78)


  


  


 


 


Alkaline Phosphatase


  


  97 U/L


() 


  


 


 


Troponin I


  


  0.179 ng/mL


(0.000-0.056) 


  


 


 


Pro-B-Type Natriuretic Peptide


  


  > 91494 pg/mL


(0-125)  H 


  


 


 


Total Protein


  


  7.0 G/DL


(6.4-8.2) 


  


 


 


Albumin


  


  2.1 G/DL


(3.4-5.0)  L 


  


 


 


Globulin  4.9 g/dL    


 


Albumin/Globulin Ratio


  


  0.4 (1.0-2.7)


L 


  


 


 


Test


  11/4/19


09:10 


  


  


 


 


White Blood Count


  12.5 K/UL


(4.8-10.8)  H 


  


  


 


 


Red Blood Count


  2.87 M/UL


(4.20-5.40)  L 


  


  


 


 


Hemoglobin


  7.8 G/DL


(12.0-16.0)  L 


  


  


 


 


Hematocrit


  24.0 %


(37.0-47.0)  L 


  


  


 


 


Mean Corpuscular Volume 84 FL (80-99)     


 


Mean Corpuscular Hemoglobin


  27.3 PG


(27.0-31.0) 


  


  


 


 


Mean Corpuscular Hemoglobin


Concent 32.6 G/DL


(32.0-36.0) 


  


  


 


 


Red Cell Distribution Width


  14.2 %


(11.6-14.8) 


  


  


 


 


Platelet Count


  419 K/UL


(150-450) 


  


  


 


 


Mean Platelet Volume


  7.0 FL


(6.5-10.1) 


  


  


 


 


Neutrophils (%) (Auto)


  % (45.0-75.0)


  


  


  


 


 


Lymphocytes (%) (Auto)


  % (20.0-45.0)


  


  


  


 


 


Monocytes (%) (Auto)  % (1.0-10.0)     


 


Eosinophils (%) (Auto)  % (0.0-3.0)     


 


Basophils (%) (Auto)  % (0.0-2.0)     


 


Differential Total Cells


Counted 100  


  


  


  


 


 


Neutrophils % (Manual) 70 % (45-75)     


 


Lymphocytes % (Manual) 18 % (20-45)  L   


 


Monocytes % (Manual) 10 % (1-10)     


 


Eosinophils % (Manual) 2 % (0-3)     


 


Basophils % (Manual) 0 % (0-2)     


 


Band Neutrophils 0 % (0-8)     


 


Platelet Estimate Adequate     


 


Platelet Morphology Normal     


 


Sodium Level


  148 MMOL/L


(136-145)  H 


  


  


 


 


Potassium Level


  3.6 MMOL/L


(3.5-5.1) 


  


  


 


 


Chloride Level


  110 MMOL/L


()  H 


  


  


 


 


Carbon Dioxide Level


  30 MMOL/L


(21-32) 


  


  


 


 


Anion Gap


  8 mmol/L


(5-15) 


  


  


 


 


Blood Urea Nitrogen


  23 mg/dL


(7-18)  H 


  


  


 


 


Creatinine


  1.0 MG/DL


(0.55-1.30) 


  


  


 


 


Estimat Glomerular Filtration


Rate 55.5 mL/min


(>60) 


  


  


 


 


Glucose Level


  221 MG/DL


()  H 


  


  


 


 


Calcium Level


  9.0 MG/DL


(8.5-10.1) 


  


  


 


 


Total Bilirubin


  0.7 MG/DL


(0.2-1.0) 


  


  


 


 


Aspartate Amino Transf


(AST/SGOT) 24 U/L (15-37)


  


  


  


 


 


Alanine Aminotransferase


(ALT/SGPT) 13 U/L (12-78)


  


  


  


 


 


Alkaline Phosphatase


  77 U/L


() 


  


  


 


 


Lactate Dehydrogenase


  506 U/L


()  H 


  


  


 


 


Total Protein


  6.1 G/DL


(6.4-8.2)  L 


  


  


 


 


Albumin


  1.8 G/DL


(3.4-5.0)  L 


  


  


 


 


Globulin 4.3 g/dL     


 


Albumin/Globulin Ratio


  0.4 (1.0-2.7)


L 


  


  


 


 


Cortisol Pending     








Height (Feet):  5


Height (Inches):  5.00


Weight (Pounds):  166


General Appearance:  WD/WN, no apparent distress, alert


Cardiovascular:  normal rate


Respiratory/Chest:  normal breath sounds, no respiratory distress


Abdominal Exam:  normal bowel sounds, non tender, soft, GT site - c/d/i


Extremities:  non-tender











Darren Lucio NP Nov 4, 2019 13:10

## 2019-11-04 NOTE — NUR
NURSE NOTES:

Patient in bed ETT connected to vent saturation 100%. No fever. no s/s of acute distress 
noted. Blood glucose 271mg/dl gave Novolog insulin 3 units 1/2 dose due to pt NPO.

## 2019-11-04 NOTE — NUR
NURSE NOTES:

Patient with episode of pulling out tubing, bilateral soft wrist restraint checked. no s/s 
of acute distress noted. Incontinent of bowel and bladder. kept clean and dry. On p200 
mattress for wound management. frequent visual checks continued.

## 2019-11-04 NOTE — NUR
NURSE NOTES:

Bed bath given tolerated well. wound care done. GT intact no residual, clamped. NPO. HOB 
elevated. no s/s of acute distress noted. call light within easy reach. SR on cardiac 
monitor HR 81.will continue to monitor the patient.

## 2019-11-04 NOTE — NUR
NURSE NOTES:

MD Kiesha made rounds, updates given about RRT; code blue with PT and transfer to ICU; 
intubated; not on pressors; PT has no IV fluids and remains NPO; EKG done showing SR. 
MD Kiesha ordered D5 1/2NS @ 40cc/hr for PT. Will place orders and continue with plan of 
care.

## 2019-11-04 NOTE — NUR
NURSE NOTES:

Ativan 1mg given @ 1139 due to agitation, PT moving her legs, pulling on restraints; PT @ 
1220 on monitor showed desaturating from 100% to 86%; called Lianet, RT; PT oxygen increased 
from 50% to 60%, PT shows no S/S of respiratory distress, saturating @ 100% after increase 
in O2. Will continue to monitor PT.

## 2019-11-04 NOTE — HEMATOLOGY/ONC PROGRESS NOTE
Assessment/Plan


Assessment/Plan





Assessment and Recs:


# Anemia of chronic disease due to underlying chronic medical issues, 

multifactorial 


--> Anemia workup has been ordered, rule out gi bleed --> ferritin is 1400+


--> No evidence of hemolysis is noted, peripheral smear has been reviewed.


--> Hgb goal >7. Transfuse prn.


--> Epogen or iron at this time is not particularly indicated


--> Medications have been reviewed


--> low threshold for gi evaluation in case has occult +


--> bone marrow biopsy is not indicated given the other more likely causes (if 

recurrent anemia) first time here


--> hgb trend 8.4->8.5-->8.2->7.7-->7.9-->10.1-->9.1->7.8


--> FOLIC ACID 1mg po daily started


# Leukocytosis/elevated white blood cell count, unspecified likely related to 

underlying reaction v more likely infection


--> have reviewed peripheral smear and bandemia/neutrophilia noted


--> continue antibiotics if they have been started by ID team (VANC/ZOSYN)--> 

vanc/linda--> linda


--> wbc 39-->28k-->29k->31k-->23-->26k-->19k-->24k-->15.4-->16-->14-->23k-->14--

>17-->12.5


--> monitor for resolution


--> CT C/A/P Results reviewed


--> FOR INdium bone scan done -> Rim of activity within the pelvis, 

corresponding to expected location of the soft tissue component of the cystic 

pelvic mass described on recent imaging studies.


--> again consider gyn eval


--> if doesn't improve by wednesday, may consider a bone marrow biopsy, have dw 

pcp and pulm--> has slowly come down over last 1-2 weeks (will eval 11/4)


# Pelvic mass on ct and us -- 13 x 7 x 12.9 cm unilocular cystic mass, 

corresponding to lesion reported on recent CT scan. Layering low level internal 

echoes likely represent bladder debris.. This presumably represents a cystic 

ovarian lesion with debris or hemorrhage, possibly neoplastic.


--> CA-125 is 216! elevated


--> consider gyn eval


# Hypercalcemia - btw 10-11 range when corrected to alb


--> ivf have been started


--> if remains elevated, 7.9-->8.3


--> will get pth


# Sepsis from pneumonia.  


--> pulm consulted, abx started


--> on bipap


# CHANCE (acute kidney injury) on ckd


--> cr 1.6-->2.7-->2.2->1.2


--> per renal


# UTI (urinary tract infection)


--> on abx per id


# Dehydration


--> on ivf


# Acute metabolic encephalopathy


--> likely due to pna


# Resp failure now intibated 11/4


# Dvt ppx lovenox sq





The timing of this note does not necessarily reflect the time of the patient 

was seen.





Greatly appreciate consultation.





Subjective


HEENT:  Denies: no symptoms, eye pain, blurred vision, tearing, double vision, 

ear pain, ear discharge, nose pain, nose congestion, throat pain, throat 

swelling, mouth pain, mouth swelling, other


Respiratory:  Denies: no symptoms, cough, shortness of breath, SOB with 

excertion, SOB at rest, sputum, wheezing, other


Gastrointestinal/Abdominal:  Denies: no symptoms, abdomen distended, abdominal 

pain, black stools, tarry stools, blood in stool, constipated, diarrhea, 

difficulty swallowing, nausea, poor appetite, poor fluid intake, rectal bleeding

, vomiting, other


Genitourinary:  Denies: no symptoms, burning, discharge, frequency, flank pain, 

hematuria, incontinence, pain, urgency, other


Neurologic/Psychiatric:  Denies: no symptoms, anxiety, depressed, emotional 

problems, headache, numbness, paresthesia, pre-existing deficit, seizure, 

tingling, tremors, weakness, other


Allergies:  


Coded Allergies:  


     No Known Allergies (Unverified , 10/14/19)


Subjective


10/14: hgb has improved, no bleeding, labs reivewed


10/15: no events to report


10/16: a+ o x1, no bleeding or chills noted, no major changes, occult pending


10/17: no events noted, no bleeding, no chills, no hematemesis/hematochezia


10/18: remains confused, with abx, on nc


10/19: cr is worse, hgb 8.4, no bleeding, night sweats, chills


10/20: no f/c, no night sweats, labs noted, cr worse


10/21: no bleeding or chills, no night sweats, labs pending this am


10/22: pelvic mass noted on imaging, no bleeding reported, ordered ca125


10/23: no events, remains lethargic, is on abx, seen by surg


10/24: with raid response overnight, rr high as well as bp, vidal to icu


10/25: no events, no bleeding, to undergo a indium scan with id


10/26: comfortable, electrolytes reviewed, given mag and k


10/29: back on bipap with gt feeds, dw rn this am


10/30: remains very confused, no f/c, no bleeding, with urinary retention, 

folic acid started


10/31: sleeping, is on bipap, no events, wbc is higher this am


11/1: no events to report, no bleeding, wbc still high but better


11/3: no bleeding, no night sweats, s/p peg, alert


11/4: s/p code blue last night, now intubated, labs noted wbc lower





Objective


Objective





Current Medications








 Medications


  (Trade)  Dose


 Ordered  Sig/Winnie


 Route


 PRN Reason  Start Time


 Stop Time Status Last Admin


Dose Admin


 


 Acetaminophen


  (Tylenol)  650 mg  Q4H  PRN


 NG


 Mild Pain/Temp > 100.5  11/3/19 20:30


 11/25/19 20:29   


 


 


 Acetaminophen


  (Tylenol)  650 mg  Q4H  PRN


 RECTAL


 TEMP>100.5  11/3/19 20:30


 12/2/19 20:29   


 


 


 Amlodipine


 Besylate


  (Norvasc)  2.5 mg  BID


 NG


   11/4/19 09:00


 11/29/19 17:59  11/4/19 09:04


 


 


 Dextrose


  (Dextrose 50%)  25 ml  Q30M  PRN


 IV


 Hypoglycemia  11/3/19 20:30


 12/1/19 06:29   


 


 


 Dextrose


  (Dextrose 50%)  50 ml  Q30M  PRN


 IV


 Hypoglycemia  11/3/19 20:30


 12/1/19 06:29   


 


 


 Divalproex Sodium


  (Depakote


 Sprinkles)  500 mg  Q12HR


 NG


   11/3/19 21:00


 11/14/19 21:59  11/4/19 09:05


 


 


 Docusate Sodium


  (Colace)  100 mg  EVERY 8  HOURS


 NG


   11/3/19 22:00


 11/25/19 08:59  11/4/19 06:51


 


 


 Famotidine


  (Pepcid)  20 mg  EVERY 12  HOURS


 GT


   11/3/19 21:00


 11/25/19 17:59  11/4/19 09:04


 


 


 Folic Acid


  (Folate)  1 mg  DAILY


 ORAL


   11/4/19 09:00


 11/30/19 08:59  11/4/19 09:04


 


 


 Furosemide


  (Lasix)  40 mg  Q12HR


 NG


   11/3/19 21:00


 12/2/19 20:59  11/4/19 09:04


 


 


 Insulin Aspart


  (NovoLOG)    EVERY 6  HOURS


 SUBQ


   11/4/19 00:00


 11/29/19 11:59  11/4/19 12:38


 


 


 Insulin Detemir


  (Levemir)  8 units  BID


 SUBQ


   11/4/19 09:00


 12/1/19 08:59  11/4/19 09:16


 


 


 Lactulose


  (Cephulac)  20 gm  Q8H  PRN


 NG


 Constipation  11/3/19 20:30


 12/3/19 20:29   


 


 


 Lorazepam


  (Ativan 2mg/ml


 1ml)  1 mg  Q4H  PRN


 IV


 For Anxiety  11/3/19 20:30


 11/10/19 20:29  11/4/19 11:39


 


 


 Meropenem 1 gm/


 Sodium Chloride  55 ml @ 


 110 mls/hr  Q8H


 IVPB


   11/4/19 17:00


 11/9/19 16:59   


 


 


 Metoclopramide HCl


  (Reglan)  10 mg  Q8HR


 IVP


   11/4/19 15:21


 12/4/19 15:20   


 


 


 Midazolam HCl  100 ml @ 0


 mls/hr  Q24H  PRN


 IV


 To Patient Comfort  11/3/19 20:35


 11/10/19 20:34   


 


 


 Pantoprazole


  (Protonix)  40 mg  EVERY 12  HOURS


 IVP


   11/3/19 21:00


 12/3/19 08:59  11/4/19 09:04


 


 


 Vancomycin HCl


  (Vanco rx to


 dose)  1 ea  DAILY  PRN


 MISC


 Per rx protocol  11/4/19 15:30


 12/4/19 15:29   


 


 


 Vancomycin HCl


 750 mg/Dextrose  275 ml @ 


 183.333


 mls/hr  Q12HR@0600,1800


 IVPB


   11/4/19 18:00


 11/9/19 17:59   


 











Last 24 Hour Vital Signs








  Date Time  Temp Pulse Resp B/P (MAP) Pulse Ox O2 Delivery O2 Flow Rate FiO2


 


11/4/19 14:31  70 14     60


 


11/4/19 13:10  74 14     60


 


11/4/19 13:00  77 14 146/56 (86) 100   


 


11/4/19 12:22        60


 


11/4/19 12:00  73      


 


11/4/19 12:00      Endotracheal Tube  


 


11/4/19 12:00        60


 


11/4/19 12:00 97.1 73 13 138/51 (80) 98   


 


11/4/19 11:00  87 15 142/60 (87)    


 


11/4/19 10:39  77 14     50


 


11/4/19 10:00  87 19 156/62 (93)    


 


11/4/19 09:20  80 18     50


 


11/4/19 09:04  75  140/56    


 


11/4/19 09:00  75 14 140/56 (84) 100   


 


11/4/19 08:00      Endotracheal Tube  


 


11/4/19 08:00        50


 


11/4/19 08:00  80 15 149/58 (88) 100   


 


11/4/19 08:00  88      


 


11/4/19 07:13  91 19     50


 


11/4/19 07:00 98.7 92 18 159/66 (97) 100   


 


11/4/19 06:00  71 14 136/48 (77) 97   


 


11/4/19 05:00  88 17 143/86 (105) 100   


 


11/4/19 05:00  87 20     50


 


11/4/19 04:43  91 21 121/88 (99) 100   


 


11/4/19 04:00 98.2 88 16 148/123 (131) 100   


 


11/4/19 04:00        60


 


11/4/19 04:00  89      


 


11/4/19 04:00      Endotracheal Tube  


 


11/4/19 03:00  76 14 139/58 (85) 99   


 


11/4/19 02:45  76 14     50


 


11/4/19 02:00  79 14 144/63 (90) 100   


 


11/4/19 01:08  89 14     50


 


11/4/19 01:00  90 16 154/64 (94) 100   


 


11/4/19 00:00      Endotracheal Tube  


 


11/4/19 00:00        60


 


11/4/19 00:00 98.5 90 14 157/74 (101) 100   


 


11/3/19 23:30  90 16 157/71 (99) 100   


 


11/3/19 23:14  91      


 


11/3/19 23:00  92 18 154/69 (97) 100   


 


11/3/19 22:54  87 14     60


 


11/3/19 22:30  91 16 152/64 (93) 100   


 


11/3/19 22:00  92 14 152/68 (96) 100   


 


11/3/19 21:30  93 14 142/63 (89) 100   


 


11/3/19 21:15  94 10 147/65 (92) 100   


 


11/3/19 21:13  94 14     80


 


11/3/19 21:00  95 13 146/66 (92) 100   


 


11/3/19 20:46  98 19 144/77 (99) 93   


 


11/3/19 20:28  102 15 141/106 (118) 100   


 


11/3/19 20:22        80


 


11/3/19 20:22        80


 


11/3/19 20:18  110      


 


11/3/19 20:06        100


 


11/3/19 20:06 98.5       


 


11/3/19 20:06  104 16     100


 


11/3/19 19:29  94 30  95 Full Face  60


 


11/3/19 19:29     95 Bi-Pap  60


 


11/3/19 17:23  103  158/85    


 


11/3/19 17:10  99 34  99 Full Face  60


 


11/3/19 16:00        60


 


11/3/19 16:00  94      


 


11/3/19 16:00 97.7 103 28 158/85 (109) 92   


 


11/3/19 15:35        80


 


11/3/19 15:21  102 36  95 Full Face  60


 


11/3/19 12:59  104 36  100 Full Face  60


 


11/3/19 12:00 97.6 110 28 154/87 (109) 92   


 


11/3/19 12:00  97      


 


11/3/19 12:00        60


 


11/3/19 11:06  94 31  100 Full Face  60


 


11/3/19 09:24  90  159/89    


 


11/3/19 09:00      Bi-pap  


 


11/3/19 08:56  90 25  100 Full Face  60


 


11/3/19 08:00  87      


 


11/3/19 08:00        60


 


11/3/19 08:00 97.5 89 22 159/89 (112) 94   


 


11/3/19 06:50     94 Bi-Pap  60


 


11/3/19 06:50  100 31  94 Facial  60


 


11/3/19 05:28  84 24  98 Facial  40


 


11/3/19 04:00 98.2 101 19 153/99 (117) 95   


 


11/3/19 04:00  99      


 


11/3/19 04:00        40


 


11/3/19 03:25  114 30  97 Facial  60


 


11/3/19 01:25  98 30  94 Facial  60


 


11/3/19 00:00  108      


 


11/3/19 00:00 98.4 106 18 141/81 (101) 93   


 


11/3/19 00:00        40


 


11/2/19 23:10  111 39  95 Facial  60


 


11/2/19 21:06  122 38  94 Facial  60


 


11/2/19 21:00      Nasal Cannula 2.0 


 


11/2/19 20:00  103      


 


11/2/19 20:00 98.7 103 19 147/66 (93) 99   


 


11/2/19 19:29     97 Bi-Pap  40


 


11/2/19 19:29  98 22  97 Facial  40


 


11/2/19 17:38  117 36  97 Facial  40


 


11/2/19 17:29  94  133/84    


 


11/2/19 16:08  98      

















Intake and Output  


 


 11/3/19 11/4/19





 19:00 07:00


 


  


 


# Voids 3 53


 


# Bowel Movements  1











Labs








Test


  11/2/19


11:10 11/3/19


05:50 11/3/19


20:09 11/3/19


20:30


 


Arterial Blood pH


  7.434


(7.350-7.450) 


  7.340


(7.350-7.450) 


 


 


Arterial Blood Partial


Pressure CO2 44.8 mmHg


(35.0-45.0) 


  58.0 mmHg


(35.0-45.0) 


 


 


Arterial Blood Partial


Pressure O2 58.8 mmHg


(75.0-100.0) 


  184.8 mmHg


(75.0-100.0) 


 


 


Arterial Blood HCO3


  29.3 mmol/L


(22.0-26.0) 


  30.6 mmol/L


(22.0-26.0) 


 


 


Arterial Blood Oxygen


Saturation 88.9 %


() 


  98.7 %


() 


 


 


Arterial Blood Base Excess 4.6 (-2-2)   3.9 (-2-2)  


 


Graham Test Positive   Positive  


 


White Blood Count


  


  17.3 K/UL


(4.8-10.8) 


  15.3 K/UL


(4.8-10.8)


 


Red Blood Count


  


  3.32 M/UL


(4.20-5.40) 


  3.40 M/UL


(4.20-5.40)


 


Hemoglobin


  


  9.1 G/DL


(12.0-16.0) 


  9.3 G/DL


(12.0-16.0)


 


Hematocrit


  


  28.2 %


(37.0-47.0) 


  28.3 %


(37.0-47.0)


 


Mean Corpuscular Volume  85 FL (80-99)   83 FL (80-99) 


 


Mean Corpuscular Hemoglobin


  


  27.6 PG


(27.0-31.0) 


  27.5 PG


(27.0-31.0)


 


Mean Corpuscular Hemoglobin


Concent 


  32.4 G/DL


(32.0-36.0) 


  33.0 G/DL


(32.0-36.0)


 


Red Cell Distribution Width


  


  14.6 %


(11.6-14.8) 


  12.7 %


(11.6-14.8)


 


Platelet Count


  


  434 K/UL


(150-450) 


  444 K/UL


(150-450)


 


Mean Platelet Volume


  


  7.0 FL


(6.5-10.1) 


  6.9 FL


(6.5-10.1)


 


Neutrophils (%) (Auto)   % (45.0-75.0)    % (45.0-75.0) 


 


Lymphocytes (%) (Auto)   % (20.0-45.0)    % (20.0-45.0) 


 


Monocytes (%) (Auto)   % (1.0-10.0)    % (1.0-10.0) 


 


Eosinophils (%) (Auto)   % (0.0-3.0)    % (0.0-3.0) 


 


Basophils (%) (Auto)   % (0.0-2.0)    % (0.0-2.0) 


 


Differential Total Cells


Counted 


  100 


  


  100 


 


 


Neutrophils % (Manual)  90 % (45-75)   88 % (45-75) 


 


Lymphocytes % (Manual)  6 % (20-45)   10 % (20-45) 


 


Monocytes % (Manual)  4 % (1-10)   2 % (1-10) 


 


Eosinophils % (Manual)  0 % (0-3)   0 % (0-3) 


 


Basophils % (Manual)  0 % (0-2)   0 % (0-2) 


 


Band Neutrophils  0 % (0-8)   0 % (0-8) 


 


Platelet Estimate  Adequate   Increased 


 


Platelet Morphology  Normal   Normal 


 


Anisocytosis  1+   


 


Sodium Level


  


  147 MMOL/L


(136-145) 


  146 MMOL/L


(136-145)


 


Potassium Level


  


  3.4 MMOL/L


(3.5-5.1) 


  3.5 MMOL/L


(3.5-5.1)


 


Chloride Level


  


  107 MMOL/L


() 


  106 MMOL/L


()


 


Carbon Dioxide Level


  


  30 MMOL/L


(21-32) 


  30 MMOL/L


(21-32)


 


Anion Gap


  


  10 mmol/L


(5-15) 


  10 mmol/L


(5-15)


 


Blood Urea Nitrogen


  


  24 mg/dL


(7-18) 


  24 mg/dL


(7-18)


 


Creatinine


  


  1.2 MG/DL


(0.55-1.30) 


  1.2 MG/DL


(0.55-1.30)


 


Estimat Glomerular Filtration


Rate 


  45.0 mL/min


(>60) 


  45.0 mL/min


(>60)


 


Glucose Level


  


  305 MG/DL


() 


  291 MG/DL


()


 


Calcium Level


  


  9.3 MG/DL


(8.5-10.1) 


  9.5 MG/DL


(8.5-10.1)


 


Magnesium Level


  


  2.1 MG/DL


(1.8-2.4) 


  2.1 MG/DL


(1.8-2.4)


 


Red Blood Cell Morphology    Normal 


 


Total Bilirubin


  


  


  


  0.7 MG/DL


(0.2-1.0)


 


Aspartate Amino Transf


(AST/SGOT) 


  


  


  15 U/L (15-37) 


 


 


Alanine Aminotransferase


(ALT/SGPT) 


  


  


  14 U/L (12-78) 


 


 


Alkaline Phosphatase


  


  


  


  97 U/L


()


 


Troponin I


  


  


  


  0.179 ng/mL


(0.000-0.056)


 


Pro-B-Type Natriuretic Peptide


  


  


  


  > 16378 pg/mL


(0-125)


 


Total Protein


  


  


  


  7.0 G/DL


(6.4-8.2)


 


Albumin


  


  


  


  2.1 G/DL


(3.4-5.0)


 


Globulin    4.9 g/dL 


 


Albumin/Globulin Ratio    0.4 (1.0-2.7) 


 


Test


  11/3/19


21:30 11/4/19


07:40 11/4/19


09:10 11/4/19


15:45


 


Arterial Blood pH


  7.419


(7.350-7.450) 7.506


(7.350-7.450) 


  7.526


(7.350-7.450)


 


Arterial Blood Partial


Pressure CO2 50.2 mmHg


(35.0-45.0) 39.2 mmHg


(35.0-45.0) 


  39.7 mmHg


(35.0-45.0)


 


Arterial Blood Partial


Pressure O2 112.5 mmHg


(75.0-100.0) 80.7 mmHg


(75.0-100.0) 


  119.9 mmHg


(75.0-100.0)


 


Arterial Blood HCO3


  31.8 mmol/L


(22.0-26.0) 30.3 mmol/L


(22.0-26.0) 


  32.1 mmol/L


(22.0-26.0)


 


Arterial Blood Oxygen


Saturation 97.6 %


() 95.8 %


() 


  98.0 %


()


 


Arterial Blood Base Excess 6.4 (-2-2)  6.7 (-2-2)   8.7 (-2-2) 


 


Graham Test Positive  Positive   Positive 


 


White Blood Count


  


  


  12.5 K/UL


(4.8-10.8) 


 


 


Red Blood Count


  


  


  2.87 M/UL


(4.20-5.40) 


 


 


Hemoglobin


  


  


  7.8 G/DL


(12.0-16.0) 


 


 


Hematocrit


  


  


  24.0 %


(37.0-47.0) 


 


 


Mean Corpuscular Volume   84 FL (80-99)  


 


Mean Corpuscular Hemoglobin


  


  


  27.3 PG


(27.0-31.0) 


 


 


Mean Corpuscular Hemoglobin


Concent 


  


  32.6 G/DL


(32.0-36.0) 


 


 


Red Cell Distribution Width


  


  


  14.2 %


(11.6-14.8) 


 


 


Platelet Count


  


  


  419 K/UL


(150-450) 


 


 


Mean Platelet Volume


  


  


  7.0 FL


(6.5-10.1) 


 


 


Neutrophils (%) (Auto)    % (45.0-75.0)  


 


Lymphocytes (%) (Auto)    % (20.0-45.0)  


 


Monocytes (%) (Auto)    % (1.0-10.0)  


 


Eosinophils (%) (Auto)    % (0.0-3.0)  


 


Basophils (%) (Auto)    % (0.0-2.0)  


 


Differential Total Cells


Counted 


  


  100 


  


 


 


Neutrophils % (Manual)   70 % (45-75)  


 


Lymphocytes % (Manual)   18 % (20-45)  


 


Monocytes % (Manual)   10 % (1-10)  


 


Eosinophils % (Manual)   2 % (0-3)  


 


Basophils % (Manual)   0 % (0-2)  


 


Band Neutrophils   0 % (0-8)  


 


Platelet Estimate   Adequate  


 


Platelet Morphology   Normal  


 


Sodium Level


  


  


  148 MMOL/L


(136-145) 


 


 


Potassium Level


  


  


  3.6 MMOL/L


(3.5-5.1) 


 


 


Chloride Level


  


  


  110 MMOL/L


() 


 


 


Carbon Dioxide Level


  


  


  30 MMOL/L


(21-32) 


 


 


Anion Gap


  


  


  8 mmol/L


(5-15) 


 


 


Blood Urea Nitrogen


  


  


  23 mg/dL


(7-18) 


 


 


Creatinine


  


  


  1.0 MG/DL


(0.55-1.30) 


 


 


Estimat Glomerular Filtration


Rate 


  


  55.5 mL/min


(>60) 


 


 


Glucose Level


  


  


  221 MG/DL


() 


 


 


Calcium Level


  


  


  9.0 MG/DL


(8.5-10.1) 


 


 


Total Bilirubin


  


  


  0.7 MG/DL


(0.2-1.0) 


 


 


Aspartate Amino Transf


(AST/SGOT) 


  


  24 U/L (15-37) 


  


 


 


Alanine Aminotransferase


(ALT/SGPT) 


  


  13 U/L (12-78) 


  


 


 


Alkaline Phosphatase


  


  


  77 U/L


() 


 


 


Lactate Dehydrogenase


  


  


  506 U/L


() 


 


 


Total Protein


  


  


  6.1 G/DL


(6.4-8.2) 


 


 


Albumin


  


  


  1.8 G/DL


(3.4-5.0) 


 


 


Globulin   4.3 g/dL  


 


Albumin/Globulin Ratio   0.4 (1.0-2.7)  








Height (Feet):  5


Height (Inches):  5.00


Weight (Pounds):  166


Objective





Physical Exam:


Vitals: reviewed


General Appearance:  NAD


HEENT:  normocephalic, atraumatic


Neck:  non-tender, normal alignment


Respiratory/Chest:  normal breath sounds bilaterally ++ vent


Cardiovascular/Chest: rrr


Abdomen:  normal bowel sounds, soft, nontender ++ peg


Extremities:  normal range of motion











Mike Pop MD Nov 4, 2019 16:03

## 2019-11-04 NOTE — NUR
RESPIRATORY NOTES:

Received Patient on Vent settings ACVC RR 14, , Fio2 50% PEEP +5. Patient has a 7.5 
ETT at 23 cm at the lip, secured with anchorfast. Suctioned a small amount of clear, white, 
thin secretions. Patient lying in bed sleeping. Vent plugged into red outlet. Alarms are on 
and audible. Will continue to monitor patient throughout the day.

## 2019-11-04 NOTE — NUR
CASE MANAGEMENT: REVIEW



***11/3/19= S/P PT CODED LAST NIGHT 

RESP. FAILURE, RESP. ARRESTED 

HR 36BPM; RR=4

 11/4/19



SI:  SEPSIS.

98.7   80   15   149/58  100% ET TUBE ; FI02 50

WBC 12.5; RBC 2.87; H/H 7.8/24.0; BUN 23; 

ABG pH 7.506; HCO3 30.3

***11/3/9= TROP 0.179; BNP >59565



IS:      IV DEXTROSE Q24HR

LASIX NG NORVASC NG BID

LOVENOX SUBQ QD

PLAVIX GT QD 

NORVASC GT BID 

LIPITOR GT  QHS

NOVOLOG SUBQ Q6HR

LEVEMIR SQBID

GT FEEDING 

FOLATE PO QD





***MED/SURG UNIT STATUS***



DCP: PATIENT IS FROM  [facility]

## 2019-11-04 NOTE — NUR
NURSE NOTES:

pt blood glucose 194mg/dl gave Levemir 1/2 dose 4units, NovoLog insulin 3 units. pt NPO

## 2019-11-04 NOTE — GENERAL PROGRESS NOTE
Assessment/Plan


Problem List:  


(1) Abnormal TSH


ICD Codes:  R79.89 - Other specified abnormal findings of blood chemistry


SNOMED:  267944945


(2) Hyperglycemia due to type 2 diabetes mellitus


ICD Codes:  E11.65 - Type 2 diabetes mellitus with hyperglycemia


SNOMED:  422910747565398, [id number]


Qualifiers:  


   Qualified Codes:  E11.65 - Type 2 diabetes mellitus with hyperglycemia; 

Z79.4 - Long term (current) use of insulin


(3) Acute metabolic encephalopathy


ICD Codes:  G93.41 - Metabolic encephalopathy


SNOMED:  45903412, [id number]


(4) ARF (acute renal failure)


ICD Codes:  N17.9 - Acute kidney failure, unspecified


SNOMED:  13014116


Qualifiers:  


   Qualified Codes:  N17.9 - Acute kidney failure, unspecified


(5) Healthcare associated bacterial pneumonia


ICD Codes:  J15.9 - Unspecified bacterial pneumonia


SNOMED:  307671473


Status:  not improved


Assessment/Plan:


increase Levemir to 8 units bid 


continue NISS every 6 hours 


hypoglycemia protocol in order





Subjective


ROS Limited/Unobtainable:  Yes


Allergies:  


Coded Allergies:  


     No Known Allergies (Unverified , 10/14/19)


Subjective


events noted 


glucose values on higher side 











Item Value  Date Time


 


Bedside Blood Glucose 271 mg/dl H 11/4/19 0025


 


Bedside Blood Glucose 268 mg/dl H 11/3/19 1800


 


Bedside Blood Glucose 299 mg/dl H 11/3/19 1340


 


Bedside Blood Glucose 296 mg/dl H 11/3/19 0947


 


Bedside Blood Glucose 300 mg/dl H 11/3/19 0624


 


Bedside Blood Glucose 282 mg/dl H 11/3/19 0123











Objective





Last 24 Hour Vital Signs








  Date Time  Temp Pulse Resp B/P (MAP) Pulse Ox O2 Delivery O2 Flow Rate FiO2


 


11/4/19 05:00  88 17 143/86 (105) 100   


 


11/4/19 05:00  87 20     50


 


11/4/19 04:43  91 21 121/88 (99) 100   


 


11/4/19 04:00 98.2 88 16 148/123 (131) 100   


 


11/4/19 04:00        60


 


11/4/19 04:00  89      


 


11/4/19 04:00      Bi-pap  


 


11/4/19 03:00  76 14 139/58 (85) 99   


 


11/4/19 02:45  76 14     50


 


11/4/19 02:00  79 14 144/63 (90) 100   


 


11/4/19 01:08  89 14     50


 


11/4/19 01:00  90 16 154/64 (94) 100   


 


11/4/19 00:00      Bi-pap  


 


11/4/19 00:00        60


 


11/4/19 00:00 98.5 90 14 157/74 (101) 100   


 


11/3/19 23:30  90 16 157/71 (99) 100   


 


11/3/19 23:14  91      


 


11/3/19 23:00  92 18 154/69 (97) 100   


 


11/3/19 22:54  87 14     60


 


11/3/19 22:30  91 16 152/64 (93) 100   


 


11/3/19 22:00  92 14 152/68 (96) 100   


 


11/3/19 21:30  93 14 142/63 (89) 100   


 


11/3/19 21:15  94 10 147/65 (92) 100   


 


11/3/19 21:13  94 14     80


 


11/3/19 21:00  95 13 146/66 (92) 100   


 


11/3/19 20:46  98 19 144/77 (99) 93   


 


11/3/19 20:28  102 15 141/106 (118) 100   


 


11/3/19 20:22        80


 


11/3/19 20:22        80


 


11/3/19 20:18  110      


 


11/3/19 20:06        100


 


11/3/19 20:06 98.5       


 


11/3/19 20:06  104 16     100


 


11/3/19 19:29  94 30  95 Full Face  60


 


11/3/19 19:29     95 Bi-Pap  60


 


11/3/19 17:23  103  158/85    


 


11/3/19 17:10  99 34  99 Full Face  60


 


11/3/19 16:00        60


 


11/3/19 16:00  94      


 


11/3/19 16:00 97.7 103 28 158/85 (109) 92   


 


11/3/19 15:35        80


 


11/3/19 15:21  102 36  95 Full Face  60


 


11/3/19 12:59  104 36  100 Full Face  60


 


11/3/19 12:00 97.6 110 28 154/87 (109) 92   


 


11/3/19 12:00  97      


 


11/3/19 12:00        60


 


11/3/19 11:06  94 31  100 Full Face  60


 


11/3/19 09:24  90  159/89    


 


11/3/19 09:00      Bi-pap  


 


11/3/19 08:56  90 25  100 Full Face  60


 


11/3/19 08:00  87      


 


11/3/19 08:00        60


 


11/3/19 08:00 97.5 89 22 159/89 (112) 94   


 


11/3/19 06:50     94 Bi-Pap  60


 


11/3/19 06:50  100 31  94 Facial  60

















Intake and Output  


 


 11/3/19 11/4/19





 19:00 07:00


 


  


 


# Voids 3 3


 


# Bowel Movements  1








Laboratory Tests


11/3/19 20:09: 


Arterial Blood pH 7.340L, Arterial Blood Partial Pressure CO2 58.0*H, Arterial 

Blood Partial Pressure O2 184.8H, Arterial Blood HCO3 30.6H, Arterial Blood 

Oxygen Saturation 98.7, Arterial Blood Base Excess 3.9H, Graham Test Positive


11/3/19 20:30: 


White Blood Count 15.3H, Red Blood Count 3.40L, Hemoglobin 9.3L, Hematocrit 

28.3L, Mean Corpuscular Volume 83, Mean Corpuscular Hemoglobin 27.5, Mean 

Corpuscular Hemoglobin Concent 33.0, Red Cell Distribution Width 12.7, Platelet 

Count 444, Mean Platelet Volume 6.9, Neutrophils (%) (Auto) , Lymphocytes (%) (

Auto) , Monocytes (%) (Auto) , Eosinophils (%) (Auto) , Basophils (%) (Auto) , 

Differential Total Cells Counted 100, Neutrophils % (Manual) 88H, Lymphocytes % 

(Manual) 10L, Monocytes % (Manual) 2, Eosinophils % (Manual) 0, Basophils % (

Manual) 0, Band Neutrophils 0, Platelet Estimate IncreasedH, Platelet 

Morphology Normal, Red Blood Cell Morphology Normal, Sodium Level 146H, 

Potassium Level 3.5, Chloride Level 106, Carbon Dioxide Level 30, Anion Gap 10, 

Blood Urea Nitrogen 24H, Creatinine 1.2, Estimat Glomerular Filtration Rate 45.0

, Glucose Level 291H, Calcium Level 9.5, Magnesium Level 2.1, Total Bilirubin 

0.7, Aspartate Amino Transf (AST/SGOT) 15, Alanine Aminotransferase (ALT/SGPT) 

14, Alkaline Phosphatase 97, Troponin I 0.179H, Pro-B-Type Natriuretic Peptide 

> 63376O, Total Protein 7.0, Albumin 2.1L, Globulin 4.9, Albumin/Globulin Ratio 

0.4L


11/3/19 21:30: 


Arterial Blood pH 7.419, Arterial Blood Partial Pressure CO2 50.2H, Arterial 

Blood Partial Pressure O2 112.5H, Arterial Blood HCO3 31.8H, Arterial Blood 

Oxygen Saturation 97.6, Arterial Blood Base Excess 6.4H, Graham Test Positive


Height (Feet):  5


Height (Inches):  5.00


Weight (Pounds):  169


General Appearance:  lethargic


Cardiovascular:  normal rate


Respiratory/Chest:  decreased breath sounds


Abdomen:  normal bowel sounds


Objective





Current Medications








 Medications


  (Trade)  Dose


 Ordered  Sig/Winnie


 Route


 PRN Reason  Start Time


 Stop Time Status Last Admin


Dose Admin


 


 Acetaminophen


  (Tylenol)  650 mg  Q4H  PRN


 NG


 Mild Pain/Temp > 100.5  11/3/19 20:30


 11/25/19 20:29   


 


 


 Acetaminophen


  (Tylenol)  650 mg  Q4H  PRN


 RECTAL


 TEMP>100.5  11/3/19 20:30


 12/2/19 20:29   


 


 


 Amlodipine


 Besylate


  (Norvasc)  2.5 mg  BID


 NG


   11/4/19 09:00


 11/29/19 17:59   


 


 


 Atorvastatin


 Calcium


  (Lipitor)  10 mg  BEDTIME


 NG


   11/3/19 21:00


 11/13/19 20:59  11/3/19 22:39


 


 


 Dextrose


  (Dextrose 50%)  25 ml  Q30M  PRN


 IV


 Hypoglycemia  11/3/19 20:30


 12/1/19 06:29   


 


 


 Dextrose


  (Dextrose 50%)  50 ml  Q30M  PRN


 IV


 Hypoglycemia  11/3/19 20:30


 12/1/19 06:29   


 


 


 Divalproex Sodium


  (Depakote


 Sprinkles)  500 mg  Q12HR


 NG


   11/3/19 21:00


 11/14/19 21:59  11/3/19 22:39


 


 


 Docusate Sodium


  (Colace)  100 mg  EVERY 8  HOURS


 NG


   11/3/19 22:00


 11/25/19 08:59  11/3/19 22:38


 


 


 Famotidine


  (Pepcid)  20 mg  EVERY 12  HOURS


 GT


   11/3/19 21:00


 11/25/19 17:59  11/3/19 22:40


 


 


 Folic Acid


  (Folate)  1 mg  DAILY


 ORAL


   11/4/19 09:00


 11/30/19 08:59   


 


 


 Furosemide


  (Lasix)  40 mg  Q12HR


 NG


   11/3/19 21:00


 12/2/19 20:59  11/3/19 22:40


 


 


 Insulin Aspart


  (NovoLOG)    EVERY 6  HOURS


 SUBQ


   11/4/19 00:00


 11/29/19 11:59  11/4/19 00:25


 


 


 Insulin Detemir


  (Levemir)  6 units  DAILY


 SUBQ


   11/4/19 09:00


 12/1/19 08:59   


 


 


 Lactulose


  (Cephulac)  20 gm  Q8H  PRN


 NG


 Constipation  11/3/19 20:30


 12/3/19 20:29   


 


 


 Lorazepam


  (Ativan 2mg/ml


 1ml)  1 mg  Q4H  PRN


 IV


 For Anxiety  11/3/19 20:30


 11/10/19 20:29  11/4/19 00:37


 


 


 Midazolam HCl  100 ml @ 0


 mls/hr  Q24H  PRN


 IV


 To Patient Comfort  11/3/19 20:35


 11/10/19 20:34   


 


 


 Pantoprazole


  (Protonix)  40 mg  EVERY 12  HOURS


 IVP


   11/3/19 21:00


 12/3/19 08:59  11/3/19 22:39


 

















Riccardo Velez MD Nov 4, 2019 06:44

## 2019-11-04 NOTE — NEPHROLOGY PROGRESS NOTE
Assessment/Plan


Problem List:  


(1) Renal failure (ARF), acute on chronic


Assessment:  resolved





(2) Dehydration


(3) ARF (acute renal failure)


(4) Sepsis


(5) Acute metabolic encephalopathy


(6) Hyperglycemia due to type 2 diabetes mellitus


(7) UTI (urinary tract infection)


(8) Diabetic nephropathy


Assessment





Renal failure, Acute on Chronic resolved


Dehydration


Severe Anemia


Sepsis / Pneumonia / UTI


DM, OOC


Proteinuria , Diabetic Nephropathy


Acute encephalopathy


Plan





Pulmonary and Vent support


IV Reglan


GT feeding


Stop IV fluid-


Lasix


has PEG


Per order








previously 


Cr lowering 


Will order urine studies and monitor renal parameters


kidney YOANDY noted


lower iv fluids rate


WBCs rising


has casas again due to retention


Avoid Nephrotoxics








previously


Anemia salas


2D echo


24 H urine protein


Keep BP and BS in check


I am holding  her psych meds due to low MS


Per orders





Subjective


ROS Limited/Unobtainable:  Yes





Objective


Objective





Last 24 Hour Vital Signs








  Date Time  Temp Pulse Resp B/P (MAP) Pulse Ox O2 Delivery O2 Flow Rate FiO2


 


11/4/19 14:31  70 14     60


 


11/4/19 13:10  74 14     60


 


11/4/19 13:00  77 14 146/56 (86) 100   


 


11/4/19 12:22        60


 


11/4/19 12:00  73      


 


11/4/19 12:00      Endotracheal Tube  


 


11/4/19 12:00        60


 


11/4/19 12:00 97.1 73 13 138/51 (80) 98   


 


11/4/19 11:00  87 15 142/60 (87)    


 


11/4/19 10:39  77 14     50


 


11/4/19 10:00  87 19 156/62 (93)    


 


11/4/19 09:20  80 18     50


 


11/4/19 09:04  75  140/56    


 


11/4/19 09:00  75 14 140/56 (84) 100   


 


11/4/19 08:00      Endotracheal Tube  


 


11/4/19 08:00        50


 


11/4/19 08:00  80 15 149/58 (88) 100   


 


11/4/19 08:00  88      


 


11/4/19 07:13  91 19     50


 


11/4/19 07:00 98.7 92 18 159/66 (97) 100   


 


11/4/19 06:00  71 14 136/48 (77) 97   


 


11/4/19 05:00  88 17 143/86 (105) 100   


 


11/4/19 05:00  87 20     50


 


11/4/19 04:43  91 21 121/88 (99) 100   


 


11/4/19 04:00 98.2 88 16 148/123 (131) 100   


 


11/4/19 04:00        60


 


11/4/19 04:00  89      


 


11/4/19 04:00      Endotracheal Tube  


 


11/4/19 03:00  76 14 139/58 (85) 99   


 


11/4/19 02:45  76 14     50


 


11/4/19 02:00  79 14 144/63 (90) 100   


 


11/4/19 01:08  89 14     50


 


11/4/19 01:00  90 16 154/64 (94) 100   


 


11/4/19 00:00      Endotracheal Tube  


 


11/4/19 00:00        60


 


11/4/19 00:00 98.5 90 14 157/74 (101) 100   


 


11/3/19 23:30  90 16 157/71 (99) 100   


 


11/3/19 23:14  91      


 


11/3/19 23:00  92 18 154/69 (97) 100   


 


11/3/19 22:54  87 14     60


 


11/3/19 22:30  91 16 152/64 (93) 100   


 


11/3/19 22:00  92 14 152/68 (96) 100   


 


11/3/19 21:30  93 14 142/63 (89) 100   


 


11/3/19 21:15  94 10 147/65 (92) 100   


 


11/3/19 21:13  94 14     80


 


11/3/19 21:00  95 13 146/66 (92) 100   


 


11/3/19 20:46  98 19 144/77 (99) 93   


 


11/3/19 20:28  102 15 141/106 (118) 100   


 


11/3/19 20:22        80


 


11/3/19 20:22        80


 


11/3/19 20:18  110      


 


11/3/19 20:06        100


 


11/3/19 20:06 98.5       


 


11/3/19 20:06  104 16     100


 


11/3/19 19:29  94 30  95 Full Face  60


 


11/3/19 19:29     95 Bi-Pap  60


 


11/3/19 17:23  103  158/85    


 


11/3/19 17:10  99 34  99 Full Face  60


 


11/3/19 16:00        60


 


11/3/19 16:00  94      


 


11/3/19 16:00 97.7 103 28 158/85 (109) 92   


 


11/3/19 15:35        80


 


11/3/19 15:21  102 36  95 Full Face  60

















Intake and Output  


 


 11/3/19 11/4/19





 19:00 07:00


 


  


 


# Voids 3 53


 


# Bowel Movements  1








Laboratory Tests


11/3/19 20:09: 


Arterial Blood pH 7.340L, Arterial Blood Partial Pressure CO2 58.0*H, Arterial 

Blood Partial Pressure O2 184.8H, Arterial Blood HCO3 30.6H, Arterial Blood 

Oxygen Saturation 98.7, Arterial Blood Base Excess 3.9H, Graham Test Positive


11/3/19 20:30: 


White Blood Count 15.3H, Red Blood Count 3.40L, Hemoglobin 9.3L, Hematocrit 

28.3L, Mean Corpuscular Volume 83, Mean Corpuscular Hemoglobin 27.5, Mean 

Corpuscular Hemoglobin Concent 33.0, Red Cell Distribution Width 12.7, Platelet 

Count 444, Mean Platelet Volume 6.9, Neutrophils (%) (Auto) , Lymphocytes (%) (

Auto) , Monocytes (%) (Auto) , Eosinophils (%) (Auto) , Basophils (%) (Auto) , 

Differential Total Cells Counted 100, Neutrophils % (Manual) 88H, Lymphocytes % 

(Manual) 10L, Monocytes % (Manual) 2, Eosinophils % (Manual) 0, Basophils % (

Manual) 0, Band Neutrophils 0, Platelet Estimate IncreasedH, Platelet 

Morphology Normal, Red Blood Cell Morphology Normal, Sodium Level 146H, 

Potassium Level 3.5, Chloride Level 106, Carbon Dioxide Level 30, Anion Gap 10, 

Blood Urea Nitrogen 24H, Creatinine 1.2, Estimat Glomerular Filtration Rate 45.0

, Glucose Level 291H, Calcium Level 9.5, Magnesium Level 2.1, Total Bilirubin 

0.7, Aspartate Amino Transf (AST/SGOT) 15, Alanine Aminotransferase (ALT/SGPT) 

14, Alkaline Phosphatase 97, Troponin I 0.179H, Pro-B-Type Natriuretic Peptide 

> [id number], Total Protein 7.0, Albumin 2.1L, Globulin 4.9, Albumin/Globulin Ratio 

0.4L


11/3/19 21:30: 


Arterial Blood pH 7.419, Arterial Blood Partial Pressure CO2 50.2H, Arterial 

Blood Partial Pressure O2 112.5H, Arterial Blood HCO3 31.8H, Arterial Blood 

Oxygen Saturation 97.6, Arterial Blood Base Excess 6.4H, Graham Test Positive


11/4/19 07:40: 


Arterial Blood pH 7.506H, Arterial Blood Partial Pressure CO2 39.2, Arterial 

Blood Partial Pressure O2 80.7, Arterial Blood HCO3 30.3H, Arterial Blood 

Oxygen Saturation 95.8, Arterial Blood Base Excess 6.7H, Graham Test Positive


11/4/19 09:10: 


White Blood Count 12.5H, Red Blood Count 2.87L, Hemoglobin 7.8L, Hematocrit 

24.0L, Mean Corpuscular Volume 84, Mean Corpuscular Hemoglobin 27.3, Mean 

Corpuscular Hemoglobin Concent 32.6, Red Cell Distribution Width 14.2, Platelet 

Count 419, Mean Platelet Volume 7.0, Neutrophils (%) (Auto) , Lymphocytes (%) (

Auto) , Monocytes (%) (Auto) , Eosinophils (%) (Auto) , Basophils (%) (Auto) , 

Differential Total Cells Counted 100, Neutrophils % (Manual) 70, Lymphocytes % (

Manual) 18L, Monocytes % (Manual) 10, Eosinophils % (Manual) 2, Basophils % (

Manual) 0, Band Neutrophils 0, Platelet Estimate Adequate, Platelet Morphology 

Normal, Sodium Level 148H, Potassium Level 3.6, Chloride Level 110H, Carbon 

Dioxide Level 30, Anion Gap 8, Blood Urea Nitrogen 23H, Creatinine 1.0, Estimat 

Glomerular Filtration Rate 55.5, Glucose Level 221H, Calcium Level 9.0, Total 

Bilirubin 0.7, Aspartate Amino Transf (AST/SGOT) 24, Alanine Aminotransferase (

ALT/SGPT) 13, Alkaline Phosphatase 77, Lactate Dehydrogenase 506H, Total 

Protein 6.1L, Albumin 1.8L, Globulin 4.3, Albumin/Globulin Ratio 0.4L, Cortisol 

[Pending]


Height (Feet):  5


Height (Inches):  5.00


Weight (Pounds):  166


General Appearance:  no apparent distress


EENT:  other - on Vent


Cardiovascular:  normal rate


Respiratory/Chest:  decreased breath sounds


Abdomen:  soft, distended, other - PEG


Objective


no change











Lukasz Vizcaino MD Nov 4, 2019 15:20

## 2019-11-04 NOTE — NUR
RD ASSESSMENT & RECOMMENDATIONS

SEE CARE ACTIVITY FOR COMPLETE ASSESSMENT



DAILY ESTIMATED NEEDS:

Needs based on Critical Care / 68kg 

22-30  kcals/kg 

1215-0091  total kcals

1.25-2  g protein/kg

  g total protein 

25-30  mL/kg

8293-3792  total fluid mLs



NUTRITION DIAGNOSIS:

* Swallowing difficulty R/T dysphgia, poor dentition, dementia as

evidenced by on liquify pureed, NTL texture diet per SLP rec-> now on NNGT

feeds-> now s/p PEG placement.

* Increased kcal/prot needs R/T wound healing and sepsis as evidenced by

admitted w/ DTPI R heel and non-blanchable sacral erythema, critically

elev wbc (24.0* ->12.5), elev LA (5.8 -> wnl), elev CRP, now afebrile.

* Altered nutrition related lab values R/T CHF, diabetes as evidenced by

elev BNP (>18194), A1C of 10.4, elev BGs and POC glu, 200's.





ENTERAL NUTRITION RECOMMENDATIONS:

Glucerna 1.5 @50ml/hr x24 hrs  to provide 1200ml, 1800 kcal, 99g pro, 911ml free H2O 



- With stability, as medically able, rec Glucerna 1.5. Start @20ml/hr,

   advance slowly to goal.

- HOB over 45 degrees/ flush per MD







ADDITIONAL RECOMMENDATIONS:

* Calibrated bedscale wt w/ added P200 mattress  

* Wound healing: add MVI x1, Vit C 250mg QD 

                        Zaki 1pkt BID with diet order 

* Add folic acid 1mg QD (low folate 7.2) 

* Monitor renal fxn: BUN/ creat wnl  

-> now intubated, monitor for ability to feed. 

.

## 2019-11-04 NOTE — NUR
NURSE NOTES:

BLOOD GLUCOSE 201MG/DL. DAUGHTER IBDARIAN CALLED AND UPDATED REGARDING PATIENT CONDITION.

## 2019-11-04 NOTE — SURGERY PROGRESS NOTE
Surgery Progress Note


Subjective


Additional Comments


leukocytosis improving


anemia


possible BM Bx as per heme/onc


labs noted


exam unchanged





Objective





Last 24 Hour Vital Signs








  Date Time  Temp Pulse Resp B/P (MAP) Pulse Ox O2 Delivery O2 Flow Rate FiO2


 


11/4/19 10:39  77 14     50


 


11/4/19 09:20  80 18     50


 


11/4/19 09:04  75  140/56    


 


11/4/19 09:00  75 14 140/56 (84) 100   


 


11/4/19 08:00      Endotracheal Tube  


 


11/4/19 08:00        50


 


11/4/19 08:00  80 15 149/58 (88) 100   


 


11/4/19 07:13  91 19     50


 


11/4/19 07:00 98.7 92 18 159/66 (97) 100   


 


11/4/19 06:00  71 14 136/48 (77) 97   


 


11/4/19 05:00  88 17 143/86 (105) 100   


 


11/4/19 05:00  87 20     50


 


11/4/19 04:43  91 21 121/88 (99) 100   


 


11/4/19 04:00 98.2 88 16 148/123 (131) 100   


 


11/4/19 04:00        60


 


11/4/19 04:00  89      


 


11/4/19 04:00      Endotracheal Tube  


 


11/4/19 03:00  76 14 139/58 (85) 99   


 


11/4/19 02:45  76 14     50


 


11/4/19 02:00  79 14 144/63 (90) 100   


 


11/4/19 01:08  89 14     50


 


11/4/19 01:00  90 16 154/64 (94) 100   


 


11/4/19 00:00      Endotracheal Tube  


 


11/4/19 00:00        60


 


11/4/19 00:00 98.5 90 14 157/74 (101) 100   


 


11/3/19 23:30  90 16 157/71 (99) 100   


 


11/3/19 23:14  91      


 


11/3/19 23:00  92 18 154/69 (97) 100   


 


11/3/19 22:54  87 14     60


 


11/3/19 22:30  91 16 152/64 (93) 100   


 


11/3/19 22:00  92 14 152/68 (96) 100   


 


11/3/19 21:30  93 14 142/63 (89) 100   


 


11/3/19 21:15  94 10 147/65 (92) 100   


 


11/3/19 21:13  94 14     80


 


11/3/19 21:00  95 13 146/66 (92) 100   


 


11/3/19 20:46  98 19 144/77 (99) 93   


 


11/3/19 20:28  102 15 141/106 (118) 100   


 


11/3/19 20:22        80


 


11/3/19 20:22        80


 


11/3/19 20:18  110      


 


11/3/19 20:06        100


 


11/3/19 20:06 98.5       


 


11/3/19 20:06  104 16     100


 


11/3/19 19:29  94 30  95 Full Face  60


 


11/3/19 19:29     95 Bi-Pap  60


 


11/3/19 17:23  103  158/85    


 


11/3/19 17:10  99 34  99 Full Face  60


 


11/3/19 16:00        60


 


11/3/19 16:00  94      


 


11/3/19 16:00 97.7 103 28 158/85 (109) 92   


 


11/3/19 15:35        80


 


11/3/19 15:21  102 36  95 Full Face  60


 


11/3/19 12:59  104 36  100 Full Face  60


 


11/3/19 12:00 97.6 110 28 154/87 (109) 92   


 


11/3/19 12:00  97      


 


11/3/19 12:00        60








I&O











Intake and Output  


 


 11/3/19 11/4/19





 18:59 06:59


 


  


 


# Voids 3 53


 


# Bowel Movements  1








Dressing:  saturated


Wound:  other


Drains:  other


Cardiovascular:  RSR


Respiratory:  decreased breath sounds


Abdomen:  soft, non-tender, present bowel sounds


Extremities:  no cyanosis





Laboratory Tests








Test


  11/3/19


20:09 11/3/19


20:30 11/3/19


21:30 11/4/19


07:40


 


Arterial Blood pH


  7.340


(7.350-7.450) 


  7.419


(7.350-7.450) 7.506


(7.350-7.450)


 


Arterial Blood Partial


Pressure CO2 58.0 mmHg


(35.0-45.0)  *H 


  50.2 mmHg


(35.0-45.0)  H 39.2 mmHg


(35.0-45.0)


 


Arterial Blood Partial


Pressure O2 184.8 mmHg


(75.0-100.0)  H 


  112.5 mmHg


(75.0-100.0)  H 80.7 mmHg


(75.0-100.0)


 


Arterial Blood HCO3


  30.6 mmol/L


(22.0-26.0)  H 


  31.8 mmol/L


(22.0-26.0)  H 30.3 mmol/L


(22.0-26.0)  H


 


Arterial Blood Oxygen


Saturation 98.7 %


() 


  97.6 %


() 95.8 %


()


 


Arterial Blood Base Excess 3.9 (-2-2)  H  6.4 (-2-2)  H 6.7 (-2-2)  H


 


Graham Test Positive    Positive   Positive  


 


White Blood Count


  


  15.3 K/UL


(4.8-10.8)  H 


  


 


 


Red Blood Count


  


  3.40 M/UL


(4.20-5.40)  L 


  


 


 


Hemoglobin


  


  9.3 G/DL


(12.0-16.0)  L 


  


 


 


Hematocrit


  


  28.3 %


(37.0-47.0)  L 


  


 


 


Mean Corpuscular Volume  83 FL (80-99)    


 


Mean Corpuscular Hemoglobin


  


  27.5 PG


(27.0-31.0) 


  


 


 


Mean Corpuscular Hemoglobin


Concent 


  33.0 G/DL


(32.0-36.0) 


  


 


 


Red Cell Distribution Width


  


  12.7 %


(11.6-14.8) 


  


 


 


Platelet Count


  


  444 K/UL


(150-450) 


  


 


 


Mean Platelet Volume


  


  6.9 FL


(6.5-10.1) 


  


 


 


Neutrophils (%) (Auto)


  


  % (45.0-75.0)


  


  


 


 


Lymphocytes (%) (Auto)


  


  % (20.0-45.0)


  


  


 


 


Monocytes (%) (Auto)   % (1.0-10.0)    


 


Eosinophils (%) (Auto)   % (0.0-3.0)    


 


Basophils (%) (Auto)   % (0.0-2.0)    


 


Differential Total Cells


Counted 


  100  


  


  


 


 


Neutrophils % (Manual)  88 % (45-75)  H  


 


Lymphocytes % (Manual)  10 % (20-45)  L  


 


Monocytes % (Manual)  2 % (1-10)    


 


Eosinophils % (Manual)  0 % (0-3)    


 


Basophils % (Manual)  0 % (0-2)    


 


Band Neutrophils  0 % (0-8)    


 


Platelet Estimate  Increased  H  


 


Platelet Morphology  Normal    


 


Red Blood Cell Morphology  Normal    


 


Sodium Level


  


  146 MMOL/L


(136-145)  H 


  


 


 


Potassium Level


  


  3.5 MMOL/L


(3.5-5.1) 


  


 


 


Chloride Level


  


  106 MMOL/L


() 


  


 


 


Carbon Dioxide Level


  


  30 MMOL/L


(21-32) 


  


 


 


Anion Gap


  


  10 mmol/L


(5-15) 


  


 


 


Blood Urea Nitrogen


  


  24 mg/dL


(7-18)  H 


  


 


 


Creatinine


  


  1.2 MG/DL


(0.55-1.30) 


  


 


 


Estimat Glomerular Filtration


Rate 


  45.0 mL/min


(>60) 


  


 


 


Glucose Level


  


  291 MG/DL


()  H 


  


 


 


Calcium Level


  


  9.5 MG/DL


(8.5-10.1) 


  


 


 


Magnesium Level


  


  2.1 MG/DL


(1.8-2.4) 


  


 


 


Total Bilirubin


  


  0.7 MG/DL


(0.2-1.0) 


  


 


 


Aspartate Amino Transf


(AST/SGOT) 


  15 U/L (15-37)


  


  


 


 


Alanine Aminotransferase


(ALT/SGPT) 


  14 U/L (12-78)


  


  


 


 


Alkaline Phosphatase


  


  97 U/L


() 


  


 


 


Troponin I


  


  0.179 ng/mL


(0.000-0.056) 


  


 


 


Pro-B-Type Natriuretic Peptide


  


  > 62141 pg/mL


(0-125)  H 


  


 


 


Total Protein


  


  7.0 G/DL


(6.4-8.2) 


  


 


 


Albumin


  


  2.1 G/DL


(3.4-5.0)  L 


  


 


 


Globulin  4.9 g/dL    


 


Albumin/Globulin Ratio


  


  0.4 (1.0-2.7)


L 


  


 


 


Test


  11/4/19


09:10 


  


  


 


 


White Blood Count


  12.5 K/UL


(4.8-10.8)  H 


  


  


 


 


Red Blood Count


  2.87 M/UL


(4.20-5.40)  L 


  


  


 


 


Hemoglobin


  7.8 G/DL


(12.0-16.0)  L 


  


  


 


 


Hematocrit


  24.0 %


(37.0-47.0)  L 


  


  


 


 


Mean Corpuscular Volume 84 FL (80-99)     


 


Mean Corpuscular Hemoglobin


  27.3 PG


(27.0-31.0) 


  


  


 


 


Mean Corpuscular Hemoglobin


Concent 32.6 G/DL


(32.0-36.0) 


  


  


 


 


Red Cell Distribution Width


  14.2 %


(11.6-14.8) 


  


  


 


 


Platelet Count


  419 K/UL


(150-450) 


  


  


 


 


Mean Platelet Volume


  7.0 FL


(6.5-10.1) 


  


  


 


 


Neutrophils (%) (Auto)


  % (45.0-75.0)


  


  


  


 


 


Lymphocytes (%) (Auto)


  % (20.0-45.0)


  


  


  


 


 


Monocytes (%) (Auto)  % (1.0-10.0)     


 


Eosinophils (%) (Auto)  % (0.0-3.0)     


 


Basophils (%) (Auto)  % (0.0-2.0)     


 


Differential Total Cells


Counted 100  


  


  


  


 


 


Neutrophils % (Manual) 70 % (45-75)     


 


Lymphocytes % (Manual) 18 % (20-45)  L   


 


Monocytes % (Manual) 10 % (1-10)     


 


Eosinophils % (Manual) 2 % (0-3)     


 


Basophils % (Manual) 0 % (0-2)     


 


Band Neutrophils 0 % (0-8)     


 


Platelet Estimate Adequate     


 


Platelet Morphology Normal     


 


Sodium Level


  148 MMOL/L


(136-145)  H 


  


  


 


 


Potassium Level


  3.6 MMOL/L


(3.5-5.1) 


  


  


 


 


Chloride Level


  110 MMOL/L


()  H 


  


  


 


 


Carbon Dioxide Level


  30 MMOL/L


(21-32) 


  


  


 


 


Anion Gap


  8 mmol/L


(5-15) 


  


  


 


 


Blood Urea Nitrogen


  23 mg/dL


(7-18)  H 


  


  


 


 


Creatinine


  1.0 MG/DL


(0.55-1.30) 


  


  


 


 


Estimat Glomerular Filtration


Rate 55.5 mL/min


(>60) 


  


  


 


 


Glucose Level


  221 MG/DL


()  H 


  


  


 


 


Calcium Level


  9.0 MG/DL


(8.5-10.1) 


  


  


 


 


Total Bilirubin


  0.7 MG/DL


(0.2-1.0) 


  


  


 


 


Aspartate Amino Transf


(AST/SGOT) 24 U/L (15-37)


  


  


  


 


 


Alanine Aminotransferase


(ALT/SGPT) 13 U/L (12-78)


  


  


  


 


 


Alkaline Phosphatase


  77 U/L


() 


  


  


 


 


Lactate Dehydrogenase


  506 U/L


()  H 


  


  


 


 


Total Protein


  6.1 G/DL


(6.4-8.2)  L 


  


  


 


 


Albumin


  1.8 G/DL


(3.4-5.0)  L 


  


  


 


 


Globulin 4.3 g/dL     


 


Albumin/Globulin Ratio


  0.4 (1.0-2.7)


L 


  


  


 


 


Cortisol Pending     











Plan


Problems:  


(1) Pressure injury of deep tissue


Assessment & Plan:  Pt presented on admission with DTPI R heel. Blood filled 

blister with marginal erythema noted to heel. Pt exhibited agitation when 

minimally palpated. (L)2.2cm x (W)2.1cm


L heel is blanchable .


Non-blanchable erythema without induration noted to sacral cleft. 


No other areas of skin concerns noted.





Tx.Plan:


Apply Betadine to R heel. Cover with Optifoam drsg. Change every 3 days and prn.


           


Apply Cavilon Skin Barrier to L heel. Cover with Optifoam drsg. Change every 7 

days and prn.


           


Apply Moisture Barrier Paste to buttocks. Cover sacral area with Optifoam drsg. 

Change every 3 days and prn.


           


Reposition at least every 2hours or as tolerated.


           


Off-load heels with pillow.





(2) Sepsis


Assessment & Plan:  Patient with low-grade fevers, anemia, leukocytosis 

persistent, elevated platelets, abnormal labs.


Etiology currently unknown and work-up in progress.  Labs noted and imaging 

that is available as noted.  


Okay for diet


CT noted


US noted


Impression: 13 x 7 x 12.9 cm unilocular cystic mass, corresponding to lesion 

reported


on recent CT scan. Layering low level internal echoes likely represent bladder


debris.. This presumably represents a cystic ovarian lesion with debris or


hemorrhage, possibly neoplastic. Gynecological consultation is recommended


Antibiotics as per infectious disease 


local wound care as wounds do not seem to be the etiology of her sepsis


start feeds via peg


doing well


improving


cont with tube feeds


supplementation for healing 


We will monitor and follow with recommendations


Thank you for allowing me to participate in patient's care














Radhames Blas Nov 4, 2019 11:13

## 2019-11-04 NOTE — NUR
NURSE NOTES:

Sami NP made rounds, update given on PT, PT to remain NPO except meds, EGD postponed till 
tomorrow.

## 2019-11-04 NOTE — NUR
*-* INSURANCE *-*



UPDATED CLINICALS AND REVIEWS HAVE BEEN FAXED TO:





Tracking#[id number]

CM: Lance

#[phone]

Fax#[phone]

## 2019-11-05 VITALS — SYSTOLIC BLOOD PRESSURE: 124 MMHG | DIASTOLIC BLOOD PRESSURE: 49 MMHG

## 2019-11-05 VITALS — SYSTOLIC BLOOD PRESSURE: 149 MMHG | DIASTOLIC BLOOD PRESSURE: 56 MMHG

## 2019-11-05 VITALS — SYSTOLIC BLOOD PRESSURE: 132 MMHG | DIASTOLIC BLOOD PRESSURE: 51 MMHG

## 2019-11-05 VITALS — DIASTOLIC BLOOD PRESSURE: 67 MMHG | SYSTOLIC BLOOD PRESSURE: 139 MMHG

## 2019-11-05 VITALS — DIASTOLIC BLOOD PRESSURE: 81 MMHG | SYSTOLIC BLOOD PRESSURE: 163 MMHG

## 2019-11-05 VITALS — SYSTOLIC BLOOD PRESSURE: 136 MMHG | DIASTOLIC BLOOD PRESSURE: 56 MMHG

## 2019-11-05 VITALS — SYSTOLIC BLOOD PRESSURE: 137 MMHG | DIASTOLIC BLOOD PRESSURE: 52 MMHG

## 2019-11-05 VITALS — DIASTOLIC BLOOD PRESSURE: 49 MMHG | SYSTOLIC BLOOD PRESSURE: 131 MMHG

## 2019-11-05 VITALS — SYSTOLIC BLOOD PRESSURE: 147 MMHG | DIASTOLIC BLOOD PRESSURE: 74 MMHG

## 2019-11-05 VITALS — SYSTOLIC BLOOD PRESSURE: 152 MMHG | DIASTOLIC BLOOD PRESSURE: 61 MMHG

## 2019-11-05 VITALS — DIASTOLIC BLOOD PRESSURE: 57 MMHG | SYSTOLIC BLOOD PRESSURE: 128 MMHG

## 2019-11-05 VITALS — SYSTOLIC BLOOD PRESSURE: 128 MMHG | DIASTOLIC BLOOD PRESSURE: 54 MMHG

## 2019-11-05 VITALS — DIASTOLIC BLOOD PRESSURE: 56 MMHG | SYSTOLIC BLOOD PRESSURE: 134 MMHG

## 2019-11-05 VITALS — DIASTOLIC BLOOD PRESSURE: 73 MMHG | SYSTOLIC BLOOD PRESSURE: 145 MMHG

## 2019-11-05 VITALS — SYSTOLIC BLOOD PRESSURE: 159 MMHG | DIASTOLIC BLOOD PRESSURE: 74 MMHG

## 2019-11-05 VITALS — SYSTOLIC BLOOD PRESSURE: 141 MMHG | DIASTOLIC BLOOD PRESSURE: 54 MMHG

## 2019-11-05 VITALS — DIASTOLIC BLOOD PRESSURE: 68 MMHG | SYSTOLIC BLOOD PRESSURE: 150 MMHG

## 2019-11-05 VITALS — DIASTOLIC BLOOD PRESSURE: 54 MMHG | SYSTOLIC BLOOD PRESSURE: 144 MMHG

## 2019-11-05 VITALS — DIASTOLIC BLOOD PRESSURE: 54 MMHG | SYSTOLIC BLOOD PRESSURE: 132 MMHG

## 2019-11-05 VITALS — SYSTOLIC BLOOD PRESSURE: 144 MMHG | DIASTOLIC BLOOD PRESSURE: 61 MMHG

## 2019-11-05 VITALS — DIASTOLIC BLOOD PRESSURE: 66 MMHG | SYSTOLIC BLOOD PRESSURE: 143 MMHG

## 2019-11-05 VITALS — DIASTOLIC BLOOD PRESSURE: 54 MMHG | SYSTOLIC BLOOD PRESSURE: 147 MMHG

## 2019-11-05 VITALS — DIASTOLIC BLOOD PRESSURE: 56 MMHG | SYSTOLIC BLOOD PRESSURE: 136 MMHG

## 2019-11-05 LAB
ADD MANUAL DIFF: YES
ADD MANUAL DIFF: YES
ALBUMIN SERPL-MCNC: 1.9 G/DL (ref 3.4–5)
ALBUMIN/GLOB SERPL: 0.5 {RATIO} (ref 1–2.7)
ALP SERPL-CCNC: 74 U/L (ref 46–116)
ALT SERPL-CCNC: 12 U/L (ref 12–78)
ANION GAP SERPL CALC-SCNC: 5 MMOL/L (ref 5–15)
ANION GAP SERPL CALC-SCNC: 8 MMOL/L (ref 5–15)
APTT BLD: 30 SEC (ref 23–33)
APTT BLD: 31 SEC (ref 23–33)
AST SERPL-CCNC: 13 U/L (ref 15–37)
BILIRUB SERPL-MCNC: 0.6 MG/DL (ref 0.2–1)
BUN SERPL-MCNC: 18 MG/DL (ref 7–18)
BUN SERPL-MCNC: 19 MG/DL (ref 7–18)
CALCIUM SERPL-MCNC: 8.6 MG/DL (ref 8.5–10.1)
CALCIUM SERPL-MCNC: 8.9 MG/DL (ref 8.5–10.1)
CHLORIDE SERPL-SCNC: 107 MMOL/L (ref 98–107)
CHLORIDE SERPL-SCNC: 110 MMOL/L (ref 98–107)
CO2 SERPL-SCNC: 32 MMOL/L (ref 21–32)
CO2 SERPL-SCNC: 33 MMOL/L (ref 21–32)
CREAT SERPL-MCNC: 0.9 MG/DL (ref 0.55–1.3)
CREAT SERPL-MCNC: 1 MG/DL (ref 0.55–1.3)
ERYTHROCYTE [DISTWIDTH] IN BLOOD BY AUTOMATED COUNT: 13.9 % (ref 11.6–14.8)
ERYTHROCYTE [DISTWIDTH] IN BLOOD BY AUTOMATED COUNT: 14.1 % (ref 11.6–14.8)
GLOBULIN SER-MCNC: 4.1 G/DL
HCT VFR BLD CALC: 21.7 % (ref 37–47)
HCT VFR BLD CALC: 24 % (ref 37–47)
HGB BLD-MCNC: 7.2 G/DL (ref 12–16)
HGB BLD-MCNC: 7.7 G/DL (ref 12–16)
INR PPP: 1.2 (ref 0.9–1.1)
INR PPP: 1.3 (ref 0.9–1.1)
MCV RBC AUTO: 83 FL (ref 80–99)
MCV RBC AUTO: 84 FL (ref 80–99)
PHOSPHATE SERPL-MCNC: 2.5 MG/DL (ref 2.5–4.9)
PLATELET # BLD: 407 K/UL (ref 150–450)
PLATELET # BLD: 415 K/UL (ref 150–450)
POTASSIUM SERPL-SCNC: 2.6 MMOL/L (ref 3.5–5.1)
POTASSIUM SERPL-SCNC: 2.6 MMOL/L (ref 3.5–5.1)
RBC # BLD AUTO: 2.6 M/UL (ref 4.2–5.4)
RBC # BLD AUTO: 2.85 M/UL (ref 4.2–5.4)
SODIUM SERPL-SCNC: 146 MMOL/L (ref 136–145)
SODIUM SERPL-SCNC: 149 MMOL/L (ref 136–145)
WBC # BLD AUTO: 12.2 K/UL (ref 4.8–10.8)
WBC # BLD AUTO: 12.3 K/UL (ref 4.8–10.8)

## 2019-11-05 PROCEDURE — 02HV33Z INSERTION OF INFUSION DEVICE INTO SUPERIOR VENA CAVA, PERCUTANEOUS APPROACH: ICD-10-PCS

## 2019-11-05 PROCEDURE — B548ZZA ULTRASONOGRAPHY OF SUPERIOR VENA CAVA, GUIDANCE: ICD-10-PCS

## 2019-11-05 RX ADMIN — MEROPENEM SCH MLS/HR: 1 INJECTION INTRAVENOUS at 00:58

## 2019-11-05 RX ADMIN — DIVALPROEX SODIUM SCH MG: 125 CAPSULE ORAL at 08:41

## 2019-11-05 RX ADMIN — INSULIN ASPART SCH UNITS: 100 INJECTION, SOLUTION INTRAVENOUS; SUBCUTANEOUS at 17:53

## 2019-11-05 RX ADMIN — SODIUM CHLORIDE SCH MLS/HR: 9 INJECTION, SOLUTION INTRAVENOUS at 07:30

## 2019-11-05 RX ADMIN — DOCUSATE SODIUM SCH MG: 50 LIQUID ORAL at 22:45

## 2019-11-05 RX ADMIN — INSULIN ASPART SCH UNITS: 100 INJECTION, SOLUTION INTRAVENOUS; SUBCUTANEOUS at 11:57

## 2019-11-05 RX ADMIN — LORAZEPAM PRN MG: 2 INJECTION, SOLUTION INTRAMUSCULAR; INTRAVENOUS at 22:45

## 2019-11-05 RX ADMIN — INSULIN DETEMIR SCH UNITS: 100 INJECTION, SOLUTION SUBCUTANEOUS at 08:14

## 2019-11-05 RX ADMIN — MEROPENEM SCH MLS/HR: 1 INJECTION INTRAVENOUS at 08:11

## 2019-11-05 RX ADMIN — FUROSEMIDE SCH MG: 40 TABLET ORAL at 08:12

## 2019-11-05 RX ADMIN — PANTOPRAZOLE SODIUM SCH MG: 40 INJECTION, POWDER, FOR SOLUTION INTRAVENOUS at 08:14

## 2019-11-05 RX ADMIN — PANTOPRAZOLE SODIUM SCH MG: 40 INJECTION, POWDER, FOR SOLUTION INTRAVENOUS at 20:32

## 2019-11-05 RX ADMIN — INSULIN DETEMIR SCH UNITS: 100 INJECTION, SOLUTION SUBCUTANEOUS at 17:50

## 2019-11-05 RX ADMIN — INSULIN ASPART SCH UNITS: 100 INJECTION, SOLUTION INTRAVENOUS; SUBCUTANEOUS at 00:00

## 2019-11-05 RX ADMIN — DOCUSATE SODIUM SCH MG: 50 LIQUID ORAL at 14:47

## 2019-11-05 RX ADMIN — DIVALPROEX SODIUM SCH MG: 125 CAPSULE ORAL at 20:33

## 2019-11-05 RX ADMIN — SODIUM CHLORIDE SCH MG: 0.9 INJECTION INTRAVENOUS at 22:45

## 2019-11-05 RX ADMIN — SODIUM CHLORIDE SCH MG: 0.9 INJECTION INTRAVENOUS at 14:40

## 2019-11-05 RX ADMIN — DOCUSATE SODIUM SCH MG: 50 LIQUID ORAL at 06:37

## 2019-11-05 RX ADMIN — LORAZEPAM PRN MG: 2 INJECTION, SOLUTION INTRAMUSCULAR; INTRAVENOUS at 02:01

## 2019-11-05 RX ADMIN — MEROPENEM SCH MLS/HR: 1 INJECTION INTRAVENOUS at 16:54

## 2019-11-05 RX ADMIN — INSULIN ASPART SCH UNITS: 100 INJECTION, SOLUTION INTRAVENOUS; SUBCUTANEOUS at 06:00

## 2019-11-05 RX ADMIN — CHLORHEXIDINE GLUCONATE SCH APPLIC: 213 SOLUTION TOPICAL at 20:32

## 2019-11-05 RX ADMIN — SODIUM CHLORIDE SCH MG: 0.9 INJECTION INTRAVENOUS at 06:37

## 2019-11-05 RX ADMIN — SODIUM CHLORIDE SCH MLS/HR: 9 INJECTION, SOLUTION INTRAVENOUS at 17:26

## 2019-11-05 NOTE — GENERAL PROGRESS NOTE
Assessment/Plan


Problem List:  


(1) Abnormal TSH


ICD Codes:  R79.89 - Other specified abnormal findings of blood chemistry


SNOMED:  150298494


(2) Hyperglycemia due to type 2 diabetes mellitus


ICD Codes:  E11.65 - Type 2 diabetes mellitus with hyperglycemia


SNOMED:  560498261467892, 04336734


Qualifiers:  


   Qualified Codes:  E11.65 - Type 2 diabetes mellitus with hyperglycemia; 

Z79.4 - Long term (current) use of insulin


(3) Acute metabolic encephalopathy


ICD Codes:  G93.41 - Metabolic encephalopathy


SNOMED:  99117583, 138950975


(4) ARF (acute renal failure)


ICD Codes:  N17.9 - Acute kidney failure, unspecified


SNOMED:  05574033


Qualifiers:  


   Qualified Codes:  N17.9 - Acute kidney failure, unspecified


(5) Healthcare associated bacterial pneumonia


ICD Codes:  J15.9 - Unspecified bacterial pneumonia


SNOMED:  770356737


Status:  unchanged


Assessment/Plan:


continue Levemir 8 units bid 


continue NISS every 6 hours 


hypoglycemia protocol in order





Subjective


ROS Limited/Unobtainable:  Yes


Allergies:  


Coded Allergies:  


     No Known Allergies (Unverified , 10/14/19)


Subjective


events noted 


glucose values improved 











Item Value  Date Time


 


Bedside Blood Glucose 110 mg/dl 11/5/19 0000


 


Bedside Blood Glucose 194 mg/dl H 11/4/19 2017


 


Bedside Blood Glucose 229 mg/dl H 11/4/19 1238


 


Bedside Blood Glucose 227 mg/dl H 11/4/19 0916


 


Bedside Blood Glucose 201 mg/dl H 11/4/19 0652


 


Bedside Blood Glucose 271 mg/dl H 11/4/19 0025











Objective





Last 24 Hour Vital Signs








  Date Time  Temp Pulse Resp B/P (MAP) Pulse Ox O2 Delivery O2 Flow Rate FiO2


 


11/5/19 06:00  75 16 152/61 (91) 100   


 


11/5/19 05:20  60 14     60


 


11/5/19 05:00  60 14 131/49 (76) 100   


 


11/5/19 04:00      Endotracheal Tube  


 


11/5/19 04:00  79      


 


11/5/19 04:00        60


 


11/5/19 04:00 98.4 76 14 144/61 (88) 100   


 


11/5/19 03:32  57 14     60


 


11/5/19 03:00  60 14 124/49 (74) 100   


 


11/5/19 02:00  72 14 144/54 (84) 100   


 


11/5/19 01:14  76 14     60


 


11/5/19 01:00  67 15 134/56 (82)    


 


11/5/19 00:15  83 19     


 


11/5/19 00:00  82      


 


11/5/19 00:00 98.4 67 14 132/54 (80) 100   


 


11/5/19 00:00      Endotracheal Tube  


 


11/4/19 23:00  74 14     60


 


11/4/19 23:00  82 18 140/58 (85) 100   


 


11/4/19 22:00  81 19 137/57 (83) 99   


 


11/4/19 21:03  72 14     60


 


11/4/19 21:00  69 14 128/57 (80) 100   


 


11/4/19 20:00  80      


 


11/4/19 20:00        60


 


11/4/19 20:00 98.8 84 19 147/56 (86) 100   


 


11/4/19 20:00      Endotracheal Tube  


 


11/4/19 19:29  69 14     60


 


11/4/19 19:00  68 14 131/51 (77) 100   


 


11/4/19 18:00      Endotracheal Tube  


 


11/4/19 18:00        60


 


11/4/19 18:00  76 14 138/49 (78) 100   


 


11/4/19 17:29  80  127/49    


 


11/4/19 17:21  78 14     60


 


11/4/19 17:00  70 14 127/49 (75) 100   


 


11/4/19 16:00 98.6 83 15 150/58 (88) 100   


 


11/4/19 16:00      Endotracheal Tube  


 


11/4/19 16:00  75      


 


11/4/19 16:00        60


 


11/4/19 15:00  77 17 140/59 (86) 100   


 


11/4/19 14:31  70 14     60


 


11/4/19 14:00  72 14 136/50 (78) 100   


 


11/4/19 13:10  74 14     60


 


11/4/19 13:00  77 14 146/56 (86) 100   


 


11/4/19 12:22        60


 


11/4/19 12:00  73      


 


11/4/19 12:00      Endotracheal Tube  


 


11/4/19 12:00        60


 


11/4/19 12:00 97.1 73 13 138/51 (80) 98   


 


11/4/19 11:00  87 15 142/60 (87)    


 


11/4/19 10:39  77 14     50


 


11/4/19 10:00  87 19 156/62 (93)    


 


11/4/19 09:20  80 18     50


 


11/4/19 09:04  75  140/56    


 


11/4/19 09:00  75 14 140/56 (84) 100   


 


11/4/19 08:00      Endotracheal Tube  


 


11/4/19 08:00        50


 


11/4/19 08:00  80 15 149/58 (88) 100   


 


11/4/19 08:00  88      


 


11/4/19 07:13  91 19     50


 


11/4/19 07:00 98.7 92 18 159/66 (97) 100   

















Intake and Output  


 


 11/4/19 11/5/19





 19:00 07:00


 


Intake Total 363 ml 330.000 ml


 


Output Total  500 ml


 


Balance 363 ml -170.000 ml


 


  


 


IV Total 303 ml 330.000 ml


 


Other 60 ml 


 


Output Urine Total  500 ml


 


# Voids  3








Laboratory Tests


11/4/19 07:40: 


Arterial Blood pH 7.506H, Arterial Blood Partial Pressure CO2 39.2, Arterial 

Blood Partial Pressure O2 80.7, Arterial Blood HCO3 30.3H, Arterial Blood 

Oxygen Saturation 95.8, Arterial Blood Base Excess 6.7H, Graham Test Positive


11/4/19 09:10: 


White Blood Count 12.5H, Red Blood Count 2.87L, Hemoglobin 7.8L, Hematocrit 

24.0L, Mean Corpuscular Volume 84, Mean Corpuscular Hemoglobin 27.3, Mean 

Corpuscular Hemoglobin Concent 32.6, Red Cell Distribution Width 14.2, Platelet 

Count 419, Mean Platelet Volume 7.0, Neutrophils (%) (Auto) , Lymphocytes (%) (

Auto) , Monocytes (%) (Auto) , Eosinophils (%) (Auto) , Basophils (%) (Auto) , 

Differential Total Cells Counted 100, Neutrophils % (Manual) 70, Lymphocytes % (

Manual) 18L, Monocytes % (Manual) 10, Eosinophils % (Manual) 2, Basophils % (

Manual) 0, Band Neutrophils 0, Platelet Estimate Adequate, Platelet Morphology 

Normal, Sodium Level 148H, Potassium Level 3.6, Chloride Level 110H, Carbon 

Dioxide Level 30, Anion Gap 8, Blood Urea Nitrogen 23H, Creatinine 1.0, Estimat 

Glomerular Filtration Rate 55.5, Glucose Level 221H, Calcium Level 9.0, Total 

Bilirubin 0.7, Aspartate Amino Transf (AST/SGOT) 24, Alanine Aminotransferase (

ALT/SGPT) 13, Alkaline Phosphatase 77, Lactate Dehydrogenase 506H, Total 

Protein 6.1L, Albumin 1.8L, Globulin 4.3, Albumin/Globulin Ratio 0.4L, Cortisol 

17.0


11/4/19 15:45: 


Arterial Blood pH 7.526H, Arterial Blood Partial Pressure CO2 39.7, Arterial 

Blood Partial Pressure O2 119.9H, Arterial Blood HCO3 32.1H, Arterial Blood 

Oxygen Saturation 98.0, Arterial Blood Base Excess 8.7H, Graham Test Positive


11/5/19 04:25: 


White Blood Count 12.3H, Red Blood Count 2.60L, Hemoglobin 7.2L, Hematocrit 

21.7L, Mean Corpuscular Volume 83, Mean Corpuscular Hemoglobin 27.9, Mean 

Corpuscular Hemoglobin Concent 33.4, Red Cell Distribution Width 13.9, Platelet 

Count 415, Mean Platelet Volume 7.0, Neutrophils (%) (Auto) , Lymphocytes (%) (

Auto) , Monocytes (%) (Auto) , Eosinophils (%) (Auto) , Basophils (%) (Auto) , 

Neutrophils % (Manual) [Pending], Lymphocytes % (Manual) [Pending], Platelet 

Estimate [Pending], Platelet Morphology [Pending], Sodium Level 149H, Potassium 

Level 2.6*L, Chloride Level 110H, Carbon Dioxide Level 33H, Anion Gap 5, Blood 

Urea Nitrogen 19H, Creatinine 1.0, Estimat Glomerular Filtration Rate 55.5, 

Glucose Level 78#, Calcium Level 8.9, Total Bilirubin 0.6, Aspartate Amino 

Transf (AST/SGOT) 13L, Alanine Aminotransferase (ALT/SGPT) 12, Alkaline 

Phosphatase 74, Total Protein 6.0L, Albumin 1.9L, Globulin 4.1, Albumin/

Globulin Ratio 0.5L, Prothrombin Time 13.6H, Prothromb Time International Ratio 

1.3H, Activated Partial Thromboplast Time 31, Lactic Acid Level 0.60, Uric Acid 

8.3H, Phosphorus Level 2.5, Magnesium Level 1.6L, C-Reactive Protein, 

Quantitative 6.7H, Pro-B-Type Natriuretic Peptide 20997N


Height (Feet):  5


Height (Inches):  5.00


Weight (Pounds):  166


General Appearance:  lethargic


Neck:  normal alignment


Respiratory/Chest:  decreased breath sounds


Abdomen:  normal bowel sounds


Edema:  1+ Arm (L), 1+ Arm (R), 1+ Leg (L), 1+ Leg (R), 1+ Pedal (L), 1+ Pedal (

R), 1+ Generalized


Objective





Current Medications








 Medications


  (Trade)  Dose


 Ordered  Sig/Winnie


 Route


 PRN Reason  Start Time


 Stop Time Status Last Admin


Dose Admin


 


 Acetaminophen


  (Tylenol)  650 mg  Q4H  PRN


 NG


 Mild Pain/Temp > 100.5  11/3/19 20:30


 11/25/19 20:29   


 


 


 Acetaminophen


  (Tylenol)  650 mg  Q4H  PRN


 RECTAL


 TEMP>100.5  11/3/19 20:30


 12/2/19 20:29   


 


 


 Amlodipine


 Besylate


  (Norvasc)  2.5 mg  BID


 NG


   11/4/19 09:00


 11/29/19 17:59  11/4/19 09:04


 


 


 Dextrose


  (Dextrose 50%)  25 ml  Q30M  PRN


 IV


 Hypoglycemia  11/5/19 06:30


 12/5/19 06:29   


 


 


 Dextrose


  (Dextrose 50%)  50 ml  Q30M  PRN


 IV


 Hypoglycemia  11/5/19 06:30


 12/5/19 06:29   


 


 


 Divalproex Sodium


  (Depakote


 Sprinkles)  500 mg  Q12HR


 NG


   11/3/19 21:00


 11/14/19 21:59  11/4/19 21:15


 


 


 Docusate Sodium


  (Colace)  100 mg  EVERY 8  HOURS


 NG


   11/3/19 22:00


 11/25/19 08:59  11/4/19 21:16


 


 


 Famotidine


  (Pepcid)  20 mg  EVERY 12  HOURS


 GT


   11/3/19 21:00


 11/25/19 17:59  11/4/19 21:15


 


 


 Folic Acid


  (Folate)  1 mg  DAILY


 ORAL


   11/4/19 09:00


 11/30/19 08:59  11/4/19 09:04


 


 


 Furosemide


  (Lasix)  40 mg  Q12HR


 NG


   11/3/19 21:00


 12/2/19 20:59  11/4/19 21:16


 


 


 Insulin Aspart


  (NovoLOG)    EVERY 6  HOURS


 SUBQ


   11/4/19 00:00


 11/29/19 11:59  11/4/19 20:17


 


 


 Insulin Detemir


  (Levemir)  8 units  BID


 SUBQ


   11/4/19 09:00


 12/1/19 08:59  11/4/19 20:17


 


 


 Lactulose


  (Cephulac)  20 gm  Q8H  PRN


 NG


 Constipation  11/3/19 20:30


 12/3/19 20:29   


 


 


 Lorazepam


  (Ativan 2mg/ml


 1ml)  1 mg  Q4H  PRN


 IV


 For Anxiety  11/3/19 20:30


 11/10/19 20:29  11/5/19 02:01


 


 


 Meropenem 1 gm/


 Sodium Chloride  55 ml @ 


 110 mls/hr  Q8H


 IVPB


   11/4/19 17:00


 11/9/19 16:59  11/5/19 00:58


 


 


 Metoclopramide HCl


  (Reglan)  10 mg  Q8HR


 IVP


   11/4/19 15:21


 12/4/19 15:20  11/4/19 21:15


 


 


 Midazolam HCl  100 ml @ 0


 mls/hr  Q24H  PRN


 IV


 To Patient Comfort  11/3/19 20:35


 11/10/19 20:34   


 


 


 Pantoprazole


  (Protonix)  40 mg  EVERY 12  HOURS


 IVP


   11/3/19 21:00


 12/3/19 08:59  11/4/19 21:15


 


 


 Vancomycin HCl


  (Vanco rx to


 dose)  1 ea  DAILY  PRN


 MISC


 Per rx protocol  11/4/19 15:30


 12/4/19 15:29   


 


 


 Vancomycin HCl


 750 mg/Dextrose  275 ml @ 


 183.333


 mls/hr  Q12HR@0600,1800


 IVPB


   11/4/19 18:00


 11/9/19 17:59  11/4/19 19:18


 

















Riccardo Velez MD Nov 5, 2019 06:35

## 2019-11-05 NOTE — NUR
NURSE NOTES:

Patient turned and repositioned. Mouth care done and suctioned as tolerate.Will continue 
same care plan, no significant change of condition at this time.

## 2019-11-05 NOTE — SURGERY PROGRESS NOTE
Surgery Progress Note


Subjective


Additional Comments


leukocytosis


anemia


hypok


labs abnormal


on vent support


 AC 14/450/60/5


unresponsive





Objective





Last 24 Hour Vital Signs








  Date Time  Temp Pulse Resp B/P (MAP) Pulse Ox O2 Delivery O2 Flow Rate FiO2


 


11/5/19 13:00  69 22 132/51 (78) 100   


 


11/5/19 12:00  72      


 


11/5/19 12:00 98.2 69 22 132/51 (78) 100   


 


11/5/19 12:00      Endotracheal Tube  


 


11/5/19 12:00        60


 


11/5/19 11:29  71 17     60


 


11/5/19 11:00  67 21 136/56 (82) 100   


 


11/5/19 10:00  78 23 136/56 (82) 100   


 


11/5/19 09:30        60


 


11/5/19 09:28  77 22     60


 


11/5/19 09:00  76 20 141/54 (83) 100   


 


11/5/19 08:12  74  147/54    


 


11/5/19 08:00        60


 


11/5/19 08:00      Endotracheal Tube  


 


11/5/19 08:00  75 15 147/54 (85) 100   


 


11/5/19 08:00  76      


 


11/5/19 07:21  78 15     60


 


11/5/19 07:00 97.8 80 16 149/56 (87) 100   


 


11/5/19 06:00  75 16 152/61 (91) 100   


 


11/5/19 05:20  60 14     60


 


11/5/19 05:00  60 14 131/49 (76) 100   


 


11/5/19 04:00      Endotracheal Tube  


 


11/5/19 04:00  79      


 


11/5/19 04:00        60


 


11/5/19 04:00 98.4 76 14 144/61 (88) 100   


 


11/5/19 03:32  57 14     60


 


11/5/19 03:00  60 14 124/49 (74) 100   


 


11/5/19 02:00  72 14 144/54 (84) 100   


 


11/5/19 01:14  76 14     60


 


11/5/19 01:00  67 15 134/56 (82)    


 


11/5/19 00:15  83 19     


 


11/5/19 00:00  82      


 


11/5/19 00:00 98.4 67 14 132/54 (80) 100   


 


11/5/19 00:00      Endotracheal Tube  


 


11/4/19 23:00  74 14     60


 


11/4/19 23:00  82 18 140/58 (85) 100   


 


11/4/19 22:00  81 19 137/57 (83) 99   


 


11/4/19 21:03  72 14     60


 


11/4/19 21:00  69 14 128/57 (80) 100   


 


11/4/19 20:00  80      


 


11/4/19 20:00        60


 


11/4/19 20:00 98.8 84 19 147/56 (86) 100   


 


11/4/19 20:00      Endotracheal Tube  


 


11/4/19 19:29  69 14     60


 


11/4/19 19:00  68 14 131/51 (77) 100   


 


11/4/19 18:00      Endotracheal Tube  


 


11/4/19 18:00        60


 


11/4/19 18:00  76 14 138/49 (78) 100   


 


11/4/19 17:29  80  127/49    


 


11/4/19 17:21  78 14     60


 


11/4/19 17:00  70 14 127/49 (75) 100   


 


11/4/19 16:00 98.6 83 15 150/58 (88) 100   


 


11/4/19 16:00      Endotracheal Tube  


 


11/4/19 16:00  75      


 


11/4/19 16:00        60








I&O











Intake and Output  


 


 11/4/19 11/5/19





 19:00 07:00


 


Intake Total 363 ml 330.000 ml


 


Output Total  500 ml


 


Balance 363 ml -170.000 ml


 


  


 


IV Total 303 ml 330.000 ml


 


Other 60 ml 


 


Output Urine Total  500 ml


 


# Voids  3








Dressing:  saturated


Wound:  other


Drains:  other


Cardiovascular:  RSR


Respiratory:  decreased breath sounds


Abdomen:  soft, present bowel sounds, non-distended


Extremities:  no cyanosis, other





Laboratory Tests








Test


  11/4/19


15:45 11/5/19


04:25 11/5/19


11:25 11/5/19


14:15


 


Arterial Blood pH


  7.526


(7.350-7.450) 


  7.514


(7.350-7.450) 


 


 


Arterial Blood Partial


Pressure CO2 39.7 mmHg


(35.0-45.0) 


  37.7 mmHg


(35.0-45.0) 


 


 


Arterial Blood Partial


Pressure O2 119.9 mmHg


(75.0-100.0)  H 


  144.4 mmHg


(75.0-100.0)  H 


 


 


Arterial Blood HCO3


  32.1 mmol/L


(22.0-26.0)  H 


  29.7 mmol/L


(22.0-26.0)  H 


 


 


Arterial Blood Oxygen


Saturation 98.0 %


() 


  98.5 %


() 


 


 


Arterial Blood Base Excess 8.7 (-2-2)  H  6.3 (-2-2)  H 


 


Graham Test Positive    Positive   


 


White Blood Count


  


  12.3 K/UL


(4.8-10.8)  H 


  12.2 K/UL


(4.8-10.8)  H


 


Red Blood Count


  


  2.60 M/UL


(4.20-5.40)  L 


  2.85 M/UL


(4.20-5.40)  L


 


Hemoglobin


  


  7.2 G/DL


(12.0-16.0)  L 


  7.7 G/DL


(12.0-16.0)  L


 


Hematocrit


  


  21.7 %


(37.0-47.0)  L 


  24.0 %


(37.0-47.0)  L


 


Mean Corpuscular Volume  83 FL (80-99)    84 FL (80-99)  


 


Mean Corpuscular Hemoglobin


  


  27.9 PG


(27.0-31.0) 


  27.0 PG


(27.0-31.0)


 


Mean Corpuscular Hemoglobin


Concent 


  33.4 G/DL


(32.0-36.0) 


  32.1 G/DL


(32.0-36.0)


 


Red Cell Distribution Width


  


  13.9 %


(11.6-14.8) 


  14.1 %


(11.6-14.8)


 


Platelet Count


  


  415 K/UL


(150-450) 


  407 K/UL


(150-450)


 


Mean Platelet Volume


  


  7.0 FL


(6.5-10.1) 


  7.1 FL


(6.5-10.1)


 


Neutrophils (%) (Auto)


  


  % (45.0-75.0)


  


  % (45.0-75.0)


 


 


Lymphocytes (%) (Auto)


  


  % (20.0-45.0)


  


  % (20.0-45.0)


 


 


Monocytes (%) (Auto)   % (1.0-10.0)     % (1.0-10.0)  


 


Eosinophils (%) (Auto)   % (0.0-3.0)     % (0.0-3.0)  


 


Basophils (%) (Auto)   % (0.0-2.0)     % (0.0-2.0)  


 


Differential Total Cells


Counted 


  100  


  


  


 


 


Neutrophils % (Manual)  67 % (45-75)    Pending  


 


Lymphocytes % (Manual)  18 % (20-45)  L  Pending  


 


Monocytes % (Manual)  5 % (1-10)    


 


Eosinophils % (Manual)  10 % (0-3)  H  


 


Basophils % (Manual)  0 % (0-2)    


 


Band Neutrophils  0 % (0-8)    


 


Platelet Estimate  Adequate    Pending  


 


Platelet Morphology  Normal    Pending  


 


Prothrombin Time


  


  13.6 SEC


(9.30-11.50)  H 


  12.8 SEC


(9.30-11.50)  H


 


Prothromb Time International


Ratio 


  1.3 (0.9-1.1)


H 


  1.2 (0.9-1.1)


H


 


Activated Partial


Thromboplast Time 


  31 SEC (23-33)


  


  30 SEC (23-33)


 


 


Sodium Level


  


  149 MMOL/L


(136-145)  H 


  146 MMOL/L


(136-145)  H


 


Potassium Level


  


  2.6 MMOL/L


(3.5-5.1)  *L 


  2.6 MMOL/L


(3.5-5.1)  *L


 


Chloride Level


  


  110 MMOL/L


()  H 


  107 MMOL/L


()


 


Carbon Dioxide Level


  


  33 MMOL/L


(21-32)  H 


  32 MMOL/L


(21-32)


 


Anion Gap


  


  5 mmol/L


(5-15) 


  8 mmol/L


(5-15)


 


Blood Urea Nitrogen


  


  19 mg/dL


(7-18)  H 


  18 mg/dL


(7-18)


 


Creatinine


  


  1.0 MG/DL


(0.55-1.30) 


  0.9 MG/DL


(0.55-1.30)


 


Estimat Glomerular Filtration


Rate 


  55.5 mL/min


(>60) 


  > 60 mL/min


(>60)


 


Glucose Level


  


  78 MG/DL


()  # 


  196 MG/DL


()  #H


 


Lactic Acid Level


  


  0.60 mmol/L


(0.4-2.0) 


  


 


 


Uric Acid


  


  8.3 MG/DL


(2.6-7.2)  H 


  


 


 


Calcium Level


  


  8.9 MG/DL


(8.5-10.1) 


  8.6 MG/DL


(8.5-10.1)


 


Phosphorus Level


  


  2.5 MG/DL


(2.5-4.9) 


  


 


 


Magnesium Level


  


  1.6 MG/DL


(1.8-2.4)  L 


  


 


 


Total Bilirubin


  


  0.6 MG/DL


(0.2-1.0) 


  


 


 


Aspartate Amino Transf


(AST/SGOT) 


  13 U/L (15-37)


L 


  


 


 


Alanine Aminotransferase


(ALT/SGPT) 


  12 U/L (12-78)


  


  


 


 


Alkaline Phosphatase


  


  74 U/L


() 


  


 


 


C-Reactive Protein,


Quantitative 


  6.7 mg/dL


(0.00-0.90)  H 


  


 


 


Pro-B-Type Natriuretic Peptide


  


  89872 pg/mL


(0-125)  H 


  


 


 


Total Protein


  


  6.0 G/DL


(6.4-8.2)  L 


  


 


 


Albumin


  


  1.9 G/DL


(3.4-5.0)  L 


  


 


 


Globulin  4.1 g/dL    


 


Albumin/Globulin Ratio


  


  0.5 (1.0-2.7)


L 


  


 











Plan


Problems:  


(1) Pressure injury of deep tissue


Assessment & Plan:  Pt presented on admission with DTPI R heel. Blood filled 

blister with marginal erythema noted to heel. Pt exhibited agitation when 

minimally palpated. (L)2.2cm x (W)2.1cm


L heel is blanchable .


Non-blanchable erythema without induration noted to sacral cleft. 


No other areas of skin concerns noted.





Tx.Plan:


Apply Betadine to R heel. Cover with Optifoam drsg. Change every 3 days and prn.


           


Apply Cavilon Skin Barrier to L heel. Cover with Optifoam drsg. Change every 7 

days and prn.


           


Apply Moisture Barrier Paste to buttocks. Cover sacral area with Optifoam drsg. 

Change every 3 days and prn.


           


Reposition at least every 2hours or as tolerated.


           


Off-load heels with pillow.





(2) Sepsis


Assessment & Plan:  Patient with low-grade fevers, anemia, leukocytosis 

persistent, elevated platelets, abnormal labs.


Etiology currently unknown and work-up in progress.  Labs noted and imaging 

that is available as noted.  


CT noted


US noted


Impression: 13 x 7 x 12.9 cm unilocular cystic mass, corresponding to lesion 

reported


on recent CT scan. Layering low level internal echoes likely represent bladder


debris.. This presumably represents a cystic ovarian lesion with debris or


hemorrhage, possibly neoplastic. Gynecological consultation is recommended


Antibiotics as per infectious disease 


local wound care as wounds do not seem to be the etiology of her sepsis


start feeds via peg


doing well


improving


cont with tube feeds


cont with ICU care 


supplementation for healing 


We will monitor and follow with recommendations


Thank you for allowing me to participate in patient's care














Radhames Blas Nov 5, 2019 15:05

## 2019-11-05 NOTE — NUR
NURSE NOTES:

Patient was received asleep easily arousal to tactile stimuli. Afebrile at this time  and 
orally intubated with ETT 7.5/23cm,AC 14, , FiO2 60%, Peep 5, SpO2 96%,CO2 29 and RR 
20. No apparent acute distress.Mouth care done and and suctioned as tolerated.Abdomen soft 
and non-distended with bowel sounds present in all 4 quadrants.GT  placement checked and 
intact with no residual.HOB elevated at 45 degree to prevent aspiration.Patient NPO at this 
time for possible EGD. Order to transfuse 2 unit PRBC for hgb 7.2, no adequate IV 
access,will follow up for possible Picc placement.Potassium 2.6 and magnesium 1.6, sodium 
149 and glucose 78, will relayed to MD.Patient also on GI prophylaxis Pepcid and Protonix, 
will follow up with Sami.Lungs sonds diminished with some crackles.Pt on puerwick catheter 
draining yellow straw urine with no apparent sediments.Patient Bed in low position and 
locked position.Bilateral soft wrist restraints in place for safety and prevent removal of 
restraints.  Turned and repositioned for skin management and comfort.Call light within reach 
at all the times.Will continue to monitor.

## 2019-11-05 NOTE — NUR
NURSE NOTES:

patient awake with episode of biting ETT tubing talk therapy and reality orientation 
provided not effective. Ativan given effective.

## 2019-11-05 NOTE — CARDIOLOGY REPORT
--------------- APPROVED REPORT --------------





EKG Measurement

Heart [id number]

HI [id number]

[id number]

[id number]

[id number]





Sinus rhythm

Left ventricular hypertrophy 

Prolonged QT

Abnormal ECG

## 2019-11-05 NOTE — NUR
HAND-OFF: 

Report given to ELIZABETH Winter. Endorsed to follow up with blood transfusion, EGD consent, and 
removal of peripheral line.

## 2019-11-05 NOTE — NUR
NURSE NOTES:

Blood transfusion started, unit [id number].No adverse reaction noted.Endorsed to next 
nurse to continue transfusion

## 2019-11-05 NOTE — CARDIOLOGY PROGRESS NOTE
Assessment/Plan


Assessment/Plan


1. acute congestive heart failure


2. Abnormal cardiac enzyme demand related due to infection and profound anemia 


3. Agitation 


4. Urinary tract infection.


5. Acute renal failure.


6. Profound anemia.


7. Diabetes mellitus.


8. History of hypertension.


9. History of mood disorder.


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 


11. arf 


12. pelvic mass


13  sepsis 


14. pneumonia 








off dapt due to suspected bleeding 


echo mild systolic dysfunction 


in icu on  a vent 


iv abx 


to get 2 unit prbc orderd by hem 


s/p peg  


lasix iv bid 


we dcd all anti plt 


tele sinus 


remain on vent 


cxr noted will need new one 


keep in neg fluid balance


await egd to see the source of potential bleeding 





bp will need and tolerate more diuretic but need more kcl before diurtic aer 

adminstered





Subjective


ROS Limited/Unobtainable:  Yes





Objective





Last 24 Hour Vital Signs








  Date Time  Temp Pulse Resp B/P (MAP) Pulse Ox O2 Delivery O2 Flow Rate FiO2


 


11/5/19 11:29  71 17     60


 


11/5/19 09:28  77 22     60


 


11/5/19 08:12  74  147/54    


 


11/5/19 08:00  76      


 


11/5/19 07:21  78 15     60


 


11/5/19 07:00 97.8 80 16 149/56 (87) 100   


 


11/5/19 06:00  75 16 152/61 (91) 100   


 


11/5/19 05:20  60 14     60


 


11/5/19 05:00  60 14 131/49 (76) 100   


 


11/5/19 04:00      Endotracheal Tube  


 


11/5/19 04:00  79      


 


11/5/19 04:00        60


 


11/5/19 04:00 98.4 76 14 144/61 (88) 100   


 


11/5/19 03:32  57 14     60


 


11/5/19 03:00  60 14 124/49 (74) 100   


 


11/5/19 02:00  72 14 144/54 (84) 100   


 


11/5/19 01:14  76 14     60


 


11/5/19 01:00  67 15 134/56 (82)    


 


11/5/19 00:15  83 19     


 


11/5/19 00:00  82      


 


11/5/19 00:00 98.4 67 14 132/54 (80) 100   


 


11/5/19 00:00      Endotracheal Tube  


 


11/4/19 23:00  74 14     60


 


11/4/19 23:00  82 18 140/58 (85) 100   


 


11/4/19 22:00  81 19 137/57 (83) 99   


 


11/4/19 21:03  72 14     60


 


11/4/19 21:00  69 14 128/57 (80) 100   


 


11/4/19 20:00  80      


 


11/4/19 20:00        60


 


11/4/19 20:00 98.8 84 19 147/56 (86) 100   


 


11/4/19 20:00      Endotracheal Tube  


 


11/4/19 19:29  69 14     60


 


11/4/19 19:00  68 14 131/51 (77) 100   


 


11/4/19 18:00      Endotracheal Tube  


 


11/4/19 18:00        60


 


11/4/19 18:00  76 14 138/49 (78) 100   


 


11/4/19 17:29  80  127/49    


 


11/4/19 17:21  78 14     60


 


11/4/19 17:00  70 14 127/49 (75) 100   


 


11/4/19 16:00 98.6 83 15 150/58 (88) 100   


 


11/4/19 16:00      Endotracheal Tube  


 


11/4/19 16:00  75      


 


11/4/19 16:00        60


 


11/4/19 15:00  77 17 140/59 (86) 100   


 


11/4/19 14:31  70 14     60


 


11/4/19 14:00  72 14 136/50 (78) 100   


 


11/4/19 13:10  74 14     60


 


11/4/19 13:00  77 14 146/56 (86) 100   


 


11/4/19 12:22        60


 


11/4/19 12:00  73      


 


11/4/19 12:00      Endotracheal Tube  


 


11/4/19 12:00        60


 


11/4/19 12:00 97.1 73 13 138/51 (80) 98   








General Appearance:  no apparent distress


Cardiovascular:  normal rate, regular rhythm


Respiratory/Chest:  rhonchi - bilaterally


Abdomen:  normal bowel sounds, non tender, soft


Extremities:  no swelling











Intake and Output  


 


 11/4/19 11/5/19





 19:00 07:00


 


Intake Total 363 ml 330.000 ml


 


Output Total  500 ml


 


Balance 363 ml -170.000 ml


 


  


 


IV Total 303 ml 330.000 ml


 


Other 60 ml 


 


Output Urine Total  500 ml


 


# Voids  3











Laboratory Tests








Test


  11/4/19


15:45 11/5/19


04:25 11/5/19


11:25


 


Arterial Blood pH


  7.526


(7.350-7.450) 


  7.514


(7.350-7.450)


 


Arterial Blood Partial


Pressure CO2 39.7 mmHg


(35.0-45.0) 


  37.7 mmHg


(35.0-45.0)


 


Arterial Blood Partial


Pressure O2 119.9 mmHg


(75.0-100.0)  H 


  144.4 mmHg


(75.0-100.0)  H


 


Arterial Blood HCO3


  32.1 mmol/L


(22.0-26.0)  H 


  29.7 mmol/L


(22.0-26.0)  H


 


Arterial Blood Oxygen


Saturation 98.0 %


() 


  98.5 %


()


 


Arterial Blood Base Excess 8.7 (-2-2)  H  6.3 (-2-2)  H


 


Graham Test Positive    Positive  


 


White Blood Count


  


  12.3 K/UL


(4.8-10.8)  H 


 


 


Red Blood Count


  


  2.60 M/UL


(4.20-5.40)  L 


 


 


Hemoglobin


  


  7.2 G/DL


(12.0-16.0)  L 


 


 


Hematocrit


  


  21.7 %


(37.0-47.0)  L 


 


 


Mean Corpuscular Volume  83 FL (80-99)   


 


Mean Corpuscular Hemoglobin


  


  27.9 PG


(27.0-31.0) 


 


 


Mean Corpuscular Hemoglobin


Concent 


  33.4 G/DL


(32.0-36.0) 


 


 


Red Cell Distribution Width


  


  13.9 %


(11.6-14.8) 


 


 


Platelet Count


  


  415 K/UL


(150-450) 


 


 


Mean Platelet Volume


  


  7.0 FL


(6.5-10.1) 


 


 


Neutrophils (%) (Auto)


  


  % (45.0-75.0)


  


 


 


Lymphocytes (%) (Auto)


  


  % (20.0-45.0)


  


 


 


Monocytes (%) (Auto)   % (1.0-10.0)   


 


Eosinophils (%) (Auto)   % (0.0-3.0)   


 


Basophils (%) (Auto)   % (0.0-2.0)   


 


Differential Total Cells


Counted 


  100  


  


 


 


Neutrophils % (Manual)  67 % (45-75)   


 


Lymphocytes % (Manual)  18 % (20-45)  L 


 


Monocytes % (Manual)  5 % (1-10)   


 


Eosinophils % (Manual)  10 % (0-3)  H 


 


Basophils % (Manual)  0 % (0-2)   


 


Band Neutrophils  0 % (0-8)   


 


Platelet Estimate  Adequate   


 


Platelet Morphology  Normal   


 


Prothrombin Time


  


  13.6 SEC


(9.30-11.50)  H 


 


 


Prothromb Time International


Ratio 


  1.3 (0.9-1.1)


H 


 


 


Activated Partial


Thromboplast Time 


  31 SEC (23-33)


  


 


 


Sodium Level


  


  149 MMOL/L


(136-145)  H 


 


 


Potassium Level


  


  2.6 MMOL/L


(3.5-5.1)  *L 


 


 


Chloride Level


  


  110 MMOL/L


()  H 


 


 


Carbon Dioxide Level


  


  33 MMOL/L


(21-32)  H 


 


 


Anion Gap


  


  5 mmol/L


(5-15) 


 


 


Blood Urea Nitrogen


  


  19 mg/dL


(7-18)  H 


 


 


Creatinine


  


  1.0 MG/DL


(0.55-1.30) 


 


 


Estimat Glomerular Filtration


Rate 


  55.5 mL/min


(>60) 


 


 


Glucose Level


  


  78 MG/DL


()  # 


 


 


Lactic Acid Level


  


  0.60 mmol/L


(0.4-2.0) 


 


 


Uric Acid


  


  8.3 MG/DL


(2.6-7.2)  H 


 


 


Calcium Level


  


  8.9 MG/DL


(8.5-10.1) 


 


 


Phosphorus Level


  


  2.5 MG/DL


(2.5-4.9) 


 


 


Magnesium Level


  


  1.6 MG/DL


(1.8-2.4)  L 


 


 


Total Bilirubin


  


  0.6 MG/DL


(0.2-1.0) 


 


 


Aspartate Amino Transf


(AST/SGOT) 


  13 U/L (15-37)


L 


 


 


Alanine Aminotransferase


(ALT/SGPT) 


  12 U/L (12-78)


  


 


 


Alkaline Phosphatase


  


  74 U/L


() 


 


 


C-Reactive Protein,


Quantitative 


  6.7 mg/dL


(0.00-0.90)  H 


 


 


Pro-B-Type Natriuretic Peptide


  


  61726 pg/mL


(0-125)  H 


 


 


Total Protein


  


  6.0 G/DL


(6.4-8.2)  L 


 


 


Albumin


  


  1.9 G/DL


(3.4-5.0)  L 


 


 


Globulin  4.1 g/dL   


 


Albumin/Globulin Ratio


  


  0.5 (1.0-2.7)


L 


 

















Rob May MD Nov 5, 2019 11:57

## 2019-11-05 NOTE — NUR
RESPIRATORY NOTES:

Received Patient on Vent settings ACVC RR 14, , Fio2 60% PEEP +5. Patient has a 7.5 
ETT at 23 cm at the lip, secured with anchorfast. Suctioned a small amount of clear, thin 
secretions. Patient lying in bed sleeping. Vent plugged into red outlet. Alarms are on and 
audible. Will continue to monitor patient throughout the day.

## 2019-11-05 NOTE — NUR
NURSE NOTES:

received report from Irena JOHNSON. Patient in bed sleeping easily arousable to verbal and 
tactile stimuli. patient with good and strong hand .  ETT 7.5/23cm lip line ac 14, TV 
400, fi02 28% peep 5 satting 100% etC02 15. Right upper arm PICC line intact , patient 
currently receiving blood transfusion no adverse reaction at this time. GT intact abdomen 
large and distended no residual.NPO. with abdominal binder.  no s/s of hypo/hyperglycemia. 
SR on cardiac monitor HR 80.On P 200 for wound management. skin warm and dry to touch.bed 
alarm on. bed lock and in low position. Contact isolation maintained and observed. Will 
continue plan of care.

## 2019-11-05 NOTE — NUR
NURSE NOTES:

Dr Main made aware Potassium 2.6 and magnesium 1.6, sodium 149 and glucose 78. Order to 
give 40meq IV and 2g magnesium, start D51/2 NS at 40cc/hr.Also per Dr Main, decrease  
and ABG in 2 hrs, RT made aware.Order noted and carried out.Also order received for Picc 
line placement.Consent received from next to Kin Iby and update given on pt condition.Unable 
to start blood transfusion due to inadequate access.Awaiting Picc line placement.radiology 
made aware and spoke to Daryl from radiology

-------------------------------------------------------------------------------

Addendum: 11/05/19 at 1800 by Irena Bang RN

-------------------------------------------------------------------------------

Lactulose 20mg given for constipation

## 2019-11-05 NOTE — PULMONOLGY CRITICAL CARE NOTE
Critical Care - Asmt/Plan


Assessment/Plan:


Pulmonary CCM Progress Note 





Assessment/Plan


Problems:  


(1) Healthcare/aspiration associated bacterial pneumonia


(2) UTI (urinary tract infection)


(3) Sepsis


(4) Dehydration


(5) CHANCE (acute kidney injury)


(6) Acute metabolic encephalopathy


(7) Hyperglycemia due to type 2 diabetes mellitus


(8) Renal failure (ARF), acute on chronic


(9) Ventilator dependant Respiratory Failure


(10) Abnormal TSH


(11) Pelvic mass in female


(12) PEG (percutaneous endoscopic gastrostomy) status


Assessment/Plan


Marked leukocytosis S/P WBC scan with uptake in pelvis, has pelvic mass


Sepsis


Possible pneumonia


E coli UTI


Acute hypoxemic respiratory failure


S/p code blue - now VDRF


Acute encephalopathy


Hx CHF


HTN


CHANCE improving


Pelvic mass/possible GYN malignancy vs cyst


Dysphagia S/P PEG





Plan:


-Wean FIO2, ABG PRN


-Optimize pulmonary hygiene


-Continue ACVC, reduce VT


-Titrate down FiO2 to keep SaO2 > 90%


-Monitor WCt, RFS sent for JAK2 mutation, per Dr. Pop BMBx if unrevealing


-Abx per ID, F/U Cx's


-monitor volumes and renal function, F/U renal recs


-monitor encephalopathyl


-NPO, GTF's


-DVT Px


-FC


-PICC line





Subjective


Allergies:  


Coded Allergies:  


     No Known Allergies


Subjective


Sedated on the Ventilator





Objective





Vital Signs Noted





General Appearance:  Sedated, cachetic


HEENT:  normocephalic, atraumatic, anicteric, mucous membranes moist


Respiratory/Chest:  occasional rhonchi


Cardiovascular:  normal peripheral pulses, normal rate, regular rhythm


Abdomen:  normal bowel sounds, soft, non tender, no organomegaly, non distended

, no mass, other - G


Extremities:  no cyanosis, no clubbing, no edema





Microbiology noted








 Date/Time


Source Procedure


Growth Status


 


 


 10/31/19 10:15


Urine,Clean Catch Urine Culture - Preliminary


YEAST Resulted








Laboratory Tests Noted





Critical Care - Objective





Last 24 Hour Vital Signs








  Date Time  Temp Pulse Resp B/P (MAP) Pulse Ox O2 Delivery O2 Flow Rate FiO2


 


11/5/19 09:28  77 22     60


 


11/5/19 08:12  74  147/54    


 


11/5/19 07:21  78 15     60


 


11/5/19 07:00 97.8 80 16 149/56 (87) 100   


 


11/5/19 06:00  75 16 152/61 (91) 100   


 


11/5/19 05:20  60 14     60


 


11/5/19 05:00  60 14 131/49 (76) 100   


 


11/5/19 04:00      Endotracheal Tube  


 


11/5/19 04:00  79      


 


11/5/19 04:00        60


 


11/5/19 04:00 98.4 76 14 144/61 (88) 100   


 


11/5/19 03:32  57 14     60


 


11/5/19 03:00  60 14 124/49 (74) 100   


 


11/5/19 02:00  72 14 144/54 (84) 100   


 


11/5/19 01:14  76 14     60


 


11/5/19 01:00  67 15 134/56 (82)    


 


11/5/19 00:15  83 19     


 


11/5/19 00:00  82      


 


11/5/19 00:00 98.4 67 14 132/54 (80) 100   


 


11/5/19 00:00      Endotracheal Tube  


 


11/4/19 23:00  74 14     60


 


11/4/19 23:00  82 18 140/58 (85) 100   


 


11/4/19 22:00  81 19 137/57 (83) 99   


 


11/4/19 21:03  72 14     60


 


11/4/19 21:00  69 14 128/57 (80) 100   


 


11/4/19 20:00  80      


 


11/4/19 20:00        60


 


11/4/19 20:00 98.8 84 19 147/56 (86) 100   


 


11/4/19 20:00      Endotracheal Tube  


 


11/4/19 19:29  69 14     60


 


11/4/19 19:00  68 14 131/51 (77) 100   


 


11/4/19 18:00      Endotracheal Tube  


 


11/4/19 18:00        60


 


11/4/19 18:00  76 14 138/49 (78) 100   


 


11/4/19 17:29  80  127/49    


 


11/4/19 17:21  78 14     60


 


11/4/19 17:00  70 14 127/49 (75) 100   


 


11/4/19 16:00 98.6 83 15 150/58 (88) 100   


 


11/4/19 16:00      Endotracheal Tube  


 


11/4/19 16:00  75      


 


11/4/19 16:00        60


 


11/4/19 15:00  77 17 140/59 (86) 100   


 


11/4/19 14:31  70 14     60


 


11/4/19 14:00  72 14 136/50 (78) 100   


 


11/4/19 13:10  74 14     60


 


11/4/19 13:00  77 14 146/56 (86) 100   


 


11/4/19 12:22        60


 


11/4/19 12:00  73      


 


11/4/19 12:00      Endotracheal Tube  


 


11/4/19 12:00        60


 


11/4/19 12:00 97.1 73 13 138/51 (80) 98   


 


11/4/19 11:00  87 15 142/60 (87)    








Accucheck:  78





Critical Care - Subjective


ROS Limited/Unobtainable:  No


FI02:  60


Vent Support Breath Rate:  14


Vent Support Mode:  AC


Vent Tidal Volume:  450


Sputum Amount:  Scant


PEEP:  5.0


PIP:  33


Tube Feeding Amount:  50


I&O:











Intake and Output  


 


 11/4/19 11/5/19





 19:00 07:00


 


Intake Total 363 ml 330.000 ml


 


Output Total  500 ml


 


Balance 363 ml -170.000 ml


 


  


 


IV Total 303 ml 330.000 ml


 


Other 60 ml 


 


Output Urine Total  500 ml


 


# Voids  3








ET-Tube:  7.5


ET Position:  23











Delmer Main MD Nov 5, 2019 10:51

## 2019-11-05 NOTE — NUR
NURSE NOTES:

Picc line done , awaiting confirmation.Seen by Dr Burgos, made aware foul smell urine, will 
follow up with new order

## 2019-11-05 NOTE — NUR
NURSE NOTES:

Bed bath given tolerated well. patient with episodes of pulling out tubing, reality 
orientation provided. talk therapy nad reposition provided. bilateral soft wrist restraint 
checked. incontinent of bladder, pure wick connected to suction. P200 mattress for wound 
management. oral care and suctioned patient. will continue to monitor patient. call light 
within easy reach.

## 2019-11-05 NOTE — NUR
CASE MANAGEMENT: REVIEW





 11/5/19



SI:  SEPSIS. ACUTE CHF. ACUTE ON CHRONIC RENAL FAILURE. UTI

97.8   80   15   149/56  100% ET TUBE ; FI02 60

K+ 2.6; NA+ 149; WBC 12.3; RBC 2.60; H/H 7.2/21.7; URIC ACID 8.3; MG 1.6

ABG pH 7.514; pO2 144.4; HCO3 29.7

C-REC PROT 6.7; BNP 49409



IS:      IV KCL @100HR X6 BAG

IV  MG SULFATE @100HR X4 BAGS

IV VANCOMYCIN Q12HR

IV MEROPENEM Q8HR

IV PROTONIX Q12HR

IV REGLAN Q8HR

LACTULOSE NG Q8/PRN

IV DEXTROSE Q24HR

LASIX NG

          NORVASC NG BID 

NOVOLOG SUBQ Q6HR

LEVEMIR SQBID

GT FEEDING 

FOLATE PO QD





**: ICU STATUS ON VENT 



DCP: PATIENT IS FROM  [facility] 





PLAN: WEAN FIO2

REPEAT CXR

ECG

EGD -ON HOLD

IF NO IMPROVEMENT PLAN FOR BONE MARROW BX

## 2019-11-05 NOTE — INFECTIOUS DISEASES PROG NOTE
Assessment/Plan


Problems:  


(1) Aspiration pneumonia


Assessment & Plan:  with B/L infiltrates, hypoxemia and leukocytosis, CXR  

showed interstitial infiltrates , await  sputum culture and continue vancomycin 

with meropenem  empirically . aspiration precaution. EOT 11/14/19





(2) Respiratory failure


Assessment & Plan:  with maegan cardia and S/P code blue, was intubated and 

started on mechanical ventilation , monitor CXR and ABG , pulmonary is 

following 





(3) UTI (urinary tract infection)


Assessment & Plan:  due to candida lusitaneae , S/P casas catheter removal , no 

specific therapy needed for now 





(4) Acute metabolic encephalopathy


Assessment & Plan:  due to the above, continue hydration and antibiotics, 

monitor in tele 





(5) Hyperglycemia due to type 2 diabetes mellitus


Assessment & Plan:  recommend tight glycemic control to keep blood glucose 

between 100-140 





(6) ARF (acute renal failure)


Assessment & Plan:  due to the above, continue hydration and renally dosed 

antibiotics, close  monitor of renal function , stop vancomycin 





(7) Pelvic mass in female


Assessment & Plan:  to the left of the uterus, suspect malignancy , recommend OB

/GYN eval , and tumor markers , oncology is following 








Subjective


ROS Limited/Unobtainable:  Yes


Allergies:  


Coded Allergies:  


     No Known Allergies (Unverified , 10/14/19)


Subjective


she was intubated and sedated on ativan, still in  ICU after she became 

bradycardic and had code blue , minimally responsive , no fever or chills , no 

diarrhea, no secretions from the ET tube on mechanical ventilation





Objective


Vital Signs





Last 24 Hour Vital Signs








  Date Time  Temp Pulse Resp B/P (MAP) Pulse Ox O2 Delivery O2 Flow Rate FiO2


 


11/5/19 13:00  69 22 132/51 (78) 100   


 


11/5/19 12:00  72      


 


11/5/19 12:00 98.2 69 22 132/51 (78) 100   


 


11/5/19 12:00      Endotracheal Tube  


 


11/5/19 12:00        60


 


11/5/19 11:29  71 17     60


 


11/5/19 11:00  67 21 136/56 (82) 100   


 


11/5/19 10:00  78 23 136/56 (82) 100   


 


11/5/19 09:30        60


 


11/5/19 09:28  77 22     60


 


11/5/19 09:00  76 20 141/54 (83) 100   


 


11/5/19 08:12  74  147/54    


 


11/5/19 08:00        60


 


11/5/19 08:00      Endotracheal Tube  


 


11/5/19 08:00  75 15 147/54 (85) 100   


 


11/5/19 08:00  76      


 


11/5/19 07:21  78 15     60


 


11/5/19 07:00 97.8 80 16 149/56 (87) 100   


 


11/5/19 06:00  75 16 152/61 (91) 100   


 


11/5/19 05:20  60 14     60


 


11/5/19 05:00  60 14 131/49 (76) 100   


 


11/5/19 04:00      Endotracheal Tube  


 


11/5/19 04:00  79      


 


11/5/19 04:00        60


 


11/5/19 04:00 98.4 76 14 144/61 (88) 100   


 


11/5/19 03:32  57 14     60


 


11/5/19 03:00  60 14 124/49 (74) 100   


 


11/5/19 02:00  72 14 144/54 (84) 100   


 


11/5/19 01:14  76 14     60


 


11/5/19 01:00  67 15 134/56 (82)    


 


11/5/19 00:15  83 19     


 


11/5/19 00:00  82      


 


11/5/19 00:00 98.4 67 14 132/54 (80) 100   


 


11/5/19 00:00      Endotracheal Tube  


 


11/4/19 23:00  74 14     60


 


11/4/19 23:00  82 18 140/58 (85) 100   


 


11/4/19 22:00  81 19 137/57 (83) 99   


 


11/4/19 21:03  72 14     60


 


11/4/19 21:00  69 14 128/57 (80) 100   


 


11/4/19 20:00  80      


 


11/4/19 20:00        60


 


11/4/19 20:00 98.8 84 19 147/56 (86) 100   


 


11/4/19 20:00      Endotracheal Tube  


 


11/4/19 19:29  69 14     60


 


11/4/19 19:00  68 14 131/51 (77) 100   


 


11/4/19 18:00      Endotracheal Tube  


 


11/4/19 18:00        60


 


11/4/19 18:00  76 14 138/49 (78) 100   


 


11/4/19 17:29  80  127/49    


 


11/4/19 17:21  78 14     60


 


11/4/19 17:00  70 14 127/49 (75) 100   


 


11/4/19 16:00 98.6 83 15 150/58 (88) 100   


 


11/4/19 16:00      Endotracheal Tube  


 


11/4/19 16:00  75      


 


11/4/19 16:00        60








Height (Feet):  5


Height (Inches):  5.00


Weight (Pounds):  167


General Appearance:  WD/WN, no acute distress


HEENT:  normocephalic, atraumatic, anicteric, mucous membranes moist


Respiratory/Chest:  no respiratory distress, no accessory muscle use, decreased 

breath sounds, crackles/rales


Cardiovascular:  normal peripheral pulses, normal rate, regular rhythm, no 

gallop/murmur, no JVD


Abdomen:  no organomegaly, no mass, no scars, hypoactive bowel sounds, distended


Genitourinary:  normal external genitalia


Extremities:  no cyanosis, no clubbing


Skin:  no rash, no lesions, ulcers


Neurologic/Psychiatric:  unresponsiveness


Lymphatic:  no neck adenopathy, no groin adenopathy


Musculoskeletal:  normal muscle bulk, no effusion





Laboratory Tests








Test


  11/4/19


15:45 11/5/19


04:25 11/5/19


11:25 11/5/19


14:15


 


Arterial Blood pH


  7.526


(7.350-7.450) 


  7.514


(7.350-7.450) 


 


 


Arterial Blood Partial


Pressure CO2 39.7 mmHg


(35.0-45.0) 


  37.7 mmHg


(35.0-45.0) 


 


 


Arterial Blood Partial


Pressure O2 119.9 mmHg


(75.0-100.0)  H 


  144.4 mmHg


(75.0-100.0)  H 


 


 


Arterial Blood HCO3


  32.1 mmol/L


(22.0-26.0)  H 


  29.7 mmol/L


(22.0-26.0)  H 


 


 


Arterial Blood Oxygen


Saturation 98.0 %


() 


  98.5 %


() 


 


 


Arterial Blood Base Excess 8.7 (-2-2)  H  6.3 (-2-2)  H 


 


Graham Test Positive    Positive   


 


White Blood Count


  


  12.3 K/UL


(4.8-10.8)  H 


  12.2 K/UL


(4.8-10.8)  H


 


Red Blood Count


  


  2.60 M/UL


(4.20-5.40)  L 


  2.85 M/UL


(4.20-5.40)  L


 


Hemoglobin


  


  7.2 G/DL


(12.0-16.0)  L 


  7.7 G/DL


(12.0-16.0)  L


 


Hematocrit


  


  21.7 %


(37.0-47.0)  L 


  24.0 %


(37.0-47.0)  L


 


Mean Corpuscular Volume  83 FL (80-99)    84 FL (80-99)  


 


Mean Corpuscular Hemoglobin


  


  27.9 PG


(27.0-31.0) 


  27.0 PG


(27.0-31.0)


 


Mean Corpuscular Hemoglobin


Concent 


  33.4 G/DL


(32.0-36.0) 


  32.1 G/DL


(32.0-36.0)


 


Red Cell Distribution Width


  


  13.9 %


(11.6-14.8) 


  14.1 %


(11.6-14.8)


 


Platelet Count


  


  415 K/UL


(150-450) 


  407 K/UL


(150-450)


 


Mean Platelet Volume


  


  7.0 FL


(6.5-10.1) 


  7.1 FL


(6.5-10.1)


 


Neutrophils (%) (Auto)


  


  % (45.0-75.0)


  


  % (45.0-75.0)


 


 


Lymphocytes (%) (Auto)


  


  % (20.0-45.0)


  


  % (20.0-45.0)


 


 


Monocytes (%) (Auto)   % (1.0-10.0)     % (1.0-10.0)  


 


Eosinophils (%) (Auto)   % (0.0-3.0)     % (0.0-3.0)  


 


Basophils (%) (Auto)   % (0.0-2.0)     % (0.0-2.0)  


 


Differential Total Cells


Counted 


  100  


  


  


 


 


Neutrophils % (Manual)  67 % (45-75)    Pending  


 


Lymphocytes % (Manual)  18 % (20-45)  L  Pending  


 


Monocytes % (Manual)  5 % (1-10)    


 


Eosinophils % (Manual)  10 % (0-3)  H  


 


Basophils % (Manual)  0 % (0-2)    


 


Band Neutrophils  0 % (0-8)    


 


Platelet Estimate  Adequate    Pending  


 


Platelet Morphology  Normal    Pending  


 


Prothrombin Time


  


  13.6 SEC


(9.30-11.50)  H 


  12.8 SEC


(9.30-11.50)  H


 


Prothromb Time International


Ratio 


  1.3 (0.9-1.1)


H 


  1.2 (0.9-1.1)


H


 


Activated Partial


Thromboplast Time 


  31 SEC (23-33)


  


  30 SEC (23-33)


 


 


Sodium Level


  


  149 MMOL/L


(136-145)  H 


  146 MMOL/L


(136-145)  H


 


Potassium Level


  


  2.6 MMOL/L


(3.5-5.1)  *L 


  2.6 MMOL/L


(3.5-5.1)  *L


 


Chloride Level


  


  110 MMOL/L


()  H 


  107 MMOL/L


()


 


Carbon Dioxide Level


  


  33 MMOL/L


(21-32)  H 


  32 MMOL/L


(21-32)


 


Anion Gap


  


  5 mmol/L


(5-15) 


  8 mmol/L


(5-15)


 


Blood Urea Nitrogen


  


  19 mg/dL


(7-18)  H 


  18 mg/dL


(7-18)


 


Creatinine


  


  1.0 MG/DL


(0.55-1.30) 


  0.9 MG/DL


(0.55-1.30)


 


Estimat Glomerular Filtration


Rate 


  55.5 mL/min


(>60) 


  > 60 mL/min


(>60)


 


Glucose Level


  


  78 MG/DL


()  # 


  196 MG/DL


()  #H


 


Lactic Acid Level


  


  0.60 mmol/L


(0.4-2.0) 


  


 


 


Uric Acid


  


  8.3 MG/DL


(2.6-7.2)  H 


  


 


 


Calcium Level


  


  8.9 MG/DL


(8.5-10.1) 


  8.6 MG/DL


(8.5-10.1)


 


Phosphorus Level


  


  2.5 MG/DL


(2.5-4.9) 


  


 


 


Magnesium Level


  


  1.6 MG/DL


(1.8-2.4)  L 


  


 


 


Total Bilirubin


  


  0.6 MG/DL


(0.2-1.0) 


  


 


 


Aspartate Amino Transf


(AST/SGOT) 


  13 U/L (15-37)


L 


  


 


 


Alanine Aminotransferase


(ALT/SGPT) 


  12 U/L (12-78)


  


  


 


 


Alkaline Phosphatase


  


  74 U/L


() 


  


 


 


C-Reactive Protein,


Quantitative 


  6.7 mg/dL


(0.00-0.90)  H 


  


 


 


Pro-B-Type Natriuretic Peptide


  


  39729 pg/mL


(0-125)  H 


  


 


 


Total Protein


  


  6.0 G/DL


(6.4-8.2)  L 


  


 


 


Albumin


  


  1.9 G/DL


(3.4-5.0)  L 


  


 


 


Globulin  4.1 g/dL    


 


Albumin/Globulin Ratio


  


  0.5 (1.0-2.7)


L 


  


 











Current Medications








 Medications


  (Trade)  Dose


 Ordered  Sig/Winnie


 Route


 PRN Reason  Start Time


 Stop Time Status Last Admin


Dose Admin


 


 Acetaminophen


  (Tylenol)  650 mg  Q4H  PRN


 NG


 Mild Pain/Temp > 100.5  11/3/19 20:30


 11/25/19 20:29   


 


 


 Acetaminophen


  (Tylenol)  650 mg  Q4H  PRN


 RECTAL


 TEMP>100.5  11/3/19 20:30


 12/2/19 20:29   


 


 


 Amlodipine


 Besylate


  (Norvasc)  2.5 mg  BID


 NG


   11/4/19 09:00


 11/29/19 17:59  11/4/19 09:04


 


 


 Chlorhexidine


 Gluconate


  (Mae-Hex 2%)  1 applic  DAILY@2000


 TOPIC


   11/5/19 20:00


 12/5/19 19:59   


 


 


 Dextrose


  (Dextrose 50%)  25 ml  Q30M  PRN


 IV


 Hypoglycemia  11/5/19 06:30


 12/5/19 06:29   


 


 


 Dextrose


  (Dextrose 50%)  50 ml  Q30M  PRN


 IV


 Hypoglycemia  11/5/19 06:30


 12/5/19 06:29   


 


 


 Divalproex Sodium


  (Depakote


 Sprinkles)  500 mg  Q12HR


 NG


   11/3/19 21:00


 11/14/19 21:59  11/5/19 08:41


 


 


 Docusate Sodium


  (Colace)  100 mg  EVERY 8  HOURS


 NG


   11/3/19 22:00


 11/25/19 08:59  11/5/19 14:47


 


 


 Folic Acid


  (Folate)  1 mg  DAILY


 ORAL


   11/4/19 09:00


 11/30/19 08:59  11/5/19 08:12


 


 


 Furosemide


  (Lasix)  20 mg  EVERY 12  HOURS


 IV


   11/5/19 21:00


 12/5/19 20:59   


 


 


 Heparin Sodium/


 Sodium Chloride


  (Heparin 1000


 units/500ml


 Premix)  1,000 unit  ONCE  PRN


 IV


 PICC  11/5/19 10:30


 11/5/19 23:59   


 


 


 Insulin Aspart


  (NovoLOG)    EVERY 6  HOURS


 SUBQ


   11/4/19 00:00


 11/29/19 11:59  11/4/19 20:17


 


 


 Insulin Detemir


  (Levemir)  8 units  BID


 SUBQ


   11/4/19 09:00


 12/1/19 08:59  11/4/19 20:17


 


 


 Lactulose


  (Cephulac)  20 gm  Q8H  PRN


 NG


 Constipation  11/3/19 20:30


 12/3/19 20:29  11/5/19 08:12


 


 


 Lidocaine HCl


  (Xylocaine 1%


 30ml)  30 ml  ONCE  PRN


 INJ


 PICC  11/5/19 10:30


 11/5/19 23:59   


 


 


 Lorazepam


  (Ativan 2mg/ml


 1ml)  1 mg  Q4H  PRN


 IV


 For Anxiety  11/3/19 20:30


 11/10/19 20:29  11/5/19 02:01


 


 


 Meropenem 1 gm/


 Sodium Chloride  55 ml @ 


 110 mls/hr  Q8H


 IVPB


   11/4/19 17:00


 11/9/19 16:59  11/5/19 08:11


 


 


 Metoclopramide HCl


  (Reglan)  10 mg  Q8HR


 IVP


   11/4/19 15:21


 12/4/19 15:20  11/5/19 14:40


 


 


 Midazolam HCl  100 ml @ 0


 mls/hr  Q24H  PRN


 IV


 To Patient Comfort  11/3/19 20:35


 11/10/19 20:34   


 


 


 Pantoprazole


  (Protonix)  40 mg  EVERY 12  HOURS


 IVP


   11/3/19 21:00


 12/3/19 08:59  11/5/19 08:14


 


 


 Potassium Chloride  100 ml @ 


 100 mls/hr  Q1HR


 IVPB


   11/5/19 10:00


 11/5/19 15:59  11/5/19 14:37


 


 


 Potassium Chloride


  (K-Dur)  40 meq  TWICE A  DAY


 GT


   11/5/19 18:00


 12/5/19 17:59   


 


 


 Sodium Chloride  600 ml @ 


 100 mls/hr  Q6H


 IV


   11/5/19 10:00


 11/5/19 15:59   


 


 


 Vancomycin HCl


  (Vanco rx to


 dose)  1 ea  DAILY  PRN


 MISC


 Per rx protocol  11/4/19 15:30


 12/4/19 15:29   


 


 


 Vancomycin HCl


 750 mg/Dextrose  275 ml @ 


 183.333


 mls/hr  Q12HR@0600,1800


 IVPB


   11/4/19 18:00


 11/9/19 17:59  11/5/19 07:30


 

















Amie Burgos M.D. Nov 5, 2019 15:10

## 2019-11-05 NOTE — NUR
NURSE NOTES:

Bed bath given tolerated well.  no moaning no facial grimaces. frequent visual checks 
continued.

## 2019-11-05 NOTE — NUR
NURSE NOTES:

Transfused 1 PRBC no adverse side effect noted. no s/s of acute distress noted. no fever. no 
moaning no facial grimaces noted. will continue plan of care.

## 2019-11-05 NOTE — GENERAL PROGRESS NOTE
Assessment/Plan


Status:  unchanged


Assessment/Plan:


S, O: Intubated , vent setting reviewed. No pressor. awake, limited following 

commands





PHYSICAL EXAMINATION:HEAD AND NECK:  intubated .


CHEST:  Bronchial breathing sounds.ABDOMEN:  Soft.  No organomegaly.


MUSCULOSKELETAL:  Diffuse 1+ or 2+ nonpitting edema in all four contracted


extremities.  The patient is not following the commands.  Limited


evaluation.NEUROLOGY:  The patient is awake.  Not alert.  Not verbally


communicative.





LABORATORY DATA:  Labs dated Nov 5, 2019  reviewed





Meds: Reviewed and reconciled in the chart





Imaging:  CXR reviewed 





ASSESSMENT AND PLAN:


1. VDRF


2. Sepsis.  secondary to healthcare-associated pneumonia or


3. Acute chf exacerbation - Diastolic


4. Chronic encephalomalacia.


3. Acute on chronic anemia.


4. Renal failure, age indeterminate.


6. Hyperthyroidism.


7. Hyperkalemia.


8. Diabetes type 2, uncontrolled with A1c of 10.


9. Psychiatric disorder.


10. GI and DVT prophylaxis.


11. PAH- Severe 





PLAN OF CARE:


Worsening leukocytosis 


Post PEG tube


Out patient followup for abnormal incidental imaging finding ( possibility of 

CA cant be excluded 


Acute drop in Hgb and observation of UGI bleeding, Will hold DAPT





Subjective


Allergies:  


Coded Allergies:  


     No Known Allergies (Unverified , 10/14/19)





Objective





Last 24 Hour Vital Signs








  Date Time  Temp Pulse Resp B/P (MAP) Pulse Ox O2 Delivery O2 Flow Rate FiO2


 


11/5/19 13:00  69 22 132/51 (78) 100   


 


11/5/19 12:00  72      


 


11/5/19 12:00 98.2 69 22 132/51 (78) 100   


 


11/5/19 12:00      Endotracheal Tube  


 


11/5/19 12:00        60


 


11/5/19 11:29  71 17     60


 


11/5/19 11:00  67 21 136/56 (82) 100   


 


11/5/19 10:00  78 23 136/56 (82) 100   


 


11/5/19 09:30        60


 


11/5/19 09:28  77 22     60


 


11/5/19 09:00  76 20 141/54 (83) 100   


 


11/5/19 08:12  74  147/54    


 


11/5/19 08:00        60


 


11/5/19 08:00      Endotracheal Tube  


 


11/5/19 08:00  75 15 147/54 (85) 100   


 


11/5/19 08:00  76      


 


11/5/19 07:21  78 15     60


 


11/5/19 07:00 97.8 80 16 149/56 (87) 100   


 


11/5/19 06:00  75 16 152/61 (91) 100   


 


11/5/19 05:20  60 14     60


 


11/5/19 05:00  60 14 131/49 (76) 100   


 


11/5/19 04:00      Endotracheal Tube  


 


11/5/19 04:00  79      


 


11/5/19 04:00        60


 


11/5/19 04:00 98.4 76 14 144/61 (88) 100   


 


11/5/19 03:32  57 14     60


 


11/5/19 03:00  60 14 124/49 (74) 100   


 


11/5/19 02:00  72 14 144/54 (84) 100   


 


11/5/19 01:14  76 14     60


 


11/5/19 01:00  67 15 134/56 (82)    


 


11/5/19 00:15  83 19     


 


11/5/19 00:00  82      


 


11/5/19 00:00 98.4 67 14 132/54 (80) 100   


 


11/5/19 00:00      Endotracheal Tube  


 


11/4/19 23:00  74 14     60


 


11/4/19 23:00  82 18 140/58 (85) 100   


 


11/4/19 22:00  81 19 137/57 (83) 99   


 


11/4/19 21:03  72 14     60


 


11/4/19 21:00  69 14 128/57 (80) 100   


 


11/4/19 20:00  80      


 


11/4/19 20:00        60


 


11/4/19 20:00 98.8 84 19 147/56 (86) 100   


 


11/4/19 20:00      Endotracheal Tube  


 


11/4/19 19:29  69 14     60


 


11/4/19 19:00  68 14 131/51 (77) 100   


 


11/4/19 18:00      Endotracheal Tube  


 


11/4/19 18:00        60


 


11/4/19 18:00  76 14 138/49 (78) 100   


 


11/4/19 17:29  80  127/49    


 


11/4/19 17:21  78 14     60


 


11/4/19 17:00  70 14 127/49 (75) 100   


 


11/4/19 16:00 98.6 83 15 150/58 (88) 100   


 


11/4/19 16:00      Endotracheal Tube  


 


11/4/19 16:00  75      


 


11/4/19 16:00        60

















Intake and Output  


 


 11/4/19 11/5/19





 19:00 07:00


 


Intake Total 363 ml 330.000 ml


 


Output Total  500 ml


 


Balance 363 ml -170.000 ml


 


  


 


IV Total 303 ml 330.000 ml


 


Other 60 ml 


 


Output Urine Total  500 ml


 


# Voids  3








Laboratory Tests


11/5/19 04:25: 


White Blood Count 12.3H, Red Blood Count 2.60L, Hemoglobin 7.2L, Hematocrit 

21.7L, Mean Corpuscular Volume 83, Mean Corpuscular Hemoglobin 27.9, Mean 

Corpuscular Hemoglobin Concent 33.4, Red Cell Distribution Width 13.9, Platelet 

Count 415, Mean Platelet Volume 7.0, Neutrophils (%) (Auto) , Lymphocytes (%) (

Auto) , Monocytes (%) (Auto) , Eosinophils (%) (Auto) , Basophils (%) (Auto) , 

Differential Total Cells Counted 100, Neutrophils % (Manual) 67, Lymphocytes % (

Manual) 18L, Monocytes % (Manual) 5, Eosinophils % (Manual) 10H, Basophils % (

Manual) 0, Band Neutrophils 0, Platelet Estimate Adequate, Platelet Morphology 

Normal, Prothrombin Time 13.6H, Prothromb Time International Ratio 1.3H, 

Activated Partial Thromboplast Time 31, Sodium Level 149H, Potassium Level 2.6*L

, Chloride Level 110H, Carbon Dioxide Level 33H, Anion Gap 5, Blood Urea 

Nitrogen 19H, Creatinine 1.0, Estimat Glomerular Filtration Rate 55.5, Glucose 

Level 78#, Lactic Acid Level 0.60, Uric Acid 8.3H, Calcium Level 8.9, 

Phosphorus Level 2.5, Magnesium Level 1.6L, Total Bilirubin 0.6, Aspartate 

Amino Transf (AST/SGOT) 13L, Alanine Aminotransferase (ALT/SGPT) 12, Alkaline 

Phosphatase 74, C-Reactive Protein, Quantitative 6.7H, Pro-B-Type Natriuretic 

Peptide 08501N, Total Protein 6.0L, Albumin 1.9L, Globulin 4.1, Albumin/

Globulin Ratio 0.5L


11/5/19 11:25: 


Arterial Blood pH 7.514H, Arterial Blood Partial Pressure CO2 37.7, Arterial 

Blood Partial Pressure O2 144.4H, Arterial Blood HCO3 29.7H, Arterial Blood 

Oxygen Saturation 98.5, Arterial Blood Base Excess 6.3H, Graham Test Positive


11/5/19 14:15: 


White Blood Count 12.2H, Red Blood Count 2.85L, Hemoglobin 7.7L, Hematocrit 

24.0L, Mean Corpuscular Volume 84, Mean Corpuscular Hemoglobin 27.0, Mean 

Corpuscular Hemoglobin Concent 32.1, Red Cell Distribution Width 14.1, Platelet 

Count 407, Mean Platelet Volume 7.1, Neutrophils (%) (Auto) , Lymphocytes (%) (

Auto) , Monocytes (%) (Auto) , Eosinophils (%) (Auto) , Basophils (%) (Auto) , 

Differential Total Cells Counted 100, Neutrophils % (Manual) 83H, Lymphocytes % 

(Manual) 12L, Monocytes % (Manual) 3, Eosinophils % (Manual) 2, Basophils % (

Manual) 0, Band Neutrophils 0, Platelet Estimate Adequate, Platelet Morphology 

Normal, Prothrombin Time 12.8H, Prothromb Time International Ratio 1.2H, 

Activated Partial Thromboplast Time 30, Sodium Level 146H, Potassium Level 2.6*L

, Chloride Level 107, Carbon Dioxide Level 32, Anion Gap 8, Blood Urea Nitrogen 

18, Creatinine 0.9, Estimat Glomerular Filtration Rate > 60, Glucose Level 196#H

, Calcium Level 8.6, Nucleated Red Blood Cells 1, Polychromasia 1+, 

Anisocytosis 1+, Microcytosis 1+


Height (Feet):  5


Height (Inches):  5.00


Weight (Pounds):  167











Garrick Keith MD Nov 5, 2019 15:57

## 2019-11-05 NOTE — NUR
NURSE NOTES:

Pt seen by Sami CHANG and made aware pt on Pepcid and Protonix, order to D/C Pepcid.Noted and 
carried out

-------------------------------------------------------------------------------

Addendum: 11/05/19 at 1756 by Irena Bang RN

-------------------------------------------------------------------------------

New order placed bu CHARLENE Gallardo for EGD and NPO. Will endorsed next shift to obtain consent from 
family

## 2019-11-05 NOTE — NUR
NURSE NOTES:

Called radiology for Picc line placement but per [facility] it will be done after 
their break.Pt kept on close monitoring. No significant change of condition,will continue 
monitoring.Call light within easy reach.

## 2019-11-05 NOTE — NUR
NURSE NOTES:

Pt turned and repositioned, potassium and magnesium dosage changed by Dr Main, will follow 
up with new orders.Patient turned and repositioned, HOB elevated to prevent aspiration, will 
continue monitoring.Blood transfusion not started yet due to access, awaiting Picc line team

## 2019-11-05 NOTE — DIAGNOSTIC IMAGING REPORT
Indications: Needs long-term IV access

 

Technique: Procedure performed at bedside. Procedural timeout performed. Ultrasound

confirms patent compressible right basilic vein. Total sterile technique, including

sterile probe cover and sterile gel, sterile gloves, hand hygiene, hat, mask,,

sterile gown, large sterile drape, and preparation with 2% chlorhexidine utilized.

Local anesthesia with 1% lidocaine. Under real-time ultrasound guidance, puncture

basilic vein using 21-gauge needle, passage 0.018 guidewire, exchange for 4 Bulgarian

peel-away sheath. 4 Bulgarian Bard dual-lumen power PICC cut to 36 cm. It was inserted

through the peel-away sheath. Peel-away sheath and guidewire removed. Catheter fixed

to the skin. Both catheter ports aspirated and flushed. Patient tolerated procedure

well, without immediate complication. Followup chest x-ray obtained, documents

catheter tip position at the mid superior vena cava

 

Impression: Successful bedside placement of right arm PICC under sonographic

guidance, as described above.

## 2019-11-05 NOTE — NUR
NURSE NOTES:

Patient was seen by Dr Keith with order for bilateral knee SCD. Order noted and carried 
out

## 2019-11-05 NOTE — NUR
NURSE NOTES:

Received call from Dr Main with new order to adjust FiO2 to maintain saturation between 
90-96%.Change tidal volume to 450 and ABG in 2 hours. Give potassium 40meq IV and start D5 
1/2 NS at 40cc/hr. Order noted and carried out

-------------------------------------------------------------------------------

Addendum: 11/05/19 at 0938 by Irena Bang RN

-------------------------------------------------------------------------------

also give magnesium 2g

## 2019-11-05 NOTE — GI PROGRESS NOTE
Assessment/Plan


Problems:  


(1) PEG (percutaneous endoscopic gastrostomy) status


ICD Codes:  Z93.1 - Gastrostomy status


SNOMED:  979764233, [id number]


(2) Anemia


ICD Codes:  D64.9 - Anemia, unspecified


SNOMED:  890880792


Qualifiers:  


   Qualified Codes:  D64.9 - Anemia, unspecified


(3) Dehydration


ICD Codes:  E86.0 - Dehydration


SNOMED:  08061168, 0221261


Status:  unchanged


Status Narrative


Discussed with Dr. Turk.


Assessment/Plan


Assessment


(1) pneumonia


(2) UTI


(3) Sepsis


(4) Dehydration


(5) CHANCE 


(6) Acute metabolic encephalopathy


(7) Hyperglycemia due to type 2 diabetes mellitus


(8) Renal failure (ARF), acute on chronic


(9) Aspiration pneumonia


(10) Abnormal TSH


(11) Pelvic mass in female


(12) Congestive Heart Failure


(13) Anemia


(14) Dysphagia - s/p GT


Had RRT, EGD cancelled at the time given bloody aspirate from GT.





Recommendations


EGD tomorrow given drop in Hgb.


- NPO @ MN


- hold all blood thinners


IVF


BIPAP


Elevate HOB


PPI and H2B


will follow up





The patient was seen and examined at bedside and all new and available data was 

reviewed in the patients chart. I agree with the above findings, impression 

and plan.  (Patient seen earlier today. Signature stamp does not reflect 

patient encounter time.). - Storm Turk MD





Subjective


Subjective


Limited





Objective





Last 24 Hour Vital Signs








  Date Time  Temp Pulse Resp B/P (MAP) Pulse Ox O2 Delivery O2 Flow Rate FiO2


 


11/5/19 13:00  69 22 132/51 (78) 100   


 


11/5/19 12:00  72      


 


11/5/19 12:00 98.2 69 22 132/51 (78) 100   


 


11/5/19 12:00      Endotracheal Tube  


 


11/5/19 12:00        60


 


11/5/19 11:29  71 17     60


 


11/5/19 11:00  67 21 136/56 (82) 100   


 


11/5/19 10:00  78 23 136/56 (82) 100   


 


11/5/19 09:30        60


 


11/5/19 09:28  77 22     60


 


11/5/19 09:00  76 20 141/54 (83) 100   


 


11/5/19 08:12  74  147/54    


 


11/5/19 08:00        60


 


11/5/19 08:00      Endotracheal Tube  


 


11/5/19 08:00  75 15 147/54 (85) 100   


 


11/5/19 08:00  76      


 


11/5/19 07:21  78 15     60


 


11/5/19 07:00 97.8 80 16 149/56 (87) 100   


 


11/5/19 06:00  75 16 152/61 (91) 100   


 


11/5/19 05:20  60 14     60


 


11/5/19 05:00  60 14 131/49 (76) 100   


 


11/5/19 04:00      Endotracheal Tube  


 


11/5/19 04:00  79      


 


11/5/19 04:00        60


 


11/5/19 04:00 98.4 76 14 144/61 (88) 100   


 


11/5/19 03:32  57 14     60


 


11/5/19 03:00  60 14 124/49 (74) 100   


 


11/5/19 02:00  72 14 144/54 (84) 100   


 


11/5/19 01:14  76 14     60


 


11/5/19 01:00  67 15 134/56 (82)    


 


11/5/19 00:15  83 19     


 


11/5/19 00:00  82      


 


11/5/19 00:00 98.4 67 14 132/54 (80) 100   


 


11/5/19 00:00      Endotracheal Tube  


 


11/4/19 23:00  74 14     60


 


11/4/19 23:00  82 18 140/58 (85) 100   


 


11/4/19 22:00  81 19 137/57 (83) 99   


 


11/4/19 21:03  72 14     60


 


11/4/19 21:00  69 14 128/57 (80) 100   


 


11/4/19 20:00  80      


 


11/4/19 20:00        60


 


11/4/19 20:00 98.8 84 19 147/56 (86) 100   


 


11/4/19 20:00      Endotracheal Tube  


 


11/4/19 19:29  69 14     60


 


11/4/19 19:00  68 14 131/51 (77) 100   


 


11/4/19 18:00      Endotracheal Tube  


 


11/4/19 18:00        60


 


11/4/19 18:00  76 14 138/49 (78) 100   


 


11/4/19 17:29  80  127/49    


 


11/4/19 17:21  78 14     60


 


11/4/19 17:00  70 14 127/49 (75) 100   


 


11/4/19 16:00 98.6 83 15 150/58 (88) 100   


 


11/4/19 16:00      Endotracheal Tube  


 


11/4/19 16:00  75      


 


11/4/19 16:00        60


 


11/4/19 15:00  77 17 140/59 (86) 100   


 


11/4/19 14:31  70 14     60


 


11/4/19 14:00  72 14 136/50 (78) 100   

















Intake and Output  


 


 11/4/19 11/5/19





 19:00 07:00


 


Intake Total 363 ml 330.000 ml


 


Output Total  500 ml


 


Balance 363 ml -170.000 ml


 


  


 


IV Total 303 ml 330.000 ml


 


Other 60 ml 


 


Output Urine Total  500 ml


 


# Voids  3











Laboratory Tests








Test


  11/4/19


15:45 11/5/19


04:25 11/5/19


11:25


 


Arterial Blood pH


  7.526


(7.350-7.450) 


  7.514


(7.350-7.450)


 


Arterial Blood Partial


Pressure CO2 39.7 mmHg


(35.0-45.0) 


  37.7 mmHg


(35.0-45.0)


 


Arterial Blood Partial


Pressure O2 119.9 mmHg


(75.0-100.0)  H 


  144.4 mmHg


(75.0-100.0)  H


 


Arterial Blood HCO3


  32.1 mmol/L


(22.0-26.0)  H 


  29.7 mmol/L


(22.0-26.0)  H


 


Arterial Blood Oxygen


Saturation 98.0 %


() 


  98.5 %


()


 


Arterial Blood Base Excess 8.7 (-2-2)  H  6.3 (-2-2)  H


 


Graham Test Positive    Positive  


 


White Blood Count


  


  12.3 K/UL


(4.8-10.8)  H 


 


 


Red Blood Count


  


  2.60 M/UL


(4.20-5.40)  L 


 


 


Hemoglobin


  


  7.2 G/DL


(12.0-16.0)  L 


 


 


Hematocrit


  


  21.7 %


(37.0-47.0)  L 


 


 


Mean Corpuscular Volume  83 FL (80-99)   


 


Mean Corpuscular Hemoglobin


  


  27.9 PG


(27.0-31.0) 


 


 


Mean Corpuscular Hemoglobin


Concent 


  33.4 G/DL


(32.0-36.0) 


 


 


Red Cell Distribution Width


  


  13.9 %


(11.6-14.8) 


 


 


Platelet Count


  


  415 K/UL


(150-450) 


 


 


Mean Platelet Volume


  


  7.0 FL


(6.5-10.1) 


 


 


Neutrophils (%) (Auto)


  


  % (45.0-75.0)


  


 


 


Lymphocytes (%) (Auto)


  


  % (20.0-45.0)


  


 


 


Monocytes (%) (Auto)   % (1.0-10.0)   


 


Eosinophils (%) (Auto)   % (0.0-3.0)   


 


Basophils (%) (Auto)   % (0.0-2.0)   


 


Differential Total Cells


Counted 


  100  


  


 


 


Neutrophils % (Manual)  67 % (45-75)   


 


Lymphocytes % (Manual)  18 % (20-45)  L 


 


Monocytes % (Manual)  5 % (1-10)   


 


Eosinophils % (Manual)  10 % (0-3)  H 


 


Basophils % (Manual)  0 % (0-2)   


 


Band Neutrophils  0 % (0-8)   


 


Platelet Estimate  Adequate   


 


Platelet Morphology  Normal   


 


Prothrombin Time


  


  13.6 SEC


(9.30-11.50)  H 


 


 


Prothromb Time International


Ratio 


  1.3 (0.9-1.1)


H 


 


 


Activated Partial


Thromboplast Time 


  31 SEC (23-33)


  


 


 


Sodium Level


  


  149 MMOL/L


(136-145)  H 


 


 


Potassium Level


  


  2.6 MMOL/L


(3.5-5.1)  *L 


 


 


Chloride Level


  


  110 MMOL/L


()  H 


 


 


Carbon Dioxide Level


  


  33 MMOL/L


(21-32)  H 


 


 


Anion Gap


  


  5 mmol/L


(5-15) 


 


 


Blood Urea Nitrogen


  


  19 mg/dL


(7-18)  H 


 


 


Creatinine


  


  1.0 MG/DL


(0.55-1.30) 


 


 


Estimat Glomerular Filtration


Rate 


  55.5 mL/min


(>60) 


 


 


Glucose Level


  


  78 MG/DL


()  # 


 


 


Lactic Acid Level


  


  0.60 mmol/L


(0.4-2.0) 


 


 


Uric Acid


  


  8.3 MG/DL


(2.6-7.2)  H 


 


 


Calcium Level


  


  8.9 MG/DL


(8.5-10.1) 


 


 


Phosphorus Level


  


  2.5 MG/DL


(2.5-4.9) 


 


 


Magnesium Level


  


  1.6 MG/DL


(1.8-2.4)  L 


 


 


Total Bilirubin


  


  0.6 MG/DL


(0.2-1.0) 


 


 


Aspartate Amino Transf


(AST/SGOT) 


  13 U/L (15-37)


L 


 


 


Alanine Aminotransferase


(ALT/SGPT) 


  12 U/L (12-78)


  


 


 


Alkaline Phosphatase


  


  74 U/L


() 


 


 


C-Reactive Protein,


Quantitative 


  6.7 mg/dL


(0.00-0.90)  H 


 


 


Pro-B-Type Natriuretic Peptide


  


  07653 pg/mL


(0-125)  H 


 


 


Total Protein


  


  6.0 G/DL


(6.4-8.2)  L 


 


 


Albumin


  


  1.9 G/DL


(3.4-5.0)  L 


 


 


Globulin  4.1 g/dL   


 


Albumin/Globulin Ratio


  


  0.5 (1.0-2.7)


L 


 








Height (Feet):  5


Height (Inches):  5.00


Weight (Pounds):  167


General Appearance:  no apparent distress


Cardiovascular:  normal rate


Abdominal Exam:  soft











Darren Lucio NP Nov 5, 2019 13:26

## 2019-11-05 NOTE — NEPHROLOGY PROGRESS NOTE
Assessment/Plan


Problem List:  


(1) Renal failure (ARF), acute on chronic


Assessment:  resolved





(2) Dehydration


(3) ARF (acute renal failure)


(4) Sepsis


(5) Acute metabolic encephalopathy


(6) Hyperglycemia due to type 2 diabetes mellitus


(7) UTI (urinary tract infection)


(8) Diabetic nephropathy


Assessment





Renal failure, Acute on Chronic resolved


Dehydration


Severe Anemia


Sepsis / Pneumonia / UTI


DM, OOC


Proteinuria , Diabetic Nephropathy


Acute encephalopathy


Plan


casas out


on K and Mag IV 


Pulmonary and Vent support


IV Reglan


GT feeding


Stop IV fluid-


Lasix


has PEG


Per order








previously 


Cr lowering 


Will order urine studies and monitor renal parameters


kidney YOANDY noted


lower iv fluids rate


WBCs rising


has casas again due to retention


Avoid Nephrotoxics








previously


Anemia salas


2D echo


24 H urine protein


Keep BP and BS in check


I am holding  her psych meds due to low MS


Per orders





Subjective


ROS Limited/Unobtainable:  Yes





Objective


Objective





Last 24 Hour Vital Signs








  Date Time  Temp Pulse Resp B/P (MAP) Pulse Ox O2 Delivery O2 Flow Rate FiO2


 


11/5/19 09:28  77 22     60


 


11/5/19 08:12  74  147/54    


 


11/5/19 07:21  78 15     60


 


11/5/19 07:00 97.8 80 16 149/56 (87) 100   


 


11/5/19 06:00  75 16 152/61 (91) 100   


 


11/5/19 05:20  60 14     60


 


11/5/19 05:00  60 14 131/49 (76) 100   


 


11/5/19 04:00      Endotracheal Tube  


 


11/5/19 04:00  79      


 


11/5/19 04:00        60


 


11/5/19 04:00 98.4 76 14 144/61 (88) 100   


 


11/5/19 03:32  57 14     60


 


11/5/19 03:00  60 14 124/49 (74) 100   


 


11/5/19 02:00  72 14 144/54 (84) 100   


 


11/5/19 01:14  76 14     60


 


11/5/19 01:00  67 15 134/56 (82)    


 


11/5/19 00:15  83 19     


 


11/5/19 00:00  82      


 


11/5/19 00:00 98.4 67 14 132/54 (80) 100   


 


11/5/19 00:00      Endotracheal Tube  


 


11/4/19 23:00  74 14     60


 


11/4/19 23:00  82 18 140/58 (85) 100   


 


11/4/19 22:00  81 19 137/57 (83) 99   


 


11/4/19 21:03  72 14     60


 


11/4/19 21:00  69 14 128/57 (80) 100   


 


11/4/19 20:00  80      


 


11/4/19 20:00        60


 


11/4/19 20:00 98.8 84 19 147/56 (86) 100   


 


11/4/19 20:00      Endotracheal Tube  


 


11/4/19 19:29  69 14     60


 


11/4/19 19:00  68 14 131/51 (77) 100   


 


11/4/19 18:00      Endotracheal Tube  


 


11/4/19 18:00        60


 


11/4/19 18:00  76 14 138/49 (78) 100   


 


11/4/19 17:29  80  127/49    


 


11/4/19 17:21  78 14     60


 


11/4/19 17:00  70 14 127/49 (75) 100   


 


11/4/19 16:00 98.6 83 15 150/58 (88) 100   


 


11/4/19 16:00      Endotracheal Tube  


 


11/4/19 16:00  75      


 


11/4/19 16:00        60


 


11/4/19 15:00  77 17 140/59 (86) 100   


 


11/4/19 14:31  70 14     60


 


11/4/19 14:00  72 14 136/50 (78) 100   


 


11/4/19 13:10  74 14     60


 


11/4/19 13:00  77 14 146/56 (86) 100   


 


11/4/19 12:22        60


 


11/4/19 12:00  73      


 


11/4/19 12:00      Endotracheal Tube  


 


11/4/19 12:00        60


 


11/4/19 12:00 97.1 73 13 138/51 (80) 98   


 


11/4/19 11:00  87 15 142/60 (87)    

















Intake and Output  


 


 11/4/19 11/5/19





 19:00 07:00


 


Intake Total 363 ml 330.000 ml


 


Output Total  500 ml


 


Balance 363 ml -170.000 ml


 


  


 


IV Total 303 ml 330.000 ml


 


Other 60 ml 


 


Output Urine Total  500 ml


 


# Voids  3








Laboratory Tests


11/4/19 15:45: 


Arterial Blood pH 7.526H, Arterial Blood Partial Pressure CO2 39.7, Arterial 

Blood Partial Pressure O2 119.9H, Arterial Blood HCO3 32.1H, Arterial Blood 

Oxygen Saturation 98.0, Arterial Blood Base Excess 8.7H, Graham Test Positive


11/5/19 04:25: 


White Blood Count 12.3H, Red Blood Count 2.60L, Hemoglobin 7.2L, Hematocrit 

21.7L, Mean Corpuscular Volume 83, Mean Corpuscular Hemoglobin 27.9, Mean 

Corpuscular Hemoglobin Concent 33.4, Red Cell Distribution Width 13.9, Platelet 

Count 415, Mean Platelet Volume 7.0, Neutrophils (%) (Auto) , Lymphocytes (%) (

Auto) , Monocytes (%) (Auto) , Eosinophils (%) (Auto) , Basophils (%) (Auto) , 

Differential Total Cells Counted 100, Neutrophils % (Manual) 67, Lymphocytes % (

Manual) 18L, Monocytes % (Manual) 5, Eosinophils % (Manual) 10H, Basophils % (

Manual) 0, Band Neutrophils 0, Platelet Estimate Adequate, Platelet Morphology 

Normal, Prothrombin Time 13.6H, Prothromb Time International Ratio 1.3H, 

Activated Partial Thromboplast Time 31, Sodium Level 149H, Potassium Level 2.6*L

, Chloride Level 110H, Carbon Dioxide Level 33H, Anion Gap 5, Blood Urea 

Nitrogen 19H, Creatinine 1.0, Estimat Glomerular Filtration Rate 55.5, Glucose 

Level 78#, Lactic Acid Level 0.60, Uric Acid 8.3H, Calcium Level 8.9, 

Phosphorus Level 2.5, Magnesium Level 1.6L, Total Bilirubin 0.6, Aspartate 

Amino Transf (AST/SGOT) 13L, Alanine Aminotransferase (ALT/SGPT) 12, Alkaline 

Phosphatase 74, C-Reactive Protein, Quantitative 6.7H, Pro-B-Type Natriuretic 

Peptide 88842U, Total Protein 6.0L, Albumin 1.9L, Globulin 4.1, Albumin/

Globulin Ratio 0.5L


Height (Feet):  5


Height (Inches):  5.00


Weight (Pounds):  166


General Appearance:  no apparent distress


EENT:  other - vented


Cardiovascular:  normal rate


Respiratory/Chest:  decreased breath sounds


Abdomen:  distended


Objective


no change











Lukasz Vizcaino MD Nov 5, 2019 10:52

## 2019-11-05 NOTE — NUR
NURSE NOTES:

Adls done,turned and repositioned.No significant changed. Per Dr Kohler transfuse only 1 
unit PRBC, repeat Hgb 7.7. Turned and reposition for skin management

## 2019-11-05 NOTE — HEMATOLOGY/ONC PROGRESS NOTE
Assessment/Plan


Assessment/Plan





Assessment and Recs:


# Anemia of chronic disease due to underlying chronic medical issues, 

multifactorial 


--> Anemia workup has been ordered, rule out gi bleed --> ferritin is 1400+


--> No evidence of hemolysis is noted, peripheral smear has been reviewed.


--> Hgb goal >7. Transfuse prn.


--> Epogen or iron at this time is not particularly indicated


--> Medications have been reviewed


--> low threshold for gi evaluation in case has occult +


--> bone marrow biopsy is not indicated given the other more likely causes (if 

recurrent anemia) first time here


--> hgb trend 8.4->8.5-->8.2->7.7-->7.9-->10.1-->9.1->7.8->7.2


--> FOLIC ACID 1mg po daily started


# Leukocytosis/elevated white blood cell count, unspecified likely related to 

underlying reaction v more likely infection


--> have reviewed peripheral smear and bandemia/neutrophilia noted


--> continue antibiotics if they have been started by ID team (VANC/ZOSYN)--> 

vanc/linda--> linda


--> wbc 39-->28k-->29k->31k-->23-->26k-->19k-->24k-->15.4-->16-->14-->23k-->14--

>17-->12.5->12.3


--> monitor for resolution


--> CT C/A/P Results reviewed


--> FOR INdium bone scan done -> Rim of activity within the pelvis, 

corresponding to expected location of the soft tissue component of the cystic 

pelvic mass described on recent imaging studies.


--> again consider gyn eval


--> as come down over last week, currently only 12k and s/p code, continue to 

monitor


# Pelvic mass on ct and us -- 13 x 7 x 12.9 cm unilocular cystic mass, 

corresponding to lesion reported on recent CT scan. Layering low level internal 

echoes likely represent bladder debris.. This presumably represents a cystic 

ovarian lesion with debris or hemorrhage, possibly neoplastic.


--> CA-125 is 216! elevated


--> consider gyn eval


# Hypercalcemia - btw 10-11 range when corrected to alb


--> ivf have been started


--> if remains elevated, 7.9-->8.3


--> will get pth


# Sepsis from pneumonia.  


--> pulm consulted, abx started


--> on bipap


# CHANCE (acute kidney injury) on ckd


--> cr 1.6-->2.7-->2.2->1.2


--> per renal


# UTI (urinary tract infection)


--> on abx per id


# Dehydration


--> on ivf


# Acute metabolic encephalopathy


--> likely due to pna


# Resp failure now intibated 11/4


# Dvt ppx lovenox sq





The timing of this note does not necessarily reflect the time of the patient 

was seen.





Greatly appreciate consultation.





Subjective


Constitutional:  Denies: no symptoms, chills, fever, malaise, weakness, other


HEENT:  Denies: no symptoms, eye pain, blurred vision, tearing, double vision, 

ear pain, ear discharge, nose pain, nose congestion, throat pain, throat 

swelling, mouth pain, mouth swelling, other


Cardiovascular:  Denies: no symptoms, chest pain, edema, irregular heart rate, 

lightheadedness, palpitations, syncope, other


Respiratory:  Denies: no symptoms, cough, shortness of breath, SOB with 

excertion, SOB at rest, sputum, wheezing, other


Gastrointestinal/Abdominal:  Denies: no symptoms, abdomen distended, abdominal 

pain, black stools, tarry stools, blood in stool, constipated, diarrhea, 

difficulty swallowing, nausea, poor appetite, poor fluid intake, rectal bleeding

, vomiting, other


Genitourinary:  Denies: no symptoms, burning, discharge, frequency, flank pain, 

hematuria, incontinence, pain, urgency, other


Neurologic/Psychiatric:  Denies: no symptoms, anxiety, depressed, emotional 

problems, headache, numbness, paresthesia, pre-existing deficit, seizure, 

tingling, tremors, weakness, other


Endocrine:  Denies: no symptoms, excessive sweating, flushing, intolerance to 

cold, intolerance to heat, increased hunger, increased thirst, increased urine, 

unexplained weight gain, unexplained weight loss, other


Allergies:  


Coded Allergies:  


     No Known Allergies (Unverified , 10/14/19)


Subjective


10/14: hgb has improved, no bleeding, labs reivewed


10/15: no events to report


10/16: a+ o x1, no bleeding or chills noted, no major changes, occult pending


10/17: no events noted, no bleeding, no chills, no hematemesis/hematochezia


10/18: remains confused, with abx, on nc


10/19: cr is worse, hgb 8.4, no bleeding, night sweats, chills


10/20: no f/c, no night sweats, labs noted, cr worse


10/21: no bleeding or chills, no night sweats, labs pending this am


10/22: pelvic mass noted on imaging, no bleeding reported, ordered ca125


10/23: no events, remains lethargic, is on abx, seen by surg


10/24: with raid response overnight, rr high as well as bp, vidal to icu


10/25: no events, no bleeding, to undergo a indium scan with id


10/26: comfortable, electrolytes reviewed, given mag and k


10/29: back on bipap with gt feeds, dw rn this am


10/30: remains very confused, no f/c, no bleeding, with urinary retention, 

folic acid started


10/31: sleeping, is on bipap, no events, wbc is higher this am


11/1: no events to report, no bleeding, wbc still high but better


11/3: no bleeding, no night sweats, s/p peg, alert


11/4: s/p code blue last night, now intubated, labs noted wbc lower


11/5: given k and mg, for endoscopy tomorrow given drop h/h, 1 unit prbc





Objective


Objective





Current Medications








 Medications


  (Trade)  Dose


 Ordered  Sig/Winnie


 Route


 PRN Reason  Start Time


 Stop Time Status Last Admin


Dose Admin


 


 Acetaminophen


  (Tylenol)  650 mg  Q4H  PRN


 NG


 Mild Pain/Temp > 100.5  11/3/19 20:30


 11/25/19 20:29   


 


 


 Acetaminophen


  (Tylenol)  650 mg  Q4H  PRN


 RECTAL


 TEMP>100.5  11/3/19 20:30


 12/2/19 20:29   


 


 


 Amlodipine


 Besylate


  (Norvasc)  2.5 mg  BID


 NG


   11/4/19 09:00


 11/29/19 17:59  11/4/19 09:04


 


 


 Chlorhexidine


 Gluconate


  (Mae-Hex 2%)  1 applic  DAILY@2000


 TOPIC


   11/5/19 20:00


 12/5/19 19:59   


 


 


 Dextrose


  (Dextrose 50%)  25 ml  Q30M  PRN


 IV


 Hypoglycemia  11/5/19 06:30


 12/5/19 06:29   


 


 


 Dextrose


  (Dextrose 50%)  50 ml  Q30M  PRN


 IV


 Hypoglycemia  11/5/19 06:30


 12/5/19 06:29   


 


 


 Divalproex Sodium


  (Depakote


 Sprinkles)  500 mg  Q12HR


 NG


   11/3/19 21:00


 11/14/19 21:59  11/5/19 08:41


 


 


 Docusate Sodium


  (Colace)  100 mg  EVERY 8  HOURS


 NG


   11/3/19 22:00


 11/25/19 08:59  11/5/19 06:37


 


 


 Folic Acid


  (Folate)  1 mg  DAILY


 ORAL


   11/4/19 09:00


 11/30/19 08:59  11/5/19 08:12


 


 


 Furosemide


  (Lasix)  20 mg  EVERY 12  HOURS


 IV


   11/5/19 21:00


 12/5/19 20:59   


 


 


 Heparin Sodium/


 Sodium Chloride


  (Heparin 1000


 units/500ml


 Premix)  1,000 unit  ONCE  PRN


 IV


 PICC  11/5/19 10:30


 11/5/19 23:59   


 


 


 Insulin Aspart


  (NovoLOG)    EVERY 6  HOURS


 SUBQ


   11/4/19 00:00


 11/29/19 11:59  11/4/19 20:17


 


 


 Insulin Detemir


  (Levemir)  8 units  BID


 SUBQ


   11/4/19 09:00


 12/1/19 08:59  11/4/19 20:17


 


 


 Lactulose


  (Cephulac)  20 gm  Q8H  PRN


 NG


 Constipation  11/3/19 20:30


 12/3/19 20:29  11/5/19 08:12


 


 


 Lidocaine HCl


  (Xylocaine 1%


 30ml)  30 ml  ONCE  PRN


 INJ


 PICC  11/5/19 10:30


 11/5/19 23:59   


 


 


 Lorazepam


  (Ativan 2mg/ml


 1ml)  1 mg  Q4H  PRN


 IV


 For Anxiety  11/3/19 20:30


 11/10/19 20:29  11/5/19 02:01


 


 


 Magnesium Sulfate  100 ml @ 


 100 mls/hr  Q1H


 IVPB


   11/5/19 10:00


 11/5/19 13:59  11/5/19 12:55


 


 


 Meropenem 1 gm/


 Sodium Chloride  55 ml @ 


 110 mls/hr  Q8H


 IVPB


   11/4/19 17:00


 11/9/19 16:59  11/5/19 08:11


 


 


 Metoclopramide HCl


  (Reglan)  10 mg  Q8HR


 IVP


   11/4/19 15:21


 12/4/19 15:20  11/5/19 06:37


 


 


 Midazolam HCl  100 ml @ 0


 mls/hr  Q24H  PRN


 IV


 To Patient Comfort  11/3/19 20:35


 11/10/19 20:34   


 


 


 Pantoprazole


  (Protonix)  40 mg  EVERY 12  HOURS


 IVP


   11/3/19 21:00


 12/3/19 08:59  11/5/19 08:14


 


 


 Potassium Chloride  100 ml @ 


 100 mls/hr  Q1HR


 IVPB


   11/5/19 10:00


 11/5/19 15:59  11/5/19 12:55


 


 


 Potassium Chloride


  (K-Dur)  40 meq  TWICE A  DAY


 GT


   11/5/19 18:00


 12/5/19 17:59   


 


 


 Sodium Chloride  600 ml @ 


 100 mls/hr  Q6H


 IV


   11/5/19 10:00


 11/5/19 15:59   


 


 


 Vancomycin HCl


  (Vanco rx to


 dose)  1 ea  DAILY  PRN


 MISC


 Per rx protocol  11/4/19 15:30


 12/4/19 15:29   


 


 


 Vancomycin HCl


 750 mg/Dextrose  275 ml @ 


 183.333


 mls/hr  Q12HR@0600,1800


 IVPB


   11/4/19 18:00


 11/9/19 17:59  11/5/19 07:30


 











Last 24 Hour Vital Signs








  Date Time  Temp Pulse Resp B/P (MAP) Pulse Ox O2 Delivery O2 Flow Rate FiO2


 


11/5/19 13:00  69 22 132/51 (78) 100   


 


11/5/19 12:00  72      


 


11/5/19 12:00 98.2 69 22 132/51 (78) 100   


 


11/5/19 12:00      Endotracheal Tube  


 


11/5/19 12:00        60


 


11/5/19 11:29  71 17     60


 


11/5/19 11:00  67 21 136/56 (82) 100   


 


11/5/19 10:00  78 23 136/56 (82) 100   


 


11/5/19 09:30        60


 


11/5/19 09:28  77 22     60


 


11/5/19 09:00  76 20 141/54 (83) 100   


 


11/5/19 08:12  74  147/54    


 


11/5/19 08:00        60


 


11/5/19 08:00      Endotracheal Tube  


 


11/5/19 08:00  75 15 147/54 (85) 100   


 


11/5/19 08:00  76      


 


11/5/19 07:21  78 15     60


 


11/5/19 07:00 97.8 80 16 149/56 (87) 100   


 


11/5/19 06:00  75 16 152/61 (91) 100   


 


11/5/19 05:20  60 14     60


 


11/5/19 05:00  60 14 131/49 (76) 100   


 


11/5/19 04:00      Endotracheal Tube  


 


11/5/19 04:00  79      


 


11/5/19 04:00        60


 


11/5/19 04:00 98.4 76 14 144/61 (88) 100   


 


11/5/19 03:32  57 14     60


 


11/5/19 03:00  60 14 124/49 (74) 100   


 


11/5/19 02:00  72 14 144/54 (84) 100   


 


11/5/19 01:14  76 14     60


 


11/5/19 01:00  67 15 134/56 (82)    


 


11/5/19 00:15  83 19     


 


11/5/19 00:00  82      


 


11/5/19 00:00 98.4 67 14 132/54 (80) 100   


 


11/5/19 00:00      Endotracheal Tube  


 


11/4/19 23:00  74 14     60


 


11/4/19 23:00  82 18 140/58 (85) 100   


 


11/4/19 22:00  81 19 137/57 (83) 99   


 


11/4/19 21:03  72 14     60


 


11/4/19 21:00  69 14 128/57 (80) 100   


 


11/4/19 20:00  80      


 


11/4/19 20:00        60


 


11/4/19 20:00 98.8 84 19 147/56 (86) 100   


 


11/4/19 20:00      Endotracheal Tube  


 


11/4/19 19:29  69 14     60


 


11/4/19 19:00  68 14 131/51 (77) 100   


 


11/4/19 18:00      Endotracheal Tube  


 


11/4/19 18:00        60


 


11/4/19 18:00  76 14 138/49 (78) 100   


 


11/4/19 17:29  80  127/49    


 


11/4/19 17:21  78 14     60


 


11/4/19 17:00  70 14 127/49 (75) 100   


 


11/4/19 16:00 98.6 83 15 150/58 (88) 100   


 


11/4/19 16:00      Endotracheal Tube  


 


11/4/19 16:00  75      


 


11/4/19 16:00        60


 


11/4/19 15:00  77 17 140/59 (86) 100   


 


11/4/19 14:31  70 14     60


 


11/4/19 14:00  72 14 136/50 (78) 100   


 


11/4/19 13:10  74 14     60


 


11/4/19 13:00  77 14 146/56 (86) 100   


 


11/4/19 12:22        60


 


11/4/19 12:00  73      


 


11/4/19 12:00      Endotracheal Tube  


 


11/4/19 12:00        60


 


11/4/19 12:00 97.1 73 13 138/51 (80) 98   


 


11/4/19 11:00  87 15 142/60 (87)    


 


11/4/19 10:39  77 14     50


 


11/4/19 10:00  87 19 156/62 (93)    


 


11/4/19 09:20  80 18     50


 


11/4/19 09:04  75  140/56    


 


11/4/19 09:00  75 14 140/56 (84) 100   


 


11/4/19 08:00      Endotracheal Tube  


 


11/4/19 08:00        50


 


11/4/19 08:00  80 15 149/58 (88) 100   


 


11/4/19 08:00  88      


 


11/4/19 07:13  91 19     50


 


11/4/19 07:00 98.7 92 18 159/66 (97) 100   


 


11/4/19 06:00  71 14 136/48 (77) 97   


 


11/4/19 05:00  88 17 143/86 (105) 100   


 


11/4/19 05:00  87 20     50


 


11/4/19 04:43  91 21 121/88 (99) 100   


 


11/4/19 04:00 98.2 88 16 148/123 (131) 100   


 


11/4/19 04:00        60


 


11/4/19 04:00  89      


 


11/4/19 04:00      Endotracheal Tube  


 


11/4/19 03:00  76 14 139/58 (85) 99   


 


11/4/19 02:45  76 14     50


 


11/4/19 02:00  79 14 144/63 (90) 100   


 


11/4/19 01:08  89 14     50


 


11/4/19 01:00  90 16 154/64 (94) 100   


 


11/4/19 00:00      Endotracheal Tube  


 


11/4/19 00:00        60


 


11/4/19 00:00 98.5 90 14 157/74 (101) 100   


 


11/3/19 23:30  90 16 157/71 (99) 100   


 


11/3/19 23:14  91      


 


11/3/19 23:00  92 18 154/69 (97) 100   


 


11/3/19 22:54  87 14     60


 


11/3/19 22:30  91 16 152/64 (93) 100   


 


11/3/19 22:00  92 14 152/68 (96) 100   


 


11/3/19 21:30  93 14 142/63 (89) 100   


 


11/3/19 21:15  94 10 147/65 (92) 100   


 


11/3/19 21:13  94 14     80


 


11/3/19 21:00  95 13 146/66 (92) 100   


 


11/3/19 20:46  98 19 144/77 (99) 93   


 


11/3/19 20:28  102 15 141/106 (118) 100   


 


11/3/19 20:22        80


 


11/3/19 20:22        80


 


11/3/19 20:18  110      


 


11/3/19 20:06        100


 


11/3/19 20:06 98.5       


 


11/3/19 20:06  104 16     100


 


11/3/19 19:29  94 30  95 Full Face  60


 


11/3/19 19:29     95 Bi-Pap  60


 


11/3/19 17:23  103  158/85    


 


11/3/19 17:10  99 34  99 Full Face  60


 


11/3/19 16:00        60


 


11/3/19 16:00  94      


 


11/3/19 16:00 97.7 103 28 158/85 (109) 92   


 


11/3/19 15:35        80


 


11/3/19 15:21  102 36  95 Full Face  60

















Intake and Output  


 


 11/4/19 11/5/19





 19:00 07:00


 


Intake Total 363 ml 330.000 ml


 


Output Total  500 ml


 


Balance 363 ml -170.000 ml


 


  


 


IV Total 303 ml 330.000 ml


 


Other 60 ml 


 


Output Urine Total  500 ml


 


# Voids  3











Labs








Test


  11/3/19


05:50 11/3/19


20:09 11/3/19


20:30 11/3/19


21:30


 


White Blood Count


  17.3 K/UL


(4.8-10.8) 


  15.3 K/UL


(4.8-10.8) 


 


 


Red Blood Count


  3.32 M/UL


(4.20-5.40) 


  3.40 M/UL


(4.20-5.40) 


 


 


Hemoglobin


  9.1 G/DL


(12.0-16.0) 


  9.3 G/DL


(12.0-16.0) 


 


 


Hematocrit


  28.2 %


(37.0-47.0) 


  28.3 %


(37.0-47.0) 


 


 


Mean Corpuscular Volume 85 FL (80-99)   83 FL (80-99)  


 


Mean Corpuscular Hemoglobin


  27.6 PG


(27.0-31.0) 


  27.5 PG


(27.0-31.0) 


 


 


Mean Corpuscular Hemoglobin


Concent 32.4 G/DL


(32.0-36.0) 


  33.0 G/DL


(32.0-36.0) 


 


 


Red Cell Distribution Width


  14.6 %


(11.6-14.8) 


  12.7 %


(11.6-14.8) 


 


 


Platelet Count


  434 K/UL


(150-450) 


  444 K/UL


(150-450) 


 


 


Mean Platelet Volume


  7.0 FL


(6.5-10.1) 


  6.9 FL


(6.5-10.1) 


 


 


Neutrophils (%) (Auto)  % (45.0-75.0)    % (45.0-75.0)  


 


Lymphocytes (%) (Auto)  % (20.0-45.0)    % (20.0-45.0)  


 


Monocytes (%) (Auto)  % (1.0-10.0)    % (1.0-10.0)  


 


Eosinophils (%) (Auto)  % (0.0-3.0)    % (0.0-3.0)  


 


Basophils (%) (Auto)  % (0.0-2.0)    % (0.0-2.0)  


 


Differential Total Cells


Counted 100 


  


  100 


  


 


 


Neutrophils % (Manual) 90 % (45-75)   88 % (45-75)  


 


Lymphocytes % (Manual) 6 % (20-45)   10 % (20-45)  


 


Monocytes % (Manual) 4 % (1-10)   2 % (1-10)  


 


Eosinophils % (Manual) 0 % (0-3)   0 % (0-3)  


 


Basophils % (Manual) 0 % (0-2)   0 % (0-2)  


 


Band Neutrophils 0 % (0-8)   0 % (0-8)  


 


Platelet Estimate Adequate   Increased  


 


Platelet Morphology Normal   Normal  


 


Anisocytosis 1+    


 


Sodium Level


  147 MMOL/L


(136-145) 


  146 MMOL/L


(136-145) 


 


 


Potassium Level


  3.4 MMOL/L


(3.5-5.1) 


  3.5 MMOL/L


(3.5-5.1) 


 


 


Chloride Level


  107 MMOL/L


() 


  106 MMOL/L


() 


 


 


Carbon Dioxide Level


  30 MMOL/L


(21-32) 


  30 MMOL/L


(21-32) 


 


 


Anion Gap


  10 mmol/L


(5-15) 


  10 mmol/L


(5-15) 


 


 


Blood Urea Nitrogen


  24 mg/dL


(7-18) 


  24 mg/dL


(7-18) 


 


 


Creatinine


  1.2 MG/DL


(0.55-1.30) 


  1.2 MG/DL


(0.55-1.30) 


 


 


Estimat Glomerular Filtration


Rate 45.0 mL/min


(>60) 


  45.0 mL/min


(>60) 


 


 


Glucose Level


  305 MG/DL


() 


  291 MG/DL


() 


 


 


Calcium Level


  9.3 MG/DL


(8.5-10.1) 


  9.5 MG/DL


(8.5-10.1) 


 


 


Magnesium Level


  2.1 MG/DL


(1.8-2.4) 


  2.1 MG/DL


(1.8-2.4) 


 


 


Arterial Blood pH


  


  7.340


(7.350-7.450) 


  7.419


(7.350-7.450)


 


Arterial Blood Partial


Pressure CO2 


  58.0 mmHg


(35.0-45.0) 


  50.2 mmHg


(35.0-45.0)


 


Arterial Blood Partial


Pressure O2 


  184.8 mmHg


(75.0-100.0) 


  112.5 mmHg


(75.0-100.0)


 


Arterial Blood HCO3


  


  30.6 mmol/L


(22.0-26.0) 


  31.8 mmol/L


(22.0-26.0)


 


Arterial Blood Oxygen


Saturation 


  98.7 %


() 


  97.6 %


()


 


Arterial Blood Base Excess  3.9 (-2-2)   6.4 (-2-2) 


 


Graham Test  Positive   Positive 


 


Red Blood Cell Morphology   Normal  


 


Total Bilirubin


  


  


  0.7 MG/DL


(0.2-1.0) 


 


 


Aspartate Amino Transf


(AST/SGOT) 


  


  15 U/L (15-37) 


  


 


 


Alanine Aminotransferase


(ALT/SGPT) 


  


  14 U/L (12-78) 


  


 


 


Alkaline Phosphatase


  


  


  97 U/L


() 


 


 


Troponin I


  


  


  0.179 ng/mL


(0.000-0.056) 


 


 


Pro-B-Type Natriuretic Peptide


  


  


  > 15919 pg/mL


(0-125) 


 


 


Total Protein


  


  


  7.0 G/DL


(6.4-8.2) 


 


 


Albumin


  


  


  2.1 G/DL


(3.4-5.0) 


 


 


Globulin   4.9 g/dL  


 


Albumin/Globulin Ratio   0.4 (1.0-2.7)  


 


Test


  11/4/19


07:40 11/4/19


09:10 11/4/19


15:45 11/5/19


04:25


 


Arterial Blood pH


  7.506


(7.350-7.450) 


  7.526


(7.350-7.450) 


 


 


Arterial Blood Partial


Pressure CO2 39.2 mmHg


(35.0-45.0) 


  39.7 mmHg


(35.0-45.0) 


 


 


Arterial Blood Partial


Pressure O2 80.7 mmHg


(75.0-100.0) 


  119.9 mmHg


(75.0-100.0) 


 


 


Arterial Blood HCO3


  30.3 mmol/L


(22.0-26.0) 


  32.1 mmol/L


(22.0-26.0) 


 


 


Arterial Blood Oxygen


Saturation 95.8 %


() 


  98.0 %


() 


 


 


Arterial Blood Base Excess 6.7 (-2-2)   8.7 (-2-2)  


 


Graham Test Positive   Positive  


 


White Blood Count


  


  12.5 K/UL


(4.8-10.8) 


  12.3 K/UL


(4.8-10.8)


 


Red Blood Count


  


  2.87 M/UL


(4.20-5.40) 


  2.60 M/UL


(4.20-5.40)


 


Hemoglobin


  


  7.8 G/DL


(12.0-16.0) 


  7.2 G/DL


(12.0-16.0)


 


Hematocrit


  


  24.0 %


(37.0-47.0) 


  21.7 %


(37.0-47.0)


 


Mean Corpuscular Volume  84 FL (80-99)   83 FL (80-99) 


 


Mean Corpuscular Hemoglobin


  


  27.3 PG


(27.0-31.0) 


  27.9 PG


(27.0-31.0)


 


Mean Corpuscular Hemoglobin


Concent 


  32.6 G/DL


(32.0-36.0) 


  33.4 G/DL


(32.0-36.0)


 


Red Cell Distribution Width


  


  14.2 %


(11.6-14.8) 


  13.9 %


(11.6-14.8)


 


Platelet Count


  


  419 K/UL


(150-450) 


  415 K/UL


(150-450)


 


Mean Platelet Volume


  


  7.0 FL


(6.5-10.1) 


  7.0 FL


(6.5-10.1)


 


Neutrophils (%) (Auto)   % (45.0-75.0)    % (45.0-75.0) 


 


Lymphocytes (%) (Auto)   % (20.0-45.0)    % (20.0-45.0) 


 


Monocytes (%) (Auto)   % (1.0-10.0)    % (1.0-10.0) 


 


Eosinophils (%) (Auto)   % (0.0-3.0)    % (0.0-3.0) 


 


Basophils (%) (Auto)   % (0.0-2.0)    % (0.0-2.0) 


 


Differential Total Cells


Counted 


  100 


  


  100 


 


 


Neutrophils % (Manual)  70 % (45-75)   67 % (45-75) 


 


Lymphocytes % (Manual)  18 % (20-45)   18 % (20-45) 


 


Monocytes % (Manual)  10 % (1-10)   5 % (1-10) 


 


Eosinophils % (Manual)  2 % (0-3)   10 % (0-3) 


 


Basophils % (Manual)  0 % (0-2)   0 % (0-2) 


 


Band Neutrophils  0 % (0-8)   0 % (0-8) 


 


Platelet Estimate  Adequate   Adequate 


 


Platelet Morphology  Normal   Normal 


 


Sodium Level


  


  148 MMOL/L


(136-145) 


  149 MMOL/L


(136-145)


 


Potassium Level


  


  3.6 MMOL/L


(3.5-5.1) 


  2.6 MMOL/L


(3.5-5.1)


 


Chloride Level


  


  110 MMOL/L


() 


  110 MMOL/L


()


 


Carbon Dioxide Level


  


  30 MMOL/L


(21-32) 


  33 MMOL/L


(21-32)


 


Anion Gap


  


  8 mmol/L


(5-15) 


  5 mmol/L


(5-15)


 


Blood Urea Nitrogen


  


  23 mg/dL


(7-18) 


  19 mg/dL


(7-18)


 


Creatinine


  


  1.0 MG/DL


(0.55-1.30) 


  1.0 MG/DL


(0.55-1.30)


 


Estimat Glomerular Filtration


Rate 


  55.5 mL/min


(>60) 


  55.5 mL/min


(>60)


 


Glucose Level


  


  221 MG/DL


() 


  78 MG/DL


()


 


Calcium Level


  


  9.0 MG/DL


(8.5-10.1) 


  8.9 MG/DL


(8.5-10.1)


 


Total Bilirubin


  


  0.7 MG/DL


(0.2-1.0) 


  0.6 MG/DL


(0.2-1.0)


 


Aspartate Amino Transf


(AST/SGOT) 


  24 U/L (15-37) 


  


  13 U/L (15-37) 


 


 


Alanine Aminotransferase


(ALT/SGPT) 


  13 U/L (12-78) 


  


  12 U/L (12-78) 


 


 


Alkaline Phosphatase


  


  77 U/L


() 


  74 U/L


()


 


Lactate Dehydrogenase


  


  506 U/L


() 


  


 


 


Total Protein


  


  6.1 G/DL


(6.4-8.2) 


  6.0 G/DL


(6.4-8.2)


 


Albumin


  


  1.8 G/DL


(3.4-5.0) 


  1.9 G/DL


(3.4-5.0)


 


Globulin  4.3 g/dL   4.1 g/dL 


 


Albumin/Globulin Ratio  0.4 (1.0-2.7)   0.5 (1.0-2.7) 


 


Cortisol  17.0 UG/DL   


 


Prothrombin Time


  


  


  


  13.6 SEC


(9.30-11.50)


 


Prothromb Time International


Ratio 


  


  


  1.3 (0.9-1.1) 


 


 


Activated Partial


Thromboplast Time 


  


  


  31 SEC (23-33) 


 


 


Lactic Acid Level


  


  


  


  0.60 mmol/L


(0.4-2.0)


 


Uric Acid


  


  


  


  8.3 MG/DL


(2.6-7.2)


 


Phosphorus Level


  


  


  


  2.5 MG/DL


(2.5-4.9)


 


Magnesium Level


  


  


  


  1.6 MG/DL


(1.8-2.4)


 


C-Reactive Protein,


Quantitative 


  


  


  6.7 mg/dL


(0.00-0.90)


 


Pro-B-Type Natriuretic Peptide


  


  


  


  19230 pg/mL


(0-125)


 


Test


  11/5/19


11:25 


  


  


 


 


Arterial Blood pH


  7.514


(7.350-7.450) 


  


  


 


 


Arterial Blood Partial


Pressure CO2 37.7 mmHg


(35.0-45.0) 


  


  


 


 


Arterial Blood Partial


Pressure O2 144.4 mmHg


(75.0-100.0) 


  


  


 


 


Arterial Blood HCO3


  29.7 mmol/L


(22.0-26.0) 


  


  


 


 


Arterial Blood Oxygen


Saturation 98.5 %


() 


  


  


 


 


Arterial Blood Base Excess 6.3 (-2-2)    


 


Graham Test Positive    








Height (Feet):  5


Height (Inches):  5.00


Weight (Pounds):  167


Objective





Physical Exam:


Vitals: reviewed


General Appearance:  NAD


HEENT:  normocephalic, atraumatic


Neck:  non-tender, normal alignment


Respiratory/Chest:  normal breath sounds bilaterally ++ vent


Cardiovascular/Chest: rrr


Abdomen:  normal bowel sounds, soft, nontender ++ peg


Extremities:  normal range of motion











Mike Pop MD Nov 5, 2019 13:53

## 2019-11-05 NOTE — NUR
NURSE NOTES:

patient with episode of biting ETT tubing talk therapy and reality orientation provided not 
effective. Ativan given effective.

## 2019-11-06 VITALS — DIASTOLIC BLOOD PRESSURE: 97 MMHG | SYSTOLIC BLOOD PRESSURE: 140 MMHG

## 2019-11-06 VITALS — SYSTOLIC BLOOD PRESSURE: 137 MMHG | DIASTOLIC BLOOD PRESSURE: 68 MMHG

## 2019-11-06 VITALS — SYSTOLIC BLOOD PRESSURE: 130 MMHG | DIASTOLIC BLOOD PRESSURE: 73 MMHG

## 2019-11-06 VITALS — SYSTOLIC BLOOD PRESSURE: 130 MMHG | DIASTOLIC BLOOD PRESSURE: 70 MMHG

## 2019-11-06 VITALS — DIASTOLIC BLOOD PRESSURE: 62 MMHG | SYSTOLIC BLOOD PRESSURE: 135 MMHG

## 2019-11-06 VITALS — SYSTOLIC BLOOD PRESSURE: 129 MMHG | DIASTOLIC BLOOD PRESSURE: 59 MMHG

## 2019-11-06 VITALS — DIASTOLIC BLOOD PRESSURE: 62 MMHG | SYSTOLIC BLOOD PRESSURE: 122 MMHG

## 2019-11-06 VITALS — DIASTOLIC BLOOD PRESSURE: 66 MMHG | SYSTOLIC BLOOD PRESSURE: 137 MMHG

## 2019-11-06 VITALS — DIASTOLIC BLOOD PRESSURE: 58 MMHG | SYSTOLIC BLOOD PRESSURE: 117 MMHG

## 2019-11-06 VITALS — SYSTOLIC BLOOD PRESSURE: 157 MMHG | DIASTOLIC BLOOD PRESSURE: 82 MMHG

## 2019-11-06 VITALS — SYSTOLIC BLOOD PRESSURE: 123 MMHG | DIASTOLIC BLOOD PRESSURE: 64 MMHG

## 2019-11-06 VITALS — DIASTOLIC BLOOD PRESSURE: 67 MMHG | SYSTOLIC BLOOD PRESSURE: 135 MMHG

## 2019-11-06 VITALS — SYSTOLIC BLOOD PRESSURE: 133 MMHG | DIASTOLIC BLOOD PRESSURE: 60 MMHG

## 2019-11-06 VITALS — DIASTOLIC BLOOD PRESSURE: 63 MMHG | SYSTOLIC BLOOD PRESSURE: 138 MMHG

## 2019-11-06 VITALS — SYSTOLIC BLOOD PRESSURE: 135 MMHG | DIASTOLIC BLOOD PRESSURE: 61 MMHG

## 2019-11-06 VITALS — DIASTOLIC BLOOD PRESSURE: 109 MMHG | SYSTOLIC BLOOD PRESSURE: 144 MMHG

## 2019-11-06 VITALS — DIASTOLIC BLOOD PRESSURE: 60 MMHG | SYSTOLIC BLOOD PRESSURE: 128 MMHG

## 2019-11-06 VITALS — SYSTOLIC BLOOD PRESSURE: 137 MMHG | DIASTOLIC BLOOD PRESSURE: 64 MMHG

## 2019-11-06 VITALS — DIASTOLIC BLOOD PRESSURE: 67 MMHG | SYSTOLIC BLOOD PRESSURE: 139 MMHG

## 2019-11-06 VITALS — SYSTOLIC BLOOD PRESSURE: 136 MMHG | DIASTOLIC BLOOD PRESSURE: 71 MMHG

## 2019-11-06 VITALS — SYSTOLIC BLOOD PRESSURE: 139 MMHG | DIASTOLIC BLOOD PRESSURE: 76 MMHG

## 2019-11-06 VITALS — SYSTOLIC BLOOD PRESSURE: 125 MMHG | DIASTOLIC BLOOD PRESSURE: 70 MMHG

## 2019-11-06 VITALS — SYSTOLIC BLOOD PRESSURE: 139 MMHG | DIASTOLIC BLOOD PRESSURE: 69 MMHG

## 2019-11-06 VITALS — SYSTOLIC BLOOD PRESSURE: 128 MMHG | DIASTOLIC BLOOD PRESSURE: 63 MMHG

## 2019-11-06 VITALS — DIASTOLIC BLOOD PRESSURE: 62 MMHG | SYSTOLIC BLOOD PRESSURE: 143 MMHG

## 2019-11-06 VITALS — DIASTOLIC BLOOD PRESSURE: 61 MMHG | SYSTOLIC BLOOD PRESSURE: 123 MMHG

## 2019-11-06 VITALS — DIASTOLIC BLOOD PRESSURE: 67 MMHG | SYSTOLIC BLOOD PRESSURE: 141 MMHG

## 2019-11-06 LAB
ADD MANUAL DIFF: NO
ALBUMIN SERPL-MCNC: 2 G/DL (ref 3.4–5)
ALBUMIN/GLOB SERPL: 0.5 {RATIO} (ref 1–2.7)
ALP SERPL-CCNC: 79 U/L (ref 46–116)
ALT SERPL-CCNC: 14 U/L (ref 12–78)
ANION GAP SERPL CALC-SCNC: 7 MMOL/L (ref 5–15)
AST SERPL-CCNC: 12 U/L (ref 15–37)
BASOPHILS NFR BLD AUTO: 0.7 % (ref 0–2)
BILIRUB DIRECT SERPL-MCNC: 0.3 MG/DL (ref 0–0.3)
BILIRUB SERPL-MCNC: 1.2 MG/DL (ref 0.2–1)
BUN SERPL-MCNC: 16 MG/DL (ref 7–18)
CALCIUM SERPL-MCNC: 8.5 MG/DL (ref 8.5–10.1)
CHLORIDE SERPL-SCNC: 108 MMOL/L (ref 98–107)
CO2 SERPL-SCNC: 31 MMOL/L (ref 21–32)
CREAT SERPL-MCNC: 1.1 MG/DL (ref 0.55–1.3)
EOSINOPHIL NFR BLD AUTO: 5.8 % (ref 0–3)
ERYTHROCYTE [DISTWIDTH] IN BLOOD BY AUTOMATED COUNT: 14.9 % (ref 11.6–14.8)
GLOBULIN SER-MCNC: 4.2 G/DL
HCT VFR BLD CALC: 27 % (ref 37–47)
HGB BLD-MCNC: 9 G/DL (ref 12–16)
LYMPHOCYTES NFR BLD AUTO: 15.8 % (ref 20–45)
MCV RBC AUTO: 81 FL (ref 80–99)
MONOCYTES NFR BLD AUTO: 5.1 % (ref 1–10)
NEUTROPHILS NFR BLD AUTO: 72.6 % (ref 45–75)
PHOSPHATE SERPL-MCNC: 1.9 MG/DL (ref 2.5–4.9)
PLATELET # BLD: 438 K/UL (ref 150–450)
POTASSIUM SERPL-SCNC: 4 MMOL/L (ref 3.5–5.1)
RBC # BLD AUTO: 3.31 M/UL (ref 4.2–5.4)
SODIUM SERPL-SCNC: 145 MMOL/L (ref 136–145)
WBC # BLD AUTO: 12.9 K/UL (ref 4.8–10.8)

## 2019-11-06 PROCEDURE — 0DD78ZX EXTRACTION OF STOMACH, PYLORUS, VIA NATURAL OR ARTIFICIAL OPENING ENDOSCOPIC, DIAGNOSTIC: ICD-10-PCS

## 2019-11-06 RX ADMIN — DOCUSATE SODIUM SCH MG: 50 LIQUID ORAL at 23:01

## 2019-11-06 RX ADMIN — INSULIN ASPART SCH UNITS: 100 INJECTION, SOLUTION INTRAVENOUS; SUBCUTANEOUS at 00:00

## 2019-11-06 RX ADMIN — INSULIN DETEMIR SCH UNITS: 100 INJECTION, SOLUTION SUBCUTANEOUS at 20:51

## 2019-11-06 RX ADMIN — SODIUM CHLORIDE SCH MG: 0.9 INJECTION INTRAVENOUS at 06:16

## 2019-11-06 RX ADMIN — DIVALPROEX SODIUM SCH MG: 125 CAPSULE ORAL at 20:45

## 2019-11-06 RX ADMIN — DOCUSATE SODIUM SCH MG: 50 LIQUID ORAL at 06:16

## 2019-11-06 RX ADMIN — CHLORHEXIDINE GLUCONATE SCH APPLIC: 213 SOLUTION TOPICAL at 20:45

## 2019-11-06 RX ADMIN — LORAZEPAM PRN MG: 2 INJECTION, SOLUTION INTRAMUSCULAR; INTRAVENOUS at 20:45

## 2019-11-06 RX ADMIN — PANTOPRAZOLE SODIUM SCH MG: 40 INJECTION, POWDER, FOR SOLUTION INTRAVENOUS at 08:33

## 2019-11-06 RX ADMIN — PANTOPRAZOLE SODIUM SCH MG: 40 INJECTION, POWDER, FOR SOLUTION INTRAVENOUS at 20:45

## 2019-11-06 RX ADMIN — LORAZEPAM PRN MG: 2 INJECTION, SOLUTION INTRAMUSCULAR; INTRAVENOUS at 08:34

## 2019-11-06 RX ADMIN — INSULIN ASPART SCH UNITS: 100 INJECTION, SOLUTION INTRAVENOUS; SUBCUTANEOUS at 17:19

## 2019-11-06 RX ADMIN — SODIUM CHLORIDE SCH MLS/HR: 9 INJECTION, SOLUTION INTRAVENOUS at 17:40

## 2019-11-06 RX ADMIN — DIVALPROEX SODIUM SCH MG: 125 CAPSULE ORAL at 08:33

## 2019-11-06 RX ADMIN — MEROPENEM SCH MLS/HR: 1 INJECTION INTRAVENOUS at 17:04

## 2019-11-06 RX ADMIN — MEROPENEM SCH MLS/HR: 1 INJECTION INTRAVENOUS at 01:56

## 2019-11-06 RX ADMIN — MEROPENEM SCH MLS/HR: 1 INJECTION INTRAVENOUS at 08:46

## 2019-11-06 RX ADMIN — INSULIN ASPART SCH UNITS: 100 INJECTION, SOLUTION INTRAVENOUS; SUBCUTANEOUS at 06:00

## 2019-11-06 RX ADMIN — SODIUM CHLORIDE SCH MLS/HR: 9 INJECTION, SOLUTION INTRAVENOUS at 06:38

## 2019-11-06 RX ADMIN — INSULIN ASPART SCH UNITS: 100 INJECTION, SOLUTION INTRAVENOUS; SUBCUTANEOUS at 11:46

## 2019-11-06 RX ADMIN — DOCUSATE SODIUM SCH MG: 50 LIQUID ORAL at 14:23

## 2019-11-06 NOTE — NUR
NURSE NOTES:

received report from Irena JOHNSON. Patient in bed sleeping easily arousable to verbal and 
tactile stimuli.  ETT 7.5/23cm lip line ac 14, , fi02 40% peep 5 satting 100% etC02 
26. Right upper arm PICC line intact.  GT intact abdomen large and distended On Glucerna  
1.2 at 40ml/hr goal 65cc/hr, no residual. with abdominal binder.  no s/s of 
hypo/hyperglycemia. SR on cardiac monitor HR 89.On P 200 for wound management. skin warm and 
dry to touch.bed alarm on. bed lock and in low position. Contact isolation maintained and 
observed. Will continue plan of care.

## 2019-11-06 NOTE — NUR
CASE MANAGEMENT: REVIEW



11/6/19



SI:  SEPSIS. ACUTE CHF. UTI. ACUTE ON CHRONIC RENAL FAILURE.

97.9   80   25   137/66  100% ET TUBE ; INTUBATED;  FI02 60

WBC 12.9; RBC 3.31; H/H 9.0/27.0; URIC ACID 7.7; PHOS 1.9

C-REC PROT 6.1; BNP 01389



IS:      IV POTASSIUM PHOS/NS X1

K-DUR GT QD

IV LASIX Q12HR

IV  MG SULFATE @100HR X4 BAGS

IV VANCOMYCIN Q12HR

IV MEROPENEM Q8HR

IV PROTONIX Q12HR

IV REGLAN Q8HR

LACTULOSE NG Q8/PRN

IV DEXTROSE Q24HR

 NORVASC NG BID 

NOVOLOG SUBQ Q6HR

LEVEMIR SQBID

GT FEEDING 

FOLATE PO QD

COLACE NG Q8HR

DEPAKOTE NG Q12HR





**: ICU STATUS ON VENT 



DCP: PATIENT IS FROM  [facility] 



PLAN: EGD COMPLETE TODAY



PLAN: WEAN FIO2- FAIL AFTER 30 MIN

REPEAT CXR

ECG

EGD -COMPLETE

IF NO IMPROVEMENT PLAN FOR BONE MARROW BX

## 2019-11-06 NOTE — CARDIOLOGY PROGRESS NOTE
Assessment/Plan


Assessment/Plan


1. acute congestive heart failure


2. Abnormal cardiac enzyme demand related due to infection and profound anemia 


3. Agitation 


4. Urinary tract infection.


5. Acute renal failure.


6. Profound anemia.


7. Diabetes mellitus.


8. History of hypertension.


9. History of mood disorder.


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 


11. arf 


12. pelvic mass


13  sepsis 


14. pneumonia 








off dapt due to suspected bleeding 


echo mild systolic dysfunction 


in icu on  a vent 


iv abx 


to get 2 unit prbc orderd by hem 


s/p peg  


lasix iv bid 


we dcd all anti plt 


tele sinus 


remain on vent 


cxr notedreviewed persoanlly  


keep in neg fluid balance


egd noted





Subjective


ROS Limited/Unobtainable:  Yes





Objective





Last 24 Hour Vital Signs








  Date Time  Temp Pulse Resp B/P (MAP) Pulse Ox O2 Delivery O2 Flow Rate FiO2


 


11/6/19 19:20  85 25     50


 


11/6/19 19:00  81 21 130/73 (92) 99   


 


11/6/19 18:00  81 22 138/63 (88) 100   


 


11/6/19 17:16  76  158/63    


 


11/6/19 17:00  78 26 128/63 (84) 98   


 


11/6/19 16:33  79 18     50


 


11/6/19 16:00  81      


 


11/6/19 16:00      Endotracheal Tube  


 


11/6/19 16:00 97.9 81 25 139/76 (97) 97   


 


11/6/19 16:00        40


 


11/6/19 15:00  75 23 140/97 (111) 97   


 


11/6/19 14:38  77 18     50


 


11/6/19 14:00  78 20 125/70 (88) 97   


 


11/6/19 13:00  73 23 117/58 (77) 97   


 


11/6/19 12:34  76 18     40


 


11/6/19 12:00        50


 


11/6/19 12:00      Endotracheal Tube  


 


11/6/19 12:00 97.8 78 24 129/59 (82) 100   


 


11/6/19 12:00  77      


 


11/6/19 11:00  76 23 123/64 (83) 100   


 


11/6/19 10:32  81 18     50


 


11/6/19 10:00  77 24 130/70 (90) 100   


 


11/6/19 09:10  79 18     50


 


11/6/19 09:00  83 25 139/69 (92) 100   


 


11/6/19 08:50        50


 


11/6/19 08:32  75  137/66    


 


11/6/19 08:00 97.9 80 25 137/66 (89) 98   


 


11/6/19 08:00        28


 


11/6/19 08:00      Endotracheal Tube  


 


11/6/19 08:00  81      


 


11/6/19 07:15  82 15     50


 


11/6/19 07:00 98.3 76 29 133/60 (84) 92   


 


11/6/19 06:00  78 28 137/64 (88) 90   


 


11/6/19 05:38  77 15     28


 


11/6/19 05:00  78 26 139/67 (91) 96   


 


11/6/19 04:00  79      


 


11/6/19 04:00        28


 


11/6/19 04:00 97.9 82 28 137/68 (91) 94   


 


11/6/19 04:00      Endotracheal Tube  


 


11/6/19 03:00  81 31 136/71 (92) 93   


 


11/6/19 02:54  77 15     28


 


11/6/19 02:00  78 26 135/62 (86) 100   


 


11/6/19 01:00  87 25 144/109 (121) 95   


 


11/6/19 01:00  89 24     28


 


11/6/19 00:00  91      


 


11/6/19 00:00      Endotracheal Tube  


 


11/6/19 00:00        28


 


11/6/19 00:00 98.0 80 28 122/62 (82) 96   


 


11/5/19 23:00  92 26 145/73 (97) 92   


 


11/5/19 22:42  88 26     28


 


11/5/19 22:00  86 27 139/67 (91) 94   


 


11/5/19 21:15  88 19     28


 


11/5/19 21:00  86 27 143/66 (91) 98   








General Appearance:  no apparent distress, on vent


Cardiovascular:  normal rate


Respiratory/Chest:  rhonchi - bilaterally


Abdomen:  normal bowel sounds, non tender, soft


Extremities:  no swelling











Intake and Output  


 


 11/5/19 11/6/19





 19:00 07:00


 


Intake Total 1624.666 ml 305 ml


 


Output Total 480 ml 600 ml


 


Balance 1144.666 ml -295 ml


 


  


 


IV Total 1624.666 ml 55 ml


 


Blood Product  250 ml


 


Output Urine Total 480 ml 600 ml











Laboratory Tests








Test


  11/6/19


03:50


 


White Blood Count


  12.9 K/UL


(4.8-10.8)  H


 


Red Blood Count


  3.31 M/UL


(4.20-5.40)  L


 


Hemoglobin


  9.0 G/DL


(12.0-16.0)  L


 


Hematocrit


  27.0 %


(37.0-47.0)  L


 


Mean Corpuscular Volume 81 FL (80-99)  


 


Mean Corpuscular Hemoglobin


  27.1 PG


(27.0-31.0)


 


Mean Corpuscular Hemoglobin


Concent 33.3 G/DL


(32.0-36.0)


 


Red Cell Distribution Width


  14.9 %


(11.6-14.8)  H


 


Platelet Count


  438 K/UL


(150-450)


 


Mean Platelet Volume


  7.1 FL


(6.5-10.1)


 


Neutrophils (%) (Auto)


  72.6 %


(45.0-75.0)


 


Lymphocytes (%) (Auto)


  15.8 %


(20.0-45.0)  L


 


Monocytes (%) (Auto)


  5.1 %


(1.0-10.0)


 


Eosinophils (%) (Auto)


  5.8 %


(0.0-3.0)  H


 


Basophils (%) (Auto)


  0.7 %


(0.0-2.0)


 


Sodium Level


  145 MMOL/L


(136-145)


 


Potassium Level


  4.0 MMOL/L


(3.5-5.1)  #


 


Chloride Level


  108 MMOL/L


()  H


 


Carbon Dioxide Level


  31 MMOL/L


(21-32)


 


Anion Gap


  7 mmol/L


(5-15)


 


Blood Urea Nitrogen


  16 mg/dL


(7-18)


 


Creatinine


  1.1 MG/DL


(0.55-1.30)


 


Estimat Glomerular Filtration


Rate 49.7 mL/min


(>60)


 


Glucose Level


  88 MG/DL


()  #


 


Uric Acid


  7.7 MG/DL


(2.6-7.2)  H


 


Calcium Level


  8.5 MG/DL


(8.5-10.1)


 


Phosphorus Level


  1.9 MG/DL


(2.5-4.9)  L


 


Magnesium Level


  2.4 MG/DL


(1.8-2.4)


 


Total Bilirubin


  1.2 MG/DL


(0.2-1.0)  H


 


Direct Bilirubin


  0.3 MG/DL


(0.0-0.3)


 


Aspartate Amino Transf


(AST/SGOT) 12 U/L (15-37)


L


 


Alanine Aminotransferase


(ALT/SGPT) 14 U/L (12-78)


 


 


Alkaline Phosphatase


  79 U/L


()


 


C-Reactive Protein,


Quantitative 6.1 mg/dL


(0.00-0.90)  H


 


Pro-B-Type Natriuretic Peptide


  99166 pg/mL


(0-125)  H


 


Total Protein


  6.2 G/DL


(6.4-8.2)  L


 


Albumin


  2.0 G/DL


(3.4-5.0)  L


 


Globulin 4.2 g/dL  


 


Albumin/Globulin Ratio


  0.5 (1.0-2.7)


L


 


Vancomycin Level Trough


  18.3 ug/mL


(5.0-12.0)  H











Microbiology








 Date/Time


Source Procedure


Growth Status


 


 


 11/4/19 16:15


Blood Blood Culture - Preliminary


NO GROWTH AFTER 24 HOURS Resulted


 


 11/4/19 16:10


Blood Blood Culture - Preliminary


NO GROWTH AFTER 24 HOURS Resulted





 11/5/19 08:00


Sputum Expectorated Gram Stain - Final Resulted


 


 11/5/19 08:00


Sputum Expectorated Sputum Culture


Pending Resulted

















Rob May MD Nov 6, 2019 20:33

## 2019-11-06 NOTE — NUR
NURSE NOTES:

GT feeding of Glucerna 1.2 at 55cc/hr goal 65cc/hr. HOB elevated. no residual. abdomen soft 
with +bowel sounds in all 4 quadrant. Frequent visual checks continued.

## 2019-11-06 NOTE — PULMONOLGY CRITICAL CARE NOTE
Critical Care - Asmt/Plan


Assessment/Plan:


Pulmonary CCM Progress Note 





Assessment/Plan


Problems:  


(1) Healthcare/aspiration associated bacterial pneumonia, respiratory failure


(2) UTI (urinary tract infection)


(3) Sepsis


(4) Dehydration


(5) CHANCE (acute kidney injury)


(6) Acute metabolic encephalopathy


(7) Hyperglycemia due to type 2 diabetes mellitus


(8) Renal failure (ARF), acute on chronic


(9) Ventilator dependant Respiratory Failure


(10) Abnormal TSH


(11) Pelvic mass in female


(12) PEG (percutaneous endoscopic gastrostomy) status


Assessment/Plan


Marked leukocytosis S/P WBC scan with uptake in pelvis, has pelvic mass


Sepsis


Possible pneumonia


E coli UTI


Acute hypoxemic respiratory failure


S/p code blue - now VDRF - not tolerating weaning


Acute encephalopathy


Hx CHF


HTN


CHANCE improving


Pelvic mass/possible GYN malignancy vs cyst


Dysphagia S/P PEG





Plan:


-Wean FIO2, ABG PRN


-Optimize pulmonary hygiene


-Wean ventilator as tolerated


-Titrate down FiO2 to keep SaO2 > 90%


-Monitor WCt, RFS sent for JAK2 mutation, per Dr. Pop BMBx if unrevealing


-Abx per ID, F/U Cx's


-monitor volumes and renal function, F/U renal recs


-monitor encephalopathy


-NPO, GTF's


-DVT Px


-FC


-PICC line





Subjective


Allergies:  


Coded Allergies:  


     No Known Allergies


Subjective


Sedated on the Ventilator





Objective





Vital Signs Noted





General Appearance:  Sedated, cachetic


HEENT:  normocephalic, atraumatic, anicteric, mucous membranes moist


Respiratory/Chest:  occasional rhonchi


Cardiovascular:  normal peripheral pulses, normal rate, regular rhythm


Abdomen:  normal bowel sounds, soft, non tender, no organomegaly, non distended

, no mass, other - G


Extremities:  no cyanosis, no clubbing, no edema





Microbiology noted








 Date/Time


Source Procedure


Growth Status


 


 


 10/31/19 10:15


Urine,Clean Catch Urine Culture - Preliminary


YEAST Resulted








Laboratory Tests Noted





Critical Care - Objective





Last 24 Hour Vital Signs








  Date Time  Temp Pulse Resp B/P (MAP) Pulse Ox O2 Delivery O2 Flow Rate FiO2


 


11/6/19 21:02  79 24     50


 


11/6/19 19:20  85 25     50


 


11/6/19 19:00  81 21 130/73 (92) 99   


 


11/6/19 18:00  81 22 138/63 (88) 100   


 


11/6/19 17:16  76  158/63    


 


11/6/19 17:00  78 26 128/63 (84) 98   


 


11/6/19 16:33  79 18     50


 


11/6/19 16:00  81      


 


11/6/19 16:00      Endotracheal Tube  


 


11/6/19 16:00 97.9 81 25 139/76 (97) 97   


 


11/6/19 16:00        40


 


11/6/19 15:00  75 23 140/97 (111) 97   


 


11/6/19 14:38  77 18     50


 


11/6/19 14:00  78 20 125/70 (88) 97   


 


11/6/19 13:00  73 23 117/58 (77) 97   


 


11/6/19 12:34  76 18     40


 


11/6/19 12:00        50


 


11/6/19 12:00      Endotracheal Tube  


 


11/6/19 12:00 97.8 78 24 129/59 (82) 100   


 


11/6/19 12:00  77      


 


11/6/19 11:00  76 23 123/64 (83) 100   


 


11/6/19 10:32  81 18     50


 


11/6/19 10:00  77 24 130/70 (90) 100   


 


11/6/19 09:10  79 18     50


 


11/6/19 09:00  83 25 139/69 (92) 100   


 


11/6/19 08:50        50


 


11/6/19 08:32  75  137/66    


 


11/6/19 08:00 97.9 80 25 137/66 (89) 98   


 


11/6/19 08:00        28


 


11/6/19 08:00      Endotracheal Tube  


 


11/6/19 08:00  81      


 


11/6/19 07:15  82 15     50


 


11/6/19 07:00 98.3 76 29 133/60 (84) 92   


 


11/6/19 06:00  78 28 137/64 (88) 90   


 


11/6/19 05:38  77 15     28


 


11/6/19 05:00  78 26 139/67 (91) 96   


 


11/6/19 04:00  79      


 


11/6/19 04:00        28


 


11/6/19 04:00 97.9 82 28 137/68 (91) 94   


 


11/6/19 04:00      Endotracheal Tube  


 


11/6/19 03:00  81 31 136/71 (92) 93   


 


11/6/19 02:54  77 15     28


 


11/6/19 02:00  78 26 135/62 (86) 100   


 


11/6/19 01:00  87 25 144/109 (121) 95   


 


11/6/19 01:00  89 24     28


 


11/6/19 00:00  91      


 


11/6/19 00:00      Endotracheal Tube  


 


11/6/19 00:00        28


 


11/6/19 00:00 98.0 80 28 122/62 (82) 96   


 


11/5/19 23:00  92 26 145/73 (97) 92   


 


11/5/19 22:42  88 26     28


 


11/5/19 22:00  86 27 139/67 (91) 94   








Micro:





Microbiology








 Date/Time


Source Procedure


Growth Status


 


 


 11/4/19 16:15


Blood Blood Culture - Preliminary


NO GROWTH AFTER 24 HOURS Resulted


 


 11/4/19 16:10


Blood Blood Culture - Preliminary


NO GROWTH AFTER 24 HOURS Resulted





 11/5/19 08:00


Sputum Expectorated Gram Stain - Final Resulted


 


 11/5/19 08:00


Sputum Expectorated Sputum Culture


Pending Resulted








Accucheck:  153





Critical Care - Subjective


ROS Limited/Unobtainable:  No


FI02:  50


Vent Support Breath Rate:  14


Vent Support Mode:  AC


Vent Tidal Volume:  400


Sputum Amount:  Scant


PEEP:  5.0


PIP:  33


Tube Feeding Amount:  40


I&O:











Intake and Output  


 


 11/5/19 11/6/19





 19:00 07:00


 


Intake Total 1624.666 ml 305 ml


 


Output Total 480 ml 600 ml


 


Balance 1144.666 ml -295 ml


 


  


 


IV Total 1624.666 ml 55 ml


 


Blood Product  250 ml


 


Output Urine Total 480 ml 600 ml








ET-Tube:  7.5


ET Position:  23











Delmer Main MD Nov 6, 2019 21:41

## 2019-11-06 NOTE — NUR
RESPIRATORY NOTE:

Received pt on AC 14, 400VT, 50%, PEEP +5. Pt is intubated w/ ETT 7.5 @ 23cm lipline, 
secured by anchorfast. Pt flat effect, responds to stimuli. B/S vickie. rales/rhonchi, sxn 
small amounts of thin, tan-pink secretions. Both hands on soft-restraints to prevent pt from 
self-extubation. Vent plugged into red outlet, ambubag at bedside. Pt in no apparent 
distress at this time. Will continue to monitor pt.

## 2019-11-06 NOTE — SURGERY PROGRESS NOTE
Surgery Progress Note


Subjective


Additional Comments


on vent support


not responsive


ill appearing





Objective





Last 24 Hour Vital Signs








  Date Time  Temp Pulse Resp B/P (MAP) Pulse Ox O2 Delivery O2 Flow Rate FiO2


 


11/6/19 17:16  76  158/63    


 


11/6/19 17:00  78 26 128/63 (84) 98   


 


11/6/19 16:33  79 18     50


 


11/6/19 16:00  81      


 


11/6/19 16:00      Endotracheal Tube  


 


11/6/19 16:00 97.9 81 25 139/76 (97) 97   


 


11/6/19 16:00        40


 


11/6/19 15:00  75 23 140/97 (111) 97   


 


11/6/19 14:38  77 18     50


 


11/6/19 14:00  78 20 125/70 (88) 97   


 


11/6/19 13:00  73 23 117/58 (77) 97   


 


11/6/19 12:34  76 18     40


 


11/6/19 12:00        50


 


11/6/19 12:00      Endotracheal Tube  


 


11/6/19 12:00 97.8 78 24 129/59 (82) 100   


 


11/6/19 12:00  77      


 


11/6/19 11:00  76 23 123/64 (83) 100   


 


11/6/19 10:32  81 18     50


 


11/6/19 10:00  77 24 130/70 (90) 100   


 


11/6/19 09:10  79 18     50


 


11/6/19 09:00  83 25 139/69 (92) 100   


 


11/6/19 08:50        50


 


11/6/19 08:32  75  137/66    


 


11/6/19 08:00 97.9 80 25 137/66 (89) 98   


 


11/6/19 08:00        28


 


11/6/19 08:00      Endotracheal Tube  


 


11/6/19 08:00  81      


 


11/6/19 07:15  82 15     50


 


11/6/19 07:00 98.3 76 29 133/60 (84) 92   


 


11/6/19 06:00  78 28 137/64 (88) 90   


 


11/6/19 05:38  77 15     28


 


11/6/19 05:00  78 26 139/67 (91) 96   


 


11/6/19 04:00  79      


 


11/6/19 04:00        28


 


11/6/19 04:00 97.9 82 28 137/68 (91) 94   


 


11/6/19 04:00      Endotracheal Tube  


 


11/6/19 03:00  81 31 136/71 (92) 93   


 


11/6/19 02:54  77 15     28


 


11/6/19 02:00  78 26 135/62 (86) 100   


 


11/6/19 01:00  87 25 144/109 (121) 95   


 


11/6/19 01:00  89 24     28


 


11/6/19 00:00  91      


 


11/6/19 00:00      Endotracheal Tube  


 


11/6/19 00:00        28


 


11/6/19 00:00 98.0 80 28 122/62 (82) 96   


 


11/5/19 23:00  92 26 145/73 (97) 92   


 


11/5/19 22:42  88 26     28


 


11/5/19 22:00  86 27 139/67 (91) 94   


 


11/5/19 21:15  88 19     28


 


11/5/19 21:00  86 27 143/66 (91) 98   


 


11/5/19 20:00      Endotracheal Tube  


 


11/5/19 20:00 98.2 80 26 128/57 (80) 96   


 


11/5/19 20:00        28


 


11/5/19 20:00  88      


 


11/5/19 19:00  77 25 128/54 (78) 97   


 


11/5/19 18:45  79 20     28


 


11/5/19 18:00  82 25 137/52 (80) 95   


 


11/5/19 17:48  85  163/81    








I&O











Intake and Output  


 


 11/5/19 11/6/19





 19:00 07:00


 


Intake Total 1624.666 ml 305 ml


 


Output Total 480 ml 600 ml


 


Balance 1144.666 ml -295 ml


 


  


 


IV Total 1624.666 ml 55 ml


 


Blood Product  250 ml


 


Output Urine Total 480 ml 600 ml








Dressing:  other


Wound:  other


Drains:  other


Cardiovascular:  RSR


Respiratory:  decreased breath sounds


Abdomen:  soft, non-distended


Extremities:  no tenderness, no cyanosis, other





Laboratory Tests








Test


  11/6/19


03:50


 


White Blood Count


  12.9 K/UL


(4.8-10.8)  H


 


Red Blood Count


  3.31 M/UL


(4.20-5.40)  L


 


Hemoglobin


  9.0 G/DL


(12.0-16.0)  L


 


Hematocrit


  27.0 %


(37.0-47.0)  L


 


Mean Corpuscular Volume 81 FL (80-99)  


 


Mean Corpuscular Hemoglobin


  27.1 PG


(27.0-31.0)


 


Mean Corpuscular Hemoglobin


Concent 33.3 G/DL


(32.0-36.0)


 


Red Cell Distribution Width


  14.9 %


(11.6-14.8)  H


 


Platelet Count


  438 K/UL


(150-450)


 


Mean Platelet Volume


  7.1 FL


(6.5-10.1)


 


Neutrophils (%) (Auto)


  72.6 %


(45.0-75.0)


 


Lymphocytes (%) (Auto)


  15.8 %


(20.0-45.0)  L


 


Monocytes (%) (Auto)


  5.1 %


(1.0-10.0)


 


Eosinophils (%) (Auto)


  5.8 %


(0.0-3.0)  H


 


Basophils (%) (Auto)


  0.7 %


(0.0-2.0)


 


Sodium Level


  145 MMOL/L


(136-145)


 


Potassium Level


  4.0 MMOL/L


(3.5-5.1)  #


 


Chloride Level


  108 MMOL/L


()  H


 


Carbon Dioxide Level


  31 MMOL/L


(21-32)


 


Anion Gap


  7 mmol/L


(5-15)


 


Blood Urea Nitrogen


  16 mg/dL


(7-18)


 


Creatinine


  1.1 MG/DL


(0.55-1.30)


 


Estimat Glomerular Filtration


Rate 49.7 mL/min


(>60)


 


Glucose Level


  88 MG/DL


()  #


 


Uric Acid


  7.7 MG/DL


(2.6-7.2)  H


 


Calcium Level


  8.5 MG/DL


(8.5-10.1)


 


Phosphorus Level


  1.9 MG/DL


(2.5-4.9)  L


 


Magnesium Level


  2.4 MG/DL


(1.8-2.4)


 


Total Bilirubin


  1.2 MG/DL


(0.2-1.0)  H


 


Direct Bilirubin


  0.3 MG/DL


(0.0-0.3)


 


Aspartate Amino Transf


(AST/SGOT) 12 U/L (15-37)


L


 


Alanine Aminotransferase


(ALT/SGPT) 14 U/L (12-78)


 


 


Alkaline Phosphatase


  79 U/L


()


 


C-Reactive Protein,


Quantitative 6.1 mg/dL


(0.00-0.90)  H


 


Pro-B-Type Natriuretic Peptide


  56375 pg/mL


(0-125)  H


 


Total Protein


  6.2 G/DL


(6.4-8.2)  L


 


Albumin


  2.0 G/DL


(3.4-5.0)  L


 


Globulin 4.2 g/dL  


 


Albumin/Globulin Ratio


  0.5 (1.0-2.7)


L


 


Vancomycin Level Trough


  18.3 ug/mL


(5.0-12.0)  H











Plan


Problems:  


(1) Pressure injury of deep tissue


Assessment & Plan:  Pt presented on admission with DTPI R heel. Blood filled 

blister with marginal erythema noted to heel. Pt exhibited agitation when 

minimally palpated. (L)2.2cm x (W)2.1cm


L heel is blanchable .


Non-blanchable erythema without induration noted to sacral cleft. 


No other areas of skin concerns noted.





Tx.Plan:


Apply Betadine to R heel. Cover with Optifoam drsg. Change every 3 days and prn.


           


Apply Cavilon Skin Barrier to L heel. Cover with Optifoam drsg. Change every 7 

days and prn.


           


Apply Moisture Barrier Paste to buttocks. Cover sacral area with Optifoam drsg. 

Change every 3 days and prn.


           


Reposition at least every 2hours or as tolerated.


           


Off-load heels with pillow.





(2) Sepsis


Assessment & Plan:  Patient with low-grade fevers, anemia, leukocytosis 

persistent, elevated platelets, abnormal labs.


Etiology currently unknown and work-up in progress.  Labs noted and imaging 

that is available as noted.  


CT noted


US noted


Impression: 13 x 7 x 12.9 cm unilocular cystic mass, corresponding to lesion 

reported


on recent CT scan. Layering low level internal echoes likely represent bladder


debris.. This presumably represents a cystic ovarian lesion with debris or


hemorrhage, possibly neoplastic. Gynecological consultation is recommended


Antibiotics as per infectious disease 


local wound care as wounds do not seem to be the etiology of her sepsis


start feeds via peg


doing well


improving


cont with tube feeds


cont with ICU care 


supplementation for healing 


We will monitor and follow with recommendations


Thank you for allowing me to participate in patient's care














Radhames Blas Nov 6, 2019 17:36

## 2019-11-06 NOTE — NUR
NURSE NOTES:

patient noted with  anxiety with episode of biting ETT tubing talk therapy, suctioned  and 
reality orientation provided not effective. Ativan given effective.

## 2019-11-06 NOTE — INFECTIOUS DISEASES PROG NOTE
Assessment/Plan


Problems:  


(1) Aspiration pneumonia


Assessment & Plan:  with B/L infiltrates, hypoxemia and leukocytosis, CXR  

showed interstitial infiltrates , await  sputum culture and continue vancomycin 

with meropenem  empirically . aspiration precaution. EOT 11/14/19





(2) Respiratory failure


Assessment & Plan:  with maegan cardia and S/P code blue, was intubated and 

started on mechanical ventilation , monitor CXR and ABG , pulmonary is 

following 





(3) UTI (urinary tract infection)


Assessment & Plan:  due to candida lusitaneae , S/P casas catheter removal , no 

specific therapy needed for now 





(4) Acute metabolic encephalopathy


Assessment & Plan:  due to the above, continue hydration and antibiotics, 

monitor in tele 





(5) Hyperglycemia due to type 2 diabetes mellitus


Assessment & Plan:  recommend tight glycemic control to keep blood glucose 

between 100-140 





(6) ARF (acute renal failure)


Assessment & Plan:  due to the above, continue hydration and renally dosed 

antibiotics, close  monitor of renal function , stop vancomycin 





(7) Pelvic mass in female


Assessment & Plan:  to the left of the uterus, suspect malignancy , recommend OB

/GYN eval , and tumor markers , oncology is following 








Subjective


ROS Limited/Unobtainable:  Yes


Allergies:  


Coded Allergies:  


     No Known Allergies (Unverified , 10/14/19)


Subjective


she was still intubated on mechanical ventilation  in  ICU after she became 

bradycardic and had code blue , minimally responsive , no fever or chills , no 

diarrhea, no secretions from the ET tube





Objective


Vital Signs





Last 24 Hour Vital Signs








  Date Time  Temp Pulse Resp B/P (MAP) Pulse Ox O2 Delivery O2 Flow Rate FiO2


 


11/6/19 14:00  78 20 125/70 (88) 97   


 


11/6/19 13:00  73 23 117/58 (77) 97   


 


11/6/19 12:34  76 18     40


 


11/6/19 12:00      Endotracheal Tube  


 


11/6/19 12:00 97.8 78 24 129/59 (82) 100   


 


11/6/19 12:00  77      


 


11/6/19 11:00  76 23 123/64 (83) 100   


 


11/6/19 10:32  81 18     50


 


11/6/19 10:00  77 24 130/70 (90) 100   


 


11/6/19 09:10  79 18     50


 


11/6/19 09:00  83 25 139/69 (92) 100   


 


11/6/19 08:50        50


 


11/6/19 08:32  75  137/66    


 


11/6/19 08:00 97.9 80 25 137/66 (89) 98   


 


11/6/19 08:00        28


 


11/6/19 08:00      Endotracheal Tube  


 


11/6/19 08:00  81      


 


11/6/19 07:15  82 15     50


 


11/6/19 07:00 98.3 76 29 133/60 (84) 92   


 


11/6/19 06:00  78 28 137/64 (88) 90   


 


11/6/19 05:38  77 15     28


 


11/6/19 05:00  78 26 139/67 (91) 96   


 


11/6/19 04:00  79      


 


11/6/19 04:00        28


 


11/6/19 04:00 97.9 82 28 137/68 (91) 94   


 


11/6/19 04:00      Endotracheal Tube  


 


11/6/19 03:00  81 31 136/71 (92) 93   


 


11/6/19 02:54  77 15     28


 


11/6/19 02:00  78 26 135/62 (86) 100   


 


11/6/19 01:00  87 25 144/109 (121) 95   


 


11/6/19 01:00  89 24     28


 


11/6/19 00:00  91      


 


11/6/19 00:00      Endotracheal Tube  


 


11/6/19 00:00        28


 


11/6/19 00:00 98.0 80 28 122/62 (82) 96   


 


11/5/19 23:00  92 26 145/73 (97) 92   


 


11/5/19 22:42  88 26     28


 


11/5/19 22:00  86 27 139/67 (91) 94   


 


11/5/19 21:15  88 19     28


 


11/5/19 21:00  86 27 143/66 (91) 98   


 


11/5/19 20:00      Endotracheal Tube  


 


11/5/19 20:00 98.2 80 26 128/57 (80) 96   


 


11/5/19 20:00        28


 


11/5/19 20:00  88      


 


11/5/19 19:00  77 25 128/54 (78) 97   


 


11/5/19 18:45  79 20     28


 


11/5/19 18:00  82 25 137/52 (80) 95   


 


11/5/19 17:48  85  163/81    


 


11/5/19 17:02  85 27     28


 


11/5/19 17:00  92 25 163/81 (108) 100   


 


11/5/19 16:00        30


 


11/5/19 16:00  81      


 


11/5/19 16:00 98.0 81 20 150/68 (95) 100   


 


11/5/19 16:00      Endotracheal Tube  


 


11/5/19 15:02  89 21     45


 


11/5/19 15:00  97 27 159/74 (102) 100   








Height (Feet):  5


Height (Inches):  5.00


Weight (Pounds):  158


General Appearance:  WD/WN, no acute distress


HEENT:  normocephalic, atraumatic, anicteric, mucous membranes moist, supple, 

no JVD


Respiratory/Chest:  no respiratory distress, no accessory muscle use, decreased 

breath sounds, crackles/rales


Cardiovascular:  normal peripheral pulses, normal rate, regular rhythm, no 

gallop/murmur, no JVD


Abdomen:  normal bowel sounds, soft, non tender, no organomegaly, non distended

, no mass, no scars


Extremities:  no cyanosis, no clubbing


Skin:  no rash, no lesions, ulcers


Neurologic/Psychiatric:  unresponsiveness


Lymphatic:  no neck adenopathy, no groin adenopathy


Musculoskeletal:  normal muscle bulk, no effusion





Microbiology








 Date/Time


Source Procedure


Growth Status


 


 


 11/4/19 16:15


Blood Blood Culture - Preliminary


NO GROWTH AFTER 24 HOURS Resulted


 


 11/4/19 16:10


Blood Blood Culture - Preliminary


NO GROWTH AFTER 24 HOURS Resulted





 11/5/19 08:00


Sputum Expectorated Gram Stain - Final Resulted


 


 11/5/19 08:00


Sputum Expectorated Sputum Culture


Pending Resulted











Laboratory Tests








Test


  11/5/19


17:35 11/6/19


03:50


 


Arterial Blood pH


  7.492


(7.350-7.450) 


 


 


Arterial Blood Partial


Pressure CO2 35.3 mmHg


(35.0-45.0) 


 


 


Arterial Blood Partial


Pressure O2 140.2 mmHg


(75.0-100.0)  H 


 


 


Arterial Blood HCO3


  26.4 mmol/L


(22.0-26.0)  H 


 


 


Arterial Blood Oxygen


Saturation 98.4 %


() 


 


 


Arterial Blood Base Excess 3.0 (-2-2)  H 


 


Graham Test Positive   


 


White Blood Count


  


  12.9 K/UL


(4.8-10.8)  H


 


Red Blood Count


  


  3.31 M/UL


(4.20-5.40)  L


 


Hemoglobin


  


  9.0 G/DL


(12.0-16.0)  L


 


Hematocrit


  


  27.0 %


(37.0-47.0)  L


 


Mean Corpuscular Volume  81 FL (80-99)  


 


Mean Corpuscular Hemoglobin


  


  27.1 PG


(27.0-31.0)


 


Mean Corpuscular Hemoglobin


Concent 


  33.3 G/DL


(32.0-36.0)


 


Red Cell Distribution Width


  


  14.9 %


(11.6-14.8)  H


 


Platelet Count


  


  438 K/UL


(150-450)


 


Mean Platelet Volume


  


  7.1 FL


(6.5-10.1)


 


Neutrophils (%) (Auto)


  


  72.6 %


(45.0-75.0)


 


Lymphocytes (%) (Auto)


  


  15.8 %


(20.0-45.0)  L


 


Monocytes (%) (Auto)


  


  5.1 %


(1.0-10.0)


 


Eosinophils (%) (Auto)


  


  5.8 %


(0.0-3.0)  H


 


Basophils (%) (Auto)


  


  0.7 %


(0.0-2.0)


 


Sodium Level


  


  145 MMOL/L


(136-145)


 


Potassium Level


  


  4.0 MMOL/L


(3.5-5.1)  #


 


Chloride Level


  


  108 MMOL/L


()  H


 


Carbon Dioxide Level


  


  31 MMOL/L


(21-32)


 


Anion Gap


  


  7 mmol/L


(5-15)


 


Blood Urea Nitrogen


  


  16 mg/dL


(7-18)


 


Creatinine


  


  1.1 MG/DL


(0.55-1.30)


 


Estimat Glomerular Filtration


Rate 


  49.7 mL/min


(>60)


 


Glucose Level


  


  88 MG/DL


()  #


 


Uric Acid


  


  7.7 MG/DL


(2.6-7.2)  H


 


Calcium Level


  


  8.5 MG/DL


(8.5-10.1)


 


Phosphorus Level


  


  1.9 MG/DL


(2.5-4.9)  L


 


Magnesium Level


  


  2.4 MG/DL


(1.8-2.4)


 


Total Bilirubin


  


  1.2 MG/DL


(0.2-1.0)  H


 


Direct Bilirubin


  


  0.3 MG/DL


(0.0-0.3)


 


Aspartate Amino Transf


(AST/SGOT) 


  12 U/L (15-37)


L


 


Alanine Aminotransferase


(ALT/SGPT) 


  14 U/L (12-78)


 


 


Alkaline Phosphatase


  


  79 U/L


()


 


C-Reactive Protein,


Quantitative 


  6.1 mg/dL


(0.00-0.90)  H


 


Pro-B-Type Natriuretic Peptide


  


  27364 pg/mL


(0-125)  H


 


Total Protein


  


  6.2 G/DL


(6.4-8.2)  L


 


Albumin


  


  2.0 G/DL


(3.4-5.0)  L


 


Globulin  4.2 g/dL  


 


Albumin/Globulin Ratio


  


  0.5 (1.0-2.7)


L


 


Vancomycin Level Trough


  


  18.3 ug/mL


(5.0-12.0)  H











Current Medications








 Medications


  (Trade)  Dose


 Ordered  Sig/Winnie


 Route


 PRN Reason  Start Time


 Stop Time Status Last Admin


Dose Admin


 


 Acetaminophen


  (Tylenol)  650 mg  Q4H  PRN


 NG


 Mild Pain/Temp > 100.5  11/3/19 20:30


 11/25/19 20:29   


 


 


 Acetaminophen


  (Tylenol)  650 mg  Q4H  PRN


 RECTAL


 TEMP>100.5  11/3/19 20:30


 12/2/19 20:29   


 


 


 Amlodipine


 Besylate


  (Norvasc)  2.5 mg  BID


 NG


   11/4/19 09:00


 11/29/19 17:59  11/6/19 08:32


 


 


 Chlorhexidine


 Gluconate


  (Mae-Hex 2%)  1 applic  DAILY@2000


 TOPIC


   11/5/19 20:00


 12/5/19 19:59  11/5/19 20:32


 


 


 Dextrose


  (Dextrose 50%)  25 ml  Q30M  PRN


 IV


 Hypoglycemia  11/5/19 06:30


 12/5/19 06:29   


 


 


 Dextrose


  (Dextrose 50%)  50 ml  Q30M  PRN


 IV


 Hypoglycemia  11/5/19 06:30


 12/5/19 06:29   


 


 


 Divalproex Sodium


  (Depakote


 Sprinkles)  500 mg  Q12HR


 NG


   11/3/19 21:00


 11/14/19 21:59  11/6/19 08:33


 


 


 Docusate Sodium


  (Colace)  100 mg  EVERY 8  HOURS


 NG


   11/3/19 22:00


 11/25/19 08:59  11/6/19 14:23


 


 


 Folic Acid


  (Folate)  3 mg  DAILY


 GT


   11/7/19 09:00


 11/30/19 08:59   


 


 


 Furosemide


  (Lasix)  20 mg  EVERY 12  HOURS


 IV


   11/5/19 21:00


 12/5/19 20:59  11/6/19 08:33


 


 


 Insulin Aspart


  (NovoLOG)    EVERY 6  HOURS


 SUBQ


   11/4/19 00:00


 11/29/19 11:59  11/4/19 20:17


 


 


 Insulin Detemir


  (Levemir)  8 units  QHS


 SUBQ


   11/6/19 21:00


 12/1/19 08:59   


 


 


 Lactulose


  (Cephulac)  20 gm  Q8H  PRN


 NG


 Constipation  11/3/19 20:30


 12/3/19 20:29  11/5/19 08:12


 


 


 Lorazepam


  (Ativan 2mg/ml


 1ml)  1 mg  Q4H  PRN


 IV


 For Anxiety  11/3/19 20:30


 11/10/19 20:29  11/6/19 08:34


 


 


 Meropenem 1 gm/


 Sodium Chloride  55 ml @ 


 110 mls/hr  Q8H


 IVPB


   11/4/19 17:00


 11/9/19 16:59  11/6/19 08:46


 


 


 Metoclopramide HCl


  (Reglan)  10 mg  Q6H  PRN


 IVP


 Nausea & Vomiting  11/6/19 10:45


 12/6/19 10:44   


 


 


 Midazolam HCl  100 ml @ 0


 mls/hr  Q24H  PRN


 IV


 To Patient Comfort  11/3/19 20:35


 11/10/19 20:34   


 


 


 Pantoprazole


  (Protonix)  40 mg  EVERY 12  HOURS


 IVP


   11/3/19 21:00


 12/3/19 08:59  11/6/19 08:33


 


 


 Potassium


 Phosphate 30 mm/


 Sodium Chloride  285 ml @ 


 47.5 mls/hr  ONCE  ONCE


 IV


   11/6/19 10:00


 11/6/19 15:59  11/6/19 09:06


 


 


 Potassium Chloride


  (K-Dur)  40 meq  DAILY


 GT


   11/6/19 09:00


 12/5/19 17:59  11/6/19 08:33


 


 


 Vancomycin HCl


  (Vanco rx to


 dose)  1 ea  DAILY  PRN


 MISC


 Per rx protocol  11/4/19 15:30


 12/4/19 15:29   


 


 


 Vancomycin HCl


 750 mg/Dextrose  275 ml @ 


 183.333


 mls/hr  Q12HR@0600,1800


 IVPB


   11/4/19 18:00


 11/9/19 17:59  11/6/19 06:38


 

















Amie Burgos M.D. Nov 6, 2019 14:43

## 2019-11-06 NOTE — NUR
NURSE NOTES:

Received report from ELIZABETH Winter. Patient is asleep and open her eyes to verbal and tactile 
stimuli. ETT 7.5 at 23cm at lip line with vent setting with AC 14, , Peep 5 and FiO2 
28%. Gtube intact and patent, no residual noted and NPO. Bilateral heels dressing intact and 
clean. Right upper arm PICC intact and clean. Bilateral soft wrist restraints on. Skin 
intact and clean on restraints area. Hands are warms, both radial pulses are palpable. Kept 
dry, clean, comfortable and HOB>30. Will continue plan of care.

## 2019-11-06 NOTE — NEPHROLOGY PROGRESS NOTE
Assessment/Plan


Problem List:  


(1) Renal failure (ARF), acute on chronic


Assessment:  resolved





(2) Dehydration


(3) ARF (acute renal failure)


(4) Sepsis


(5) Acute metabolic encephalopathy


(6) Hyperglycemia due to type 2 diabetes mellitus


(7) UTI (urinary tract infection)


(8) Diabetic nephropathy


Assessment





Renal failure, Acute on Chronic resolved


Dehydration


Severe Anemia


Sepsis / Pneumonia / UTI


DM, OOC


Proteinuria , Diabetic Nephropathy


Acute encephalopathy


Plan


casas out


on K and Mag IV 


Pulmonary and Vent support


IV Reglan


GT feeding


Stop IV fluid-


Lasix


has PEG


Per order








previously 


Cr lowering 


Will order urine studies and monitor renal parameters


kidney YOANDY noted


lower iv fluids rate


WBCs rising


has casas again due to retention


Avoid Nephrotoxics








previously


Anemia salas


2D echo


24 H urine protein


Keep BP and BS in check


I am holding  her psych meds due to low MS


Per orders





Subjective


ROS Limited/Unobtainable:  Yes





Objective


Objective





Last 24 Hour Vital Signs








  Date Time  Temp Pulse Resp B/P (MAP) Pulse Ox O2 Delivery O2 Flow Rate FiO2


 


11/6/19 10:00  77 24 130/70 (90) 100   


 


11/6/19 09:10  79 18     28


 


11/6/19 09:00  83 25 139/69 (92) 100   


 


11/6/19 08:50        50


 


11/6/19 08:32  75  137/66    


 


11/6/19 08:00 97.9 80 25 137/66 (89) 98   


 


11/6/19 08:00        28


 


11/6/19 08:00      Endotracheal Tube  


 


11/6/19 07:15  82 15     28


 


11/6/19 07:00 98.3 76 29 133/60 (84) 92   


 


11/6/19 06:00  78 28 137/64 (88) 90   


 


11/6/19 05:38  77 15     28


 


11/6/19 05:00  78 26 139/67 (91) 96   


 


11/6/19 04:00  79      


 


11/6/19 04:00        28


 


11/6/19 04:00 97.9 82 28 137/68 (91) 94   


 


11/6/19 04:00      Endotracheal Tube  


 


11/6/19 03:00  81 31 136/71 (92) 93   


 


11/6/19 02:54  77 15     28


 


11/6/19 02:00  78 26 135/62 (86) 100   


 


11/6/19 01:00  87 25 144/109 (121) 95   


 


11/6/19 01:00  89 24     28


 


11/6/19 00:00  91      


 


11/6/19 00:00      Endotracheal Tube  


 


11/6/19 00:00        28


 


11/6/19 00:00 98.0 80 28 122/62 (82) 96   


 


11/5/19 23:00  92 26 145/73 (97) 92   


 


11/5/19 22:42  88 26     28


 


11/5/19 22:00  86 27 139/67 (91) 94   


 


11/5/19 21:15  88 19     28


 


11/5/19 21:00  86 27 143/66 (91) 98   


 


11/5/19 20:00      Endotracheal Tube  


 


11/5/19 20:00 98.2 80 26 128/57 (80) 96   


 


11/5/19 20:00        28


 


11/5/19 20:00  88      


 


11/5/19 19:00  77 25 128/54 (78) 97   


 


11/5/19 18:45  79 20     28


 


11/5/19 18:00  82 25 137/52 (80) 95   


 


11/5/19 17:48  85  163/81    


 


11/5/19 17:02  85 27     28


 


11/5/19 17:00  92 25 163/81 (108) 100   


 


11/5/19 16:00        30


 


11/5/19 16:00  81      


 


11/5/19 16:00 98.0 81 20 150/68 (95) 100   


 


11/5/19 16:00      Endotracheal Tube  


 


11/5/19 15:02  89 21     45


 


11/5/19 15:00  97 27 159/74 (102) 100   


 


11/5/19 14:00  76 19 147/74 (98) 100   


 


11/5/19 13:00  69 22 132/51 (78) 100   


 


11/5/19 12:00  72      


 


11/5/19 12:00 98.2 69 22 132/51 (78) 100   


 


11/5/19 12:00      Endotracheal Tube  


 


11/5/19 12:00        60


 


11/5/19 11:29  71 17     60


 


11/5/19 11:00  67 21 136/56 (82) 100   

















Intake and Output  


 


 11/5/19 11/6/19





 19:00 07:00


 


Intake Total 1624.666 ml 305 ml


 


Output Total 480 ml 600 ml


 


Balance 1144.666 ml -295 ml


 


  


 


IV Total 1624.666 ml 55 ml


 


Blood Product  250 ml


 


Output Urine Total 480 ml 600 ml








Laboratory Tests


11/5/19 11:25: 


Arterial Blood pH 7.514H, Arterial Blood Partial Pressure CO2 37.7, Arterial 

Blood Partial Pressure O2 144.4H, Arterial Blood HCO3 29.7H, Arterial Blood 

Oxygen Saturation 98.5, Arterial Blood Base Excess 6.3H, Graham Test Positive


11/5/19 14:15: 


White Blood Count 12.2H, Red Blood Count 2.85L, Hemoglobin 7.7L, Hematocrit 

24.0L, Mean Corpuscular Volume 84, Mean Corpuscular Hemoglobin 27.0, Mean 

Corpuscular Hemoglobin Concent 32.1, Red Cell Distribution Width 14.1, Platelet 

Count 407, Mean Platelet Volume 7.1, Neutrophils (%) (Auto) , Lymphocytes (%) (

Auto) , Monocytes (%) (Auto) , Eosinophils (%) (Auto) , Basophils (%) (Auto) , 

Differential Total Cells Counted 100, Neutrophils % (Manual) 83H, Lymphocytes % 

(Manual) 12L, Monocytes % (Manual) 3, Eosinophils % (Manual) 2, Basophils % (

Manual) 0, Band Neutrophils 0, Nucleated Red Blood Cells 1, Platelet Estimate 

Adequate, Platelet Morphology Normal, Polychromasia 1+, Anisocytosis 1+, 

Microcytosis 1+, Prothrombin Time 12.8H, Prothromb Time International Ratio 1.2H

, Activated Partial Thromboplast Time 30, Sodium Level 146H, Potassium Level 2.6

*L, Chloride Level 107, Carbon Dioxide Level 32, Anion Gap 8, Blood Urea 

Nitrogen 18, Creatinine 0.9, Estimat Glomerular Filtration Rate > 60, Glucose 

Level 196#H, Calcium Level 8.6


11/5/19 17:35: 


Arterial Blood pH 7.492H, Arterial Blood Partial Pressure CO2 35.3, Arterial 

Blood Partial Pressure O2 140.2H, Arterial Blood HCO3 26.4H, Arterial Blood 

Oxygen Saturation 98.4, Arterial Blood Base Excess 3.0H, Graham Test Positive


11/6/19 03:50: 


White Blood Count 12.9H, Red Blood Count 3.31L, Hemoglobin 9.0L, Hematocrit 

27.0L, Mean Corpuscular Volume 81, Mean Corpuscular Hemoglobin 27.1, Mean 

Corpuscular Hemoglobin Concent 33.3, Red Cell Distribution Width 14.9H, 

Platelet Count 438, Mean Platelet Volume 7.1, Neutrophils (%) (Auto) 72.6, 

Lymphocytes (%) (Auto) 15.8L, Monocytes (%) (Auto) 5.1, Eosinophils (%) (Auto) 

5.8H, Basophils (%) (Auto) 0.7, Sodium Level 145, Potassium Level 4.0#, 

Chloride Level 108H, Carbon Dioxide Level 31, Anion Gap 7, Blood Urea Nitrogen 

16, Creatinine 1.1, Estimat Glomerular Filtration Rate 49.7, Glucose Level 88#, 

Calcium Level 8.5, Uric Acid 7.7H, Phosphorus Level 1.9L, Magnesium Level 2.4, 

Total Bilirubin 1.2H, Direct Bilirubin 0.3, Aspartate Amino Transf (AST/SGOT) 

12L, Alanine Aminotransferase (ALT/SGPT) 14, Alkaline Phosphatase 79, C-

Reactive Protein, Quantitative 6.1H, Pro-B-Type Natriuretic Peptide 23066Q, 

Total Protein 6.2L, Albumin 2.0L, Globulin 4.2, Albumin/Globulin Ratio 0.5L, 

Vancomycin Level Trough 18.3H


Height (Feet):  5


Height (Inches):  5.00


Weight (Pounds):  158


General Appearance:  no apparent distress


EENT:  other - vented


Cardiovascular:  normal rate


Respiratory/Chest:  decreased breath sounds


Abdomen:  distended


Objective


no change











Lukasz Vizcaino MD Nov 6, 2019 10:45

## 2019-11-06 NOTE — ENDOSCOPY PROCEDURE NOTE
Endoscopy Procedure Note


General


Indication for Procedure:  gib


Procedures Performed:  EGD


Operative Findings/Diagnosis:  gastritis


Specimen:  yes


Pt Tolerated Procedure Well:  Yes


Estimated Blood Loss:  none





Anesthesia


Anesthesiologist:  yanci


Anesthesia:  MAC





Inserted Devices


Implant(s) used?:  No





GI Core Measures


50 yrs or older w/o bx or poly:  Not Applicable


10yrs. F/U recommended:  Not Applicable











Storm Turk MD Nov 6, 2019 12:19

## 2019-11-06 NOTE — NUR
RESPIRATORY NOTE: Received pt on ordered vent settings. Pt airway is patent and secured. 
Suctioned pt prn. No resp distress noted. Vent alarms are on and audible. Vent is plugged 
into red outlet. Will monitor pt progress.

## 2019-11-06 NOTE — GENERAL PROGRESS NOTE
Assessment/Plan


Problem List:  


(1) Abnormal TSH


ICD Codes:  R79.89 - Other specified abnormal findings of blood chemistry


SNOMED:  618729420


(2) Hyperglycemia due to type 2 diabetes mellitus


ICD Codes:  E11.65 - Type 2 diabetes mellitus with hyperglycemia


SNOMED:  863571483857061, 11880130


Qualifiers:  


   Qualified Codes:  E11.65 - Type 2 diabetes mellitus with hyperglycemia; 

Z79.4 - Long term (current) use of insulin


(3) Acute metabolic encephalopathy


ICD Codes:  G93.41 - Metabolic encephalopathy


SNOMED:  22121092, 225204782


(4) ARF (acute renal failure)


ICD Codes:  N17.9 - Acute kidney failure, unspecified


SNOMED:  22226281


Qualifiers:  


   Qualified Codes:  N17.9 - Acute kidney failure, unspecified


(5) Healthcare associated bacterial pneumonia


ICD Codes:  J15.9 - Unspecified bacterial pneumonia


SNOMED:  943350334


Status:  unchanged


Assessment/Plan:


change Levemir to 8 units qhs 


continue NISS every 6 hours 


hypoglycemia protocol in order





Subjective


ROS Limited/Unobtainable:  Yes


Allergies:  


Coded Allergies:  


     No Known Allergies (Unverified , 10/14/19)


Subjective


events noted 











Item Value  Date Time


 


Bedside Blood Glucose 80 mg/dl 11/6/19 0614


 


Bedside Blood Glucose 133 mg/dl H 11/6/19 0000


 


Bedside Blood Glucose 181 mg/dl H 11/5/19 1800


 


Bedside Blood Glucose 176 mg/dl H 11/5/19 1200


 


Bedside Blood Glucose 78 mg/dl 11/5/19 0814


 


Bedside Blood Glucose 80 mg/dl 11/5/19 0600











Objective





Last 24 Hour Vital Signs








  Date Time  Temp Pulse Resp B/P (MAP) Pulse Ox O2 Delivery O2 Flow Rate FiO2


 


11/6/19 07:00 98.3 76 29 133/60 (84) 92   


 


11/6/19 06:00  78 28 137/64 (88) 90   


 


11/6/19 05:38  77 15     28


 


11/6/19 05:00  78 26 139/67 (91) 96   


 


11/6/19 04:00  79      


 


11/6/19 04:00        28


 


11/6/19 04:00 97.9 82 28 137/68 (91) 94   


 


11/6/19 04:00      Endotracheal Tube  


 


11/6/19 03:00  81 31 136/71 (92) 93   


 


11/6/19 02:54  77 15     28


 


11/6/19 02:00  78 26 135/62 (86) 100   


 


11/6/19 01:00  87 25 144/109 (121) 95   


 


11/6/19 01:00  89 24     28


 


11/6/19 00:00  91      


 


11/6/19 00:00      Endotracheal Tube  


 


11/6/19 00:00        28


 


11/6/19 00:00 98.0 80 28 122/62 (82) 96   


 


11/5/19 23:00  92 26 145/73 (97) 92   


 


11/5/19 22:42  88 26     28


 


11/5/19 22:00  86 27 139/67 (91) 94   


 


11/5/19 21:15  88 19     28


 


11/5/19 21:00  86 27 143/66 (91) 98   


 


11/5/19 20:00      Endotracheal Tube  


 


11/5/19 20:00 98.2 80 26 128/57 (80) 96   


 


11/5/19 20:00        28


 


11/5/19 20:00  88      


 


11/5/19 19:00  77 25 128/54 (78) 97   


 


11/5/19 18:45  79 20     28


 


11/5/19 18:00  82 25 137/52 (80) 95   


 


11/5/19 17:48  85  163/81    


 


11/5/19 17:02  85 27     28


 


11/5/19 17:00  92 25 163/81 (108) 100   


 


11/5/19 16:00        30


 


11/5/19 16:00  81      


 


11/5/19 16:00 98.0 81 20 150/68 (95) 100   


 


11/5/19 16:00      Endotracheal Tube  


 


11/5/19 15:02  89 21     45


 


11/5/19 15:00  97 27 159/74 (102) 100   


 


11/5/19 14:00  76 19 147/74 (98) 100   


 


11/5/19 13:00  69 22 132/51 (78) 100   


 


11/5/19 12:00  72      


 


11/5/19 12:00 98.2 69 22 132/51 (78) 100   


 


11/5/19 12:00      Endotracheal Tube  


 


11/5/19 12:00        60


 


11/5/19 11:29  71 17     60


 


11/5/19 11:00  67 21 136/56 (82) 100   


 


11/5/19 10:00  78 23 136/56 (82) 100   


 


11/5/19 09:30        60


 


11/5/19 09:28  77 22     60


 


11/5/19 09:00  76 20 141/54 (83) 100   


 


11/5/19 08:12  74  147/54    


 


11/5/19 08:00        60


 


11/5/19 08:00      Endotracheal Tube  


 


11/5/19 08:00  75 15 147/54 (85) 100   


 


11/5/19 08:00  76      


 


11/5/19 07:21  78 15     60

















Intake and Output  


 


 11/5/19 11/6/19





 18:59 06:59


 


Intake Total 1624.666 ml 305 ml


 


Output Total 480 ml 600 ml


 


Balance 1144.666 ml -295 ml


 


  


 


IV Total 1624.666 ml 55 ml


 


Blood Product  250 ml


 


Output Urine Total 480 ml 600 ml








Laboratory Tests


11/5/19 11:25: 


Arterial Blood pH 7.514H, Arterial Blood Partial Pressure CO2 37.7, Arterial 

Blood Partial Pressure O2 144.4H, Arterial Blood HCO3 29.7H, Arterial Blood 

Oxygen Saturation 98.5, Arterial Blood Base Excess 6.3H, Graham Test Positive


11/5/19 14:15: 


White Blood Count 12.2H, Red Blood Count 2.85L, Hemoglobin 7.7L, Hematocrit 

24.0L, Mean Corpuscular Volume 84, Mean Corpuscular Hemoglobin 27.0, Mean 

Corpuscular Hemoglobin Concent 32.1, Red Cell Distribution Width 14.1, Platelet 

Count 407, Mean Platelet Volume 7.1, Neutrophils (%) (Auto) , Lymphocytes (%) (

Auto) , Monocytes (%) (Auto) , Eosinophils (%) (Auto) , Basophils (%) (Auto) , 

Differential Total Cells Counted 100, Neutrophils % (Manual) 83H, Lymphocytes % 

(Manual) 12L, Monocytes % (Manual) 3, Eosinophils % (Manual) 2, Basophils % (

Manual) 0, Band Neutrophils 0, Nucleated Red Blood Cells 1, Platelet Estimate 

Adequate, Platelet Morphology Normal, Polychromasia 1+, Anisocytosis 1+, 

Microcytosis 1+, Prothrombin Time 12.8H, Prothromb Time International Ratio 1.2H

, Activated Partial Thromboplast Time 30, Sodium Level 146H, Potassium Level 2.6

*L, Chloride Level 107, Carbon Dioxide Level 32, Anion Gap 8, Blood Urea 

Nitrogen 18, Creatinine 0.9, Estimat Glomerular Filtration Rate > 60, Glucose 

Level 196#H, Calcium Level 8.6


11/5/19 17:35: 


Arterial Blood pH 7.492H, Arterial Blood Partial Pressure CO2 35.3, Arterial 

Blood Partial Pressure O2 140.2H, Arterial Blood HCO3 26.4H, Arterial Blood 

Oxygen Saturation 98.4, Arterial Blood Base Excess 3.0H, Graham Test Positive


11/6/19 03:50: 


White Blood Count 12.9H, Red Blood Count 3.31L, Hemoglobin 9.0L, Hematocrit 

27.0L, Mean Corpuscular Volume 81, Mean Corpuscular Hemoglobin 27.1, Mean 

Corpuscular Hemoglobin Concent 33.3, Red Cell Distribution Width 14.9H, 

Platelet Count 438, Mean Platelet Volume 7.1, Neutrophils (%) (Auto) 72.6, 

Lymphocytes (%) (Auto) 15.8L, Monocytes (%) (Auto) 5.1, Eosinophils (%) (Auto) 

5.8H, Basophils (%) (Auto) 0.7, Sodium Level 145, Potassium Level 4.0#, 

Chloride Level 108H, Carbon Dioxide Level 31, Anion Gap 7, Blood Urea Nitrogen 

16, Creatinine 1.1, Estimat Glomerular Filtration Rate 49.7, Glucose Level 88#, 

Calcium Level 8.5, Uric Acid 7.7H, Phosphorus Level 1.9L, Magnesium Level 2.4, 

Total Bilirubin 1.2H, Direct Bilirubin 0.3, Aspartate Amino Transf (AST/SGOT) 

12L, Alanine Aminotransferase (ALT/SGPT) 14, Alkaline Phosphatase 79, C-

Reactive Protein, Quantitative 6.1H, Pro-B-Type Natriuretic Peptide 35394F, 

Total Protein 6.2L, Albumin 2.0L, Globulin 4.2, Albumin/Globulin Ratio 0.5L, 

Vancomycin Level Trough 18.3H


Height (Feet):  5


Height (Inches):  5.00


Weight (Pounds):  185


General Appearance:  lethargic


Cardiovascular:  normal rate


Respiratory/Chest:  lungs clear


Abdomen:  normal bowel sounds


Edema:  1+ Arm (L), 1+ Arm (R), 1+ Leg (L), 1+ Leg (R), 1+ Pedal (L), 1+ Pedal (

R), 1+ Generalized


Objective





Current Medications








 Medications


  (Trade)  Dose


 Ordered  Sig/Winnie


 Route


 PRN Reason  Start Time


 Stop Time Status Last Admin


Dose Admin


 


 Acetaminophen


  (Tylenol)  650 mg  Q4H  PRN


 NG


 Mild Pain/Temp > 100.5  11/3/19 20:30


 11/25/19 20:29   


 


 


 Acetaminophen


  (Tylenol)  650 mg  Q4H  PRN


 RECTAL


 TEMP>100.5  11/3/19 20:30


 12/2/19 20:29   


 


 


 Amlodipine


 Besylate


  (Norvasc)  2.5 mg  BID


 NG


   11/4/19 09:00


 11/29/19 17:59  11/5/19 17:48


 


 


 Chlorhexidine


 Gluconate


  (Mae-Hex 2%)  1 applic  DAILY@2000


 TOPIC


   11/5/19 20:00


 12/5/19 19:59  11/5/19 20:32


 


 


 Dextrose


  (Dextrose 50%)  25 ml  Q30M  PRN


 IV


 Hypoglycemia  11/5/19 06:30


 12/5/19 06:29   


 


 


 Dextrose


  (Dextrose 50%)  50 ml  Q30M  PRN


 IV


 Hypoglycemia  11/5/19 06:30


 12/5/19 06:29   


 


 


 Divalproex Sodium


  (Depakote


 Sprinkles)  500 mg  Q12HR


 NG


   11/3/19 21:00


 11/14/19 21:59  11/5/19 20:33


 


 


 Docusate Sodium


  (Colace)  100 mg  EVERY 8  HOURS


 NG


   11/3/19 22:00


 11/25/19 08:59  11/6/19 06:16


 


 


 Folic Acid


  (Folate)  1 mg  DAILY


 GT


   11/6/19 09:00


 11/30/19 08:59   


 


 


 Furosemide


  (Lasix)  20 mg  EVERY 12  HOURS


 IV


   11/5/19 21:00


 12/5/19 20:59  11/5/19 20:33


 


 


 Insulin Aspart


  (NovoLOG)    EVERY 6  HOURS


 SUBQ


   11/4/19 00:00


 11/29/19 11:59  11/4/19 20:17


 


 


 Insulin Detemir


  (Levemir)  8 units  BID


 SUBQ


   11/4/19 09:00


 12/1/19 08:59  11/5/19 17:50


 


 


 Lactulose


  (Cephulac)  20 gm  Q8H  PRN


 NG


 Constipation  11/3/19 20:30


 12/3/19 20:29  11/5/19 08:12


 


 


 Lorazepam


  (Ativan 2mg/ml


 1ml)  1 mg  Q4H  PRN


 IV


 For Anxiety  11/3/19 20:30


 11/10/19 20:29  11/5/19 22:45


 


 


 Meropenem 1 gm/


 Sodium Chloride  55 ml @ 


 110 mls/hr  Q8H


 IVPB


   11/4/19 17:00


 11/9/19 16:59  11/6/19 01:56


 


 


 Metoclopramide HCl


  (Reglan)  10 mg  Q8HR


 IVP


   11/4/19 15:21


 12/4/19 15:20  11/6/19 06:16


 


 


 Midazolam HCl  100 ml @ 0


 mls/hr  Q24H  PRN


 IV


 To Patient Comfort  11/3/19 20:35


 11/10/19 20:34   


 


 


 Pantoprazole


  (Protonix)  40 mg  EVERY 12  HOURS


 IVP


   11/3/19 21:00


 12/3/19 08:59  11/5/19 20:32


 


 


 Potassium Chloride


  (K-Dur)  40 meq  TWICE A  DAY


 GT


   11/5/19 18:00


 12/5/19 17:59  11/5/19 17:48


 


 


 Vancomycin HCl


  (Vanco rx to


 dose)  1 ea  DAILY  PRN


 MISC


 Per rx protocol  11/4/19 15:30


 12/4/19 15:29   


 


 


 Vancomycin HCl


 750 mg/Dextrose  275 ml @ 


 183.333


 mls/hr  Q12HR@0600,1800


 IVPB


   11/4/19 18:00


 11/9/19 17:59  11/6/19 06:38


 

















Riccardo Velez MD Nov 6, 2019 07:13

## 2019-11-06 NOTE — GENERAL PROGRESS NOTE
Assessment/Plan


Status:  unchanged


Assessment/Plan:


S, O: Intubated , vent setting reviewed. No pressor. awake, limited following 

commands





PHYSICAL EXAMINATION:HEAD AND NECK:  intubated .


CHEST:  Bronchial breathing sounds.ABDOMEN:  Soft.  No organomegaly.


MUSCULOSKELETAL:  Diffuse 1+ or 2+ nonpitting edema in all four contracted


extremities.  The patient is not following the commands.  Limited


evaluation.NEUROLOGY:  The patient is awake.  Not alert.  Not verbally


communicative.





LABORATORY DATA:  Labs dated Nov 6, 2019  reviewed





Meds: Reviewed and reconciled in the chart





Imaging:  CXR reviewed 





ASSESSMENT AND PLAN:


1. VDRF


2. Sepsis.  secondary to healthcare-associated pneumonia or


3. Acute chf exacerbation - Diastolic


4. Chronic encephalomalacia.


3. Acute on chronic anemia.


4. Renal failure, age indeterminate.


6. Hyperthyroidism.


7. Hyperkalemia.


8. Diabetes type 2, uncontrolled with A1c of 10.


9. Psychiatric disorder.


10. GI and DVT prophylaxis.


11. PAH- Severe 





PLAN OF CARE:


Worsening leukocytosis, defer to ID  


Post PEG tube


Out patient followup for abnormal incidental imaging finding ( possibility of 

CA cant be excluded 


Acute drop in Hgb and observation of UGI bleeding, Will hold DAPT





Subjective


Allergies:  


Coded Allergies:  


     No Known Allergies (Unverified , 10/14/19)





Objective





Last 24 Hour Vital Signs








  Date Time  Temp Pulse Resp B/P (MAP) Pulse Ox O2 Delivery O2 Flow Rate FiO2


 


11/6/19 10:00  77 24 130/70 (90) 100   


 


11/6/19 09:10  79 18     28


 


11/6/19 09:00  83 25 139/69 (92) 100   


 


11/6/19 08:50        50


 


11/6/19 08:32  75  137/66    


 


11/6/19 08:00 97.9 80 25 137/66 (89) 98   


 


11/6/19 08:00        28


 


11/6/19 08:00      Endotracheal Tube  


 


11/6/19 07:15  82 15     28


 


11/6/19 07:00 98.3 76 29 133/60 (84) 92   


 


11/6/19 06:00  78 28 137/64 (88) 90   


 


11/6/19 05:38  77 15     28


 


11/6/19 05:00  78 26 139/67 (91) 96   


 


11/6/19 04:00  79      


 


11/6/19 04:00        28


 


11/6/19 04:00 97.9 82 28 137/68 (91) 94   


 


11/6/19 04:00      Endotracheal Tube  


 


11/6/19 03:00  81 31 136/71 (92) 93   


 


11/6/19 02:54  77 15     28


 


11/6/19 02:00  78 26 135/62 (86) 100   


 


11/6/19 01:00  87 25 144/109 (121) 95   


 


11/6/19 01:00  89 24     28


 


11/6/19 00:00  91      


 


11/6/19 00:00      Endotracheal Tube  


 


11/6/19 00:00        28


 


11/6/19 00:00 98.0 80 28 122/62 (82) 96   


 


11/5/19 23:00  92 26 145/73 (97) 92   


 


11/5/19 22:42  88 26     28


 


11/5/19 22:00  86 27 139/67 (91) 94   


 


11/5/19 21:15  88 19     28


 


11/5/19 21:00  86 27 143/66 (91) 98   


 


11/5/19 20:00      Endotracheal Tube  


 


11/5/19 20:00 98.2 80 26 128/57 (80) 96   


 


11/5/19 20:00        28


 


11/5/19 20:00  88      


 


11/5/19 19:00  77 25 128/54 (78) 97   


 


11/5/19 18:45  79 20     28


 


11/5/19 18:00  82 25 137/52 (80) 95   


 


11/5/19 17:48  85  163/81    


 


11/5/19 17:02  85 27     28


 


11/5/19 17:00  92 25 163/81 (108) 100   


 


11/5/19 16:00        30


 


11/5/19 16:00  81      


 


11/5/19 16:00 98.0 81 20 150/68 (95) 100   


 


11/5/19 16:00      Endotracheal Tube  


 


11/5/19 15:02  89 21     45


 


11/5/19 15:00  97 27 159/74 (102) 100   


 


11/5/19 14:00  76 19 147/74 (98) 100   


 


11/5/19 13:00  69 22 132/51 (78) 100   


 


11/5/19 12:00  72      


 


11/5/19 12:00 98.2 69 22 132/51 (78) 100   


 


11/5/19 12:00      Endotracheal Tube  


 


11/5/19 12:00        60


 


11/5/19 11:29  71 17     60


 


11/5/19 11:00  67 21 136/56 (82) 100   

















Intake and Output  


 


 11/5/19 11/6/19





 19:00 07:00


 


Intake Total 1624.666 ml 305 ml


 


Output Total 480 ml 600 ml


 


Balance 1144.666 ml -295 ml


 


  


 


IV Total 1624.666 ml 55 ml


 


Blood Product  250 ml


 


Output Urine Total 480 ml 600 ml








Laboratory Tests


11/5/19 11:25: 


Arterial Blood pH 7.514H, Arterial Blood Partial Pressure CO2 37.7, Arterial 

Blood Partial Pressure O2 144.4H, Arterial Blood HCO3 29.7H, Arterial Blood 

Oxygen Saturation 98.5, Arterial Blood Base Excess 6.3H, Graham Test Positive


11/5/19 14:15: 


White Blood Count 12.2H, Red Blood Count 2.85L, Hemoglobin 7.7L, Hematocrit 

24.0L, Mean Corpuscular Volume 84, Mean Corpuscular Hemoglobin 27.0, Mean 

Corpuscular Hemoglobin Concent 32.1, Red Cell Distribution Width 14.1, Platelet 

Count 407, Mean Platelet Volume 7.1, Neutrophils (%) (Auto) , Lymphocytes (%) (

Auto) , Monocytes (%) (Auto) , Eosinophils (%) (Auto) , Basophils (%) (Auto) , 

Differential Total Cells Counted 100, Neutrophils % (Manual) 83H, Lymphocytes % 

(Manual) 12L, Monocytes % (Manual) 3, Eosinophils % (Manual) 2, Basophils % (

Manual) 0, Band Neutrophils 0, Nucleated Red Blood Cells 1, Platelet Estimate 

Adequate, Platelet Morphology Normal, Polychromasia 1+, Anisocytosis 1+, 

Microcytosis 1+, Prothrombin Time 12.8H, Prothromb Time International Ratio 1.2H

, Activated Partial Thromboplast Time 30, Sodium Level 146H, Potassium Level 2.6

*L, Chloride Level 107, Carbon Dioxide Level 32, Anion Gap 8, Blood Urea 

Nitrogen 18, Creatinine 0.9, Estimat Glomerular Filtration Rate > 60, Glucose 

Level 196#H, Calcium Level 8.6


11/5/19 17:35: 


Arterial Blood pH 7.492H, Arterial Blood Partial Pressure CO2 35.3, Arterial 

Blood Partial Pressure O2 140.2H, Arterial Blood HCO3 26.4H, Arterial Blood 

Oxygen Saturation 98.4, Arterial Blood Base Excess 3.0H, Graham Test Positive


11/6/19 03:50: 


White Blood Count 12.9H, Red Blood Count 3.31L, Hemoglobin 9.0L, Hematocrit 

27.0L, Mean Corpuscular Volume 81, Mean Corpuscular Hemoglobin 27.1, Mean 

Corpuscular Hemoglobin Concent 33.3, Red Cell Distribution Width 14.9H, 

Platelet Count 438, Mean Platelet Volume 7.1, Neutrophils (%) (Auto) 72.6, 

Lymphocytes (%) (Auto) 15.8L, Monocytes (%) (Auto) 5.1, Eosinophils (%) (Auto) 

5.8H, Basophils (%) (Auto) 0.7, Sodium Level 145, Potassium Level 4.0#, 

Chloride Level 108H, Carbon Dioxide Level 31, Anion Gap 7, Blood Urea Nitrogen 

16, Creatinine 1.1, Estimat Glomerular Filtration Rate 49.7, Glucose Level 88#, 

Calcium Level 8.5, Uric Acid 7.7H, Phosphorus Level 1.9L, Magnesium Level 2.4, 

Total Bilirubin 1.2H, Direct Bilirubin 0.3, Aspartate Amino Transf (AST/SGOT) 

12L, Alanine Aminotransferase (ALT/SGPT) 14, Alkaline Phosphatase 79, C-

Reactive Protein, Quantitative 6.1H, Pro-B-Type Natriuretic Peptide 01582N, 

Total Protein 6.2L, Albumin 2.0L, Globulin 4.2, Albumin/Globulin Ratio 0.5L, 

Vancomycin Level Trough 18.3H


Height (Feet):  5


Height (Inches):  5.00


Weight (Pounds):  158











Garrick Keith MD Nov 6, 2019 10:49

## 2019-11-06 NOTE — DIAGNOSTIC IMAGING REPORT
Indication: Tachypnea

 

Technique: One view of the chest

 

Comparison: Post PICC radiograph dated 11/5/2019

 

Findings: Serial satisfactory position of endotracheal tube and PICC. Bilateral

diffuse interstitial and airspace disease appears similar on the right, worse on the

left. There is probably some pleural fluid on the left, appears to have increased.

 

Impression: Bilateral interstitial and airspace infiltrates versus edema, stable on

the right and somewhat worse on the left, over one day

## 2019-11-06 NOTE — NUR
NURSE NOTES:

Patient in bed resting no fever. no s/s of hypo/hyperglycemia. HOB elevated. oral care and 
suctioned pt. turned and repositioned. On P200 mattress for wound management Contact 
isolation maintained and  observed.bilateral upper nad lower extremities elevated with 
pillows. .Call light within easy reach.

## 2019-11-06 NOTE — NUR
NURSE NOTES:

Patient in bed resting no fever. no s/s of hypo/hyperglycemia. HOB elevated. oral care and 
suctioned pt. turned nad repositioned. On P200 mattress for wound management. with on and 
off episode of pulling tubing, reality orientation provided and talk therapy not effective. 
Bilateral soft wrist restraint checked .Call light within easy reach.

## 2019-11-06 NOTE — PRE-PROCEDURE NOTE/ATTESTATION
Pre-Procedure Note/Attestation


Complete Prior to Procedure


Planned Procedure:  not applicable


Procedure Narrative:


egd





Indications for Procedure


Pre-Operative Diagnosis:


gib





Attestation


I attest that I discussed the nature of the procedure; its benefits; risks and 

complications; and alternatives (and the risks and benefits of such alternatives

), prior to the procedure, with the patient (or the patient's legal 

representative).





I attest that, if there was a reasonable possibility of needing a blood 

transfusion, the patient (or the patient's legal representative) was given the 

[facility] standardized written summary, pursuant 

to the Cleve Rob Blood Safety Act ([place] Health and Safety Code # 1645, as 

amended).





I attest that I re-evaluated the patient just prior to the surgery and that 

there has been no change in the patient's H&P, except as documented below:











Storm Turk MD Nov 6, 2019 11:54

## 2019-11-06 NOTE — DIAGNOSTIC IMAGING REPORT
Indication: Abdominal tenderness

 

Technique: Supine view of the abdomen

 

Comparison: 10/25/2019

 

Findings:  Previously demonstrated enteric contrast is seen more distally than

previously, there are exclusively within the descending colon, sigmoid colon, and

rectum. Bowel gas pattern is unremarkable. Previously demonstrated nasogastric tube

has been removed. There is a gastrostomy projected over the stomach.

 

Impression: No acute process. Findings as noted

## 2019-11-06 NOTE — HEMATOLOGY/ONC PROGRESS NOTE
Assessment/Plan


Assessment/Plan





Assessment and Recs:


# Anemia of chronic disease due to underlying chronic medical issues, 

multifactorial 


--> Anemia workup has been ordered, rule out gi bleed --> ferritin is 1400+


--> No evidence of hemolysis is noted, peripheral smear has been reviewed.


--> Hgb goal >7. Transfuse prn.


--> Epogen or iron at this time is not particularly indicated


--> Medications have been reviewed


--> low threshold for gi evaluation in case has occult +


--> bone marrow biopsy is not indicated given the other more likely causes (if 

recurrent anemia) first time here


--> hgb trend 8.4->8.5-->8.2->7.7-->7.9-->10.1-->9.1->7.8->7.2-->9


--> FOLIC ACID 1mg po daily started


# Leukocytosis/elevated white blood cell count, unspecified likely related to 

underlying reaction v more likely infection


--> have reviewed peripheral smear and bandemia/neutrophilia noted


--> continue antibiotics if they have been started by ID team (VANC/ZOSYN)--> 

vanc/linda--> linda


--> wbc 39-->28k-->29k->31k-->23-->26k-->19k-->24k-->15.4-->16-->14-->23k-->14--

>17-->12.5->12.3


--> monitor for resolution


--> CT C/A/P Results reviewed


--> FOR INdium bone scan done -> Rim of activity within the pelvis, 

corresponding to expected location of the soft tissue component of the cystic 

pelvic mass described on recent imaging studies.


--> again consider gyn eval


--> as come down over last week, currently only 12k and s/p code, continue to 

monitor


--> at this time, does not appear to have sascha malignancy, as per further gi w/

u


# Pelvic mass on ct and us -- 13 x 7 x 12.9 cm unilocular cystic mass, 

corresponding to lesion reported on recent CT scan. Layering low level internal 

echoes likely represent bladder debris.. This presumably represents a cystic 

ovarian lesion with debris or hemorrhage, possibly neoplastic.


--> CA-125 is 216! elevated


--> consider gyn eval


# Hypercalcemia - btw 10-11 range when corrected to alb


--> ivf have been started


--> if remains elevated, 7.9-->8.3


--> will get pth


# Sepsis from pneumonia.  


--> pulm consulted, abx started


# CHANCE (acute kidney injury) on ckd


--> cr 1.6-->2.7-->2.2->1.2


--> per renal


# UTI (urinary tract infection)


--> on abx per id


# Dehydration


--> on ivf


# Acute metabolic encephalopathy


--> likely due to pna


# Resp failure now intibated 11/4


# Dysphagia s/p peg++


# Dvt ppx SCDS





The timing of this note does not necessarily reflect the time of the patient 

was seen.





Greatly appreciate consultation.





Subjective


HEENT:  Denies: no symptoms, eye pain, blurred vision, tearing, double vision, 

ear pain, ear discharge, nose pain, nose congestion, throat pain, throat 

swelling, mouth pain, mouth swelling, other


Cardiovascular:  Denies: no symptoms, chest pain, edema, irregular heart rate, 

lightheadedness, palpitations, syncope, other


Respiratory:  Denies: no symptoms, cough, shortness of breath, SOB with 

excertion, SOB at rest, sputum, wheezing, other


Gastrointestinal/Abdominal:  Denies: no symptoms, abdomen distended, abdominal 

pain, black stools, tarry stools, blood in stool, constipated, diarrhea, 

difficulty swallowing, nausea, poor appetite, poor fluid intake, rectal bleeding

, vomiting, other


Genitourinary:  Denies: no symptoms, burning, discharge, frequency, flank pain, 

hematuria, incontinence, pain, urgency, other


Neurologic/Psychiatric:  Denies: no symptoms, anxiety, depressed, emotional 

problems, headache, numbness, paresthesia, pre-existing deficit, seizure, 

tingling, tremors, weakness, other


Endocrine:  Denies: no symptoms, excessive sweating, flushing, intolerance to 

cold, intolerance to heat, increased hunger, increased thirst, increased urine, 

unexplained weight gain, unexplained weight loss, other


Allergies:  


Coded Allergies:  


     No Known Allergies (Unverified , 10/14/19)


Subjective


10/14: hgb has improved, no bleeding, labs reivewed


10/15: no events to report


10/16: a+ o x1, no bleeding or chills noted, no major changes, occult pending


10/17: no events noted, no bleeding, no chills, no hematemesis/hematochezia


10/18: remains confused, with abx, on nc


10/19: cr is worse, hgb 8.4, no bleeding, night sweats, chills


10/20: no f/c, no night sweats, labs noted, cr worse


10/21: no bleeding or chills, no night sweats, labs pending this am


10/22: pelvic mass noted on imaging, no bleeding reported, ordered ca125


10/23: no events, remains lethargic, is on abx, seen by surg


10/24: with raid response overnight, rr high as well as bp, vidal to icu


10/25: no events, no bleeding, to undergo a indium scan with id


10/26: comfortable, electrolytes reviewed, given mag and k


10/29: back on bipap with gt feeds, dw rn this am


10/30: remains very confused, no f/c, no bleeding, with urinary retention, 

folic acid started


10/31: sleeping, is on bipap, no events, wbc is higher this am


11/1: no events to report, no bleeding, wbc still high but better


11/3: no bleeding, no night sweats, s/p peg, alert


11/4: s/p code blue last night, now intubated, labs noted wbc lower


11/5: given k and mg, for endoscopy tomorrow given drop h/h, 1 unit prbc


11/6: is on vent, unresponsive, no bleeding noted, no chills wbc is 13k





Objective


Objective





Current Medications








 Medications


  (Trade)  Dose


 Ordered  Sig/Winnie


 Route


 PRN Reason  Start Time


 Stop Time Status Last Admin


Dose Admin


 


 Acetaminophen


  (Tylenol)  650 mg  Q4H  PRN


 NG


 Mild Pain/Temp > 100.5  11/3/19 20:30


 11/25/19 20:29   


 


 


 Acetaminophen


  (Tylenol)  650 mg  Q4H  PRN


 RECTAL


 TEMP>100.5  11/3/19 20:30


 12/2/19 20:29   


 


 


 Amlodipine


 Besylate


  (Norvasc)  2.5 mg  BID


 NG


   11/4/19 09:00


 11/29/19 17:59  11/6/19 08:32


 


 


 Chlorhexidine


 Gluconate


  (Mae-Hex 2%)  1 applic  DAILY@2000


 TOPIC


   11/5/19 20:00


 12/5/19 19:59  11/5/19 20:32


 


 


 Dextrose


  (Dextrose 50%)  25 ml  Q30M  PRN


 IV


 Hypoglycemia  11/5/19 06:30


 12/5/19 06:29   


 


 


 Dextrose


  (Dextrose 50%)  50 ml  Q30M  PRN


 IV


 Hypoglycemia  11/5/19 06:30


 12/5/19 06:29   


 


 


 Divalproex Sodium


  (Depakote


 Sprinkles)  500 mg  Q12HR


 NG


   11/3/19 21:00


 11/14/19 21:59  11/6/19 08:33


 


 


 Docusate Sodium


  (Colace)  100 mg  EVERY 8  HOURS


 NG


   11/3/19 22:00


 11/25/19 08:59  11/6/19 06:16


 


 


 Folic Acid


  (Folate)  3 mg  DAILY


 GT


   11/7/19 09:00


 11/30/19 08:59   


 


 


 Furosemide


  (Lasix)  20 mg  EVERY 12  HOURS


 IV


   11/5/19 21:00


 12/5/19 20:59  11/6/19 08:33


 


 


 Insulin Aspart


  (NovoLOG)    EVERY 6  HOURS


 SUBQ


   11/4/19 00:00


 11/29/19 11:59  11/4/19 20:17


 


 


 Insulin Detemir


  (Levemir)  8 units  QHS


 SUBQ


   11/6/19 21:00


 12/1/19 08:59   


 


 


 Lactulose


  (Cephulac)  20 gm  Q8H  PRN


 NG


 Constipation  11/3/19 20:30


 12/3/19 20:29  11/5/19 08:12


 


 


 Lorazepam


  (Ativan 2mg/ml


 1ml)  1 mg  Q4H  PRN


 IV


 For Anxiety  11/3/19 20:30


 11/10/19 20:29  11/6/19 08:34


 


 


 Meropenem 1 gm/


 Sodium Chloride  55 ml @ 


 110 mls/hr  Q8H


 IVPB


   11/4/19 17:00


 11/9/19 16:59  11/6/19 08:46


 


 


 Metoclopramide HCl


  (Reglan)  10 mg  Q6H  PRN


 IVP


 Nausea & Vomiting  11/6/19 10:45


 12/6/19 10:44   


 


 


 Midazolam HCl  100 ml @ 0


 mls/hr  Q24H  PRN


 IV


 To Patient Comfort  11/3/19 20:35


 11/10/19 20:34   


 


 


 Pantoprazole


  (Protonix)  40 mg  EVERY 12  HOURS


 IVP


   11/3/19 21:00


 12/3/19 08:59  11/6/19 08:33


 


 


 Potassium


 Phosphate 30 mm/


 Sodium Chloride  285 ml @ 


 47.5 mls/hr  ONCE  ONCE


 IV


   11/6/19 10:00


 11/6/19 15:59  11/6/19 09:06


 


 


 Potassium Chloride


  (K-Dur)  40 meq  DAILY


 GT


   11/6/19 09:00


 12/5/19 17:59  11/6/19 08:33


 


 


 Vancomycin HCl


  (Vanco rx to


 dose)  1 ea  DAILY  PRN


 MISC


 Per rx protocol  11/4/19 15:30


 12/4/19 15:29   


 


 


 Vancomycin HCl


 750 mg/Dextrose  275 ml @ 


 183.333


 mls/hr  Q12HR@0600,1800


 IVPB


   11/4/19 18:00


 11/9/19 17:59  11/6/19 06:38


 











Last 24 Hour Vital Signs








  Date Time  Temp Pulse Resp B/P (MAP) Pulse Ox O2 Delivery O2 Flow Rate FiO2


 


11/6/19 10:32  81 18     28


 


11/6/19 10:00  77 24 130/70 (90) 100   


 


11/6/19 09:10  79 18     28


 


11/6/19 09:00  83 25 139/69 (92) 100   


 


11/6/19 08:50        50


 


11/6/19 08:32  75  137/66    


 


11/6/19 08:00 97.9 80 25 137/66 (89) 98   


 


11/6/19 08:00        28


 


11/6/19 08:00      Endotracheal Tube  


 


11/6/19 07:15  82 15     28


 


11/6/19 07:00 98.3 76 29 133/60 (84) 92   


 


11/6/19 06:00  78 28 137/64 (88) 90   


 


11/6/19 05:38  77 15     28


 


11/6/19 05:00  78 26 139/67 (91) 96   


 


11/6/19 04:00  79      


 


11/6/19 04:00        28


 


11/6/19 04:00 97.9 82 28 137/68 (91) 94   


 


11/6/19 04:00      Endotracheal Tube  


 


11/6/19 03:00  81 31 136/71 (92) 93   


 


11/6/19 02:54  77 15     28


 


11/6/19 02:00  78 26 135/62 (86) 100   


 


11/6/19 01:00  87 25 144/109 (121) 95   


 


11/6/19 01:00  89 24     28


 


11/6/19 00:00  91      


 


11/6/19 00:00      Endotracheal Tube  


 


11/6/19 00:00        28


 


11/6/19 00:00 98.0 80 28 122/62 (82) 96   


 


11/5/19 23:00  92 26 145/73 (97) 92   


 


11/5/19 22:42  88 26     28


 


11/5/19 22:00  86 27 139/67 (91) 94   


 


11/5/19 21:15  88 19     28


 


11/5/19 21:00  86 27 143/66 (91) 98   


 


11/5/19 20:00      Endotracheal Tube  


 


11/5/19 20:00 98.2 80 26 128/57 (80) 96   


 


11/5/19 20:00        28


 


11/5/19 20:00  88      


 


11/5/19 19:00  77 25 128/54 (78) 97   


 


11/5/19 18:45  79 20     28


 


11/5/19 18:00  82 25 137/52 (80) 95   


 


11/5/19 17:48  85  163/81    


 


11/5/19 17:02  85 27     28


 


11/5/19 17:00  92 25 163/81 (108) 100   


 


11/5/19 16:00        30


 


11/5/19 16:00  81      


 


11/5/19 16:00 98.0 81 20 150/68 (95) 100   


 


11/5/19 16:00      Endotracheal Tube  


 


11/5/19 15:02  89 21     45


 


11/5/19 15:00  97 27 159/74 (102) 100   


 


11/5/19 14:00  76 19 147/74 (98) 100   


 


11/5/19 13:00  69 22 132/51 (78) 100   


 


11/5/19 12:00  72      


 


11/5/19 12:00 98.2 69 22 132/51 (78) 100   


 


11/5/19 12:00      Endotracheal Tube  


 


11/5/19 12:00        60


 


11/5/19 11:29  71 17     60


 


11/5/19 11:00  67 21 136/56 (82) 100   


 


11/5/19 10:00  78 23 136/56 (82) 100   


 


11/5/19 09:30        60


 


11/5/19 09:28  77 22     60


 


11/5/19 09:00  76 20 141/54 (83) 100   


 


11/5/19 08:12  74  147/54    


 


11/5/19 08:00        60


 


11/5/19 08:00      Endotracheal Tube  


 


11/5/19 08:00  75 15 147/54 (85) 100   


 


11/5/19 08:00  76      


 


11/5/19 07:21  78 15     60


 


11/5/19 07:00 97.8 80 16 149/56 (87) 100   


 


11/5/19 06:00  75 16 152/61 (91) 100   


 


11/5/19 05:20  60 14     60


 


11/5/19 05:00  60 14 131/49 (76) 100   


 


11/5/19 04:00      Endotracheal Tube  


 


11/5/19 04:00  79      


 


11/5/19 04:00        60


 


11/5/19 04:00 98.4 76 14 144/61 (88) 100   


 


11/5/19 03:32  57 14     60


 


11/5/19 03:00  60 14 124/49 (74) 100   


 


11/5/19 02:00  72 14 144/54 (84) 100   


 


11/5/19 01:14  76 14     60


 


11/5/19 01:00  67 15 134/56 (82)    


 


11/5/19 00:15  83 19     


 


11/5/19 00:00  82      


 


11/5/19 00:00 98.4 67 14 132/54 (80) 100   


 


11/5/19 00:00      Endotracheal Tube  


 


11/4/19 23:00  74 14     60


 


11/4/19 23:00  82 18 140/58 (85) 100   


 


11/4/19 22:00  81 19 137/57 (83) 99   


 


11/4/19 21:03  72 14     60


 


11/4/19 21:00  69 14 128/57 (80) 100   


 


11/4/19 20:00  80      


 


11/4/19 20:00        60


 


11/4/19 20:00 98.8 84 19 147/56 (86) 100   


 


11/4/19 20:00      Endotracheal Tube  


 


11/4/19 19:29  69 14     60


 


11/4/19 19:00  68 14 131/51 (77) 100   


 


11/4/19 18:00      Endotracheal Tube  


 


11/4/19 18:00        60


 


11/4/19 18:00  76 14 138/49 (78) 100   


 


11/4/19 17:29  80  127/49    


 


11/4/19 17:21  78 14     60


 


11/4/19 17:00  70 14 127/49 (75) 100   


 


11/4/19 16:00 98.6 83 15 150/58 (88) 100   


 


11/4/19 16:00      Endotracheal Tube  


 


11/4/19 16:00  75      


 


11/4/19 16:00        60


 


11/4/19 15:00  77 17 140/59 (86) 100   


 


11/4/19 14:31  70 14     60


 


11/4/19 14:00  72 14 136/50 (78) 100   


 


11/4/19 13:10  74 14     60


 


11/4/19 13:00  77 14 146/56 (86) 100   


 


11/4/19 12:22        60


 


11/4/19 12:00  73      


 


11/4/19 12:00      Endotracheal Tube  


 


11/4/19 12:00        60


 


11/4/19 12:00 97.1 73 13 138/51 (80) 98   

















Intake and Output  


 


 11/5/19 11/6/19





 19:00 07:00


 


Intake Total 1624.666 ml 305 ml


 


Output Total 480 ml 600 ml


 


Balance 1144.666 ml -295 ml


 


  


 


IV Total 1624.666 ml 55 ml


 


Blood Product  250 ml


 


Output Urine Total 480 ml 600 ml











Labs








Test


  11/3/19


20:09 11/3/19


20:30 11/3/19


21:30 11/4/19


07:40


 


Arterial Blood pH


  7.340


(7.350-7.450) 


  7.419


(7.350-7.450) 7.506


(7.350-7.450)


 


Arterial Blood Partial


Pressure CO2 58.0 mmHg


(35.0-45.0) 


  50.2 mmHg


(35.0-45.0) 39.2 mmHg


(35.0-45.0)


 


Arterial Blood Partial


Pressure O2 184.8 mmHg


(75.0-100.0) 


  112.5 mmHg


(75.0-100.0) 80.7 mmHg


(75.0-100.0)


 


Arterial Blood HCO3


  30.6 mmol/L


(22.0-26.0) 


  31.8 mmol/L


(22.0-26.0) 30.3 mmol/L


(22.0-26.0)


 


Arterial Blood Oxygen


Saturation 98.7 %


() 


  97.6 %


() 95.8 %


()


 


Arterial Blood Base Excess 3.9 (-2-2)   6.4 (-2-2)  6.7 (-2-2) 


 


Graham Test Positive   Positive  Positive 


 


White Blood Count


  


  15.3 K/UL


(4.8-10.8) 


  


 


 


Red Blood Count


  


  3.40 M/UL


(4.20-5.40) 


  


 


 


Hemoglobin


  


  9.3 G/DL


(12.0-16.0) 


  


 


 


Hematocrit


  


  28.3 %


(37.0-47.0) 


  


 


 


Mean Corpuscular Volume  83 FL (80-99)   


 


Mean Corpuscular Hemoglobin


  


  27.5 PG


(27.0-31.0) 


  


 


 


Mean Corpuscular Hemoglobin


Concent 


  33.0 G/DL


(32.0-36.0) 


  


 


 


Red Cell Distribution Width


  


  12.7 %


(11.6-14.8) 


  


 


 


Platelet Count


  


  444 K/UL


(150-450) 


  


 


 


Mean Platelet Volume


  


  6.9 FL


(6.5-10.1) 


  


 


 


Neutrophils (%) (Auto)   % (45.0-75.0)   


 


Lymphocytes (%) (Auto)   % (20.0-45.0)   


 


Monocytes (%) (Auto)   % (1.0-10.0)   


 


Eosinophils (%) (Auto)   % (0.0-3.0)   


 


Basophils (%) (Auto)   % (0.0-2.0)   


 


Differential Total Cells


Counted 


  100 


  


  


 


 


Neutrophils % (Manual)  88 % (45-75)   


 


Lymphocytes % (Manual)  10 % (20-45)   


 


Monocytes % (Manual)  2 % (1-10)   


 


Eosinophils % (Manual)  0 % (0-3)   


 


Basophils % (Manual)  0 % (0-2)   


 


Band Neutrophils  0 % (0-8)   


 


Platelet Estimate  Increased   


 


Platelet Morphology  Normal   


 


Red Blood Cell Morphology  Normal   


 


Sodium Level


  


  146 MMOL/L


(136-145) 


  


 


 


Potassium Level


  


  3.5 MMOL/L


(3.5-5.1) 


  


 


 


Chloride Level


  


  106 MMOL/L


() 


  


 


 


Carbon Dioxide Level


  


  30 MMOL/L


(21-32) 


  


 


 


Anion Gap


  


  10 mmol/L


(5-15) 


  


 


 


Blood Urea Nitrogen


  


  24 mg/dL


(7-18) 


  


 


 


Creatinine


  


  1.2 MG/DL


(0.55-1.30) 


  


 


 


Estimat Glomerular Filtration


Rate 


  45.0 mL/min


(>60) 


  


 


 


Glucose Level


  


  291 MG/DL


() 


  


 


 


Calcium Level


  


  9.5 MG/DL


(8.5-10.1) 


  


 


 


Magnesium Level


  


  2.1 MG/DL


(1.8-2.4) 


  


 


 


Total Bilirubin


  


  0.7 MG/DL


(0.2-1.0) 


  


 


 


Aspartate Amino Transf


(AST/SGOT) 


  15 U/L (15-37) 


  


  


 


 


Alanine Aminotransferase


(ALT/SGPT) 


  14 U/L (12-78) 


  


  


 


 


Alkaline Phosphatase


  


  97 U/L


() 


  


 


 


Troponin I


  


  0.179 ng/mL


(0.000-0.056) 


  


 


 


Pro-B-Type Natriuretic Peptide


  


  > 53008 pg/mL


(0-125) 


  


 


 


Total Protein


  


  7.0 G/DL


(6.4-8.2) 


  


 


 


Albumin


  


  2.1 G/DL


(3.4-5.0) 


  


 


 


Globulin  4.9 g/dL   


 


Albumin/Globulin Ratio  0.4 (1.0-2.7)   


 


Test


  11/4/19


09:10 11/4/19


15:45 11/5/19


04:25 11/5/19


11:25


 


White Blood Count


  12.5 K/UL


(4.8-10.8) 


  12.3 K/UL


(4.8-10.8) 


 


 


Red Blood Count


  2.87 M/UL


(4.20-5.40) 


  2.60 M/UL


(4.20-5.40) 


 


 


Hemoglobin


  7.8 G/DL


(12.0-16.0) 


  7.2 G/DL


(12.0-16.0) 


 


 


Hematocrit


  24.0 %


(37.0-47.0) 


  21.7 %


(37.0-47.0) 


 


 


Mean Corpuscular Volume 84 FL (80-99)   83 FL (80-99)  


 


Mean Corpuscular Hemoglobin


  27.3 PG


(27.0-31.0) 


  27.9 PG


(27.0-31.0) 


 


 


Mean Corpuscular Hemoglobin


Concent 32.6 G/DL


(32.0-36.0) 


  33.4 G/DL


(32.0-36.0) 


 


 


Red Cell Distribution Width


  14.2 %


(11.6-14.8) 


  13.9 %


(11.6-14.8) 


 


 


Platelet Count


  419 K/UL


(150-450) 


  415 K/UL


(150-450) 


 


 


Mean Platelet Volume


  7.0 FL


(6.5-10.1) 


  7.0 FL


(6.5-10.1) 


 


 


Neutrophils (%) (Auto)  % (45.0-75.0)    % (45.0-75.0)  


 


Lymphocytes (%) (Auto)  % (20.0-45.0)    % (20.0-45.0)  


 


Monocytes (%) (Auto)  % (1.0-10.0)    % (1.0-10.0)  


 


Eosinophils (%) (Auto)  % (0.0-3.0)    % (0.0-3.0)  


 


Basophils (%) (Auto)  % (0.0-2.0)    % (0.0-2.0)  


 


Differential Total Cells


Counted 100 


  


  100 


  


 


 


Neutrophils % (Manual) 70 % (45-75)   67 % (45-75)  


 


Lymphocytes % (Manual) 18 % (20-45)   18 % (20-45)  


 


Monocytes % (Manual) 10 % (1-10)   5 % (1-10)  


 


Eosinophils % (Manual) 2 % (0-3)   10 % (0-3)  


 


Basophils % (Manual) 0 % (0-2)   0 % (0-2)  


 


Band Neutrophils 0 % (0-8)   0 % (0-8)  


 


Platelet Estimate Adequate   Adequate  


 


Platelet Morphology Normal   Normal  


 


Sodium Level


  148 MMOL/L


(136-145) 


  149 MMOL/L


(136-145) 


 


 


Potassium Level


  3.6 MMOL/L


(3.5-5.1) 


  2.6 MMOL/L


(3.5-5.1) 


 


 


Chloride Level


  110 MMOL/L


() 


  110 MMOL/L


() 


 


 


Carbon Dioxide Level


  30 MMOL/L


(21-32) 


  33 MMOL/L


(21-32) 


 


 


Anion Gap


  8 mmol/L


(5-15) 


  5 mmol/L


(5-15) 


 


 


Blood Urea Nitrogen


  23 mg/dL


(7-18) 


  19 mg/dL


(7-18) 


 


 


Creatinine


  1.0 MG/DL


(0.55-1.30) 


  1.0 MG/DL


(0.55-1.30) 


 


 


Estimat Glomerular Filtration


Rate 55.5 mL/min


(>60) 


  55.5 mL/min


(>60) 


 


 


Glucose Level


  221 MG/DL


() 


  78 MG/DL


() 


 


 


Calcium Level


  9.0 MG/DL


(8.5-10.1) 


  8.9 MG/DL


(8.5-10.1) 


 


 


Total Bilirubin


  0.7 MG/DL


(0.2-1.0) 


  0.6 MG/DL


(0.2-1.0) 


 


 


Aspartate Amino Transf


(AST/SGOT) 24 U/L (15-37) 


  


  13 U/L (15-37) 


  


 


 


Alanine Aminotransferase


(ALT/SGPT) 13 U/L (12-78) 


  


  12 U/L (12-78) 


  


 


 


Alkaline Phosphatase


  77 U/L


() 


  74 U/L


() 


 


 


Lactate Dehydrogenase


  506 U/L


() 


  


  


 


 


Total Protein


  6.1 G/DL


(6.4-8.2) 


  6.0 G/DL


(6.4-8.2) 


 


 


Albumin


  1.8 G/DL


(3.4-5.0) 


  1.9 G/DL


(3.4-5.0) 


 


 


Globulin 4.3 g/dL   4.1 g/dL  


 


Albumin/Globulin Ratio 0.4 (1.0-2.7)   0.5 (1.0-2.7)  


 


Cortisol 17.0 UG/DL    


 


Arterial Blood pH


  


  7.526


(7.350-7.450) 


  7.514


(7.350-7.450)


 


Arterial Blood Partial


Pressure CO2 


  39.7 mmHg


(35.0-45.0) 


  37.7 mmHg


(35.0-45.0)


 


Arterial Blood Partial


Pressure O2 


  119.9 mmHg


(75.0-100.0) 


  144.4 mmHg


(75.0-100.0)


 


Arterial Blood HCO3


  


  32.1 mmol/L


(22.0-26.0) 


  29.7 mmol/L


(22.0-26.0)


 


Arterial Blood Oxygen


Saturation 


  98.0 %


() 


  98.5 %


()


 


Arterial Blood Base Excess  8.7 (-2-2)   6.3 (-2-2) 


 


Graham Test  Positive   Positive 


 


Prothrombin Time


  


  


  13.6 SEC


(9.30-11.50) 


 


 


Prothromb Time International


Ratio 


  


  1.3 (0.9-1.1) 


  


 


 


Activated Partial


Thromboplast Time 


  


  31 SEC (23-33) 


  


 


 


Lactic Acid Level


  


  


  0.60 mmol/L


(0.4-2.0) 


 


 


Uric Acid


  


  


  8.3 MG/DL


(2.6-7.2) 


 


 


Phosphorus Level


  


  


  2.5 MG/DL


(2.5-4.9) 


 


 


Magnesium Level


  


  


  1.6 MG/DL


(1.8-2.4) 


 


 


C-Reactive Protein,


Quantitative 


  


  6.7 mg/dL


(0.00-0.90) 


 


 


Pro-B-Type Natriuretic Peptide


  


  


  07245 pg/mL


(0-125) 


 


 


Test


  11/5/19


14:15 11/5/19


17:35 11/6/19


03:50 


 


 


White Blood Count


  12.2 K/UL


(4.8-10.8) 


  12.9 K/UL


(4.8-10.8) 


 


 


Red Blood Count


  2.85 M/UL


(4.20-5.40) 


  3.31 M/UL


(4.20-5.40) 


 


 


Hemoglobin


  7.7 G/DL


(12.0-16.0) 


  9.0 G/DL


(12.0-16.0) 


 


 


Hematocrit


  24.0 %


(37.0-47.0) 


  27.0 %


(37.0-47.0) 


 


 


Mean Corpuscular Volume 84 FL (80-99)   81 FL (80-99)  


 


Mean Corpuscular Hemoglobin


  27.0 PG


(27.0-31.0) 


  27.1 PG


(27.0-31.0) 


 


 


Mean Corpuscular Hemoglobin


Concent 32.1 G/DL


(32.0-36.0) 


  33.3 G/DL


(32.0-36.0) 


 


 


Red Cell Distribution Width


  14.1 %


(11.6-14.8) 


  14.9 %


(11.6-14.8) 


 


 


Platelet Count


  407 K/UL


(150-450) 


  438 K/UL


(150-450) 


 


 


Mean Platelet Volume


  7.1 FL


(6.5-10.1) 


  7.1 FL


(6.5-10.1) 


 


 


Neutrophils (%) (Auto)


   % (45.0-75.0) 


  


  72.6 %


(45.0-75.0) 


 


 


Lymphocytes (%) (Auto)


   % (20.0-45.0) 


  


  15.8 %


(20.0-45.0) 


 


 


Monocytes (%) (Auto)


   % (1.0-10.0) 


  


  5.1 %


(1.0-10.0) 


 


 


Eosinophils (%) (Auto)


   % (0.0-3.0) 


  


  5.8 %


(0.0-3.0) 


 


 


Basophils (%) (Auto)


   % (0.0-2.0) 


  


  0.7 %


(0.0-2.0) 


 


 


Differential Total Cells


Counted 100 


  


  


  


 


 


Neutrophils % (Manual) 83 % (45-75)    


 


Lymphocytes % (Manual) 12 % (20-45)    


 


Monocytes % (Manual) 3 % (1-10)    


 


Eosinophils % (Manual) 2 % (0-3)    


 


Basophils % (Manual) 0 % (0-2)    


 


Band Neutrophils 0 % (0-8)    


 


Nucleated Red Blood Cells 1 /100 WBC    


 


Platelet Estimate Adequate    


 


Platelet Morphology Normal    


 


Polychromasia 1+    


 


Anisocytosis 1+    


 


Microcytosis 1+    


 


Prothrombin Time


  12.8 SEC


(9.30-11.50) 


  


  


 


 


Prothromb Time International


Ratio 1.2 (0.9-1.1) 


  


  


  


 


 


Activated Partial


Thromboplast Time 30 SEC (23-33) 


  


  


  


 


 


Sodium Level


  146 MMOL/L


(136-145) 


  145 MMOL/L


(136-145) 


 


 


Potassium Level


  2.6 MMOL/L


(3.5-5.1) 


  4.0 MMOL/L


(3.5-5.1) 


 


 


Chloride Level


  107 MMOL/L


() 


  108 MMOL/L


() 


 


 


Carbon Dioxide Level


  32 MMOL/L


(21-32) 


  31 MMOL/L


(21-32) 


 


 


Anion Gap


  8 mmol/L


(5-15) 


  7 mmol/L


(5-15) 


 


 


Blood Urea Nitrogen


  18 mg/dL


(7-18) 


  16 mg/dL


(7-18) 


 


 


Creatinine


  0.9 MG/DL


(0.55-1.30) 


  1.1 MG/DL


(0.55-1.30) 


 


 


Estimat Glomerular Filtration


Rate > 60 mL/min


(>60) 


  49.7 mL/min


(>60) 


 


 


Glucose Level


  196 MG/DL


() 


  88 MG/DL


() 


 


 


Calcium Level


  8.6 MG/DL


(8.5-10.1) 


  8.5 MG/DL


(8.5-10.1) 


 


 


Arterial Blood pH


  


  7.492


(7.350-7.450) 


  


 


 


Arterial Blood Partial


Pressure CO2 


  35.3 mmHg


(35.0-45.0) 


  


 


 


Arterial Blood Partial


Pressure O2 


  140.2 mmHg


(75.0-100.0) 


  


 


 


Arterial Blood HCO3


  


  26.4 mmol/L


(22.0-26.0) 


  


 


 


Arterial Blood Oxygen


Saturation 


  98.4 %


() 


  


 


 


Arterial Blood Base Excess  3.0 (-2-2)   


 


Graham Test  Positive   


 


Uric Acid


  


  


  7.7 MG/DL


(2.6-7.2) 


 


 


Phosphorus Level


  


  


  1.9 MG/DL


(2.5-4.9) 


 


 


Magnesium Level


  


  


  2.4 MG/DL


(1.8-2.4) 


 


 


Total Bilirubin


  


  


  1.2 MG/DL


(0.2-1.0) 


 


 


Direct Bilirubin


  


  


  0.3 MG/DL


(0.0-0.3) 


 


 


Aspartate Amino Transf


(AST/SGOT) 


  


  12 U/L (15-37) 


  


 


 


Alanine Aminotransferase


(ALT/SGPT) 


  


  14 U/L (12-78) 


  


 


 


Alkaline Phosphatase


  


  


  79 U/L


() 


 


 


C-Reactive Protein,


Quantitative 


  


  6.1 mg/dL


(0.00-0.90) 


 


 


Pro-B-Type Natriuretic Peptide


  


  


  68003 pg/mL


(0-125) 


 


 


Total Protein


  


  


  6.2 G/DL


(6.4-8.2) 


 


 


Albumin


  


  


  2.0 G/DL


(3.4-5.0) 


 


 


Globulin   4.2 g/dL  


 


Albumin/Globulin Ratio   0.5 (1.0-2.7)  


 


Vancomycin Level Trough


  


  


  18.3 ug/mL


(5.0-12.0) 


 








Height (Feet):  5


Height (Inches):  5.00


Weight (Pounds):  158


Objective





Physical Exam:


Vitals: reviewed


General Appearance:  NAD


HEENT:  normocephalic, atraumatic


Neck:  non-tender, normal alignment


Respiratory/Chest:  normal breath sounds bilaterally ++ vent


Cardiovascular/Chest: rrr


Abdomen:  normal bowel sounds, soft, nontender ++ peg


Extremities:  normal range of motion











Mike Pop MD Nov 6, 2019 11:55

## 2019-11-06 NOTE — NUR
NURSE NOTES:

Bed bath given tolerated well. no s/s of acute distress noted. no fever. no n/v. oral care 
and suctioned pt. no bleeding.

## 2019-11-06 NOTE — PROCEDURE NOTE
DATE OF PROCEDURE:  11/06/2019

SURGEON:  Storm Turk M.D.



PROCEDURE:  Upper endoscopy with biopsy.



ANESTHESIA:  Per Gregory Guo M.D.



INSTRUMENT:  Olympus adult flexible upper endoscope.



INDICATION:  Upper GI bleeding.



REASON FOR PROCEDURE:  The procedure, risks, benefits, and possible

consequences, including hemorrhage, aspiration, perforation and infection,

and alternative treatments, were explained to the patient/legal guardian

by Dr. Storm Turk and the patient/legal guardian understood and

accepted these risks.



PROCEDURE IN DETAIL:  After informed consent was obtained and the patient

was adequately sedated, Olympus upper endoscope was advanced from mouth

into the second portion of the duodenum and retroflexion was performed in

the stomach.



The patient had evidence of gastritis.  No evidence of any active upper

GI bleeding.  G-tube was pushed in to see if there is any ulceration under

the G-tube _____  there was none.  At this time, we biopsied the antrum,

removed the scope and procedure was terminated.



SUMMARY OF FINDINGS:

1. No evidence of any active upper GI bleeding at this time.

2. Gastritis status biopsy.



RECOMMENDATIONS:

1. Follow biopsy results and treat accordingly.

2. Resume G-tube feeding.

3. Monitor hemoglobin and hematocrit, transfuse as needed.

4. Consider colonoscopy if the patient shows signs and symptoms of lower

GI bleeding.



I want to thank, Dr. Keith, for this kind referral.









  ______________________________________________

  Storm Turk M.D.





DR:  Asiha

D:  11/06/2019 14:47

T:  11/06/2019 19:23

JOB#:  [id number]

CC:  Garrick Keith M.D.; Fax#:  [phone]

## 2019-11-07 VITALS — DIASTOLIC BLOOD PRESSURE: 61 MMHG | SYSTOLIC BLOOD PRESSURE: 120 MMHG

## 2019-11-07 VITALS — DIASTOLIC BLOOD PRESSURE: 70 MMHG | SYSTOLIC BLOOD PRESSURE: 150 MMHG

## 2019-11-07 VITALS — SYSTOLIC BLOOD PRESSURE: 131 MMHG | DIASTOLIC BLOOD PRESSURE: 69 MMHG

## 2019-11-07 VITALS — SYSTOLIC BLOOD PRESSURE: 143 MMHG | DIASTOLIC BLOOD PRESSURE: 63 MMHG

## 2019-11-07 VITALS — SYSTOLIC BLOOD PRESSURE: 123 MMHG | DIASTOLIC BLOOD PRESSURE: 66 MMHG

## 2019-11-07 VITALS — DIASTOLIC BLOOD PRESSURE: 56 MMHG | SYSTOLIC BLOOD PRESSURE: 142 MMHG

## 2019-11-07 VITALS — SYSTOLIC BLOOD PRESSURE: 126 MMHG | DIASTOLIC BLOOD PRESSURE: 55 MMHG

## 2019-11-07 VITALS — SYSTOLIC BLOOD PRESSURE: 132 MMHG | DIASTOLIC BLOOD PRESSURE: 68 MMHG

## 2019-11-07 VITALS — SYSTOLIC BLOOD PRESSURE: 132 MMHG | DIASTOLIC BLOOD PRESSURE: 67 MMHG

## 2019-11-07 VITALS — SYSTOLIC BLOOD PRESSURE: 134 MMHG | DIASTOLIC BLOOD PRESSURE: 55 MMHG

## 2019-11-07 VITALS — SYSTOLIC BLOOD PRESSURE: 135 MMHG | DIASTOLIC BLOOD PRESSURE: 62 MMHG

## 2019-11-07 VITALS — DIASTOLIC BLOOD PRESSURE: 54 MMHG | SYSTOLIC BLOOD PRESSURE: 136 MMHG

## 2019-11-07 VITALS — SYSTOLIC BLOOD PRESSURE: 136 MMHG | DIASTOLIC BLOOD PRESSURE: 65 MMHG

## 2019-11-07 VITALS — SYSTOLIC BLOOD PRESSURE: 142 MMHG | DIASTOLIC BLOOD PRESSURE: 58 MMHG

## 2019-11-07 VITALS — DIASTOLIC BLOOD PRESSURE: 69 MMHG | SYSTOLIC BLOOD PRESSURE: 127 MMHG

## 2019-11-07 VITALS — DIASTOLIC BLOOD PRESSURE: 62 MMHG | SYSTOLIC BLOOD PRESSURE: 126 MMHG

## 2019-11-07 VITALS — SYSTOLIC BLOOD PRESSURE: 131 MMHG | DIASTOLIC BLOOD PRESSURE: 63 MMHG

## 2019-11-07 VITALS — DIASTOLIC BLOOD PRESSURE: 60 MMHG | SYSTOLIC BLOOD PRESSURE: 130 MMHG

## 2019-11-07 VITALS — SYSTOLIC BLOOD PRESSURE: 151 MMHG | DIASTOLIC BLOOD PRESSURE: 84 MMHG

## 2019-11-07 VITALS — SYSTOLIC BLOOD PRESSURE: 131 MMHG | DIASTOLIC BLOOD PRESSURE: 86 MMHG

## 2019-11-07 VITALS — SYSTOLIC BLOOD PRESSURE: 135 MMHG | DIASTOLIC BLOOD PRESSURE: 61 MMHG

## 2019-11-07 VITALS — SYSTOLIC BLOOD PRESSURE: 140 MMHG | DIASTOLIC BLOOD PRESSURE: 71 MMHG

## 2019-11-07 VITALS — SYSTOLIC BLOOD PRESSURE: 133 MMHG | DIASTOLIC BLOOD PRESSURE: 69 MMHG

## 2019-11-07 VITALS — DIASTOLIC BLOOD PRESSURE: 106 MMHG | SYSTOLIC BLOOD PRESSURE: 128 MMHG

## 2019-11-07 VITALS — SYSTOLIC BLOOD PRESSURE: 119 MMHG | DIASTOLIC BLOOD PRESSURE: 82 MMHG

## 2019-11-07 VITALS — SYSTOLIC BLOOD PRESSURE: 130 MMHG | DIASTOLIC BLOOD PRESSURE: 57 MMHG

## 2019-11-07 VITALS — DIASTOLIC BLOOD PRESSURE: 54 MMHG | SYSTOLIC BLOOD PRESSURE: 119 MMHG

## 2019-11-07 VITALS — DIASTOLIC BLOOD PRESSURE: 61 MMHG | SYSTOLIC BLOOD PRESSURE: 132 MMHG

## 2019-11-07 LAB
ADD MANUAL DIFF: NO
ALBUMIN SERPL-MCNC: 1.8 G/DL (ref 3.4–5)
ALBUMIN/GLOB SERPL: 0.4 {RATIO} (ref 1–2.7)
ALP SERPL-CCNC: 85 U/L (ref 46–116)
ALT SERPL-CCNC: 10 U/L (ref 12–78)
ANION GAP SERPL CALC-SCNC: 4 MMOL/L (ref 5–15)
AST SERPL-CCNC: 13 U/L (ref 15–37)
BASOPHILS NFR BLD AUTO: 2.1 % (ref 0–2)
BILIRUB SERPL-MCNC: 0.8 MG/DL (ref 0.2–1)
BUN SERPL-MCNC: 19 MG/DL (ref 7–18)
CALCIUM SERPL-MCNC: 8.3 MG/DL (ref 8.5–10.1)
CHLORIDE SERPL-SCNC: 107 MMOL/L (ref 98–107)
CO2 SERPL-SCNC: 31 MMOL/L (ref 21–32)
CREAT SERPL-MCNC: 1.2 MG/DL (ref 0.55–1.3)
EOSINOPHIL NFR BLD AUTO: 8.5 % (ref 0–3)
ERYTHROCYTE [DISTWIDTH] IN BLOOD BY AUTOMATED COUNT: 14.8 % (ref 11.6–14.8)
GLOBULIN SER-MCNC: 4.2 G/DL
HCT VFR BLD CALC: 25.5 % (ref 37–47)
HGB BLD-MCNC: 8.3 G/DL (ref 12–16)
LYMPHOCYTES NFR BLD AUTO: 16.1 % (ref 20–45)
MCV RBC AUTO: 82 FL (ref 80–99)
MONOCYTES NFR BLD AUTO: 6 % (ref 1–10)
NEUTROPHILS NFR BLD AUTO: 67.3 % (ref 45–75)
PHOSPHATE SERPL-MCNC: 3.7 MG/DL (ref 2.5–4.9)
PLATELET # BLD: 377 K/UL (ref 150–450)
POTASSIUM SERPL-SCNC: 4.5 MMOL/L (ref 3.5–5.1)
RBC # BLD AUTO: 3.09 M/UL (ref 4.2–5.4)
SODIUM SERPL-SCNC: 142 MMOL/L (ref 136–145)
WBC # BLD AUTO: 11.3 K/UL (ref 4.8–10.8)

## 2019-11-07 RX ADMIN — IPRATROPIUM BROMIDE AND ALBUTEROL SULFATE SCH ML: .5; 3 SOLUTION RESPIRATORY (INHALATION) at 19:07

## 2019-11-07 RX ADMIN — DOCUSATE SODIUM SCH MG: 50 LIQUID ORAL at 06:05

## 2019-11-07 RX ADMIN — INSULIN ASPART SCH UNITS: 100 INJECTION, SOLUTION INTRAVENOUS; SUBCUTANEOUS at 11:47

## 2019-11-07 RX ADMIN — MEROPENEM SCH MLS/HR: 1 INJECTION INTRAVENOUS at 17:09

## 2019-11-07 RX ADMIN — SODIUM CHLORIDE SCH MLS/HR: 9 INJECTION, SOLUTION INTRAVENOUS at 06:06

## 2019-11-07 RX ADMIN — DIVALPROEX SODIUM SCH MG: 125 CAPSULE ORAL at 08:33

## 2019-11-07 RX ADMIN — MEROPENEM SCH MLS/HR: 1 INJECTION INTRAVENOUS at 08:33

## 2019-11-07 RX ADMIN — CHLORHEXIDINE GLUCONATE SCH APPLIC: 213 SOLUTION TOPICAL at 19:38

## 2019-11-07 RX ADMIN — INSULIN DETEMIR SCH UNITS: 100 INJECTION, SOLUTION SUBCUTANEOUS at 20:42

## 2019-11-07 RX ADMIN — INSULIN ASPART SCH UNITS: 100 INJECTION, SOLUTION INTRAVENOUS; SUBCUTANEOUS at 00:00

## 2019-11-07 RX ADMIN — MEROPENEM SCH MLS/HR: 1 INJECTION INTRAVENOUS at 01:28

## 2019-11-07 RX ADMIN — PANTOPRAZOLE SODIUM SCH MG: 40 INJECTION, POWDER, FOR SOLUTION INTRAVENOUS at 08:32

## 2019-11-07 RX ADMIN — DOCUSATE SODIUM SCH MG: 50 LIQUID ORAL at 13:53

## 2019-11-07 RX ADMIN — DIVALPROEX SODIUM SCH MG: 125 CAPSULE ORAL at 20:41

## 2019-11-07 RX ADMIN — PANTOPRAZOLE SODIUM SCH MG: 40 INJECTION, POWDER, FOR SOLUTION INTRAVENOUS at 20:40

## 2019-11-07 RX ADMIN — INSULIN ASPART SCH UNITS: 100 INJECTION, SOLUTION INTRAVENOUS; SUBCUTANEOUS at 23:35

## 2019-11-07 RX ADMIN — DOCUSATE SODIUM SCH MG: 50 LIQUID ORAL at 21:40

## 2019-11-07 RX ADMIN — INSULIN ASPART SCH UNITS: 100 INJECTION, SOLUTION INTRAVENOUS; SUBCUTANEOUS at 06:00

## 2019-11-07 RX ADMIN — INSULIN ASPART SCH UNITS: 100 INJECTION, SOLUTION INTRAVENOUS; SUBCUTANEOUS at 17:12

## 2019-11-07 RX ADMIN — IPRATROPIUM BROMIDE AND ALBUTEROL SULFATE SCH ML: .5; 3 SOLUTION RESPIRATORY (INHALATION) at 12:41

## 2019-11-07 NOTE — INFECTIOUS DISEASES PROG NOTE
Assessment/Plan


Problems:  


(1) Aspiration pneumonia


Assessment & Plan:  with B/L infiltrates, hypoxemia and leukocytosis, CXR  

showed interstitial infiltrates , await  sputum culture and continue vancomycin 

with meropenem  empirically . aspiration precaution. EOT 11/14/19





(2) Respiratory failure


Assessment & Plan:  with maegan cardia and S/P code blue, was intubated and 

started on mechanical ventilation , monitor CXR and ABG , pulmonary is 

following 





(3) UTI (urinary tract infection)


Assessment & Plan:  due to candida lusitaneae , S/P casas catheter removal , no 

specific therapy needed for now 





(4) Acute metabolic encephalopathy


Assessment & Plan:  due to the above, continue hydration and antibiotics, 

monitor in tele 





(5) Hyperglycemia due to type 2 diabetes mellitus


Assessment & Plan:  recommend tight glycemic control to keep blood glucose 

between 100-140 





(6) ARF (acute renal failure)


Assessment & Plan:  due to the above, continue hydration and renally dosed 

antibiotics, close  monitor of renal function , stop vancomycin 





(7) Pelvic mass in female


Assessment & Plan:  to the left of the uterus, suspect malignancy , recommend OB

/GYN eval , oncology is following 








Subjective


ROS Limited/Unobtainable:  Yes


Allergies:  


Coded Allergies:  


     No Known Allergies (Unverified , 10/14/19)


Subjective


she was still intubated on mechanical ventilation in  ICU after she became 

bradycardic and had code blue , minimally responsive , no fever or chills , no 

diarrhea, no secretions from the ET tube





Objective


Vital Signs





Last 24 Hour Vital Signs








  Date Time  Temp Pulse Resp B/P (MAP) Pulse Ox O2 Delivery O2 Flow Rate FiO2


 


11/7/19 15:00  79 27 130/60 (83) 96   


 


11/7/19 14:00  83 35 140/71 (94) 96   


 


11/7/19 13:00  78 25 136/65 (88) 97   


 


11/7/19 12:51  77 24  98 Mechanical Ventilator  40





  80 27     40


 


11/7/19 12:00 99.1 83 24 132/68 (89) 96   


 


11/7/19 12:00  84      


 


11/7/19 12:00      Endotracheal Tube  


 


11/7/19 11:21  89 30     40


 


11/7/19 11:00  86 25 151/84 (106) 98   


 


11/7/19 10:10  72 18  98   


 


11/7/19 10:09  72 16  98   


 


11/7/19 10:00  77 22 131/69 (89) 97   


 


11/7/19 09:00  82 28 142/58 (86) 96   


 


11/7/19 09:00        40


 


11/7/19 08:32  78  131/63    


 


11/7/19 08:30  82 21     40


 


11/7/19 08:00  81      


 


11/7/19 08:00 98.6 81 21 136/65 (88) 100   


 


11/7/19 08:00      Endotracheal Tube  


 


11/7/19 08:00        50


 


11/7/19 07:00  81 32 128/106 (113) 100   


 


11/7/19 06:58  79 35     50


 


11/7/19 06:30  75 20 131/63 (85) 99   


 


11/7/19 06:00 99.2 88 30 150/70 (96) 99   


 


11/7/19 05:25  74 22     50


 


11/7/19 05:00  74 25 132/67 (88) 100   


 


11/7/19 04:30  73 19 133/69 (90) 100   


 


11/7/19 04:00        50


 


11/7/19 04:00      Endotracheal Tube  


 


11/7/19 04:00  85      


 


11/7/19 04:00 99.6 75 24 132/61 (84) 99   


 


11/7/19 03:30  73 23 127/69 (88) 100   


 


11/7/19 03:00  74 22 126/55 (78) 100   


 


11/7/19 02:50  74 21     50


 


11/7/19 02:00  74 25 143/63 (89) 100   


 


11/7/19 01:30  76 27 126/62 (83) 100   


 


11/7/19 01:01  79 23     50


 


11/7/19 01:00  78 28 123/66 (85) 100   


 


11/7/19 00:30  76 25 135/62 (86) 98   


 


11/7/19 00:00        50


 


11/7/19 00:00 98.2 82 27 119/82 (94) 97   


 


11/7/19 00:00  81      


 


11/7/19 00:00      Endotracheal Tube  


 


11/6/19 23:30  80 27 143/62 (89) 98   


 


11/6/19 23:00  76 26 135/61 (85) 98   


 


11/6/19 23:00  77 28     50


 


11/6/19 22:00  78 27 135/67 (89) 98   


 


11/6/19 21:30  78 24 123/61 (81) 97   


 


11/6/19 21:02  79 24     50


 


11/6/19 21:00  78 24 128/60 (82) 95   


 


11/6/19 20:30  90 27 157/82 (107) 97   


 


11/6/19 20:00  88      


 


11/6/19 20:00 99.8 88 30 141/67 (91) 98   


 


11/6/19 20:00      Endotracheal Tube  


 


11/6/19 20:00        50


 


11/6/19 19:20  85 25     50


 


11/6/19 19:00  81 21 130/73 (92) 99   


 


11/6/19 18:00  81 22 138/63 (88) 100   


 


11/6/19 17:16  76  158/63    


 


11/6/19 17:00  78 26 128/63 (84) 98   


 


11/6/19 16:33  79 18     50


 


11/6/19 16:00  81      


 


11/6/19 16:00      Endotracheal Tube  


 


11/6/19 16:00 97.9 81 25 139/76 (97) 97   


 


11/6/19 16:00        40








Height (Feet):  5


Height (Inches):  5.00


Weight (Pounds):  154


General Appearance:  WD/WN, no acute distress


HEENT:  normocephalic, atraumatic, anicteric, mucous membranes moist, PERRL


Respiratory/Chest:  chest wall non-tender, no respiratory distress, no 

accessory muscle use, decreased breath sounds, crackles/rales


Cardiovascular:  normal peripheral pulses, normal rate, regular rhythm, no 

gallop/murmur, no JVD


Abdomen:  normal bowel sounds, soft, non tender, no organomegaly, non distended

, no mass, no scars


Genitourinary:  normal external genitalia


Extremities:  no cyanosis, no clubbing


Skin:  no rash, no lesions, ulcers


Neurologic/Psychiatric:  CNs II-XII grossly normal, alert, unresponsiveness


Lymphatic:  no neck adenopathy, no groin adenopathy


Musculoskeletal:  normal muscle bulk, no effusion





Microbiology








 Date/Time


Source Procedure


Growth Status


 


 


 11/4/19 16:15


Blood Blood Culture - Preliminary


NO GROWTH AFTER 48 HOURS Resulted


 


 11/4/19 16:10


Blood Blood Culture - Preliminary


NO GROWTH AFTER 48 HOURS Resulted





 11/5/19 08:00


Sputum Expectorated Gram Stain - Final Complete


 


 11/5/19 08:00


Sputum Expectorated Sputum Culture - Final


NORMAL UPPER RESPIRATORY JEANNIE PRESENT Complete











Laboratory Tests








Test


  11/7/19


04:00


 


White Blood Count


  11.3 K/UL


(4.8-10.8)  H


 


Red Blood Count


  3.09 M/UL


(4.20-5.40)  L


 


Hemoglobin


  8.3 G/DL


(12.0-16.0)  L


 


Hematocrit


  25.5 %


(37.0-47.0)  L


 


Mean Corpuscular Volume 82 FL (80-99)  


 


Mean Corpuscular Hemoglobin


  26.9 PG


(27.0-31.0)  L


 


Mean Corpuscular Hemoglobin


Concent 32.7 G/DL


(32.0-36.0)


 


Red Cell Distribution Width


  14.8 %


(11.6-14.8)


 


Platelet Count


  377 K/UL


(150-450)


 


Mean Platelet Volume


  7.3 FL


(6.5-10.1)


 


Neutrophils (%) (Auto)


  67.3 %


(45.0-75.0)


 


Lymphocytes (%) (Auto)


  16.1 %


(20.0-45.0)  L


 


Monocytes (%) (Auto)


  6.0 %


(1.0-10.0)


 


Eosinophils (%) (Auto)


  8.5 %


(0.0-3.0)  H


 


Basophils (%) (Auto)


  2.1 %


(0.0-2.0)  H


 


Sodium Level


  142 MMOL/L


(136-145)


 


Potassium Level


  4.5 MMOL/L


(3.5-5.1)


 


Chloride Level


  107 MMOL/L


()


 


Carbon Dioxide Level


  31 MMOL/L


(21-32)


 


Anion Gap


  4 mmol/L


(5-15)  L


 


Blood Urea Nitrogen


  19 mg/dL


(7-18)  H


 


Creatinine


  1.2 MG/DL


(0.55-1.30)


 


Estimat Glomerular Filtration


Rate 45.0 mL/min


(>60)


 


Glucose Level


  95 MG/DL


()


 


Uric Acid


  7.5 MG/DL


(2.6-7.2)  H


 


Calcium Level


  8.3 MG/DL


(8.5-10.1)  L


 


Phosphorus Level


  3.7 MG/DL


(2.5-4.9)


 


Magnesium Level


  2.1 MG/DL


(1.8-2.4)


 


Total Bilirubin


  0.8 MG/DL


(0.2-1.0)


 


Aspartate Amino Transf


(AST/SGOT) 13 U/L (15-37)


L


 


Alanine Aminotransferase


(ALT/SGPT) 10 U/L (12-78)


L


 


Alkaline Phosphatase


  85 U/L


()


 


C-Reactive Protein,


Quantitative 5.6 mg/dL


(0.00-0.90)  H


 


Pro-B-Type Natriuretic Peptide


  94814 pg/mL


(0-125)  H


 


Total Protein


  6.0 G/DL


(6.4-8.2)  L


 


Albumin


  1.8 G/DL


(3.4-5.0)  L


 


Globulin 4.2 g/dL  


 


Albumin/Globulin Ratio


  0.4 (1.0-2.7)


L


 


Vitamin B12 Level


  920 PG/ML


(193-986)


 


Folate


  13.3 NG/ML


(8.6-58.9)


 


Valproic Acid (Depakene) Level


  13 MCG/ML


()  L











Current Medications








 Medications


  (Trade)  Dose


 Ordered  Sig/Winnie


 Route


 PRN Reason  Start Time


 Stop Time Status Last Admin


Dose Admin


 


 Acetaminophen


  (Tylenol)  650 mg  Q4H  PRN


 NG


 Mild Pain/Temp > 100.5  11/3/19 20:30


 11/25/19 20:29   


 


 


 Acetaminophen


  (Tylenol)  650 mg  Q4H  PRN


 RECTAL


 TEMP>100.5  11/3/19 20:30


 12/2/19 20:29   


 


 


 Albuterol/


 Ipratropium


  (Albuterol/


 Ipratropium)  3 ml  Q4H  PRN


 HHN


 Shortness of Breath  11/7/19 09:45


 11/12/19 09:44   


 


 


 Albuterol/


 Ipratropium


  (Albuterol/


 Ipratropium)  3 ml  Q6HRT


 HHN


   11/7/19 13:00


 11/12/19 12:59  11/7/19 12:41


 


 


 Amlodipine


 Besylate


  (Norvasc)  2.5 mg  BID


 NG


   11/4/19 09:00


 11/29/19 17:59  11/7/19 08:32


 


 


 Chlorhexidine


 Gluconate


  (Mae-Hex 2%)  1 applic  DAILY@2000


 TOPIC


   11/5/19 20:00


 12/5/19 19:59  11/6/19 20:45


 


 


 Dextrose


  (Dextrose 50%)  25 ml  Q30M  PRN


 IV


 Hypoglycemia  11/5/19 06:30


 12/5/19 06:29   


 


 


 Dextrose


  (Dextrose 50%)  50 ml  Q30M  PRN


 IV


 Hypoglycemia  11/5/19 06:30


 12/5/19 06:29   


 


 


 Divalproex Sodium


  (Depakote


 Sprinkles)  500 mg  Q12HR


 NG


   11/3/19 21:00


 11/14/19 21:59  11/7/19 08:33


 


 


 Docusate Sodium


  (Colace)  100 mg  EVERY 8  HOURS


 NG


   11/3/19 22:00


 11/25/19 08:59  11/7/19 13:53


 


 


 Folic Acid


  (Folate)  3 mg  DAILY


 GT


   11/7/19 09:00


 11/30/19 08:59  11/7/19 08:32


 


 


 Furosemide


  (Lasix)  20 mg  EVERY 12  HOURS


 IV


   11/5/19 21:00


 12/5/19 20:59  11/7/19 08:32


 


 


 Insulin Aspart


  (NovoLOG)    EVERY 6  HOURS


 SUBQ


   11/4/19 00:00


 11/29/19 11:59  11/7/19 11:47


 


 


 Insulin Detemir


  (Levemir)  8 units  QHS


 SUBQ


   11/6/19 21:00


 12/1/19 08:59  11/6/19 20:51


 


 


 Lactulose


  (Cephulac)  20 gm  Q8H  PRN


 NG


 Constipation  11/3/19 20:30


 12/3/19 20:29  11/5/19 08:12


 


 


 Lorazepam


  (Ativan 2mg/ml


 1ml)  1 mg  Q4H  PRN


 IV


 For Anxiety  11/3/19 20:30


 11/10/19 20:29  11/6/19 20:45


 


 


 Meropenem 1 gm/


 Sodium Chloride  55 ml @ 


 110 mls/hr  Q8H


 IVPB


   11/4/19 17:00


 11/9/19 16:59  11/7/19 08:33


 


 


 Metoclopramide HCl


  (Reglan)  10 mg  Q6H  PRN


 IVP


 Nausea & Vomiting  11/6/19 10:45


 12/6/19 10:44   


 


 


 Midazolam HCl  100 ml @ 0


 mls/hr  Q24H  PRN


 IV


 To Patient Comfort  11/3/19 20:35


 11/10/19 20:34   


 


 


 Pantoprazole


  (Protonix)  40 mg  EVERY 12  HOURS


 IVP


   11/3/19 21:00


 12/3/19 08:59  11/7/19 08:32


 


 


 Potassium Chloride


  (K-Dur)  40 meq  DAILY


 GT


   11/6/19 09:00


 12/5/19 17:59  11/7/19 08:33


 


 


 Vancomycin HCl


  (Vanco rx to


 dose)  1 ea  DAILY  PRN


 MISC


 Per rx protocol  11/4/19 15:30


 12/4/19 15:29   


 


 


 Vancomycin/Sodium


 Chloride  275 ml @ 


 183.333


 mls/hr  Q24H


 IVPB


   11/8/19 18:00


 11/13/19 17:59   


 

















Amie Burgos M.D. Nov 7, 2019 15:36

## 2019-11-07 NOTE — NUR
NURSE NOTES:

Patient in bed sleeping comfortably. Temp 99.6 axillary cooling measure continued. no s/s of 
hypo/hyperglycemia. HOB elevated. oral care and suctioned pt. turned and repositioned. On 
P200 mattress for wound management.

## 2019-11-07 NOTE — PULMONOLGY CRITICAL CARE NOTE
Critical Care - Asmt/Plan


Problems:  


(1) Respiratory arrest


(2) Pulmonary edema


(3) Bilateral pulmonary infiltrates on CXR


(4) Cardiopulmonary arrest with successful resuscitation


(5) Encephalopathy


(6) Diabetes mellitus type II, controlled


(7) Respiratory failure


(8) CHF (congestive heart failure)


(9) Pressure injury of deep tissue


(10) Renal failure (ARF), acute on chronic


(11) Diabetic nephropathy


(12) Healthcare associated bacterial pneumonia


(13) Abnormal TSH


(14) Aspiration pneumonia


(15) ARF (acute renal failure)


Assessment/Plan:


Marked leukocytosis S/P WBC scan with uptake in pelvis, ? correspondence to 

pelvic mass


Sepsis


HAP


E coli UTI


Acute hypoxemic respiratory failure/VDRF


Acute encephalopathy


Hx CHF


HTN


CHANCE - RESOVLED


Pelvic mass/possible GYN malignancy vs cyst


Dysphagia S/P PEG


CGE S/P neg EGD





Plan:


-Continue ventilatory support


-Daily SBT


-HHN's


-Optimize pulmonary hygiene/mobilize as tolerated


-Titrate down FiO2 and PEEP to keep SaO2 > 90%


-Monitor WCt, RFS sent for JAK2 mutation, per Dr. Pop BMBx if unrevealing


-Abx per ID


-monitor volumes and renal function, diuresis as able


-Consider GYN evaluation 


-monitor encephalopathy, ? neuro eval


-NPO, GTF's


-DVT Px: LMWH (held)


-FC





CCT 45





Critical Care - Objective





Last 24 Hour Vital Signs








  Date Time  Temp Pulse Resp B/P (MAP) Pulse Ox O2 Delivery O2 Flow Rate FiO2


 


11/7/19 08:32  78  131/63    


 


11/7/19 07:00  81 32 128/106 (113) 100   


 


11/7/19 06:58  79 35     50


 


11/7/19 06:30  75 20 131/63 (85) 99   


 


11/7/19 06:00 99.2 88 30 150/70 (96) 99   


 


11/7/19 05:25  74 22     50


 


11/7/19 05:00  74 25 132/67 (88) 100   


 


11/7/19 04:30  73 19 133/69 (90) 100   


 


11/7/19 04:00        50


 


11/7/19 04:00      Endotracheal Tube  


 


11/7/19 04:00  85      


 


11/7/19 04:00 99.6 75 24 132/61 (84) 99   


 


11/7/19 03:30  73 23 127/69 (88) 100   


 


11/7/19 03:00  74 22 126/55 (78) 100   


 


11/7/19 02:50  74 21     50


 


11/7/19 02:00  74 25 143/63 (89) 100   


 


11/7/19 01:30  76 27 126/62 (83) 100   


 


11/7/19 01:01  79 23     50


 


11/7/19 01:00  78 28 123/66 (85) 100   


 


11/7/19 00:30  76 25 135/62 (86) 98   


 


11/7/19 00:00        50


 


11/7/19 00:00 98.2 82 27 119/82 (94) 97   


 


11/7/19 00:00  81      


 


11/7/19 00:00      Endotracheal Tube  


 


11/6/19 23:30  80 27 143/62 (89) 98   


 


11/6/19 23:00  76 26 135/61 (85) 98   


 


11/6/19 23:00  77 28     50


 


11/6/19 22:00  78 27 135/67 (89) 98   


 


11/6/19 21:30  78 24 123/61 (81) 97   


 


11/6/19 21:02  79 24     50


 


11/6/19 21:00  78 24 128/60 (82) 95   


 


11/6/19 20:30  90 27 157/82 (107) 97   


 


11/6/19 20:00  88      


 


11/6/19 20:00 99.8 88 30 141/67 (91) 98   


 


11/6/19 20:00      Endotracheal Tube  


 


11/6/19 20:00        50


 


11/6/19 19:20  85 25     50


 


11/6/19 19:00  81 21 130/73 (92) 99   


 


11/6/19 18:00  81 22 138/63 (88) 100   


 


11/6/19 17:16  76  158/63    


 


11/6/19 17:00  78 26 128/63 (84) 98   


 


11/6/19 16:33  79 18     50


 


11/6/19 16:00  81      


 


11/6/19 16:00      Endotracheal Tube  


 


11/6/19 16:00 97.9 81 25 139/76 (97) 97   


 


11/6/19 16:00        40


 


11/6/19 15:00  75 23 140/97 (111) 97   


 


11/6/19 14:38  77 18     50


 


11/6/19 14:00  78 20 125/70 (88) 97   


 


11/6/19 13:00  73 23 117/58 (77) 97   


 


11/6/19 12:34  76 18     40


 


11/6/19 12:00        50


 


11/6/19 12:00      Endotracheal Tube  


 


11/6/19 12:00 97.8 78 24 129/59 (82) 100   


 


11/6/19 12:00  77      


 


11/6/19 11:00  76 23 123/64 (83) 100   


 


11/6/19 10:32  81 18     50


 


11/6/19 10:00  77 24 130/70 (90) 100   








Status:  sedated


Condition:  critical


HEENT:  atraumatic, normocephalic, other - ETT


Lungs:  rhonchi


Heart:  HR/BP stable


Abdomen:  soft, non-tender, active bowel sounds, feeding tube


Extremities:  no C/C/E


Micro:





Microbiology








 Date/Time


Source Procedure


Growth Status


 


 


 11/4/19 16:15


Blood Blood Culture - Preliminary


NO GROWTH AFTER 48 HOURS Resulted


 


 11/4/19 16:10


Blood Blood Culture - Preliminary


NO GROWTH AFTER 48 HOURS Resulted





 11/5/19 08:00


Sputum Expectorated Gram Stain - Final Complete


 


 11/5/19 08:00


Sputum Expectorated Sputum Culture - Final


NORMAL UPPER RESPIRATORY JEANNIE PRESENT Complete








Accucheck:  104


Blood Sugars:  BS controlled





Critical Care - Subjective


ROS Limited/Unobtainable:  Yes


ICU Day:  4


Intubation Day:  4


Interval Events:


Failed SBT


S/p EGD


HH stable


Condition:  critical


IV Access:  PICC


EKG Rhythm:  Sinus Rhythm


FI02:  50


Vent Support Breath Rate:  14


Vent Support Mode:  AC


Vent Tidal Volume:  400


Sputum Amount:  Small


PEEP:  5.0


PIP:  34


Secretions:  small yellow thick


Fluids:  SLIV


Drips:  N/A


Tube Feeding Amount:  60


I&O:











Intake and Output  


 


 11/6/19 11/7/19





 18:59 06:59


 


Intake Total 1053.333 ml 1096.667 ml


 


Output Total 350 ml 500 ml


 


Balance 703.333 ml 596.667 ml


 


  


 


Free Water  250 ml


 


IV Total 853.333 ml 146.667 ml


 


Tube Feeding 200 ml 640 ml


 


Other  60 ml


 


Output Urine Total 350 ml 500 ml


 


# Voids  1


 


# Bowel Movements  2








Subjective:


CINTHIA


ET-Tube:  7.5


ET Position:  23


Labs:





Laboratory Tests








Test


  11/7/19


04:00


 


White Blood Count


  11.3 K/UL


(4.8-10.8)  H


 


Red Blood Count


  3.09 M/UL


(4.20-5.40)  L


 


Hemoglobin


  8.3 G/DL


(12.0-16.0)  L


 


Hematocrit


  25.5 %


(37.0-47.0)  L


 


Mean Corpuscular Volume 82 FL (80-99)  


 


Mean Corpuscular Hemoglobin


  26.9 PG


(27.0-31.0)  L


 


Mean Corpuscular Hemoglobin


Concent 32.7 G/DL


(32.0-36.0)


 


Red Cell Distribution Width


  14.8 %


(11.6-14.8)


 


Platelet Count


  377 K/UL


(150-450)


 


Mean Platelet Volume


  7.3 FL


(6.5-10.1)


 


Neutrophils (%) (Auto)


  67.3 %


(45.0-75.0)


 


Lymphocytes (%) (Auto)


  16.1 %


(20.0-45.0)  L


 


Monocytes (%) (Auto)


  6.0 %


(1.0-10.0)


 


Eosinophils (%) (Auto)


  8.5 %


(0.0-3.0)  H


 


Basophils (%) (Auto)


  2.1 %


(0.0-2.0)  H


 


Sodium Level


  142 MMOL/L


(136-145)


 


Potassium Level


  4.5 MMOL/L


(3.5-5.1)


 


Chloride Level


  107 MMOL/L


()


 


Carbon Dioxide Level


  31 MMOL/L


(21-32)


 


Anion Gap


  4 mmol/L


(5-15)  L


 


Blood Urea Nitrogen


  19 mg/dL


(7-18)  H


 


Creatinine


  1.2 MG/DL


(0.55-1.30)


 


Estimat Glomerular Filtration


Rate 45.0 mL/min


(>60)


 


Glucose Level


  95 MG/DL


()


 


Uric Acid


  7.5 MG/DL


(2.6-7.2)  H


 


Calcium Level


  8.3 MG/DL


(8.5-10.1)  L


 


Phosphorus Level


  3.7 MG/DL


(2.5-4.9)


 


Magnesium Level


  2.1 MG/DL


(1.8-2.4)


 


Total Bilirubin


  0.8 MG/DL


(0.2-1.0)


 


Aspartate Amino Transf


(AST/SGOT) 13 U/L (15-37)


L


 


Alanine Aminotransferase


(ALT/SGPT) 10 U/L (12-78)


L


 


Alkaline Phosphatase


  85 U/L


()


 


C-Reactive Protein,


Quantitative 5.6 mg/dL


(0.00-0.90)  H


 


Pro-B-Type Natriuretic Peptide


  83378 pg/mL


(0-125)  H


 


Total Protein


  6.0 G/DL


(6.4-8.2)  L


 


Albumin


  1.8 G/DL


(3.4-5.0)  L


 


Globulin 4.2 g/dL  


 


Albumin/Globulin Ratio


  0.4 (1.0-2.7)


L


 


Vitamin B12 Level


  920 PG/ML


(193-986)


 


Folate


  13.3 NG/ML


(8.6-58.9)


 


Valproic Acid (Depakene) Level


  13 MCG/ML


()  L

















Sammy Torres MD Nov 7, 2019 09:35

## 2019-11-07 NOTE — NUR
NURSE NOTES:

PATIENT RESPONSE TO TACTILE STIMULI, ON ETT TO VENT, AC14//FIO2 40%/PEEP 5, O2 
SATURATION 99% NOTED, HEART 78/MIN AND NOTED SR, ABDOMEN SOFT, DISTENDED, NON TENDER, BOWEL 
MOVEMENT STATUS, G TUBE INTACT AND PATENT, NO RESIDUE NOTED, ONGOING GLUCERNA 1.2 AT 
60ML/HR, KEPT HOB OVER 30 DEGREES, F/C INTACT AND PATENT, YELLOW URINE OUTED, PICC LINE TO 
RIGHT UPPER ARM, INTACT AND PATENT, ON SCD'S TO BOTH LOWER LEGS, MADE LOWER BED AND LOCKED, 
ON P200 BED, KEPT 2 POINT SOFT RESTRAINTS FOR SAFETY, SZ AND ASPIRATION PRECAUTION, PROVIDED 
CALL LIGHT WITHIN REACH, WILL CONTINUE TO MONITOR.

## 2019-11-07 NOTE — NEPHROLOGY PROGRESS NOTE
Assessment/Plan


Problem List:  


(1) Renal failure (ARF), acute on chronic


Assessment:  resolved





(2) Dehydration


(3) ARF (acute renal failure)


(4) Sepsis


(5) Acute metabolic encephalopathy


(6) Hyperglycemia due to type 2 diabetes mellitus


(7) UTI (urinary tract infection)


(8) Diabetic nephropathy


Assessment





Renal failure, Acute on Chronic resolved


Dehydration


Severe Anemia


Sepsis / Pneumonia / UTI


DM, OOC


Proteinuria , Diabetic Nephropathy


Acute encephalopathy


Plan





casas out


on K and Mag IV 


Pulmonary and Vent support


IV Reglan


GT feeding


Stop IV fluid-


Lasix


has PEG


Per order








previously 


Cr lowering 


Will order urine studies and monitor renal parameters


kidney YOANDY noted


lower iv fluids rate


WBCs rising


has casas again due to retention


Avoid Nephrotoxics








previously


Anemia salas


2D echo


24 H urine protein


Keep BP and BS in check


I am holding  her psych meds due to low MS


Per orders





Subjective


ROS Limited/Unobtainable:  Yes





Objective


Objective





Last 24 Hour Vital Signs








  Date Time  Temp Pulse Resp B/P (MAP) Pulse Ox O2 Delivery O2 Flow Rate FiO2


 


11/7/19 08:32  78  131/63    


 


11/7/19 07:00  81 32 128/106 (113) 100   


 


11/7/19 06:58  79 35     50


 


11/7/19 06:30  75 20 131/63 (85) 99   


 


11/7/19 06:00 99.2 88 30 150/70 (96) 99   


 


11/7/19 05:25  74 22     50


 


11/7/19 05:00  74 25 132/67 (88) 100   


 


11/7/19 04:30  73 19 133/69 (90) 100   


 


11/7/19 04:00        50


 


11/7/19 04:00      Endotracheal Tube  


 


11/7/19 04:00  85      


 


11/7/19 04:00 99.6 75 24 132/61 (84) 99   


 


11/7/19 03:30  73 23 127/69 (88) 100   


 


11/7/19 03:00  74 22 126/55 (78) 100   


 


11/7/19 02:50  74 21     50


 


11/7/19 02:00  74 25 143/63 (89) 100   


 


11/7/19 01:30  76 27 126/62 (83) 100   


 


11/7/19 01:01  79 23     50


 


11/7/19 01:00  78 28 123/66 (85) 100   


 


11/7/19 00:30  76 25 135/62 (86) 98   


 


11/7/19 00:00        50


 


11/7/19 00:00 98.2 82 27 119/82 (94) 97   


 


11/7/19 00:00  81      


 


11/7/19 00:00      Endotracheal Tube  


 


11/6/19 23:30  80 27 143/62 (89) 98   


 


11/6/19 23:00  76 26 135/61 (85) 98   


 


11/6/19 23:00  77 28     50


 


11/6/19 22:00  78 27 135/67 (89) 98   


 


11/6/19 21:30  78 24 123/61 (81) 97   


 


11/6/19 21:02  79 24     50


 


11/6/19 21:00  78 24 128/60 (82) 95   


 


11/6/19 20:30  90 27 157/82 (107) 97   


 


11/6/19 20:00  88      


 


11/6/19 20:00 99.8 88 30 141/67 (91) 98   


 


11/6/19 20:00      Endotracheal Tube  


 


11/6/19 20:00        50


 


11/6/19 19:20  85 25     50


 


11/6/19 19:00  81 21 130/73 (92) 99   


 


11/6/19 18:00  81 22 138/63 (88) 100   


 


11/6/19 17:16  76  158/63    


 


11/6/19 17:00  78 26 128/63 (84) 98   


 


11/6/19 16:33  79 18     50


 


11/6/19 16:00  81      


 


11/6/19 16:00      Endotracheal Tube  


 


11/6/19 16:00 97.9 81 25 139/76 (97) 97   


 


11/6/19 16:00        40


 


11/6/19 15:00  75 23 140/97 (111) 97   


 


11/6/19 14:38  77 18     50


 


11/6/19 14:00  78 20 125/70 (88) 97   


 


11/6/19 13:00  73 23 117/58 (77) 97   


 


11/6/19 12:34  76 18     40


 


11/6/19 12:00        50


 


11/6/19 12:00      Endotracheal Tube  


 


11/6/19 12:00 97.8 78 24 129/59 (82) 100   


 


11/6/19 12:00  77      


 


11/6/19 11:00  76 23 123/64 (83) 100   


 


11/6/19 10:32  81 18     50


 


11/6/19 10:00  77 24 130/70 (90) 100   

















Intake and Output  


 


 11/6/19 11/7/19





 18:59 06:59


 


Intake Total 1053.333 ml 1096.667 ml


 


Output Total 350 ml 500 ml


 


Balance 703.333 ml 596.667 ml


 


  


 


Free Water  250 ml


 


IV Total 853.333 ml 146.667 ml


 


Tube Feeding 200 ml 640 ml


 


Other  60 ml


 


Output Urine Total 350 ml 500 ml


 


# Voids  1


 


# Bowel Movements  2








Laboratory Tests


11/7/19 04:00: 


White Blood Count 11.3H, Red Blood Count 3.09L, Hemoglobin 8.3L, Hematocrit 

25.5L, Mean Corpuscular Volume 82, Mean Corpuscular Hemoglobin 26.9L, Mean 

Corpuscular Hemoglobin Concent 32.7, Red Cell Distribution Width 14.8, Platelet 

Count 377, Mean Platelet Volume 7.3, Neutrophils (%) (Auto) 67.3, Lymphocytes (%

) (Auto) 16.1L, Monocytes (%) (Auto) 6.0, Eosinophils (%) (Auto) 8.5H, 

Basophils (%) (Auto) 2.1H, Sodium Level 142, Potassium Level 4.5, Chloride 

Level 107, Carbon Dioxide Level 31, Anion Gap 4L, Blood Urea Nitrogen 19H, 

Creatinine 1.2, Estimat Glomerular Filtration Rate 45.0, Glucose Level 95, Uric 

Acid 7.5H, Calcium Level 8.3L, Phosphorus Level 3.7, Magnesium Level 2.1, Total 

Bilirubin 0.8, Aspartate Amino Transf (AST/SGOT) 13L, Alanine Aminotransferase (

ALT/SGPT) 10L, Alkaline Phosphatase 85, C-Reactive Protein, Quantitative 5.6H, 

Pro-B-Type Natriuretic Peptide 15005T, Total Protein 6.0L, Albumin 1.8L, 

Globulin 4.2, Albumin/Globulin Ratio 0.4L, Vitamin B12 Level 920, Folate 13.3, 

Valproic Acid (Depakene) Level 13L


Height (Feet):  5


Height (Inches):  5.00


Weight (Pounds):  158


General Appearance:  no apparent distress


EENT:  other - vented


Cardiovascular:  tachycardia


Respiratory/Chest:  decreased breath sounds


Abdomen:  distended


Objective


no change











Lukasz Vizcaino MD Nov 7, 2019 09:53

## 2019-11-07 NOTE — NUR
RD ASSESSMENT & RECOMMENDATIONS

SEE CARE ACTIVITY FOR COMPLETE ASSESSMENT



DAILY ESTIMATED NEEDS:

Needs based on Critical Care / 68kg 

22-30  kcals/kg 

2734-8318  total kcals

1.25-2  g protein/kg

  g total protein 

25-30  mL/kg

3231-5076  total fluid mLs



NUTRITION DIAGNOSIS:

* Swallowing difficulty R/T dysphagia, poor dentition, dementia as

evidenced by on liquify pureed, NTL texture diet per SLP rec-> now on NGT

feeds-> now s/p PEG placement.

* Increased kcal/prot needs R/T wound healing and sepsis as evidenced by

admitted w/ DTPI R heel and non-blanchable sacral erythema, critically

elev wbc (24.0* ->12.5), elev LA (5.8 -> wnl), elev CRP, now afebrile.

* Altered nutrition related lab values R/T CHF, diabetes as evidenced by

elev BNP (>63560), A1C of 10.4, elev BGs and POC glu, 200's- now improved.





CURRENT TF: Glucerna 1.2 @60 x 24hrs 





ENTERAL NUTRITION RECOMMENDATIONS:

Glucerna 1.5 @50ml/hr x24 hrs  to provide 1200ml, 1800 kcal, 99g pro, 911ml free H2O 



- With stability, as medically able, rec switch from Glucerna 1.2 to

Glucerna 1.5. Start @20ml/hr, advance slowly to goal.

- HOB over 45 degrees/ flush per MD







ADDITIONAL RECOMMENDATIONS:

* Calibrated bedscale wt w/ added P200 mattress  

* Wound healing: add MVI x1, Vit C 250mg QD 

                        Zaki 1pkt BID with diet order 

* Add folic acid 1mg QD (low folate 7.2) 

* Monitor renal fxn: BUN/ creat wnl  

     -> now intubated, monitor for ability to feed (now initiated) 

* TF change as above

## 2019-11-07 NOTE — ANETHESIA PREOPERATIVE EVAL
Anesthesia Pre-op PMH/ROS


General


Date of Evaluation:  Nov 6, 2019


Time of Evaluation:  12:02


Anesthesiologist:  Brant


ASA Score:  ASA 3


Mallampati Score


Class I : Soft palate, uvula, fauces, pillars visible


Class II: Soft palate, uvula, fauces visible


Class III: Soft palate, base of uvula visible


Class IV: Only hard plate visible


Mallampati Classification:  Class III


Surgeon:  Rosalee


Diagnosis:  Anemia


Surgical Procedure:  EGD


Anesthesia History:  none


Family History:  no anesthesia problems


Allergies:  


Coded Allergies:  


     No Known Allergies (Unverified , 10/14/19)


Medications:  see eMAR


Patient NPO?:  Yes


NPO Date:  Nov 1, 2019


NPO Time:  00:01





Past Medical History


Cardiovascular:  Reports: HTN, other - h/o CHF; 


   Denies: CAD, MI, valve dz, arrhythmia


Pulmonary:  Reports: other - Respiratory failure; 


   Denies: asthma, COPD, FIOR


Gastrointestinal/Genitourinary:  Reports: GERD, other - Dysphagia PEG tube in 

place; 


   Denies: CRI, ESRD


Neurologic/Psychiatric:  Reports: dementia, CVA, other - Schizophrenia; 


   Denies: depression/anxiety, TIA


Endocrine:  Reports: DM, hypothyroidism; 


   Denies: steroids, other


HEENT:  Denies: cataract (L), cataract (R), glaucoma, [place] (L), [place] (R), other


Hematology/Immune:  Reports: anemia; 


   Denies: DVT, bleeding disorder, other


Musculoskeletal/Integumentary:  Reports: other - contructed; 


   Denies: OA, RA, DJD, DDD, edema


Other:  obesity


PMH Narrative:


as above


PSxH Narrative:


MAC





Anesthesia Pre-op Phys. Exam


Physician Exam





Last Vital Signs








  Date Time  Temp Pulse Resp B/P (MAP) Pulse Ox O2 Delivery O2 Flow Rate FiO2


 


11/7/19 08:32  78  131/63    


 


11/7/19 07:00   32  100   


 


11/7/19 06:58        50


 


11/7/19 06:00 99.2       


 


11/7/19 04:00      Endotracheal Tube  


 


11/2/19 21:00       2.0 








Constitutional:  NAD


Neurologic:  other - unable to obtaine


Cardiovascular:  RRR, no M/R/G


Respiratory:  CTA


Gastrointestinal:  other - obesity





Airway Exam


Mallampati Score:  Class II


MO:  limited


Neck:  intubated


ROM:  limited


Teeth:  missing


Dentures:  no upper, no lower





Anesthesia Pre-op A/P


Labs





Hematology








Test


  11/7/19


04:00


 


White Blood Count


  11.3 K/UL


(4.8-10.8)  H


 


Red Blood Count


  3.09 M/UL


(4.20-5.40)  L


 


Hemoglobin


  8.3 G/DL


(12.0-16.0)  L


 


Hematocrit


  25.5 %


(37.0-47.0)  L


 


Mean Corpuscular Volume 82 FL (80-99)  


 


Mean Corpuscular Hemoglobin


  26.9 PG


(27.0-31.0)  L


 


Mean Corpuscular Hemoglobin


Concent 32.7 G/DL


(32.0-36.0)


 


Red Cell Distribution Width


  14.8 %


(11.6-14.8)


 


Platelet Count


  377 K/UL


(150-450)


 


Mean Platelet Volume


  7.3 FL


(6.5-10.1)


 


Neutrophils (%) (Auto)


  67.3 %


(45.0-75.0)


 


Lymphocytes (%) (Auto)


  16.1 %


(20.0-45.0)  L


 


Monocytes (%) (Auto)


  6.0 %


(1.0-10.0)


 


Eosinophils (%) (Auto)


  8.5 %


(0.0-3.0)  H


 


Basophils (%) (Auto)


  2.1 %


(0.0-2.0)  H








Chemistry








Test


  11/7/19


04:00


 


Sodium Level


  142 MMOL/L


(136-145)


 


Potassium Level


  4.5 MMOL/L


(3.5-5.1)


 


Chloride Level


  107 MMOL/L


()


 


Carbon Dioxide Level


  31 MMOL/L


(21-32)


 


Anion Gap


  4 mmol/L


(5-15)  L


 


Blood Urea Nitrogen


  19 mg/dL


(7-18)  H


 


Creatinine


  1.2 MG/DL


(0.55-1.30)


 


Estimat Glomerular Filtration


Rate 45.0 mL/min


(>60)


 


Glucose Level


  95 MG/DL


()


 


Uric Acid


  7.5 MG/DL


(2.6-7.2)  H


 


Calcium Level


  8.3 MG/DL


(8.5-10.1)  L


 


Phosphorus Level


  3.7 MG/DL


(2.5-4.9)


 


Magnesium Level


  2.1 MG/DL


(1.8-2.4)


 


Total Bilirubin


  0.8 MG/DL


(0.2-1.0)


 


Aspartate Amino Transf


(AST/SGOT) 13 U/L (15-37)


L


 


Alanine Aminotransferase


(ALT/SGPT) 10 U/L (12-78)


L


 


Alkaline Phosphatase


  85 U/L


()


 


C-Reactive Protein,


Quantitative 5.6 mg/dL


(0.00-0.90)  H


 


Pro-B-Type Natriuretic Peptide


  68807 pg/mL


(0-125)  H


 


Total Protein


  6.0 G/DL


(6.4-8.2)  L


 


Albumin


  1.8 G/DL


(3.4-5.0)  L


 


Globulin 4.2 g/dL  


 


Albumin/Globulin Ratio


  0.4 (1.0-2.7)


L


 


Vitamin B12 Level


  920 PG/ML


(193-986)


 


Folate


  13.3 NG/ML


(8.6-58.9)











Risk Assessment & Plan


Assessment:


ASA 4


Plan:


MAC


Status Change Before Surgery:  Gregory Walsh MD Nov 7, 2019 10:07

## 2019-11-07 NOTE — GENERAL PROGRESS NOTE
Assessment/Plan


Problem List:  


(1) Abnormal TSH


ICD Codes:  R79.89 - Other specified abnormal findings of blood chemistry


SNOMED:  204123468


(2) Hyperglycemia due to type 2 diabetes mellitus


ICD Codes:  E11.65 - Type 2 diabetes mellitus with hyperglycemia


SNOMED:  109292788074646, 18852286


Qualifiers:  


   Qualified Codes:  E11.65 - Type 2 diabetes mellitus with hyperglycemia; 

Z79.4 - Long term (current) use of insulin


(3) Acute metabolic encephalopathy


ICD Codes:  G93.41 - Metabolic encephalopathy


SNOMED:  30103735, 636264518


(4) ARF (acute renal failure)


ICD Codes:  N17.9 - Acute kidney failure, unspecified


SNOMED:  07177844


Qualifiers:  


   Qualified Codes:  N17.9 - Acute kidney failure, unspecified


(5) Healthcare associated bacterial pneumonia


ICD Codes:  J15.9 - Unspecified bacterial pneumonia


SNOMED:  028801973


Status:  unchanged


Assessment/Plan:


reduce Levemir to 6 units qhs 


continue NISS every 6 hours 


hypoglycemia protocol in order





Subjective


Allergies:  


Coded Allergies:  


     No Known Allergies (Unverified , 10/14/19)


All Systems:  reviewed and negative except above


Subjective


events noted 











Item Value  Date Time


 


Bedside Blood Glucose 104 mg/dl 11/7/19 0600


 


Bedside Blood Glucose 90 mg/dl 11/7/19 0000


 


Bedside Blood Glucose 153 mg/dl H 11/6/19 2100


 


Bedside Blood Glucose 116 mg/dl 11/6/19 1800


 


Bedside Blood Glucose 75 mg/dl 11/6/19 1200


 


Bedside Blood Glucose 80 mg/dl 11/6/19 0614


 


Bedside Blood Glucose 133 mg/dl H 11/6/19 0000











Objective





Last 24 Hour Vital Signs








  Date Time  Temp Pulse Resp B/P (MAP) Pulse Ox O2 Delivery O2 Flow Rate FiO2


 


11/7/19 06:30  75 20 131/63 (85) 99   


 


11/7/19 06:00 99.2 88 30 150/70 (96) 99   


 


11/7/19 05:25  74 22     50


 


11/7/19 05:00  74 25 132/67 (88) 100   


 


11/7/19 04:30  73 19 133/69 (90) 100   


 


11/7/19 04:00        50


 


11/7/19 04:00      Endotracheal Tube  


 


11/7/19 04:00  85      


 


11/7/19 04:00 99.6 75 24 132/61 (84) 99   


 


11/7/19 03:30  73 23 127/69 (88) 100   


 


11/7/19 03:00  74 22 126/55 (78) 100   


 


11/7/19 02:50  74 21     50


 


11/7/19 02:00  74 25 143/63 (89) 100   


 


11/7/19 01:30  76 27 126/62 (83) 100   


 


11/7/19 01:01  79 23     50


 


11/7/19 01:00  78 28 123/66 (85) 100   


 


11/7/19 00:30  76 25 135/62 (86) 98   


 


11/7/19 00:00        50


 


11/7/19 00:00 98.2 82 27 119/82 (94) 97   


 


11/7/19 00:00  81      


 


11/7/19 00:00      Endotracheal Tube  


 


11/6/19 23:30  80 27 143/62 (89) 98   


 


11/6/19 23:00  76 26 135/61 (85) 98   


 


11/6/19 23:00  77 28     50


 


11/6/19 22:00  78 27 135/67 (89) 98   


 


11/6/19 21:30  78 24 123/61 (81) 97   


 


11/6/19 21:02  79 24     50


 


11/6/19 21:00  78 24 128/60 (82) 95   


 


11/6/19 20:30  90 27 157/82 (107) 97   


 


11/6/19 20:00  88      


 


11/6/19 20:00 99.8 88 30 141/67 (91) 98   


 


11/6/19 20:00      Endotracheal Tube  


 


11/6/19 20:00        50


 


11/6/19 19:20  85 25     50


 


11/6/19 19:00  81 21 130/73 (92) 99   


 


11/6/19 18:00  81 22 138/63 (88) 100   


 


11/6/19 17:16  76  158/63    


 


11/6/19 17:00  78 26 128/63 (84) 98   


 


11/6/19 16:33  79 18     50


 


11/6/19 16:00  81      


 


11/6/19 16:00      Endotracheal Tube  


 


11/6/19 16:00 97.9 81 25 139/76 (97) 97   


 


11/6/19 16:00        40


 


11/6/19 15:00  75 23 140/97 (111) 97   


 


11/6/19 14:38  77 18     50


 


11/6/19 14:00  78 20 125/70 (88) 97   


 


11/6/19 13:00  73 23 117/58 (77) 97   


 


11/6/19 12:34  76 18     40


 


11/6/19 12:00        50


 


11/6/19 12:00      Endotracheal Tube  


 


11/6/19 12:00 97.8 78 24 129/59 (82) 100   


 


11/6/19 12:00  77      


 


11/6/19 11:00  76 23 123/64 (83) 100   


 


11/6/19 10:32  81 18     50


 


11/6/19 10:00  77 24 130/70 (90) 100   


 


11/6/19 09:10  79 18     50


 


11/6/19 09:00  83 25 139/69 (92) 100   


 


11/6/19 08:50        50


 


11/6/19 08:32  75  137/66    


 


11/6/19 08:00 97.9 80 25 137/66 (89) 98   


 


11/6/19 08:00        28


 


11/6/19 08:00      Endotracheal Tube  


 


11/6/19 08:00  81      


 


11/6/19 07:15  82 15     50


 


11/6/19 07:00 98.3 76 29 133/60 (84) 92   

















Intake and Output  


 


 11/6/19 11/7/19





 18:59 06:59


 


Intake Total 1053.333 ml 946.667 ml


 


Output Total 350 ml 500 ml


 


Balance 703.333 ml 446.667 ml


 


  


 


Free Water  100 ml


 


IV Total 853.333 ml 146.667 ml


 


Tube Feeding 200 ml 640 ml


 


Other  60 ml


 


Output Urine Total 350 ml 500 ml


 


# Voids  1


 


# Bowel Movements  2








Laboratory Tests


11/7/19 04:00: 


White Blood Count 11.3H, Red Blood Count 3.09L, Hemoglobin 8.3L, Hematocrit 

25.5L, Mean Corpuscular Volume 82, Mean Corpuscular Hemoglobin 26.9L, Mean 

Corpuscular Hemoglobin Concent 32.7, Red Cell Distribution Width 14.8, Platelet 

Count 377, Mean Platelet Volume 7.3, Neutrophils (%) (Auto) 67.3, Lymphocytes (%

) (Auto) 16.1L, Monocytes (%) (Auto) 6.0, Eosinophils (%) (Auto) 8.5H, 

Basophils (%) (Auto) 2.1H, Sodium Level 142, Potassium Level 4.5, Chloride 

Level 107, Carbon Dioxide Level 31, Anion Gap 4L, Blood Urea Nitrogen 19H, 

Creatinine 1.2, Estimat Glomerular Filtration Rate 45.0, Glucose Level 95, Uric 

Acid 7.5H, Calcium Level 8.3L, Phosphorus Level 3.7, Magnesium Level 2.1, Total 

Bilirubin 0.8, Aspartate Amino Transf (AST/SGOT) 13L, Alanine Aminotransferase (

ALT/SGPT) 10L, Alkaline Phosphatase 85, C-Reactive Protein, Quantitative 5.6H, 

Pro-B-Type Natriuretic Peptide 91080Q, Total Protein 6.0L, Albumin 1.8L, 

Globulin 4.2, Albumin/Globulin Ratio 0.4L, Vitamin B12 Level [Pending], Folate [

Pending], Valproic Acid (Depakene) Level 13L


Height (Feet):  5


Height (Inches):  5.00


Weight (Pounds):  158


General Appearance:  lethargic


Neck:  normal alignment


Respiratory/Chest:  decreased breath sounds


Abdomen:  normal bowel sounds


Objective





Current Medications








 Medications


  (Trade)  Dose


 Ordered  Sig/Winnie


 Route


 PRN Reason  Start Time


 Stop Time Status Last Admin


Dose Admin


 


 Acetaminophen


  (Tylenol)  650 mg  Q4H  PRN


 NG


 Mild Pain/Temp > 100.5  11/3/19 20:30


 11/25/19 20:29   


 


 


 Acetaminophen


  (Tylenol)  650 mg  Q4H  PRN


 RECTAL


 TEMP>100.5  11/3/19 20:30


 12/2/19 20:29   


 


 


 Amlodipine


 Besylate


  (Norvasc)  2.5 mg  BID


 NG


   11/4/19 09:00


 11/29/19 17:59  11/6/19 17:16


 


 


 Chlorhexidine


 Gluconate


  (Mae-Hex 2%)  1 applic  DAILY@2000


 TOPIC


   11/5/19 20:00


 12/5/19 19:59  11/6/19 20:45


 


 


 Dextrose


  (Dextrose 50%)  25 ml  Q30M  PRN


 IV


 Hypoglycemia  11/5/19 06:30


 12/5/19 06:29   


 


 


 Dextrose


  (Dextrose 50%)  50 ml  Q30M  PRN


 IV


 Hypoglycemia  11/5/19 06:30


 12/5/19 06:29   


 


 


 Divalproex Sodium


  (Depakote


 Sprinkles)  500 mg  Q12HR


 NG


   11/3/19 21:00


 11/14/19 21:59  11/6/19 20:45


 


 


 Docusate Sodium


  (Colace)  100 mg  EVERY 8  HOURS


 NG


   11/3/19 22:00


 11/25/19 08:59  11/7/19 06:05


 


 


 Folic Acid


  (Folate)  3 mg  DAILY


 GT


   11/7/19 09:00


 11/30/19 08:59   


 


 


 Furosemide


  (Lasix)  20 mg  EVERY 12  HOURS


 IV


   11/5/19 21:00


 12/5/19 20:59  11/6/19 20:45


 


 


 Insulin Aspart


  (NovoLOG)    EVERY 6  HOURS


 SUBQ


   11/4/19 00:00


 11/29/19 11:59  11/6/19 17:19


 


 


 Insulin Detemir


  (Levemir)  8 units  QHS


 SUBQ


   11/6/19 21:00


 12/1/19 08:59  11/6/19 20:51


 


 


 Lactulose


  (Cephulac)  20 gm  Q8H  PRN


 NG


 Constipation  11/3/19 20:30


 12/3/19 20:29  11/5/19 08:12


 


 


 Lorazepam


  (Ativan 2mg/ml


 1ml)  1 mg  Q4H  PRN


 IV


 For Anxiety  11/3/19 20:30


 11/10/19 20:29  11/6/19 20:45


 


 


 Meropenem 1 gm/


 Sodium Chloride  55 ml @ 


 110 mls/hr  Q8H


 IVPB


   11/4/19 17:00


 11/9/19 16:59  11/7/19 01:28


 


 


 Metoclopramide HCl


  (Reglan)  10 mg  Q6H  PRN


 IVP


 Nausea & Vomiting  11/6/19 10:45


 12/6/19 10:44   


 


 


 Midazolam HCl  100 ml @ 0


 mls/hr  Q24H  PRN


 IV


 To Patient Comfort  11/3/19 20:35


 11/10/19 20:34   


 


 


 Pantoprazole


  (Protonix)  40 mg  EVERY 12  HOURS


 IVP


   11/3/19 21:00


 12/3/19 08:59  11/6/19 20:45


 


 


 Potassium Chloride


  (K-Dur)  40 meq  DAILY


 GT


   11/6/19 09:00


 12/5/19 17:59  11/6/19 08:33


 


 


 Vancomycin HCl


  (Vanco rx to


 dose)  1 ea  DAILY  PRN


 MISC


 Per rx protocol  11/4/19 15:30


 12/4/19 15:29   


 


 


 Vancomycin HCl


 750 mg/Dextrose  275 ml @ 


 183.333


 mls/hr  Q12HR@0600,1800


 IVPB


   11/4/19 18:00


 11/9/19 17:59  11/7/19 06:06


 

















Riccardo Velez MD Nov 7, 2019 06:43

## 2019-11-07 NOTE — NUR
NURSE NOTES:

Seen by Dr. Vizcaino. Informed that insertion of casas catheter d/t urinary retention and 
distended ABD. No new order at this time. Will continue plan of care.

## 2019-11-07 NOTE — NUR
NURSE NOTES:

Received report from ELIZABETH Winter. Patient asleep and open eyes to verbal and tactile 
stimuli. ETT 7.5/23cm at lip line with vent setting AC 14, , Peep 5 and FiO2 50%. O2 
sat 99% on the monitor. Gtube intact, clean and running with glucerna 1.2 60ml/hr. Charles 
intact and draining with cloud, yellow color urine with sediment. ABD distended. Right heel 
dressing intact and clean. Right upper arm PICC intact and clean with TKO. Kept dry, clean, 
comfortable and HOB>30. Call light placed in easy reach. Will continue plan of care.

## 2019-11-07 NOTE — DIAGNOSTIC IMAGING REPORT
Indication: Abdominal distention

 

Technique: Supine view of the abdomen

 

Comparison: 11/6/2019

 

Findings: Gastrostomy, distal colonic contrast are unchanged. Bowel gas pattern is

unremarkable.

 

Impression:  Unchanged, over one day, findings as above.

## 2019-11-07 NOTE — GENERAL PROGRESS NOTE
Assessment/Plan


Status:  unchanged


Assessment/Plan:


S, O: Intubated , vent setting reviewed. No pressor. awake, limited following 

commands





PHYSICAL EXAMINATION:HEAD AND NECK:  intubated .


CHEST:  Bronchial breathing sounds.ABDOMEN:  Soft.  No organomegaly.


MUSCULOSKELETAL:  Diffuse 1+ or 2+ nonpitting edema in all four contracted


extremities.  The patient is not following the commands.  Limited


evaluation.NEUROLOGY:  The patient is awake.  Not alert.  Not verbally


communicative.





LABORATORY DATA:  Labs dated Nov 6, 2019  reviewed





Meds: Reviewed and reconciled in the chart





Imaging:  CXR reviewed 





ASSESSMENT AND PLAN:


1. VDRF


2. Sepsis.  secondary to healthcare-associated pneumonia or


3. Acute chf exacerbation - Diastolic


4. Chronic encephalomalacia.


3. Acute on chronic anemia.


4. Renal failure, age indeterminate.


6. Hyperthyroidism.


7. Hyperkalemia.


8. Diabetes type 2, uncontrolled with A1c of 10.


9. Psychiatric disorder.


10. GI and DVT prophylaxis.


11. PAH- Severe 





PLAN OF CARE:


Interval improvment of abn leukocytosis, defer to ID  


Post PEG tube


Out patient followup for abnormal incidental imaging finding ( possibility of 

CA cant be excluded 


Acute drop in Hgb and observation of UGI bleeding, Will hold DAPT


notes from pulmonary reviewed





Subjective


Allergies:  


Coded Allergies:  


     No Known Allergies (Unverified , 10/14/19)





Objective





Last 24 Hour Vital Signs








  Date Time  Temp Pulse Resp B/P (MAP) Pulse Ox O2 Delivery O2 Flow Rate FiO2


 


11/7/19 10:10  72 18  98   


 


11/7/19 10:09  72 16  98   


 


11/7/19 10:00  77 22 131/69 (89) 97   


 


11/7/19 09:00  82 28 142/58 (86) 96   


 


11/7/19 09:00        40


 


11/7/19 08:32  78  131/63    


 


11/7/19 08:00 98.6 81 21 136/65 (88) 100   


 


11/7/19 08:00      Endotracheal Tube  


 


11/7/19 08:00        50


 


11/7/19 07:00  81 32 128/106 (113) 100   


 


11/7/19 06:58  79 35     50


 


11/7/19 06:30  75 20 131/63 (85) 99   


 


11/7/19 06:00 99.2 88 30 150/70 (96) 99   


 


11/7/19 05:25  74 22     50


 


11/7/19 05:00  74 25 132/67 (88) 100   


 


11/7/19 04:30  73 19 133/69 (90) 100   


 


11/7/19 04:00        50


 


11/7/19 04:00      Endotracheal Tube  


 


11/7/19 04:00  85      


 


11/7/19 04:00 99.6 75 24 132/61 (84) 99   


 


11/7/19 03:30  73 23 127/69 (88) 100   


 


11/7/19 03:00  74 22 126/55 (78) 100   


 


11/7/19 02:50  74 21     50


 


11/7/19 02:00  74 25 143/63 (89) 100   


 


11/7/19 01:30  76 27 126/62 (83) 100   


 


11/7/19 01:01  79 23     50


 


11/7/19 01:00  78 28 123/66 (85) 100   


 


11/7/19 00:30  76 25 135/62 (86) 98   


 


11/7/19 00:00        50


 


11/7/19 00:00 98.2 82 27 119/82 (94) 97   


 


11/7/19 00:00  81      


 


11/7/19 00:00      Endotracheal Tube  


 


11/6/19 23:30  80 27 143/62 (89) 98   


 


11/6/19 23:00  76 26 135/61 (85) 98   


 


11/6/19 23:00  77 28     50


 


11/6/19 22:00  78 27 135/67 (89) 98   


 


11/6/19 21:30  78 24 123/61 (81) 97   


 


11/6/19 21:02  79 24     50


 


11/6/19 21:00  78 24 128/60 (82) 95   


 


11/6/19 20:30  90 27 157/82 (107) 97   


 


11/6/19 20:00  88      


 


11/6/19 20:00 99.8 88 30 141/67 (91) 98   


 


11/6/19 20:00      Endotracheal Tube  


 


11/6/19 20:00        50


 


11/6/19 19:20  85 25     50


 


11/6/19 19:00  81 21 130/73 (92) 99   


 


11/6/19 18:00  81 22 138/63 (88) 100   


 


11/6/19 17:16  76  158/63    


 


11/6/19 17:00  78 26 128/63 (84) 98   


 


11/6/19 16:33  79 18     50


 


11/6/19 16:00  81      


 


11/6/19 16:00      Endotracheal Tube  


 


11/6/19 16:00 97.9 81 25 139/76 (97) 97   


 


11/6/19 16:00        40


 


11/6/19 15:00  75 23 140/97 (111) 97   


 


11/6/19 14:38  77 18     50


 


11/6/19 14:00  78 20 125/70 (88) 97   


 


11/6/19 13:00  73 23 117/58 (77) 97   


 


11/6/19 12:34  76 18     40


 


11/6/19 12:00        50


 


11/6/19 12:00      Endotracheal Tube  


 


11/6/19 12:00 97.8 78 24 129/59 (82) 100   


 


11/6/19 12:00  77      


 


11/6/19 11:00  76 23 123/64 (83) 100   


 


11/6/19 10:32  81 18     50

















Intake and Output  


 


 11/6/19 11/7/19





 18:59 06:59


 


Intake Total 1053.333 ml 1096.667 ml


 


Output Total 350 ml 500 ml


 


Balance 703.333 ml 596.667 ml


 


  


 


Free Water  250 ml


 


IV Total 853.333 ml 146.667 ml


 


Tube Feeding 200 ml 640 ml


 


Other  60 ml


 


Output Urine Total 350 ml 500 ml


 


# Voids  1


 


# Bowel Movements  2








Laboratory Tests


11/7/19 04:00: 


White Blood Count 11.3H, Red Blood Count 3.09L, Hemoglobin 8.3L, Hematocrit 

25.5L, Mean Corpuscular Volume 82, Mean Corpuscular Hemoglobin 26.9L, Mean 

Corpuscular Hemoglobin Concent 32.7, Red Cell Distribution Width 14.8, Platelet 

Count 377, Mean Platelet Volume 7.3, Neutrophils (%) (Auto) 67.3, Lymphocytes (%

) (Auto) 16.1L, Monocytes (%) (Auto) 6.0, Eosinophils (%) (Auto) 8.5H, 

Basophils (%) (Auto) 2.1H, Sodium Level 142, Potassium Level 4.5, Chloride 

Level 107, Carbon Dioxide Level 31, Anion Gap 4L, Blood Urea Nitrogen 19H, 

Creatinine 1.2, Estimat Glomerular Filtration Rate 45.0, Glucose Level 95, Uric 

Acid 7.5H, Calcium Level 8.3L, Phosphorus Level 3.7, Magnesium Level 2.1, Total 

Bilirubin 0.8, Aspartate Amino Transf (AST/SGOT) 13L, Alanine Aminotransferase (

ALT/SGPT) 10L, Alkaline Phosphatase 85, C-Reactive Protein, Quantitative 5.6H, 

Pro-B-Type Natriuretic Peptide 22166T, Total Protein 6.0L, Albumin 1.8L, 

Globulin 4.2, Albumin/Globulin Ratio 0.4L, Vitamin B12 Level 920, Folate 13.3, 

Valproic Acid (Depakene) Level 13L


Height (Feet):  5


Height (Inches):  5.00


Weight (Pounds):  158











Garrick Keith MD Nov 7, 2019 10:59

## 2019-11-07 NOTE — CARDIOLOGY PROGRESS NOTE
Assessment/Plan


Assessment/Plan


1. acute congestive heart failure


2. Abnormal cardiac enzyme demand related due to infection and profound anemia 


3. Agitation 


4. Urinary tract infection.


5. Acute renal failure.


6. Profound anemia.


7. Diabetes mellitus.


8. History of hypertension.


9. History of mood disorder.


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 


11. arf 


12. pelvic mass


13  sepsis 


14. pneumonia 








off dapt due to suspected bleeding 


echo mild systolic dysfunction 


in icu on  a vent 


iv abx 


s/p peg  


lasix iv bid remains + balance will increase lasix to 40 bid 


we dcd all anti plt 


tele sinus 


remain on vent 


cxr noted reviewed personally  


failed weaning again today





Subjective


ROS Limited/Unobtainable:  Yes





Objective





Last 24 Hour Vital Signs








  Date Time  Temp Pulse Resp B/P (MAP) Pulse Ox O2 Delivery O2 Flow Rate FiO2


 


11/7/19 18:00  77 28 119/54 (75) 99   


 


11/7/19 17:11  77  130/57    


 


11/7/19 17:00  82 31 130/57 (81) 98   


 


11/7/19 16:35  81 25     40


 


11/7/19 16:00 98.8 78 24 135/61 (85) 98   


 


11/7/19 16:00  80      


 


11/7/19 16:00      Endotracheal Tube  


 


11/7/19 16:00        40


 


11/7/19 15:00  79 27 130/60 (83) 96   


 


11/7/19 14:30  79 34     40


 


11/7/19 14:00  83 35 140/71 (94) 96   


 


11/7/19 13:00  78 25 136/65 (88) 97   


 


11/7/19 12:51  77 24  98 Mechanical Ventilator  40





  80 27     40


 


11/7/19 12:00 99.1 83 24 132/68 (89) 96   


 


11/7/19 12:00  84      


 


11/7/19 12:00      Endotracheal Tube  


 


11/7/19 12:00        40


 


11/7/19 11:21  89 30     40


 


11/7/19 11:00  86 25 151/84 (106) 98   


 


11/7/19 10:10  72 18  98   


 


11/7/19 10:09  72 16  98   


 


11/7/19 10:00  77 22 131/69 (89) 97   


 


11/7/19 09:00  82 28 142/58 (86) 96   


 


11/7/19 09:00        40


 


11/7/19 08:32  78  131/63    


 


11/7/19 08:30  82 21     40


 


11/7/19 08:00  81      


 


11/7/19 08:00 98.6 81 21 136/65 (88) 100   


 


11/7/19 08:00      Endotracheal Tube  


 


11/7/19 08:00        50


 


11/7/19 07:00  81 32 128/106 (113) 100   


 


11/7/19 06:58  79 35     50


 


11/7/19 06:30  75 20 131/63 (85) 99   


 


11/7/19 06:00 99.2 88 30 150/70 (96) 99   


 


11/7/19 05:25  74 22     50


 


11/7/19 05:00  74 25 132/67 (88) 100   


 


11/7/19 04:30  73 19 133/69 (90) 100   


 


11/7/19 04:00        50


 


11/7/19 04:00      Endotracheal Tube  


 


11/7/19 04:00  85      


 


11/7/19 04:00 99.6 75 24 132/61 (84) 99   


 


11/7/19 03:30  73 23 127/69 (88) 100   


 


11/7/19 03:00  74 22 126/55 (78) 100   


 


11/7/19 02:50  74 21     50


 


11/7/19 02:00  74 25 143/63 (89) 100   


 


11/7/19 01:30  76 27 126/62 (83) 100   


 


11/7/19 01:01  79 23     50


 


11/7/19 01:00  78 28 123/66 (85) 100   


 


11/7/19 00:30  76 25 135/62 (86) 98   


 


11/7/19 00:00        50


 


11/7/19 00:00 98.2 82 27 119/82 (94) 97   


 


11/7/19 00:00  81      


 


11/7/19 00:00      Endotracheal Tube  


 


11/6/19 23:30  80 27 143/62 (89) 98   


 


11/6/19 23:00  76 26 135/61 (85) 98   


 


11/6/19 23:00  77 28     50


 


11/6/19 22:00  78 27 135/67 (89) 98   


 


11/6/19 21:30  78 24 123/61 (81) 97   


 


11/6/19 21:02  79 24     50


 


11/6/19 21:00  78 24 128/60 (82) 95   


 


11/6/19 20:30  90 27 157/82 (107) 97   


 


11/6/19 20:00  88      


 


11/6/19 20:00 99.8 88 30 141/67 (91) 98   


 


11/6/19 20:00      Endotracheal Tube  


 


11/6/19 20:00        50


 


11/6/19 19:20  85 25     50


 


11/6/19 19:00  81 21 130/73 (92) 99   








General Appearance:  no apparent distress, on vent, patient on isolation


Cardiovascular:  normal rate


Respiratory/Chest:  lungs clear - ant


Abdomen:  normal bowel sounds, non tender, soft


Extremities:  no swelling











Intake and Output  


 


 11/6/19 11/7/19





 19:00 07:00


 


Intake Total 1154.444 ml 1055.556 ml


 


Output Total 350 ml 540 ml


 


Balance 804.444 ml 515.556 ml


 


  


 


Free Water  250 ml


 


IV Total 914.444 ml 85.556 ml


 


Tube Feeding 240 ml 660 ml


 


Other  60 ml


 


Output Urine Total 350 ml 540 ml


 


# Voids  1


 


# Bowel Movements  2











Laboratory Tests








Test


  11/7/19


04:00


 


White Blood Count


  11.3 K/UL


(4.8-10.8)  H


 


Red Blood Count


  3.09 M/UL


(4.20-5.40)  L


 


Hemoglobin


  8.3 G/DL


(12.0-16.0)  L


 


Hematocrit


  25.5 %


(37.0-47.0)  L


 


Mean Corpuscular Volume 82 FL (80-99)  


 


Mean Corpuscular Hemoglobin


  26.9 PG


(27.0-31.0)  L


 


Mean Corpuscular Hemoglobin


Concent 32.7 G/DL


(32.0-36.0)


 


Red Cell Distribution Width


  14.8 %


(11.6-14.8)


 


Platelet Count


  377 K/UL


(150-450)


 


Mean Platelet Volume


  7.3 FL


(6.5-10.1)


 


Neutrophils (%) (Auto)


  67.3 %


(45.0-75.0)


 


Lymphocytes (%) (Auto)


  16.1 %


(20.0-45.0)  L


 


Monocytes (%) (Auto)


  6.0 %


(1.0-10.0)


 


Eosinophils (%) (Auto)


  8.5 %


(0.0-3.0)  H


 


Basophils (%) (Auto)


  2.1 %


(0.0-2.0)  H


 


Sodium Level


  142 MMOL/L


(136-145)


 


Potassium Level


  4.5 MMOL/L


(3.5-5.1)


 


Chloride Level


  107 MMOL/L


()


 


Carbon Dioxide Level


  31 MMOL/L


(21-32)


 


Anion Gap


  4 mmol/L


(5-15)  L


 


Blood Urea Nitrogen


  19 mg/dL


(7-18)  H


 


Creatinine


  1.2 MG/DL


(0.55-1.30)


 


Estimat Glomerular Filtration


Rate 45.0 mL/min


(>60)


 


Glucose Level


  95 MG/DL


()


 


Uric Acid


  7.5 MG/DL


(2.6-7.2)  H


 


Calcium Level


  8.3 MG/DL


(8.5-10.1)  L


 


Phosphorus Level


  3.7 MG/DL


(2.5-4.9)


 


Magnesium Level


  2.1 MG/DL


(1.8-2.4)


 


Total Bilirubin


  0.8 MG/DL


(0.2-1.0)


 


Aspartate Amino Transf


(AST/SGOT) 13 U/L (15-37)


L


 


Alanine Aminotransferase


(ALT/SGPT) 10 U/L (12-78)


L


 


Alkaline Phosphatase


  85 U/L


()


 


C-Reactive Protein,


Quantitative 5.6 mg/dL


(0.00-0.90)  H


 


Pro-B-Type Natriuretic Peptide


  62328 pg/mL


(0-125)  H


 


Total Protein


  6.0 G/DL


(6.4-8.2)  L


 


Albumin


  1.8 G/DL


(3.4-5.0)  L


 


Globulin 4.2 g/dL  


 


Albumin/Globulin Ratio


  0.4 (1.0-2.7)


L


 


Vitamin B12 Level


  920 PG/ML


(193-986)


 


Folate


  13.3 NG/ML


(8.6-58.9)


 


Valproic Acid (Depakene) Level


  13 MCG/ML


()  L











Microbiology








 Date/Time


Source Procedure


Growth Status


 


 


 11/5/19 08:00


Sputum Expectorated Gram Stain - Final Complete


 


 11/5/19 08:00


Sputum Expectorated Sputum Culture - Final


NORMAL UPPER RESPIRATORY JEANNIE PRESENT Complete

















Rob May MD Nov 7, 2019 18:30

## 2019-11-07 NOTE — NUR
NURSE NOTES:

Seen by Dr. Burgos and assessed patient. No new order at this time. Will continue plan of 
care.

## 2019-11-07 NOTE — NUR
NURSE NOTES:

Bed bath given tolerated well. with x1 large BM brownish yellow. no s/s of acute distress 
noted. no fever. no n/v. oral care and suctioned pt. no bleeding.

## 2019-11-07 NOTE — NUR
NURSE NOTES:

Patient in bed resting no fever. no s/s of hypo/hyperglycemia. HOB elevated. oral care and 
suctioned pt. turned and repositioned. On P200 mattress for wound management. with on and 
off episode of pulling tubing, reality orientation provided and talk therapy not effective. 
Bilateral soft wrist restraint checked .Call light within easy reach.

## 2019-11-07 NOTE — SURGERY PROGRESS NOTE
Surgery Progress Note


Subjective


Additional Comments


ill appearing in ICU


labs noted


on support


poor prognosis





Objective





Last 24 Hour Vital Signs








  Date Time  Temp Pulse Resp B/P (MAP) Pulse Ox O2 Delivery O2 Flow Rate FiO2


 


11/7/19 16:00 98.8 78 24 135/61 (85) 98   


 


11/7/19 16:00      Endotracheal Tube  


 


11/7/19 16:00        40


 


11/7/19 15:00  79 27 130/60 (83) 96   


 


11/7/19 14:30  79 34     40


 


11/7/19 14:00  83 35 140/71 (94) 96   


 


11/7/19 13:00  78 25 136/65 (88) 97   


 


11/7/19 12:51  77 24  98 Mechanical Ventilator  40





  80 27     40


 


11/7/19 12:00 99.1 83 24 132/68 (89) 96   


 


11/7/19 12:00  84      


 


11/7/19 12:00      Endotracheal Tube  


 


11/7/19 12:00        40


 


11/7/19 11:21  89 30     40


 


11/7/19 11:00  86 25 151/84 (106) 98   


 


11/7/19 10:10  72 18  98   


 


11/7/19 10:09  72 16  98   


 


11/7/19 10:00  77 22 131/69 (89) 97   


 


11/7/19 09:00  82 28 142/58 (86) 96   


 


11/7/19 09:00        40


 


11/7/19 08:32  78  131/63    


 


11/7/19 08:30  82 21     40


 


11/7/19 08:00  81      


 


11/7/19 08:00 98.6 81 21 136/65 (88) 100   


 


11/7/19 08:00      Endotracheal Tube  


 


11/7/19 08:00        50


 


11/7/19 07:00  81 32 128/106 (113) 100   


 


11/7/19 06:58  79 35     50


 


11/7/19 06:30  75 20 131/63 (85) 99   


 


11/7/19 06:00 99.2 88 30 150/70 (96) 99   


 


11/7/19 05:25  74 22     50


 


11/7/19 05:00  74 25 132/67 (88) 100   


 


11/7/19 04:30  73 19 133/69 (90) 100   


 


11/7/19 04:00        50


 


11/7/19 04:00      Endotracheal Tube  


 


11/7/19 04:00  85      


 


11/7/19 04:00 99.6 75 24 132/61 (84) 99   


 


11/7/19 03:30  73 23 127/69 (88) 100   


 


11/7/19 03:00  74 22 126/55 (78) 100   


 


11/7/19 02:50  74 21     50


 


11/7/19 02:00  74 25 143/63 (89) 100   


 


11/7/19 01:30  76 27 126/62 (83) 100   


 


11/7/19 01:01  79 23     50


 


11/7/19 01:00  78 28 123/66 (85) 100   


 


11/7/19 00:30  76 25 135/62 (86) 98   


 


11/7/19 00:00        50


 


11/7/19 00:00 98.2 82 27 119/82 (94) 97   


 


11/7/19 00:00  81      


 


11/7/19 00:00      Endotracheal Tube  


 


11/6/19 23:30  80 27 143/62 (89) 98   


 


11/6/19 23:00  76 26 135/61 (85) 98   


 


11/6/19 23:00  77 28     50


 


11/6/19 22:00  78 27 135/67 (89) 98   


 


11/6/19 21:30  78 24 123/61 (81) 97   


 


11/6/19 21:02  79 24     50


 


11/6/19 21:00  78 24 128/60 (82) 95   


 


11/6/19 20:30  90 27 157/82 (107) 97   


 


11/6/19 20:00  88      


 


11/6/19 20:00 99.8 88 30 141/67 (91) 98   


 


11/6/19 20:00      Endotracheal Tube  


 


11/6/19 20:00        50


 


11/6/19 19:20  85 25     50


 


11/6/19 19:00  81 21 130/73 (92) 99   


 


11/6/19 18:00  81 22 138/63 (88) 100   


 


11/6/19 17:16  76  158/63    


 


11/6/19 17:00  78 26 128/63 (84) 98   


 


11/6/19 16:33  79 18     50








I&O











Intake and Output  


 


 11/6/19 11/7/19





 19:00 07:00


 


Intake Total 1154.444 ml 1055.556 ml


 


Output Total 350 ml 540 ml


 


Balance 804.444 ml 515.556 ml


 


  


 


Free Water  250 ml


 


IV Total 914.444 ml 85.556 ml


 


Tube Feeding 240 ml 660 ml


 


Other  60 ml


 


Output Urine Total 350 ml 540 ml


 


# Voids  1


 


# Bowel Movements  2








Dressing:  other


Wound:  other


Drains:  other


Cardiovascular:  RSR


Respiratory:  decreased breath sounds


Abdomen:  soft, present bowel sounds


Extremities:  no cyanosis





Laboratory Tests








Test


  11/7/19


04:00


 


White Blood Count


  11.3 K/UL


(4.8-10.8)  H


 


Red Blood Count


  3.09 M/UL


(4.20-5.40)  L


 


Hemoglobin


  8.3 G/DL


(12.0-16.0)  L


 


Hematocrit


  25.5 %


(37.0-47.0)  L


 


Mean Corpuscular Volume 82 FL (80-99)  


 


Mean Corpuscular Hemoglobin


  26.9 PG


(27.0-31.0)  L


 


Mean Corpuscular Hemoglobin


Concent 32.7 G/DL


(32.0-36.0)


 


Red Cell Distribution Width


  14.8 %


(11.6-14.8)


 


Platelet Count


  377 K/UL


(150-450)


 


Mean Platelet Volume


  7.3 FL


(6.5-10.1)


 


Neutrophils (%) (Auto)


  67.3 %


(45.0-75.0)


 


Lymphocytes (%) (Auto)


  16.1 %


(20.0-45.0)  L


 


Monocytes (%) (Auto)


  6.0 %


(1.0-10.0)


 


Eosinophils (%) (Auto)


  8.5 %


(0.0-3.0)  H


 


Basophils (%) (Auto)


  2.1 %


(0.0-2.0)  H


 


Sodium Level


  142 MMOL/L


(136-145)


 


Potassium Level


  4.5 MMOL/L


(3.5-5.1)


 


Chloride Level


  107 MMOL/L


()


 


Carbon Dioxide Level


  31 MMOL/L


(21-32)


 


Anion Gap


  4 mmol/L


(5-15)  L


 


Blood Urea Nitrogen


  19 mg/dL


(7-18)  H


 


Creatinine


  1.2 MG/DL


(0.55-1.30)


 


Estimat Glomerular Filtration


Rate 45.0 mL/min


(>60)


 


Glucose Level


  95 MG/DL


()


 


Uric Acid


  7.5 MG/DL


(2.6-7.2)  H


 


Calcium Level


  8.3 MG/DL


(8.5-10.1)  L


 


Phosphorus Level


  3.7 MG/DL


(2.5-4.9)


 


Magnesium Level


  2.1 MG/DL


(1.8-2.4)


 


Total Bilirubin


  0.8 MG/DL


(0.2-1.0)


 


Aspartate Amino Transf


(AST/SGOT) 13 U/L (15-37)


L


 


Alanine Aminotransferase


(ALT/SGPT) 10 U/L (12-78)


L


 


Alkaline Phosphatase


  85 U/L


()


 


C-Reactive Protein,


Quantitative 5.6 mg/dL


(0.00-0.90)  H


 


Pro-B-Type Natriuretic Peptide


  45249 pg/mL


(0-125)  H


 


Total Protein


  6.0 G/DL


(6.4-8.2)  L


 


Albumin


  1.8 G/DL


(3.4-5.0)  L


 


Globulin 4.2 g/dL  


 


Albumin/Globulin Ratio


  0.4 (1.0-2.7)


L


 


Vitamin B12 Level


  920 PG/ML


(193-986)


 


Folate


  13.3 NG/ML


(8.6-58.9)


 


Valproic Acid (Depakene) Level


  13 MCG/ML


()  L











Plan


Problems:  


(1) Pressure injury of deep tissue


Assessment & Plan:  Pt presented on admission with DTPI R heel. Blood filled 

blister with marginal erythema noted to heel. Pt exhibited agitation when 

minimally palpated. (L)2.2cm x (W)2.1cm


L heel is blanchable .


Non-blanchable erythema without induration noted to sacral cleft. 


No other areas of skin concerns noted.





Tx.Plan:


Apply Betadine to R heel. Cover with Optifoam drsg. Change every 3 days and prn.


           


Apply Cavilon Skin Barrier to L heel. Cover with Optifoam drsg. Change every 7 

days and prn.


           


Apply Moisture Barrier Paste to buttocks. Cover sacral area with Optifoam drsg. 

Change every 3 days and prn.


           


Reposition at least every 2hours or as tolerated.


           


Off-load heels with pillow.





(2) Sepsis


Assessment & Plan:  Patient with low-grade fevers, anemia, leukocytosis 

persistent, elevated platelets, abnormal labs.


Etiology currently unknown and work-up in progress.  Labs noted and imaging 

that is available as noted.  


CT noted


US noted


Impression: 13 x 7 x 12.9 cm unilocular cystic mass, corresponding to lesion 

reported


on recent CT scan. Layering low level internal echoes likely represent bladder


debris.. This presumably represents a cystic ovarian lesion with debris or


hemorrhage, possibly neoplastic. Gynecological consultation is recommended


Antibiotics as per infectious disease 


local wound care as wounds do not seem to be the etiology of her sepsis


start feeds via peg


doing well


improving


cont with tube feeds


cont with ICU care 


supplementation for healing 


We will monitor and follow with recommendations


Thank you for allowing me to participate in patient's care














Radhames Blas Nov 7, 2019 16:22

## 2019-11-07 NOTE — NUR
RESPIRATORY NOTE:

 Received pt on ETT 7.5@23cm lips line with current vent settings: AC 14-400ml-50%FiO2- 
peep 5, saturates at 99%. Pt is shallow breathing, tachypneic RR >35bpm but not in resp 
distress. Pt is unable to follow commands. Placido rales/rhonchi breath sounds heard upon 
auscultation, suctioned small amounts of thin brown yellow pink secretions without 
incidents. ETT moved to the left, vent circuits and suction tubing are secure and out of the 
way. Vent is plugged into the red outlet, alarms are set and audible, ambu bag is at 
bedside. Will continue to monitor pt.

## 2019-11-07 NOTE — GI PROGRESS NOTE
Assessment/Plan


Problems:  


(1) PEG (percutaneous endoscopic gastrostomy) status


ICD Codes:  Z93.1 - Gastrostomy status


SNOMED:  219304849, 555066631


(2) Anemia


ICD Codes:  D64.9 - Anemia, unspecified


SNOMED:  590632987


Qualifiers:  


   Qualified Codes:  D64.9 - Anemia, unspecified


(3) Dehydration


ICD Codes:  E86.0 - Dehydration


SNOMED:  39774405, 4393527


Status:  unchanged


Status Narrative


Discussed with Dr. Turk.


Assessment/Plan


Assessment


(1) pneumonia


(2) UTI


(3) Sepsis


(4) Dehydration


(5) CHANCE 


(6) Acute metabolic encephalopathy


(7) Hyperglycemia due to type 2 diabetes mellitus


(8) Renal failure (ARF), acute on chronic


(9) Aspiration pneumonia


(10) Abnormal TSH


(11) Pelvic mass in female


(12) Congestive Heart Failure


(13) Anemia


(14) Dysphagia - s/p GT





s/p EGD SUMMARY OF FINDINGS:


1. No evidence of any active upper GI bleeding at this time.


2. Gastritis status biopsy.





Recommendations


GTFs


KUB for abdominal distention


IVF


prn transfusions


bowel regime


PPI and H2B


will follow up, consider colonoscopy if patient has recurrent GI bleed





The patient was seen and examined at bedside and all new and available data was 

reviewed in the patients chart. I agree with the above findings, impression 

and plan.  (Patient seen earlier today. Signature stamp does not reflect 

patient encounter time.). - Storm Turk MD





Subjective


Subjective


Limited





Objective





Last 24 Hour Vital Signs








  Date Time  Temp Pulse Resp B/P (MAP) Pulse Ox O2 Delivery O2 Flow Rate FiO2


 


11/7/19 12:00 99.1 83 24 132/68 (89) 96   


 


11/7/19 12:00  84      


 


11/7/19 12:00      Endotracheal Tube  


 


11/7/19 11:21  89 30     40


 


11/7/19 11:00  86 25 151/84 (106) 98   


 


11/7/19 10:10  72 18  98   


 


11/7/19 10:09  72 16  98   


 


11/7/19 10:00  77 22 131/69 (89) 97   


 


11/7/19 09:00  82 28 142/58 (86) 96   


 


11/7/19 09:00        40


 


11/7/19 08:32  78  131/63    


 


11/7/19 08:30  82 21     40


 


11/7/19 08:00  81      


 


11/7/19 08:00 98.6 81 21 136/65 (88) 100   


 


11/7/19 08:00      Endotracheal Tube  


 


11/7/19 08:00        50


 


11/7/19 07:00  81 32 128/106 (113) 100   


 


11/7/19 06:58  79 35     50


 


11/7/19 06:30  75 20 131/63 (85) 99   


 


11/7/19 06:00 99.2 88 30 150/70 (96) 99   


 


11/7/19 05:25  74 22     50


 


11/7/19 05:00  74 25 132/67 (88) 100   


 


11/7/19 04:30  73 19 133/69 (90) 100   


 


11/7/19 04:00        50


 


11/7/19 04:00      Endotracheal Tube  


 


11/7/19 04:00  85      


 


11/7/19 04:00 99.6 75 24 132/61 (84) 99   


 


11/7/19 03:30  73 23 127/69 (88) 100   


 


11/7/19 03:00  74 22 126/55 (78) 100   


 


11/7/19 02:50  74 21     50


 


11/7/19 02:00  74 25 143/63 (89) 100   


 


11/7/19 01:30  76 27 126/62 (83) 100   


 


11/7/19 01:01  79 23     50


 


11/7/19 01:00  78 28 123/66 (85) 100   


 


11/7/19 00:30  76 25 135/62 (86) 98   


 


11/7/19 00:00        50


 


11/7/19 00:00 98.2 82 27 119/82 (94) 97   


 


11/7/19 00:00  81      


 


11/7/19 00:00      Endotracheal Tube  


 


11/6/19 23:30  80 27 143/62 (89) 98   


 


11/6/19 23:00  76 26 135/61 (85) 98   


 


11/6/19 23:00  77 28     50


 


11/6/19 22:00  78 27 135/67 (89) 98   


 


11/6/19 21:30  78 24 123/61 (81) 97   


 


11/6/19 21:02  79 24     50


 


11/6/19 21:00  78 24 128/60 (82) 95   


 


11/6/19 20:30  90 27 157/82 (107) 97   


 


11/6/19 20:00  88      


 


11/6/19 20:00 99.8 88 30 141/67 (91) 98   


 


11/6/19 20:00      Endotracheal Tube  


 


11/6/19 20:00        50


 


11/6/19 19:20  85 25     50


 


11/6/19 19:00  81 21 130/73 (92) 99   


 


11/6/19 18:00  81 22 138/63 (88) 100   


 


11/6/19 17:16  76  158/63    


 


11/6/19 17:00  78 26 128/63 (84) 98   


 


11/6/19 16:33  79 18     50


 


11/6/19 16:00  81      


 


11/6/19 16:00      Endotracheal Tube  


 


11/6/19 16:00 97.9 81 25 139/76 (97) 97   


 


11/6/19 16:00        40


 


11/6/19 15:00  75 23 140/97 (111) 97   


 


11/6/19 14:38  77 18     50


 


11/6/19 14:00  78 20 125/70 (88) 97   

















Intake and Output  


 


 11/6/19 11/7/19





 19:00 07:00


 


Intake Total 1154.444 ml 1055.556 ml


 


Output Total 350 ml 540 ml


 


Balance 804.444 ml 515.556 ml


 


  


 


Free Water  250 ml


 


IV Total 914.444 ml 85.556 ml


 


Tube Feeding 240 ml 660 ml


 


Other  60 ml


 


Output Urine Total 350 ml 540 ml


 


# Voids  1


 


# Bowel Movements  2











Laboratory Tests








Test


  11/7/19


04:00


 


White Blood Count


  11.3 K/UL


(4.8-10.8)  H


 


Red Blood Count


  3.09 M/UL


(4.20-5.40)  L


 


Hemoglobin


  8.3 G/DL


(12.0-16.0)  L


 


Hematocrit


  25.5 %


(37.0-47.0)  L


 


Mean Corpuscular Volume 82 FL (80-99)  


 


Mean Corpuscular Hemoglobin


  26.9 PG


(27.0-31.0)  L


 


Mean Corpuscular Hemoglobin


Concent 32.7 G/DL


(32.0-36.0)


 


Red Cell Distribution Width


  14.8 %


(11.6-14.8)


 


Platelet Count


  377 K/UL


(150-450)


 


Mean Platelet Volume


  7.3 FL


(6.5-10.1)


 


Neutrophils (%) (Auto)


  67.3 %


(45.0-75.0)


 


Lymphocytes (%) (Auto)


  16.1 %


(20.0-45.0)  L


 


Monocytes (%) (Auto)


  6.0 %


(1.0-10.0)


 


Eosinophils (%) (Auto)


  8.5 %


(0.0-3.0)  H


 


Basophils (%) (Auto)


  2.1 %


(0.0-2.0)  H


 


Sodium Level


  142 MMOL/L


(136-145)


 


Potassium Level


  4.5 MMOL/L


(3.5-5.1)


 


Chloride Level


  107 MMOL/L


()


 


Carbon Dioxide Level


  31 MMOL/L


(21-32)


 


Anion Gap


  4 mmol/L


(5-15)  L


 


Blood Urea Nitrogen


  19 mg/dL


(7-18)  H


 


Creatinine


  1.2 MG/DL


(0.55-1.30)


 


Estimat Glomerular Filtration


Rate 45.0 mL/min


(>60)


 


Glucose Level


  95 MG/DL


()


 


Uric Acid


  7.5 MG/DL


(2.6-7.2)  H


 


Calcium Level


  8.3 MG/DL


(8.5-10.1)  L


 


Phosphorus Level


  3.7 MG/DL


(2.5-4.9)


 


Magnesium Level


  2.1 MG/DL


(1.8-2.4)


 


Total Bilirubin


  0.8 MG/DL


(0.2-1.0)


 


Aspartate Amino Transf


(AST/SGOT) 13 U/L (15-37)


L


 


Alanine Aminotransferase


(ALT/SGPT) 10 U/L (12-78)


L


 


Alkaline Phosphatase


  85 U/L


()


 


C-Reactive Protein,


Quantitative 5.6 mg/dL


(0.00-0.90)  H


 


Pro-B-Type Natriuretic Peptide


  90083 pg/mL


(0-125)  H


 


Total Protein


  6.0 G/DL


(6.4-8.2)  L


 


Albumin


  1.8 G/DL


(3.4-5.0)  L


 


Globulin 4.2 g/dL  


 


Albumin/Globulin Ratio


  0.4 (1.0-2.7)


L


 


Vitamin B12 Level


  920 PG/ML


(193-986)


 


Folate


  13.3 NG/ML


(8.6-58.9)


 


Valproic Acid (Depakene) Level


  13 MCG/ML


()  L








Height (Feet):  5


Height (Inches):  5.00


Weight (Pounds):  154


General Appearance:  no apparent distress


Cardiovascular:  normal rate


Respiratory/Chest:  normal breath sounds, no respiratory distress


Abdominal Exam:  normal bowel sounds, non tender, soft, GT site


Extremities:  non-tender











Darren Lucio NP Nov 7, 2019 13:20

## 2019-11-07 NOTE — 48 HOUR POST ANESTHESIA EVAL
Post Anesthesia Evaluation


Procedure:  EGD


Date of Evaluation:  Nov 7, 2019


Time of Evaluation:  10:09


Blood Pressure Systolic:  124


0:  68


Pulse Rate:  72


Respiratory Rate:  18


Temperature (Fahrenheit):  97.6


O2 Sat by Pulse Oximetry:  98


Airway:  patent


Nausea:  No


Vomiting:  No


Pain Intensity:  1


Hydration Status:  adequate


Cardiopulmonary Status:


stable


Mental Status/LOC:  patient returned to baseline


Follow-up Care/Observations:


n/a


Post-Anesthesia Complications:


none


Follow-up care needed:  N/A











Gregory Guo MD Nov 7, 2019 10:10

## 2019-11-07 NOTE — NUR
*-* INSURANCE *-*



UPDATED CLINICALS AND REVIEWS HAVE BEEN FAXED TO:





Tracking#[id number]

CM: Lance

#[phone]

Fax#[phone]

## 2019-11-07 NOTE — NUR
NURSE NOTES:

Due meds given. GT residual 0, flushed 50cc water. repositioned patient. Kept dry, clean and 
comfortable. Will continue plan of care.

## 2019-11-07 NOTE — NUR
CASE MANAGEMENT: REVIEW



11/7/19



SI:  VDRF. SEPSIS D/T PNA.  ACUTE CHF. UTI. ACUTE ON CHRONIC RENAL FAILURE.

98.6   81   21   136/65  100% ET TUBE ; INTUBATED;  FI02 50

H/H 8.3/25.5; A.GAP 4; URIC ACID 7.5; CA 8.3; [id number]; C REC PROT 5.6

IS:      

K-DUR GT QD

IV LASIX Q12HR

IV MEROPENEM Q8HR

IV PROTONIX Q12HR

LACTULOSE NG Q8/PRN

NORVASC NG BID 

NOVOLOG SUBQ Q6HR

LEVEMIR SQ HS

FOLATE PO QD

COLACE NG Q8HR

DEPAKOTE NG Q12HR

ALBUTEROL  HHN Q6HR

IV ATIVAN Q4/PRN



**: ICU STATUS ON VENT 



DCP: PATIENT IS FROM  [facility] 



PLAN: START VANCOMYCIN IN AM 

WEAN FIO2- FAIL AFTER 30 MIN

REPEAT CXR ABD

IF NO IMPROVEMENT PLAN FOR BONE MARROW BX

## 2019-11-08 VITALS — DIASTOLIC BLOOD PRESSURE: 58 MMHG | SYSTOLIC BLOOD PRESSURE: 143 MMHG

## 2019-11-08 VITALS — SYSTOLIC BLOOD PRESSURE: 136 MMHG | DIASTOLIC BLOOD PRESSURE: 51 MMHG

## 2019-11-08 VITALS — DIASTOLIC BLOOD PRESSURE: 50 MMHG | SYSTOLIC BLOOD PRESSURE: 137 MMHG

## 2019-11-08 VITALS — SYSTOLIC BLOOD PRESSURE: 126 MMHG | DIASTOLIC BLOOD PRESSURE: 50 MMHG

## 2019-11-08 VITALS — SYSTOLIC BLOOD PRESSURE: 131 MMHG | DIASTOLIC BLOOD PRESSURE: 50 MMHG

## 2019-11-08 VITALS — DIASTOLIC BLOOD PRESSURE: 59 MMHG | SYSTOLIC BLOOD PRESSURE: 143 MMHG

## 2019-11-08 VITALS — DIASTOLIC BLOOD PRESSURE: 49 MMHG | SYSTOLIC BLOOD PRESSURE: 125 MMHG

## 2019-11-08 VITALS — SYSTOLIC BLOOD PRESSURE: 137 MMHG | DIASTOLIC BLOOD PRESSURE: 61 MMHG

## 2019-11-08 VITALS — DIASTOLIC BLOOD PRESSURE: 49 MMHG | SYSTOLIC BLOOD PRESSURE: 126 MMHG

## 2019-11-08 VITALS — SYSTOLIC BLOOD PRESSURE: 132 MMHG | DIASTOLIC BLOOD PRESSURE: 55 MMHG

## 2019-11-08 VITALS — SYSTOLIC BLOOD PRESSURE: 144 MMHG | DIASTOLIC BLOOD PRESSURE: 46 MMHG

## 2019-11-08 VITALS — SYSTOLIC BLOOD PRESSURE: 144 MMHG | DIASTOLIC BLOOD PRESSURE: 55 MMHG

## 2019-11-08 VITALS — DIASTOLIC BLOOD PRESSURE: 45 MMHG | SYSTOLIC BLOOD PRESSURE: 131 MMHG

## 2019-11-08 VITALS — SYSTOLIC BLOOD PRESSURE: 129 MMHG | DIASTOLIC BLOOD PRESSURE: 46 MMHG

## 2019-11-08 VITALS — SYSTOLIC BLOOD PRESSURE: 129 MMHG | DIASTOLIC BLOOD PRESSURE: 49 MMHG

## 2019-11-08 VITALS — DIASTOLIC BLOOD PRESSURE: 54 MMHG | SYSTOLIC BLOOD PRESSURE: 129 MMHG

## 2019-11-08 VITALS — DIASTOLIC BLOOD PRESSURE: 47 MMHG | SYSTOLIC BLOOD PRESSURE: 119 MMHG

## 2019-11-08 VITALS — DIASTOLIC BLOOD PRESSURE: 45 MMHG | SYSTOLIC BLOOD PRESSURE: 130 MMHG

## 2019-11-08 VITALS — SYSTOLIC BLOOD PRESSURE: 142 MMHG | DIASTOLIC BLOOD PRESSURE: 66 MMHG

## 2019-11-08 VITALS — SYSTOLIC BLOOD PRESSURE: 149 MMHG | DIASTOLIC BLOOD PRESSURE: 54 MMHG

## 2019-11-08 VITALS — DIASTOLIC BLOOD PRESSURE: 60 MMHG | SYSTOLIC BLOOD PRESSURE: 157 MMHG

## 2019-11-08 VITALS — SYSTOLIC BLOOD PRESSURE: 135 MMHG | DIASTOLIC BLOOD PRESSURE: 51 MMHG

## 2019-11-08 VITALS — SYSTOLIC BLOOD PRESSURE: 146 MMHG | DIASTOLIC BLOOD PRESSURE: 58 MMHG

## 2019-11-08 VITALS — SYSTOLIC BLOOD PRESSURE: 129 MMHG | DIASTOLIC BLOOD PRESSURE: 51 MMHG

## 2019-11-08 LAB
ADD MANUAL DIFF: NO
ALBUMIN SERPL-MCNC: 1.7 G/DL (ref 3.4–5)
ALBUMIN/GLOB SERPL: 0.4 {RATIO} (ref 1–2.7)
ALP SERPL-CCNC: 99 U/L (ref 46–116)
ALT SERPL-CCNC: 14 U/L (ref 12–78)
ANION GAP SERPL CALC-SCNC: 5 MMOL/L (ref 5–15)
AST SERPL-CCNC: 9 U/L (ref 15–37)
BASOPHILS NFR BLD AUTO: 1.8 % (ref 0–2)
BILIRUB SERPL-MCNC: 0.8 MG/DL (ref 0.2–1)
BUN SERPL-MCNC: 22 MG/DL (ref 7–18)
CALCIUM SERPL-MCNC: 8.2 MG/DL (ref 8.5–10.1)
CHLORIDE SERPL-SCNC: 105 MMOL/L (ref 98–107)
CO2 SERPL-SCNC: 32 MMOL/L (ref 21–32)
CREAT SERPL-MCNC: 1.2 MG/DL (ref 0.55–1.3)
EOSINOPHIL NFR BLD AUTO: 8.3 % (ref 0–3)
ERYTHROCYTE [DISTWIDTH] IN BLOOD BY AUTOMATED COUNT: 14.8 % (ref 11.6–14.8)
GLOBULIN SER-MCNC: 4.1 G/DL
HCT VFR BLD CALC: 26.3 % (ref 37–47)
HGB BLD-MCNC: 8.7 G/DL (ref 12–16)
LYMPHOCYTES NFR BLD AUTO: 11.9 % (ref 20–45)
MCV RBC AUTO: 83 FL (ref 80–99)
MONOCYTES NFR BLD AUTO: 7.2 % (ref 1–10)
NEUTROPHILS NFR BLD AUTO: 70.9 % (ref 45–75)
PHOSPHATE SERPL-MCNC: 3.3 MG/DL (ref 2.5–4.9)
PLATELET # BLD: 387 K/UL (ref 150–450)
POTASSIUM SERPL-SCNC: 4.5 MMOL/L (ref 3.5–5.1)
RBC # BLD AUTO: 3.18 M/UL (ref 4.2–5.4)
SODIUM SERPL-SCNC: 142 MMOL/L (ref 136–145)
WBC # BLD AUTO: 11.3 K/UL (ref 4.8–10.8)

## 2019-11-08 RX ADMIN — INSULIN ASPART SCH UNITS: 100 INJECTION, SOLUTION INTRAVENOUS; SUBCUTANEOUS at 18:38

## 2019-11-08 RX ADMIN — INSULIN ASPART SCH UNITS: 100 INJECTION, SOLUTION INTRAVENOUS; SUBCUTANEOUS at 05:32

## 2019-11-08 RX ADMIN — LORAZEPAM PRN MG: 2 INJECTION, SOLUTION INTRAMUSCULAR; INTRAVENOUS at 21:53

## 2019-11-08 RX ADMIN — IPRATROPIUM BROMIDE AND ALBUTEROL SULFATE SCH ML: .5; 3 SOLUTION RESPIRATORY (INHALATION) at 07:22

## 2019-11-08 RX ADMIN — DIVALPROEX SODIUM SCH MG: 125 CAPSULE ORAL at 20:32

## 2019-11-08 RX ADMIN — INSULIN ASPART SCH UNITS: 100 INJECTION, SOLUTION INTRAVENOUS; SUBCUTANEOUS at 23:31

## 2019-11-08 RX ADMIN — Medication SCH MLS/HR: at 18:20

## 2019-11-08 RX ADMIN — DIVALPROEX SODIUM SCH MG: 125 CAPSULE ORAL at 08:48

## 2019-11-08 RX ADMIN — MEROPENEM SCH MLS/HR: 1 INJECTION INTRAVENOUS at 17:22

## 2019-11-08 RX ADMIN — DOCUSATE SODIUM SCH MG: 50 LIQUID ORAL at 05:31

## 2019-11-08 RX ADMIN — CHLORHEXIDINE GLUCONATE SCH APPLIC: 213 SOLUTION TOPICAL at 19:35

## 2019-11-08 RX ADMIN — MEROPENEM SCH MLS/HR: 1 INJECTION INTRAVENOUS at 00:30

## 2019-11-08 RX ADMIN — MEROPENEM SCH MLS/HR: 1 INJECTION INTRAVENOUS at 08:46

## 2019-11-08 RX ADMIN — IPRATROPIUM BROMIDE AND ALBUTEROL SULFATE SCH ML: .5; 3 SOLUTION RESPIRATORY (INHALATION) at 19:07

## 2019-11-08 RX ADMIN — INSULIN DETEMIR SCH UNITS: 100 INJECTION, SOLUTION SUBCUTANEOUS at 20:33

## 2019-11-08 RX ADMIN — INSULIN ASPART SCH UNITS: 100 INJECTION, SOLUTION INTRAVENOUS; SUBCUTANEOUS at 11:57

## 2019-11-08 RX ADMIN — DOCUSATE SODIUM SCH MG: 50 LIQUID ORAL at 21:32

## 2019-11-08 RX ADMIN — PANTOPRAZOLE SODIUM SCH MG: 40 INJECTION, POWDER, FOR SOLUTION INTRAVENOUS at 08:49

## 2019-11-08 RX ADMIN — PANTOPRAZOLE SODIUM SCH MG: 40 INJECTION, POWDER, FOR SOLUTION INTRAVENOUS at 20:32

## 2019-11-08 RX ADMIN — IPRATROPIUM BROMIDE AND ALBUTEROL SULFATE SCH ML: .5; 3 SOLUTION RESPIRATORY (INHALATION) at 13:10

## 2019-11-08 RX ADMIN — DOCUSATE SODIUM SCH MG: 50 LIQUID ORAL at 14:00

## 2019-11-08 RX ADMIN — IPRATROPIUM BROMIDE AND ALBUTEROL SULFATE SCH ML: .5; 3 SOLUTION RESPIRATORY (INHALATION) at 01:09

## 2019-11-08 NOTE — NUR
RESPIRATORY NOTE:

Placed pt on CPAP PS 10, pt immediately went to tachypneic RR 43bpm, abdominal breathing, 
RSBI 126, Vt 279ml. Placed pt back to AC mode.  Pt is calming down. No SOB or resp distress 
noted. ELIZABETH Dowell at bedside and aware.

## 2019-11-08 NOTE — DIAGNOSTIC IMAGING REPORT
Indication: Dyspnea

 

Comparison:  11/6/2019

 

A single view chest radiograph was obtained.

 

Findings:

 

Evidence of worsening pulmonary edema currently moderate to severe. Heart size

remains normal. Endotracheal tube and PICC line are stable in good position. Hazy

opacity noted at the left lung base obscuring the diaphragm.

 

IMPRESSION:

 

Moderate to severe pulmonary edema. This may be noncardiogenic. Correlate clinically.

Suspect left pleural effusion.

## 2019-11-08 NOTE — NUR
NURSE NOTES:

PATIENT ASLEEP STATUS, ONGOING GLUCERNA 1.2 AT 60ML/HR, NO RESIDUE NOTED, CONTINUE PLAN OF 
CARE.

## 2019-11-08 NOTE — NUR
CASE MANAGEMENT: REVIEW



11/8/19



SI:  VDRF. SEPSIS D/T PNA.  ACUTE CHF. UTI. ACUTE ON CHRONIC RENAL FAILURE.

98.6   77   20   143/59  97% ET TUBE; INTUBATED;  FI02 50

H/H 8.7/26.3; CA 8.2;

ABG: pH 7.488; HCO3 30.4





IS:     IV MEROPENEM Q8HR

 IV LASIX Q12HR

K-DUR GT QD

IV PROTONIX Q12HR

NORVASC NG BID

ALBUTEROL  HHN Q6HR

DEPAKOTE NG A12HR

FOLATE GT QD

LEVEMIR SQ QHS

NOVOLOG SQ Q6HR

LACTULOSE NG Q8HR/PRN 

IV ATIVAN Q4/PRN





**: ICU STATUS ON VENT 



[facility]: [facility] 



PLAN: START VANCOMYCIN THIS EVENING

WEAN FIO2- FAIL AFTER 2-3 MIN

REPEAT CXR 

IF NO IMPROVEMENT PLAN FOR BONE MARROW BX

## 2019-11-08 NOTE — NUR
NURSE NOTES:



Dr. Vizcaino updated on patient urine status, 

remains above 40 to 100 mls. for the last two hours, 

updated on chemistry labs with no verbal orders given.

## 2019-11-08 NOTE — NUR
NURSE NOTES:



Dr. Main assessed patient at the bedside and updated on weaning trial this morning, 

ordered to have feeding held at 0400 and place ventilator setting of CPAP with PS of 8 and 
have an ABG done after 30min of weaning, also place a chest X-ray for 11-9-19. 

RT aware of weaning parameters.

## 2019-11-08 NOTE — INFECTIOUS DISEASES PROG NOTE
Assessment/Plan


Problems:  


(1) Aspiration pneumonia


Assessment & Plan:  with B/L infiltrates, hypoxemia and leukocytosis, CXR  

showed interstitial infiltrates , await  sputum culture and continue vancomycin 

with meropenem  empirically . aspiration precaution. EOT 11/14/19





(2) Respiratory failure


Assessment & Plan:  with maegan cardia and S/P code blue, was intubated and 

started on mechanical ventilation , monitor CXR and ABG , pulmonary is 

following 





(3) UTI (urinary tract infection)


Assessment & Plan:  due to candida lusitaneae , S/P casas catheter removal , no 

specific therapy needed for now 





(4) Acute metabolic encephalopathy


Assessment & Plan:  due to the above, continue hydration and antibiotics, 

monitor in tele 





(5) Hyperglycemia due to type 2 diabetes mellitus


Assessment & Plan:  recommend tight glycemic control to keep blood glucose 

between 100-140 





(6) ARF (acute renal failure)


Assessment & Plan:  due to the above, continue hydration and renally dosed 

antibiotics, close  monitor of renal function , stop vancomycin 





(7) Pelvic mass in female


Assessment & Plan:  to the left of the uterus, suspect malignancy , recommend OB

/GYN eval , oncology is following 








Subjective


ROS Limited/Unobtainable:  Yes


Allergies:  


Coded Allergies:  


     No Known Allergies (Unverified , 10/14/19)


Subjective


she was still intubated on mechanical ventilation in  ICU after she became 

bradycardic and had code blue , minimally responsive , no fever or chills , no 

diarrhea, no secretions from the ET tube





Objective


Vital Signs





Last 24 Hour Vital Signs








  Date Time  Temp Pulse Resp B/P (MAP) Pulse Ox O2 Delivery O2 Flow Rate FiO2


 


11/8/19 14:00  82 27 126/50 (75) 96   


 


11/8/19 13:20  78 24  100 Mechanical Ventilator  40





  72 25     40


 


11/8/19 13:00  73 24 149/54 (85) 100   


 


11/8/19 12:00        40


 


11/8/19 12:00      Endotracheal Tube  


 


11/8/19 12:00 98.9 79 19 129/51 (77) 94   


 


11/8/19 12:00  86      


 


11/8/19 11:07  82 28     40


 


11/8/19 11:00  84 27 143/58 (86) 99   


 


11/8/19 10:00  80 24 131/50 (77) 97   


 


11/8/19 09:00  86 30 144/46 (78) 94   


 


11/8/19 08:58  82 33     40


 


11/8/19 08:48  71  141/67    


 


11/8/19 08:00      Endotracheal Tube  


 


11/8/19 08:00 98.6 77 20 143/59 (87) 97   


 


11/8/19 08:00        40


 


11/8/19 08:00  72      


 


11/8/19 07:32  80 32  100 Mechanical Ventilator  40





  78 27     40


 


11/8/19 07:00  72 27 119/47 (71) 100   


 


11/8/19 06:00  73 26 130/45 (73) 100   


 


11/8/19 05:33  80 30     40


 


11/8/19 05:00  75 27 129/54 (79) 100   


 


11/8/19 04:00 99.1 74 22 131/45 (73) 100   


 


11/8/19 04:00        40


 


11/8/19 04:00      Endotracheal Tube  


 


11/8/19 03:27  82      


 


11/8/19 03:10  84 35     40


 


11/8/19 03:00  83 34 135/51 (79) 100   


 


11/8/19 02:00  78 27 129/49 (75) 100   


 


11/8/19 01:06  77 33  100 Mechanical Ventilator  40





  82 30     40


 


11/8/19 01:00  81 34 142/66 (91) 99   


 


11/8/19 00:00 98.4 78 5 137/50 (79) 99   


 


11/8/19 00:00      Endotracheal Tube  


 


11/8/19 00:00        40


 


11/7/19 23:13  79      


 


11/7/19 23:03  79 28     40


 


11/7/19 23:00  79 28 136/54 (81) 99   


 


11/7/19 22:00  79 27 142/56 (84) 99   


 


11/7/19 21:04  82 31     40


 


11/7/19 21:00  84 29 131/86 (101) 98   


 


11/7/19 20:00 98.4 79 28 134/55 (81) 98   


 


11/7/19 20:00      Endotracheal Tube  


 


11/7/19 20:00        40


 


11/7/19 20:00  79      


 


11/7/19 19:03  78 28  100 Mechanical Ventilator  40





  80 26     40


 


11/7/19 19:00  76 27 120/61 (80) 99   


 


11/7/19 18:00  77 28 119/54 (75) 99   


 


11/7/19 17:11  77  130/57    


 


11/7/19 17:00  82 31 130/57 (81) 98   


 


11/7/19 16:35  81 25     40


 


11/7/19 16:00 98.8 78 24 135/61 (85) 98   


 


11/7/19 16:00  80      


 


11/7/19 16:00      Endotracheal Tube  


 


11/7/19 16:00        40


 


11/7/19 15:00  79 27 130/60 (83) 96   


 


11/7/19 14:30  79 34     40








Height (Feet):  5


Height (Inches):  5.00


Weight (Pounds):  182


General Appearance:  WD/WN, no acute distress


HEENT:  normocephalic, atraumatic, anicteric, mucous membranes moist, PERRL


Respiratory/Chest:  chest wall non-tender, no respiratory distress, no 

accessory muscle use, decreased breath sounds, crackles/rales


Cardiovascular:  normal peripheral pulses, normal rate, regular rhythm, no 

gallop/murmur, no JVD


Abdomen:  normal bowel sounds, soft, non tender, no organomegaly, non distended

, no mass, no scars


Genitourinary:  normal external genitalia


Extremities:  no cyanosis, no clubbing


Skin:  no rash, no lesions


Neurologic/Psychiatric:  alert, unresponsiveness


Lymphatic:  no neck adenopathy, no groin adenopathy


Musculoskeletal:  normal muscle bulk, no effusion





Laboratory Tests








Test


  11/8/19


04:25 11/8/19


11:30


 


White Blood Count


  11.3 K/UL


(4.8-10.8)  H 


 


 


Red Blood Count


  3.18 M/UL


(4.20-5.40)  L 


 


 


Hemoglobin


  8.7 G/DL


(12.0-16.0)  L 


 


 


Hematocrit


  26.3 %


(37.0-47.0)  L 


 


 


Mean Corpuscular Volume 83 FL (80-99)   


 


Mean Corpuscular Hemoglobin


  27.4 PG


(27.0-31.0) 


 


 


Mean Corpuscular Hemoglobin


Concent 33.1 G/DL


(32.0-36.0) 


 


 


Red Cell Distribution Width


  14.8 %


(11.6-14.8) 


 


 


Platelet Count


  387 K/UL


(150-450) 


 


 


Mean Platelet Volume


  7.7 FL


(6.5-10.1) 


 


 


Neutrophils (%) (Auto)


  70.9 %


(45.0-75.0) 


 


 


Lymphocytes (%) (Auto)


  11.9 %


(20.0-45.0)  L 


 


 


Monocytes (%) (Auto)


  7.2 %


(1.0-10.0) 


 


 


Eosinophils (%) (Auto)


  8.3 %


(0.0-3.0)  H 


 


 


Basophils (%) (Auto)


  1.8 %


(0.0-2.0) 


 


 


Sodium Level


  142 MMOL/L


(136-145) 


 


 


Potassium Level


  4.5 MMOL/L


(3.5-5.1) 


 


 


Chloride Level


  105 MMOL/L


() 


 


 


Carbon Dioxide Level


  32 MMOL/L


(21-32) 


 


 


Anion Gap


  5 mmol/L


(5-15) 


 


 


Blood Urea Nitrogen


  22 mg/dL


(7-18)  H 


 


 


Creatinine


  1.2 MG/DL


(0.55-1.30) 


 


 


Estimat Glomerular Filtration


Rate 45.0 mL/min


(>60) 


 


 


Glucose Level


  168 MG/DL


()  H 


 


 


Calcium Level


  8.2 MG/DL


(8.5-10.1)  L 


 


 


Phosphorus Level


  3.3 MG/DL


(2.5-4.9) 


 


 


Magnesium Level


  1.8 MG/DL


(1.8-2.4) 


 


 


Total Bilirubin


  0.8 MG/DL


(0.2-1.0) 


 


 


Aspartate Amino Transf


(AST/SGOT) 9 U/L (15-37)


L 


 


 


Alanine Aminotransferase


(ALT/SGPT) 14 U/L (12-78)


  


 


 


Alkaline Phosphatase


  99 U/L


() 


 


 


Total Protein


  5.8 G/DL


(6.4-8.2)  L 


 


 


Albumin


  1.7 G/DL


(3.4-5.0)  L 


 


 


Globulin 4.1 g/dL   


 


Albumin/Globulin Ratio


  0.4 (1.0-2.7)


L 


 


 


Arterial Blood pH


  


  7.488


(7.350-7.450)


 


Arterial Blood Partial


Pressure CO2 


  41.0 mmHg


(35.0-45.0)


 


Arterial Blood Partial


Pressure O2 


  77.5 mmHg


(75.0-100.0)


 


Arterial Blood HCO3


  


  30.4 mmol/L


(22.0-26.0)  H


 


Arterial Blood Oxygen


Saturation 


  95.2 %


()


 


Arterial Blood Base Excess  6.5 (-2-2)  H


 


Graham Test  Positive  











Current Medications








 Medications


  (Trade)  Dose


 Ordered  Sig/Winnie


 Route


 PRN Reason  Start Time


 Stop Time Status Last Admin


Dose Admin


 


 Acetaminophen


  (Tylenol)  650 mg  Q4H  PRN


 NG


 Mild Pain/Temp > 100.5  11/3/19 20:30


 11/25/19 20:29   


 


 


 Acetaminophen


  (Tylenol)  650 mg  Q4H  PRN


 RECTAL


 TEMP>100.5  11/3/19 20:30


 12/2/19 20:29   


 


 


 Albuterol/


 Ipratropium


  (Albuterol/


 Ipratropium)  3 ml  Q4H  PRN


 HHN


 Shortness of Breath  11/7/19 09:45


 11/12/19 09:44   


 


 


 Albuterol/


 Ipratropium


  (Albuterol/


 Ipratropium)  3 ml  Q6HRT


 HHN


   11/7/19 13:00


 11/12/19 12:59  11/8/19 13:10


 


 


 Amlodipine


 Besylate


  (Norvasc)  2.5 mg  BID


 NG


   11/4/19 09:00


 11/29/19 17:59  11/8/19 08:48


 


 


 Chlorhexidine


 Gluconate


  (Mae-Hex 2%)  1 applic  DAILY@2000


 TOPIC


   11/5/19 20:00


 12/5/19 19:59  11/7/19 19:38


 


 


 Dextrose


  (Dextrose 50%)  25 ml  Q30M  PRN


 IV


 Hypoglycemia  11/5/19 06:30


 12/5/19 06:29   


 


 


 Dextrose


  (Dextrose 50%)  50 ml  Q30M  PRN


 IV


 Hypoglycemia  11/5/19 06:30


 12/5/19 06:29   


 


 


 Divalproex Sodium


  (Depakote


 Sprinkles)  500 mg  Q12HR


 NG


   11/3/19 21:00


 11/14/19 21:59  11/8/19 08:48


 


 


 Docusate Sodium


  (Colace)  100 mg  EVERY 8  HOURS


 NG


   11/3/19 22:00


 11/25/19 08:59  11/7/19 13:53


 


 


 Folic Acid


  (Folate)  3 mg  DAILY


 GT


   11/7/19 09:00


 11/30/19 08:59  11/8/19 08:49


 


 


 Furosemide


  (Lasix)  40 mg  EVERY 12  HOURS


 IV


   11/7/19 21:00


 12/7/19 20:59  11/8/19 08:49


 


 


 Insulin Aspart


  (NovoLOG)    EVERY 6  HOURS


 SUBQ


   11/4/19 00:00


 11/29/19 11:59  11/8/19 11:57


 


 


 Insulin Detemir


  (Levemir)  8 units  QHS


 SUBQ


   11/6/19 21:00


 12/1/19 08:59  11/7/19 20:42


 


 


 Lactulose


  (Cephulac)  20 gm  Q8H  PRN


 NG


 Constipation  11/3/19 20:30


 12/3/19 20:29  11/5/19 08:12


 


 


 Lorazepam


  (Ativan 2mg/ml


 1ml)  1 mg  Q4H  PRN


 IV


 For Anxiety  11/3/19 20:30


 11/10/19 20:29  11/6/19 20:45


 


 


 Meropenem 1 gm/


 Sodium Chloride  55 ml @ 


 110 mls/hr  Q8H


 IVPB


   11/4/19 17:00


 11/14/19 16:59  11/8/19 08:46


 


 


 Metoclopramide HCl


  (Reglan)  10 mg  Q6H  PRN


 IVP


 Nausea & Vomiting  11/6/19 10:45


 12/6/19 10:44   


 


 


 Midazolam HCl 50


 mg/Dextrose  100 ml @ 0


 mls/hr  Q24H  PRN


 IV


 To Patient Comfort  11/7/19 21:30


 11/10/19 20:34   


 


 


 Pantoprazole


  (Protonix)  40 mg  EVERY 12  HOURS


 IVP


   11/3/19 21:00


 12/3/19 08:59  11/8/19 08:49


 


 


 Potassium Chloride


  (K-Dur)  40 meq  DAILY


 GT


   11/6/19 09:00


 12/5/19 17:59  11/8/19 08:49


 


 


 Vancomycin HCl


  (Vanco rx to


 dose)  1 ea  DAILY  PRN


 MISC


 Per rx protocol  11/4/19 15:30


 12/4/19 15:29   


 


 


 Vancomycin/Sodium


 Chloride  275 ml @ 


 183.333


 mls/hr  Q24H


 IVPB


   11/8/19 18:00


 11/13/19 17:59   


 

















Amie Burgos M.D. Nov 8, 2019 14:24

## 2019-11-08 NOTE — NUR
NURSE NOTES:



Bowel movement cleaned being a medium soft brown yogurt consistency, 

strong odor noted from stool. 

Charles remains draining clear pale yellow urine, 

dose of lasix 40mg ivp one time given.

## 2019-11-08 NOTE — NUR
NURSE NOTES:



Dr. Burgos updated on the patient status and WBC of 11.3, 

reviewed cbc labs and assessed patient at the bedside, 

no verbal orders given at this time.

## 2019-11-08 NOTE — GENERAL PROGRESS NOTE
Assessment/Plan


Status:  unchanged


Assessment/Plan:


S, O: Intubated , vent setting reviewed. No pressor. awake, limited following 

commands





PHYSICAL EXAMINATION:HEAD AND NECK:  intubated .


CHEST:  Bronchial breathing sounds.ABDOMEN:  Soft.  No organomegaly.


MUSCULOSKELETAL:  Diffuse 1+ or 2+ nonpitting edema in all four contracted


extremities.  The patient is not following the commands.  Limited


evaluation.NEUROLOGY:  The patient is awake.  Not alert.  Not verbally


communicative.





LABORATORY DATA:  Labs dated Nov 6, 2019  reviewed





Meds: Reviewed and reconciled in the chart





Imaging:  CXR reviewed 





ASSESSMENT AND PLAN:


1. VDRF


2. Sepsis.  secondary to healthcare-associated pneumonia or


3. Acute chf exacerbation - Diastolic


4. Chronic encephalomalacia.


3. Acute on chronic anemia.


4. Renal failure, age indeterminate.


6. Hyperthyroidism.


7. Hyperkalemia.


8. Diabetes type 2, uncontrolled with A1c of 10.


9. Psychiatric disorder.


10. GI and DVT prophylaxis.


11. PAH- Severe 





PLAN OF CARE:


Interval worsening  of abn leukocytosis, defer to ID  


Post PEG tube


Out patient followup for abnormal incidental imaging finding ( possibility of 

CA cant be excluded 


Acute drop in Hgb and observation of UGI bleeding, Will hold DAPT


notes from pulmonary reviewed


WIll discuss abx coverage with ID





Subjective


Allergies:  


Coded Allergies:  


     No Known Allergies (Unverified , 10/14/19)





Objective





Last 24 Hour Vital Signs








  Date Time  Temp Pulse Resp B/P (MAP) Pulse Ox O2 Delivery O2 Flow Rate FiO2


 


11/8/19 11:07  82 28     40


 


11/8/19 11:00  84 27 143/58 (86) 99   


 


11/8/19 10:00  80 24 131/50 (77) 97   


 


11/8/19 09:00  86 30 144/46 (78) 94   


 


11/8/19 08:58  82 33     40


 


11/8/19 08:48  71  141/67    


 


11/8/19 08:00      Endotracheal Tube  


 


11/8/19 08:00 98.6 77 20 143/59 (87) 97   


 


11/8/19 08:00        40


 


11/8/19 08:00  72      


 


11/8/19 07:32  80 32  100 Mechanical Ventilator  40





  78 27     40


 


11/8/19 07:00  72 27 119/47 (71) 100   


 


11/8/19 06:00  73 26 130/45 (73) 100   


 


11/8/19 05:33  80 30     40


 


11/8/19 05:00  75 27 129/54 (79) 100   


 


11/8/19 04:00 99.1 74 22 131/45 (73) 100   


 


11/8/19 04:00        40


 


11/8/19 04:00      Endotracheal Tube  


 


11/8/19 03:27  82      


 


11/8/19 03:10  84 35     40


 


11/8/19 03:00  83 34 135/51 (79) 100   


 


11/8/19 02:00  78 27 129/49 (75) 100   


 


11/8/19 01:06  77 33  100 Mechanical Ventilator  40





  82 30     40


 


11/8/19 01:00  81 34 142/66 (91) 99   


 


11/8/19 00:00 98.4 78 5 137/50 (79) 99   


 


11/8/19 00:00      Endotracheal Tube  


 


11/8/19 00:00        40


 


11/7/19 23:13  79      


 


11/7/19 23:03  79 28     40


 


11/7/19 23:00  79 28 136/54 (81) 99   


 


11/7/19 22:00  79 27 142/56 (84) 99   


 


11/7/19 21:04  82 31     40


 


11/7/19 21:00  84 29 131/86 (101) 98   


 


11/7/19 20:00 98.4 79 28 134/55 (81) 98   


 


11/7/19 20:00      Endotracheal Tube  


 


11/7/19 20:00        40


 


11/7/19 20:00  79      


 


11/7/19 19:03  78 28  100 Mechanical Ventilator  40





  80 26     40


 


11/7/19 19:00  76 27 120/61 (80) 99   


 


11/7/19 18:00  77 28 119/54 (75) 99   


 


11/7/19 17:11  77  130/57    


 


11/7/19 17:00  82 31 130/57 (81) 98   


 


11/7/19 16:35  81 25     40


 


11/7/19 16:00 98.8 78 24 135/61 (85) 98   


 


11/7/19 16:00  80      


 


11/7/19 16:00      Endotracheal Tube  


 


11/7/19 16:00        40


 


11/7/19 15:00  79 27 130/60 (83) 96   


 


11/7/19 14:30  79 34     40


 


11/7/19 14:00  83 35 140/71 (94) 96   


 


11/7/19 13:00  78 25 136/65 (88) 97   


 


11/7/19 12:51  77 24  98 Mechanical Ventilator  40





  80 27     40


 


11/7/19 12:00 99.1 83 24 132/68 (89) 96   


 


11/7/19 12:00  84      


 


11/7/19 12:00      Endotracheal Tube  


 


11/7/19 12:00        40

















Intake and Output  


 


 11/7/19 11/8/19





 18:59 06:59


 


Intake Total 1030 ml 865 ml


 


Output Total 1120 ml 1635 ml


 


Balance -90 ml -770 ml


 


  


 


Free Water 200 ml 


 


IV Total 110 ml 55 ml


 


Tube Feeding 720 ml 720 ml


 


Other  90 ml


 


Output Urine Total 1120 ml 1635 ml


 


# Bowel Movements 2 4








Laboratory Tests


11/8/19 04:25: 


White Blood Count 11.3H, Red Blood Count 3.18L, Hemoglobin 8.7L, Hematocrit 

26.3L, Mean Corpuscular Volume 83, Mean Corpuscular Hemoglobin 27.4, Mean 

Corpuscular Hemoglobin Concent 33.1, Red Cell Distribution Width 14.8, Platelet 

Count 387, Mean Platelet Volume 7.7, Neutrophils (%) (Auto) 70.9, Lymphocytes (%

) (Auto) 11.9L, Monocytes (%) (Auto) 7.2, Eosinophils (%) (Auto) 8.3H, 

Basophils (%) (Auto) 1.8, Sodium Level 142, Potassium Level 4.5, Chloride Level 

105, Carbon Dioxide Level 32, Anion Gap 5, Blood Urea Nitrogen 22H, Creatinine 

1.2, Estimat Glomerular Filtration Rate 45.0, Glucose Level 168H, Calcium Level 

8.2L, Phosphorus Level 3.3, Magnesium Level 1.8, Total Bilirubin 0.8, Aspartate 

Amino Transf (AST/SGOT) 9L, Alanine Aminotransferase (ALT/SGPT) 14, Alkaline 

Phosphatase 99, Total Protein 5.8L, Albumin 1.7L, Globulin 4.1, Albumin/

Globulin Ratio 0.4L


Height (Feet):  5


Height (Inches):  5.00


Weight (Pounds):  182











Garrick Keith MD Nov 8, 2019 11:43

## 2019-11-08 NOTE — SURGERY PROGRESS NOTE
Surgery Progress Note


Subjective


Additional Comments


ill appearing in ICU


wbc 11k


exam unchanged


on vent support


unresponsive


labs noted





Objective





Last 24 Hour Vital Signs








  Date Time  Temp Pulse Resp B/P (MAP) Pulse Ox O2 Delivery O2 Flow Rate FiO2


 


11/8/19 21:37  99 24     40


 


11/8/19 21:00  102 33 157/60 (92) 98   


 


11/8/19 20:00      Endotracheal Tube  


 


11/8/19 20:00        40


 


11/8/19 20:00 98.8 83 26 129/46 (73) 99   


 


11/8/19 19:29  83      


 


11/8/19 19:08  85 23  97 Mechanical Ventilator  40





  86 23     40


 


11/8/19 19:00  83 22 136/51 (79) 99   


 


11/8/19 18:36  97  153/72    


 


11/8/19 18:00  86 26 132/55 (80) 98   


 


11/8/19 17:00  75 30 144/55 (84) 97   


 


11/8/19 16:47  85 28     40


 


11/8/19 16:00        40


 


11/8/19 16:00      Endotracheal Tube  


 


11/8/19 16:00 98.6 83 24 126/49 (74) 99   


 


11/8/19 16:00  82      


 


11/8/19 15:01  74 21     40


 


11/8/19 15:00  82 26 125/49 (74) 98   


 


11/8/19 14:00  82 27 126/50 (75) 96   


 


11/8/19 13:20  78 24  100 Mechanical Ventilator  40





  72 25     40


 


11/8/19 13:00  73 24 149/54 (85) 100   


 


11/8/19 12:00        40


 


11/8/19 12:00      Endotracheal Tube  


 


11/8/19 12:00 98.9 79 19 129/51 (77) 94   


 


11/8/19 12:00  86      


 


11/8/19 11:07  82 28     40


 


11/8/19 11:00  84 27 143/58 (86) 99   


 


11/8/19 10:00  80 24 131/50 (77) 97   


 


11/8/19 09:00  86 30 144/46 (78) 94   


 


11/8/19 08:58  82 33     40


 


11/8/19 08:48  71  141/67    


 


11/8/19 08:00      Endotracheal Tube  


 


11/8/19 08:00 98.6 77 20 143/59 (87) 97   


 


11/8/19 08:00        40


 


11/8/19 08:00  72      


 


11/8/19 07:32  80 32  100 Mechanical Ventilator  40





  78 27     40


 


11/8/19 07:00  72 27 119/47 (71) 100   


 


11/8/19 06:00  73 26 130/45 (73) 100   


 


11/8/19 05:33  80 30     40


 


11/8/19 05:00  75 27 129/54 (79) 100   


 


11/8/19 04:00 99.1 74 22 131/45 (73) 100   


 


11/8/19 04:00        40


 


11/8/19 04:00      Endotracheal Tube  


 


11/8/19 03:27  82      


 


11/8/19 03:10  84 35     40


 


11/8/19 03:00  83 34 135/51 (79) 100   


 


11/8/19 02:00  78 27 129/49 (75) 100   


 


11/8/19 01:06  77 33  100 Mechanical Ventilator  40





  82 30     40


 


11/8/19 01:00  81 34 142/66 (91) 99   


 


11/8/19 00:00 98.4 78 5 137/50 (79) 99   


 


11/8/19 00:00      Endotracheal Tube  


 


11/8/19 00:00        40


 


11/7/19 23:13  79      


 


11/7/19 23:03  79 28     40


 


11/7/19 23:00  79 28 136/54 (81) 99   








I&O











Intake and Output  


 


 11/7/19 11/8/19





 19:00 07:00


 


Intake Total 1030 ml 865 ml


 


Output Total 1180 ml 1570 ml


 


Balance -150 ml -705 ml


 


  


 


Free Water 200 ml 


 


IV Total 110 ml 55 ml


 


Tube Feeding 720 ml 720 ml


 


Other  90 ml


 


Output Urine Total 1180 ml 1570 ml


 


# Bowel Movements 2 4








Dressing:  saturated


Wound:  other


Drains:  other


Cardiovascular:  RSR


Respiratory:  decreased breath sounds


Abdomen:  soft, present bowel sounds


Extremities:  no cyanosis





Laboratory Tests








Test


  11/8/19


04:25 11/8/19


11:30


 


White Blood Count


  11.3 K/UL


(4.8-10.8)  H 


 


 


Red Blood Count


  3.18 M/UL


(4.20-5.40)  L 


 


 


Hemoglobin


  8.7 G/DL


(12.0-16.0)  L 


 


 


Hematocrit


  26.3 %


(37.0-47.0)  L 


 


 


Mean Corpuscular Volume 83 FL (80-99)   


 


Mean Corpuscular Hemoglobin


  27.4 PG


(27.0-31.0) 


 


 


Mean Corpuscular Hemoglobin


Concent 33.1 G/DL


(32.0-36.0) 


 


 


Red Cell Distribution Width


  14.8 %


(11.6-14.8) 


 


 


Platelet Count


  387 K/UL


(150-450) 


 


 


Mean Platelet Volume


  7.7 FL


(6.5-10.1) 


 


 


Neutrophils (%) (Auto)


  70.9 %


(45.0-75.0) 


 


 


Lymphocytes (%) (Auto)


  11.9 %


(20.0-45.0)  L 


 


 


Monocytes (%) (Auto)


  7.2 %


(1.0-10.0) 


 


 


Eosinophils (%) (Auto)


  8.3 %


(0.0-3.0)  H 


 


 


Basophils (%) (Auto)


  1.8 %


(0.0-2.0) 


 


 


Sodium Level


  142 MMOL/L


(136-145) 


 


 


Potassium Level


  4.5 MMOL/L


(3.5-5.1) 


 


 


Chloride Level


  105 MMOL/L


() 


 


 


Carbon Dioxide Level


  32 MMOL/L


(21-32) 


 


 


Anion Gap


  5 mmol/L


(5-15) 


 


 


Blood Urea Nitrogen


  22 mg/dL


(7-18)  H 


 


 


Creatinine


  1.2 MG/DL


(0.55-1.30) 


 


 


Estimat Glomerular Filtration


Rate 45.0 mL/min


(>60) 


 


 


Glucose Level


  168 MG/DL


()  H 


 


 


Calcium Level


  8.2 MG/DL


(8.5-10.1)  L 


 


 


Phosphorus Level


  3.3 MG/DL


(2.5-4.9) 


 


 


Magnesium Level


  1.8 MG/DL


(1.8-2.4) 


 


 


Total Bilirubin


  0.8 MG/DL


(0.2-1.0) 


 


 


Aspartate Amino Transf


(AST/SGOT) 9 U/L (15-37)


L 


 


 


Alanine Aminotransferase


(ALT/SGPT) 14 U/L (12-78)


  


 


 


Alkaline Phosphatase


  99 U/L


() 


 


 


Total Protein


  5.8 G/DL


(6.4-8.2)  L 


 


 


Albumin


  1.7 G/DL


(3.4-5.0)  L 


 


 


Globulin 4.1 g/dL   


 


Albumin/Globulin Ratio


  0.4 (1.0-2.7)


L 


 


 


Arterial Blood pH


  


  7.488


(7.350-7.450)


 


Arterial Blood Partial


Pressure CO2 


  41.0 mmHg


(35.0-45.0)


 


Arterial Blood Partial


Pressure O2 


  77.5 mmHg


(75.0-100.0)


 


Arterial Blood HCO3


  


  30.4 mmol/L


(22.0-26.0)  H


 


Arterial Blood Oxygen


Saturation 


  95.2 %


()


 


Arterial Blood Base Excess  6.5 (-2-2)  H


 


Graham Test  Positive  











Plan


Problems:  


(1) Pressure injury of deep tissue


Assessment & Plan:  Pt presented on admission with DTPI R heel. Blood filled 

blister with marginal erythema noted to heel. Pt exhibited agitation when 

minimally palpated. (L)2.2cm x (W)2.1cm


L heel is blanchable .


Non-blanchable erythema without induration noted to sacral cleft. 


No other areas of skin concerns noted.





Tx.Plan:


Apply Betadine to R heel. Cover with Optifoam drsg. Change every 3 days and prn.


           


Apply Cavilon Skin Barrier to L heel. Cover with Optifoam drsg. Change every 7 

days and prn.


           


Apply Moisture Barrier Paste to buttocks. Cover sacral area with Optifoam drsg. 

Change every 3 days and prn.


           


Reposition at least every 2hours or as tolerated.


           


Off-load heels with pillow.





(2) Sepsis


Assessment & Plan:  Patient with low-grade fevers, anemia, leukocytosis 

persistent, elevated platelets, abnormal labs.


Etiology currently unknown and work-up in progress.  Labs noted and imaging 

that is available as noted.  


CT noted


US noted


Impression: 13 x 7 x 12.9 cm unilocular cystic mass, corresponding to lesion 

reported


on recent CT scan. Layering low level internal echoes likely represent bladder


debris.. This presumably represents a cystic ovarian lesion with debris or


hemorrhage, possibly neoplastic. Gynecological consultation is recommended


Antibiotics as per infectious disease 


local wound care as wounds do not seem to be the etiology of her sepsis


start feeds via peg


doing well


improving


cont with tube feeds


cont with ICU care 


supplementation for healing 


We will monitor and follow with recommendations


Thank you for allowing me to participate in patient's care














Radhames Blas Nov 8, 2019 22:39

## 2019-11-08 NOTE — CARDIOLOGY PROGRESS NOTE
Assessment/Plan


Assessment/Plan


1. acute congestive heart failure


2. Abnormal cardiac enzyme demand related due to infection and profound anemia 


3. Agitation 


4. Urinary tract infection.


5. Acute renal failure.


6. Profound anemia.


7. Diabetes mellitus.


8. History of hypertension.


9. History of mood disorder.


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 


11. arf 


12. pelvic mass


13  sepsis 


14. pneumonia 








off dapt due to suspected bleeding 


echo mild systolic dysfunction 


in icu on  a vent 


iv abx 


s/p peg  


lasix iv bid


add zoroxolyn one time in am 


we dcd all anti plt 


tele sinus 


remain on vent 


cxr noted reviewed personally





Subjective


ROS Limited/Unobtainable:  Yes





Objective





Last 24 Hour Vital Signs








  Date Time  Temp Pulse Resp B/P (MAP) Pulse Ox O2 Delivery O2 Flow Rate FiO2


 


11/8/19 19:08  85 23  97 Mechanical Ventilator  40





  86 23     40


 


11/8/19 18:36  97  153/72    


 


11/8/19 18:00  86 26 132/55 (80) 98   


 


11/8/19 17:00  75 30 144/55 (84) 97   


 


11/8/19 16:47  85 28     40


 


11/8/19 16:00        40


 


11/8/19 16:00      Endotracheal Tube  


 


11/8/19 16:00 98.6 83 24 126/49 (74) 99   


 


11/8/19 16:00  82      


 


11/8/19 15:01  74 21     40


 


11/8/19 15:00  82 26 125/49 (74) 98   


 


11/8/19 14:00  82 27 126/50 (75) 96   


 


11/8/19 13:20  78 24  100 Mechanical Ventilator  40





  72 25     40


 


11/8/19 13:00  73 24 149/54 (85) 100   


 


11/8/19 12:00        40


 


11/8/19 12:00      Endotracheal Tube  


 


11/8/19 12:00 98.9 79 19 129/51 (77) 94   


 


11/8/19 12:00  86      


 


11/8/19 11:07  82 28     40


 


11/8/19 11:00  84 27 143/58 (86) 99   


 


11/8/19 10:00  80 24 131/50 (77) 97   


 


11/8/19 09:00  86 30 144/46 (78) 94   


 


11/8/19 08:58  82 33     40


 


11/8/19 08:48  71  141/67    


 


11/8/19 08:00      Endotracheal Tube  


 


11/8/19 08:00 98.6 77 20 143/59 (87) 97   


 


11/8/19 08:00        40


 


11/8/19 08:00  72      


 


11/8/19 07:32  80 32  100 Mechanical Ventilator  40





  78 27     40


 


11/8/19 07:00  72 27 119/47 (71) 100   


 


11/8/19 06:00  73 26 130/45 (73) 100   


 


11/8/19 05:33  80 30     40


 


11/8/19 05:00  75 27 129/54 (79) 100   


 


11/8/19 04:00 99.1 74 22 131/45 (73) 100   


 


11/8/19 04:00        40


 


11/8/19 04:00      Endotracheal Tube  


 


11/8/19 03:27  82      


 


11/8/19 03:10  84 35     40


 


11/8/19 03:00  83 34 135/51 (79) 100   


 


11/8/19 02:00  78 27 129/49 (75) 100   


 


11/8/19 01:06  77 33  100 Mechanical Ventilator  40





  82 30     40


 


11/8/19 01:00  81 34 142/66 (91) 99   


 


11/8/19 00:00 98.4 78 5 137/50 (79) 99   


 


11/8/19 00:00      Endotracheal Tube  


 


11/8/19 00:00        40


 


11/7/19 23:13  79      


 


11/7/19 23:03  79 28     40


 


11/7/19 23:00  79 28 136/54 (81) 99   


 


11/7/19 22:00  79 27 142/56 (84) 99   


 


11/7/19 21:04  82 31     40


 


11/7/19 21:00  84 29 131/86 (101) 98   


 


11/7/19 20:00 98.4 79 28 134/55 (81) 98   


 


11/7/19 20:00      Endotracheal Tube  


 


11/7/19 20:00        40


 


11/7/19 20:00  79      








General Appearance:  no apparent distress, on vent, patient on isolation


Cardiovascular:  normal rate


Respiratory/Chest:  rhonchi - bilaterally


Abdomen:  normal bowel sounds, non tender, soft


Extremities:  no swelling











Intake and Output  


 


 11/7/19 11/8/19





 19:00 07:00


 


Intake Total 1030 ml 865 ml


 


Output Total 1180 ml 1570 ml


 


Balance -150 ml -705 ml


 


  


 


Free Water 200 ml 


 


IV Total 110 ml 55 ml


 


Tube Feeding 720 ml 720 ml


 


Other  90 ml


 


Output Urine Total 1180 ml 1570 ml


 


# Bowel Movements 2 4











Laboratory Tests








Test


  11/8/19


04:25 11/8/19


11:30


 


White Blood Count


  11.3 K/UL


(4.8-10.8)  H 


 


 


Red Blood Count


  3.18 M/UL


(4.20-5.40)  L 


 


 


Hemoglobin


  8.7 G/DL


(12.0-16.0)  L 


 


 


Hematocrit


  26.3 %


(37.0-47.0)  L 


 


 


Mean Corpuscular Volume 83 FL (80-99)   


 


Mean Corpuscular Hemoglobin


  27.4 PG


(27.0-31.0) 


 


 


Mean Corpuscular Hemoglobin


Concent 33.1 G/DL


(32.0-36.0) 


 


 


Red Cell Distribution Width


  14.8 %


(11.6-14.8) 


 


 


Platelet Count


  387 K/UL


(150-450) 


 


 


Mean Platelet Volume


  7.7 FL


(6.5-10.1) 


 


 


Neutrophils (%) (Auto)


  70.9 %


(45.0-75.0) 


 


 


Lymphocytes (%) (Auto)


  11.9 %


(20.0-45.0)  L 


 


 


Monocytes (%) (Auto)


  7.2 %


(1.0-10.0) 


 


 


Eosinophils (%) (Auto)


  8.3 %


(0.0-3.0)  H 


 


 


Basophils (%) (Auto)


  1.8 %


(0.0-2.0) 


 


 


Sodium Level


  142 MMOL/L


(136-145) 


 


 


Potassium Level


  4.5 MMOL/L


(3.5-5.1) 


 


 


Chloride Level


  105 MMOL/L


() 


 


 


Carbon Dioxide Level


  32 MMOL/L


(21-32) 


 


 


Anion Gap


  5 mmol/L


(5-15) 


 


 


Blood Urea Nitrogen


  22 mg/dL


(7-18)  H 


 


 


Creatinine


  1.2 MG/DL


(0.55-1.30) 


 


 


Estimat Glomerular Filtration


Rate 45.0 mL/min


(>60) 


 


 


Glucose Level


  168 MG/DL


()  H 


 


 


Calcium Level


  8.2 MG/DL


(8.5-10.1)  L 


 


 


Phosphorus Level


  3.3 MG/DL


(2.5-4.9) 


 


 


Magnesium Level


  1.8 MG/DL


(1.8-2.4) 


 


 


Total Bilirubin


  0.8 MG/DL


(0.2-1.0) 


 


 


Aspartate Amino Transf


(AST/SGOT) 9 U/L (15-37)


L 


 


 


Alanine Aminotransferase


(ALT/SGPT) 14 U/L (12-78)


  


 


 


Alkaline Phosphatase


  99 U/L


() 


 


 


Total Protein


  5.8 G/DL


(6.4-8.2)  L 


 


 


Albumin


  1.7 G/DL


(3.4-5.0)  L 


 


 


Globulin 4.1 g/dL   


 


Albumin/Globulin Ratio


  0.4 (1.0-2.7)


L 


 


 


Arterial Blood pH


  


  7.488


(7.350-7.450)


 


Arterial Blood Partial


Pressure CO2 


  41.0 mmHg


(35.0-45.0)


 


Arterial Blood Partial


Pressure O2 


  77.5 mmHg


(75.0-100.0)


 


Arterial Blood HCO3


  


  30.4 mmol/L


(22.0-26.0)  H


 


Arterial Blood Oxygen


Saturation 


  95.2 %


()


 


Arterial Blood Base Excess  6.5 (-2-2)  H


 


Graham Test  Positive  

















Rob May MD Nov 8, 2019 19:30

## 2019-11-08 NOTE — NUR
NURSE NOTES:



RT attempted to wean on CPAP of 8 with FIO2 of 40% and Peep of 5,

unable to continue weaning after 2-3 minutes since patient stated to breath with RR of 40 
and using abdominal muscles. 

patient placed back to AC 14, TV: 400, FIO2: 40% with peep of 5.

## 2019-11-08 NOTE — NUR
NURSE NOTES:

PATIENT AWOKE, IRRITABLE IN ABED AND TRIED TO OUT OF BED STATUS, GIVEN ATIVAN 1MG BY IVP 
SLOWLY AS PRN ORDER AND REPOSITIONED, SECURED RESTRAINTS FOR SAFETY, WILL CONTINUE TO 
MONITOR.

## 2019-11-08 NOTE — NUR
NURSE NOTES:

PATIENT OPEN EYES TO NAME, ON ETT TO VENT, AC14//FIO2 40%/PEEP 5, O2 SATURATION 99% 
NOTED, HEART 80/MIN AND NOTED SR, ABDOMEN SOFT, DISTENDED, NON TENDER, G TUBE INTACT AND 
PATENT, NO RESIDUE NOTED, ONGOING GLUCERNA 1.2 AT 60ML/HR, KEPT HOB OVER 30 DEGREES, F/C 
INTACT AND PATENT, YELLOW URINE OUTED, PICC LINE TO RIGHT UPPER ARM, INTACT AND PATENT, ON 
SCD'S TO BOTH LOWER LEGS, MADE LOWER BED AND LOCKED, ON P200 BED, KEPT 2 POINT SOFT 
RESTRAINTS FOR SAFETY, SZ AND ASPIRATION PRECAUTION, PROVIDED CALL LIGHT WITHIN REACH, WILL 
CONTINUE TO MONITOR.

## 2019-11-08 NOTE — NUR
RESPIRATORY NOTE:

 Received pt on ETT 7.5@23cm lips line with current vent settings: AC 14-400ml-40%FiO2- 
peep 5, saturates at 100%. Pt is shallow breathing, tachypneic RR 32bpm but not in resp 
distress. Pt is unable to follow commands. Placido rales/rhonchi breath sounds heard upon 
auscultation, suctioned small amounts of thin frothy brown yellow pink secretions without 
incidents. Breathing Tx Duoneb given without any adverse reactions. .Vent circuits and 
suction tubing are secure and out of the way. Vent is plugged into the red outlet, alarms 
are set and audible, ambu bag is at bedside. Will continue to monitor pt.

## 2019-11-08 NOTE — NUR
NURSE NOTES:



Dr. Torres updated of chest x-ray results and abg, 

ordered to give an additional dose of lasix 40mg, 

no further orders given,

## 2019-11-08 NOTE — PULMONOLGY CRITICAL CARE NOTE
Critical Care - Asmt/Plan


Assessment/Plan:


Pulmonary CCM Progress Note 





Assessment/Plan


Problems:  


(1) Healthcare/aspiration associated bacterial pneumonia, respiratory failure


(2) UTI (urinary tract infection)


(3) Sepsis


(4) Dehydration


(5) CHANCE (acute kidney injury)


(6) Acute metabolic encephalopathy


(7) Hyperglycemia due to type 2 diabetes mellitus


(8) Renal failure (ARF), acute on chronic


(9) Ventilator dependant Respiratory Failure - not tolerating weaning


(10) Abnormal TSH


(11) Pelvic mass in female


(12) PEG (percutaneous endoscopic gastrostomy) status


Assessment/Plan


Marked leukocytosis S/P WBC scan with uptake in pelvis, has pelvic mass


Sepsis


Possible pneumonia


E coli UTI


Acute hypoxemic respiratory failure


S/p code blue - now VDRF - not tolerating weaning


Acute encephalopathy


Hx CHF


HTN


CHANCE improving


Pelvic mass/possible GYN malignancy vs cyst


Dysphagia S/P PEG





Plan:


-Wean FIO2, ABG PRN


-Optimize pulmonary hygiene


-Wean ventilator as tolerated


-Titrate down FiO2 to keep SaO2 > 90%


-Monitor WCt, RFS sent for JAK2 mutation, per Dr. Pop BMBx if unrevealing


-Abx per ID, F/U Cx's


-monitor volumes and renal function, F/U renal recs, diurese PRN


-monitor encephalopathy


-NPO, GTF's


-DVT Px


-FC


-PICC line





Subjective


Allergies:  


Coded Allergies:  


     No Known Allergies


Subjective


Sedated on the Ventilator





Objective





Vital Signs Noted





General Appearance:  Sedated, cachetic


HEENT:  normocephalic, atraumatic, anicteric, mucous membranes moist


Respiratory/Chest:  occasional rhonchi


Cardiovascular:  normal peripheral pulses, normal rate, regular rhythm


Abdomen:  normal bowel sounds, soft, non tender, no organomegaly, non distended

, no mass, other - G


Extremities:  no cyanosis, no clubbing, no edema





Microbiology noted








 Date/Time


Source Procedure


Growth Status


 


 


 10/31/19 10:15


Urine,Clean Catch Urine Culture - Preliminary


YEAST Resulted








Laboratory Tests Noted





Critical Care - Objective





Last 24 Hour Vital Signs








  Date Time  Temp Pulse Resp B/P (MAP) Pulse Ox O2 Delivery O2 Flow Rate FiO2


 


11/8/19 19:08  85 23  97 Mechanical Ventilator  40





  86 23     40


 


11/8/19 18:36  97  153/72    


 


11/8/19 18:00  86 26 132/55 (80) 98   


 


11/8/19 17:00  75 30 144/55 (84) 97   


 


11/8/19 16:47  85 28     40


 


11/8/19 16:00        40


 


11/8/19 16:00      Endotracheal Tube  


 


11/8/19 16:00 98.6 83 24 126/49 (74) 99   


 


11/8/19 16:00  82      


 


11/8/19 15:01  74 21     40


 


11/8/19 15:00  82 26 125/49 (74) 98   


 


11/8/19 14:00  82 27 126/50 (75) 96   


 


11/8/19 13:20  78 24  100 Mechanical Ventilator  40





  72 25     40


 


11/8/19 13:00  73 24 149/54 (85) 100   


 


11/8/19 12:00        40


 


11/8/19 12:00      Endotracheal Tube  


 


11/8/19 12:00 98.9 79 19 129/51 (77) 94   


 


11/8/19 12:00  86      


 


11/8/19 11:07  82 28     40


 


11/8/19 11:00  84 27 143/58 (86) 99   


 


11/8/19 10:00  80 24 131/50 (77) 97   


 


11/8/19 09:00  86 30 144/46 (78) 94   


 


11/8/19 08:58  82 33     40


 


11/8/19 08:48  71  141/67    


 


11/8/19 08:00      Endotracheal Tube  


 


11/8/19 08:00 98.6 77 20 143/59 (87) 97   


 


11/8/19 08:00        40


 


11/8/19 08:00  72      


 


11/8/19 07:32  80 32  100 Mechanical Ventilator  40





  78 27     40


 


11/8/19 07:00  72 27 119/47 (71) 100   


 


11/8/19 06:00  73 26 130/45 (73) 100   


 


11/8/19 05:33  80 30     40


 


11/8/19 05:00  75 27 129/54 (79) 100   


 


11/8/19 04:00 99.1 74 22 131/45 (73) 100   


 


11/8/19 04:00        40


 


11/8/19 04:00      Endotracheal Tube  


 


11/8/19 03:27  82      


 


11/8/19 03:10  84 35     40


 


11/8/19 03:00  83 34 135/51 (79) 100   


 


11/8/19 02:00  78 27 129/49 (75) 100   


 


11/8/19 01:06  77 33  100 Mechanical Ventilator  40





  82 30     40


 


11/8/19 01:00  81 34 142/66 (91) 99   


 


11/8/19 00:00 98.4 78 5 137/50 (79) 99   


 


11/8/19 00:00      Endotracheal Tube  


 


11/8/19 00:00        40


 


11/7/19 23:13  79      


 


11/7/19 23:03  79 28     40


 


11/7/19 23:00  79 28 136/54 (81) 99   


 


11/7/19 22:00  79 27 142/56 (84) 99   


 


11/7/19 21:04  82 31     40


 


11/7/19 21:00  84 29 131/86 (101) 98   


 


11/7/19 20:00 98.4 79 28 134/55 (81) 98   


 


11/7/19 20:00      Endotracheal Tube  


 


11/7/19 20:00        40


 


11/7/19 20:00  79      








Accucheck:  173





Critical Care - Subjective


ROS Limited/Unobtainable:  No


FI02:  40


Vent Support Breath Rate:  14


Vent Support Mode:  AC


Vent Tidal Volume:  400


Sputum Amount:  Small


PEEP:  5.0


PIP:  34


Tube Feeding Amount:  60


I&O:











Intake and Output  


 


 11/7/19 11/8/19





 19:00 07:00


 


Intake Total 1030 ml 865 ml


 


Output Total 1180 ml 1570 ml


 


Balance -150 ml -705 ml


 


  


 


Free Water 200 ml 


 


IV Total 110 ml 55 ml


 


Tube Feeding 720 ml 720 ml


 


Other  90 ml


 


Output Urine Total 1180 ml 1570 ml


 


# Bowel Movements 2 4








ET-Tube:  7.5


ET Position:  23











Delmer Main MD Nov 8, 2019 19:21

## 2019-11-08 NOTE — NUR
NURSE NOTES:

MORNING CARE AND ORAL CARE WAS DONE, TRIED TO TOUCH WHEN RELEASE RESTRAINTS, SECURED SOFT 
RESTRAINTS, WILL CONTINUE TO MONITOR. Benefits, risks, and possible complications of procedure explained to patient/caregiver who verbalized understanding and gave verbal consent.

## 2019-11-08 NOTE — NUR
NURSE NOTES:



Dr. Torres placed and order for Chest -X ray and ABG, updated of patient weaning failed, 

RT made aware of order.

## 2019-11-08 NOTE — GENERAL PROGRESS NOTE
Assessment/Plan


Problem List:  


(1) Abnormal TSH


ICD Codes:  R79.89 - Other specified abnormal findings of blood chemistry


SNOMED:  831233072


(2) Hyperglycemia due to type 2 diabetes mellitus


ICD Codes:  E11.65 - Type 2 diabetes mellitus with hyperglycemia


SNOMED:  372196534014130, 83000636


Qualifiers:  


   Qualified Codes:  E11.65 - Type 2 diabetes mellitus with hyperglycemia; 

Z79.4 - Long term (current) use of insulin


(3) Acute metabolic encephalopathy


ICD Codes:  G93.41 - Metabolic encephalopathy


SNOMED:  65627816, 402898519


(4) ARF (acute renal failure)


ICD Codes:  N17.9 - Acute kidney failure, unspecified


SNOMED:  04187957


Qualifiers:  


   Qualified Codes:  N17.9 - Acute kidney failure, unspecified


(5) Healthcare associated bacterial pneumonia


ICD Codes:  J15.9 - Unspecified bacterial pneumonia


SNOMED:  585520643


Status:  unchanged


Assessment/Plan:


continue Levemir 8 units qhs 


continue NISS every 6 hours 


hypoglycemia protocol in order





Subjective


Allergies:  


Coded Allergies:  


     No Known Allergies (Unverified , 10/14/19)


All Systems:  reviewed and negative except above


Subjective


events noted 











Item Value  Date Time


 


Bedside Blood Glucose 168 mg/dl H 11/8/19 0532


 


Bedside Blood Glucose 225 mg/dl H 11/7/19 2335


 


Bedside Blood Glucose 184 mg/dl H 11/7/19 2042


 


Bedside Blood Glucose 183 mg/dl H 11/7/19 1800


 


Bedside Blood Glucose 139 mg/dl H 11/7/19 1200


 


Bedside Blood Glucose 104 mg/dl 11/7/19 0600











Objective





Last 24 Hour Vital Signs








  Date Time  Temp Pulse Resp B/P (MAP) Pulse Ox O2 Delivery O2 Flow Rate FiO2


 


11/8/19 06:00  73 26 130/45 (73) 100   


 


11/8/19 05:33  80 30     40


 


11/8/19 05:00  75 27 129/54 (79) 100   


 


11/8/19 04:00 99.1 74 22 131/45 (73) 100   


 


11/8/19 04:00        40


 


11/8/19 04:00      Endotracheal Tube  


 


11/8/19 03:27  82      


 


11/8/19 03:10  84 35     40


 


11/8/19 03:00  83 34 135/51 (79) 100   


 


11/8/19 02:00  78 27 129/49 (75) 100   


 


11/8/19 01:06  77 33  100 Mechanical Ventilator  40





  82 30     40


 


11/8/19 01:00  81 34 142/66 (91) 99   


 


11/8/19 00:00 98.4 78 5 137/50 (79) 99   


 


11/8/19 00:00      Endotracheal Tube  


 


11/8/19 00:00        40


 


11/7/19 23:13  79      


 


11/7/19 23:03  79 28     40


 


11/7/19 23:00  79 28 136/54 (81) 99   


 


11/7/19 22:00  79 27 142/56 (84) 99   


 


11/7/19 21:04  82 31     40


 


11/7/19 21:00  84 29 131/86 (101) 98   


 


11/7/19 20:00 98.4 79 28 134/55 (81) 98   


 


11/7/19 20:00      Endotracheal Tube  


 


11/7/19 20:00        40


 


11/7/19 20:00  79      


 


11/7/19 19:03  78 28  100 Mechanical Ventilator  40





  80 26     40


 


11/7/19 19:00  76 27 120/61 (80) 99   


 


11/7/19 18:00  77 28 119/54 (75) 99   


 


11/7/19 17:11  77  130/57    


 


11/7/19 17:00  82 31 130/57 (81) 98   


 


11/7/19 16:35  81 25     40


 


11/7/19 16:00 98.8 78 24 135/61 (85) 98   


 


11/7/19 16:00  80      


 


11/7/19 16:00      Endotracheal Tube  


 


11/7/19 16:00        40


 


11/7/19 15:00  79 27 130/60 (83) 96   


 


11/7/19 14:30  79 34     40


 


11/7/19 14:00  83 35 140/71 (94) 96   


 


11/7/19 13:00  78 25 136/65 (88) 97   


 


11/7/19 12:51  77 24  98 Mechanical Ventilator  40





  80 27     40


 


11/7/19 12:00 99.1 83 24 132/68 (89) 96   


 


11/7/19 12:00  84      


 


11/7/19 12:00      Endotracheal Tube  


 


11/7/19 12:00        40


 


11/7/19 11:21  89 30     40


 


11/7/19 11:00  86 25 151/84 (106) 98   


 


11/7/19 10:10  72 18  98   


 


11/7/19 10:09  72 16  98   


 


11/7/19 10:00  77 22 131/69 (89) 97   


 


11/7/19 09:00  82 28 142/58 (86) 96   


 


11/7/19 09:00        40


 


11/7/19 08:32  78  131/63    


 


11/7/19 08:30  82 21     40


 


11/7/19 08:00  81      


 


11/7/19 08:00 98.6 81 21 136/65 (88) 100   


 


11/7/19 08:00      Endotracheal Tube  


 


11/7/19 08:00        50


 


11/7/19 07:00  81 32 128/106 (113) 100   


 


11/7/19 06:58  79 35     50


 


11/7/19 06:30  75 20 131/63 (85) 99   

















Intake and Output  


 


 11/7/19 11/8/19





 19:00 07:00


 


Intake Total 1030 ml 805 ml


 


Output Total 1180 ml 1535 ml


 


Balance -150 ml -730 ml


 


  


 


Free Water 200 ml 


 


IV Total 110 ml 55 ml


 


Tube Feeding 720 ml 660 ml


 


Other  90 ml


 


Output Urine Total 1180 ml 1535 ml


 


# Bowel Movements 2 4








Laboratory Tests


11/8/19 04:25: 


White Blood Count 11.3H, Red Blood Count 3.18L, Hemoglobin 8.7L, Hematocrit 

26.3L, Mean Corpuscular Volume 83, Mean Corpuscular Hemoglobin 27.4, Mean 

Corpuscular Hemoglobin Concent 33.1, Red Cell Distribution Width 14.8, Platelet 

Count 387, Mean Platelet Volume 7.7, Neutrophils (%) (Auto) 70.9, Lymphocytes (%

) (Auto) 11.9L, Monocytes (%) (Auto) 7.2, Eosinophils (%) (Auto) 8.3H, 

Basophils (%) (Auto) 1.8, Sodium Level 142, Potassium Level 4.5, Chloride Level 

105, Carbon Dioxide Level 32, Anion Gap 5, Blood Urea Nitrogen 22H, Creatinine 

1.2, Estimat Glomerular Filtration Rate 45.0, Glucose Level 168H, Calcium Level 

8.2L, Phosphorus Level 3.3, Magnesium Level 1.8, Total Bilirubin 0.8, Aspartate 

Amino Transf (AST/SGOT) 9L, Alanine Aminotransferase (ALT/SGPT) 14, Alkaline 

Phosphatase 99, Total Protein 5.8L, Albumin 1.7L, Globulin 4.1, Albumin/

Globulin Ratio 0.4L


Height (Feet):  5


Height (Inches):  5.00


Weight (Pounds):  154


General Appearance:  lethargic


Neck:  normal alignment


Cardiovascular:  normal rate


Respiratory/Chest:  decreased breath sounds


Abdomen:  normal bowel sounds


Objective





Current Medications








 Medications


  (Trade)  Dose


 Ordered  Sig/Winnie


 Route


 PRN Reason  Start Time


 Stop Time Status Last Admin


Dose Admin


 


 Acetaminophen


  (Tylenol)  650 mg  Q4H  PRN


 NG


 Mild Pain/Temp > 100.5  11/3/19 20:30


 11/25/19 20:29   


 


 


 Acetaminophen


  (Tylenol)  650 mg  Q4H  PRN


 RECTAL


 TEMP>100.5  11/3/19 20:30


 12/2/19 20:29   


 


 


 Albuterol/


 Ipratropium


  (Albuterol/


 Ipratropium)  3 ml  Q4H  PRN


 HHN


 Shortness of Breath  11/7/19 09:45


 11/12/19 09:44   


 


 


 Albuterol/


 Ipratropium


  (Albuterol/


 Ipratropium)  3 ml  Q6HRT


 HHN


   11/7/19 13:00


 11/12/19 12:59  11/8/19 01:09


 


 


 Amlodipine


 Besylate


  (Norvasc)  2.5 mg  BID


 NG


   11/4/19 09:00


 11/29/19 17:59  11/7/19 17:11


 


 


 Chlorhexidine


 Gluconate


  (Mae-Hex 2%)  1 applic  DAILY@2000


 TOPIC


   11/5/19 20:00


 12/5/19 19:59  11/7/19 19:38


 


 


 Dextrose


  (Dextrose 50%)  25 ml  Q30M  PRN


 IV


 Hypoglycemia  11/5/19 06:30


 12/5/19 06:29   


 


 


 Dextrose


  (Dextrose 50%)  50 ml  Q30M  PRN


 IV


 Hypoglycemia  11/5/19 06:30


 12/5/19 06:29   


 


 


 Divalproex Sodium


  (Depakote


 Sprinkles)  500 mg  Q12HR


 NG


   11/3/19 21:00


 11/14/19 21:59  11/7/19 20:41


 


 


 Docusate Sodium


  (Colace)  100 mg  EVERY 8  HOURS


 NG


   11/3/19 22:00


 11/25/19 08:59  11/7/19 13:53


 


 


 Folic Acid


  (Folate)  3 mg  DAILY


 GT


   11/7/19 09:00


 11/30/19 08:59  11/7/19 08:32


 


 


 Furosemide


  (Lasix)  40 mg  EVERY 12  HOURS


 IV


   11/7/19 21:00


 12/7/19 20:59  11/7/19 20:40


 


 


 Insulin Aspart


  (NovoLOG)    EVERY 6  HOURS


 SUBQ


   11/4/19 00:00


 11/29/19 11:59  11/8/19 05:32


 


 


 Insulin Detemir


  (Levemir)  8 units  QHS


 SUBQ


   11/6/19 21:00


 12/1/19 08:59  11/7/19 20:42


 


 


 Lactulose


  (Cephulac)  20 gm  Q8H  PRN


 NG


 Constipation  11/3/19 20:30


 12/3/19 20:29  11/5/19 08:12


 


 


 Lorazepam


  (Ativan 2mg/ml


 1ml)  1 mg  Q4H  PRN


 IV


 For Anxiety  11/3/19 20:30


 11/10/19 20:29  11/6/19 20:45


 


 


 Meropenem 1 gm/


 Sodium Chloride  55 ml @ 


 110 mls/hr  Q8H


 IVPB


   11/4/19 17:00


 11/9/19 16:59  11/8/19 00:30


 


 


 Metoclopramide HCl


  (Reglan)  10 mg  Q6H  PRN


 IVP


 Nausea & Vomiting  11/6/19 10:45


 12/6/19 10:44   


 


 


 Midazolam HCl 50


 mg/Dextrose  100 ml @ 0


 mls/hr  Q24H  PRN


 IV


 To Patient Comfort  11/7/19 21:30


 11/10/19 20:34   


 


 


 Pantoprazole


  (Protonix)  40 mg  EVERY 12  HOURS


 IVP


   11/3/19 21:00


 12/3/19 08:59  11/7/19 20:40


 


 


 Potassium Chloride


  (K-Dur)  40 meq  DAILY


 GT


   11/6/19 09:00


 12/5/19 17:59  11/7/19 08:33


 


 


 Vancomycin HCl


  (Vanco rx to


 dose)  1 ea  DAILY  PRN


 MISC


 Per rx protocol  11/4/19 15:30


 12/4/19 15:29   


 


 


 Vancomycin/Sodium


 Chloride  275 ml @ 


 183.333


 mls/hr  Q24H


 IVPB


   11/8/19 18:00


 11/13/19 17:59   


 

















Riccardo Velez MD Nov 8, 2019 06:26

## 2019-11-09 VITALS — SYSTOLIC BLOOD PRESSURE: 129 MMHG | DIASTOLIC BLOOD PRESSURE: 46 MMHG

## 2019-11-09 VITALS — SYSTOLIC BLOOD PRESSURE: 126 MMHG | DIASTOLIC BLOOD PRESSURE: 44 MMHG

## 2019-11-09 VITALS — DIASTOLIC BLOOD PRESSURE: 48 MMHG | SYSTOLIC BLOOD PRESSURE: 123 MMHG

## 2019-11-09 VITALS — DIASTOLIC BLOOD PRESSURE: 46 MMHG | SYSTOLIC BLOOD PRESSURE: 131 MMHG

## 2019-11-09 VITALS — SYSTOLIC BLOOD PRESSURE: 112 MMHG | DIASTOLIC BLOOD PRESSURE: 41 MMHG

## 2019-11-09 VITALS — SYSTOLIC BLOOD PRESSURE: 125 MMHG | DIASTOLIC BLOOD PRESSURE: 53 MMHG

## 2019-11-09 VITALS — SYSTOLIC BLOOD PRESSURE: 122 MMHG | DIASTOLIC BLOOD PRESSURE: 49 MMHG

## 2019-11-09 VITALS — SYSTOLIC BLOOD PRESSURE: 129 MMHG | DIASTOLIC BLOOD PRESSURE: 55 MMHG

## 2019-11-09 VITALS — SYSTOLIC BLOOD PRESSURE: 124 MMHG | DIASTOLIC BLOOD PRESSURE: 46 MMHG

## 2019-11-09 VITALS — DIASTOLIC BLOOD PRESSURE: 42 MMHG | SYSTOLIC BLOOD PRESSURE: 125 MMHG

## 2019-11-09 VITALS — SYSTOLIC BLOOD PRESSURE: 122 MMHG | DIASTOLIC BLOOD PRESSURE: 47 MMHG

## 2019-11-09 VITALS — DIASTOLIC BLOOD PRESSURE: 41 MMHG | SYSTOLIC BLOOD PRESSURE: 122 MMHG

## 2019-11-09 VITALS — DIASTOLIC BLOOD PRESSURE: 46 MMHG | SYSTOLIC BLOOD PRESSURE: 124 MMHG

## 2019-11-09 VITALS — SYSTOLIC BLOOD PRESSURE: 129 MMHG | DIASTOLIC BLOOD PRESSURE: 43 MMHG

## 2019-11-09 VITALS — SYSTOLIC BLOOD PRESSURE: 133 MMHG | DIASTOLIC BLOOD PRESSURE: 45 MMHG

## 2019-11-09 VITALS — DIASTOLIC BLOOD PRESSURE: 44 MMHG | SYSTOLIC BLOOD PRESSURE: 124 MMHG

## 2019-11-09 VITALS — DIASTOLIC BLOOD PRESSURE: 64 MMHG | SYSTOLIC BLOOD PRESSURE: 142 MMHG

## 2019-11-09 VITALS — SYSTOLIC BLOOD PRESSURE: 126 MMHG | DIASTOLIC BLOOD PRESSURE: 40 MMHG

## 2019-11-09 VITALS — SYSTOLIC BLOOD PRESSURE: 125 MMHG | DIASTOLIC BLOOD PRESSURE: 59 MMHG

## 2019-11-09 VITALS — SYSTOLIC BLOOD PRESSURE: 125 MMHG | DIASTOLIC BLOOD PRESSURE: 43 MMHG

## 2019-11-09 VITALS — SYSTOLIC BLOOD PRESSURE: 120 MMHG | DIASTOLIC BLOOD PRESSURE: 45 MMHG

## 2019-11-09 VITALS — SYSTOLIC BLOOD PRESSURE: 148 MMHG | DIASTOLIC BLOOD PRESSURE: 51 MMHG

## 2019-11-09 VITALS — DIASTOLIC BLOOD PRESSURE: 46 MMHG | SYSTOLIC BLOOD PRESSURE: 112 MMHG

## 2019-11-09 VITALS — SYSTOLIC BLOOD PRESSURE: 102 MMHG | DIASTOLIC BLOOD PRESSURE: 63 MMHG

## 2019-11-09 RX ADMIN — IPRATROPIUM BROMIDE AND ALBUTEROL SULFATE SCH ML: .5; 3 SOLUTION RESPIRATORY (INHALATION) at 07:15

## 2019-11-09 RX ADMIN — PANTOPRAZOLE SODIUM SCH MG: 40 INJECTION, POWDER, FOR SOLUTION INTRAVENOUS at 09:38

## 2019-11-09 RX ADMIN — INSULIN ASPART SCH UNITS: 100 INJECTION, SOLUTION INTRAVENOUS; SUBCUTANEOUS at 11:48

## 2019-11-09 RX ADMIN — DOCUSATE SODIUM SCH MG: 50 LIQUID ORAL at 13:31

## 2019-11-09 RX ADMIN — DIVALPROEX SODIUM SCH MG: 125 CAPSULE ORAL at 09:39

## 2019-11-09 RX ADMIN — CHLORHEXIDINE GLUCONATE SCH APPLIC: 213 SOLUTION TOPICAL at 19:42

## 2019-11-09 RX ADMIN — IPRATROPIUM BROMIDE AND ALBUTEROL SULFATE SCH ML: .5; 3 SOLUTION RESPIRATORY (INHALATION) at 01:08

## 2019-11-09 RX ADMIN — INSULIN ASPART SCH UNITS: 100 INJECTION, SOLUTION INTRAVENOUS; SUBCUTANEOUS at 06:00

## 2019-11-09 RX ADMIN — MEROPENEM SCH MLS/HR: 1 INJECTION INTRAVENOUS at 09:38

## 2019-11-09 RX ADMIN — PANTOPRAZOLE SODIUM SCH MG: 40 INJECTION, POWDER, FOR SOLUTION INTRAVENOUS at 20:38

## 2019-11-09 RX ADMIN — DOCUSATE SODIUM SCH MG: 50 LIQUID ORAL at 21:30

## 2019-11-09 RX ADMIN — LORAZEPAM PRN MG: 2 INJECTION, SOLUTION INTRAMUSCULAR; INTRAVENOUS at 20:45

## 2019-11-09 RX ADMIN — INSULIN ASPART SCH UNITS: 100 INJECTION, SOLUTION INTRAVENOUS; SUBCUTANEOUS at 23:55

## 2019-11-09 RX ADMIN — DIVALPROEX SODIUM SCH MG: 125 CAPSULE ORAL at 20:38

## 2019-11-09 RX ADMIN — IPRATROPIUM BROMIDE AND ALBUTEROL SULFATE SCH ML: .5; 3 SOLUTION RESPIRATORY (INHALATION) at 13:25

## 2019-11-09 RX ADMIN — IPRATROPIUM BROMIDE AND ALBUTEROL SULFATE SCH ML: .5; 3 SOLUTION RESPIRATORY (INHALATION) at 18:55

## 2019-11-09 RX ADMIN — Medication SCH MLS/HR: at 17:27

## 2019-11-09 RX ADMIN — MEROPENEM SCH MLS/HR: 1 INJECTION INTRAVENOUS at 01:05

## 2019-11-09 RX ADMIN — INSULIN ASPART SCH UNITS: 100 INJECTION, SOLUTION INTRAVENOUS; SUBCUTANEOUS at 17:24

## 2019-11-09 RX ADMIN — INSULIN DETEMIR SCH UNITS: 100 INJECTION, SOLUTION SUBCUTANEOUS at 20:39

## 2019-11-09 RX ADMIN — DOCUSATE SODIUM SCH MG: 50 LIQUID ORAL at 06:00

## 2019-11-09 RX ADMIN — MEROPENEM SCH MLS/HR: 1 INJECTION INTRAVENOUS at 17:21

## 2019-11-09 NOTE — SURGERY PROGRESS NOTE
Surgery Progress Note


Subjective


Additional Comments


ill appearing in ICU


labs noted


exam stable


tolerating tube feeds


on vent support





Objective





Last 24 Hour Vital Signs








  Date Time  Temp Pulse Resp B/P (MAP) Pulse Ox O2 Delivery O2 Flow Rate FiO2


 


11/9/19 14:00  73 21 125/43 (70) 100   


 


11/9/19 13:19  78 20  100 Mechanical Ventilator  40





  79 14     40


 


11/9/19 13:00  73 22 112/46 (68) 100   


 


11/9/19 12:30  78 26 129/55 (79) 100   


 


11/9/19 12:00        40


 


11/9/19 12:00 98.5 73 23 126/40 (68) 100   


 


11/9/19 12:00      Endotracheal Tube  


 


11/9/19 11:52  77      


 


11/9/19 11:00  76 22 122/41 (68) 100   


 


11/9/19 10:41  79 20     40


 


11/9/19 10:00  79 23 133/45 (74) 100   


 


11/9/19 09:39  72  127/52    


 


11/9/19 09:00  74 21 112/41 (64) 100   


 


11/9/19 08:47  82 25     40


 


11/9/19 08:00 99.1 77 26 126/44 (71) 100   


 


11/9/19 08:00        40


 


11/9/19 08:00      Endotracheal Tube  


 


11/9/19 08:00  77      


 


11/9/19 07:10     100   


 


11/9/19 07:04  83 29  100 Mechanical Ventilator  40





  82 27     40





        40


 


11/9/19 07:00  81 27 142/64 (90) 100   


 


11/9/19 06:00  76 21 126/44 (71) 100   


 


11/9/19 05:32  78 27     40


 


11/9/19 05:00  83 22 129/46 (73) 100   


 


11/9/19 04:00        40


 


11/9/19 04:00      Endotracheal Tube  


 


11/9/19 04:00 99.8 80 22 124/46 (72) 100   


 


11/9/19 03:34  82 23     40


 


11/9/19 03:30  85      


 


11/9/19 03:00  86 28 125/42 (69) 100   


 


11/9/19 02:00  92 33 148/51 (83) 100   


 


11/9/19 01:08  87 35  100 Mechanical Ventilator  40





  85 34     40


 


11/9/19 01:00  87 30 129/43 (71) 99   


 


11/9/19 00:00 100.1 92 26 131/46 (74) 97   


 


11/9/19 00:00        40


 


11/9/19 00:00      Endotracheal Tube  


 


11/8/19 23:50  92      


 


11/8/19 23:26  91 24     40


 


11/8/19 23:00  103 29 146/58 (87) 100   


 


11/8/19 22:00  96 31 137/61 (86) 98   


 


11/8/19 21:37  99 24     40


 


11/8/19 21:00  102 33 157/60 (92) 98   


 


11/8/19 20:00      Endotracheal Tube  


 


11/8/19 20:00        40


 


11/8/19 20:00 98.8 83 26 129/46 (73) 99   


 


11/8/19 19:29  83      


 


11/8/19 19:08  85 23  97 Mechanical Ventilator  40





  86 23     40


 


11/8/19 19:00  83 22 136/51 (79) 99   


 


11/8/19 18:36  97  153/72    


 


11/8/19 18:00  86 26 132/55 (80) 98   


 


11/8/19 17:00  75 30 144/55 (84) 97   


 


11/8/19 16:47  85 28     40


 


11/8/19 16:00        40


 


11/8/19 16:00      Endotracheal Tube  


 


11/8/19 16:00 98.6 83 24 126/49 (74) 99   


 


11/8/19 16:00  82      


 


11/8/19 15:01  74 21     40


 


11/8/19 15:00  82 26 125/49 (74) 98   








I&O











Intake and Output  


 


 11/8/19 11/9/19





 18:59 06:59


 


Intake Total 1125 ml 930 ml


 


Output Total 2610 ml 2880 ml


 


Balance -1485 ml -1950 ml


 


  


 


Free Water 300 ml 


 


IV Total 55 ml 330 ml


 


Tube Feeding 720 ml 600 ml


 


Other 50 ml 


 


Output Urine Total 2610 ml 2880 ml


 


# Bowel Movements 3 4








Dressing:  other


Wound:  other


Drains:  other


Cardiovascular:  RSR


Respiratory:  decreased breath sounds


Abdomen:  soft, present bowel sounds, non-distended


Extremities:  no cyanosis





Laboratory Tests








Test


  11/9/19


08:10


 


Arterial Blood pH


  7.478


(7.350-7.450)


 


Arterial Blood Partial


Pressure CO2 45.1 mmHg


(35.0-45.0)  H


 


Arterial Blood Partial


Pressure O2 102.6 mmHg


(75.0-100.0)  H


 


Arterial Blood HCO3


  32.7 mmol/L


(22.0-26.0)  H


 


Arterial Blood Oxygen


Saturation 97.4 %


()


 


Arterial Blood Base Excess 8.3 (-2-2)  H


 


Graahm Test Positive  











Plan


Problems:  


(1) Pressure injury of deep tissue


Assessment & Plan:  Pt presented on admission with DTPI R heel. Blood filled 

blister with marginal erythema noted to heel. Pt exhibited agitation when 

minimally palpated. (L)2.2cm x (W)2.1cm


L heel is blanchable .


Non-blanchable erythema without induration noted to sacral cleft. 


No other areas of skin concerns noted.





Tx.Plan:


Apply Betadine to R heel. Cover with Optifoam drsg. Change every 3 days and prn.


           


Apply Cavilon Skin Barrier to L heel. Cover with Optifoam drsg. Change every 7 

days and prn.


           


Apply Moisture Barrier Paste to buttocks. Cover sacral area with Optifoam drsg. 

Change every 3 days and prn.


           


Reposition at least every 2hours or as tolerated.


           


Off-load heels with pillow.





(2) Sepsis


Assessment & Plan:  Patient with low-grade fevers, anemia, leukocytosis 

persistent, elevated platelets, abnormal labs.


Etiology currently unknown and work-up in progress.  Labs noted and imaging 

that is available as noted.  


CT noted


US noted


Impression: 13 x 7 x 12.9 cm unilocular cystic mass, corresponding to lesion 

reported


on recent CT scan. Layering low level internal echoes likely represent bladder


debris.. This presumably represents a cystic ovarian lesion with debris or


hemorrhage, possibly neoplastic. Gynecological consultation is recommended


Antibiotics as per infectious disease 


local wound care as wounds do not seem to be the etiology of her sepsis


start feeds via peg


doing well


improving


cont with tube feeds


cont with ICU care 


supplementation for healing 


We will monitor and follow with recommendations


Thank you for allowing me to participate in patient's care














Radhames Blas Nov 9, 2019 14:42

## 2019-11-09 NOTE — NEPHROLOGY PROGRESS NOTE
Assessment/Plan


Problem List:  


(1) Renal failure (ARF), acute on chronic


Assessment:  resolved





(2) Dehydration


(3) ARF (acute renal failure)


(4) Sepsis


(5) Acute metabolic encephalopathy


(6) Hyperglycemia due to type 2 diabetes mellitus


(7) UTI (urinary tract infection)


(8) Diabetic nephropathy


Assessment





Renal failure, Acute on Chronic resolved


Dehydration


Severe Anemia


Sepsis / Pneumonia / UTI


DM, OOC


Proteinuria , Diabetic Nephropathy


Acute encephalopathy


Plan





casas out


on K and Mag IV 


Pulmonary and Vent support


IV Reglan


GT feeding


Stop IV fluid-


Lasix


has PEG


Per order








previously 


Cr lowering 


Will order urine studies and monitor renal parameters


kidney YOANDY noted


lower iv fluids rate


WBCs rising


has casas again due to retention


Avoid Nephrotoxics








previously


Anemia salas


2D echo


24 H urine protein


Keep BP and BS in check


I am holding  her psych meds due to low MS


Per orders





Subjective


ROS Limited/Unobtainable:  Yes





Objective


Objective





Last 24 Hour Vital Signs








  Date Time  Temp Pulse Resp B/P (MAP) Pulse Ox O2 Delivery O2 Flow Rate FiO2


 


11/9/19 08:47  82 25     40


 


11/9/19 07:10     100   


 


11/9/19 07:04  83 29  100 Mechanical Ventilator  40





  82 27     40





        40


 


11/9/19 07:00  81 27 142/64 (90) 100   


 


11/9/19 06:00  76 21 126/44 (71) 100   


 


11/9/19 05:32  78 27     40


 


11/9/19 05:00  83 22 129/46 (73) 100   


 


11/9/19 04:00        40


 


11/9/19 04:00      Endotracheal Tube  


 


11/9/19 04:00 99.8 80 22 124/46 (72) 100   


 


11/9/19 03:34  82 23     40


 


11/9/19 03:30  85      


 


11/9/19 03:00  86 28 125/42 (69) 100   


 


11/9/19 02:00  92 33 148/51 (83) 100   


 


11/9/19 01:08  87 35  100 Mechanical Ventilator  40





  85 34     40


 


11/9/19 01:00  87 30 129/43 (71) 99   


 


11/9/19 00:00 100.1 92 26 131/46 (74) 97   


 


11/9/19 00:00        40


 


11/9/19 00:00      Endotracheal Tube  


 


11/8/19 23:50  92      


 


11/8/19 23:26  91 24     40


 


11/8/19 23:00  103 29 146/58 (87) 100   


 


11/8/19 22:00  96 31 137/61 (86) 98   


 


11/8/19 21:37  99 24     40


 


11/8/19 21:00  102 33 157/60 (92) 98   


 


11/8/19 20:00      Endotracheal Tube  


 


11/8/19 20:00        40


 


11/8/19 20:00 98.8 83 26 129/46 (73) 99   


 


11/8/19 19:29  83      


 


11/8/19 19:08  85 23  97 Mechanical Ventilator  40





  86 23     40


 


11/8/19 19:00  83 22 136/51 (79) 99   


 


11/8/19 18:36  97  153/72    


 


11/8/19 18:00  86 26 132/55 (80) 98   


 


11/8/19 17:00  75 30 144/55 (84) 97   


 


11/8/19 16:47  85 28     40


 


11/8/19 16:00        40


 


11/8/19 16:00      Endotracheal Tube  


 


11/8/19 16:00 98.6 83 24 126/49 (74) 99   


 


11/8/19 16:00  82      


 


11/8/19 15:01  74 21     40


 


11/8/19 15:00  82 26 125/49 (74) 98   


 


11/8/19 14:00  82 27 126/50 (75) 96   


 


11/8/19 13:20  78 24  100 Mechanical Ventilator  40





  72 25     40


 


11/8/19 13:00  73 24 149/54 (85) 100   


 


11/8/19 12:00        40


 


11/8/19 12:00      Endotracheal Tube  


 


11/8/19 12:00 98.9 79 19 129/51 (77) 94   


 


11/8/19 12:00  86      


 


11/8/19 11:07  82 28     40


 


11/8/19 11:00  84 27 143/58 (86) 99   


 


11/8/19 10:00  80 24 131/50 (77) 97   

















Intake and Output  


 


 11/8/19 11/9/19





 19:00 07:00


 


Intake Total 1125 ml 870 ml


 


Output Total 2795 ml 2740 ml


 


Balance -1670 ml -1870 ml


 


  


 


Free Water 300 ml 


 


IV Total 55 ml 330 ml


 


Tube Feeding 720 ml 540 ml


 


Other 50 ml 


 


Output Urine Total 2795 ml 2740 ml


 


# Bowel Movements 3 4








Laboratory Tests


11/8/19 11:30: 


Arterial Blood pH 7.488H, Arterial Blood Partial Pressure CO2 41.0, Arterial 

Blood Partial Pressure O2 77.5, Arterial Blood HCO3 30.4H, Arterial Blood 

Oxygen Saturation 95.2, Arterial Blood Base Excess 6.5H, Graham Test Positive


11/9/19 08:10: 


Arterial Blood pH 7.478H, Arterial Blood Partial Pressure CO2 45.1H, Arterial 

Blood Partial Pressure O2 102.6H, Arterial Blood HCO3 32.7H, Arterial Blood 

Oxygen Saturation 97.4, Arterial Blood Base Excess 8.3H, Graham Test Positive


Height (Feet):  5


Height (Inches):  5.00


Weight (Pounds):  182


General Appearance:  no apparent distress


EENT:  other - vented


Cardiovascular:  normal rate


Respiratory/Chest:  decreased breath sounds


Abdomen:  distended


Objective


no change











Lukasz Vizcaino MD Nov 9, 2019 09:12

## 2019-11-09 NOTE — INFECTIOUS DISEASES PROG NOTE
Assessment/Plan


Problems:  


(1) Aspiration pneumonia


Assessment & Plan:  with B/L infiltrates, hypoxemia and leukocytosis, CXR  

showed interstitial infiltrates , sputum culture showed normal agustin . continue 

vancomycin with meropenem  empirically . aspiration precaution. EOT 11/14/19





(2) Respiratory failure


Assessment & Plan:  with maegan cardia and S/P code blue, was intubated and 

started on mechanical ventilation , monitor CXR and ABG , pulmonary is 

following 





(3) UTI (urinary tract infection)


Assessment & Plan:  due to candida lusitaneae , S/P casas catheter removal , no 

specific therapy needed for now 





(4) Acute metabolic encephalopathy


Assessment & Plan:  due to the above, continue hydration and antibiotics, 

monitor in tele 





(5) Hyperglycemia due to type 2 diabetes mellitus


Assessment & Plan:  recommend tight glycemic control to keep blood glucose 

between 100-140 





(6) ARF (acute renal failure)


Assessment & Plan:  due to the above, continue hydration and renally dosed 

antibiotics, close  monitor of renal function , stop vancomycin 





(7) Pelvic mass in female


Assessment & Plan:  to the left of the uterus, suspect malignancy , recommend OB

/GYN eval , oncology is following 








Subjective


ROS Limited/Unobtainable:  Yes


Allergies:  


Coded Allergies:  


     No Known Allergies (Unverified , 10/14/19)


Subjective


she was still intubated on mechanical ventilation in  ICU after she became 

bradycardic and had code blue , minimally responsive , no fever or chills , no 

diarrhea, no secretions from the ET tube





Objective


Vital Signs





Last 24 Hour Vital Signs








  Date Time  Temp Pulse Resp B/P (MAP) Pulse Ox O2 Delivery O2 Flow Rate FiO2


 


11/9/19 14:00  73 21 125/43 (70) 100   


 


11/9/19 13:19  78 20  100 Mechanical Ventilator  40





  79 14     40


 


11/9/19 13:00  73 22 112/46 (68) 100   


 


11/9/19 12:30  78 26 129/55 (79) 100   


 


11/9/19 12:00        40


 


11/9/19 12:00 98.5 73 23 126/40 (68) 100   


 


11/9/19 12:00      Endotracheal Tube  


 


11/9/19 11:52  77      


 


11/9/19 11:00  76 22 122/41 (68) 100   


 


11/9/19 10:41  79 20     40


 


11/9/19 10:00  79 23 133/45 (74) 100   


 


11/9/19 09:39  72  127/52    


 


11/9/19 09:00  74 21 112/41 (64) 100   


 


11/9/19 08:47  82 25     40


 


11/9/19 08:00 99.1 77 26 126/44 (71) 100   


 


11/9/19 08:00        40


 


11/9/19 08:00      Endotracheal Tube  


 


11/9/19 08:00  77      


 


11/9/19 07:10     100   


 


11/9/19 07:04  83 29  100 Mechanical Ventilator  40





  82 27     40





        40


 


11/9/19 07:00  81 27 142/64 (90) 100   


 


11/9/19 06:00  76 21 126/44 (71) 100   


 


11/9/19 05:32  78 27     40


 


11/9/19 05:00  83 22 129/46 (73) 100   


 


11/9/19 04:00        40


 


11/9/19 04:00      Endotracheal Tube  


 


11/9/19 04:00 99.8 80 22 124/46 (72) 100   


 


11/9/19 03:34  82 23     40


 


11/9/19 03:30  85      


 


11/9/19 03:00  86 28 125/42 (69) 100   


 


11/9/19 02:00  92 33 148/51 (83) 100   


 


11/9/19 01:08  87 35  100 Mechanical Ventilator  40





  85 34     40


 


11/9/19 01:00  87 30 129/43 (71) 99   


 


11/9/19 00:00 100.1 92 26 131/46 (74) 97   


 


11/9/19 00:00        40


 


11/9/19 00:00      Endotracheal Tube  


 


11/8/19 23:50  92      


 


11/8/19 23:26  91 24     40


 


11/8/19 23:00  103 29 146/58 (87) 100   


 


11/8/19 22:00  96 31 137/61 (86) 98   


 


11/8/19 21:37  99 24     40


 


11/8/19 21:00  102 33 157/60 (92) 98   


 


11/8/19 20:00      Endotracheal Tube  


 


11/8/19 20:00        40


 


11/8/19 20:00 98.8 83 26 129/46 (73) 99   


 


11/8/19 19:29  83      


 


11/8/19 19:08  85 23  97 Mechanical Ventilator  40





  86 23     40


 


11/8/19 19:00  83 22 136/51 (79) 99   


 


11/8/19 18:36  97  153/72    


 


11/8/19 18:00  86 26 132/55 (80) 98   


 


11/8/19 17:00  75 30 144/55 (84) 97   


 


11/8/19 16:47  85 28     40


 


11/8/19 16:00        40


 


11/8/19 16:00      Endotracheal Tube  


 


11/8/19 16:00 98.6 83 24 126/49 (74) 99   


 


11/8/19 16:00  82      


 


11/8/19 15:01  74 21     40


 


11/8/19 15:00  82 26 125/49 (74) 98   








Height (Feet):  5


Height (Inches):  5.00


Weight (Pounds):  182


General Appearance:  WD/WN, no acute distress


HEENT:  normocephalic, atraumatic, anicteric, mucous membranes moist, PERRL


Respiratory/Chest:  chest wall non-tender, normal breath sounds, no respiratory 

distress, no accessory muscle use


Cardiovascular:  normal peripheral pulses, normal rate, regular rhythm, no 

gallop/murmur, no JVD


Abdomen:  normal bowel sounds, soft, non tender, no organomegaly, non distended

, no mass, no scars


Genitourinary:  normal external genitalia


Extremities:  no cyanosis, no clubbing


Skin:  no rash, no lesions, no ulcers


Neurologic/Psychiatric:  CNs II-XII grossly normal, alert, unresponsiveness


Lymphatic:  no neck adenopathy, no groin adenopathy


Musculoskeletal:  normal muscle bulk, no effusion





Laboratory Tests








Test


  11/9/19


08:10


 


Arterial Blood pH


  7.478


(7.350-7.450)


 


Arterial Blood Partial


Pressure CO2 45.1 mmHg


(35.0-45.0)  H


 


Arterial Blood Partial


Pressure O2 102.6 mmHg


(75.0-100.0)  H


 


Arterial Blood HCO3


  32.7 mmol/L


(22.0-26.0)  H


 


Arterial Blood Oxygen


Saturation 97.4 %


()


 


Arterial Blood Base Excess 8.3 (-2-2)  H


 


Graham Test Positive  











Current Medications








 Medications


  (Trade)  Dose


 Ordered  Sig/Winnie


 Route


 PRN Reason  Start Time


 Stop Time Status Last Admin


Dose Admin


 


 Acetaminophen


  (Tylenol)  650 mg  Q4H  PRN


 NG


 Mild Pain/Temp > 100.5  11/3/19 20:30


 11/25/19 20:29   


 


 


 Acetaminophen


  (Tylenol)  650 mg  Q4H  PRN


 RECTAL


 TEMP>100.5  11/3/19 20:30


 12/2/19 20:29   


 


 


 Albuterol/


 Ipratropium


  (Albuterol/


 Ipratropium)  3 ml  Q4H  PRN


 HHN


 Shortness of Breath  11/7/19 09:45


 11/12/19 09:44   


 


 


 Albuterol/


 Ipratropium


  (Albuterol/


 Ipratropium)  3 ml  Q6HRT


 HHN


   11/7/19 13:00


 11/12/19 12:59  11/9/19 13:25


 


 


 Amlodipine


 Besylate


  (Norvasc)  2.5 mg  BID


 NG


   11/4/19 09:00


 11/29/19 17:59  11/9/19 09:39


 


 


 Chlorhexidine


 Gluconate


  (Mae-Hex 2%)  1 applic  DAILY@2000


 TOPIC


   11/5/19 20:00


 12/5/19 19:59  11/8/19 19:35


 


 


 Dextrose


  (Dextrose 50%)  25 ml  Q30M  PRN


 IV


 Hypoglycemia  11/5/19 06:30


 12/5/19 06:29   


 


 


 Dextrose


  (Dextrose 50%)  50 ml  Q30M  PRN


 IV


 Hypoglycemia  11/5/19 06:30


 12/5/19 06:29   


 


 


 Divalproex Sodium


  (Depakote


 Sprinkles)  500 mg  Q12HR


 NG


   11/3/19 21:00


 11/14/19 21:59  11/9/19 09:39


 


 


 Docusate Sodium


  (Colace)  100 mg  EVERY 8  HOURS


 NG


   11/3/19 22:00


 11/25/19 08:59  11/7/19 13:53


 


 


 Folic Acid


  (Folate)  3 mg  DAILY


 GT


   11/7/19 09:00


 11/30/19 08:59  11/9/19 09:40


 


 


 Furosemide


  (Lasix)  40 mg  EVERY 12  HOURS


 IV


   11/7/19 21:00


 12/7/19 20:59  11/9/19 09:38


 


 


 Insulin Aspart


  (NovoLOG)    EVERY 6  HOURS


 SUBQ


   11/4/19 00:00


 11/29/19 11:59  11/9/19 11:48


 


 


 Insulin Detemir


  (Levemir)  8 units  QHS


 SUBQ


   11/6/19 21:00


 12/1/19 08:59  11/8/19 20:33


 


 


 Lactulose


  (Cephulac)  20 gm  Q8H  PRN


 NG


 Constipation  11/3/19 20:30


 12/3/19 20:29  11/5/19 08:12


 


 


 Lorazepam


  (Ativan 2mg/ml


 1ml)  1 mg  Q4H  PRN


 IV


 For Anxiety  11/3/19 20:30


 11/10/19 20:29  11/8/19 21:53


 


 


 Meropenem 1 gm/


 Sodium Chloride  55 ml @ 


 110 mls/hr  Q8H


 IVPB


   11/4/19 17:00


 11/14/19 16:59  11/9/19 09:38


 


 


 Metoclopramide HCl


  (Reglan)  10 mg  Q6H  PRN


 IVP


 Nausea & Vomiting  11/6/19 10:45


 12/6/19 10:44   


 


 


 Midazolam HCl 50


 mg/Dextrose  100 ml @ 0


 mls/hr  Q24H  PRN


 IV


 To Patient Comfort  11/8/19 20:15


 11/10/19 20:34   


 


 


 Pantoprazole


  (Protonix)  40 mg  EVERY 12  HOURS


 IVP


   11/3/19 21:00


 12/3/19 08:59  11/9/19 09:38


 


 


 Potassium Chloride


  (K-Dur)  40 meq  DAILY


 GT


   11/6/19 09:00


 12/5/19 17:59  11/9/19 09:38


 


 


 Vancomycin HCl


  (Vanco rx to


 dose)  1 ea  DAILY  PRN


 MISC


 Per rx protocol  11/4/19 15:30


 12/4/19 15:29   


 


 


 Vancomycin/Sodium


 Chloride  275 ml @ 


 183.333


 mls/hr  Q24H


 IVPB


   11/8/19 18:00


 11/13/19 17:59  11/8/19 18:20


 

















Amie Burgos M.D. Nov 9, 2019 14:16

## 2019-11-09 NOTE — NUR
NURSE NOTES:



Weaning ended at this time due to patient SOB. RR increased >35. ABG collected during this 
time. Patient placed back on settings: AC 14, , FiO2 40%, and PEEP of 5.

## 2019-11-09 NOTE — PULMONOLGY CRITICAL CARE NOTE
Critical Care - Asmt/Plan


Assessment/Plan:


Pulmonary CCM Progress Note 





Assessment/Plan


Problems:  


(1) Healthcare/aspiration associated bacterial pneumonia, respiratory failure, 

some improvement in CXR


(2) UTI (urinary tract infection)


(3) Sepsis


(4) Dehydration


(5) CHANCE (acute kidney injury)


(6) Acute metabolic encephalopathy


(7) Hyperglycemia due to type 2 diabetes mellitus


(8) Renal failure (ARF), acute on chronic


(9) Ventilator dependant Respiratory Failure - not tolerating weaning


(10) Abnormal TSH


(11) Pelvic mass in female


(12) PEG (percutaneous endoscopic gastrostomy) status


Assessment/Plan


Marked leukocytosis S/P WBC scan with uptake in pelvis, has pelvic mass


Sepsis


Possible pneumonia


E coli UTI


Acute hypoxemic respiratory failure


S/p code blue - now VDRF - not tolerating weaning


Acute encephalopathy


Hx CHF


HTN


CHANCE improving


Pelvic mass/possible GYN malignancy vs cyst


Dysphagia S/P PEG





Plan:


-Wean FIO2, ABG PRN


-Optimize pulmonary hygiene


-Wean ventilator as tolerated


-Titrate down FiO2 to keep SaO2 > 90%


-Monitor WCt, RFS sent for JAK2 mutation, per Dr. Pop BMBx if unrevealing


-Abx per ID, F/U Cx's


-monitor volumes and renal function, F/U renal recs, diurese PRN


-monitor encephalopathy


-NPO, GTF's


-DVT Px


-FC


-PICC line





Subjective


Allergies:  


Coded Allergies:  


     No Known Allergies


Subjective


Sedated on the Ventilator





Objective





Vital Signs Noted





General Appearance:  Sedated, cachetic


HEENT:  normocephalic, atraumatic, anicteric, mucous membranes moist


Respiratory/Chest:  occasional rhonchi


Cardiovascular:  normal peripheral pulses, normal rate, regular rhythm


Abdomen:  normal bowel sounds, soft, non tender, no organomegaly, non distended

, no mass, other - G


Extremities:  no cyanosis, no clubbing, no edema





Microbiology noted








 Date/Time


Source Procedure


Growth Status


 


 


 10/31/19 10:15


Urine,Clean Catch Urine Culture - Preliminary


YEAST Resulted








Laboratory Tests Noted





Critical Care - Objective





Last 24 Hour Vital Signs








  Date Time  Temp Pulse Resp B/P (MAP) Pulse Ox O2 Delivery O2 Flow Rate FiO2


 


11/9/19 17:21  79  124/46    


 


11/9/19 17:00 98.8 79 23 124/46 (72) 99   


 


11/9/19 16:00  81 27 122/47 (72) 99   


 


11/9/19 16:00        40


 


11/9/19 16:00      Endotracheal Tube  


 


11/9/19 16:00  80      


 


11/9/19 15:14  78 27     40


 


11/9/19 15:00  77 22 120/45 (70) 99   


 


11/9/19 14:00  73 21 125/43 (70) 100   


 


11/9/19 13:19  78 20  100 Mechanical Ventilator  40





  79 14     40


 


11/9/19 13:00  73 22 112/46 (68) 100   


 


11/9/19 12:30  78 26 129/55 (79) 100   


 


11/9/19 12:00        40


 


11/9/19 12:00 98.5 73 23 126/40 (68) 100   


 


11/9/19 12:00      Endotracheal Tube  


 


11/9/19 11:52  77      


 


11/9/19 11:00  76 22 122/41 (68) 100   


 


11/9/19 10:41  79 20     40


 


11/9/19 10:00  79 23 133/45 (74) 100   


 


11/9/19 09:39  72  127/52    


 


11/9/19 09:00  74 21 112/41 (64) 100   


 


11/9/19 08:47  82 25     40


 


11/9/19 08:00 99.1 77 26 126/44 (71) 100   


 


11/9/19 08:00        40


 


11/9/19 08:00      Endotracheal Tube  


 


11/9/19 08:00  77      


 


11/9/19 07:10     100   


 


11/9/19 07:04  83 29  100 Mechanical Ventilator  40





  82 27     40





        40


 


11/9/19 07:00  81 27 142/64 (90) 100   


 


11/9/19 06:00  76 21 126/44 (71) 100   


 


11/9/19 05:32  78 27     40


 


11/9/19 05:00  83 22 129/46 (73) 100   


 


11/9/19 04:00        40


 


11/9/19 04:00      Endotracheal Tube  


 


11/9/19 04:00 99.8 80 22 124/46 (72) 100   


 


11/9/19 03:34  82 23     40


 


11/9/19 03:30  85      


 


11/9/19 03:00  86 28 125/42 (69) 100   


 


11/9/19 02:00  92 33 148/51 (83) 100   


 


11/9/19 01:08  87 35  100 Mechanical Ventilator  40





  85 34     40


 


11/9/19 01:00  87 30 129/43 (71) 99   


 


11/9/19 00:00 100.1 92 26 131/46 (74) 97   


 


11/9/19 00:00        40


 


11/9/19 00:00      Endotracheal Tube  


 


11/8/19 23:50  92      


 


11/8/19 23:26  91 24     40


 


11/8/19 23:00  103 29 146/58 (87) 100   


 


11/8/19 22:00  96 31 137/61 (86) 98   


 


11/8/19 21:37  99 24     40


 


11/8/19 21:00  102 33 157/60 (92) 98   


 


11/8/19 20:00      Endotracheal Tube  


 


11/8/19 20:00        40


 


11/8/19 20:00 98.8 83 26 129/46 (73) 99   


 


11/8/19 19:29  83      


 


11/8/19 19:08  85 23  97 Mechanical Ventilator  40





  86 23     40


 


11/8/19 19:00  83 22 136/51 (79) 99   


 


11/8/19 18:36  97  153/72    


 


11/8/19 18:00  86 26 132/55 (80) 98   








Accucheck:  191





Critical Care - Subjective


FI02:  40


Vent Support Breath Rate:  14


Vent Support Mode:  AC


Vent Tidal Volume:  400


Sputum Amount:  Scant


PEEP:  5.0


PIP:  23


Tube Feeding Amount:  60


I&O:











Intake and Output  


 


 11/8/19 11/9/19





 18:59 06:59


 


Intake Total 1125 ml 930 ml


 


Output Total 2610 ml 2880 ml


 


Balance -1485 ml -1950 ml


 


  


 


Free Water 300 ml 


 


IV Total 55 ml 330 ml


 


Tube Feeding 720 ml 600 ml


 


Other 50 ml 


 


Output Urine Total 2610 ml 2880 ml


 


# Bowel Movements 3 4








ET-Tube:  7.5


ET Position:  23











Delmer Main MD Nov 9, 2019 17:38

## 2019-11-09 NOTE — NUR
NURSE NOTES:

PATIENT IRRITABLE IN BED SOMETIMES, KEPT BED ALARM, LOWER BED POSITION AND FALL PRECAUTION.

## 2019-11-09 NOTE — GENERAL PROGRESS NOTE
Assessment/Plan


Status:  unchanged


Assessment/Plan:








Assessment/Plan


Problems:  


(1) PEG (percutaneous endoscopic gastrostomy) status


ICD Codes:  Z93.1 - Gastrostomy status


SNOMED:  477260570, 220077416


(2) Anemia


ICD Codes:  D64.9 - Anemia, unspecified


SNOMED:  790156240


Qualifiers:  


   Qualified Codes:  D64.9 - Anemia, unspecified


(3) Dehydration


ICD Codes:  E86.0 - Dehydration


SNOMED:  23202092, 9510194


Status:  unchanged


Status Narrative


Discussed with Dr. Turk.


Assessment/Plan


Assessment


(1) pneumonia


(2) UTI


(3) Sepsis


(4) Dehydration


(5) CHANCE 


(6) Acute metabolic encephalopathy


(7) Hyperglycemia due to type 2 diabetes mellitus


(8) Renal failure (ARF), acute on chronic


(9) Aspiration pneumonia


(10) Abnormal TSH


(11) Pelvic mass in female


(12) Congestive Heart Failure


(13) Anemia


(14) Dysphagia - s/p GT





s/p EGD SUMMARY OF FINDINGS:


1. No evidence of any active upper GI bleeding at this time.


2. Gastritis status biopsy.





Recommendations


GTFs


prn transfusions


bowel regime


PPI and H2B


will follow up, consider colonoscopy if patient has recurrent GI bleed





Subjective


ROS Limited/Unobtainable:  No


Allergies:  


Coded Allergies:  


     No Known Allergies (Unverified , 10/14/19)





Objective





Last 24 Hour Vital Signs








  Date Time  Temp Pulse Resp B/P (MAP) Pulse Ox O2 Delivery O2 Flow Rate FiO2


 


11/9/19 08:47  82 25     40


 


11/9/19 07:10     100   


 


11/9/19 07:04  83 29  100 Mechanical Ventilator  40





  82 27     40





        40


 


11/9/19 07:00  81 27 142/64 (90) 100   


 


11/9/19 06:00  76 21 126/44 (71) 100   


 


11/9/19 05:32  78 27     40


 


11/9/19 05:00  83 22 129/46 (73) 100   


 


11/9/19 04:00        40


 


11/9/19 04:00      Endotracheal Tube  


 


11/9/19 04:00 99.8 80 22 124/46 (72) 100   


 


11/9/19 03:34  82 23     40


 


11/9/19 03:30  85      


 


11/9/19 03:00  86 28 125/42 (69) 100   


 


11/9/19 02:00  92 33 148/51 (83) 100   


 


11/9/19 01:08  87 35  100 Mechanical Ventilator  40





  85 34     40


 


11/9/19 01:00  87 30 129/43 (71) 99   


 


11/9/19 00:00 100.1 92 26 131/46 (74) 97   


 


11/9/19 00:00        40


 


11/9/19 00:00      Endotracheal Tube  


 


11/8/19 23:50  92      


 


11/8/19 23:26  91 24     40


 


11/8/19 23:00  103 29 146/58 (87) 100   


 


11/8/19 22:00  96 31 137/61 (86) 98   


 


11/8/19 21:37  99 24     40


 


11/8/19 21:00  102 33 157/60 (92) 98   


 


11/8/19 20:00      Endotracheal Tube  


 


11/8/19 20:00        40


 


11/8/19 20:00 98.8 83 26 129/46 (73) 99   


 


11/8/19 19:29  83      


 


11/8/19 19:08  85 23  97 Mechanical Ventilator  40





  86 23     40


 


11/8/19 19:00  83 22 136/51 (79) 99   


 


11/8/19 18:36  97  153/72    


 


11/8/19 18:00  86 26 132/55 (80) 98   


 


11/8/19 17:00  75 30 144/55 (84) 97   


 


11/8/19 16:47  85 28     40


 


11/8/19 16:00        40


 


11/8/19 16:00      Endotracheal Tube  


 


11/8/19 16:00 98.6 83 24 126/49 (74) 99   


 


11/8/19 16:00  82      


 


11/8/19 15:01  74 21     40


 


11/8/19 15:00  82 26 125/49 (74) 98   


 


11/8/19 14:00  82 27 126/50 (75) 96   


 


11/8/19 13:20  78 24  100 Mechanical Ventilator  40





  72 25     40


 


11/8/19 13:00  73 24 149/54 (85) 100   


 


11/8/19 12:00        40


 


11/8/19 12:00      Endotracheal Tube  


 


11/8/19 12:00 98.9 79 19 129/51 (77) 94   


 


11/8/19 12:00  86      


 


11/8/19 11:07  82 28     40


 


11/8/19 11:00  84 27 143/58 (86) 99   


 


11/8/19 10:00  80 24 131/50 (77) 97   


 


11/8/19 09:00  86 30 144/46 (78) 94   


 


11/8/19 08:58  82 33     40

















Intake and Output  


 


 11/8/19 11/9/19





 19:00 07:00


 


Intake Total 1125 ml 870 ml


 


Output Total 2795 ml 2740 ml


 


Balance -1670 ml -1870 ml


 


  


 


Free Water 300 ml 


 


IV Total 55 ml 330 ml


 


Tube Feeding 720 ml 540 ml


 


Other 50 ml 


 


Output Urine Total 2795 ml 2740 ml


 


# Bowel Movements 3 4








Laboratory Tests


11/8/19 11:30: 


Arterial Blood pH 7.488H, Arterial Blood Partial Pressure CO2 41.0, Arterial 

Blood Partial Pressure O2 77.5, Arterial Blood HCO3 30.4H, Arterial Blood 

Oxygen Saturation 95.2, Arterial Blood Base Excess 6.5H, Graham Test Positive


11/9/19 08:10: 


Arterial Blood pH 7.478H, Arterial Blood Partial Pressure CO2 45.1H, Arterial 

Blood Partial Pressure O2 102.6H, Arterial Blood HCO3 32.7H, Arterial Blood 

Oxygen Saturation 97.4, Arterial Blood Base Excess 8.3H, Graham Test Positive


Height (Feet):  5


Height (Inches):  5.00


Weight (Pounds):  182


General Appearance:  no apparent distress


EENT:  normal ENT inspection


Neck:  supple


Cardiovascular:  normal rate


Respiratory/Chest:  decreased breath sounds


Abdomen:  normal bowel sounds, non tender, soft


Extremities:  non-tender











Storm Turk MD Nov 9, 2019 08:55

## 2019-11-09 NOTE — NUR
NURSE NOTES:



Pt turned and repositioned. No distress noted at this time. Will continue to monitor.

## 2019-11-09 NOTE — NUR
NURSE NOTES:



Report received from ELIZABETH Muro. Patient observed laying in bed with her eyes closed, opens 
eyes to name, not following commands. Ett to vent. Pt currently being weaned on CPAP of 8 
with FIO2 of 40% and Peep of 5 as of 0704, tolerating well. RT will do ABG 30 mins post 
start of wean. O2 saturation 100%. SR on cardiac monitor. B/P 140/49. G tube intact and 
patent, feeds currently on hold. Charles Catheter intact and patent, draining yellow urine. 
PICC line Rt upper arm, intact and asymptomatic. Scd's to both lower legs, bed low and 
locked, on p200 bed, received and maintained on 2 point soft restraints for safety, provided 
call light within reach, Seizure precautions in place. Will resume plan of care.

## 2019-11-09 NOTE — NUR
NURSE NOTES:



Wound on left heel noted, picture taken and dressing changed. Sacral area clean, with some 
redness, appears to be an old healed wound. Optifoam applied for preventative measures. Pt 
cleaned and repositioned.

## 2019-11-09 NOTE — NUR
NURSE NOTES:

PATIENT AWOKE, OPEN EYES TO NAME, DID NOT FOLLOW COMMANDS, ON ETT TO VENT, AC14//FIO2 
40%/PEEP 5, O2 SATURATION 100% NOTED, HEART 90/MIN AND NOTED SR, ABDOMEN SOFT, NON TENDER, G 
TUBE INTACT AND PATENT, NO RESIDUE NOTED, ONGOING GLUCERNA 1.2 AT 60ML/HR, KEPT HOB OVER 30 
DEGREES, F/C INTACT AND PATENT, YELLOW URINE OUTED, PICC LINE TO RIGHT UPPER ARM, INTACT AND 
PATENT, ON SCD'S TO BOTH LOWER LEGS, MADE LOWER BED AND LOCKED, ON P200 BED, KEPT 2 POINT 
SOFT RESTRAINTS FOR SAFETY, SZ AND ASPIRATION PRECAUTION, PROVIDED CALL LIGHT WITHIN REACH, 
WILL CONTINUE TO MONITOR.

## 2019-11-09 NOTE — NUR
NURSE NOTES:

PATIENT ANXIOUS, TRIED TO TOUCH ENDOTUBE, GIVEN ATIVAN 1MG BY IVP SLOWLY AS PRN ORDER, WILL 
CONTINUE TO MONITOR.

## 2019-11-09 NOTE — CARDIOLOGY PROGRESS NOTE
Assessment/Plan


Assessment/Plan


respiratory failure, is intubated, 


in process of diuresing


will follow labs





Subjective


Subjective


intubated, not opening her eyes, no communication





Objective





Last 24 Hour Vital Signs








  Date Time  Temp Pulse Resp B/P (MAP) Pulse Ox O2 Delivery O2 Flow Rate FiO2


 


11/9/19 15:00  77 22 120/45 (70) 99   


 


11/9/19 14:00  73 21 125/43 (70) 100   


 


11/9/19 13:19  78 20  100 Mechanical Ventilator  40





  79 14     40


 


11/9/19 13:00  73 22 112/46 (68) 100   


 


11/9/19 12:30  78 26 129/55 (79) 100   


 


11/9/19 12:00        40


 


11/9/19 12:00 98.5 73 23 126/40 (68) 100   


 


11/9/19 12:00      Endotracheal Tube  


 


11/9/19 11:52  77      


 


11/9/19 11:00  76 22 122/41 (68) 100   


 


11/9/19 10:41  79 20     40


 


11/9/19 10:00  79 23 133/45 (74) 100   


 


11/9/19 09:39  72  127/52    


 


11/9/19 09:00  74 21 112/41 (64) 100   


 


11/9/19 08:47  82 25     40


 


11/9/19 08:00 99.1 77 26 126/44 (71) 100   


 


11/9/19 08:00        40


 


11/9/19 08:00      Endotracheal Tube  


 


11/9/19 08:00  77      


 


11/9/19 07:10     100   


 


11/9/19 07:04  83 29  100 Mechanical Ventilator  40





  82 27     40





        40


 


11/9/19 07:00  81 27 142/64 (90) 100   


 


11/9/19 06:00  76 21 126/44 (71) 100   


 


11/9/19 05:32  78 27     40


 


11/9/19 05:00  83 22 129/46 (73) 100   


 


11/9/19 04:00        40


 


11/9/19 04:00      Endotracheal Tube  


 


11/9/19 04:00 99.8 80 22 124/46 (72) 100   


 


11/9/19 03:34  82 23     40


 


11/9/19 03:30  85      


 


11/9/19 03:00  86 28 125/42 (69) 100   


 


11/9/19 02:00  92 33 148/51 (83) 100   


 


11/9/19 01:08  87 35  100 Mechanical Ventilator  40





  85 34     40


 


11/9/19 01:00  87 30 129/43 (71) 99   


 


11/9/19 00:00 100.1 92 26 131/46 (74) 97   


 


11/9/19 00:00        40


 


11/9/19 00:00      Endotracheal Tube  


 


11/8/19 23:50  92      


 


11/8/19 23:26  91 24     40


 


11/8/19 23:00  103 29 146/58 (87) 100   


 


11/8/19 22:00  96 31 137/61 (86) 98   


 


11/8/19 21:37  99 24     40


 


11/8/19 21:00  102 33 157/60 (92) 98   


 


11/8/19 20:00      Endotracheal Tube  


 


11/8/19 20:00        40


 


11/8/19 20:00 98.8 83 26 129/46 (73) 99   


 


11/8/19 19:29  83      


 


11/8/19 19:08  85 23  97 Mechanical Ventilator  40





  86 23     40


 


11/8/19 19:00  83 22 136/51 (79) 99   


 


11/8/19 18:36  97  153/72    


 


11/8/19 18:00  86 26 132/55 (80) 98   


 


11/8/19 17:00  75 30 144/55 (84) 97   


 


11/8/19 16:47  85 28     40


 


11/8/19 16:00        40


 


11/8/19 16:00      Endotracheal Tube  


 


11/8/19 16:00 98.6 83 24 126/49 (74) 99   


 


11/8/19 16:00  82      








General Appearance:  on vent, other - unresponaive


Neck:  JVD


Rhythm:  NSR


Cardiovascular:  tachycardia


Respiratory/Chest:  crackles/rales


Abdomen:  distended


Neurologic:  unresponsiveness











Intake and Output  


 


 11/8/19 11/9/19





 18:59 06:59


 


Intake Total 1125 ml 930 ml


 


Output Total 2610 ml 2880 ml


 


Balance -1485 ml -1950 ml


 


  


 


Free Water 300 ml 


 


IV Total 55 ml 330 ml


 


Tube Feeding 720 ml 600 ml


 


Other 50 ml 


 


Output Urine Total 2610 ml 2880 ml


 


# Bowel Movements 3 4











Laboratory Tests








Test


  11/9/19


08:10


 


Arterial Blood pH


  7.478


(7.350-7.450)


 


Arterial Blood Partial


Pressure CO2 45.1 mmHg


(35.0-45.0)  H


 


Arterial Blood Partial


Pressure O2 102.6 mmHg


(75.0-100.0)  H


 


Arterial Blood HCO3


  32.7 mmol/L


(22.0-26.0)  H


 


Arterial Blood Oxygen


Saturation 97.4 %


()


 


Arterial Blood Base Excess 8.3 (-2-2)  H


 


Graham Test Positive  

















Katia Castano MD Nov 9, 2019 15:21

## 2019-11-09 NOTE — GENERAL PROGRESS NOTE
Assessment/Plan


Problem List:  


(1) Abnormal TSH


ICD Codes:  R79.89 - Other specified abnormal findings of blood chemistry


SNOMED:  513002458


(2) Hyperglycemia due to type 2 diabetes mellitus


ICD Codes:  E11.65 - Type 2 diabetes mellitus with hyperglycemia


SNOMED:  830640147982982, 17400708


Qualifiers:  


   Qualified Codes:  E11.65 - Type 2 diabetes mellitus with hyperglycemia; 

Z79.4 - Long term (current) use of insulin


(3) Acute metabolic encephalopathy


ICD Codes:  G93.41 - Metabolic encephalopathy


SNOMED:  32608528, 336718308


(4) ARF (acute renal failure)


ICD Codes:  N17.9 - Acute kidney failure, unspecified


SNOMED:  44289049


Qualifiers:  


   Qualified Codes:  N17.9 - Acute kidney failure, unspecified


(5) Healthcare associated bacterial pneumonia


ICD Codes:  J15.9 - Unspecified bacterial pneumonia


SNOMED:  043272188


Status:  unchanged


Assessment/Plan:


continue Levemir 8 units qhs 


continue NISS every 6 hours 


hypoglycemia protocol in order





Subjective


ROS Limited/Unobtainable:  Yes


Allergies:  


Coded Allergies:  


     No Known Allergies (Unverified , 10/14/19)


Subjective


events noted 


in ICU on vent 


TF on hold since earlier this morning 


glucose values are stable 











Item Value  Date Time


 


Bedside Blood Glucose 164 mg/dl H 11/9/19 0600


 


Bedside Blood Glucose 189 mg/dl H 11/8/19 2331


 


Bedside Blood Glucose 191 mg/dl H 11/8/19 2034


 


Bedside Blood Glucose 173 mg/dl H 11/8/19 1838


 


Bedside Blood Glucose 191 mg/dl H 11/8/19 1200


 


Bedside Blood Glucose 168 mg/dl H 11/8/19 0532


 


Bedside Blood Glucose 225 mg/dl H 11/7/19 2335











Objective





Last 24 Hour Vital Signs








  Date Time  Temp Pulse Resp B/P (MAP) Pulse Ox O2 Delivery O2 Flow Rate FiO2


 


11/9/19 06:00  76 21 126/44 (71) 100   


 


11/9/19 05:32  78 27     40


 


11/9/19 05:00  83 22 129/46 (73) 100   


 


11/9/19 04:00        40


 


11/9/19 04:00      Endotracheal Tube  


 


11/9/19 04:00 99.8 80 22 124/46 (72) 100   


 


11/9/19 03:34  82 23     40


 


11/9/19 03:30  85      


 


11/9/19 03:00  86 28 125/42 (69) 100   


 


11/9/19 02:00  92 33 148/51 (83) 100   


 


11/9/19 01:08  87 35  100 Mechanical Ventilator  40





  85 34     40


 


11/9/19 01:00  87 30 129/43 (71) 99   


 


11/9/19 00:00 100.1 92 26 131/46 (74) 97   


 


11/9/19 00:00        40


 


11/9/19 00:00      Endotracheal Tube  


 


11/8/19 23:50  92      


 


11/8/19 23:26  91 24     40


 


11/8/19 23:00  103 29 146/58 (87) 100   


 


11/8/19 22:00  96 31 137/61 (86) 98   


 


11/8/19 21:37  99 24     40


 


11/8/19 21:00  102 33 157/60 (92) 98   


 


11/8/19 20:00      Endotracheal Tube  


 


11/8/19 20:00        40


 


11/8/19 20:00 98.8 83 26 129/46 (73) 99   


 


11/8/19 19:29  83      


 


11/8/19 19:08  85 23  97 Mechanical Ventilator  40





  86 23     40


 


11/8/19 19:00  83 22 136/51 (79) 99   


 


11/8/19 18:36  97  153/72    


 


11/8/19 18:00  86 26 132/55 (80) 98   


 


11/8/19 17:00  75 30 144/55 (84) 97   


 


11/8/19 16:47  85 28     40


 


11/8/19 16:00        40


 


11/8/19 16:00      Endotracheal Tube  


 


11/8/19 16:00 98.6 83 24 126/49 (74) 99   


 


11/8/19 16:00  82      


 


11/8/19 15:01  74 21     40


 


11/8/19 15:00  82 26 125/49 (74) 98   


 


11/8/19 14:00  82 27 126/50 (75) 96   


 


11/8/19 13:20  78 24  100 Mechanical Ventilator  40





  72 25     40


 


11/8/19 13:00  73 24 149/54 (85) 100   


 


11/8/19 12:00        40


 


11/8/19 12:00      Endotracheal Tube  


 


11/8/19 12:00 98.9 79 19 129/51 (77) 94   


 


11/8/19 12:00  86      


 


11/8/19 11:07  82 28     40


 


11/8/19 11:00  84 27 143/58 (86) 99   


 


11/8/19 10:00  80 24 131/50 (77) 97   


 


11/8/19 09:00  86 30 144/46 (78) 94   


 


11/8/19 08:58  82 33     40


 


11/8/19 08:48  71  141/67    


 


11/8/19 08:00      Endotracheal Tube  


 


11/8/19 08:00 98.6 77 20 143/59 (87) 97   


 


11/8/19 08:00        40


 


11/8/19 08:00  72      


 


11/8/19 07:32  80 32  100 Mechanical Ventilator  40





  78 27     40


 


11/8/19 07:00  72 27 119/47 (71) 100   

















Intake and Output  


 


 11/8/19 11/9/19





 19:00 07:00


 


Intake Total 1125 ml 870 ml


 


Output Total 2795 ml 2660 ml


 


Balance -1670 ml -1790 ml


 


  


 


Free Water 300 ml 


 


IV Total 55 ml 330 ml


 


Tube Feeding 720 ml 540 ml


 


Other 50 ml 


 


Output Urine Total 2795 ml 2660 ml


 


# Bowel Movements 3 4








Laboratory Tests


11/8/19 11:30: 


Arterial Blood pH 7.488H, Arterial Blood Partial Pressure CO2 41.0, Arterial 

Blood Partial Pressure O2 77.5, Arterial Blood HCO3 30.4H, Arterial Blood 

Oxygen Saturation 95.2, Arterial Blood Base Excess 6.5H, Graham Test Positive


Height (Feet):  5


Height (Inches):  5.00


Weight (Pounds):  182


General Appearance:  other - on vent - ill appearing


Neck:  normal alignment


Cardiovascular:  tachycardia


Respiratory/Chest:  decreased breath sounds


Abdomen:  normal bowel sounds


Edema:  1+ Arm (L), 1+ Arm (R), 1+ Leg (L), 1+ Leg (R), 1+ Pedal (L), 1+ Pedal (

R), 1+ Generalized


Objective





Current Medications








 Medications


  (Trade)  Dose


 Ordered  Sig/Winnie


 Route


 PRN Reason  Start Time


 Stop Time Status Last Admin


Dose Admin


 


 Acetaminophen


  (Tylenol)  650 mg  Q4H  PRN


 NG


 Mild Pain/Temp > 100.5  11/3/19 20:30


 11/25/19 20:29   


 


 


 Acetaminophen


  (Tylenol)  650 mg  Q4H  PRN


 RECTAL


 TEMP>100.5  11/3/19 20:30


 12/2/19 20:29   


 


 


 Albuterol/


 Ipratropium


  (Albuterol/


 Ipratropium)  3 ml  Q4H  PRN


 HHN


 Shortness of Breath  11/7/19 09:45


 11/12/19 09:44   


 


 


 Albuterol/


 Ipratropium


  (Albuterol/


 Ipratropium)  3 ml  Q6HRT


 HHN


   11/7/19 13:00


 11/12/19 12:59  11/9/19 01:08


 


 


 Amlodipine


 Besylate


  (Norvasc)  2.5 mg  BID


 NG


   11/4/19 09:00


 11/29/19 17:59  11/8/19 18:36


 


 


 Chlorhexidine


 Gluconate


  (Mae-Hex 2%)  1 applic  DAILY@2000 TOPIC 11/5/19 20:00


 12/5/19 19:59  11/8/19 19:35


 


 


 Dextrose


  (Dextrose 50%)  25 ml  Q30M  PRN


 IV


 Hypoglycemia  11/5/19 06:30


 12/5/19 06:29   


 


 


 Dextrose


  (Dextrose 50%)  50 ml  Q30M  PRN


 IV


 Hypoglycemia  11/5/19 06:30


 12/5/19 06:29   


 


 


 Divalproex Sodium


  (Depakote


 Sprinkles)  500 mg  Q12HR


 NG


   11/3/19 21:00


 11/14/19 21:59  11/8/19 20:32


 


 


 Docusate Sodium


  (Colace)  100 mg  EVERY 8  HOURS


 NG


   11/3/19 22:00


 11/25/19 08:59  11/7/19 13:53


 


 


 Folic Acid


  (Folate)  3 mg  DAILY


 GT


   11/7/19 09:00


 11/30/19 08:59  11/8/19 08:49


 


 


 Furosemide


  (Lasix)  40 mg  EVERY 12  HOURS


 IV


   11/7/19 21:00


 12/7/19 20:59  11/8/19 20:32


 


 


 Insulin Aspart


  (NovoLOG)    EVERY 6  HOURS


 SUBQ


   11/4/19 00:00


 11/29/19 11:59  11/8/19 23:31


 


 


 Insulin Detemir


  (Levemir)  8 units  QHS


 SUBQ


   11/6/19 21:00


 12/1/19 08:59  11/8/19 20:33


 


 


 Lactulose


  (Cephulac)  20 gm  Q8H  PRN


 NG


 Constipation  11/3/19 20:30


 12/3/19 20:29  11/5/19 08:12


 


 


 Lorazepam


  (Ativan 2mg/ml


 1ml)  1 mg  Q4H  PRN


 IV


 For Anxiety  11/3/19 20:30


 11/10/19 20:29  11/8/19 21:53


 


 


 Meropenem 1 gm/


 Sodium Chloride  55 ml @ 


 110 mls/hr  Q8H


 IVPB


   11/4/19 17:00


 11/14/19 16:59  11/9/19 01:05


 


 


 Metoclopramide HCl


  (Reglan)  10 mg  Q6H  PRN


 IVP


 Nausea & Vomiting  11/6/19 10:45


 12/6/19 10:44   


 


 


 Midazolam HCl 50


 mg/Dextrose  100 ml @ 0


 mls/hr  Q24H  PRN


 IV


 To Patient Comfort  11/8/19 20:15


 11/10/19 20:34   


 


 


 Pantoprazole


  (Protonix)  40 mg  EVERY 12  HOURS


 IVP


   11/3/19 21:00


 12/3/19 08:59  11/8/19 20:32


 


 


 Potassium Chloride


  (K-Dur)  40 meq  DAILY


 GT


   11/6/19 09:00


 12/5/19 17:59  11/8/19 08:49


 


 


 Vancomycin HCl


  (Vanco rx to


 dose)  1 ea  DAILY  PRN


 MISC


 Per rx protocol  11/4/19 15:30


 12/4/19 15:29   


 


 


 Vancomycin/Sodium


 Chloride  275 ml @ 


 183.333


 mls/hr  Q24H


 IVPB


   11/8/19 18:00


 11/13/19 17:59  11/8/19 18:20


 

















Riccardo Velez MD Nov 9, 2019 06:36

## 2019-11-09 NOTE — NUR
RESPIRATORY NOTES:

Received Patient on Vent settings ACVC RR 14, , Fio2 40% PEEP +5. Patient has a 7.5 
ETT at 23 cm at the lip, secured with anchorfast. Suctioned a small amount of clear, thin 
secretions. Patient lying in bed restless. Vent plugged into red outlet. Alarms are on and 
audible. Will continue to monitor patient throughout the day.

## 2019-11-09 NOTE — NUR
RESPIRATORY NOTES:

Weaning started at 0704. Weaning criteria passed. Placed patient on PS +8 PEEP +5 FIO2 40%. 
Will continue to monitor patient throughout the day.

## 2019-11-09 NOTE — NUR
NURSE NOTES:



Pt cleaned and repositioned. At times can be restless, kicking her legs around. Pt 
maintained on 2 point wrist restraints d/t attempts at puling out ETT. Pulses palpable. Will 
continue to monitor.

## 2019-11-09 NOTE — DIAGNOSTIC IMAGING REPORT
EXAM:

  XR Chest, 1 View

 

CLINICAL HISTORY:

  SOB

 

TECHNIQUE:

  Frontal view of the chest.

 

COMPARISON:

11 3 2019 

 

FINDINGS:

  Lungs: There are extensive bilateral pulmonary opacities which have 

change have slightly improved compared to the prior study.  There is more 

basilar predominance.  Haziness at the left lung base is suggestive of a 

small to moderate-sized left effusion.  There may also be a small right 

pleural effusion.

  Heart:  Unremarkable.  No cardiomegaly.

  Mediastinum:  Unremarkable.

  Bones joints:  Unremarkable.

  Tubes, lines and devices:  An endotracheal tube tip overlies the 

trachea approximate 2.2 cm above the kimberlyn.   

 

IMPRESSION:     

1.  Bilateral infiltrates likely representing moderate vascular 

congestion but cannot exclude pneumonia.  They have slightly improved.  

Left greater than right pleural effusions.

## 2019-11-09 NOTE — CARDIOLOGY REPORT
--------------- APPROVED REPORT --------------





EKG Measurement

Heart [id number]

PA [id number]

[id number]

[id number]

[id number]





Normal sinus rhythm

T wave abnormality, consider inferior ischemia

Abnormal ECG

## 2019-11-09 NOTE — GENERAL PROGRESS NOTE
Assessment/Plan


Status:  unchanged


Assessment/Plan:


S, O: Intubated , vent setting reviewed. No pressor. awake, limited following 

commands





PHYSICAL EXAMINATION:HEAD AND NECK:  intubated .


CHEST:  Bronchial breathing sounds.ABDOMEN:  Soft.  No organomegaly.


MUSCULOSKELETAL:  Diffuse 1+ or 2+ nonpitting edema in all four contracted


extremities.  The patient is not following the commands.  Limited


evaluation.NEUROLOGY:  The patient is awake.  Not alert.  Not verbally


communicative.





LABORATORY DATA:  Labs dated Nov 6, 2019  reviewed





Meds: Reviewed and reconciled in the chart





Imaging:  CXR reviewed 





ASSESSMENT AND PLAN:


1. VDRF


2. Sepsis.  secondary to healthcare-associated pneumonia or


3. Acute chf exacerbation - Diastolic


4. Chronic encephalomalacia.


3. Acute on chronic anemia.


4. Renal failure, age indeterminate.


6. Hyperthyroidism.


7. Hyperkalemia.


8. Diabetes type 2, uncontrolled with A1c of 10.


9. Psychiatric disorder.


10. GI and DVT prophylaxis.


11. PAH- Severe 





PLAN OF CARE:





Post PEG tube


Out patient followup for abnormal incidental imaging finding ( possibility of 

CA cant be excluded 


Acute drop in Hgb and observation of UGI bleeding, Will hold DAPT


notes from pulmonary reviewed


Weaning trial per pulmonary





Subjective


Allergies:  


Coded Allergies:  


     No Known Allergies (Unverified , 10/14/19)





Objective





Last 24 Hour Vital Signs








  Date Time  Temp Pulse Resp B/P (MAP) Pulse Ox O2 Delivery O2 Flow Rate FiO2


 


11/9/19 14:00  73 21 125/43 (70) 100   


 


11/9/19 13:19  78 20  100 Mechanical Ventilator  40





  79 14     40


 


11/9/19 13:00  73 22 112/46 (68) 100   


 


11/9/19 12:30  78 26 129/55 (79) 100   


 


11/9/19 12:00        40


 


11/9/19 12:00 98.5 73 23 126/40 (68) 100   


 


11/9/19 12:00      Endotracheal Tube  


 


11/9/19 11:52  77      


 


11/9/19 11:00  76 22 122/41 (68) 100   


 


11/9/19 10:41  79 20     40


 


11/9/19 10:00  79 23 133/45 (74) 100   


 


11/9/19 09:39  72  127/52    


 


11/9/19 09:00  74 21 112/41 (64) 100   


 


11/9/19 08:47  82 25     40


 


11/9/19 08:00 99.1 77 26 126/44 (71) 100   


 


11/9/19 08:00        40


 


11/9/19 08:00      Endotracheal Tube  


 


11/9/19 08:00  77      


 


11/9/19 07:10     100   


 


11/9/19 07:04  83 29  100 Mechanical Ventilator  40





  82 27     40





        40


 


11/9/19 07:00  81 27 142/64 (90) 100   


 


11/9/19 06:00  76 21 126/44 (71) 100   


 


11/9/19 05:32  78 27     40


 


11/9/19 05:00  83 22 129/46 (73) 100   


 


11/9/19 04:00        40


 


11/9/19 04:00      Endotracheal Tube  


 


11/9/19 04:00 99.8 80 22 124/46 (72) 100   


 


11/9/19 03:34  82 23     40


 


11/9/19 03:30  85      


 


11/9/19 03:00  86 28 125/42 (69) 100   


 


11/9/19 02:00  92 33 148/51 (83) 100   


 


11/9/19 01:08  87 35  100 Mechanical Ventilator  40





  85 34     40


 


11/9/19 01:00  87 30 129/43 (71) 99   


 


11/9/19 00:00 100.1 92 26 131/46 (74) 97   


 


11/9/19 00:00        40


 


11/9/19 00:00      Endotracheal Tube  


 


11/8/19 23:50  92      


 


11/8/19 23:26  91 24     40


 


11/8/19 23:00  103 29 146/58 (87) 100   


 


11/8/19 22:00  96 31 137/61 (86) 98   


 


11/8/19 21:37  99 24     40


 


11/8/19 21:00  102 33 157/60 (92) 98   


 


11/8/19 20:00      Endotracheal Tube  


 


11/8/19 20:00        40


 


11/8/19 20:00 98.8 83 26 129/46 (73) 99   


 


11/8/19 19:29  83      


 


11/8/19 19:08  85 23  97 Mechanical Ventilator  40





  86 23     40


 


11/8/19 19:00  83 22 136/51 (79) 99   


 


11/8/19 18:36  97  153/72    


 


11/8/19 18:00  86 26 132/55 (80) 98   


 


11/8/19 17:00  75 30 144/55 (84) 97   


 


11/8/19 16:47  85 28     40


 


11/8/19 16:00        40


 


11/8/19 16:00      Endotracheal Tube  


 


11/8/19 16:00 98.6 83 24 126/49 (74) 99   


 


11/8/19 16:00  82      


 


11/8/19 15:01  74 21     40


 


11/8/19 15:00  82 26 125/49 (74) 98   

















Intake and Output  


 


 11/8/19 11/9/19





 18:59 06:59


 


Intake Total 1125 ml 930 ml


 


Output Total 2610 ml 2880 ml


 


Balance -1485 ml -1950 ml


 


  


 


Free Water 300 ml 


 


IV Total 55 ml 330 ml


 


Tube Feeding 720 ml 600 ml


 


Other 50 ml 


 


Output Urine Total 2610 ml 2880 ml


 


# Bowel Movements 3 4








Laboratory Tests


11/9/19 08:10: 


Arterial Blood pH 7.478H, Arterial Blood Partial Pressure CO2 45.1H, Arterial 

Blood Partial Pressure O2 102.6H, Arterial Blood HCO3 32.7H, Arterial Blood 

Oxygen Saturation 97.4, Arterial Blood Base Excess 8.3H, Graham Test Positive


Height (Feet):  5


Height (Inches):  5.00


Weight (Pounds):  182











Garrick Keith MD Nov 9, 2019 14:14

## 2019-11-09 NOTE — NUR
RESPIRATORY NOTES:

Weaning ended at 0810 due to patient SOB. RR increased >35 and VT decreased below <300mL. RN 
Evelin cuadra. Placed back onto previous ACVC settings.

## 2019-11-10 VITALS — DIASTOLIC BLOOD PRESSURE: 42 MMHG | SYSTOLIC BLOOD PRESSURE: 113 MMHG

## 2019-11-10 VITALS — SYSTOLIC BLOOD PRESSURE: 114 MMHG | DIASTOLIC BLOOD PRESSURE: 42 MMHG

## 2019-11-10 VITALS — DIASTOLIC BLOOD PRESSURE: 42 MMHG | SYSTOLIC BLOOD PRESSURE: 105 MMHG

## 2019-11-10 VITALS — DIASTOLIC BLOOD PRESSURE: 51 MMHG | SYSTOLIC BLOOD PRESSURE: 126 MMHG

## 2019-11-10 VITALS — SYSTOLIC BLOOD PRESSURE: 123 MMHG | DIASTOLIC BLOOD PRESSURE: 45 MMHG

## 2019-11-10 VITALS — SYSTOLIC BLOOD PRESSURE: 140 MMHG | DIASTOLIC BLOOD PRESSURE: 44 MMHG

## 2019-11-10 VITALS — DIASTOLIC BLOOD PRESSURE: 39 MMHG | SYSTOLIC BLOOD PRESSURE: 117 MMHG

## 2019-11-10 VITALS — SYSTOLIC BLOOD PRESSURE: 114 MMHG | DIASTOLIC BLOOD PRESSURE: 49 MMHG

## 2019-11-10 VITALS — DIASTOLIC BLOOD PRESSURE: 40 MMHG | SYSTOLIC BLOOD PRESSURE: 109 MMHG

## 2019-11-10 VITALS — DIASTOLIC BLOOD PRESSURE: 51 MMHG | SYSTOLIC BLOOD PRESSURE: 114 MMHG

## 2019-11-10 VITALS — DIASTOLIC BLOOD PRESSURE: 60 MMHG | SYSTOLIC BLOOD PRESSURE: 128 MMHG

## 2019-11-10 VITALS — DIASTOLIC BLOOD PRESSURE: 47 MMHG | SYSTOLIC BLOOD PRESSURE: 116 MMHG

## 2019-11-10 VITALS — SYSTOLIC BLOOD PRESSURE: 111 MMHG | DIASTOLIC BLOOD PRESSURE: 39 MMHG

## 2019-11-10 VITALS — DIASTOLIC BLOOD PRESSURE: 57 MMHG | SYSTOLIC BLOOD PRESSURE: 128 MMHG

## 2019-11-10 VITALS — DIASTOLIC BLOOD PRESSURE: 55 MMHG | SYSTOLIC BLOOD PRESSURE: 103 MMHG

## 2019-11-10 VITALS — DIASTOLIC BLOOD PRESSURE: 48 MMHG | SYSTOLIC BLOOD PRESSURE: 133 MMHG

## 2019-11-10 VITALS — SYSTOLIC BLOOD PRESSURE: 124 MMHG | DIASTOLIC BLOOD PRESSURE: 46 MMHG

## 2019-11-10 VITALS — DIASTOLIC BLOOD PRESSURE: 44 MMHG | SYSTOLIC BLOOD PRESSURE: 125 MMHG

## 2019-11-10 VITALS — DIASTOLIC BLOOD PRESSURE: 47 MMHG | SYSTOLIC BLOOD PRESSURE: 138 MMHG

## 2019-11-10 VITALS — DIASTOLIC BLOOD PRESSURE: 45 MMHG | SYSTOLIC BLOOD PRESSURE: 124 MMHG

## 2019-11-10 VITALS — SYSTOLIC BLOOD PRESSURE: 121 MMHG | DIASTOLIC BLOOD PRESSURE: 43 MMHG

## 2019-11-10 VITALS — DIASTOLIC BLOOD PRESSURE: 51 MMHG | SYSTOLIC BLOOD PRESSURE: 113 MMHG

## 2019-11-10 VITALS — SYSTOLIC BLOOD PRESSURE: 127 MMHG | DIASTOLIC BLOOD PRESSURE: 46 MMHG

## 2019-11-10 VITALS — SYSTOLIC BLOOD PRESSURE: 94 MMHG | DIASTOLIC BLOOD PRESSURE: 47 MMHG

## 2019-11-10 VITALS — SYSTOLIC BLOOD PRESSURE: 114 MMHG | DIASTOLIC BLOOD PRESSURE: 44 MMHG

## 2019-11-10 LAB
ADD MANUAL DIFF: NO
ANION GAP SERPL CALC-SCNC: 5 MMOL/L (ref 5–15)
BASOPHILS NFR BLD AUTO: 1.9 % (ref 0–2)
BUN SERPL-MCNC: 31 MG/DL (ref 7–18)
CALCIUM SERPL-MCNC: 8.6 MG/DL (ref 8.5–10.1)
CHLORIDE SERPL-SCNC: 105 MMOL/L (ref 98–107)
CO2 SERPL-SCNC: 33 MMOL/L (ref 21–32)
CREAT SERPL-MCNC: 1.2 MG/DL (ref 0.55–1.3)
EOSINOPHIL NFR BLD AUTO: 9.7 % (ref 0–3)
ERYTHROCYTE [DISTWIDTH] IN BLOOD BY AUTOMATED COUNT: 14.5 % (ref 11.6–14.8)
HCT VFR BLD CALC: 24.9 % (ref 37–47)
HGB BLD-MCNC: 8.1 G/DL (ref 12–16)
LYMPHOCYTES NFR BLD AUTO: 15.8 % (ref 20–45)
MCV RBC AUTO: 83 FL (ref 80–99)
MONOCYTES NFR BLD AUTO: 9.7 % (ref 1–10)
NEUTROPHILS NFR BLD AUTO: 62.9 % (ref 45–75)
PLATELET # BLD: 386 K/UL (ref 150–450)
POTASSIUM SERPL-SCNC: 4.6 MMOL/L (ref 3.5–5.1)
RBC # BLD AUTO: 3.01 M/UL (ref 4.2–5.4)
SODIUM SERPL-SCNC: 143 MMOL/L (ref 136–145)
WBC # BLD AUTO: 10 K/UL (ref 4.8–10.8)

## 2019-11-10 RX ADMIN — PANTOPRAZOLE SODIUM SCH MG: 40 INJECTION, POWDER, FOR SOLUTION INTRAVENOUS at 08:27

## 2019-11-10 RX ADMIN — CHLORHEXIDINE GLUCONATE SCH APPLIC: 213 SOLUTION TOPICAL at 21:06

## 2019-11-10 RX ADMIN — DIVALPROEX SODIUM SCH MG: 125 CAPSULE ORAL at 21:06

## 2019-11-10 RX ADMIN — IPRATROPIUM BROMIDE AND ALBUTEROL SULFATE SCH ML: .5; 3 SOLUTION RESPIRATORY (INHALATION) at 18:59

## 2019-11-10 RX ADMIN — ACETAMINOPHEN PRN MG: 160 SOLUTION ORAL at 03:01

## 2019-11-10 RX ADMIN — PANTOPRAZOLE SODIUM SCH MG: 40 INJECTION, POWDER, FOR SOLUTION INTRAVENOUS at 21:06

## 2019-11-10 RX ADMIN — INSULIN ASPART SCH UNITS: 100 INJECTION, SOLUTION INTRAVENOUS; SUBCUTANEOUS at 17:40

## 2019-11-10 RX ADMIN — IPRATROPIUM BROMIDE AND ALBUTEROL SULFATE SCH ML: .5; 3 SOLUTION RESPIRATORY (INHALATION) at 13:19

## 2019-11-10 RX ADMIN — IPRATROPIUM BROMIDE AND ALBUTEROL SULFATE SCH ML: .5; 3 SOLUTION RESPIRATORY (INHALATION) at 07:03

## 2019-11-10 RX ADMIN — MEROPENEM SCH MLS/HR: 1 INJECTION INTRAVENOUS at 08:26

## 2019-11-10 RX ADMIN — IPRATROPIUM BROMIDE AND ALBUTEROL SULFATE SCH ML: .5; 3 SOLUTION RESPIRATORY (INHALATION) at 01:51

## 2019-11-10 RX ADMIN — INSULIN DETEMIR SCH UNITS: 100 INJECTION, SOLUTION SUBCUTANEOUS at 22:00

## 2019-11-10 RX ADMIN — MEROPENEM SCH MLS/HR: 1 INJECTION INTRAVENOUS at 00:34

## 2019-11-10 RX ADMIN — LORAZEPAM PRN MG: 2 INJECTION, SOLUTION INTRAMUSCULAR; INTRAVENOUS at 21:04

## 2019-11-10 RX ADMIN — DIVALPROEX SODIUM SCH MG: 125 CAPSULE ORAL at 08:26

## 2019-11-10 RX ADMIN — DOCUSATE SODIUM SCH MG: 50 LIQUID ORAL at 05:35

## 2019-11-10 RX ADMIN — INSULIN ASPART SCH UNITS: 100 INJECTION, SOLUTION INTRAVENOUS; SUBCUTANEOUS at 12:01

## 2019-11-10 RX ADMIN — MEROPENEM SCH MLS/HR: 1 INJECTION INTRAVENOUS at 16:54

## 2019-11-10 RX ADMIN — INSULIN ASPART SCH UNITS: 100 INJECTION, SOLUTION INTRAVENOUS; SUBCUTANEOUS at 05:36

## 2019-11-10 NOTE — NUR
NURSE NOTES:



WEANING TRIAL FAILED. RESTARTED TUBE FEEDING, GLUCERNA 1.2 AT 60ML/HR. WILL CONTINUE TO 
MONITOR.

## 2019-11-10 NOTE — NUR
NURSE NOTES:

PATIENT TRIED TO OUT OF BED, GIVEN REORIENTATION AND REPOSITIONED, SECURED RESTRAINS, WILL 
CONTINUE TO MONITOR.

## 2019-11-10 NOTE — CARDIOLOGY PROGRESS NOTE
Assessment/Plan


Assessment/Plan


respiratory failure, is intubated, 


in process of diuresing


will follow labs


the patient is demented


off antiplatelet therapy due to anemia





Subjective


Subjective


intubated, not opening her eyes, no communication





Objective





Last 24 Hour Vital Signs








  Date Time  Temp Pulse Resp B/P (MAP) Pulse Ox O2 Delivery O2 Flow Rate FiO2


 


11/10/19 16:00 99.0 79 27 123/45 (71) 100   


 


11/10/19 16:00      Endotracheal Tube  


 


11/10/19 16:00        40


 


11/10/19 15:18  80 18     40


 


11/10/19 15:00  78 24 114/49 (70) 100   


 


11/10/19 14:00  75 21 111/39 (63) 100   


 


11/10/19 13:17  76 24  100 Mechanical Ventilator  40





  82 24     40


 


11/10/19 13:00  73 22 121/43 (69) 98   


 


11/10/19 12:00  89      


 


11/10/19 12:00 99.0 75 20 117/39 (65) 100   


 


11/10/19 12:00        40


 


11/10/19 12:00      Endotracheal Tube  


 


11/10/19 11:08  81 14     40


 


11/10/19 11:00  89 25 128/60 (82) 100   


 


11/10/19 10:00  78 21 127/46 (73) 100   


 


11/10/19 09:07  79 18     40


 


11/10/19 09:00  72 20 109/40 (63) 100   


 


11/10/19 08:27  73  122/41    


 


11/10/19 08:00      Endotracheal Tube  


 


11/10/19 08:00 99.0 73 20 122/41 (68) 100   


 


11/10/19 08:00        40


 


11/10/19 08:00  69 25 116/47 (70) 100   


 


11/10/19 08:00  76      


 


11/10/19 07:00  69 25 116/47 (70) 100   


 


11/10/19 06:56  73 31  100 Mechanical Ventilator  40





  78 28     40


 


11/10/19 06:00  69 18 114/42 (66) 100   


 


11/10/19 05:09  75 20     40


 


11/10/19 05:00  77 28 105/42 (63) 99   


 


11/10/19 04:00        40


 


11/10/19 04:00      Endotracheal Tube  


 


11/10/19 04:00 99.2 87 27 94/47 (63) 100   


 


11/10/19 03:40  89      


 


11/10/19 03:28  86 25     40


 


11/10/19 03:00  90 30 113/42 (65) 97   


 


11/10/19 02:00  86 29 124/46 (72) 100   


 


11/10/19 01:52  89 16  100 Mechanical Ventilator  40





  89 18     40


 


11/10/19 01:00  82 29 113/51 (71) 99   


 


11/10/19 00:00        40


 


11/10/19 00:00      Endotracheal Tube  


 


11/10/19 00:00 99.0 86 22 128/57 (80) 100   


 


11/9/19 23:53  97      


 


11/9/19 23:08  82 22     40


 


11/9/19 23:00  82 25 123/48 (73) 99   


 


11/9/19 22:00  80 20 122/49 (73) 100   


 


11/9/19 21:06  86 23     40


 


11/9/19 21:00  89 27 125/53 (77) 100   


 


11/9/19 20:00        40


 


11/9/19 20:00      Endotracheal Tube  


 


11/9/19 20:00 99.5 91 29 102/63 (76) 99   


 


11/9/19 19:27  85      


 


11/9/19 19:00  83 20 125/59 (81) 100   


 


11/9/19 18:55  78 27  100 Mechanical Ventilator  40





  78 27     40


 


11/9/19 18:00  76 22 124/44 (70) 99   


 


11/9/19 17:21  79  124/46    


 


11/9/19 17:14  76 24     40


 


11/9/19 17:00 98.8 79 23 124/46 (72) 99   








General Appearance:  on vent


EENT:  PERRL/EOMI


Neck:  JVD, other - cannot assess JVP


Rhythm:  ST


Cardiovascular:  regular rhythm


Respiratory/Chest:  crackles/rales


Abdomen:  soft


Extremities:  no swelling, other











Intake and Output  


 


 11/9/19 11/10/19





 18:59 06:59


 


Intake Total 810 ml 1090 ml


 


Output Total 1885 ml 2160 ml


 


Balance -1075 ml -1070 ml


 


  


 


IV Total 110 ml 330 ml


 


Tube Feeding 600 ml 600 ml


 


Other 100 ml 160 ml


 


Output Urine Total 1885 ml 2160 ml











Laboratory Tests








Test


  11/10/19


05:00


 


White Blood Count


  10.0 K/UL


(4.8-10.8)


 


Red Blood Count


  3.01 M/UL


(4.20-5.40)  L


 


Hemoglobin


  8.1 G/DL


(12.0-16.0)  L


 


Hematocrit


  24.9 %


(37.0-47.0)  L


 


Mean Corpuscular Volume 83 FL (80-99)  


 


Mean Corpuscular Hemoglobin


  27.0 PG


(27.0-31.0)


 


Mean Corpuscular Hemoglobin


Concent 32.7 G/DL


(32.0-36.0)


 


Red Cell Distribution Width


  14.5 %


(11.6-14.8)


 


Platelet Count


  386 K/UL


(150-450)


 


Mean Platelet Volume


  7.8 FL


(6.5-10.1)


 


Neutrophils (%) (Auto)


  62.9 %


(45.0-75.0)


 


Lymphocytes (%) (Auto)


  15.8 %


(20.0-45.0)  L


 


Monocytes (%) (Auto)


  9.7 %


(1.0-10.0)


 


Eosinophils (%) (Auto)


  9.7 %


(0.0-3.0)  H


 


Basophils (%) (Auto)


  1.9 %


(0.0-2.0)


 


Sodium Level


  143 MMOL/L


(136-145)


 


Potassium Level


  4.6 MMOL/L


(3.5-5.1)


 


Chloride Level


  105 MMOL/L


()


 


Carbon Dioxide Level


  33 MMOL/L


(21-32)  H


 


Anion Gap


  5 mmol/L


(5-15)


 


Blood Urea Nitrogen


  31 mg/dL


(7-18)  H


 


Creatinine


  1.2 MG/DL


(0.55-1.30)


 


Estimat Glomerular Filtration


Rate 45.0 mL/min


(>60)


 


Glucose Level


  130 MG/DL


()  H


 


Calcium Level


  8.6 MG/DL


(8.5-10.1)

















Katia Castano MD Nov 10, 2019 16:31

## 2019-11-10 NOTE — NUR
NURSE NOTES:

patient noted with  anxiety with episode of biting ETT tubing , trying to dangle the feet on 
the side rails  talk therapy, suctioned  and reality orientation provided not effective.  
Ativan given effective.

## 2019-11-10 NOTE — GENERAL PROGRESS NOTE
Assessment/Plan


Problem List:  


(1) Abnormal TSH


ICD Codes:  R79.89 - Other specified abnormal findings of blood chemistry


SNOMED:  210000714


(2) Hyperglycemia due to type 2 diabetes mellitus


ICD Codes:  E11.65 - Type 2 diabetes mellitus with hyperglycemia


SNOMED:  460564764627033, 83093586


Qualifiers:  


   Qualified Codes:  E11.65 - Type 2 diabetes mellitus with hyperglycemia; 

Z79.4 - Long term (current) use of insulin


(3) Acute metabolic encephalopathy


ICD Codes:  G93.41 - Metabolic encephalopathy


SNOMED:  07706345, 470336797


(4) ARF (acute renal failure)


ICD Codes:  N17.9 - Acute kidney failure, unspecified


SNOMED:  56799512


Qualifiers:  


   Qualified Codes:  N17.9 - Acute kidney failure, unspecified


(5) Healthcare associated bacterial pneumonia


ICD Codes:  J15.9 - Unspecified bacterial pneumonia


SNOMED:  772710355


Status:  unchanged


Assessment/Plan:


continue Levemir 8 units qhs 


continue NISS every 6 hours 


hypoglycemia protocol in order





Subjective


ROS Limited/Unobtainable:  Yes


Allergies:  


Coded Allergies:  


     No Known Allergies (Unverified , 10/14/19)


Subjective


events noted 











Item Value  Date Time


 


Bedside Blood Glucose 131 mg/dl H 11/10/19 0536


 


Bedside Blood Glucose 189 mg/dl H 11/9/19 2355


 


Bedside Blood Glucose 202 mg/dl H 11/9/19 2039


 


Bedside Blood Glucose 191 mg/dl H 11/9/19 1800


 


Bedside Blood Glucose 139 mg/dl H 11/9/19 1200











Objective





Last 24 Hour Vital Signs








  Date Time  Temp Pulse Resp B/P (MAP) Pulse Ox O2 Delivery O2 Flow Rate FiO2


 


11/10/19 11:08  81 14     40


 


11/10/19 09:07  79 18     40


 


11/10/19 08:27  73  122/41    


 


11/10/19 08:00      Endotracheal Tube  


 


11/10/19 08:00 99.0 73 20 122/41 (68) 100   


 


11/10/19 08:00        40


 


11/10/19 08:00  69 25 116/47 (70) 100   


 


11/10/19 07:00  69 25 116/47 (70) 100   


 


11/10/19 06:56  73 31  100 Mechanical Ventilator  40





  78 28     40


 


11/10/19 06:00  69 18 114/42 (66) 100   


 


11/10/19 05:09  75 20     40


 


11/10/19 05:00  77 28 105/42 (63) 99   


 


11/10/19 04:00        40


 


11/10/19 04:00      Endotracheal Tube  


 


11/10/19 04:00 99.2 87 27 94/47 (63) 100   


 


11/10/19 03:40  89      


 


11/10/19 03:28  86 25     40


 


11/10/19 03:00  90 30 113/42 (65) 97   


 


11/10/19 02:00  86 29 124/46 (72) 100   


 


11/10/19 01:52  89 16  100 Mechanical Ventilator  40





  89 18     40


 


11/10/19 01:00  82 29 113/51 (71) 99   


 


11/10/19 00:00        40


 


11/10/19 00:00      Endotracheal Tube  


 


11/10/19 00:00 99.0 86 22 128/57 (80) 100   


 


11/9/19 23:53  97      


 


11/9/19 23:08  82 22     40


 


11/9/19 23:00  82 25 123/48 (73) 99   


 


11/9/19 22:00  80 20 122/49 (73) 100   


 


11/9/19 21:06  86 23     40


 


11/9/19 21:00  89 27 125/53 (77) 100   


 


11/9/19 20:00        40


 


11/9/19 20:00      Endotracheal Tube  


 


11/9/19 20:00 99.5 91 29 102/63 (76) 99   


 


11/9/19 19:27  85      


 


11/9/19 19:00  83 20 125/59 (81) 100   


 


11/9/19 18:55  78 27  100 Mechanical Ventilator  40





  78 27     40


 


11/9/19 18:00  76 22 124/44 (70) 99   


 


11/9/19 17:21  79  124/46    


 


11/9/19 17:14  76 24     40


 


11/9/19 17:00 98.8 79 23 124/46 (72) 99   


 


11/9/19 16:00  81 27 122/47 (72) 99   


 


11/9/19 16:00        40


 


11/9/19 16:00      Endotracheal Tube  


 


11/9/19 16:00  80      


 


11/9/19 15:14  78 27     40


 


11/9/19 15:00  77 22 120/45 (70) 99   


 


11/9/19 14:00  73 21 125/43 (70) 100   


 


11/9/19 13:19  78 20  100 Mechanical Ventilator  40





  79 14     40


 


11/9/19 13:00  73 22 112/46 (68) 100   


 


11/9/19 12:30  78 26 129/55 (79) 100   


 


11/9/19 12:00        40


 


11/9/19 12:00 98.5 73 23 126/40 (68) 100   


 


11/9/19 12:00      Endotracheal Tube  


 


11/9/19 11:52  77      

















Intake and Output  


 


 11/9/19 11/10/19





 19:00 07:00


 


Intake Total 1145 ml 755 ml


 


Output Total 1845 ml 2190 ml


 


Balance -700 ml -1435 ml


 


  


 


IV Total 385 ml 55 ml


 


Tube Feeding 660 ml 540 ml


 


Other 100 ml 160 ml


 


Output Urine Total 1845 ml 2190 ml








Laboratory Tests


11/10/19 05:00: 


White Blood Count 10.0, Red Blood Count 3.01L, Hemoglobin 8.1L, Hematocrit 24.9L

, Mean Corpuscular Volume 83, Mean Corpuscular Hemoglobin 27.0, Mean 

Corpuscular Hemoglobin Concent 32.7, Red Cell Distribution Width 14.5, Platelet 

Count 386, Mean Platelet Volume 7.8, Neutrophils (%) (Auto) 62.9, Lymphocytes (%

) (Auto) 15.8L, Monocytes (%) (Auto) 9.7, Eosinophils (%) (Auto) 9.7H, 

Basophils (%) (Auto) 1.9, Sodium Level 143, Potassium Level 4.6, Chloride Level 

105, Carbon Dioxide Level 33H, Anion Gap 5, Blood Urea Nitrogen 31H, Creatinine 

1.2, Estimat Glomerular Filtration Rate 45.0, Glucose Level 130H, Calcium Level 

8.6


Height (Feet):  5


Height (Inches):  5.00


Weight (Pounds):  182


General Appearance:  lethargic


Neck:  normal alignment


Cardiovascular:  tachycardia


Respiratory/Chest:  decreased breath sounds


Abdomen:  normal bowel sounds


Objective





Current Medications








 Medications


  (Trade)  Dose


 Ordered  Sig/Winnie


 Route


 PRN Reason  Start Time


 Stop Time Status Last Admin


Dose Admin


 


 Acetaminophen


  (Tylenol)  650 mg  Q4H  PRN


 NG


 Mild Pain/Temp > 100.5  11/3/19 20:30


 11/25/19 20:29  11/10/19 03:01


 


 


 Acetaminophen


  (Tylenol)  650 mg  Q4H  PRN


 RECTAL


 TEMP>100.5  11/3/19 20:30


 12/2/19 20:29   


 


 


 Albuterol/


 Ipratropium


  (Albuterol/


 Ipratropium)  3 ml  Q4H  PRN


 HHN


 Shortness of Breath  11/7/19 09:45


 11/12/19 09:44   


 


 


 Albuterol/


 Ipratropium


  (Albuterol/


 Ipratropium)  3 ml  Q6HRT


 HHN


   11/7/19 13:00


 11/12/19 12:59  11/10/19 07:03


 


 


 Amlodipine


 Besylate


  (Norvasc)  2.5 mg  BID


 NG


   11/4/19 09:00


 11/29/19 17:59  11/10/19 08:27


 


 


 Chlorhexidine


 Gluconate


  (Mae-Hex 2%)  1 applic  DAILY@2000


 TOPIC


   11/5/19 20:00


 12/5/19 19:59  11/9/19 19:42


 


 


 Dextrose


  (Dextrose 50%)  25 ml  Q30M  PRN


 IV


 Hypoglycemia  11/5/19 06:30


 12/5/19 06:29   


 


 


 Dextrose


  (Dextrose 50%)  50 ml  Q30M  PRN


 IV


 Hypoglycemia  11/5/19 06:30


 12/5/19 06:29   


 


 


 Divalproex Sodium


  (Depakote


 Sprinkles)  500 mg  Q12HR


 NG


   11/3/19 21:00


 11/14/19 21:59  11/10/19 08:26


 


 


 Folic Acid


  (Folate)  3 mg  DAILY


 GT


   11/7/19 09:00


 11/30/19 08:59  11/10/19 08:27


 


 


 Furosemide


  (Lasix)  40 mg  EVERY 12  HOURS


 IV


   11/7/19 21:00


 12/7/19 20:59  11/10/19 08:28


 


 


 Insulin Aspart


  (NovoLOG)    EVERY 6  HOURS


 SUBQ


   11/4/19 00:00


 11/29/19 11:59  11/9/19 23:55


 


 


 Insulin Detemir


  (Levemir)  8 units  QHS


 SUBQ


   11/6/19 21:00


 12/1/19 08:59  11/9/19 20:39


 


 


 Lactulose


  (Cephulac)  20 gm  Q8H  PRN


 NG


 Constipation  11/3/19 20:30


 12/3/19 20:29  11/5/19 08:12


 


 


 Lorazepam


  (Ativan 2mg/ml


 1ml)  1 mg  Q4H  PRN


 IV


 For Anxiety  11/3/19 20:30


 11/10/19 20:29  11/9/19 20:45


 


 


 Meropenem 1 gm/


 Sodium Chloride  55 ml @ 


 110 mls/hr  Q8H


 IVPB


   11/4/19 17:00


 11/14/19 16:59  11/10/19 08:26


 


 


 Metoclopramide HCl


  (Reglan)  10 mg  Q6H  PRN


 IVP


 Nausea & Vomiting  11/6/19 10:45


 12/6/19 10:44   


 


 


 Midazolam HCl 50


 mg/Dextrose  100 ml @ 0


 mls/hr  Q24H  PRN


 IV


 To Patient Comfort  11/8/19 20:15


 11/10/19 20:34   


 


 


 Pantoprazole


  (Protonix)  40 mg  EVERY 12  HOURS


 IVP


   11/3/19 21:00


 12/3/19 08:59  11/10/19 08:27


 


 


 Potassium Chloride


  (K-Dur)  40 meq  DAILY


 GT


   11/6/19 09:00


 12/5/19 17:59  11/10/19 08:27


 


 


 Sodium Chloride  1,000 ml @ 


 999 mls/hr  Q1H1M ONCE


 IV


   11/10/19 11:30


 11/10/19 12:30   


 


 


 Vancomycin HCl


  (Vanco rx to


 dose)  1 ea  DAILY  PRN


 MISC


 Per rx protocol  11/4/19 15:30


 12/4/19 15:29   


 


 


 Vancomycin/Sodium


 Chloride  275 ml @ 


 183.333


 mls/hr  Q24H


 IVPB


   11/8/19 18:00


 11/13/19 17:59  11/9/19 17:27


 

















Riccardo Velez MD Nov 10, 2019 11:34

## 2019-11-10 NOTE — NUR
NURSE NOTES:



PATIENT KEPT CLEAN AND DRY. NOTED SOFT BM. SUCTIONED AND REPOSITIONED PATIENT. WILL CONTINUE 
TO MONITOR.

## 2019-11-10 NOTE — NUR
NURSE NOTES:

received report from Inocente JOHNSON. Patient in bed sleeping easily arousable to verbal and 
tactile stimuli.  ETT 7.5/23cm lip line ac 14, , fi02 40% peep 5 satting 100% etC02 
26. Right upper arm PICC line intact.  GT intact abdomen soft and non-distended.On Glucerna  
1.2 at 60cc/hr, no residual.   no s/s of hypo/hyperglycemia. SR on cardiac monitor HR 86. On 
P 200 for wound management. skin warm and dry to touch. bilateral soft wrist restraint 
checked. bed alarm on. bed lock and in low position. Contact isolation maintained and 
observed. Will continue plan of care.

## 2019-11-10 NOTE — GENERAL PROGRESS NOTE
Assessment/Plan


Status:  unchanged


Assessment/Plan:








Assessment/Plan


Problems:  


(1) PEG (percutaneous endoscopic gastrostomy) status


ICD Codes:  Z93.1 - Gastrostomy status


SNOMED:  764958868, 665320150


(2) Anemia


ICD Codes:  D64.9 - Anemia, unspecified


SNOMED:  579038556


Qualifiers:  


   Qualified Codes:  D64.9 - Anemia, unspecified


(3) Dehydration


ICD Codes:  E86.0 - Dehydration


SNOMED:  85591335, 1994865


Status:  unchanged


Status Narrative


Discussed with Dr. Turk.


Assessment/Plan


Assessment


(1) pneumonia


(2) UTI


(3) Sepsis


(4) Dehydration


(5) CHANCE 


(6) Acute metabolic encephalopathy


(7) Hyperglycemia due to type 2 diabetes mellitus


(8) Renal failure (ARF), acute on chronic


(9) Aspiration pneumonia


(10) Abnormal TSH


(11) Pelvic mass in female


(12) Congestive Heart Failure


(13) Anemia


(14) Dysphagia - s/p GT





s/p EGD SUMMARY OF FINDINGS:


1. No evidence of any active upper GI bleeding at this time.


2. Gastritis status biopsy.





Recommendations


GTFs


prn transfusions


bowel regime


PPI and H2B


will follow up, consider colonoscopy if patient has recurrent GI bleed





Subjective


ROS Limited/Unobtainable:  No


Allergies:  


Coded Allergies:  


     No Known Allergies (Unverified , 10/14/19)





Objective





Last 24 Hour Vital Signs








  Date Time  Temp Pulse Resp B/P (MAP) Pulse Ox O2 Delivery O2 Flow Rate FiO2


 


11/10/19 06:56  73 31  100 Mechanical Ventilator  40





  78 28     40


 


11/10/19 06:00  69 18 114/42 (66) 100   


 


11/10/19 05:09  75 20     40


 


11/10/19 05:00  77 28 105/42 (63) 99   


 


11/10/19 04:00        40


 


11/10/19 04:00      Endotracheal Tube  


 


11/10/19 04:00 99.2 87 27 94/47 (63) 100   


 


11/10/19 03:40  89      


 


11/10/19 03:28  86 25     40


 


11/10/19 03:00  90 30 113/42 (65) 97   


 


11/10/19 02:00  86 29 124/46 (72) 100   


 


11/10/19 01:52  89 16  100 Mechanical Ventilator  40





  89 18     40


 


11/10/19 01:00  82 29 113/51 (71) 99   


 


11/10/19 00:00        40


 


11/10/19 00:00      Endotracheal Tube  


 


11/10/19 00:00 99.0 86 22 128/57 (80) 100   


 


11/9/19 23:53  97      


 


11/9/19 23:08  82 22     40


 


11/9/19 23:00  82 25 123/48 (73) 99   


 


11/9/19 22:00  80 20 122/49 (73) 100   


 


11/9/19 21:06  86 23     40


 


11/9/19 21:00  89 27 125/53 (77) 100   


 


11/9/19 20:00        40


 


11/9/19 20:00      Endotracheal Tube  


 


11/9/19 20:00 99.5 91 29 102/63 (76) 99   


 


11/9/19 19:27  85      


 


11/9/19 19:00  83 20 125/59 (81) 100   


 


11/9/19 18:55  78 27  100 Mechanical Ventilator  40





  78 27     40


 


11/9/19 18:00  76 22 124/44 (70) 99   


 


11/9/19 17:21  79  124/46    


 


11/9/19 17:14  76 24     40


 


11/9/19 17:00 98.8 79 23 124/46 (72) 99   


 


11/9/19 16:00  81 27 122/47 (72) 99   


 


11/9/19 16:00        40


 


11/9/19 16:00      Endotracheal Tube  


 


11/9/19 16:00  80      


 


11/9/19 15:14  78 27     40


 


11/9/19 15:00  77 22 120/45 (70) 99   


 


11/9/19 14:00  73 21 125/43 (70) 100   


 


11/9/19 13:19  78 20  100 Mechanical Ventilator  40





  79 14     40


 


11/9/19 13:00  73 22 112/46 (68) 100   


 


11/9/19 12:30  78 26 129/55 (79) 100   


 


11/9/19 12:00        40


 


11/9/19 12:00 98.5 73 23 126/40 (68) 100   


 


11/9/19 12:00      Endotracheal Tube  


 


11/9/19 11:52  77      


 


11/9/19 11:00  76 22 122/41 (68) 100   


 


11/9/19 10:41  79 20     40


 


11/9/19 10:00  79 23 133/45 (74) 100   


 


11/9/19 09:39  72  127/52    


 


11/9/19 09:00  74 21 112/41 (64) 100   


 


11/9/19 08:47  82 25     40


 


11/9/19 08:00 99.1 77 26 126/44 (71) 100   


 


11/9/19 08:00        40


 


11/9/19 08:00      Endotracheal Tube  


 


11/9/19 08:00  77      

















Intake and Output  


 


 11/9/19 11/10/19





 19:00 07:00


 


Intake Total 1145 ml 755 ml


 


Output Total 1845 ml 2120 ml


 


Balance -700 ml -1365 ml


 


  


 


IV Total 385 ml 55 ml


 


Tube Feeding 660 ml 540 ml


 


Other 100 ml 160 ml


 


Output Urine Total 1845 ml 2120 ml








Laboratory Tests


11/9/19 08:10: 


Arterial Blood pH 7.478H, Arterial Blood Partial Pressure CO2 45.1H, Arterial 

Blood Partial Pressure O2 102.6H, Arterial Blood HCO3 32.7H, Arterial Blood 

Oxygen Saturation 97.4, Arterial Blood Base Excess 8.3H, Graham Test Positive


11/10/19 05:00: 


White Blood Count 10.0, Red Blood Count 3.01L, Hemoglobin 8.1L, Hematocrit 24.9L

, Mean Corpuscular Volume 83, Mean Corpuscular Hemoglobin 27.0, Mean 

Corpuscular Hemoglobin Concent 32.7, Red Cell Distribution Width 14.5, Platelet 

Count 386, Mean Platelet Volume 7.8, Neutrophils (%) (Auto) 62.9, Lymphocytes (%

) (Auto) 15.8L, Monocytes (%) (Auto) 9.7, Eosinophils (%) (Auto) 9.7H, 

Basophils (%) (Auto) 1.9, Sodium Level 143, Potassium Level 4.6, Chloride Level 

105, Carbon Dioxide Level 33H, Anion Gap 5, Blood Urea Nitrogen 31H, Creatinine 

1.2, Estimat Glomerular Filtration Rate 45.0, Glucose Level 130H, Calcium Level 

8.6


Height (Feet):  5


Height (Inches):  5.00


Weight (Pounds):  182


General Appearance:  lethargic


EENT:  PERRL/EOMI


Neck:  supple


Cardiovascular:  normal rate


Respiratory/Chest:  decreased breath sounds


Abdomen:  normal bowel sounds, non tender, soft


Extremities:  non-tender











Storm Turk MD Nov 10, 2019 07:13

## 2019-11-10 NOTE — NUR
NURSE NOTES:



TURNED AND REPOSITIONED PATIENT. NO SIGNS OF DISTRESS FOR NOW. TOLERATING TUBE FEEDING. WILL 
CONTINUE OT MONITOR.

## 2019-11-10 NOTE — PULMONOLGY CRITICAL CARE NOTE
Critical Care - Asmt/Plan


Assessment/Plan:


Pulmonary CCM Progress Note 





Assessment/Plan


Problems:  


(1) Healthcare/aspiration associated bacterial pneumonia, respiratory failure, 

some improvement in CXR


(2) UTI (urinary tract infection)


(3) Sepsis


(4) Dehydration


(5) CHANCE (acute kidney injury)


(6) Acute metabolic encephalopathy


(7) Hyperglycemia due to type 2 diabetes mellitus


(8) Renal failure (ARF), acute on chronic


(9) Ventilator dependant Respiratory Failure - not tolerating weaning


(10) Abnormal TSH


(11) Pelvic mass in female


(12) PEG (percutaneous endoscopic gastrostomy) status


Assessment/Plan


Marked leukocytosis S/P WBC scan with uptake in pelvis, has pelvic mass


Sepsis


Possible pneumonia


E coli UTI


Acute hypoxemic respiratory failure


S/p code blue - now VDRF - not tolerating weaning - failed weaning again today, 

RSBI 130,  VT <200


Acute encephalopathy


Hx CHF


HTN


CHANCE improving


Pelvic mass/possible GYN malignancy vs cyst


Dysphagia S/P PEG





Plan:


-Wean FIO2, ABG PRN


-Optimize pulmonary hygiene


-Wean ventilator as tolerated


-Titrate down FiO2 to keep SaO2 > 90%


-Monitor WCt, RFS sent for JAK2 mutation, per Dr. Pop BMBx if unrevealing


-Abx per ID, F/U Cx's


-monitor volumes and renal function, F/U renal recs, diurese PRN


-monitor encephalopathy


-NPO, GTF's


-DVT Px


-FC


-PICC line





Subjective


Allergies:  


Coded Allergies:  


     No Known Allergies


Subjective


Sedated on the Ventilator





Objective





Vital Signs Noted





General Appearance:  Sedated, cachetic


HEENT:  normocephalic, atraumatic, anicteric, mucous membranes moist


Respiratory/Chest:  occasional rhonchi


Cardiovascular:  normal peripheral pulses, normal rate, regular rhythm


Abdomen:  normal bowel sounds, soft, non tender, no organomegaly, non distended

, no mass, other - G


Extremities:  no cyanosis, no clubbing, no edema





Microbiology noted








 Date/Time


Source Procedure


Growth Status


 


 


 10/31/19 10:15


Urine,Clean Catch Urine Culture - Preliminary


YEAST Resulted








Laboratory Tests Noted





Critical Care - Objective





Last 24 Hour Vital Signs








  Date Time  Temp Pulse Resp B/P (MAP) Pulse Ox O2 Delivery O2 Flow Rate FiO2


 


11/10/19 09:07  79 18     40


 


11/10/19 08:27  73  122/41    


 


11/10/19 08:00      Endotracheal Tube  


 


11/10/19 08:00 99.0 73 20 122/41 (68) 100   


 


11/10/19 08:00        40


 


11/10/19 08:00  69 25 116/47 (70) 100   


 


11/10/19 07:00  69 25 116/47 (70) 100   


 


11/10/19 06:56  73 31  100 Mechanical Ventilator  40





  78 28     40


 


11/10/19 06:00  69 18 114/42 (66) 100   


 


11/10/19 05:09  75 20     40


 


11/10/19 05:00  77 28 105/42 (63) 99   


 


11/10/19 04:00        40


 


11/10/19 04:00      Endotracheal Tube  


 


11/10/19 04:00 99.2 87 27 94/47 (63) 100   


 


11/10/19 03:40  89      


 


11/10/19 03:28  86 25     40


 


11/10/19 03:00  90 30 113/42 (65) 97   


 


11/10/19 02:00  86 29 124/46 (72) 100   


 


11/10/19 01:52  89 16  100 Mechanical Ventilator  40





  89 18     40


 


11/10/19 01:00  82 29 113/51 (71) 99   


 


11/10/19 00:00        40


 


11/10/19 00:00      Endotracheal Tube  


 


11/10/19 00:00 99.0 86 22 128/57 (80) 100   


 


11/9/19 23:53  97      


 


11/9/19 23:08  82 22     40


 


11/9/19 23:00  82 25 123/48 (73) 99   


 


11/9/19 22:00  80 20 122/49 (73) 100   


 


11/9/19 21:06  86 23     40


 


11/9/19 21:00  89 27 125/53 (77) 100   


 


11/9/19 20:00        40


 


11/9/19 20:00      Endotracheal Tube  


 


11/9/19 20:00 99.5 91 29 102/63 (76) 99   


 


11/9/19 19:27  85      


 


11/9/19 19:00  83 20 125/59 (81) 100   


 


11/9/19 18:55  78 27  100 Mechanical Ventilator  40





  78 27     40


 


11/9/19 18:00  76 22 124/44 (70) 99   


 


11/9/19 17:21  79  124/46    


 


11/9/19 17:14  76 24     40


 


11/9/19 17:00 98.8 79 23 124/46 (72) 99   


 


11/9/19 16:00  81 27 122/47 (72) 99   


 


11/9/19 16:00        40


 


11/9/19 16:00      Endotracheal Tube  


 


11/9/19 16:00  80      


 


11/9/19 15:14  78 27     40


 


11/9/19 15:00  77 22 120/45 (70) 99   


 


11/9/19 14:00  73 21 125/43 (70) 100   


 


11/9/19 13:19  78 20  100 Mechanical Ventilator  40





  79 14     40


 


11/9/19 13:00  73 22 112/46 (68) 100   


 


11/9/19 12:30  78 26 129/55 (79) 100   


 


11/9/19 12:00        40


 


11/9/19 12:00 98.5 73 23 126/40 (68) 100   


 


11/9/19 12:00      Endotracheal Tube  


 


11/9/19 11:52  77      








Accucheck:  131





Critical Care - Subjective


ROS Limited/Unobtainable:  No


FI02:  40


Vent Support Breath Rate:  14


Vent Support Mode:  AC


Vent Tidal Volume:  400


Sputum Amount:  Small


PEEP:  5.0


PIP:  20


Tube Feeding Amount:  0


I&O:











Intake and Output  


 


 11/9/19 11/10/19





 19:00 07:00


 


Intake Total 1145 ml 755 ml


 


Output Total 1845 ml 2190 ml


 


Balance -700 ml -1435 ml


 


  


 


IV Total 385 ml 55 ml


 


Tube Feeding 660 ml 540 ml


 


Other 100 ml 160 ml


 


Output Urine Total 1845 ml 2190 ml








ET-Tube:  7.5


ET Position:  23











Delmer Main MD Nov 10, 2019 11:08

## 2019-11-10 NOTE — GENERAL PROGRESS NOTE
Assessment/Plan


Status:  unchanged


Assessment/Plan:


S, O: Intubated , vent setting reviewed. No pressor. awake, limited following 

commands





PHYSICAL EXAMINATION:HEAD AND NECK:  intubated .


CHEST:  Bronchial breathing sounds.ABDOMEN:  Soft.  No organomegaly.


MUSCULOSKELETAL:  Diffuse 1+ or 2+ nonpitting edema in all four contracted


extremities.  The patient is not following the commands.  Limited


evaluation.NEUROLOGY:  The patient is awake.  Not alert.  Not verbally


communicative.





LABORATORY DATA:  Labs dated Nov 6, 2019  reviewed





Meds: Reviewed and reconciled in the chart





Imaging:  CXR reviewed 





ASSESSMENT AND PLAN:


1. VDRF


2. Sepsis.  secondary to healthcare-associated pneumonia or


3. Acute chf exacerbation - Diastolic


4. Chronic encephalomalacia.


3. Acute on chronic anemia.


4. Renal failure, age indeterminate.


6. Hyperthyroidism.


7. Hyperkalemia.


8. Diabetes type 2, uncontrolled with A1c of 10.


9. Psychiatric disorder.


10. GI and DVT prophylaxis.


11. PAH- Severe 





PLAN OF CARE:





Post PEG tube


Out patient followup for abnormal incidental imaging finding ( possibility of 

CA cant be excluded 


Acute drop in Hgb and observation of UGI bleeding, Will hold DAPT


notes from pulmonary reviewed


Several failed Weaning trials. 


Trach !?





Subjective


Allergies:  


Coded Allergies:  


     No Known Allergies (Unverified , 10/14/19)





Objective





Last 24 Hour Vital Signs








  Date Time  Temp Pulse Resp B/P (MAP) Pulse Ox O2 Delivery O2 Flow Rate FiO2


 


11/10/19 07:00  69 25 116/47 (70) 100   


 


11/10/19 06:56  73 31  100 Mechanical Ventilator  40





  78 28     40


 


11/10/19 06:00  69 18 114/42 (66) 100   


 


11/10/19 05:09  75 20     40


 


11/10/19 05:00  77 28 105/42 (63) 99   


 


11/10/19 04:00        40


 


11/10/19 04:00      Endotracheal Tube  


 


11/10/19 04:00 99.2 87 27 94/47 (63) 100   


 


11/10/19 03:40  89      


 


11/10/19 03:28  86 25     40


 


11/10/19 03:00  90 30 113/42 (65) 97   


 


11/10/19 02:00  86 29 124/46 (72) 100   


 


11/10/19 01:52  89 16  100 Mechanical Ventilator  40





  89 18     40


 


11/10/19 01:00  82 29 113/51 (71) 99   


 


11/10/19 00:00        40


 


11/10/19 00:00      Endotracheal Tube  


 


11/10/19 00:00 99.0 86 22 128/57 (80) 100   


 


11/9/19 23:53  97      


 


11/9/19 23:08  82 22     40


 


11/9/19 23:00  82 25 123/48 (73) 99   


 


11/9/19 22:00  80 20 122/49 (73) 100   


 


11/9/19 21:06  86 23     40


 


11/9/19 21:00  89 27 125/53 (77) 100   


 


11/9/19 20:00        40


 


11/9/19 20:00      Endotracheal Tube  


 


11/9/19 20:00 99.5 91 29 102/63 (76) 99   


 


11/9/19 19:27  85      


 


11/9/19 19:00  83 20 125/59 (81) 100   


 


11/9/19 18:55  78 27  100 Mechanical Ventilator  40





  78 27     40


 


11/9/19 18:00  76 22 124/44 (70) 99   


 


11/9/19 17:21  79  124/46    


 


11/9/19 17:14  76 24     40


 


11/9/19 17:00 98.8 79 23 124/46 (72) 99   


 


11/9/19 16:00  81 27 122/47 (72) 99   


 


11/9/19 16:00        40


 


11/9/19 16:00      Endotracheal Tube  


 


11/9/19 16:00  80      


 


11/9/19 15:14  78 27     40


 


11/9/19 15:00  77 22 120/45 (70) 99   


 


11/9/19 14:00  73 21 125/43 (70) 100   


 


11/9/19 13:19  78 20  100 Mechanical Ventilator  40





  79 14     40


 


11/9/19 13:00  73 22 112/46 (68) 100   


 


11/9/19 12:30  78 26 129/55 (79) 100   


 


11/9/19 12:00        40


 


11/9/19 12:00 98.5 73 23 126/40 (68) 100   


 


11/9/19 12:00      Endotracheal Tube  


 


11/9/19 11:52  77      


 


11/9/19 11:00  76 22 122/41 (68) 100   


 


11/9/19 10:41  79 20     40


 


11/9/19 10:00  79 23 133/45 (74) 100   


 


11/9/19 09:39  72  127/52    


 


11/9/19 09:00  74 21 112/41 (64) 100   


 


11/9/19 08:47  82 25     40


 


11/9/19 08:00 99.1 77 26 126/44 (71) 100   


 


11/9/19 08:00        40


 


11/9/19 08:00      Endotracheal Tube  


 


11/9/19 08:00  77      

















Intake and Output  


 


 11/9/19 11/10/19





 19:00 07:00


 


Intake Total 1145 ml 755 ml


 


Output Total 1845 ml 2190 ml


 


Balance -700 ml -1435 ml


 


  


 


IV Total 385 ml 55 ml


 


Tube Feeding 660 ml 540 ml


 


Other 100 ml 160 ml


 


Output Urine Total 1845 ml 2190 ml








Laboratory Tests


11/9/19 08:10: 


Arterial Blood pH 7.478H, Arterial Blood Partial Pressure CO2 45.1H, Arterial 

Blood Partial Pressure O2 102.6H, Arterial Blood HCO3 32.7H, Arterial Blood 

Oxygen Saturation 97.4, Arterial Blood Base Excess 8.3H, Graham Test Positive


11/10/19 05:00: 


White Blood Count 10.0, Red Blood Count 3.01L, Hemoglobin 8.1L, Hematocrit 24.9L

, Mean Corpuscular Volume 83, Mean Corpuscular Hemoglobin 27.0, Mean 

Corpuscular Hemoglobin Concent 32.7, Red Cell Distribution Width 14.5, Platelet 

Count 386, Mean Platelet Volume 7.8, Neutrophils (%) (Auto) 62.9, Lymphocytes (%

) (Auto) 15.8L, Monocytes (%) (Auto) 9.7, Eosinophils (%) (Auto) 9.7H, 

Basophils (%) (Auto) 1.9, Sodium Level 143, Potassium Level 4.6, Chloride Level 

105, Carbon Dioxide Level 33H, Anion Gap 5, Blood Urea Nitrogen 31H, Creatinine 

1.2, Estimat Glomerular Filtration Rate 45.0, Glucose Level 130H, Calcium Level 

8.6


Height (Feet):  5


Height (Inches):  5.00


Weight (Pounds):  182











Garrick Keith MD Nov 10, 2019 07:37

## 2019-11-10 NOTE — NUR
RESPIRATORY NOTES:

Patient failed weaning criteria. RSBI 130, NIF -10, RR >36, VT <200. Patient became restless 
and sort of breath after 3 minutes during weaning trail. Will not continue with spontaneous 
breathing trial. Will continue to closely monitor patient.

## 2019-11-10 NOTE — NUR
RESPIRATORY NOTES:

Received Patient on Vent settings ACVC RR 14, , Fio2 40% PEEP +5. Patient has a 7.5 
ETT at 23 cm at the lip, secured with anchorfast. Suctioned a no to minimal amount of clear, 
thin secretions. Patient lying in bed comfortable. Vent plugged into red outlet. Alarms are 
on and audible. Will continue to monitor patient throughout the day.

## 2019-11-10 NOTE — SURGERY PROGRESS NOTE
Surgery Progress Note


Subjective


Additional Comments


ill appearing in ICU


unresponsive


not able to wean from vent





Objective





Last 24 Hour Vital Signs








  Date Time  Temp Pulse Resp B/P (MAP) Pulse Ox O2 Delivery O2 Flow Rate FiO2


 


11/10/19 13:00  73 22 121/43 (69) 98   


 


11/10/19 12:00  89      


 


11/10/19 12:00 99.0 75 20 117/39 (65) 100   


 


11/10/19 12:00        40


 


11/10/19 12:00      Endotracheal Tube  


 


11/10/19 11:08  81 14     40


 


11/10/19 11:00  89 25 128/60 (82) 100   


 


11/10/19 10:00  78 21 127/46 (73) 100   


 


11/10/19 09:07  79 18     40


 


11/10/19 09:00  72 20 109/40 (63) 100   


 


11/10/19 08:27  73  122/41    


 


11/10/19 08:00      Endotracheal Tube  


 


11/10/19 08:00 99.0 73 20 122/41 (68) 100   


 


11/10/19 08:00        40


 


11/10/19 08:00  69 25 116/47 (70) 100   


 


11/10/19 08:00  76      


 


11/10/19 07:00  69 25 116/47 (70) 100   


 


11/10/19 06:56  73 31  100 Mechanical Ventilator  40





  78 28     40


 


11/10/19 06:00  69 18 114/42 (66) 100   


 


11/10/19 05:09  75 20     40


 


11/10/19 05:00  77 28 105/42 (63) 99   


 


11/10/19 04:00        40


 


11/10/19 04:00      Endotracheal Tube  


 


11/10/19 04:00 99.2 87 27 94/47 (63) 100   


 


11/10/19 03:40  89      


 


11/10/19 03:28  86 25     40


 


11/10/19 03:00  90 30 113/42 (65) 97   


 


11/10/19 02:00  86 29 124/46 (72) 100   


 


11/10/19 01:52  89 16  100 Mechanical Ventilator  40





  89 18     40


 


11/10/19 01:00  82 29 113/51 (71) 99   


 


11/10/19 00:00        40


 


11/10/19 00:00      Endotracheal Tube  


 


11/10/19 00:00 99.0 86 22 128/57 (80) 100   


 


11/9/19 23:53  97      


 


11/9/19 23:08  82 22     40


 


11/9/19 23:00  82 25 123/48 (73) 99   


 


11/9/19 22:00  80 20 122/49 (73) 100   


 


11/9/19 21:06  86 23     40


 


11/9/19 21:00  89 27 125/53 (77) 100   


 


11/9/19 20:00        40


 


11/9/19 20:00      Endotracheal Tube  


 


11/9/19 20:00 99.5 91 29 102/63 (76) 99   


 


11/9/19 19:27  85      


 


11/9/19 19:00  83 20 125/59 (81) 100   


 


11/9/19 18:55  78 27  100 Mechanical Ventilator  40





  78 27     40


 


11/9/19 18:00  76 22 124/44 (70) 99   


 


11/9/19 17:21  79  124/46    


 


11/9/19 17:14  76 24     40


 


11/9/19 17:00 98.8 79 23 124/46 (72) 99   


 


11/9/19 16:00  81 27 122/47 (72) 99   


 


11/9/19 16:00        40


 


11/9/19 16:00      Endotracheal Tube  


 


11/9/19 16:00  80      


 


11/9/19 15:14  78 27     40


 


11/9/19 15:00  77 22 120/45 (70) 99   


 


11/9/19 14:00  73 21 125/43 (70) 100   


 


11/9/19 13:19  78 20  100 Mechanical Ventilator  40





  79 14     40








I&O











Intake and Output  


 


 11/9/19 11/10/19





 18:59 06:59


 


Intake Total 810 ml 1090 ml


 


Output Total 1885 ml 2160 ml


 


Balance -1075 ml -1070 ml


 


  


 


IV Total 110 ml 330 ml


 


Tube Feeding 600 ml 600 ml


 


Other 100 ml 160 ml


 


Output Urine Total 1885 ml 2160 ml








Dressing:  other


Wound:  other


Drains:  other


Cardiovascular:  RSR


Respiratory:  decreased breath sounds


Abdomen:  soft, present bowel sounds


Extremities:  no edema, no tenderness, no cyanosis





Laboratory Tests








Test


  11/10/19


05:00


 


White Blood Count


  10.0 K/UL


(4.8-10.8)


 


Red Blood Count


  3.01 M/UL


(4.20-5.40)  L


 


Hemoglobin


  8.1 G/DL


(12.0-16.0)  L


 


Hematocrit


  24.9 %


(37.0-47.0)  L


 


Mean Corpuscular Volume 83 FL (80-99)  


 


Mean Corpuscular Hemoglobin


  27.0 PG


(27.0-31.0)


 


Mean Corpuscular Hemoglobin


Concent 32.7 G/DL


(32.0-36.0)


 


Red Cell Distribution Width


  14.5 %


(11.6-14.8)


 


Platelet Count


  386 K/UL


(150-450)


 


Mean Platelet Volume


  7.8 FL


(6.5-10.1)


 


Neutrophils (%) (Auto)


  62.9 %


(45.0-75.0)


 


Lymphocytes (%) (Auto)


  15.8 %


(20.0-45.0)  L


 


Monocytes (%) (Auto)


  9.7 %


(1.0-10.0)


 


Eosinophils (%) (Auto)


  9.7 %


(0.0-3.0)  H


 


Basophils (%) (Auto)


  1.9 %


(0.0-2.0)


 


Sodium Level


  143 MMOL/L


(136-145)


 


Potassium Level


  4.6 MMOL/L


(3.5-5.1)


 


Chloride Level


  105 MMOL/L


()


 


Carbon Dioxide Level


  33 MMOL/L


(21-32)  H


 


Anion Gap


  5 mmol/L


(5-15)


 


Blood Urea Nitrogen


  31 mg/dL


(7-18)  H


 


Creatinine


  1.2 MG/DL


(0.55-1.30)


 


Estimat Glomerular Filtration


Rate 45.0 mL/min


(>60)


 


Glucose Level


  130 MG/DL


()  H


 


Calcium Level


  8.6 MG/DL


(8.5-10.1)











Plan


Problems:  


(1) Pressure injury of deep tissue


Assessment & Plan:  Pt presented on admission with DTPI R heel. Blood filled 

blister with marginal erythema noted to heel. Pt exhibited agitation when 

minimally palpated. (L)2.2cm x (W)2.1cm


L heel is blanchable .


Non-blanchable erythema without induration noted to sacral cleft. 


No other areas of skin concerns noted.





Tx.Plan:


Apply Betadine to R heel. Cover with Optifoam drsg. Change every 3 days and prn.


           


Apply Cavilon Skin Barrier to L heel. Cover with Optifoam drsg. Change every 7 

days and prn.


           


Apply Moisture Barrier Paste to buttocks. Cover sacral area with Optifoam drsg. 

Change every 3 days and prn.


           


Reposition at least every 2hours or as tolerated.


           


Off-load heels with pillow.





(2) Sepsis


Assessment & Plan:  Patient with low-grade fevers, anemia, leukocytosis 

persistent, elevated platelets, abnormal labs.


Etiology currently unknown and work-up in progress.  Labs noted and imaging 

that is available as noted.  


CT noted


US noted


Impression: 13 x 7 x 12.9 cm unilocular cystic mass, corresponding to lesion 

reported


on recent CT scan. Layering low level internal echoes likely represent bladder


debris.. This presumably represents a cystic ovarian lesion with debris or


hemorrhage, possibly neoplastic. Gynecological consultation is recommended


Antibiotics as per infectious disease 


local wound care as wounds do not seem to be the etiology of her sepsis


start feeds via peg


doing well


improving


cont with tube feeds


cont with ICU care 


supplementation for healing 


We will monitor and follow with recommendations


Thank you for allowing me to participate in patient's care














Radhames Blas Nov 10, 2019 13:18

## 2019-11-10 NOTE — INFECTIOUS DISEASES PROG NOTE
Assessment/Plan


Problems:  


(1) Aspiration pneumonia


Assessment & Plan:  with B/L infiltrates, hypoxemia and leukocytosis, CXR  

showed interstitial infiltrates , sputum culture showed normal agustin . continue 

meropenem  empirically for 4 more days stop vancomycin . aspiration precaution. 

EOT 11/14/19





(2) Respiratory failure


Assessment & Plan:  with maegan cardia and S/P code blue, was intubated and 

started on mechanical ventilation , monitor CXR and ABG , pulmonary is 

following 





(3) UTI (urinary tract infection)


Assessment & Plan:  due to candida lusitaneae , S/P casas catheter removal , no 

specific therapy needed for now after changing her casas catheter 





(4) Acute metabolic encephalopathy


Assessment & Plan:  due to the above, continue hydration and antibiotics, 

monitor in tele 





(5) Hyperglycemia due to type 2 diabetes mellitus


Assessment & Plan:  recommend tight glycemic control to keep blood glucose 

between 100-140 





(6) ARF (acute renal failure)


Assessment & Plan:  due to the above, continue hydration and renally dosed 

antibiotics, close  monitor of renal function , stop vancomycin 





(7) Pelvic mass in female


Assessment & Plan:  to the left of the uterus, suspect malignancy , recommend OB

/GYN eval , oncology is following 








Subjective


ROS Limited/Unobtainable:  Yes


Allergies:  


Coded Allergies:  


     No Known Allergies (Unverified , 10/14/19)


Subjective


she was still intubated on mechanical ventilation , comfortable still in ICU 

after she became bradycardic and had code blue , minimally responsive , had low 

grade fever no chills , no diarrhea, no secretions from the ET tube





Objective


Vital Signs





Last 24 Hour Vital Signs








  Date Time  Temp Pulse Resp B/P (MAP) Pulse Ox O2 Delivery O2 Flow Rate FiO2


 


11/10/19 18:00  76 20 114/51 (72) 100   


 


11/10/19 17:38  75  124/45    


 


11/10/19 17:02  75 25     40


 


11/10/19 17:00  75 22 124/45 (71) 100   


 


11/10/19 16:00 99.0 79 27 123/45 (71) 100   


 


11/10/19 16:00      Endotracheal Tube  


 


11/10/19 16:00        40


 


11/10/19 16:00  79      


 


11/10/19 15:18  80 18     40


 


11/10/19 15:00  78 24 114/49 (70) 100   


 


11/10/19 14:00  75 21 111/39 (63) 100   


 


11/10/19 13:17  76 24  100 Mechanical Ventilator  40





  82 24     40


 


11/10/19 13:00  73 22 121/43 (69) 98   


 


11/10/19 12:00  89      


 


11/10/19 12:00 99.0 75 20 117/39 (65) 100   


 


11/10/19 12:00        40


 


11/10/19 12:00      Endotracheal Tube  


 


11/10/19 11:08  81 14     40


 


11/10/19 11:00  89 25 128/60 (82) 100   


 


11/10/19 10:00  78 21 127/46 (73) 100   


 


11/10/19 09:07  79 18     40


 


11/10/19 09:00  72 20 109/40 (63) 100   


 


11/10/19 08:27  73  122/41    


 


11/10/19 08:00      Endotracheal Tube  


 


11/10/19 08:00 99.0 73 20 122/41 (68) 100   


 


11/10/19 08:00        40


 


11/10/19 08:00  69 25 116/47 (70) 100   


 


11/10/19 08:00  76      


 


11/10/19 07:00  69 25 116/47 (70) 100   


 


11/10/19 06:56  73 31  100 Mechanical Ventilator  40





  78 28     40


 


11/10/19 06:00  69 18 114/42 (66) 100   


 


11/10/19 05:09  75 20     40


 


11/10/19 05:00  77 28 105/42 (63) 99   


 


11/10/19 04:00        40


 


11/10/19 04:00      Endotracheal Tube  


 


11/10/19 04:00 99.2 87 27 94/47 (63) 100   


 


11/10/19 03:40  89      


 


11/10/19 03:28  86 25     40


 


11/10/19 03:00  90 30 113/42 (65) 97   


 


11/10/19 02:00  86 29 124/46 (72) 100   


 


11/10/19 01:52  89 16  100 Mechanical Ventilator  40





  89 18     40


 


11/10/19 01:00  82 29 113/51 (71) 99   


 


11/10/19 00:00        40


 


11/10/19 00:00      Endotracheal Tube  


 


11/10/19 00:00 99.0 86 22 128/57 (80) 100   


 


11/9/19 23:53  97      


 


11/9/19 23:08  82 22     40


 


11/9/19 23:00  82 25 123/48 (73) 99   


 


11/9/19 22:00  80 20 122/49 (73) 100   


 


11/9/19 21:06  86 23     40


 


11/9/19 21:00  89 27 125/53 (77) 100   


 


11/9/19 20:00        40


 


11/9/19 20:00      Endotracheal Tube  


 


11/9/19 20:00 99.5 91 29 102/63 (76) 99   


 


11/9/19 19:27  85      


 


11/9/19 19:00  83 20 125/59 (81) 100   


 


11/9/19 18:55  78 27  100 Mechanical Ventilator  40





  78 27     40








Height (Feet):  5


Height (Inches):  5.00


Weight (Pounds):  182


General Appearance:  WD/WN, no acute distress


HEENT:  normocephalic, atraumatic, anicteric, mucous membranes moist, PERRL


Respiratory/Chest:  no respiratory distress, no accessory muscle use, decreased 

breath sounds, crackles/rales


Cardiovascular:  normal peripheral pulses, normal rate, regular rhythm, no 

gallop/murmur, no JVD


Abdomen:  soft, non tender, no organomegaly, non distended, no mass, no scars, 

hypoactive bowel sounds


Genitourinary:  normal external genitalia


Extremities:  no cyanosis, no clubbing


Skin:  no rash, no lesions, ulcers


Lymphatic:  no neck adenopathy, no groin adenopathy


Musculoskeletal:  normal muscle bulk, no effusion





Laboratory Tests








Test


  11/10/19


05:00 11/10/19


16:45


 


White Blood Count


  10.0 K/UL


(4.8-10.8) 


 


 


Red Blood Count


  3.01 M/UL


(4.20-5.40)  L 


 


 


Hemoglobin


  8.1 G/DL


(12.0-16.0)  L 


 


 


Hematocrit


  24.9 %


(37.0-47.0)  L 


 


 


Mean Corpuscular Volume 83 FL (80-99)   


 


Mean Corpuscular Hemoglobin


  27.0 PG


(27.0-31.0) 


 


 


Mean Corpuscular Hemoglobin


Concent 32.7 G/DL


(32.0-36.0) 


 


 


Red Cell Distribution Width


  14.5 %


(11.6-14.8) 


 


 


Platelet Count


  386 K/UL


(150-450) 


 


 


Mean Platelet Volume


  7.8 FL


(6.5-10.1) 


 


 


Neutrophils (%) (Auto)


  62.9 %


(45.0-75.0) 


 


 


Lymphocytes (%) (Auto)


  15.8 %


(20.0-45.0)  L 


 


 


Monocytes (%) (Auto)


  9.7 %


(1.0-10.0) 


 


 


Eosinophils (%) (Auto)


  9.7 %


(0.0-3.0)  H 


 


 


Basophils (%) (Auto)


  1.9 %


(0.0-2.0) 


 


 


Sodium Level


  143 MMOL/L


(136-145) 


 


 


Potassium Level


  4.6 MMOL/L


(3.5-5.1) 


 


 


Chloride Level


  105 MMOL/L


() 


 


 


Carbon Dioxide Level


  33 MMOL/L


(21-32)  H 


 


 


Anion Gap


  5 mmol/L


(5-15) 


 


 


Blood Urea Nitrogen


  31 mg/dL


(7-18)  H 


 


 


Creatinine


  1.2 MG/DL


(0.55-1.30) 


 


 


Estimat Glomerular Filtration


Rate 45.0 mL/min


(>60) 


 


 


Glucose Level


  130 MG/DL


()  H 


 


 


Calcium Level


  8.6 MG/DL


(8.5-10.1) 


 


 


Vancomycin Level Trough


  


  24.2 ug/mL


(5.0-12.0)  H











Current Medications








 Medications


  (Trade)  Dose


 Ordered  Sig/Winnie


 Route


 PRN Reason  Start Time


 Stop Time Status Last Admin


Dose Admin


 


 Acetaminophen


  (Tylenol)  650 mg  Q4H  PRN


 NG


 Mild Pain/Temp > 100.5  11/3/19 20:30


 11/25/19 20:29  11/10/19 03:01


 


 


 Acetaminophen


  (Tylenol)  650 mg  Q4H  PRN


 RECTAL


 TEMP>100.5  11/3/19 20:30


 12/2/19 20:29   


 


 


 Albuterol/


 Ipratropium


  (Albuterol/


 Ipratropium)  3 ml  Q4H  PRN


 HHN


 Shortness of Breath  11/7/19 09:45


 11/12/19 09:44   


 


 


 Albuterol/


 Ipratropium


  (Albuterol/


 Ipratropium)  3 ml  Q6HRT


 HHN


   11/7/19 13:00


 11/12/19 12:59  11/10/19 13:19


 


 


 Amlodipine


 Besylate


  (Norvasc)  2.5 mg  BID


 NG


   11/4/19 09:00


 11/29/19 17:59  11/10/19 17:38


 


 


 Chlorhexidine


 Gluconate


  (Mae-Hex 2%)  1 applic  DAILY@2000


 TOPIC


   11/5/19 20:00


 12/5/19 19:59  11/9/19 19:42


 


 


 Dextrose


  (Dextrose 50%)  25 ml  Q30M  PRN


 IV


 Hypoglycemia  11/5/19 06:30


 12/5/19 06:29   


 


 


 Dextrose


  (Dextrose 50%)  50 ml  Q30M  PRN


 IV


 Hypoglycemia  11/5/19 06:30


 12/5/19 06:29   


 


 


 Divalproex Sodium


  (Depakote


 Sprinkles)  500 mg  Q12HR


 NG


   11/3/19 21:00


 11/14/19 21:59  11/10/19 08:26


 


 


 Folic Acid


  (Folate)  3 mg  DAILY


 GT


   11/7/19 09:00


 11/30/19 08:59  11/10/19 08:27


 


 


 Furosemide


  (Lasix)  40 mg  EVERY 12  HOURS


 IV


   11/7/19 21:00


 12/7/19 20:59  11/10/19 08:28


 


 


 Insulin Aspart


  (NovoLOG)    EVERY 6  HOURS


 SUBQ


   11/4/19 00:00


 11/29/19 11:59  11/10/19 17:40


 


 


 Insulin Detemir


  (Levemir)  8 units  QHS


 SUBQ


   11/6/19 21:00


 12/1/19 08:59  11/9/19 20:39


 


 


 Lactulose


  (Cephulac)  20 gm  Q8H  PRN


 NG


 Constipation  11/3/19 20:30


 12/3/19 20:29  11/5/19 08:12


 


 


 Lorazepam


  (Ativan 2mg/ml


 1ml)  1 mg  Q4H  PRN


 IV


 For Anxiety  11/3/19 20:30


 11/10/19 20:29  11/9/19 20:45


 


 


 Meropenem 1 gm/


 Sodium Chloride  55 ml @ 


 110 mls/hr  Q8H


 IVPB


   11/4/19 17:00


 11/14/19 16:59  11/10/19 16:54


 


 


 Metoclopramide HCl


  (Reglan)  10 mg  Q6H  PRN


 IVP


 Nausea & Vomiting  11/6/19 10:45


 12/6/19 10:44   


 


 


 Midazolam HCl 50


 mg/Dextrose  100 ml @ 0


 mls/hr  Q24H  PRN


 IV


 To Patient Comfort  11/8/19 20:15


 11/10/19 20:34   


 


 


 Pantoprazole


  (Protonix)  40 mg  EVERY 12  HOURS


 IVP


   11/3/19 21:00


 12/3/19 08:59  11/10/19 08:27


 


 


 Potassium Chloride


  (K-Dur)  40 meq  DAILY


 GT


   11/6/19 09:00


 12/5/19 17:59  11/10/19 08:27


 


 


 Vancomycin HCl


  (Vanco rx to


 dose)  1 ea  DAILY  PRN


 MISC


 Per rx protocol  11/4/19 15:30


 12/4/19 15:29   


 

















Amie Burgos M.D. Nov 10, 2019 18:42

## 2019-11-10 NOTE — NUR
NURSE NOTES:



RECEIVED PATIENT FROM HELADIO MONTIEL RN. PATIENT IS LYING IN BED, ASLEEP. HOOKED TO CARDIAC 
MONITOR. HR OF 74.  ORALLY INTUBATED. ETT OF 7.5 AT 23CM LIP LINE. VENT SETTINGS OF AC 14, 
, FiO2 40%, PEEP 5. NO SIGNS OF DISTRESS AS OF THE MOMENT. TUBE FEEDING HELD FOR 
WEANING TRIAL. GT IN PLACE, DRESSING DRY AND INTACT. SNYDER CONNECTED TO BAG, PATENT AND 
DRAINING YELLOW URINE. NOTED SKIN ALTERATION. ON P200 MATTRESS. WITH R UA PICC, TKO. CALL 
LIGHT WITHIN REACH. BED AT LOWEST POSITION. SIDE RAILS UP. WILL CONTINUE TO MONITOR.

## 2019-11-10 NOTE — NEPHROLOGY PROGRESS NOTE
Assessment/Plan


Problem List:  


(1) Renal failure (ARF), acute on chronic


Assessment:  resolved





(2) Dehydration


(3) ARF (acute renal failure)


(4) Sepsis


(5) Acute metabolic encephalopathy


(6) Hyperglycemia due to type 2 diabetes mellitus


(7) UTI (urinary tract infection)


(8) Diabetic nephropathy


Assessment





Renal failure, Acute on Chronic resolved


Dehydration


Severe Anemia


Sepsis / Pneumonia / UTI


DM, OOC


Proteinuria , Diabetic Nephropathy


Acute encephalopathy


Plan





casas out


on K and Mag IV 


Pulmonary and Vent support


IV Reglan


GT feeding


Stop IV fluid-


Lasix


has PEG


Per order








previously 


Cr lowering 


Will order urine studies and monitor renal parameters


kidney YOANDY noted


lower iv fluids rate


WBCs rising


has casas again due to retention


Avoid Nephrotoxics








previously


Anemia salas


2D echo


24 H urine protein


Keep BP and BS in check


I am holding  her psych meds due to low MS


Per orders





Subjective


ROS Limited/Unobtainable:  Yes





Objective


Objective





Last 24 Hour Vital Signs








  Date Time  Temp Pulse Resp B/P (MAP) Pulse Ox O2 Delivery O2 Flow Rate FiO2


 


11/10/19 13:17  76 24  100 Mechanical Ventilator  40





  82 24     40


 


11/10/19 13:00  73 22 121/43 (69) 98   


 


11/10/19 12:00  89      


 


11/10/19 12:00 99.0 75 20 117/39 (65) 100   


 


11/10/19 12:00        40


 


11/10/19 12:00      Endotracheal Tube  


 


11/10/19 11:08  81 14     40


 


11/10/19 11:00  89 25 128/60 (82) 100   


 


11/10/19 10:00  78 21 127/46 (73) 100   


 


11/10/19 09:07  79 18     40


 


11/10/19 09:00  72 20 109/40 (63) 100   


 


11/10/19 08:27  73  122/41    


 


11/10/19 08:00      Endotracheal Tube  


 


11/10/19 08:00 99.0 73 20 122/41 (68) 100   


 


11/10/19 08:00        40


 


11/10/19 08:00  69 25 116/47 (70) 100   


 


11/10/19 08:00  76      


 


11/10/19 07:00  69 25 116/47 (70) 100   


 


11/10/19 06:56  73 31  100 Mechanical Ventilator  40





  78 28     40


 


11/10/19 06:00  69 18 114/42 (66) 100   


 


11/10/19 05:09  75 20     40


 


11/10/19 05:00  77 28 105/42 (63) 99   


 


11/10/19 04:00        40


 


11/10/19 04:00      Endotracheal Tube  


 


11/10/19 04:00 99.2 87 27 94/47 (63) 100   


 


11/10/19 03:40  89      


 


11/10/19 03:28  86 25     40


 


11/10/19 03:00  90 30 113/42 (65) 97   


 


11/10/19 02:00  86 29 124/46 (72) 100   


 


11/10/19 01:52  89 16  100 Mechanical Ventilator  40





  89 18     40


 


11/10/19 01:00  82 29 113/51 (71) 99   


 


11/10/19 00:00        40


 


11/10/19 00:00      Endotracheal Tube  


 


11/10/19 00:00 99.0 86 22 128/57 (80) 100   


 


11/9/19 23:53  97      


 


11/9/19 23:08  82 22     40


 


11/9/19 23:00  82 25 123/48 (73) 99   


 


11/9/19 22:00  80 20 122/49 (73) 100   


 


11/9/19 21:06  86 23     40


 


11/9/19 21:00  89 27 125/53 (77) 100   


 


11/9/19 20:00        40


 


11/9/19 20:00      Endotracheal Tube  


 


11/9/19 20:00 99.5 91 29 102/63 (76) 99   


 


11/9/19 19:27  85      


 


11/9/19 19:00  83 20 125/59 (81) 100   


 


11/9/19 18:55  78 27  100 Mechanical Ventilator  40





  78 27     40


 


11/9/19 18:00  76 22 124/44 (70) 99   


 


11/9/19 17:21  79  124/46    


 


11/9/19 17:14  76 24     40


 


11/9/19 17:00 98.8 79 23 124/46 (72) 99   


 


11/9/19 16:00  81 27 122/47 (72) 99   


 


11/9/19 16:00        40


 


11/9/19 16:00      Endotracheal Tube  


 


11/9/19 16:00  80      


 


11/9/19 15:14  78 27     40


 


11/9/19 15:00  77 22 120/45 (70) 99   

















Intake and Output  


 


 11/9/19 11/10/19





 19:00 07:00


 


Intake Total 1145 ml 755 ml


 


Output Total 1845 ml 2190 ml


 


Balance -700 ml -1435 ml


 


  


 


IV Total 385 ml 55 ml


 


Tube Feeding 660 ml 540 ml


 


Other 100 ml 160 ml


 


Output Urine Total 1845 ml 2190 ml








Laboratory Tests


11/10/19 05:00: 


White Blood Count 10.0, Red Blood Count 3.01L, Hemoglobin 8.1L, Hematocrit 24.9L

, Mean Corpuscular Volume 83, Mean Corpuscular Hemoglobin 27.0, Mean 

Corpuscular Hemoglobin Concent 32.7, Red Cell Distribution Width 14.5, Platelet 

Count 386, Mean Platelet Volume 7.8, Neutrophils (%) (Auto) 62.9, Lymphocytes (%

) (Auto) 15.8L, Monocytes (%) (Auto) 9.7, Eosinophils (%) (Auto) 9.7H, 

Basophils (%) (Auto) 1.9, Sodium Level 143, Potassium Level 4.6, Chloride Level 

105, Carbon Dioxide Level 33H, Anion Gap 5, Blood Urea Nitrogen 31H, Creatinine 

1.2, Estimat Glomerular Filtration Rate 45.0, Glucose Level 130H, Calcium Level 

8.6


Height (Feet):  5


Height (Inches):  5.00


Weight (Pounds):  182


General Appearance:  no apparent distress


Cardiovascular:  tachycardia


Respiratory/Chest:  decreased breath sounds


Abdomen:  distended


Objective


no change











Lukasz Vizcaino MD Nov 10, 2019 14:17

## 2019-11-11 VITALS — SYSTOLIC BLOOD PRESSURE: 110 MMHG | DIASTOLIC BLOOD PRESSURE: 45 MMHG

## 2019-11-11 VITALS — DIASTOLIC BLOOD PRESSURE: 54 MMHG | SYSTOLIC BLOOD PRESSURE: 120 MMHG

## 2019-11-11 VITALS — SYSTOLIC BLOOD PRESSURE: 115 MMHG | DIASTOLIC BLOOD PRESSURE: 46 MMHG

## 2019-11-11 VITALS — DIASTOLIC BLOOD PRESSURE: 48 MMHG | SYSTOLIC BLOOD PRESSURE: 116 MMHG

## 2019-11-11 VITALS — SYSTOLIC BLOOD PRESSURE: 108 MMHG | DIASTOLIC BLOOD PRESSURE: 60 MMHG

## 2019-11-11 VITALS — SYSTOLIC BLOOD PRESSURE: 123 MMHG | DIASTOLIC BLOOD PRESSURE: 57 MMHG

## 2019-11-11 VITALS — SYSTOLIC BLOOD PRESSURE: 119 MMHG | DIASTOLIC BLOOD PRESSURE: 46 MMHG

## 2019-11-11 VITALS — SYSTOLIC BLOOD PRESSURE: 104 MMHG | DIASTOLIC BLOOD PRESSURE: 41 MMHG

## 2019-11-11 VITALS — SYSTOLIC BLOOD PRESSURE: 111 MMHG | DIASTOLIC BLOOD PRESSURE: 49 MMHG

## 2019-11-11 VITALS — SYSTOLIC BLOOD PRESSURE: 121 MMHG | DIASTOLIC BLOOD PRESSURE: 57 MMHG

## 2019-11-11 VITALS — SYSTOLIC BLOOD PRESSURE: 110 MMHG | DIASTOLIC BLOOD PRESSURE: 43 MMHG

## 2019-11-11 VITALS — SYSTOLIC BLOOD PRESSURE: 123 MMHG | DIASTOLIC BLOOD PRESSURE: 54 MMHG

## 2019-11-11 VITALS — SYSTOLIC BLOOD PRESSURE: 128 MMHG | DIASTOLIC BLOOD PRESSURE: 48 MMHG

## 2019-11-11 VITALS — SYSTOLIC BLOOD PRESSURE: 125 MMHG | DIASTOLIC BLOOD PRESSURE: 49 MMHG

## 2019-11-11 VITALS — SYSTOLIC BLOOD PRESSURE: 111 MMHG | DIASTOLIC BLOOD PRESSURE: 45 MMHG

## 2019-11-11 VITALS — SYSTOLIC BLOOD PRESSURE: 118 MMHG | DIASTOLIC BLOOD PRESSURE: 44 MMHG

## 2019-11-11 VITALS — SYSTOLIC BLOOD PRESSURE: 110 MMHG | DIASTOLIC BLOOD PRESSURE: 49 MMHG

## 2019-11-11 VITALS — DIASTOLIC BLOOD PRESSURE: 44 MMHG | SYSTOLIC BLOOD PRESSURE: 119 MMHG

## 2019-11-11 VITALS — DIASTOLIC BLOOD PRESSURE: 50 MMHG | SYSTOLIC BLOOD PRESSURE: 125 MMHG

## 2019-11-11 VITALS — SYSTOLIC BLOOD PRESSURE: 110 MMHG | DIASTOLIC BLOOD PRESSURE: 46 MMHG

## 2019-11-11 VITALS — SYSTOLIC BLOOD PRESSURE: 122 MMHG | DIASTOLIC BLOOD PRESSURE: 49 MMHG

## 2019-11-11 VITALS — SYSTOLIC BLOOD PRESSURE: 125 MMHG | DIASTOLIC BLOOD PRESSURE: 40 MMHG

## 2019-11-11 VITALS — DIASTOLIC BLOOD PRESSURE: 51 MMHG | SYSTOLIC BLOOD PRESSURE: 134 MMHG

## 2019-11-11 VITALS — SYSTOLIC BLOOD PRESSURE: 112 MMHG | DIASTOLIC BLOOD PRESSURE: 48 MMHG

## 2019-11-11 VITALS — SYSTOLIC BLOOD PRESSURE: 115 MMHG | DIASTOLIC BLOOD PRESSURE: 44 MMHG

## 2019-11-11 VITALS — SYSTOLIC BLOOD PRESSURE: 116 MMHG | DIASTOLIC BLOOD PRESSURE: 47 MMHG

## 2019-11-11 VITALS — SYSTOLIC BLOOD PRESSURE: 107 MMHG | DIASTOLIC BLOOD PRESSURE: 44 MMHG

## 2019-11-11 VITALS — DIASTOLIC BLOOD PRESSURE: 38 MMHG | SYSTOLIC BLOOD PRESSURE: 123 MMHG

## 2019-11-11 VITALS — DIASTOLIC BLOOD PRESSURE: 76 MMHG | SYSTOLIC BLOOD PRESSURE: 111 MMHG

## 2019-11-11 LAB
ADD MANUAL DIFF: NO
ALBUMIN SERPL-MCNC: 1.8 G/DL (ref 3.4–5)
ALP SERPL-CCNC: 87 U/L (ref 46–116)
ALT SERPL-CCNC: 12 U/L (ref 12–78)
ANION GAP SERPL CALC-SCNC: 0 MMOL/L (ref 5–15)
AST SERPL-CCNC: 22 U/L (ref 15–37)
BASOPHILS NFR BLD AUTO: 2.3 % (ref 0–2)
BILIRUB DIRECT SERPL-MCNC: 0.1 MG/DL (ref 0–0.3)
BILIRUB SERPL-MCNC: 0.5 MG/DL (ref 0.2–1)
BUN SERPL-MCNC: 32 MG/DL (ref 7–18)
CALCIUM SERPL-MCNC: 8.3 MG/DL (ref 8.5–10.1)
CHLORIDE SERPL-SCNC: 105 MMOL/L (ref 98–107)
CO2 SERPL-SCNC: 36 MMOL/L (ref 21–32)
CREAT SERPL-MCNC: 1.1 MG/DL (ref 0.55–1.3)
EOSINOPHIL NFR BLD AUTO: 8.9 % (ref 0–3)
ERYTHROCYTE [DISTWIDTH] IN BLOOD BY AUTOMATED COUNT: 14.5 % (ref 11.6–14.8)
HCT VFR BLD CALC: 25.2 % (ref 37–47)
HGB BLD-MCNC: 8.2 G/DL (ref 12–16)
LYMPHOCYTES NFR BLD AUTO: 14.9 % (ref 20–45)
MCV RBC AUTO: 82 FL (ref 80–99)
MONOCYTES NFR BLD AUTO: 11.4 % (ref 1–10)
NEUTROPHILS NFR BLD AUTO: 62.6 % (ref 45–75)
PHOSPHATE SERPL-MCNC: 3 MG/DL (ref 2.5–4.9)
PLATELET # BLD: 382 K/UL (ref 150–450)
POTASSIUM SERPL-SCNC: 4.7 MMOL/L (ref 3.5–5.1)
RBC # BLD AUTO: 3.07 M/UL (ref 4.2–5.4)
SODIUM SERPL-SCNC: 141 MMOL/L (ref 136–145)
WBC # BLD AUTO: 10.4 K/UL (ref 4.8–10.8)

## 2019-11-11 RX ADMIN — PANTOPRAZOLE SODIUM SCH MG: 40 INJECTION, POWDER, FOR SOLUTION INTRAVENOUS at 20:41

## 2019-11-11 RX ADMIN — INSULIN ASPART SCH UNITS: 100 INJECTION, SOLUTION INTRAVENOUS; SUBCUTANEOUS at 23:51

## 2019-11-11 RX ADMIN — IPRATROPIUM BROMIDE AND ALBUTEROL SULFATE SCH ML: .5; 3 SOLUTION RESPIRATORY (INHALATION) at 18:43

## 2019-11-11 RX ADMIN — CHLORHEXIDINE GLUCONATE SCH APPLIC: 213 SOLUTION TOPICAL at 19:53

## 2019-11-11 RX ADMIN — DIVALPROEX SODIUM SCH MG: 125 CAPSULE ORAL at 20:42

## 2019-11-11 RX ADMIN — INSULIN ASPART SCH UNITS: 100 INJECTION, SOLUTION INTRAVENOUS; SUBCUTANEOUS at 12:00

## 2019-11-11 RX ADMIN — IPRATROPIUM BROMIDE AND ALBUTEROL SULFATE SCH ML: .5; 3 SOLUTION RESPIRATORY (INHALATION) at 01:05

## 2019-11-11 RX ADMIN — INSULIN ASPART SCH UNITS: 100 INJECTION, SOLUTION INTRAVENOUS; SUBCUTANEOUS at 00:47

## 2019-11-11 RX ADMIN — INSULIN DETEMIR SCH UNITS: 100 INJECTION, SOLUTION SUBCUTANEOUS at 20:43

## 2019-11-11 RX ADMIN — MEROPENEM SCH MLS/HR: 1 INJECTION INTRAVENOUS at 17:00

## 2019-11-11 RX ADMIN — IPRATROPIUM BROMIDE AND ALBUTEROL SULFATE SCH ML: .5; 3 SOLUTION RESPIRATORY (INHALATION) at 13:24

## 2019-11-11 RX ADMIN — PANTOPRAZOLE SODIUM SCH MG: 40 INJECTION, POWDER, FOR SOLUTION INTRAVENOUS at 08:49

## 2019-11-11 RX ADMIN — LORAZEPAM PRN MG: 2 INJECTION, SOLUTION INTRAMUSCULAR; INTRAVENOUS at 02:48

## 2019-11-11 RX ADMIN — MEROPENEM SCH MLS/HR: 1 INJECTION INTRAVENOUS at 08:48

## 2019-11-11 RX ADMIN — MEROPENEM SCH MLS/HR: 1 INJECTION INTRAVENOUS at 01:13

## 2019-11-11 RX ADMIN — IPRATROPIUM BROMIDE AND ALBUTEROL SULFATE SCH ML: .5; 3 SOLUTION RESPIRATORY (INHALATION) at 07:01

## 2019-11-11 RX ADMIN — INSULIN ASPART SCH UNITS: 100 INJECTION, SOLUTION INTRAVENOUS; SUBCUTANEOUS at 17:04

## 2019-11-11 RX ADMIN — DIVALPROEX SODIUM SCH MG: 125 CAPSULE ORAL at 09:29

## 2019-11-11 RX ADMIN — INSULIN ASPART SCH UNITS: 100 INJECTION, SOLUTION INTRAVENOUS; SUBCUTANEOUS at 06:45

## 2019-11-11 NOTE — NUR
NURSE NOTES:



Pt repositioned and oral care completed. Pt remains on 2 point wrist restraints for safety, 
pulses palpable, no swelling noted. No distress noted at this time.

## 2019-11-11 NOTE — SURGERY PROGRESS NOTE
Surgery Progress Note


Subjective


Additional Comments


ill appearing


still unable to wean vent


may need trach


labs noted


 exam unchanged





Objective





Last 24 Hour Vital Signs








  Date Time  Temp Pulse Resp B/P (MAP) Pulse Ox O2 Delivery O2 Flow Rate FiO2


 


11/11/19 15:26  75 24     40


 


11/11/19 15:00  79 23 108/60 (76) 100   


 


11/11/19 14:00  72 15 118/44 (68) 100   


 


11/11/19 13:30  69 14 110/43 (65) 100   


 


11/11/19 13:22  63 17  100 Mechanical Ventilator  40





  63 17     40


 


11/11/19 13:00  76 23 122/49 (73) 100   


 


11/11/19 12:00 99.5 74 20 119/46 (70) 100   


 


11/11/19 12:00        40


 


11/11/19 12:00  69      


 


11/11/19 12:00      Endotracheal Tube  


 


11/11/19 11:25  67 15     40


 


11/11/19 11:00  72 21 111/49 (69) 99   


 


11/11/19 10:00  79 22 115/46 (69) 99   


 


11/11/19 09:50  84 37     40


 


11/11/19 09:47     99   


 


11/11/19 09:02  80 20     40





        40


 


11/11/19 09:00  80 23 121/57 (78) 99   


 


11/11/19 08:49  79  107/46    


 


11/11/19 08:00        40


 


11/11/19 08:00  74      


 


11/11/19 08:00      Endotracheal Tube  


 


11/11/19 08:00 99.9 75 21 107/44 (65) 98   


 


11/11/19 07:15  80 17  100   


 


11/11/19 07:00  75 22 116/47 (70) 100   


 


11/11/19 06:55  75 16  100 Mechanical Ventilator  40





  75 16     40


 


11/11/19 06:30  78 21 125/50 (75) 100   


 


11/11/19 06:00  72 22 115/44 (67) 100   


 


11/11/19 05:30  73 21 110/46 (67) 100   


 


11/11/19 05:11  72 18     40


 


11/11/19 05:00  75 19 111/45 (67) 100   


 


11/11/19 04:00  76      


 


11/11/19 04:00      Endotracheal Tube  


 


11/11/19 04:00        40


 


11/11/19 04:00  78 21 110/45 (66) 100   


 


11/11/19 03:02  95 24     40


 


11/11/19 03:00  95 22 134/51 (78) 100   


 


11/11/19 02:00 99.2 72 17 123/38 (66) 100   


 


11/11/19 01:30  77 20 125/40 (68) 100   


 


11/11/19 01:05  74 20  100 Mechanical Ventilator  40





  74 20     40


 


11/11/19 01:00  75 20 125/49 (74) 100   


 


11/11/19 00:30  79 23 111/76 (88) 100   


 


11/11/19 00:00  80      


 


11/11/19 00:00        40


 


11/11/19 00:00 99.2 78 24 128/48 (74) 100   


 


11/11/19 00:00      Endotracheal Tube  


 


11/10/19 23:30  76 22 140/44 (76) 100   


 


11/10/19 23:01  72 23     40


 


11/10/19 23:00  72 22 125/44 (71) 100   


 


11/10/19 22:00  82 21 138/47 (77) 100   


 


11/10/19 21:01  83 22     40


 


11/10/19 21:00  83 22 126/51 (76) 100   


 


11/10/19 20:30  83 24 133/48 (76) 100   


 


11/10/19 20:17  82 26 103/55 (71) 100   


 


11/10/19 20:00  76      


 


11/10/19 20:00 99.6 81 23  100   


 


11/10/19 20:00      Endotracheal Tube  


 


11/10/19 20:00        40


 


11/10/19 19:00  78 19 114/44 (67) 100   


 


11/10/19 18:59  73 18  100 Mechanical Ventilator  40





  73 18     40


 


11/10/19 18:00  76 20 114/51 (72) 100   


 


11/10/19 17:38  75  124/45    


 


11/10/19 17:02  75 25     40


 


11/10/19 17:00  75 22 124/45 (71) 100   


 


11/10/19 16:00 99.0 79 27 123/45 (71) 100   


 


11/10/19 16:00      Endotracheal Tube  


 


11/10/19 16:00        40


 


11/10/19 16:00  79      








I&O











Intake and Output  


 


 11/10/19 11/11/19





 19:00 07:00


 


Intake Total 930 ml 740 ml


 


Output Total 750 ml 1075 ml


 


Balance 180 ml -335 ml


 


  


 


Free Water 50 ml 200 ml


 


IV Total 220 ml 


 


Tube Feeding 660 ml 480 ml


 


Other  60 ml


 


Output Urine Total 750 ml 1075 ml


 


# Bowel Movements 3 2








Dressing:  other


Wound:  other


Drains:  other


Cardiovascular:  RSR


Respiratory:  decreased breath sounds


Abdomen:  soft, present bowel sounds


Extremities:  no cyanosis





Laboratory Tests








Test


  11/10/19


16:45 11/11/19


04:20


 


Vancomycin Level Trough


  24.2 ug/mL


(5.0-12.0)  H 


 


 


White Blood Count


  


  10.4 K/UL


(4.8-10.8)


 


Red Blood Count


  


  3.07 M/UL


(4.20-5.40)  L


 


Hemoglobin


  


  8.2 G/DL


(12.0-16.0)  L


 


Hematocrit


  


  25.2 %


(37.0-47.0)  L


 


Mean Corpuscular Volume  82 FL (80-99)  


 


Mean Corpuscular Hemoglobin


  


  26.8 PG


(27.0-31.0)  L


 


Mean Corpuscular Hemoglobin


Concent 


  32.7 G/DL


(32.0-36.0)


 


Red Cell Distribution Width


  


  14.5 %


(11.6-14.8)


 


Platelet Count


  


  382 K/UL


(150-450)


 


Mean Platelet Volume


  


  7.7 FL


(6.5-10.1)


 


Neutrophils (%) (Auto)


  


  62.6 %


(45.0-75.0)


 


Lymphocytes (%) (Auto)


  


  14.9 %


(20.0-45.0)  L


 


Monocytes (%) (Auto)


  


  11.4 %


(1.0-10.0)  H


 


Eosinophils (%) (Auto)


  


  8.9 %


(0.0-3.0)  H


 


Basophils (%) (Auto)


  


  2.3 %


(0.0-2.0)  H


 


Sodium Level


  


  141 MMOL/L


(136-145)


 


Potassium Level


  


  4.7 MMOL/L


(3.5-5.1)


 


Chloride Level


  


  105 MMOL/L


()


 


Carbon Dioxide Level


  


  36 MMOL/L


(21-32)  H


 


Anion Gap


  


  0 mmol/L


(5-15)  L


 


Blood Urea Nitrogen


  


  32 mg/dL


(7-18)  H


 


Creatinine


  


  1.1 MG/DL


(0.55-1.30)


 


Estimat Glomerular Filtration


Rate 


  49.7 mL/min


(>60)


 


Glucose Level


  


  133 MG/DL


()  H


 


Calcium Level


  


  8.3 MG/DL


(8.5-10.1)  L


 


Phosphorus Level


  


  3.0 MG/DL


(2.5-4.9)


 


Magnesium Level


  


  2.1 MG/DL


(1.8-2.4)


 


Total Bilirubin


  


  0.5 MG/DL


(0.2-1.0)


 


Direct Bilirubin


  


  0.1 MG/DL


(0.0-0.3)


 


Aspartate Amino Transf


(AST/SGOT) 


  22 U/L (15-37)


 


 


Alanine Aminotransferase


(ALT/SGPT) 


  12 U/L (12-78)


 


 


Alkaline Phosphatase


  


  87 U/L


()


 


Total Protein


  


  6.2 G/DL


(6.4-8.2)  L


 


Albumin


  


  1.8 G/DL


(3.4-5.0)  L


 


Random Vancomycin Level  19.3 ug/mL  











Plan


Problems:  


(1) Pressure injury of deep tissue


Assessment & Plan:  Pt presented on admission with DTPI R heel. Blood filled 

blister with marginal erythema noted to heel. Pt exhibited agitation when 

minimally palpated. (L)2.2cm x (W)2.1cm


L heel is blanchable .


Non-blanchable erythema without induration noted to sacral cleft. 


No other areas of skin concerns noted.





Tx.Plan:


Apply Betadine to R heel. Cover with Optifoam drsg. Change every 3 days and prn.


           


Apply Cavilon Skin Barrier to L heel. Cover with Optifoam drsg. Change every 7 

days and prn.


           


Apply Moisture Barrier Paste to buttocks. Cover sacral area with Optifoam drsg. 

Change every 3 days and prn.


           


Reposition at least every 2hours or as tolerated.


           


Off-load heels with pillow.





(2) Sepsis


Assessment & Plan:  Patient with low-grade fevers, anemia, leukocytosis 

persistent, elevated platelets, abnormal labs.


Etiology currently unknown and work-up in progress.  Labs noted and imaging 

that is available as noted.  


CT noted


US noted


Impression: 13 x 7 x 12.9 cm unilocular cystic mass, corresponding to lesion 

reported


on recent CT scan. Layering low level internal echoes likely represent bladder


debris.. This presumably represents a cystic ovarian lesion with debris or


hemorrhage, possibly neoplastic. Gynecological consultation is recommended


Antibiotics as per infectious disease 


local wound care as wounds do not seem to be the etiology of her sepsis


start feeds via peg


doing well


improving


cont with tube feeds


cont with ICU care 


trach consideration 


supplementation for healing 


We will monitor and follow with recommendations


Thank you for allowing me to participate in patient's care














Radhames Blas Nov 11, 2019 15:55

## 2019-11-11 NOTE — NUR
NURSE NOTES:

Patient in bed resting no s/s of acute distress noted. no s/s of hypo/hyperglycemia. 
Repositioned Q2 hour. contact isolation maintained and observed.

## 2019-11-11 NOTE — NEPHROLOGY PROGRESS NOTE
Assessment/Plan


Problem List:  


(1) Renal failure (ARF), acute on chronic


Assessment:  resolved





(2) Dehydration


(3) ARF (acute renal failure)


(4) Sepsis


(5) Acute metabolic encephalopathy


(6) Hyperglycemia due to type 2 diabetes mellitus


(7) UTI (urinary tract infection)


(8) Diabetic nephropathy


Assessment





Renal failure, Acute on Chronic resolved


Dehydration


Severe Anemia


Sepsis / Pneumonia / UTI


DM, OOC


Proteinuria , Diabetic Nephropathy


Acute encephalopathy


Plan





fails weaning


on K and Mag IV  as needed


Pulmonary and Vent support - ? Trach


IV Reglan


GT feeding


Stop IV fluid-


Lasix


has PEG


Per order








previously 


Cr lowering 


Will order urine studies and monitor renal parameters


kidney YOANDY noted


lower iv fluids rate


WBCs rising


has casas again due to retention


Avoid Nephrotoxics








previously


Anemia salas


2D echo


24 H urine protein


Keep BP and BS in check


I am holding  her psych meds due to low MS


Per orders





Subjective


ROS Limited/Unobtainable:  Yes





Objective


Objective





Last 24 Hour Vital Signs








  Date Time  Temp Pulse Resp B/P (MAP) Pulse Ox O2 Delivery O2 Flow Rate FiO2


 


11/11/19 09:02  80 20     40





        40


 


11/11/19 09:00  80 23 121/57 (78) 99   


 


11/11/19 08:49  79  107/46    


 


11/11/19 08:00        40


 


11/11/19 08:00      Endotracheal Tube  


 


11/11/19 08:00 99.9 75 21 107/44 (65) 98   


 


11/11/19 07:15  80 17  100   


 


11/11/19 07:00  75 22 116/47 (70) 100   


 


11/11/19 06:55  75 16  100 Mechanical Ventilator  40





  75 16     40


 


11/11/19 06:30  78 21 125/50 (75) 100   


 


11/11/19 06:00  72 22 115/44 (67) 100   


 


11/11/19 05:30  73 21 110/46 (67) 100   


 


11/11/19 05:11  72 18     40


 


11/11/19 05:00  75 19 111/45 (67) 100   


 


11/11/19 04:00  76      


 


11/11/19 04:00      Endotracheal Tube  


 


11/11/19 04:00        40


 


11/11/19 04:00  78 21 110/45 (66) 100   


 


11/11/19 03:02  95 24     40


 


11/11/19 03:00  95 22 134/51 (78) 100   


 


11/11/19 02:00 99.2 72 17 123/38 (66) 100   


 


11/11/19 01:30  77 20 125/40 (68) 100   


 


11/11/19 01:05  74 20  100 Mechanical Ventilator  40





  74 20     40


 


11/11/19 01:00  75 20 125/49 (74) 100   


 


11/11/19 00:30  79 23 111/76 (88) 100   


 


11/11/19 00:00  80      


 


11/11/19 00:00        40


 


11/11/19 00:00 99.2 78 24 128/48 (74) 100   


 


11/11/19 00:00      Endotracheal Tube  


 


11/10/19 23:30  76 22 140/44 (76) 100   


 


11/10/19 23:01  72 23     40


 


11/10/19 23:00  72 22 125/44 (71) 100   


 


11/10/19 22:00  82 21 138/47 (77) 100   


 


11/10/19 21:01  83 22     40


 


11/10/19 21:00  83 22 126/51 (76) 100   


 


11/10/19 20:30  83 24 133/48 (76) 100   


 


11/10/19 20:17  82 26 103/55 (71) 100   


 


11/10/19 20:00  76      


 


11/10/19 20:00 99.6 81 23  100   


 


11/10/19 20:00      Endotracheal Tube  


 


11/10/19 20:00        40


 


11/10/19 19:00  78 19 114/44 (67) 100   


 


11/10/19 18:59  73 18  100 Mechanical Ventilator  40





  73 18     40


 


11/10/19 18:00  76 20 114/51 (72) 100   


 


11/10/19 17:38  75  124/45    


 


11/10/19 17:02  75 25     40


 


11/10/19 17:00  75 22 124/45 (71) 100   


 


11/10/19 16:00 99.0 79 27 123/45 (71) 100   


 


11/10/19 16:00      Endotracheal Tube  


 


11/10/19 16:00        40


 


11/10/19 16:00  79      


 


11/10/19 15:18  80 18     40


 


11/10/19 15:00  78 24 114/49 (70) 100   


 


11/10/19 14:00  75 21 111/39 (63) 100   


 


11/10/19 13:17  76 24  100 Mechanical Ventilator  40





  82 24     40


 


11/10/19 13:00  73 22 121/43 (69) 98   


 


11/10/19 12:00  89      


 


11/10/19 12:00 99.0 75 20 117/39 (65) 100   


 


11/10/19 12:00        40


 


11/10/19 12:00      Endotracheal Tube  


 


11/10/19 11:08  81 14     40


 


11/10/19 11:00  89 25 128/60 (82) 100   


 


11/10/19 10:00  78 21 127/46 (73) 100   

















Intake and Output  


 


 11/10/19 11/11/19





 19:00 07:00


 


Intake Total 930 ml 740 ml


 


Output Total 750 ml 1075 ml


 


Balance 180 ml -335 ml


 


  


 


Free Water 50 ml 200 ml


 


IV Total 220 ml 


 


Tube Feeding 660 ml 480 ml


 


Other  60 ml


 


Output Urine Total 750 ml 1075 ml


 


# Bowel Movements 3 2








Laboratory Tests


11/10/19 16:45: Vancomycin Level Trough 24.2H


11/11/19 04:20: 


White Blood Count 10.4, Red Blood Count 3.07L, Hemoglobin 8.2L, Hematocrit 25.2L

, Mean Corpuscular Volume 82, Mean Corpuscular Hemoglobin 26.8L, Mean 

Corpuscular Hemoglobin Concent 32.7, Red Cell Distribution Width 14.5, Platelet 

Count 382, Mean Platelet Volume 7.7, Neutrophils (%) (Auto) 62.6, Lymphocytes (%

) (Auto) 14.9L, Monocytes (%) (Auto) 11.4H, Eosinophils (%) (Auto) 8.9H, 

Basophils (%) (Auto) 2.3H, Sodium Level 141, Potassium Level 4.7, Chloride 

Level 105, Carbon Dioxide Level 36H, Anion Gap 0L, Blood Urea Nitrogen 32H, 

Creatinine 1.1, Estimat Glomerular Filtration Rate 49.7, Glucose Level 133H, 

Calcium Level 8.3L, Phosphorus Level 3.0, Magnesium Level 2.1, Total Bilirubin 

0.5, Direct Bilirubin 0.1, Aspartate Amino Transf (AST/SGOT) 22, Alanine 

Aminotransferase (ALT/SGPT) 12, Alkaline Phosphatase 87, Total Protein 6.2L, 

Albumin 1.8L, Random Vancomycin Level 19.3


Height (Feet):  5


Height (Inches):  5.00


Weight (Pounds):  182


General Appearance:  no apparent distress


EENT:  other - vented


Cardiovascular:  normal rate


Respiratory/Chest:  decreased breath sounds


Abdomen:  distended


Objective


no change











Lukasz Vizcaino MD Nov 11, 2019 09:51

## 2019-11-11 NOTE — NUR
NURSE NOTES:

Noted patient with  anxiety, with  episode of biting ETT tubing , trying to dangle the feet 
on the side rails  talk therapy, suctioned  and reality orientation provided not effective.  
Ativan given effective.

## 2019-11-11 NOTE — CARDIOLOGY PROGRESS NOTE
Assessment/Plan


Assessment/Plan


1. acute congestive heart failure


2. Abnormal cardiac enzyme demand related due to infection and profound anemia 


3. Agitation 


4. Urinary tract infection.


5. Acute renal failure.


6. Profound anemia.


7. Diabetes mellitus.


8. History of hypertension.


9. History of mood disorder.


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 


11. arf 


12. pelvic mass


13  sepsis 


14. pneumonia 








off dapt due to suspected bleeding 


echo mild systolic dysfunction 


in icu on  a vent 


iv abx 


s/p peg  


lasix iv bidwill contineu 


add zoroxolyn daily 


we dcd all anti plt 


tele sinus 


remain on vent





Subjective


ROS Limited/Unobtainable:  Yes





Objective





Last 24 Hour Vital Signs








  Date Time  Temp Pulse Resp B/P (MAP) Pulse Ox O2 Delivery O2 Flow Rate FiO2


 


11/11/19 19:00  89 23 112/48 (69) 100   


 


11/11/19 18:43  85 24  100 Mechanical Ventilator 40.0 40





  85 24     40


 


11/11/19 18:00  85 24 116/48 (70) 100   


 


11/11/19 17:09  78 24     40


 


11/11/19 17:02  80  123/57    


 


11/11/19 17:00  81 26 123/57 (79) 100   


 


11/11/19 16:30  69 22 104/41 (62) 100   


 


11/11/19 16:00  87      


 


11/11/19 16:00 99.3 82 24 116/47 (70) 100   


 


11/11/19 16:00        40


 


11/11/19 16:00      Endotracheal Tube  


 


11/11/19 15:26  75 24     40


 


11/11/19 15:00  79 23 108/60 (76) 100   


 


11/11/19 14:00  72 15 118/44 (68) 100   


 


11/11/19 13:30  69 14 110/43 (65) 100   


 


11/11/19 13:22  63 17  100 Mechanical Ventilator  40





  63 17     40


 


11/11/19 13:00  76 23 122/49 (73) 100   


 


11/11/19 12:00 99.5 74 20 119/46 (70) 100   


 


11/11/19 12:00        40


 


11/11/19 12:00  69      


 


11/11/19 12:00      Endotracheal Tube  


 


11/11/19 11:25  67 15     40


 


11/11/19 11:00  72 21 111/49 (69) 99   


 


11/11/19 10:00  79 22 115/46 (69) 99   


 


11/11/19 09:50  84 37     40


 


11/11/19 09:47     99   


 


11/11/19 09:02  80 20     40





        40


 


11/11/19 09:00  80 23 121/57 (78) 99   


 


11/11/19 08:49  79  107/46    


 


11/11/19 08:00        40


 


11/11/19 08:00  74      


 


11/11/19 08:00      Endotracheal Tube  


 


11/11/19 08:00 99.9 75 21 107/44 (65) 98   


 


11/11/19 07:15  80 17  100   


 


11/11/19 07:00  75 22 116/47 (70) 100   


 


11/11/19 06:55  75 16  100 Mechanical Ventilator  40





  75 16     40


 


11/11/19 06:30  78 21 125/50 (75) 100   


 


11/11/19 06:00  72 22 115/44 (67) 100   


 


11/11/19 05:30  73 21 110/46 (67) 100   


 


11/11/19 05:11  72 18     40


 


11/11/19 05:00  75 19 111/45 (67) 100   


 


11/11/19 04:00  76      


 


11/11/19 04:00      Endotracheal Tube  


 


11/11/19 04:00        40


 


11/11/19 04:00  78 21 110/45 (66) 100   


 


11/11/19 03:02  95 24     40


 


11/11/19 03:00  95 22 134/51 (78) 100   


 


11/11/19 02:00 99.2 72 17 123/38 (66) 100   


 


11/11/19 01:30  77 20 125/40 (68) 100   


 


11/11/19 01:05  74 20  100 Mechanical Ventilator  40





  74 20     40


 


11/11/19 01:00  75 20 125/49 (74) 100   


 


11/11/19 00:30  79 23 111/76 (88) 100   


 


11/11/19 00:00  80      


 


11/11/19 00:00        40


 


11/11/19 00:00 99.2 78 24 128/48 (74) 100   


 


11/11/19 00:00      Endotracheal Tube  


 


11/10/19 23:30  76 22 140/44 (76) 100   


 


11/10/19 23:01  72 23     40


 


11/10/19 23:00  72 22 125/44 (71) 100   


 


11/10/19 22:00  82 21 138/47 (77) 100   


 


11/10/19 21:01  83 22     40


 


11/10/19 21:00  83 22 126/51 (76) 100   


 


11/10/19 20:30  83 24 133/48 (76) 100   








General Appearance:  no apparent distress, on vent, patient on isolation


Neck:  supple


Cardiovascular:  regular rhythm


Respiratory/Chest:  rhonchi - bilaterally


Abdomen:  normal bowel sounds, non tender, soft


Extremities:  no swelling











Intake and Output  


 


 11/10/19 11/11/19





 18:59 06:59


 


Intake Total 870 ml 800 ml


 


Output Total 720 ml 1110 ml


 


Balance 150 ml -310 ml


 


  


 


Free Water 50 ml 200 ml


 


IV Total 220 ml 


 


Tube Feeding 600 ml 540 ml


 


Other  60 ml


 


Output Urine Total 720 ml 1110 ml


 


# Bowel Movements 3 2











Laboratory Tests








Test


  11/11/19


04:20


 


White Blood Count


  10.4 K/UL


(4.8-10.8)


 


Red Blood Count


  3.07 M/UL


(4.20-5.40)  L


 


Hemoglobin


  8.2 G/DL


(12.0-16.0)  L


 


Hematocrit


  25.2 %


(37.0-47.0)  L


 


Mean Corpuscular Volume 82 FL (80-99)  


 


Mean Corpuscular Hemoglobin


  26.8 PG


(27.0-31.0)  L


 


Mean Corpuscular Hemoglobin


Concent 32.7 G/DL


(32.0-36.0)


 


Red Cell Distribution Width


  14.5 %


(11.6-14.8)


 


Platelet Count


  382 K/UL


(150-450)


 


Mean Platelet Volume


  7.7 FL


(6.5-10.1)


 


Neutrophils (%) (Auto)


  62.6 %


(45.0-75.0)


 


Lymphocytes (%) (Auto)


  14.9 %


(20.0-45.0)  L


 


Monocytes (%) (Auto)


  11.4 %


(1.0-10.0)  H


 


Eosinophils (%) (Auto)


  8.9 %


(0.0-3.0)  H


 


Basophils (%) (Auto)


  2.3 %


(0.0-2.0)  H


 


Sodium Level


  141 MMOL/L


(136-145)


 


Potassium Level


  4.7 MMOL/L


(3.5-5.1)


 


Chloride Level


  105 MMOL/L


()


 


Carbon Dioxide Level


  36 MMOL/L


(21-32)  H


 


Anion Gap


  0 mmol/L


(5-15)  L


 


Blood Urea Nitrogen


  32 mg/dL


(7-18)  H


 


Creatinine


  1.1 MG/DL


(0.55-1.30)


 


Estimat Glomerular Filtration


Rate 49.7 mL/min


(>60)


 


Glucose Level


  133 MG/DL


()  H


 


Calcium Level


  8.3 MG/DL


(8.5-10.1)  L


 


Phosphorus Level


  3.0 MG/DL


(2.5-4.9)


 


Magnesium Level


  2.1 MG/DL


(1.8-2.4)


 


Total Bilirubin


  0.5 MG/DL


(0.2-1.0)


 


Direct Bilirubin


  0.1 MG/DL


(0.0-0.3)


 


Aspartate Amino Transf


(AST/SGOT) 22 U/L (15-37)


 


 


Alanine Aminotransferase


(ALT/SGPT) 12 U/L (12-78)


 


 


Alkaline Phosphatase


  87 U/L


()


 


Total Protein


  6.2 G/DL


(6.4-8.2)  L


 


Albumin


  1.8 G/DL


(3.4-5.0)  L


 


Random Vancomycin Level 19.3 ug/mL  











Microbiology








 Date/Time


Source Procedure


Growth Status


 


 


 11/10/19 09:00


Sputum Induced Gram Stain - Final Resulted


 


 11/10/19 09:00


Sputum Induced Sputum Culture


Pending Resulted

















Rob May MD Nov 11, 2019 20:29

## 2019-11-11 NOTE — NUR
NURSE NOTES:



Pt placed back on original vent settings after 50 mins of weaning, was not able to tolerate 
longer. Pt became tachypneic RR >35.

## 2019-11-11 NOTE — NUR
NURSE NOTES:

Bed bath given tolerated well. turned and repositioned. with x1 med bm. No seizure episode. 
no moaning no facial grimaces. frequent visual checks continued.

## 2019-11-11 NOTE — PULMONOLGY CRITICAL CARE NOTE
Critical Care - Asmt/Plan


Assessment/Plan:


Pulmonary CCM Progress Note 





Assessment/Plan


Problems:  


(1) Healthcare/aspiration associated bacterial pneumonia, respiratory failure, 

some improvement in CXR


(2) UTI (urinary tract infection)


(3) Sepsis


(4) Dehydration


(5) CHANCE (acute kidney injury)


(6) Acute metabolic encephalopathy


(7) Hyperglycemia due to type 2 diabetes mellitus


(8) Renal failure (ARF), acute on chronic


(9) Ventilator dependant Respiratory Failure - not tolerating weaning


(10) Abnormal TSH


(11) Pelvic mass in female


(12) PEG (percutaneous endoscopic gastrostomy) status


Assessment/Plan


Marked leukocytosis S/P WBC scan with uptake in pelvis, has pelvic mass


Sepsis


Possible pneumonia


E coli UTI


Acute hypoxemic respiratory failure


S/p code blue - now VDRF - not tolerating weaning - failed weaning again today, 

still has significant secretions


Willneed Tracheostomy - DW PMD


Encephalopathy sp Code


Hx CHF


HTN


CHANCE improving


Pelvic mass/possible GYN malignancy vs cyst


Dysphagia S/P PEG





Plan:


-Wean FIO2, ABG PRN


-Optimize pulmonary hygiene


-Wean ventilator as tolerated, plan for Tracheostomy


-Titrate down FiO2 to keep SaO2 > 90%


-Monitor WCt, RFS sent for JAK2 mutation, per Dr. Pop BMBx if unrevealing


-Abx per ID, F/U Cx's


-monitor volumes and renal function, F/U renal recs, diurese PRN


-monitor encephalopathy


-NPO, GTF's


-DVT Px


-FC


-PICC line





Subjective


Allergies:  


Coded Allergies:  


     No Known Allergies


Subjective


Sedated on the Ventilator





Objective





Vital Signs Noted





General Appearance:  Sedated, cachetic


HEENT:  normocephalic, atraumatic, anicteric, mucous membranes moist


Respiratory/Chest:  occasional rhonchi


Cardiovascular:  normal peripheral pulses, normal rate, regular rhythm


Abdomen:  normal bowel sounds, soft, non tender, no organomegaly, non distended

, no mass, other - G


Extremities:  no cyanosis, no clubbing, no edema





Microbiology noted








 Date/Time


Source Procedure


Growth Status


 


 


 10/31/19 10:15


Urine,Clean Catch Urine Culture - Preliminary


YEAST Resulted








Laboratory Tests Noted





Critical Care - Objective





Last 24 Hour Vital Signs








  Date Time  Temp Pulse Resp B/P (MAP) Pulse Ox O2 Delivery O2 Flow Rate FiO2


 


11/11/19 21:00  91 24 123/54 (77) 100   


 


11/11/19 20:00        40


 


11/11/19 20:00      Endotracheal Tube  


 


11/11/19 20:00 99.5 86 22 110/49 (69) 100   


 


11/11/19 19:00  89 23 112/48 (69) 100   


 


11/11/19 18:43  85 24  100 Mechanical Ventilator 40.0 40





  85 24     40


 


11/11/19 18:00  85 24 116/48 (70) 100   


 


11/11/19 17:09  78 24     40


 


11/11/19 17:02  80  123/57    


 


11/11/19 17:00  81 26 123/57 (79) 100   


 


11/11/19 16:30  69 22 104/41 (62) 100   


 


11/11/19 16:00  87      


 


11/11/19 16:00 99.3 82 24 116/47 (70) 100   


 


11/11/19 16:00        40


 


11/11/19 16:00      Endotracheal Tube  


 


11/11/19 15:26  75 24     40


 


11/11/19 15:00  79 23 108/60 (76) 100   


 


11/11/19 14:00  72 15 118/44 (68) 100   


 


11/11/19 13:30  69 14 110/43 (65) 100   


 


11/11/19 13:22  63 17  100 Mechanical Ventilator  40





  63 17     40


 


11/11/19 13:00  76 23 122/49 (73) 100   


 


11/11/19 12:00 99.5 74 20 119/46 (70) 100   


 


11/11/19 12:00        40


 


11/11/19 12:00  69      


 


11/11/19 12:00      Endotracheal Tube  


 


11/11/19 11:25  67 15     40


 


11/11/19 11:00  72 21 111/49 (69) 99   


 


11/11/19 10:00  79 22 115/46 (69) 99   


 


11/11/19 09:50  84 37     40


 


11/11/19 09:47     99   


 


11/11/19 09:02  80 20     40





        40


 


11/11/19 09:00  80 23 121/57 (78) 99   


 


11/11/19 08:49  79  107/46    


 


11/11/19 08:00        40


 


11/11/19 08:00  74      


 


11/11/19 08:00      Endotracheal Tube  


 


11/11/19 08:00 99.9 75 21 107/44 (65) 98   


 


11/11/19 07:15  80 17  100   


 


11/11/19 07:00  75 22 116/47 (70) 100   


 


11/11/19 06:55  75 16  100 Mechanical Ventilator  40





  75 16     40


 


11/11/19 06:30  78 21 125/50 (75) 100   


 


11/11/19 06:00  72 22 115/44 (67) 100   


 


11/11/19 05:30  73 21 110/46 (67) 100   


 


11/11/19 05:11  72 18     40


 


11/11/19 05:00  75 19 111/45 (67) 100   


 


11/11/19 04:00  76      


 


11/11/19 04:00      Endotracheal Tube  


 


11/11/19 04:00        40


 


11/11/19 04:00  78 21 110/45 (66) 100   


 


11/11/19 03:02  95 24     40


 


11/11/19 03:00  95 22 134/51 (78) 100   


 


11/11/19 02:00 99.2 72 17 123/38 (66) 100   


 


11/11/19 01:30  77 20 125/40 (68) 100   


 


11/11/19 01:05  74 20  100 Mechanical Ventilator  40





  74 20     40


 


11/11/19 01:00  75 20 125/49 (74) 100   


 


11/11/19 00:30  79 23 111/76 (88) 100   


 


11/11/19 00:00  80      


 


11/11/19 00:00        40


 


11/11/19 00:00 99.2 78 24 128/48 (74) 100   


 


11/11/19 00:00      Endotracheal Tube  


 


11/10/19 23:30  76 22 140/44 (76) 100   


 


11/10/19 23:01  72 23     40


 


11/10/19 23:00  72 22 125/44 (71) 100   








Micro:





Microbiology








 Date/Time


Source Procedure


Growth Status


 


 


 11/10/19 09:00


Sputum Induced Gram Stain - Final Resulted


 


 11/10/19 09:00


Sputum Induced Sputum Culture


Pending Resulted








Accucheck:  179





Critical Care - Subjective


ROS Limited/Unobtainable:  No


FI02:  40


Vent Support Breath Rate:  14


Vent Support Mode:  AC


Vent Tidal Volume:  400


Sputum Amount:  Small


PEEP:  5.0


PIP:  19


Tube Feeding Amount:  60


I&O:











Intake and Output  


 


 11/10/19 11/11/19





 18:59 06:59


 


Intake Total 870 ml 800 ml


 


Output Total 720 ml 1110 ml


 


Balance 150 ml -310 ml


 


  


 


Free Water 50 ml 200 ml


 


IV Total 220 ml 


 


Tube Feeding 600 ml 540 ml


 


Other  60 ml


 


Output Urine Total 720 ml 1110 ml


 


# Bowel Movements 3 2








ET-Tube:  7.5


ET Position:  23











Delmer Main MD Nov 11, 2019 22:25

## 2019-11-11 NOTE — NUR
NURSE NOTES:



Dr. Main called, updated him on pt's current condition. Let him know pt tolerated weaning 
this AM for 50 mins. No new orders at this time. No distress noted. Will continue to 
monitor.

## 2019-11-11 NOTE — GENERAL PROGRESS NOTE
Assessment/Plan


Problem List:  


(1) Abnormal TSH


ICD Codes:  R79.89 - Other specified abnormal findings of blood chemistry


SNOMED:  595658577


(2) Hyperglycemia due to type 2 diabetes mellitus


ICD Codes:  E11.65 - Type 2 diabetes mellitus with hyperglycemia


SNOMED:  318305053656477, 45207549


Qualifiers:  


   Qualified Codes:  E11.65 - Type 2 diabetes mellitus with hyperglycemia; 

Z79.4 - Long term (current) use of insulin


(3) Acute metabolic encephalopathy


ICD Codes:  G93.41 - Metabolic encephalopathy


SNOMED:  33674733, 244519754


(4) ARF (acute renal failure)


ICD Codes:  N17.9 - Acute kidney failure, unspecified


SNOMED:  62385600


Qualifiers:  


   Qualified Codes:  N17.9 - Acute kidney failure, unspecified


(5) Healthcare associated bacterial pneumonia


ICD Codes:  J15.9 - Unspecified bacterial pneumonia


SNOMED:  426724097


Status:  unchanged


Assessment/Plan:


continue Levemir 8 units qhs 


continue NISS every 6 hours 


hypoglycemia protocol in order





Subjective


ROS Limited/Unobtainable:  Yes


Allergies:  


Coded Allergies:  


     No Known Allergies (Unverified , 10/14/19)


Subjective


events noted 











Item Value  Date Time


 


Bedside Blood Glucose 133 mg/dl H 11/11/19 0645


 


Bedside Blood Glucose 218 mg/dl H 11/11/19 0047


 


Bedside Blood Glucose 193 mg/dl H 11/10/19 2200


 


Bedside Blood Glucose 172 mg/dl H 11/10/19 1800


 


Bedside Blood Glucose 174 mg/dl H 11/10/19 1201


 


Bedside Blood Glucose 131 mg/dl H 11/10/19 0536











Objective





Last 24 Hour Vital Signs








  Date Time  Temp Pulse Resp B/P (MAP) Pulse Ox O2 Delivery O2 Flow Rate FiO2


 


11/11/19 06:30  78 21 125/50 (75) 100   


 


11/11/19 06:00  72 22 115/44 (67) 100   


 


11/11/19 05:30  73 21 110/46 (67) 100   


 


11/11/19 05:11  72 18     40


 


11/11/19 05:00  75 19 111/45 (67) 100   


 


11/11/19 04:00  76      


 


11/11/19 04:00      Endotracheal Tube  


 


11/11/19 04:00        40


 


11/11/19 04:00  78 21 110/45 (66) 100   


 


11/11/19 03:02  95 24     40


 


11/11/19 03:00  95 22 134/51 (78) 100   


 


11/11/19 02:00 99.2 72 17 123/38 (66) 100   


 


11/11/19 01:30  77 20 125/40 (68) 100   


 


11/11/19 01:05  74 20  100 Mechanical Ventilator  40





  74 20     40


 


11/11/19 01:00  75 20 125/49 (74) 100   


 


11/11/19 00:30  79 23 111/76 (88) 100   


 


11/11/19 00:00  80      


 


11/11/19 00:00        40


 


11/11/19 00:00 99.2 78 24 128/48 (74) 100   


 


11/11/19 00:00      Endotracheal Tube  


 


11/10/19 23:30  76 22 140/44 (76) 100   


 


11/10/19 23:01  72 23     40


 


11/10/19 23:00  72 22 125/44 (71) 100   


 


11/10/19 22:00  82 21 138/47 (77) 100   


 


11/10/19 21:01  83 22     40


 


11/10/19 21:00  83 22 126/51 (76) 100   


 


11/10/19 20:30  83 24 133/48 (76) 100   


 


11/10/19 20:17  82 26 103/55 (71) 100   


 


11/10/19 20:00  76      


 


11/10/19 20:00 99.6 81 23  100   


 


11/10/19 20:00      Endotracheal Tube  


 


11/10/19 20:00        40


 


11/10/19 19:00  78 19 114/44 (67) 100   


 


11/10/19 18:59  73 18  100 Mechanical Ventilator  40





  73 18     40


 


11/10/19 18:00  76 20 114/51 (72) 100   


 


11/10/19 17:38  75  124/45    


 


11/10/19 17:02  75 25     40


 


11/10/19 17:00  75 22 124/45 (71) 100   


 


11/10/19 16:00 99.0 79 27 123/45 (71) 100   


 


11/10/19 16:00      Endotracheal Tube  


 


11/10/19 16:00        40


 


11/10/19 16:00  79      


 


11/10/19 15:18  80 18     40


 


11/10/19 15:00  78 24 114/49 (70) 100   


 


11/10/19 14:00  75 21 111/39 (63) 100   


 


11/10/19 13:17  76 24  100 Mechanical Ventilator  40





  82 24     40


 


11/10/19 13:00  73 22 121/43 (69) 98   


 


11/10/19 12:00  89      


 


11/10/19 12:00 99.0 75 20 117/39 (65) 100   


 


11/10/19 12:00        40


 


11/10/19 12:00      Endotracheal Tube  


 


11/10/19 11:08  81 14     40


 


11/10/19 11:00  89 25 128/60 (82) 100   


 


11/10/19 10:00  78 21 127/46 (73) 100   


 


11/10/19 09:07  79 18     40


 


11/10/19 09:00  72 20 109/40 (63) 100   


 


11/10/19 08:27  73  122/41    


 


11/10/19 08:00      Endotracheal Tube  


 


11/10/19 08:00 99.0 73 20 122/41 (68) 100   


 


11/10/19 08:00        40


 


11/10/19 08:00  69 25 116/47 (70) 100   


 


11/10/19 08:00  76      


 


11/10/19 07:00  69 25 116/47 (70) 100   

















Intake and Output  


 


 11/10/19 11/11/19





 19:00 07:00


 


Intake Total 930 ml 740 ml


 


Output Total 750 ml 1010 ml


 


Balance 180 ml -270 ml


 


  


 


Free Water 50 ml 200 ml


 


IV Total 220 ml 


 


Tube Feeding 660 ml 480 ml


 


Other  60 ml


 


Output Urine Total 750 ml 1010 ml


 


# Bowel Movements 3 2








Laboratory Tests


11/10/19 16:45: Vancomycin Level Trough 24.2H


11/11/19 04:20: 


White Blood Count 10.4, Red Blood Count 3.07L, Hemoglobin 8.2L, Hematocrit 25.2L

, Mean Corpuscular Volume 82, Mean Corpuscular Hemoglobin 26.8L, Mean 

Corpuscular Hemoglobin Concent 32.7, Red Cell Distribution Width 14.5, Platelet 

Count 382, Mean Platelet Volume 7.7, Neutrophils (%) (Auto) 62.6, Lymphocytes (%

) (Auto) 14.9L, Monocytes (%) (Auto) 11.4H, Eosinophils (%) (Auto) 8.9H, 

Basophils (%) (Auto) 2.3H, Sodium Level 141, Potassium Level 4.7, Chloride 

Level 105, Carbon Dioxide Level 36H, Anion Gap 0L, Blood Urea Nitrogen 32H, 

Creatinine 1.1, Estimat Glomerular Filtration Rate 49.7, Glucose Level 133H, 

Calcium Level 8.3L, Random Vancomycin Level 19.3


Height (Feet):  5


Height (Inches):  5.00


Weight (Pounds):  182


General Appearance:  lethargic


Neck:  normal alignment


Respiratory/Chest:  decreased breath sounds


Abdomen:  normal bowel sounds


Objective





Current Medications








 Medications


  (Trade)  Dose


 Ordered  Sig/Winnie


 Route


 PRN Reason  Start Time


 Stop Time Status Last Admin


Dose Admin


 


 Acetaminophen


  (Tylenol)  650 mg  Q4H  PRN


 NG


 Mild Pain/Temp > 100.5  11/3/19 20:30


 11/25/19 20:29  11/10/19 03:01


 


 


 Acetaminophen


  (Tylenol)  650 mg  Q4H  PRN


 RECTAL


 TEMP>100.5  11/3/19 20:30


 12/2/19 20:29   


 


 


 Acetylcysteine


  (Mucomyst)  100 mg  TIDRT


 HHN


   11/10/19 19:30


 12/10/19 19:29   


 


 


 Albuterol/


 Ipratropium


  (Albuterol/


 Ipratropium)  3 ml  Q4H  PRN


 HHN


 Shortness of Breath  11/7/19 09:45


 11/12/19 09:44   


 


 


 Albuterol/


 Ipratropium


  (Albuterol/


 Ipratropium)  3 ml  Q6HRT


 HHN


   11/7/19 13:00


 11/12/19 12:59  11/11/19 01:05


 


 


 Amlodipine


 Besylate


  (Norvasc)  2.5 mg  BID


 NG


   11/4/19 09:00


 11/29/19 17:59  11/10/19 17:38


 


 


 Chlorhexidine


 Gluconate


  (Mae-Hex 2%)  1 applic  DAILY@2000


 TOPIC


   11/5/19 20:00


 12/5/19 19:59  11/10/19 21:06


 


 


 Dextrose


  (Dextrose 50%)  25 ml  Q30M  PRN


 IV


 Hypoglycemia  11/5/19 06:30


 12/5/19 06:29   


 


 


 Dextrose


  (Dextrose 50%)  50 ml  Q30M  PRN


 IV


 Hypoglycemia  11/5/19 06:30


 12/5/19 06:29   


 


 


 Divalproex Sodium


  (Depakote


 Sprinkles)  500 mg  Q12HR


 NG


   11/3/19 21:00


 11/14/19 21:59  11/10/19 21:06


 


 


 Folic Acid


  (Folate)  3 mg  DAILY


 GT


   11/7/19 09:00


 11/30/19 08:59  11/10/19 08:27


 


 


 Furosemide


  (Lasix)  40 mg  EVERY 12  HOURS


 IV


   11/7/19 21:00


 12/7/19 20:59  11/10/19 21:06


 


 


 Insulin Aspart


  (NovoLOG)    EVERY 6  HOURS


 SUBQ


   11/4/19 00:00


 11/29/19 11:59  11/11/19 06:45


 


 


 Insulin Detemir


  (Levemir)  8 units  QHS


 SUBQ


   11/6/19 21:00


 12/1/19 08:59  11/10/19 22:00


 


 


 Lactulose


  (Cephulac)  20 gm  Q8H  PRN


 NG


 Constipation  11/3/19 20:30


 12/3/19 20:29  11/5/19 08:12


 


 


 Lorazepam


  (Ativan 2mg/ml


 1ml)  2 mg  Q4H  PRN


 IV


 for agitation  11/10/19 19:30


 11/17/19 19:29  11/11/19 02:48


 


 


 Meropenem 1 gm/


 Sodium Chloride  55 ml @ 


 110 mls/hr  Q8H


 IVPB


   11/4/19 17:00


 11/14/19 16:59  11/11/19 01:13


 


 


 Metoclopramide HCl


  (Reglan)  10 mg  Q6H  PRN


 IVP


 Nausea & Vomiting  11/6/19 10:45


 12/6/19 10:44   


 


 


 Pantoprazole


  (Protonix)  40 mg  EVERY 12  HOURS


 IVP


   11/3/19 21:00


 12/3/19 08:59  11/10/19 21:06


 


 


 Potassium Chloride


  (K-Dur)  40 meq  DAILY


 GT


   11/6/19 09:00


 12/5/19 17:59  11/10/19 08:27


 

















Riccardo Velez MD Nov 11, 2019 06:58

## 2019-11-11 NOTE — NUR
NURSE NOTES:



Pt cleaned, oral care completed, and pt repositioned. No distress noted. Will monitor.

## 2019-11-11 NOTE — NUR
NURSE NOTES:

PATIENT AWOKE, OPEN EYES TO NAME, DID NOT FOLLOW COMMANDS, ON ETT TO VENT, AC14//FIO2 
40%/PEEP 5, O2 SATURATION 100% NOTED, HEART 80'S/MIN SR NOTED, ABDOMEN SOFT, NON TENDER, 
SOFT BROWN COLOR BM STATUS, G TUBE INTACT AND PATENT, NO RESIDUE NOTED, ONGOING GLUCERNA 1.2 
AT 60ML/HR, KEPT HOB OVER 30 DEGREES, F/C INTACT AND PATENT, YELLOW URINE OUTED, PICC LINE 
TO RIGHT UPPER ARM, INTACT AND PATENT, ON SCD'S TO BOTH LOWER LEGS, MADE LOWER BED AND 
LOCKED, ON P200 BED, KEPT 2 POINT SOFT RESTRAINTS FOR SAFETY, SZ AND ASPIRATION PRECAUTION, 
PROVIDED CALL LIGHT WITHIN REACH, WILL CONTINUE TO MONITOR.

## 2019-11-11 NOTE — INFECTIOUS DISEASES PROG NOTE
Assessment/Plan


Problems:  


(1) Aspiration pneumonia


Assessment & Plan:  with B/L infiltrates, hypoxemia and leukocytosis, CXR  

showed interstitial infiltrates , sputum culture showed normal agustin . continue 

meropenem  empirically for 4 more days stop vancomycin . aspiration precaution. 

EOT 11/14/19





(2) Respiratory failure


Assessment & Plan:  with maegan cardia and S/P code blue, was intubated and 

started on mechanical ventilation , monitor CXR and ABG , pulmonary is 

following 





(3) UTI (urinary tract infection)


Assessment & Plan:  due to candida lusitaneae , S/P casas catheter removal , no 

specific therapy needed for now after changing her casas catheter 





(4) Acute metabolic encephalopathy


Assessment & Plan:  due to the above, continue hydration and antibiotics, 

monitor in tele 





(5) Hyperglycemia due to type 2 diabetes mellitus


Assessment & Plan:  recommend tight glycemic control to keep blood glucose 

between 100-140 





(6) ARF (acute renal failure)


Assessment & Plan:  due to the above, continue hydration and renally dosed 

antibiotics, close  monitor of renal function , stop vancomycin 





(7) Pelvic mass in female


Assessment & Plan:  to the left of the uterus, suspect malignancy , recommend OB

/GYN eval , oncology is following 








Subjective


ROS Limited/Unobtainable:  Yes


Allergies:  


Coded Allergies:  


     No Known Allergies (Unverified , 10/14/19)


Subjective


she was still intubated on mechanical ventilation , comfortable still in ICU 

after she became bradycardic and had code blue , minimally responsive , had low 

grade fever no chills , no diarrhea, no secretions from the ET tube





Objective


Vital Signs





Last 24 Hour Vital Signs








  Date Time  Temp Pulse Resp B/P (MAP) Pulse Ox O2 Delivery O2 Flow Rate FiO2


 


11/11/19 19:00  89 23 112/48 (69) 100   


 


11/11/19 18:43  85 24  100 Mechanical Ventilator 40.0 40





  85 24     40


 


11/11/19 18:00  85 24 116/48 (70) 100   


 


11/11/19 17:09  78 24     40


 


11/11/19 17:02  80  123/57    


 


11/11/19 17:00  81 26 123/57 (79) 100   


 


11/11/19 16:30  69 22 104/41 (62) 100   


 


11/11/19 16:00  87      


 


11/11/19 16:00 99.3 82 24 116/47 (70) 100   


 


11/11/19 16:00        40


 


11/11/19 16:00      Endotracheal Tube  


 


11/11/19 15:26  75 24     40


 


11/11/19 15:00  79 23 108/60 (76) 100   


 


11/11/19 14:00  72 15 118/44 (68) 100   


 


11/11/19 13:30  69 14 110/43 (65) 100   


 


11/11/19 13:22  63 17  100 Mechanical Ventilator  40





  63 17     40


 


11/11/19 13:00  76 23 122/49 (73) 100   


 


11/11/19 12:00 99.5 74 20 119/46 (70) 100   


 


11/11/19 12:00        40


 


11/11/19 12:00  69      


 


11/11/19 12:00      Endotracheal Tube  


 


11/11/19 11:25  67 15     40


 


11/11/19 11:00  72 21 111/49 (69) 99   


 


11/11/19 10:00  79 22 115/46 (69) 99   


 


11/11/19 09:50  84 37     40


 


11/11/19 09:47     99   


 


11/11/19 09:02  80 20     40





        40


 


11/11/19 09:00  80 23 121/57 (78) 99   


 


11/11/19 08:49  79  107/46    


 


11/11/19 08:00        40


 


11/11/19 08:00  74      


 


11/11/19 08:00      Endotracheal Tube  


 


11/11/19 08:00 99.9 75 21 107/44 (65) 98   


 


11/11/19 07:15  80 17  100   


 


11/11/19 07:00  75 22 116/47 (70) 100   


 


11/11/19 06:55  75 16  100 Mechanical Ventilator  40





  75 16     40


 


11/11/19 06:30  78 21 125/50 (75) 100   


 


11/11/19 06:00  72 22 115/44 (67) 100   


 


11/11/19 05:30  73 21 110/46 (67) 100   


 


11/11/19 05:11  72 18     40


 


11/11/19 05:00  75 19 111/45 (67) 100   


 


11/11/19 04:00  76      


 


11/11/19 04:00      Endotracheal Tube  


 


11/11/19 04:00        40


 


11/11/19 04:00  78 21 110/45 (66) 100   


 


11/11/19 03:02  95 24     40


 


11/11/19 03:00  95 22 134/51 (78) 100   


 


11/11/19 02:00 99.2 72 17 123/38 (66) 100   


 


11/11/19 01:30  77 20 125/40 (68) 100   


 


11/11/19 01:05  74 20  100 Mechanical Ventilator  40





  74 20     40


 


11/11/19 01:00  75 20 125/49 (74) 100   


 


11/11/19 00:30  79 23 111/76 (88) 100   


 


11/11/19 00:00  80      


 


11/11/19 00:00        40


 


11/11/19 00:00 99.2 78 24 128/48 (74) 100   


 


11/11/19 00:00      Endotracheal Tube  


 


11/10/19 23:30  76 22 140/44 (76) 100   


 


11/10/19 23:01  72 23     40


 


11/10/19 23:00  72 22 125/44 (71) 100   


 


11/10/19 22:00  82 21 138/47 (77) 100   








Height (Feet):  5


Height (Inches):  5.00


Weight (Pounds):  182


General Appearance:  WD/WN, no acute distress


HEENT:  normocephalic, atraumatic, anicteric, mucous membranes moist, PERRL


Respiratory/Chest:  chest wall non-tender, no respiratory distress, no 

accessory muscle use, decreased breath sounds, crackles/rales


Cardiovascular:  normal peripheral pulses, normal rate, regular rhythm, no 

gallop/murmur, no JVD


Abdomen:  normal bowel sounds, soft, non tender, no organomegaly, non distended

, no mass, no scars


Genitourinary:  normal external genitalia


Extremities:  no cyanosis, no clubbing


Skin:  no rash, no lesions, no ulcers


Neurologic/Psychiatric:  unresponsiveness


Lymphatic:  no neck adenopathy, no groin adenopathy


Musculoskeletal:  normal muscle bulk, no effusion





Microbiology








 Date/Time


Source Procedure


Growth Status


 


 


 11/10/19 09:00


Sputum Induced Gram Stain - Final Resulted


 


 11/10/19 09:00


Sputum Induced Sputum Culture


Pending Resulted











Laboratory Tests








Test


  11/11/19


04:20


 


White Blood Count


  10.4 K/UL


(4.8-10.8)


 


Red Blood Count


  3.07 M/UL


(4.20-5.40)  L


 


Hemoglobin


  8.2 G/DL


(12.0-16.0)  L


 


Hematocrit


  25.2 %


(37.0-47.0)  L


 


Mean Corpuscular Volume 82 FL (80-99)  


 


Mean Corpuscular Hemoglobin


  26.8 PG


(27.0-31.0)  L


 


Mean Corpuscular Hemoglobin


Concent 32.7 G/DL


(32.0-36.0)


 


Red Cell Distribution Width


  14.5 %


(11.6-14.8)


 


Platelet Count


  382 K/UL


(150-450)


 


Mean Platelet Volume


  7.7 FL


(6.5-10.1)


 


Neutrophils (%) (Auto)


  62.6 %


(45.0-75.0)


 


Lymphocytes (%) (Auto)


  14.9 %


(20.0-45.0)  L


 


Monocytes (%) (Auto)


  11.4 %


(1.0-10.0)  H


 


Eosinophils (%) (Auto)


  8.9 %


(0.0-3.0)  H


 


Basophils (%) (Auto)


  2.3 %


(0.0-2.0)  H


 


Sodium Level


  141 MMOL/L


(136-145)


 


Potassium Level


  4.7 MMOL/L


(3.5-5.1)


 


Chloride Level


  105 MMOL/L


()


 


Carbon Dioxide Level


  36 MMOL/L


(21-32)  H


 


Anion Gap


  0 mmol/L


(5-15)  L


 


Blood Urea Nitrogen


  32 mg/dL


(7-18)  H


 


Creatinine


  1.1 MG/DL


(0.55-1.30)


 


Estimat Glomerular Filtration


Rate 49.7 mL/min


(>60)


 


Glucose Level


  133 MG/DL


()  H


 


Calcium Level


  8.3 MG/DL


(8.5-10.1)  L


 


Phosphorus Level


  3.0 MG/DL


(2.5-4.9)


 


Magnesium Level


  2.1 MG/DL


(1.8-2.4)


 


Total Bilirubin


  0.5 MG/DL


(0.2-1.0)


 


Direct Bilirubin


  0.1 MG/DL


(0.0-0.3)


 


Aspartate Amino Transf


(AST/SGOT) 22 U/L (15-37)


 


 


Alanine Aminotransferase


(ALT/SGPT) 12 U/L (12-78)


 


 


Alkaline Phosphatase


  87 U/L


()


 


Total Protein


  6.2 G/DL


(6.4-8.2)  L


 


Albumin


  1.8 G/DL


(3.4-5.0)  L


 


Random Vancomycin Level 19.3 ug/mL  











Current Medications








 Medications


  (Trade)  Dose


 Ordered  Sig/Winnie


 Route


 PRN Reason  Start Time


 Stop Time Status Last Admin


Dose Admin


 


 Acetaminophen


  (Tylenol)  650 mg  Q4H  PRN


 NG


 Mild Pain/Temp > 100.5  11/3/19 20:30


 11/25/19 20:29  11/10/19 03:01


 


 


 Acetaminophen


  (Tylenol)  650 mg  Q4H  PRN


 RECTAL


 TEMP>100.5  11/3/19 20:30


 12/2/19 20:29   


 


 


 Acetylcysteine


  (Mucomyst)  100 mg  TIDRT


 N


   11/10/19 19:30


 12/10/19 19:29  11/11/19 18:43


 


 


 Albuterol/


 Ipratropium


  (Albuterol/


 Ipratropium)  3 ml  Q4H  PRN


 HHN


 Shortness of Breath  11/7/19 09:45


 11/12/19 09:44   


 


 


 Albuterol/


 Ipratropium


  (Albuterol/


 Ipratropium)  3 ml  Q6HRT


 N


   11/7/19 13:00


 11/12/19 12:59  11/11/19 18:43


 


 


 Amlodipine


 Besylate


  (Norvasc)  2.5 mg  BID


 NG


   11/4/19 09:00


 11/29/19 17:59  11/11/19 17:02


 


 


 Chlorhexidine


 Gluconate


  (Mae-Hex 2%)  1 applic  DAILY@2000


 TOPIC


   11/5/19 20:00


 12/5/19 19:59  11/11/19 19:53


 


 


 Dextrose


  (Dextrose 50%)  25 ml  Q30M  PRN


 IV


 Hypoglycemia  11/5/19 06:30


 12/5/19 06:29   


 


 


 Dextrose


  (Dextrose 50%)  50 ml  Q30M  PRN


 IV


 Hypoglycemia  11/5/19 06:30


 12/5/19 06:29   


 


 


 Divalproex Sodium


  (Depakote


 Sprinkles)  500 mg  Q12HR


 NG


   11/3/19 21:00


 11/14/19 21:59  11/11/19 20:42


 


 


 Folic Acid


  (Folate)  3 mg  DAILY


 GT


   11/7/19 09:00


 11/30/19 08:59  11/11/19 08:49


 


 


 Furosemide


  (Lasix)  40 mg  EVERY 12  HOURS


 IV


   11/7/19 21:00


 12/7/19 20:59  11/11/19 20:41


 


 


 Insulin Aspart


  (NovoLOG)    EVERY 6  HOURS


 SUBQ


   11/4/19 00:00


 11/29/19 11:59  11/11/19 17:04


 


 


 Insulin Detemir


  (Levemir)  8 units  QHS


 SUBQ


   11/6/19 21:00


 12/1/19 08:59  11/11/19 20:43


 


 


 Lactulose


  (Cephulac)  20 gm  Q8H  PRN


 NG


 Constipation  11/3/19 20:30


 12/3/19 20:29  11/5/19 08:12


 


 


 Lorazepam


  (Ativan 2mg/ml


 1ml)  2 mg  Q4H  PRN


 IV


 for agitation  11/10/19 19:30


 11/17/19 19:29  11/11/19 02:48


 


 


 Meropenem 1 gm/


 Sodium Chloride  55 ml @ 


 110 mls/hr  Q8H


 IVPB


   11/4/19 17:00


 11/14/19 16:59  11/11/19 17:00


 


 


 Metoclopramide HCl


  (Reglan)  10 mg  Q6H  PRN


 IVP


 Nausea & Vomiting  11/6/19 10:45


 12/6/19 10:44   


 


 


 Metolazone


  (Zaroxolyn)  5 mg  Q24H


 NG


   11/12/19 08:00


 12/12/19 07:59   


 


 


 Pantoprazole


  (Protonix)  40 mg  EVERY 12  HOURS


 IVP


   11/3/19 21:00


 12/3/19 08:59  11/11/19 20:41


 


 


 Potassium Chloride


  (K-Dur)  40 meq  DAILY


 GT


   11/6/19 09:00


 12/5/19 17:59  11/11/19 08:50


 

















Amie Burgos M.D. Nov 11, 2019 21:27

## 2019-11-11 NOTE — NUR
NURSE NOTES:



Report received from ELIZABETH Winter. Patient observed laying in bed with her eyes closed, 
opens eyes to name, not following commands. Ett to vent. AC 14, , FiO2 40, PEEP 5. RT 
will attempt to wean on CPAP of 8 with FIO2 of 40% and Peep of 5 this morning. O2 saturation 
100%. SR on cardiac monitor. B/P 109/66. G tube intact and patent, feeds on hold since 0400. 
Charles Catheter intact and patent, draining yellow urine. PICC line Rt upper arm, intact and 
asymptomatic. Scd's to BL lower legs, bed low and locked, on p200 mattress, received and 
maintained on 2 point soft restraints for safety, call light within reach, Seizure 
precautions in place. Will resume plan of care.

## 2019-11-11 NOTE — NUR
CASE MANAGEMENT:REVIEW



11/9/19

SI: SEPSIS D/T PNA. ACUTE CHF

RESPIRATORY FAILURE ~ INTUBATED

99.1   77  26  126/44  100% ON VENT SUPPORT @ 40% FIO2

PH+7.47   PCO2+45.1   



IS: IV VANCOMYCIN Q24

IV MEROPENEM Q8HRS

IV LASIX Q12

IV PROTONIX Q12

DUONEB INH Q6HRS RTC

**: ICU STATUS



PLAN:

WEANING TRIAL ATTEMPTED ~ FAILED...BECAME TACHYPNEIC WITH RR~35 ~ PLACED BACK ON VENT





11/10/19

SI: SEPSIS D/T PNA. ACUTE CHF

RESPIRATORY FAILURE ~ INTUBATED

99.2   87  27  94/47   100% ON VENT SUPPORT @ 40% FIO2

H/H-8.1/24.9    CO2+33



IS: IV MEROPENEM Q8HRS

IV LASIX Q12

IV PROTONIX Q12

DUONEB INH Q6HRS RTC

**: ICU STATUS



PLAN:

WEANING TRIAL ~ FAILED AFTER 3 MINUTES



11/11/19

SI: SEPSIS D/T PNA. ACUTE CHF

RESPIRATORY FAILURE ~ INTUBATED 

99.2   72  17  123/38    100% ON VENT SUPPORT @ 40% FIO2

H/H-8.2/25.2     CO2+36   BUN+32







IS: IV MEROPENEM Q8HRS

IV LASIX Q12

IV PROTONIX Q12

DUONEB INH Q6HRS RTC

**: ICU STATUS

## 2019-11-11 NOTE — GI PROGRESS NOTE
Assessment/Plan


Problems:  


(1) PEG (percutaneous endoscopic gastrostomy) status


ICD Codes:  Z93.1 - Gastrostomy status


SNOMED:  121864335, 345919409


(2) Anemia


ICD Codes:  D64.9 - Anemia, unspecified


SNOMED:  316344607


Qualifiers:  


   Qualified Codes:  D64.9 - Anemia, unspecified


(3) Dehydration


ICD Codes:  E86.0 - Dehydration


SNOMED:  64592739, 6527510


Status:  stable


Status Narrative


Discussed with Dr. Turk.


Assessment/Plan


Assessment


(1) pneumonia


(2) UTI


(3) Sepsis


(4) Dehydration


(5) CHANCE 


(6) Acute metabolic encephalopathy


(7) Hyperglycemia due to type 2 diabetes mellitus


(8) Renal failure (ARF), acute on chronic


(9) Aspiration pneumonia


(10) Abnormal TSH


(11) Pelvic mass in female


(12) Congestive Heart Failure


(13) Anemia


(14) Dysphagia - s/p GT





s/p EGD SUMMARY OF FINDINGS:


1. No evidence of any active upper GI bleeding at this time.


2. Gastritis status biopsy.





Recommendations


Assessment


(1) pneumonia


(2) UTI


(3) Sepsis


(4) Dehydration


(5) CHANCE 


(6) Acute metabolic encephalopathy


(7) Hyperglycemia due to type 2 diabetes mellitus


(8) Renal failure (ARF), acute on chronic


(9) Aspiration pneumonia


(10) Abnormal TSH


(11) Pelvic mass in female


(12) Congestive Heart Failure


(13) Anemia


(14) Dysphagia - s/p GT





s/p EGD SUMMARY OF FINDINGS:


1. No evidence of any active upper GI bleeding at this time.


2. Gastritis status biopsy.





Recommendations


GTFs


prn transfusions


bowel regime


PPI and H2B


will follow up, consider colonoscopy if patient has recurrent GI bleed





The patient was seen and examined at bedside and all new and available data was 

reviewed in the patients chart. I agree with the above findings, impression 

and plan.  (Patient seen earlier today. Signature stamp does not reflect 

patient encounter time.). - Storm Turk MD





Subjective


Subjective


Limited





Objective





Last 24 Hour Vital Signs








  Date Time  Temp Pulse Resp B/P (MAP) Pulse Ox O2 Delivery O2 Flow Rate FiO2


 


11/11/19 11:25  67 15     40


 


11/11/19 11:00  72 21 111/49 (69) 99   


 


11/11/19 10:00  79 22 115/46 (69) 99   


 


11/11/19 09:50  84 37     40


 


11/11/19 09:47     99   


 


11/11/19 09:02  80 20     40





        40


 


11/11/19 09:00  80 23 121/57 (78) 99   


 


11/11/19 08:49  79  107/46    


 


11/11/19 08:00        40


 


11/11/19 08:00  74      


 


11/11/19 08:00      Endotracheal Tube  


 


11/11/19 08:00 99.9 75 21 107/44 (65) 98   


 


11/11/19 07:15  80 17  100   


 


11/11/19 07:00  75 22 116/47 (70) 100   


 


11/11/19 06:55  75 16  100 Mechanical Ventilator  40





  75 16     40


 


11/11/19 06:30  78 21 125/50 (75) 100   


 


11/11/19 06:00  72 22 115/44 (67) 100   


 


11/11/19 05:30  73 21 110/46 (67) 100   


 


11/11/19 05:11  72 18     40


 


11/11/19 05:00  75 19 111/45 (67) 100   


 


11/11/19 04:00  76      


 


11/11/19 04:00      Endotracheal Tube  


 


11/11/19 04:00        40


 


11/11/19 04:00  78 21 110/45 (66) 100   


 


11/11/19 03:02  95 24     40


 


11/11/19 03:00  95 22 134/51 (78) 100   


 


11/11/19 02:00 99.2 72 17 123/38 (66) 100   


 


11/11/19 01:30  77 20 125/40 (68) 100   


 


11/11/19 01:05  74 20  100 Mechanical Ventilator  40





  74 20     40


 


11/11/19 01:00  75 20 125/49 (74) 100   


 


11/11/19 00:30  79 23 111/76 (88) 100   


 


11/11/19 00:00  80      


 


11/11/19 00:00        40


 


11/11/19 00:00 99.2 78 24 128/48 (74) 100   


 


11/11/19 00:00      Endotracheal Tube  


 


11/10/19 23:30  76 22 140/44 (76) 100   


 


11/10/19 23:01  72 23     40


 


11/10/19 23:00  72 22 125/44 (71) 100   


 


11/10/19 22:00  82 21 138/47 (77) 100   


 


11/10/19 21:01  83 22     40


 


11/10/19 21:00  83 22 126/51 (76) 100   


 


11/10/19 20:30  83 24 133/48 (76) 100   


 


11/10/19 20:17  82 26 103/55 (71) 100   


 


11/10/19 20:00  76      


 


11/10/19 20:00 99.6 81 23  100   


 


11/10/19 20:00      Endotracheal Tube  


 


11/10/19 20:00        40


 


11/10/19 19:00  78 19 114/44 (67) 100   


 


11/10/19 18:59  73 18  100 Mechanical Ventilator  40





  73 18     40


 


11/10/19 18:00  76 20 114/51 (72) 100   


 


11/10/19 17:38  75  124/45    


 


11/10/19 17:02  75 25     40


 


11/10/19 17:00  75 22 124/45 (71) 100   


 


11/10/19 16:00 99.0 79 27 123/45 (71) 100   


 


11/10/19 16:00      Endotracheal Tube  


 


11/10/19 16:00        40


 


11/10/19 16:00  79      


 


11/10/19 15:18  80 18     40


 


11/10/19 15:00  78 24 114/49 (70) 100   


 


11/10/19 14:00  75 21 111/39 (63) 100   


 


11/10/19 13:17  76 24  100 Mechanical Ventilator  40





  82 24     40


 


11/10/19 13:00  73 22 121/43 (69) 98   


 


11/10/19 12:00  89      


 


11/10/19 12:00 99.0 75 20 117/39 (65) 100   


 


11/10/19 12:00        40


 


11/10/19 12:00      Endotracheal Tube  

















Intake and Output  


 


 11/10/19 11/11/19





 19:00 07:00


 


Intake Total 930 ml 740 ml


 


Output Total 750 ml 1075 ml


 


Balance 180 ml -335 ml


 


  


 


Free Water 50 ml 200 ml


 


IV Total 220 ml 


 


Tube Feeding 660 ml 480 ml


 


Other  60 ml


 


Output Urine Total 750 ml 1075 ml


 


# Bowel Movements 3 2











Laboratory Tests








Test


  11/10/19


16:45 11/11/19


04:20


 


Vancomycin Level Trough


  24.2 ug/mL


(5.0-12.0)  H 


 


 


White Blood Count


  


  10.4 K/UL


(4.8-10.8)


 


Red Blood Count


  


  3.07 M/UL


(4.20-5.40)  L


 


Hemoglobin


  


  8.2 G/DL


(12.0-16.0)  L


 


Hematocrit


  


  25.2 %


(37.0-47.0)  L


 


Mean Corpuscular Volume  82 FL (80-99)  


 


Mean Corpuscular Hemoglobin


  


  26.8 PG


(27.0-31.0)  L


 


Mean Corpuscular Hemoglobin


Concent 


  32.7 G/DL


(32.0-36.0)


 


Red Cell Distribution Width


  


  14.5 %


(11.6-14.8)


 


Platelet Count


  


  382 K/UL


(150-450)


 


Mean Platelet Volume


  


  7.7 FL


(6.5-10.1)


 


Neutrophils (%) (Auto)


  


  62.6 %


(45.0-75.0)


 


Lymphocytes (%) (Auto)


  


  14.9 %


(20.0-45.0)  L


 


Monocytes (%) (Auto)


  


  11.4 %


(1.0-10.0)  H


 


Eosinophils (%) (Auto)


  


  8.9 %


(0.0-3.0)  H


 


Basophils (%) (Auto)


  


  2.3 %


(0.0-2.0)  H


 


Sodium Level


  


  141 MMOL/L


(136-145)


 


Potassium Level


  


  4.7 MMOL/L


(3.5-5.1)


 


Chloride Level


  


  105 MMOL/L


()


 


Carbon Dioxide Level


  


  36 MMOL/L


(21-32)  H


 


Anion Gap


  


  0 mmol/L


(5-15)  L


 


Blood Urea Nitrogen


  


  32 mg/dL


(7-18)  H


 


Creatinine


  


  1.1 MG/DL


(0.55-1.30)


 


Estimat Glomerular Filtration


Rate 


  49.7 mL/min


(>60)


 


Glucose Level


  


  133 MG/DL


()  H


 


Calcium Level


  


  8.3 MG/DL


(8.5-10.1)  L


 


Phosphorus Level


  


  3.0 MG/DL


(2.5-4.9)


 


Magnesium Level


  


  2.1 MG/DL


(1.8-2.4)


 


Total Bilirubin


  


  0.5 MG/DL


(0.2-1.0)


 


Direct Bilirubin


  


  0.1 MG/DL


(0.0-0.3)


 


Aspartate Amino Transf


(AST/SGOT) 


  22 U/L (15-37)


 


 


Alanine Aminotransferase


(ALT/SGPT) 


  12 U/L (12-78)


 


 


Alkaline Phosphatase


  


  87 U/L


()


 


Total Protein


  


  6.2 G/DL


(6.4-8.2)  L


 


Albumin


  


  1.8 G/DL


(3.4-5.0)  L


 


Random Vancomycin Level  19.3 ug/mL  








Height (Feet):  5


Height (Inches):  5.00


Weight (Pounds):  182


General Appearance:  no apparent distress


Cardiovascular:  normal rate


Respiratory/Chest:  normal breath sounds, no respiratory distress


Abdominal Exam:  normal bowel sounds, non tender, soft


Extremities:  non-tender











Darren Lucio NP Nov 11, 2019 11:39

## 2019-11-11 NOTE — NUR
NURSE NOTES:



Pt began weaning at this time. PS 8, fIO2 40%, PEEP 5. Pt tolerating well. Feeds remain off. 
Will monitor.

## 2019-11-12 VITALS — DIASTOLIC BLOOD PRESSURE: 49 MMHG | SYSTOLIC BLOOD PRESSURE: 116 MMHG

## 2019-11-12 VITALS — DIASTOLIC BLOOD PRESSURE: 51 MMHG | SYSTOLIC BLOOD PRESSURE: 114 MMHG

## 2019-11-12 VITALS — SYSTOLIC BLOOD PRESSURE: 104 MMHG | DIASTOLIC BLOOD PRESSURE: 48 MMHG

## 2019-11-12 VITALS — DIASTOLIC BLOOD PRESSURE: 41 MMHG | SYSTOLIC BLOOD PRESSURE: 105 MMHG

## 2019-11-12 VITALS — SYSTOLIC BLOOD PRESSURE: 132 MMHG | DIASTOLIC BLOOD PRESSURE: 71 MMHG

## 2019-11-12 VITALS — SYSTOLIC BLOOD PRESSURE: 122 MMHG | DIASTOLIC BLOOD PRESSURE: 54 MMHG

## 2019-11-12 VITALS — DIASTOLIC BLOOD PRESSURE: 51 MMHG | SYSTOLIC BLOOD PRESSURE: 116 MMHG

## 2019-11-12 VITALS — DIASTOLIC BLOOD PRESSURE: 37 MMHG | SYSTOLIC BLOOD PRESSURE: 125 MMHG

## 2019-11-12 VITALS — DIASTOLIC BLOOD PRESSURE: 37 MMHG | SYSTOLIC BLOOD PRESSURE: 113 MMHG

## 2019-11-12 VITALS — DIASTOLIC BLOOD PRESSURE: 56 MMHG | SYSTOLIC BLOOD PRESSURE: 126 MMHG

## 2019-11-12 VITALS — SYSTOLIC BLOOD PRESSURE: 113 MMHG | DIASTOLIC BLOOD PRESSURE: 40 MMHG

## 2019-11-12 VITALS — DIASTOLIC BLOOD PRESSURE: 40 MMHG | SYSTOLIC BLOOD PRESSURE: 112 MMHG

## 2019-11-12 VITALS — SYSTOLIC BLOOD PRESSURE: 129 MMHG | DIASTOLIC BLOOD PRESSURE: 56 MMHG

## 2019-11-12 VITALS — DIASTOLIC BLOOD PRESSURE: 45 MMHG | SYSTOLIC BLOOD PRESSURE: 125 MMHG

## 2019-11-12 VITALS — DIASTOLIC BLOOD PRESSURE: 48 MMHG | SYSTOLIC BLOOD PRESSURE: 143 MMHG

## 2019-11-12 VITALS — DIASTOLIC BLOOD PRESSURE: 52 MMHG | SYSTOLIC BLOOD PRESSURE: 122 MMHG

## 2019-11-12 VITALS — SYSTOLIC BLOOD PRESSURE: 121 MMHG | DIASTOLIC BLOOD PRESSURE: 48 MMHG

## 2019-11-12 VITALS — DIASTOLIC BLOOD PRESSURE: 48 MMHG | SYSTOLIC BLOOD PRESSURE: 132 MMHG

## 2019-11-12 VITALS — DIASTOLIC BLOOD PRESSURE: 53 MMHG | SYSTOLIC BLOOD PRESSURE: 138 MMHG

## 2019-11-12 VITALS — DIASTOLIC BLOOD PRESSURE: 43 MMHG | SYSTOLIC BLOOD PRESSURE: 118 MMHG

## 2019-11-12 VITALS — DIASTOLIC BLOOD PRESSURE: 49 MMHG | SYSTOLIC BLOOD PRESSURE: 118 MMHG

## 2019-11-12 VITALS — SYSTOLIC BLOOD PRESSURE: 120 MMHG | DIASTOLIC BLOOD PRESSURE: 41 MMHG

## 2019-11-12 VITALS — SYSTOLIC BLOOD PRESSURE: 121 MMHG | DIASTOLIC BLOOD PRESSURE: 45 MMHG

## 2019-11-12 VITALS — DIASTOLIC BLOOD PRESSURE: 45 MMHG | SYSTOLIC BLOOD PRESSURE: 115 MMHG

## 2019-11-12 LAB
ADD MANUAL DIFF: NO
ANION GAP SERPL CALC-SCNC: -2 MMOL/L (ref 5–15)
BASOPHILS NFR BLD AUTO: 2.6 % (ref 0–2)
BUN SERPL-MCNC: 32 MG/DL (ref 7–18)
CALCIUM SERPL-MCNC: 8.4 MG/DL (ref 8.5–10.1)
CHLORIDE SERPL-SCNC: 105 MMOL/L (ref 98–107)
CO2 SERPL-SCNC: 39 MMOL/L (ref 21–32)
CREAT SERPL-MCNC: 1.2 MG/DL (ref 0.55–1.3)
EOSINOPHIL NFR BLD AUTO: 9.2 % (ref 0–3)
ERYTHROCYTE [DISTWIDTH] IN BLOOD BY AUTOMATED COUNT: 14.5 % (ref 11.6–14.8)
HCT VFR BLD CALC: 24.7 % (ref 37–47)
HGB BLD-MCNC: 8 G/DL (ref 12–16)
LYMPHOCYTES NFR BLD AUTO: 15.4 % (ref 20–45)
MCV RBC AUTO: 83 FL (ref 80–99)
MONOCYTES NFR BLD AUTO: 11.4 % (ref 1–10)
NEUTROPHILS NFR BLD AUTO: 61.5 % (ref 45–75)
PHOSPHATE SERPL-MCNC: 3.1 MG/DL (ref 2.5–4.9)
PLATELET # BLD: 389 K/UL (ref 150–450)
POTASSIUM SERPL-SCNC: 4.6 MMOL/L (ref 3.5–5.1)
RBC # BLD AUTO: 2.96 M/UL (ref 4.2–5.4)
SODIUM SERPL-SCNC: 142 MMOL/L (ref 136–145)
WBC # BLD AUTO: 11.9 K/UL (ref 4.8–10.8)

## 2019-11-12 RX ADMIN — INSULIN ASPART SCH UNITS: 100 INJECTION, SOLUTION INTRAVENOUS; SUBCUTANEOUS at 17:13

## 2019-11-12 RX ADMIN — IPRATROPIUM BROMIDE AND ALBUTEROL SULFATE SCH ML: .5; 3 SOLUTION RESPIRATORY (INHALATION) at 12:55

## 2019-11-12 RX ADMIN — INSULIN ASPART SCH UNITS: 100 INJECTION, SOLUTION INTRAVENOUS; SUBCUTANEOUS at 23:35

## 2019-11-12 RX ADMIN — DIVALPROEX SODIUM SCH MG: 125 CAPSULE ORAL at 20:38

## 2019-11-12 RX ADMIN — IPRATROPIUM BROMIDE AND ALBUTEROL SULFATE SCH ML: .5; 3 SOLUTION RESPIRATORY (INHALATION) at 01:09

## 2019-11-12 RX ADMIN — INSULIN ASPART SCH UNITS: 100 INJECTION, SOLUTION INTRAVENOUS; SUBCUTANEOUS at 05:38

## 2019-11-12 RX ADMIN — IPRATROPIUM BROMIDE AND ALBUTEROL SULFATE SCH ML: .5; 3 SOLUTION RESPIRATORY (INHALATION) at 06:43

## 2019-11-12 RX ADMIN — INSULIN ASPART SCH UNITS: 100 INJECTION, SOLUTION INTRAVENOUS; SUBCUTANEOUS at 11:40

## 2019-11-12 RX ADMIN — CHLORHEXIDINE GLUCONATE SCH APPLIC: 213 SOLUTION TOPICAL at 19:49

## 2019-11-12 RX ADMIN — MEROPENEM SCH MLS/HR: 1 INJECTION INTRAVENOUS at 08:37

## 2019-11-12 RX ADMIN — INSULIN DETEMIR SCH UNITS: 100 INJECTION, SOLUTION SUBCUTANEOUS at 20:39

## 2019-11-12 RX ADMIN — LORAZEPAM PRN MG: 2 INJECTION, SOLUTION INTRAMUSCULAR; INTRAVENOUS at 20:37

## 2019-11-12 RX ADMIN — MEROPENEM SCH MLS/HR: 1 INJECTION INTRAVENOUS at 17:12

## 2019-11-12 RX ADMIN — PANTOPRAZOLE SODIUM SCH MG: 40 INJECTION, POWDER, FOR SOLUTION INTRAVENOUS at 08:37

## 2019-11-12 RX ADMIN — MEROPENEM SCH MLS/HR: 1 INJECTION INTRAVENOUS at 00:32

## 2019-11-12 RX ADMIN — IPRATROPIUM BROMIDE AND ALBUTEROL SULFATE SCH ML: .5; 3 SOLUTION RESPIRATORY (INHALATION) at 19:26

## 2019-11-12 RX ADMIN — DIVALPROEX SODIUM SCH MG: 125 CAPSULE ORAL at 08:37

## 2019-11-12 RX ADMIN — PANTOPRAZOLE SODIUM SCH MG: 40 INJECTION, POWDER, FOR SOLUTION INTRAVENOUS at 20:37

## 2019-11-12 NOTE — CARDIOLOGY PROGRESS NOTE
Assessment/Plan


Assessment/Plan


1. acute congestive heart failure


2. Abnormal cardiac enzyme demand related due to infection and profound anemia 


3. Agitation 


4. Urinary tract infection.


5. Acute renal failure.


6. Profound anemia.


7. Diabetes mellitus.


8. History of hypertension.


9. History of mood disorder.


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 


11. arf 


12. pelvic mass


13  sepsis 


14. pneumonia 








off dapt due to suspected bleeding


failed weaning agian  


echo mild systolic dysfunction 


in icu on  a vent 


iv abx 


s/p peg  


lasix iv bid with zoroxolyn daily 


tele sinus 


d/w rn 


cxr personally reviewed still with chf 


watc na  adn co2 tomorrow





Subjective


ROS Limited/Unobtainable:  Yes





Objective





Last 24 Hour Vital Signs








  Date Time  Temp Pulse Resp B/P (MAP) Pulse Ox O2 Delivery O2 Flow Rate FiO2


 


11/12/19 16:00 89.9 81 19 115/45 (68) 97   


 


11/12/19 16:00      Endotracheal Tube  


 


11/12/19 16:00  91      


 


11/12/19 16:00        35


 


11/12/19 15:02  91 27     35


 


11/12/19 15:00  90 23 122/52 (75) 99   


 


11/12/19 14:00  79 20 116/49 (71) 100   


 


11/12/19 13:10  85 26  100 Mechanical Ventilator  35





  76 14     40


 


11/12/19 13:00  90 29 132/71 (91) 100   


 


11/12/19 12:00        35


 


11/12/19 12:00      Endotracheal Tube  


 


11/12/19 12:00 99.3 85 23 125/37 (66) 100   


 


11/12/19 12:00  85      


 


11/12/19 11:00  80 20 113/37 (62) 100   


 


11/12/19 11:00  79 19     35


 


11/12/19 10:00  82 20 118/43 (68) 100   


 


11/12/19 09:05  80 20     35


 


11/12/19 09:00     100   


 


11/12/19 09:00        35


 


11/12/19 09:00  82 19 138/53 (81) 100   


 


11/12/19 09:00  87 35     35





        35


 


11/12/19 08:36  80  133/56    


 


11/12/19 08:00 99.3 78 21 121/45 (70) 100   


 


11/12/19 08:00        40


 


11/12/19 08:00      Endotracheal Tube  


 


11/12/19 08:00  80      


 


11/12/19 07:00  74 16 116/51 (72) 100   


 


11/12/19 06:58  69 19  100 Mechanical Ventilator  40





  84 20     40


 


11/12/19 06:00 99.6 76 21 143/48 (79) 100   


 


11/12/19 05:10  75 19     40


 


11/12/19 05:00  81 19 122/54 (76) 100   


 


11/12/19 04:00        40


 


11/12/19 04:00 100.2 93 28 125/45 (71) 100   


 


11/12/19 04:00      Endotracheal Tube  


 


11/12/19 03:49  100      


 


11/12/19 03:20  90 21     40


 


11/12/19 03:00  96 25 118/49 (72) 100   


 


11/12/19 02:00  93 22 104/48 (66) 100   


 


11/12/19 01:09  77 24  100 Mechanical Ventilator 40.0 40





  77 24     40


 


11/12/19 01:00 99.2 77 19 113/40 (64) 100   


 


11/12/19 00:00  78 17 121/48 (72) 100   


 


11/12/19 00:00      Endotracheal Tube  


 


11/12/19 00:00        40


 


11/12/19 00:00  78      


 


11/11/19 23:00  78 17 119/44 (69) 100   


 


11/11/19 22:42  82 16     40


 


11/11/19 22:00  83 23 120/54 (76) 100   


 


11/11/19 21:00  91 24 123/54 (77) 100   


 


11/11/19 21:00  83 19     40


 


11/11/19 20:00        40


 


11/11/19 20:00      Endotracheal Tube  


 


11/11/19 20:00 99.5 86 22 110/49 (69) 100   


 


11/11/19 20:00  86      


 


11/11/19 19:00  89 23 112/48 (69) 100   


 


11/11/19 18:43  85 24  100 Mechanical Ventilator 40.0 40





  85 24     40


 


11/11/19 18:00  85 24 116/48 (70) 100   


 


11/11/19 17:09  78 24     40


 


11/11/19 17:02  80  123/57    


 


11/11/19 17:00  81 26 123/57 (79) 100   








General Appearance:  no apparent distress, agitated, on vent, patient on 

isolation


Cardiovascular:  normal rate


Respiratory/Chest:  rhonchi - bilaterally


Abdomen:  normal bowel sounds, non tender, soft


Extremities:  no swelling











Intake and Output  


 


 11/11/19 11/12/19





 19:00 07:00


 


Intake Total 910 ml 875 ml


 


Output Total 1210 ml 1610 ml


 


Balance -300 ml -735 ml


 


  


 


Free Water 100 ml 


 


IV Total 110 ml 55 ml


 


Tube Feeding 600 ml 720 ml


 


Other 100 ml 100 ml


 


Output Urine Total 1210 ml 1610 ml


 


# Bowel Movements 1 2











Laboratory Tests








Test


  11/12/19


03:00 11/12/19


15:17


 


White Blood Count


  11.9 K/UL


(4.8-10.8)  H 


 


 


Red Blood Count


  2.96 M/UL


(4.20-5.40)  L 


 


 


Hemoglobin


  8.0 G/DL


(12.0-16.0)  L 


 


 


Hematocrit


  24.7 %


(37.0-47.0)  L 


 


 


Mean Corpuscular Volume 83 FL (80-99)   


 


Mean Corpuscular Hemoglobin


  27.1 PG


(27.0-31.0) 


 


 


Mean Corpuscular Hemoglobin


Concent 32.4 G/DL


(32.0-36.0) 


 


 


Red Cell Distribution Width


  14.5 %


(11.6-14.8) 


 


 


Platelet Count


  389 K/UL


(150-450) 


 


 


Mean Platelet Volume


  8.2 FL


(6.5-10.1) 


 


 


Neutrophils (%) (Auto)


  61.5 %


(45.0-75.0) 


 


 


Lymphocytes (%) (Auto)


  15.4 %


(20.0-45.0)  L 


 


 


Monocytes (%) (Auto)


  11.4 %


(1.0-10.0)  H 


 


 


Eosinophils (%) (Auto)


  9.2 %


(0.0-3.0)  H 


 


 


Basophils (%) (Auto)


  2.6 %


(0.0-2.0)  H 


 


 


Sodium Level


  142 MMOL/L


(136-145) 


 


 


Potassium Level


  4.6 MMOL/L


(3.5-5.1) 


 


 


Chloride Level


  105 MMOL/L


() 


 


 


Carbon Dioxide Level


  39 MMOL/L


(21-32)  H 


 


 


Anion Gap


  -2 mmol/L


(5-15)  L 


 


 


Blood Urea Nitrogen


  32 mg/dL


(7-18)  H 


 


 


Creatinine


  1.2 MG/DL


(0.55-1.30) 


 


 


Estimat Glomerular Filtration


Rate 45.0 mL/min


(>60) 


 


 


Glucose Level


  132 MG/DL


()  H 


 


 


Calcium Level


  8.4 MG/DL


(8.5-10.1)  L 


 


 


Phosphorus Level


  3.1 MG/DL


(2.5-4.9) 


 


 


Magnesium Level


  2.3 MG/DL


(1.8-2.4) 


 


 


Arterial Blood pH


  


  7.482


(7.350-7.450)


 


Arterial Blood Partial


Pressure CO2 


  43.4 mmHg


(35.0-45.0)


 


Arterial Blood Partial


Pressure O2 


  102.6 mmHg


(75.0-100.0)  H


 


Arterial Blood HCO3


  


  31.7 mmol/L


(22.0-26.0)  H


 


Arterial Blood Oxygen


Saturation 


  97.6 %


()


 


Arterial Blood Base Excess  7.5 (-2-2)  H


 


Graham Test  Positive  











Microbiology








 Date/Time


Source Procedure


Growth Status


 


 


 11/10/19 09:00


Sputum Induced Gram Stain - Final Resulted


 


 11/10/19 09:00


Sputum Induced Sputum Culture


Pending Resulted

















Rob May MD Nov 12, 2019 16:57

## 2019-11-12 NOTE — NUR
NURSE NOTES:

Received the patient from [facility]. patient resting with eyes closed, unable to follow 
commands, response to painful stimuli. Orally intubated, ETT 7.5, 23cm at lip line. AC 14, 
, FIO2 40%, PEEP 5. O2 sat 100%. No distress noted. G-tube intact, dressing intact, 
clean and dry. Tube feeding on hold for weaning this am. HOB kept elevated. Charles cath 
intact, patent, draining yellow urine by gravity. Right upper arm PICC line intact, TKO. 
SCDs on bilateral lower extremities. Patient on P200 mattress. Wound dressings intact, clean 
and dry. Patient on bilateral soft wrist restraints. No skin breakdown noted, pulses 
present. Bed in lowest position, locked, side rails upx3. Side rails are padded. Call light 
within reach. Bed alarm on. Will continue to monitor.

## 2019-11-12 NOTE — NUR
*-* INSURANCE *-*



UPDATED CLINICALS AND REVIEWS HAVE BEEN FAXED TO:





Tracking#[id number]

CM: Lance

#[phone]

Fax#[phone]

## 2019-11-12 NOTE — NUR
NURSE NOTES:

Observed the patient attempting to pull out ETT. Patient on bilateral soft wrist restraints. 
No skin breakdown noted. pulses present. Patient was turned and repositioned. HOB kept 
elevated.

## 2019-11-12 NOTE — DIAGNOSTIC IMAGING REPORT
Indication: Dyspnea

 

Comparison:  11/9/2019

 

A single view chest radiograph was obtained.

 

Findings:

 

Pulmonary edema demonstrated with mild cardiomegaly. There is a left pleural

effusion. Endotracheal tube is in good position as is the PICC line.

 

IMPRESSION:

 

No change from the prior study. Pulmonary edema

## 2019-11-12 NOTE — NUR
RESPIRATORY NOTE:

Received pt on ETT 7.5@23cm lip line, secured with anchor fast, with current vent settings: 
AC -40%- peep 5. Pt is unable to follow commands, respond to stimuli. No SOB or resp 
distress noted at this time. Alarms are set and audible, vent is plugged into the red 
outlet, ambu bag is at bedside.  Vent circuits and suction tubing are secured and out of the 
way. Will continue to monitor pt.

## 2019-11-12 NOTE — NUR
NURSE NOTES:

Dr. Torres at bedside to assess the patient. MD updated on patient's condition. Called RT 
for ABG.

## 2019-11-12 NOTE — NUR
NURSE NOTES:

Patient was turned and repositioned. casas cath draining yellow urine. oral care and casas 
care provided. patient resting in bed with eyes closed, able to move all extremities.

## 2019-11-12 NOTE — NUR
NURSE NOTES:

Patient resting in bed comfortably. No distress noted. removed bilateral soft wrist 
restraints, no skin breakdown noted, pulses present. will continue to monitor.

## 2019-11-12 NOTE — NEPHROLOGY PROGRESS NOTE
Assessment/Plan


Problem List:  


(1) Renal failure (ARF), acute on chronic


Assessment:  resolved





(2) Dehydration


(3) ARF (acute renal failure)


(4) Sepsis


(5) Acute metabolic encephalopathy


(6) Hyperglycemia due to type 2 diabetes mellitus


(7) UTI (urinary tract infection)


(8) Diabetic nephropathy


Assessment





Renal failure, Acute on Chronic resolved


Dehydration


Severe Anemia


Sepsis / Pneumonia / UTI


DM, OOC


Proteinuria , Diabetic Nephropathy


Acute encephalopathy


Plan





fails weaning


on K and Mag IV  as needed


Pulmonary and Vent support - ? Trach


IV Reglan


GT feeding


Stop IV fluid-


Lasix


has PEG


Per order








previously 


Cr lowering 


Will order urine studies and monitor renal parameters


kidney YOANDY noted


lower iv fluids rate


WBCs rising


has casas again due to retention


Avoid Nephrotoxics








previously


Anemia salas


2D echo


24 H urine protein


Keep BP and BS in check


I am holding  her psych meds due to low MS


Per orders





Subjective


ROS Limited/Unobtainable:  Yes





Objective


Objective





Last 24 Hour Vital Signs








  Date Time  Temp Pulse Resp B/P (MAP) Pulse Ox O2 Delivery O2 Flow Rate FiO2


 


11/12/19 11:00  80 20 113/37 (62) 100   


 


11/12/19 11:00  79 19     35


 


11/12/19 10:00  82 20 118/43 (68) 100   


 


11/12/19 09:05  80 20     35


 


11/12/19 09:00     100   


 


11/12/19 09:00        35


 


11/12/19 09:00  82 19 138/53 (81) 100   


 


11/12/19 09:00  87 35     35





        35


 


11/12/19 08:36  80  133/56    


 


11/12/19 08:00 99.3 78 21 121/45 (70) 100   


 


11/12/19 08:00        40


 


11/12/19 08:00      Endotracheal Tube  


 


11/12/19 08:00  80      


 


11/12/19 07:00  74 16 116/51 (72) 100   


 


11/12/19 06:58  69 19  100 Mechanical Ventilator  40





  84 20     40


 


11/12/19 06:00 99.6 76 21 143/48 (79) 100   


 


11/12/19 05:10  75 19     40


 


11/12/19 05:00  81 19 122/54 (76) 100   


 


11/12/19 04:00        40


 


11/12/19 04:00 100.2 93 28 125/45 (71) 100   


 


11/12/19 04:00      Endotracheal Tube  


 


11/12/19 03:49  100      


 


11/12/19 03:20  90 21     40


 


11/12/19 03:00  96 25 118/49 (72) 100   


 


11/12/19 02:00  93 22 104/48 (66) 100   


 


11/12/19 01:09  77 24  100 Mechanical Ventilator 40.0 40





  77 24     40


 


11/12/19 01:00 99.2 77 19 113/40 (64) 100   


 


11/12/19 00:00  78 17 121/48 (72) 100   


 


11/12/19 00:00      Endotracheal Tube  


 


11/12/19 00:00        40


 


11/12/19 00:00  78      


 


11/11/19 23:00  78 17 119/44 (69) 100   


 


11/11/19 22:42  82 16     40


 


11/11/19 22:00  83 23 120/54 (76) 100   


 


11/11/19 21:00  91 24 123/54 (77) 100   


 


11/11/19 21:00  83 19     40


 


11/11/19 20:00        40


 


11/11/19 20:00      Endotracheal Tube  


 


11/11/19 20:00 99.5 86 22 110/49 (69) 100   


 


11/11/19 20:00  86      


 


11/11/19 19:00  89 23 112/48 (69) 100   


 


11/11/19 18:43  85 24  100 Mechanical Ventilator 40.0 40





  85 24     40


 


11/11/19 18:00  85 24 116/48 (70) 100   


 


11/11/19 17:09  78 24     40


 


11/11/19 17:02  80  123/57    


 


11/11/19 17:00  81 26 123/57 (79) 100   


 


11/11/19 16:30  69 22 104/41 (62) 100   


 


11/11/19 16:00  87      


 


11/11/19 16:00 99.3 82 24 116/47 (70) 100   


 


11/11/19 16:00        40


 


11/11/19 16:00      Endotracheal Tube  


 


11/11/19 15:26  75 24     40


 


11/11/19 15:00  79 23 108/60 (76) 100   


 


11/11/19 14:00  72 15 118/44 (68) 100   


 


11/11/19 13:30  69 14 110/43 (65) 100   


 


11/11/19 13:22  63 17  100 Mechanical Ventilator  40





  63 17     40


 


11/11/19 13:00  76 23 122/49 (73) 100   

















Intake and Output  


 


 11/11/19 11/12/19





 19:00 07:00


 


Intake Total 910 ml 875 ml


 


Output Total 1210 ml 1610 ml


 


Balance -300 ml -735 ml


 


  


 


Free Water 100 ml 


 


IV Total 110 ml 55 ml


 


Tube Feeding 600 ml 720 ml


 


Other 100 ml 100 ml


 


Output Urine Total 1210 ml 1610 ml


 


# Bowel Movements 1 2








Laboratory Tests


11/12/19 03:00: 


White Blood Count 11.9H, Red Blood Count 2.96L, Hemoglobin 8.0L, Hematocrit 

24.7L, Mean Corpuscular Volume 83, Mean Corpuscular Hemoglobin 27.1, Mean 

Corpuscular Hemoglobin Concent 32.4, Red Cell Distribution Width 14.5, Platelet 

Count 389, Mean Platelet Volume 8.2, Neutrophils (%) (Auto) 61.5, Lymphocytes (%

) (Auto) 15.4L, Monocytes (%) (Auto) 11.4H, Eosinophils (%) (Auto) 9.2H, 

Basophils (%) (Auto) 2.6H, Sodium Level 142, Potassium Level 4.6, Chloride 

Level 105, Carbon Dioxide Level 39H, Anion Gap -2L, Blood Urea Nitrogen 32H, 

Creatinine 1.2, Estimat Glomerular Filtration Rate 45.0, Glucose Level 132H, 

Calcium Level 8.4L, Phosphorus Level 3.1, Magnesium Level 2.3


Height (Feet):  5


Height (Inches):  5.00


Weight (Pounds):  130


General Appearance:  no apparent distress, lethargic


Cardiovascular:  normal rate


Respiratory/Chest:  decreased breath sounds


Abdomen:  distended


Objective


no change











Lukasz Vizcaino MD Nov 12, 2019 12:09

## 2019-11-12 NOTE — INFECTIOUS DISEASES PROG NOTE
Assessment/Plan


Problems:  


(1) Fever


Assessment & Plan:  will send blood culture from the PICC line and send stool 

for C diff 





(2) Aspiration pneumonia


Assessment & Plan:  with B/L infiltrates, hypoxemia and leukocytosis, CXR  

showed interstitial infiltrates , sputum culture showed normal agustin . continue 

meropenem  empirically for 4 more days stop vancomycin . aspiration precaution. 

EOT 11/14/19





(3) Respiratory failure


Assessment & Plan:  with maegan cardia and S/P code blue, was intubated and 

started on mechanical ventilation , monitor CXR and ABG , pulmonary is 

following 





(4) UTI (urinary tract infection)


Assessment & Plan:  due to candida lusitaneae , S/P casas catheter removal , no 

specific therapy needed for now after changing her casas catheter 





(5) Acute metabolic encephalopathy


Assessment & Plan:  due to the above, continue hydration and antibiotics, 

monitor in tele 





(6) Hyperglycemia due to type 2 diabetes mellitus


Assessment & Plan:  recommend tight glycemic control to keep blood glucose 

between 100-140 





(7) ARF (acute renal failure)


Assessment & Plan:  due to the above, continue hydration and renally dosed 

antibiotics, close  monitor of renal function , stop vancomycin 





(8) Pelvic mass in female


Assessment & Plan:  to the left of the uterus, suspect malignancy , recommend OB

/GYN eval , oncology is following 








Subjective


ROS Limited/Unobtainable:  Yes


Allergies:  


Coded Allergies:  


     No Known Allergies (Unverified , 10/14/19)


Subjective


she was still intubated on mechanical ventilation , comfortable still in ICU 

after she became bradycardic and had code blue , minimally responsive , had low 

grade fever but no chills , no diarrhea, no secretions from the ET tube





Objective


Vital Signs





Last 24 Hour Vital Signs








  Date Time  Temp Pulse Resp B/P (MAP) Pulse Ox O2 Delivery O2 Flow Rate FiO2


 


11/12/19 13:10  85 26  100 Mechanical Ventilator  35





  76 14     40


 


11/12/19 13:00  90 29 132/71 (91) 100   


 


11/12/19 12:00        35


 


11/12/19 12:00      Endotracheal Tube  


 


11/12/19 12:00 99.3 85 23 125/37 (66) 100   


 


11/12/19 12:00  85      


 


11/12/19 11:00  80 20 113/37 (62) 100   


 


11/12/19 11:00  79 19     35


 


11/12/19 10:00  82 20 118/43 (68) 100   


 


11/12/19 09:05  80 20     35


 


11/12/19 09:00     100   


 


11/12/19 09:00        35


 


11/12/19 09:00  82 19 138/53 (81) 100   


 


11/12/19 09:00  87 35     35





        35


 


11/12/19 08:36  80  133/56    


 


11/12/19 08:00 99.3 78 21 121/45 (70) 100   


 


11/12/19 08:00        40


 


11/12/19 08:00      Endotracheal Tube  


 


11/12/19 08:00  80      


 


11/12/19 07:00  74 16 116/51 (72) 100   


 


11/12/19 06:58  69 19  100 Mechanical Ventilator  40





  84 20     40


 


11/12/19 06:00 99.6 76 21 143/48 (79) 100   


 


11/12/19 05:10  75 19     40


 


11/12/19 05:00  81 19 122/54 (76) 100   


 


11/12/19 04:00        40


 


11/12/19 04:00 100.2 93 28 125/45 (71) 100   


 


11/12/19 04:00      Endotracheal Tube  


 


11/12/19 03:49  100      


 


11/12/19 03:20  90 21     40


 


11/12/19 03:00  96 25 118/49 (72) 100   


 


11/12/19 02:00  93 22 104/48 (66) 100   


 


11/12/19 01:09  77 24  100 Mechanical Ventilator 40.0 40





  77 24     40


 


11/12/19 01:00 99.2 77 19 113/40 (64) 100   


 


11/12/19 00:00  78 17 121/48 (72) 100   


 


11/12/19 00:00      Endotracheal Tube  


 


11/12/19 00:00        40


 


11/12/19 00:00  78      


 


11/11/19 23:00  78 17 119/44 (69) 100   


 


11/11/19 22:42  82 16     40


 


11/11/19 22:00  83 23 120/54 (76) 100   


 


11/11/19 21:00  91 24 123/54 (77) 100   


 


11/11/19 21:00  83 19     40


 


11/11/19 20:00        40


 


11/11/19 20:00      Endotracheal Tube  


 


11/11/19 20:00 99.5 86 22 110/49 (69) 100   


 


11/11/19 20:00  86      


 


11/11/19 19:00  89 23 112/48 (69) 100   


 


11/11/19 18:43  85 24  100 Mechanical Ventilator 40.0 40





  85 24     40


 


11/11/19 18:00  85 24 116/48 (70) 100   


 


11/11/19 17:09  78 24     40


 


11/11/19 17:02  80  123/57    


 


11/11/19 17:00  81 26 123/57 (79) 100   


 


11/11/19 16:30  69 22 104/41 (62) 100   


 


11/11/19 16:00  87      


 


11/11/19 16:00 99.3 82 24 116/47 (70) 100   


 


11/11/19 16:00        40


 


11/11/19 16:00      Endotracheal Tube  


 


11/11/19 15:26  75 24     40


 


11/11/19 15:00  79 23 108/60 (76) 100   








Height (Feet):  5


Height (Inches):  5.00


Weight (Pounds):  130


General Appearance:  WD/WN, no acute distress


HEENT:  normocephalic, atraumatic, anicteric, mucous membranes moist, PERRL


Respiratory/Chest:  chest wall non-tender, lungs clear, normal breath sounds, 

no respiratory distress, no accessory muscle use


Cardiovascular:  normal peripheral pulses, normal rate, regular rhythm, no 

gallop/murmur, no JVD


Abdomen:  normal bowel sounds, soft, non tender, no organomegaly, non distended

, no mass, no scars


Genitourinary:  normal external genitalia


Extremities:  no cyanosis, no clubbing


Skin:  no rash, no lesions, no ulcers


Lymphatic:  no neck adenopathy, no groin adenopathy


Musculoskeletal:  normal muscle bulk, no effusion





Microbiology








 Date/Time


Source Procedure


Growth Status


 


 


 11/10/19 09:00


Sputum Induced Gram Stain - Final Resulted


 


 11/10/19 09:00


Sputum Induced Sputum Culture


Pending Resulted











Laboratory Tests








Test


  11/12/19


03:00


 


White Blood Count


  11.9 K/UL


(4.8-10.8)  H


 


Red Blood Count


  2.96 M/UL


(4.20-5.40)  L


 


Hemoglobin


  8.0 G/DL


(12.0-16.0)  L


 


Hematocrit


  24.7 %


(37.0-47.0)  L


 


Mean Corpuscular Volume 83 FL (80-99)  


 


Mean Corpuscular Hemoglobin


  27.1 PG


(27.0-31.0)


 


Mean Corpuscular Hemoglobin


Concent 32.4 G/DL


(32.0-36.0)


 


Red Cell Distribution Width


  14.5 %


(11.6-14.8)


 


Platelet Count


  389 K/UL


(150-450)


 


Mean Platelet Volume


  8.2 FL


(6.5-10.1)


 


Neutrophils (%) (Auto)


  61.5 %


(45.0-75.0)


 


Lymphocytes (%) (Auto)


  15.4 %


(20.0-45.0)  L


 


Monocytes (%) (Auto)


  11.4 %


(1.0-10.0)  H


 


Eosinophils (%) (Auto)


  9.2 %


(0.0-3.0)  H


 


Basophils (%) (Auto)


  2.6 %


(0.0-2.0)  H


 


Sodium Level


  142 MMOL/L


(136-145)


 


Potassium Level


  4.6 MMOL/L


(3.5-5.1)


 


Chloride Level


  105 MMOL/L


()


 


Carbon Dioxide Level


  39 MMOL/L


(21-32)  H


 


Anion Gap


  -2 mmol/L


(5-15)  L


 


Blood Urea Nitrogen


  32 mg/dL


(7-18)  H


 


Creatinine


  1.2 MG/DL


(0.55-1.30)


 


Estimat Glomerular Filtration


Rate 45.0 mL/min


(>60)


 


Glucose Level


  132 MG/DL


()  H


 


Calcium Level


  8.4 MG/DL


(8.5-10.1)  L


 


Phosphorus Level


  3.1 MG/DL


(2.5-4.9)


 


Magnesium Level


  2.3 MG/DL


(1.8-2.4)











Current Medications








 Medications


  (Trade)  Dose


 Ordered  Sig/Winnie


 Route


 PRN Reason  Start Time


 Stop Time Status Last Admin


Dose Admin


 


 Acetaminophen


  (Tylenol)  650 mg  Q4H  PRN


 NG


 Mild Pain/Temp > 100.5  11/3/19 20:30


 11/25/19 20:29  11/10/19 03:01


 


 


 Acetaminophen


  (Tylenol)  650 mg  Q4H  PRN


 RECTAL


 TEMP>100.5  11/3/19 20:30


 12/2/19 20:29   


 


 


 Acetylcysteine


  (Mucomyst)  100 mg  TIDRT


 HHN


   11/10/19 19:30


 12/10/19 19:29  11/12/19 12:55


 


 


 Albuterol/


 Ipratropium


  (Albuterol/


 Ipratropium)  3 ml  Q6HRT


 HHN


   11/12/19 13:00


 11/17/19 12:59  11/12/19 12:55


 


 


 Amlodipine


 Besylate


  (Norvasc)  2.5 mg  BID


 NG


   11/4/19 09:00


 11/29/19 17:59  11/11/19 17:02


 


 


 Chlorhexidine


 Gluconate


  (Mae-Hex 2%)  1 applic  DAILY@2000


 TOPIC


   11/5/19 20:00


 12/5/19 19:59  11/11/19 19:53


 


 


 Dextrose


  (Dextrose 50%)  25 ml  Q30M  PRN


 IV


 Hypoglycemia  11/5/19 06:30


 12/5/19 06:29   


 


 


 Dextrose


  (Dextrose 50%)  50 ml  Q30M  PRN


 IV


 Hypoglycemia  11/5/19 06:30


 12/5/19 06:29   


 


 


 Divalproex Sodium


  (Depakote


 Sprinkles)  500 mg  Q12HR


 NG


   11/3/19 21:00


 11/14/19 21:59  11/12/19 08:37


 


 


 Folic Acid


  (Folate)  3 mg  DAILY


 GT


   11/7/19 09:00


 11/30/19 08:59  11/12/19 09:32


 


 


 Furosemide


  (Lasix)  40 mg  EVERY 12  HOURS


 IV


   11/7/19 21:00


 12/7/19 20:59  11/12/19 09:32


 


 


 Insulin Aspart


  (NovoLOG)    EVERY 6  HOURS


 SUBQ


   11/4/19 00:00


 11/29/19 11:59  11/12/19 11:40


 


 


 Insulin Detemir


  (Levemir)  8 units  QHS


 SUBQ


   11/6/19 21:00


 12/1/19 08:59  11/11/19 20:43


 


 


 Lactulose


  (Cephulac)  20 gm  Q8H  PRN


 NG


 Constipation  11/3/19 20:30


 12/3/19 20:29  11/5/19 08:12


 


 


 Lorazepam


  (Ativan 2mg/ml


 1ml)  2 mg  Q4H  PRN


 IV


 for agitation  11/10/19 19:30


 11/17/19 19:29  11/11/19 02:48


 


 


 Meropenem 1 gm/


 Sodium Chloride  55 ml @ 


 110 mls/hr  Q8H


 IVPB


   11/4/19 17:00


 11/14/19 16:59  11/12/19 08:37


 


 


 Metoclopramide HCl


  (Reglan)  10 mg  Q6H  PRN


 IVP


 Nausea & Vomiting  11/6/19 10:45


 12/6/19 10:44   


 


 


 Metolazone


  (Zaroxolyn)  5 mg  Q24H


 NG


   11/12/19 08:00


 12/12/19 07:59  11/12/19 08:36


 


 


 Pantoprazole


  (Protonix)  40 mg  EVERY 12  HOURS


 IVP


   11/3/19 21:00


 12/3/19 08:59  11/12/19 08:37


 


 


 Potassium Chloride


  (K-Dur)  40 meq  DAILY


 GT


   11/6/19 09:00


 12/5/19 17:59  11/12/19 08:37


 

















Amie Burgos M.D. Nov 12, 2019 14:12

## 2019-11-12 NOTE — GENERAL PROGRESS NOTE
Assessment/Plan


Status:  stable


Assessment/Plan:


S, O: Intubated , vent setting reviewed. No pressor. awake, limited following 

commands





PHYSICAL EXAMINATION:HEAD AND NECK:  intubated .


CHEST:  Bronchial breathing sounds.ABDOMEN:  Soft.  No organomegaly.


MUSCULOSKELETAL:  Diffuse 1+ or 2+ nonpitting edema in all four contracted


extremities.  The patient is not following the commands.  Limited


evaluation.NEUROLOGY:  The patient is awake.  Not alert.  Not verbally


communicative.





LABORATORY DATA:  Labs dated Nov 6, 2019  reviewed





Meds: Reviewed and reconciled in the chart





Imaging:  CXR reviewed 





ASSESSMENT AND PLAN:


1. VDRF


2. Sepsis.  secondary to healthcare-associated pneumonia or


3. Acute chf exacerbation - Diastolic


4. Chronic encephalomalacia.


3. Acute on chronic anemia.


4. Renal failure, age indeterminate.


6. Hyperthyroidism.


7. Hyperkalemia.


8. Diabetes type 2, uncontrolled with A1c of 10.


9. Psychiatric disorder.


10. GI and DVT prophylaxis.


11. PAH- Severe 





PLAN OF CARE:





Post PEG tube


Out patient followup for abnormal incidental imaging finding ( possibility of 

CA cant be excluded 


Acute drop in Hgb and observation of UGI bleeding, Will hold DAPT


notes from pulmonary reviewed


Several failed Weaning trials. 


Trach !?





Subjective


Allergies:  


Coded Allergies:  


     No Known Allergies (Unverified , 10/14/19)





Objective





Last 24 Hour Vital Signs








  Date Time  Temp Pulse Resp B/P (MAP) Pulse Ox O2 Delivery O2 Flow Rate FiO2


 


11/12/19 12:00        35


 


11/12/19 12:00      Endotracheal Tube  


 


11/12/19 12:00 99.3 85 23 125/37 (66) 100   


 


11/12/19 12:00  85      


 


11/12/19 11:00  80 20 113/37 (62) 100   


 


11/12/19 11:00  79 19     35


 


11/12/19 10:00  82 20 118/43 (68) 100   


 


11/12/19 09:05  80 20     35


 


11/12/19 09:00     100   


 


11/12/19 09:00        35


 


11/12/19 09:00  82 19 138/53 (81) 100   


 


11/12/19 09:00  87 35     35





        35


 


11/12/19 08:36  80  133/56    


 


11/12/19 08:00 99.3 78 21 121/45 (70) 100   


 


11/12/19 08:00        40


 


11/12/19 08:00      Endotracheal Tube  


 


11/12/19 08:00  80      


 


11/12/19 07:00  74 16 116/51 (72) 100   


 


11/12/19 06:58  69 19  100 Mechanical Ventilator  40





  84 20     40


 


11/12/19 06:00 99.6 76 21 143/48 (79) 100   


 


11/12/19 05:10  75 19     40


 


11/12/19 05:00  81 19 122/54 (76) 100   


 


11/12/19 04:00        40


 


11/12/19 04:00 100.2 93 28 125/45 (71) 100   


 


11/12/19 04:00      Endotracheal Tube  


 


11/12/19 03:49  100      


 


11/12/19 03:20  90 21     40


 


11/12/19 03:00  96 25 118/49 (72) 100   


 


11/12/19 02:00  93 22 104/48 (66) 100   


 


11/12/19 01:09  77 24  100 Mechanical Ventilator 40.0 40





  77 24     40


 


11/12/19 01:00 99.2 77 19 113/40 (64) 100   


 


11/12/19 00:00  78 17 121/48 (72) 100   


 


11/12/19 00:00      Endotracheal Tube  


 


11/12/19 00:00        40


 


11/12/19 00:00  78      


 


11/11/19 23:00  78 17 119/44 (69) 100   


 


11/11/19 22:42  82 16     40


 


11/11/19 22:00  83 23 120/54 (76) 100   


 


11/11/19 21:00  91 24 123/54 (77) 100   


 


11/11/19 21:00  83 19     40


 


11/11/19 20:00        40


 


11/11/19 20:00      Endotracheal Tube  


 


11/11/19 20:00 99.5 86 22 110/49 (69) 100   


 


11/11/19 20:00  86      


 


11/11/19 19:00  89 23 112/48 (69) 100   


 


11/11/19 18:43  85 24  100 Mechanical Ventilator 40.0 40





  85 24     40


 


11/11/19 18:00  85 24 116/48 (70) 100   


 


11/11/19 17:09  78 24     40


 


11/11/19 17:02  80  123/57    


 


11/11/19 17:00  81 26 123/57 (79) 100   


 


11/11/19 16:30  69 22 104/41 (62) 100   


 


11/11/19 16:00  87      


 


11/11/19 16:00 99.3 82 24 116/47 (70) 100   


 


11/11/19 16:00        40


 


11/11/19 16:00      Endotracheal Tube  


 


11/11/19 15:26  75 24     40


 


11/11/19 15:00  79 23 108/60 (76) 100   


 


11/11/19 14:00  72 15 118/44 (68) 100   


 


11/11/19 13:30  69 14 110/43 (65) 100   


 


11/11/19 13:22  63 17  100 Mechanical Ventilator  40





  63 17     40


 


11/11/19 13:00  76 23 122/49 (73) 100   

















Intake and Output  


 


 11/11/19 11/12/19





 19:00 07:00


 


Intake Total 910 ml 875 ml


 


Output Total 1210 ml 1610 ml


 


Balance -300 ml -735 ml


 


  


 


Free Water 100 ml 


 


IV Total 110 ml 55 ml


 


Tube Feeding 600 ml 720 ml


 


Other 100 ml 100 ml


 


Output Urine Total 1210 ml 1610 ml


 


# Bowel Movements 1 2








Laboratory Tests


11/12/19 03:00: 


White Blood Count 11.9H, Red Blood Count 2.96L, Hemoglobin 8.0L, Hematocrit 

24.7L, Mean Corpuscular Volume 83, Mean Corpuscular Hemoglobin 27.1, Mean 

Corpuscular Hemoglobin Concent 32.4, Red Cell Distribution Width 14.5, Platelet 

Count 389, Mean Platelet Volume 8.2, Neutrophils (%) (Auto) 61.5, Lymphocytes (%

) (Auto) 15.4L, Monocytes (%) (Auto) 11.4H, Eosinophils (%) (Auto) 9.2H, 

Basophils (%) (Auto) 2.6H, Sodium Level 142, Potassium Level 4.6, Chloride 

Level 105, Carbon Dioxide Level 39H, Anion Gap -2L, Blood Urea Nitrogen 32H, 

Creatinine 1.2, Estimat Glomerular Filtration Rate 45.0, Glucose Level 132H, 

Calcium Level 8.4L, Phosphorus Level 3.1, Magnesium Level 2.3


Height (Feet):  5


Height (Inches):  5.00


Weight (Pounds):  130











Garrick Keith MD Nov 12, 2019 12:17

## 2019-11-12 NOTE — NUR
NURSE NOTES:

PATIENT TRIED TO TOUCH ENDOTUBE, AGITATED AND ANXIOUS STATUS, GIVEN ATIVAN 2MG BY IVP SLOWLY 
AS PRN ORDER, SECURED RESTRAINTS, WILL CONTINUE TO MONITOR.

## 2019-11-12 NOTE — NUR
NURSE NOTES:

PATIENT AWOKE, OPEN EYES TO NAME, DID NOT FOLLOW COMMANDS, ON ETT TO VENT, AC14//FIO2 
35%/PEEP 5, O2 SATURATION 100% NOTED, HEART 80'S/MIN SR NOTED, ABDOMEN SOFT, NON TENDER, NO 
BM STATUS, G TUBE INTACT AND PATENT, NO RESIDUE NOTED, ONGOING GLUCERNA 1.2 AT 60ML/HR, KEPT 
HOB OVER 30 DEGREES, F/C INTACT AND PATENT, YELLOW URINE OUTED, PICC LINE TO RIGHT UPPER 
ARM, INTACT AND PATENT, ON SCD'S TO BOTH LOWER LEGS, MADE LOWER BED AND LOCKED, ON P200 BED, 
KEPT 2 POINT SOFT RESTRAINTS FOR SAFETY, SZ AND ASPIRATION PRECAUTION, PROVIDED CALL LIGHT 
WITHIN REACH, WILL CONTINUE TO MONITOR.

## 2019-11-12 NOTE — GENERAL PROGRESS NOTE
Assessment/Plan


Problem List:  


(1) Abnormal TSH


ICD Codes:  R79.89 - Other specified abnormal findings of blood chemistry


SNOMED:  211114414


(2) Hyperglycemia due to type 2 diabetes mellitus


ICD Codes:  E11.65 - Type 2 diabetes mellitus with hyperglycemia


SNOMED:  533261586040392, [id number]


Qualifiers:  


   Qualified Codes:  E11.65 - Type 2 diabetes mellitus with hyperglycemia; 

Z79.4 - Long term (current) use of insulin


(3) Acute metabolic encephalopathy


ICD Codes:  G93.41 - Metabolic encephalopathy


SNOMED:  16284645, 936916340


(4) ARF (acute renal failure)


ICD Codes:  N17.9 - Acute kidney failure, unspecified


SNOMED:  59153329


Qualifiers:  


   Qualified Codes:  N17.9 - Acute kidney failure, unspecified


(5) Healthcare associated bacterial pneumonia


ICD Codes:  J15.9 - Unspecified bacterial pneumonia


SNOMED:  102259010


Status:  unchanged


Assessment/Plan:


increase Levemir to 10 units qhs 


continue NISS every 6 hours 


hypoglycemia protocol in order





Subjective


ROS Limited/Unobtainable:  Yes


Allergies:  


Coded Allergies:  


     No Known Allergies (Unverified , 10/14/19)


Subjective


events noted 


intubated in ICU











Item Value  Date Time


 


Bedside Blood Glucose 196 mg/dl H 11/12/19 1800


 


Bedside Blood Glucose 166 mg/dl H 11/12/19 1200


 


Bedside Blood Glucose 199 mg/dl H 11/12/19 0538


 


Bedside Blood Glucose 211 mg/dl H 11/11/19 2351


 


Bedside Blood Glucose 179 mg/dl H 11/11/19 2043











Objective





Last 24 Hour Vital Signs








  Date Time  Temp Pulse Resp B/P (MAP) Pulse Ox O2 Delivery O2 Flow Rate FiO2


 


11/12/19 18:00  88 25 114/51 (72) 100   


 


11/12/19 17:12  93  105/41    


 


11/12/19 17:00  82 20 105/41 (62) 98   


 


11/12/19 16:45  82 20     35


 


11/12/19 16:00 98.9 81 19 115/45 (68) 97   


 


11/12/19 16:00      Endotracheal Tube  


 


11/12/19 16:00  91      


 


11/12/19 16:00        35


 


11/12/19 15:02  91 27     35


 


11/12/19 15:00  90 23 122/52 (75) 99   


 


11/12/19 14:00  79 20 116/49 (71) 100   


 


11/12/19 13:10  85 26  100 Mechanical Ventilator  35





  76 14     40


 


11/12/19 13:00  90 29 132/71 (91) 100   


 


11/12/19 12:00        35


 


11/12/19 12:00      Endotracheal Tube  


 


11/12/19 12:00 99.3 85 23 125/37 (66) 100   


 


11/12/19 12:00  85      


 


11/12/19 11:00  80 20 113/37 (62) 100   


 


11/12/19 11:00  79 19     35


 


11/12/19 10:00  82 20 118/43 (68) 100   


 


11/12/19 09:05  80 20     35


 


11/12/19 09:00     100   


 


11/12/19 09:00        35


 


11/12/19 09:00  82 19 138/53 (81) 100   


 


11/12/19 09:00  87 35     35





        35


 


11/12/19 08:36  80  133/56    


 


11/12/19 08:00 99.3 78 21 121/45 (70) 100   


 


11/12/19 08:00        40


 


11/12/19 08:00      Endotracheal Tube  


 


11/12/19 08:00  80      


 


11/12/19 07:00  74 16 116/51 (72) 100   


 


11/12/19 06:58  69 19  100 Mechanical Ventilator  40





  84 20     40


 


11/12/19 06:00 99.6 76 21 143/48 (79) 100   


 


11/12/19 05:10  75 19     40


 


11/12/19 05:00  81 19 122/54 (76) 100   


 


11/12/19 04:00        40


 


11/12/19 04:00 100.2 93 28 125/45 (71) 100   


 


11/12/19 04:00      Endotracheal Tube  


 


11/12/19 03:49  100      


 


11/12/19 03:20  90 21     40


 


11/12/19 03:00  96 25 118/49 (72) 100   


 


11/12/19 02:00  93 22 104/48 (66) 100   


 


11/12/19 01:09  77 24  100 Mechanical Ventilator 40.0 40





  77 24     40


 


11/12/19 01:00 99.2 77 19 113/40 (64) 100   


 


11/12/19 00:00  78 17 121/48 (72) 100   


 


11/12/19 00:00      Endotracheal Tube  


 


11/12/19 00:00        40


 


11/12/19 00:00  78      


 


11/11/19 23:00  78 17 119/44 (69) 100   


 


11/11/19 22:42  82 16     40


 


11/11/19 22:00  83 23 120/54 (76) 100   


 


11/11/19 21:00  91 24 123/54 (77) 100   


 


11/11/19 21:00  83 19     40


 


11/11/19 20:00        40


 


11/11/19 20:00      Endotracheal Tube  


 


11/11/19 20:00 99.5 86 22 110/49 (69) 100   


 


11/11/19 20:00  86      


 


11/11/19 19:00  89 23 112/48 (69) 100   


 


11/11/19 18:43  85 24  100 Mechanical Ventilator 40.0 40





  85 24     40

















Intake and Output  


 


 11/11/19 11/12/19





 19:00 07:00


 


Intake Total 910 ml 875 ml


 


Output Total 1210 ml 1610 ml


 


Balance -300 ml -735 ml


 


  


 


Free Water 100 ml 


 


IV Total 110 ml 55 ml


 


Tube Feeding 600 ml 720 ml


 


Other 100 ml 100 ml


 


Output Urine Total 1210 ml 1610 ml


 


# Bowel Movements 1 2








Laboratory Tests


11/12/19 03:00: 


White Blood Count 11.9H, Red Blood Count 2.96L, Hemoglobin 8.0L, Hematocrit 

24.7L, Mean Corpuscular Volume 83, Mean Corpuscular Hemoglobin 27.1, Mean 

Corpuscular Hemoglobin Concent 32.4, Red Cell Distribution Width 14.5, Platelet 

Count 389, Mean Platelet Volume 8.2, Neutrophils (%) (Auto) 61.5, Lymphocytes (%

) (Auto) 15.4L, Monocytes (%) (Auto) 11.4H, Eosinophils (%) (Auto) 9.2H, 

Basophils (%) (Auto) 2.6H, Sodium Level 142, Potassium Level 4.6, Chloride 

Level 105, Carbon Dioxide Level 39H, Anion Gap -2L, Blood Urea Nitrogen 32H, 

Creatinine 1.2, Estimat Glomerular Filtration Rate 45.0, Glucose Level 132H, 

Calcium Level 8.4L, Phosphorus Level 3.1, Magnesium Level 2.3


11/12/19 15:17: 


Arterial Blood pH 7.482H, Arterial Blood Partial Pressure CO2 43.4, Arterial 

Blood Partial Pressure O2 102.6H, Arterial Blood HCO3 31.7H, Arterial Blood 

Oxygen Saturation 97.6, Arterial Blood Base Excess 7.5H, Graham Test Positive


Height (Feet):  5


Height (Inches):  5.00


Weight (Pounds):  130


General Appearance:  other - intubated


EENT:  other - ETT


Neck:  normal alignment


Cardiovascular:  tachycardia


Respiratory/Chest:  decreased breath sounds


Abdomen:  normal bowel sounds


Edema:  1+ Arm (L), 1+ Arm (R), 1+ Leg (L), 1+ Leg (R), 1+ Pedal (L), 1+ Pedal (

R), 1+ Generalized


Objective





Current Medications








 Medications


  (Trade)  Dose


 Ordered  Sig/Winnie


 Route


 PRN Reason  Start Time


 Stop Time Status Last Admin


Dose Admin


 


 Acetaminophen


  (Tylenol)  650 mg  Q4H  PRN


 NG


 Mild Pain/Temp > 100.5  11/3/19 20:30


 11/25/19 20:29  11/10/19 03:01


 


 


 Acetaminophen


  (Tylenol)  650 mg  Q4H  PRN


 RECTAL


 TEMP>100.5  11/3/19 20:30


 12/2/19 20:29   


 


 


 Acetylcysteine


  (Mucomyst)  100 mg  TIDRT


 [facility]


   11/10/19 19:30


 12/10/19 19:29  11/12/19 12:55


 


 


 Albuterol/


 Ipratropium


  (Albuterol/


 Ipratropium)  3 ml  Q6HRT


 N


   11/12/19 13:00


 11/17/19 12:59  11/12/19 12:55


 


 


 Amlodipine


 Besylate


  (Norvasc)  2.5 mg  BID


 NG


   11/4/19 09:00


 11/29/19 17:59  11/11/19 17:02


 


 


 Chlorhexidine


 Gluconate


  (Mae-Hex 2%)  1 applic  DAILY@2000


 TOPIC


   11/5/19 20:00


 12/5/19 19:59  11/11/19 19:53


 


 


 Dextrose


  (Dextrose 50%)  25 ml  Q30M  PRN


 IV


 Hypoglycemia  11/5/19 06:30


 12/5/19 06:29   


 


 


 Dextrose


  (Dextrose 50%)  50 ml  Q30M  PRN


 IV


 Hypoglycemia  11/5/19 06:30


 12/5/19 06:29   


 


 


 Divalproex Sodium


  (Depakote


 Sprinkles)  500 mg  Q12HR


 NG


   11/3/19 21:00


 11/14/19 21:59  11/12/19 08:37


 


 


 Folic Acid


  (Folate)  3 mg  DAILY


 GT


   11/7/19 09:00


 11/30/19 08:59  11/12/19 09:32


 


 


 Furosemide


  (Lasix)  40 mg  EVERY 12  HOURS


 IV


   11/7/19 21:00


 12/7/19 20:59  11/12/19 09:32


 


 


 Insulin Aspart


  (NovoLOG)    EVERY 6  HOURS


 SUBQ


   11/4/19 00:00


 11/29/19 11:59  11/12/19 17:13


 


 


 Insulin Detemir


  (Levemir)  8 units  QHS


 SUBQ


   11/6/19 21:00


 12/1/19 08:59  11/11/19 20:43


 


 


 Lactulose


  (Cephulac)  20 gm  Q8H  PRN


 NG


 Constipation  11/3/19 20:30


 12/3/19 20:29  11/5/19 08:12


 


 


 Lorazepam


  (Ativan 2mg/ml


 1ml)  2 mg  Q4H  PRN


 IV


 for agitation  11/10/19 19:30


 11/17/19 19:29  11/11/19 02:48


 


 


 Meropenem 1 gm/


 Sodium Chloride  55 ml @ 


 110 mls/hr  Q8H


 IVPB


   11/4/19 17:00


 11/14/19 16:59  11/12/19 17:12


 


 


 Metoclopramide HCl


  (Reglan)  10 mg  Q6H  PRN


 IVP


 Nausea & Vomiting  11/6/19 10:45


 12/6/19 10:44   


 


 


 Metolazone


  (Zaroxolyn)  5 mg  Q24H


 NG


   11/12/19 08:00


 12/12/19 07:59  11/12/19 08:36


 


 


 Pantoprazole


  (Protonix)  40 mg  EVERY 12  HOURS


 IVP


   11/3/19 21:00


 12/3/19 08:59  11/12/19 08:37


 


 


 Potassium Chloride


  (K-Dur)  40 meq  DAILY


 GT


   11/6/19 09:00


 12/5/19 17:59  11/12/19 08:37


 

















Riccardo Velez MD Nov 12, 2019 18:26

## 2019-11-12 NOTE — PULMONOLGY CRITICAL CARE NOTE
Critical Care - Asmt/Plan


Problems:  


(1) Respiratory arrest


(2) Pulmonary edema


(3) Bilateral pulmonary infiltrates on CXR


(4) Cardiopulmonary arrest with successful resuscitation


(5) Encephalopathy


(6) Diabetes mellitus type II, controlled


(7) Respiratory failure


(8) CHF (congestive heart failure)


(9) Pressure injury of deep tissue


(10) Renal failure (ARF), acute on chronic


(11) Diabetic nephropathy


(12) Healthcare associated bacterial pneumonia


(13) Abnormal TSH


(14) Aspiration pneumonia


(15) ARF (acute renal failure)


Assessment/Plan:


Marked leukocytosis S/P WBC scan with uptake in pelvis, ? correspondence to 

pelvic mass


Sepsis


HAP


E coli UTI


Acute hypoxemic respiratory failure/VDRF


Acute encephalopathy


Hx CHF


HTN


CHANCE - RESOVLED


Pelvic mass/possible GYN malignancy vs cyst


Dysphagia S/P PEG


CGE S/P neg EGD





Plan:


-ABG


-CXR


-Repeat DUPLEX


-Continue ventilatory support


-Daily SBT


-Will most likely need TRACH   


-HHN's


-Optimize pulmonary hygiene/mobilize as tolerated


-Titrate down FiO2 and PEEP to keep SaO2 > 90%


-Monitor WCt, RFS sent for JAK2 mutation (still pending), per Dr. Pop 

BMBx if unrevealing


-Abx (MEROPENEM) per ID


-monitor volumes and renal function, diuresis as able (lasix 40 IV BID + 

zaroxyln)


-Consider GYN evaluation 


-monitor encephalopathy, ? neuro eval   


-NPO, GTF's


-DVT Px: LMWH (held), continue SCD's


-FC - need to continue to discuss GOC, consider ETHICS eval





CCT 35





Critical Care - Objective





Last 24 Hour Vital Signs








  Date Time  Temp Pulse Resp B/P (MAP) Pulse Ox O2 Delivery O2 Flow Rate FiO2


 


11/12/19 14:00  79 20 116/49 (71) 100   


 


11/12/19 13:10  85 26  100 Mechanical Ventilator  35





  76 14     40


 


11/12/19 13:00  90 29 132/71 (91) 100   


 


11/12/19 12:00        35


 


11/12/19 12:00      Endotracheal Tube  


 


11/12/19 12:00 99.3 85 23 125/37 (66) 100   


 


11/12/19 12:00  85      


 


11/12/19 11:00  80 20 113/37 (62) 100   


 


11/12/19 11:00  79 19     35


 


11/12/19 10:00  82 20 118/43 (68) 100   


 


11/12/19 09:05  80 20     35


 


11/12/19 09:00     100   


 


11/12/19 09:00        35


 


11/12/19 09:00  82 19 138/53 (81) 100   


 


11/12/19 09:00  87 35     35





        35


 


11/12/19 08:36  80  133/56    


 


11/12/19 08:00 99.3 78 21 121/45 (70) 100   


 


11/12/19 08:00        40


 


11/12/19 08:00      Endotracheal Tube  


 


11/12/19 08:00  80      


 


11/12/19 07:00  74 16 116/51 (72) 100   


 


11/12/19 06:58  69 19  100 Mechanical Ventilator  40





  84 20     40


 


11/12/19 06:00 99.6 76 21 143/48 (79) 100   


 


11/12/19 05:10  75 19     40


 


11/12/19 05:00  81 19 122/54 (76) 100   


 


11/12/19 04:00        40


 


11/12/19 04:00 100.2 93 28 125/45 (71) 100   


 


11/12/19 04:00      Endotracheal Tube  


 


11/12/19 03:49  100      


 


11/12/19 03:20  90 21     40


 


11/12/19 03:00  96 25 118/49 (72) 100   


 


11/12/19 02:00  93 22 104/48 (66) 100   


 


11/12/19 01:09  77 24  100 Mechanical Ventilator 40.0 40





  77 24     40


 


11/12/19 01:00 99.2 77 19 113/40 (64) 100   


 


11/12/19 00:00  78 17 121/48 (72) 100   


 


11/12/19 00:00      Endotracheal Tube  


 


11/12/19 00:00        40


 


11/12/19 00:00  78      


 


11/11/19 23:00  78 17 119/44 (69) 100   


 


11/11/19 22:42  82 16     40


 


11/11/19 22:00  83 23 120/54 (76) 100   


 


11/11/19 21:00  91 24 123/54 (77) 100   


 


11/11/19 21:00  83 19     40


 


11/11/19 20:00        40


 


11/11/19 20:00      Endotracheal Tube  


 


11/11/19 20:00 99.5 86 22 110/49 (69) 100   


 


11/11/19 20:00  86      


 


11/11/19 19:00  89 23 112/48 (69) 100   


 


11/11/19 18:43  85 24  100 Mechanical Ventilator 40.0 40





  85 24     40


 


11/11/19 18:00  85 24 116/48 (70) 100   


 


11/11/19 17:09  78 24     40


 


11/11/19 17:02  80  123/57    


 


11/11/19 17:00  81 26 123/57 (79) 100   


 


11/11/19 16:30  69 22 104/41 (62) 100   


 


11/11/19 16:00  87      


 


11/11/19 16:00 99.3 82 24 116/47 (70) 100   


 


11/11/19 16:00        40


 


11/11/19 16:00      Endotracheal Tube  


 


11/11/19 15:26  75 24     40


 


11/11/19 15:00  79 23 108/60 (76) 100   








Status:  sedated, other - intubated


Condition:  critical


HEENT:  atraumatic, normocephalic


Neck:  full ROM


Lungs:  rhonchi


Heart:  HR/BP stable


Abdomen:  soft, non-tender, active bowel sounds


Extremities:  no C/C/E


Decubiti:  location - R heel DTI


Micro:





Microbiology








 Date/Time


Source Procedure


Growth Status


 


 


 11/10/19 09:00


Sputum Induced Gram Stain - Final Resulted


 


 11/10/19 09:00


Sputum Induced Sputum Culture


Pending Resulted








Accucheck:  166


Blood Sugars:  BS controlled





Critical Care - Subjective


ROS Limited/Unobtainable:  Yes


ICU Day:  9


Intubation Day:  9


Interval Events:


Failed SBT --> tachypnic


Condition:  critical


IV Access:  PICC


EKG Rhythm:  Sinus Rhythm


FI02:  35


Vent Support Breath Rate:  14


Vent Support Mode:  AC


Vent Tidal Volume:  400


Sputum Amount:  Moderate


PEEP:  5.0


PIP:  21


Secretions:  moderate thick clear


Fluids:  SLIV


Drips:  None


Tube Feeding Amount:  60


I&O:











Intake and Output  


 


 11/11/19 11/12/19





 19:00 07:00


 


Intake Total 910 ml 875 ml


 


Output Total 1210 ml 1610 ml


 


Balance -300 ml -735 ml


 


  


 


Free Water 100 ml 


 


IV Total 110 ml 55 ml


 


Tube Feeding 600 ml 720 ml


 


Other 100 ml 100 ml


 


Output Urine Total 1210 ml 1610 ml


 


# Bowel Movements 1 2








Subjective:


CINTHIA


ET-Tube:  7.5


ET Position:  23


Labs:


   


Laboratory Tests








Test


  11/12/19


03:00


 


White Blood Count


  11.9 K/UL


(4.8-10.8)  H


 


Red Blood Count


  2.96 M/UL


(4.20-5.40)  L


 


Hemoglobin


  8.0 G/DL


(12.0-16.0)  L


 


Hematocrit


  24.7 %


(37.0-47.0)  L


 


Mean Corpuscular Volume 83 FL (80-99)  


 


Mean Corpuscular Hemoglobin


  27.1 PG


(27.0-31.0)


 


Mean Corpuscular Hemoglobin


Concent 32.4 G/DL


(32.0-36.0)


 


Red Cell Distribution Width


  14.5 %


(11.6-14.8)


 


Platelet Count


  389 K/UL


(150-450)


 


Mean Platelet Volume


  8.2 FL


(6.5-10.1)


 


Neutrophils (%) (Auto)


  61.5 %


(45.0-75.0)


 


Lymphocytes (%) (Auto)


  15.4 %


(20.0-45.0)  L


 


Monocytes (%) (Auto)


  11.4 %


(1.0-10.0)  H


 


Eosinophils (%) (Auto)


  9.2 %


(0.0-3.0)  H


 


Basophils (%) (Auto)


  2.6 %


(0.0-2.0)  H


 


Sodium Level


  142 MMOL/L


(136-145)


 


Potassium Level


  4.6 MMOL/L


(3.5-5.1)


 


Chloride Level


  105 MMOL/L


()


 


Carbon Dioxide Level


  39 MMOL/L


(21-32)  H


 


Anion Gap


  -2 mmol/L


(5-15)  L


 


Blood Urea Nitrogen


  32 mg/dL


(7-18)  H


 


Creatinine


  1.2 MG/DL


(0.55-1.30)


 


Estimat Glomerular Filtration


Rate 45.0 mL/min


(>60)


 


Glucose Level


  132 MG/DL


()  H


 


Calcium Level


  8.4 MG/DL


(8.5-10.1)  L


 


Phosphorus Level


  3.1 MG/DL


(2.5-4.9)


 


Magnesium Level


  2.3 MG/DL


(1.8-2.4)

















Sammy Torres MD Nov 12, 2019 14:35

## 2019-11-12 NOTE — GI PROGRESS NOTE
Assessment/Plan


Problems:  


(1) PEG (percutaneous endoscopic gastrostomy) status


ICD Codes:  Z93.1 - Gastrostomy status


SNOMED:  731197869, 671689622


(2) Anemia


ICD Codes:  D64.9 - Anemia, unspecified


SNOMED:  379751394


Qualifiers:  


   Qualified Codes:  D64.9 - Anemia, unspecified


(3) Dehydration


ICD Codes:  E86.0 - Dehydration


SNOMED:  21477701, [id number]


Status:  unchanged


Status Narrative


Discussed with Dr. Turk.


Assessment/Plan


Assessment


(1) pneumonia


(2) UTI


(3) Sepsis


(4) Dehydration


(5) CHANCE 


(6) Acute metabolic encephalopathy


(7) Hyperglycemia due to type 2 diabetes mellitus


(8) Renal failure (ARF), acute on chronic


(9) Aspiration pneumonia


(10) Abnormal TSH


(11) Pelvic mass in female


(12) Congestive Heart Failure


(13) Anemia


(14) Dysphagia - s/p GT





s/p EGD SUMMARY OF FINDINGS:


1. No evidence of any active upper GI bleeding at this time.


2. Gastritis status biopsy.





Recommendations


GTFs, tolerating


prn transfusions


bowel regime


PPI and H2B


will follow up, consider colonoscopy if patient has recurrent GI bleed, stable H

&H.





The patient was seen and examined at bedside and all new and available data was 

reviewed in the patients chart. I agree with the above findings, impression 

and plan.  (Patient seen earlier today. Signature stamp does not reflect 

patient encounter time.). - Storm Turk MD





Subjective


Subjective


Limited





Objective





Last 24 Hour Vital Signs








  Date Time  Temp Pulse Resp B/P (MAP) Pulse Ox O2 Delivery O2 Flow Rate FiO2


 


11/12/19 12:00        35


 


11/12/19 12:00      Endotracheal Tube  


 


11/12/19 12:00 99.3 85 23 125/37 (66) 100   


 


11/12/19 12:00  85      


 


11/12/19 11:00  80 20 113/37 (62) 100   


 


11/12/19 11:00  79 19     35


 


11/12/19 10:00  82 20 118/43 (68) 100   


 


11/12/19 09:05  80 20     35


 


11/12/19 09:00     100   


 


11/12/19 09:00        35


 


11/12/19 09:00  82 19 138/53 (81) 100   


 


11/12/19 09:00  87 35     35





        35


 


11/12/19 08:36  80  133/56    


 


11/12/19 08:00 99.3 78 21 121/45 (70) 100   


 


11/12/19 08:00        40


 


11/12/19 08:00      Endotracheal Tube  


 


11/12/19 08:00  80      


 


11/12/19 07:00  74 16 116/51 (72) 100   


 


11/12/19 06:58  69 19  100 Mechanical Ventilator  40





  84 20     40


 


11/12/19 06:00 99.6 76 21 143/48 (79) 100   


 


11/12/19 05:10  75 19     40


 


11/12/19 05:00  81 19 122/54 (76) 100   


 


11/12/19 04:00        40


 


11/12/19 04:00 100.2 93 28 125/45 (71) 100   


 


11/12/19 04:00      Endotracheal Tube  


 


11/12/19 03:49  100      


 


11/12/19 03:20  90 21     40


 


11/12/19 03:00  96 25 118/49 (72) 100   


 


11/12/19 02:00  93 22 104/48 (66) 100   


 


11/12/19 01:09  77 24  100 Mechanical Ventilator 40.0 40





  77 24     40


 


11/12/19 01:00 99.2 77 19 113/40 (64) 100   


 


11/12/19 00:00  78 17 121/48 (72) 100   


 


11/12/19 00:00      Endotracheal Tube  


 


11/12/19 00:00        40


 


11/12/19 00:00  78      


 


11/11/19 23:00  78 17 119/44 (69) 100   


 


11/11/19 22:42  82 16     40


 


11/11/19 22:00  83 23 120/54 (76) 100   


 


11/11/19 21:00  91 24 123/54 (77) 100   


 


11/11/19 21:00  83 19     40


 


11/11/19 20:00        40


 


11/11/19 20:00      Endotracheal Tube  


 


11/11/19 20:00 99.5 86 22 110/49 (69) 100   


 


11/11/19 20:00  86      


 


11/11/19 19:00  89 23 112/48 (69) 100   


 


11/11/19 18:43  85 24  100 Mechanical Ventilator 40.0 40





  85 24     40


 


11/11/19 18:00  85 24 116/48 (70) 100   


 


11/11/19 17:09  78 24     40


 


11/11/19 17:02  80  123/57    


 


11/11/19 17:00  81 26 123/57 (79) 100   


 


11/11/19 16:30  69 22 104/41 (62) 100   


 


11/11/19 16:00  87      


 


11/11/19 16:00 99.3 82 24 116/47 (70) 100   


 


11/11/19 16:00        40


 


11/11/19 16:00      Endotracheal Tube  


 


11/11/19 15:26  75 24     40


 


11/11/19 15:00  79 23 108/60 (76) 100   


 


11/11/19 14:00  72 15 118/44 (68) 100   


 


11/11/19 13:30  69 14 110/43 (65) 100   


 


11/11/19 13:22  63 17  100 Mechanical Ventilator  40





  63 17     40


 


11/11/19 13:00  76 23 122/49 (73) 100   

















Intake and Output  


 


 11/11/19 11/12/19





 19:00 07:00


 


Intake Total 910 ml 875 ml


 


Output Total 1210 ml 1610 ml


 


Balance -300 ml -735 ml


 


  


 


Free Water 100 ml 


 


IV Total 110 ml 55 ml


 


Tube Feeding 600 ml 720 ml


 


Other 100 ml 100 ml


 


Output Urine Total 1210 ml 1610 ml


 


# Bowel Movements 1 2











Laboratory Tests








Test


  11/12/19


03:00


 


White Blood Count


  11.9 K/UL


(4.8-10.8)  H


 


Red Blood Count


  2.96 M/UL


(4.20-5.40)  L


 


Hemoglobin


  8.0 G/DL


(12.0-16.0)  L


 


Hematocrit


  24.7 %


(37.0-47.0)  L


 


Mean Corpuscular Volume 83 FL (80-99)  


 


Mean Corpuscular Hemoglobin


  27.1 PG


(27.0-31.0)


 


Mean Corpuscular Hemoglobin


Concent 32.4 G/DL


(32.0-36.0)


 


Red Cell Distribution Width


  14.5 %


(11.6-14.8)


 


Platelet Count


  389 K/UL


(150-450)


 


Mean Platelet Volume


  8.2 FL


(6.5-10.1)


 


Neutrophils (%) (Auto)


  61.5 %


(45.0-75.0)


 


Lymphocytes (%) (Auto)


  15.4 %


(20.0-45.0)  L


 


Monocytes (%) (Auto)


  11.4 %


(1.0-10.0)  H


 


Eosinophils (%) (Auto)


  9.2 %


(0.0-3.0)  H


 


Basophils (%) (Auto)


  2.6 %


(0.0-2.0)  H


 


Sodium Level


  142 MMOL/L


(136-145)


 


Potassium Level


  4.6 MMOL/L


(3.5-5.1)


 


Chloride Level


  105 MMOL/L


()


 


Carbon Dioxide Level


  39 MMOL/L


(21-32)  H


 


Anion Gap


  -2 mmol/L


(5-15)  L


 


Blood Urea Nitrogen


  32 mg/dL


(7-18)  H


 


Creatinine


  1.2 MG/DL


(0.55-1.30)


 


Estimat Glomerular Filtration


Rate 45.0 mL/min


(>60)


 


Glucose Level


  132 MG/DL


()  H


 


Calcium Level


  8.4 MG/DL


(8.5-10.1)  L


 


Phosphorus Level


  3.1 MG/DL


(2.5-4.9)


 


Magnesium Level


  2.3 MG/DL


(1.8-2.4)








Height (Feet):  5


Height (Inches):  5.00


Weight (Pounds):  130


General Appearance:  no apparent distress


Cardiovascular:  normal rate


Abdominal Exam:   site











Darren Lucio NP Nov 12, 2019 12:41

## 2019-11-12 NOTE — NUR
NURSE NOTES:

Patient attempted to pull out ETT multiple times, diversional activities failed. patient is 
impulsive, unable to follow commands. ETT intact, 23cm at lip line. placed bilateral soft 
wrist restraints. Skin intact, pulses present.

## 2019-11-12 NOTE — NUR
NURSE NOTES:

Patient is seen by Dr. Burgos. MD updated on pt's condition. No new orders at this time.

## 2019-11-12 NOTE — NUR
RESPIRATORY NOTE:

Placed pt on CPAP PS 8 per Dr. Main's prder at 0855. Pt first tolerated really well but 
then started abdominal breathing, tachypneic, SOB RR from 23 to 35bpm, RSBI from 66 to 108, 
NIF -10 within 5 minutes. Placed pt back to AC mode at 0901, pt is calming down, no more SOB 
and abdominal breathing. RN So Yeon made aware.

## 2019-11-12 NOTE — SURGERY PROGRESS NOTE
Surgery Progress Note


Subjective


Additional Comments


leukocytosis


anemia


exam unchanged


non responsive





Objective





Last 24 Hour Vital Signs








  Date Time  Temp Pulse Resp B/P (MAP) Pulse Ox O2 Delivery O2 Flow Rate FiO2


 


11/12/19 15:02  91 27     35


 


11/12/19 15:00  90 23 122/52 (75) 99   


 


11/12/19 14:00  79 20 116/49 (71) 100   


 


11/12/19 13:10  85 26  100 Mechanical Ventilator  35





  76 14     40


 


11/12/19 13:00  90 29 132/71 (91) 100   


 


11/12/19 12:00        35


 


11/12/19 12:00      Endotracheal Tube  


 


11/12/19 12:00 99.3 85 23 125/37 (66) 100   


 


11/12/19 12:00  85      


 


11/12/19 11:00  80 20 113/37 (62) 100   


 


11/12/19 11:00  79 19     35


 


11/12/19 10:00  82 20 118/43 (68) 100   


 


11/12/19 09:05  80 20     35


 


11/12/19 09:00     100   


 


11/12/19 09:00        35


 


11/12/19 09:00  82 19 138/53 (81) 100   


 


11/12/19 09:00  87 35     35





        35


 


11/12/19 08:36  80  133/56    


 


11/12/19 08:00 99.3 78 21 121/45 (70) 100   


 


11/12/19 08:00        40


 


11/12/19 08:00      Endotracheal Tube  


 


11/12/19 08:00  80      


 


11/12/19 07:00  74 16 116/51 (72) 100   


 


11/12/19 06:58  69 19  100 Mechanical Ventilator  40





  84 20     40


 


11/12/19 06:00 99.6 76 21 143/48 (79) 100   


 


11/12/19 05:10  75 19     40


 


11/12/19 05:00  81 19 122/54 (76) 100   


 


11/12/19 04:00        40


 


11/12/19 04:00 100.2 93 28 125/45 (71) 100   


 


11/12/19 04:00      Endotracheal Tube  


 


11/12/19 03:49  100      


 


11/12/19 03:20  90 21     40


 


11/12/19 03:00  96 25 118/49 (72) 100   


 


11/12/19 02:00  93 22 104/48 (66) 100   


 


11/12/19 01:09  77 24  100 Mechanical Ventilator 40.0 40





  77 24     40


 


11/12/19 01:00 99.2 77 19 113/40 (64) 100   


 


11/12/19 00:00  78 17 121/48 (72) 100   


 


11/12/19 00:00      Endotracheal Tube  


 


11/12/19 00:00        40


 


11/12/19 00:00  78      


 


11/11/19 23:00  78 17 119/44 (69) 100   


 


11/11/19 22:42  82 16     40


 


11/11/19 22:00  83 23 120/54 (76) 100   


 


11/11/19 21:00  91 24 123/54 (77) 100   


 


11/11/19 21:00  83 19     40


 


11/11/19 20:00        40


 


11/11/19 20:00      Endotracheal Tube  


 


11/11/19 20:00 99.5 86 22 110/49 (69) 100   


 


11/11/19 20:00  86      


 


11/11/19 19:00  89 23 112/48 (69) 100   


 


11/11/19 18:43  85 24  100 Mechanical Ventilator 40.0 40





  85 24     40


 


11/11/19 18:00  85 24 116/48 (70) 100   


 


11/11/19 17:09  78 24     40


 


11/11/19 17:02  80  123/57    


 


11/11/19 17:00  81 26 123/57 (79) 100   


 


11/11/19 16:30  69 22 104/41 (62) 100   


 


11/11/19 16:00  87      


 


11/11/19 16:00 99.3 82 24 116/47 (70) 100   


 


11/11/19 16:00        40


 


11/11/19 16:00      Endotracheal Tube  








I&O











Intake and Output  


 


 11/11/19 11/12/19





 19:00 07:00


 


Intake Total 910 ml 875 ml


 


Output Total 1210 ml 1610 ml


 


Balance -300 ml -735 ml


 


  


 


Free Water 100 ml 


 


IV Total 110 ml 55 ml


 


Tube Feeding 600 ml 720 ml


 


Other 100 ml 100 ml


 


Output Urine Total 1210 ml 1610 ml


 


# Bowel Movements 1 2








Dressing:  other


Wound:  other


Drains:  other


Cardiovascular:  RSR


Respiratory:  decreased breath sounds


Abdomen:  soft, present bowel sounds


Extremities:  no cyanosis, other





Laboratory Tests








Test


  11/12/19


03:00 11/12/19


15:17


 


White Blood Count


  11.9 K/UL


(4.8-10.8)  H 


 


 


Red Blood Count


  2.96 M/UL


(4.20-5.40)  L 


 


 


Hemoglobin


  8.0 G/DL


(12.0-16.0)  L 


 


 


Hematocrit


  24.7 %


(37.0-47.0)  L 


 


 


Mean Corpuscular Volume 83 FL (80-99)   


 


Mean Corpuscular Hemoglobin


  27.1 PG


(27.0-31.0) 


 


 


Mean Corpuscular Hemoglobin


Concent 32.4 G/DL


(32.0-36.0) 


 


 


Red Cell Distribution Width


  14.5 %


(11.6-14.8) 


 


 


Platelet Count


  389 K/UL


(150-450) 


 


 


Mean Platelet Volume


  8.2 FL


(6.5-10.1) 


 


 


Neutrophils (%) (Auto)


  61.5 %


(45.0-75.0) 


 


 


Lymphocytes (%) (Auto)


  15.4 %


(20.0-45.0)  L 


 


 


Monocytes (%) (Auto)


  11.4 %


(1.0-10.0)  H 


 


 


Eosinophils (%) (Auto)


  9.2 %


(0.0-3.0)  H 


 


 


Basophils (%) (Auto)


  2.6 %


(0.0-2.0)  H 


 


 


Sodium Level


  142 MMOL/L


(136-145) 


 


 


Potassium Level


  4.6 MMOL/L


(3.5-5.1) 


 


 


Chloride Level


  105 MMOL/L


() 


 


 


Carbon Dioxide Level


  39 MMOL/L


(21-32)  H 


 


 


Anion Gap


  -2 mmol/L


(5-15)  L 


 


 


Blood Urea Nitrogen


  32 mg/dL


(7-18)  H 


 


 


Creatinine


  1.2 MG/DL


(0.55-1.30) 


 


 


Estimat Glomerular Filtration


Rate 45.0 mL/min


(>60) 


 


 


Glucose Level


  132 MG/DL


()  H 


 


 


Calcium Level


  8.4 MG/DL


(8.5-10.1)  L 


 


 


Phosphorus Level


  3.1 MG/DL


(2.5-4.9) 


 


 


Magnesium Level


  2.3 MG/DL


(1.8-2.4) 


 


 


Arterial Blood pH


  


  7.482


(7.350-7.450)


 


Arterial Blood Partial


Pressure CO2 


  43.4 mmHg


(35.0-45.0)


 


Arterial Blood Partial


Pressure O2 


  102.6 mmHg


(75.0-100.0)  H


 


Arterial Blood HCO3


  


  31.7 mmol/L


(22.0-26.0)  H


 


Arterial Blood Oxygen


Saturation 


  97.6 %


()


 


Arterial Blood Base Excess  7.5 (-2-2)  H


 


Graham Test  Positive  











Plan


Problems:  


(1) Pressure injury of deep tissue


Assessment & Plan:  Pt presented on admission with DTPI R heel. Blood filled 

blister with marginal erythema noted to heel. Pt exhibited agitation when 

minimally palpated. (L)2.2cm x (W)2.1cm


L heel is blanchable .


Non-blanchable erythema without induration noted to sacral cleft. 


No other areas of skin concerns noted.





Tx.Plan:


Apply Betadine to R heel. Cover with Optifoam drsg. Change every 3 days and prn.


           


Apply Cavilon Skin Barrier to L heel. Cover with Optifoam drsg. Change every 7 

days and prn.


           


Apply Moisture Barrier Paste to buttocks. Cover sacral area with Optifoam drsg. 

Change every 3 days and prn.


           


Reposition at least every 2hours or as tolerated.


           


Off-load heels with pillow.





(2) Sepsis


Assessment & Plan:  Patient with low-grade fevers, anemia, leukocytosis 

persistent, elevated platelets, abnormal labs.


Etiology currently unknown and work-up in progress.  Labs noted and imaging 

that is available as noted.  


CT noted


US noted


Impression: 13 x 7 x 12.9 cm unilocular cystic mass, corresponding to lesion 

reported


on recent CT scan. Layering low level internal echoes likely represent bladder


debris.. This presumably represents a cystic ovarian lesion with debris or


hemorrhage, possibly neoplastic. Gynecological consultation is recommended


Antibiotics as per infectious disease 


local wound care as wounds do not seem to be the etiology of her sepsis


start feeds via peg


doing well


improving


cont with tube feeds


cont with ICU care 


trach consideration as difficult to wean  


supplementation for healing 


We will monitor and follow with recommendations


Thank you for allowing me to participate in patient's care














Radhames Blas Nov 12, 2019 15:39

## 2019-11-12 NOTE — NUR
CASE MANAGEMENT: REVIEW



11/12/19



SI: SEPSIS D/T PNA. ACUTE CHF

RESPIRATORY FAILURE ~ INTUBATED 

99.3   78  21  121/45    100% ON VENT SUPPORT @ 40% FIO2

H/H-8.0/24.7     CO2+39   BUN+32    ANION GAP -2   CA+8.4







IS: IV MEROPENEM Q8HRS

IV LASIX Q12

IV PROTONIX Q12

DUONEB INH Q6HRS RTC



**: ICU STATUS



PLAN: WILL NEED TRACHEOSTOMY

## 2019-11-13 VITALS — SYSTOLIC BLOOD PRESSURE: 104 MMHG | DIASTOLIC BLOOD PRESSURE: 37 MMHG

## 2019-11-13 VITALS — DIASTOLIC BLOOD PRESSURE: 43 MMHG | SYSTOLIC BLOOD PRESSURE: 124 MMHG

## 2019-11-13 VITALS — SYSTOLIC BLOOD PRESSURE: 115 MMHG | DIASTOLIC BLOOD PRESSURE: 48 MMHG

## 2019-11-13 VITALS — SYSTOLIC BLOOD PRESSURE: 135 MMHG | DIASTOLIC BLOOD PRESSURE: 47 MMHG

## 2019-11-13 VITALS — SYSTOLIC BLOOD PRESSURE: 110 MMHG | DIASTOLIC BLOOD PRESSURE: 40 MMHG

## 2019-11-13 VITALS — DIASTOLIC BLOOD PRESSURE: 48 MMHG | SYSTOLIC BLOOD PRESSURE: 131 MMHG

## 2019-11-13 VITALS — SYSTOLIC BLOOD PRESSURE: 129 MMHG | DIASTOLIC BLOOD PRESSURE: 47 MMHG

## 2019-11-13 VITALS — SYSTOLIC BLOOD PRESSURE: 121 MMHG | DIASTOLIC BLOOD PRESSURE: 47 MMHG

## 2019-11-13 VITALS — SYSTOLIC BLOOD PRESSURE: 128 MMHG | DIASTOLIC BLOOD PRESSURE: 59 MMHG

## 2019-11-13 VITALS — DIASTOLIC BLOOD PRESSURE: 45 MMHG | SYSTOLIC BLOOD PRESSURE: 113 MMHG

## 2019-11-13 VITALS — SYSTOLIC BLOOD PRESSURE: 112 MMHG | DIASTOLIC BLOOD PRESSURE: 34 MMHG

## 2019-11-13 VITALS — DIASTOLIC BLOOD PRESSURE: 37 MMHG | SYSTOLIC BLOOD PRESSURE: 108 MMHG

## 2019-11-13 VITALS — DIASTOLIC BLOOD PRESSURE: 35 MMHG | SYSTOLIC BLOOD PRESSURE: 113 MMHG

## 2019-11-13 VITALS — DIASTOLIC BLOOD PRESSURE: 39 MMHG | SYSTOLIC BLOOD PRESSURE: 123 MMHG

## 2019-11-13 VITALS — DIASTOLIC BLOOD PRESSURE: 56 MMHG | SYSTOLIC BLOOD PRESSURE: 133 MMHG

## 2019-11-13 VITALS — SYSTOLIC BLOOD PRESSURE: 126 MMHG | DIASTOLIC BLOOD PRESSURE: 48 MMHG

## 2019-11-13 VITALS — DIASTOLIC BLOOD PRESSURE: 49 MMHG | SYSTOLIC BLOOD PRESSURE: 133 MMHG

## 2019-11-13 VITALS — DIASTOLIC BLOOD PRESSURE: 41 MMHG | SYSTOLIC BLOOD PRESSURE: 128 MMHG

## 2019-11-13 VITALS — DIASTOLIC BLOOD PRESSURE: 47 MMHG | SYSTOLIC BLOOD PRESSURE: 133 MMHG

## 2019-11-13 VITALS — DIASTOLIC BLOOD PRESSURE: 45 MMHG | SYSTOLIC BLOOD PRESSURE: 135 MMHG

## 2019-11-13 VITALS — SYSTOLIC BLOOD PRESSURE: 116 MMHG | DIASTOLIC BLOOD PRESSURE: 34 MMHG

## 2019-11-13 VITALS — SYSTOLIC BLOOD PRESSURE: 119 MMHG | DIASTOLIC BLOOD PRESSURE: 41 MMHG

## 2019-11-13 VITALS — SYSTOLIC BLOOD PRESSURE: 133 MMHG | DIASTOLIC BLOOD PRESSURE: 51 MMHG

## 2019-11-13 LAB
ADD MANUAL DIFF: NO
ANION GAP SERPL CALC-SCNC: 5 MMOL/L (ref 5–15)
BUN SERPL-MCNC: 32 MG/DL (ref 7–18)
CALCIUM SERPL-MCNC: 8.4 MG/DL (ref 8.5–10.1)
CHLORIDE SERPL-SCNC: 104 MMOL/L (ref 98–107)
CO2 SERPL-SCNC: 35 MMOL/L (ref 21–32)
CREAT SERPL-MCNC: 1.2 MG/DL (ref 0.55–1.3)
ERYTHROCYTE [DISTWIDTH] IN BLOOD BY AUTOMATED COUNT: 14.3 % (ref 11.6–14.8)
HCT VFR BLD CALC: 23.5 % (ref 37–47)
HGB BLD-MCNC: 7.6 G/DL (ref 12–16)
MCV RBC AUTO: 83 FL (ref 80–99)
PHOSPHATE SERPL-MCNC: 3.2 MG/DL (ref 2.5–4.9)
PLATELET # BLD: 385 K/UL (ref 150–450)
POTASSIUM SERPL-SCNC: 4.2 MMOL/L (ref 3.5–5.1)
RBC # BLD AUTO: 2.84 M/UL (ref 4.2–5.4)
SODIUM SERPL-SCNC: 144 MMOL/L (ref 136–145)
WBC # BLD AUTO: 10.9 K/UL (ref 4.8–10.8)

## 2019-11-13 RX ADMIN — PANTOPRAZOLE SODIUM SCH MG: 40 INJECTION, POWDER, FOR SOLUTION INTRAVENOUS at 21:09

## 2019-11-13 RX ADMIN — MEROPENEM SCH MLS/HR: 1 INJECTION INTRAVENOUS at 17:02

## 2019-11-13 RX ADMIN — INSULIN ASPART SCH UNITS: 100 INJECTION, SOLUTION INTRAVENOUS; SUBCUTANEOUS at 17:03

## 2019-11-13 RX ADMIN — IPRATROPIUM BROMIDE AND ALBUTEROL SULFATE SCH ML: .5; 3 SOLUTION RESPIRATORY (INHALATION) at 18:50

## 2019-11-13 RX ADMIN — MEROPENEM SCH MLS/HR: 1 INJECTION INTRAVENOUS at 00:34

## 2019-11-13 RX ADMIN — INSULIN ASPART SCH UNITS: 100 INJECTION, SOLUTION INTRAVENOUS; SUBCUTANEOUS at 11:52

## 2019-11-13 RX ADMIN — PANTOPRAZOLE SODIUM SCH MG: 40 INJECTION, POWDER, FOR SOLUTION INTRAVENOUS at 08:43

## 2019-11-13 RX ADMIN — IPRATROPIUM BROMIDE AND ALBUTEROL SULFATE SCH ML: .5; 3 SOLUTION RESPIRATORY (INHALATION) at 00:46

## 2019-11-13 RX ADMIN — DIVALPROEX SODIUM SCH MG: 125 CAPSULE ORAL at 08:44

## 2019-11-13 RX ADMIN — INSULIN DETEMIR SCH UNITS: 100 INJECTION, SOLUTION SUBCUTANEOUS at 21:21

## 2019-11-13 RX ADMIN — IPRATROPIUM BROMIDE AND ALBUTEROL SULFATE SCH ML: .5; 3 SOLUTION RESPIRATORY (INHALATION) at 14:30

## 2019-11-13 RX ADMIN — MEROPENEM SCH MLS/HR: 1 INJECTION INTRAVENOUS at 08:43

## 2019-11-13 RX ADMIN — ACETAMINOPHEN PRN MG: 160 SOLUTION ORAL at 23:35

## 2019-11-13 RX ADMIN — DIVALPROEX SODIUM SCH MG: 125 CAPSULE ORAL at 21:09

## 2019-11-13 RX ADMIN — CHLORHEXIDINE GLUCONATE SCH APPLIC: 213 SOLUTION TOPICAL at 20:18

## 2019-11-13 RX ADMIN — IPRATROPIUM BROMIDE AND ALBUTEROL SULFATE SCH ML: .5; 3 SOLUTION RESPIRATORY (INHALATION) at 07:10

## 2019-11-13 RX ADMIN — INSULIN ASPART SCH UNITS: 100 INJECTION, SOLUTION INTRAVENOUS; SUBCUTANEOUS at 05:31

## 2019-11-13 RX ADMIN — INSULIN ASPART SCH UNITS: 100 INJECTION, SOLUTION INTRAVENOUS; SUBCUTANEOUS at 23:36

## 2019-11-13 NOTE — NUR
NURSE NOTES:

Received the patient from ELIZABETH brown. patient resting with eyes closed, unable to follow 
commands, response to painful stimuli. Orally intubated, ETT 7.5, 23cm at lip line. AC 14, 
, FIO2 40%, PEEP 5. O2 sat 100%. No distress noted. G-tube intact, dressing intact, 
clean and dry. Tube feeding on hold for weaning this am. HOB kept elevated. Charles cath 
intact, patent, draining yellow urine by gravity. Right upper arm PICC line intact, TKO. 
SCDs on bilateral lower extremities. Patient on P200 mattress. Wound dressings intact, clean 
and dry. Patient on bilateral soft wrist restraints. No skin breakdown noted, pulses 
present. Bed in lowest position, locked, side rails upx3. Side rails are padded. Call light 
within reach. Bed alarm on. Will continue to monitor.

-------------------------------------------------------------------------------

Addendum: 11/13/19 at 0804 by SOYEON HUH RN

-------------------------------------------------------------------------------

Correction: FIO2 35%

## 2019-11-13 NOTE — NUR
NURSE NOTES:

HELD FEEDING SINCE 0400AM DUE TO WEANING PROTOCOLS, VSS, NO ACUTE DISTRESS NOTED AT THIS 
SHIFT.

## 2019-11-13 NOTE — INFECTIOUS DISEASES PROG NOTE
Assessment/Plan


Problems:  


(1) Fever


Assessment & Plan:  resolved, monitor  blood culture 





(2) Aspiration pneumonia


Assessment & Plan:  with B/L infiltrates, hypoxemia and leukocytosis, CXR  

showed interstitial infiltrates , sputum culture showed normal agustin . continue 

meropenem  empirically . aspiration precaution. EOT 11/14/19





(3) Respiratory failure


Assessment & Plan:  with maegan cardia and S/P code blue, was intubated and 

started on mechanical ventilation , monitor CXR and ABG , pulmonary is 

following 





(4) UTI (urinary tract infection)


Assessment & Plan:  due to candida lusitaneae , S/P casas catheter removal , no 

specific therapy needed for now after changing her casas catheter 





(5) Acute metabolic encephalopathy


Assessment & Plan:  due to the above, continue hydration and antibiotics, 

monitor in tele 





(6) Hyperglycemia due to type 2 diabetes mellitus


Assessment & Plan:  recommend tight glycemic control to keep blood glucose 

between 100-140 





(7) ARF (acute renal failure)


Assessment & Plan:  due to the above, continue hydration and renally dosed 

antibiotics, close  monitor of renal function , stop vancomycin 





(8) Pelvic mass in female


Assessment & Plan:  to the left of the uterus, suspect malignancy , recommend OB

/GYN eval , oncology is following 








Subjective


ROS Limited/Unobtainable:  Yes


Allergies:  


Coded Allergies:  


     No Known Allergies (Unverified , 10/14/19)


Subjective


she was still intubated on mechanical ventilation , comfortable still in ICU , S

/P code blue , minimally responsive , NO fever OR chills today , no diarrhea, 

no secretions from the ET tube





Objective


Vital Signs





Last 24 Hour Vital Signs








  Date Time  Temp Pulse Resp B/P (MAP) Pulse Ox O2 Delivery O2 Flow Rate FiO2


 


11/13/19 13:00 98.9 87 21 128/41 (70) 100   


 


11/13/19 12:00  74      


 


11/13/19 12:00      Endotracheal Tube  


 


11/13/19 12:00        35


 


11/13/19 12:00  72 19 112/34 (60) 100   


 


11/13/19 11:30  82 23     35





        35


 


11/13/19 11:00  77 20 121/47 (71) 100   


 


11/13/19 10:00  75 17 116/34 (61) 100   


 


11/13/19 09:30  77 24     35





        35


 


11/13/19 09:30     100   


 


11/13/19 09:00  82 17 129/47 (74) 100   


 


11/13/19 08:45  72  115/48    


 


11/13/19 08:00      Endotracheal Tube  


 


11/13/19 08:00 98.7 75 18 115/48 (70) 100   


 


11/13/19 08:00  73      


 


11/13/19 08:00        35


 


11/13/19 07:10  77 30  100 Mechanical Ventilator  35





  87 18     35


 


11/13/19 07:00  73 20 123/39 (67) 100   


 


11/13/19 06:00  83 24 135/47 (76) 100   


 


11/13/19 05:00 98.6 73 20 119/41 (67) 100   


 


11/13/19 04:58  74 18     35


 


11/13/19 04:00      Endotracheal Tube  


 


11/13/19 04:00  74 21 110/40 (63) 100   


 


11/13/19 04:00        35


 


11/13/19 03:20  92      


 


11/13/19 03:12  79 17     35


 


11/13/19 03:00  81 20 108/37 (60) 100   


 


11/13/19 02:00  93 28 133/51 (78) 75   


 


11/13/19 01:00  70 14 113/35 (61) 99   


 


11/13/19 00:43  73 18  100 Mechanical Ventilator  35





  73 20     35


 


11/13/19 00:00 98.6 69 19 104/37 (59) 99   


 


11/13/19 00:00        35


 


11/13/19 00:00      Endotracheal Tube  


 


11/12/19 23:38  73      


 


11/12/19 23:00  88 29 132/48 (76) 100   


 


11/12/19 22:58  88 31     35


 


11/12/19 22:00  80 23 112/40 (64) 97   


 


11/12/19 21:00  85 21 120/41 (67) 98   


 


11/12/19 20:41  88 21     35


 


11/12/19 20:04  115      


 


11/12/19 20:00        35


 


11/12/19 20:00      Endotracheal Tube  


 


11/12/19 20:00 99.1 99 26 129/56 (80) 78   


 


11/12/19 19:41  93 28  100 Mechanical Ventilator  35





  90 23     


 


11/12/19 19:05  92 27     35


 


11/12/19 19:00  89 24 126/56 (79) 100   


 


11/12/19 18:00  88 25 114/51 (72) 100   


 


11/12/19 17:12  93  105/41    


 


11/12/19 17:00  82 20 105/41 (62) 98   


 


11/12/19 16:45  82 20     35


 


11/12/19 16:00 98.9 81 19 115/45 (68) 97   


 


11/12/19 16:00      Endotracheal Tube  


 


11/12/19 16:00  91      


 


11/12/19 16:00        35


 


11/12/19 15:02  91 27     35


 


11/12/19 15:00  90 23 122/52 (75) 99   


 


11/12/19 14:00  79 20 116/49 (71) 100   








Height (Feet):  5


Height (Inches):  5.00


Weight (Pounds):  129


General Appearance:  WD/WN, no acute distress


HEENT:  normocephalic, atraumatic, anicteric, mucous membranes moist, PERRL


Respiratory/Chest:  chest wall non-tender, no respiratory distress, no 

accessory muscle use, decreased breath sounds, crackles/rales


Cardiovascular:  normal peripheral pulses, normal rate, regular rhythm, no 

gallop/murmur, no JVD


Abdomen:  normal bowel sounds, soft, non tender, no organomegaly, non distended

, no mass, no scars


Extremities:  no cyanosis, no clubbing


Skin:  no rash, no lesions, ulcers


Neurologic/Psychiatric:  alert, unresponsiveness





Laboratory Tests








Test


  11/12/19


15:17 11/13/19


03:55


 


Arterial Blood pH


  7.482


(7.350-7.450) 


 


 


Arterial Blood Partial


Pressure CO2 43.4 mmHg


(35.0-45.0) 


 


 


Arterial Blood Partial


Pressure O2 102.6 mmHg


(75.0-100.0)  H 


 


 


Arterial Blood HCO3


  31.7 mmol/L


(22.0-26.0)  H 


 


 


Arterial Blood Oxygen


Saturation 97.6 %


() 


 


 


Arterial Blood Base Excess 7.5 (-2-2)  H 


 


Graham Test Positive   


 


White Blood Count


  


  10.9 K/UL


(4.8-10.8)  H


 


Red Blood Count


  


  2.84 M/UL


(4.20-5.40)  L


 


Hemoglobin


  


  7.6 G/DL


(12.0-16.0)  L


 


Hematocrit


  


  23.5 %


(37.0-47.0)  L


 


Mean Corpuscular Volume  83 FL (80-99)  


 


Mean Corpuscular Hemoglobin


  


  26.7 PG


(27.0-31.0)  L


 


Mean Corpuscular Hemoglobin


Concent 


  32.4 G/DL


(32.0-36.0)


 


Red Cell Distribution Width


  


  14.3 %


(11.6-14.8)


 


Platelet Count


  


  385 K/UL


(150-450)


 


Mean Platelet Volume


  


  8.5 FL


(6.5-10.1)


 


Neutrophils (%) (Auto)


  


  % (45.0-75.0)


 


 


Lymphocytes (%) (Auto)


  


  % (20.0-45.0)


 


 


Monocytes (%) (Auto)   % (1.0-10.0)  


 


Eosinophils (%) (Auto)   % (0.0-3.0)  


 


Basophils (%) (Auto)   % (0.0-2.0)  


 


Sodium Level


  


  144 MMOL/L


(136-145)


 


Potassium Level


  


  4.2 MMOL/L


(3.5-5.1)


 


Chloride Level


  


  104 MMOL/L


()


 


Carbon Dioxide Level


  


  35 MMOL/L


(21-32)  H


 


Anion Gap


  


  5 mmol/L


(5-15)


 


Blood Urea Nitrogen


  


  32 mg/dL


(7-18)  H


 


Creatinine


  


  1.2 MG/DL


(0.55-1.30)


 


Estimat Glomerular Filtration


Rate 


  45.0 mL/min


(>60)


 


Glucose Level


  


  174 MG/DL


()  H


 


Calcium Level


  


  8.4 MG/DL


(8.5-10.1)  L


 


Phosphorus Level


  


  3.2 MG/DL


(2.5-4.9)


 


Magnesium Level


  


  2.2 MG/DL


(1.8-2.4)











Current Medications








 Medications


  (Trade)  Dose


 Ordered  Sig/Winnie


 Route


 PRN Reason  Start Time


 Stop Time Status Last Admin


Dose Admin


 


 Acetaminophen


  (Tylenol)  650 mg  Q4H  PRN


 NG


 Mild Pain/Temp > 100.5  11/3/19 20:30


 11/25/19 20:29  11/10/19 03:01


 


 


 Acetaminophen


  (Tylenol)  650 mg  Q4H  PRN


 RECTAL


 TEMP>100.5  11/3/19 20:30


 12/2/19 20:29   


 


 


 Acetylcysteine


  (Mucomyst)  100 mg  TIDRT


 HHN


   11/10/19 19:30


 12/10/19 19:29  11/13/19 07:10


 


 


 Albuterol/


 Ipratropium


  (Albuterol/


 Ipratropium)  3 ml  Q6HRT


 HHN


   11/12/19 13:00


 11/17/19 12:59  11/13/19 07:10


 


 


 Amlodipine


 Besylate


  (Norvasc)  2.5 mg  BID


 NG


   11/4/19 09:00


 11/29/19 17:59  11/11/19 17:02


 


 


 Chlorhexidine


 Gluconate


  (Mae-Hex 2%)  1 applic  DAILY@2000


 TOPIC


   11/5/19 20:00


 12/5/19 19:59  11/12/19 19:49


 


 


 Dextrose


  (Dextrose 50%)  25 ml  Q30M  PRN


 IV


 Hypoglycemia  11/5/19 06:30


 12/5/19 06:29   


 


 


 Dextrose


  (Dextrose 50%)  50 ml  Q30M  PRN


 IV


 Hypoglycemia  11/5/19 06:30


 12/5/19 06:29   


 


 


 Divalproex Sodium


  (Depakote


 Sprinkles)  500 mg  Q12HR


 NG


   11/3/19 21:00


 11/14/19 21:59  11/13/19 08:44


 


 


 Folic Acid


  (Folate)  3 mg  DAILY


 GT


   11/7/19 09:00


 11/30/19 08:59  11/13/19 08:44


 


 


 Furosemide


  (Lasix)  40 mg  EVERY 12  HOURS


 IV


   11/7/19 21:00


 12/7/19 20:59  11/13/19 08:43


 


 


 Insulin Aspart


  (NovoLOG)    EVERY 6  HOURS


 SUBQ


   11/4/19 00:00


 11/29/19 11:59  11/13/19 11:52


 


 


 Insulin Detemir


  (Levemir)  8 units  QHS


 SUBQ


   11/6/19 21:00


 12/1/19 08:59  11/12/19 20:39


 


 


 Lactulose


  (Cephulac)  20 gm  Q8H  PRN


 NG


 Constipation  11/3/19 20:30


 12/3/19 20:29  11/5/19 08:12


 


 


 Lorazepam


  (Ativan 2mg/ml


 1ml)  2 mg  Q4H  PRN


 IV


 for agitation  11/10/19 19:30


 11/17/19 19:29  11/12/19 20:37


 


 


 Meropenem 1 gm/


 Sodium Chloride  55 ml @ 


 110 mls/hr  Q8H


 IVPB


   11/4/19 17:00


 11/14/19 16:59  11/13/19 08:43


 


 


 Metoclopramide HCl


  (Reglan)  10 mg  Q6H  PRN


 IVP


 Nausea & Vomiting  11/6/19 10:45


 12/6/19 10:44   


 


 


 Metolazone


  (Zaroxolyn)  5 mg  Q24H


 NG


   11/12/19 08:00


 12/12/19 07:59  11/13/19 07:23


 


 


 Pantoprazole


  (Protonix)  40 mg  EVERY 12  HOURS


 IVP


   11/3/19 21:00


 12/3/19 08:59  11/13/19 08:43


 


 


 Potassium Chloride


  (K-Dur)  40 meq  DAILY


 GT


   11/6/19 09:00


 12/5/19 17:59  11/13/19 08:44


 

















Amie Burgos M.D. Nov 13, 2019 13:45

## 2019-11-13 NOTE — NUR
NURSE NOTES:

Received a call from daughter, Aly Perez. Obtained telephone consent from daughter, 
filed in pt's chart.

## 2019-11-13 NOTE — NUR
NURSE NOTES:

Left a voice message to pt's daughter, Aly Perez, regarding consent for tracheostomy, 
awaiting for call back.

## 2019-11-13 NOTE — SURGERY PROGRESS NOTE
Surgery Progress Note


Subjective


Additional Comments


spoke with daughter


plan for trach tomorrow





Objective





Last 24 Hour Vital Signs








  Date Time  Temp Pulse Resp B/P (MAP) Pulse Ox O2 Delivery O2 Flow Rate FiO2


 


11/13/19 16:00        35


 


11/13/19 16:00      Endotracheal Tube  


 


11/13/19 15:00  89 21 126/48 (74) 100   


 


11/13/19 14:30  85 25  100 Mechanical Ventilator  35





  84 15     35





        


 


11/13/19 14:00  83 19 113/45 (67) 100   


 


11/13/19 13:00 98.9 87 21 128/41 (70) 100   


 


11/13/19 12:41  93 24     35


 


11/13/19 12:00  74      


 


11/13/19 12:00      Endotracheal Tube  


 


11/13/19 12:00        35


 


11/13/19 12:00  72 19 112/34 (60) 100   


 


11/13/19 11:30  82 23     35





        


 


11/13/19 11:00  77 20 121/47 (71) 100   


 


11/13/19 10:00  75 17 116/34 (61) 100   


 


11/13/19 09:30  77 24     35





        35


 


11/13/19 09:30     100   


 


11/13/19 09:00  82 17 129/47 (74) 100   


 


11/13/19 08:45  72  115/48    


 


11/13/19 08:00      Endotracheal Tube  


 


11/13/19 08:00 98.7 75 18 115/48 (70) 100   


 


11/13/19 08:00  73      


 


11/13/19 08:00        35


 


11/13/19 07:10  77 30  100 Mechanical Ventilator  35





  87 18     35


 


11/13/19 07:00  73 20 123/39 (67) 100   


 


11/13/19 06:00  83 24 135/47 (76) 100   


 


11/13/19 05:00 98.6 73 20 119/41 (67) 100   


 


11/13/19 04:58  74 18     35


 


11/13/19 04:00      Endotracheal Tube  


 


11/13/19 04:00  74 21 110/40 (63) 100   


 


11/13/19 04:00        35


 


11/13/19 03:20  92      


 


11/13/19 03:12  79 17     35


 


11/13/19 03:00  81 20 108/37 (60) 100   


 


11/13/19 02:00  93 28 133/51 (78) 75   


 


11/13/19 01:00  70 14 113/35 (61) 99   


 


11/13/19 00:43  73 18  100 Mechanical Ventilator  35





  73 20     35


 


11/13/19 00:00 98.6 69 19 104/37 (59) 99   


 


11/13/19 00:00        35


 


11/13/19 00:00      Endotracheal Tube  


 


11/12/19 23:38  73      


 


11/12/19 23:00  88 29 132/48 (76) 100   


 


11/12/19 22:58  88 31     35


 


11/12/19 22:00  80 23 112/40 (64) 97   


 


11/12/19 21:00  85 21 120/41 (67) 98   


 


11/12/19 20:41  88 21     35


 


11/12/19 20:04  115      


 


11/12/19 20:00        35


 


11/12/19 20:00      Endotracheal Tube  


 


11/12/19 20:00 99.1 99 26 129/56 (80) 78   


 


11/12/19 19:41  93 28  100 Mechanical Ventilator  35





  90 23     


 


11/12/19 19:05  92 27     35


 


11/12/19 19:00  89 24 126/56 (79) 100   


 


11/12/19 18:00  88 25 114/51 (72) 100   


 


11/12/19 17:12  93  105/41    


 


11/12/19 17:00  82 20 105/41 (62) 98   


 


11/12/19 16:45  82 20     35








I&O











Intake and Output  


 


 11/12/19 11/13/19





 19:00 07:00


 


Intake Total 990 ml 695 ml


 


Output Total 2225 ml 1250 ml


 


Balance -1235 ml -555 ml


 


  


 


Free Water 160 ml 


 


IV Total 110 ml 55 ml


 


Tube Feeding 720 ml 540 ml


 


Other  100 ml


 


Output Urine Total 2225 ml 1250 ml


 


# Bowel Movements  2








Dressing:  other


Wound:  other


Drains:  other


Cardiovascular:  RSR


Respiratory:  decreased breath sounds


Abdomen:  soft, present bowel sounds


Extremities:  no cyanosis





Laboratory Tests








Test


  11/13/19


03:55


 


White Blood Count


  10.9 K/UL


(4.8-10.8)  H


 


Red Blood Count


  2.84 M/UL


(4.20-5.40)  L


 


Hemoglobin


  7.6 G/DL


(12.0-16.0)  L


 


Hematocrit


  23.5 %


(37.0-47.0)  L


 


Mean Corpuscular Volume 83 FL (80-99)  


 


Mean Corpuscular Hemoglobin


  26.7 PG


(27.0-31.0)  L


 


Mean Corpuscular Hemoglobin


Concent 32.4 G/DL


(32.0-36.0)


 


Red Cell Distribution Width


  14.3 %


(11.6-14.8)


 


Platelet Count


  385 K/UL


(150-450)


 


Mean Platelet Volume


  8.5 FL


(6.5-10.1)


 


Neutrophils (%) (Auto)


  % (45.0-75.0)


 


 


Lymphocytes (%) (Auto)


  % (20.0-45.0)


 


 


Monocytes (%) (Auto)  % (1.0-10.0)  


 


Eosinophils (%) (Auto)  % (0.0-3.0)  


 


Basophils (%) (Auto)  % (0.0-2.0)  


 


Sodium Level


  144 MMOL/L


(136-145)


 


Potassium Level


  4.2 MMOL/L


(3.5-5.1)


 


Chloride Level


  104 MMOL/L


()


 


Carbon Dioxide Level


  35 MMOL/L


(21-32)  H


 


Anion Gap


  5 mmol/L


(5-15)


 


Blood Urea Nitrogen


  32 mg/dL


(7-18)  H


 


Creatinine


  1.2 MG/DL


(0.55-1.30)


 


Estimat Glomerular Filtration


Rate 45.0 mL/min


(>60)


 


Glucose Level


  174 MG/DL


()  H


 


Calcium Level


  8.4 MG/DL


(8.5-10.1)  L


 


Phosphorus Level


  3.2 MG/DL


(2.5-4.9)


 


Magnesium Level


  2.2 MG/DL


(1.8-2.4)











Plan


Problems:  


(1) Pressure injury of deep tissue


Assessment & Plan:  Pt presented on admission with DTPI R heel. Blood filled 

blister with marginal erythema noted to heel. Pt exhibited agitation when 

minimally palpated. (L)2.2cm x (W)2.1cm


L heel is blanchable .


Non-blanchable erythema without induration noted to sacral cleft. 


No other areas of skin concerns noted.





Tx.Plan:


Apply Betadine to R heel. Cover with Optifoam drsg. Change every 3 days and prn.


           


Apply Cavilon Skin Barrier to L heel. Cover with Optifoam drsg. Change every 7 

days and prn.


           


Apply Moisture Barrier Paste to buttocks. Cover sacral area with Optifoam drsg. 

Change every 3 days and prn.


           


Reposition at least every 2hours or as tolerated.


           


Off-load heels with pillow.





(2) Sepsis


Assessment & Plan:  Patient with low-grade fevers, anemia, leukocytosis 

persistent, elevated platelets, abnormal labs.


Etiology currently unknown and work-up in progress.  Labs noted and imaging 

that is available as noted.  


CT noted


US noted


Impression: 13 x 7 x 12.9 cm unilocular cystic mass, corresponding to lesion 

reported


on recent CT scan. Layering low level internal echoes likely represent bladder


debris.. This presumably represents a cystic ovarian lesion with debris or


hemorrhage, possibly neoplastic. Gynecological consultation is recommended


Antibiotics as per infectious disease 


local wound care as wounds do not seem to be the etiology of her sepsis


start feeds via peg


doing well


improving


cont with tube feeds


cont with ICU care 


Trach tomorrow


consent 


supplementation for healing 


We will monitor and follow with recommendations


Thank you for allowing me to participate in patient's care














Radhames Blas Nov 13, 2019 16:05

## 2019-11-13 NOTE — GENERAL PROGRESS NOTE
Assessment/Plan


Problem List:  


(1) Abnormal TSH


ICD Codes:  R79.89 - Other specified abnormal findings of blood chemistry


SNOMED:  692103765


(2) Hyperglycemia due to type 2 diabetes mellitus


ICD Codes:  E11.65 - Type 2 diabetes mellitus with hyperglycemia


SNOMED:  597378731658968, 14816424


Qualifiers:  


   Qualified Codes:  E11.65 - Type 2 diabetes mellitus with hyperglycemia; 

Z79.4 - Long term (current) use of insulin


(3) Acute metabolic encephalopathy


ICD Codes:  G93.41 - Metabolic encephalopathy


SNOMED:  90441201, 231767674


(4) ARF (acute renal failure)


ICD Codes:  N17.9 - Acute kidney failure, unspecified


SNOMED:  00914140


Qualifiers:  


   Qualified Codes:  N17.9 - Acute kidney failure, unspecified


(5) Healthcare associated bacterial pneumonia


ICD Codes:  J15.9 - Unspecified bacterial pneumonia


SNOMED:  507389267


Status:  unchanged


Assessment/Plan:


continue Levemir 10 units qhs 


continue NISS every 6 hours 


hypoglycemia protocol in order





Subjective


ROS Limited/Unobtainable:  Yes


Allergies:  


Coded Allergies:  


     No Known Allergies (Unverified , 10/14/19)


Subjective


events noted 


intubated in ICU











Item Value  Date Time


 


Bedside Blood Glucose 139 mg/dl H 11/13/19 1200


 


Bedside Blood Glucose 200 mg/dl H 11/13/19 0531


 


Bedside Blood Glucose 225 mg/dl H 11/12/19 2335


 


Bedside Blood Glucose 232 mg/dl H 11/12/19 2039


 


Bedside Blood Glucose 196 mg/dl H 11/12/19 1800


 


Bedside Blood Glucose 166 mg/dl H 11/12/19 1200











Objective





Last 24 Hour Vital Signs








  Date Time  Temp Pulse Resp B/P (MAP) Pulse Ox O2 Delivery O2 Flow Rate FiO2


 


11/13/19 13:00 98.9 87 21 128/41 (70) 100   


 


11/13/19 12:00  74      


 


11/13/19 12:00      Endotracheal Tube  


 


11/13/19 12:00        35


 


11/13/19 12:00  72 19 112/34 (60) 100   


 


11/13/19 11:30  82 23     35





        35


 


11/13/19 11:00  77 20 121/47 (71) 100   


 


11/13/19 10:00  75 17 116/34 (61) 100   


 


11/13/19 09:30  77 24     35





        35


 


11/13/19 09:30     100   


 


11/13/19 09:00  82 17 129/47 (74) 100   


 


11/13/19 08:45  72  115/48    


 


11/13/19 08:00      Endotracheal Tube  


 


11/13/19 08:00 98.7 75 18 115/48 (70) 100   


 


11/13/19 08:00  73      


 


11/13/19 08:00        35


 


11/13/19 07:10  77 30  100 Mechanical Ventilator  35





  87 18     35


 


11/13/19 07:00  73 20 123/39 (67) 100   


 


11/13/19 06:00  83 24 135/47 (76) 100   


 


11/13/19 05:00 98.6 73 20 119/41 (67) 100   


 


11/13/19 04:58  74 18     35


 


11/13/19 04:00      Endotracheal Tube  


 


11/13/19 04:00  74 21 110/40 (63) 100   


 


11/13/19 04:00        35


 


11/13/19 03:20  92      


 


11/13/19 03:12  79 17     35


 


11/13/19 03:00  81 20 108/37 (60) 100   


 


11/13/19 02:00  93 28 133/51 (78) 75   


 


11/13/19 01:00  70 14 113/35 (61) 99   


 


11/13/19 00:43  73 18  100 Mechanical Ventilator  35





  73 20     35


 


11/13/19 00:00 98.6 69 19 104/37 (59) 99   


 


11/13/19 00:00        35


 


11/13/19 00:00      Endotracheal Tube  


 


11/12/19 23:38  73      


 


11/12/19 23:00  88 29 132/48 (76) 100   


 


11/12/19 22:58  88 31     35


 


11/12/19 22:00  80 23 112/40 (64) 97   


 


11/12/19 21:00  85 21 120/41 (67) 98   


 


11/12/19 20:41  88 21     35


 


11/12/19 20:04  115      


 


11/12/19 20:00        35


 


11/12/19 20:00      Endotracheal Tube  


 


11/12/19 20:00 99.1 99 26 129/56 (80) 78   


 


11/12/19 19:41  93 28  100 Mechanical Ventilator  35





  90 23     


 


11/12/19 19:05  92 27     35


 


11/12/19 19:00  89 24 126/56 (79) 100   


 


11/12/19 18:00  88 25 114/51 (72) 100   


 


11/12/19 17:12  93  105/41    


 


11/12/19 17:00  82 20 105/41 (62) 98   


 


11/12/19 16:45  82 20     35


 


11/12/19 16:00 98.9 81 19 115/45 (68) 97   


 


11/12/19 16:00      Endotracheal Tube  


 


11/12/19 16:00  91      


 


11/12/19 16:00        35


 


11/12/19 15:02  91 27     35


 


11/12/19 15:00  90 23 122/52 (75) 99   


 


11/12/19 14:00  79 20 116/49 (71) 100   

















Intake and Output  


 


 11/12/19 11/13/19





 19:00 07:00


 


Intake Total 990 ml 695 ml


 


Output Total 2225 ml 1250 ml


 


Balance -1235 ml -555 ml


 


  


 


Free Water 160 ml 


 


IV Total 110 ml 55 ml


 


Tube Feeding 720 ml 540 ml


 


Other  100 ml


 


Output Urine Total 2225 ml 1250 ml


 


# Bowel Movements  2








Laboratory Tests


11/12/19 15:17: 


Arterial Blood pH 7.482H, Arterial Blood Partial Pressure CO2 43.4, Arterial 

Blood Partial Pressure O2 102.6H, Arterial Blood HCO3 31.7H, Arterial Blood 

Oxygen Saturation 97.6, Arterial Blood Base Excess 7.5H, Graham Test Positive


11/13/19 03:55: 


White Blood Count 10.9H, Red Blood Count 2.84L, Hemoglobin 7.6L, Hematocrit 

23.5L, Mean Corpuscular Volume 83, Mean Corpuscular Hemoglobin 26.7L, Mean 

Corpuscular Hemoglobin Concent 32.4, Red Cell Distribution Width 14.3, Platelet 

Count 385, Mean Platelet Volume 8.5, Neutrophils (%) (Auto) , Lymphocytes (%) (

Auto) , Monocytes (%) (Auto) , Eosinophils (%) (Auto) , Basophils (%) (Auto) , 

Sodium Level 144, Potassium Level 4.2, Chloride Level 104, Carbon Dioxide Level 

35H, Anion Gap 5, Blood Urea Nitrogen 32H, Creatinine 1.2, Estimat Glomerular 

Filtration Rate 45.0, Glucose Level 174H, Calcium Level 8.4L, Phosphorus Level 

3.2, Magnesium Level 2.2


Height (Feet):  5


Height (Inches):  5.00


Weight (Pounds):  129


General Appearance:  other -  intubated


Neck:  normal alignment


Respiratory/Chest:  decreased breath sounds


Abdomen:  normal bowel sounds


Edema:  1+ Arm (L), 1+ Arm (R), 1+ Leg (L), 1+ Leg (R), 1+ Pedal (L), 1+ Pedal (

R), 1+ Generalized


Objective





Current Medications








 Medications


  (Trade)  Dose


 Ordered  Sig/Winnie


 Route


 PRN Reason  Start Time


 Stop Time Status Last Admin


Dose Admin


 


 Acetaminophen


  (Tylenol)  650 mg  Q4H  PRN


 NG


 Mild Pain/Temp > 100.5  11/3/19 20:30


 11/25/19 20:29  11/10/19 03:01


 


 


 Acetaminophen


  (Tylenol)  650 mg  Q4H  PRN


 RECTAL


 TEMP>100.5  11/3/19 20:30


 12/2/19 20:29   


 


 


 Acetylcysteine


  (Mucomyst)  100 mg  TIDRT


 [facility]


   11/10/19 19:30


 12/10/19 19:29  11/13/19 07:10


 


 


 Albuterol/


 Ipratropium


  (Albuterol/


 Ipratropium)  3 ml  Q6HRT


 [facility]


   11/12/19 13:00


 11/17/19 12:59  11/13/19 07:10


 


 


 Amlodipine


 Besylate


  (Norvasc)  2.5 mg  BID


 NG


   11/4/19 09:00


 11/29/19 17:59  11/11/19 17:02


 


 


 Chlorhexidine


 Gluconate


  (Mae-Hex 2%)  1 applic  DAILY@2000


 TOPIC


   11/5/19 20:00


 12/5/19 19:59  11/12/19 19:49


 


 


 Dextrose


  (Dextrose 50%)  25 ml  Q30M  PRN


 IV


 Hypoglycemia  11/5/19 06:30


 12/5/19 06:29   


 


 


 Dextrose


  (Dextrose 50%)  50 ml  Q30M  PRN


 IV


 Hypoglycemia  11/5/19 06:30


 12/5/19 06:29   


 


 


 Divalproex Sodium


  (Depakote


 Sprinkles)  500 mg  Q12HR


 NG


   11/3/19 21:00


 11/14/19 21:59  11/13/19 08:44


 


 


 Folic Acid


  (Folate)  3 mg  DAILY


 GT


   11/7/19 09:00


 11/30/19 08:59  11/13/19 08:44


 


 


 Furosemide


  (Lasix)  40 mg  EVERY 12  HOURS


 IV


   11/7/19 21:00


 12/7/19 20:59  11/13/19 08:43


 


 


 Insulin Aspart


  (NovoLOG)    EVERY 6  HOURS


 SUBQ


   11/4/19 00:00


 11/29/19 11:59  11/13/19 11:52


 


 


 Insulin Detemir


  (Levemir)  8 units  QHS


 SUBQ


   11/6/19 21:00


 12/1/19 08:59  11/12/19 20:39


 


 


 Lactulose


  (Cephulac)  20 gm  Q8H  PRN


 NG


 Constipation  11/3/19 20:30


 12/3/19 20:29  11/5/19 08:12


 


 


 Lorazepam


  (Ativan 2mg/ml


 1ml)  2 mg  Q4H  PRN


 IV


 for agitation  11/10/19 19:30


 11/17/19 19:29  11/12/19 20:37


 


 


 Meropenem 1 gm/


 Sodium Chloride  55 ml @ 


 110 mls/hr  Q8H


 IVPB


   11/4/19 17:00


 11/14/19 16:59  11/13/19 08:43


 


 


 Metoclopramide HCl


  (Reglan)  10 mg  Q6H  PRN


 IVP


 Nausea & Vomiting  11/6/19 10:45


 12/6/19 10:44   


 


 


 Metolazone


  (Zaroxolyn)  5 mg  Q24H


 NG


   11/12/19 08:00


 12/12/19 07:59  11/13/19 07:23


 


 


 Pantoprazole


  (Protonix)  40 mg  EVERY 12  HOURS


 IVP


   11/3/19 21:00


 12/3/19 08:59  11/13/19 08:43


 


 


 Potassium Chloride


  (K-Dur)  40 meq  DAILY


 GT


   11/6/19 09:00


 12/5/19 17:59  11/13/19 08:44


 

















Riccardo Velez MD Nov 13, 2019 13:48

## 2019-11-13 NOTE — PULMONOLGY CRITICAL CARE NOTE
Critical Care - Asmt/Plan


Problems:  


(1) Respiratory arrest


(2) Pulmonary edema


(3) Bilateral pulmonary infiltrates on CXR


(4) Cardiopulmonary arrest with successful resuscitation


(5) Encephalopathy


(6) Diabetes mellitus type II, controlled


(7) Respiratory failure


(8) CHF (congestive heart failure)


(9) Pressure injury of deep tissue


(10) Renal failure (ARF), acute on chronic


(11) Diabetic nephropathy


(12) Healthcare associated bacterial pneumonia


(13) Abnormal TSH


(14) Aspiration pneumonia


(15) ARF (acute renal failure)


Assessment/Plan:


Marked leukocytosis S/P WBC scan with uptake in pelvis, ? correspondence to 

pelvic mass


Sepsis


HAP


E coli UTI


Acute hypoxemic respiratory failure/VDRF


Acute encephalopathy


Hx CHF


HTN


CHANCE - RESOVLED


Pelvic mass/possible GYN malignancy vs cyst


Dysphagia S/P PEG


CGE S/P neg EGD





Plan:


-Continue ventilatory support


-Plan for TRACH    


-HHN's


-Optimize pulmonary hygiene/mobilize as tolerated


-Titrate down FiO2 and PEEP to keep SaO2 > 90%


-Monitor WCt, JAK2 +, ? BMBx - will d/w Dr. Pop


-Abx (MEROPENEM) per ID


-monitor volumes and renal function, diuresis as able (lasix 40 IV BID + 

zaroxyln)


-Consider GYN evaluation 


-monitor encephalopathy, ? neuro eval   


-NPO, GTF's


-DVT Px: LMWH (held), continue SCD's


-FC - need to continue to discuss GOC, consider ETHICS eval





CCT 35





Critical Care - Objective





Last 24 Hour Vital Signs








  Date Time  Temp Pulse Resp B/P (MAP) Pulse Ox O2 Delivery O2 Flow Rate FiO2


 


11/13/19 11:30  82 23     35





        35


 


11/13/19 11:00  77 20 121/47 (71) 100   


 


11/13/19 10:00  75 17 116/34 (61) 100   


 


11/13/19 09:30  77 24     35





        35


 


11/13/19 09:30     100   


 


11/13/19 09:00  82 17 129/47 (74) 100   


 


11/13/19 08:45  72  115/48    


 


11/13/19 08:00      Endotracheal Tube  


 


11/13/19 08:00 98.7 75 18 115/48 (70) 100   


 


11/13/19 08:00  73      


 


11/13/19 08:00        35


 


11/13/19 07:10  77 30  100 Mechanical Ventilator  35





  87 18     35


 


11/13/19 07:00  73 20 123/39 (67) 100   


 


11/13/19 06:00  83 24 135/47 (76) 100   


 


11/13/19 05:00 98.6 73 20 119/41 (67) 100   


 


11/13/19 04:58  74 18     35


 


11/13/19 04:00      Endotracheal Tube  


 


11/13/19 04:00  74 21 110/40 (63) 100   


 


11/13/19 04:00        35


 


11/13/19 03:20  92      


 


11/13/19 03:12  79 17     35


 


11/13/19 03:00  81 20 108/37 (60) 100   


 


11/13/19 02:00  93 28 133/51 (78) 75   


 


11/13/19 01:00  70 14 113/35 (61) 99   


 


11/13/19 00:43  73 18  100 Mechanical Ventilator  35





  73 20     35


 


11/13/19 00:00 98.6 69 19 104/37 (59) 99   


 


11/13/19 00:00        35


 


11/13/19 00:00      Endotracheal Tube  


 


11/12/19 23:38  73      


 


11/12/19 23:00  88 29 132/48 (76) 100   


 


11/12/19 22:58  88 31     35


 


11/12/19 22:00  80 23 112/40 (64) 97   


 


11/12/19 21:00  85 21 120/41 (67) 98   


 


11/12/19 20:41  88 21     35


 


11/12/19 20:04  115      


 


11/12/19 20:00        35


 


11/12/19 20:00      Endotracheal Tube  


 


11/12/19 20:00 99.1 99 26 129/56 (80) 78   


 


11/12/19 19:41  93 28  100 Mechanical Ventilator  35





  90 23     


 


11/12/19 19:05  92 27     35


 


11/12/19 19:00  89 24 126/56 (79) 100   


 


11/12/19 18:00  88 25 114/51 (72) 100   


 


11/12/19 17:12  93  105/41    


 


11/12/19 17:00  82 20 105/41 (62) 98   


 


11/12/19 16:45  82 20     35


 


11/12/19 16:00 98.9 81 19 115/45 (68) 97   


 


11/12/19 16:00      Endotracheal Tube  


 


11/12/19 16:00  91      


 


11/12/19 16:00        35


 


11/12/19 15:02  91 27     35


 


11/12/19 15:00  90 23 122/52 (75) 99   


 


11/12/19 14:00  79 20 116/49 (71) 100   


 


11/12/19 13:10  85 26  100 Mechanical Ventilator  35





  76 14     35


 


11/12/19 13:00  90 29 132/71 (91) 100   








Status:  sedated, other - intubated


Condition:  critical


HEENT:  atraumatic, normocephalic


Lungs:  rhonchi


Heart:  HR/BP unstable


Abdomen:  soft, non-tender, active bowel sounds, feeding tube


Extremities:  no C/C/E


Accucheck:  139


Blood Sugars:  BS controlled





Critical Care - Subjective


ROS Limited/Unobtainable:  Yes


ICU Day:  10


Intubation Day:  10


Interval Events:


Failed SBT


Duplex neg


Condition:  critical


IV Access:  PICC


EKG Rhythm:  Sinus Rhythm


FI02:  35


Vent Support Breath Rate:  14


Vent Support Mode:  AC


Vent Tidal Volume:  400


Sputum Amount:  Small


PEEP:  5.0


PIP:  25


Fluids:  SLIV


Drips:  N/A


Tube Feeding Amount:  60


I&O:











Intake and Output  


 


 11/12/19 11/13/19





 19:00 07:00


 


Intake Total 990 ml 695 ml


 


Output Total 2225 ml 1250 ml


 


Balance -1235 ml -555 ml


 


  


 


Free Water 160 ml 


 


IV Total 110 ml 55 ml


 


Tube Feeding 720 ml 540 ml


 


Other  100 ml


 


Output Urine Total 2225 ml 1250 ml


 


# Bowel Movements  2








Subjective:


CINTHIA


CXR:


PVC


ET-Tube:  7.5


ET Position:  23


Labs:





Laboratory Tests








Test


  11/12/19


15:17 11/13/19


03:55


 


Arterial Blood pH


  7.482


(7.350-7.450) 


 


 


Arterial Blood Partial


Pressure CO2 43.4 mmHg


(35.0-45.0) 


 


 


Arterial Blood Partial


Pressure O2 102.6 mmHg


(75.0-100.0)  H 


 


 


Arterial Blood HCO3


  31.7 mmol/L


(22.0-26.0)  H 


 


 


Arterial Blood Oxygen


Saturation 97.6 %


() 


 


 


Arterial Blood Base Excess 7.5 (-2-2)  H 


 


Graham Test Positive   


 


White Blood Count


  


  10.9 K/UL


(4.8-10.8)  H


 


Red Blood Count


  


  2.84 M/UL


(4.20-5.40)  L


 


Hemoglobin


  


  7.6 G/DL


(12.0-16.0)  L


 


Hematocrit


  


  23.5 %


(37.0-47.0)  L


 


Mean Corpuscular Volume  83 FL (80-99)  


 


Mean Corpuscular Hemoglobin


  


  26.7 PG


(27.0-31.0)  L


 


Mean Corpuscular Hemoglobin


Concent 


  32.4 G/DL


(32.0-36.0)


 


Red Cell Distribution Width


  


  14.3 %


(11.6-14.8)


 


Platelet Count


  


  385 K/UL


(150-450)


 


Mean Platelet Volume


  


  8.5 FL


(6.5-10.1)


 


Neutrophils (%) (Auto)


  


  % (45.0-75.0)


 


 


Lymphocytes (%) (Auto)


  


  % (20.0-45.0)


 


 


Monocytes (%) (Auto)   % (1.0-10.0)  


 


Eosinophils (%) (Auto)   % (0.0-3.0)  


 


Basophils (%) (Auto)   % (0.0-2.0)  


 


Sodium Level


  


  144 MMOL/L


(136-145)


 


Potassium Level


  


  4.2 MMOL/L


(3.5-5.1)


 


Chloride Level


  


  104 MMOL/L


()


 


Carbon Dioxide Level


  


  35 MMOL/L


(21-32)  H


 


Anion Gap


  


  5 mmol/L


(5-15)


 


Blood Urea Nitrogen


  


  32 mg/dL


(7-18)  H


 


Creatinine


  


  1.2 MG/DL


(0.55-1.30)


 


Estimat Glomerular Filtration


Rate 


  45.0 mL/min


(>60)


 


Glucose Level


  


  174 MG/DL


()  H


 


Calcium Level


  


  8.4 MG/DL


(8.5-10.1)  L


 


Phosphorus Level


  


  3.2 MG/DL


(2.5-4.9)


 


Magnesium Level


  


  2.2 MG/DL


(1.8-2.4)

















Sammy Torres MD Nov 13, 2019 12:32

## 2019-11-13 NOTE — NEPHROLOGY PROGRESS NOTE
Assessment/Plan


Problem List:  


(1) Renal failure (ARF), acute on chronic


Assessment:  resolved





(2) Dehydration


(3) ARF (acute renal failure)


(4) Sepsis


(5) Acute metabolic encephalopathy


(6) Hyperglycemia due to type 2 diabetes mellitus


(7) UTI (urinary tract infection)


(8) Diabetic nephropathy


Assessment





Renal failure, Acute on Chronic resolved


Dehydration


Severe Anemia


Sepsis / Pneumonia / UTI


DM, OOC


Proteinuria , Diabetic Nephropathy


Acute encephalopathy


Plan





fails weaning- due trach


on K and Mag IV  as needed


Pulmonary and Vent support - ? Trach


IV Reglan


GT feeding


Stop IV fluid-


Lasix


has PEG


Per order








previously 


Cr lowering 


Will order urine studies and monitor renal parameters


kidney YOANDY noted


lower iv fluids rate


WBCs rising


has casas again due to retention


Avoid Nephrotoxics








previously


Anemia salas


2D echo


24 H urine protein


Keep BP and BS in check


I am holding  her psych meds due to low MS


Per orders





Subjective


ROS Limited/Unobtainable:  Yes





Objective


Objective





Last 24 Hour Vital Signs








  Date Time  Temp Pulse Resp B/P (MAP) Pulse Ox O2 Delivery O2 Flow Rate FiO2


 


11/13/19 11:00  77 20 121/47 (71) 100   


 


11/13/19 10:00  75 17 116/34 (61) 100   


 


11/13/19 09:30  77 24     35





        35


 


11/13/19 09:30     100   


 


11/13/19 09:00  82 17 129/47 (74) 100   


 


11/13/19 08:45  72  115/48    


 


11/13/19 08:00      Endotracheal Tube  


 


11/13/19 08:00 98.7 75 18 115/48 (70) 100   


 


11/13/19 08:00  73      


 


11/13/19 08:00        35


 


11/13/19 07:10  77 30  100 Mechanical Ventilator  35





  87 18     35


 


11/13/19 07:00  73 20 123/39 (67) 100   


 


11/13/19 06:00  83 24 135/47 (76) 100   


 


11/13/19 05:00 98.6 73 20 119/41 (67) 100   


 


11/13/19 04:58  74 18     35


 


11/13/19 04:00      Endotracheal Tube  


 


11/13/19 04:00  74 21 110/40 (63) 100   


 


11/13/19 04:00        35


 


11/13/19 03:20  92      


 


11/13/19 03:12  79 17     35


 


11/13/19 03:00  81 20 108/37 (60) 100   


 


11/13/19 02:00  93 28 133/51 (78) 75   


 


11/13/19 01:00  70 14 113/35 (61) 99   


 


11/13/19 00:43  73 18  100 Mechanical Ventilator  35





  73 20     35


 


11/13/19 00:00 98.6 69 19 104/37 (59) 99   


 


11/13/19 00:00        35


 


11/13/19 00:00      Endotracheal Tube  


 


11/12/19 23:38  73      


 


11/12/19 23:00  88 29 132/48 (76) 100   


 


11/12/19 22:58  88 31     35


 


11/12/19 22:00  80 23 112/40 (64) 97   


 


11/12/19 21:00  85 21 120/41 (67) 98   


 


11/12/19 20:41  88 21     35


 


11/12/19 20:04  115      


 


11/12/19 20:00        35


 


11/12/19 20:00      Endotracheal Tube  


 


11/12/19 20:00 99.1 99 26 129/56 (80) 78   


 


11/12/19 19:41  93 28  100 Mechanical Ventilator  35





  90 23     


 


11/12/19 19:05  92 27     35


 


11/12/19 19:00  89 24 126/56 (79) 100   


 


11/12/19 18:00  88 25 114/51 (72) 100   


 


11/12/19 17:12  93  105/41    


 


11/12/19 17:00  82 20 105/41 (62) 98   


 


11/12/19 16:45  82 20     35


 


11/12/19 16:00 98.9 81 19 115/45 (68) 97   


 


11/12/19 16:00      Endotracheal Tube  


 


11/12/19 16:00  91      


 


11/12/19 16:00        35


 


11/12/19 15:02  91 27     35


 


11/12/19 15:00  90 23 122/52 (75) 99   


 


11/12/19 14:00  79 20 116/49 (71) 100   


 


11/12/19 13:10  85 26  100 Mechanical Ventilator  35





  76 14     35


 


11/12/19 13:00  90 29 132/71 (91) 100   


 


11/12/19 12:00        35


 


11/12/19 12:00      Endotracheal Tube  


 


11/12/19 12:00 99.3 85 23 125/37 (66) 100   


 


11/12/19 12:00  85      

















Intake and Output  


 


 11/12/19 11/13/19





 19:00 07:00


 


Intake Total 990 ml 695 ml


 


Output Total 2225 ml 1250 ml


 


Balance -1235 ml -555 ml


 


  


 


Free Water 160 ml 


 


IV Total 110 ml 55 ml


 


Tube Feeding 720 ml 540 ml


 


Other  100 ml


 


Output Urine Total 2225 ml 1250 ml


 


# Bowel Movements  2








Laboratory Tests


11/12/19 15:17: 


Arterial Blood pH 7.482H, Arterial Blood Partial Pressure CO2 43.4, Arterial 

Blood Partial Pressure O2 102.6H, Arterial Blood HCO3 31.7H, Arterial Blood 

Oxygen Saturation 97.6, Arterial Blood Base Excess 7.5H, Graham Test Positive


11/13/19 03:55: 


White Blood Count 10.9H, Red Blood Count 2.84L, Hemoglobin 7.6L, Hematocrit 

23.5L, Mean Corpuscular Volume 83, Mean Corpuscular Hemoglobin 26.7L, Mean 

Corpuscular Hemoglobin Concent 32.4, Red Cell Distribution Width 14.3, Platelet 

Count 385, Mean Platelet Volume 8.5, Neutrophils (%) (Auto) , Lymphocytes (%) (

Auto) , Monocytes (%) (Auto) , Eosinophils (%) (Auto) , Basophils (%) (Auto) , 

Sodium Level 144, Potassium Level 4.2, Chloride Level 104, Carbon Dioxide Level 

35H, Anion Gap 5, Blood Urea Nitrogen 32H, Creatinine 1.2, Estimat Glomerular 

Filtration Rate 45.0, Glucose Level 174H, Calcium Level 8.4L, Phosphorus Level 

3.2, Magnesium Level 2.2


Height (Feet):  5


Height (Inches):  5.00


Weight (Pounds):  129


General Appearance:  no apparent distress


EENT:  other - vented


Cardiovascular:  normal rate


Respiratory/Chest:  decreased breath sounds


Abdomen:  distended


Objective


no change











Lukasz Vizcaino MD Nov 13, 2019 11:32

## 2019-11-13 NOTE — NUR
NURSE NOTES:

Patient keeps moving arms and attempts to pull out ETT. Patient on bilateral soft wrist 
restraints. No skin breakdown noted. pulses present.

## 2019-11-13 NOTE — NUR
NURSE NOTES:

Patient was turned and repositioned. Oral care and casas care provided. VSS, afebrile.

## 2019-11-13 NOTE — NUR
NURSE NOTES:

Patient was turned and repositioned. All dressings intact, right upper arm picc intact, 
dressing clean and dry, no signs of infection noted. VSS. Patient kept clean and dry. Oral 
care provided. suction provided, noted with small tan secretion.

## 2019-11-13 NOTE — NUR
NURSE NOTES:

Endorsement received from So Yeon, RN. Patient orally intubated, ET 7.5, 23 lipline. AC 14, 
400Vt, PEEP5 30%. Sinus rhythm on the monitor. GT patent and intact. On Glucerna 1.2 
60ml/hr. No residual. Charles catheter in place. With right upper arm PICC. Head of bed 
elevated. Call light within reach. Bed locked and in low position. Bed alarm on.

## 2019-11-13 NOTE — NUR
CASE MANAGEMENT: REVIEW



11/13/19



SI: SEPSIS D/T PNA. ACUTE CHF

RESPIRATORY FAILURE ~ INTUBATED 

98.7   75   18   115/48  100% ON VENT SUPPORT @ 35% FIO2

RBC 2.84H/H-7.6/23.5  CO2+35   BUN+32    CA+8.4







IS: IV MEROPENEM Q8HRS

IV LASIX Q12

IV PROTONIX Q12

NORVASC NG BID

DUONEB INH Q6HRS RTC

K-DUR GT QD



**: ICU STATUS



PLAN: TRACHEOSTOMY

## 2019-11-13 NOTE — NUR
NURSE NOTES:

H/H reported to Dr. Keith, Informed MD that unable to reach Dr. moser. Per Dr. Keith, no need for transfusion at this time.

## 2019-11-13 NOTE — PRE-PROCEDURE NOTE/ATTESTATION
Pre-Procedure Note/Attestation


Complete Prior to Procedure


Procedure Narrative:


tracheostomy





Indications for Procedure


Pre-Operative Diagnosis:


respiratory insufficiency requiring prolonged ventilatory support





Attestation


I attest that I discussed the nature of the procedure; its benefits; risks and 

complications; and alternatives (and the risks and benefits of such alternatives

), prior to the procedure, with the patient (or the patient's legal 

representative).





I attest that, if there was a reasonable possibility of needing a blood 

transfusion, the patient (or the patient's legal representative) was given the 

[facility] standardized written summary, pursuant 

to the Cleve Rob Blood Safety Act ([place] Health and Safety Code # 1645, as 

amended).





I attest that I re-evaluated the patient just prior to the surgery and that 

there has been no change in the patient's H&P, except as documented below:











Radhames Blas Nov 13, 2019 16:07

## 2019-11-13 NOTE — CARDIOLOGY PROGRESS NOTE
Assessment/Plan


Assessment/Plan


1. acute congestive heart failure


2. Abnormal cardiac enzyme demand related due to infection and profound anemia 


3. Agitation 


4. Urinary tract infection.


5. Acute renal failure.


6. Profound anemia.


7. Diabetes mellitus.


8. History of hypertension.


9. History of mood disorder.


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 


11. arf 


12. pelvic mass


13  sepsis 


14. pneumonia 








off dapt due to suspected bleeding


echo mild systolic dysfunction 


in icu on  a vent 


iv abx 


s/p peg  


lasix iv bid with zoroxolyn daily 


tele sinus 


d/w rn 


cxr will reorcder for tomorrow 





co2 still ok on diuretics





Subjective


ROS Limited/Unobtainable:  Yes





Objective





Last 24 Hour Vital Signs








  Date Time  Temp Pulse Resp B/P (MAP) Pulse Ox O2 Delivery O2 Flow Rate FiO2


 


11/13/19 19:00  90 17 135/45 (75) 100   


 


11/13/19 18:50  100 29     30


 


11/13/19 18:00  90 24 131/48 (75) 100   


 


11/13/19 17:03  94  128/59    


 


11/13/19 17:00  86 19 128/59 (82) 100   


 


11/13/19 16:44  84 25     30


 


11/13/19 16:00        35


 


11/13/19 16:00 98.8 85 24 133/47 (75) 100   


 


11/13/19 16:00      Endotracheal Tube  


 


11/13/19 16:00  82      


 


11/13/19 15:00  89 21 126/48 (74) 100   


 


11/13/19 14:30  85 25  100 Mechanical Ventilator  35





  84 15     35





        


 


11/13/19 14:00  83 19 113/45 (67) 100   


 


11/13/19 13:00 98.9 87 21 128/41 (70) 100   


 


11/13/19 12:41  93 24     35


 


11/13/19 12:00  74      


 


11/13/19 12:00      Endotracheal Tube  


 


11/13/19 12:00        35


 


11/13/19 12:00  72 19 112/34 (60) 100   


 


11/13/19 11:30  82 23     35


 


11/13/19 11:00  77 20 121/47 (71) 100   


 


11/13/19 10:00  75 17 116/34 (61) 100   


 


11/13/19 09:30  77 24     35





        35


 


11/13/19 09:30     100   


 


11/13/19 09:00  82 17 129/47 (74) 100   


 


11/13/19 08:45  72  115/48    


 


11/13/19 08:00      Endotracheal Tube  


 


11/13/19 08:00 98.7 75 18 115/48 (70) 100   


 


11/13/19 08:00  73      


 


11/13/19 08:00        35


 


11/13/19 07:10  77 30  100 Mechanical Ventilator  35





  87 18     35


 


11/13/19 07:00  73 20 123/39 (67) 100   


 


11/13/19 06:00  83 24 135/47 (76) 100   


 


11/13/19 05:00 98.6 73 20 119/41 (67) 100   


 


11/13/19 04:58  74 18     35


 


11/13/19 04:00      Endotracheal Tube  


 


11/13/19 04:00  74 21 110/40 (63) 100   


 


11/13/19 04:00        35


 


11/13/19 03:20  92      


 


11/13/19 03:12  79 17     35


 


11/13/19 03:00  81 20 108/37 (60) 100   


 


11/13/19 02:00  93 28 133/51 (78) 75   


 


11/13/19 01:00  70 14 113/35 (61) 99   


 


11/13/19 00:43  73 18  100 Mechanical Ventilator  35





  73 20     35


 


11/13/19 00:00 98.6 69 19 104/37 (59) 99   


 


11/13/19 00:00        35


 


11/13/19 00:00      Endotracheal Tube  


 


11/12/19 23:38  73      


 


11/12/19 23:00  88 29 132/48 (76) 100   


 


11/12/19 22:58  88 31     35


 


11/12/19 22:00  80 23 112/40 (64) 97   


 


11/12/19 21:00  85 21 120/41 (67) 98   


 


11/12/19 20:41  88 21     35


 


11/12/19 20:04  115      


 


11/12/19 20:00        35


 


11/12/19 20:00      Endotracheal Tube  


 


11/12/19 20:00 99.1 99 26 129/56 (80) 78   


 


11/12/19 19:41  93 28  100 Mechanical Ventilator  35





  90 23     








General Appearance:  no apparent distress, agitated, on vent, patient on 

isolation


Cardiovascular:  normal rate


Respiratory/Chest:  rhonchi - bilaterally


Abdomen:  normal bowel sounds, non tender, soft


Extremities:  no swelling











Intake and Output  


 


 11/12/19 11/13/19





 19:00 07:00


 


Intake Total 990 ml 695 ml


 


Output Total 2225 ml 1250 ml


 


Balance -1235 ml -555 ml


 


  


 


Free Water 160 ml 


 


IV Total 110 ml 55 ml


 


Tube Feeding 720 ml 540 ml


 


Other  100 ml


 


Output Urine Total 2225 ml 1250 ml


 


# Bowel Movements  2











Laboratory Tests








Test


  11/13/19


03:55


 


White Blood Count


  10.9 K/UL


(4.8-10.8)  H


 


Red Blood Count


  2.84 M/UL


(4.20-5.40)  L


 


Hemoglobin


  7.6 G/DL


(12.0-16.0)  L


 


Hematocrit


  23.5 %


(37.0-47.0)  L


 


Mean Corpuscular Volume 83 FL (80-99)  


 


Mean Corpuscular Hemoglobin


  26.7 PG


(27.0-31.0)  L


 


Mean Corpuscular Hemoglobin


Concent 32.4 G/DL


(32.0-36.0)


 


Red Cell Distribution Width


  14.3 %


(11.6-14.8)


 


Platelet Count


  385 K/UL


(150-450)


 


Mean Platelet Volume


  8.5 FL


(6.5-10.1)


 


Neutrophils (%) (Auto)


  % (45.0-75.0)


 


 


Lymphocytes (%) (Auto)


  % (20.0-45.0)


 


 


Monocytes (%) (Auto)  % (1.0-10.0)  


 


Eosinophils (%) (Auto)  % (0.0-3.0)  


 


Basophils (%) (Auto)  % (0.0-2.0)  


 


Sodium Level


  144 MMOL/L


(136-145)


 


Potassium Level


  4.2 MMOL/L


(3.5-5.1)


 


Chloride Level


  104 MMOL/L


()


 


Carbon Dioxide Level


  35 MMOL/L


(21-32)  H


 


Anion Gap


  5 mmol/L


(5-15)


 


Blood Urea Nitrogen


  32 mg/dL


(7-18)  H


 


Creatinine


  1.2 MG/DL


(0.55-1.30)


 


Estimat Glomerular Filtration


Rate 45.0 mL/min


(>60)


 


Glucose Level


  174 MG/DL


()  H


 


Calcium Level


  8.4 MG/DL


(8.5-10.1)  L


 


Phosphorus Level


  3.2 MG/DL


(2.5-4.9)


 


Magnesium Level


  2.2 MG/DL


(1.8-2.4)

















Rob May MD Nov 13, 2019 19:20

## 2019-11-13 NOTE — GENERAL PROGRESS NOTE
Assessment/Plan


Status:  unchanged


Assessment/Plan:








Assessment/Plan


Problems:  


(1) PEG (percutaneous endoscopic gastrostomy) status


ICD Codes:  Z93.1 - Gastrostomy status


SNOMED:  880215230, 704221282


(2) Anemia


ICD Codes:  D64.9 - Anemia, unspecified


SNOMED:  299168783


Qualifiers:  


   Qualified Codes:  D64.9 - Anemia, unspecified


(3) Dehydration


ICD Codes:  E86.0 - Dehydration


SNOMED:  78063337, 7853313


Status:  unchanged





Assessment/Plan


Assessment


(1) pneumonia


(2) UTI


(3) Sepsis


(4) Dehydration


(5) CHANCE 


(6) Acute metabolic encephalopathy


(7) Hyperglycemia due to type 2 diabetes mellitus


(8) Renal failure (ARF), acute on chronic


(9) Aspiration pneumonia


(10) Abnormal TSH


(11) Pelvic mass in female


(12) Congestive Heart Failure


(13) Anemia


(14) Dysphagia - s/p GT





s/p EGD SUMMARY OF FINDINGS:


1. No evidence of any active upper GI bleeding at this time.


2. Gastritis status biopsy.





Recommendations


GTFs


prn transfusions


bowel regime


PPI and H2B


will follow up, consider colonoscopy if patient has recurrent GI bleed





Subjective


ROS Limited/Unobtainable:  No


Allergies:  


Coded Allergies:  


     No Known Allergies (Unverified , 10/14/19)





Objective





Last 24 Hour Vital Signs








  Date Time  Temp Pulse Resp B/P (MAP) Pulse Ox O2 Delivery O2 Flow Rate FiO2


 


11/13/19 09:30  77 24     35





        35


 


11/13/19 09:30     100   


 


11/13/19 09:00  82 17 129/47 (74) 100   


 


11/13/19 08:45  72  115/48    


 


11/13/19 08:00      Endotracheal Tube  


 


11/13/19 08:00 98.7 75 18 115/48 (70) 100   


 


11/13/19 08:00  73      


 


11/13/19 08:00        35


 


11/13/19 07:10  77 30  100 Mechanical Ventilator  35





  87 18     35


 


11/13/19 07:00  73 20 123/39 (67) 100   


 


11/13/19 06:00  83 24 135/47 (76) 100   


 


11/13/19 05:00 98.6 73 20 119/41 (67) 100   


 


11/13/19 04:58  74 18     35


 


11/13/19 04:00      Endotracheal Tube  


 


11/13/19 04:00  74 21 110/40 (63) 100   


 


11/13/19 04:00        35


 


11/13/19 03:20  92      


 


11/13/19 03:12  79 17     35


 


11/13/19 03:00  81 20 108/37 (60) 100   


 


11/13/19 02:00  93 28 133/51 (78) 75   


 


11/13/19 01:00  70 14 113/35 (61) 99   


 


11/13/19 00:43  73 18  100 Mechanical Ventilator  35





  73 20     35


 


11/13/19 00:00 98.6 69 19 104/37 (59) 99   


 


11/13/19 00:00        35


 


11/13/19 00:00      Endotracheal Tube  


 


11/12/19 23:38  73      


 


11/12/19 23:00  88 29 132/48 (76) 100   


 


11/12/19 22:58  88 31     35


 


11/12/19 22:00  80 23 112/40 (64) 97   


 


11/12/19 21:00  85 21 120/41 (67) 98   


 


11/12/19 20:41  88 21     35


 


11/12/19 20:04  115      


 


11/12/19 20:00        35


 


11/12/19 20:00      Endotracheal Tube  


 


11/12/19 20:00 99.1 99 26 129/56 (80) 78   


 


11/12/19 19:41  93 28  100 Mechanical Ventilator  35





  90 23     


 


11/12/19 19:05  92 27     35


 


11/12/19 19:00  89 24 126/56 (79) 100   


 


11/12/19 18:00  88 25 114/51 (72) 100   


 


11/12/19 17:12  93  105/41    


 


11/12/19 17:00  82 20 105/41 (62) 98   


 


11/12/19 16:45  82 20     35


 


11/12/19 16:00 98.9 81 19 115/45 (68) 97   


 


11/12/19 16:00      Endotracheal Tube  


 


11/12/19 16:00  91      


 


11/12/19 16:00        35


 


11/12/19 15:02  91 27     35


 


11/12/19 15:00  90 23 122/52 (75) 99   


 


11/12/19 14:00  79 20 116/49 (71) 100   


 


11/12/19 13:10  85 26  100 Mechanical Ventilator  35





  76 14     35


 


11/12/19 13:00  90 29 132/71 (91) 100   


 


11/12/19 12:00        35


 


11/12/19 12:00      Endotracheal Tube  


 


11/12/19 12:00 99.3 85 23 125/37 (66) 100   


 


11/12/19 12:00  85      


 


11/12/19 11:00  80 20 113/37 (62) 100   


 


11/12/19 11:00  79 19     35


 


11/12/19 10:00  82 20 118/43 (68) 100   

















Intake and Output  


 


 11/12/19 11/13/19





 19:00 07:00


 


Intake Total 990 ml 695 ml


 


Output Total 2225 ml 1250 ml


 


Balance -1235 ml -555 ml


 


  


 


Free Water 160 ml 


 


IV Total 110 ml 55 ml


 


Tube Feeding 720 ml 540 ml


 


Other  100 ml


 


Output Urine Total 2225 ml 1250 ml


 


# Bowel Movements  2








Laboratory Tests


11/12/19 15:17: 


Arterial Blood pH 7.482H, Arterial Blood Partial Pressure CO2 43.4, Arterial 

Blood Partial Pressure O2 102.6H, Arterial Blood HCO3 31.7H, Arterial Blood 

Oxygen Saturation 97.6, Arterial Blood Base Excess 7.5H, Graham Test Positive


11/13/19 03:55: 


White Blood Count 10.9H, Red Blood Count 2.84L, Hemoglobin 7.6L, Hematocrit 

23.5L, Mean Corpuscular Volume 83, Mean Corpuscular Hemoglobin 26.7L, Mean 

Corpuscular Hemoglobin Concent 32.4, Red Cell Distribution Width 14.3, Platelet 

Count 385, Mean Platelet Volume 8.5, Neutrophils (%) (Auto) , Lymphocytes (%) (

Auto) , Monocytes (%) (Auto) , Eosinophils (%) (Auto) , Basophils (%) (Auto) , 

Sodium Level 144, Potassium Level 4.2, Chloride Level 104, Carbon Dioxide Level 

35H, Anion Gap 5, Blood Urea Nitrogen 32H, Creatinine 1.2, Estimat Glomerular 

Filtration Rate 45.0, Glucose Level 174H, Calcium Level 8.4L, Phosphorus Level 

3.2, Magnesium Level 2.2


Height (Feet):  5


Height (Inches):  5.00


Weight (Pounds):  129


General Appearance:  lethargic


EENT:  normal ENT inspection


Neck:  supple


Cardiovascular:  normal rate


Respiratory/Chest:  decreased breath sounds


Abdomen:  normal bowel sounds, non tender, soft


Extremities:  non-tender











Storm Turk MD Nov 13, 2019 09:56

## 2019-11-14 VITALS — SYSTOLIC BLOOD PRESSURE: 121 MMHG | DIASTOLIC BLOOD PRESSURE: 52 MMHG

## 2019-11-14 VITALS — DIASTOLIC BLOOD PRESSURE: 71 MMHG | SYSTOLIC BLOOD PRESSURE: 174 MMHG

## 2019-11-14 VITALS — DIASTOLIC BLOOD PRESSURE: 50 MMHG | SYSTOLIC BLOOD PRESSURE: 123 MMHG

## 2019-11-14 VITALS — DIASTOLIC BLOOD PRESSURE: 51 MMHG | SYSTOLIC BLOOD PRESSURE: 129 MMHG

## 2019-11-14 VITALS — DIASTOLIC BLOOD PRESSURE: 62 MMHG | SYSTOLIC BLOOD PRESSURE: 172 MMHG

## 2019-11-14 VITALS — SYSTOLIC BLOOD PRESSURE: 130 MMHG | DIASTOLIC BLOOD PRESSURE: 39 MMHG

## 2019-11-14 VITALS — DIASTOLIC BLOOD PRESSURE: 44 MMHG | SYSTOLIC BLOOD PRESSURE: 127 MMHG

## 2019-11-14 VITALS — DIASTOLIC BLOOD PRESSURE: 42 MMHG | SYSTOLIC BLOOD PRESSURE: 145 MMHG

## 2019-11-14 VITALS — DIASTOLIC BLOOD PRESSURE: 45 MMHG | SYSTOLIC BLOOD PRESSURE: 131 MMHG

## 2019-11-14 VITALS — DIASTOLIC BLOOD PRESSURE: 60 MMHG | SYSTOLIC BLOOD PRESSURE: 143 MMHG

## 2019-11-14 VITALS — DIASTOLIC BLOOD PRESSURE: 53 MMHG | SYSTOLIC BLOOD PRESSURE: 152 MMHG

## 2019-11-14 VITALS — SYSTOLIC BLOOD PRESSURE: 116 MMHG | DIASTOLIC BLOOD PRESSURE: 49 MMHG

## 2019-11-14 VITALS — DIASTOLIC BLOOD PRESSURE: 46 MMHG | SYSTOLIC BLOOD PRESSURE: 121 MMHG

## 2019-11-14 VITALS — SYSTOLIC BLOOD PRESSURE: 159 MMHG | DIASTOLIC BLOOD PRESSURE: 53 MMHG

## 2019-11-14 VITALS — SYSTOLIC BLOOD PRESSURE: 152 MMHG | DIASTOLIC BLOOD PRESSURE: 53 MMHG

## 2019-11-14 VITALS — SYSTOLIC BLOOD PRESSURE: 170 MMHG | DIASTOLIC BLOOD PRESSURE: 71 MMHG

## 2019-11-14 VITALS — DIASTOLIC BLOOD PRESSURE: 50 MMHG | SYSTOLIC BLOOD PRESSURE: 110 MMHG

## 2019-11-14 VITALS — DIASTOLIC BLOOD PRESSURE: 56 MMHG | SYSTOLIC BLOOD PRESSURE: 142 MMHG

## 2019-11-14 VITALS — DIASTOLIC BLOOD PRESSURE: 58 MMHG | SYSTOLIC BLOOD PRESSURE: 138 MMHG

## 2019-11-14 VITALS — DIASTOLIC BLOOD PRESSURE: 46 MMHG | SYSTOLIC BLOOD PRESSURE: 144 MMHG

## 2019-11-14 VITALS — DIASTOLIC BLOOD PRESSURE: 47 MMHG | SYSTOLIC BLOOD PRESSURE: 122 MMHG

## 2019-11-14 VITALS — SYSTOLIC BLOOD PRESSURE: 142 MMHG | DIASTOLIC BLOOD PRESSURE: 51 MMHG

## 2019-11-14 VITALS — SYSTOLIC BLOOD PRESSURE: 130 MMHG | DIASTOLIC BLOOD PRESSURE: 47 MMHG

## 2019-11-14 VITALS — SYSTOLIC BLOOD PRESSURE: 127 MMHG | DIASTOLIC BLOOD PRESSURE: 51 MMHG

## 2019-11-14 VITALS — SYSTOLIC BLOOD PRESSURE: 127 MMHG | DIASTOLIC BLOOD PRESSURE: 52 MMHG

## 2019-11-14 VITALS — SYSTOLIC BLOOD PRESSURE: 117 MMHG | DIASTOLIC BLOOD PRESSURE: 43 MMHG

## 2019-11-14 VITALS — SYSTOLIC BLOOD PRESSURE: 147 MMHG | DIASTOLIC BLOOD PRESSURE: 52 MMHG

## 2019-11-14 VITALS — SYSTOLIC BLOOD PRESSURE: 120 MMHG | DIASTOLIC BLOOD PRESSURE: 44 MMHG

## 2019-11-14 VITALS — SYSTOLIC BLOOD PRESSURE: 121 MMHG | DIASTOLIC BLOOD PRESSURE: 40 MMHG

## 2019-11-14 LAB
ADD MANUAL DIFF: NO
ALBUMIN SERPL-MCNC: 2.2 G/DL (ref 3.4–5)
ALBUMIN/GLOB SERPL: 0.4 {RATIO} (ref 1–2.7)
ALP SERPL-CCNC: 83 U/L (ref 46–116)
ALT SERPL-CCNC: 13 U/L (ref 12–78)
ANION GAP SERPL CALC-SCNC: 5 MMOL/L (ref 5–15)
APTT BLD: 31 SEC (ref 23–33)
AST SERPL-CCNC: 11 U/L (ref 15–37)
BASOPHILS NFR BLD AUTO: 2.9 % (ref 0–2)
BILIRUB SERPL-MCNC: 0.5 MG/DL (ref 0.2–1)
BUN SERPL-MCNC: 46 MG/DL (ref 7–18)
CALCIUM SERPL-MCNC: 9 MG/DL (ref 8.5–10.1)
CHLORIDE SERPL-SCNC: 99 MMOL/L (ref 98–107)
CO2 SERPL-SCNC: 35 MMOL/L (ref 21–32)
CREAT SERPL-MCNC: 1.3 MG/DL (ref 0.55–1.3)
EOSINOPHIL NFR BLD AUTO: 11.6 % (ref 0–3)
ERYTHROCYTE [DISTWIDTH] IN BLOOD BY AUTOMATED COUNT: 14 % (ref 11.6–14.8)
GLOBULIN SER-MCNC: 5.1 G/DL
HCT VFR BLD CALC: 26.3 % (ref 37–47)
HGB BLD-MCNC: 8.4 G/DL (ref 12–16)
INR PPP: 1 (ref 0.9–1.1)
LYMPHOCYTES NFR BLD AUTO: 22.8 % (ref 20–45)
MCV RBC AUTO: 83 FL (ref 80–99)
MONOCYTES NFR BLD AUTO: 6.6 % (ref 1–10)
NEUTROPHILS NFR BLD AUTO: 56.2 % (ref 45–75)
PHOSPHATE SERPL-MCNC: 3.4 MG/DL (ref 2.5–4.9)
PLATELET # BLD: 444 K/UL (ref 150–450)
POTASSIUM SERPL-SCNC: 3.8 MMOL/L (ref 3.5–5.1)
RBC # BLD AUTO: 3.18 M/UL (ref 4.2–5.4)
SODIUM SERPL-SCNC: 139 MMOL/L (ref 136–145)
WBC # BLD AUTO: 12.3 K/UL (ref 4.8–10.8)

## 2019-11-14 RX ADMIN — CHLORHEXIDINE GLUCONATE SCH APPLIC: 213 SOLUTION TOPICAL at 20:28

## 2019-11-14 RX ADMIN — ACETAMINOPHEN PRN MG: 160 SOLUTION ORAL at 12:01

## 2019-11-14 RX ADMIN — PANTOPRAZOLE SODIUM SCH MG: 40 INJECTION, POWDER, FOR SOLUTION INTRAVENOUS at 09:00

## 2019-11-14 RX ADMIN — IPRATROPIUM BROMIDE AND ALBUTEROL SULFATE SCH ML: .5; 3 SOLUTION RESPIRATORY (INHALATION) at 13:00

## 2019-11-14 RX ADMIN — DIVALPROEX SODIUM SCH MG: 125 CAPSULE ORAL at 09:00

## 2019-11-14 RX ADMIN — DIVALPROEX SODIUM SCH MG: 125 CAPSULE ORAL at 20:43

## 2019-11-14 RX ADMIN — IPRATROPIUM BROMIDE AND ALBUTEROL SULFATE SCH ML: .5; 3 SOLUTION RESPIRATORY (INHALATION) at 19:51

## 2019-11-14 RX ADMIN — INSULIN ASPART SCH UNITS: 100 INJECTION, SOLUTION INTRAVENOUS; SUBCUTANEOUS at 05:48

## 2019-11-14 RX ADMIN — IPRATROPIUM BROMIDE AND ALBUTEROL SULFATE SCH ML: .5; 3 SOLUTION RESPIRATORY (INHALATION) at 01:33

## 2019-11-14 RX ADMIN — INSULIN ASPART SCH UNITS: 100 INJECTION, SOLUTION INTRAVENOUS; SUBCUTANEOUS at 23:51

## 2019-11-14 RX ADMIN — MEROPENEM SCH MLS/HR: 1 INJECTION INTRAVENOUS at 10:41

## 2019-11-14 RX ADMIN — INSULIN ASPART SCH UNITS: 100 INJECTION, SOLUTION INTRAVENOUS; SUBCUTANEOUS at 18:15

## 2019-11-14 RX ADMIN — MEROPENEM SCH MLS/HR: 1 INJECTION INTRAVENOUS at 01:39

## 2019-11-14 RX ADMIN — IPRATROPIUM BROMIDE AND ALBUTEROL SULFATE SCH ML: .5; 3 SOLUTION RESPIRATORY (INHALATION) at 07:47

## 2019-11-14 RX ADMIN — INSULIN ASPART SCH UNITS: 100 INJECTION, SOLUTION INTRAVENOUS; SUBCUTANEOUS at 12:00

## 2019-11-14 RX ADMIN — PANTOPRAZOLE SODIUM SCH MG: 40 INJECTION, POWDER, FOR SOLUTION INTRAVENOUS at 20:42

## 2019-11-14 NOTE — GENERAL PROGRESS NOTE
Assessment/Plan


Status:  unchanged


Assessment/Plan:








Assessment/Plan


Problems:  


(1) PEG (percutaneous endoscopic gastrostomy) status


ICD Codes:  Z93.1 - Gastrostomy status


SNOMED:  044759918, 057140262


(2) Anemia


ICD Codes:  D64.9 - Anemia, unspecified


SNOMED:  798792079


Qualifiers:  


   Qualified Codes:  D64.9 - Anemia, unspecified


(3) Dehydration


ICD Codes:  E86.0 - Dehydration


SNOMED:  43957483, 2506676


Status:  unchanged





Assessment/Plan


Assessment


(1) pneumonia


(2) UTI


(3) Sepsis


(4) Dehydration


(5) CHANCE 


(6) Acute metabolic encephalopathy


(7) Hyperglycemia due to type 2 diabetes mellitus


(8) Renal failure (ARF), acute on chronic


(9) Aspiration pneumonia


(10) Abnormal TSH


(11) Pelvic mass in female


(12) Congestive Heart Failure


(13) Anemia


(14) Dysphagia - s/p GT





s/p EGD SUMMARY OF FINDINGS:


1. No evidence of any active upper GI bleeding at this time.


2. Gastritis status biopsy.





Recommendations


GTFs


prn transfusions


bowel regime


PPI and H2B


will follow up, consider colonoscopy if patient has recurrent GI bleed





Subjective


ROS Limited/Unobtainable:  No


Allergies:  


Coded Allergies:  


     No Known Allergies (Unverified , 10/14/19)





Objective





Last 24 Hour Vital Signs








  Date Time  Temp Pulse Resp B/P (MAP) Pulse Ox O2 Delivery O2 Flow Rate FiO2


 


11/14/19 12:15  117 19 172/62 (98) 99   


 


11/14/19 12:00      Mechanical Ventilator  


 


11/14/19 12:00 99.0 121 17 174/71 (105) 98   


 


11/14/19 11:30  114 15 170/71 (104) 99   


 


11/14/19 11:02  101 18     30


 


11/14/19 11:00  100 18 147/52 (83) 100   


 


11/14/19 10:45  99 20 159/53 (88) 100   


 


11/14/19 10:30  97 18 142/56 (84) 100   


 


11/14/19 10:15  89 18 152/53 (86) 100   


 


11/14/19 10:12  84 14  100   


 


11/14/19 10:10  88 14  100   


 


11/14/19 10:00  86 18 152/53 (86) 100   


 


11/14/19 10:00        30


 


11/14/19 09:00  84 18 121/40 (67) 100   


 


11/14/19 09:00  90  121/59    


 


11/14/19 08:36     100   


 


11/14/19 08:35  85 17     30


 


11/14/19 08:00      Endotracheal Tube  


 


11/14/19 08:00        30


 


11/14/19 08:00  82      


 


11/14/19 08:00 98.8 83 24 127/52 (77) 100   


 


11/14/19 07:36  80 26  100 Mechanical Ventilator  30





  88 26     30





        30


 


11/14/19 07:00  78 20 117/43 (67) 100   


 


11/14/19 06:00  89 23 145/42 (76) 100   


 


11/14/19 05:26  85 19     30


 


11/14/19 05:00  84 20 130/39 (69) 100   


 


11/14/19 04:00      Endotracheal Tube  


 


11/14/19 04:00        30


 


11/14/19 04:00 98.7 84 23 142/51 (81) 100   


 


11/14/19 04:00  78      


 


11/14/19 03:21  78 21     30


 


11/14/19 03:00  79 16 127/44 (71) 99   


 


11/14/19 02:00 99.1 78 16 110/50 (70) 100   


 


11/14/19 01:33  81 21  98 Mechanical Ventilator  35





  81 21     35


 


11/14/19 01:00 100.3 88 24 144/46 (78) 99   


 


11/14/19 00:59 100.3       


 


11/14/19 00:00      Endotracheal Tube  


 


11/14/19 00:00  93      


 


11/14/19 00:00 100.3 93 19 122/47 (72) 98   


 


11/13/19 23:25  99 25     30


 


11/13/19 23:00  97 22 133/56 (81) 100   


 


11/13/19 22:00  86 23 135/47 (76) 99   


 


11/13/19 21:00  86 22 133/49 (77) 100   


 


11/13/19 20:45  91 21     30


 


11/13/19 20:00      Endotracheal Tube  


 


11/13/19 20:00 98.9 84 17 124/43 (70) 97   


 


11/13/19 20:00        30


 


11/13/19 20:00  94      


 


11/13/19 19:00  90 17 135/45 (75) 100   


 


11/13/19 18:50  100 29     30


 


11/13/19 18:00  90 24 131/48 (75) 100   


 


11/13/19 17:03  94  128/59    


 


11/13/19 17:00  86 19 128/59 (82) 100   


 


11/13/19 16:44  84 25     30


 


11/13/19 16:00        35


 


11/13/19 16:00 98.8 85 24 133/47 (75) 100   


 


11/13/19 16:00      Endotracheal Tube  


 


11/13/19 16:00  82      


 


11/13/19 15:00  89 21 126/48 (74) 100   


 


11/13/19 14:30  85 25  100 Mechanical Ventilator  35





  84 15     35





        


 


11/13/19 14:00  83 19 113/45 (67) 100   


 


11/13/19 13:00 98.9 87 21 128/41 (70) 100   

















Intake and Output  


 


 11/13/19 11/14/19





 19:00 07:00


 


Intake Total 890 ml 355 ml


 


Output Total 2006 ml 1605 ml


 


Balance -1116 ml -1250 ml


 


  


 


Free Water 180 ml 


 


IV Total 110 ml 55 ml


 


Tube Feeding 600 ml 240 ml


 


Other  60 ml


 


Output Urine Total 2005 ml 1605 ml


 


Stool Total 1 ml 


 


# Bowel Movements 1 2








Laboratory Tests


11/14/19 04:45: 


White Blood Count 12.3H, Red Blood Count 3.18L, Hemoglobin 8.4L, Hematocrit 

26.3L, Mean Corpuscular Volume 83, Mean Corpuscular Hemoglobin 26.6L, Mean 

Corpuscular Hemoglobin Concent 32.1, Red Cell Distribution Width 14.0, Platelet 

Count 444, Mean Platelet Volume 8.2, Neutrophils (%) (Auto) 56.2, Lymphocytes (%

) (Auto) 22.8, Monocytes (%) (Auto) 6.6, Eosinophils (%) (Auto) 11.6H, 

Basophils (%) (Auto) 2.9H, Prothrombin Time 10.7, Prothromb Time International 

Ratio 1.0, Activated Partial Thromboplast Time 31, Sodium Level 139, Potassium 

Level 3.8, Chloride Level 99, Carbon Dioxide Level 35H, Anion Gap 5, Blood Urea 

Nitrogen 46H, Creatinine 1.3, Estimat Glomerular Filtration Rate 41.0, Glucose 

Level 185H, Calcium Level 9.0, Phosphorus Level 3.4, Magnesium Level 2.4, Total 

Bilirubin 0.5, Aspartate Amino Transf (AST/SGOT) 11L, Alanine Aminotransferase (

ALT/SGPT) 13, Alkaline Phosphatase 83, Total Protein 7.3, Albumin 2.2L, 

Globulin 5.1, Albumin/Globulin Ratio 0.4L


Height (Feet):  5


Height (Inches):  5.00


Weight (Pounds):  130


General Appearance:  lethargic


EENT:  normal ENT inspection


Neck:  supple


Cardiovascular:  normal rate


Respiratory/Chest:  decreased breath sounds


Abdomen:  normal bowel sounds, non tender, soft


Extremities:  non-tender











Storm Turk MD Nov 14, 2019 12:42

## 2019-11-14 NOTE — 48 HOUR POST ANESTHESIA EVAL
Post Anesthesia Evaluation


Procedure:  Tracheostomy


Date of Evaluation:  Nov 14, 2019


Time of Evaluation:  12:42


Blood Pressure Systolic:  152


0:  63


Pulse Rate:  84


Respiratory Rate:  14 - Mech Vent


Temperature (Fahrenheit):  98.2


O2 Sat by Pulse Oximetry:  100


Airway:  patent


Nausea:  No


Vomiting:  No


Pain Intensity:  0


Hydration Status:  adequate


Mental Status/LOC:  other - ICU


Follow-up Care/Observations:


0


Post-Anesthesia Complications:


0


Follow-up care needed:  N/A











Geronimo Balderrama MD Nov 14, 2019 10:12

## 2019-11-14 NOTE — NUR
RESPIRATORY NOTES:

Received Patient on Vent settings ACVC RR 14, , Fio2 30% PEEP +5. Patient has a 7.5 
ETT at 23 cm at the lip, secured with anchorfast. Suctioned a minimal amount of clear, thin 
secretions. Patient lying in bed comfortable. Vent plugged into red outlet. Alarms are on 
and audible. Will continue to monitor patient throughout the day.

## 2019-11-14 NOTE — NUR
RESPIRATORY NOTES:

Weaning criteria passed. Patient placed on PS +8 PEEP +5 35% at 0736. Will continue to 
monitor.

## 2019-11-14 NOTE — HEMATOLOGY/ONC PROGRESS NOTE
Assessment/Plan


Assessment/Plan





Assessment and Recs:


# Anemia of chronic disease due to underlying chronic medical issues, 

multifactorial 


--> Anemia workup has been ordered, rule out gi bleed --> ferritin is 1400+


--> No evidence of hemolysis is noted, peripheral smear has been reviewed.


--> Hgb goal >7. Transfuse prn.


--> Epogen or iron at this time is not particularly indicated


--> Medications have been reviewed


--> low threshold for gi evaluation in case has occult +


--> bone marrow biopsy is not indicated given the other more likely causes (if 

recurrent anemia) first time here


--> hgb trend 8.4->8.5-->8.2->7.7-->7.9-->10.1-->9.1->7.8->7.2-->9-->8.4


--> FOLIC ACID 1mg po daily started


# Leukocytosis/elevated white blood cell count, unspecified likely related to 

underlying reaction v more likely infection


--> have reviewed peripheral smear and bandemia/neutrophilia noted


--> continue antibiotics if they have been started by ID team (VANC/ZOSYN)--> 

vanc/linda--> linda


--> wbc 39-->28k-->29k->31k-->23-->26k-->19k-->24k-->15.4-->16-->14-->23k-->14--

>17-->12.5->12.3


--> monitor for resolution


--> CT C/A/P Results reviewed


--> FOR INdium bone scan done -> Rim of activity within the pelvis, 

corresponding to expected location of the soft tissue component of the cystic 

pelvic mass described on recent imaging studies.


--> again consider gyn eval


--> as come down over last week, currently only 12k and s/p code, continue to 

monitor


--> would hold off on biopsy of bone


# Pelvic mass on ct and us -- 13 x 7 x 12.9 cm unilocular cystic mass, 

corresponding to lesion reported on recent CT scan. Layering low level internal 

echoes likely represent bladder debris.. This presumably represents a cystic 

ovarian lesion with debris or hemorrhage, possibly neoplastic.


--> CA-125 is 216! elevated


--> consider gyn eval


# Hypercalcemia - btw 10-11 range when corrected to alb


--> ivf have been started


--> if remains elevated, 7.9-->8.3


--> will get pth


# Sepsis from pneumonia.  


--> pulm consulted, abx started


# CHANCE (acute kidney injury) on ckd


--> cr 1.6-->2.7-->2.2->1.2


--> per renal


# UTI (urinary tract infection)


--> on abx per id


# Dehydration


--> on ivf


# Acute metabolic encephalopathy


--> likely due to pna


# Resp failure now intibated 11/4


# Dysphagia s/p peg++


# Dvt ppx SCDS





The timing of this note does not necessarily reflect the time of the patient 

was seen.





Greatly appreciate consultation.





Subjective


Allergies:  


Coded Allergies:  


     No Known Allergies (Unverified , 10/14/19)


Subjective


10/14: hgb has improved, no bleeding, labs reivewed


10/15: no events to report


10/16: a+ o x1, no bleeding or chills noted, no major changes, occult pending


10/17: no events noted, no bleeding, no chills, no hematemesis/hematochezia


10/18: remains confused, with abx, on nc


10/19: cr is worse, hgb 8.4, no bleeding, night sweats, chills


10/20: no f/c, no night sweats, labs noted, cr worse


10/21: no bleeding or chills, no night sweats, labs pending this am


10/22: pelvic mass noted on imaging, no bleeding reported, ordered ca125


10/23: no events, remains lethargic, is on abx, seen by surg


10/24: with raid response overnight, rr high as well as bp, vidal to icu


10/25: no events, no bleeding, to undergo a indium scan with id


10/26: comfortable, electrolytes reviewed, given mag and k


10/29: back on bipap with gt feeds, dw rn this am


10/30: remains very confused, no f/c, no bleeding, with urinary retention, 

folic acid started


10/31: sleeping, is on bipap, no events, wbc is higher this am


11/1: no events to report, no bleeding, wbc still high but better


11/3: no bleeding, no night sweats, s/p peg, alert


11/4: s/p code blue last night, now intubated, labs noted wbc lower


11/5: given k and mg, for endoscopy tomorrow given drop h/h, 1 unit prbc


11/6: is on vent, unresponsive, no bleeding noted, no chills wbc is 13k


11/14: remains in icu, s/p trach, wbc 12, on meropenem





Objective


Objective





Current Medications








 Medications


  (Trade)  Dose


 Ordered  Sig/Winnie


 Route


 PRN Reason  Start Time


 Stop Time Status Last Admin


Dose Admin


 


 Acetaminophen


  (Tylenol)  650 mg  Q4H  PRN


 NG


 Mild Pain/Temp > 100.5  11/3/19 20:30


 11/25/19 20:29  11/14/19 12:01


 


 


 Acetaminophen


  (Tylenol)  650 mg  Q4H  PRN


 RECTAL


 TEMP>100.5  11/3/19 20:30


 12/2/19 20:29   


 


 


 Acetylcysteine


  (Mucomyst)  100 mg  TIDRT


 [facility]


   11/10/19 19:30


 12/10/19 19:29  11/14/19 07:47


 


 


 Albuterol/


 Ipratropium


  (Albuterol/


 Ipratropium)  3 ml  Q6HRT


 N


   11/12/19 13:00


 11/17/19 12:59  11/14/19 07:47


 


 


 Amlodipine


 Besylate


  (Norvasc)  2.5 mg  BID


 NG


   11/4/19 09:00


 11/29/19 17:59  11/11/19 17:02


 


 


 Chlorhexidine


 Gluconate


  (Mae-Hex 2%)  1 applic  DAILY@2000


 TOPIC


   11/5/19 20:00


 12/5/19 19:59  11/13/19 20:18


 


 


 Dextrose


  (Dextrose 50%)  25 ml  Q30M  PRN


 IV


 Hypoglycemia  11/5/19 06:30


 12/5/19 06:29   


 


 


 Dextrose


  (Dextrose 50%)  50 ml  Q30M  PRN


 IV


 Hypoglycemia  11/5/19 06:30


 12/5/19 06:29   


 


 


 Divalproex Sodium


  (Depakote


 Sprinkles)  500 mg  Q12HR


 NG


   11/3/19 21:00


 11/14/19 21:59  11/13/19 21:09


 


 


 Folic Acid


  (Folate)  3 mg  DAILY


 GT


   11/7/19 09:00


 11/30/19 08:59  11/13/19 08:44


 


 


 Furosemide


  (Lasix)  40 mg  EVERY 12  HOURS


 IV


   11/7/19 21:00


 12/7/19 20:59  11/14/19 12:02


 


 


 Insulin Aspart


  (NovoLOG)    EVERY 6  HOURS


 SUBQ


   11/4/19 00:00


 11/29/19 11:59  11/14/19 05:48


 


 


 Insulin Detemir


  (Levemir)  10 units  QHS


 SUBQ


   11/14/19 21:00


 12/1/19 08:59   


 


 


 Lactulose


  (Cephulac)  20 gm  Q8H  PRN


 NG


 Constipation  11/3/19 20:30


 12/3/19 20:29  11/5/19 08:12


 


 


 Lorazepam


  (Ativan 2mg/ml


 1ml)  2 mg  Q4H  PRN


 IV


 for agitation  11/10/19 19:30


 11/17/19 19:29  11/12/19 20:37


 


 


 Meropenem 1 gm/


 Sodium Chloride  55 ml @ 


 110 mls/hr  Q8H


 IVPB


   11/4/19 17:00


 11/14/19 16:59  11/14/19 10:41


 


 


 Metoclopramide HCl


  (Reglan)  10 mg  Q6H  PRN


 IVP


 Nausea & Vomiting  11/6/19 10:45


 12/6/19 10:44   


 


 


 Metolazone


  (Zaroxolyn)  5 mg  Q24H


 NG


   11/12/19 08:00


 12/12/19 07:59  11/14/19 08:11


 


 


 Pantoprazole


  (Protonix)  40 mg  EVERY 12  HOURS


 IVP


   11/3/19 21:00


 12/3/19 08:59  11/13/19 21:09


 


 


 Potassium Chloride


  (K-Dur)  40 meq  DAILY


 GT


   11/6/19 09:00


 12/5/19 17:59  11/14/19 12:00


 











Last 24 Hour Vital Signs








  Date Time  Temp Pulse Resp B/P (MAP) Pulse Ox O2 Delivery O2 Flow Rate FiO2


 


11/14/19 16:00        30


 


11/14/19 16:00  79      


 


11/14/19 16:00 98.9 82 18 127/51 (76) 100   


 


11/14/19 16:00      Mechanical Ventilator  


 


11/14/19 15:11  81 14     30


 


11/14/19 15:00  80 18 120/44 (69) 100   


 


11/14/19 14:00  85 16 123/50 (74) 100   


 


11/14/19 13:09  87 14     30


 


11/14/19 13:00  96 18 124/54 (77) 99   


 


11/14/19 12:30  105 19 143/60 (87) 99   


 


11/14/19 12:15  117 19 172/62 (98) 99   


 


11/14/19 12:00      Mechanical Ventilator  


 


11/14/19 12:00 99.0 121 17 174/71 (105) 98   


 


11/14/19 12:00  120      


 


11/14/19 11:30  114 15 170/71 (104) 99   


 


11/14/19 11:02  101 18     30


 


11/14/19 11:00  100 18 147/52 (83) 100   


 


11/14/19 10:45  99 20 159/53 (88) 100   


 


11/14/19 10:30  97 18 142/56 (84) 100   


 


11/14/19 10:15  89 18 152/53 (86) 100   


 


11/14/19 10:12  84 14  100   


 


11/14/19 10:10  88 14  100   


 


11/14/19 10:00  86 18 152/53 (86) 100   


 


11/14/19 10:00        30


 


11/14/19 09:00  84 18 121/40 (67) 100   


 


11/14/19 09:00  90  121/59    


 


11/14/19 08:36     100   


 


11/14/19 08:35  85 17     30


 


11/14/19 08:00      Endotracheal Tube  


 


11/14/19 08:00        30


 


11/14/19 08:00  82      


 


11/14/19 08:00 98.8 83 24 127/52 (77) 100   


 


11/14/19 07:36  80 26  100 Mechanical Ventilator  30





  88 26     30





        30


 


11/14/19 07:00  78 20 117/43 (67) 100   


 


11/14/19 06:00  89 23 145/42 (76) 100   


 


11/14/19 05:26  85 19     30


 


11/14/19 05:00  84 20 130/39 (69) 100   


 


11/14/19 04:00      Endotracheal Tube  


 


11/14/19 04:00        30


 


11/14/19 04:00 98.7 84 23 142/51 (81) 100   


 


11/14/19 04:00  78      


 


11/14/19 03:21  78 21     30


 


11/14/19 03:00  79 16 127/44 (71) 99   


 


11/14/19 02:00 99.1 78 16 110/50 (70) 100   


 


11/14/19 01:33  81 21  98 Mechanical Ventilator  35





  81 21     35


 


11/14/19 01:00 100.3 88 24 144/46 (78) 99   


 


11/14/19 00:59 100.3       


 


11/14/19 00:00      Endotracheal Tube  


 


11/14/19 00:00  93      


 


11/14/19 00:00 100.3 93 19 122/47 (72) 98   


 


11/13/19 23:25  99 25     30


 


11/13/19 23:00  97 22 133/56 (81) 100   


 


11/13/19 22:00  86 23 135/47 (76) 99   


 


11/13/19 21:00  86 22 133/49 (77) 100   


 


11/13/19 20:45  91 21     30


 


11/13/19 20:00      Endotracheal Tube  


 


11/13/19 20:00 98.9 84 17 124/43 (70) 97   


 


11/13/19 20:00        30


 


11/13/19 20:00  94      


 


11/13/19 19:00  90 17 135/45 (75) 100   


 


11/13/19 18:50  100 29     30


 


11/13/19 18:00  90 24 131/48 (75) 100   


 


11/13/19 17:03  94  128/59    


 


11/13/19 17:00  86 19 128/59 (82) 100   


 


11/13/19 16:44  84 25     30


 


11/13/19 16:00        35


 


11/13/19 16:00 98.8 85 24 133/47 (75) 100   


 


11/13/19 16:00      Endotracheal Tube  


 


11/13/19 16:00  82      


 


11/13/19 15:00  89 21 126/48 (74) 100   


 


11/13/19 14:30  85 25  100 Mechanical Ventilator  35





  84 15     35





        


 


11/13/19 14:00  83 19 113/45 (67) 100   


 


11/13/19 13:00 98.9 87 21 128/41 (70) 100   


 


11/13/19 12:41  93 24     35


 


11/13/19 12:00  74      


 


11/13/19 12:00      Endotracheal Tube  


 


11/13/19 12:00        35


 


11/13/19 12:00  72 19 112/34 (60) 100   


 


11/13/19 11:30  82 23     35


 


11/13/19 11:00  77 20 121/47 (71) 100   


 


11/13/19 10:00  75 17 116/34 (61) 100   


 


11/13/19 09:30  77 24     35





        35


 


11/13/19 09:30     100   


 


11/13/19 09:00  82 17 129/47 (74) 100   


 


11/13/19 08:45  72  115/48    


 


11/13/19 08:00      Endotracheal Tube  


 


11/13/19 08:00 98.7 75 18 115/48 (70) 100   


 


11/13/19 08:00  73      


 


11/13/19 08:00        35


 


11/13/19 07:10  77 30  100 Mechanical Ventilator  35





  87 18     35


 


11/13/19 07:00  73 20 123/39 (67) 100   


 


11/13/19 06:00  83 24 135/47 (76) 100   


 


11/13/19 05:00 98.6 73 20 119/41 (67) 100   


 


11/13/19 04:58  74 18     35


 


11/13/19 04:00      Endotracheal Tube  


 


11/13/19 04:00  74 21 110/40 (63) 100   


 


11/13/19 04:00        35


 


11/13/19 03:20  92      


 


11/13/19 03:12  79 17     35


 


11/13/19 03:00  81 20 108/37 (60) 100   


 


11/13/19 02:00  93 28 133/51 (78) 75   


 


11/13/19 01:00  70 14 113/35 (61) 99   


 


11/13/19 00:43  73 18  100 Mechanical Ventilator  35





  73 20     35


 


11/13/19 00:00 98.6 69 19 104/37 (59) 99   


 


11/13/19 00:00        35


 


11/13/19 00:00      Endotracheal Tube  


 


11/12/19 23:38  73      


 


11/12/19 23:00  88 29 132/48 (76) 100   


 


11/12/19 22:58  88 31     35


 


11/12/19 22:00  80 23 112/40 (64) 97   


 


11/12/19 21:00  85 21 120/41 (67) 98   


 


11/12/19 20:41  88 21     35


 


11/12/19 20:04  115      


 


11/12/19 20:00        35


 


11/12/19 20:00      Endotracheal Tube  


 


11/12/19 20:00 99.1 99 26 129/56 (80) 78   


 


11/12/19 19:41  93 28  100 Mechanical Ventilator  35





  90 23     


 


11/12/19 19:05  92 27     35


 


11/12/19 19:00  89 24 126/56 (79) 100   


 


11/12/19 18:00  88 25 114/51 (72) 100   


 


11/12/19 17:12  93  105/41    


 


11/12/19 17:00  82 20 105/41 (62) 98   


 


11/12/19 16:45  82 20     35

















Intake and Output  


 


 11/13/19 11/14/19





 19:00 07:00


 


Intake Total 890 ml 355 ml


 


Output Total 2006 ml 1605 ml


 


Balance -1116 ml -1250 ml


 


  


 


Free Water 180 ml 


 


IV Total 110 ml 55 ml


 


Tube Feeding 600 ml 240 ml


 


Other  60 ml


 


Output Urine Total 2005 ml 1605 ml


 


Stool Total 1 ml 


 


# Bowel Movements 1 2











Labs








Test


  11/12/19


03:00 11/12/19


15:17 11/13/19


03:55 11/14/19


04:45


 


White Blood Count


  11.9 K/UL


(4.8-10.8) 


  10.9 K/UL


(4.8-10.8) 12.3 K/UL


(4.8-10.8)


 


Red Blood Count


  2.96 M/UL


(4.20-5.40) 


  2.84 M/UL


(4.20-5.40) 3.18 M/UL


(4.20-5.40)


 


Hemoglobin


  8.0 G/DL


(12.0-16.0) 


  7.6 G/DL


(12.0-16.0) 8.4 G/DL


(12.0-16.0)


 


Hematocrit


  24.7 %


(37.0-47.0) 


  23.5 %


(37.0-47.0) 26.3 %


(37.0-47.0)


 


Mean Corpuscular Volume 83 FL (80-99)   83 FL (80-99)  83 FL (80-99) 


 


Mean Corpuscular Hemoglobin


  27.1 PG


(27.0-31.0) 


  26.7 PG


(27.0-31.0) 26.6 PG


(27.0-31.0)


 


Mean Corpuscular Hemoglobin


Concent 32.4 G/DL


(32.0-36.0) 


  32.4 G/DL


(32.0-36.0) 32.1 G/DL


(32.0-36.0)


 


Red Cell Distribution Width


  14.5 %


(11.6-14.8) 


  14.3 %


(11.6-14.8) 14.0 %


(11.6-14.8)


 


Platelet Count


  389 K/UL


(150-450) 


  385 K/UL


(150-450) 444 K/UL


(150-450)


 


Mean Platelet Volume


  8.2 FL


(6.5-10.1) 


  8.5 FL


(6.5-10.1) 8.2 FL


(6.5-10.1)


 


Neutrophils (%) (Auto)


  61.5 %


(45.0-75.0) 


   % (45.0-75.0) 


  56.2 %


(45.0-75.0)


 


Lymphocytes (%) (Auto)


  15.4 %


(20.0-45.0) 


   % (20.0-45.0) 


  22.8 %


(20.0-45.0)


 


Monocytes (%) (Auto)


  11.4 %


(1.0-10.0) 


   % (1.0-10.0) 


  6.6 %


(1.0-10.0)


 


Eosinophils (%) (Auto)


  9.2 %


(0.0-3.0) 


   % (0.0-3.0) 


  11.6 %


(0.0-3.0)


 


Basophils (%) (Auto)


  2.6 %


(0.0-2.0) 


   % (0.0-2.0) 


  2.9 %


(0.0-2.0)


 


Sodium Level


  142 MMOL/L


(136-145) 


  144 MMOL/L


(136-145) 139 MMOL/L


(136-145)


 


Potassium Level


  4.6 MMOL/L


(3.5-5.1) 


  4.2 MMOL/L


(3.5-5.1) 3.8 MMOL/L


(3.5-5.1)


 


Chloride Level


  105 MMOL/L


() 


  104 MMOL/L


() 99 MMOL/L


()


 


Carbon Dioxide Level


  39 MMOL/L


(21-32) 


  35 MMOL/L


(21-32) 35 MMOL/L


(21-32)


 


Anion Gap


  -2 mmol/L


(5-15) 


  5 mmol/L


(5-15) 5 mmol/L


(5-15)


 


Blood Urea Nitrogen


  32 mg/dL


(7-18) 


  32 mg/dL


(7-18) 46 mg/dL


(7-18)


 


Creatinine


  1.2 MG/DL


(0.55-1.30) 


  1.2 MG/DL


(0.55-1.30) 1.3 MG/DL


(0.55-1.30)


 


Estimat Glomerular Filtration


Rate 45.0 mL/min


(>60) 


  45.0 mL/min


(>60) 41.0 mL/min


(>60)


 


Glucose Level


  132 MG/DL


() 


  174 MG/DL


() 185 MG/DL


()


 


Calcium Level


  8.4 MG/DL


(8.5-10.1) 


  8.4 MG/DL


(8.5-10.1) 9.0 MG/DL


(8.5-10.1)


 


Phosphorus Level


  3.1 MG/DL


(2.5-4.9) 


  3.2 MG/DL


(2.5-4.9) 3.4 MG/DL


(2.5-4.9)


 


Magnesium Level


  2.3 MG/DL


(1.8-2.4) 


  2.2 MG/DL


(1.8-2.4) 2.4 MG/DL


(1.8-2.4)


 


Arterial Blood pH


  


  7.482


(7.350-7.450) 


  


 


 


Arterial Blood Partial


Pressure CO2 


  43.4 mmHg


(35.0-45.0) 


  


 


 


Arterial Blood Partial


Pressure O2 


  102.6 mmHg


(75.0-100.0) 


  


 


 


Arterial Blood HCO3


  


  31.7 mmol/L


(22.0-26.0) 


  


 


 


Arterial Blood Oxygen


Saturation 


  97.6 %


() 


  


 


 


Arterial Blood Base Excess  7.5 (-2-2)   


 


Graham Test  Positive   


 


Prothrombin Time


  


  


  


  10.7 SEC


(9.30-11.50)


 


Prothromb Time International


Ratio 


  


  


  1.0 (0.9-1.1) 


 


 


Activated Partial


Thromboplast Time 


  


  


  31 SEC (23-33) 


 


 


Total Bilirubin


  


  


  


  0.5 MG/DL


(0.2-1.0)


 


Aspartate Amino Transf


(AST/SGOT) 


  


  


  11 U/L (15-37) 


 


 


Alanine Aminotransferase


(ALT/SGPT) 


  


  


  13 U/L (12-78) 


 


 


Alkaline Phosphatase


  


  


  


  83 U/L


()


 


Total Protein


  


  


  


  7.3 G/DL


(6.4-8.2)


 


Albumin


  


  


  


  2.2 G/DL


(3.4-5.0)


 


Globulin    5.1 g/dL 


 


Albumin/Globulin Ratio    0.4 (1.0-2.7) 








Height (Feet):  5


Height (Inches):  5.00


Weight (Pounds):  130


Objective


Physical Exam:


Vitals: reviewed


General Appearance:  NAD


HEENT:  normocephalic, atraumatic


Neck:  non-tender, normal alignment, trach++


Respiratory/Chest:  normal breath sounds bilaterally ++ vent


Cardiovascular/Chest: rrr


Abdomen:  normal bowel sounds, soft, nontender ++ peg


Extremities:  normal range of motion











Mike Pop MD Nov 14, 2019 16:25

## 2019-11-14 NOTE — NUR
NURSE NOTES:

started tube feeding, glucerna 1.2 at 30ml/hr via G-tube. Dressing intact, clean and dry. 
HOB kept elevated.

## 2019-11-14 NOTE — NUR
NURSE NOTES:

received report Count includes the [facility] rn pt post op trach today drowsy open eyes to touch does not follows 
command trach -vent  o2 sat 100 o/o dressing dry and intact no acute resp distress note  
reposition and suction tolerating tube feeding  on residual iv infusing rt upper arm 
dressing dry and intact

## 2019-11-14 NOTE — GENERAL PROGRESS NOTE
Assessment/Plan


Status:  unchanged


Assessment/Plan:


S, O: On Trach,  , vent setting reviewed. No pressor. awake, limited following 

commands





PHYSICAL EXAMINATION:HEAD AND NECK:  Trached, site is clean .


CHEST:  Bronchial breathing sounds.ABDOMEN:  Soft.  No organomegaly.


MUSCULOSKELETAL:  Diffuse 1+ or 2+ nonpitting edema in all four contracted


extremities.  The patient is not following the commands.  Limited


evaluation.NEUROLOGY:  The patient is awake.  Not alert.  Not verbally


communicative.





LABORATORY DATA:  Labs dated Nov 14, 2019  reviewed





Meds: Reviewed and reconciled in the chart





Imaging:  CXR reviewed 





ASSESSMENT AND PLAN:


1. VDRF S/P trach


2. Sepsis.  secondary to healthcare-associated pneumonia or


3. Acute chf exacerbation - Diastolic


4. Chronic encephalomalacia.


3. Acute on chronic anemia.


4. Renal failure, age indeterminate.


6. Hyperthyroidism.


7. Hyperkalemia.


8. Diabetes type 2, uncontrolled with A1c of 10.


9. Psychiatric disorder.


10. GI and DVT prophylaxis.


11. PAH- Severe 





PLAN OF CARE:





Post PEG tube


Out patient followup for abnormal incidental imaging finding ( possibility of 

CA cant be excluded 


Acute drop in Hgb and observation of UGI bleeding, Will hold DAPT


notes from pulmonary reviewed


Post Trach placement





Subjective


Allergies:  


Coded Allergies:  


     No Known Allergies (Unverified , 10/14/19)





Objective





Last 24 Hour Vital Signs








  Date Time  Temp Pulse Resp B/P (MAP) Pulse Ox O2 Delivery O2 Flow Rate FiO2


 


11/14/19 14:00  85 16 123/50 (74) 100   


 


11/14/19 13:09  87 14     30


 


11/14/19 13:00  96 18 124/54 (77) 99   


 


11/14/19 12:30  105 19 143/60 (87) 99   


 


11/14/19 12:15  117 19 172/62 (98) 99   


 


11/14/19 12:00      Mechanical Ventilator  


 


11/14/19 12:00 99.0 121 17 174/71 (105) 98   


 


11/14/19 12:00  120      


 


11/14/19 11:30  114 15 170/71 (104) 99   


 


11/14/19 11:02  101 18     30


 


11/14/19 11:00  100 18 147/52 (83) 100   


 


11/14/19 10:45  99 20 159/53 (88) 100   


 


11/14/19 10:30  97 18 142/56 (84) 100   


 


11/14/19 10:15  89 18 152/53 (86) 100   


 


11/14/19 10:12  84 14  100   


 


11/14/19 10:10  88 14  100   


 


11/14/19 10:00  86 18 152/53 (86) 100   


 


11/14/19 10:00        30


 


11/14/19 09:00  84 18 121/40 (67) 100   


 


11/14/19 09:00  90  121/59    


 


11/14/19 08:36     100   


 


11/14/19 08:35  85 17     30


 


11/14/19 08:00      Endotracheal Tube  


 


11/14/19 08:00        30


 


11/14/19 08:00  82      


 


11/14/19 08:00 98.8 83 24 127/52 (77) 100   


 


11/14/19 07:36  80 26  100 Mechanical Ventilator  30





  88 26     30





        30


 


11/14/19 07:00  78 20 117/43 (67) 100   


 


11/14/19 06:00  89 23 145/42 (76) 100   


 


11/14/19 05:26  85 19     30


 


11/14/19 05:00  84 20 130/39 (69) 100   


 


11/14/19 04:00      Endotracheal Tube  


 


11/14/19 04:00        30


 


11/14/19 04:00 98.7 84 23 142/51 (81) 100   


 


11/14/19 04:00  78      


 


11/14/19 03:21  78 21     30


 


11/14/19 03:00  79 16 127/44 (71) 99   


 


11/14/19 02:00 99.1 78 16 110/50 (70) 100   


 


11/14/19 01:33  81 21  98 Mechanical Ventilator  35





  81 21     35


 


11/14/19 01:00 100.3 88 24 144/46 (78) 99   


 


11/14/19 00:59 100.3       


 


11/14/19 00:00      Endotracheal Tube  


 


11/14/19 00:00  93      


 


11/14/19 00:00 100.3 93 19 122/47 (72) 98   


 


11/13/19 23:25  99 25     30


 


11/13/19 23:00  97 22 133/56 (81) 100   


 


11/13/19 22:00  86 23 135/47 (76) 99   


 


11/13/19 21:00  86 22 133/49 (77) 100   


 


11/13/19 20:45  91 21     30


 


11/13/19 20:00      Endotracheal Tube  


 


11/13/19 20:00 98.9 84 17 124/43 (70) 97   


 


11/13/19 20:00        30


 


11/13/19 20:00  94      


 


11/13/19 19:00  90 17 135/45 (75) 100   


 


11/13/19 18:50  100 29     30


 


11/13/19 18:00  90 24 131/48 (75) 100   


 


11/13/19 17:03  94  128/59    


 


11/13/19 17:00  86 19 128/59 (82) 100   


 


11/13/19 16:44  84 25     30


 


11/13/19 16:00        35


 


11/13/19 16:00 98.8 85 24 133/47 (75) 100   


 


11/13/19 16:00      Endotracheal Tube  


 


11/13/19 16:00  82      

















Intake and Output  


 


 11/13/19 11/14/19





 19:00 07:00


 


Intake Total 890 ml 355 ml


 


Output Total 2006 ml 1605 ml


 


Balance -1116 ml -1250 ml


 


  


 


Free Water 180 ml 


 


IV Total 110 ml 55 ml


 


Tube Feeding 600 ml 240 ml


 


Other  60 ml


 


Output Urine Total 2005 ml 1605 ml


 


Stool Total 1 ml 


 


# Bowel Movements 1 2








Laboratory Tests


11/14/19 04:45: 


White Blood Count 12.3H, Red Blood Count 3.18L, Hemoglobin 8.4L, Hematocrit 

26.3L, Mean Corpuscular Volume 83, Mean Corpuscular Hemoglobin 26.6L, Mean 

Corpuscular Hemoglobin Concent 32.1, Red Cell Distribution Width 14.0, Platelet 

Count 444, Mean Platelet Volume 8.2, Neutrophils (%) (Auto) 56.2, Lymphocytes (%

) (Auto) 22.8, Monocytes (%) (Auto) 6.6, Eosinophils (%) (Auto) 11.6H, 

Basophils (%) (Auto) 2.9H, Prothrombin Time 10.7, Prothromb Time International 

Ratio 1.0, Activated Partial Thromboplast Time 31, Sodium Level 139, Potassium 

Level 3.8, Chloride Level 99, Carbon Dioxide Level 35H, Anion Gap 5, Blood Urea 

Nitrogen 46H, Creatinine 1.3, Estimat Glomerular Filtration Rate 41.0, Glucose 

Level 185H, Calcium Level 9.0, Phosphorus Level 3.4, Magnesium Level 2.4, Total 

Bilirubin 0.5, Aspartate Amino Transf (AST/SGOT) 11L, Alanine Aminotransferase (

ALT/SGPT) 13, Alkaline Phosphatase 83, Total Protein 7.3, Albumin 2.2L, 

Globulin 5.1, Albumin/Globulin Ratio 0.4L


Height (Feet):  5


Height (Inches):  5.00


Weight (Pounds):  130











Garrick Keith MD Nov 14, 2019 15:09

## 2019-11-14 NOTE — NUR
CASE MANAGEMENT: REVIEW



11/14/19



SI: SEPSIS D/T PNA. ACUTE CHF

RESPIRATORY FAILURE ~ INTUBATED 

98.8   83   24   127/52  100% ON VENT SUPPORT @ 30% FIO2

RBC 3.18 H/H-8.4/26.5  CO2+35   BUN+35 







IS: IV MEROPENEM Q8HRS

IV LASIX Q12HR

METOLAZONE NG Q24HR

IV PROTONIX Q12

NORVASC NG BID

DUONEB INH Q6HRS RTC

K-DUR GT QD





**: ICU STATUS



PLAN: TRACHEOSTOMY TODAY

## 2019-11-14 NOTE — DIAGNOSTIC IMAGING REPORT
Indication: Dyspnea

 

Comparison:  11/12/2019

 

A single view chest radiograph was obtained.

 

Findings:

 

Tracheostomy is in good position. No pneumothorax identified. Lungs are clear. CHF

has improved significantly since the last exam. Heart size is normal. Right-sided

PICC line is stable. Left costophrenic angle is slightly blunted likely on the basis

of a small pleural effusion.

 

IMPRESSION:

 

Status post tracheostomy placement.

## 2019-11-14 NOTE — GENERAL PROGRESS NOTE
Assessment/Plan


Problem List:  


(1) Abnormal TSH


ICD Codes:  R79.89 - Other specified abnormal findings of blood chemistry


SNOMED:  457809305


(2) Hyperglycemia due to type 2 diabetes mellitus


ICD Codes:  E11.65 - Type 2 diabetes mellitus with hyperglycemia


SNOMED:  231189886444417, [id number]


Qualifiers:  


   Qualified Codes:  E11.65 - Type 2 diabetes mellitus with hyperglycemia; 

Z79.4 - Long term (current) use of insulin


(3) Acute metabolic encephalopathy


ICD Codes:  G93.41 - Metabolic encephalopathy


SNOMED:  44839297, [id number]


(4) ARF (acute renal failure)


ICD Codes:  N17.9 - Acute kidney failure, unspecified


SNOMED:  77158840


Qualifiers:  


   Qualified Codes:  N17.9 - Acute kidney failure, unspecified


(5) Healthcare associated bacterial pneumonia


ICD Codes:  J15.9 - Unspecified bacterial pneumonia


SNOMED:  391960821


Status:  unchanged


Assessment/Plan:


increase Levemir to 10 units qhs 


continue NISS every 6 hours 


hypoglycemia protocol in order





Subjective


ROS Limited/Unobtainable:  Yes


Allergies:  


Coded Allergies:  


     No Known Allergies (Unverified , 10/14/19)


Subjective


events noted 


glucose slightly on higher side 











Item Value  Date Time


 


Bedside Blood Glucose 189 mg/dl H 11/14/19 0600


 


Bedside Blood Glucose 230 mg/dl H 11/14/19 0000


 


Bedside Blood Glucose 203 mg/dl H 11/13/19 2121


 


Bedside Blood Glucose 176 mg/dl H 11/13/19 1800


 


Bedside Blood Glucose 139 mg/dl H 11/13/19 1200


 


Bedside Blood Glucose 200 mg/dl H 11/13/19 0531


 


Bedside Blood Glucose 225 mg/dl H 11/12/19 2335











Objective





Last 24 Hour Vital Signs








  Date Time  Temp Pulse Resp B/P (MAP) Pulse Ox O2 Delivery O2 Flow Rate FiO2


 


11/14/19 06:00  89 23 145/42 (76) 100   


 


11/14/19 05:26  85 19     30


 


11/14/19 05:00  84 20 130/39 (69) 100   


 


11/14/19 04:00      Endotracheal Tube  


 


11/14/19 04:00        30


 


11/14/19 04:00 98.7 84 23 142/51 (81) 100   


 


11/14/19 04:00  78      


 


11/14/19 03:21  78 21     30


 


11/14/19 03:00  79 16 127/44 (71) 99   


 


11/14/19 02:00 99.1 78 16 110/50 (70) 100   


 


11/14/19 01:33  81 21  98 Mechanical Ventilator  35





  81 21     35


 


11/14/19 01:00 100.3 88 24 144/46 (78) 99   


 


11/14/19 00:59 100.3       


 


11/14/19 00:00      Endotracheal Tube  


 


11/14/19 00:00  93      


 


11/14/19 00:00 100.3 93 19 122/47 (72) 98   


 


11/13/19 23:25  99 25     30


 


11/13/19 23:00  97 22 133/56 (81) 100   


 


11/13/19 22:00  86 23 135/47 (76) 99   


 


11/13/19 21:00  86 22 133/49 (77) 100   


 


11/13/19 20:45  91 21     30


 


11/13/19 20:00      Endotracheal Tube  


 


11/13/19 20:00 98.9 84 17 124/43 (70) 97   


 


11/13/19 20:00        30


 


11/13/19 20:00  94      


 


11/13/19 19:00  90 17 135/45 (75) 100   


 


11/13/19 18:50  100 29     30


 


11/13/19 18:00  90 24 131/48 (75) 100   


 


11/13/19 17:03  94  128/59    


 


11/13/19 17:00  86 19 128/59 (82) 100   


 


11/13/19 16:44  84 25     30


 


11/13/19 16:00        35


 


11/13/19 16:00 98.8 85 24 133/47 (75) 100   


 


11/13/19 16:00      Endotracheal Tube  


 


11/13/19 16:00  82      


 


11/13/19 15:00  89 21 126/48 (74) 100   


 


11/13/19 14:30  85 25  100 Mechanical Ventilator  35





  84 15     35





        


 


11/13/19 14:00  83 19 113/45 (67) 100   


 


11/13/19 13:00 98.9 87 21 128/41 (70) 100   


 


11/13/19 12:41  93 24     35


 


11/13/19 12:00  74      


 


11/13/19 12:00      Endotracheal Tube  


 


11/13/19 12:00        35


 


11/13/19 12:00  72 19 112/34 (60) 100   


 


11/13/19 11:30  82 23     35


 


11/13/19 11:00  77 20 121/47 (71) 100   


 


11/13/19 10:00  75 17 116/34 (61) 100   


 


11/13/19 09:30  77 24     35





        35


 


11/13/19 09:30     100   


 


11/13/19 09:00  82 17 129/47 (74) 100   


 


11/13/19 08:45  72  115/48    


 


11/13/19 08:00      Endotracheal Tube  


 


11/13/19 08:00 98.7 75 18 115/48 (70) 100   


 


11/13/19 08:00  73      


 


11/13/19 08:00        35


 


11/13/19 07:10  77 30  100 Mechanical Ventilator  35





  87 18     35


 


11/13/19 07:00  73 20 123/39 (67) 100   

















Intake and Output  


 


 11/13/19 11/14/19





 18:59 06:59


 


Intake Total 830 ml 415 ml


 


Output Total 1976 ml 1595 ml


 


Balance -1146 ml -1180 ml


 


  


 


Free Water 180 ml 


 


IV Total 110 ml 55 ml


 


Tube Feeding 540 ml 300 ml


 


Other  60 ml


 


Output Urine Total 1975 ml 1595 ml


 


Stool Total 1 ml 


 


# Bowel Movements 1 2








Laboratory Tests


11/14/19 04:45: 


White Blood Count 12.3H, Red Blood Count 3.18L, Hemoglobin 8.4L, Hematocrit 

26.3L, Mean Corpuscular Volume 83, Mean Corpuscular Hemoglobin 26.6L, Mean 

Corpuscular Hemoglobin Concent 32.1, Red Cell Distribution Width 14.0, Platelet 

Count 444, Mean Platelet Volume 8.2, Neutrophils (%) (Auto) 56.2, Lymphocytes (%

) (Auto) 22.8, Monocytes (%) (Auto) 6.6, Eosinophils (%) (Auto) 11.6H, 

Basophils (%) (Auto) 2.9H, Prothrombin Time 10.7, Prothromb Time International 

Ratio 1.0, Activated Partial Thromboplast Time 31, Sodium Level 139, Potassium 

Level 3.8, Chloride Level 99, Carbon Dioxide Level 35H, Anion Gap 5, Blood Urea 

Nitrogen 46H, Creatinine 1.3, Estimat Glomerular Filtration Rate 41.0, Glucose 

Level 185H, Calcium Level 9.0, Total Bilirubin 0.5, Aspartate Amino Transf (AST/

SGOT) 11L, Alanine Aminotransferase (ALT/SGPT) 13, Alkaline Phosphatase 83, 

Total Protein 7.3, Albumin 2.2L, Globulin 5.1, Albumin/Globulin Ratio 0.4L


Height (Feet):  5


Height (Inches):  5.00


Weight (Pounds):  130


General Appearance:  lethargic


Neck:  normal alignment


Cardiovascular:  normal rate


Respiratory/Chest:  decreased breath sounds


Abdomen:  normal bowel sounds


Edema:  1+ Arm (L), 1+ Arm (R), 1+ Leg (L), 1+ Leg (R), 1+ Pedal (L), 1+ Pedal (

R), 1+ Generalized


Objective





Current Medications








 Medications


  (Trade)  Dose


 Ordered  Sig/Winnie


 Route


 PRN Reason  Start Time


 Stop Time Status Last Admin


Dose Admin


 


 Acetaminophen


  (Tylenol)  650 mg  Q4H  PRN


 NG


 Mild Pain/Temp > 100.5  11/3/19 20:30


 11/25/19 20:29  11/13/19 23:35


 


 


 Acetaminophen


  (Tylenol)  650 mg  Q4H  PRN


 RECTAL


 TEMP>100.5  11/3/19 20:30


 12/2/19 20:29   


 


 


 Acetylcysteine


  (Mucomyst)  100 mg  TIDRT


 [facility]


   11/10/19 19:30


 12/10/19 19:29  11/13/19 18:50


 


 


 Albuterol/


 Ipratropium


  (Albuterol/


 Ipratropium)  3 ml  Q6HRT


 N


   11/12/19 13:00


 11/17/19 12:59  11/14/19 01:33


 


 


 Amlodipine


 Besylate


  (Norvasc)  2.5 mg  BID


 NG


   11/4/19 09:00


 11/29/19 17:59  11/11/19 17:02


 


 


 Chlorhexidine


 Gluconate


  (Mae-Hex 2%)  1 applic  DAILY@2000


 TOPIC


   11/5/19 20:00


 12/5/19 19:59  11/13/19 20:18


 


 


 Dextrose


  (Dextrose 50%)  25 ml  Q30M  PRN


 IV


 Hypoglycemia  11/5/19 06:30


 12/5/19 06:29   


 


 


 Dextrose


  (Dextrose 50%)  50 ml  Q30M  PRN


 IV


 Hypoglycemia  11/5/19 06:30


 12/5/19 06:29   


 


 


 Divalproex Sodium


  (Depakote


 Sprinkles)  500 mg  Q12HR


 NG


   11/3/19 21:00


 11/14/19 21:59  11/13/19 21:09


 


 


 Folic Acid


  (Folate)  3 mg  DAILY


 GT


   11/7/19 09:00


 11/30/19 08:59  11/13/19 08:44


 


 


 Furosemide


  (Lasix)  40 mg  EVERY 12  HOURS


 IV


   11/7/19 21:00


 12/7/19 20:59  11/13/19 21:10


 


 


 Insulin Aspart


  (NovoLOG)    EVERY 6  HOURS


 SUBQ


   11/4/19 00:00


 11/29/19 11:59  11/14/19 05:48


 


 


 Insulin Detemir


  (Levemir)  8 units  QHS


 SUBQ


   11/6/19 21:00


 12/1/19 08:59  11/13/19 21:21


 


 


 Lactulose


  (Cephulac)  20 gm  Q8H  PRN


 NG


 Constipation  11/3/19 20:30


 12/3/19 20:29  11/5/19 08:12


 


 


 Lorazepam


  (Ativan 2mg/ml


 1ml)  2 mg  Q4H  PRN


 IV


 for agitation  11/10/19 19:30


 11/17/19 19:29  11/12/19 20:37


 


 


 Meropenem 1 gm/


 Sodium Chloride  55 ml @ 


 110 mls/hr  Q8H


 IVPB


   11/4/19 17:00


 11/14/19 16:59  11/14/19 01:39


 


 


 Metoclopramide HCl


  (Reglan)  10 mg  Q6H  PRN


 IVP


 Nausea & Vomiting  11/6/19 10:45


 12/6/19 10:44   


 


 


 Metolazone


  (Zaroxolyn)  5 mg  Q24H


 NG


   11/12/19 08:00


 12/12/19 07:59  11/13/19 07:23


 


 


 Pantoprazole


  (Protonix)  40 mg  EVERY 12  HOURS


 IVP


   11/3/19 21:00


 12/3/19 08:59  11/13/19 21:09


 


 


 Potassium Chloride


  (K-Dur)  40 meq  DAILY


 GT


   11/6/19 09:00


 12/5/19 17:59  11/13/19 08:44


 

















Riccardo Velez MD Nov 14, 2019 06:33

## 2019-11-14 NOTE — NUR
NURSE NOTES:

Patient was turned and repositioned. Patient kept clean and dry. no s/sx of pain noted. VSS

-------------------------------------------------------------------------------

Addendum: 11/14/19 at 1848 by SOYEON HUH RN

-------------------------------------------------------------------------------

Patient opens eyes to painful stimuli. No distress noted.

## 2019-11-14 NOTE — NUR
NURSE NOTES:

patient resting in bed comfortably. No distress noted. Bilateral soft wrist restraints were 
removed. no skin breakdown noted. pulses present. will continue to monitor.

## 2019-11-14 NOTE — BRIEF OPERATIVE NOTE
Immediate Post Operative Note


Operative Note


Pre-op Diagnosis:


respiratory insufficiency requiring prolonged ventilatory support


Procedure:


tracheostomy


Post-op Diagnosis:  same as pre-op


Surgeon:  corry


Anesthesiologist:  elenita


Anesthesia:  general, local


Specimen:  none


Complications:  none


Condition:  stable


Fluids:  see records


Estimated Blood Loss:  minimal


Drains:  none


Implant(s) used?:  Radhames Grover Nov 14, 2019 11:20

## 2019-11-14 NOTE — NUR
NURSE NOTES:

Patient back from OR. Placed the patient back on cardiac monitor, SR, VSS. Trach to vent, 
shiley 8.0, trach site dressing intact, clean, no bleeding noted. Patient resting in bed, no 
distress noted.

## 2019-11-14 NOTE — NUR
NURSE NOTES:

Patient resting in bed comfortably. No distress noted. HOB kept elevated. patient tolerating 
tube feeding. VSS. Trach site clean.

## 2019-11-14 NOTE — CARDIOLOGY PROGRESS NOTE
Assessment/Plan


Assessment/Plan


1. acute congestive heart failure


2. Abnormal cardiac enzyme demand related due to infection and profound anemia 


3. Agitation 


4. Urinary tract infection.


5. Acute renal failure.


6. Profound anemia.


7. Diabetes mellitus.


8. History of hypertension.


9. History of mood disorder.


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 


11. arf 


12. pelvic mass


13  sepsis 


14. pneumonia 








off dapt due to suspected bleeding


echo mild systolic dysfunction 


in icu on  a vent 


iv abx 


s/p peg  


lasix iv bid with zoroxolyn daily 


tele sinus 


d/w rn 


s/p trach today remains on the vent





Objective





Last 24 Hour Vital Signs








  Date Time  Temp Pulse Resp B/P (MAP) Pulse Ox O2 Delivery O2 Flow Rate FiO2


 


11/14/19 19:52  75 14  100 Mechanical Ventilator  30





  79 14     30


 


11/14/19 19:00  82 19 121/46 (71) 100   


 


11/14/19 18:00  84 19 121/52 (75) 100   


 


11/14/19 18:00  84  121/52    


 


11/14/19 17:15  85 26     30


 


11/14/19 17:00  90 18 138/58 (84) 100   


 


11/14/19 16:00        30


 


11/14/19 16:00  79      


 


11/14/19 16:00 98.9 82 18 127/51 (76) 100   


 


11/14/19 16:00      Mechanical Ventilator  


 


11/14/19 15:11  81 14     30


 


11/14/19 15:00  80 18 120/44 (69) 100   


 


11/14/19 14:00  85 16 123/50 (74) 100   


 


11/14/19 13:09  87 14     30


 


11/14/19 13:00  96 18 124/54 (77) 99   


 


11/14/19 12:30  105 19 143/60 (87) 99   


 


11/14/19 12:15  117 19 172/62 (98) 99   


 


11/14/19 12:00      Mechanical Ventilator  


 


11/14/19 12:00 99.0 121 17 174/71 (105) 98   


 


11/14/19 12:00  120      


 


11/14/19 11:30  114 15 170/71 (104) 99   


 


11/14/19 11:02  101 18     30


 


11/14/19 11:00  100 18 147/52 (83) 100   


 


11/14/19 10:45  99 20 159/53 (88) 100   


 


11/14/19 10:30  97 18 142/56 (84) 100   


 


11/14/19 10:15  89 18 152/53 (86) 100   


 


11/14/19 10:12  84 14  100   


 


11/14/19 10:10  88 14  100   


 


11/14/19 10:00  86 18 152/53 (86) 100   


 


11/14/19 10:00        30


 


11/14/19 09:00  84 18 121/40 (67) 100   


 


11/14/19 09:00  90  121/59    


 


11/14/19 08:36     100   


 


11/14/19 08:35  85 17     30


 


11/14/19 08:00      Endotracheal Tube  


 


11/14/19 08:00        30


 


11/14/19 08:00  82      


 


11/14/19 08:00 98.8 83 24 127/52 (77) 100   


 


11/14/19 07:36  80 26  100 Mechanical Ventilator  30





  88 26     30





        30


 


11/14/19 07:00  78 20 117/43 (67) 100   


 


11/14/19 06:00  89 23 145/42 (76) 100   


 


11/14/19 05:26  85 19     30


 


11/14/19 05:00  84 20 130/39 (69) 100   


 


11/14/19 04:00      Endotracheal Tube  


 


11/14/19 04:00        30


 


11/14/19 04:00 98.7 84 23 142/51 (81) 100   


 


11/14/19 04:00  78      


 


11/14/19 03:21  78 21     30


 


11/14/19 03:00  79 16 127/44 (71) 99   


 


11/14/19 02:00 99.1 78 16 110/50 (70) 100   


 


11/14/19 01:33  81 21  98 Mechanical Ventilator  35





  81 21     35


 


11/14/19 01:00 100.3 88 24 144/46 (78) 99   


 


11/14/19 00:59 100.3       


 


11/14/19 00:00      Endotracheal Tube  


 


11/14/19 00:00  93      


 


11/14/19 00:00 100.3 93 19 122/47 (72) 98   


 


11/13/19 23:25  99 25     30


 


11/13/19 23:00  97 22 133/56 (81) 100   


 


11/13/19 22:00  86 23 135/47 (76) 99   


 


11/13/19 21:00  86 22 133/49 (77) 100   


 


11/13/19 20:45  91 21     30

















Intake and Output  


 


 11/13/19 11/14/19





 19:00 07:00


 


Intake Total 890 ml 355 ml


 


Output Total 2006 ml 1605 ml


 


Balance -1116 ml -1250 ml


 


  


 


Free Water 180 ml 


 


IV Total 110 ml 55 ml


 


Tube Feeding 600 ml 240 ml


 


Other  60 ml


 


Output Urine Total 2005 ml 1605 ml


 


Stool Total 1 ml 


 


# Bowel Movements 1 2











Laboratory Tests








Test


  11/14/19


04:45


 


White Blood Count


  12.3 K/UL


(4.8-10.8)  H


 


Red Blood Count


  3.18 M/UL


(4.20-5.40)  L


 


Hemoglobin


  8.4 G/DL


(12.0-16.0)  L


 


Hematocrit


  26.3 %


(37.0-47.0)  L


 


Mean Corpuscular Volume 83 FL (80-99)  


 


Mean Corpuscular Hemoglobin


  26.6 PG


(27.0-31.0)  L


 


Mean Corpuscular Hemoglobin


Concent 32.1 G/DL


(32.0-36.0)


 


Red Cell Distribution Width


  14.0 %


(11.6-14.8)


 


Platelet Count


  444 K/UL


(150-450)


 


Mean Platelet Volume


  8.2 FL


(6.5-10.1)


 


Neutrophils (%) (Auto)


  56.2 %


(45.0-75.0)


 


Lymphocytes (%) (Auto)


  22.8 %


(20.0-45.0)


 


Monocytes (%) (Auto)


  6.6 %


(1.0-10.0)


 


Eosinophils (%) (Auto)


  11.6 %


(0.0-3.0)  H


 


Basophils (%) (Auto)


  2.9 %


(0.0-2.0)  H


 


Prothrombin Time


  10.7 SEC


(9.30-11.50)


 


Prothromb Time International


Ratio 1.0 (0.9-1.1)  


 


 


Activated Partial


Thromboplast Time 31 SEC (23-33)


 


 


Sodium Level


  139 MMOL/L


(136-145)


 


Potassium Level


  3.8 MMOL/L


(3.5-5.1)


 


Chloride Level


  99 MMOL/L


()


 


Carbon Dioxide Level


  35 MMOL/L


(21-32)  H


 


Anion Gap


  5 mmol/L


(5-15)


 


Blood Urea Nitrogen


  46 mg/dL


(7-18)  H


 


Creatinine


  1.3 MG/DL


(0.55-1.30)


 


Estimat Glomerular Filtration


Rate 41.0 mL/min


(>60)


 


Glucose Level


  185 MG/DL


()  H


 


Calcium Level


  9.0 MG/DL


(8.5-10.1)


 


Phosphorus Level


  3.4 MG/DL


(2.5-4.9)


 


Magnesium Level


  2.4 MG/DL


(1.8-2.4)


 


Total Bilirubin


  0.5 MG/DL


(0.2-1.0)


 


Aspartate Amino Transf


(AST/SGOT) 11 U/L (15-37)


L


 


Alanine Aminotransferase


(ALT/SGPT) 13 U/L (12-78)


 


 


Alkaline Phosphatase


  83 U/L


()


 


Total Protein


  7.3 G/DL


(6.4-8.2)


 


Albumin


  2.2 G/DL


(3.4-5.0)  L


 


Globulin 5.1 g/dL  


 


Albumin/Globulin Ratio


  0.4 (1.0-2.7)


L











Microbiology








 Date/Time


Source Procedure


Growth Status


 


 


 11/12/19 15:30


Blood Blood Culture - Preliminary


NO GROWTH AFTER 24 HOURS Resulted


 


 11/12/19 14:45


Blood Blood Culture - Preliminary


NO GROWTH AFTER 24 HOURS Resulted

















Rob May MD Nov 14, 2019 20:13

## 2019-11-14 NOTE — INFECTIOUS DISEASES PROG NOTE
Assessment/Plan


Problems:  


(1) Fever


Assessment & Plan:  suspect due to pelvic mass , with negative repeated blood 

cultures , urine and sputum only grew yeast which is colonization . will 

monitor off antibiotics for now done with her course today  





(2) Aspiration pneumonia


Assessment & Plan:  with B/L infiltrates, hypoxemia and leukocytosis, CXR  

showed interstitial infiltrates , sputum culture showed normal agustin and 

repeated one shoed yeast which is colonization . S/P meropenem  empirically for 

two weeks . aspiration precaution. EOT 11/14/19





(3) Respiratory failure


Assessment & Plan:  with maegan cardia and S/P code blue, was intubated and 

started on mechanical ventilation , S/P tracheostomy , monitor CXR and ABG , 

pulmonary is following 





(4) UTI (urinary tract infection)


Assessment & Plan:  due to candida lusitaneae , S/P casas catheter removal , no 

specific therapy needed for now after changing her casas catheter 





(5) Acute metabolic encephalopathy


Assessment & Plan:  due to the above, continue hydration and antibiotics, 

monitor in tele 





(6) Hyperglycemia due to type 2 diabetes mellitus


Assessment & Plan:  recommend tight glycemic control to keep blood glucose 

between 100-140 





(7) ARF (acute renal failure)


Assessment & Plan:  due to the above, continue hydration and renally dosed 

antibiotics, close  monitor of renal function , stop vancomycin 





(8) Pelvic mass in female


Assessment & Plan:  to the left of the uterus, suspect malignancy , may need 

surgical resection , recommend OB/GYN eval and follow up , oncology is 

following 








Subjective


ROS Limited/Unobtainable:  Yes


Allergies:  


Coded Allergies:  


     No Known Allergies (Unverified , 10/14/19)


Subjective


she had tracheostomy done , and continued on mechanical ventilation , tolerated 

procedure well, no bleeding or draining from the trach site ,  comfortable in 

bed, and responsive to verbal commands still in ICU , had low grade fever but 

no chills  , no diarrhea, no secretions from the trach





Objective


Vital Signs





Last 24 Hour Vital Signs








  Date Time  Temp Pulse Resp B/P (MAP) Pulse Ox O2 Delivery O2 Flow Rate FiO2


 


11/14/19 13:09  87 14     30


 


11/14/19 13:00  96 18 124/54 (77) 99   


 


11/14/19 13:00  121 17 174/71 (105) 98   


 


11/14/19 12:30  105 19 143/60 (87) 99   


 


11/14/19 12:15  117 19 172/62 (98) 99   


 


11/14/19 12:00      Mechanical Ventilator  


 


11/14/19 12:00 99.0 121 17 174/71 (105) 98   


 


11/14/19 11:30  114 15 170/71 (104) 99   


 


11/14/19 11:02  101 18     30


 


11/14/19 11:00  100 18 147/52 (83) 100   


 


11/14/19 10:45  99 20 159/53 (88) 100   


 


11/14/19 10:30  97 18 142/56 (84) 100   


 


11/14/19 10:15  89 18 152/53 (86) 100   


 


11/14/19 10:12  84 14  100   


 


11/14/19 10:10  88 14  100   


 


11/14/19 10:00  86 18 152/53 (86) 100   


 


11/14/19 10:00        30


 


11/14/19 09:00  84 18 121/40 (67) 100   


 


11/14/19 09:00  90  121/59    


 


11/14/19 08:36     100   


 


11/14/19 08:35  85 17     30


 


11/14/19 08:00      Endotracheal Tube  


 


11/14/19 08:00        30


 


11/14/19 08:00  82      


 


11/14/19 08:00 98.8 83 24 127/52 (77) 100   


 


11/14/19 07:36  80 26  100 Mechanical Ventilator  30





  88 26     30





        30


 


11/14/19 07:00  78 20 117/43 (67) 100   


 


11/14/19 06:00  89 23 145/42 (76) 100   


 


11/14/19 05:26  85 19     30


 


11/14/19 05:00  84 20 130/39 (69) 100   


 


11/14/19 04:00      Endotracheal Tube  


 


11/14/19 04:00        30


 


11/14/19 04:00 98.7 84 23 142/51 (81) 100   


 


11/14/19 04:00  78      


 


11/14/19 03:21  78 21     30


 


11/14/19 03:00  79 16 127/44 (71) 99   


 


11/14/19 02:00 99.1 78 16 110/50 (70) 100   


 


11/14/19 01:33  81 21  98 Mechanical Ventilator  35





  81 21     35


 


11/14/19 01:00 100.3 88 24 144/46 (78) 99   


 


11/14/19 00:59 100.3       


 


11/14/19 00:00      Endotracheal Tube  


 


11/14/19 00:00  93      


 


11/14/19 00:00 100.3 93 19 122/47 (72) 98   


 


11/13/19 23:25  99 25     30


 


11/13/19 23:00  97 22 133/56 (81) 100   


 


11/13/19 22:00  86 23 135/47 (76) 99   


 


11/13/19 21:00  86 22 133/49 (77) 100   


 


11/13/19 20:45  91 21     30


 


11/13/19 20:00      Endotracheal Tube  


 


11/13/19 20:00 98.9 84 17 124/43 (70) 97   


 


11/13/19 20:00        30


 


11/13/19 20:00  94      


 


11/13/19 19:00  90 17 135/45 (75) 100   


 


11/13/19 18:50  100 29     30


 


11/13/19 18:00  90 24 131/48 (75) 100   


 


11/13/19 17:03  94  128/59    


 


11/13/19 17:00  86 19 128/59 (82) 100   


 


11/13/19 16:44  84 25     30


 


11/13/19 16:00        35


 


11/13/19 16:00 98.8 85 24 133/47 (75) 100   


 


11/13/19 16:00      Endotracheal Tube  


 


11/13/19 16:00  82      


 


11/13/19 15:00  89 21 126/48 (74) 100   


 


11/13/19 14:30  85 25  100 Mechanical Ventilator  35





  84 15     35





        








Height (Feet):  5


Height (Inches):  5.00


Weight (Pounds):  130


General Appearance:  WD/WN, no acute distress


HEENT:  normocephalic, atraumatic, anicteric, mucous membranes moist, PERRL, 

other - dry sticky secretions from both eyes


Respiratory/Chest:  chest wall non-tender, lungs clear, normal breath sounds, 

no respiratory distress, no accessory muscle use


Cardiovascular:  normal peripheral pulses, normal rate, regular rhythm, no 

gallop/murmur, no JVD


Abdomen:  normal bowel sounds, soft, non tender, no organomegaly, non distended

, no mass, no scars


Extremities:  no cyanosis, no clubbing


Skin:  no rash, no lesions, no ulcers


Neurologic/Psychiatric:  alert, responsive


Lymphatic:  no neck adenopathy, no groin adenopathy


Musculoskeletal:  normal muscle bulk, no effusion





Microbiology








 Date/Time


Source Procedure


Growth Status


 


 


 11/12/19 15:30


Blood Blood Culture - Preliminary


NO GROWTH AFTER 24 HOURS Resulted


 


 11/12/19 14:45


Blood Blood Culture - Preliminary


NO GROWTH AFTER 24 HOURS Resulted











Laboratory Tests








Test


  11/14/19


04:45


 


White Blood Count


  12.3 K/UL


(4.8-10.8)  H


 


Red Blood Count


  3.18 M/UL


(4.20-5.40)  L


 


Hemoglobin


  8.4 G/DL


(12.0-16.0)  L


 


Hematocrit


  26.3 %


(37.0-47.0)  L


 


Mean Corpuscular Volume 83 FL (80-99)  


 


Mean Corpuscular Hemoglobin


  26.6 PG


(27.0-31.0)  L


 


Mean Corpuscular Hemoglobin


Concent 32.1 G/DL


(32.0-36.0)


 


Red Cell Distribution Width


  14.0 %


(11.6-14.8)


 


Platelet Count


  444 K/UL


(150-450)


 


Mean Platelet Volume


  8.2 FL


(6.5-10.1)


 


Neutrophils (%) (Auto)


  56.2 %


(45.0-75.0)


 


Lymphocytes (%) (Auto)


  22.8 %


(20.0-45.0)


 


Monocytes (%) (Auto)


  6.6 %


(1.0-10.0)


 


Eosinophils (%) (Auto)


  11.6 %


(0.0-3.0)  H


 


Basophils (%) (Auto)


  2.9 %


(0.0-2.0)  H


 


Prothrombin Time


  10.7 SEC


(9.30-11.50)


 


Prothromb Time International


Ratio 1.0 (0.9-1.1)  


 


 


Activated Partial


Thromboplast Time 31 SEC (23-33)


 


 


Sodium Level


  139 MMOL/L


(136-145)


 


Potassium Level


  3.8 MMOL/L


(3.5-5.1)


 


Chloride Level


  99 MMOL/L


()


 


Carbon Dioxide Level


  35 MMOL/L


(21-32)  H


 


Anion Gap


  5 mmol/L


(5-15)


 


Blood Urea Nitrogen


  46 mg/dL


(7-18)  H


 


Creatinine


  1.3 MG/DL


(0.55-1.30)


 


Estimat Glomerular Filtration


Rate 41.0 mL/min


(>60)


 


Glucose Level


  185 MG/DL


()  H


 


Calcium Level


  9.0 MG/DL


(8.5-10.1)


 


Phosphorus Level


  3.4 MG/DL


(2.5-4.9)


 


Magnesium Level


  2.4 MG/DL


(1.8-2.4)


 


Total Bilirubin


  0.5 MG/DL


(0.2-1.0)


 


Aspartate Amino Transf


(AST/SGOT) 11 U/L (15-37)


L


 


Alanine Aminotransferase


(ALT/SGPT) 13 U/L (12-78)


 


 


Alkaline Phosphatase


  83 U/L


()


 


Total Protein


  7.3 G/DL


(6.4-8.2)


 


Albumin


  2.2 G/DL


(3.4-5.0)  L


 


Globulin 5.1 g/dL  


 


Albumin/Globulin Ratio


  0.4 (1.0-2.7)


L











Current Medications








 Medications


  (Trade)  Dose


 Ordered  Sig/Winnie


 Route


 PRN Reason  Start Time


 Stop Time Status Last Admin


Dose Admin


 


 Acetaminophen


  (Tylenol)  650 mg  Q4H  PRN


 NG


 Mild Pain/Temp > 100.5  11/3/19 20:30


 11/25/19 20:29  11/14/19 12:01


 


 


 Acetaminophen


  (Tylenol)  650 mg  Q4H  PRN


 RECTAL


 TEMP>100.5  11/3/19 20:30


 12/2/19 20:29   


 


 


 Acetylcysteine


  (Mucomyst)  100 mg  TIDRT


 [facility]


   11/10/19 19:30


 12/10/19 19:29  11/14/19 07:47


 


 


 Albuterol/


 Ipratropium


  (Albuterol/


 Ipratropium)  3 ml  Q6HRT


 [facility]


   11/12/19 13:00


 11/17/19 12:59  11/14/19 07:47


 


 


 Amlodipine


 Besylate


  (Norvasc)  2.5 mg  BID


 NG


   11/4/19 09:00


 11/29/19 17:59  11/11/19 17:02


 


 


 Chlorhexidine


 Gluconate


  (Mae-Hex 2%)  1 applic  DAILY@2000


 TOPIC


   11/5/19 20:00


 12/5/19 19:59  11/13/19 20:18


 


 


 Dextrose


  (Dextrose 50%)  25 ml  Q30M  PRN


 IV


 Hypoglycemia  11/5/19 06:30


 12/5/19 06:29   


 


 


 Dextrose


  (Dextrose 50%)  50 ml  Q30M  PRN


 IV


 Hypoglycemia  11/5/19 06:30


 12/5/19 06:29   


 


 


 Divalproex Sodium


  (Depakote


 Sprinkles)  500 mg  Q12HR


 NG


   11/3/19 21:00


 11/14/19 21:59  11/13/19 21:09


 


 


 Folic Acid


  (Folate)  3 mg  DAILY


 GT


   11/7/19 09:00


 11/30/19 08:59  11/13/19 08:44


 


 


 Furosemide


  (Lasix)  40 mg  EVERY 12  HOURS


 IV


   11/7/19 21:00


 12/7/19 20:59  11/14/19 12:02


 


 


 Insulin Aspart


  (NovoLOG)    EVERY 6  HOURS


 SUBQ


   11/4/19 00:00


 11/29/19 11:59  11/14/19 05:48


 


 


 Insulin Detemir


  (Levemir)  10 units  QHS


 SUBQ


   11/14/19 21:00


 12/1/19 08:59   


 


 


 Lactulose


  (Cephulac)  20 gm  Q8H  PRN


 NG


 Constipation  11/3/19 20:30


 12/3/19 20:29  11/5/19 08:12


 


 


 Lorazepam


  (Ativan 2mg/ml


 1ml)  2 mg  Q4H  PRN


 IV


 for agitation  11/10/19 19:30


 11/17/19 19:29  11/12/19 20:37


 


 


 Meropenem 1 gm/


 Sodium Chloride  55 ml @ 


 110 mls/hr  Q8H


 IVPB


   11/4/19 17:00


 11/14/19 16:59  11/14/19 10:41


 


 


 Metoclopramide HCl


  (Reglan)  10 mg  Q6H  PRN


 IVP


 Nausea & Vomiting  11/6/19 10:45


 12/6/19 10:44   


 


 


 Metolazone


  (Zaroxolyn)  5 mg  Q24H


 NG


   11/12/19 08:00


 12/12/19 07:59  11/14/19 08:11


 


 


 Pantoprazole


  (Protonix)  40 mg  EVERY 12  HOURS


 IVP


   11/3/19 21:00


 12/3/19 08:59  11/13/19 21:09


 


 


 Potassium Chloride


  (K-Dur)  40 meq  DAILY


 GT


   11/6/19 09:00


 12/5/19 17:59  11/14/19 12:00


 

















Amie Burgos M.D. Nov 14, 2019 14:17

## 2019-11-14 NOTE — NUR
NURSE NOTES:

Patient seen by Dr. Main and Dr. Keith. MDs updated on pt's condition. No new orders at 
this time.

## 2019-11-14 NOTE — NEPHROLOGY PROGRESS NOTE
Assessment/Plan


Problem List:  


(1) Renal failure (ARF), acute on chronic


Assessment:  resolved





(2) Dehydration


(3) ARF (acute renal failure)


(4) Sepsis


(5) Acute metabolic encephalopathy


(6) Hyperglycemia due to type 2 diabetes mellitus


(7) UTI (urinary tract infection)


(8) Diabetic nephropathy


Assessment





Renal failure, Acute on Chronic resolved


Dehydration


Severe Anemia


Sepsis / Pneumonia / UTI


DM, OOC


Proteinuria , Diabetic Nephropathy


Acute encephalopathy


Plan





fails weaning- due trach


on K and Mag IV  as needed


Pulmonary and Vent support - ? Trach


IV Reglan


GT feeding


Stop IV fluid-


Lasix


has PEG


Per order








previously 


Cr lowering 


Will order urine studies and monitor renal parameters


kidney YOANDY noted


lower iv fluids rate


WBCs rising


has casas again due to retention


Avoid Nephrotoxics








previously


Anemia salas


2D echo


24 H urine protein


Keep BP and BS in check


I am holding  her psych meds due to low MS


Per orders





Subjective


ROS Limited/Unobtainable:  Yes





Objective


Objective





Last 24 Hour Vital Signs








  Date Time  Temp Pulse Resp B/P (MAP) Pulse Ox O2 Delivery O2 Flow Rate FiO2


 


11/14/19 09:00  84 18 121/40 (67) 100   


 


11/14/19 09:00  90  121/59    


 


11/14/19 08:36     100   


 


11/14/19 08:35  85 17     30


 


11/14/19 08:00      Endotracheal Tube  


 


11/14/19 08:00        30


 


11/14/19 08:00  82      


 


11/14/19 08:00 98.8 83 24 127/52 (77) 100   


 


11/14/19 07:36  80 26  100 Mechanical Ventilator  30





  88 26     30





        30


 


11/14/19 07:00  78 20 117/43 (67) 100   


 


11/14/19 06:00  89 23 145/42 (76) 100   


 


11/14/19 05:26  85 19     30


 


11/14/19 05:00  84 20 130/39 (69) 100   


 


11/14/19 04:00      Endotracheal Tube  


 


11/14/19 04:00        30


 


11/14/19 04:00 98.7 84 23 142/51 (81) 100   


 


11/14/19 04:00  78      


 


11/14/19 03:21  78 21     30


 


11/14/19 03:00  79 16 127/44 (71) 99   


 


11/14/19 02:00 99.1 78 16 110/50 (70) 100   


 


11/14/19 01:33  81 21  98 Mechanical Ventilator  35





  81 21     35


 


11/14/19 01:00 100.3 88 24 144/46 (78) 99   


 


11/14/19 00:59 100.3       


 


11/14/19 00:00      Endotracheal Tube  


 


11/14/19 00:00  93      


 


11/14/19 00:00 100.3 93 19 122/47 (72) 98   


 


11/13/19 23:25  99 25     30


 


11/13/19 23:00  97 22 133/56 (81) 100   


 


11/13/19 22:00  86 23 135/47 (76) 99   


 


11/13/19 21:00  86 22 133/49 (77) 100   


 


11/13/19 20:45  91 21     30


 


11/13/19 20:00      Endotracheal Tube  


 


11/13/19 20:00 98.9 84 17 124/43 (70) 97   


 


11/13/19 20:00        30


 


11/13/19 20:00  94      


 


11/13/19 19:00  90 17 135/45 (75) 100   


 


11/13/19 18:50  100 29     30


 


11/13/19 18:00  90 24 131/48 (75) 100   


 


11/13/19 17:03  94  128/59    


 


11/13/19 17:00  86 19 128/59 (82) 100   


 


11/13/19 16:44  84 25     30


 


11/13/19 16:00        35


 


11/13/19 16:00 98.8 85 24 133/47 (75) 100   


 


11/13/19 16:00      Endotracheal Tube  


 


11/13/19 16:00  82      


 


11/13/19 15:00  89 21 126/48 (74) 100   


 


11/13/19 14:30  85 25  100 Mechanical Ventilator  35





  84 15     35





        


 


11/13/19 14:00  83 19 113/45 (67) 100   


 


11/13/19 13:00 98.9 87 21 128/41 (70) 100   


 


11/13/19 12:41  93 24     35


 


11/13/19 12:00  74      


 


11/13/19 12:00      Endotracheal Tube  


 


11/13/19 12:00        35


 


11/13/19 12:00  72 19 112/34 (60) 100   


 


11/13/19 11:30  82 23     35


 


11/13/19 11:00  77 20 121/47 (71) 100   

















Intake and Output  


 


 11/13/19 11/14/19





 19:00 07:00


 


Intake Total 890 ml 355 ml


 


Output Total 2006 ml 1605 ml


 


Balance -1116 ml -1250 ml


 


  


 


Free Water 180 ml 


 


IV Total 110 ml 55 ml


 


Tube Feeding 600 ml 240 ml


 


Other  60 ml


 


Output Urine Total 2005 ml 1605 ml


 


Stool Total 1 ml 


 


# Bowel Movements 1 2








Laboratory Tests


11/14/19 04:45: 


White Blood Count 12.3H, Red Blood Count 3.18L, Hemoglobin 8.4L, Hematocrit 

26.3L, Mean Corpuscular Volume 83, Mean Corpuscular Hemoglobin 26.6L, Mean 

Corpuscular Hemoglobin Concent 32.1, Red Cell Distribution Width 14.0, Platelet 

Count 444, Mean Platelet Volume 8.2, Neutrophils (%) (Auto) 56.2, Lymphocytes (%

) (Auto) 22.8, Monocytes (%) (Auto) 6.6, Eosinophils (%) (Auto) 11.6H, 

Basophils (%) (Auto) 2.9H, Prothrombin Time 10.7, Prothromb Time International 

Ratio 1.0, Activated Partial Thromboplast Time 31, Sodium Level 139, Potassium 

Level 3.8, Chloride Level 99, Carbon Dioxide Level 35H, Anion Gap 5, Blood Urea 

Nitrogen 46H, Creatinine 1.3, Estimat Glomerular Filtration Rate 41.0, Glucose 

Level 185H, Calcium Level 9.0, Phosphorus Level 3.4, Magnesium Level 2.4, Total 

Bilirubin 0.5, Aspartate Amino Transf (AST/SGOT) 11L, Alanine Aminotransferase (

ALT/SGPT) 13, Alkaline Phosphatase 83, Total Protein 7.3, Albumin 2.2L, 

Globulin 5.1, Albumin/Globulin Ratio 0.4L


Height (Feet):  5


Height (Inches):  5.00


Weight (Pounds):  130


General Appearance:  no apparent distress


EENT:  other - vented


Cardiovascular:  tachycardia


Respiratory/Chest:  decreased breath sounds


Abdomen:  distended


Objective


no change











Lukasz Vizcaino MD Nov 14, 2019 10:10

## 2019-11-14 NOTE — NUR
NURSE NOTES:

Received the patient from ELIZABETH brown. patient resting with eyes closed, unable to follow 
commands, response to painful stimuli. Orally intubated, ETT 7.5, 23cm at lip line. AC 14, 
, FIO2 30%, PEEP 5. O2 sat 100%. No distress noted. G-tube intact, dressing intact, 
clean and dry. Tube feeding on hold for tracheostomy this am. Charles cath intact, patent, 
draining yellow urine by gravity. Right upper arm PICC line intact, TKO. SCDs on bilateral 
lower extremities. Patient on P200 mattress. Wound dressings intact, clean and dry. Patient 
on bilateral soft wrist restraints. No skin breakdown noted, pulses present. Bed in lowest 
position, locked, side rails upx3. Side rails are padded. Call light within reach. Bed alarm 
on. Will continue to monitor.

## 2019-11-14 NOTE — IMMEDIATE POST-OP EVALUATION
Immediate Post-Op Evalulation


Immediate Post-Op Evalulation


Procedure:  Tracheostomy


Date of Evaluation:  Nov 14, 2019


Time of Evaluation:  10:24


IV Fluids:  10 NS


Blood Products:  0


Estimated Blood Loss:  7


Urinary Output:  0


Blood Pressure Systolic:  165


Blood Pressure Diastolic:  62


Pulse Rate:  88


Respiratory Rate:  14 - Mech Vent


O2 Sat by Pulse Oximetry:  100


Temperature (Fahrenheit):  98.2


Pain Score (1-10):  0


Nausea:  No


Vomiting:  No


Complications


0


Patient Status:  no response, patent, ventilated, none


Hydration Status:  adequate


Drug:  On Floor











Geronimo Balderrama MD Nov 14, 2019 10:10

## 2019-11-14 NOTE — NUR
RD ASSESSMENT & RECOMMENDATIONS

SEE CARE ACTIVITY FOR COMPLETE ASSESSMENT



DAILY ESTIMATED NEEDS:

Needs based on Critical Care / 68kg 

22-30  kcals/kg 

5162-5148  total kcals

1.25-2  g protein/kg

  g total protein 

25-30  mL/kg

8121-1142  total fluid mLs



NUTRITION DIAGNOSIS:

* Swallowing difficulty R/T dysaphgia, poor dentition, dementia,

respiratory status as evidenced by on liquify pureed, NTL texture diet per

SLP rec-> now on NGT feeds-> now s/p PEG placement and trach placement.

* Increased kcal/prot needs R/T wound healing and sepsis as evidenced by

admitted w/ DTPI R heel and non-blanchable sacral erythema, critically

elev wbc (24.0* ->12.3), elev LA (5.8 -> wnl), elev CRP, now afebrile.

* Altered nutrition related lab values R/T CHF, diabetes as evidenced by

elev BNP (>76505 ->23714), A1C of 10.4, elev BGs and POC glu ([phone]).



 

CURRENT TF:Glucerna 1.2 @60 x 24hrs -> HELD FOR PROCEDURE THIS AM 



 

ENTERAL NUTRITION RECOMMENDATIONS:

Glucerna 1.5 @50ml/hr x24 hrs  to provide 1200ml, 1800 kcal, 99g pro, 911ml free H2O 



- Rec TF change to Glucerna 1.5 for less free fluids (BNP >79190 -> 94224)

- Initiate Glucerna 1.5 @ 20ml/hr x 6hrs, advance 10ml q 4-6 hrs as tolerated to goal

- HOB over 45 degrees/ flush per MD

 



ADDITIONAL RECOMMENDATIONS:

* Calibrated bedscale wt w/ added P200 mattress  

* Wound healing: add  Vit C 250mg QD 

                        continue Zaki 1pkt BID  

* Monitor lytes daily w/ lasix, replete as needed 

* Monitor BG control (Levemir increased 11/14) 

. 

.

## 2019-11-14 NOTE — NUR
NURSE NOTES:

Patient is resting in bed, response to painful stimuli. No distress noted. BP and HR stable. 
Trach site dressing clean and dry. no bleeding from trach site noted. 

-------------------------------------------------------------------------------

Addendum: 11/14/19 at 1500 by SOYEON HUH RN

-------------------------------------------------------------------------------

Dr. Duran at bedside to assess the patient.

## 2019-11-14 NOTE — NUR
NURSE NOTES:

Patient seen by Dr. Pop. Spoke with MD regarding pelvic mass. Per MD, MD to speak to 
PMD or OB/GYN.

## 2019-11-14 NOTE — NUR
NURSE NOTES:

Patient off the unit for trach, on portable cardiac monitor. VSS. Accompanied by RN and RT. 
Patient in stable condition.

## 2019-11-14 NOTE — NUR
NURSE NOTES:

Informed Dr. Blas about BP and HR trending up. /71, . No new orders at this 
time. PRN tylenol given. will continue to monitor.

## 2019-11-14 NOTE — SURGERY PROGRESS NOTE
Surgery Progress Note


Subjective


Procedure Performed


tracheostomy


Additional Comments


trach today went well


wean vent 


otherwise unchanged





Objective





Last 24 Hour Vital Signs








  Date Time  Temp Pulse Resp B/P (MAP) Pulse Ox O2 Delivery O2 Flow Rate FiO2


 


11/14/19 11:02  101 18     30


 


11/14/19 11:00  100 18 147/52 (83) 100   


 


11/14/19 10:45  99 20 159/53 (88) 100   


 


11/14/19 10:30  97 18 142/56 (84) 100   


 


11/14/19 10:15  89 18 152/53 (86) 100   


 


11/14/19 10:12  84 14  100   


 


11/14/19 10:10  88 14  100   


 


11/14/19 10:00  86 18 152/53 (86) 100   


 


11/14/19 09:00  84 18 121/40 (67) 100   


 


11/14/19 09:00  90  121/59    


 


11/14/19 08:36     100   


 


11/14/19 08:35  85 17     30


 


11/14/19 08:00      Endotracheal Tube  


 


11/14/19 08:00        30


 


11/14/19 08:00  82      


 


11/14/19 08:00 98.8 83 24 127/52 (77) 100   


 


11/14/19 07:36  80 26  100 Mechanical Ventilator  30





  88 26     30





        30


 


11/14/19 07:00  78 20 117/43 (67) 100   


 


11/14/19 06:00  89 23 145/42 (76) 100   


 


11/14/19 05:26  85 19     30


 


11/14/19 05:00  84 20 130/39 (69) 100   


 


11/14/19 04:00      Endotracheal Tube  


 


11/14/19 04:00        30


 


11/14/19 04:00 98.7 84 23 142/51 (81) 100   


 


11/14/19 04:00  78      


 


11/14/19 03:21  78 21     30


 


11/14/19 03:00  79 16 127/44 (71) 99   


 


11/14/19 02:00 99.1 78 16 110/50 (70) 100   


 


11/14/19 01:33  81 21  98 Mechanical Ventilator  35





  81 21     35


 


11/14/19 01:00 100.3 88 24 144/46 (78) 99   


 


11/14/19 00:59 100.3       


 


11/14/19 00:00      Endotracheal Tube  


 


11/14/19 00:00  93      


 


11/14/19 00:00 100.3 93 19 122/47 (72) 98   


 


11/13/19 23:25  99 25     30


 


11/13/19 23:00  97 22 133/56 (81) 100   


 


11/13/19 22:00  86 23 135/47 (76) 99   


 


11/13/19 21:00  86 22 133/49 (77) 100   


 


11/13/19 20:45  91 21     30


 


11/13/19 20:00      Endotracheal Tube  


 


11/13/19 20:00 98.9 84 17 124/43 (70) 97   


 


11/13/19 20:00        30


 


11/13/19 20:00  94      


 


11/13/19 19:00  90 17 135/45 (75) 100   


 


11/13/19 18:50  100 29     30


 


11/13/19 18:00  90 24 131/48 (75) 100   


 


11/13/19 17:03  94  128/59    


 


11/13/19 17:00  86 19 128/59 (82) 100   


 


11/13/19 16:44  84 25     30


 


11/13/19 16:00        35


 


11/13/19 16:00 98.8 85 24 133/47 (75) 100   


 


11/13/19 16:00      Endotracheal Tube  


 


11/13/19 16:00  82      


 


11/13/19 15:00  89 21 126/48 (74) 100   


 


11/13/19 14:30  85 25  100 Mechanical Ventilator  35





  84 15     35





        


 


11/13/19 14:00  83 19 113/45 (67) 100   


 


11/13/19 13:00 98.9 87 21 128/41 (70) 100   


 


11/13/19 12:41  93 24     35


 


11/13/19 12:00  74      


 


11/13/19 12:00      Endotracheal Tube  


 


11/13/19 12:00        35


 


11/13/19 12:00  72 19 112/34 (60) 100   


 


11/13/19 11:30  82 23     35








I&O











Intake and Output  


 


 11/13/19 11/14/19





 19:00 07:00


 


Intake Total 890 ml 355 ml


 


Output Total 2006 ml 1605 ml


 


Balance -1116 ml -1250 ml


 


  


 


Free Water 180 ml 


 


IV Total 110 ml 55 ml


 


Tube Feeding 600 ml 240 ml


 


Other  60 ml


 


Output Urine Total 2005 ml 1605 ml


 


Stool Total 1 ml 


 


# Bowel Movements 1 2








Dressing:  saturated


Wound:  other


Drains:  other


Cardiovascular:  RSR


Respiratory:  decreased breath sounds


Abdomen:  soft, present bowel sounds, non-distended


Extremities:  no cyanosis, other





Laboratory Tests








Test


  11/14/19


04:45


 


White Blood Count


  12.3 K/UL


(4.8-10.8)  H


 


Red Blood Count


  3.18 M/UL


(4.20-5.40)  L


 


Hemoglobin


  8.4 G/DL


(12.0-16.0)  L


 


Hematocrit


  26.3 %


(37.0-47.0)  L


 


Mean Corpuscular Volume 83 FL (80-99)  


 


Mean Corpuscular Hemoglobin


  26.6 PG


(27.0-31.0)  L


 


Mean Corpuscular Hemoglobin


Concent 32.1 G/DL


(32.0-36.0)


 


Red Cell Distribution Width


  14.0 %


(11.6-14.8)


 


Platelet Count


  444 K/UL


(150-450)


 


Mean Platelet Volume


  8.2 FL


(6.5-10.1)


 


Neutrophils (%) (Auto)


  56.2 %


(45.0-75.0)


 


Lymphocytes (%) (Auto)


  22.8 %


(20.0-45.0)


 


Monocytes (%) (Auto)


  6.6 %


(1.0-10.0)


 


Eosinophils (%) (Auto)


  11.6 %


(0.0-3.0)  H


 


Basophils (%) (Auto)


  2.9 %


(0.0-2.0)  H


 


Prothrombin Time


  10.7 SEC


(9.30-11.50)


 


Prothromb Time International


Ratio 1.0 (0.9-1.1)  


 


 


Activated Partial


Thromboplast Time 31 SEC (23-33)


 


 


Sodium Level


  139 MMOL/L


(136-145)


 


Potassium Level


  3.8 MMOL/L


(3.5-5.1)


 


Chloride Level


  99 MMOL/L


()


 


Carbon Dioxide Level


  35 MMOL/L


(21-32)  H


 


Anion Gap


  5 mmol/L


(5-15)


 


Blood Urea Nitrogen


  46 mg/dL


(7-18)  H


 


Creatinine


  1.3 MG/DL


(0.55-1.30)


 


Estimat Glomerular Filtration


Rate 41.0 mL/min


(>60)


 


Glucose Level


  185 MG/DL


()  H


 


Calcium Level


  9.0 MG/DL


(8.5-10.1)


 


Phosphorus Level


  3.4 MG/DL


(2.5-4.9)


 


Magnesium Level


  2.4 MG/DL


(1.8-2.4)


 


Total Bilirubin


  0.5 MG/DL


(0.2-1.0)


 


Aspartate Amino Transf


(AST/SGOT) 11 U/L (15-37)


L


 


Alanine Aminotransferase


(ALT/SGPT) 13 U/L (12-78)


 


 


Alkaline Phosphatase


  83 U/L


()


 


Total Protein


  7.3 G/DL


(6.4-8.2)


 


Albumin


  2.2 G/DL


(3.4-5.0)  L


 


Globulin 5.1 g/dL  


 


Albumin/Globulin Ratio


  0.4 (1.0-2.7)


L











Plan


Problems:  


(1) Pressure injury of deep tissue


Assessment & Plan:  Pt presented on admission with DTPI R heel. Blood filled 

blister with marginal erythema noted to heel. Pt exhibited agitation when 

minimally palpated. (L)2.2cm x (W)2.1cm


L heel is blanchable .


Non-blanchable erythema without induration noted to sacral cleft. 


No other areas of skin concerns noted.





Tx.Plan:


Apply Betadine to R heel. Cover with Optifoam drsg. Change every 3 days and prn.


           


Apply Cavilon Skin Barrier to L heel. Cover with Optifoam drsg. Change every 7 

days and prn.


           


Apply Moisture Barrier Paste to buttocks. Cover sacral area with Optifoam drsg. 

Change every 3 days and prn.


           


Reposition at least every 2hours or as tolerated.


           


Off-load heels with pillow.





(2) Sepsis


Assessment & Plan:  Patient with low-grade fevers, anemia, leukocytosis 

persistent, elevated platelets, abnormal labs.


Etiology currently unknown and work-up in progress.  Labs noted and imaging 

that is available as noted.  


CT noted


US noted


Impression: 13 x 7 x 12.9 cm unilocular cystic mass, corresponding to lesion 

reported


on recent CT scan. Layering low level internal echoes likely represent bladder


debris.. This presumably represents a cystic ovarian lesion with debris or


hemorrhage, possibly neoplastic. Gynecological consultation is recommended


Antibiotics as per infectious disease 


local wound care as wounds do not seem to be the etiology of her sepsis


start feeds via peg


doing well


improving


cont with tube feeds


cont with ICU care 


s/p trach


wean vent


consent 


supplementation for healing 


We will monitor and follow with recommendations


Thank you for allowing me to participate in patient's care














Radhames Blas Nov 14, 2019 11:21

## 2019-11-14 NOTE — PULMONOLGY CRITICAL CARE NOTE
Critical Care - Asmt/Plan


Assessment/Plan:


Pulmonary CCM Progress Note 





Assessment/Plan


Problems:  


(1) Healthcare/aspiration associated bacterial pneumonia, respiratory failure, 

some improvement in CXR


(2) UTI (urinary tract infection)


(3) Sepsis


(4) Dehydration


(5) CHANCE (acute kidney injury)


(6) Acute metabolic encephalopathy


(7) Hyperglycemia due to type 2 diabetes mellitus


(8) Renal failure (ARF), acute on chronic


(9) Ventilator dependant Respiratory Failure - not tolerating weaning


(10) Abnormal TSH


(11) Pelvic mass in female


(12) PEG (percutaneous endoscopic gastrostomy) status


Assessment/Plan


Marked leukocytosis S/P WBC scan with uptake in pelvis, has pelvic mass


Sepsis


Possible pneumonia


E coli UTI


Acute hypoxemic respiratory failure


S/p code blue - VDRF 


S/p Tracheostomy and G tube


Encephalopathy sp Code


Hx CHF


HTN


CHANCE improving


Pelvic mass/possible GYN malignancy vs cyst


Dysphagia S/P PEG





Plan:


-Wean FIO2, ABG PRN


-Optimize pulmonary hygiene


-Wean ventilator as tolerated


-Titrate down FiO2 to keep SaO2 > 90%


-Monitor WCt, RFS sent for JAK2 mutation, per Dr. Pop BMBx if unrevealing


-Abx per ID, F/U Cx's


-monitor volumes and renal function, F/U renal recs, diurese PRN


-monitor encephalopathy


-NPO, GTF's


-DVT Px


-FC


-PICC line





Subjective


Allergies:  


Coded Allergies:  


     No Known Allergies


Subjective


Sedated on the Ventilator





Objective





Vital Signs Noted





General Appearance:  Sedated, cachetic


HEENT:  normocephalic, atraumatic, anicteric, mucous membranes moist, trach CDI


Respiratory/Chest:  occasional rhonchi


Cardiovascular:  normal peripheral pulses, normal rate, regular rhythm


Abdomen:  normal bowel sounds, soft, non tender, no organomegaly, non distended

, no mass, other - G tube


Extremities:  no cyanosis, no clubbing, no edema





Microbiology noted








 Date/Time


Source Procedure


Growth Status


 


 


 10/31/19 10:15


Urine,Clean Catch Urine Culture - Preliminary


YEAST Resulted








Laboratory Tests Noted





Critical Care - Objective





Last 24 Hour Vital Signs








  Date Time  Temp Pulse Resp B/P (MAP) Pulse Ox O2 Delivery O2 Flow Rate FiO2


 


11/14/19 21:23  88 23     30


 


11/14/19 21:00  83 14 130/47 (74) 100   


 


11/14/19 20:00      Mechanical Ventilator  


 


11/14/19 20:00  87      


 


11/14/19 20:00 98.5 80 14 116/49 (71) 99   


 


11/14/19 20:00        30


 


11/14/19 19:52  75 14  100 Mechanical Ventilator  30





  79 14     30


 


11/14/19 19:00  82 19 121/46 (71) 100   


 


11/14/19 18:00  84 19 121/52 (75) 100   


 


11/14/19 18:00  84  121/52    


 


11/14/19 17:15  85 26     30


 


11/14/19 17:00  90 18 138/58 (84) 100   


 


11/14/19 16:00        30


 


11/14/19 16:00  79      


 


11/14/19 16:00 98.9 82 18 127/51 (76) 100   


 


11/14/19 16:00      Mechanical Ventilator  


 


11/14/19 15:11  81 14     30


 


11/14/19 15:00  80 18 120/44 (69) 100   


 


11/14/19 14:00  85 16 123/50 (74) 100   


 


11/14/19 13:09  87 14     30


 


11/14/19 13:00  96 18 124/54 (77) 99   


 


11/14/19 12:30  105 19 143/60 (87) 99   


 


11/14/19 12:15  117 19 172/62 (98) 99   


 


11/14/19 12:00      Mechanical Ventilator  


 


11/14/19 12:00 99.0 121 17 174/71 (105) 98   


 


11/14/19 12:00  120      


 


11/14/19 11:30  114 15 170/71 (104) 99   


 


11/14/19 11:02  101 18     30


 


11/14/19 11:00  100 18 147/52 (83) 100   


 


11/14/19 10:45  99 20 159/53 (88) 100   


 


11/14/19 10:30  97 18 142/56 (84) 100   


 


11/14/19 10:15  89 18 152/53 (86) 100   


 


11/14/19 10:12  84 14  100   


 


11/14/19 10:10  88 14  100   


 


11/14/19 10:00  86 18 152/53 (86) 100   


 


11/14/19 10:00        30


 


11/14/19 09:00  84 18 121/40 (67) 100   


 


11/14/19 09:00  90  121/59    


 


11/14/19 08:36     100   


 


11/14/19 08:35  85 17     30


 


11/14/19 08:00      Endotracheal Tube  


 


11/14/19 08:00        30


 


11/14/19 08:00  82      


 


11/14/19 08:00 98.8 83 24 127/52 (77) 100   


 


11/14/19 07:36  80 26  100 Mechanical Ventilator  30





  88 26     30





        30


 


11/14/19 07:00  78 20 117/43 (67) 100   


 


11/14/19 06:00  89 23 145/42 (76) 100   


 


11/14/19 05:26  85 19     30


 


11/14/19 05:00  84 20 130/39 (69) 100   


 


11/14/19 04:00      Endotracheal Tube  


 


11/14/19 04:00        30


 


11/14/19 04:00 98.7 84 23 142/51 (81) 100   


 


11/14/19 04:00  78      


 


11/14/19 03:21  78 21     30


 


11/14/19 03:00  79 16 127/44 (71) 99   


 


11/14/19 02:00 99.1 78 16 110/50 (70) 100   


 


11/14/19 01:33  81 21  98 Mechanical Ventilator  35





  81 21     35


 


11/14/19 01:00 100.3 88 24 144/46 (78) 99   


 


11/14/19 00:59 100.3       


 


11/14/19 00:00      Endotracheal Tube  


 


11/14/19 00:00  93      


 


11/14/19 00:00 100.3 93 19 122/47 (72) 98   


 


11/13/19 23:25  99 25     30


 


11/13/19 23:00  97 22 133/56 (81) 100   








Micro:





Microbiology








 Date/Time


Source Procedure


Growth Status


 


 


 11/12/19 15:30


Blood Blood Culture - Preliminary


NO GROWTH AFTER 24 HOURS Resulted


 


 11/12/19 14:45


Blood Blood Culture - Preliminary


NO GROWTH AFTER 24 HOURS Resulted








Accucheck:  229





Critical Care - Subjective


ROS Limited/Unobtainable:  No


FI02:  30


Vent Support Breath Rate:  14


Vent Support Mode:  AC


Vent Tidal Volume:  400


Sputum Amount:  Small


PEEP:  5.0


PIP:  15


Tube Feeding Amount:  40


I&O:











Intake and Output  


 


 11/13/19 11/14/19





 19:00 07:00


 


Intake Total 890 ml 355 ml


 


Output Total 2006 ml 1605 ml


 


Balance -1116 ml -1250 ml


 


  


 


Free Water 180 ml 


 


IV Total 110 ml 55 ml


 


Tube Feeding 600 ml 240 ml


 


Other  60 ml


 


Output Urine Total 2005 ml 1605 ml


 


Stool Total 1 ml 


 


# Bowel Movements 1 2








ET-Tube:  7.5


ET Position:  23











Delmer Main MD Nov 14, 2019 22:58

## 2019-11-14 NOTE — NUR
RESPIRATORY NOTES:

Weaning stopped. Patient getting tracheostomy tube later today. Placed back onto ACVC 
settings.

## 2019-11-14 NOTE — ANETHESIA PREOPERATIVE EVAL
Anesthesia Pre-op PMH/ROS


General


Date of Evaluation:  Nov 14, 2019


Time of Evaluation:  09:22


Anesthesiologist:  Conchis


ASA Score:  ASA 4


Mallampati Score


Class I : Soft palate, uvula, fauces, pillars visible


Class II: Soft palate, uvula, fauces visible


Class III: Soft palate, base of uvula visible


Class IV: Only hard plate visible


Mallampati Classification:  Class III


Surgeon:  Sarah


Diagnosis:  Ventilatory Failure


Surgical Procedure:  Tracheostomy


Anesthesia History:  none


Family History:  no anesthesia problems


Allergies:  


Coded Allergies:  


     No Known Allergies (Unverified , 10/14/19)


Medications:  see eMAR


Patient NPO?:  Yes


NPO Date:  Nov 14, 2019


NPO Time:  0001





Past Medical History


Cardiovascular:  Reports: HTN, other - CHF


Pulmonary:  Reports: other - Ventlatory Failure


Neurologic/Psychiatric:  Reports: CVA, other - Seizure


Endocrine:  Reports: DM


Hematology/Immune:  Reports: anemia





Anesthesia Pre-op Phys. Exam


Physician Exam





Last Vital Signs








  Date Time  Temp Pulse Resp B/P (MAP) Pulse Ox O2 Delivery O2 Flow Rate FiO2


 


11/14/19 09:00  84 18 121/40 (67) 100   


 


11/14/19 08:35        30


 


11/14/19 08:00      Endotracheal Tube  


 


11/14/19 08:00 98.8       


 


11/12/19 01:09       40.0 








Constitutional:  NAD, other


Neurologic:  other


Cardiovascular:  other


Respiratory:  other


Gastrointestinal:  other





Airway Exam


Mallampati Score:  Class II


MO:  limited


ROM:  limited


Teeth:  missing, intact





Anesthesia Pre-op A/P


Labs





Hematology








Test


  11/14/19


04:45


 


White Blood Count


  12.3 K/UL


(4.8-10.8)  H


 


Red Blood Count


  3.18 M/UL


(4.20-5.40)  L


 


Hemoglobin


  8.4 G/DL


(12.0-16.0)  L


 


Hematocrit


  26.3 %


(37.0-47.0)  L


 


Mean Corpuscular Volume 83 FL (80-99)  


 


Mean Corpuscular Hemoglobin


  26.6 PG


(27.0-31.0)  L


 


Mean Corpuscular Hemoglobin


Concent 32.1 G/DL


(32.0-36.0)


 


Red Cell Distribution Width


  14.0 %


(11.6-14.8)


 


Platelet Count


  444 K/UL


(150-450)


 


Mean Platelet Volume


  8.2 FL


(6.5-10.1)


 


Neutrophils (%) (Auto)


  56.2 %


(45.0-75.0)


 


Lymphocytes (%) (Auto)


  22.8 %


(20.0-45.0)


 


Monocytes (%) (Auto)


  6.6 %


(1.0-10.0)


 


Eosinophils (%) (Auto)


  11.6 %


(0.0-3.0)  H


 


Basophils (%) (Auto)


  2.9 %


(0.0-2.0)  H








Coagulation








Test


  11/14/19


04:45


 


Prothrombin Time


  10.7 SEC


(9.30-11.50)


 


Prothromb Time International


Ratio 1.0 (0.9-1.1)  


 


 


Activated Partial


Thromboplast Time 31 SEC (23-33)


 








Chemistry








Test


  11/14/19


04:45


 


Sodium Level


  139 MMOL/L


(136-145)


 


Potassium Level


  3.8 MMOL/L


(3.5-5.1)


 


Chloride Level


  99 MMOL/L


()


 


Carbon Dioxide Level


  35 MMOL/L


(21-32)  H


 


Anion Gap


  5 mmol/L


(5-15)


 


Blood Urea Nitrogen


  46 mg/dL


(7-18)  H


 


Creatinine


  1.3 MG/DL


(0.55-1.30)


 


Estimat Glomerular Filtration


Rate 41.0 mL/min


(>60)


 


Glucose Level


  185 MG/DL


()  H


 


Calcium Level


  9.0 MG/DL


(8.5-10.1)


 


Phosphorus Level


  3.4 MG/DL


(2.5-4.9)


 


Magnesium Level


  2.4 MG/DL


(1.8-2.4)


 


Total Bilirubin


  0.5 MG/DL


(0.2-1.0)


 


Aspartate Amino Transf


(AST/SGOT) 11 U/L (15-37)


L


 


Alanine Aminotransferase


(ALT/SGPT) 13 U/L (12-78)


 


 


Alkaline Phosphatase


  83 U/L


()


 


Total Protein


  7.3 G/DL


(6.4-8.2)


 


Albumin


  2.2 G/DL


(3.4-5.0)  L


 


Globulin 5.1 g/dL  


 


Albumin/Globulin Ratio


  0.4 (1.0-2.7)


L











Risk Assessment & Plan


Assessment:


ASA 4


Plan:


GA


Status Change Before Surgery:  No





Pre-Antibiotics


Drug:  On Floor











Geronimo Balderrama MD Nov 14, 2019 09:54

## 2019-11-15 VITALS — SYSTOLIC BLOOD PRESSURE: 123 MMHG | DIASTOLIC BLOOD PRESSURE: 51 MMHG

## 2019-11-15 VITALS — SYSTOLIC BLOOD PRESSURE: 118 MMHG | DIASTOLIC BLOOD PRESSURE: 43 MMHG

## 2019-11-15 VITALS — SYSTOLIC BLOOD PRESSURE: 123 MMHG | DIASTOLIC BLOOD PRESSURE: 48 MMHG

## 2019-11-15 VITALS — DIASTOLIC BLOOD PRESSURE: 66 MMHG | SYSTOLIC BLOOD PRESSURE: 146 MMHG

## 2019-11-15 VITALS — DIASTOLIC BLOOD PRESSURE: 62 MMHG | SYSTOLIC BLOOD PRESSURE: 132 MMHG

## 2019-11-15 VITALS — DIASTOLIC BLOOD PRESSURE: 57 MMHG | SYSTOLIC BLOOD PRESSURE: 128 MMHG

## 2019-11-15 VITALS — SYSTOLIC BLOOD PRESSURE: 118 MMHG | DIASTOLIC BLOOD PRESSURE: 47 MMHG

## 2019-11-15 VITALS — SYSTOLIC BLOOD PRESSURE: 127 MMHG | DIASTOLIC BLOOD PRESSURE: 55 MMHG

## 2019-11-15 VITALS — SYSTOLIC BLOOD PRESSURE: 127 MMHG | DIASTOLIC BLOOD PRESSURE: 49 MMHG

## 2019-11-15 VITALS — DIASTOLIC BLOOD PRESSURE: 45 MMHG | SYSTOLIC BLOOD PRESSURE: 129 MMHG

## 2019-11-15 VITALS — SYSTOLIC BLOOD PRESSURE: 125 MMHG | DIASTOLIC BLOOD PRESSURE: 44 MMHG

## 2019-11-15 VITALS — SYSTOLIC BLOOD PRESSURE: 137 MMHG | DIASTOLIC BLOOD PRESSURE: 65 MMHG

## 2019-11-15 VITALS — DIASTOLIC BLOOD PRESSURE: 39 MMHG | SYSTOLIC BLOOD PRESSURE: 110 MMHG

## 2019-11-15 VITALS — DIASTOLIC BLOOD PRESSURE: 39 MMHG | SYSTOLIC BLOOD PRESSURE: 109 MMHG

## 2019-11-15 VITALS — DIASTOLIC BLOOD PRESSURE: 56 MMHG | SYSTOLIC BLOOD PRESSURE: 136 MMHG

## 2019-11-15 VITALS — DIASTOLIC BLOOD PRESSURE: 47 MMHG | SYSTOLIC BLOOD PRESSURE: 126 MMHG

## 2019-11-15 VITALS — SYSTOLIC BLOOD PRESSURE: 123 MMHG | DIASTOLIC BLOOD PRESSURE: 43 MMHG

## 2019-11-15 VITALS — SYSTOLIC BLOOD PRESSURE: 131 MMHG | DIASTOLIC BLOOD PRESSURE: 49 MMHG

## 2019-11-15 VITALS — DIASTOLIC BLOOD PRESSURE: 48 MMHG | SYSTOLIC BLOOD PRESSURE: 138 MMHG

## 2019-11-15 VITALS — SYSTOLIC BLOOD PRESSURE: 121 MMHG | DIASTOLIC BLOOD PRESSURE: 49 MMHG

## 2019-11-15 LAB
ADD MANUAL DIFF: NO
ALBUMIN SERPL-MCNC: 2.1 G/DL (ref 3.4–5)
ALBUMIN/GLOB SERPL: 0.4 {RATIO} (ref 1–2.7)
ALP SERPL-CCNC: 87 U/L (ref 46–116)
ALT SERPL-CCNC: 13 U/L (ref 12–78)
ANION GAP SERPL CALC-SCNC: 7 MMOL/L (ref 5–15)
AST SERPL-CCNC: 10 U/L (ref 15–37)
BASOPHILS NFR BLD AUTO: 2.1 % (ref 0–2)
BILIRUB SERPL-MCNC: 0.4 MG/DL (ref 0.2–1)
BUN SERPL-MCNC: 44 MG/DL (ref 7–18)
CALCIUM SERPL-MCNC: 9.7 MG/DL (ref 8.5–10.1)
CHLORIDE SERPL-SCNC: 105 MMOL/L (ref 98–107)
CO2 SERPL-SCNC: 35 MMOL/L (ref 21–32)
CREAT SERPL-MCNC: 1.2 MG/DL (ref 0.55–1.3)
EOSINOPHIL NFR BLD AUTO: 11.2 % (ref 0–3)
ERYTHROCYTE [DISTWIDTH] IN BLOOD BY AUTOMATED COUNT: 14.4 % (ref 11.6–14.8)
GLOBULIN SER-MCNC: 5.2 G/DL
HCT VFR BLD CALC: 25.1 % (ref 37–47)
HGB BLD-MCNC: 8 G/DL (ref 12–16)
LYMPHOCYTES NFR BLD AUTO: 14.9 % (ref 20–45)
MCV RBC AUTO: 83 FL (ref 80–99)
MONOCYTES NFR BLD AUTO: 8.3 % (ref 1–10)
NEUTROPHILS NFR BLD AUTO: 63.5 % (ref 45–75)
PLATELET # BLD: 459 K/UL (ref 150–450)
POTASSIUM SERPL-SCNC: 3.9 MMOL/L (ref 3.5–5.1)
RBC # BLD AUTO: 3.03 M/UL (ref 4.2–5.4)
SODIUM SERPL-SCNC: 147 MMOL/L (ref 136–145)
WBC # BLD AUTO: 12.6 K/UL (ref 4.8–10.8)

## 2019-11-15 PROCEDURE — 0B113F4 BYPASS TRACHEA TO CUTANEOUS WITH TRACHEOSTOMY DEVICE, PERCUTANEOUS APPROACH: ICD-10-PCS

## 2019-11-15 RX ADMIN — INSULIN ASPART SCH UNITS: 100 INJECTION, SOLUTION INTRAVENOUS; SUBCUTANEOUS at 18:32

## 2019-11-15 RX ADMIN — INSULIN ASPART SCH UNITS: 100 INJECTION, SOLUTION INTRAVENOUS; SUBCUTANEOUS at 23:24

## 2019-11-15 RX ADMIN — PANTOPRAZOLE SODIUM SCH MG: 40 INJECTION, POWDER, FOR SOLUTION INTRAVENOUS at 20:54

## 2019-11-15 RX ADMIN — INSULIN ASPART SCH UNITS: 100 INJECTION, SOLUTION INTRAVENOUS; SUBCUTANEOUS at 12:27

## 2019-11-15 RX ADMIN — PANTOPRAZOLE SODIUM SCH MG: 40 INJECTION, POWDER, FOR SOLUTION INTRAVENOUS at 08:54

## 2019-11-15 RX ADMIN — IPRATROPIUM BROMIDE AND ALBUTEROL SULFATE SCH ML: .5; 3 SOLUTION RESPIRATORY (INHALATION) at 07:02

## 2019-11-15 RX ADMIN — IPRATROPIUM BROMIDE AND ALBUTEROL SULFATE SCH ML: .5; 3 SOLUTION RESPIRATORY (INHALATION) at 19:03

## 2019-11-15 RX ADMIN — INSULIN ASPART SCH UNITS: 100 INJECTION, SOLUTION INTRAVENOUS; SUBCUTANEOUS at 05:31

## 2019-11-15 RX ADMIN — CHLORHEXIDINE GLUCONATE SCH APPLIC: 213 SOLUTION TOPICAL at 20:55

## 2019-11-15 RX ADMIN — IPRATROPIUM BROMIDE AND ALBUTEROL SULFATE SCH ML: .5; 3 SOLUTION RESPIRATORY (INHALATION) at 12:45

## 2019-11-15 RX ADMIN — IPRATROPIUM BROMIDE AND ALBUTEROL SULFATE SCH ML: .5; 3 SOLUTION RESPIRATORY (INHALATION) at 01:52

## 2019-11-15 NOTE — NUR
TRANSFER TO FLOOR:

Patient transferred to [facility], SDU.   Report given to Darren MAHARAJ RN.  No belongings.

## 2019-11-15 NOTE — NUR
NURSE NOTES:



Pt was placed back to AC mode by RT. No acute respiratory distress noted. Turned and 
repositioned pt.

## 2019-11-15 NOTE — NUR
*-* INSURANCE *-*



UPDATED CLINICALS AND REVIEWS HAVE BEEN FAXED TO:





Tracking#[id number]

CM: Lance

#[phone]

Fax#[phone]

## 2019-11-15 NOTE — GENERAL PROGRESS NOTE
Assessment/Plan


Status:  unchanged


Assessment/Plan:








Assessment/Plan


Problems:  


(1) PEG (percutaneous endoscopic gastrostomy) status


ICD Codes:  Z93.1 - Gastrostomy status


SNOMED:  353689881, 557989838


(2) Anemia


ICD Codes:  D64.9 - Anemia, unspecified


SNOMED:  153887324


Qualifiers:  


   Qualified Codes:  D64.9 - Anemia, unspecified


(3) Dehydration


ICD Codes:  E86.0 - Dehydration


SNOMED:  23491795, 2449251


Status:  unchanged





Assessment/Plan


Assessment


(1) pneumonia


(2) UTI


(3) Sepsis


(4) Dehydration


(5) CHANCE 


(6) Acute metabolic encephalopathy


(7) Hyperglycemia due to type 2 diabetes mellitus


(8) Renal failure (ARF), acute on chronic


(9) Aspiration pneumonia


(10) Abnormal TSH


(11) Pelvic mass in female


(12) Congestive Heart Failure


(13) Anemia


(14) Dysphagia - s/p GT





s/p EGD SUMMARY OF FINDINGS:


1. No evidence of any active upper GI bleeding at this time.


2. Gastritis status biopsy.





Recommendations


GTFs


prn transfusions


bowel regime


PPI and H2B


will follow up, consider colonoscopy if patient has recurrent GI bleed





Subjective


ROS Limited/Unobtainable:  No


Allergies:  


Coded Allergies:  


     No Known Allergies (Unverified , 10/14/19)





Objective





Last 24 Hour Vital Signs








  Date Time  Temp Pulse Resp B/P (MAP) Pulse Ox O2 Delivery O2 Flow Rate FiO2


 


11/15/19 12:45  78 20  100   30





  82 21     30


 


11/15/19 12:00 97.8 79 14 129/45 (73) 100   


 


11/15/19 12:00      Mechanical Ventilator  


 


11/15/19 12:00  90      


 


11/15/19 11:00  85 19 118/43 (68) 99   


 


11/15/19 10:37        30


 


11/15/19 10:35  87 20     30


 


11/15/19 10:00  86 22 128/57 (80) 100   


 


11/15/19 09:05        30


 


11/15/19 09:04  86 24     30





        30


 


11/15/19 09:04     100   


 


11/15/19 09:00  84 23 127/55 (79) 100   


 


11/15/19 08:54  84  129/49    


 


11/15/19 08:00      Mechanical Ventilator  


 


11/15/19 08:00 98.9 86 23 127/49 (75) 100   


 


11/15/19 08:00  87      


 


11/15/19 07:02  80 17  100 Mechanical Ventilator  30





  80 18     30


 


11/15/19 07:00  80 21 110/39 (62) 100   


 


11/15/19 06:00  83 17 109/39 (62) 100   


 


11/15/19 05:30  85 23     30


 


11/15/19 05:00  80 16 126/47 (73) 100   


 


11/15/19 04:00  70      


 


11/15/19 04:00 98.8 81 19 118/47 (70) 100   


 


11/15/19 04:00        30


 


11/15/19 04:00      Mechanical Ventilator  


 


11/15/19 03:00  78 14 123/48 (73) 100   


 


11/15/19 02:38  85 23     30


 


11/15/19 02:00  76 14 123/51 (75) 100   


 


11/15/19 01:52  78 18  100 Mechanical Ventilator  30





  80 14     30


 


11/15/19 01:00  78 18 125/44 (71) 100   


 


11/15/19 00:00  78 14 123/43 (69) 100   


 


11/15/19 00:00  82      


 


11/15/19 00:00      Mechanical Ventilator  


 


11/14/19 23:00  82 20 129/51 (77) 100   


 


11/14/19 22:58  81 14     30


 


11/14/19 22:00  82 15 131/45 (73) 100   


 


11/14/19 21:23  88 23     30


 


11/14/19 21:00  83 14 130/47 (74) 100   


 


11/14/19 20:00      Mechanical Ventilator  


 


11/14/19 20:00  87      


 


11/14/19 20:00 98.5 80 14 116/49 (71) 99   


 


11/14/19 20:00        30


 


11/14/19 19:52  75 14  100 Mechanical Ventilator  30





  79 14     30


 


11/14/19 19:00  82 19 121/46 (71) 100   


 


11/14/19 18:00  84 19 121/52 (75) 100   


 


11/14/19 18:00  84  121/52    


 


11/14/19 17:15  85 26     30


 


11/14/19 17:00  90 18 138/58 (84) 100   


 


11/14/19 16:00        30


 


11/14/19 16:00  79      


 


11/14/19 16:00 98.9 82 18 127/51 (76) 100   


 


11/14/19 16:00      Mechanical Ventilator  


 


11/14/19 15:11  81 14     30


 


11/14/19 15:00  80 18 120/44 (69) 100   


 


11/14/19 14:00  85 16 123/50 (74) 100   


 


11/14/19 13:09  87 14     30


 


11/14/19 13:00  96 18 124/54 (77) 99   

















Intake and Output  


 


 11/14/19 11/15/19





 19:00 07:00


 


Intake Total 315 ml 590 ml


 


Output Total 1270 ml 1320 ml


 


Balance -955 ml -730 ml


 


  


 


Free Water 60 ml 30 ml


 


IV Total 55 ml 


 


Tube Feeding 200 ml 560 ml


 


Output Urine Total 1270 ml 1320 ml








Laboratory Tests


11/15/19 05:30: 


White Blood Count 12.6H, Red Blood Count 3.03L, Hemoglobin 8.0L, Hematocrit 

25.1L, Mean Corpuscular Volume 83, Mean Corpuscular Hemoglobin 26.5L, Mean 

Corpuscular Hemoglobin Concent 32.1, Red Cell Distribution Width 14.4, Platelet 

Count 459H, Mean Platelet Volume 8.1, Neutrophils (%) (Auto) 63.5, Lymphocytes (

%) (Auto) 14.9L, Monocytes (%) (Auto) 8.3, Eosinophils (%) (Auto) 11.2H, 

Basophils (%) (Auto) 2.1H, Sodium Level 147H, Potassium Level 3.9, Chloride 

Level 105, Carbon Dioxide Level 35H, Anion Gap 7, Blood Urea Nitrogen 44H, 

Creatinine 1.2, Estimat Glomerular Filtration Rate 45.0, Glucose Level 188H, 

Calcium Level 9.7, Total Bilirubin 0.4, Aspartate Amino Transf (AST/SGOT) 10L, 

Alanine Aminotransferase (ALT/SGPT) 13, Alkaline Phosphatase 87, Total Protein 

7.3, Albumin 2.1L, Globulin 5.2, Albumin/Globulin Ratio 0.4L


Height (Feet):  5


Height (Inches):  5.00


Weight (Pounds):  130


General Appearance:  no apparent distress


EENT:  TMs normal


Neck:  supple


Cardiovascular:  normal rate


Respiratory/Chest:  decreased breath sounds


Abdomen:  normal bowel sounds, non tender, soft


Extremities:  non-tender











Storm Turk MD Nov 15, 2019 12:54

## 2019-11-15 NOTE — NUR
NURSE NOTES:



Report received from ELIZABETH Gloria. Pt is sleeping in bed. Able to open eyes to light touch. 
Unable to follow commands. Non-verbal. Able to move all extremities +2-3. Sinus rhythm on 
cardiac monitor. Trach to vent. Slight old blood noted on the trach dressing. No active 
bleeding noted. AC 14, , FiO2 30%, P 5. O2 sat 100%. No respiratory distress noted. 
G-tube in place receiving Glucerna 1.2 at 60cc/hr. No residual noted. TU PICC patent and 
asymptomatic. Bed in lowest position. Side rails up x3. Will resume plan of care.

## 2019-11-15 NOTE — NUR
RESPIRATORY NOTE:

Received pt on AC 14, 400VT, 30%, PEEP +5. Pt is trach-dependent w/ a cuffed, Shiley 8 
tube. Pt asleep/disoriented, responds to stimuli. B/S vickie. rhonchi, sxn small amounts of 
thick/thin, white to pale-yellow secretions. Vent plugged into red outlet, ambubag at 
bedside. Pt in no apparent distress at this time. Will continue plan of care.

## 2019-11-15 NOTE — NUR
***DISCHARGE PLANNING:



PATIENT HAS BEEN REFERRED TO 

[facility]

T:[phone]

F:[phone]

CLINICALS HAVE BEEN FAXED 

WAITING FOR RESPONSE

## 2019-11-15 NOTE — NUR
RESPIRATORY NOTE:

received trach pt on current vent settings without any signs of resp distress. pt trach size 
shiley 8, secured via trach tie/guard. redness and slight bleeding around stoma due to newly 
tracheostomy. alarms are set and audible with ambu bag at bedside. will cont to monitor with 
scheduled breathing tx's.

## 2019-11-15 NOTE — NUR
NURSE NOTES:



Dr Keith here to see the patient. Updated him with pt's current condition. Awaiting new 
orders.

## 2019-11-15 NOTE — NUR
CASE MANAGEMENT: REVIEW



11/15/19



SI: POD# 1  TRACHEOSTOMY

SEPSIS D/T PNA. ACUTE CHF

RESPIRATORY FAILURE ~ INTUBATED 

98.9   86   23   127/49  100% ON VENT SUPPORT @ 30% FIO2

WBC 12.6 RBC 3.03 H/H-8.0/25.1  CO2+35   BUN+44  



IS: 

IV LASIX Q12HR

METOLAZONE NG Q24HR

IV PROTONIX Q12

NORVASC NG BID

DUONEB INH Q6HRS RTC

K-DUR GT QD





**: ICU STATUS



PLAN: TX TO SDU

LTAC PLACEMENT

## 2019-11-15 NOTE — NUR
RESPIRATORY NOTE:

attempting to wean pt on CPAP PS8 fio3 30% as of 0900. no signs of resp distress. pt 
currently tolerating well. RN aware and will cont to monitor

## 2019-11-15 NOTE — NUR
NURSE NOTES:

Received report from Mi Yeon, RN. Patient arrive to unit in stable condition, trach to vent, 
vent settings are as ordered. Per Mi Yeon, patient is on mittens, arms are free in motion, 
d/t patient attempting to remove trach. Noted, charge nurse aware. Observed no presence of 
pain or discomfort at this time. Bed is in lowest position, brakes engaged. Call light is 
kept within easy reach. Will continue to monitor patient.

## 2019-11-15 NOTE — NUR
NURSE NOTES:



Weaning with CPAP, PS 8 started by RT. No respiratory distress noted. O2 sat 100%, RR25. 
Will continue to monitor.

## 2019-11-15 NOTE — INFECTIOUS DISEASES PROG NOTE
Assessment/Plan


Problems:  


(1) Fever


Assessment & Plan:  suspect due to pelvic mass , with negative repeated blood 

cultures , urine and sputum only grew yeast which is colonization . will 

monitor off antibiotics for now done with her course today  





(2) Aspiration pneumonia


Assessment & Plan:  with B/L infiltrates, hypoxemia and leukocytosis, CXR  

showed interstitial infiltrates , sputum culture showed normal agustin and 

repeated one shoed yeast which is colonization . S/P meropenem  empirically for 

two weeks . aspiration precaution. EOT 11/14/19





(3) Respiratory failure


Assessment & Plan:  with maegan cardia and S/P code blue, was intubated and 

started on mechanical ventilation , S/P tracheostomy , monitor CXR and ABG , 

pulmonary is following 





(4) UTI (urinary tract infection)


Assessment & Plan:  due to candida lusitaneae , S/P casas catheter removal , no 

specific therapy needed for now after changing her casas catheter 





(5) Acute metabolic encephalopathy


Assessment & Plan:  due to the above, continue hydration and antibiotics, 

monitor in tele 





(6) Hyperglycemia due to type 2 diabetes mellitus


Assessment & Plan:  recommend tight glycemic control to keep blood glucose 

between 100-140 





(7) ARF (acute renal failure)


Assessment & Plan:  due to the above, continue hydration and renally dosed 

antibiotics, close  monitor of renal function , stop vancomycin 





(8) Pelvic mass in female


Assessment & Plan:  to the left of the uterus, suspect malignancy , may need 

surgical resection , recommend OB/GYN eval and follow up , oncology is 

following 








Subjective


ROS Limited/Unobtainable:  Yes


Allergies:  


Coded Allergies:  


     No Known Allergies (Unverified , 10/14/19)


Subjective


she had tracheostomy done , and continued on mechanical ventilation , tolerated 

procedure well, no bleeding or draining from the trach site ,  comfortable in 

bed, and responsive to verbal commands still in ICU , had low grade fever but 

no chills  , no diarrhea, no secretions from the trach





Objective


Vital Signs





Last 24 Hour Vital Signs








  Date Time  Temp Pulse Resp B/P (MAP) Pulse Ox O2 Delivery O2 Flow Rate FiO2


 


11/15/19 17:02  91 23     30


 


11/15/19 17:00  89 21 120/48 (72) 100   


 


11/15/19 16:00 99.1 91 25 136/56 (82) 100   


 


11/15/19 16:00      Mechanical Ventilator  


 


11/15/19 16:00        30


 


11/15/19 15:28  85      


 


11/15/19 15:00  87 22 131/49 (76) 100   


 


11/15/19 14:45  84 20     30


 


11/15/19 14:00  84 24 121/49 (73) 100   


 


11/15/19 13:00  84 19 138/48 (78) 100   


 


11/15/19 12:45  78 20  100 Mechanical Ventilator  30





  82 21     30


 


11/15/19 12:00 97.8 79 14 129/45 (73) 100   


 


11/15/19 12:00      Mechanical Ventilator  


 


11/15/19 12:00  90      


 


11/15/19 11:00  85 19 118/43 (68) 99   


 


11/15/19 10:37        30


 


11/15/19 10:35  87 20     30


 


11/15/19 10:00  86 22 128/57 (80) 100   


 


11/15/19 09:05        30


 


11/15/19 09:04  86 24     30





        30


 


11/15/19 09:04     100   


 


11/15/19 09:00  84 23 127/55 (79) 100   


 


11/15/19 08:54  84  129/49    


 


11/15/19 08:00      Mechanical Ventilator  


 


11/15/19 08:00 98.9 86 23 127/49 (75) 100   


 


11/15/19 08:00  87      


 


11/15/19 07:02  80 17  100 Mechanical Ventilator  30





  80 18     30


 


11/15/19 07:00  80 21 110/39 (62) 100   


 


11/15/19 06:00  83 17 109/39 (62) 100   


 


11/15/19 05:30  85 23     30


 


11/15/19 05:00  80 16 126/47 (73) 100   


 


11/15/19 04:00  70      


 


11/15/19 04:00 98.8 81 19 118/47 (70) 100   


 


11/15/19 04:00        30


 


11/15/19 04:00      Mechanical Ventilator  


 


11/15/19 03:00  78 14 123/48 (73) 100   


 


11/15/19 02:38  85 23     30


 


11/15/19 02:00  76 14 123/51 (75) 100   


 


11/15/19 01:52  78 18  100 Mechanical Ventilator  30





  80 14     30


 


11/15/19 01:00  78 18 125/44 (71) 100   


 


11/15/19 00:00  78 14 123/43 (69) 100   


 


11/15/19 00:00  82      


 


11/15/19 00:00      Mechanical Ventilator  


 


11/14/19 23:00  82 20 129/51 (77) 100   


 


11/14/19 22:58  81 14     30


 


11/14/19 22:00  82 15 131/45 (73) 100   


 


11/14/19 21:23  88 23     30


 


11/14/19 21:00  83 14 130/47 (74) 100   


 


11/14/19 20:00      Mechanical Ventilator  


 


11/14/19 20:00  87      


 


11/14/19 20:00 98.5 80 14 116/49 (71) 99   


 


11/14/19 20:00        30


 


11/14/19 19:52  75 14  100 Mechanical Ventilator  30





  79 14     30


 


11/14/19 19:00  82 19 121/46 (71) 100   


 


11/14/19 18:00  84 19 121/52 (75) 100   


 


11/14/19 18:00  84  121/52    








Height (Feet):  5


Height (Inches):  5.00


Weight (Pounds):  130


General Appearance:  WD/WN, no acute distress


HEENT:  normocephalic, atraumatic, anicteric, mucous membranes moist, PERRL, 

supple, no JVD, status post trach


Respiratory/Chest:  chest wall non-tender, no respiratory distress, no 

accessory muscle use, decreased breath sounds, crackles/rales


Cardiovascular:  normal peripheral pulses, normal rate, regular rhythm, no 

gallop/murmur, no JVD


Abdomen:  normal bowel sounds, soft, non tender, no organomegaly, non distended

, no mass, no scars


Genitourinary:  normal external genitalia


Extremities:  no cyanosis, no clubbing


Skin:  no rash


Neurologic/Psychiatric:  unresponsiveness


Lymphatic:  no neck adenopathy, no groin adenopathy





Laboratory Tests








Test


  11/15/19


05:30


 


White Blood Count


  12.6 K/UL


(4.8-10.8)  H


 


Red Blood Count


  3.03 M/UL


(4.20-5.40)  L


 


Hemoglobin


  8.0 G/DL


(12.0-16.0)  L


 


Hematocrit


  25.1 %


(37.0-47.0)  L


 


Mean Corpuscular Volume 83 FL (80-99)  


 


Mean Corpuscular Hemoglobin


  26.5 PG


(27.0-31.0)  L


 


Mean Corpuscular Hemoglobin


Concent 32.1 G/DL


(32.0-36.0)


 


Red Cell Distribution Width


  14.4 %


(11.6-14.8)


 


Platelet Count


  459 K/UL


(150-450)  H


 


Mean Platelet Volume


  8.1 FL


(6.5-10.1)


 


Neutrophils (%) (Auto)


  63.5 %


(45.0-75.0)


 


Lymphocytes (%) (Auto)


  14.9 %


(20.0-45.0)  L


 


Monocytes (%) (Auto)


  8.3 %


(1.0-10.0)


 


Eosinophils (%) (Auto)


  11.2 %


(0.0-3.0)  H


 


Basophils (%) (Auto)


  2.1 %


(0.0-2.0)  H


 


Sodium Level


  147 MMOL/L


(136-145)  H


 


Potassium Level


  3.9 MMOL/L


(3.5-5.1)


 


Chloride Level


  105 MMOL/L


()


 


Carbon Dioxide Level


  35 MMOL/L


(21-32)  H


 


Anion Gap


  7 mmol/L


(5-15)


 


Blood Urea Nitrogen


  44 mg/dL


(7-18)  H


 


Creatinine


  1.2 MG/DL


(0.55-1.30)


 


Estimat Glomerular Filtration


Rate 45.0 mL/min


(>60)


 


Glucose Level


  188 MG/DL


()  H


 


Calcium Level


  9.7 MG/DL


(8.5-10.1)


 


Total Bilirubin


  0.4 MG/DL


(0.2-1.0)


 


Aspartate Amino Transf


(AST/SGOT) 10 U/L (15-37)


L


 


Alanine Aminotransferase


(ALT/SGPT) 13 U/L (12-78)


 


 


Alkaline Phosphatase


  87 U/L


()


 


Total Protein


  7.3 G/DL


(6.4-8.2)


 


Albumin


  2.1 G/DL


(3.4-5.0)  L


 


Globulin 5.2 g/dL  


 


Albumin/Globulin Ratio


  0.4 (1.0-2.7)


L











Current Medications








 Medications


  (Trade)  Dose


 Ordered  Sig/Winnie


 Route


 PRN Reason  Start Time


 Stop Time Status Last Admin


Dose Admin


 


 Acetaminophen


  (Tylenol)  650 mg  Q4H  PRN


 NG


 Mild Pain/Temp > 100.5  11/15/19 18:00


 11/25/19 17:59   


 


 


 Acetaminophen


  (Tylenol)  650 mg  Q4H  PRN


 RECTAL


 TEMP>100.5  11/15/19 18:00


 12/2/19 17:59   


 


 


 Acetylcysteine


  (Mucomyst)  100 mg  TIDRT


 N


   11/15/19 19:00


 12/10/19 19:29   


 


 


 Albuterol/


 Ipratropium


  (Albuterol/


 Ipratropium)  3 ml  Q6HRT


 N


   11/15/19 19:00


 11/17/19 12:59   


 


 


 Amlodipine


 Besylate


  (Norvasc)  2.5 mg  BID


 NG


   11/15/19 18:00


 11/29/19 17:59   


 


 


 Chlorhexidine


 Gluconate


  (Mae-Hex 2%)  1 applic  DAILY@2000


 TOPIC


   11/15/19 20:00


 12/5/19 19:59   


 


 


 Dextrose


  (Dextrose 50%)  25 ml  Q30M  PRN


 IV


 Hypoglycemia  11/15/19 18:00


 12/5/19 06:29   


 


 


 Dextrose


  (Dextrose 50%)  50 ml  Q30M  PRN


 IV


 Hypoglycemia  11/15/19 18:00


 12/5/19 06:29   


 


 


 Folic Acid


  (Folate)  3 mg  DAILY


 GT


   11/16/19 09:00


 11/30/19 08:59   


 


 


 Furosemide


  (Lasix)  40 mg  EVERY 12  HOURS


 IV


   11/15/19 21:00


 12/7/19 20:59   


 


 


 Insulin Aspart


  (NovoLOG)    EVERY 6  HOURS


 SUBQ


   11/15/19 18:00


 11/29/19 11:59 UNV  


 


 


 Insulin Detemir


  (Levemir)  12 units  QHS


 SUBQ


   11/15/19 21:00


 12/1/19 08:59 UNV  


 


 


 Lactulose


  (Cephulac)  20 gm  Q8H  PRN


 NG


 Constipation  11/15/19 18:00


 12/3/19 17:59   


 


 


 Lorazepam


  (Ativan 2mg/ml


 1ml)  2 mg  Q4H  PRN


 IV


 for agitation  11/15/19 18:00


 11/17/19 17:59   


 


 


 Metoclopramide HCl


  (Reglan)  10 mg  Q6H  PRN


 IVP


 Nausea & Vomiting  11/15/19 18:00


 12/6/19 17:59   


 


 


 Metolazone


  (Zaroxolyn)  5 mg  Q24H


 NG


   11/16/19 08:00


 12/12/19 07:59   


 


 


 Pantoprazole


  (Protonix)  40 mg  EVERY 12  HOURS


 IVP


   11/15/19 21:00


 12/3/19 08:59 UNV  


 


 


 Potassium Chloride


  (K-Dur)  40 meq  DAILY


 GT


   11/16/19 09:00


 12/5/19 17:59 REFUGIOV  


 

















Amie Burgos M.D. Nov 15, 2019 17:53

## 2019-11-15 NOTE — NUR
NURSE NOTES:

Pt report received from [facility] SDU day shift. pt remains stable. pt is obtunded neuro 
wise. pt is on cardiac monitor showing NSR, no distress noted. pt is a trach to vent, 
satting at 100%. no distress noted. pt bed is low, locked, armed, bed rails up times 3, 
padded, call light within easy reach. will continue plan of care.

## 2019-11-15 NOTE — PULMONOLGY CRITICAL CARE NOTE
Critical Care - Asmt/Plan


Assessment/Plan:


Pulmonary CCM Progress Note 





Assessment/Plan


Problems:  


(1) Healthcare/aspiration associated bacterial pneumonia, respiratory failure, 

some improvement in CXR


(2) UTI (urinary tract infection)


(3) Sepsis


(4) Dehydration


(5) CHANCE (acute kidney injury)


(6) Acute metabolic encephalopathy


(7) Hyperglycemia due to type 2 diabetes mellitus


(8) Renal failure (ARF), acute on chronic


(9) Ventilator dependant Respiratory Failure - not tolerating weaning


(10) Abnormal TSH


(11) Pelvic mass in female


(12) PEG (percutaneous endoscopic gastrostomy) status


Assessment/Plan


Marked leukocytosis S/P WBC scan with uptake in pelvis, has pelvic mass


Sepsis


Possible pneumonia


E coli UTI


Acute hypoxemic respiratory failure


S/p code blue - VDRF 


S/p Tracheostomy and G tube


Encephalopathy sp Code


Hx CHF


HTN


CHANCE improving


Pelvic mass/possible GYN malignancy vs cyst


Dysphagia S/P PEG





Plan:


-Wean FIO2, ABG PRN


-Optimize pulmonary hygiene


-Wean ventilator as tolerated


-Titrate down FiO2 to keep SaO2 > 90%


-Monitor WCt, RFS sent for JAK2 mutation, per Dr. Pop BMBx if unrevealing


-Abx per ID, F/U Cx's


-monitor volumes and renal function, F/U renal recs, diurese PRN


-monitor encephalopathy


-NPO, GTF's


-DVT Px


-FC


-PICC line





Subjective


Allergies:  


Coded Allergies:  


     No Known Allergies


Subjective


Sedated on the Ventilator





Objective





Vital Signs Noted





General Appearance:  Sedated, cachetic


HEENT:  normocephalic, atraumatic, anicteric, mucous membranes moist, trach CDI


Respiratory/Chest:  occasional rhonchi


Cardiovascular:  normal peripheral pulses, normal rate, regular rhythm


Abdomen:  normal bowel sounds, soft, non tender, no organomegaly, non distended

, no mass, other - G tube


Extremities:  no cyanosis, no clubbing, no edema





Microbiology noted








 Date/Time


Source Procedure


Growth Status


 


 


 10/31/19 10:15


Urine,Clean Catch Urine Culture - Preliminary


YEAST Resulted








Laboratory Tests Noted





Critical Care - Objective





Last 24 Hour Vital Signs








  Date Time  Temp Pulse Resp B/P (MAP) Pulse Ox O2 Delivery O2 Flow Rate FiO2


 


11/15/19 14:00  84 24 121/49 (73) 100   


 


11/15/19 13:00  84 19 138/48 (78) 100   


 


11/15/19 12:45  78 20  100   30





  82 21     30


 


11/15/19 12:00 97.8 79 14 129/45 (73) 100   


 


11/15/19 12:00      Mechanical Ventilator  


 


11/15/19 12:00  90      


 


11/15/19 11:00  85 19 118/43 (68) 99   


 


11/15/19 10:37        30


 


11/15/19 10:35  87 20     30


 


11/15/19 10:00  86 22 128/57 (80) 100   


 


11/15/19 09:05        30


 


11/15/19 09:04  86 24     30





        30


 


11/15/19 09:04     100   


 


11/15/19 09:00  84 23 127/55 (79) 100   


 


11/15/19 08:54  84  129/49    


 


11/15/19 08:00      Mechanical Ventilator  


 


11/15/19 08:00 98.9 86 23 127/49 (75) 100   


 


11/15/19 08:00  87      


 


11/15/19 07:02  80 17  100 Mechanical Ventilator  30





  80 18     30


 


11/15/19 07:00  80 21 110/39 (62) 100   


 


11/15/19 06:00  83 17 109/39 (62) 100   


 


11/15/19 05:30  85 23     30


 


11/15/19 05:00  80 16 126/47 (73) 100   


 


11/15/19 04:00  70      


 


11/15/19 04:00 98.8 81 19 118/47 (70) 100   


 


11/15/19 04:00        30


 


11/15/19 04:00      Mechanical Ventilator  


 


11/15/19 03:00  78 14 123/48 (73) 100   


 


11/15/19 02:38  85 23     30


 


11/15/19 02:00  76 14 123/51 (75) 100   


 


11/15/19 01:52  78 18  100 Mechanical Ventilator  30





  80 14     30


 


11/15/19 01:00  78 18 125/44 (71) 100   


 


11/15/19 00:00  78 14 123/43 (69) 100   


 


11/15/19 00:00  82      


 


11/15/19 00:00      Mechanical Ventilator  


 


11/14/19 23:00  82 20 129/51 (77) 100   


 


11/14/19 22:58  81 14     30


 


11/14/19 22:00  82 15 131/45 (73) 100   


 


11/14/19 21:23  88 23     30


 


11/14/19 21:00  83 14 130/47 (74) 100   


 


11/14/19 20:00      Mechanical Ventilator  


 


11/14/19 20:00  87      


 


11/14/19 20:00 98.5 80 14 116/49 (71) 99   


 


11/14/19 20:00        30


 


11/14/19 19:52  75 14  100 Mechanical Ventilator  30





  79 14     30


 


11/14/19 19:00  82 19 121/46 (71) 100   


 


11/14/19 18:00  84 19 121/52 (75) 100   


 


11/14/19 18:00  84  121/52    


 


11/14/19 17:15  85 26     30


 


11/14/19 17:00  90 18 138/58 (84) 100   


 


11/14/19 16:00        30


 


11/14/19 16:00  79      


 


11/14/19 16:00 98.9 82 18 127/51 (76) 100   


 


11/14/19 16:00      Mechanical Ventilator  


 


11/14/19 15:11  81 14     30


 


11/14/19 15:00  80 18 120/44 (69) 100   








Micro:





Microbiology








 Date/Time


Source Procedure


Growth Status


 


 


 11/12/19 15:30


Blood Blood Culture - Preliminary


NO GROWTH AFTER 48 HOURS Resulted


 


 11/12/19 14:45


Blood Blood Culture - Preliminary


NO GROWTH AFTER 48 HOURS Resulted








Accucheck:  239





Critical Care - Subjective


ROS Limited/Unobtainable:  No


FI02:  30


Vent Support Breath Rate:  14


Vent Support Mode:  AC


Vent Tidal Volume:  400


Sputum Amount:  Scant


PEEP:  5.0


PIP:  17


Tube Feeding Amount:  60


I&O:











Intake and Output  


 


 11/14/19 11/15/19





 19:00 07:00


 


Intake Total 315 ml 590 ml


 


Output Total 1270 ml 1320 ml


 


Balance -955 ml -730 ml


 


  


 


Free Water 60 ml 30 ml


 


IV Total 55 ml 


 


Tube Feeding 200 ml 560 ml


 


Output Urine Total 1270 ml 1320 ml








ET-Tube:  7.5


ET Position:  23











Delmer Main MD Nov 15, 2019 14:34

## 2019-11-15 NOTE — NUR
NURSE NOTES:



Cleaned and repositioned pt. Received a room number from supervisor. Will get ready for 
transfer.

## 2019-11-15 NOTE — GENERAL PROGRESS NOTE
Assessment/Plan


Problem List:  


(1) Abnormal TSH


ICD Codes:  R79.89 - Other specified abnormal findings of blood chemistry


SNOMED:  272987991


(2) Hyperglycemia due to type 2 diabetes mellitus


ICD Codes:  E11.65 - Type 2 diabetes mellitus with hyperglycemia


SNOMED:  244386952425448, [id number]


Qualifiers:  


   Qualified Codes:  E11.65 - Type 2 diabetes mellitus with hyperglycemia; 

Z79.4 - Long term (current) use of insulin


(3) Acute metabolic encephalopathy


ICD Codes:  G93.41 - Metabolic encephalopathy


SNOMED:  05179000, [id number]


(4) ARF (acute renal failure)


ICD Codes:  N17.9 - Acute kidney failure, unspecified


SNOMED:  95512768


Qualifiers:  


   Qualified Codes:  N17.9 - Acute kidney failure, unspecified


(5) Healthcare associated bacterial pneumonia


ICD Codes:  J15.9 - Unspecified bacterial pneumonia


SNOMED:  241062744


Status:  unchanged


Assessment/Plan:


increase Levemir to 12 units qhs 


continue NISS every 6 hours 


hypoglycemia protocol in order





Subjective


ROS Limited/Unobtainable:  Yes


Allergies:  


Coded Allergies:  


     No Known Allergies (Unverified , 10/14/19)


Subjective


events noted 


glucose slightly on higher side 











Item Value  Date Time


 


Bedside Blood Glucose 190 mg/dl H 11/15/19 0600


 


Bedside Blood Glucose 257 mg/dl H 11/15/19 0000


 


Bedside Blood Glucose 229 mg/dl H 11/14/19 2046


 


Bedside Blood Glucose 247 mg/dl H 11/14/19 1815


 


Bedside Blood Glucose 135 mg/dl H 11/14/19 1200


 


Bedside Blood Glucose 189 mg/dl H 11/14/19 0600


 


Bedside Blood Glucose 230 mg/dl H 11/14/19 0000











Objective





Last 24 Hour Vital Signs








  Date Time  Temp Pulse Resp B/P (MAP) Pulse Ox O2 Delivery O2 Flow Rate FiO2


 


11/15/19 06:00  83 17 109/39 (62) 100   


 


11/15/19 05:30  85 23     30


 


11/15/19 05:00  80 16 126/47 (73) 100   


 


11/15/19 04:00  70      


 


11/15/19 04:00 98.8 81 19 118/47 (70) 100   


 


11/15/19 04:00        30


 


11/15/19 04:00      Mechanical Ventilator  


 


11/15/19 03:00  78 14 123/48 (73) 100   


 


11/15/19 02:38  85 23     30


 


11/15/19 02:00  76 14 123/51 (75) 100   


 


11/15/19 01:52  78 18  100 Mechanical Ventilator  30





  80 14     30


 


11/15/19 01:00  78 18 125/44 (71) 100   


 


11/15/19 00:00  78 14 123/43 (69) 100   


 


11/15/19 00:00  82      


 


11/15/19 00:00      Mechanical Ventilator  


 


11/14/19 23:00  82 20 129/51 (77) 100   


 


11/14/19 22:58  81 14     30


 


11/14/19 22:00  82 15 131/45 (73) 100   


 


11/14/19 21:23  88 23     30


 


11/14/19 21:00  83 14 130/47 (74) 100   


 


11/14/19 20:00      Mechanical Ventilator  


 


11/14/19 20:00  87      


 


11/14/19 20:00 98.5 80 14 116/49 (71) 99   


 


11/14/19 20:00        30


 


11/14/19 19:52  75 14  100 Mechanical Ventilator  30





  79 14     30


 


11/14/19 19:00  82 19 121/46 (71) 100   


 


11/14/19 18:00  84 19 121/52 (75) 100   


 


11/14/19 18:00  84  121/52    


 


11/14/19 17:15  85 26     30


 


11/14/19 17:00  90 18 138/58 (84) 100   


 


11/14/19 16:00        30


 


11/14/19 16:00  79      


 


11/14/19 16:00 98.9 82 18 127/51 (76) 100   


 


11/14/19 16:00      Mechanical Ventilator  


 


11/14/19 15:11  81 14     30


 


11/14/19 15:00  80 18 120/44 (69) 100   


 


11/14/19 14:00  85 16 123/50 (74) 100   


 


11/14/19 13:09  87 14     30


 


11/14/19 13:00  96 18 124/54 (77) 99   


 


11/14/19 12:30  105 19 143/60 (87) 99   


 


11/14/19 12:15  117 19 172/62 (98) 99   


 


11/14/19 12:00      Mechanical Ventilator  


 


11/14/19 12:00 99.0 121 17 174/71 (105) 98   


 


11/14/19 12:00  120      


 


11/14/19 11:30  114 15 170/71 (104) 99   


 


11/14/19 11:02  101 18     30


 


11/14/19 11:00  100 18 147/52 (83) 100   


 


11/14/19 10:45  99 20 159/53 (88) 100   


 


11/14/19 10:30  97 18 142/56 (84) 100   


 


11/14/19 10:15  89 18 152/53 (86) 100   


 


11/14/19 10:12  84 14  100   


 


11/14/19 10:10  88 14  100   


 


11/14/19 10:00  86 18 152/53 (86) 100   


 


11/14/19 10:00        30


 


11/14/19 09:00  84 18 121/40 (67) 100   


 


11/14/19 09:00  90  121/59    


 


11/14/19 08:36     100   


 


11/14/19 08:35  85 17     30


 


11/14/19 08:00      Endotracheal Tube  


 


11/14/19 08:00        30


 


11/14/19 08:00  82      


 


11/14/19 08:00 98.8 83 24 127/52 (77) 100   


 


11/14/19 07:36  80 26  100 Mechanical Ventilator  30





  88 26     30





        30


 


11/14/19 07:00  78 20 117/43 (67) 100   

















Intake and Output  


 


 11/14/19 11/15/19





 18:59 06:59


 


Intake Total 275 ml 570 ml


 


Output Total 1285 ml 1305 ml


 


Balance -1010 ml -735 ml


 


  


 


Free Water 60 ml 30 ml


 


IV Total 55 ml 


 


Tube Feeding 160 ml 540 ml


 


Output Urine Total 1285 ml 1305 ml








Laboratory Tests


11/15/19 05:30: 


White Blood Count 12.6H, Red Blood Count 3.03L, Hemoglobin 8.0L, Hematocrit 

25.1L, Mean Corpuscular Volume 83, Mean Corpuscular Hemoglobin 26.5L, Mean 

Corpuscular Hemoglobin Concent 32.1, Red Cell Distribution Width 14.4, Platelet 

Count 459H, Mean Platelet Volume 8.1, Neutrophils (%) (Auto) 63.5, Lymphocytes (

%) (Auto) 14.9L, Monocytes (%) (Auto) 8.3, Eosinophils (%) (Auto) 11.2H, 

Basophils (%) (Auto) 2.1H, Sodium Level 147H, Potassium Level 3.9, Chloride 

Level 105, Carbon Dioxide Level 35H, Anion Gap 7, Blood Urea Nitrogen 44H, 

Creatinine 1.2, Estimat Glomerular Filtration Rate 45.0, Glucose Level 188H, 

Calcium Level 9.7, Total Bilirubin 0.4, Aspartate Amino Transf (AST/SGOT) 10L, 

Alanine Aminotransferase (ALT/SGPT) 13, Alkaline Phosphatase 87, Total Protein 

7.3, Albumin 2.1L, Globulin 5.2, Albumin/Globulin Ratio 0.4L


Height (Feet):  5


Height (Inches):  5.00


Weight (Pounds):  130


General Appearance:  lethargic


Neck:  normal alignment


Respiratory/Chest:  decreased breath sounds


Abdomen:  normal bowel sounds


Edema:  1+ Arm (L), 1+ Arm (R), 1+ Leg (L), 1+ Leg (R), 1+ Pedal (L), 1+ Pedal (

R), 1+ Generalized


Objective





Current Medications








 Medications


  (Trade)  Dose


 Ordered  Sig/Winnie


 Route


 PRN Reason  Start Time


 Stop Time Status Last Admin


Dose Admin


 


 Acetaminophen


  (Tylenol)  650 mg  Q4H  PRN


 NG


 Mild Pain/Temp > 100.5  11/3/19 20:30


 11/25/19 20:29  11/14/19 12:01


 


 


 Acetaminophen


  (Tylenol)  650 mg  Q4H  PRN


 RECTAL


 TEMP>100.5  11/3/19 20:30


 12/2/19 20:29   


 


 


 Acetylcysteine


  (Mucomyst)  100 mg  TIDRT


 [facility]


   11/10/19 19:30


 12/10/19 19:29  11/14/19 19:51


 


 


 Albuterol/


 Ipratropium


  (Albuterol/


 Ipratropium)  3 ml  Q6HRT


 [facility]


   11/12/19 13:00


 11/17/19 12:59  11/15/19 01:52


 


 


 Amlodipine


 Besylate


  (Norvasc)  2.5 mg  BID


 NG


   11/4/19 09:00


 11/29/19 17:59  11/11/19 17:02


 


 


 Chlorhexidine


 Gluconate


  (Mae-Hex 2%)  1 applic  DAILY@2000


 TOPIC


   11/5/19 20:00


 12/5/19 19:59  11/14/19 20:28


 


 


 Dextrose


  (Dextrose 50%)  25 ml  Q30M  PRN


 IV


 Hypoglycemia  11/5/19 06:30


 12/5/19 06:29   


 


 


 Dextrose


  (Dextrose 50%)  50 ml  Q30M  PRN


 IV


 Hypoglycemia  11/5/19 06:30


 12/5/19 06:29   


 


 


 Folic Acid


  (Folate)  3 mg  DAILY


 GT


   11/7/19 09:00


 11/30/19 08:59  11/13/19 08:44


 


 


 Furosemide


  (Lasix)  40 mg  EVERY 12  HOURS


 IV


   11/7/19 21:00


 12/7/19 20:59  11/14/19 20:42


 


 


 Insulin Aspart


  (NovoLOG)    EVERY 6  HOURS


 SUBQ


   11/4/19 00:00


 11/29/19 11:59  11/15/19 05:31


 


 


 Insulin Detemir


  (Levemir)  10 units  QHS


 SUBQ


   11/14/19 21:00


 12/1/19 08:59  11/14/19 20:46


 


 


 Lactulose


  (Cephulac)  20 gm  Q8H  PRN


 NG


 Constipation  11/3/19 20:30


 12/3/19 20:29  11/5/19 08:12


 


 


 Lorazepam


  (Ativan 2mg/ml


 1ml)  2 mg  Q4H  PRN


 IV


 for agitation  11/10/19 19:30


 11/17/19 19:29  11/12/19 20:37


 


 


 Metoclopramide HCl


  (Reglan)  10 mg  Q6H  PRN


 IVP


 Nausea & Vomiting  11/6/19 10:45


 12/6/19 10:44   


 


 


 Metolazone


  (Zaroxolyn)  5 mg  Q24H


 NG


   11/12/19 08:00


 12/12/19 07:59  11/14/19 08:11


 


 


 Pantoprazole


  (Protonix)  40 mg  EVERY 12  HOURS


 IVP


   11/3/19 21:00


 12/3/19 08:59  11/14/19 20:42


 


 


 Potassium Chloride


  (K-Dur)  40 meq  DAILY


 GT


   11/6/19 09:00


 12/5/19 17:59  11/14/19 12:00


 

















Riccardo Velez MD Nov 15, 2019 06:44

## 2019-11-15 NOTE — HEMATOLOGY/ONC PROGRESS NOTE
Assessment/Plan


Assessment/Plan





Assessment and Recs:


# Anemia of chronic disease due to underlying chronic medical issues, 

multifactorial 


--> Anemia workup has been ordered, rule out gi bleed --> ferritin is 1400+


--> No evidence of hemolysis is noted, peripheral smear has been reviewed.


--> Hgb goal >7. Transfuse prn.


--> Epogen or iron at this time is not particularly indicated


--> Medications have been reviewed


--> low threshold for gi evaluation in case has occult +


--> bone marrow biopsy is not indicated given the other more likely causes (if 

recurrent anemia) first time here


--> hgb trend 8.4->8.5-->8.2->7.7-->7.9-->10.1-->9.1->7.8->7.2-->9-->8.4-->8


--> FOLIC ACID 1mg po daily started


# Leukocytosis/elevated white blood cell count, unspecified likely related to 

underlying reaction v more likely infection


--> have reviewed peripheral smear and bandemia/neutrophilia noted


--> continue antibiotics if they have been started by ID team (VANC/ZOSYN)--> 

vanc/linda--> linda


--> wbc 39-->28k-->29k->31k-->23-->26k-->19k-->24k-->15.4-->16-->14-->23k-->14--

>17-->12.5->12.3-->12.6


--> monitor for resolution


--> CT C/A/P Results reviewed


--> FOR INdium bone scan done -> Rim of activity within the pelvis, 

corresponding to expected location of the soft tissue component of the cystic 

pelvic mass described on recent imaging studies.


--> again consider gyn eval


--> as come down over last week, currently only 12k and s/p code, continue to 

monitor


--> would hold off on biopsy of bone


# Pelvic mass on ct and us -- 13 x 7 x 12.9 cm unilocular cystic mass, 

corresponding to lesion reported on recent CT scan. Layering low level internal 

echoes likely represent bladder debris.. This presumably represents a cystic 

ovarian lesion with debris or hemorrhage, possibly neoplastic.


--> CA-125 is 216! elevated


--> consider gyn eval


# Hypercalcemia - btw 10-11 range when corrected to alb


--> ivf have been started


--> if remains elevated, 7.9-->8.3


--> will get pth


# Sepsis from pneumonia.  


--> pulm consulted, abx started


# CHANCE (acute kidney injury) on ckd


--> cr 1.6-->2.7-->2.2->1.2


--> per renal


# UTI (urinary tract infection)


--> on abx per id


# Dehydration


--> on ivf


# Acute metabolic encephalopathy


--> likely due to pna


# Resp failure now intibated 11/4


# Dysphagia s/p peg++


# Dvt ppx SCDS





The timing of this note does not necessarily reflect the time of the patient 

was seen.





Greatly appreciate consultation.





Subjective


Constitutional:  Denies: no symptoms, chills, fever, malaise, weakness, other


HEENT:  Denies: no symptoms, eye pain, blurred vision, tearing, double vision, 

ear pain, ear discharge, nose pain, nose congestion, throat pain, throat 

swelling, mouth pain, mouth swelling, other


Cardiovascular:  Denies: no symptoms, chest pain, edema, irregular heart rate, 

lightheadedness, palpitations, syncope, other


Respiratory:  Denies: no symptoms, cough, shortness of breath, SOB with 

excertion, SOB at rest, sputum, wheezing, other


Gastrointestinal/Abdominal:  Denies: no symptoms, abdomen distended, abdominal 

pain, black stools, tarry stools, blood in stool, constipated, diarrhea, 

difficulty swallowing, nausea, poor appetite, poor fluid intake, rectal bleeding

, vomiting, other


Genitourinary:  Denies: no symptoms, burning, discharge, frequency, flank pain, 

hematuria, incontinence, pain, urgency, other


Endocrine:  Denies: no symptoms, excessive sweating, flushing, intolerance to 

cold, intolerance to heat, increased hunger, increased thirst, increased urine, 

unexplained weight gain, unexplained weight loss, other


Allergies:  


Coded Allergies:  


     No Known Allergies (Unverified , 10/14/19)


Subjective


10/14: hgb has improved, no bleeding, labs reivewed


10/15: no events to report


10/16: a+ o x1, no bleeding or chills noted, no major changes, occult pending


10/17: no events noted, no bleeding, no chills, no hematemesis/hematochezia


10/18: remains confused, with abx, on nc


10/19: cr is worse, hgb 8.4, no bleeding, night sweats, chills


10/20: no f/c, no night sweats, labs noted, cr worse


10/21: no bleeding or chills, no night sweats, labs pending this am


10/22: pelvic mass noted on imaging, no bleeding reported, ordered ca125


10/23: no events, remains lethargic, is on abx, seen by surg


10/24: with raid response overnight, rr high as well as bp, vidal to icu


10/25: no events, no bleeding, to undergo a indium scan with id


10/26: comfortable, electrolytes reviewed, given mag and k


10/29: back on bipap with gt feeds, yousuf rn this am


10/30: remains very confused, no f/c, no bleeding, with urinary retention, 

folic acid started


10/31: sleeping, is on bipap, no events, wbc is higher this am


11/1: no events to report, no bleeding, wbc still high but better


11/3: no bleeding, no night sweats, s/p peg, alert


11/4: s/p code blue last night, now intubated, labs noted wbc lower


11/5: given k and mg, for endoscopy tomorrow given drop h/h, 1 unit prbc


11/6: is on vent, unresponsive, no bleeding noted, no chills wbc is 13k


11/14: remains in icu, s/p trach, wbc 12, on meropenem 


11/15: no bleeding, trying to pull off the trach, cbc reviewed





Objective


Objective





Current Medications








 Medications


  (Trade)  Dose


 Ordered  Sig/Winnie


 Route


 PRN Reason  Start Time


 Stop Time Status Last Admin


Dose Admin


 


 Acetaminophen


  (Tylenol)  650 mg  Q4H  PRN


 NG


 Mild Pain/Temp > 100.5  11/3/19 20:30


 11/25/19 20:29  11/14/19 12:01


 


 


 Acetaminophen


  (Tylenol)  650 mg  Q4H  PRN


 RECTAL


 TEMP>100.5  11/3/19 20:30


 12/2/19 20:29   


 


 


 Acetylcysteine


  (Mucomyst)  100 mg  TIDRT


 N


   11/10/19 19:30


 12/10/19 19:29  11/15/19 12:45


 


 


 Albuterol/


 Ipratropium


  (Albuterol/


 Ipratropium)  3 ml  Q6HRT


 N


   11/12/19 13:00


 11/17/19 12:59  11/15/19 12:45


 


 


 Amlodipine


 Besylate


  (Norvasc)  2.5 mg  BID


 NG


   11/4/19 09:00


 11/29/19 17:59  11/11/19 17:02


 


 


 Chlorhexidine


 Gluconate


  (Mae-Hex 2%)  1 applic  DAILY@2000


 TOPIC


   11/5/19 20:00


 12/5/19 19:59  11/14/19 20:28


 


 


 Dextrose


  (Dextrose 50%)  25 ml  Q30M  PRN


 IV


 Hypoglycemia  11/5/19 06:30


 12/5/19 06:29   


 


 


 Dextrose


  (Dextrose 50%)  50 ml  Q30M  PRN


 IV


 Hypoglycemia  11/5/19 06:30


 12/5/19 06:29   


 


 


 Folic Acid


  (Folate)  3 mg  DAILY


 GT


   11/7/19 09:00


 11/30/19 08:59  11/15/19 08:54


 


 


 Furosemide


  (Lasix)  40 mg  EVERY 12  HOURS


 IV


   11/7/19 21:00


 12/7/19 20:59  11/15/19 08:53


 


 


 Insulin Aspart


  (NovoLOG)    EVERY 6  HOURS


 SUBQ


   11/4/19 00:00


 11/29/19 11:59  11/15/19 12:27


 


 


 Insulin Detemir


  (Levemir)  12 units  QHS


 SUBQ


   11/15/19 21:00


 12/1/19 08:59   


 


 


 Lactulose


  (Cephulac)  20 gm  Q8H  PRN


 NG


 Constipation  11/3/19 20:30


 12/3/19 20:29  11/5/19 08:12


 


 


 Lorazepam


  (Ativan 2mg/ml


 1ml)  2 mg  Q4H  PRN


 IV


 for agitation  11/10/19 19:30


 11/17/19 19:29  11/12/19 20:37


 


 


 Metoclopramide HCl


  (Reglan)  10 mg  Q6H  PRN


 IVP


 Nausea & Vomiting  11/6/19 10:45


 12/6/19 10:44   


 


 


 Metolazone


  (Zaroxolyn)  5 mg  Q24H


 NG


   11/12/19 08:00


 12/12/19 07:59  11/15/19 07:59


 


 


 Pantoprazole


  (Protonix)  40 mg  EVERY 12  HOURS


 IVP


   11/3/19 21:00


 12/3/19 08:59  11/15/19 08:54


 


 


 Potassium Chloride


  (K-Dur)  40 meq  DAILY


 GT


   11/6/19 09:00


 12/5/19 17:59  11/15/19 08:53


 











Last 24 Hour Vital Signs








  Date Time  Temp Pulse Resp B/P (MAP) Pulse Ox O2 Delivery O2 Flow Rate FiO2


 


11/15/19 16:00 99.1 91 25 136/56 (82) 100   


 


11/15/19 16:00      Mechanical Ventilator  


 


11/15/19 16:00        30


 


11/15/19 15:28  85      


 


11/15/19 15:00  87 22 131/49 (76) 100   


 


11/15/19 14:45  84 20     30


 


11/15/19 14:00  84 24 121/49 (73) 100   


 


11/15/19 13:00  84 19 138/48 (78) 100   


 


11/15/19 12:45  78 20  100 Mechanical Ventilator  30





  82 21     30


 


11/15/19 12:00 97.8 79 14 129/45 (73) 100   


 


11/15/19 12:00      Mechanical Ventilator  


 


11/15/19 12:00  90      


 


11/15/19 11:00  85 19 118/43 (68) 99   


 


11/15/19 10:37        30


 


11/15/19 10:35  87 20     30


 


11/15/19 10:00  86 22 128/57 (80) 100   


 


11/15/19 09:05        30


 


11/15/19 09:04  86 24     30





        30


 


11/15/19 09:04     100   


 


11/15/19 09:00  84 23 127/55 (79) 100   


 


11/15/19 08:54  84  129/49    


 


11/15/19 08:00      Mechanical Ventilator  


 


11/15/19 08:00 98.9 86 23 127/49 (75) 100   


 


11/15/19 08:00  87      


 


11/15/19 07:02  80 17  100 Mechanical Ventilator  30





  80 18     30


 


11/15/19 07:00  80 21 110/39 (62) 100   


 


11/15/19 06:00  83 17 109/39 (62) 100   


 


11/15/19 05:30  85 23     30


 


11/15/19 05:00  80 16 126/47 (73) 100   


 


11/15/19 04:00  70      


 


11/15/19 04:00 98.8 81 19 118/47 (70) 100   


 


11/15/19 04:00        30


 


11/15/19 04:00      Mechanical Ventilator  


 


11/15/19 03:00  78 14 123/48 (73) 100   


 


11/15/19 02:38  85 23     30


 


11/15/19 02:00  76 14 123/51 (75) 100   


 


11/15/19 01:52  78 18  100 Mechanical Ventilator  30





  80 14     30


 


11/15/19 01:00  78 18 125/44 (71) 100   


 


11/15/19 00:00  78 14 123/43 (69) 100   


 


11/15/19 00:00  82      


 


11/15/19 00:00      Mechanical Ventilator  


 


11/14/19 23:00  82 20 129/51 (77) 100   


 


11/14/19 22:58  81 14     30


 


11/14/19 22:00  82 15 131/45 (73) 100   


 


11/14/19 21:23  88 23     30


 


11/14/19 21:00  83 14 130/47 (74) 100   


 


11/14/19 20:00      Mechanical Ventilator  


 


11/14/19 20:00  87      


 


11/14/19 20:00 98.5 80 14 116/49 (71) 99   


 


11/14/19 20:00        30


 


11/14/19 19:52  75 14  100 Mechanical Ventilator  30





  79 14     30


 


11/14/19 19:00  82 19 121/46 (71) 100   


 


11/14/19 18:00  84 19 121/52 (75) 100   


 


11/14/19 18:00  84  121/52    


 


11/14/19 17:15  85 26     30


 


11/14/19 17:00  90 18 138/58 (84) 100   


 


11/14/19 16:00        30


 


11/14/19 16:00  79      


 


11/14/19 16:00 98.9 82 18 127/51 (76) 100   


 


11/14/19 16:00      Mechanical Ventilator  


 


11/14/19 15:11  81 14     30


 


11/14/19 15:00  80 18 120/44 (69) 100   


 


11/14/19 14:00  85 16 123/50 (74) 100   


 


11/14/19 13:09  87 14     30


 


11/14/19 13:00  96 18 124/54 (77) 99   


 


11/14/19 12:30  105 19 143/60 (87) 99   


 


11/14/19 12:15  117 19 172/62 (98) 99   


 


11/14/19 12:00      Mechanical Ventilator  


 


11/14/19 12:00 99.0 121 17 174/71 (105) 98   


 


11/14/19 12:00  120      


 


11/14/19 11:30  114 15 170/71 (104) 99   


 


11/14/19 11:02  101 18     30


 


11/14/19 11:00  100 18 147/52 (83) 100   


 


11/14/19 10:45  99 20 159/53 (88) 100   


 


11/14/19 10:30  97 18 142/56 (84) 100   


 


11/14/19 10:15  89 18 152/53 (86) 100   


 


11/14/19 10:12  84 14  100   


 


11/14/19 10:10  88 14  100   


 


11/14/19 10:00  86 18 152/53 (86) 100   


 


11/14/19 10:00        30


 


11/14/19 09:00  84 18 121/40 (67) 100   


 


11/14/19 09:00  90  121/59    


 


11/14/19 08:36     100   


 


11/14/19 08:35  85 17     30


 


11/14/19 08:00      Endotracheal Tube  


 


11/14/19 08:00        30


 


11/14/19 08:00  82      


 


11/14/19 08:00 98.8 83 24 127/52 (77) 100   


 


11/14/19 07:36  80 26  100 Mechanical Ventilator  30





  88 26     30





        30


 


11/14/19 07:00  78 20 117/43 (67) 100   


 


11/14/19 06:00  89 23 145/42 (76) 100   


 


11/14/19 05:26  85 19     30


 


11/14/19 05:00  84 20 130/39 (69) 100   


 


11/14/19 04:00      Endotracheal Tube  


 


11/14/19 04:00        30


 


11/14/19 04:00 98.7 84 23 142/51 (81) 100   


 


11/14/19 04:00  78      


 


11/14/19 03:21  78 21     30


 


11/14/19 03:00  79 16 127/44 (71) 99   


 


11/14/19 02:00 99.1 78 16 110/50 (70) 100   


 


11/14/19 01:33  81 21  98 Mechanical Ventilator  35





  81 21     35


 


11/14/19 01:00 100.3 88 24 144/46 (78) 99   


 


11/14/19 00:59 100.3       


 


11/14/19 00:00      Endotracheal Tube  


 


11/14/19 00:00  93      


 


11/14/19 00:00 100.3 93 19 122/47 (72) 98   


 


11/13/19 23:25  99 25     30


 


11/13/19 23:00  97 22 133/56 (81) 100   


 


11/13/19 22:00  86 23 135/47 (76) 99   


 


11/13/19 21:00  86 22 133/49 (77) 100   


 


11/13/19 20:45  91 21     30


 


11/13/19 20:00      Endotracheal Tube  


 


11/13/19 20:00 98.9 84 17 124/43 (70) 97   


 


11/13/19 20:00        30


 


11/13/19 20:00  94      


 


11/13/19 19:00  90 17 135/45 (75) 100   


 


11/13/19 18:50  100 29     30


 


11/13/19 18:00  90 24 131/48 (75) 100   


 


11/13/19 17:03  94  128/59    


 


11/13/19 17:00  86 19 128/59 (82) 100   


 


11/13/19 16:44  84 25     30

















Intake and Output  


 


 11/14/19 11/15/19





 19:00 07:00


 


Intake Total 315 ml 590 ml


 


Output Total 1270 ml 1320 ml


 


Balance -955 ml -730 ml


 


  


 


Free Water 60 ml 30 ml


 


IV Total 55 ml 


 


Tube Feeding 200 ml 560 ml


 


Output Urine Total 1270 ml 1320 ml











Labs








Test


  11/13/19


03:55 11/14/19


04:45 11/15/19


05:30


 


White Blood Count


  10.9 K/UL


(4.8-10.8) 12.3 K/UL


(4.8-10.8) 12.6 K/UL


(4.8-10.8)


 


Red Blood Count


  2.84 M/UL


(4.20-5.40) 3.18 M/UL


(4.20-5.40) 3.03 M/UL


(4.20-5.40)


 


Hemoglobin


  7.6 G/DL


(12.0-16.0) 8.4 G/DL


(12.0-16.0) 8.0 G/DL


(12.0-16.0)


 


Hematocrit


  23.5 %


(37.0-47.0) 26.3 %


(37.0-47.0) 25.1 %


(37.0-47.0)


 


Mean Corpuscular Volume 83 FL (80-99)  83 FL (80-99)  83 FL (80-99) 


 


Mean Corpuscular Hemoglobin


  26.7 PG


(27.0-31.0) 26.6 PG


(27.0-31.0) 26.5 PG


(27.0-31.0)


 


Mean Corpuscular Hemoglobin


Concent 32.4 G/DL


(32.0-36.0) 32.1 G/DL


(32.0-36.0) 32.1 G/DL


(32.0-36.0)


 


Red Cell Distribution Width


  14.3 %


(11.6-14.8) 14.0 %


(11.6-14.8) 14.4 %


(11.6-14.8)


 


Platelet Count


  385 K/UL


(150-450) 444 K/UL


(150-450) 459 K/UL


(150-450)


 


Mean Platelet Volume


  8.5 FL


(6.5-10.1) 8.2 FL


(6.5-10.1) 8.1 FL


(6.5-10.1)


 


Neutrophils (%) (Auto)


   % (45.0-75.0) 


  56.2 %


(45.0-75.0) 63.5 %


(45.0-75.0)


 


Lymphocytes (%) (Auto)


   % (20.0-45.0) 


  22.8 %


(20.0-45.0) 14.9 %


(20.0-45.0)


 


Monocytes (%) (Auto)


   % (1.0-10.0) 


  6.6 %


(1.0-10.0) 8.3 %


(1.0-10.0)


 


Eosinophils (%) (Auto)


   % (0.0-3.0) 


  11.6 %


(0.0-3.0) 11.2 %


(0.0-3.0)


 


Basophils (%) (Auto)


   % (0.0-2.0) 


  2.9 %


(0.0-2.0) 2.1 %


(0.0-2.0)


 


Sodium Level


  144 MMOL/L


(136-145) 139 MMOL/L


(136-145) 147 MMOL/L


(136-145)


 


Potassium Level


  4.2 MMOL/L


(3.5-5.1) 3.8 MMOL/L


(3.5-5.1) 3.9 MMOL/L


(3.5-5.1)


 


Chloride Level


  104 MMOL/L


() 99 MMOL/L


() 105 MMOL/L


()


 


Carbon Dioxide Level


  35 MMOL/L


(21-32) 35 MMOL/L


(21-32) 35 MMOL/L


(21-32)


 


Anion Gap


  5 mmol/L


(5-15) 5 mmol/L


(5-15) 7 mmol/L


(5-15)


 


Blood Urea Nitrogen


  32 mg/dL


(7-18) 46 mg/dL


(7-18) 44 mg/dL


(7-18)


 


Creatinine


  1.2 MG/DL


(0.55-1.30) 1.3 MG/DL


(0.55-1.30) 1.2 MG/DL


(0.55-1.30)


 


Estimat Glomerular Filtration


Rate 45.0 mL/min


(>60) 41.0 mL/min


(>60) 45.0 mL/min


(>60)


 


Glucose Level


  174 MG/DL


() 185 MG/DL


() 188 MG/DL


()


 


Calcium Level


  8.4 MG/DL


(8.5-10.1) 9.0 MG/DL


(8.5-10.1) 9.7 MG/DL


(8.5-10.1)


 


Phosphorus Level


  3.2 MG/DL


(2.5-4.9) 3.4 MG/DL


(2.5-4.9) 


 


 


Magnesium Level


  2.2 MG/DL


(1.8-2.4) 2.4 MG/DL


(1.8-2.4) 


 


 


Prothrombin Time


  


  10.7 SEC


(9.30-11.50) 


 


 


Prothromb Time International


Ratio 


  1.0 (0.9-1.1) 


  


 


 


Activated Partial


Thromboplast Time 


  31 SEC (23-33) 


  


 


 


Total Bilirubin


  


  0.5 MG/DL


(0.2-1.0) 0.4 MG/DL


(0.2-1.0)


 


Aspartate Amino Transf


(AST/SGOT) 


  11 U/L (15-37) 


  10 U/L (15-37) 


 


 


Alanine Aminotransferase


(ALT/SGPT) 


  13 U/L (12-78) 


  13 U/L (12-78) 


 


 


Alkaline Phosphatase


  


  83 U/L


() 87 U/L


()


 


Total Protein


  


  7.3 G/DL


(6.4-8.2) 7.3 G/DL


(6.4-8.2)


 


Albumin


  


  2.2 G/DL


(3.4-5.0) 2.1 G/DL


(3.4-5.0)


 


Globulin  5.1 g/dL  5.2 g/dL 


 


Albumin/Globulin Ratio  0.4 (1.0-2.7)  0.4 (1.0-2.7) 








Height (Feet):  5


Height (Inches):  5.00


Weight (Pounds):  130


Objective


Physical Exam:


Vitals: reviewed


General Appearance:  NAD


HEENT:  normocephalic, atraumatic


Neck:  non-tender, normal alignment, trach++


Respiratory/Chest:  normal breath sounds bilaterally ++ vent


Cardiovascular/Chest: rrr


Abdomen:  normal bowel sounds, soft, nontender ++ peg


Extremities:  normal range of motion











Mike Pop MD Nov 15, 2019 16:41

## 2019-11-15 NOTE — GENERAL PROGRESS NOTE
Assessment/Plan


Status:  unchanged


Assessment/Plan:


S, O: On Trach,  , vent setting reviewed. No pressor. awake, limited following 

commands





PHYSICAL EXAMINATION:HEAD AND NECK:  Trached, site is clean .


CHEST:  Bronchial breathing sounds.ABDOMEN:  Soft.  No organomegaly.


MUSCULOSKELETAL:  Diffuse 1+ or 2+ nonpitting edema in all four contracted


extremities.  The patient is not following the commands.  Limited


evaluation.NEUROLOGY:  The patient is awake.  Not alert.  Not verbally


communicative.





LABORATORY DATA:  Labs dated Nov 14, 2019  reviewed





Meds: Reviewed and reconciled in the chart





Imaging:  CXR reviewed 





ASSESSMENT AND PLAN:


1. VDRF S/P trach


2. Sepsis.  secondary to healthcare-associated pneumonia or


3. Acute chf exacerbation - Diastolic


4. Chronic encephalomalacia.


3. Acute on chronic anemia.


4. Renal failure, age indeterminate.


6. Hyperthyroidism.


7. Hyperkalemia.


8. Diabetes type 2, uncontrolled with A1c of 10.


9. Psychiatric disorder.


10. GI and DVT prophylaxis.


11. PAH- Severe 





PLAN OF CARE:





Post PEG tube


Out patient followup for abnormal incidental imaging finding ( possibility of 

CA cant be excluded 


History of Acute drop in Hgb and observation of UGI bleeding, Will hold DAPT


notes from pulmonary reviewed


Post Trach placement 


LTAC placement





Subjective


Allergies:  


Coded Allergies:  


     No Known Allergies (Unverified , 10/14/19)





Objective





Last 24 Hour Vital Signs








  Date Time  Temp Pulse Resp B/P (MAP) Pulse Ox O2 Delivery O2 Flow Rate FiO2


 


11/15/19 10:35  87 20     30


 


11/15/19 09:05        30


 


11/15/19 09:04  86 24     30





        30


 


11/15/19 09:04     100   


 


11/15/19 09:00  84 23 127/55 (79) 100   


 


11/15/19 08:54  84  129/49    


 


11/15/19 08:00      Mechanical Ventilator  


 


11/15/19 08:00 98.9 86 23 127/49 (75) 100   


 


11/15/19 07:02  80 17  100 Mechanical Ventilator  30





  80 18     30


 


11/15/19 07:00  80 21 110/39 (62) 100   


 


11/15/19 06:00  83 17 109/39 (62) 100   


 


11/15/19 05:30  85 23     30


 


11/15/19 05:00  80 16 126/47 (73) 100   


 


11/15/19 04:00  70      


 


11/15/19 04:00 98.8 81 19 118/47 (70) 100   


 


11/15/19 04:00        30


 


11/15/19 04:00      Mechanical Ventilator  


 


11/15/19 03:00  78 14 123/48 (73) 100   


 


11/15/19 02:38  85 23     30


 


11/15/19 02:00  76 14 123/51 (75) 100   


 


11/15/19 01:52  78 18  100 Mechanical Ventilator  30





  80 14     30


 


11/15/19 01:00  78 18 125/44 (71) 100   


 


11/15/19 00:00  78 14 123/43 (69) 100   


 


11/15/19 00:00  82      


 


11/15/19 00:00      Mechanical Ventilator  


 


11/14/19 23:00  82 20 129/51 (77) 100   


 


11/14/19 22:58  81 14     30


 


11/14/19 22:00  82 15 131/45 (73) 100   


 


11/14/19 21:23  88 23     30


 


11/14/19 21:00  83 14 130/47 (74) 100   


 


11/14/19 20:00      Mechanical Ventilator  


 


11/14/19 20:00  87      


 


11/14/19 20:00 98.5 80 14 116/49 (71) 99   


 


11/14/19 20:00        30


 


11/14/19 19:52  75 14  100 Mechanical Ventilator  30





  79 14     30


 


11/14/19 19:00  82 19 121/46 (71) 100   


 


11/14/19 18:00  84 19 121/52 (75) 100   


 


11/14/19 18:00  84  121/52    


 


11/14/19 17:15  85 26     30


 


11/14/19 17:00  90 18 138/58 (84) 100   


 


11/14/19 16:00        30


 


11/14/19 16:00  79      


 


11/14/19 16:00 98.9 82 18 127/51 (76) 100   


 


11/14/19 16:00      Mechanical Ventilator  


 


11/14/19 15:11  81 14     30


 


11/14/19 15:00  80 18 120/44 (69) 100   


 


11/14/19 14:00  85 16 123/50 (74) 100   


 


11/14/19 13:09  87 14     30


 


11/14/19 13:00  96 18 124/54 (77) 99   


 


11/14/19 12:30  105 19 143/60 (87) 99   


 


11/14/19 12:15  117 19 172/62 (98) 99   


 


11/14/19 12:00      Mechanical Ventilator  


 


11/14/19 12:00 99.0 121 17 174/71 (105) 98   


 


11/14/19 12:00  120      


 


11/14/19 11:30  114 15 170/71 (104) 99   


 


11/14/19 11:02  101 18     30


 


11/14/19 11:00  100 18 147/52 (83) 100   

















Intake and Output  


 


 11/14/19 11/15/19





 19:00 07:00


 


Intake Total 315 ml 590 ml


 


Output Total 1270 ml 1320 ml


 


Balance -955 ml -730 ml


 


  


 


Free Water 60 ml 30 ml


 


IV Total 55 ml 


 


Tube Feeding 200 ml 560 ml


 


Output Urine Total 1270 ml 1320 ml








Laboratory Tests


11/15/19 05:30: 


White Blood Count 12.6H, Red Blood Count 3.03L, Hemoglobin 8.0L, Hematocrit 

25.1L, Mean Corpuscular Volume 83, Mean Corpuscular Hemoglobin 26.5L, Mean 

Corpuscular Hemoglobin Concent 32.1, Red Cell Distribution Width 14.4, Platelet 

Count 459H, Mean Platelet Volume 8.1, Neutrophils (%) (Auto) 63.5, Lymphocytes (

%) (Auto) 14.9L, Monocytes (%) (Auto) 8.3, Eosinophils (%) (Auto) 11.2H, 

Basophils (%) (Auto) 2.1H, Sodium Level 147H, Potassium Level 3.9, Chloride 

Level 105, Carbon Dioxide Level 35H, Anion Gap 7, Blood Urea Nitrogen 44H, 

Creatinine 1.2, Estimat Glomerular Filtration Rate 45.0, Glucose Level 188H, 

Calcium Level 9.7, Total Bilirubin 0.4, Aspartate Amino Transf (AST/SGOT) 10L, 

Alanine Aminotransferase (ALT/SGPT) 13, Alkaline Phosphatase 87, Total Protein 

7.3, Albumin 2.1L, Globulin 5.2, Albumin/Globulin Ratio 0.4L


Height (Feet):  5


Height (Inches):  5.00


Weight (Pounds):  130











Garrick Keith MD Nov 15, 2019 10:55

## 2019-11-15 NOTE — NUR
NURSE NOTES:



Pt actively moves extremities at times. Turned and repositioned pt. VSS. Will continue to 
monitor.

-------------------------------------------------------------------------------

Addendum: 11/15/19 at 1601 by MI YEON YOON, RN RN

-------------------------------------------------------------------------------

NURSE NOTES:



Pt actively moves extremities at times, but not strong enough to pull out Tracheostomy tube. 
Turned and repositioned pt. VSS. Will continue to monitor.

## 2019-11-15 NOTE — SURGERY PROGRESS NOTE
Surgery Progress Note


Subjective


Procedure Performed


tracheostomy


Additional Comments


s/p trach


site clean


no drainage


functional


cxr ntoed





Objective





Last 24 Hour Vital Signs








  Date Time  Temp Pulse Resp B/P (MAP) Pulse Ox O2 Delivery O2 Flow Rate FiO2


 


11/15/19 15:00  87 22 131/49 (76) 100   


 


11/15/19 14:45  84 20     30


 


11/15/19 14:00  84 24 121/49 (73) 100   


 


11/15/19 13:00  84 19 138/48 (78) 100   


 


11/15/19 12:45  78 20  100 Mechanical Ventilator  30





  82 21     30


 


11/15/19 12:00 97.8 79 14 129/45 (73) 100   


 


11/15/19 12:00      Mechanical Ventilator  


 


11/15/19 12:00  90      


 


11/15/19 11:00  85 19 118/43 (68) 99   


 


11/15/19 10:37        30


 


11/15/19 10:35  87 20     30


 


11/15/19 10:00  86 22 128/57 (80) 100   


 


11/15/19 09:05        30


 


11/15/19 09:04  86 24     30





        30


 


11/15/19 09:04     100   


 


11/15/19 09:00  84 23 127/55 (79) 100   


 


11/15/19 08:54  84  129/49    


 


11/15/19 08:00      Mechanical Ventilator  


 


11/15/19 08:00 98.9 86 23 127/49 (75) 100   


 


11/15/19 08:00  87      


 


11/15/19 07:02  80 17  100 Mechanical Ventilator  30





  80 18     30


 


11/15/19 07:00  80 21 110/39 (62) 100   


 


11/15/19 06:00  83 17 109/39 (62) 100   


 


11/15/19 05:30  85 23     30


 


11/15/19 05:00  80 16 126/47 (73) 100   


 


11/15/19 04:00  70      


 


11/15/19 04:00 98.8 81 19 118/47 (70) 100   


 


11/15/19 04:00        30


 


11/15/19 04:00      Mechanical Ventilator  


 


11/15/19 03:00  78 14 123/48 (73) 100   


 


11/15/19 02:38  85 23     30


 


11/15/19 02:00  76 14 123/51 (75) 100   


 


11/15/19 01:52  78 18  100 Mechanical Ventilator  30





  80 14     30


 


11/15/19 01:00  78 18 125/44 (71) 100   


 


11/15/19 00:00  78 14 123/43 (69) 100   


 


11/15/19 00:00  82      


 


11/15/19 00:00      Mechanical Ventilator  


 


11/14/19 23:00  82 20 129/51 (77) 100   


 


11/14/19 22:58  81 14     30


 


11/14/19 22:00  82 15 131/45 (73) 100   


 


11/14/19 21:23  88 23     30


 


11/14/19 21:00  83 14 130/47 (74) 100   


 


11/14/19 20:00      Mechanical Ventilator  


 


11/14/19 20:00  87      


 


11/14/19 20:00 98.5 80 14 116/49 (71) 99   


 


11/14/19 20:00        30


 


11/14/19 19:52  75 14  100 Mechanical Ventilator  30





  79 14     30


 


11/14/19 19:00  82 19 121/46 (71) 100   


 


11/14/19 18:00  84 19 121/52 (75) 100   


 


11/14/19 18:00  84  121/52    


 


11/14/19 17:15  85 26     30


 


11/14/19 17:00  90 18 138/58 (84) 100   


 


11/14/19 16:00        30


 


11/14/19 16:00  79      


 


11/14/19 16:00 98.9 82 18 127/51 (76) 100   


 


11/14/19 16:00      Mechanical Ventilator  








I&O











Intake and Output  


 


 11/14/19 11/15/19





 19:00 07:00


 


Intake Total 315 ml 590 ml


 


Output Total 1270 ml 1320 ml


 


Balance -955 ml -730 ml


 


  


 


Free Water 60 ml 30 ml


 


IV Total 55 ml 


 


Tube Feeding 200 ml 560 ml


 


Output Urine Total 1270 ml 1320 ml








Dressing:  dry


Wound:  clean


Drains:  other


Cardiovascular:  RSR


Respiratory:  decreased breath sounds


Abdomen:  soft, present bowel sounds


Extremities:  no cyanosis, other





Laboratory Tests








Test


  11/15/19


05:30


 


White Blood Count


  12.6 K/UL


(4.8-10.8)  H


 


Red Blood Count


  3.03 M/UL


(4.20-5.40)  L


 


Hemoglobin


  8.0 G/DL


(12.0-16.0)  L


 


Hematocrit


  25.1 %


(37.0-47.0)  L


 


Mean Corpuscular Volume 83 FL (80-99)  


 


Mean Corpuscular Hemoglobin


  26.5 PG


(27.0-31.0)  L


 


Mean Corpuscular Hemoglobin


Concent 32.1 G/DL


(32.0-36.0)


 


Red Cell Distribution Width


  14.4 %


(11.6-14.8)


 


Platelet Count


  459 K/UL


(150-450)  H


 


Mean Platelet Volume


  8.1 FL


(6.5-10.1)


 


Neutrophils (%) (Auto)


  63.5 %


(45.0-75.0)


 


Lymphocytes (%) (Auto)


  14.9 %


(20.0-45.0)  L


 


Monocytes (%) (Auto)


  8.3 %


(1.0-10.0)


 


Eosinophils (%) (Auto)


  11.2 %


(0.0-3.0)  H


 


Basophils (%) (Auto)


  2.1 %


(0.0-2.0)  H


 


Sodium Level


  147 MMOL/L


(136-145)  H


 


Potassium Level


  3.9 MMOL/L


(3.5-5.1)


 


Chloride Level


  105 MMOL/L


()


 


Carbon Dioxide Level


  35 MMOL/L


(21-32)  H


 


Anion Gap


  7 mmol/L


(5-15)


 


Blood Urea Nitrogen


  44 mg/dL


(7-18)  H


 


Creatinine


  1.2 MG/DL


(0.55-1.30)


 


Estimat Glomerular Filtration


Rate 45.0 mL/min


(>60)


 


Glucose Level


  188 MG/DL


()  H


 


Calcium Level


  9.7 MG/DL


(8.5-10.1)


 


Total Bilirubin


  0.4 MG/DL


(0.2-1.0)


 


Aspartate Amino Transf


(AST/SGOT) 10 U/L (15-37)


L


 


Alanine Aminotransferase


(ALT/SGPT) 13 U/L (12-78)


 


 


Alkaline Phosphatase


  87 U/L


()


 


Total Protein


  7.3 G/DL


(6.4-8.2)


 


Albumin


  2.1 G/DL


(3.4-5.0)  L


 


Globulin 5.2 g/dL  


 


Albumin/Globulin Ratio


  0.4 (1.0-2.7)


L











Plan


Problems:  


(1) Pressure injury of deep tissue


Assessment & Plan:  Pt presented on admission with DTPI R heel. Blood filled 

blister with marginal erythema noted to heel. Pt exhibited agitation when 

minimally palpated. (L)2.2cm x (W)2.1cm


L heel is blanchable .


Non-blanchable erythema without induration noted to sacral cleft. 


No other areas of skin concerns noted.





Tx.Plan:


Apply Betadine to R heel. Cover with Optifoam drsg. Change every 3 days and prn.


           


Apply Cavilon Skin Barrier to L heel. Cover with Optifoam drsg. Change every 7 

days and prn.


           


Apply Moisture Barrier Paste to buttocks. Cover sacral area with Optifoam drsg. 

Change every 3 days and prn.


           


Reposition at least every 2hours or as tolerated.


           


Off-load heels with pillow.





(2) Sepsis


Assessment & Plan:  Patient with low-grade fevers, anemia, leukocytosis 

persistent, elevated platelets, abnormal labs.


Etiology currently unknown and work-up in progress.  Labs noted and imaging 

that is available as noted.  


CT noted


US noted


Impression: 13 x 7 x 12.9 cm unilocular cystic mass, corresponding to lesion 

reported


on recent CT scan. Layering low level internal echoes likely represent bladder


debris.. This presumably represents a cystic ovarian lesion with debris or


hemorrhage, possibly neoplastic. Gynecological consultation is recommended


Antibiotics as per infectious disease 


local wound care as wounds do not seem to be the etiology of her sepsis


start feeds via peg


doing well


improving


cont with tube feeds


cont with ICU care 


s/p trach


wean vent


consent 


supplementation for healing 


We will monitor and follow with recommendations


Thank you for allowing me to participate in patient's care














Radhames Blas Nov 15, 2019 15:43

## 2019-11-15 NOTE — OPERATIVE NOTE - DICTATED
DATE OF OPERATION:  11/15/2019

PREOPERATIVE DIAGNOSES:

1. Respiratory insufficiency requiring prolonged ventilatory support.

2. Sepsis.



POSTOPERATIVE DIAGNOSES:

1. Respiratory insufficiency requiring prolonged ventilatory support.

2. Sepsis.



OPERATION PERFORMED:  Tracheostomy.



ATTENDING SURGEON:  Radhames Blas M.D.



ASSISTANT:  None.



ANESTHESIOLOGIST:  Geronimo Balderrama M.D.



ANESTHESIA:  General GTA plus local.



ESTIMATED BLOOD LOSS:  Minimal.



IV FLUIDS:  Please see records.



COMPLICATIONS:  None.



DRAINS:  None.



COUNTS:  Sponge and needle count correct x2.



SPECIMENS:  None.



IMPLANTS:  An 8-Chadian Shiley tracheostomy.



WOUND CLASSIFICATION:  Class 1.



ANTIBIOTICS:  The patient is on scheduled IV antibiotic for acute

inflammatory and infectious process.



INDICATIONS FOR PROCEDURE:  The patient is a 64-year-old female currently

in intensive care unit, septic, on respiratory support for significant

prolonged period of time and continues to require ventilatory support with

difficult weaning.  Extubation is not safe at this time and mechanical

ventilation support seemingly identified for prolonged period of time as

per medical team, pulmonologist, and evaluation.  Trach was indicated and

recommended.  Risks, benefits, and alternatives discussed with patient's

family in detail.  I personally spoke to the daughter and expressed to her

all the risks, benefits, and alternatives to intervention and _____

indications, recommendations, and long-term goal plans.  Family states

that they want the patient Full Code, no interventions proposed and

tracheostomy was the next step in management and consented for by the

patient's next of kin.



OPERATIVE NOTE:  The patient was taken to the operating room directly from

the intensive care unit.  The patient was made comfortable in the hospital

bed in supine position.  Preoperative was time-out taken to identify the

patient, procedure, operative staff, and surgical staff.  The patient was

already intubated on ET tube on ventilator support.  The patient had Charles

catheter and SCDs.  The patient was already on scheduled antibiotics.  A

shoulder roll placed.  The neck was prepped and draped in standard

surgical fashion.  Anatomical landmarks were identified.  Skin incision

was made after local anesthetic was infiltrated approximately two

fingerbreadths above the sternal notch.  Incision was carried down through

the subcutaneous tissue to the platysma and divided.  The median raphae

was then identified and divided.  The trachea was identified and the first

and second tracheal rings were clearly identified and dissected out until

good visualization noted.  With assistance from the anesthesiologist,

incision was made between the first and second tracheal rings and a small

flap was created.  An ET tube was identified and slowly withdrawn by the

anesthesiologist.  A tracheal hook was in place and trachea was

stabilized.  Once the ET tube was above, we made an incision flap.  An

8-Chadian Shiley tracheostomy was inserted under direct visualization

without complication.  Tracheal hook was removed and the patient was

ventilated through the tracheostomy without complication.  Deep suctioning

was performed.  The skin incision was reapproximated using 2-0 Monocryl

sutures.  A trach tie and dressings were applied.  The patient tolerated

the procedure well and was taken directly to the intensive care unit in

stable condition.









  ______________________________________________

  Radhames Blas M.D.





DR:  BEBE

D:  11/15/2019 15:58

T:  11/15/2019 19:07

JOB#:  [id number]

CC:

## 2019-11-16 VITALS — SYSTOLIC BLOOD PRESSURE: 124 MMHG | DIASTOLIC BLOOD PRESSURE: 58 MMHG

## 2019-11-16 VITALS — SYSTOLIC BLOOD PRESSURE: 142 MMHG | DIASTOLIC BLOOD PRESSURE: 63 MMHG

## 2019-11-16 VITALS — SYSTOLIC BLOOD PRESSURE: 136 MMHG | DIASTOLIC BLOOD PRESSURE: 67 MMHG

## 2019-11-16 VITALS — DIASTOLIC BLOOD PRESSURE: 65 MMHG | SYSTOLIC BLOOD PRESSURE: 144 MMHG

## 2019-11-16 VITALS — DIASTOLIC BLOOD PRESSURE: 67 MMHG | SYSTOLIC BLOOD PRESSURE: 151 MMHG

## 2019-11-16 VITALS — SYSTOLIC BLOOD PRESSURE: 128 MMHG | DIASTOLIC BLOOD PRESSURE: 65 MMHG

## 2019-11-16 RX ADMIN — PANTOPRAZOLE SODIUM SCH MG: 40 INJECTION, POWDER, FOR SOLUTION INTRAVENOUS at 20:25

## 2019-11-16 RX ADMIN — INSULIN ASPART SCH UNITS: 100 INJECTION, SOLUTION INTRAVENOUS; SUBCUTANEOUS at 17:46

## 2019-11-16 RX ADMIN — IPRATROPIUM BROMIDE AND ALBUTEROL SULFATE SCH ML: .5; 3 SOLUTION RESPIRATORY (INHALATION) at 08:01

## 2019-11-16 RX ADMIN — INSULIN ASPART SCH UNITS: 100 INJECTION, SOLUTION INTRAVENOUS; SUBCUTANEOUS at 05:19

## 2019-11-16 RX ADMIN — CHLORHEXIDINE GLUCONATE SCH APPLIC: 213 SOLUTION TOPICAL at 20:25

## 2019-11-16 RX ADMIN — INSULIN ASPART SCH UNITS: 100 INJECTION, SOLUTION INTRAVENOUS; SUBCUTANEOUS at 23:07

## 2019-11-16 RX ADMIN — IPRATROPIUM BROMIDE AND ALBUTEROL SULFATE SCH ML: .5; 3 SOLUTION RESPIRATORY (INHALATION) at 19:17

## 2019-11-16 RX ADMIN — INSULIN DETEMIR SCH UNITS: 100 INJECTION, SOLUTION SUBCUTANEOUS at 20:27

## 2019-11-16 RX ADMIN — INSULIN ASPART SCH UNITS: 100 INJECTION, SOLUTION INTRAVENOUS; SUBCUTANEOUS at 12:34

## 2019-11-16 RX ADMIN — IPRATROPIUM BROMIDE AND ALBUTEROL SULFATE SCH ML: .5; 3 SOLUTION RESPIRATORY (INHALATION) at 00:55

## 2019-11-16 RX ADMIN — INSULIN ASPART SCH UNITS: 100 INJECTION, SOLUTION INTRAVENOUS; SUBCUTANEOUS at 17:47

## 2019-11-16 RX ADMIN — INSULIN ASPART SCH UNITS: 100 INJECTION, SOLUTION INTRAVENOUS; SUBCUTANEOUS at 23:08

## 2019-11-16 RX ADMIN — PANTOPRAZOLE SODIUM SCH MG: 40 INJECTION, POWDER, FOR SOLUTION INTRAVENOUS at 08:30

## 2019-11-16 RX ADMIN — IPRATROPIUM BROMIDE AND ALBUTEROL SULFATE SCH ML: .5; 3 SOLUTION RESPIRATORY (INHALATION) at 13:07

## 2019-11-16 NOTE — NUR
RESPIRATORY NOTE:

Received pt on AC 14, 400VT, 30%, PEEP +5. Pt is trach-dependent w/ a cuffed, Shiley 8 
tube. Pt asleep/disoriented, responds to stimuli. B/S vickie. rhonchi, sxn small amounts of 
thick/thin, white secretions. Vent plugged into red outlet, ambubag at bedside. Pt resting 
comfortably, in no apparent distress at this time. Will continue plan of care.

## 2019-11-16 NOTE — NUR
NURSE NOTES:

Report received from ELIZABETH Branch. Observed pt lying in the bed, hard to arouse, moves her 
feet noted. No signs of pain noted. Trac to vent, Shiley 8, AC 14, , FIO2 30%, PEEP 5 
noted, no signs of SOB. GT intact running Glucerna 1.2 at 60cc/hr. F/C intact and draining 
yellow urine noted. R UA PICC, intact, TKO. Bed in the lowest position. Side rails up x3. 
Will continue to monitor.

## 2019-11-16 NOTE — GENERAL PROGRESS NOTE
Assessment/Plan


Status:  unchanged


Assessment/Plan:


S, O: On Trach,  , vent setting reviewed. No pressor. awake, limited following 

commands





PHYSICAL EXAMINATION:HEAD AND NECK:  Trached, site is clean .


CHEST:  Bronchial breathing sounds.ABDOMEN:  Soft.  No organomegaly.


MUSCULOSKELETAL:  Diffuse 1+ or 2+ nonpitting edema in all four contracted


extremities.  The patient is not following the commands.  Limited


evaluation.NEUROLOGY:  The patient is awake.  Not alert.  Not verbally


communicative.





LABORATORY DATA:  Labs dated Nov 14, 2019  reviewed





Meds: Reviewed and reconciled in the chart





Imaging:  CXR reviewed 





ASSESSMENT AND PLAN:


1. VDRF S/P trach


2. Sepsis.  secondary to healthcare-associated pneumonia or


3. Acute chf exacerbation - Diastolic


4. Chronic encephalomalacia.


3. Acute on chronic anemia.


4. Renal failure, age indeterminate.


6. Hyperthyroidism.


7. Hyperkalemia.


8. Diabetes type 2, uncontrolled with A1c of 10.


9. Psychiatric disorder.


10. GI and DVT prophylaxis.


11. PAH- Severe 





PLAN OF CARE:





Post PEG tube


Out patient followup for abnormal incidental imaging finding ( possibility of 

CA cant be excluded 


History of Acute drop in Hgb and observation of UGI bleeding, Will hold DAPT


Post Trach placement 


Sub Acute / LTAC placement





Subjective


Allergies:  


Coded Allergies:  


     No Known Allergies (Unverified , 10/14/19)





Objective





Last 24 Hour Vital Signs








  Date Time  Temp Pulse Resp B/P (MAP) Pulse Ox O2 Delivery O2 Flow Rate FiO2


 


11/16/19 13:22  3 22  100 Mechanical Ventilator  30





  88 14     30


 


11/16/19 12:00      Mechanical Ventilator  


 


11/16/19 12:00 98.6 90 23 124/58 (80) 100   


 


11/16/19 12:00  91      


 


11/16/19 10:58  92 22     30


 


11/16/19 10:56        30


 


11/16/19 09:07  89 29     30





        30


 


11/16/19 09:05        30


 


11/16/19 09:05     100   


 


11/16/19 08:30  91  151/67    


 


11/16/19 08:01  83 17  100 Mechanical Ventilator  30





  77 16     30


 


11/16/19 08:00 97.9 91 21 151/67 (95) 100   


 


11/16/19 08:00      Mechanical Ventilator  


 


11/16/19 08:00  87      


 


11/16/19 08:00        30


 


11/16/19 04:51  84 23     30


 


11/16/19 04:00        30


 


11/16/19 04:00      Mechanical Ventilator  


 


11/16/19 04:00 98.2 83 20 142/63 (89) 99   


 


11/16/19 04:00  80      


 


11/16/19 02:48  83 20     30


 


11/16/19 01:05  87 15  100 Mechanical Ventilator  30


 


11/16/19 00:55  93 21   Mechanical Ventilator  30





        30


 


11/16/19 00:00        30


 


11/16/19 00:00 97.9 92 20 136/67 (90) 100   


 


11/16/19 00:00      Mechanical Ventilator  


 


11/16/19 00:00  92      


 


11/15/19 22:59  93 20     30


 


11/15/19 20:59  85 22     30


 


11/15/19 20:00        30


 


11/15/19 20:00      Mechanical Ventilator  


 


11/15/19 20:00 98.1 86 24 146/66 (92) 99   


 


11/15/19 20:00  86      


 


11/15/19 19:18  88 20  100 Mechanical Ventilator  30


 


11/15/19 19:03  79 19   Mechanical Ventilator  30





        30


 


11/15/19 18:19  92      


 


11/15/19 18:00 98.9 92 22 132/62 (85) 100   


 


11/15/19 18:00  92  130/62    


 


11/15/19 17:02  91 23     30


 


11/15/19 17:00 97.3 91 19 137/65 (89) 100   


 


11/15/19 17:00  89 21 120/48 (72) 100   


 


11/15/19 16:00 99.1 91 25 136/56 (82) 100   


 


11/15/19 16:00      Mechanical Ventilator  


 


11/15/19 16:00        30


 


11/15/19 15:28  85      


 


11/15/19 15:00  87 22 131/49 (76) 100   


 


11/15/19 14:45  84 20     30

















Intake and Output  


 


 11/15/19 11/16/19





 19:00 07:00


 


Intake Total 850 ml 1120 ml


 


Output Total 1000 ml 2000 ml


 


Balance -150 ml -880 ml


 


  


 


Free Water 50 ml 400 ml


 


Tube Feeding 720 ml 720 ml


 


Other 80 ml 


 


Output Urine Total 1000 ml 2000 ml








Height (Feet):  5


Height (Inches):  5.00


Weight (Pounds):  130











Garrick Keith MD Nov 16, 2019 14:31

## 2019-11-16 NOTE — CARDIOLOGY PROGRESS NOTE
Assessment/Plan


Problem List:  


(1) Aspiration pneumonia


(2) Renal failure (ARF), acute on chronic


(3) CHF (congestive heart failure)


Status:  stable, progressing


Status Narrative


Respiratory failure, on chronic vent, now s/p trach


Diastolic HF on iv lasix , metolazone


type II DM


Anemia


CHANCE


Assessment/Plan


Continue vent support, bronchodilator rx


Change diuretics to per g tube


Followup labs in am.





Subjective


ROS Limited/Unobtainable:  Yes


Subjective


Intubated/ sedated





Objective





Last 24 Hour Vital Signs








  Date Time  Temp Pulse Resp B/P (MAP) Pulse Ox O2 Delivery O2 Flow Rate FiO2


 


11/16/19 15:02  94 24     30


 


11/16/19 13:22  3 22  100 Mechanical Ventilator  30





  88 14     30


 


11/16/19 12:00      Mechanical Ventilator  


 


11/16/19 12:00 98.6 90 23 124/58 (80) 100   


 


11/16/19 12:00  91      


 


11/16/19 10:58  92 22     30


 


11/16/19 10:56        30


 


11/16/19 09:07  89 29     30





        30


 


11/16/19 09:05        30


 


11/16/19 09:05     100   


 


11/16/19 08:30  91  151/67    


 


11/16/19 08:01  83 17  100 Mechanical Ventilator  30





  77 16     30


 


11/16/19 08:00 97.9 91 21 151/67 (95) 100   


 


11/16/19 08:00      Mechanical Ventilator  


 


11/16/19 08:00  87      


 


11/16/19 08:00        30


 


11/16/19 04:51  84 23     30


 


11/16/19 04:00        30


 


11/16/19 04:00      Mechanical Ventilator  


 


11/16/19 04:00 98.2 83 20 142/63 (89) 99   


 


11/16/19 04:00  80      


 


11/16/19 02:48  83 20     30


 


11/16/19 01:05  87 15  100 Mechanical Ventilator  30


 


11/16/19 00:55  93 21   Mechanical Ventilator  30





        30


 


11/16/19 00:00        30


 


11/16/19 00:00 97.9 92 20 136/67 (90) 100   


 


11/16/19 00:00      Mechanical Ventilator  


 


11/16/19 00:00  92      


 


11/15/19 22:59  93 20     30


 


11/15/19 20:59  85 22     30


 


11/15/19 20:00        30


 


11/15/19 20:00      Mechanical Ventilator  


 


11/15/19 20:00 98.1 86 24 146/66 (92) 99   


 


11/15/19 20:00  86      


 


11/15/19 19:18  88 20  100 Mechanical Ventilator  30


 


11/15/19 19:03  79 19   Mechanical Ventilator  30





        30


 


11/15/19 18:19  92      


 


11/15/19 18:00 98.9 92 22 132/62 (85) 100   


 


11/15/19 18:00  92  130/62    


 


11/15/19 17:02  91 23     30


 


11/15/19 17:00 97.3 91 19 137/65 (89) 100   


 


11/15/19 17:00  89 21 120/48 (72) 100   


 


11/15/19 16:00 99.1 91 25 136/56 (82) 100   


 


11/15/19 16:00      Mechanical Ventilator  


 


11/15/19 16:00        30








General Appearance:  WD/WN, on vent


EENT:  PERRL/EOMI


Neck:  supple, other - trach


Rhythm:  NSR


Cardiovascular:  normal rate, regular rhythm, no gallop/murmur


Respiratory/Chest:  lungs clear - clear anteriorly


Abdomen:  non tender, soft, other - + g tube


Extremities:  no swelling











Intake and Output  


 


 11/15/19 11/16/19





 19:00 07:00


 


Intake Total 850 ml 1120 ml


 


Output Total 1000 ml 2000 ml


 


Balance -150 ml -880 ml


 


  


 


Free Water 50 ml 400 ml


 


Tube Feeding 720 ml 720 ml


 


Other 80 ml 


 


Output Urine Total 1000 ml 2000 ml

















Desiree Rush MD Nov 16, 2019 16:04

## 2019-11-16 NOTE — GENERAL PROGRESS NOTE
Assessment/Plan


Status:  stable, progressing


Assessment/Plan:


Assessment


(1) pneumonia / resp failure / s/p trach


(2) recent UTI


(3) recent Sepsis


(4) Dehydration


(5) Dysphagia, s/p PEG


(6) Acute metabolic encephalopathy


(7) diabetes mellitus


(8) Renal failure (ARF), acute on chronic


(9) Anemia


(10) Abnormal TSH


(11) Pelvic mass in female


(12) Congestive Heart Failure





Recommendations


Continue TF


IVF


trach care


Elevate HOB 


PPI





Subjective


Allergies:  


Coded Allergies:  


     No Known Allergies (Unverified , 10/14/19)


Subjective


d/w RN


no BM x 2 days


unresponsive





Objective





Last 24 Hour Vital Signs








  Date Time  Temp Pulse Resp B/P (MAP) Pulse Ox O2 Delivery O2 Flow Rate FiO2


 


11/16/19 16:49  85 21     30


 


11/16/19 16:00      Mechanical Ventilator  


 


11/16/19 16:00        30


 


11/16/19 16:00 99.3 87 24 128/65 (86) 100   


 


11/16/19 16:00  91      


 


11/16/19 15:02  94 24     30


 


11/16/19 13:22  3 22  100 Mechanical Ventilator  30





  88 14     30


 


11/16/19 12:00      Mechanical Ventilator  


 


11/16/19 12:00 98.6 90 23 124/58 (80) 100   


 


11/16/19 12:00  91      


 


11/16/19 10:58  92 22     30


 


11/16/19 10:56        30


 


11/16/19 09:07  89 29     30





        30


 


11/16/19 09:05        30


 


11/16/19 09:05     100   


 


11/16/19 08:30  91  151/67    


 


11/16/19 08:01  83 17  100 Mechanical Ventilator  30





  77 16     30


 


11/16/19 08:00 97.9 91 21 151/67 (95) 100   


 


11/16/19 08:00      Mechanical Ventilator  


 


11/16/19 08:00  87      


 


11/16/19 08:00        30


 


11/16/19 04:51  84 23     30


 


11/16/19 04:00        30


 


11/16/19 04:00      Mechanical Ventilator  


 


11/16/19 04:00 98.2 83 20 142/63 (89) 99   


 


11/16/19 04:00  80      


 


11/16/19 02:48  83 20     30


 


11/16/19 01:05  87 15  100 Mechanical Ventilator  30


 


11/16/19 00:55  93 21   Mechanical Ventilator  30





        30


 


11/16/19 00:00        30


 


11/16/19 00:00 97.9 92 20 136/67 (90) 100   


 


11/16/19 00:00      Mechanical Ventilator  


 


11/16/19 00:00  92      


 


11/15/19 22:59  93 20     30


 


11/15/19 20:59  85 22     30


 


11/15/19 20:00        30


 


11/15/19 20:00      Mechanical Ventilator  


 


11/15/19 20:00 98.1 86 24 146/66 (92) 99   


 


11/15/19 20:00  86      


 


11/15/19 19:18  88 20  100 Mechanical Ventilator  30


 


11/15/19 19:03  79 19   Mechanical Ventilator  30





        30


 


11/15/19 18:19  92      


 


11/15/19 18:00 98.9 92 22 132/62 (85) 100   


 


11/15/19 18:00  92  130/62    

















Intake and Output  


 


 11/15/19 11/16/19





 19:00 07:00


 


Intake Total 850 ml 1120 ml


 


Output Total 1000 ml 2000 ml


 


Balance -150 ml -880 ml


 


  


 


Free Water 50 ml 400 ml


 


Tube Feeding 720 ml 720 ml


 


Other 80 ml 


 


Output Urine Total 1000 ml 2000 ml








Height (Feet):  5


Height (Inches):  5.00


Weight (Pounds):  130


Objective


Elderly WW


s/p trach


Neck supple


Coarse BS


RR


abd soft, (+) GT


no edema


restrained











Bladimir Encarnacion MD Nov 16, 2019 17:35

## 2019-11-16 NOTE — INFECTIOUS DISEASES PROG NOTE
Assessment/Plan


Problems:  


(1) Fever


Assessment & Plan:  suspect due to pelvic mass , with negative repeated blood 

cultures , urine and sputum only grew yeast which is colonization . will 

monitor off antibiotics for now done with her course of antibiotics treatment   





(2) Aspiration pneumonia


Assessment & Plan:  with B/L infiltrates, hypoxemia and leukocytosis, CXR  

showed interstitial infiltrates , sputum culture showed normal agustin and 

repeated one shoed yeast which is colonization . S/P meropenem  empirically for 

two weeks . aspiration precaution. EOT 11/14/19





(3) Respiratory failure


Assessment & Plan:  with maegan cardia and S/P code blue, was intubated and 

started on mechanical ventilation , S/P tracheostomy , monitor CXR and ABG , 

pulmonary is following 





(4) UTI (urinary tract infection)


Assessment & Plan:  due to candida lusitaneae , S/P casas catheter removal , no 

specific therapy needed for now after changing her casas catheter 





(5) Acute metabolic encephalopathy


Assessment & Plan:  due to the above, continue hydration and antibiotics, 

monitor in tele 





(6) Hyperglycemia due to type 2 diabetes mellitus


Assessment & Plan:  recommend tight glycemic control to keep blood glucose 

between 100-140 





(7) ARF (acute renal failure)


Assessment & Plan:  due to the above, continue hydration and renally dosed 

antibiotics, close  monitor of renal function , stop vancomycin 





(8) Pelvic mass in female


Assessment & Plan:  to the left of the uterus, suspect malignancy , may need 

surgical resection , recommend OB/GYN eval and follow up , oncology is 

following 








Subjective


ROS Limited/Unobtainable:  Yes


Allergies:  


Coded Allergies:  


     No Known Allergies (Unverified , 10/14/19)


Subjective


she had tracheostomy done , and continued on mechanical ventilation , tolerated 

procedure well, no bleeding or draining from the trach site ,  comfortable in 

bed, and responsive to verbal commands still in ICU , had low grade fever but 

no chills  , no diarrhea, no secretions from the trach





Objective


Vital Signs





Last 24 Hour Vital Signs








  Date Time  Temp Pulse Resp B/P (MAP) Pulse Ox O2 Delivery O2 Flow Rate FiO2


 


11/16/19 10:58  92 22     30


 


11/16/19 10:56        30


 


11/16/19 09:07  89 29     30





        30


 


11/16/19 09:05        30


 


11/16/19 09:05     100   


 


11/16/19 08:30  91  151/67    


 


11/16/19 08:01  83 17  100 Mechanical Ventilator  30





  77 16     30


 


11/16/19 08:00 97.9 91 21 151/67 (95) 100   


 


11/16/19 08:00      Mechanical Ventilator  


 


11/16/19 08:00  87      


 


11/16/19 08:00        30


 


11/16/19 04:51  84 23     30


 


11/16/19 04:00        30


 


11/16/19 04:00      Mechanical Ventilator  


 


11/16/19 04:00 98.2 83 20 142/63 (89) 99   


 


11/16/19 04:00  80      


 


11/16/19 02:48  83 20     30


 


11/16/19 01:05  87 15  100 Mechanical Ventilator  30


 


11/16/19 00:55  93 21   Mechanical Ventilator  30





        30


 


11/16/19 00:00        30


 


11/16/19 00:00 97.9 92 20 136/67 (90) 100   


 


11/16/19 00:00      Mechanical Ventilator  


 


11/16/19 00:00  92      


 


11/15/19 22:59  93 20     30


 


11/15/19 20:59  85 22     30


 


11/15/19 20:00        30


 


11/15/19 20:00      Mechanical Ventilator  


 


11/15/19 20:00 98.1 86 24 146/66 (92) 99   


 


11/15/19 20:00  86      


 


11/15/19 19:18  88 20  100 Mechanical Ventilator  30


 


11/15/19 19:03  79 19   Mechanical Ventilator  30





        30


 


11/15/19 18:19  92      


 


11/15/19 18:00 98.9 92 22 132/62 (85) 100   


 


11/15/19 18:00  92  130/62    


 


11/15/19 17:02  91 23     30


 


11/15/19 17:00 97.3 91 19 137/65 (89) 100   


 


11/15/19 17:00  89 21 120/48 (72) 100   


 


11/15/19 16:00 99.1 91 25 136/56 (82) 100   


 


11/15/19 16:00      Mechanical Ventilator  


 


11/15/19 16:00        30


 


11/15/19 15:28  85      


 


11/15/19 15:00  87 22 131/49 (76) 100   


 


11/15/19 14:45  84 20     30


 


11/15/19 14:00  84 24 121/49 (73) 100   


 


11/15/19 13:00  84 19 138/48 (78) 100   


 


11/15/19 12:45  78 20  100 Mechanical Ventilator  30





  82 21     30








Height (Feet):  5


Height (Inches):  5.00


Weight (Pounds):  130


General Appearance:  WD/WN, no acute distress


HEENT:  normocephalic, atraumatic, anicteric, mucous membranes moist


Respiratory/Chest:  chest wall non-tender, lungs clear, normal breath sounds, 

no respiratory distress, no accessory muscle use


Cardiovascular:  normal peripheral pulses, normal rate, regular rhythm, no 

gallop/murmur, no JVD


Abdomen:  normal bowel sounds, soft, non tender, no organomegaly, non distended

, no mass, no scars


Extremities:  no cyanosis, no clubbing


Skin:  no rash, no lesions, no ulcers


Neurologic/Psychiatric:  unresponsiveness


Lymphatic:  no neck adenopathy, no groin adenopathy


Musculoskeletal:  normal muscle bulk, no effusion





Current Medications








 Medications


  (Trade)  Dose


 Ordered  Sig/Winnie


 Route


 PRN Reason  Start Time


 Stop Time Status Last Admin


Dose Admin


 


 Acetaminophen


  (Tylenol)  650 mg  Q4H  PRN


 NG


 Mild Pain/Temp > 100.5  11/15/19 18:00


 11/25/19 17:59   


 


 


 Acetaminophen


  (Tylenol)  650 mg  Q4H  PRN


 RECTAL


 TEMP>100.5  11/15/19 18:00


 12/2/19 17:59   


 


 


 Acetylcysteine


  (Mucomyst)  100 mg  TIDRT


 N


   11/15/19 19:00


 12/10/19 19:29  11/16/19 08:01


 


 


 Albuterol/


 Ipratropium


  (Albuterol/


 Ipratropium)  3 ml  Q6HRT


 [facility]


   11/15/19 19:00


 11/17/19 12:59  11/16/19 08:01


 


 


 Amlodipine


 Besylate


  (Norvasc)  2.5 mg  BID


 NG


   11/15/19 18:00


 11/29/19 17:59  11/16/19 08:30


 


 


 Chlorhexidine


 Gluconate


  (Mae-Hex 2%)  1 applic  DAILY@2000


 TOPIC


   11/15/19 20:00


 12/5/19 19:59  11/15/19 20:55


 


 


 Dextrose


  (Dextrose 50%)  25 ml  Q30M  PRN


 IV


 Hypoglycemia  11/15/19 18:00


 12/5/19 06:29   


 


 


 Dextrose


  (Dextrose 50%)  50 ml  Q30M  PRN


 IV


 Hypoglycemia  11/15/19 18:00


 12/5/19 06:29   


 


 


 Folic Acid


  (Folate)  3 mg  DAILY


 GT


   11/16/19 09:00


 11/30/19 08:59  11/16/19 08:30


 


 


 Furosemide


  (Lasix)  40 mg  EVERY 12  HOURS


 IV


   11/15/19 21:00


 12/7/19 20:59  11/16/19 09:34


 


 


 Insulin Aspart


  (NovoLOG)    EVERY 6  HOURS


 SUBQ


   11/15/19 18:00


 11/29/19 11:59  11/16/19 05:19


 


 


 Insulin Detemir


  (Levemir)  12 units  QHS


 SUBQ


   11/15/19 21:00


 12/1/19 08:59  11/15/19 20:54


 


 


 Lactulose


  (Cephulac)  20 gm  Q8H  PRN


 NG


 Constipation  11/15/19 18:00


 12/3/19 17:59   


 


 


 Lorazepam


  (Ativan 2mg/ml


 1ml)  2 mg  Q4H  PRN


 IV


 for agitation  11/15/19 18:00


 11/17/19 17:59   


 


 


 Metoclopramide HCl


  (Reglan)  10 mg  Q6H  PRN


 IVP


 Nausea & Vomiting  11/15/19 18:00


 12/6/19 17:59   


 


 


 Metolazone


  (Zaroxolyn)  5 mg  Q24H


 NG


   11/16/19 08:00


 12/12/19 07:59  11/16/19 08:30


 


 


 Pantoprazole


  (Protonix)  40 mg  EVERY 12  HOURS


 IVP


   11/15/19 21:00


 12/3/19 08:59  11/16/19 08:30


 


 


 Potassium Chloride


  (K-Dur)  40 meq  DAILY


 GT


   11/16/19 09:00


 12/5/19 17:59  11/16/19 08:30


 

















Amie Burgos M.D. Nov 16, 2019 12:25

## 2019-11-16 NOTE — NUR
NURSE NOTES:

Pt received from Darren Xiong RN in stable condition without cardiopulmonary distress. Pt 
is asleep in bed, obtunded, trache to vent, Shiley 8 AC 14  FiO2 30% Peep 5. GT noted 
running Glucerna 1.2 at 60cc/hr. F/C noted draining yellow urine. skin alterations noted. 
TU PICC noted with dry and intact dressing. Bed in lowest position, alarm on, side rails up 
x2 and padded per seizure precaution, call light within reach. Will continue to monitor.

## 2019-11-16 NOTE — NUR
RESPIRATORY NOTE:

Placed pt on CPAP PS 8- peep 5- 30%FiO2. pt is tolerating well. No SOB or resp distress 
noted at this time. ELIZABETH Branch made aware. Will continue to monitor.

## 2019-11-16 NOTE — NUR
NURSE NOTES:WOUND CARE FOLLOW-UP:Reabsorbing blood blister R heel. Base of wound is dry 
.edges adherent to base of wound and area dry. Periwound without erythema or 
induration.(L)3cm x (W)2.2cm.

No other areas of skin concerns noted.Wound Tx orders are effective and continued as 
ordered. All wound prevention protocols continued as ordered.

## 2019-11-16 NOTE — DIAGNOSTIC IMAGING REPORT
--------------- APPROVED REPORT --------------





CPT Code: 82185



Present Symptoms

Comments: BILATERAL LEGS PAIN.





BILATERAL: Imaging reveals a patent deep venous system bilaterally. There is no evidence 

of thrombus within the femoral, popliteal or tibial segments. The greater saphenous veins 

are also within normal limits. Doppler indicates normal spontaneous flow within these 

segments.

## 2019-11-16 NOTE — NUR
RESPIRATORY NOTE:

 Changed pt back to AC mode with the same previous settings at 1056am due to tachypneic RR 
> 35bpm, low Vt<200ml. pt's tolerating well. No resp distress at this time. ELIZABETH Branch aware. 
Will continue to monitor pt.

## 2019-11-16 NOTE — HEMATOLOGY/ONC PROGRESS NOTE
Assessment/Plan


Assessment/Plan





Assessment and Recs:


# Anemia of chronic disease due to underlying chronic medical issues, 

multifactorial 


--> Anemia workup has been ordered, rule out gi bleed --> ferritin is 1400+


--> No evidence of hemolysis is noted, peripheral smear has been reviewed.


--> Hgb goal >7. Transfuse prn.


--> Epogen or iron at this time is not particularly indicated


--> Medications have been reviewed


--> low threshold for gi evaluation in case has occult +


--> bone marrow biopsy is not indicated given the other more likely causes (if 

recurrent anemia) first time here


--> hgb trend 8.4->8.5-->8.2->7.7-->7.9-->10.1-->9.1->7.8->7.2-->9-->8.4-->8


--> FOLIC ACID 1mg po daily started


# Leukocytosis/elevated white blood cell count, unspecified likely related to 

underlying reaction v more likely infection


--> have reviewed peripheral smear and bandemia/neutrophilia noted


--> continue antibiotics if they have been started by ID team (VANC/ZOSYN)--> 

vanc/linda--> linda


--> wbc 39-->28k-->29k->31k-->23-->26k-->19k-->24k-->15.4-->16-->14-->23k-->14--

>17-->12.5->12.3-->12.6


--> monitor for resolution


--> CT C/A/P Results reviewed


--> FOR INdium bone scan done -> Rim of activity within the pelvis, 

corresponding to expected location of the soft tissue component of the cystic 

pelvic mass described on recent imaging studies.


--> again consider gyn eval


--> as come down over last week, currently only 12k and s/p code, continue to 

monitor


--> would hold off on biopsy of bone


# Pelvic mass on ct and us -- 13 x 7 x 12.9 cm unilocular cystic mass, 

corresponding to lesion reported on recent CT scan. Layering low level internal 

echoes likely represent bladder debris.. This presumably represents a cystic 

ovarian lesion with debris or hemorrhage, possibly neoplastic.


--> CA-125 is 216! elevated


--> consider gyn eval


# Hypercalcemia - btw 10-11 range when corrected to alb


--> ivf have been started


--> if remains elevated, 7.9-->8.3


--> will get pth


# Sepsis from pneumonia.  


--> pulm consulted, abx started


# CHANCE (acute kidney injury) on ckd


--> cr 1.6-->2.7-->2.2->1.2->1.2


--> per renal


# UTI (urinary tract infection)


--> on abx per id


# Dehydration


--> on ivf


# Acute metabolic encephalopathy


--> likely due to pna


# Resp failure now intubated 11/4


--> s/p trach


# Dysphagia s/p peg++


# Dvt ppx SCDS





The timing of this note does not necessarily reflect the time of the patient 

was seen.





Greatly appreciate consultation.





Subjective


Constitutional:  Denies: no symptoms, chills, fever, malaise, weakness, other


HEENT:  Denies: no symptoms, eye pain, blurred vision, tearing, double vision, 

ear pain, ear discharge, nose pain, nose congestion, throat pain, throat 

swelling, mouth pain, mouth swelling, other


Allergies:  


Coded Allergies:  


     No Known Allergies (Unverified , 10/14/19)


Subjective


10/14: hgb has improved, no bleeding, labs reivewed


10/15: no events to report


10/16: a+ o x1, no bleeding or chills noted, no major changes, occult pending


10/17: no events noted, no bleeding, no chills, no hematemesis/hematochezia


10/18: remains confused, with abx, on nc


10/19: cr is worse, hgb 8.4, no bleeding, night sweats, chills


10/20: no f/c, no night sweats, labs noted, cr worse


10/21: no bleeding or chills, no night sweats, labs pending this am


10/22: pelvic mass noted on imaging, no bleeding reported, ordered ca125


10/23: no events, remains lethargic, is on abx, seen by surg


10/24: with raid response overnight, rr high as well as bp, vidal to icu


10/25: no events, no bleeding, to undergo a indium scan with id


10/26: comfortable, electrolytes reviewed, given mag and k


10/29: back on bipap with gt feeds, yousuf rn this am


10/30: remains very confused, no f/c, no bleeding, with urinary retention, 

folic acid started


10/31: sleeping, is on bipap, no events, wbc is higher this am


11/1: no events to report, no bleeding, wbc still high but better


11/3: no bleeding, no night sweats, s/p peg, alert


11/4: s/p code blue last night, now intubated, labs noted wbc lower


11/5: given k and mg, for endoscopy tomorrow given drop h/h, 1 unit prbc


11/6: is on vent, unresponsive, no bleeding noted, no chills wbc is 13k


11/14: remains in icu, s/p trach, wbc 12, on meropenem 


11/15: no bleeding, trying to pull off the trach, cbc reviewed


11/16: no Bms last two days, on vent, seen by gi, no bleeding





Objective


Objective





Current Medications








 Medications


  (Trade)  Dose


 Ordered  Sig/Winnie


 Route


 PRN Reason  Start Time


 Stop Time Status Last Admin


Dose Admin


 


 Acetaminophen


  (Tylenol)  650 mg  Q4H  PRN


 NG


 Mild Pain/Temp > 100.5  11/15/19 18:00


 11/25/19 17:59   


 


 


 Acetaminophen


  (Tylenol)  650 mg  Q4H  PRN


 RECTAL


 TEMP>100.5  11/15/19 18:00


 12/2/19 17:59   


 


 


 Acetylcysteine


  (Mucomyst)  100 mg  TIDRT


 [facility]


   11/15/19 19:00


 12/10/19 19:29  11/16/19 13:07


 


 


 Albuterol/


 Ipratropium


  (Albuterol/


 Ipratropium)  3 ml  Q6HRT


 [facility]


   11/15/19 19:00


 11/17/19 12:59  11/16/19 13:07


 


 


 Amlodipine


 Besylate


  (Norvasc)  2.5 mg  BID


 NG


   11/15/19 18:00


 11/29/19 17:59  11/16/19 08:30


 


 


 Chlorhexidine


 Gluconate


  (Mae-Hex 2%)  1 applic  DAILY@2000


 TOPIC


   11/15/19 20:00


 12/5/19 19:59  11/15/19 20:55


 


 


 Dextrose


  (Dextrose 50%)  25 ml  Q30M  PRN


 IV


 Hypoglycemia  11/16/19 13:00


 12/16/19 12:59   


 


 


 Dextrose


  (Dextrose 50%)  50 ml  Q30M  PRN


 IV


 Hypoglycemia  11/16/19 13:00


 12/16/19 12:59   


 


 


 Folic Acid


  (Folate)  3 mg  DAILY


 GT


   11/16/19 09:00


 11/30/19 08:59  11/16/19 08:30


 


 


 Furosemide


  (Lasix)  40 mg  EVERY 12  HOURS


 IV


   11/15/19 21:00


 12/7/19 20:59  11/16/19 09:34


 


 


 Insulin Aspart


  (NovoLOG)    EVERY 6  HOURS


 SUBQ


   11/16/19 18:00


 12/16/19 17:59   


 


 


 Insulin Aspart


  (NovoLOG)  6 units  EVERY 6  HOURS


 SUBQ


   11/16/19 18:00


 12/16/19 17:59   


 


 


 Insulin Detemir


  (Levemir)  20 units  QHS


 SUBQ


   11/16/19 21:00


 12/1/19 08:59   


 


 


 Lactulose


  (Cephulac)  20 gm  Q8H  PRN


 NG


 Constipation  11/15/19 18:00


 12/3/19 17:59   


 


 


 Lorazepam


  (Ativan 2mg/ml


 1ml)  2 mg  Q4H  PRN


 IV


 for agitation  11/15/19 18:00


 11/17/19 17:59   


 


 


 Metoclopramide HCl


  (Reglan)  10 mg  Q6H  PRN


 IVP


 Nausea & Vomiting  11/15/19 18:00


 12/6/19 17:59   


 


 


 Metolazone


  (Zaroxolyn)  5 mg  Q24H


 NG


   11/16/19 08:00


 12/12/19 07:59  11/16/19 08:30


 


 


 Pantoprazole


  (Protonix)  40 mg  EVERY 12  HOURS


 IVP


   11/15/19 21:00


 12/3/19 08:59  11/16/19 08:30


 


 


 Potassium Chloride


  (K-Dur)  40 meq  DAILY


 GT


   11/16/19 09:00


 12/5/19 17:59  11/16/19 08:30


 











Last 24 Hour Vital Signs








  Date Time  Temp Pulse Resp B/P (MAP) Pulse Ox O2 Delivery O2 Flow Rate FiO2


 


11/16/19 13:22  3 22  100 Mechanical Ventilator  30





  88 14     30


 


11/16/19 12:00      Mechanical Ventilator  


 


11/16/19 12:00 98.6 90 23 124/58 (80) 100   


 


11/16/19 12:00  91      


 


11/16/19 10:58  92 22     30


 


11/16/19 10:56        30


 


11/16/19 09:07  89 29     30





        30


 


11/16/19 09:05        30


 


11/16/19 09:05     100   


 


11/16/19 08:30  91  151/67    


 


11/16/19 08:01  83 17  100 Mechanical Ventilator  30





  77 16     30


 


11/16/19 08:00 97.9 91 21 151/67 (95) 100   


 


11/16/19 08:00      Mechanical Ventilator  


 


11/16/19 08:00  87      


 


11/16/19 08:00        30


 


11/16/19 04:51  84 23     30


 


11/16/19 04:00        30


 


11/16/19 04:00      Mechanical Ventilator  


 


11/16/19 04:00 98.2 83 20 142/63 (89) 99   


 


11/16/19 04:00  80      


 


11/16/19 02:48  83 20     30


 


11/16/19 01:05  87 15  100 Mechanical Ventilator  30


 


11/16/19 00:55  93 21   Mechanical Ventilator  30





        30


 


11/16/19 00:00        30


 


11/16/19 00:00 97.9 92 20 136/67 (90) 100   


 


11/16/19 00:00      Mechanical Ventilator  


 


11/16/19 00:00  92      


 


11/15/19 22:59  93 20     30


 


11/15/19 20:59  85 22     30


 


11/15/19 20:00        30


 


11/15/19 20:00      Mechanical Ventilator  


 


11/15/19 20:00 98.1 86 24 146/66 (92) 99   


 


11/15/19 20:00  86      


 


11/15/19 19:18  88 20  100 Mechanical Ventilator  30


 


11/15/19 19:03  79 19   Mechanical Ventilator  30





        30


 


11/15/19 18:19  92      


 


11/15/19 18:00 98.9 92 22 132/62 (85) 100   


 


11/15/19 18:00  92  130/62    


 


11/15/19 17:02  91 23     30


 


11/15/19 17:00 97.3 91 19 137/65 (89) 100   


 


11/15/19 17:00  89 21 120/48 (72) 100   


 


11/15/19 16:00 99.1 91 25 136/56 (82) 100   


 


11/15/19 16:00      Mechanical Ventilator  


 


11/15/19 16:00        30


 


11/15/19 15:28  85      


 


11/15/19 15:00  87 22 131/49 (76) 100   


 


11/15/19 14:45  84 20     30


 


11/15/19 14:00  84 24 121/49 (73) 100   


 


11/15/19 13:00  84 19 138/48 (78) 100   


 


11/15/19 12:45  78 20  100 Mechanical Ventilator  30





  82 21     30


 


11/15/19 12:00 97.8 79 14 129/45 (73) 100   


 


11/15/19 12:00      Mechanical Ventilator  


 


11/15/19 12:00  90      


 


11/15/19 11:00  85 19 118/43 (68) 99   


 


11/15/19 10:37        30


 


11/15/19 10:35  87 20     30


 


11/15/19 10:00  86 22 128/57 (80) 100   


 


11/15/19 09:05        30


 


11/15/19 09:04  86 24     30





        30


 


11/15/19 09:04     100   


 


11/15/19 09:00  84 23 127/55 (79) 100   


 


11/15/19 08:54  84  129/49    


 


11/15/19 08:00      Mechanical Ventilator  


 


11/15/19 08:00 98.9 86 23 127/49 (75) 100   


 


11/15/19 08:00  87      


 


11/15/19 07:02  80 17  100 Mechanical Ventilator  30





  80 18     30


 


11/15/19 07:00  80 21 110/39 (62) 100   


 


11/15/19 06:00  83 17 109/39 (62) 100   


 


11/15/19 05:30  85 23     30


 


11/15/19 05:00  80 16 126/47 (73) 100   


 


11/15/19 04:00  70      


 


11/15/19 04:00 98.8 81 19 118/47 (70) 100   


 


11/15/19 04:00        30


 


11/15/19 04:00      Mechanical Ventilator  


 


11/15/19 03:00  78 14 123/48 (73) 100   


 


11/15/19 02:38  85 23     30


 


11/15/19 02:00  76 14 123/51 (75) 100   


 


11/15/19 01:52  78 18  100 Mechanical Ventilator  30





  80 14     30


 


11/15/19 01:00  78 18 125/44 (71) 100   


 


11/15/19 00:00  78 14 123/43 (69) 100   


 


11/15/19 00:00  82      


 


11/15/19 00:00      Mechanical Ventilator  


 


11/14/19 23:00  82 20 129/51 (77) 100   


 


11/14/19 22:58  81 14     30


 


11/14/19 22:00  82 15 131/45 (73) 100   


 


11/14/19 21:23  88 23     30


 


11/14/19 21:00  83 14 130/47 (74) 100   


 


11/14/19 20:00      Mechanical Ventilator  


 


11/14/19 20:00  87      


 


11/14/19 20:00 98.5 80 14 116/49 (71) 99   


 


11/14/19 20:00        30


 


11/14/19 19:52  75 14  100 Mechanical Ventilator  30





  79 14     30


 


11/14/19 19:00  82 19 121/46 (71) 100   


 


11/14/19 18:00  84 19 121/52 (75) 100   


 


11/14/19 18:00  84  121/52    


 


11/14/19 17:15  85 26     30


 


11/14/19 17:00  90 18 138/58 (84) 100   


 


11/14/19 16:00        30


 


11/14/19 16:00  79      


 


11/14/19 16:00 98.9 82 18 127/51 (76) 100   


 


11/14/19 16:00      Mechanical Ventilator  


 


11/14/19 15:11  81 14     30


 


11/14/19 15:00  80 18 120/44 (69) 100   

















Intake and Output  


 


 11/15/19 11/16/19





 19:00 07:00


 


Intake Total 850 ml 1120 ml


 


Output Total 1000 ml 2000 ml


 


Balance -150 ml -880 ml


 


  


 


Free Water 50 ml 400 ml


 


Tube Feeding 720 ml 720 ml


 


Other 80 ml 


 


Output Urine Total 1000 ml 2000 ml











Labs








Test


  11/14/19


04:45 11/15/19


05:30


 


White Blood Count


  12.3 K/UL


(4.8-10.8) 12.6 K/UL


(4.8-10.8)


 


Red Blood Count


  3.18 M/UL


(4.20-5.40) 3.03 M/UL


(4.20-5.40)


 


Hemoglobin


  8.4 G/DL


(12.0-16.0) 8.0 G/DL


(12.0-16.0)


 


Hematocrit


  26.3 %


(37.0-47.0) 25.1 %


(37.0-47.0)


 


Mean Corpuscular Volume 83 FL (80-99)  83 FL (80-99) 


 


Mean Corpuscular Hemoglobin


  26.6 PG


(27.0-31.0) 26.5 PG


(27.0-31.0)


 


Mean Corpuscular Hemoglobin


Concent 32.1 G/DL


(32.0-36.0) 32.1 G/DL


(32.0-36.0)


 


Red Cell Distribution Width


  14.0 %


(11.6-14.8) 14.4 %


(11.6-14.8)


 


Platelet Count


  444 K/UL


(150-450) 459 K/UL


(150-450)


 


Mean Platelet Volume


  8.2 FL


(6.5-10.1) 8.1 FL


(6.5-10.1)


 


Neutrophils (%) (Auto)


  56.2 %


(45.0-75.0) 63.5 %


(45.0-75.0)


 


Lymphocytes (%) (Auto)


  22.8 %


(20.0-45.0) 14.9 %


(20.0-45.0)


 


Monocytes (%) (Auto)


  6.6 %


(1.0-10.0) 8.3 %


(1.0-10.0)


 


Eosinophils (%) (Auto)


  11.6 %


(0.0-3.0) 11.2 %


(0.0-3.0)


 


Basophils (%) (Auto)


  2.9 %


(0.0-2.0) 2.1 %


(0.0-2.0)


 


Prothrombin Time


  10.7 SEC


(9.30-11.50) 


 


 


Prothromb Time International


Ratio 1.0 (0.9-1.1) 


  


 


 


Activated Partial


Thromboplast Time 31 SEC (23-33) 


  


 


 


Sodium Level


  139 MMOL/L


(136-145) 147 MMOL/L


(136-145)


 


Potassium Level


  3.8 MMOL/L


(3.5-5.1) 3.9 MMOL/L


(3.5-5.1)


 


Chloride Level


  99 MMOL/L


() 105 MMOL/L


()


 


Carbon Dioxide Level


  35 MMOL/L


(21-32) 35 MMOL/L


(21-32)


 


Anion Gap


  5 mmol/L


(5-15) 7 mmol/L


(5-15)


 


Blood Urea Nitrogen


  46 mg/dL


(7-18) 44 mg/dL


(7-18)


 


Creatinine


  1.3 MG/DL


(0.55-1.30) 1.2 MG/DL


(0.55-1.30)


 


Estimat Glomerular Filtration


Rate 41.0 mL/min


(>60) 45.0 mL/min


(>60)


 


Glucose Level


  185 MG/DL


() 188 MG/DL


()


 


Calcium Level


  9.0 MG/DL


(8.5-10.1) 9.7 MG/DL


(8.5-10.1)


 


Phosphorus Level


  3.4 MG/DL


(2.5-4.9) 


 


 


Magnesium Level


  2.4 MG/DL


(1.8-2.4) 


 


 


Total Bilirubin


  0.5 MG/DL


(0.2-1.0) 0.4 MG/DL


(0.2-1.0)


 


Aspartate Amino Transf


(AST/SGOT) 11 U/L (15-37) 


  10 U/L (15-37) 


 


 


Alanine Aminotransferase


(ALT/SGPT) 13 U/L (12-78) 


  13 U/L (12-78) 


 


 


Alkaline Phosphatase


  83 U/L


() 87 U/L


()


 


Total Protein


  7.3 G/DL


(6.4-8.2) 7.3 G/DL


(6.4-8.2)


 


Albumin


  2.2 G/DL


(3.4-5.0) 2.1 G/DL


(3.4-5.0)


 


Globulin 5.1 g/dL  5.2 g/dL 


 


Albumin/Globulin Ratio 0.4 (1.0-2.7)  0.4 (1.0-2.7) 








Height (Feet):  5


Height (Inches):  5.00


Weight (Pounds):  130


Objective


Physical Exam:


Vitals: reviewed


General Appearance:  NAD


HEENT:  normocephalic, atraumatic


Neck:  non-tender, normal alignment, trach++


Respiratory/Chest:  normal breath sounds bilaterally ++ vent


Cardiovascular/Chest: rrr


Abdomen:  normal bowel sounds, soft, nontender ++ peg


Extremities:  normal range of motion











Mike Pop MD Nov 16, 2019 14:55

## 2019-11-16 NOTE — SURGERY PROGRESS NOTE
Surgery Progress Note


Subjective


Procedure Performed


tracheostomy


Additional Comments


downgraded


combative with dressing changes





Objective





Last 24 Hour Vital Signs








  Date Time  Temp Pulse Resp B/P (MAP) Pulse Ox O2 Delivery O2 Flow Rate FiO2


 


11/16/19 13:22  3 22  100 Mechanical Ventilator  30





  88 14     30


 


11/16/19 12:00      Mechanical Ventilator  


 


11/16/19 12:00 98.6 90 23 124/58 (80) 100   


 


11/16/19 12:00  91      


 


11/16/19 10:58  92 22     30


 


11/16/19 10:56        30


 


11/16/19 09:07  89 29     30





        30


 


11/16/19 09:05        30


 


11/16/19 09:05     100   


 


11/16/19 08:30  91  151/67    


 


11/16/19 08:01  83 17  100 Mechanical Ventilator  30





  77 16     30


 


11/16/19 08:00 97.9 91 21 151/67 (95) 100   


 


11/16/19 08:00      Mechanical Ventilator  


 


11/16/19 08:00  87      


 


11/16/19 08:00        30


 


11/16/19 04:51  84 23     30


 


11/16/19 04:00        30


 


11/16/19 04:00      Mechanical Ventilator  


 


11/16/19 04:00 98.2 83 20 142/63 (89) 99   


 


11/16/19 04:00  80      


 


11/16/19 02:48  83 20     30


 


11/16/19 01:05  87 15  100 Mechanical Ventilator  30


 


11/16/19 00:55  93 21   Mechanical Ventilator  30





        30


 


11/16/19 00:00        30


 


11/16/19 00:00 97.9 92 20 136/67 (90) 100   


 


11/16/19 00:00      Mechanical Ventilator  


 


11/16/19 00:00  92      


 


11/15/19 22:59  93 20     30


 


11/15/19 20:59  85 22     30


 


11/15/19 20:00        30


 


11/15/19 20:00      Mechanical Ventilator  


 


11/15/19 20:00 98.1 86 24 146/66 (92) 99   


 


11/15/19 20:00  86      


 


11/15/19 19:18  88 20  100 Mechanical Ventilator  30


 


11/15/19 19:03  79 19   Mechanical Ventilator  30





        30


 


11/15/19 18:19  92      


 


11/15/19 18:00 98.9 92 22 132/62 (85) 100   


 


11/15/19 18:00  92  130/62    


 


11/15/19 17:02  91 23     30


 


11/15/19 17:00 97.3 91 19 137/65 (89) 100   


 


11/15/19 17:00  89 21 120/48 (72) 100   


 


11/15/19 16:00 99.1 91 25 136/56 (82) 100   


 


11/15/19 16:00      Mechanical Ventilator  


 


11/15/19 16:00        30


 


11/15/19 15:28  85      


 


11/15/19 15:00  87 22 131/49 (76) 100   


 


11/15/19 14:45  84 20     30








I&O











Intake and Output  


 


 11/15/19 11/16/19





 19:00 07:00


 


Intake Total 850 ml 1120 ml


 


Output Total 1000 ml 2000 ml


 


Balance -150 ml -880 ml


 


  


 


Free Water 50 ml 400 ml


 


Tube Feeding 720 ml 720 ml


 


Other 80 ml 


 


Output Urine Total 1000 ml 2000 ml








Dressing:  saturated


Wound:  other


Drains:  other


Cardiovascular:  RSR


Respiratory:  decreased breath sounds


Abdomen:  soft, present bowel sounds, non-distended


Extremities:  no cyanosis





Plan


Problems:  


(1) Pressure injury of deep tissue


Assessment & Plan:  Pt presented on admission with DTPI R heel. Blood filled 

blister with marginal erythema noted to heel. Pt exhibited agitation when 

minimally palpated. (L)2.2cm x (W)2.1cm


L heel is blanchable .


Non-blanchable erythema without induration noted to sacral cleft. 


No other areas of skin concerns noted.





Tx.Plan:


Apply Betadine to R heel. Cover with Optifoam drsg. Change every 3 days and prn.


           


Apply Cavilon Skin Barrier to L heel. Cover with Optifoam drsg. Change every 7 

days and prn.


           


Apply Moisture Barrier Paste to buttocks. Cover sacral area with Optifoam drsg. 

Change every 3 days and prn.


           


Reposition at least every 2hours or as tolerated.


           


Off-load heels with pillow.





(2) Sepsis


Assessment & Plan:  Patient with low-grade fevers, anemia, leukocytosis 

persistent, elevated platelets, abnormal labs.


Etiology currently unknown and work-up in progress.  Labs noted and imaging 

that is available as noted.  


CT noted


US noted


Impression: 13 x 7 x 12.9 cm unilocular cystic mass, corresponding to lesion 

reported


on recent CT scan. Layering low level internal echoes likely represent bladder


debris.. This presumably represents a cystic ovarian lesion with debris or


hemorrhage, possibly neoplastic. Gynecological consultation is recommended


Antibiotics as per infectious disease 


local wound care as wounds do not seem to be the etiology of her sepsis


start feeds via peg


doing well


improving


cont with tube feeds


cont with ICU care 


s/p trach


wean vent


consent 


supplementation for healing 


We will monitor and follow with recommendations


Thank you for allowing me to participate in patient's care














Radhames Blas Nov 16, 2019 14:19

## 2019-11-16 NOTE — NEPHROLOGY PROGRESS NOTE
Assessment/Plan


Problem List:  


(1) Renal failure (ARF), acute on chronic


Assessment:  resolved





(2) Dehydration


(3) ARF (acute renal failure)


(4) Sepsis


(5) Acute metabolic encephalopathy


(6) Hyperglycemia due to type 2 diabetes mellitus


(7) UTI (urinary tract infection)


(8) Diabetic nephropathy


Assessment





Renal failure, Acute on Chronic resolved


Dehydration


Severe Anemia


Sepsis / Pneumonia / UTI


DM, OOC


Proteinuria , Diabetic Nephropathy


Acute encephalopathy


Plan





trached 11/14- day 1 post





on K and Mag IV  as needed





 Reglan


GT feeding


Stop IV fluid-


Lasix as needed


has PEG


Per order








previously 


Cr lowering 


Will order urine studies and monitor renal parameters


kidney YOANDY noted


lower iv fluids rate


WBCs rising


has casas again due to retention


Avoid Nephrotoxics








previously


Anemia salas


2D echo


24 H urine protein


Keep BP and BS in check


I am holding  her psych meds due to low MS


Per orders





Subjective


ROS Limited/Unobtainable:  Yes





Objective


Objective





Last 24 Hour Vital Signs








  Date Time  Temp Pulse Resp B/P (MAP) Pulse Ox O2 Delivery O2 Flow Rate FiO2


 


11/16/19 12:00      Mechanical Ventilator  


 


11/16/19 12:00  91      


 


11/16/19 10:58  92 22     30


 


11/16/19 10:56        30


 


11/16/19 09:07  89 29     30





        30


 


11/16/19 09:05        30


 


11/16/19 09:05     100   


 


11/16/19 08:30  91  151/67    


 


11/16/19 08:01  83 17  100 Mechanical Ventilator  30





  77 16     30


 


11/16/19 08:00 97.9 91 21 151/67 (95) 100   


 


11/16/19 08:00      Mechanical Ventilator  


 


11/16/19 08:00  87      


 


11/16/19 08:00        30


 


11/16/19 04:51  84 23     30


 


11/16/19 04:00        30


 


11/16/19 04:00      Mechanical Ventilator  


 


11/16/19 04:00 98.2 83 20 142/63 (89) 99   


 


11/16/19 04:00  80      


 


11/16/19 02:48  83 20     30


 


11/16/19 01:05  87 15  100 Mechanical Ventilator  30


 


11/16/19 00:55  93 21   Mechanical Ventilator  30





        30


 


11/16/19 00:00        30


 


11/16/19 00:00 97.9 92 20 136/67 (90) 100   


 


11/16/19 00:00      Mechanical Ventilator  


 


11/16/19 00:00  92      


 


11/15/19 22:59  93 20     30


 


11/15/19 20:59  85 22     30


 


11/15/19 20:00        30


 


11/15/19 20:00      Mechanical Ventilator  


 


11/15/19 20:00 98.1 86 24 146/66 (92) 99   


 


11/15/19 20:00  86      


 


11/15/19 19:18  88 20  100 Mechanical Ventilator  30


 


11/15/19 19:03  79 19   Mechanical Ventilator  30





        30


 


11/15/19 18:19  92      


 


11/15/19 18:00 98.9 92 22 132/62 (85) 100   


 


11/15/19 18:00  92  130/62    


 


11/15/19 17:02  91 23     30


 


11/15/19 17:00 97.3 91 19 137/65 (89) 100   


 


11/15/19 17:00  89 21 120/48 (72) 100   


 


11/15/19 16:00 99.1 91 25 136/56 (82) 100   


 


11/15/19 16:00      Mechanical Ventilator  


 


11/15/19 16:00        30


 


11/15/19 15:28  85      


 


11/15/19 15:00  87 22 131/49 (76) 100   


 


11/15/19 14:45  84 20     30


 


11/15/19 14:00  84 24 121/49 (73) 100   


 


11/15/19 13:00  84 19 138/48 (78) 100   


 


11/15/19 12:45  78 20  100 Mechanical Ventilator  30





  82 21     30

















Intake and Output  


 


 11/15/19 11/16/19





 18:59 06:59


 


Intake Total 850 ml 1020 ml


 


Output Total 1080 ml 2000 ml


 


Balance -230 ml -980 ml


 


  


 


Free Water 50 ml 300 ml


 


Tube Feeding 720 ml 720 ml


 


Other 80 ml 


 


Output Urine Total 1080 ml 2000 ml








Height (Feet):  5


Height (Inches):  5.00


Weight (Pounds):  130


General Appearance:  no apparent distress


EENT:  other - trach


Cardiovascular:  tachycardia


Respiratory/Chest:  decreased breath sounds


Abdomen:  soft


Objective


no change











Lukasz Vizcaino MD Nov 16, 2019 12:47

## 2019-11-16 NOTE — GENERAL PROGRESS NOTE
Assessment/Plan


Problem List:  


(1) Abnormal TSH


ICD Codes:  R79.89 - Other specified abnormal findings of blood chemistry


SNOMED:  143843320


(2) Hyperglycemia due to type 2 diabetes mellitus


ICD Codes:  E11.65 - Type 2 diabetes mellitus with hyperglycemia


SNOMED:  348895699778735, 49018809


Qualifiers:  


   Qualified Codes:  E11.65 - Type 2 diabetes mellitus with hyperglycemia; 

Z79.4 - Long term (current) use of insulin


(3) Acute metabolic encephalopathy


ICD Codes:  G93.41 - Metabolic encephalopathy


SNOMED:  47797595, 822698119


(4) ARF (acute renal failure)


ICD Codes:  N17.9 - Acute kidney failure, unspecified


SNOMED:  21592484


Qualifiers:  


   Qualified Codes:  N17.9 - Acute kidney failure, unspecified


(5) Healthcare associated bacterial pneumonia


ICD Codes:  J15.9 - Unspecified bacterial pneumonia


SNOMED:  786519760


Status:  unchanged


Assessment/Plan:


increase Levemir to 20 units qhs -  hold if TF is being held or glucose < 100 mg

/dL 


add Novolog 8 units every 8 hrs in addition to sliding scale - hold if TF is 

being held or glucose < 100 mg/dL 


continue NISS every 6 hours 


hypoglycemia protocol in order





Subjective


ROS Limited/Unobtainable:  Yes


Allergies:  


Coded Allergies:  


     No Known Allergies (Unverified , 10/14/19)


Subjective


events noted 


glucose values elevated on TF 


status post trach on vent and transferred out to [facility] 











Item Value  Date Time


 


Bedside Blood Glucose 320 mg/dl H 11/16/19 1234


 


Bedside Blood Glucose 251 mg/dl H 11/16/19 0600


 


Bedside Blood Glucose 289 mg/dl H 11/16/19 0000


 


Bedside Blood Glucose 269 mg/dl H 11/15/19 2054











Objective





Last 24 Hour Vital Signs








  Date Time  Temp Pulse Resp B/P (MAP) Pulse Ox O2 Delivery O2 Flow Rate FiO2


 


11/16/19 12:00      Mechanical Ventilator  


 


11/16/19 12:00  91      


 


11/16/19 10:58  92 22     30


 


11/16/19 10:56        30


 


11/16/19 09:07  89 29     30





        30


 


11/16/19 09:05        30


 


11/16/19 09:05     100   


 


11/16/19 08:30  91  151/67    


 


11/16/19 08:01  83 17  100 Mechanical Ventilator  30





  77 16     30


 


11/16/19 08:00 97.9 91 21 151/67 (95) 100   


 


11/16/19 08:00      Mechanical Ventilator  


 


11/16/19 08:00  87      


 


11/16/19 08:00        30


 


11/16/19 04:51  84 23     30


 


11/16/19 04:00        30


 


11/16/19 04:00      Mechanical Ventilator  


 


11/16/19 04:00 98.2 83 20 142/63 (89) 99   


 


11/16/19 04:00  80      


 


11/16/19 02:48  83 20     30


 


11/16/19 01:05  87 15  100 Mechanical Ventilator  30


 


11/16/19 00:55  93 21   Mechanical Ventilator  30





        30


 


11/16/19 00:00        30


 


11/16/19 00:00 97.9 92 20 136/67 (90) 100   


 


11/16/19 00:00      Mechanical Ventilator  


 


11/16/19 00:00  92      


 


11/15/19 22:59  93 20     30


 


11/15/19 20:59  85 22     30


 


11/15/19 20:00        30


 


11/15/19 20:00      Mechanical Ventilator  


 


11/15/19 20:00 98.1 86 24 146/66 (92) 99   


 


11/15/19 20:00  86      


 


11/15/19 19:18  88 20  100 Mechanical Ventilator  30


 


11/15/19 19:03  79 19   Mechanical Ventilator  30





        30


 


11/15/19 18:19  92      


 


11/15/19 18:00 98.9 92 22 132/62 (85) 100   


 


11/15/19 18:00  92  130/62    


 


11/15/19 17:02  91 23     30


 


11/15/19 17:00 97.3 91 19 137/65 (89) 100   


 


11/15/19 17:00  89 21 120/48 (72) 100   


 


11/15/19 16:00 99.1 91 25 136/56 (82) 100   


 


11/15/19 16:00      Mechanical Ventilator  


 


11/15/19 16:00        30


 


11/15/19 15:28  85      


 


11/15/19 15:00  87 22 131/49 (76) 100   


 


11/15/19 14:45  84 20     30


 


11/15/19 14:00  84 24 121/49 (73) 100   


 


11/15/19 13:00  84 19 138/48 (78) 100   

















Intake and Output  


 


 11/15/19 11/16/19





 18:59 06:59


 


Intake Total 850 ml 1020 ml


 


Output Total 1080 ml 2000 ml


 


Balance -230 ml -980 ml


 


  


 


Free Water 50 ml 300 ml


 


Tube Feeding 720 ml 720 ml


 


Other 80 ml 


 


Output Urine Total 1080 ml 2000 ml








Height (Feet):  5


Height (Inches):  5.00


Weight (Pounds):  130


General Appearance:  other - on ventilator - CPAP


EENT:  other - tracheostomy


Neck:  normal alignment


Cardiovascular:  normal rate


Respiratory/Chest:  decreased breath sounds


Abdomen:  other - PEG


Edema:  1+ Arm (L), 1+ Arm (R), 1+ Leg (L), 1+ Leg (R), 1+ Pedal (L), 1+ Pedal (

R), 1+ Generalized


Objective





Current Medications








 Medications


  (Trade)  Dose


 Ordered  Sig/Winnie


 Route


 PRN Reason  Start Time


 Stop Time Status Last Admin


Dose Admin


 


 Acetaminophen


  (Tylenol)  650 mg  Q4H  PRN


 NG


 Mild Pain/Temp > 100.5  11/15/19 18:00


 11/25/19 17:59   


 


 


 Acetaminophen


  (Tylenol)  650 mg  Q4H  PRN


 RECTAL


 TEMP>100.5  11/15/19 18:00


 12/2/19 17:59   


 


 


 Acetylcysteine


  (Mucomyst)  100 mg  TIDRT


 [facility]


   11/15/19 19:00


 12/10/19 19:29  11/16/19 08:01


 


 


 Albuterol/


 Ipratropium


  (Albuterol/


 Ipratropium)  3 ml  Q6HRT


 N


   11/15/19 19:00


 11/17/19 12:59  11/16/19 08:01


 


 


 Amlodipine


 Besylate


  (Norvasc)  2.5 mg  BID


 NG


   11/15/19 18:00


 11/29/19 17:59  11/16/19 08:30


 


 


 Chlorhexidine


 Gluconate


  (Mae-Hex 2%)  1 applic  DAILY@2000


 TOPIC


   11/15/19 20:00


 12/5/19 19:59  11/15/19 20:55


 


 


 Dextrose


  (Dextrose 50%)  25 ml  Q30M  PRN


 IV


 Hypoglycemia  11/15/19 18:00


 12/5/19 06:29   


 


 


 Dextrose


  (Dextrose 50%)  50 ml  Q30M  PRN


 IV


 Hypoglycemia  11/15/19 18:00


 12/5/19 06:29   


 


 


 Folic Acid


  (Folate)  3 mg  DAILY


 GT


   11/16/19 09:00


 11/30/19 08:59  11/16/19 08:30


 


 


 Furosemide


  (Lasix)  40 mg  EVERY 12  HOURS


 IV


   11/15/19 21:00


 12/7/19 20:59  11/16/19 09:34


 


 


 Insulin Aspart


  (NovoLOG)    EVERY 6  HOURS


 SUBQ


   11/15/19 18:00


 11/29/19 11:59  11/16/19 12:34


 


 


 Insulin Detemir


  (Levemir)  12 units  QHS


 SUBQ


   11/15/19 21:00


 12/1/19 08:59  11/15/19 20:54


 


 


 Lactulose


  (Cephulac)  20 gm  Q8H  PRN


 NG


 Constipation  11/15/19 18:00


 12/3/19 17:59   


 


 


 Lorazepam


  (Ativan 2mg/ml


 1ml)  2 mg  Q4H  PRN


 IV


 for agitation  11/15/19 18:00


 11/17/19 17:59   


 


 


 Metoclopramide HCl


  (Reglan)  10 mg  Q6H  PRN


 IVP


 Nausea & Vomiting  11/15/19 18:00


 12/6/19 17:59   


 


 


 Metolazone


  (Zaroxolyn)  5 mg  Q24H


 NG


   11/16/19 08:00


 12/12/19 07:59  11/16/19 08:30


 


 


 Pantoprazole


  (Protonix)  40 mg  EVERY 12  HOURS


 IVP


   11/15/19 21:00


 12/3/19 08:59  11/16/19 08:30


 


 


 Potassium Chloride


  (K-Dur)  40 meq  DAILY


 GT


   11/16/19 09:00


 12/5/19 17:59  11/16/19 08:30


 

















Riccardo Velez MD Nov 16, 2019 12:49

## 2019-11-16 NOTE — PULMONOLGY CRITICAL CARE NOTE
Critical Care - Asmt/Plan


Assessment/Plan:


Pulmonary CCM Progress Note 





Assessment/Plan


Problems:  


(1) Healthcare/aspiration associated bacterial pneumonia, respiratory failure, 

some improvement in CXR


(2) UTI (urinary tract infection)


(3) Sepsis


(4) Dehydration


(5) CHANCE (acute kidney injury)


(6) Acute metabolic encephalopathy


(7) Hyperglycemia due to type 2 diabetes mellitus


(8) Renal failure (ARF), acute on chronic


(9) Ventilator dependant Respiratory Failure - not tolerating weaning


(10) Abnormal TSH


(11) Pelvic mass in female


(12) PEG (percutaneous endoscopic gastrostomy) status


Assessment/Plan


Marked leukocytosis S/P WBC scan with uptake in pelvis, has pelvic mass


Sepsis


Possible pneumonia


E coli UTI


Acute hypoxemic respiratory failure


S/p code blue - VDRF 


S/p Tracheostomy and G tube


Encephalopathy sp Code


Hx CHF


HTN


CHANCE improving


Pelvic mass/possible GYN malignancy vs cyst


Dysphagia S/P PEG





Plan:


-Wean FIO2, ABG PRN


-Optimize pulmonary hygiene


-Wean ventilator as tolerated


-Titrate down FiO2 to keep SaO2 > 90%


-Monitor WCt, RFS sent for JAK2 mutation, per Dr. Pop BMBx if unrevealing


-Abx per ID, F/U Cx's


-monitor volumes and renal function, F/U renal recs, diurese PRN


-monitor encephalopathy


-NPO, GTF's


-DVT Px


-FC


-PICC line





Subjective


Allergies:  


Coded Allergies:  


     No Known Allergies


Subjective


Sedated on the Ventilator





Objective





Vital Signs Noted





General Appearance:  Sedated, cachetic


HEENT:  normocephalic, atraumatic, anicteric, mucous membranes moist, trach CDI


Respiratory/Chest:  occasional rhonchi


Cardiovascular:  normal peripheral pulses, normal rate, regular rhythm


Abdomen:  normal bowel sounds, soft, non tender, no organomegaly, non distended

, no mass, other - G tube


Extremities:  no cyanosis, no clubbing, no edema





Microbiology noted








 Date/Time


Source Procedure


Growth Status


 


 


 10/31/19 10:15


Urine,Clean Catch Urine Culture - Preliminary


YEAST Resulted








Laboratory Tests Noted





CXR: Tracheostomy is in good position. No pneumothorax identified. Lungs are 

clear. CHF


has improved significantly since the last exam. Heart size is normal. Right-

sided


PICC line is stable. Left costophrenic angle is slightly blunted likely on the 

basis


of a small pleural effusion.


 


IMPRESSION:


 


Status post tracheostomy placement.





Critical Care - Objective





Last 24 Hour Vital Signs








  Date Time  Temp Pulse Resp B/P (MAP) Pulse Ox O2 Delivery O2 Flow Rate FiO2


 


11/16/19 13:22  3 22  100 Mechanical Ventilator  30





  88 14     30


 


11/16/19 12:00      Mechanical Ventilator  


 


11/16/19 12:00 98.6 90 23 124/58 (80) 100   


 


11/16/19 12:00  91      


 


11/16/19 10:58  92 22     30


 


11/16/19 10:56        30


 


11/16/19 09:07  89 29     30





        30


 


11/16/19 09:05        30


 


11/16/19 09:05     100   


 


11/16/19 08:30  91  151/67    


 


11/16/19 08:01  83 17  100 Mechanical Ventilator  30





  77 16     30


 


11/16/19 08:00 97.9 91 21 151/67 (95) 100   


 


11/16/19 08:00      Mechanical Ventilator  


 


11/16/19 08:00  87      


 


11/16/19 08:00        30


 


11/16/19 04:51  84 23     30


 


11/16/19 04:00        30


 


11/16/19 04:00      Mechanical Ventilator  


 


11/16/19 04:00 98.2 83 20 142/63 (89) 99   


 


11/16/19 04:00  80      


 


11/16/19 02:48  83 20     30


 


11/16/19 01:05  87 15  100 Mechanical Ventilator  30


 


11/16/19 00:55  93 21   Mechanical Ventilator  30





        30


 


11/16/19 00:00        30


 


11/16/19 00:00 97.9 92 20 136/67 (90) 100   


 


11/16/19 00:00      Mechanical Ventilator  


 


11/16/19 00:00  92      


 


11/15/19 22:59  93 20     30


 


11/15/19 20:59  85 22     30


 


11/15/19 20:00        30


 


11/15/19 20:00      Mechanical Ventilator  


 


11/15/19 20:00 98.1 86 24 146/66 (92) 99   


 


11/15/19 20:00  86      


 


11/15/19 19:18  88 20  100 Mechanical Ventilator  30


 


11/15/19 19:03  79 19   Mechanical Ventilator  30





        30


 


11/15/19 18:19  92      


 


11/15/19 18:00 98.9 92 22 132/62 (85) 100   


 


11/15/19 18:00  92  130/62    


 


11/15/19 17:02  91 23     30


 


11/15/19 17:00 97.3 91 19 137/65 (89) 100   


 


11/15/19 17:00  89 21 120/48 (72) 100   


 


11/15/19 16:00 99.1 91 25 136/56 (82) 100   


 


11/15/19 16:00      Mechanical Ventilator  


 


11/15/19 16:00        30


 


11/15/19 15:28  85      








Accucheck:  320





Critical Care - Subjective


ROS Limited/Unobtainable:  No


FI02:  30


Vent Support Breath Rate:  14


Vent Support Mode:  AC


Vent Tidal Volume:  400


Sputum Amount:  Small


PEEP:  5.0


PIP:  17


Tube Feeding Amount:  60


I&O:











Intake and Output  


 


 11/15/19 11/16/19





 19:00 07:00


 


Intake Total 850 ml 1120 ml


 


Output Total 1000 ml 2000 ml


 


Balance -150 ml -880 ml


 


  


 


Free Water 50 ml 400 ml


 


Tube Feeding 720 ml 720 ml


 


Other 80 ml 


 


Output Urine Total 1000 ml 2000 ml








ET-Tube:  7.5


ET Position:  23











Delmer Main MD Nov 16, 2019 15:28

## 2019-11-17 VITALS — DIASTOLIC BLOOD PRESSURE: 56 MMHG | SYSTOLIC BLOOD PRESSURE: 126 MMHG

## 2019-11-17 VITALS — DIASTOLIC BLOOD PRESSURE: 70 MMHG | SYSTOLIC BLOOD PRESSURE: 130 MMHG

## 2019-11-17 VITALS — DIASTOLIC BLOOD PRESSURE: 68 MMHG | SYSTOLIC BLOOD PRESSURE: 140 MMHG

## 2019-11-17 VITALS — SYSTOLIC BLOOD PRESSURE: 137 MMHG | DIASTOLIC BLOOD PRESSURE: 67 MMHG

## 2019-11-17 VITALS — DIASTOLIC BLOOD PRESSURE: 60 MMHG | SYSTOLIC BLOOD PRESSURE: 130 MMHG

## 2019-11-17 VITALS — SYSTOLIC BLOOD PRESSURE: 141 MMHG | DIASTOLIC BLOOD PRESSURE: 68 MMHG

## 2019-11-17 LAB
ADD MANUAL DIFF: YES
ALBUMIN SERPL-MCNC: 1.9 G/DL (ref 3.4–5)
ALBUMIN/GLOB SERPL: 0.4 {RATIO} (ref 1–2.7)
ALP SERPL-CCNC: 79 U/L (ref 46–116)
ALT SERPL-CCNC: 13 U/L (ref 12–78)
ANION GAP SERPL CALC-SCNC: 7 MMOL/L (ref 5–15)
AST SERPL-CCNC: 9 U/L (ref 15–37)
BILIRUB SERPL-MCNC: 0.3 MG/DL (ref 0.2–1)
BUN SERPL-MCNC: 51 MG/DL (ref 7–18)
CALCIUM SERPL-MCNC: 7.6 MG/DL (ref 8.5–10.1)
CHLORIDE SERPL-SCNC: 114 MMOL/L (ref 98–107)
CO2 SERPL-SCNC: 30 MMOL/L (ref 21–32)
CREAT SERPL-MCNC: 1 MG/DL (ref 0.55–1.3)
ERYTHROCYTE [DISTWIDTH] IN BLOOD BY AUTOMATED COUNT: 14.2 % (ref 11.6–14.8)
GLOBULIN SER-MCNC: 4.5 G/DL
HCT VFR BLD CALC: 24.5 % (ref 37–47)
HGB BLD-MCNC: 7.8 G/DL (ref 12–16)
MCV RBC AUTO: 83 FL (ref 80–99)
PHOSPHATE SERPL-MCNC: 2.1 MG/DL (ref 2.5–4.9)
PLATELET # BLD: 482 K/UL (ref 150–450)
POTASSIUM SERPL-SCNC: 3.2 MMOL/L (ref 3.5–5.1)
RBC # BLD AUTO: 2.95 M/UL (ref 4.2–5.4)
SODIUM SERPL-SCNC: 151 MMOL/L (ref 136–145)
WBC # BLD AUTO: 16.4 K/UL (ref 4.8–10.8)

## 2019-11-17 RX ADMIN — INSULIN ASPART SCH UNITS: 100 INJECTION, SOLUTION INTRAVENOUS; SUBCUTANEOUS at 12:01

## 2019-11-17 RX ADMIN — IPRATROPIUM BROMIDE AND ALBUTEROL SULFATE SCH ML: .5; 3 SOLUTION RESPIRATORY (INHALATION) at 07:39

## 2019-11-17 RX ADMIN — PANTOPRAZOLE SODIUM SCH MG: 40 INJECTION, POWDER, FOR SOLUTION INTRAVENOUS at 21:15

## 2019-11-17 RX ADMIN — IPRATROPIUM BROMIDE AND ALBUTEROL SULFATE SCH ML: .5; 3 SOLUTION RESPIRATORY (INHALATION) at 19:10

## 2019-11-17 RX ADMIN — INSULIN ASPART SCH UNITS: 100 INJECTION, SOLUTION INTRAVENOUS; SUBCUTANEOUS at 05:10

## 2019-11-17 RX ADMIN — INSULIN ASPART SCH UNITS: 100 INJECTION, SOLUTION INTRAVENOUS; SUBCUTANEOUS at 05:09

## 2019-11-17 RX ADMIN — IPRATROPIUM BROMIDE AND ALBUTEROL SULFATE SCH ML: .5; 3 SOLUTION RESPIRATORY (INHALATION) at 01:14

## 2019-11-17 RX ADMIN — CHLORHEXIDINE GLUCONATE SCH APPLIC: 213 SOLUTION TOPICAL at 20:10

## 2019-11-17 RX ADMIN — PANTOPRAZOLE SODIUM SCH MG: 40 INJECTION, POWDER, FOR SOLUTION INTRAVENOUS at 09:01

## 2019-11-17 RX ADMIN — INSULIN ASPART SCH UNITS: 100 INJECTION, SOLUTION INTRAVENOUS; SUBCUTANEOUS at 23:13

## 2019-11-17 RX ADMIN — INSULIN ASPART SCH UNITS: 100 INJECTION, SOLUTION INTRAVENOUS; SUBCUTANEOUS at 23:12

## 2019-11-17 RX ADMIN — INSULIN ASPART SCH UNITS: 100 INJECTION, SOLUTION INTRAVENOUS; SUBCUTANEOUS at 12:02

## 2019-11-17 RX ADMIN — IPRATROPIUM BROMIDE AND ALBUTEROL SULFATE SCH ML: .5; 3 SOLUTION RESPIRATORY (INHALATION) at 12:57

## 2019-11-17 RX ADMIN — INSULIN DETEMIR SCH UNITS: 100 INJECTION, SOLUTION SUBCUTANEOUS at 21:15

## 2019-11-17 RX ADMIN — INSULIN ASPART SCH UNITS: 100 INJECTION, SOLUTION INTRAVENOUS; SUBCUTANEOUS at 18:41

## 2019-11-17 NOTE — PULMONOLGY CRITICAL CARE NOTE
Critical Care - Asmt/Plan


Assessment/Plan:


Pulmonary CCM Progress Note 





Assessment/Plan


Problems:  


(1) Healthcare/aspiration associated bacterial pneumonia, respiratory failure, 

some improvement in CXR


(2) UTI (urinary tract infection)


(3) Sepsis


(4) Dehydration


(5) CHANCE (acute kidney injury)


(6) Acute metabolic encephalopathy


(7) Hyperglycemia due to type 2 diabetes mellitus


(8) Renal failure (ARF), acute on chronic


(9) Ventilator dependant Respiratory Failure - not tolerating weaning


(10) Abnormal TSH


(11) Pelvic mass in female


(12) PEG (percutaneous endoscopic gastrostomy) status


Assessment/Plan


Marked leukocytosis S/P WBC scan with uptake in pelvis, has pelvic mass


Sepsis


Possible pneumonia


E coli UTI


Acute hypoxemic respiratory failure


S/p code blue - VDRF 


S/p Tracheostomy and G tube


Encephalopathy sp Code


Hx CHF


HTN


CHANCE improving


Pelvic mass/possible GYN malignancy vs cyst


Dysphagia S/P PEG





Plan:


-Wean FIO2, ABG PRN


-Optimize pulmonary hygiene


-Continue ACVC, wean ventilator as tolerated


-Titrate down FiO2 to keep SaO2 > 90%


-Monitor WCt, RFS sent for JAK2 mutation


-Abx per ID, F/U Cx's


-monitor volumes and renal function, F/U renal recs, diurese PRN


-monitor encephalopathy


-NPO, GTF's


-DVT Px


-FC


-PICC line





Subjective


Allergies:  


Coded Allergies:  


     No Known Allergies


Subjective


Sedated on the Ventilator





Objective





Vital Signs Noted





General Appearance:  Sedated, cachetic


HEENT:  normocephalic, atraumatic, anicteric, mucous membranes moist, trach CDI


Respiratory/Chest:  occasional rhonchi


Cardiovascular:  normal peripheral pulses, normal rate, regular rhythm


Abdomen:  normal bowel sounds, soft, non tender, no organomegaly, non distended

, no mass, other - G tube


Extremities:  no cyanosis, no clubbing, no edema





Microbiology noted





Laboratory Tests Noted





CXR: Tracheostomy is in good position. No pneumothorax identified. Lungs are 

clear. CHF


has improved significantly since the last exam. Heart size is normal. Right-

sided


PICC line is stable. Left costophrenic angle is slightly blunted likely on the 

basis


of a small pleural effusion.


 


IMPRESSION:


 


Status post tracheostomy placement.





Critical Care - Objective





Last 24 Hour Vital Signs








  Date Time  Temp Pulse Resp B/P (MAP) Pulse Ox O2 Delivery O2 Flow Rate FiO2


 


11/17/19 16:00      Mechanical Ventilator  


 


11/17/19 16:00        30


 


11/17/19 14:44  97 22     30


 


11/17/19 12:58  94 16  100 Mechanical Ventilator  30





  99 16     30


 


11/17/19 12:00 98.4 93 20 137/67 (90) 100   


 


11/17/19 12:00      Mechanical Ventilator  


 


11/17/19 12:00  89      


 


11/17/19 12:00        30


 


11/17/19 10:51  93 21     30


 


11/17/19 09:08     100   


 


11/17/19 09:06  92 21     30


 


11/17/19 09:05  92  130/60    


 


11/17/19 08:00 98.6 92 20 130/60 (83) 100   


 


11/17/19 08:00        30


 


11/17/19 08:00      Mechanical Ventilator  


 


11/17/19 08:00  95      


 


11/17/19 07:40  92 23  100 Mechanical Ventilator  30





  95 14     30


 


11/17/19 05:01  88 15     30


 


11/17/19 04:00 98.2 90 19 140/68 (92) 100   


 


11/17/19 04:00        30


 


11/17/19 04:00  89      


 


11/17/19 04:00      Mechanical Ventilator  


 


11/17/19 03:13  96 22     30


 


11/17/19 01:24  94 15  100 Mechanical Ventilator  30


 


11/17/19 01:14  87 18  100 Mechanical Ventilator  30





  87 18     30


 


11/17/19 00:00      Mechanical Ventilator  


 


11/17/19 00:00  90      


 


11/17/19 00:00 97.7 90 20 126/56 (79) 100   


 


11/17/19 00:00        30


 


11/16/19 23:00  91 21     30


 


11/16/19 20:57  93 21     30


 


11/16/19 20:00 98.1 89 22 144/65 (91) 100   


 


11/16/19 20:00        30


 


11/16/19 20:00      Mechanical Ventilator  


 


11/16/19 20:00  86      


 


11/16/19 19:32  92 17  100 Mechanical Ventilator  30


 


11/16/19 19:17  90 22  100 Mechanical Ventilator  30





  90 22     30


 


11/16/19 17:34  87  128/65    


 


11/16/19 16:49  85 21     30








Accucheck:  258





Critical Care - Subjective


ROS Limited/Unobtainable:  No


FI02:  30


Vent Support Breath Rate:  14


Vent Support Mode:  AC


Vent Tidal Volume:  400


Sputum Amount:  Small


PEEP:  5.0


PIP:  17


Tube Feeding Amount:  60


I&O:











Intake and Output  


 


 11/16/19 11/17/19





 18:59 06:59


 


Intake Total 970 ml 920 ml


 


Output Total 1300 ml 700 ml


 


Balance -330 ml 220 ml


 


  


 


Free Water 150 ml 200 ml


 


Tube Feeding 720 ml 720 ml


 


Blood Product 100 ml 


 


Output Urine Total 1300 ml 700 ml








ET-Tube:  7.5


ET Position:  23











Delmer Main MD Nov 17, 2019 16:20

## 2019-11-17 NOTE — INFECTIOUS DISEASES PROG NOTE
Assessment/Plan


Problems:  


(1) Fever


Assessment & Plan:  suspect due to pelvic mass , resolved, with negative 

repeated blood cultures on 11/12 , urine and sputum only grew yeast which is 

colonization . will monitor off antibiotics for now since done with her course 

of antibiotics treatment .  





(2) Aspiration pneumonia


Assessment & Plan:  with B/L infiltrates, hypoxemia and leukocytosis, CXR  

showed interstitial infiltrates , sputum culture showed normal agustin and 

repeated one shoed yeast which is colonization . S/P meropenem  empirically for 

two weeks . aspiration precaution. EOT 11/14/19





(3) Respiratory failure


Assessment & Plan:  with maegan cardia and S/P code blue, was intubated and 

started on mechanical ventilation , S/P tracheostomy , monitor CXR and ABG , 

pulmonary is following 





(4) UTI (urinary tract infection)


Assessment & Plan:  due to candida lusitaneae , S/P casas catheter removal , no 

specific therapy needed for now after changing her casas catheter 





(5) Acute metabolic encephalopathy


Assessment & Plan:  due to the above, continue hydration and antibiotics, 

monitor in tele 





(6) Hyperglycemia due to type 2 diabetes mellitus


Assessment & Plan:  recommend tight glycemic control to keep blood glucose 

between 100-140 





(7) ARF (acute renal failure)


Assessment & Plan:  due to the above, continue hydration and renally dosed 

antibiotics, close  monitor of renal function , stop vancomycin 





(8) Pelvic mass in female


Assessment & Plan:  to the left of the uterus, suspect malignancy , may need 

surgical resection , recommend OB/GYN eval and follow up , oncology is 

following 








Subjective


ROS Limited/Unobtainable:  Yes


Allergies:  


Coded Allergies:  


     No Known Allergies (Unverified , 10/14/19)


Subjective


she had tracheostomy done , and continued on mechanical ventilation , tolerated 

procedure well, no bleeding or draining from the trach site ,  comfortable in 

bed, and responsive to verbal commands still in ICU , had low grade fever but 

no chills  , no diarrhea, no secretions from the trach





Objective


Vital Signs





Last 24 Hour Vital Signs








  Date Time  Temp Pulse Resp B/P (MAP) Pulse Ox O2 Delivery O2 Flow Rate FiO2


 


11/17/19 19:25  92 22  100 Mechanical Ventilator  30


 


11/17/19 19:10  94 21   Mechanical Ventilator  30





        30


 


11/17/19 18:40  92  130/70    


 


11/17/19 16:48  92 21     30


 


11/17/19 16:10  90      


 


11/17/19 16:00      Mechanical Ventilator  


 


11/17/19 16:00        30


 


11/17/19 16:00 97.9 90 20 130/70 (90) 100   


 


11/17/19 14:44  97 22     30


 


11/17/19 12:58  94 16  100 Mechanical Ventilator  30





  99 16     30


 


11/17/19 12:00 98.4 93 20 137/67 (90) 100   


 


11/17/19 12:00      Mechanical Ventilator  


 


11/17/19 12:00  89      


 


11/17/19 12:00        30


 


11/17/19 10:51  93 21     30


 


11/17/19 09:08     100   


 


11/17/19 09:06  92 21     30


 


11/17/19 09:05  92  130/60    


 


11/17/19 08:00 98.6 92 20 130/60 (83) 100   


 


11/17/19 08:00        30


 


11/17/19 08:00      Mechanical Ventilator  


 


11/17/19 08:00  95      


 


11/17/19 07:40  92 23  100 Mechanical Ventilator  30





  95 14     30


 


11/17/19 05:01  88 15     30


 


11/17/19 04:00 98.2 90 19 140/68 (92) 100   


 


11/17/19 04:00        30


 


11/17/19 04:00  89      


 


11/17/19 04:00      Mechanical Ventilator  


 


11/17/19 03:13  96 22     30


 


11/17/19 01:24  94 15  100 Mechanical Ventilator  30


 


11/17/19 01:14  87 18  100 Mechanical Ventilator  30





  87 18     30


 


11/17/19 00:00      Mechanical Ventilator  


 


11/17/19 00:00  90      


 


11/17/19 00:00 97.7 90 20 126/56 (79) 100   


 


11/17/19 00:00        30


 


11/16/19 23:00  91 21     30


 


11/16/19 20:57  93 21     30


 


11/16/19 20:00 98.1 89 22 144/65 (91) 100   


 


11/16/19 20:00        30


 


11/16/19 20:00      Mechanical Ventilator  


 


11/16/19 20:00  86      








Height (Feet):  5


Height (Inches):  5.00


Weight (Pounds):  132


General Appearance:  WD/WN, no acute distress


HEENT:  normocephalic, atraumatic, anicteric, mucous membranes moist, PERRL


Respiratory/Chest:  chest wall non-tender, no respiratory distress, no 

accessory muscle use, decreased breath sounds, crackles/rales


Cardiovascular:  normal peripheral pulses, normal rate, regular rhythm, no 

gallop/murmur, no JVD


Abdomen:  normal bowel sounds, soft, non tender, no organomegaly, non distended

, no mass, no scars


Genitourinary:  normal external genitalia


Extremities:  no cyanosis, no clubbing


Skin:  no rash, no lesions, ulcers


Neurologic/Psychiatric:  unresponsiveness


Lymphatic:  no neck adenopathy, no groin adenopathy


Musculoskeletal:  normal muscle bulk, no effusion





Laboratory Tests








Test


  11/17/19


04:00


 


White Blood Count


  16.4 K/UL


(4.8-10.8)  H


 


Red Blood Count


  2.95 M/UL


(4.20-5.40)  L


 


Hemoglobin


  7.8 G/DL


(12.0-16.0)  L


 


Hematocrit


  24.5 %


(37.0-47.0)  L


 


Mean Corpuscular Volume 83 FL (80-99)  


 


Mean Corpuscular Hemoglobin


  26.6 PG


(27.0-31.0)  L


 


Mean Corpuscular Hemoglobin


Concent 32.0 G/DL


(32.0-36.0)


 


Red Cell Distribution Width


  14.2 %


(11.6-14.8)


 


Platelet Count


  482 K/UL


(150-450)  H


 


Mean Platelet Volume


  7.8 FL


(6.5-10.1)


 


Neutrophils (%) (Auto)


  % (45.0-75.0)


 


 


Lymphocytes (%) (Auto)


  % (20.0-45.0)


 


 


Monocytes (%) (Auto)  % (1.0-10.0)  


 


Eosinophils (%) (Auto)  % (0.0-3.0)  


 


Basophils (%) (Auto)  % (0.0-2.0)  


 


Differential Total Cells


Counted 100  


 


 


Neutrophils % (Manual) 65 % (45-75)  


 


Lymphocytes % (Manual) 20 % (20-45)  


 


Monocytes % (Manual) 10 % (1-10)  


 


Eosinophils % (Manual) 5 % (0-3)  H


 


Basophils % (Manual) 0 % (0-2)  


 


Band Neutrophils 0 % (0-8)  


 


Platelet Estimate Adequate  


 


Platelet Morphology Normal  


 


Hypochromasia 1+  


 


Anisocytosis 1+  


 


Sodium Level


  151 MMOL/L


(136-145)  H


 


Potassium Level


  3.2 MMOL/L


(3.5-5.1)  L


 


Chloride Level


  114 MMOL/L


()  H


 


Carbon Dioxide Level


  30 MMOL/L


(21-32)


 


Anion Gap


  7 mmol/L


(5-15)


 


Blood Urea Nitrogen


  51 mg/dL


(7-18)  H


 


Creatinine


  1.0 MG/DL


(0.55-1.30)


 


Estimat Glomerular Filtration


Rate 55.5 mL/min


(>60)


 


Glucose Level


  165 MG/DL


()  H


 


Calcium Level


  7.6 MG/DL


(8.5-10.1)  L


 


Phosphorus Level


  2.1 MG/DL


(2.5-4.9)  L


 


Magnesium Level


  1.8 MG/DL


(1.8-2.4)


 


Total Bilirubin


  0.3 MG/DL


(0.2-1.0)


 


Aspartate Amino Transf


(AST/SGOT) 9 U/L (15-37)


L


 


Alanine Aminotransferase


(ALT/SGPT) 13 U/L (12-78)


 


 


Alkaline Phosphatase


  79 U/L


()


 


C-Reactive Protein,


Quantitative 6.6 mg/dL


(0.00-0.90)  H


 


Pro-B-Type Natriuretic Peptide


  67372 pg/mL


(0-125)  H


 


Total Protein


  6.4 G/DL


(6.4-8.2)


 


Albumin


  1.9 G/DL


(3.4-5.0)  L


 


Globulin 4.5 g/dL  


 


Albumin/Globulin Ratio


  0.4 (1.0-2.7)


L











Current Medications








 Medications


  (Trade)  Dose


 Ordered  Sig/Winnie


 Route


 PRN Reason  Start Time


 Stop Time Status Last Admin


Dose Admin


 


 Acetaminophen


  (Tylenol)  650 mg  Q4H  PRN


 NG


 Mild Pain/Temp > 100.5  11/15/19 18:00


 11/25/19 17:59   


 


 


 Acetaminophen


  (Tylenol)  650 mg  Q4H  PRN


 RECTAL


 TEMP>100.5  11/15/19 18:00


 12/2/19 17:59   


 


 


 Acetylcysteine


  (Mucomyst)  100 mg  TIDRT


 [facility]


   11/15/19 19:00


 12/10/19 19:29  11/17/19 19:10


 


 


 Albuterol/


 Ipratropium


  (Albuterol/


 Ipratropium)  3 ml  Q6HRT


 [facility]


   11/17/19 19:00


 11/22/19 18:59  11/17/19 19:10


 


 


 Amlodipine


 Besylate


  (Norvasc)  2.5 mg  BID


 NG


   11/15/19 18:00


 11/29/19 17:59  11/17/19 18:40


 


 


 Chlorhexidine


 Gluconate


  (Mae-Hex 2%)  1 applic  DAILY@2000


 TOPIC


   11/15/19 20:00


 12/5/19 19:59  11/16/19 20:25


 


 


 Dextrose


  (Dextrose 50%)  25 ml  Q30M  PRN


 IV


 Hypoglycemia  11/16/19 13:00


 12/16/19 12:59   


 


 


 Dextrose


  (Dextrose 50%)  50 ml  Q30M  PRN


 IV


 Hypoglycemia  11/16/19 13:00


 12/16/19 12:59   


 


 


 Folic Acid


  (Folate)  3 mg  DAILY


 GT


   11/16/19 09:00


 11/30/19 08:59  11/17/19 09:02


 


 


 Furosemide


  (Lasix)  40 mg  EVERY 12  HOURS


 ORAL


   11/18/19 09:00


 12/18/19 08:59   


 


 


 Insulin Aspart


  (NovoLOG)    EVERY 6  HOURS


 SUBQ


   11/16/19 18:00


 12/16/19 17:59  11/17/19 18:41


 


 


 Insulin Aspart


  (NovoLOG)  6 units  EVERY 6  HOURS


 SUBQ


   11/16/19 18:00


 12/16/19 17:59  11/17/19 18:41


 


 


 Insulin Detemir


  (Levemir)  20 units  QHS


 SUBQ


   11/16/19 21:00


 12/1/19 08:59  11/16/19 20:27


 


 


 Lactulose


  (Cephulac)  20 gm  Q8H  PRN


 NG


 Constipation  11/15/19 18:00


 12/3/19 17:59   


 


 


 Metoclopramide HCl


  (Reglan)  10 mg  Q6H  PRN


 IVP


 Nausea & Vomiting  11/15/19 18:00


 12/6/19 17:59   


 


 


 Metolazone


  (Zaroxolyn)  5 mg  Q24H


 NG


   11/16/19 08:00


 12/12/19 07:59  11/17/19 08:13


 


 


 Pantoprazole


  (Protonix)  40 mg  EVERY 12  HOURS


 IVP


   11/15/19 21:00


 12/3/19 08:59  11/17/19 09:01


 


 


 Potassium Chloride


  (K-Dur)  40 meq  DAILY


 GT


   11/16/19 09:00


 12/5/19 17:59  11/17/19 09:02


 

















Mawas,Isam M.D. Nov 17, 2019 20:02

## 2019-11-17 NOTE — SURGERY PROGRESS NOTE
Surgery Progress Note


Subjective


Procedure Performed


tracheostomy


Additional Comments


no acute events





Objective





Last 24 Hour Vital Signs








  Date Time  Temp Pulse Resp B/P (MAP) Pulse Ox O2 Delivery O2 Flow Rate FiO2


 


11/17/19 09:08     100   


 


11/17/19 09:06  92 21     30


 


11/17/19 09:05  92  130/60    


 


11/17/19 08:00 98.6 92 20 130/60 (83) 100   


 


11/17/19 08:00        30


 


11/17/19 08:00      Mechanical Ventilator  


 


11/17/19 08:00  95      


 


11/17/19 07:40  92 23  100 Mechanical Ventilator  30





  95 14     30


 


11/17/19 05:01  88 15     30


 


11/17/19 04:00 98.2 90 19 140/68 (92) 100   


 


11/17/19 04:00        30


 


11/17/19 04:00  89      


 


11/17/19 04:00      Mechanical Ventilator  


 


11/17/19 03:13  96 22     30


 


11/17/19 01:24  94 15  100 Mechanical Ventilator  30


 


11/17/19 01:14  87 18  100 Mechanical Ventilator  30





  87 18     30


 


11/17/19 00:00      Mechanical Ventilator  


 


11/17/19 00:00  90      


 


11/17/19 00:00 97.7 90 20 126/56 (79) 100   


 


11/17/19 00:00        30


 


11/16/19 23:00  91 21     30


 


11/16/19 20:57  93 21     30


 


11/16/19 20:00 98.1 89 22 144/65 (91) 100   


 


11/16/19 20:00        30


 


11/16/19 20:00      Mechanical Ventilator  


 


11/16/19 20:00  86      


 


11/16/19 19:32  92 17  100 Mechanical Ventilator  30


 


11/16/19 19:17  90 22  100 Mechanical Ventilator  30





  90 22     30


 


11/16/19 17:34  87  128/65    


 


11/16/19 16:49  85 21     30


 


11/16/19 16:00      Mechanical Ventilator  


 


11/16/19 16:00        30


 


11/16/19 16:00 99.3 87 24 128/65 (86) 100   


 


11/16/19 16:00  91      


 


11/16/19 15:02  94 24     30


 


11/16/19 13:22  3 22  100 Mechanical Ventilator  30





  88 14     30


 


11/16/19 12:00      Mechanical Ventilator  


 


11/16/19 12:00 98.6 90 23 124/58 (80) 100   


 


11/16/19 12:00  91      


 


11/16/19 10:58  92 22     30


 


11/16/19 10:56        30








I&O











Intake and Output  


 


 11/16/19 11/17/19





 19:00 07:00


 


Intake Total 870 ml 920 ml


 


Output Total 1300 ml 700 ml


 


Balance -430 ml 220 ml


 


  


 


Free Water 50 ml 200 ml


 


Tube Feeding 720 ml 720 ml


 


Blood Product 100 ml 


 


Output Urine Total 1300 ml 700 ml








Dressing:  other


Wound:  other


Drains:  other


Cardiovascular:  RSR


Respiratory:  decreased breath sounds


Abdomen:  soft, present bowel sounds, non-distended


Extremities:  no cyanosis





Laboratory Tests








Test


  11/17/19


04:00


 


White Blood Count


  16.4 K/UL


(4.8-10.8)  H


 


Red Blood Count


  2.95 M/UL


(4.20-5.40)  L


 


Hemoglobin


  7.8 G/DL


(12.0-16.0)  L


 


Hematocrit


  24.5 %


(37.0-47.0)  L


 


Mean Corpuscular Volume 83 FL (80-99)  


 


Mean Corpuscular Hemoglobin


  26.6 PG


(27.0-31.0)  L


 


Mean Corpuscular Hemoglobin


Concent 32.0 G/DL


(32.0-36.0)


 


Red Cell Distribution Width


  14.2 %


(11.6-14.8)


 


Platelet Count


  482 K/UL


(150-450)  H


 


Mean Platelet Volume


  7.8 FL


(6.5-10.1)


 


Neutrophils (%) (Auto)


  % (45.0-75.0)


 


 


Lymphocytes (%) (Auto)


  % (20.0-45.0)


 


 


Monocytes (%) (Auto)  % (1.0-10.0)  


 


Eosinophils (%) (Auto)  % (0.0-3.0)  


 


Basophils (%) (Auto)  % (0.0-2.0)  


 


Differential Total Cells


Counted 100  


 


 


Neutrophils % (Manual) 65 % (45-75)  


 


Lymphocytes % (Manual) 20 % (20-45)  


 


Monocytes % (Manual) 10 % (1-10)  


 


Eosinophils % (Manual) 5 % (0-3)  H


 


Basophils % (Manual) 0 % (0-2)  


 


Band Neutrophils 0 % (0-8)  


 


Platelet Estimate Adequate  


 


Platelet Morphology Normal  


 


Hypochromasia 1+  


 


Anisocytosis 1+  


 


Sodium Level


  151 MMOL/L


(136-145)  H


 


Potassium Level


  3.2 MMOL/L


(3.5-5.1)  L


 


Chloride Level


  114 MMOL/L


()  H


 


Carbon Dioxide Level


  30 MMOL/L


(21-32)


 


Anion Gap


  7 mmol/L


(5-15)


 


Blood Urea Nitrogen


  51 mg/dL


(7-18)  H


 


Creatinine


  1.0 MG/DL


(0.55-1.30)


 


Estimat Glomerular Filtration


Rate 55.5 mL/min


(>60)


 


Glucose Level


  165 MG/DL


()  H


 


Calcium Level


  7.6 MG/DL


(8.5-10.1)  L


 


Phosphorus Level


  2.1 MG/DL


(2.5-4.9)  L


 


Magnesium Level


  1.8 MG/DL


(1.8-2.4)


 


Total Bilirubin


  0.3 MG/DL


(0.2-1.0)


 


Aspartate Amino Transf


(AST/SGOT) 9 U/L (15-37)


L


 


Alanine Aminotransferase


(ALT/SGPT) 13 U/L (12-78)


 


 


Alkaline Phosphatase


  79 U/L


()


 


C-Reactive Protein,


Quantitative 6.6 mg/dL


(0.00-0.90)  H


 


Pro-B-Type Natriuretic Peptide


  84691 pg/mL


(0-125)  H


 


Total Protein


  6.4 G/DL


(6.4-8.2)


 


Albumin


  1.9 G/DL


(3.4-5.0)  L


 


Globulin 4.5 g/dL  


 


Albumin/Globulin Ratio


  0.4 (1.0-2.7)


L











Plan


Problems:  


(1) Pressure injury of deep tissue


Assessment & Plan:  Pt presented on admission with DTPI R heel. Blood filled 

blister with marginal erythema noted to heel. Pt exhibited agitation when 

minimally palpated. (L)2.2cm x (W)2.1cm


L heel is blanchable .


Non-blanchable erythema without induration noted to sacral cleft. 


No other areas of skin concerns noted.





Tx.Plan:


Apply Betadine to R heel. Cover with Optifoam drsg. Change every 3 days and prn.


           


Apply Cavilon Skin Barrier to L heel. Cover with Optifoam drsg. Change every 7 

days and prn.


           


Apply Moisture Barrier Paste to buttocks. Cover sacral area with Optifoam drsg. 

Change every 3 days and prn.


           


Reposition at least every 2hours or as tolerated.


           


Off-load heels with pillow.





(2) Sepsis


Assessment & Plan:  Patient with low-grade fevers, anemia, leukocytosis 

persistent, elevated platelets, abnormal labs.


Etiology currently unknown and work-up in progress.  Labs noted and imaging 

that is available as noted.  


CT noted


US noted


Impression: 13 x 7 x 12.9 cm unilocular cystic mass, corresponding to lesion 

reported


on recent CT scan. Layering low level internal echoes likely represent bladder


debris.. This presumably represents a cystic ovarian lesion with debris or


hemorrhage, possibly neoplastic. Gynecological consultation is recommended


Antibiotics as per infectious disease 


local wound care as wounds do not seem to be the etiology of her sepsis


start feeds via peg


doing well


improving


cont with tube feeds


cont with ICU care 


s/p trach


wean vent


consent 


supplementation for healing 


We will monitor and follow with recommendations


Thank you for allowing me to participate in patient's care














Radhames Blas Nov 17, 2019 10:45

## 2019-11-17 NOTE — NUR
RESPIRATORY NOTE:

placed pt on CPAP PS-8 and pt did not tolerate. pt's RR increased within 1 minute to mid 
30's. placed back AC mode. will attempt to wean again later today.

## 2019-11-17 NOTE — NUR
RESPIRATORY NOTE:

Received pt on AC 14, 400VT, 30%, PEEP +5. Pt is trach-dependent w/ a cuffed, Shiley 8 
tube. Pt flat effect/disoriented, responds to stimuli. B/S vickie. rhonchi, sxn small amounts 
of thick/thin, white secretions. Vent plugged into red outlet, ambubag at bedside. Pt 
resting comfortably, in no apparent distress at this time. Will continue plan of care.

## 2019-11-17 NOTE — NUR
NURSE NOTES:

received pt in the bed, vent dependent, obtunded, vital signs stable, no SOB, skin warm and 
dry to touch, dressing dry and intact, tolerate GT feeding well, PICC line on RT upper arm, 
dressing dry and intact, Charles catheter with yellow urine, HGB 7,8, dr. Pop aware, bed 
in low position, HOB elevated,

## 2019-11-17 NOTE — NUR
NURSE NOTES:

No acute distress noted at this time. SR on cardiac monitor. Tolerating well with current 
vent setting. No fever noted at this time. Bed bath given. Reposition done. Oral care given. 
PICC line intact and flushed, TKO. Will continue to monitor.

## 2019-11-17 NOTE — GENERAL PROGRESS NOTE
Assessment/Plan


Status:  stable, progressing


Assessment/Plan:


Assessment


(1) pneumonia / resp failure / s/p trach


(2) recent UTI


(3) recent Sepsis / rising WBC concerning


(4) Dehydration /  rising NA noted


(5) Dysphagia, s/p PEG


(6) Acute metabolic encephalopathy


(7) diabetes mellitus


(8) Renal failure (ARF), acute on chronic


(9) Anemia


(10) Abnormal TSH


(11) Pelvic mass in female


(12) Congestive Heart Failure





Recommendations


Continue TF - add free water


IVF


trach care


Elevate HOB 


PPI





Subjective


Allergies:  


Coded Allergies:  


     No Known Allergies (Unverified , 10/14/19)


Subjective


d/w RN


unresponsive


tolerating feeds


NA and WBC higher today





Objective





Last 24 Hour Vital Signs








  Date Time  Temp Pulse Resp B/P (MAP) Pulse Ox O2 Delivery O2 Flow Rate FiO2


 


11/17/19 12:00 98.4 93 20 137/67 (90) 100   


 


11/17/19 12:00      Mechanical Ventilator  


 


11/17/19 12:00        30


 


11/17/19 10:51  93 21     30


 


11/17/19 09:08     100   


 


11/17/19 09:06  92 21     30


 


11/17/19 09:05  92  130/60    


 


11/17/19 08:00 98.6 92 20 130/60 (83) 100   


 


11/17/19 08:00        30


 


11/17/19 08:00      Mechanical Ventilator  


 


11/17/19 08:00  95      


 


11/17/19 07:40  92 23  100 Mechanical Ventilator  30





  95 14     30


 


11/17/19 05:01  88 15     30


 


11/17/19 04:00 98.2 90 19 140/68 (92) 100   


 


11/17/19 04:00        30


 


11/17/19 04:00  89      


 


11/17/19 04:00      Mechanical Ventilator  


 


11/17/19 03:13  96 22     30


 


11/17/19 01:24  94 15  100 Mechanical Ventilator  30


 


11/17/19 01:14  87 18  100 Mechanical Ventilator  30





  87 18     30


 


11/17/19 00:00      Mechanical Ventilator  


 


11/17/19 00:00  90      


 


11/17/19 00:00 97.7 90 20 126/56 (79) 100   


 


11/17/19 00:00        30


 


11/16/19 23:00  91 21     30


 


11/16/19 20:57  93 21     30


 


11/16/19 20:00 98.1 89 22 144/65 (91) 100   


 


11/16/19 20:00        30


 


11/16/19 20:00      Mechanical Ventilator  


 


11/16/19 20:00  86      


 


11/16/19 19:32  92 17  100 Mechanical Ventilator  30


 


11/16/19 19:17  90 22  100 Mechanical Ventilator  30





  90 22     30


 


11/16/19 17:34  87  128/65    


 


11/16/19 16:49  85 21     30


 


11/16/19 16:00      Mechanical Ventilator  


 


11/16/19 16:00        30


 


11/16/19 16:00 99.3 87 24 128/65 (86) 100   


 


11/16/19 16:00  91      


 


11/16/19 15:02  94 24     30


 


11/16/19 13:22  3 22  100 Mechanical Ventilator  30





  88 14     30

















Intake and Output  


 


 11/16/19 11/17/19





 18:59 06:59


 


Intake Total 970 ml 920 ml


 


Output Total 1300 ml 700 ml


 


Balance -330 ml 220 ml


 


  


 


Free Water 150 ml 200 ml


 


Tube Feeding 720 ml 720 ml


 


Blood Product 100 ml 


 


Output Urine Total 1300 ml 700 ml








Laboratory Tests


11/17/19 04:00: 


White Blood Count 16.4H, Red Blood Count 2.95L, Hemoglobin 7.8L, Hematocrit 

24.5L, Mean Corpuscular Volume 83, Mean Corpuscular Hemoglobin 26.6L, Mean 

Corpuscular Hemoglobin Concent 32.0, Red Cell Distribution Width 14.2, Platelet 

Count 482H, Mean Platelet Volume 7.8, Neutrophils (%) (Auto) , Lymphocytes (%) (

Auto) , Monocytes (%) (Auto) , Eosinophils (%) (Auto) , Basophils (%) (Auto) , 

Differential Total Cells Counted 100, Neutrophils % (Manual) 65, Lymphocytes % (

Manual) 20, Monocytes % (Manual) 10, Eosinophils % (Manual) 5H, Basophils % (

Manual) 0, Band Neutrophils 0, Platelet Estimate Adequate, Platelet Morphology 

Normal, Hypochromasia 1+, Anisocytosis 1+, Sodium Level 151H, Potassium Level 

3.2L, Chloride Level 114H, Carbon Dioxide Level 30, Anion Gap 7, Blood Urea 

Nitrogen 51H, Creatinine 1.0, Estimat Glomerular Filtration Rate 55.5, Glucose 

Level 165H, Calcium Level 7.6L, Phosphorus Level 2.1L, Magnesium Level 1.8, 

Total Bilirubin 0.3, Aspartate Amino Transf (AST/SGOT) 9L, Alanine 

Aminotransferase (ALT/SGPT) 13, Alkaline Phosphatase 79, C-Reactive Protein, 

Quantitative 6.6H, Pro-B-Type Natriuretic Peptide 21041B, Total Protein 6.4, 

Albumin 1.9L, Globulin 4.5, Albumin/Globulin Ratio 0.4L


Height (Feet):  5


Height (Inches):  5.00


Weight (Pounds):  132


Objective


Elderly WW


s/p trach


Neck supple


Coarse BS


RR


abd soft, (+) GT


no edema


restrained











Bladimir Encarnacion MD Nov 17, 2019 12:59

## 2019-11-17 NOTE — NUR
RESPIRATORY NOTE:

received trached on current vent settings with no signs of resp distress. pt's trach size 
shiley 8, secured via trach tie/guard. no visible redness or skin wounds around stoma. 
bilateral rhonchi breath sounds upon auscultation. alarms are set and audible with ambu bag 
at bedside. will cont to monitor throughout the day.

## 2019-11-17 NOTE — NUR
NURSE NOTES:

vital signs stable, K 3.2, 3o mm of Kphos infusing as ordered, repositioned, continue 
monitoring.

## 2019-11-17 NOTE — NUR
NURSE NOTES:

No acute change noted at this time. No fever at this time. Blood drawn for AM lab. Oral care 
given. Reposition done. Will continue to monitor.

## 2019-11-17 NOTE — NUR
NURSE NOTES:

Received patient from Marley Villeda RN. Patient is obtunded, receiving oxygen via trach to 
vent: Shiley 8,  AC 14, , Fio2 30%, PEEP 5, patient tolerating well, no signs of 
respiratory distress. G-tube is patent and intact, receiving Glucerna 1.2 at 60cc/hr, no 
residuals. Charles catheter is patent and draining. Patient has a Right Upper Arm PICC, patent 
and intact. Bed is locked, placed in lowest position, side rails up x3, bed alarm on, head 
of bed elevated, call light within reach. Will continue to monitor.

## 2019-11-17 NOTE — NUR
NURSE NOTES:

Notified  regarding Hgb 7.8 and no new order received at this time. Will 
continue to monitor.

## 2019-11-17 NOTE — NEPHROLOGY PROGRESS NOTE
Assessment/Plan


Problem List:  


(1) Renal failure (ARF), acute on chronic


Assessment:  resolved





(2) Dehydration


(3) ARF (acute renal failure)


(4) Sepsis


(5) Acute metabolic encephalopathy


(6) Hyperglycemia due to type 2 diabetes mellitus


(7) UTI (urinary tract infection)


(8) Diabetic nephropathy


Assessment





Renal failure, Acute on Chronic resolved


Dehydration


Severe Anemia


Sepsis / Pneumonia / UTI


DM, OOC


Proteinuria , Diabetic Nephropathy


Acute encephalopathy


Plan





trached 11/14- 





on K and Mag IV  as needed





 Reglan


GT feeding


Stop IV fluid-


Lasix as needed


has PEG


Per order








previously 


Cr lowering 


Will order urine studies and monitor renal parameters


kidney YOANDY noted


lower iv fluids rate


WBCs rising


has casas again due to retention


Avoid Nephrotoxics








previously


Anemia salas


2D echo


24 H urine protein


Keep BP and BS in check


I am holding  her psych meds due to low MS


Per orders





Subjective


ROS Limited/Unobtainable:  Yes





Objective


Objective





Last 24 Hour Vital Signs








  Date Time  Temp Pulse Resp B/P (MAP) Pulse Ox O2 Delivery O2 Flow Rate FiO2


 


11/17/19 09:08     100   


 


11/17/19 09:06  92 21     30


 


11/17/19 09:05  92  130/60    


 


11/17/19 08:00 98.6 92 20 130/60 (83) 100   


 


11/17/19 08:00        30


 


11/17/19 08:00      Mechanical Ventilator  


 


11/17/19 08:00  95      


 


11/17/19 07:40  92 23  100 Mechanical Ventilator  30





  95 14     30


 


11/17/19 05:01  88 15     30


 


11/17/19 04:00 98.2 90 19 140/68 (92) 100   


 


11/17/19 04:00        30


 


11/17/19 04:00  89      


 


11/17/19 04:00      Mechanical Ventilator  


 


11/17/19 03:13  96 22     30


 


11/17/19 01:24  94 15  100 Mechanical Ventilator  30


 


11/17/19 01:14  87 18  100 Mechanical Ventilator  30





  87 18     30


 


11/17/19 00:00      Mechanical Ventilator  


 


11/17/19 00:00  90      


 


11/17/19 00:00 97.7 90 20 126/56 (79) 100   


 


11/17/19 00:00        30


 


11/16/19 23:00  91 21     30


 


11/16/19 20:57  93 21     30


 


11/16/19 20:00 98.1 89 22 144/65 (91) 100   


 


11/16/19 20:00        30


 


11/16/19 20:00      Mechanical Ventilator  


 


11/16/19 20:00  86      


 


11/16/19 19:32  92 17  100 Mechanical Ventilator  30


 


11/16/19 19:17  90 22  100 Mechanical Ventilator  30





  90 22     30


 


11/16/19 17:34  87  128/65    


 


11/16/19 16:49  85 21     30


 


11/16/19 16:00      Mechanical Ventilator  


 


11/16/19 16:00        30


 


11/16/19 16:00 99.3 87 24 128/65 (86) 100   


 


11/16/19 16:00  91      


 


11/16/19 15:02  94 24     30


 


11/16/19 13:22  3 22  100 Mechanical Ventilator  30





  88 14     30


 


11/16/19 12:00      Mechanical Ventilator  


 


11/16/19 12:00 98.6 90 23 124/58 (80) 100   


 


11/16/19 12:00  91      


 


11/16/19 10:58  92 22     30


 


11/16/19 10:56        30

















Intake and Output  


 


 11/16/19 11/17/19





 18:59 06:59


 


Intake Total 970 ml 920 ml


 


Output Total 1300 ml 700 ml


 


Balance -330 ml 220 ml


 


  


 


Free Water 150 ml 200 ml


 


Tube Feeding 720 ml 720 ml


 


Blood Product 100 ml 


 


Output Urine Total 1300 ml 700 ml








Laboratory Tests


11/17/19 04:00: 


White Blood Count 16.4H, Red Blood Count 2.95L, Hemoglobin 7.8L, Hematocrit 

24.5L, Mean Corpuscular Volume 83, Mean Corpuscular Hemoglobin 26.6L, Mean 

Corpuscular Hemoglobin Concent 32.0, Red Cell Distribution Width 14.2, Platelet 

Count 482H, Mean Platelet Volume 7.8, Neutrophils (%) (Auto) , Lymphocytes (%) (

Auto) , Monocytes (%) (Auto) , Eosinophils (%) (Auto) , Basophils (%) (Auto) , 

Differential Total Cells Counted 100, Neutrophils % (Manual) 65, Lymphocytes % (

Manual) 20, Monocytes % (Manual) 10, Eosinophils % (Manual) 5H, Basophils % (

Manual) 0, Band Neutrophils 0, Platelet Estimate Adequate, Platelet Morphology 

Normal, Hypochromasia 1+, Anisocytosis 1+, Sodium Level 151H, Potassium Level 

3.2L, Chloride Level 114H, Carbon Dioxide Level 30, Anion Gap 7, Blood Urea 

Nitrogen 51H, Creatinine 1.0, Estimat Glomerular Filtration Rate 55.5, Glucose 

Level 165H, Calcium Level 7.6L, Phosphorus Level 2.1L, Magnesium Level 1.8, 

Total Bilirubin 0.3, Aspartate Amino Transf (AST/SGOT) 9L, Alanine 

Aminotransferase (ALT/SGPT) 13, Alkaline Phosphatase 79, C-Reactive Protein, 

Quantitative 6.6H, Pro-B-Type Natriuretic Peptide 92599E, Total Protein 6.4, 

Albumin 1.9L, Globulin 4.5, Albumin/Globulin Ratio 0.4L


Height (Feet):  5


Height (Inches):  5.00


Weight (Pounds):  132


General Appearance:  no apparent distress


EENT:  other - trach


Cardiovascular:  tachycardia


Respiratory/Chest:  decreased breath sounds


Abdomen:  soft


Objective


no change











Lukasz Vizcaino MD Nov 17, 2019 10:55

## 2019-11-17 NOTE — CARDIOLOGY PROGRESS NOTE
Assessment/Plan


Problem List:  


(1) Aspiration pneumonia


(2) Renal failure (ARF), acute on chronic


(3) CHF (congestive heart failure)


(4) Hypokalemia


(5) Diastolic heart failure


Status:  stable, unchanged


Status Narrative


Respiratory failure, on chronic vent, now s/p trach


Diastolic HF on iv lasix , metolazone, with increasing BUN/CR , c/w prerenal 

state, and  - i/os 17 L since adm ( ? accurate)


Hypokalemia - due to diuretics


type II DM


Anemia


CHANCE


Assessment/Plan


Continue vent support, bronchodilator rx


Change lasix to po (per G tube). continue metolazone


Supplement K


Follow i/os


Followup labs in am.





Subjective


ROS Limited/Unobtainable:  Yes


Subjective


Intubated/ sedated





Objective





Last 24 Hour Vital Signs








  Date Time  Temp Pulse Resp B/P (MAP) Pulse Ox O2 Delivery O2 Flow Rate FiO2


 


11/17/19 12:58  94 16  100 Mechanical Ventilator  30





  99 16     30


 


11/17/19 12:00 98.4 93 20 137/67 (90) 100   


 


11/17/19 12:00      Mechanical Ventilator  


 


11/17/19 12:00  89      


 


11/17/19 12:00        30


 


11/17/19 10:51  93 21     30


 


11/17/19 09:08     100   


 


11/17/19 09:06  92 21     30


 


11/17/19 09:05  92  130/60    


 


11/17/19 08:00 98.6 92 20 130/60 (83) 100   


 


11/17/19 08:00        30


 


11/17/19 08:00      Mechanical Ventilator  


 


11/17/19 08:00  95      


 


11/17/19 07:40  92 23  100 Mechanical Ventilator  30





  95 14     30


 


11/17/19 05:01  88 15     30


 


11/17/19 04:00 98.2 90 19 140/68 (92) 100   


 


11/17/19 04:00        30


 


11/17/19 04:00  89      


 


11/17/19 04:00      Mechanical Ventilator  


 


11/17/19 03:13  96 22     30


 


11/17/19 01:24  94 15  100 Mechanical Ventilator  30


 


11/17/19 01:14  87 18  100 Mechanical Ventilator  30





  87 18     30


 


11/17/19 00:00      Mechanical Ventilator  


 


11/17/19 00:00  90      


 


11/17/19 00:00 97.7 90 20 126/56 (79) 100   


 


11/17/19 00:00        30


 


11/16/19 23:00  91 21     30


 


11/16/19 20:57  93 21     30


 


11/16/19 20:00 98.1 89 22 144/65 (91) 100   


 


11/16/19 20:00        30


 


11/16/19 20:00      Mechanical Ventilator  


 


11/16/19 20:00  86      


 


11/16/19 19:32  92 17  100 Mechanical Ventilator  30


 


11/16/19 19:17  90 22  100 Mechanical Ventilator  30





  90 22     30


 


11/16/19 17:34  87  128/65    


 


11/16/19 16:49  85 21     30


 


11/16/19 16:00      Mechanical Ventilator  


 


11/16/19 16:00        30


 


11/16/19 16:00 99.3 87 24 128/65 (86) 100   


 


11/16/19 16:00  91      


 


11/16/19 15:02  94 24     30








General Appearance:  WD/WN, on vent


EENT:  PERRL/EOMI


Neck:  other - trach


Rhythm:  NSR


Cardiovascular:  normal rate, regular rhythm, no gallop/murmur


Respiratory/Chest:  lungs clear - clear anteriorly


Abdomen:  non tender, soft, other - + g tube


Extremities:  no swelling - bilat SCDs











Intake and Output  


 


 11/16/19 11/17/19





 18:59 06:59


 


Intake Total 970 ml 920 ml


 


Output Total 1300 ml 700 ml


 


Balance -330 ml 220 ml


 


  


 


Free Water 150 ml 200 ml


 


Tube Feeding 720 ml 720 ml


 


Blood Product 100 ml 


 


Output Urine Total 1300 ml 700 ml











Laboratory Tests








Test


  11/17/19


04:00


 


White Blood Count


  16.4 K/UL


(4.8-10.8)  H


 


Red Blood Count


  2.95 M/UL


(4.20-5.40)  L


 


Hemoglobin


  7.8 G/DL


(12.0-16.0)  L


 


Hematocrit


  24.5 %


(37.0-47.0)  L


 


Mean Corpuscular Volume 83 FL (80-99)  


 


Mean Corpuscular Hemoglobin


  26.6 PG


(27.0-31.0)  L


 


Mean Corpuscular Hemoglobin


Concent 32.0 G/DL


(32.0-36.0)


 


Red Cell Distribution Width


  14.2 %


(11.6-14.8)


 


Platelet Count


  482 K/UL


(150-450)  H


 


Mean Platelet Volume


  7.8 FL


(6.5-10.1)


 


Neutrophils (%) (Auto)


  % (45.0-75.0)


 


 


Lymphocytes (%) (Auto)


  % (20.0-45.0)


 


 


Monocytes (%) (Auto)  % (1.0-10.0)  


 


Eosinophils (%) (Auto)  % (0.0-3.0)  


 


Basophils (%) (Auto)  % (0.0-2.0)  


 


Differential Total Cells


Counted 100  


 


 


Neutrophils % (Manual) 65 % (45-75)  


 


Lymphocytes % (Manual) 20 % (20-45)  


 


Monocytes % (Manual) 10 % (1-10)  


 


Eosinophils % (Manual) 5 % (0-3)  H


 


Basophils % (Manual) 0 % (0-2)  


 


Band Neutrophils 0 % (0-8)  


 


Platelet Estimate Adequate  


 


Platelet Morphology Normal  


 


Hypochromasia 1+  


 


Anisocytosis 1+  


 


Sodium Level


  151 MMOL/L


(136-145)  H


 


Potassium Level


  3.2 MMOL/L


(3.5-5.1)  L


 


Chloride Level


  114 MMOL/L


()  H


 


Carbon Dioxide Level


  30 MMOL/L


(21-32)


 


Anion Gap


  7 mmol/L


(5-15)


 


Blood Urea Nitrogen


  51 mg/dL


(7-18)  H


 


Creatinine


  1.0 MG/DL


(0.55-1.30)


 


Estimat Glomerular Filtration


Rate 55.5 mL/min


(>60)


 


Glucose Level


  165 MG/DL


()  H


 


Calcium Level


  7.6 MG/DL


(8.5-10.1)  L


 


Phosphorus Level


  2.1 MG/DL


(2.5-4.9)  L


 


Magnesium Level


  1.8 MG/DL


(1.8-2.4)


 


Total Bilirubin


  0.3 MG/DL


(0.2-1.0)


 


Aspartate Amino Transf


(AST/SGOT) 9 U/L (15-37)


L


 


Alanine Aminotransferase


(ALT/SGPT) 13 U/L (12-78)


 


 


Alkaline Phosphatase


  79 U/L


()


 


C-Reactive Protein,


Quantitative 6.6 mg/dL


(0.00-0.90)  H


 


Pro-B-Type Natriuretic Peptide


  95679 pg/mL


(0-125)  H


 


Total Protein


  6.4 G/DL


(6.4-8.2)


 


Albumin


  1.9 G/DL


(3.4-5.0)  L


 


Globulin 4.5 g/dL  


 


Albumin/Globulin Ratio


  0.4 (1.0-2.7)


L

















Desiree Rush MD Nov 17, 2019 14:26

## 2019-11-18 VITALS — SYSTOLIC BLOOD PRESSURE: 133 MMHG | DIASTOLIC BLOOD PRESSURE: 63 MMHG

## 2019-11-18 VITALS — SYSTOLIC BLOOD PRESSURE: 147 MMHG | DIASTOLIC BLOOD PRESSURE: 68 MMHG

## 2019-11-18 VITALS — SYSTOLIC BLOOD PRESSURE: 158 MMHG | DIASTOLIC BLOOD PRESSURE: 76 MMHG

## 2019-11-18 VITALS — SYSTOLIC BLOOD PRESSURE: 142 MMHG | DIASTOLIC BLOOD PRESSURE: 71 MMHG

## 2019-11-18 VITALS — DIASTOLIC BLOOD PRESSURE: 73 MMHG | SYSTOLIC BLOOD PRESSURE: 152 MMHG

## 2019-11-18 VITALS — SYSTOLIC BLOOD PRESSURE: 141 MMHG | DIASTOLIC BLOOD PRESSURE: 69 MMHG

## 2019-11-18 RX ADMIN — POLYETHYLENE GLYCOL 3350 SCH GM: 17 POWDER, FOR SOLUTION ORAL at 20:58

## 2019-11-18 RX ADMIN — DOCUSATE SODIUM SCH MG: 100 CAPSULE, LIQUID FILLED ORAL at 18:00

## 2019-11-18 RX ADMIN — INSULIN ASPART SCH UNITS: 100 INJECTION, SOLUTION INTRAVENOUS; SUBCUTANEOUS at 06:06

## 2019-11-18 RX ADMIN — IPRATROPIUM BROMIDE AND ALBUTEROL SULFATE SCH ML: .5; 3 SOLUTION RESPIRATORY (INHALATION) at 01:07

## 2019-11-18 RX ADMIN — PANTOPRAZOLE SODIUM SCH MG: 40 INJECTION, POWDER, FOR SOLUTION INTRAVENOUS at 09:09

## 2019-11-18 RX ADMIN — IPRATROPIUM BROMIDE AND ALBUTEROL SULFATE SCH ML: .5; 3 SOLUTION RESPIRATORY (INHALATION) at 20:23

## 2019-11-18 RX ADMIN — PANTOPRAZOLE SODIUM SCH MG: 40 INJECTION, POWDER, FOR SOLUTION INTRAVENOUS at 20:58

## 2019-11-18 RX ADMIN — INSULIN ASPART SCH UNITS: 100 INJECTION, SOLUTION INTRAVENOUS; SUBCUTANEOUS at 12:02

## 2019-11-18 RX ADMIN — HEPARIN SODIUM SCH UNITS: 5000 INJECTION INTRAVENOUS; SUBCUTANEOUS at 21:00

## 2019-11-18 RX ADMIN — IPRATROPIUM BROMIDE AND ALBUTEROL SULFATE SCH ML: .5; 3 SOLUTION RESPIRATORY (INHALATION) at 07:26

## 2019-11-18 RX ADMIN — INSULIN ASPART SCH UNITS: 100 INJECTION, SOLUTION INTRAVENOUS; SUBCUTANEOUS at 12:04

## 2019-11-18 RX ADMIN — IPRATROPIUM BROMIDE AND ALBUTEROL SULFATE SCH ML: .5; 3 SOLUTION RESPIRATORY (INHALATION) at 13:10

## 2019-11-18 RX ADMIN — INSULIN DETEMIR SCH UNITS: 100 INJECTION, SOLUTION SUBCUTANEOUS at 21:00

## 2019-11-18 RX ADMIN — INSULIN ASPART SCH UNITS: 100 INJECTION, SOLUTION INTRAVENOUS; SUBCUTANEOUS at 18:02

## 2019-11-18 RX ADMIN — CHLORHEXIDINE GLUCONATE SCH APPLIC: 213 SOLUTION TOPICAL at 21:00

## 2019-11-18 RX ADMIN — INSULIN ASPART SCH UNITS: 100 INJECTION, SOLUTION INTRAVENOUS; SUBCUTANEOUS at 18:03

## 2019-11-18 NOTE — INFECTIOUS DISEASES PROG NOTE
Assessment/Plan


Problems:  


(1) Fever


Assessment & Plan:  suspect due to pelvic mass ,recurrent ,  with negative 

repeated blood cultures on 11/12 , urine and sputum only grew yeast which is 

colonization . will monitor off antibiotics for now since done with her course 

of antibiotics treatment .  





(2) Aspiration pneumonia


Assessment & Plan:  with B/L infiltrates, hypoxemia and leukocytosis, CXR  

showed interstitial infiltrates , sputum culture showed normal agustin and 

repeated one shoed yeast which is colonization . S/P meropenem  empirically for 

two weeks . aspiration precaution. EOT 11/14/19





(3) Respiratory failure


Assessment & Plan:  with maegan cardia and S/P code blue, was intubated and 

started on mechanical ventilation , S/P tracheostomy , monitor CXR and ABG , 

pulmonary is following 





(4) UTI (urinary tract infection)


Assessment & Plan:  due to candida lusitaneae , S/P casas catheter removal , no 

specific therapy needed for now after changing her casas catheter 





(5) Acute metabolic encephalopathy


Assessment & Plan:  due to the above, continue hydration and antibiotics, 

monitor in tele 





(6) Hyperglycemia due to type 2 diabetes mellitus


Assessment & Plan:  recommend tight glycemic control to keep blood glucose 

between 100-140 





(7) ARF (acute renal failure)


Assessment & Plan:  due to the above, continue hydration and renally dosed 

antibiotics, close  monitor of renal function , stop vancomycin 





(8) Pelvic mass in female


Assessment & Plan:  to the left of the uterus, suspect malignancy , may need 

surgical resection , recommend OB/GYN eval and follow up , oncology is 

following 








Subjective


ROS Limited/Unobtainable:  Yes


Allergies:  


Coded Allergies:  


     No Known Allergies (Unverified , 10/14/19)


Subjective


she had tracheostomy done , and continued on mechanical ventilation , tolerated 

procedure well, no bleeding or draining from the trach site ,  comfortable in 

bed, and responsive to verbal commands still in ICU , had low grade fever but 

no chills  , no diarrhea, no secretions from the trach





Objective


Vital Signs





Last 24 Hour Vital Signs








  Date Time  Temp Pulse Resp B/P (MAP) Pulse Ox O2 Delivery O2 Flow Rate FiO2


 


11/18/19 18:00  101  147/68    


 


11/18/19 16:50  101 16     30


 


11/18/19 16:00  100      


 


11/18/19 16:00 98.2 101 22 147/68 (94) 100   


 


11/18/19 16:00      Mechanical Ventilator  


 


11/18/19 16:00        30


 


11/18/19 15:21  100 21     30





        


 


11/18/19 13:09  98 21   Mechanical Ventilator 45.0 30





        30


 


11/18/19 12:00      Mechanical Ventilator  


 


11/18/19 12:00  101      


 


11/18/19 12:00        30


 


11/18/19 12:00 99.3 102 20 142/71 (94) 100   


 


11/18/19 10:37  102 22     30





        


 


11/18/19 09:30  95 21     30


 


11/18/19 09:08  95  158/76    


 


11/18/19 08:00  93      


 


11/18/19 08:00 99.1 95 24 158/76 (103) 100   


 


11/18/19 08:00        30


 


11/18/19 08:00      Mechanical Ventilator  


 


11/18/19 07:08  99 21  100 Mechanical Ventilator 45.0 30





  92 14     30


 


11/18/19 05:30  91 21     30


 


11/18/19 04:00        30


 


11/18/19 04:00      Mechanical Ventilator  


 


11/18/19 04:00 99.7 95 22 141/69 (93) 100   


 


11/18/19 03:28  97      


 


11/18/19 03:12  98 22     30


 


11/18/19 01:17  90 14  100 Mechanical Ventilator  30


 


11/18/19 01:07  91 24   Mechanical Ventilator  30





        30


 


11/18/19 00:00  92      


 


11/18/19 00:00 98.1 94 22 133/63 (86) 100   


 


11/18/19 00:00      Mechanical Ventilator  


 


11/17/19 23:06  94 23     30


 


11/17/19 20:48  94 21     30








Height (Feet):  5


Height (Inches):  5.00


Weight (Pounds):  134


General Appearance:  WD/WN, no acute distress


HEENT:  normocephalic, atraumatic, anicteric, mucous membranes moist, PERRL


Respiratory/Chest:  chest wall non-tender, no respiratory distress, no 

accessory muscle use, decreased breath sounds, crackles/rales


Cardiovascular:  normal peripheral pulses, normal rate, regular rhythm, no 

gallop/murmur, no JVD


Abdomen:  normal bowel sounds, soft, non tender, no organomegaly, non distended

, no mass, no scars


Genitourinary:  normal external genitalia


Extremities:  no cyanosis, no clubbing


Skin:  no rash, no lesions


Neurologic/Psychiatric:  unresponsiveness


Lymphatic:  no neck adenopathy, no groin adenopathy


Musculoskeletal:  normal muscle bulk, no effusion





Current Medications








 Medications


  (Trade)  Dose


 Ordered  Sig/Winnie


 Route


 PRN Reason  Start Time


 Stop Time Status Last Admin


Dose Admin


 


 Acetaminophen


  (Tylenol)  650 mg  Q4H  PRN


 NG


 Mild Pain/Temp > 100.5  11/15/19 18:00


 11/25/19 17:59   


 


 


 Acetaminophen


  (Tylenol)  650 mg  Q4H  PRN


 RECTAL


 TEMP>100.5  11/15/19 18:00


 12/2/19 17:59   


 


 


 Acetylcysteine


  (Mucomyst)  100 mg  TIDRT


 HHN


   11/15/19 19:00


 12/10/19 19:29  11/18/19 13:10


 


 


 Albuterol/


 Ipratropium


  (Albuterol/


 Ipratropium)  3 ml  Q6HRT


 N


   11/17/19 19:00


 11/22/19 18:59  11/18/19 13:10


 


 


 Amlodipine


 Besylate


  (Norvasc)  2.5 mg  BID


 NG


   11/15/19 18:00


 11/29/19 17:59  11/18/19 18:00


 


 


 Chlorhexidine


 Gluconate


  (Mae-Hex 2%)  1 applic  DAILY@2000


 TOPIC


   11/15/19 20:00


 12/5/19 19:59  11/17/19 20:10


 


 


 Dextrose


  (Dextrose 50%)  25 ml  Q30M  PRN


 IV


 Hypoglycemia  11/16/19 13:00


 12/16/19 12:59   


 


 


 Dextrose


  (Dextrose 50%)  50 ml  Q30M  PRN


 IV


 Hypoglycemia  11/16/19 13:00


 12/16/19 12:59   


 


 


 Docusate Sodium


  (Colace)  100 mg  TWICE A  DAY


 ORAL


   11/18/19 18:00


 12/18/19 17:59  11/18/19 18:00


 


 


 Folic Acid


  (Folate)  3 mg  DAILY


 GT


   11/16/19 09:00


 11/30/19 08:59  11/18/19 09:08


 


 


 Heparin Sodium


  (Porcine)


  (Heparin 5000


 units/ml)  5,000 units  EVERY 12  HOURS


 SUBQ


   11/18/19 21:00


 12/18/19 20:59   


 


 


 Insulin Aspart


  (NovoLOG)    EVERY 6  HOURS


 SUBQ


   11/16/19 18:00


 12/16/19 17:59  11/18/19 18:02


 


 


 Insulin Aspart


  (NovoLOG)  10 units  EVERY 6  HOURS


 SUBQ


   11/18/19 12:00


 12/16/19 17:59  11/18/19 18:03


 


 


 Insulin Detemir


  (Levemir)  20 units  QHS


 SUBQ


   11/16/19 21:00


 12/1/19 08:59  11/17/19 21:15


 


 


 Lactulose


  (Cephulac)  20 gm  Q8H  PRN


 NG


 Constipation  11/15/19 18:00


 12/3/19 17:59   


 


 


 Metoclopramide HCl


  (Reglan)  10 mg  Q6H  PRN


 IVP


 Nausea & Vomiting  11/15/19 18:00


 12/6/19 17:59   


 


 


 Pantoprazole


  (Protonix)  40 mg  EVERY 12  HOURS


 IVP


   11/15/19 21:00


 12/3/19 08:59  11/18/19 09:09


 


 


 Polyethylene


 Glycol


  (Miralax)  17 gm  BEDTIME


 GT


   11/18/19 21:00


 12/18/19 20:59   


 


 


 Potassium Chloride


  (K-Dur)  40 meq  DAILY


 GT


   11/16/19 09:00


 12/5/19 17:59  11/18/19 09:09


 

















Amie Burgos M.D. Nov 18, 2019 20:23

## 2019-11-18 NOTE — NUR
NURSE NOTES:

Oral care and suctioning done on patient. Patient tolerated well, no signs of distress. 
Patient is resting comfortably in bed. Will continue to monitor.

## 2019-11-18 NOTE — DIAGNOSTIC IMAGING REPORT
--------------- APPROVED REPORT --------------





CPT Code: 83735



Present Symptoms

Comments: BILATERAL ARMS PAIN.





BILATERAL UPPER EXTREMITY: Imaging reveals patency of the internal jugular, subclavian, 

axillary and brachial veins. The  basilic veins are also patent.  Doppler indicates 

normal spontaneous flow within these venous segments, bilaterally.The cephalic veins not 

visualized due to patient's body habitus.

## 2019-11-18 NOTE — GENERAL PROGRESS NOTE
Assessment/Plan


Status:  stable, unchanged


Assessment/Plan:








Assessment/Plan


Problems:  


(1) PEG (percutaneous endoscopic gastrostomy) status


ICD Codes:  Z93.1 - Gastrostomy status


SNOMED:  024925545, 005728078


(2) Anemia


ICD Codes:  D64.9 - Anemia, unspecified


SNOMED:  345093914


Qualifiers:  


   Qualified Codes:  D64.9 - Anemia, unspecified


(3) Dehydration


ICD Codes:  E86.0 - Dehydration


SNOMED:  73277560, 0832047


Status:  unchanged





Assessment/Plan


Assessment


(1) pneumonia


(2) UTI


(3) Sepsis


(4) Dehydration


(5) CHANCE 


(6) Acute metabolic encephalopathy


(7) Hyperglycemia due to type 2 diabetes mellitus


(8) Renal failure (ARF), acute on chronic


(9) Aspiration pneumonia


(10) Abnormal TSH


(11) Pelvic mass in female


(12) Congestive Heart Failure


(13) Anemia


(14) Dysphagia - s/p GT





s/p EGD SUMMARY OF FINDINGS:


1. No evidence of any active upper GI bleeding at this time.


2. Gastritis status biopsy.





Recommendations


GTFs


prn transfusions


bowel regime


PPI and H2B


will follow up, consider colonoscopy if patient has recurrent GI bleed





Subjective


ROS Limited/Unobtainable:  No


Allergies:  


Coded Allergies:  


     No Known Allergies (Unverified , 10/14/19)





Objective





Last 24 Hour Vital Signs








  Date Time  Temp Pulse Resp B/P (MAP) Pulse Ox O2 Delivery O2 Flow Rate FiO2


 


11/18/19 09:08  95  158/76    


 


11/18/19 08:00  93      


 


11/18/19 08:00 99.1 95 24 158/76 (103) 100   


 


11/18/19 08:00        30


 


11/18/19 08:00      Mechanical Ventilator  


 


11/18/19 07:08  99 21  100 Mechanical Ventilator 45.0 30





  92 14     30


 


11/18/19 05:30  91 21     30


 


11/18/19 04:00        30


 


11/18/19 04:00      Mechanical Ventilator  


 


11/18/19 04:00 99.7 95 22 141/69 (93) 100   


 


11/18/19 03:28  97      


 


11/18/19 03:12  98 22     30


 


11/18/19 01:17  90 14  100 Mechanical Ventilator  30


 


11/18/19 01:07  91 24   Mechanical Ventilator  30





        30


 


11/18/19 00:00  92      


 


11/18/19 00:00 98.1 94 22 133/63 (86) 100   


 


11/18/19 00:00      Mechanical Ventilator  


 


11/17/19 23:06  94 23     30


 


11/17/19 20:48  94 21     30


 


11/17/19 20:00        30


 


11/17/19 20:00      Mechanical Ventilator  


 


11/17/19 20:00 98.1 93 20 141/68 (92) 100   


 


11/17/19 19:25  92 22  100 Mechanical Ventilator  30


 


11/17/19 19:23  90      


 


11/17/19 19:10  94 21   Mechanical Ventilator  30





        30


 


11/17/19 18:40  92  130/70    


 


11/17/19 16:48  92 21     30


 


11/17/19 16:10  90      


 


11/17/19 16:00      Mechanical Ventilator  


 


11/17/19 16:00        30


 


11/17/19 16:00 97.9 90 20 130/70 (90) 100   


 


11/17/19 14:44  97 22     30


 


11/17/19 12:58  94 16  100 Mechanical Ventilator  30





  99 16     30


 


11/17/19 12:00 98.4 93 20 137/67 (90) 100   


 


11/17/19 12:00      Mechanical Ventilator  


 


11/17/19 12:00  89      


 


11/17/19 12:00        30


 


11/17/19 10:51  93 21     30

















Intake and Output  


 


 11/17/19 11/18/19





 19:00 07:00


 


Intake Total 870 ml 420 ml


 


Output Total 1100 ml 


 


Balance -230 ml 420 ml


 


  


 


Free Water 150 ml 


 


Tube Feeding 720 ml 420 ml


 


Output Urine Total 1100 ml 








Height (Feet):  5


Height (Inches):  5.00


Weight (Pounds):  132


General Appearance:  no apparent distress


EENT:  normal ENT inspection


Neck:  supple


Cardiovascular:  normal rate


Respiratory/Chest:  decreased breath sounds


Abdomen:  normal bowel sounds, non tender, soft


Extremities:  non-tender











Storm Turk MD Nov 18, 2019 10:18

## 2019-11-18 NOTE — GENERAL PROGRESS NOTE
Assessment/Plan


Problem List:  


(1) Abnormal TSH


ICD Codes:  R79.89 - Other specified abnormal findings of blood chemistry


SNOMED:  814003375


(2) Hyperglycemia due to type 2 diabetes mellitus


ICD Codes:  E11.65 - Type 2 diabetes mellitus with hyperglycemia


SNOMED:  266156237780451, 93350552


Qualifiers:  


   Qualified Codes:  E11.65 - Type 2 diabetes mellitus with hyperglycemia; 

Z79.4 - Long term (current) use of insulin


(3) Acute metabolic encephalopathy


ICD Codes:  G93.41 - Metabolic encephalopathy


SNOMED:  74365389, 688417602


(4) ARF (acute renal failure)


ICD Codes:  N17.9 - Acute kidney failure, unspecified


SNOMED:  02066801


Qualifiers:  


   Qualified Codes:  N17.9 - Acute kidney failure, unspecified


(5) Healthcare associated bacterial pneumonia


ICD Codes:  J15.9 - Unspecified bacterial pneumonia


SNOMED:  594835492


Status:  stable, unchanged


Assessment/Plan:


continue Levemir 20 units qhs -  hold if TF is being held or glucose < 100 mg/

dL 


increase Novolog to 10 units every 6 hrs in addition to sliding scale - hold if 

TF is being held or glucose < 100 mg/dL 


continue NISS every 6 hours 


hypoglycemia protocol in order





Subjective


ROS Limited/Unobtainable:  Yes


Allergies:  


Coded Allergies:  


     No Known Allergies (Unverified , 10/14/19)


Subjective


events noted 


glucose values elevated on TF 











Item Value  Date Time


 


Bedside Blood Glucose 253 mg/dl H 11/18/19 0606


 


Bedside Blood Glucose 388 mg/dl H 11/18/19 0000


 


Bedside Blood Glucose 336 mg/dl H 11/17/19 2115


 


Bedside Blood Glucose 280 mg/dl H 11/17/19 1841


 


Bedside Blood Glucose 258 mg/dl H 11/17/19 1202


 


Bedside Blood Glucose 243 mg/dl H 11/17/19 0600


 


Bedside Blood Glucose 284 mg/dl H 11/17/19 0000











Objective





Last 24 Hour Vital Signs








  Date Time  Temp Pulse Resp B/P (MAP) Pulse Ox O2 Delivery O2 Flow Rate FiO2


 


11/18/19 05:30  91 21     30


 


11/18/19 03:28  97      


 


11/18/19 03:12  98 22     30


 


11/18/19 01:17  90 14  100 Mechanical Ventilator  30


 


11/18/19 01:07  91 24   Mechanical Ventilator  30





        30


 


11/18/19 00:00  92      


 


11/18/19 00:00 98.1 94 22 133/63 (86) 100   


 


11/18/19 00:00      Mechanical Ventilator  


 


11/17/19 23:06  94 23     30


 


11/17/19 20:48  94 21     30


 


11/17/19 20:00        30


 


11/17/19 20:00      Mechanical Ventilator  


 


11/17/19 20:00 98.1 93 20 141/68 (92) 100   


 


11/17/19 19:25  92 22  100 Mechanical Ventilator  30


 


11/17/19 19:23  90      


 


11/17/19 19:10  94 21   Mechanical Ventilator  30





        30


 


11/17/19 18:40  92  130/70    


 


11/17/19 16:48  92 21     30


 


11/17/19 16:10  90      


 


11/17/19 16:00      Mechanical Ventilator  


 


11/17/19 16:00        30


 


11/17/19 16:00 97.9 90 20 130/70 (90) 100   


 


11/17/19 14:44  97 22     30


 


11/17/19 12:58  94 16  100 Mechanical Ventilator  30





  99 16     30


 


11/17/19 12:00 98.4 93 20 137/67 (90) 100   


 


11/17/19 12:00      Mechanical Ventilator  


 


11/17/19 12:00  89      


 


11/17/19 12:00        30


 


11/17/19 10:51  93 21     30


 


11/17/19 09:08     100   


 


11/17/19 09:06  92 21     30


 


11/17/19 09:05  92  130/60    


 


11/17/19 08:00 98.6 92 20 130/60 (83) 100   


 


11/17/19 08:00        30


 


11/17/19 08:00      Mechanical Ventilator  


 


11/17/19 08:00  95      


 


11/17/19 07:40  92 23  100 Mechanical Ventilator  30





  95 14     30

















Intake and Output  


 


 11/17/19 11/18/19





 19:00 07:00


 


Intake Total 870 ml 420 ml


 


Output Total 1100 ml 


 


Balance -230 ml 420 ml


 


  


 


Free Water 150 ml 


 


Tube Feeding 720 ml 420 ml


 


Output Urine Total 1100 ml 








Height (Feet):  5


Height (Inches):  5.00


Weight (Pounds):  132


General Appearance:  no apparent distress


Neck:  normal alignment


Cardiovascular:  normal rate


Respiratory/Chest:  decreased breath sounds


Abdomen:  normal bowel sounds


Objective





Current Medications








 Medications


  (Trade)  Dose


 Ordered  Sig/Winnie


 Route


 PRN Reason  Start Time


 Stop Time Status Last Admin


Dose Admin


 


 Acetaminophen


  (Tylenol)  650 mg  Q4H  PRN


 NG


 Mild Pain/Temp > 100.5  11/15/19 18:00


 11/25/19 17:59   


 


 


 Acetaminophen


  (Tylenol)  650 mg  Q4H  PRN


 RECTAL


 TEMP>100.5  11/15/19 18:00


 12/2/19 17:59   


 


 


 Acetylcysteine


  (Mucomyst)  100 mg  TIDRT


 N


   11/15/19 19:00


 12/10/19 19:29  11/17/19 19:10


 


 


 Albuterol/


 Ipratropium


  (Albuterol/


 Ipratropium)  3 ml  Q6HRT


 N


   11/17/19 19:00


 11/22/19 18:59  11/18/19 01:07


 


 


 Amlodipine


 Besylate


  (Norvasc)  2.5 mg  BID


 NG


   11/15/19 18:00


 11/29/19 17:59  11/17/19 18:40


 


 


 Chlorhexidine


 Gluconate


  (Mae-Hex 2%)  1 applic  DAILY@2000


 TOPIC


   11/15/19 20:00


 12/5/19 19:59  11/17/19 20:10


 


 


 Dextrose


  (Dextrose 50%)  25 ml  Q30M  PRN


 IV


 Hypoglycemia  11/16/19 13:00


 12/16/19 12:59   


 


 


 Dextrose


  (Dextrose 50%)  50 ml  Q30M  PRN


 IV


 Hypoglycemia  11/16/19 13:00


 12/16/19 12:59   


 


 


 Folic Acid


  (Folate)  3 mg  DAILY


 GT


   11/16/19 09:00


 11/30/19 08:59  11/17/19 09:02


 


 


 Furosemide


  (Lasix)  40 mg  EVERY 12  HOURS


 ORAL


   11/18/19 09:00


 12/18/19 08:59   


 


 


 Insulin Aspart


  (NovoLOG)    EVERY 6  HOURS


 SUBQ


   11/16/19 18:00


 12/16/19 17:59  11/18/19 06:06


 


 


 Insulin Aspart


  (NovoLOG)  6 units  EVERY 6  HOURS


 SUBQ


   11/16/19 18:00


 12/16/19 17:59  11/18/19 06:06


 


 


 Insulin Detemir


  (Levemir)  20 units  QHS


 SUBQ


   11/16/19 21:00


 12/1/19 08:59  11/17/19 21:15


 


 


 Lactulose


  (Cephulac)  20 gm  Q8H  PRN


 NG


 Constipation  11/15/19 18:00


 12/3/19 17:59   


 


 


 Metoclopramide HCl


  (Reglan)  10 mg  Q6H  PRN


 IVP


 Nausea & Vomiting  11/15/19 18:00


 12/6/19 17:59   


 


 


 Metolazone


  (Zaroxolyn)  5 mg  Q24H


 NG


   11/16/19 08:00


 12/12/19 07:59  11/17/19 08:13


 


 


 Pantoprazole


  (Protonix)  40 mg  EVERY 12  HOURS


 IVP


   11/15/19 21:00


 12/3/19 08:59  11/17/19 21:15


 


 


 Potassium Chloride


  (K-Dur)  40 meq  DAILY


 GT


   11/16/19 09:00


 12/5/19 17:59  11/17/19 09:02


 

















Riccardo Velez MD Nov 18, 2019 06:29

## 2019-11-18 NOTE — NEPHROLOGY PROGRESS NOTE
Assessment/Plan


Problem List:  


(1) Renal failure (ARF), acute on chronic


Assessment:  resolved





(2) Dehydration


(3) ARF (acute renal failure)


(4) Sepsis


(5) Acute metabolic encephalopathy


(6) Hyperglycemia due to type 2 diabetes mellitus


(7) UTI (urinary tract infection)


(8) Diabetic nephropathy


Assessment





Renal failure, Acute on Chronic resolved


Dehydration


Severe Anemia


Sepsis / Pneumonia / UTI


DM, OOC


Proteinuria , Diabetic Nephropathy


Acute encephalopathy


Plan





trached 11/14- 





on K and Mag IV  as needed





 Reglan


GT feeding


Stop IV fluid-


Lasix as needed


has PEG


Per order








previously 


Cr lowering 


Will order urine studies and monitor renal parameters


kidney YOANDY noted


lower iv fluids rate


WBCs rising


has casas again due to retention


Avoid Nephrotoxics








previously


Anemia salas


2D echo


24 H urine protein


Keep BP and BS in check


I am holding  her psych meds due to low MS


Per orders





Subjective


ROS Limited/Unobtainable:  Yes





Objective


Objective





Last 24 Hour Vital Signs








  Date Time  Temp Pulse Resp B/P (MAP) Pulse Ox O2 Delivery O2 Flow Rate FiO2


 


11/18/19 10:37  102 22  100 Mechanical Ventilator 45.0 30





  92 14     30


 


11/18/19 09:30  95 21  100 Mechanical Ventilator 45.0 30





  92 14     30


 


11/18/19 09:08  95  158/76    


 


11/18/19 08:00  93      


 


11/18/19 08:00 99.1 95 24 158/76 (103) 100   


 


11/18/19 08:00        30


 


11/18/19 08:00      Mechanical Ventilator  


 


11/18/19 07:08  99 21  100 Mechanical Ventilator 45.0 30





  92 14     30


 


11/18/19 05:30  91 21     30


 


11/18/19 04:00        30


 


11/18/19 04:00      Mechanical Ventilator  


 


11/18/19 04:00 99.7 95 22 141/69 (93) 100   


 


11/18/19 03:28  97      


 


11/18/19 03:12  98 22     30


 


11/18/19 01:17  90 14  100 Mechanical Ventilator  30


 


11/18/19 01:07  91 24   Mechanical Ventilator  30





        30


 


11/18/19 00:00  92      


 


11/18/19 00:00 98.1 94 22 133/63 (86) 100   


 


11/18/19 00:00      Mechanical Ventilator  


 


11/17/19 23:06  94 23     30


 


11/17/19 20:48  94 21     30


 


11/17/19 20:00        30


 


11/17/19 20:00      Mechanical Ventilator  


 


11/17/19 20:00 98.1 93 20 141/68 (92) 100   


 


11/17/19 19:25  92 22  100 Mechanical Ventilator  30


 


11/17/19 19:23  90      


 


11/17/19 19:10  94 21   Mechanical Ventilator  30





        30


 


11/17/19 18:40  92  130/70    


 


11/17/19 16:48  92 21     30


 


11/17/19 16:10  90      


 


11/17/19 16:00      Mechanical Ventilator  


 


11/17/19 16:00        30


 


11/17/19 16:00 97.9 90 20 130/70 (90) 100   


 


11/17/19 14:44  97 22     30


 


11/17/19 12:58  94 16  100 Mechanical Ventilator  30





  99 16     30


 


11/17/19 12:00 98.4 93 20 137/67 (90) 100   


 


11/17/19 12:00      Mechanical Ventilator  


 


11/17/19 12:00  89      


 


11/17/19 12:00        30

















Intake and Output  


 


 11/17/19 11/18/19





 19:00 07:00


 


Intake Total 870 ml 420 ml


 


Output Total 1100 ml 


 


Balance -230 ml 420 ml


 


  


 


Free Water 150 ml 


 


Tube Feeding 720 ml 420 ml


 


Output Urine Total 1100 ml 








Height (Feet):  5


Height (Inches):  5.00


Weight (Pounds):  132


General Appearance:  no apparent distress


EENT:  other - Trach


Cardiovascular:  normal rate


Respiratory/Chest:  decreased breath sounds


Abdomen:  distended


Objective


no change











Lukasz Vizcaino MD Nov 18, 2019 11:40

## 2019-11-18 NOTE — HEMATOLOGY/ONC PROGRESS NOTE
Assessment/Plan


Assessment/Plan





Assessment and Recs:


# Anemia of chronic disease due to underlying chronic medical issues, 

multifactorial 


--> Anemia workup has been ordered, rule out gi bleed --> ferritin is 1400+


--> No evidence of hemolysis is noted, peripheral smear has been reviewed.


--> Hgb goal >7. Transfuse prn.


--> Epogen or iron at this time is not particularly indicated


--> Medications have been reviewed


--> low threshold for gi evaluation in case has occult +


--> bone marrow biopsy is not indicated given the other more likely causes (if 

recurrent anemia) first time here


--> hgb trend 8.4->8.5-->8.2->7.7-->7.9-->10.1-->9.1->7.8->7.2-->9-->8.4-->8->

7.8


--> FOLIC ACID 1mg po daily started


# Leukocytosis/elevated white blood cell count, unspecified likely related to 

underlying reaction v more likely infection


--> have reviewed peripheral smear and bandemia/neutrophilia noted


--> continue antibiotics if they have been started by ID team (VANC/ZOSYN)--> 

vanc/linda--> linda


--> wbc 39-->28k-->29k->31k-->23-->26k-->19k-->24k-->15.4-->16-->14-->23k-->14--

>17-->12.5->12.3-->12.6-->16


--> monitor for resolution


--> CT C/A/P Results reviewed


--> FOR INdium bone scan done -> Rim of activity within the pelvis, 

corresponding to expected location of the soft tissue component of the cystic 

pelvic mass described on recent imaging studies.


--> again consider gyn eval


--> as come down over last week, currently only 12k and s/p code, continue to 

monitor


--> would hold off on biopsy of bone


# Pelvic mass on ct and us -- 13 x 7 x 12.9 cm unilocular cystic mass, 

corresponding to lesion reported on recent CT scan. Layering low level internal 

echoes likely represent bladder debris.. This presumably represents a cystic 

ovarian lesion with debris or hemorrhage, possibly neoplastic.


--> CA-125 is 216! elevated


--> consider gyn eval


# Hypercalcemia - btw 10-11 range when corrected to alb


--> ivf have been started


--> if remains elevated, 7.9-->8.3


--> will get pth


# Sepsis from pneumonia.  


--> pulm consulted, abx started


# CHANCE (acute kidney injury) on ckd


--> cr 1.6-->2.7-->2.2->1.2->1.2


--> per renal


# UTI (urinary tract infection)


--> on abx per id


# Dehydration


--> on ivf


# Acute metabolic encephalopathy


--> likely due to pna


# Resp failure now intubated 11/4


--> s/p trach


# Dysphagia s/p peg++


# Dvt ppx SCDS





The timing of this note does not necessarily reflect the time of the patient 

was seen.





Greatly appreciate consultation.





Subjective


Constitutional:  Denies: no symptoms, chills, fever, malaise, weakness, other


HEENT:  Denies: no symptoms, eye pain, blurred vision, tearing, double vision, 

ear pain, ear discharge, nose pain, nose congestion, throat pain, throat 

swelling, mouth pain, mouth swelling, other


Cardiovascular:  Denies: no symptoms, chest pain, edema, irregular heart rate, 

lightheadedness, palpitations, syncope, other


Respiratory:  Denies: no symptoms, cough, shortness of breath, SOB with 

excertion, SOB at rest, sputum, wheezing, other


Allergies:  


Coded Allergies:  


     No Known Allergies (Unverified , 10/14/19)


Subjective


10/14: hgb has improved, no bleeding, labs reivewed


10/15: no events to report


10/16: a+ o x1, no bleeding or chills noted, no major changes, occult pending


10/17: no events noted, no bleeding, no chills, no hematemesis/hematochezia


10/18: remains confused, with abx, on nc


10/19: cr is worse, hgb 8.4, no bleeding, night sweats, chills


10/20: no f/c, no night sweats, labs noted, cr worse


10/21: no bleeding or chills, no night sweats, labs pending this am


10/22: pelvic mass noted on imaging, no bleeding reported, ordered ca125


10/23: no events, remains lethargic, is on abx, seen by surg


10/24: with raid response overnight, rr high as well as bp, vidal to icu


10/25: no events, no bleeding, to undergo a indium scan with id


10/26: comfortable, electrolytes reviewed, given mag and k


10/29: back on bipap with gt feeds, dw rn this am


10/30: remains very confused, no f/c, no bleeding, with urinary retention, 

folic acid started


10/31: sleeping, is on bipap, no events, wbc is higher this am


11/1: no events to report, no bleeding, wbc still high but better


11/3: no bleeding, no night sweats, s/p peg, alert


11/4: s/p code blue last night, now intubated, labs noted wbc lower


11/5: given k and mg, for endoscopy tomorrow given drop h/h, 1 unit prbc


11/6: is on vent, unresponsive, no bleeding noted, no chills wbc is 13k


11/14: remains in icu, s/p trach, wbc 12, on meropenem 


11/15: no bleeding, trying to pull off the trach, cbc reviewed


11/16: no Bms last two days, on vent, seen by gi, no bleeding


11/18: no bleeding, no night sweats, labs reviewed, seen by gi/surg





Objective


Objective





Current Medications








 Medications


  (Trade)  Dose


 Ordered  Sig/Winnie


 Route


 PRN Reason  Start Time


 Stop Time Status Last Admin


Dose Admin


 


 Acetaminophen


  (Tylenol)  650 mg  Q4H  PRN


 NG


 Mild Pain/Temp > 100.5  11/15/19 18:00


 11/25/19 17:59   


 


 


 Acetaminophen


  (Tylenol)  650 mg  Q4H  PRN


 RECTAL


 TEMP>100.5  11/15/19 18:00


 12/2/19 17:59   


 


 


 Acetylcysteine


  (Mucomyst)  100 mg  TIDRT


 HHN


   11/15/19 19:00


 12/10/19 19:29  11/18/19 07:26


 


 


 Albuterol/


 Ipratropium


  (Albuterol/


 Ipratropium)  3 ml  Q6HRT


 N


   11/17/19 19:00


 11/22/19 18:59  11/18/19 07:26


 


 


 Amlodipine


 Besylate


  (Norvasc)  2.5 mg  BID


 NG


   11/15/19 18:00


 11/29/19 17:59  11/18/19 09:08


 


 


 Chlorhexidine


 Gluconate


  (Mae-Hex 2%)  1 applic  DAILY@2000


 TOPIC


   11/15/19 20:00


 12/5/19 19:59  11/17/19 20:10


 


 


 Dextrose


  (Dextrose 50%)  25 ml  Q30M  PRN


 IV


 Hypoglycemia  11/16/19 13:00


 12/16/19 12:59   


 


 


 Dextrose


  (Dextrose 50%)  50 ml  Q30M  PRN


 IV


 Hypoglycemia  11/16/19 13:00


 12/16/19 12:59   


 


 


 Docusate Sodium


  (Colace)  100 mg  TWICE A  DAY


 ORAL


   11/18/19 18:00


 12/18/19 17:59   


 


 


 Folic Acid


  (Folate)  3 mg  DAILY


 GT


   11/16/19 09:00


 11/30/19 08:59  11/18/19 09:08


 


 


 Furosemide


  (Lasix)  40 mg  EVERY 12  HOURS


 ORAL


   11/18/19 09:00


 12/18/19 08:59  11/18/19 09:08


 


 


 Insulin Aspart


  (NovoLOG)    EVERY 6  HOURS


 SUBQ


   11/16/19 18:00


 12/16/19 17:59  11/18/19 06:06


 


 


 Insulin Aspart


  (NovoLOG)  10 units  EVERY 6  HOURS


 SUBQ


   11/18/19 12:00


 12/16/19 17:59   


 


 


 Insulin Detemir


  (Levemir)  20 units  QHS


 SUBQ


   11/16/19 21:00


 12/1/19 08:59  11/17/19 21:15


 


 


 Lactulose


  (Cephulac)  20 gm  Q8H  PRN


 NG


 Constipation  11/15/19 18:00


 12/3/19 17:59   


 


 


 Lactulose


  (Cephulac)  30 gm  ONCE


 ORAL


   11/18/19 10:30


 11/18/19 12:30   


 


 


 Metoclopramide HCl


  (Reglan)  10 mg  Q6H  PRN


 IVP


 Nausea & Vomiting  11/15/19 18:00


 12/6/19 17:59   


 


 


 Metolazone


  (Zaroxolyn)  5 mg  Q24H


 NG


   11/16/19 08:00


 12/12/19 07:59  11/18/19 08:13


 


 


 Pantoprazole


  (Protonix)  40 mg  EVERY 12  HOURS


 IVP


   11/15/19 21:00


 12/3/19 08:59  11/18/19 09:09


 


 


 Polyethylene


 Glycol


  (Miralax)  17 gm  BEDTIME


 ORAL


   11/18/19 21:00


 12/18/19 20:59   


 


 


 Potassium Chloride


  (K-Dur)  40 meq  DAILY


 GT


   11/16/19 09:00


 12/5/19 17:59  11/18/19 09:09


 











Last 24 Hour Vital Signs








  Date Time  Temp Pulse Resp B/P (MAP) Pulse Ox O2 Delivery O2 Flow Rate FiO2


 


11/18/19 10:37  102 22  100 Mechanical Ventilator 45.0 30





  92 14     30


 


11/18/19 09:30  95 21  100 Mechanical Ventilator 45.0 30





  92 14     30


 


11/18/19 09:08  95  158/76    


 


11/18/19 08:00  93      


 


11/18/19 08:00 99.1 95 24 158/76 (103) 100   


 


11/18/19 08:00        30


 


11/18/19 08:00      Mechanical Ventilator  


 


11/18/19 07:08  99 21  100 Mechanical Ventilator 45.0 30





  92 14     30


 


11/18/19 05:30  91 21     30


 


11/18/19 04:00        30


 


11/18/19 04:00      Mechanical Ventilator  


 


11/18/19 04:00 99.7 95 22 141/69 (93) 100   


 


11/18/19 03:28  97      


 


11/18/19 03:12  98 22     30


 


11/18/19 01:17  90 14  100 Mechanical Ventilator  30


 


11/18/19 01:07  91 24   Mechanical Ventilator  30





        30


 


11/18/19 00:00  92      


 


11/18/19 00:00 98.1 94 22 133/63 (86) 100   


 


11/18/19 00:00      Mechanical Ventilator  


 


11/17/19 23:06  94 23     30


 


11/17/19 20:48  94 21     30


 


11/17/19 20:00        30


 


11/17/19 20:00      Mechanical Ventilator  


 


11/17/19 20:00 98.1 93 20 141/68 (92) 100   


 


11/17/19 19:25  92 22  100 Mechanical Ventilator  30


 


11/17/19 19:23  90      


 


11/17/19 19:10  94 21   Mechanical Ventilator  30





        30


 


11/17/19 18:40  92  130/70    


 


11/17/19 16:48  92 21     30


 


11/17/19 16:10  90      


 


11/17/19 16:00      Mechanical Ventilator  


 


11/17/19 16:00        30


 


11/17/19 16:00 97.9 90 20 130/70 (90) 100   


 


11/17/19 14:44  97 22     30


 


11/17/19 12:58  94 16  100 Mechanical Ventilator  30





  99 16     30


 


11/17/19 12:00 98.4 93 20 137/67 (90) 100   


 


11/17/19 12:00      Mechanical Ventilator  


 


11/17/19 12:00  89      


 


11/17/19 12:00        30


 


11/17/19 10:51  93 21     30


 


11/17/19 09:08     100   


 


11/17/19 09:06  92 21     30


 


11/17/19 09:05  92  130/60    


 


11/17/19 08:00 98.6 92 20 130/60 (83) 100   


 


11/17/19 08:00        30


 


11/17/19 08:00      Mechanical Ventilator  


 


11/17/19 08:00  95      


 


11/17/19 07:40  92 23  100 Mechanical Ventilator  30





  95 14     30


 


11/17/19 05:01  88 15     30


 


11/17/19 04:00 98.2 90 19 140/68 (92) 100   


 


11/17/19 04:00        30


 


11/17/19 04:00  89      


 


11/17/19 04:00      Mechanical Ventilator  


 


11/17/19 03:13  96 22     30


 


11/17/19 01:24  94 15  100 Mechanical Ventilator  30


 


11/17/19 01:14  87 18  100 Mechanical Ventilator  30





  87 18     30


 


11/17/19 00:00      Mechanical Ventilator  


 


11/17/19 00:00  90      


 


11/17/19 00:00 97.7 90 20 126/56 (79) 100   


 


11/17/19 00:00        30


 


11/16/19 23:00  91 21     30


 


11/16/19 20:57  93 21     30


 


11/16/19 20:00 98.1 89 22 144/65 (91) 100   


 


11/16/19 20:00        30


 


11/16/19 20:00      Mechanical Ventilator  


 


11/16/19 20:00  86      


 


11/16/19 19:32  92 17  100 Mechanical Ventilator  30


 


11/16/19 19:17  90 22  100 Mechanical Ventilator  30





  90 22     30


 


11/16/19 17:34  87  128/65    


 


11/16/19 16:49  85 21     30


 


11/16/19 16:00      Mechanical Ventilator  


 


11/16/19 16:00        30


 


11/16/19 16:00 99.3 87 24 128/65 (86) 100   


 


11/16/19 16:00  91      


 


11/16/19 15:02  94 24     30


 


11/16/19 13:22  83 22  100 Mechanical Ventilator  30





  88 14     30


 


11/16/19 12:00      Mechanical Ventilator  


 


11/16/19 12:00 98.6 90 23 124/58 (80) 100   


 


11/16/19 12:00  91      


 


11/16/19 10:58  92 22     30


 


11/16/19 10:56        30

















Intake and Output  


 


 11/17/19 11/18/19





 19:00 07:00


 


Intake Total 870 ml 420 ml


 


Output Total 1100 ml 


 


Balance -230 ml 420 ml


 


  


 


Free Water 150 ml 


 


Tube Feeding 720 ml 420 ml


 


Output Urine Total 1100 ml 











Labs








Test


  11/17/19


04:00


 


White Blood Count


  16.4 K/UL


(4.8-10.8)


 


Red Blood Count


  2.95 M/UL


(4.20-5.40)


 


Hemoglobin


  7.8 G/DL


(12.0-16.0)


 


Hematocrit


  24.5 %


(37.0-47.0)


 


Mean Corpuscular Volume 83 FL (80-99) 


 


Mean Corpuscular Hemoglobin


  26.6 PG


(27.0-31.0)


 


Mean Corpuscular Hemoglobin


Concent 32.0 G/DL


(32.0-36.0)


 


Red Cell Distribution Width


  14.2 %


(11.6-14.8)


 


Platelet Count


  482 K/UL


(150-450)


 


Mean Platelet Volume


  7.8 FL


(6.5-10.1)


 


Neutrophils (%) (Auto)  % (45.0-75.0) 


 


Lymphocytes (%) (Auto)  % (20.0-45.0) 


 


Monocytes (%) (Auto)  % (1.0-10.0) 


 


Eosinophils (%) (Auto)  % (0.0-3.0) 


 


Basophils (%) (Auto)  % (0.0-2.0) 


 


Differential Total Cells


Counted 100 


 


 


Neutrophils % (Manual) 65 % (45-75) 


 


Lymphocytes % (Manual) 20 % (20-45) 


 


Monocytes % (Manual) 10 % (1-10) 


 


Eosinophils % (Manual) 5 % (0-3) 


 


Basophils % (Manual) 0 % (0-2) 


 


Band Neutrophils 0 % (0-8) 


 


Platelet Estimate Adequate 


 


Platelet Morphology Normal 


 


Hypochromasia 1+ 


 


Anisocytosis 1+ 


 


Sodium Level


  151 MMOL/L


(136-145)


 


Potassium Level


  3.2 MMOL/L


(3.5-5.1)


 


Chloride Level


  114 MMOL/L


()


 


Carbon Dioxide Level


  30 MMOL/L


(21-32)


 


Anion Gap


  7 mmol/L


(5-15)


 


Blood Urea Nitrogen


  51 mg/dL


(7-18)


 


Creatinine


  1.0 MG/DL


(0.55-1.30)


 


Estimat Glomerular Filtration


Rate 55.5 mL/min


(>60)


 


Glucose Level


  165 MG/DL


()


 


Calcium Level


  7.6 MG/DL


(8.5-10.1)


 


Phosphorus Level


  2.1 MG/DL


(2.5-4.9)


 


Magnesium Level


  1.8 MG/DL


(1.8-2.4)


 


Total Bilirubin


  0.3 MG/DL


(0.2-1.0)


 


Aspartate Amino Transf


(AST/SGOT) 9 U/L (15-37) 


 


 


Alanine Aminotransferase


(ALT/SGPT) 13 U/L (12-78) 


 


 


Alkaline Phosphatase


  79 U/L


()


 


C-Reactive Protein,


Quantitative 6.6 mg/dL


(0.00-0.90)


 


Pro-B-Type Natriuretic Peptide


  08586 pg/mL


(0-125)


 


Total Protein


  6.4 G/DL


(6.4-8.2)


 


Albumin


  1.9 G/DL


(3.4-5.0)


 


Globulin 4.5 g/dL 


 


Albumin/Globulin Ratio 0.4 (1.0-2.7) 








Height (Feet):  5


Height (Inches):  5.00


Weight (Pounds):  132


Objective


Physical Exam:


Vitals: reviewed


General Appearance:  NAD


HEENT:  normocephalic, atraumatic


Neck:  non-tender, normal alignment, trach++


Respiratory/Chest:  normal breath sounds bilaterally ++ vent


Cardiovascular/Chest: rrr


Abdomen:  normal bowel sounds, soft, nontender ++ peg


Extremities:  normal range of motion











Mike Pop MD Nov 18, 2019 10:43

## 2019-11-18 NOTE — NUR
NURSE NOTES:

received pt in the bed, obtunded, vent dependent, vital signs stable, no co pain, no SOB, 
skin warm and dry to touch, dressing dry and intact, tolerate GT feeding well, Charles 
catheter with yellow urine, PICC line on RT upper arm, dressing dry and intact, bed in low 
position, HOB elevated.

## 2019-11-18 NOTE — CARDIOLOGY PROGRESS NOTE
Assessment/Plan


Assessment/Plan


1. acute congestive heart failure


2. Abnormal cardiac enzyme demand related due to infection and profound anemia 


3. Agitation 


4. Urinary tract infection.


5. Acute renal failure.


6. Profound anemia.


7. Diabetes mellitus.


8. History of hypertension.


9. History of mood disorder.


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 


11. arf 


12. pelvic mass


13  sepsis 


14. pneumonia 


15. hypernatremai  











off dapt due to suspected bleeding


echo mild systolic dysfunction 


na increased


looks much improved 


dc lasix and metolazone 


free water thru g tube





Subjective


ROS Limited/Unobtainable:  Yes





Objective





Last 24 Hour Vital Signs








  Date Time  Temp Pulse Resp B/P (MAP) Pulse Ox O2 Delivery O2 Flow Rate FiO2


 


11/18/19 18:00  101  147/68    


 


11/18/19 16:50  101 16     30


 


11/18/19 16:00  100      


 


11/18/19 16:00 98.2 101 22 147/68 (94) 100   


 


11/18/19 16:00      Mechanical Ventilator  


 


11/18/19 16:00        30


 


11/18/19 15:21  100 21     30





        


 


11/18/19 13:09  98 21   Mechanical Ventilator 45.0 30





        30


 


11/18/19 12:00      Mechanical Ventilator  


 


11/18/19 12:00  101      


 


11/18/19 12:00        30


 


11/18/19 12:00 99.3 102 20 142/71 (94) 100   


 


11/18/19 10:37  102 22     30





        


 


11/18/19 09:30  95 21     30


 


11/18/19 09:08  95  158/76    


 


11/18/19 08:00  93      


 


11/18/19 08:00 99.1 95 24 158/76 (103) 100   


 


11/18/19 08:00        30


 


11/18/19 08:00      Mechanical Ventilator  


 


11/18/19 07:08  99 21  100 Mechanical Ventilator 45.0 30





  92 14     30


 


11/18/19 05:30  91 21     30


 


11/18/19 04:00        30


 


11/18/19 04:00      Mechanical Ventilator  


 


11/18/19 04:00 99.7 95 22 141/69 (93) 100   


 


11/18/19 03:28  97      


 


11/18/19 03:12  98 22     30


 


11/18/19 01:17  90 14  100 Mechanical Ventilator  30


 


11/18/19 01:07  91 24   Mechanical Ventilator  30





        30


 


11/18/19 00:00  92      


 


11/18/19 00:00 98.1 94 22 133/63 (86) 100   


 


11/18/19 00:00      Mechanical Ventilator  


 


11/17/19 23:06  94 23     30


 


11/17/19 20:48  94 21     30


 


11/17/19 20:00        30


 


11/17/19 20:00      Mechanical Ventilator  


 


11/17/19 20:00 98.1 93 20 141/68 (92) 100   








General Appearance:  no apparent distress, on vent, patient on isolation


Cardiovascular:  normal rate, tachycardia


Respiratory/Chest:  lungs clear


Abdomen:  normal bowel sounds, non tender, soft


Extremities:  no swelling











Intake and Output  


 


 11/17/19 11/18/19





 18:59 06:59


 


Intake Total 870 ml 480 ml


 


Output Total 1100 ml 


 


Balance -230 ml 480 ml


 


  


 


Free Water 150 ml 


 


Tube Feeding 720 ml 480 ml


 


Output Urine Total 1100 ml 

















Rob May MD Nov 18, 2019 19:40

## 2019-11-18 NOTE — PULMONOLOGY PROGRESS NOTE
Assessment/Plan


Problems:  


(1) Healthcare associated bacterial pneumonia


(2) UTI (urinary tract infection)


(3) Sepsis


(4) Dehydration


(5) CHANCE (acute kidney injury)


(6) Acute metabolic encephalopathy


(7) Hyperglycemia due to type 2 diabetes mellitus


(8) Renal failure (ARF), acute on chronic


(9) Aspiration pneumonia


(10) Abnormal TSH


(11) Pelvic mass in female


(12) PEG (percutaneous endoscopic gastrostomy) status


(13) Gastrostomy in place


(14) Tracheostomy in place


Assessment/Plan


VDRF


S/P trach


Pneumonia S/P treatment


E coli UTI S/P treatment


Encephalopathy


Hx CHF


HTN


CHANCE - RESOVLED


Pelvic mass/possible GYN malignancy vs cyst


Dysphagia S/P PEG





Plan:


-Continue ventilatory support, will start to wean as able


-Optimize pulmonary hygiene/mobilize as tolerated


-BiPAP PRN and qHS


-Titrate down FiO2 to keep SaO2 > 90%


-Monitor WCt, JAK2 mutation negative @ CS, per Dr. Pop and Dr. Keith 

hold off BMBx @ this time


-Monitor off Abx per ID, F/U Cx's


-monitor volumes and renal function, F/U renal recs, continue Lasix 40 PO BID 


-? GYN evaluation 


-monitor encephalopathy, ? neuro eval


-NPO, GTF's


-DVT Px: Hep SQ


-FC





Subjective


Allergies:  


Coded Allergies:  


     No Known Allergies (Unverified , 10/14/19)


Subjective


AFVSS, stable on vent 


S/P peg/trach


Not really interactive 


No SOB no cough no FC no wheezing





Objective





Last 24 Hour Vital Signs








  Date Time  Temp Pulse Resp B/P (MAP) Pulse Ox O2 Delivery O2 Flow Rate FiO2


 


11/18/19 18:00  101  147/68    


 


11/18/19 16:50  101 16     30


 


11/18/19 16:00  100      


 


11/18/19 16:00 98.2 101 22 147/68 (94) 100   


 


11/18/19 16:00      Mechanical Ventilator  


 


11/18/19 16:00        30


 


11/18/19 15:21  100 21     30





        


 


11/18/19 13:09  98 21   Mechanical Ventilator 45.0 30





        30


 


11/18/19 12:00      Mechanical Ventilator  


 


11/18/19 12:00  101      


 


11/18/19 12:00        30


 


11/18/19 12:00 99.3 102 20 142/71 (94) 100   


 


11/18/19 10:37  102 22     30





        


 


11/18/19 09:30  95 21     30


 


11/18/19 09:08  95  158/76    


 


11/18/19 08:00  93      


 


11/18/19 08:00 99.1 95 24 158/76 (103) 100   


 


11/18/19 08:00        30


 


11/18/19 08:00      Mechanical Ventilator  


 


11/18/19 07:08  99 21  100 Mechanical Ventilator 45.0 30





  92 14     30


 


11/18/19 05:30  91 21     30


 


11/18/19 04:00        30


 


11/18/19 04:00      Mechanical Ventilator  


 


11/18/19 04:00 99.7 95 22 141/69 (93) 100   


 


11/18/19 03:28  97      


 


11/18/19 03:12  98 22     30


 


11/18/19 01:17  90 14  100 Mechanical Ventilator  30


 


11/18/19 01:07  91 24   Mechanical Ventilator  30





        30


 


11/18/19 00:00  92      


 


11/18/19 00:00 98.1 94 22 133/63 (86) 100   


 


11/18/19 00:00      Mechanical Ventilator  


 


11/17/19 23:06  94 23     30


 


11/17/19 20:48  94 21     30


 


11/17/19 20:00        30


 


11/17/19 20:00      Mechanical Ventilator  


 


11/17/19 20:00 98.1 93 20 141/68 (92) 100   


 


11/17/19 19:25  92 22  100 Mechanical Ventilator  30


 


11/17/19 19:23  90      


 


11/17/19 19:10  94 21   Mechanical Ventilator  30





        30

















Intake and Output  


 


 11/17/19 11/18/19





 18:59 06:59


 


Intake Total 870 ml 480 ml


 


Output Total 1100 ml 


 


Balance -230 ml 480 ml


 


  


 


Free Water 150 ml 


 


Tube Feeding 720 ml 480 ml


 


Output Urine Total 1100 ml 








General Appearance:  no acute distress, cachetic


HEENT:  normocephalic, atraumatic, anicteric, mucous membranes moist, status 

post trach


Respiratory/Chest:  crackles/rales


Cardiovascular:  normal peripheral pulses, normal rate, regular rhythm


Abdomen:  normal bowel sounds, soft, non tender, no organomegaly, non distended

, no mass, other - GT


Extremities:  no cyanosis, no clubbing, no edema





Current Medications








 Medications


  (Trade)  Dose


 Ordered  Sig/Winnie


 Route


 PRN Reason  Start Time


 Stop Time Status Last Admin


Dose Admin


 


 Acetaminophen


  (Tylenol)  650 mg  Q4H  PRN


 NG


 Mild Pain/Temp > 100.5  11/15/19 18:00


 11/25/19 17:59   


 


 


 Acetaminophen


  (Tylenol)  650 mg  Q4H  PRN


 RECTAL


 TEMP>100.5  11/15/19 18:00


 12/2/19 17:59   


 


 


 Acetylcysteine


  (Mucomyst)  100 mg  TIDRT


 N


   11/15/19 19:00


 12/10/19 19:29  11/18/19 13:10


 


 


 Albuterol/


 Ipratropium


  (Albuterol/


 Ipratropium)  3 ml  Q6HRT


 N


   11/17/19 19:00


 11/22/19 18:59  11/18/19 13:10


 


 


 Amlodipine


 Besylate


  (Norvasc)  2.5 mg  BID


 NG


   11/15/19 18:00


 11/29/19 17:59  11/18/19 18:00


 


 


 Chlorhexidine


 Gluconate


  (Mae-Hex 2%)  1 applic  DAILY@2000


 TOPIC


   11/15/19 20:00


 12/5/19 19:59  11/17/19 20:10


 


 


 Dextrose


  (Dextrose 50%)  25 ml  Q30M  PRN


 IV


 Hypoglycemia  11/16/19 13:00


 12/16/19 12:59   


 


 


 Dextrose


  (Dextrose 50%)  50 ml  Q30M  PRN


 IV


 Hypoglycemia  11/16/19 13:00


 12/16/19 12:59   


 


 


 Docusate Sodium


  (Colace)  100 mg  TWICE A  DAY


 ORAL


   11/18/19 18:00


 12/18/19 17:59  11/18/19 18:00


 


 


 Folic Acid


  (Folate)  3 mg  DAILY


 GT


   11/16/19 09:00


 11/30/19 08:59  11/18/19 09:08


 


 


 Furosemide


  (Lasix)  40 mg  EVERY 12  HOURS


 ORAL


   11/18/19 09:00


 12/18/19 08:59  11/18/19 09:08


 


 


 Insulin Aspart


  (NovoLOG)    EVERY 6  HOURS


 SUBQ


   11/16/19 18:00


 12/16/19 17:59  11/18/19 18:02


 


 


 Insulin Aspart


  (NovoLOG)  10 units  EVERY 6  HOURS


 SUBQ


   11/18/19 12:00


 12/16/19 17:59  11/18/19 18:03


 


 


 Insulin Detemir


  (Levemir)  20 units  QHS


 SUBQ


   11/16/19 21:00


 12/1/19 08:59  11/17/19 21:15


 


 


 Lactulose


  (Cephulac)  20 gm  Q8H  PRN


 NG


 Constipation  11/15/19 18:00


 12/3/19 17:59   


 


 


 Metoclopramide HCl


  (Reglan)  10 mg  Q6H  PRN


 IVP


 Nausea & Vomiting  11/15/19 18:00


 12/6/19 17:59   


 


 


 Metolazone


  (Zaroxolyn)  5 mg  Q24H


 NG


   11/16/19 08:00


 12/12/19 07:59  11/18/19 08:13


 


 


 Pantoprazole


  (Protonix)  40 mg  EVERY 12  HOURS


 IVP


   11/15/19 21:00


 12/3/19 08:59  11/18/19 09:09


 


 


 Polyethylene


 Glycol


  (Miralax)  17 gm  BEDTIME


 ORAL


   11/18/19 21:00


 12/18/19 20:59   


 


 


 Potassium Chloride


  (K-Dur)  40 meq  DAILY


 GT


   11/16/19 09:00


 12/5/19 17:59  11/18/19 09:09


 

















Sammy Torres MD Nov 18, 2019 18:49

## 2019-11-18 NOTE — NUR
NURSE NOTES:

Received patient from Marley Villeda RN. Patient is obtunded, receiving oxygen via trach to 
vent: Shiley 8,  AC 14, , Fio2 30%, PEEP 5, patient tolerating well, O2 Saturation at 
100%. G-tube is patent and intact, receiving Glucerna 1.2 at 60cc/hr. Charles catheter is 
patent and draining. Patient has a Right Upper Arm PICC, patent and intact. Bed is locked, 
placed in lowest position, side rails up x3, bed alarm on, head of bed elevated, call light 
within reach. Will continue to monitor.

## 2019-11-18 NOTE — SURGERY PROGRESS NOTE
Surgery Progress Note


Subjective


Procedure Performed


tracheostomy


Additional Comments


leukocytosis


abnormal electrolytes


anemia


no n/v/f/c


on support





Objective





Last 24 Hour Vital Signs








  Date Time  Temp Pulse Resp B/P (MAP) Pulse Ox O2 Delivery O2 Flow Rate FiO2


 


11/18/19 09:08  95  158/76    


 


11/18/19 08:00 99.1 95 24 158/76 (103) 100   


 


11/18/19 08:00        30


 


11/18/19 08:00      Mechanical Ventilator  


 


11/18/19 07:08  99 21  100 Mechanical Ventilator 45.0 30





  92 14     30


 


11/18/19 05:30  91 21     30


 


11/18/19 04:00        30


 


11/18/19 04:00      Mechanical Ventilator  


 


11/18/19 04:00 99.7 95 22 141/69 (93) 100   


 


11/18/19 03:28  97      


 


11/18/19 03:12  98 22     30


 


11/18/19 01:17  90 14  100 Mechanical Ventilator  30


 


11/18/19 01:07  91 24   Mechanical Ventilator  30





        30


 


11/18/19 00:00  92      


 


11/18/19 00:00 98.1 94 22 133/63 (86) 100   


 


11/18/19 00:00      Mechanical Ventilator  


 


11/17/19 23:06  94 23     30


 


11/17/19 20:48  94 21     30


 


11/17/19 20:00        30


 


11/17/19 20:00      Mechanical Ventilator  


 


11/17/19 20:00 98.1 93 20 141/68 (92) 100   


 


11/17/19 19:25  92 22  100 Mechanical Ventilator  30


 


11/17/19 19:23  90      


 


11/17/19 19:10  94 21   Mechanical Ventilator  30





        30


 


11/17/19 18:40  92  130/70    


 


11/17/19 16:48  92 21     30


 


11/17/19 16:10  90      


 


11/17/19 16:00      Mechanical Ventilator  


 


11/17/19 16:00        30


 


11/17/19 16:00 97.9 90 20 130/70 (90) 100   


 


11/17/19 14:44  97 22     30


 


11/17/19 12:58  94 16  100 Mechanical Ventilator  30





  99 16     30


 


11/17/19 12:00 98.4 93 20 137/67 (90) 100   


 


11/17/19 12:00      Mechanical Ventilator  


 


11/17/19 12:00  89      


 


11/17/19 12:00        30


 


11/17/19 10:51  93 21     30








I&O











Intake and Output  


 


 11/17/19 11/18/19





 19:00 07:00


 


Intake Total 870 ml 420 ml


 


Output Total 1100 ml 


 


Balance -230 ml 420 ml


 


  


 


Free Water 150 ml 


 


Tube Feeding 720 ml 420 ml


 


Output Urine Total 1100 ml 








Dressing:  saturated


Wound:  clean


Drains:  other


Cardiovascular:  RSR


Respiratory:  decreased breath sounds


Abdomen:  soft, present bowel sounds


Extremities:  no cyanosis, other





Plan


Problems:  


(1) Pressure injury of deep tissue


Assessment & Plan:  Pt presented on admission with DTPI R heel. Blood filled 

blister with marginal erythema noted to heel. Pt exhibited agitation when 

minimally palpated. (L)2.2cm x (W)2.1cm


L heel is blanchable .


Non-blanchable erythema without induration noted to sacral cleft. 


No other areas of skin concerns noted.





Tx.Plan:


Apply Betadine to R heel. Cover with Optifoam drsg. Change every 3 days and prn.


           


Apply Cavilon Skin Barrier to L heel. Cover with Optifoam drsg. Change every 7 

days and prn.


           


Apply Moisture Barrier Paste to buttocks. Cover sacral area with Optifoam drsg. 

Change every 3 days and prn.


           


Reposition at least every 2hours or as tolerated.


           


Off-load heels with pillow.





(2) Sepsis


Assessment & Plan:  Patient with low-grade fevers, anemia, leukocytosis 

persistent, elevated platelets, abnormal labs.


Etiology currently unknown and work-up in progress.  Labs noted and imaging 

that is available as noted.  


CT noted


US noted


Impression: 13 x 7 x 12.9 cm unilocular cystic mass, corresponding to lesion 

reported


on recent CT scan. Layering low level internal echoes likely represent bladder


debris.. This presumably represents a cystic ovarian lesion with debris or


hemorrhage, possibly neoplastic. Gynecological consultation is recommended


Antibiotics as per infectious disease 


local wound care as wounds do not seem to be the etiology of her sepsis


start feeds via peg


doing well


improving


cont with tube feeds


cont with ICU care 


s/p trach


wean vent


supplementation for healing 


We will monitor and follow with recommendations


Thank you for allowing me to participate in patient's care














Radhames Blas Nov 18, 2019 10:09

## 2019-11-19 VITALS — DIASTOLIC BLOOD PRESSURE: 49 MMHG | SYSTOLIC BLOOD PRESSURE: 126 MMHG

## 2019-11-19 VITALS — SYSTOLIC BLOOD PRESSURE: 115 MMHG | DIASTOLIC BLOOD PRESSURE: 72 MMHG

## 2019-11-19 VITALS — DIASTOLIC BLOOD PRESSURE: 72 MMHG | SYSTOLIC BLOOD PRESSURE: 153 MMHG

## 2019-11-19 VITALS — SYSTOLIC BLOOD PRESSURE: 147 MMHG | DIASTOLIC BLOOD PRESSURE: 70 MMHG

## 2019-11-19 VITALS — SYSTOLIC BLOOD PRESSURE: 149 MMHG | DIASTOLIC BLOOD PRESSURE: 72 MMHG

## 2019-11-19 VITALS — SYSTOLIC BLOOD PRESSURE: 146 MMHG | DIASTOLIC BLOOD PRESSURE: 75 MMHG

## 2019-11-19 LAB
ADD MANUAL DIFF: YES
ALBUMIN SERPL-MCNC: 2.3 G/DL (ref 3.4–5)
ALBUMIN/GLOB SERPL: 0.4 {RATIO} (ref 1–2.7)
ALP SERPL-CCNC: 106 U/L (ref 46–116)
ALT SERPL-CCNC: 20 U/L (ref 12–78)
ANION GAP SERPL CALC-SCNC: 7 MMOL/L (ref 5–15)
AST SERPL-CCNC: 15 U/L (ref 15–37)
BILIRUB SERPL-MCNC: 0.3 MG/DL (ref 0.2–1)
BUN SERPL-MCNC: 61 MG/DL (ref 7–18)
CALCIUM SERPL-MCNC: 9.1 MG/DL (ref 8.5–10.1)
CHLORIDE SERPL-SCNC: 115 MMOL/L (ref 98–107)
CO2 SERPL-SCNC: 32 MMOL/L (ref 21–32)
CREAT SERPL-MCNC: 1.5 MG/DL (ref 0.55–1.3)
ERYTHROCYTE [DISTWIDTH] IN BLOOD BY AUTOMATED COUNT: 13.1 % (ref 11.6–14.8)
GLOBULIN SER-MCNC: 5.8 G/DL
HCT VFR BLD CALC: 25.4 % (ref 37–47)
HGB BLD-MCNC: 8.5 G/DL (ref 12–16)
MCV RBC AUTO: 82 FL (ref 80–99)
PHOSPHATE SERPL-MCNC: 2.9 MG/DL (ref 2.5–4.9)
PLATELET # BLD: 498 K/UL (ref 150–450)
POTASSIUM SERPL-SCNC: 5.2 MMOL/L (ref 3.5–5.1)
RBC # BLD AUTO: 3.11 M/UL (ref 4.2–5.4)
SODIUM SERPL-SCNC: 154 MMOL/L (ref 136–145)
WBC # BLD AUTO: 18.2 K/UL (ref 4.8–10.8)

## 2019-11-19 RX ADMIN — DOCUSATE SODIUM SCH MG: 100 CAPSULE, LIQUID FILLED ORAL at 18:55

## 2019-11-19 RX ADMIN — IPRATROPIUM BROMIDE AND ALBUTEROL SULFATE SCH ML: .5; 3 SOLUTION RESPIRATORY (INHALATION) at 07:21

## 2019-11-19 RX ADMIN — INSULIN ASPART SCH UNITS: 100 INJECTION, SOLUTION INTRAVENOUS; SUBCUTANEOUS at 06:16

## 2019-11-19 RX ADMIN — INSULIN DETEMIR SCH UNITS: 100 INJECTION, SOLUTION SUBCUTANEOUS at 10:19

## 2019-11-19 RX ADMIN — INSULIN ASPART SCH UNITS: 100 INJECTION, SOLUTION INTRAVENOUS; SUBCUTANEOUS at 00:13

## 2019-11-19 RX ADMIN — INSULIN ASPART SCH UNITS: 100 INJECTION, SOLUTION INTRAVENOUS; SUBCUTANEOUS at 13:02

## 2019-11-19 RX ADMIN — HEPARIN SODIUM SCH UNITS: 5000 INJECTION INTRAVENOUS; SUBCUTANEOUS at 09:13

## 2019-11-19 RX ADMIN — HEPARIN SODIUM SCH UNITS: 5000 INJECTION INTRAVENOUS; SUBCUTANEOUS at 21:00

## 2019-11-19 RX ADMIN — IPRATROPIUM BROMIDE AND ALBUTEROL SULFATE SCH ML: .5; 3 SOLUTION RESPIRATORY (INHALATION) at 19:58

## 2019-11-19 RX ADMIN — INSULIN DETEMIR SCH UNITS: 100 INJECTION, SOLUTION SUBCUTANEOUS at 18:58

## 2019-11-19 RX ADMIN — DOCUSATE SODIUM SCH MG: 100 CAPSULE, LIQUID FILLED ORAL at 09:07

## 2019-11-19 RX ADMIN — POLYETHYLENE GLYCOL 3350 SCH GM: 17 POWDER, FOR SOLUTION ORAL at 20:49

## 2019-11-19 RX ADMIN — INSULIN ASPART SCH UNITS: 100 INJECTION, SOLUTION INTRAVENOUS; SUBCUTANEOUS at 12:39

## 2019-11-19 RX ADMIN — INSULIN ASPART SCH UNITS: 100 INJECTION, SOLUTION INTRAVENOUS; SUBCUTANEOUS at 19:01

## 2019-11-19 RX ADMIN — IPRATROPIUM BROMIDE AND ALBUTEROL SULFATE SCH ML: .5; 3 SOLUTION RESPIRATORY (INHALATION) at 14:07

## 2019-11-19 RX ADMIN — INSULIN ASPART SCH UNITS: 100 INJECTION, SOLUTION INTRAVENOUS; SUBCUTANEOUS at 00:14

## 2019-11-19 RX ADMIN — PANTOPRAZOLE SODIUM SCH MG: 40 INJECTION, POWDER, FOR SOLUTION INTRAVENOUS at 09:07

## 2019-11-19 RX ADMIN — INSULIN ASPART SCH UNITS: 100 INJECTION, SOLUTION INTRAVENOUS; SUBCUTANEOUS at 19:04

## 2019-11-19 RX ADMIN — CHLORHEXIDINE GLUCONATE SCH APPLIC: 213 SOLUTION TOPICAL at 20:49

## 2019-11-19 RX ADMIN — PANTOPRAZOLE SODIUM SCH MG: 40 INJECTION, POWDER, FOR SOLUTION INTRAVENOUS at 20:49

## 2019-11-19 RX ADMIN — IPRATROPIUM BROMIDE AND ALBUTEROL SULFATE SCH ML: .5; 3 SOLUTION RESPIRATORY (INHALATION) at 01:09

## 2019-11-19 RX ADMIN — ACETAMINOPHEN PRN MG: 160 SOLUTION ORAL at 00:14

## 2019-11-19 NOTE — PULMONOLOGY PROGRESS NOTE
Assessment/Plan


Problems:  


(1) Healthcare associated bacterial pneumonia


(2) UTI (urinary tract infection)


(3) Sepsis


(4) Dehydration


(5) CHANCE (acute kidney injury)


(6) Acute metabolic encephalopathy


(7) Hyperglycemia due to type 2 diabetes mellitus


(8) Renal failure (ARF), acute on chronic


(9) Aspiration pneumonia


(10) Abnormal TSH


(11) Pelvic mass in female


(12) PEG (percutaneous endoscopic gastrostomy) status


(13) Gastrostomy in place


(14) Tracheostomy in place


Assessment/Plan


VDRF


S/P trach


Pneumonia S/P treatment


E coli UTI S/P treatment


Encephalopathy


Hx CHF


HTN


CHANCE - RESOVLED


Pelvic mass/possible GYN malignancy vs cyst


Dysphagia S/P PEG





Plan:


-Continue ventilatory support ---> WEANING TRIALS (d/w RT, will start with PS 

12 and dec by 2 every 30 min as able, if fatigues can return to AC, hopefully 

can attempt TC next few days)


-Optimize pulmonary hygiene/mobilize as tolerated


-Titrate down FiO2 to keep SaO2 > 90%


-Monitor WCt, JAK2 mutation negative @ CS, per Dr. Pop and Dr. Keith 

hold off BMBx @ this time


-Monitor off Abx per ID, F/U Cx's, UA and LA 


-monitor volumes and renal function, F/U renal recs, agree with holding lasix 

and giving D5W


-Consider GYN evaluation 


-monitor encephalopathy, Consider neuro eval, ? LP


-NPO, GTF's


-DVT Px: Hep SQ


-FC, continue to discuss GOC, consider palliative care eval





Subjective


Allergies:  


Coded Allergies:  


     No Known Allergies (Unverified , 10/14/19)


Subjective


Tm 100.5 MS not better sinus tachy to 120's Na inc and Cr 1.5 - diuretics held, 

getting D5W


CXR NAD, stable on vent and yas PS 10 briefly, yas TF's





Objective





Last 24 Hour Vital Signs








  Date Time  Temp Pulse Resp B/P (MAP) Pulse Ox O2 Delivery O2 Flow Rate FiO2


 


11/19/19 16:31  103 30     30


 


11/19/19 16:00      Mechanical Ventilator  


 


11/19/19 14:55  111 23     30


 


11/19/19 13:29  109 29  99 Mechanical Ventilator 45.0 30





  111 16     30


 


11/19/19 12:00 97.7 105 21 115/72 (86) 100   


 


11/19/19 12:00        30


 


11/19/19 12:00      Mechanical Ventilator  


 


11/19/19 12:00  100      


 


11/19/19 11:07  108 21     30


 


11/19/19 09:14  107 24     30


 


11/19/19 09:11  107  153/72    


 


11/19/19 08:00      Mechanical Ventilator  


 


11/19/19 08:00        30


 


11/19/19 08:00 98.6 107 22 153/72 (99) 100   


 


11/19/19 08:00  104      


 


11/19/19 07:25  105 30  100 Mechanical Ventilator 45.0 30





  104 28     30


 


11/19/19 04:00      Mechanical Ventilator  


 


11/19/19 04:00        30


 


11/19/19 04:00 99.8 102 19 147/70 (95) 100   


 


11/19/19 03:48  101      


 


11/19/19 03:09  100 21     30


 


11/19/19 01:10  101 23  100 Mechanical Ventilator 45.0 30





  103 24     30


 


11/19/19 00:44 100.0       


 


11/19/19 00:00        30


 


11/19/19 00:00 100.5 101 19 146/75 (98) 100   


 


11/19/19 00:00      Mechanical Ventilator  


 


11/18/19 23:24  103      


 


11/18/19 23:10  97 20     30


 


11/18/19 21:10  100 21     30


 


11/18/19 20:23  105 24  100 Mechanical Ventilator 45.0 30





  105 24     30


 


11/18/19 20:00      Mechanical Ventilator  


 


11/18/19 20:00        30


 


11/18/19 20:00 98.6 104 22 152/73 (99) 100   


 


11/18/19 19:01  106      


 


11/18/19 18:00  101  147/68    


 


11/18/19 16:50  101 16     30

















Intake and Output  


 


 11/18/19 11/19/19





 18:59 06:59


 


Intake Total 820 ml 1060 ml


 


Output Total 725 ml 600 ml


 


Balance 95 ml 460 ml


 


  


 


Free Water 100 ml 400 ml


 


Tube Feeding 720 ml 660 ml


 


Output Urine Total 725 ml 600 ml


 


# Bowel Movements  1








General Appearance:  no acute distress, cachetic, other - non-verbal


HEENT:  normocephalic, atraumatic, anicteric, mucous membranes moist, status 

post trach


Respiratory/Chest:  chest wall non-tender, lungs clear, normal breath sounds, 

no respiratory distress, no accessory muscle use


Cardiovascular:  normal peripheral pulses, normal rate, regular rhythm


Abdomen:  normal bowel sounds, soft, non tender, no organomegaly, non distended

, no mass, other - GT


Extremities:  no cyanosis, no clubbing, no edema


Laboratory Tests


11/19/19 04:30: 


White Blood Count 18.2H, Red Blood Count 3.11L, Hemoglobin 8.5L, Hematocrit 

25.4L, Mean Corpuscular Volume 82, Mean Corpuscular Hemoglobin 27.3, Mean 

Corpuscular Hemoglobin Concent 33.4, Red Cell Distribution Width 13.1, Platelet 

Count 498H, Mean Platelet Volume 7.5, Neutrophils (%) (Auto) , Lymphocytes (%) (

Auto) , Monocytes (%) (Auto) , Eosinophils (%) (Auto) , Basophils (%) (Auto) , 

Differential Total Cells Counted 100, Neutrophils % (Manual) 73, Lymphocytes % (

Manual) 17L, Monocytes % (Manual) 8, Eosinophils % (Manual) 1, Basophils % (

Manual) 1, Band Neutrophils 0, Platelet Estimate Adequate, Platelet Morphology 

Normal, Hypochromasia 1+, Sodium Level 154H, Potassium Level 5.2H, Chloride 

Level 115H, Carbon Dioxide Level 32, Anion Gap 7, Blood Urea Nitrogen 61H, 

Creatinine 1.5H, Estimat Glomerular Filtration Rate 34.7, Glucose Level 211H, 

Calcium Level 9.1, Phosphorus Level 2.9, Magnesium Level 2.7H, Total Bilirubin 

0.3, Aspartate Amino Transf (AST/SGOT) 15, Alanine Aminotransferase (ALT/SGPT) 

20, Alkaline Phosphatase 106, C-Reactive Protein, Quantitative 8.3H, Pro-B-Type 

Natriuretic Peptide 13581H, Total Protein 8.1, Albumin 2.3L, Globulin 5.8, 

Albumin/Globulin Ratio 0.4L





Current Medications








 Medications


  (Trade)  Dose


 Ordered  Sig/Winnie


 Route


 PRN Reason  Start Time


 Stop Time Status Last Admin


Dose Admin


 


 Acetaminophen


  (Tylenol)  650 mg  Q4H  PRN


 NG


 Mild Pain/Temp > 100.5  11/15/19 18:00


 11/25/19 17:59  11/19/19 00:14


 


 


 Acetaminophen


  (Tylenol)  650 mg  Q4H  PRN


 RECTAL


 TEMP>100.5  11/15/19 18:00


 12/2/19 17:59   


 


 


 Acetylcysteine


  (Mucomyst)  100 mg  TIDRT


 HHN


   11/15/19 19:00


 12/10/19 19:29  11/19/19 14:07


 


 


 Albuterol/


 Ipratropium


  (Albuterol/


 Ipratropium)  3 ml  Q6HRT


 HHN


   11/17/19 19:00


 11/22/19 18:59  11/19/19 14:07


 


 


 Amlodipine


 Besylate


  (Norvasc)  2.5 mg  BID


 NG


   11/15/19 18:00


 11/29/19 17:59  11/19/19 09:11


 


 


 Chlorhexidine


 Gluconate


  (Mae-Hex 2%)  1 applic  DAILY@2000


 TOPIC


   11/15/19 20:00


 12/5/19 19:59  11/18/19 21:00


 


 


 Dextrose  1,000 ml @ 


 100 mls/hr  Q10H


 IV


   11/19/19 10:30


 12/19/19 10:29  11/19/19 10:17


 


 


 Dextrose


  (Dextrose 50%)  25 ml  Q30M  PRN


 IV


 Hypoglycemia  11/16/19 13:00


 12/16/19 12:59   


 


 


 Dextrose


  (Dextrose 50%)  50 ml  Q30M  PRN


 IV


 Hypoglycemia  11/16/19 13:00


 12/16/19 12:59   


 


 


 Docusate Sodium


  (Colace)  100 mg  TWICE A  DAY


 ORAL


   11/18/19 18:00


 12/18/19 17:59  11/19/19 09:07


 


 


 Folic Acid


  (Folate)  3 mg  DAILY


 GT


   11/16/19 09:00


 11/30/19 08:59  11/19/19 09:11


 


 


 Heparin Sodium


  (Porcine)


  (Heparin 5000


 units/ml)  5,000 units  EVERY 12  HOURS


 SUBQ


   11/18/19 21:00


 12/18/19 20:59  11/19/19 09:13


 


 


 Insulin Aspart


  (NovoLOG)    EVERY 6  HOURS


 SUBQ


   11/16/19 18:00


 12/16/19 17:59  11/19/19 12:39


 


 


 Insulin Aspart


  (NovoLOG)  15 units  EVERY 6  HOURS


 SUBQ


   11/19/19 12:00


 12/16/19 17:59  11/19/19 13:02


 


 


 Insulin Detemir


  (Levemir)  20 units  BID


 SUBQ


   11/19/19 18:00


 12/1/19 08:59   


 


 


 Lactulose


  (Cephulac)  20 gm  Q8H  PRN


 NG


 Constipation  11/15/19 18:00


 12/3/19 17:59   


 


 


 Metoclopramide HCl


  (Reglan)  10 mg  Q6H  PRN


 IVP


 Nausea & Vomiting  11/15/19 18:00


 12/6/19 17:59   


 


 


 Pantoprazole


  (Protonix)  40 mg  EVERY 12  HOURS


 IVP


   11/15/19 21:00


 12/3/19 08:59  11/19/19 09:07


 


 


 Polyethylene


 Glycol


  (Miralax)  17 gm  BEDTIME


 GT


   11/18/19 21:00


 12/18/19 20:59  11/18/19 20:58


 

















Sammy Torres MD Nov 19, 2019 16:39

## 2019-11-19 NOTE — GENERAL PROGRESS NOTE
Assessment/Plan


Problem List:  


(1) Abnormal TSH


ICD Codes:  R79.89 - Other specified abnormal findings of blood chemistry


SNOMED:  881202916


(2) Hyperglycemia due to type 2 diabetes mellitus


ICD Codes:  E11.65 - Type 2 diabetes mellitus with hyperglycemia


SNOMED:  858707506789538, 68296719


Qualifiers:  


   Qualified Codes:  E11.65 - Type 2 diabetes mellitus with hyperglycemia; 

Z79.4 - Long term (current) use of insulin


(3) Acute metabolic encephalopathy


ICD Codes:  G93.41 - Metabolic encephalopathy


SNOMED:  59596537, 661318061


(4) ARF (acute renal failure)


ICD Codes:  N17.9 - Acute kidney failure, unspecified


SNOMED:  65237248


Qualifiers:  


   Qualified Codes:  N17.9 - Acute kidney failure, unspecified


(5) Healthcare associated bacterial pneumonia


ICD Codes:  J15.9 - Unspecified bacterial pneumonia


SNOMED:  293249317


Status:  stable, unchanged


Assessment/Plan:


increase Levemir to 15 units bid  -  hold if TF is being held or glucose < 100 

mg/dL 


increase Novolog to 15 units every 6 hrs in addition to sliding scale - hold if 

TF is being held or glucose < 100 mg/dL 


continue NISS every 6 hours 


hypoglycemia protocol in order





Subjective


ROS Limited/Unobtainable:  Yes


Allergies:  


Coded Allergies:  


     No Known Allergies (Unverified , 10/14/19)


Subjective


events noted 


glucose values elevated











Item Value  Date Time


 


Bedside Blood Glucose 261 mg/dl H 11/19/19 0616


 


Bedside Blood Glucose 345 mg/dl H 11/19/19 0014


 


Bedside Blood Glucose 313 mg/dl H 11/18/19 2100


 


Bedside Blood Glucose 309 mg/dl H 11/18/19 1803


 


Bedside Blood Glucose 254 mg/dl H 11/18/19 1204


 


Bedside Blood Glucose 253 mg/dl H 11/18/19 0606











Objective





Last 24 Hour Vital Signs








  Date Time  Temp Pulse Resp B/P (MAP) Pulse Ox O2 Delivery O2 Flow Rate FiO2


 


11/19/19 04:00      Mechanical Ventilator  


 


11/19/19 04:00        30


 


11/19/19 04:00 99.8 102 19 147/70 (95) 100   


 


11/19/19 03:48  101      


 


11/19/19 03:09  100 21     30


 


11/19/19 01:10  101 23  100 Mechanical Ventilator 45.0 30





  103 24     30


 


11/19/19 00:44 100.0       


 


11/19/19 00:00        30


 


11/19/19 00:00 100.5 101 19 146/75 (98) 100   


 


11/19/19 00:00      Mechanical Ventilator  


 


11/18/19 23:24  103      


 


11/18/19 23:10  97 20     30


 


11/18/19 21:10  100 21     30


 


11/18/19 20:23  105 24  100 Mechanical Ventilator 45.0 30





  105 24     30


 


11/18/19 20:00      Mechanical Ventilator  


 


11/18/19 20:00        30


 


11/18/19 20:00 98.6 104 22 152/73 (99) 100   


 


11/18/19 19:01  106      


 


11/18/19 18:00  101  147/68    


 


11/18/19 16:50  101 16     30


 


11/18/19 16:00  100      


 


11/18/19 16:00 98.2 101 22 147/68 (94) 100   


 


11/18/19 16:00      Mechanical Ventilator  


 


11/18/19 16:00        30


 


11/18/19 15:21  100 21     30





        


 


11/18/19 13:09  98 21   Mechanical Ventilator 45.0 30





        30


 


11/18/19 12:00      Mechanical Ventilator  


 


11/18/19 12:00  101      


 


11/18/19 12:00        30


 


11/18/19 12:00 99.3 102 20 142/71 (94) 100   


 


11/18/19 10:37  102 22     30





        


 


11/18/19 09:30  95 21     30


 


11/18/19 09:08  95  158/76    


 


11/18/19 08:00  93      


 


11/18/19 08:00 99.1 95 24 158/76 (103) 100   


 


11/18/19 08:00        30


 


11/18/19 08:00      Mechanical Ventilator  


 


11/18/19 07:08  99 21  100 Mechanical Ventilator 45.0 30





  92 14     30

















Intake and Output  


 


 11/18/19 11/19/19





 19:00 07:00


 


Intake Total 760 ml 1060 ml


 


Output Total 725 ml 600 ml


 


Balance 35 ml 460 ml


 


  


 


Free Water 100 ml 400 ml


 


Tube Feeding 660 ml 660 ml


 


Output Urine Total 725 ml 600 ml


 


# Bowel Movements  1








Laboratory Tests


11/19/19 04:30: 


White Blood Count 18.2H, Red Blood Count 3.11L, Hemoglobin 8.5L, Hematocrit 

25.4L, Mean Corpuscular Volume 82, Mean Corpuscular Hemoglobin 27.3, Mean 

Corpuscular Hemoglobin Concent 33.4, Red Cell Distribution Width 13.1, Platelet 

Count 498H, Mean Platelet Volume 7.5, Neutrophils (%) (Auto) , Lymphocytes (%) (

Auto) , Monocytes (%) (Auto) , Eosinophils (%) (Auto) , Basophils (%) (Auto) , 

Neutrophils % (Manual) [Pending], Lymphocytes % (Manual) [Pending], Platelet 

Estimate [Pending], Platelet Morphology [Pending], Sodium Level 154H, Potassium 

Level 5.2H, Chloride Level 115H, Carbon Dioxide Level 32, Anion Gap 7, Blood 

Urea Nitrogen 61H, Creatinine 1.5H, Estimat Glomerular Filtration Rate 34.7, 

Glucose Level 211H, Calcium Level 9.1, Phosphorus Level 2.9, Magnesium Level 

2.7H, Total Bilirubin 0.3, Aspartate Amino Transf (AST/SGOT) 15, Alanine 

Aminotransferase (ALT/SGPT) 20, Alkaline Phosphatase 106, C-Reactive Protein, 

Quantitative 8.3H, Pro-B-Type Natriuretic Peptide 20364W, Total Protein 8.1, 

Albumin 2.3L, Globulin 5.8, Albumin/Globulin Ratio 0.4L


Height (Feet):  5


Height (Inches):  5.00


Weight (Pounds):  134


General Appearance:  lethargic


Neck:  normal alignment


Cardiovascular:  normal rate


Respiratory/Chest:  decreased breath sounds


Abdomen:  normal bowel sounds


Pelvis:  normal external exam


Objective





Current Medications








 Medications


  (Trade)  Dose


 Ordered  Sig/Winnie


 Route


 PRN Reason  Start Time


 Stop Time Status Last Admin


Dose Admin


 


 Acetaminophen


  (Tylenol)  650 mg  Q4H  PRN


 NG


 Mild Pain/Temp > 100.5  11/15/19 18:00


 11/25/19 17:59  11/19/19 00:14


 


 


 Acetaminophen


  (Tylenol)  650 mg  Q4H  PRN


 RECTAL


 TEMP>100.5  11/15/19 18:00


 12/2/19 17:59   


 


 


 Acetylcysteine


  (Mucomyst)  100 mg  TIDRT


 [facility]


   11/15/19 19:00


 12/10/19 19:29  11/18/19 20:22


 


 


 Albuterol/


 Ipratropium


  (Albuterol/


 Ipratropium)  3 ml  Q6HRT


 [facility]


   11/17/19 19:00


 11/22/19 18:59  11/19/19 01:09


 


 


 Amlodipine


 Besylate


  (Norvasc)  2.5 mg  BID


 NG


   11/15/19 18:00


 11/29/19 17:59  11/18/19 18:00


 


 


 Chlorhexidine


 Gluconate


  (Mae-Hex 2%)  1 applic  DAILY@2000


 TOPIC


   11/15/19 20:00


 12/5/19 19:59  11/18/19 21:00


 


 


 Dextrose


  (Dextrose 50%)  25 ml  Q30M  PRN


 IV


 Hypoglycemia  11/16/19 13:00


 12/16/19 12:59   


 


 


 Dextrose


  (Dextrose 50%)  50 ml  Q30M  PRN


 IV


 Hypoglycemia  11/16/19 13:00


 12/16/19 12:59   


 


 


 Docusate Sodium


  (Colace)  100 mg  TWICE A  DAY


 ORAL


   11/18/19 18:00


 12/18/19 17:59  11/18/19 18:00


 


 


 Folic Acid


  (Folate)  3 mg  DAILY


 GT


   11/16/19 09:00


 11/30/19 08:59  11/18/19 09:08


 


 


 Heparin Sodium


  (Porcine)


  (Heparin 5000


 units/ml)  5,000 units  EVERY 12  HOURS


 SUBQ


   11/18/19 21:00


 12/18/19 20:59  11/18/19 21:00


 


 


 Insulin Aspart


  (NovoLOG)    EVERY 6  HOURS


 SUBQ


   11/16/19 18:00


 12/16/19 17:59  11/19/19 06:16


 


 


 Insulin Aspart


  (NovoLOG)  10 units  EVERY 6  HOURS


 SUBQ


   11/18/19 12:00


 12/16/19 17:59  11/19/19 06:16


 


 


 Insulin Detemir


  (Levemir)  20 units  QHS


 SUBQ


   11/16/19 21:00


 12/1/19 08:59  11/18/19 21:00


 


 


 Lactulose


  (Cephulac)  20 gm  Q8H  PRN


 NG


 Constipation  11/15/19 18:00


 12/3/19 17:59   


 


 


 Metoclopramide HCl


  (Reglan)  10 mg  Q6H  PRN


 IVP


 Nausea & Vomiting  11/15/19 18:00


 12/6/19 17:59   


 


 


 Pantoprazole


  (Protonix)  40 mg  EVERY 12  HOURS


 IVP


   11/15/19 21:00


 12/3/19 08:59  11/18/19 20:58


 


 


 Polyethylene


 Glycol


  (Miralax)  17 gm  BEDTIME


 GT


   11/18/19 21:00


 12/18/19 20:59  11/18/19 20:58


 


 


 Potassium Chloride


  (K-Dur)  40 meq  DAILY


 GT


   11/16/19 09:00


 12/5/19 17:59  11/18/19 09:09


 

















Riccardo Velez MD Nov 19, 2019 06:54

## 2019-11-19 NOTE — GENERAL PROGRESS NOTE
Assessment/Plan


Status:  stable, unchanged


Assessment/Plan:


S, O: On Trach,  , vent setting reviewed. No pressor. awake, limited following 

commands





PHYSICAL EXAMINATION:HEAD AND NECK:  Trached, site is clean .


CHEST:  Bronchial breathing sounds.ABDOMEN:  Soft.  No organomegaly.


MUSCULOSKELETAL:  Diffuse 1+ or 2+ nonpitting edema in all four contracted


extremities.  The patient is not following the commands.  Limited


evaluation.NEUROLOGY:  The patient is awake.  Not alert.  Not verbally


communicative.





LABORATORY DATA:  Labs dated Nov 14, 2019  reviewed





Meds: Reviewed and reconciled in the chart





Imaging:  CXR reviewed 





ASSESSMENT AND PLAN:


1. VDRF S/P trach


2. Sepsis.  secondary to healthcare-associated pneumonia or


3. Acute chf exacerbation - Diastolic


4. Chronic encephalomalacia.


3. Acute on chronic anemia.


4. Renal failure, age indeterminate.


6. Hyperthyroidism.


7. Hyperkalemia.


8. Diabetes type 2, uncontrolled with A1c of 10.


9. Psychiatric disorder.


10. GI and DVT prophylaxis.


11. PAH- Severe 





PLAN OF CARE:





Post PEG tube


Out patient followup for abnormal incidental imaging finding ( possibility of 

CA cant be excluded 


History of Acute drop in Hgb and observation of UGI bleeding, Will hold DAPT


Post Trach placement 


Sub Acute / LTAC placement 


Worsening leukocytosis, with negative blood culture. Medically a comprehensive 

cancer workup is medically in appropriated.





Subjective


Allergies:  


Coded Allergies:  


     No Known Allergies (Unverified , 10/14/19)





Objective





Last 24 Hour Vital Signs








  Date Time  Temp Pulse Resp B/P (MAP) Pulse Ox O2 Delivery O2 Flow Rate FiO2


 


11/19/19 09:14  107 24     30


 


11/19/19 09:11  107  153/72    


 


11/19/19 08:00      Mechanical Ventilator  


 


11/19/19 08:00        30


 


11/19/19 08:00 98.6 107 22 153/72 (99) 100   


 


11/19/19 07:25  105 30  100 Mechanical Ventilator 45.0 30





  104 28     30


 


11/19/19 04:00      Mechanical Ventilator  


 


11/19/19 04:00        30


 


11/19/19 04:00 99.8 102 19 147/70 (95) 100   


 


11/19/19 03:48  101      


 


11/19/19 03:09  100 21     30


 


11/19/19 01:10  101 23  100 Mechanical Ventilator 45.0 30





  103 24     30


 


11/19/19 00:44 100.0       


 


11/19/19 00:00        30


 


11/19/19 00:00 100.5 101 19 146/75 (98) 100   


 


11/19/19 00:00      Mechanical Ventilator  


 


11/18/19 23:24  103      


 


11/18/19 23:10  97 20     30


 


11/18/19 21:10  100 21     30


 


11/18/19 20:23  105 24  100 Mechanical Ventilator 45.0 30





  105 24     30


 


11/18/19 20:00      Mechanical Ventilator  


 


11/18/19 20:00        30


 


11/18/19 20:00 98.6 104 22 152/73 (99) 100   


 


11/18/19 19:01  106      


 


11/18/19 18:00  101  147/68    


 


11/18/19 16:50  101 16     30


 


11/18/19 16:00  100      


 


11/18/19 16:00 98.2 101 22 147/68 (94) 100   


 


11/18/19 16:00      Mechanical Ventilator  


 


11/18/19 16:00        30


 


11/18/19 15:21  100 21     30





        


 


11/18/19 13:09  98 21   Mechanical Ventilator 45.0 30





        30


 


11/18/19 12:00      Mechanical Ventilator  


 


11/18/19 12:00  101      


 


11/18/19 12:00        30


 


11/18/19 12:00 99.3 102 20 142/71 (94) 100   

















Intake and Output  


 


 11/18/19 11/19/19





 19:00 07:00


 


Intake Total 760 ml 1180 ml


 


Output Total 725 ml 600 ml


 


Balance 35 ml 580 ml


 


  


 


Free Water 100 ml 400 ml


 


Tube Feeding 660 ml 780 ml


 


Output Urine Total 725 ml 600 ml


 


# Bowel Movements  1








Laboratory Tests


11/19/19 04:30: 


White Blood Count 18.2H, Red Blood Count 3.11L, Hemoglobin 8.5L, Hematocrit 

25.4L, Mean Corpuscular Volume 82, Mean Corpuscular Hemoglobin 27.3, Mean 

Corpuscular Hemoglobin Concent 33.4, Red Cell Distribution Width 13.1, Platelet 

Count 498H, Mean Platelet Volume 7.5, Neutrophils (%) (Auto) , Lymphocytes (%) (

Auto) , Monocytes (%) (Auto) , Eosinophils (%) (Auto) , Basophils (%) (Auto) , 

Differential Total Cells Counted 100, Neutrophils % (Manual) 73, Lymphocytes % (

Manual) 17L, Monocytes % (Manual) 8, Eosinophils % (Manual) 1, Basophils % (

Manual) 1, Band Neutrophils 0, Platelet Estimate Adequate, Platelet Morphology 

Normal, Hypochromasia 1+, Sodium Level 154H, Potassium Level 5.2H, Chloride 

Level 115H, Carbon Dioxide Level 32, Anion Gap 7, Blood Urea Nitrogen 61H, 

Creatinine 1.5H, Estimat Glomerular Filtration Rate 34.7, Glucose Level 211H, 

Calcium Level 9.1, Phosphorus Level 2.9, Magnesium Level 2.7H, Total Bilirubin 

0.3, Aspartate Amino Transf (AST/SGOT) 15, Alanine Aminotransferase (ALT/SGPT) 

20, Alkaline Phosphatase 106, C-Reactive Protein, Quantitative 8.3H, Pro-B-Type 

Natriuretic Peptide 19305C, Total Protein 8.1, Albumin 2.3L, Globulin 5.8, 

Albumin/Globulin Ratio 0.4L


Height (Feet):  5


Height (Inches):  5.00


Weight (Pounds):  134











Garrick Keith MD Nov 19, 2019 10:42

## 2019-11-19 NOTE — NUR
*-* INSURANCE *-*



UPDATED CLINICALS AND REVIEWS HAVE BEEN FAXED TO:





Tracking#[id number]

CM: Lance

#[phone]

Fax#[phone]

## 2019-11-19 NOTE — NUR
NURSE NOTES:

Received patient from Justin JOHNSON. Patient is obtunded, receiving oxygen via trach to vent: 
Shiley 8,  AC 14, , Fio2 30%, PEEP 5, patient tolerating well, O2 Saturation at 99%. 
G-tube is patent and intact, receiving Glucerna 1.2 at 60cc/hr. Charles catheter is patent and 
draining. Patient has a Right Upper Arm PICC receiving D5W at 100cc/hr. Bed is locked, 
placed in lowest position, side rails up x3, bed alarm on, head of bed elevated, call light 
within reach. Will continue to monitor.

## 2019-11-19 NOTE — NUR
NURSE NOTES: RECEIVED BED SIDE REPORT FROM LAZARUS RN STAFF OF NOC SHIFT. RECEIVED PT WITH HOB 
ELEVATED 45 DEGREE OBTUNDED .PT TRACH TO VENT DEPENDENT , TOLERATING WELL CURRENTS VENT 
SETTINGS ,%. RENDERED TRACH CARE AND ORAL HYGIENE ,MOD AMT OF YELLOW WISH SECRETIONS 
NOTED. PT WITH PICC-LINE ON RT UPPER ARM SEEMS CLEAN AND INTACT. DR LEWIS CAME TO SEE 
THE PT  AND MADE AWARE & NOTIFIED REGARDING Na+ 154 AND K+ 5.2. M.D ORDER TO D/C KCL 40MEQ  
. NEW ORDERS NOTED & CARRIED OUT.FULL BODY ASSESSMENT DONE.PT REPOSITIONED IN BED TO PROVIDE 
COMFORT AND PREVENT FURTHER SKIN BREAK DOWN. NO ACUTE DISTRESS NOTED AT THIS TIME. WILL CONT 
TO MONITOR.

## 2019-11-19 NOTE — NUR
RD ASSESSMENT & RECOMMENDATIONS

SEE CARE ACTIVITY FOR COMPLETE ASSESSMENT



DAILY ESTIMATED NEEDS:

Needs based on Critical Care/ 68kg 

22-28  kcals/kg 

5950-4954  total kcals

1.2-2  g protein/kg

  g total protein 

25-30  mL/kg

5677-0210  total fluid mLs



NUTRITION DIAGNOSIS:

* Swallowing difficulty R/T dysaphgia, poor dentition, dementia,

respiratory status as evidenced by on liquify pureed, NTL texture diet per

SLP rec-> now on NGT feeds-> now s/p PEG placement and trach placement.

* Increased kcal/prot needs R/T wound healing and sepsis as evidenced by

admitted w/ DTPI R heel and non-blanchable sacral erythema-> now R heel w/

reabsorbing blood blister and sacral wound resolved. , critically elev wbc

(24.0* -> 18.2), elev LA (5.8 -> wnl), elev CRP, now afebrile.

* Altered nutrition related lab values R/T CHF, diabetes, clinical

condition as evidenced by elev BNP (>23082 ->93949), A1C of 10.4, elev BGs

and POC glu ([phone]), elev Na (154 trend up), elev BUN (61 trend up).



 



CURRENT TF:Glucerna 1.2 @60ml/hr x 24hrs  

 





ENTERAL NUTRITION RECOMMENDATIONS:

Glucerna 1.2 @ 52ml/hr x 24 hrs + Prosource 1pkt daily   to provide 1248ml, 1497kcal, 75g 
+11g prot, 1005ml free water 



- Rec to LOWER goal rate to 52ml/hr for 22g less CHO than current rate

- Add Prosource 1pkt daily to meet protein needs

- TF + prosource 1pkt QD will provide 100% est kcal/prot needs

- HOB over 30 degrees

- Water flush of 180ml q 6hrs



 



ADDITIONAL RECOMMENDATIONS:

* Calibrated bedscale wt w/ added P200 mattress  

* Wound healing: Continue Zaki 1pkt BID 

* Monitor hydration status: Na, BUN, and Creat trending up 

        (Lasix dc'ed 11/18) 

* Monitor BG control- rec to lower TF goal rate to provide 22g less CHO than current TF rate 
while meeting est kcal/prot needs  

.

## 2019-11-19 NOTE — HEMATOLOGY/ONC PROGRESS NOTE
Assessment/Plan


Assessment/Plan





Assessment and Recs:


# Anemia of chronic disease due to underlying chronic medical issues, 

multifactorial 


--> Anemia workup has been ordered, rule out gi bleed --> ferritin is 1400+


--> No evidence of hemolysis is noted, peripheral smear has been reviewed.


--> Hgb goal >7. Transfuse prn.


--> Epogen or iron at this time is not particularly indicated


--> Medications have been reviewed


--> low threshold for gi evaluation in case has occult +


--> bone marrow biopsy is not indicated given the other more likely causes (if 

recurrent anemia) first time here


--> hgb trend 8.4->8.5-->8.2->7.7-->7.9-->10.1-->9.1->7.8->7.2-->9-->8.4-->8->

7.8-->8.5 


--> FOLIC ACID 1mg po daily started


--> s/p egd, no active gi bleed 


# Leukocytosis/elevated white blood cell count, unspecified likely related to 

underlying reaction v more likely infection


--> have reviewed peripheral smear and bandemia/neutrophilia noted


--> continue antibiotics if they have been started by ID team (VANC/ZOSYN)--> 

vanc/linda--> linda


--> wbc 39-->28k-->29k->31k-->23-->26k-->19k-->24k-->15.4-->16-->14-->23k-->14--

>17-->12.5->12.3-->12.6-->16-->18.2 


--> monitor for resolution


--> CT C/A/P Results reviewed


--> FOR INdium bone scan done -> Rim of activity within the pelvis, 

corresponding to expected location of the soft tissue component of the cystic 

pelvic mass described on recent imaging studies.


--> again consider gyn eval


--> as come down over last week, currently only 12k and s/p code, continue to 

monitor


--> would hold off on biopsy of bone


# Thrombocytosis - if plt count >400k, most usually is a reactive process and 

will improve once exacerbant removed as well


--> if plt count >600k, consider CML  will need to order Jak2 test, peripheral 

smear to be reviewed as well and note here if any myelocytes, metamyelocytes, 

blasts are noted


# Pelvic mass on ct and us -- 13 x 7 x 12.9 cm unilocular cystic mass, 

corresponding to lesion reported on recent CT scan. Layering low level internal 

echoes likely represent bladder debris.. This presumably represents a cystic 

ovarian lesion with debris or hemorrhage, possibly neoplastic.


--> CA-125 is 216! elevated


--> consider gyn eval


# Hypercalcemia - btw 10-11 range when corrected to alb


--> ivf have been started


--> if remains elevated, 7.9-->8.3


--> will get pth


# Sepsis from pneumonia.  


--> pulm consulted, abx started


# CHANCE (acute kidney injury) on ckd


--> cr 1.6-->2.7-->2.2->1.2->1.2


--> per renal


# UTI (urinary tract infection)


--> on abx per id


# Dehydration


--> on ivf


# Acute metabolic encephalopathy


--> likely due to pna


# Resp failure now intubated 11/4


--> s/p trach


# Dysphagia s/p peg++


# Dvt ppx SCDS





The timing of this note does not necessarily reflect the time of the patient 

was seen.





Greatly appreciate consultation.





Subjective


Hematologic/Lymphatic:  Reports: anemia


Allergies:  


Coded Allergies:  


     No Known Allergies (Unverified , 10/14/19)


All Systems:  reviewed and negative except above


Subjective


10/14: hgb has improved, no bleeding, labs reivewed


10/15: no events to report


10/16: a+ o x1, no bleeding or chills noted, no major changes, occult pending


10/17: no events noted, no bleeding, no chills, no hematemesis/hematochezia


10/18: remains confused, with abx, on nc


10/19: cr is worse, hgb 8.4, no bleeding, night sweats, chills


10/20: no f/c, no night sweats, labs noted, cr worse


10/21: no bleeding or chills, no night sweats, labs pending this am


10/22: pelvic mass noted on imaging, no bleeding reported, ordered ca125


10/23: no events, remains lethargic, is on abx, seen by surg


10/24: with raid response overnight, rr high as well as bp, vidal to icu


10/25: no events, no bleeding, to undergo a indium scan with id


10/26: comfortable, electrolytes reviewed, given mag and k


10/29: back on bipap with gt feeds, dw rn this am


10/30: remains very confused, no f/c, no bleeding, with urinary retention, 

folic acid started


10/31: sleeping, is on bipap, no events, wbc is higher this am


11/1: no events to report, no bleeding, wbc still high but better


11/3: no bleeding, no night sweats, s/p peg, alert


11/4: s/p code blue last night, now intubated, labs noted wbc lower


11/5: given k and mg, for endoscopy tomorrow given drop h/h, 1 unit prbc


11/6: is on vent, unresponsive, no bleeding noted, no chills wbc is 13k


11/14: remains in icu, s/p trach, wbc 12, on meropenem 


11/15: no bleeding, trying to pull off the trach, cbc reviewed


11/16: no Bms last two days, on vent, seen by gi, no bleeding


11/18: no bleeding, no night sweats, labs reviewed, seen by gi/surg


11/19: no acute events, vent/trach, sp egd, no active gi bleed, h/h stable





Objective


Objective





Current Medications








 Medications


  (Trade)  Dose


 Ordered  Sig/Winnie


 Route


 PRN Reason  Start Time


 Stop Time Status Last Admin


Dose Admin


 


 Acetaminophen


  (Tylenol)  650 mg  Q4H  PRN


 NG


 Mild Pain/Temp > 100.5  11/15/19 18:00


 11/25/19 17:59  11/19/19 00:14


 


 


 Acetaminophen


  (Tylenol)  650 mg  Q4H  PRN


 RECTAL


 TEMP>100.5  11/15/19 18:00


 12/2/19 17:59   


 


 


 Acetylcysteine


  (Mucomyst)  100 mg  TIDRT


 HHN


   11/15/19 19:00


 12/10/19 19:29  11/19/19 07:21


 


 


 Albuterol/


 Ipratropium


  (Albuterol/


 Ipratropium)  3 ml  Q6HRT


 HHN


   11/17/19 19:00


 11/22/19 18:59  11/19/19 07:21


 


 


 Amlodipine


 Besylate


  (Norvasc)  2.5 mg  BID


 NG


   11/15/19 18:00


 11/29/19 17:59  11/19/19 09:11


 


 


 Chlorhexidine


 Gluconate


  (Mae-Hex 2%)  1 applic  DAILY@2000


 TOPIC


   11/15/19 20:00


 12/5/19 19:59  11/18/19 21:00


 


 


 Dextrose  1,000 ml @ 


 100 mls/hr  Q10H


 IV


   11/19/19 10:30


 12/19/19 10:29   


 


 


 Dextrose


  (Dextrose 50%)  25 ml  Q30M  PRN


 IV


 Hypoglycemia  11/16/19 13:00


 12/16/19 12:59   


 


 


 Dextrose


  (Dextrose 50%)  50 ml  Q30M  PRN


 IV


 Hypoglycemia  11/16/19 13:00


 12/16/19 12:59   


 


 


 Docusate Sodium


  (Colace)  100 mg  TWICE A  DAY


 ORAL


   11/18/19 18:00


 12/18/19 17:59  11/19/19 09:07


 


 


 Folic Acid


  (Folate)  3 mg  DAILY


 GT


   11/16/19 09:00


 11/30/19 08:59  11/19/19 09:11


 


 


 Heparin Sodium


  (Porcine)


  (Heparin 5000


 units/ml)  5,000 units  EVERY 12  HOURS


 SUBQ


   11/18/19 21:00


 12/18/19 20:59  11/19/19 09:13


 


 


 Insulin Aspart


  (NovoLOG)    EVERY 6  HOURS


 SUBQ


   11/16/19 18:00


 12/16/19 17:59  11/19/19 06:16


 


 


 Insulin Aspart


  (NovoLOG)  15 units  EVERY 6  HOURS


 SUBQ


   11/19/19 12:00


 12/16/19 17:59   


 


 


 Insulin Detemir


  (Levemir)  20 units  BID


 SUBQ


   11/19/19 18:00


 12/1/19 08:59   


 


 


 Lactulose


  (Cephulac)  20 gm  Q8H  PRN


 NG


 Constipation  11/15/19 18:00


 12/3/19 17:59   


 


 


 Metoclopramide HCl


  (Reglan)  10 mg  Q6H  PRN


 IVP


 Nausea & Vomiting  11/15/19 18:00


 12/6/19 17:59   


 


 


 Pantoprazole


  (Protonix)  40 mg  EVERY 12  HOURS


 IVP


   11/15/19 21:00


 12/3/19 08:59  11/19/19 09:07


 


 


 Polyethylene


 Glycol


  (Miralax)  17 gm  BEDTIME


 GT


   11/18/19 21:00


 12/18/19 20:59  11/18/19 20:58


 











Last 24 Hour Vital Signs








  Date Time  Temp Pulse Resp B/P (MAP) Pulse Ox O2 Delivery O2 Flow Rate FiO2


 


11/19/19 09:14  107 24     30


 


11/19/19 09:11  107  153/72    


 


11/19/19 08:00      Mechanical Ventilator  


 


11/19/19 08:00        30


 


11/19/19 08:00 98.6 107 22 153/72 (99) 100   


 


11/19/19 07:25  105 30  100 Mechanical Ventilator 45.0 30





  104 28     30


 


11/19/19 04:00      Mechanical Ventilator  


 


11/19/19 04:00        30


 


11/19/19 04:00 99.8 102 19 147/70 (95) 100   


 


11/19/19 03:48  101      


 


11/19/19 03:09  100 21     30


 


11/19/19 01:10  101 23  100 Mechanical Ventilator 45.0 30





  103 24     30


 


11/19/19 00:44 100.0       


 


11/19/19 00:00        30


 


11/19/19 00:00 100.5 101 19 146/75 (98) 100   


 


11/19/19 00:00      Mechanical Ventilator  


 


11/18/19 23:24  103      


 


11/18/19 23:10  97 20     30


 


11/18/19 21:10  100 21     30


 


11/18/19 20:23  105 24  100 Mechanical Ventilator 45.0 30





  105 24     30


 


11/18/19 20:00      Mechanical Ventilator  


 


11/18/19 20:00        30


 


11/18/19 20:00 98.6 104 22 152/73 (99) 100   


 


11/18/19 19:01  106      


 


11/18/19 18:00  101  147/68    


 


11/18/19 16:50  101 16     30


 


11/18/19 16:00  100      


 


11/18/19 16:00 98.2 101 22 147/68 (94) 100   


 


11/18/19 16:00      Mechanical Ventilator  


 


11/18/19 16:00        30


 


11/18/19 15:21  100 21     30





        


 


11/18/19 13:09  98 21   Mechanical Ventilator 45.0 30





        30


 


11/18/19 12:00      Mechanical Ventilator  


 


11/18/19 12:00  101      


 


11/18/19 12:00        30


 


11/18/19 12:00 99.3 102 20 142/71 (94) 100   


 


11/18/19 10:37  102 22     30





        


 


11/18/19 09:30  95 21     30


 


11/18/19 09:08  95  158/76    


 


11/18/19 08:00  93      


 


11/18/19 08:00 99.1 95 24 158/76 (103) 100   


 


11/18/19 08:00        30


 


11/18/19 08:00      Mechanical Ventilator  


 


11/18/19 07:08  99 21  100 Mechanical Ventilator 45.0 30





  92 14     30


 


11/18/19 05:30  91 21     30


 


11/18/19 04:00        30


 


11/18/19 04:00      Mechanical Ventilator  


 


11/18/19 04:00 99.7 95 22 141/69 (93) 100   


 


11/18/19 03:28  97      


 


11/18/19 03:12  98 22     30


 


11/18/19 01:17  90 14  100 Mechanical Ventilator  30


 


11/18/19 01:07  91 24   Mechanical Ventilator  30





        30


 


11/18/19 00:00  92      


 


11/18/19 00:00 98.1 94 22 133/63 (86) 100   


 


11/18/19 00:00      Mechanical Ventilator  


 


11/17/19 23:06  94 23     30


 


11/17/19 20:48  94 21     30


 


11/17/19 20:00        30


 


11/17/19 20:00      Mechanical Ventilator  


 


11/17/19 20:00 98.1 93 20 141/68 (92) 100   


 


11/17/19 19:25  92 22  100 Mechanical Ventilator  30


 


11/17/19 19:23  90      


 


11/17/19 19:10  94 21   Mechanical Ventilator  30





        30


 


11/17/19 18:40  92  130/70    


 


11/17/19 16:48  92 21     30


 


11/17/19 16:10  90      


 


11/17/19 16:00      Mechanical Ventilator  


 


11/17/19 16:00        30


 


11/17/19 16:00 97.9 90 20 130/70 (90) 100   


 


11/17/19 14:44  97 22     30


 


11/17/19 12:58  94 16  100 Mechanical Ventilator  30





  99 16     30


 


11/17/19 12:00 98.4 93 20 137/67 (90) 100   


 


11/17/19 12:00      Mechanical Ventilator  


 


11/17/19 12:00  89      


 


11/17/19 12:00        30


 


11/17/19 10:51  93 21     30

















Intake and Output  


 


 11/18/19 11/19/19





 19:00 07:00


 


Intake Total 760 ml 1180 ml


 


Output Total 725 ml 600 ml


 


Balance 35 ml 580 ml


 


  


 


Free Water 100 ml 400 ml


 


Tube Feeding 660 ml 780 ml


 


Output Urine Total 725 ml 600 ml


 


# Bowel Movements  1











Labs








Test


  11/17/19


04:00 11/19/19


04:30


 


White Blood Count


  16.4 K/UL


(4.8-10.8) 18.2 K/UL


(4.8-10.8)


 


Red Blood Count


  2.95 M/UL


(4.20-5.40) 3.11 M/UL


(4.20-5.40)


 


Hemoglobin


  7.8 G/DL


(12.0-16.0) 8.5 G/DL


(12.0-16.0)


 


Hematocrit


  24.5 %


(37.0-47.0) 25.4 %


(37.0-47.0)


 


Mean Corpuscular Volume 83 FL (80-99)  82 FL (80-99) 


 


Mean Corpuscular Hemoglobin


  26.6 PG


(27.0-31.0) 27.3 PG


(27.0-31.0)


 


Mean Corpuscular Hemoglobin


Concent 32.0 G/DL


(32.0-36.0) 33.4 G/DL


(32.0-36.0)


 


Red Cell Distribution Width


  14.2 %


(11.6-14.8) 13.1 %


(11.6-14.8)


 


Platelet Count


  482 K/UL


(150-450) 498 K/UL


(150-450)


 


Mean Platelet Volume


  7.8 FL


(6.5-10.1) 7.5 FL


(6.5-10.1)


 


Neutrophils (%) (Auto)  % (45.0-75.0)   % (45.0-75.0) 


 


Lymphocytes (%) (Auto)  % (20.0-45.0)   % (20.0-45.0) 


 


Monocytes (%) (Auto)  % (1.0-10.0)   % (1.0-10.0) 


 


Eosinophils (%) (Auto)  % (0.0-3.0)   % (0.0-3.0) 


 


Basophils (%) (Auto)  % (0.0-2.0)   % (0.0-2.0) 


 


Differential Total Cells


Counted 100 


  100 


 


 


Neutrophils % (Manual) 65 % (45-75)  73 % (45-75) 


 


Lymphocytes % (Manual) 20 % (20-45)  17 % (20-45) 


 


Monocytes % (Manual) 10 % (1-10)  8 % (1-10) 


 


Eosinophils % (Manual) 5 % (0-3)  1 % (0-3) 


 


Basophils % (Manual) 0 % (0-2)  1 % (0-2) 


 


Band Neutrophils 0 % (0-8)  0 % (0-8) 


 


Platelet Estimate Adequate  Adequate 


 


Platelet Morphology Normal  Normal 


 


Hypochromasia 1+  1+ 


 


Anisocytosis 1+  


 


Sodium Level


  151 MMOL/L


(136-145) 154 MMOL/L


(136-145)


 


Potassium Level


  3.2 MMOL/L


(3.5-5.1) 5.2 MMOL/L


(3.5-5.1)


 


Chloride Level


  114 MMOL/L


() 115 MMOL/L


()


 


Carbon Dioxide Level


  30 MMOL/L


(21-32) 32 MMOL/L


(21-32)


 


Anion Gap


  7 mmol/L


(5-15) 7 mmol/L


(5-15)


 


Blood Urea Nitrogen


  51 mg/dL


(7-18) 61 mg/dL


(7-18)


 


Creatinine


  1.0 MG/DL


(0.55-1.30) 1.5 MG/DL


(0.55-1.30)


 


Estimat Glomerular Filtration


Rate 55.5 mL/min


(>60) 34.7 mL/min


(>60)


 


Glucose Level


  165 MG/DL


() 211 MG/DL


()


 


Calcium Level


  7.6 MG/DL


(8.5-10.1) 9.1 MG/DL


(8.5-10.1)


 


Phosphorus Level


  2.1 MG/DL


(2.5-4.9) 2.9 MG/DL


(2.5-4.9)


 


Magnesium Level


  1.8 MG/DL


(1.8-2.4) 2.7 MG/DL


(1.8-2.4)


 


Total Bilirubin


  0.3 MG/DL


(0.2-1.0) 0.3 MG/DL


(0.2-1.0)


 


Aspartate Amino Transf


(AST/SGOT) 9 U/L (15-37) 


  15 U/L (15-37) 


 


 


Alanine Aminotransferase


(ALT/SGPT) 13 U/L (12-78) 


  20 U/L (12-78) 


 


 


Alkaline Phosphatase


  79 U/L


() 106 U/L


()


 


C-Reactive Protein,


Quantitative 6.6 mg/dL


(0.00-0.90) 8.3 mg/dL


(0.00-0.90)


 


Pro-B-Type Natriuretic Peptide


  18796 pg/mL


(0-125) 64117 pg/mL


(0-125)


 


Total Protein


  6.4 G/DL


(6.4-8.2) 8.1 G/DL


(6.4-8.2)


 


Albumin


  1.9 G/DL


(3.4-5.0) 2.3 G/DL


(3.4-5.0)


 


Globulin 4.5 g/dL  5.8 g/dL 


 


Albumin/Globulin Ratio 0.4 (1.0-2.7)  0.4 (1.0-2.7) 








Height (Feet):  5


Height (Inches):  5.00


Weight (Pounds):  134


Objective


Physical Exam:


Vitals: reviewed


General Appearance:  NAD


HEENT:  normocephalic, atraumatic


Neck:  non-tender, normal alignment, trach++


Respiratory/Chest:  normal breath sounds bilaterally ++ vent


Cardiovascular/Chest: rrr


Abdomen:  normal bowel sounds, soft, nontender ++ peg


Extremities:  normal range of motion











Mike Pop MD Nov 19, 2019 09:58

## 2019-11-19 NOTE — INFECTIOUS DISEASES PROG NOTE
Assessment/Plan


Problems:  


(1) Leukocytosis


Assessment & Plan:  suspect reactive, and possible dehydration related, will 

repeat blood culture x 2 and obtain CXR , will consider starting antibiotics if 

WBC continue to rise with significant infiltrates on CXR , monitor clinically 

for now 





(2) Fever


Assessment & Plan:  suspect due to pelvic mass ,recurrent ,  with negative 

repeated blood cultures on 11/12 , urine and sputum only grew yeast which is 

colonization . will monitor off antibiotics for now since done with her course 

of antibiotics treatment which she had almost for four weeks .  





(3) Respiratory failure


Assessment & Plan:  with maegan cardia and S/P code blue, was intubated and 

started on mechanical ventilation , S/P tracheostomy ,  pulmonary is following 





(4) UTI (urinary tract infection)


Assessment & Plan:  due to candida lusitaneae , S/P casas catheter removal , no 

specific therapy needed for now after changing her casas catheter 





(5) Acute metabolic encephalopathy


Assessment & Plan:  due to the above, continue hydration and avoid sedatives , 

monitor in tele 





(6) Hyperglycemia due to type 2 diabetes mellitus


Assessment & Plan:  recommend tight glycemic control to keep blood glucose 

between 100-140 





(7) ARF (acute renal failure)


Assessment & Plan:  due to the above, continue hydration and renally dosed meds 

, close  monitor of renal function 





(8) Pelvic mass in female


Assessment & Plan:  to the left of the uterus, suspect malignancy , may need 

surgical resection , recommend OB/GYN eval and follow up , oncology is 

following 








Subjective


ROS Limited/Unobtainable:  Yes


Allergies:  


Coded Allergies:  


     No Known Allergies (Unverified , 10/14/19)


Subjective


she was resting in her room comfortable, has mild secretions whitish , no cough 

or congestion, no diarrhea , continued on mechanical ventilation through her 

trach , no bleeding or draining from the trach site ,  had low grade fever but 

no chills .





Objective


Vital Signs





Last 24 Hour Vital Signs








  Date Time  Temp Pulse Resp B/P (MAP) Pulse Ox O2 Delivery O2 Flow Rate FiO2


 


11/19/19 12:00 97.7 105 21 115/72 (86) 100   


 


11/19/19 12:00        30


 


11/19/19 12:00      Mechanical Ventilator  


 


11/19/19 12:00  100      


 


11/19/19 11:07  108 21     30


 


11/19/19 09:14  107 24     30


 


11/19/19 09:11  107  153/72    


 


11/19/19 08:00      Mechanical Ventilator  


 


11/19/19 08:00        30


 


11/19/19 08:00 98.6 107 22 153/72 (99) 100   


 


11/19/19 08:00  104      


 


11/19/19 07:25  105 30  100 Mechanical Ventilator 45.0 30





  104 28     30


 


11/19/19 04:00      Mechanical Ventilator  


 


11/19/19 04:00        30


 


11/19/19 04:00 99.8 102 19 147/70 (95) 100   


 


11/19/19 03:48  101      


 


11/19/19 03:09  100 21     30


 


11/19/19 01:10  101 23  100 Mechanical Ventilator 45.0 30





  103 24     30


 


11/19/19 00:44 100.0       


 


11/19/19 00:00        30


 


11/19/19 00:00 100.5 101 19 146/75 (98) 100   


 


11/19/19 00:00      Mechanical Ventilator  


 


11/18/19 23:24  103      


 


11/18/19 23:10  97 20     30


 


11/18/19 21:10  100 21     30


 


11/18/19 20:23  105 24  100 Mechanical Ventilator 45.0 30





  105 24     30


 


11/18/19 20:00      Mechanical Ventilator  


 


11/18/19 20:00        30


 


11/18/19 20:00 98.6 104 22 152/73 (99) 100   


 


11/18/19 19:01  106      


 


11/18/19 18:00  101  147/68    


 


11/18/19 16:50  101 16     30


 


11/18/19 16:00  100      


 


11/18/19 16:00 98.2 101 22 147/68 (94) 100   


 


11/18/19 16:00      Mechanical Ventilator  


 


11/18/19 16:00        30


 


11/18/19 15:21  100 21     30





        








Height (Feet):  5


Height (Inches):  5.00


Weight (Pounds):  133


General Appearance:  WD/WN, no acute distress


HEENT:  normocephalic, atraumatic, anicteric, mucous membranes moist


Respiratory/Chest:  chest wall non-tender, no respiratory distress, no 

accessory muscle use, decreased breath sounds, crackles/rales


Cardiovascular:  normal peripheral pulses, normal rate, regular rhythm, no 

gallop/murmur, no JVD


Abdomen:  normal bowel sounds, soft, non tender, no organomegaly, non distended

, no mass, no scars


Extremities:  no cyanosis, no clubbing


Skin:  no rash, no lesions, ulcers


Neurologic/Psychiatric:  alert, unresponsiveness


Lymphatic:  no neck adenopathy, no groin adenopathy


Musculoskeletal:  normal muscle bulk, no effusion





Laboratory Tests








Test


  11/19/19


04:30


 


White Blood Count


  18.2 K/UL


(4.8-10.8)  H


 


Red Blood Count


  3.11 M/UL


(4.20-5.40)  L


 


Hemoglobin


  8.5 G/DL


(12.0-16.0)  L


 


Hematocrit


  25.4 %


(37.0-47.0)  L


 


Mean Corpuscular Volume 82 FL (80-99)  


 


Mean Corpuscular Hemoglobin


  27.3 PG


(27.0-31.0)


 


Mean Corpuscular Hemoglobin


Concent 33.4 G/DL


(32.0-36.0)


 


Red Cell Distribution Width


  13.1 %


(11.6-14.8)


 


Platelet Count


  498 K/UL


(150-450)  H


 


Mean Platelet Volume


  7.5 FL


(6.5-10.1)


 


Neutrophils (%) (Auto)


  % (45.0-75.0)


 


 


Lymphocytes (%) (Auto)


  % (20.0-45.0)


 


 


Monocytes (%) (Auto)  % (1.0-10.0)  


 


Eosinophils (%) (Auto)  % (0.0-3.0)  


 


Basophils (%) (Auto)  % (0.0-2.0)  


 


Differential Total Cells


Counted 100  


 


 


Neutrophils % (Manual) 73 % (45-75)  


 


Lymphocytes % (Manual) 17 % (20-45)  L


 


Monocytes % (Manual) 8 % (1-10)  


 


Eosinophils % (Manual) 1 % (0-3)  


 


Basophils % (Manual) 1 % (0-2)  


 


Band Neutrophils 0 % (0-8)  


 


Platelet Estimate Adequate  


 


Platelet Morphology Normal  


 


Hypochromasia 1+  


 


Sodium Level


  154 MMOL/L


(136-145)  H


 


Potassium Level


  5.2 MMOL/L


(3.5-5.1)  H


 


Chloride Level


  115 MMOL/L


()  H


 


Carbon Dioxide Level


  32 MMOL/L


(21-32)


 


Anion Gap


  7 mmol/L


(5-15)


 


Blood Urea Nitrogen


  61 mg/dL


(7-18)  H


 


Creatinine


  1.5 MG/DL


(0.55-1.30)  H


 


Estimat Glomerular Filtration


Rate 34.7 mL/min


(>60)


 


Glucose Level


  211 MG/DL


()  H


 


Calcium Level


  9.1 MG/DL


(8.5-10.1)


 


Phosphorus Level


  2.9 MG/DL


(2.5-4.9)


 


Magnesium Level


  2.7 MG/DL


(1.8-2.4)  H


 


Total Bilirubin


  0.3 MG/DL


(0.2-1.0)


 


Aspartate Amino Transf


(AST/SGOT) 15 U/L (15-37)


 


 


Alanine Aminotransferase


(ALT/SGPT) 20 U/L (12-78)


 


 


Alkaline Phosphatase


  106 U/L


()


 


C-Reactive Protein,


Quantitative 8.3 mg/dL


(0.00-0.90)  H


 


Pro-B-Type Natriuretic Peptide


  81477 pg/mL


(0-125)  H


 


Total Protein


  8.1 G/DL


(6.4-8.2)


 


Albumin


  2.3 G/DL


(3.4-5.0)  L


 


Globulin 5.8 g/dL  


 


Albumin/Globulin Ratio


  0.4 (1.0-2.7)


L











Current Medications








 Medications


  (Trade)  Dose


 Ordered  Sig/Winnie


 Route


 PRN Reason  Start Time


 Stop Time Status Last Admin


Dose Admin


 


 Acetaminophen


  (Tylenol)  650 mg  Q4H  PRN


 NG


 Mild Pain/Temp > 100.5  11/15/19 18:00


 11/25/19 17:59  11/19/19 00:14


 


 


 Acetaminophen


  (Tylenol)  650 mg  Q4H  PRN


 RECTAL


 TEMP>100.5  11/15/19 18:00


 12/2/19 17:59   


 


 


 Acetylcysteine


  (Mucomyst)  100 mg  TIDRT


 [facility]


   11/15/19 19:00


 12/10/19 19:29  11/19/19 07:21


 


 


 Albuterol/


 Ipratropium


  (Albuterol/


 Ipratropium)  3 ml  Q6HRT


 [facility]


   11/17/19 19:00


 11/22/19 18:59  11/19/19 07:21


 


 


 Amlodipine


 Besylate


  (Norvasc)  2.5 mg  BID


 NG


   11/15/19 18:00


 11/29/19 17:59  11/19/19 09:11


 


 


 Chlorhexidine


 Gluconate


  (Mae-Hex 2%)  1 applic  DAILY@2000


 TOPIC


   11/15/19 20:00


 12/5/19 19:59  11/18/19 21:00


 


 


 Dextrose  1,000 ml @ 


 100 mls/hr  Q10H


 IV


   11/19/19 10:30


 12/19/19 10:29  11/19/19 10:17


 


 


 Dextrose


  (Dextrose 50%)  25 ml  Q30M  PRN


 IV


 Hypoglycemia  11/16/19 13:00


 12/16/19 12:59   


 


 


 Dextrose


  (Dextrose 50%)  50 ml  Q30M  PRN


 IV


 Hypoglycemia  11/16/19 13:00


 12/16/19 12:59   


 


 


 Docusate Sodium


  (Colace)  100 mg  TWICE A  DAY


 ORAL


   11/18/19 18:00


 12/18/19 17:59  11/19/19 09:07


 


 


 Folic Acid


  (Folate)  3 mg  DAILY


 GT


   11/16/19 09:00


 11/30/19 08:59  11/19/19 09:11


 


 


 Heparin Sodium


  (Porcine)


  (Heparin 5000


 units/ml)  5,000 units  EVERY 12  HOURS


 SUBQ


   11/18/19 21:00


 12/18/19 20:59  11/19/19 09:13


 


 


 Insulin Aspart


  (NovoLOG)    EVERY 6  HOURS


 SUBQ


   11/16/19 18:00


 12/16/19 17:59  11/19/19 12:39


 


 


 Insulin Aspart


  (NovoLOG)  15 units  EVERY 6  HOURS


 SUBQ


   11/19/19 12:00


 12/16/19 17:59  11/19/19 13:02


 


 


 Insulin Detemir


  (Levemir)  20 units  BID


 SUBQ


   11/19/19 18:00


 12/1/19 08:59   


 


 


 Lactulose


  (Cephulac)  20 gm  Q8H  PRN


 NG


 Constipation  11/15/19 18:00


 12/3/19 17:59   


 


 


 Metoclopramide HCl


  (Reglan)  10 mg  Q6H  PRN


 IVP


 Nausea & Vomiting  11/15/19 18:00


 12/6/19 17:59   


 


 


 Pantoprazole


  (Protonix)  40 mg  EVERY 12  HOURS


 IVP


   11/15/19 21:00


 12/3/19 08:59  11/19/19 09:07


 


 


 Polyethylene


 Glycol


  (Miralax)  17 gm  BEDTIME


 GT


   11/18/19 21:00


 12/18/19 20:59  11/18/19 20:58


 

















Amie Burgos M.D. Nov 19, 2019 14:12

## 2019-11-19 NOTE — NUR
RESPIRATORY NOTES:

Received Patient on Vent settings ACVC RR 14, , Fio2 30% PEEP +5. Patient has a Shiley 
8 tracheostomy tube, secured with trache ties. Suctioned a moderate amount of yellow, thick 
secretions. Patient lying in bed comfortable. Vent plugged into red outlet. Alarms are on 
and audible. Will continue to monitor patient throughout the day.

## 2019-11-19 NOTE — NEPHROLOGY PROGRESS NOTE
Assessment/Plan


Problem List:  


(1) Renal failure (ARF), acute on chronic


Assessment:  resolved





(2) Dehydration


(3) ARF (acute renal failure)


(4) Sepsis


(5) Acute metabolic encephalopathy


(6) Hyperglycemia due to type 2 diabetes mellitus


(7) UTI (urinary tract infection)


(8) Diabetic nephropathy


Assessment





Renal failure, Acute on Chronic resolved


Dehydration


Severe Anemia


Sepsis / Pneumonia / UTI


DM, OOC


Proteinuria , Diabetic Nephropathy


Acute encephalopathy


Plan


start D5w and increase Levemir


trached 11/14- 





on K and Mag IV  as needed





 Reglan


GT feeding


Stop IV fluid-


Lasix as needed


has PEG


Per order








previously 


Cr lowering 


Will order urine studies and monitor renal parameters


kidney YOANDY noted


lower iv fluids rate


WBCs rising


has casas again due to retention


Avoid Nephrotoxics








previously


Anemia salas


2D echo


24 H urine protein


Keep BP and BS in check


I am holding  her psych meds due to low MS


Per orders





Subjective


ROS Limited/Unobtainable:  Yes





Objective


Objective





Last 24 Hour Vital Signs








  Date Time  Temp Pulse Resp B/P (MAP) Pulse Ox O2 Delivery O2 Flow Rate FiO2


 


11/19/19 11:07  108 21     30


 


11/19/19 09:14  107 24     30


 


11/19/19 09:11  107  153/72    


 


11/19/19 08:00      Mechanical Ventilator  


 


11/19/19 08:00        30


 


11/19/19 08:00 98.6 107 22 153/72 (99) 100   


 


11/19/19 08:00  104      


 


11/19/19 07:25  105 30  100 Mechanical Ventilator 45.0 30





  104 28     30


 


11/19/19 04:00      Mechanical Ventilator  


 


11/19/19 04:00        30


 


11/19/19 04:00 99.8 102 19 147/70 (95) 100   


 


11/19/19 03:48  101      


 


11/19/19 03:09  100 21     30


 


11/19/19 01:10  101 23  100 Mechanical Ventilator 45.0 30





  103 24     30


 


11/19/19 00:44 100.0       


 


11/19/19 00:00        30


 


11/19/19 00:00 100.5 101 19 146/75 (98) 100   


 


11/19/19 00:00      Mechanical Ventilator  


 


11/18/19 23:24  103      


 


11/18/19 23:10  97 20     30


 


11/18/19 21:10  100 21     30


 


11/18/19 20:23  105 24  100 Mechanical Ventilator 45.0 30





  105 24     30


 


11/18/19 20:00      Mechanical Ventilator  


 


11/18/19 20:00        30


 


11/18/19 20:00 98.6 104 22 152/73 (99) 100   


 


11/18/19 19:01  106      


 


11/18/19 18:00  101  147/68    


 


11/18/19 16:50  101 16     30


 


11/18/19 16:00  100      


 


11/18/19 16:00 98.2 101 22 147/68 (94) 100   


 


11/18/19 16:00      Mechanical Ventilator  


 


11/18/19 16:00        30


 


11/18/19 15:21  100 21     30





        


 


11/18/19 13:09  98 21   Mechanical Ventilator 45.0 30





        30


 


11/18/19 12:00      Mechanical Ventilator  


 


11/18/19 12:00  101      


 


11/18/19 12:00        30


 


11/18/19 12:00 99.3 102 20 142/71 (94) 100   

















Intake and Output  


 


 11/18/19 11/19/19





 19:00 07:00


 


Intake Total 760 ml 1180 ml


 


Output Total 725 ml 600 ml


 


Balance 35 ml 580 ml


 


  


 


Free Water 100 ml 400 ml


 


Tube Feeding 660 ml 780 ml


 


Output Urine Total 725 ml 600 ml


 


# Bowel Movements  1








Laboratory Tests


11/19/19 04:30: 


White Blood Count 18.2H, Red Blood Count 3.11L, Hemoglobin 8.5L, Hematocrit 

25.4L, Mean Corpuscular Volume 82, Mean Corpuscular Hemoglobin 27.3, Mean 

Corpuscular Hemoglobin Concent 33.4, Red Cell Distribution Width 13.1, Platelet 

Count 498H, Mean Platelet Volume 7.5, Neutrophils (%) (Auto) , Lymphocytes (%) (

Auto) , Monocytes (%) (Auto) , Eosinophils (%) (Auto) , Basophils (%) (Auto) , 

Differential Total Cells Counted 100, Neutrophils % (Manual) 73, Lymphocytes % (

Manual) 17L, Monocytes % (Manual) 8, Eosinophils % (Manual) 1, Basophils % (

Manual) 1, Band Neutrophils 0, Platelet Estimate Adequate, Platelet Morphology 

Normal, Hypochromasia 1+, Sodium Level 154H, Potassium Level 5.2H, Chloride 

Level 115H, Carbon Dioxide Level 32, Anion Gap 7, Blood Urea Nitrogen 61H, 

Creatinine 1.5H, Estimat Glomerular Filtration Rate 34.7, Glucose Level 211H, 

Calcium Level 9.1, Phosphorus Level 2.9, Magnesium Level 2.7H, Total Bilirubin 

0.3, Aspartate Amino Transf (AST/SGOT) 15, Alanine Aminotransferase (ALT/SGPT) 

20, Alkaline Phosphatase 106, C-Reactive Protein, Quantitative 8.3H, Pro-B-Type 

Natriuretic Peptide 17661D, Total Protein 8.1, Albumin 2.3L, Globulin 5.8, 

Albumin/Globulin Ratio 0.4L


Height (Feet):  5


Height (Inches):  5.00


Weight (Pounds):  133


General Appearance:  no apparent distress, lethargic


Neck:  other - trach


Cardiovascular:  tachycardia


Respiratory/Chest:  decreased breath sounds


Abdomen:  distended


Objective


no change











Lukasz Vizcaino MD Nov 19, 2019 11:29

## 2019-11-19 NOTE — NUR
NURSE NOTES:

Left message to Dr. Vizcaino's office regarding Sodium and Potassium level. Awaiting 
orders.

## 2019-11-19 NOTE — NUR
CASE MANAGEMENT: REVIEW



11/16/19



SI: POD# 2  TRACHEOSTOMY

SEPSIS D/T PNA. ACUTE CHF

RESPIRATORY FAILURE ~ INTUBATED 

97.9   91   21   151/67  100% ON VENT SUPPORT @ 30% FIO2





IS: 

IV LASIX Q12HR

METOLAZONE NG Q24HR

IV PROTONIX Q12

NORVASC NG BID

DUONEB INH Q6HRS RTC

K-DUR GT QD





**: ICU STATUS



PLAN:

LTAC PLACEMENT [facility]



***********************************************************************

CASE MANAGEMENT: REVIEW



11/17/19



SI: POD# 3 TRACHEOSTOMY

SEPSIS D/T PNA. ACUTE CHF

RESPIRATORY FAILURE ~ INTUBATED 

98.6   92   20   130/60   100 % ON VENT SUPPORT @ 30% FIO2

NA+ 151; K+ 3.2  BUN 51; CA+ 7.6  PHOS 2.1 

.4 RBC 2.95 H/H-7.8/24.5  



IS: 

IV LASIX Q12HR

METOLAZONE NG Q24HR

IV PROTONIX Q12

NORVASC NG BID

DUONEB INH Q6HRS RTC

K-DUR GT QD





**2W SD UNIT



PLAN:

LTAC PLACEMENT  [facility]





*************************************************************************



CASE MANAGEMENT: REVIEW



11/18/19



SI: POD# 4 TRACHEOSTOMY

SEPSIS D/T PNA. ACUTE CHF

RESPIRATORY FAILURE ~ INTUBATED 

99.3   102   20   142/71   100 % ON VENT SUPPORT @ 30% FIO2





IS: 

IV LASIX Q12HR

METOLAZONE NG Q24HR

IV PROTONIX Q12

NORVASC NG BID

DUONEB INH Q6HRS RTC

K-DUR GT QD





**2W SD UNIT



PLAN:

LTAC PLACEMENT  [facility]



***************************************************************************



CASE MANAGEMENT: REVIEW



11/19/19



SI: POD# 5 TRACHEOSTOMY

SEPSIS D/T PNA. ACUTE CHF

RESPIRATORY FAILURE ~ INTUBATED 

98.6   107   22   153/72    100 % ON VENT SUPPORT @ 30% FIO2

NA+ 154  K+ 5.2 BUN 61 CREAT 1.5   MG 2.7



IS: IV DEXTROSE @100ML/HR

HEPARIN SQ BID

NORVASC NG BID

IV PROTONIX Q12

ALBUTEROL HHN Q6HR





**2W SD UNIT



PLAN:

LTAC PLACEMENT  [facility]

## 2019-11-19 NOTE — SURGERY PROGRESS NOTE
Surgery Progress Note


Subjective


Procedure Performed


tracheostomy


Additional Comments


low grade fevers


tachycardia


leukocytosis 


ill appearing


electrolytes abnormal as noted below





Objective





Last 24 Hour Vital Signs








  Date Time  Temp Pulse Resp B/P (MAP) Pulse Ox O2 Delivery O2 Flow Rate FiO2


 


11/19/19 09:14  107 24     30


 


11/19/19 09:11  107  153/72    


 


11/19/19 08:00      Mechanical Ventilator  


 


11/19/19 08:00        30


 


11/19/19 08:00 98.6 107 22 153/72 (99) 100   


 


11/19/19 07:25  105 30  100 Mechanical Ventilator 45.0 30





  104 28     30


 


11/19/19 04:00      Mechanical Ventilator  


 


11/19/19 04:00        30


 


11/19/19 04:00 99.8 102 19 147/70 (95) 100   


 


11/19/19 03:48  101      


 


11/19/19 03:09  100 21     30


 


11/19/19 01:10  101 23  100 Mechanical Ventilator 45.0 30





  103 24     30


 


11/19/19 00:44 100.0       


 


11/19/19 00:00        30


 


11/19/19 00:00 100.5 101 19 146/75 (98) 100   


 


11/19/19 00:00      Mechanical Ventilator  


 


11/18/19 23:24  103      


 


11/18/19 23:10  97 20     30


 


11/18/19 21:10  100 21     30


 


11/18/19 20:23  105 24  100 Mechanical Ventilator 45.0 30





  105 24     30


 


11/18/19 20:00      Mechanical Ventilator  


 


11/18/19 20:00        30


 


11/18/19 20:00 98.6 104 22 152/73 (99) 100   


 


11/18/19 19:01  106      


 


11/18/19 18:00  101  147/68    


 


11/18/19 16:50  101 16     30


 


11/18/19 16:00  100      


 


11/18/19 16:00 98.2 101 22 147/68 (94) 100   


 


11/18/19 16:00      Mechanical Ventilator  


 


11/18/19 16:00        30


 


11/18/19 15:21  100 21     30





        


 


11/18/19 13:09  98 21   Mechanical Ventilator 45.0 30





        30


 


11/18/19 12:00      Mechanical Ventilator  


 


11/18/19 12:00  101      


 


11/18/19 12:00        30


 


11/18/19 12:00 99.3 102 20 142/71 (94) 100   


 


11/18/19 10:37  102 22     30





        








I&O











Intake and Output  


 


 11/18/19 11/19/19





 19:00 07:00


 


Intake Total 760 ml 1180 ml


 


Output Total 725 ml 600 ml


 


Balance 35 ml 580 ml


 


  


 


Free Water 100 ml 400 ml


 


Tube Feeding 660 ml 780 ml


 


Output Urine Total 725 ml 600 ml


 


# Bowel Movements  1








Dressing:  other


Wound:  other


Drains:  other


Cardiovascular:  RSR


Respiratory:  decreased breath sounds


Abdomen:  soft, present bowel sounds, non-distended


Extremities:  edema, no cyanosis, other





Laboratory Tests








Test


  11/19/19


04:30


 


White Blood Count


  18.2 K/UL


(4.8-10.8)  H


 


Red Blood Count


  3.11 M/UL


(4.20-5.40)  L


 


Hemoglobin


  8.5 G/DL


(12.0-16.0)  L


 


Hematocrit


  25.4 %


(37.0-47.0)  L


 


Mean Corpuscular Volume 82 FL (80-99)  


 


Mean Corpuscular Hemoglobin


  27.3 PG


(27.0-31.0)


 


Mean Corpuscular Hemoglobin


Concent 33.4 G/DL


(32.0-36.0)


 


Red Cell Distribution Width


  13.1 %


(11.6-14.8)


 


Platelet Count


  498 K/UL


(150-450)  H


 


Mean Platelet Volume


  7.5 FL


(6.5-10.1)


 


Neutrophils (%) (Auto)


  % (45.0-75.0)


 


 


Lymphocytes (%) (Auto)


  % (20.0-45.0)


 


 


Monocytes (%) (Auto)  % (1.0-10.0)  


 


Eosinophils (%) (Auto)  % (0.0-3.0)  


 


Basophils (%) (Auto)  % (0.0-2.0)  


 


Differential Total Cells


Counted 100  


 


 


Neutrophils % (Manual) 73 % (45-75)  


 


Lymphocytes % (Manual) 17 % (20-45)  L


 


Monocytes % (Manual) 8 % (1-10)  


 


Eosinophils % (Manual) 1 % (0-3)  


 


Basophils % (Manual) 1 % (0-2)  


 


Band Neutrophils 0 % (0-8)  


 


Platelet Estimate Adequate  


 


Platelet Morphology Normal  


 


Hypochromasia 1+  


 


Sodium Level


  154 MMOL/L


(136-145)  H


 


Potassium Level


  5.2 MMOL/L


(3.5-5.1)  H


 


Chloride Level


  115 MMOL/L


()  H


 


Carbon Dioxide Level


  32 MMOL/L


(21-32)


 


Anion Gap


  7 mmol/L


(5-15)


 


Blood Urea Nitrogen


  61 mg/dL


(7-18)  H


 


Creatinine


  1.5 MG/DL


(0.55-1.30)  H


 


Estimat Glomerular Filtration


Rate 34.7 mL/min


(>60)


 


Glucose Level


  211 MG/DL


()  H


 


Calcium Level


  9.1 MG/DL


(8.5-10.1)


 


Phosphorus Level


  2.9 MG/DL


(2.5-4.9)


 


Magnesium Level


  2.7 MG/DL


(1.8-2.4)  H


 


Total Bilirubin


  0.3 MG/DL


(0.2-1.0)


 


Aspartate Amino Transf


(AST/SGOT) 15 U/L (15-37)


 


 


Alanine Aminotransferase


(ALT/SGPT) 20 U/L (12-78)


 


 


Alkaline Phosphatase


  106 U/L


()


 


C-Reactive Protein,


Quantitative 8.3 mg/dL


(0.00-0.90)  H


 


Pro-B-Type Natriuretic Peptide


  99844 pg/mL


(0-125)  H


 


Total Protein


  8.1 G/DL


(6.4-8.2)


 


Albumin


  2.3 G/DL


(3.4-5.0)  L


 


Globulin 5.8 g/dL  


 


Albumin/Globulin Ratio


  0.4 (1.0-2.7)


L











Plan


Problems:  


(1) Pressure injury of deep tissue


Assessment & Plan:  Pt presented on admission with DTPI R heel. Blood filled 

blister with marginal erythema noted to heel. Pt exhibited agitation when 

minimally palpated. (L)2.2cm x (W)2.1cm


L heel is blanchable .


Non-blanchable erythema without induration noted to sacral cleft. 


No other areas of skin concerns noted.





Tx.Plan:


Apply Betadine to R heel. Cover with Optifoam drsg. Change every 3 days and prn.


           


Apply Cavilon Skin Barrier to L heel. Cover with Optifoam drsg. Change every 7 

days and prn.


           


Apply Moisture Barrier Paste to buttocks. Cover sacral area with Optifoam drsg. 

Change every 3 days and prn.


           


Reposition at least every 2hours or as tolerated.


           


Off-load heels with pillow.





(2) Sepsis


Assessment & Plan:  Patient with low-grade fevers, anemia, leukocytosis 

persistent, elevated platelets, abnormal labs.


Etiology currently unknown and work-up in progress.  Labs noted and imaging 

that is available as noted.  


CT noted


US noted


Impression: 13 x 7 x 12.9 cm unilocular cystic mass, corresponding to lesion 

reported


on recent CT scan. Layering low level internal echoes likely represent bladder


debris.. This presumably represents a cystic ovarian lesion with debris or


hemorrhage, possibly neoplastic. Gynecological consultation is recommended


Antibiotics as per infectious disease 


local wound care as wounds do not seem to be the etiology of her sepsis


start feeds via peg


doing well


improving


cont with tube feeds


s/p trach


wean vent


supplementation for healing 


will discuss with ID about Abx 


We will monitor and follow with recommendations


Thank you for allowing me to participate in patient's care














Radhames Blas Nov 19, 2019 10:26

## 2019-11-19 NOTE — DIAGNOSTIC IMAGING REPORT
Indication: Dyspnea

 

Technique: One view of the chest

 

Comparison: 11/14/2019

 

Findings: Lungs and pleural spaces are clear. Tracheostomy, right arm PICC remain.

Linear scarring in the right midlung persists. The heart size is normal. Previously

demonstrated left pleural effusion is no longer evident

 

Impression: No acute process

## 2019-11-19 NOTE — NUR
NURSE NOTES:

On assessing vital signs, patient's oral temperature was 100.5 degrees Fahrenheit. Covers 
were removed and prescribed Tylenol was given. Will continue to monitor.

## 2019-11-19 NOTE — NUR
NURSE NOTES:

On reassessment patient's temperature was 100.0 Degrees Fahrenheit. Will continue to 
monitor.

## 2019-11-19 NOTE — CARDIOLOGY PROGRESS NOTE
Assessment/Plan


Assessment/Plan


1. acute congestive heart failure


2. Abnormal cardiac enzyme demand related due to infection and profound anemia 


3. Agitation 


4. Urinary tract infection.


5. Acute renal failure.


6. Profound anemia.


7. Diabetes mellitus.


8. History of hypertension.


9. History of mood disorder.


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 


11. arf 


12. pelvic mass


13  sepsis 


14. pneumonia 


15. hypernatremai  











off dapt due to suspected bleeding ? resume 


echo mild systolic dysfunction 


na increased 


looks awake  


off  lasix and metolazone 


free water thru g tube starte last ntie 200 cc q6h for 8 doses 


na higer , cr higher





Subjective


ROS Limited/Unobtainable:  Yes





Objective





Last 24 Hour Vital Signs








  Date Time  Temp Pulse Resp B/P (MAP) Pulse Ox O2 Delivery O2 Flow Rate FiO2


 


11/19/19 09:14  107 24     30


 


11/19/19 09:11  107  153/72    


 


11/19/19 08:00      Mechanical Ventilator  


 


11/19/19 08:00        30


 


11/19/19 08:00 98.6 107 22 153/72 (99) 100   


 


11/19/19 07:25  105 30  100 Mechanical Ventilator 45.0 30





  104 28     30


 


11/19/19 04:00      Mechanical Ventilator  


 


11/19/19 04:00        30


 


11/19/19 04:00 99.8 102 19 147/70 (95) 100   


 


11/19/19 03:48  101      


 


11/19/19 03:09  100 21     30


 


11/19/19 01:10  101 23  100 Mechanical Ventilator 45.0 30





  103 24     30


 


11/19/19 00:44 100.0       


 


11/19/19 00:00        30


 


11/19/19 00:00 100.5 101 19 146/75 (98) 100   


 


11/19/19 00:00      Mechanical Ventilator  


 


11/18/19 23:24  103      


 


11/18/19 23:10  97 20     30


 


11/18/19 21:10  100 21     30


 


11/18/19 20:23  105 24  100 Mechanical Ventilator 45.0 30





  105 24     30


 


11/18/19 20:00      Mechanical Ventilator  


 


11/18/19 20:00        30


 


11/18/19 20:00 98.6 104 22 152/73 (99) 100   


 


11/18/19 19:01  106      


 


11/18/19 18:00  101  147/68    


 


11/18/19 16:50  101 16     30


 


11/18/19 16:00  100      


 


11/18/19 16:00 98.2 101 22 147/68 (94) 100   


 


11/18/19 16:00      Mechanical Ventilator  


 


11/18/19 16:00        30


 


11/18/19 15:21  100 21     30





        


 


11/18/19 13:09  98 21   Mechanical Ventilator 45.0 30





        30


 


11/18/19 12:00      Mechanical Ventilator  


 


11/18/19 12:00  101      


 


11/18/19 12:00        30


 


11/18/19 12:00 99.3 102 20 142/71 (94) 100   


 


11/18/19 10:37  102 22     30





        








General Appearance:  no apparent distress, other - awake


Cardiovascular:  normal rate


Respiratory/Chest:  lungs clear


Abdomen:  normal bowel sounds, non tender, soft


Extremities:  no swelling











Intake and Output  


 


 11/18/19 11/19/19





 19:00 07:00


 


Intake Total 760 ml 1180 ml


 


Output Total 725 ml 600 ml


 


Balance 35 ml 580 ml


 


  


 


Free Water 100 ml 400 ml


 


Tube Feeding 660 ml 780 ml


 


Output Urine Total 725 ml 600 ml


 


# Bowel Movements  1











Laboratory Tests








Test


  11/19/19


04:30


 


White Blood Count


  18.2 K/UL


(4.8-10.8)  H


 


Red Blood Count


  3.11 M/UL


(4.20-5.40)  L


 


Hemoglobin


  8.5 G/DL


(12.0-16.0)  L


 


Hematocrit


  25.4 %


(37.0-47.0)  L


 


Mean Corpuscular Volume 82 FL (80-99)  


 


Mean Corpuscular Hemoglobin


  27.3 PG


(27.0-31.0)


 


Mean Corpuscular Hemoglobin


Concent 33.4 G/DL


(32.0-36.0)


 


Red Cell Distribution Width


  13.1 %


(11.6-14.8)


 


Platelet Count


  498 K/UL


(150-450)  H


 


Mean Platelet Volume


  7.5 FL


(6.5-10.1)


 


Neutrophils (%) (Auto)


  % (45.0-75.0)


 


 


Lymphocytes (%) (Auto)


  % (20.0-45.0)


 


 


Monocytes (%) (Auto)  % (1.0-10.0)  


 


Eosinophils (%) (Auto)  % (0.0-3.0)  


 


Basophils (%) (Auto)  % (0.0-2.0)  


 


Differential Total Cells


Counted 100  


 


 


Neutrophils % (Manual) 73 % (45-75)  


 


Lymphocytes % (Manual) 17 % (20-45)  L


 


Monocytes % (Manual) 8 % (1-10)  


 


Eosinophils % (Manual) 1 % (0-3)  


 


Basophils % (Manual) 1 % (0-2)  


 


Band Neutrophils 0 % (0-8)  


 


Platelet Estimate Adequate  


 


Platelet Morphology Normal  


 


Hypochromasia 1+  


 


Sodium Level


  154 MMOL/L


(136-145)  H


 


Potassium Level


  5.2 MMOL/L


(3.5-5.1)  H


 


Chloride Level


  115 MMOL/L


()  H


 


Carbon Dioxide Level


  32 MMOL/L


(21-32)


 


Anion Gap


  7 mmol/L


(5-15)


 


Blood Urea Nitrogen


  61 mg/dL


(7-18)  H


 


Creatinine


  1.5 MG/DL


(0.55-1.30)  H


 


Estimat Glomerular Filtration


Rate 34.7 mL/min


(>60)


 


Glucose Level


  211 MG/DL


()  H


 


Calcium Level


  9.1 MG/DL


(8.5-10.1)


 


Phosphorus Level


  2.9 MG/DL


(2.5-4.9)


 


Magnesium Level


  2.7 MG/DL


(1.8-2.4)  H


 


Total Bilirubin


  0.3 MG/DL


(0.2-1.0)


 


Aspartate Amino Transf


(AST/SGOT) 15 U/L (15-37)


 


 


Alanine Aminotransferase


(ALT/SGPT) 20 U/L (12-78)


 


 


Alkaline Phosphatase


  106 U/L


()


 


C-Reactive Protein,


Quantitative 8.3 mg/dL


(0.00-0.90)  H


 


Pro-B-Type Natriuretic Peptide


  08567 pg/mL


(0-125)  H


 


Total Protein


  8.1 G/DL


(6.4-8.2)


 


Albumin


  2.3 G/DL


(3.4-5.0)  L


 


Globulin 5.8 g/dL  


 


Albumin/Globulin Ratio


  0.4 (1.0-2.7)


L

















Rob May MD Nov 19, 2019 09:53

## 2019-11-19 NOTE — NUR
***DISCHARGE PLANNING:



PATIENT HAS BEEN REFERRED TO 

1[facility]

T:[phone]

F:[phone]

#2 [facility]

T:[phone]

#3 [facility]

F: [phone] HAVE BEEN FAXED 



WAITING FOR RESPONSE

-------------------------------------------------------------------------------

Addendum: 11/20/19 at 1013 by ROCIO BLACK LVN

-------------------------------------------------------------------------------

[facility] CAN NOT ACCEPT PATIENT DUE TO INSURANCE

## 2019-11-19 NOTE — GENERAL PROGRESS NOTE
Assessment/Plan


Status:  stable, unchanged


Assessment/Plan:








Assessment/Plan


Problems:  


(1) PEG (percutaneous endoscopic gastrostomy) status


ICD Codes:  Z93.1 - Gastrostomy status


SNOMED:  564198980, 108570319


(2) Anemia


ICD Codes:  D64.9 - Anemia, unspecified


SNOMED:  301386730


Qualifiers:  


   Qualified Codes:  D64.9 - Anemia, unspecified


(3) Dehydration


ICD Codes:  E86.0 - Dehydration


SNOMED:  75774378, 1963026


Status:  unchanged





Assessment/Plan


Assessment


(1) pneumonia


(2) UTI


(3) Sepsis


(4) Dehydration


(5) CHANCE 


(6) Acute metabolic encephalopathy


(7) Hyperglycemia due to type 2 diabetes mellitus


(8) Renal failure (ARF), acute on chronic


(9) Aspiration pneumonia


(10) Abnormal TSH


(11) Pelvic mass in female


(12) Congestive Heart Failure


(13) Anemia


(14) Dysphagia - s/p GT





s/p EGD SUMMARY OF FINDINGS:


1. No evidence of any active upper GI bleeding at this time.


2. Gastritis status biopsy.





Recommendations


GTFs


prn transfusions


bowel regime


PPI and H2B


will follow up, consider colonoscopy if patient has recurrent GI bleed





Subjective


ROS Limited/Unobtainable:  No


Allergies:  


Coded Allergies:  


     No Known Allergies (Unverified , 10/14/19)





Objective





Last 24 Hour Vital Signs








  Date Time  Temp Pulse Resp B/P (MAP) Pulse Ox O2 Delivery O2 Flow Rate FiO2


 


11/19/19 09:11  107  153/72    


 


11/19/19 08:00      Mechanical Ventilator  


 


11/19/19 08:00        30


 


11/19/19 08:00 98.6 107 22 153/72 (99) 100   


 


11/19/19 07:25  105 30  100 Mechanical Ventilator 45.0 30





  104 28     30


 


11/19/19 04:00      Mechanical Ventilator  


 


11/19/19 04:00        30


 


11/19/19 04:00 99.8 102 19 147/70 (95) 100   


 


11/19/19 03:48  101      


 


11/19/19 03:09  100 21     30


 


11/19/19 01:10  101 23  100 Mechanical Ventilator 45.0 30





  103 24     30


 


11/19/19 00:44 100.0       


 


11/19/19 00:00        30


 


11/19/19 00:00 100.5 101 19 146/75 (98) 100   


 


11/19/19 00:00      Mechanical Ventilator  


 


11/18/19 23:24  103      


 


11/18/19 23:10  97 20     30


 


11/18/19 21:10  100 21     30


 


11/18/19 20:23  105 24  100 Mechanical Ventilator 45.0 30





  105 24     30


 


11/18/19 20:00      Mechanical Ventilator  


 


11/18/19 20:00        30


 


11/18/19 20:00 98.6 104 22 152/73 (99) 100   


 


11/18/19 19:01  106      


 


11/18/19 18:00  101  147/68    


 


11/18/19 16:50  101 16     30


 


11/18/19 16:00  100      


 


11/18/19 16:00 98.2 101 22 147/68 (94) 100   


 


11/18/19 16:00      Mechanical Ventilator  


 


11/18/19 16:00        30


 


11/18/19 15:21  100 21     30





        


 


11/18/19 13:09  98 21   Mechanical Ventilator 45.0 30





        30


 


11/18/19 12:00      Mechanical Ventilator  


 


11/18/19 12:00  101      


 


11/18/19 12:00        30


 


11/18/19 12:00 99.3 102 20 142/71 (94) 100   


 


11/18/19 10:37  102 22     30





        

















Intake and Output  


 


 11/18/19 11/19/19





 19:00 07:00


 


Intake Total 760 ml 1180 ml


 


Output Total 725 ml 600 ml


 


Balance 35 ml 580 ml


 


  


 


Free Water 100 ml 400 ml


 


Tube Feeding 660 ml 780 ml


 


Output Urine Total 725 ml 600 ml


 


# Bowel Movements  1








Laboratory Tests


11/19/19 04:30: 


White Blood Count 18.2H, Red Blood Count 3.11L, Hemoglobin 8.5L, Hematocrit 

25.4L, Mean Corpuscular Volume 82, Mean Corpuscular Hemoglobin 27.3, Mean 

Corpuscular Hemoglobin Concent 33.4, Red Cell Distribution Width 13.1, Platelet 

Count 498H, Mean Platelet Volume 7.5, Neutrophils (%) (Auto) , Lymphocytes (%) (

Auto) , Monocytes (%) (Auto) , Eosinophils (%) (Auto) , Basophils (%) (Auto) , 

Differential Total Cells Counted 100, Neutrophils % (Manual) 73, Lymphocytes % (

Manual) 17L, Monocytes % (Manual) 8, Eosinophils % (Manual) 1, Basophils % (

Manual) 1, Band Neutrophils 0, Platelet Estimate Adequate, Platelet Morphology 

Normal, Hypochromasia 1+, Sodium Level 154H, Potassium Level 5.2H, Chloride 

Level 115H, Carbon Dioxide Level 32, Anion Gap 7, Blood Urea Nitrogen 61H, 

Creatinine 1.5H, Estimat Glomerular Filtration Rate 34.7, Glucose Level 211H, 

Calcium Level 9.1, Phosphorus Level 2.9, Magnesium Level 2.7H, Total Bilirubin 

0.3, Aspartate Amino Transf (AST/SGOT) 15, Alanine Aminotransferase (ALT/SGPT) 

20, Alkaline Phosphatase 106, C-Reactive Protein, Quantitative 8.3H, Pro-B-Type 

Natriuretic Peptide 35215L, Total Protein 8.1, Albumin 2.3L, Globulin 5.8, 

Albumin/Globulin Ratio 0.4L


Height (Feet):  5


Height (Inches):  5.00


Weight (Pounds):  134


General Appearance:  no apparent distress


EENT:  normal ENT inspection


Neck:  supple


Cardiovascular:  normal rate


Respiratory/Chest:  decreased breath sounds


Abdomen:  normal bowel sounds, non tender, soft


Extremities:  non-tender











Storm Turk MD Nov 19, 2019 09:37

## 2019-11-20 VITALS — SYSTOLIC BLOOD PRESSURE: 125 MMHG | DIASTOLIC BLOOD PRESSURE: 58 MMHG

## 2019-11-20 VITALS — DIASTOLIC BLOOD PRESSURE: 66 MMHG | SYSTOLIC BLOOD PRESSURE: 125 MMHG

## 2019-11-20 VITALS — DIASTOLIC BLOOD PRESSURE: 58 MMHG | SYSTOLIC BLOOD PRESSURE: 120 MMHG

## 2019-11-20 VITALS — SYSTOLIC BLOOD PRESSURE: 122 MMHG | DIASTOLIC BLOOD PRESSURE: 52 MMHG

## 2019-11-20 VITALS — SYSTOLIC BLOOD PRESSURE: 148 MMHG | DIASTOLIC BLOOD PRESSURE: 66 MMHG

## 2019-11-20 VITALS — SYSTOLIC BLOOD PRESSURE: 125 MMHG | DIASTOLIC BLOOD PRESSURE: 56 MMHG

## 2019-11-20 LAB
ADD MANUAL DIFF: YES
ALBUMIN SERPL-MCNC: 2.1 G/DL (ref 3.4–5)
ALBUMIN/GLOB SERPL: 0.4 {RATIO} (ref 1–2.7)
ALP SERPL-CCNC: 102 U/L (ref 46–116)
ALT SERPL-CCNC: 20 U/L (ref 12–78)
ANION GAP SERPL CALC-SCNC: 3 MMOL/L (ref 5–15)
APPEARANCE UR: CLEAR
APTT BLD: 30 SEC (ref 23–33)
APTT PPP: (no result) S
AST SERPL-CCNC: 17 U/L (ref 15–37)
BILIRUB SERPL-MCNC: 0.4 MG/DL (ref 0.2–1)
BUN SERPL-MCNC: 52 MG/DL (ref 7–18)
CALCIUM SERPL-MCNC: 9 MG/DL (ref 8.5–10.1)
CHLORIDE SERPL-SCNC: 108 MMOL/L (ref 98–107)
CO2 SERPL-SCNC: 33 MMOL/L (ref 21–32)
CREAT SERPL-MCNC: 1.3 MG/DL (ref 0.55–1.3)
ERYTHROCYTE [DISTWIDTH] IN BLOOD BY AUTOMATED COUNT: 14.6 % (ref 11.6–14.8)
GLOBULIN SER-MCNC: 5.3 G/DL
GLUCOSE UR STRIP-MCNC: (no result) MG/DL
HCT VFR BLD CALC: 24.6 % (ref 37–47)
HGB BLD-MCNC: 7.9 G/DL (ref 12–16)
INR PPP: 1 (ref 0.9–1.1)
KETONES UR QL STRIP: NEGATIVE
LEUKOCYTE ESTERASE UR QL STRIP: NEGATIVE
MCV RBC AUTO: 83 FL (ref 80–99)
NITRITE UR QL STRIP: NEGATIVE
PH UR STRIP: 8 [PH] (ref 4.5–8)
PHOSPHATE SERPL-MCNC: 3.4 MG/DL (ref 2.5–4.9)
PLATELET # BLD: 452 K/UL (ref 150–450)
POTASSIUM SERPL-SCNC: 4.2 MMOL/L (ref 3.5–5.1)
PROT UR QL STRIP: (no result)
RBC # BLD AUTO: 2.96 M/UL (ref 4.2–5.4)
SODIUM SERPL-SCNC: 144 MMOL/L (ref 136–145)
SP GR UR STRIP: 1.01 (ref 1–1.03)
UROBILINOGEN UR-MCNC: 1 MG/DL (ref 0–1)
WBC # BLD AUTO: 18.9 K/UL (ref 4.8–10.8)

## 2019-11-20 RX ADMIN — HEPARIN SODIUM SCH UNITS: 5000 INJECTION INTRAVENOUS; SUBCUTANEOUS at 21:08

## 2019-11-20 RX ADMIN — INSULIN ASPART SCH UNITS: 100 INJECTION, SOLUTION INTRAVENOUS; SUBCUTANEOUS at 18:48

## 2019-11-20 RX ADMIN — INSULIN ASPART SCH UNITS: 100 INJECTION, SOLUTION INTRAVENOUS; SUBCUTANEOUS at 12:41

## 2019-11-20 RX ADMIN — IPRATROPIUM BROMIDE AND ALBUTEROL SULFATE SCH ML: .5; 3 SOLUTION RESPIRATORY (INHALATION) at 00:39

## 2019-11-20 RX ADMIN — INSULIN ASPART SCH UNITS: 100 INJECTION, SOLUTION INTRAVENOUS; SUBCUTANEOUS at 12:47

## 2019-11-20 RX ADMIN — POLYETHYLENE GLYCOL 3350 SCH GM: 17 POWDER, FOR SOLUTION ORAL at 21:07

## 2019-11-20 RX ADMIN — INSULIN ASPART SCH UNITS: 100 INJECTION, SOLUTION INTRAVENOUS; SUBCUTANEOUS at 18:49

## 2019-11-20 RX ADMIN — MEROPENEM SCH MLS/HR: 1 INJECTION INTRAVENOUS at 10:39

## 2019-11-20 RX ADMIN — INSULIN ASPART SCH UNITS: 100 INJECTION, SOLUTION INTRAVENOUS; SUBCUTANEOUS at 00:29

## 2019-11-20 RX ADMIN — INSULIN ASPART SCH UNITS: 100 INJECTION, SOLUTION INTRAVENOUS; SUBCUTANEOUS at 06:02

## 2019-11-20 RX ADMIN — INSULIN DETEMIR SCH UNITS: 100 INJECTION, SOLUTION SUBCUTANEOUS at 10:20

## 2019-11-20 RX ADMIN — IPRATROPIUM BROMIDE AND ALBUTEROL SULFATE SCH ML: .5; 3 SOLUTION RESPIRATORY (INHALATION) at 15:24

## 2019-11-20 RX ADMIN — INSULIN DETEMIR SCH UNITS: 100 INJECTION, SOLUTION SUBCUTANEOUS at 18:45

## 2019-11-20 RX ADMIN — ACETAMINOPHEN PRN MG: 160 SOLUTION ORAL at 01:17

## 2019-11-20 RX ADMIN — HEPARIN SODIUM SCH UNITS: 5000 INJECTION INTRAVENOUS; SUBCUTANEOUS at 10:16

## 2019-11-20 RX ADMIN — INSULIN ASPART SCH UNITS: 100 INJECTION, SOLUTION INTRAVENOUS; SUBCUTANEOUS at 00:28

## 2019-11-20 RX ADMIN — DOCUSATE SODIUM SCH MG: 100 CAPSULE, LIQUID FILLED ORAL at 10:14

## 2019-11-20 RX ADMIN — CHLORHEXIDINE GLUCONATE SCH APPLIC: 213 SOLUTION TOPICAL at 21:06

## 2019-11-20 RX ADMIN — DOCUSATE SODIUM SCH MG: 100 CAPSULE, LIQUID FILLED ORAL at 18:39

## 2019-11-20 RX ADMIN — MEROPENEM SCH MLS/HR: 1 INJECTION INTRAVENOUS at 21:25

## 2019-11-20 RX ADMIN — INSULIN ASPART SCH UNITS: 100 INJECTION, SOLUTION INTRAVENOUS; SUBCUTANEOUS at 23:54

## 2019-11-20 RX ADMIN — IPRATROPIUM BROMIDE AND ALBUTEROL SULFATE SCH ML: .5; 3 SOLUTION RESPIRATORY (INHALATION) at 07:02

## 2019-11-20 RX ADMIN — IPRATROPIUM BROMIDE AND ALBUTEROL SULFATE SCH ML: .5; 3 SOLUTION RESPIRATORY (INHALATION) at 20:19

## 2019-11-20 RX ADMIN — PANTOPRAZOLE SODIUM SCH MG: 40 INJECTION, POWDER, FOR SOLUTION INTRAVENOUS at 21:06

## 2019-11-20 RX ADMIN — PANTOPRAZOLE SODIUM SCH MG: 40 INJECTION, POWDER, FOR SOLUTION INTRAVENOUS at 10:13

## 2019-11-20 NOTE — NUR
RESPIRATORY NOTE: Reported to Dr. Torres that patient was unable to sustain weaning at a 
pressure support of 8 due to low spontaneous tidal volumes. I then told him I put her back 
to her previous vent settings but ordered me to put her back on a pressure support of 10.

## 2019-11-20 NOTE — NUR
RESPIRATORY NOTE: Received Patient on Vent settings AC VC RR14, Vt400, FiO2 30%, Peep +5. 
Patient has a Shiley 8 trach tube, secured with trach ties. Suctioned moderate amount of 
yellow thick secretions. Patient lying in bed comfortably. Vent plugged into red outlet. 
Alarms are audible. Will continue to monitor patient throughout the day.

## 2019-11-20 NOTE — NUR
RESPIRATORY NOTE: Patients O2 stat was dropping on PS of 10 so I had to put her back on 
previous vent settings.

## 2019-11-20 NOTE — NUR
NURSE NOTES: RECEIVED BED SIDE REPORT FROM SARITA QIU RN STAFF OF NOC SHIFT. RECE,D PT WITH 
HOB ELEVATED 45 DEGREE OBTUNDED TRACH TO VENT TOLERATING WELL CURRENTS VENT SETTINGS,O2 SAT 
100%. RENDERED TRACH CARE AND ORAL HYGIENE,SX,D MOD AMT OF WHITE TICK SECRETIONS.PT 
TOLERATING WELL GLUCERNA 1.2@ 60CC/HRS ,NO RESIDUAL NOTED AT THIS TIME.FULL BODY ASSESSMENT 
DONE .PICC-LINE ON RT UPPER ARM SEEMS CLEAN & PATENT. PT REPOSITIONED IN BED Q2HRS TO 
PROVIDE COMFORT & TO PREVENT SKIN BREAK DOWN. NO ACUTE DISTRESS NOTED AT THIS TIME.WILL CONT 
TO MONITOR.

## 2019-11-20 NOTE — NUR
CASE MANAGEMENT: REVIEW



11/20/19



SI: POD# 6 TRACHEOSTOMY

SEPSIS D/T PNA. ACUTE CHF

RESPIRATORY FAILURE ~ INTUBATED 

99.3   90   19   125/56   99% ON VENT SUPPORT @ 30% FIO2

WBC 18.9  RBC 2.96 H/H 7.9/24.6   K+ 5.2 BUN 52 MG 2.5



IS: IV LINEZOLID BID

IV MEROPENEM BID

IV DEXTROSE @100ML/HR

HEPARIN SQ BID

NORVASC NG BID

IV PROTONIX Q12

ALBUTEROL HHN Q6HR

MUCOMYST HHN TID





**2W SD UNIT



PLAN:ACTIVELY SEEKING PLACEMENT

## 2019-11-20 NOTE — NUR
*-* INSURANCE *-*



UPDATED CLINICALS AND REVIEWS HAVE BEEN FAXED TO:





[facility]#[id number]

CM: Lance

#[phone]

Fax#[phone]

## 2019-11-20 NOTE — GENERAL PROGRESS NOTE
Assessment/Plan


Problem List:  


(1) Abnormal TSH


ICD Codes:  R79.89 - Other specified abnormal findings of blood chemistry


SNOMED:  149977644


(2) Hyperglycemia due to type 2 diabetes mellitus


ICD Codes:  E11.65 - Type 2 diabetes mellitus with hyperglycemia


SNOMED:  304207269084990, 17672706


Qualifiers:  


   Qualified Codes:  E11.65 - Type 2 diabetes mellitus with hyperglycemia; 

Z79.4 - Long term (current) use of insulin


(3) Acute metabolic encephalopathy


ICD Codes:  G93.41 - Metabolic encephalopathy


SNOMED:  68699134, 949528935


(4) ARF (acute renal failure)


ICD Codes:  N17.9 - Acute kidney failure, unspecified


SNOMED:  11451267


Qualifiers:  


   Qualified Codes:  N17.9 - Acute kidney failure, unspecified


(5) Healthcare associated bacterial pneumonia


ICD Codes:  J15.9 - Unspecified bacterial pneumonia


SNOMED:  858836020


Status:  stable, unchanged


Assessment/Plan:


continue Levemir 20 units bid  -  hold if TF is being held or glucose < 100 mg/

dL 


continue Novolog 15 units every 6 hrs in addition to sliding scale - hold if TF 

is being held or glucose < 100 mg/dL 


continue NISS every 6 hours 


hypoglycemia protocol in order





Subjective


ROS Limited/Unobtainable:  Yes


Allergies:  


Coded Allergies:  


     No Known Allergies (Unverified , 10/14/19)


Subjective


events noted 


glucose values are elevated but trending down











Item Value  Date Time


 


Bedside Blood Glucose 200 mg/dl H 11/20/19 0602


 


Bedside Blood Glucose 316 mg/dl H 11/20/19 0029


 


Bedside Blood Glucose 265 mg/dl H 11/19/19 1904


 


Bedside Blood Glucose 265 mg/dl H 11/19/19 1858


 


Bedside Blood Glucose 297 mg/dl H 11/19/19 1302


 


Bedside Blood Glucose 261 mg/dl H 11/19/19 1026











Objective





Last 24 Hour Vital Signs








  Date Time  Temp Pulse Resp B/P (MAP) Pulse Ox O2 Delivery O2 Flow Rate FiO2


 


11/20/19 05:18  88 27     30


 


11/20/19 04:00        30


 


11/20/19 04:00      Mechanical Ventilator  


 


11/20/19 04:00 98.1 86 22 125/58 (80) 100   


 


11/20/19 03:50  85      


 


11/20/19 03:02  85 26     30


 


11/20/19 01:47 99.8       


 


11/20/19 00:40  112 28     30


 


11/20/19 00:00 100.5 97 26 148/66 (93) 100   


 


11/20/19 00:00        30


 


11/20/19 00:00      Mechanical Ventilator  


 


11/19/19 23:34  100      


 


11/19/19 23:08  94 28     30


 


11/19/19 21:18  97 27     30


 


11/19/19 20:00      Mechanical Ventilator  


 


11/19/19 20:00        30


 


11/19/19 20:00 99.1 102 20 126/49 (74) 100   


 


11/19/19 19:39  96      


 


11/19/19 19:22  97 29  100 Mechanical Ventilator  30





  95 29     30


 


11/19/19 18:55  103  149/72    


 


11/19/19 16:31  103 30     30


 


11/19/19 16:00 99.7 105 23 149/72 (97) 99   


 


11/19/19 16:00      Mechanical Ventilator  


 


11/19/19 16:00        30


 


11/19/19 15:36  100      


 


11/19/19 14:55  111 23     30


 


11/19/19 13:29  109 29  99 Mechanical Ventilator 45.0 30





  111 16     30


 


11/19/19 12:00 97.7 105 21 115/72 (86) 100   


 


11/19/19 12:00        30


 


11/19/19 12:00      Mechanical Ventilator  


 


11/19/19 12:00  100      


 


11/19/19 11:07  108 21     30


 


11/19/19 09:14  107 24     30


 


11/19/19 09:11  107  153/72    


 


11/19/19 08:00      Mechanical Ventilator  


 


11/19/19 08:00        30


 


11/19/19 08:00 98.6 107 22 153/72 (99) 100   


 


11/19/19 08:00  104      


 


11/19/19 07:25  105 30  100 Mechanical Ventilator 45.0 30





  104 28     30

















Intake and Output  


 


 11/19/19 11/20/19





 19:00 07:00


 


Intake Total 2220 ml 1383 ml


 


Output Total 625 ml 


 


Balance 1595 ml 1383 ml


 


  


 


Free Water 600 ml 200 ml


 


IV Total 900 ml 703 ml


 


Tube Feeding 720 ml 480 ml


 


Output Urine Total 625 ml 


 


# Bowel Movements 3 1








Laboratory Tests


11/19/19 17:30: Lactic Acid Level 1.30


11/20/19 03:28: 


Urine Color [Pending], Urine Appearance [Pending], Urine pH [Pending], Urine 

Specific Gravity [Pending], Urine Protein [Pending], Urine Glucose (UA) [Pending

], Urine Ketones [Pending], Urine Blood [Pending], Urine Nitrite [Pending], 

Urine Bilirubin [Pending], Urine Urobilinogen [Pending], Urine Leukocyte 

Esterase [Pending]


11/20/19 03:50: 


White Blood Count 18.9H, Red Blood Count 2.96L, Hemoglobin 7.9L, Hematocrit 

24.6L, Mean Corpuscular Volume 83, Mean Corpuscular Hemoglobin 26.9L, Mean 

Corpuscular Hemoglobin Concent 32.3, Red Cell Distribution Width 14.6, Platelet 

Count 452H, Mean Platelet Volume 7.8, Neutrophils (%) (Auto) , Lymphocytes (%) (

Auto) , Monocytes (%) (Auto) , Eosinophils (%) (Auto) , Basophils (%) (Auto) , 

Neutrophils % (Manual) [Pending], Lymphocytes % (Manual) [Pending], Platelet 

Estimate [Pending], Platelet Morphology [Pending], Erythrocyte Sedimentation 

Rate [Pending], Prothrombin Time 10.7, Prothromb Time International Ratio 1.0, 

Activated Partial Thromboplast Time 30, Sodium Level 144#, Potassium Level 4.2, 

Chloride Level 108H, Carbon Dioxide Level 33H, Anion Gap 3L, Blood Urea 

Nitrogen 52H, Creatinine 1.3, Estimat Glomerular Filtration Rate 41.0, Glucose 

Level 168H, Calcium Level 9.0, Total Bilirubin 0.4, Aspartate Amino Transf (AST/

SGOT) 17, Alanine Aminotransferase (ALT/SGPT) 20, Alkaline Phosphatase 102, C-

Reactive Protein, Quantitative 8.9H, Total Protein 7.4, Albumin 2.1L, Globulin 

5.3, Albumin/Globulin Ratio 0.4L, Lipase 102


Height (Feet):  5


Height (Inches):  5.00


Weight (Pounds):  133


General Appearance:  lethargic


Neck:  normal alignment


Cardiovascular:  normal rate


Respiratory/Chest:  decreased breath sounds


Abdomen:  normal bowel sounds


Objective





Current Medications








 Medications


  (Trade)  Dose


 Ordered  Sig/Winnie


 Route


 PRN Reason  Start Time


 Stop Time Status Last Admin


Dose Admin


 


 Acetaminophen


  (Tylenol)  650 mg  Q4H  PRN


 NG


 Mild Pain/Temp > 100.5  11/15/19 18:00


 11/25/19 17:59  11/20/19 01:17


 


 


 Acetaminophen


  (Tylenol)  650 mg  Q4H  PRN


 RECTAL


 TEMP>100.5  11/15/19 18:00


 12/2/19 17:59   


 


 


 Acetylcysteine


  (Mucomyst)  100 mg  TIDRT


 N


   11/15/19 19:00


 12/10/19 19:29  11/19/19 19:58


 


 


 Albuterol/


 Ipratropium


  (Albuterol/


 Ipratropium)  3 ml  Q6HRT


 N


   11/17/19 19:00


 11/22/19 18:59  11/19/19 19:58


 


 


 Amlodipine


 Besylate


  (Norvasc)  2.5 mg  BID


 NG


   11/15/19 18:00


 11/29/19 17:59  11/19/19 18:55


 


 


 Chlorhexidine


 Gluconate


  (Mae-Hex 2%)  1 applic  DAILY@2000


 TOPIC


   11/15/19 20:00


 12/5/19 19:59  11/19/19 20:49


 


 


 Dextrose  1,000 ml @ 


 100 mls/hr  Q10H


 IV


   11/19/19 10:30


 12/19/19 10:29  11/20/19 06:06


 


 


 Dextrose


  (Dextrose 50%)  25 ml  Q30M  PRN


 IV


 Hypoglycemia  11/16/19 13:00


 12/16/19 12:59   


 


 


 Dextrose


  (Dextrose 50%)  50 ml  Q30M  PRN


 IV


 Hypoglycemia  11/16/19 13:00


 12/16/19 12:59   


 


 


 Docusate Sodium


  (Colace)  100 mg  TWICE A  DAY


 ORAL


   11/18/19 18:00


 12/18/19 17:59  11/19/19 18:55


 


 


 Folic Acid


  (Folate)  3 mg  DAILY


 GT


   11/16/19 09:00


 11/30/19 08:59  11/19/19 09:11


 


 


 Heparin Sodium


  (Porcine)


  (Heparin 5000


 units/ml)  5,000 units  EVERY 12  HOURS


 SUBQ


   11/18/19 21:00


 12/18/19 20:59  11/19/19 21:00


 


 


 Insulin Aspart


  (NovoLOG)    EVERY 6  HOURS


 SUBQ


   11/16/19 18:00


 12/16/19 17:59  11/20/19 06:02


 


 


 Insulin Aspart


  (NovoLOG)  15 units  EVERY 6  HOURS


 SUBQ


   11/19/19 12:00


 12/16/19 17:59  11/20/19 06:02


 


 


 Insulin Detemir


  (Levemir)  20 units  BID


 SUBQ


   11/19/19 18:00


 12/1/19 08:59  11/19/19 18:58


 


 


 Lactulose


  (Cephulac)  20 gm  Q8H  PRN


 NG


 Constipation  11/15/19 18:00


 12/3/19 17:59   


 


 


 Metoclopramide HCl


  (Reglan)  10 mg  Q6H  PRN


 IVP


 Nausea & Vomiting  11/15/19 18:00


 12/6/19 17:59   


 


 


 Pantoprazole


  (Protonix)  40 mg  EVERY 12  HOURS


 IVP


   11/15/19 21:00


 12/3/19 08:59  11/19/19 20:49


 


 


 Polyethylene


 Glycol


  (Miralax)  17 gm  BEDTIME


 GT


   11/18/19 21:00


 12/18/19 20:59  11/19/19 20:49


 

















Riccardo Velez MD Nov 20, 2019 06:21

## 2019-11-20 NOTE — CARDIOLOGY PROGRESS NOTE
Assessment/Plan


Assessment/Plan


1. acute congestive heart failure


2. Abnormal cardiac enzyme demand related due to infection and profound anemia 


3. Agitation 


4. Urinary tract infection.


5. Acute renal failure.


6. Profound anemia.


7. Diabetes mellitus.


8. History of hypertension.


9. History of mood disorder.


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 


11. arf 


12. pelvic mass


13  sepsis 


14. pneumonia 


15. hypernatremai  











off dapt due to suspected bleeding ? resume 


echo mild systolic dysfunction 


looks awake  


off  lasix and metolazone 


fna improved dc free water





Subjective


ROS Limited/Unobtainable:  Yes





Objective





Last 24 Hour Vital Signs








  Date Time  Temp Pulse Resp B/P (MAP) Pulse Ox O2 Delivery O2 Flow Rate FiO2


 


11/20/19 19:30  85 24  100 Mechanical Ventilator  30





  88 18     30


 


11/20/19 18:39  100  120/58    


 


11/20/19 16:53  100 30     30


 


11/20/19 16:00  87      


 


11/20/19 16:00 99.3 106 21 120/58 (78) 99   


 


11/20/19 16:00        30


 


11/20/19 16:00      Mechanical Ventilator  


 


11/20/19 15:26  100 17  100 Mechanical Ventilator  30


 


11/20/19 15:23  88 22     30


 


11/20/19 13:05  89 24     30


 


11/20/19 12:00        30


 


11/20/19 12:00 99.3 90 19 125/56 (79) 99   


 


11/20/19 12:00  83      


 


11/20/19 12:00      Mechanical Ventilator  


 


11/20/19 10:56  99 30     30


 


11/20/19 10:13  79  122/52    


 


11/20/19 09:23  79 27     30


 


11/20/19 08:53     95   


 


11/20/19 08:46  82 25     30


 


11/20/19 08:42  84 23     30


 


11/20/19 08:00  97      


 


11/20/19 08:00      Mechanical Ventilator  


 


11/20/19 08:00        30


 


11/20/19 08:00 99.0 90 17 122/52 (75) 97   


 


11/20/19 07:02  93 21  100 Mechanical Ventilator  30





  95 18     30


 


11/20/19 05:18  88 27     30


 


11/20/19 04:00        30


 


11/20/19 04:00      Mechanical Ventilator  


 


11/20/19 04:00 98.1 86 22 125/58 (80) 100   


 


11/20/19 03:50  85      


 


11/20/19 03:02  85 26     30


 


11/20/19 01:47 99.8       


 


11/20/19 00:40  112 28     30


 


11/20/19 00:00 100.5 97 26 148/66 (93) 100   


 


11/20/19 00:00        30


 


11/20/19 00:00      Mechanical Ventilator  


 


11/19/19 23:34  100      


 


11/19/19 23:08  94 28     30


 


11/19/19 21:18  97 27     30








General Appearance:  on vent, patient on isolation











Intake and Output  


 


 11/19/19 11/20/19





 18:59 06:59


 


Intake Total 2180 ml 1543 ml


 


Output Total 625 ml 


 


Balance 1555 ml 1543 ml


 


  


 


Free Water 600 ml 200 ml


 


IV Total 800 ml 803 ml


 


Tube Feeding 780 ml 540 ml


 


Output Urine Total 625 ml 


 


# Bowel Movements 3 1











Laboratory Tests








Test


  11/20/19


03:28 11/20/19


03:50


 


Urine Color Pale yellow   


 


Urine Appearance Clear   


 


Urine pH 8 (4.5-8.0)   


 


Urine Specific Gravity


  1.010


(1.005-1.035) 


 


 


Urine Protein


  3+ (NEGATIVE)


H 


 


 


Urine Glucose (UA)


  1+ (NEGATIVE)


H 


 


 


Urine Ketones


  Negative


(NEGATIVE) 


 


 


Urine Blood


  Negative


(NEGATIVE) 


 


 


Urine Nitrite


  Negative


(NEGATIVE) 


 


 


Urine Bilirubin


  Negative


(NEGATIVE) 


 


 


Urine Urobilinogen


  1 MG/DL


(0.0-1.0)  H 


 


 


Urine Leukocyte Esterase


  Negative


(NEGATIVE) 


 


 


Urine RBC


  0-2 /HPF (0 -


2) 


 


 


Urine WBC


  0-2 /HPF (0 -


2) 


 


 


Urine Squamous Epithelial


Cells Occasional


/LPF 


 


 


Urine Bacteria


  Occasional


/HPF (NONE) 


 


 


White Blood Count


  


  18.9 K/UL


(4.8-10.8)  H


 


Red Blood Count


  


  2.96 M/UL


(4.20-5.40)  L


 


Hemoglobin


  


  7.9 G/DL


(12.0-16.0)  L


 


Hematocrit


  


  24.6 %


(37.0-47.0)  L


 


Mean Corpuscular Volume  83 FL (80-99)  


 


Mean Corpuscular Hemoglobin


  


  26.9 PG


(27.0-31.0)  L


 


Mean Corpuscular Hemoglobin


Concent 


  32.3 G/DL


(32.0-36.0)


 


Red Cell Distribution Width


  


  14.6 %


(11.6-14.8)


 


Platelet Count


  


  452 K/UL


(150-450)  H


 


Mean Platelet Volume


  


  7.8 FL


(6.5-10.1)


 


Neutrophils (%) (Auto)


  


  % (45.0-75.0)


 


 


Lymphocytes (%) (Auto)


  


  % (20.0-45.0)


 


 


Monocytes (%) (Auto)   % (1.0-10.0)  


 


Eosinophils (%) (Auto)   % (0.0-3.0)  


 


Basophils (%) (Auto)   % (0.0-2.0)  


 


Differential Total Cells


Counted 


  100  


 


 


Neutrophils % (Manual)  62 % (45-75)  


 


Lymphocytes % (Manual)  21 % (20-45)  


 


Monocytes % (Manual)  4 % (1-10)  


 


Eosinophils % (Manual)  11 % (0-3)  H


 


Basophils % (Manual)  1 % (0-2)  


 


Band Neutrophils  1 % (0-8)  


 


Platelet Estimate  Increased  H


 


Platelet Morphology  Normal  


 


Erythrocyte Sedimentation Rate


  


  130 MM/HR


(0-30)  H


 


Prothrombin Time


  


  10.7 SEC


(9.30-11.50)


 


Prothromb Time International


Ratio 


  1.0 (0.9-1.1)  


 


 


Activated Partial


Thromboplast Time 


  30 SEC (23-33)


 


 


Sodium Level


  


  144 MMOL/L


(136-145)  #


 


Potassium Level


  


  4.2 MMOL/L


(3.5-5.1)


 


Chloride Level


  


  108 MMOL/L


()  H


 


Carbon Dioxide Level


  


  33 MMOL/L


(21-32)  H


 


Anion Gap


  


  3 mmol/L


(5-15)  L


 


Blood Urea Nitrogen


  


  52 mg/dL


(7-18)  H


 


Creatinine


  


  1.3 MG/DL


(0.55-1.30)


 


Estimat Glomerular Filtration


Rate 


  41.0 mL/min


(>60)


 


Glucose Level


  


  168 MG/DL


()  H


 


Calcium Level


  


  9.0 MG/DL


(8.5-10.1)


 


Phosphorus Level


  


  3.4 MG/DL


(2.5-4.9)


 


Magnesium Level


  


  2.5 MG/DL


(1.8-2.4)  H


 


Total Bilirubin


  


  0.4 MG/DL


(0.2-1.0)


 


Aspartate Amino Transf


(AST/SGOT) 


  17 U/L (15-37)


 


 


Alanine Aminotransferase


(ALT/SGPT) 


  20 U/L (12-78)


 


 


Alkaline Phosphatase


  


  102 U/L


()


 


C-Reactive Protein,


Quantitative 


  8.9 mg/dL


(0.00-0.90)  H


 


Total Protein


  


  7.4 G/DL


(6.4-8.2)


 


Albumin


  


  2.1 G/DL


(3.4-5.0)  L


 


Globulin  5.3 g/dL  


 


Albumin/Globulin Ratio


  


  0.4 (1.0-2.7)


L


 


Lipase


  


  102 U/L


()

















Rob May MD Nov 20, 2019 20:37

## 2019-11-20 NOTE — NUR
RESPIRATORY NOTE: Patient is stale during weaning. Decreasing Pressure support from 10 to 8. 
will continue to monitor throughout the day.

## 2019-11-20 NOTE — NUR
RESPIRATORY NOTES:

Weaning stopped. Placed Patient back onto ACVC due to low tidal volumes. Will continue to 
monitor patient.

## 2019-11-20 NOTE — NUR
NURSE NOTES: DR GATES CAME TO SEE THE PT AND MADE AWARE & NOTIFIED REGARDING HGB 7.9 ,HCT 
25.4.NO NEW ORDERS NOTED AT THIS TIME. WILL CONT TO MONITOR.

## 2019-11-20 NOTE — NUR
RESPIRATORY NOTE: Weaning started. Placed patient on PS+12, PEEP+5, FiO2 30%. Will continue 
to monitor.

## 2019-11-20 NOTE — GENERAL PROGRESS NOTE
Assessment/Plan


Status:  stable, unchanged


Assessment/Plan:








Assessment/Plan


Problems:  


(1) PEG (percutaneous endoscopic gastrostomy) status


ICD Codes:  Z93.1 - Gastrostomy status


SNOMED:  854750747, 613930926


(2) Anemia


ICD Codes:  D64.9 - Anemia, unspecified


SNOMED:  594197452


Qualifiers:  


   Qualified Codes:  D64.9 - Anemia, unspecified


(3) Dehydration


ICD Codes:  E86.0 - Dehydration


SNOMED:  62314688, [id number]


Status:  unchanged





Assessment/Plan


Assessment


(1) pneumonia


(2) UTI


(3) Sepsis


(4) Dehydration


(5) CHANCE 


(6) Acute metabolic encephalopathy


(7) Hyperglycemia due to type 2 diabetes mellitus


(8) Renal failure (ARF), acute on chronic


(9) Aspiration pneumonia


(10) Abnormal TSH


(11) Pelvic mass in female


(12) Congestive Heart Failure


(13) Anemia


(14) Dysphagia - s/p GT





s/p EGD SUMMARY OF FINDINGS:


1. No evidence of any active upper GI bleeding at this time.


2. Gastritis status biopsy.





Recommendations


GTFs


prn transfusions


bowel regime


PPI and H2B


will follow up, consider colonoscopy if patient has recurrent GI bleed


still having fever>> fu ID





Subjective


ROS Limited/Unobtainable:  No


Allergies:  


Coded Allergies:  


     No Known Allergies (Unverified , 10/14/19)





Objective





Last 24 Hour Vital Signs








  Date Time  Temp Pulse Resp B/P (MAP) Pulse Ox O2 Delivery O2 Flow Rate FiO2


 


11/20/19 09:23  79 27     30


 


11/20/19 08:53     95   


 


11/20/19 08:46  82 25     30


 


11/20/19 08:42  84 23     30


 


11/20/19 08:00      Mechanical Ventilator  


 


11/20/19 08:00        30


 


11/20/19 08:00 99.0 90 17 122/52 (75) 97   


 


11/20/19 07:02  93 21  100 Mechanical Ventilator  30





  95 18     30


 


11/20/19 05:18  88 27     30


 


11/20/19 04:00        30


 


11/20/19 04:00      Mechanical Ventilator  


 


11/20/19 04:00 98.1 86 22 125/58 (80) 100   


 


11/20/19 03:50  85      


 


11/20/19 03:02  85 26     30


 


11/20/19 01:47 99.8       


 


11/20/19 00:40  112 28     30


 


11/20/19 00:00 100.5 97 26 148/66 (93) 100   


 


11/20/19 00:00        30


 


11/20/19 00:00      Mechanical Ventilator  


 


11/19/19 23:34  100      


 


11/19/19 23:08  94 28     30


 


11/19/19 21:18  97 27     30


 


11/19/19 20:00      Mechanical Ventilator  


 


11/19/19 20:00        30


 


11/19/19 20:00 99.1 102 20 126/49 (74) 100   


 


11/19/19 19:39  96      


 


11/19/19 19:22  97 29  100 Mechanical Ventilator  30





  95 29     30


 


11/19/19 18:55  103  149/72    


 


11/19/19 16:31  103 30     30


 


11/19/19 16:00 99.7 105 23 149/72 (97) 99   


 


11/19/19 16:00      Mechanical Ventilator  


 


11/19/19 16:00        30


 


11/19/19 15:36  100      


 


11/19/19 14:55  111 23     30


 


11/19/19 13:29  109 29  99 Mechanical Ventilator 45.0 30





  111 16     30


 


11/19/19 12:00 97.7 105 21 115/72 (86) 100   


 


11/19/19 12:00        30


 


11/19/19 12:00      Mechanical Ventilator  


 


11/19/19 12:00  100      


 


11/19/19 11:07  108 21     30

















Intake and Output  


 


 11/19/19 11/20/19





 19:00 07:00


 


Intake Total 2220 ml 1443 ml


 


Output Total 625 ml 


 


Balance 1595 ml 1443 ml


 


  


 


Free Water 600 ml 200 ml


 


IV Total 900 ml 703 ml


 


Tube Feeding 720 ml 540 ml


 


Output Urine Total 625 ml 


 


# Bowel Movements 3 1








Laboratory Tests


11/19/19 17:30: Lactic Acid Level 1.30


11/20/19 03:28: 


Urine Color Pale yellow, Urine Appearance Clear, Urine pH 8, Urine Specific 

Gravity 1.010, Urine Protein 3+H, Urine Glucose (UA) 1+H, Urine Ketones Negative

, Urine Blood Negative, Urine Nitrite Negative, Urine Bilirubin Negative, Urine 

Urobilinogen 1H, Urine Leukocyte Esterase Negative, Urine RBC 0-2, Urine WBC 0-2

, Urine Squamous Epithelial Cells Occasional, Urine Bacteria Occasional


11/20/19 03:50: 


White Blood Count 18.9H, Red Blood Count 2.96L, Hemoglobin 7.9L, Hematocrit 

24.6L, Mean Corpuscular Volume 83, Mean Corpuscular Hemoglobin 26.9L, Mean 

Corpuscular Hemoglobin Concent 32.3, Red Cell Distribution Width 14.6, Platelet 

Count 452H, Mean Platelet Volume 7.8, Neutrophils (%) (Auto) , Lymphocytes (%) (

Auto) , Monocytes (%) (Auto) , Eosinophils (%) (Auto) , Basophils (%) (Auto) , 

Differential Total Cells Counted 100, Neutrophils % (Manual) 62, Lymphocytes % (

Manual) 21, Monocytes % (Manual) 4, Eosinophils % (Manual) 11H, Basophils % (

Manual) 1, Band Neutrophils 1, Platelet Estimate IncreasedH, Platelet 

Morphology Normal, Erythrocyte Sedimentation Rate 130H, Prothrombin Time 10.7, 

Prothromb Time International Ratio 1.0, Activated Partial Thromboplast Time 30, 

Sodium Level 144#, Potassium Level 4.2, Chloride Level 108H, Carbon Dioxide 

Level 33H, Anion Gap 3L, Blood Urea Nitrogen 52H, Creatinine 1.3, Estimat 

Glomerular Filtration Rate 41.0, Glucose Level 168H, Calcium Level 9.0, 

Phosphorus Level 3.4, Magnesium Level 2.5H, Total Bilirubin 0.4, Aspartate 

Amino Transf (AST/SGOT) 17, Alanine Aminotransferase (ALT/SGPT) 20, Alkaline 

Phosphatase 102, C-Reactive Protein, Quantitative 8.9H, Total Protein 7.4, 

Albumin 2.1L, Globulin 5.3, Albumin/Globulin Ratio 0.4L, Lipase 102


Height (Feet):  5


Height (Inches):  5.00


Weight (Pounds):  133


General Appearance:  no apparent distress


EENT:  normal ENT inspection


Neck:  supple


Cardiovascular:  tachycardia


Respiratory/Chest:  decreased breath sounds


Abdomen:  normal bowel sounds, non tender, soft


Extremities:  non-tender











Storm Turk MD Nov 20, 2019 09:58

## 2019-11-20 NOTE — NUR
RESPIRATORY NOTE: Patient is weaning and is stable decreased PS from 12 to 10. Will continue 
to monitor.

## 2019-11-20 NOTE — INFECTIOUS DISEASES PROG NOTE
Assessment/Plan


Problems:  


(1) Leukocytosis


Assessment & Plan:  persistent , rule out sepsis VS reactive due to pelvic mass 

, will start meropenem and zyvox empirically for now pending repeated  blood 

culture x 2 ,  CXR  showed no active infiltrates . 





(2) Fever


Assessment & Plan:  suspect due to pelvic mass ,recurrent ,  rule out sepsis or 

CLABSI , had negative repeated blood cultures on 11/12 , urine and sputum only 

grew yeast which is colonization . restarted on antibiotics for now pending 

cultures 





(3) Respiratory failure


Assessment & Plan:  with maegan cardia and S/P code blue, was intubated and 

started on mechanical ventilation , S/P tracheostomy ,  pulmonary is following 





(4) UTI (urinary tract infection)


Assessment & Plan:  due to candida lusitaneae , S/P casas catheter removal , no 

specific therapy needed for now after changing her casas catheter 





(5) Acute metabolic encephalopathy


Assessment & Plan:  due to the above, continue hydration and avoid sedatives , 

monitor in tele 





(6) Hyperglycemia due to type 2 diabetes mellitus


Assessment & Plan:  recommend tight glycemic control to keep blood glucose 

between 100-140 





(7) ARF (acute renal failure)


Assessment & Plan:  due to the above, continue hydration and renally dosed meds 

, close  monitor of renal function 





(8) Pelvic mass in female


Assessment & Plan:  to the left of the uterus, could be the source of her fever 

and leukocytosis , rule out malignancy , may need surgical resection , 

recommend OB/GYN eval and follow up , oncology is following 








Subjective


ROS Limited/Unobtainable:  Yes


Allergies:  


Coded Allergies:  


     No Known Allergies (Unverified , 10/14/19)


Subjective


she was resting in her room , quiet, and comfortable,  has mild secretions 

whitish , no cough or congestion, no diarrhea , continued on mechanical 

ventilation through her trach , no bleeding or draining from the trach site ,  

had low grade fever but no chills .





Objective


Vital Signs





Last 24 Hour Vital Signs








  Date Time  Temp Pulse Resp B/P (MAP) Pulse Ox O2 Delivery O2 Flow Rate FiO2


 


11/20/19 13:05  89 24     30


 


11/20/19 12:00        30


 


11/20/19 12:00 99.3 90 19 125/56 (79) 99   


 


11/20/19 12:00      Mechanical Ventilator  


 


11/20/19 10:56  99 30     30


 


11/20/19 10:13  79  122/52    


 


11/20/19 09:23  79 27     30


 


11/20/19 08:53     95   


 


11/20/19 08:46  82 25     30


 


11/20/19 08:42  84 23     30


 


11/20/19 08:00  97      


 


11/20/19 08:00      Mechanical Ventilator  


 


11/20/19 08:00        30


 


11/20/19 08:00 99.0 90 17 122/52 (75) 97   


 


11/20/19 07:02  93 21  100 Mechanical Ventilator  30





  95 18     30


 


11/20/19 05:18  88 27     30


 


11/20/19 04:00        30


 


11/20/19 04:00      Mechanical Ventilator  


 


11/20/19 04:00 98.1 86 22 125/58 (80) 100   


 


11/20/19 03:50  85      


 


11/20/19 03:02  85 26     30


 


11/20/19 01:47 99.8       


 


11/20/19 00:40  112 28     30


 


11/20/19 00:00 100.5 97 26 148/66 (93) 100   


 


11/20/19 00:00        30


 


11/20/19 00:00      Mechanical Ventilator  


 


11/19/19 23:34  100      


 


11/19/19 23:08  94 28     30


 


11/19/19 21:18  97 27     30


 


11/19/19 20:00      Mechanical Ventilator  


 


11/19/19 20:00        30


 


11/19/19 20:00 99.1 102 20 126/49 (74) 100   


 


11/19/19 19:39  96      


 


11/19/19 19:22  97 29  100 Mechanical Ventilator  30





  95 29     30


 


11/19/19 18:55  103  149/72    


 


11/19/19 16:31  103 30     30


 


11/19/19 16:00 99.7 105 23 149/72 (97) 99   


 


11/19/19 16:00      Mechanical Ventilator  


 


11/19/19 16:00        30


 


11/19/19 15:36  100      


 


11/19/19 14:55  111 23     30








Height (Feet):  5


Height (Inches):  5.00


Weight (Pounds):  134


General Appearance:  WD/WN, no acute distress


HEENT:  normocephalic, atraumatic, anicteric, mucous membranes moist, PERRL


Respiratory/Chest:  chest wall non-tender, no respiratory distress, no 

accessory muscle use, decreased breath sounds


Cardiovascular:  normal peripheral pulses, normal rate, regular rhythm, no 

gallop/murmur, no JVD


Abdomen:  normal bowel sounds, soft, non tender, no organomegaly, non distended

, no mass, no scars


Genitourinary:  normal external genitalia


Extremities:  no cyanosis, no clubbing


Skin:  no rash, no lesions


Neurologic/Psychiatric:  unresponsiveness


Lymphatic:  no neck adenopathy, no groin adenopathy


Musculoskeletal:  normal muscle bulk, no effusion





Laboratory Tests








Test


  11/19/19


17:30 11/20/19


03:28 11/20/19


03:50


 


Lactic Acid Level


  1.30 mmol/L


(0.4-2.0) 


  


 


 


Urine Color  Pale yellow   


 


Urine Appearance  Clear   


 


Urine pH  8 (4.5-8.0)   


 


Urine Specific Gravity


  


  1.010


(1.005-1.035) 


 


 


Urine Protein


  


  3+ (NEGATIVE)


H 


 


 


Urine Glucose (UA)


  


  1+ (NEGATIVE)


H 


 


 


Urine Ketones


  


  Negative


(NEGATIVE) 


 


 


Urine Blood


  


  Negative


(NEGATIVE) 


 


 


Urine Nitrite


  


  Negative


(NEGATIVE) 


 


 


Urine Bilirubin


  


  Negative


(NEGATIVE) 


 


 


Urine Urobilinogen


  


  1 MG/DL


(0.0-1.0)  H 


 


 


Urine Leukocyte Esterase


  


  Negative


(NEGATIVE) 


 


 


Urine RBC


  


  0-2 /HPF (0 -


2) 


 


 


Urine WBC


  


  0-2 /HPF (0 -


2) 


 


 


Urine Squamous Epithelial


Cells 


  Occasional


/LPF 


 


 


Urine Bacteria


  


  Occasional


/HPF (NONE) 


 


 


White Blood Count


  


  


  18.9 K/UL


(4.8-10.8)  H


 


Red Blood Count


  


  


  2.96 M/UL


(4.20-5.40)  L


 


Hemoglobin


  


  


  7.9 G/DL


(12.0-16.0)  L


 


Hematocrit


  


  


  24.6 %


(37.0-47.0)  L


 


Mean Corpuscular Volume   83 FL (80-99)  


 


Mean Corpuscular Hemoglobin


  


  


  26.9 PG


(27.0-31.0)  L


 


Mean Corpuscular Hemoglobin


Concent 


  


  32.3 G/DL


(32.0-36.0)


 


Red Cell Distribution Width


  


  


  14.6 %


(11.6-14.8)


 


Platelet Count


  


  


  452 K/UL


(150-450)  H


 


Mean Platelet Volume


  


  


  7.8 FL


(6.5-10.1)


 


Neutrophils (%) (Auto)


  


  


  % (45.0-75.0)


 


 


Lymphocytes (%) (Auto)


  


  


  % (20.0-45.0)


 


 


Monocytes (%) (Auto)    % (1.0-10.0)  


 


Eosinophils (%) (Auto)    % (0.0-3.0)  


 


Basophils (%) (Auto)    % (0.0-2.0)  


 


Differential Total Cells


Counted 


  


  100  


 


 


Neutrophils % (Manual)   62 % (45-75)  


 


Lymphocytes % (Manual)   21 % (20-45)  


 


Monocytes % (Manual)   4 % (1-10)  


 


Eosinophils % (Manual)   11 % (0-3)  H


 


Basophils % (Manual)   1 % (0-2)  


 


Band Neutrophils   1 % (0-8)  


 


Platelet Estimate   Increased  H


 


Platelet Morphology   Normal  


 


Erythrocyte Sedimentation Rate


  


  


  130 MM/HR


(0-30)  H


 


Prothrombin Time


  


  


  10.7 SEC


(9.30-11.50)


 


Prothromb Time International


Ratio 


  


  1.0 (0.9-1.1)  


 


 


Activated Partial


Thromboplast Time 


  


  30 SEC (23-33)


 


 


Sodium Level


  


  


  144 MMOL/L


(136-145)  #


 


Potassium Level


  


  


  4.2 MMOL/L


(3.5-5.1)


 


Chloride Level


  


  


  108 MMOL/L


()  H


 


Carbon Dioxide Level


  


  


  33 MMOL/L


(21-32)  H


 


Anion Gap


  


  


  3 mmol/L


(5-15)  L


 


Blood Urea Nitrogen


  


  


  52 mg/dL


(7-18)  H


 


Creatinine


  


  


  1.3 MG/DL


(0.55-1.30)


 


Estimat Glomerular Filtration


Rate 


  


  41.0 mL/min


(>60)


 


Glucose Level


  


  


  168 MG/DL


()  H


 


Calcium Level


  


  


  9.0 MG/DL


(8.5-10.1)


 


Phosphorus Level


  


  


  3.4 MG/DL


(2.5-4.9)


 


Magnesium Level


  


  


  2.5 MG/DL


(1.8-2.4)  H


 


Total Bilirubin


  


  


  0.4 MG/DL


(0.2-1.0)


 


Aspartate Amino Transf


(AST/SGOT) 


  


  17 U/L (15-37)


 


 


Alanine Aminotransferase


(ALT/SGPT) 


  


  20 U/L (12-78)


 


 


Alkaline Phosphatase


  


  


  102 U/L


()


 


C-Reactive Protein,


Quantitative 


  


  8.9 mg/dL


(0.00-0.90)  H


 


Total Protein


  


  


  7.4 G/DL


(6.4-8.2)


 


Albumin


  


  


  2.1 G/DL


(3.4-5.0)  L


 


Globulin   5.3 g/dL  


 


Albumin/Globulin Ratio


  


  


  0.4 (1.0-2.7)


L


 


Lipase


  


  


  102 U/L


()











Current Medications








 Medications


  (Trade)  Dose


 Ordered  Sig/Winnie


 Route


 PRN Reason  Start Time


 Stop Time Status Last Admin


Dose Admin


 


 Acetaminophen


  (Tylenol)  650 mg  Q4H  PRN


 NG


 Mild Pain/Temp > 100.5  11/15/19 18:00


 11/25/19 17:59  11/20/19 01:17


 


 


 Acetaminophen


  (Tylenol)  650 mg  Q4H  PRN


 RECTAL


 TEMP>100.5  11/15/19 18:00


 12/2/19 17:59   


 


 


 Acetylcysteine


  (Mucomyst)  100 mg  TIDRT


 N


   11/15/19 19:00


 12/10/19 19:29  11/20/19 07:02


 


 


 Albuterol/


 Ipratropium


  (Albuterol/


 Ipratropium)  3 ml  Q6HRT


 N


   11/17/19 19:00


 11/22/19 18:59  11/20/19 07:02


 


 


 Amlodipine


 Besylate


  (Norvasc)  2.5 mg  BID


 NG


   11/15/19 18:00


 11/29/19 17:59  11/20/19 10:13


 


 


 Chlorhexidine


 Gluconate


  (Mae-Hex 2%)  1 applic  DAILY@2000


 TOPIC


   11/15/19 20:00


 12/5/19 19:59  11/19/19 20:49


 


 


 Dextrose  1,000 ml @ 


 50 mls/hr  Q20H


 IV


   11/21/19 10:30


 12/19/19 10:29   


 


 


 Dextrose


  (Dextrose 50%)  25 ml  Q30M  PRN


 IV


 Hypoglycemia  11/16/19 13:00


 12/16/19 12:59   


 


 


 Dextrose


  (Dextrose 50%)  50 ml  Q30M  PRN


 IV


 Hypoglycemia  11/16/19 13:00


 12/16/19 12:59   


 


 


 Docusate Sodium


  (Colace)  100 mg  TWICE A  DAY


 ORAL


   11/18/19 18:00


 12/18/19 17:59  11/20/19 10:14


 


 


 Folic Acid


  (Folate)  3 mg  DAILY


 GT


   11/16/19 09:00


 11/30/19 08:59  11/20/19 10:13


 


 


 Heparin Sodium


  (Porcine)


  (Heparin 5000


 units/ml)  5,000 units  EVERY 12  HOURS


 SUBQ


   11/18/19 21:00


 12/18/19 20:59  11/20/19 10:16


 


 


 Insulin Aspart


  (NovoLOG)    EVERY 6  HOURS


 SUBQ


   11/16/19 18:00


 12/16/19 17:59  11/20/19 12:41


 


 


 Insulin Aspart


  (NovoLOG)  15 units  EVERY 6  HOURS


 SUBQ


   11/19/19 12:00


 12/16/19 17:59  11/20/19 12:47


 


 


 Insulin Detemir


  (Levemir)  20 units  BID


 SUBQ


   11/19/19 18:00


 12/1/19 08:59  11/20/19 10:20


 


 


 Lactulose


  (Cephulac)  20 gm  Q8H  PRN


 NG


 Constipation  11/15/19 18:00


 12/3/19 17:59   


 


 


 Linezolid  300 ml @ 


 300 mls/hr  Q12HR


 IVPB


   11/20/19 11:00


 11/27/19 10:59  11/20/19 11:09


 


 


 Meropenem 1 gm/


 Sodium Chloride  55 ml @ 


 110 mls/hr  Q12H


 IVPB


   11/20/19 10:00


 11/25/19 09:59  11/20/19 10:39


 


 


 Metoclopramide HCl


  (Reglan)  10 mg  Q6H  PRN


 IVP


 Nausea & Vomiting  11/15/19 18:00


 12/6/19 17:59   


 


 


 Pantoprazole


  (Protonix)  40 mg  EVERY 12  HOURS


 IVP


   11/15/19 21:00


 12/3/19 08:59  11/20/19 10:13


 


 


 Polyethylene


 Glycol


  (Miralax)  17 gm  BEDTIME


 GT


   11/18/19 21:00


 12/18/19 20:59  11/19/19 20:49


 

















Amie Burgos M.D. Nov 20, 2019 14:00

## 2019-11-20 NOTE — GENERAL PROGRESS NOTE
Assessment/Plan


Status:  stable, unchanged


Assessment/Plan:


S, O: On Trach,  , vent setting reviewed. No pressor. awake, limited following 

commands





PHYSICAL EXAMINATION:HEAD AND NECK:  Trached, site is clean .


CHEST:  Bronchial breathing sounds.ABDOMEN:  Soft.  No organomegaly.


MUSCULOSKELETAL:  Diffuse 1+ or 2+ nonpitting edema in all four contracted


extremities.  The patient is not following the commands.  Limited


evaluation.NEUROLOGY:  The patient is awake.  Not alert.  Not verbally


communicative.





LABORATORY DATA:  Labs dated Nov 14, 2019  reviewed





Meds: Reviewed and reconciled in the chart





Imaging:  CXR reviewed 





ASSESSMENT AND PLAN:


1. VDRF S/P trach


2. Sepsis.  secondary to healthcare-associated pneumonia or


3. Acute chf exacerbation - Diastolic


4. Chronic encephalomalacia.


3. Acute on chronic anemia.


4. Renal failure, age indeterminate.


6. Hyperthyroidism.


7. Hyperkalemia.


8. Diabetes type 2, uncontrolled with A1c of 10.


9. Psychiatric disorder.


10. GI and DVT prophylaxis.


11. PAH- Severe 





PLAN OF CARE:





Post PEG tube


Out patient followup for abnormal incidental imaging finding ( possibility of 

CA cant be excluded 


History of Acute drop in Hgb and observation of UGI bleeding, Will hold DAPT


Post Trach placement 


Sub Acute / LTAC placement 


Worsening leukocytosis, with negative blood culture. Likely secondary to 

malignancy.  Medically a comprehensive cancer workup is inappropriate.





Subjective


Allergies:  


Coded Allergies:  


     No Known Allergies (Unverified , 10/14/19)





Objective





Last 24 Hour Vital Signs








  Date Time  Temp Pulse Resp B/P (MAP) Pulse Ox O2 Delivery O2 Flow Rate FiO2


 


11/20/19 09:23  79 27     30


 


11/20/19 08:53     95   


 


11/20/19 08:46  82 25     30


 


11/20/19 08:42  84 23     30


 


11/20/19 08:00      Mechanical Ventilator  


 


11/20/19 08:00        30


 


11/20/19 08:00 99.0 90 17 122/52 (75) 97   


 


11/20/19 07:02  93 21  100 Mechanical Ventilator  30





  95 18     30


 


11/20/19 05:18  88 27     30


 


11/20/19 04:00        30


 


11/20/19 04:00      Mechanical Ventilator  


 


11/20/19 04:00 98.1 86 22 125/58 (80) 100   


 


11/20/19 03:50  85      


 


11/20/19 03:02  85 26     30


 


11/20/19 01:47 99.8       


 


11/20/19 00:40  112 28     30


 


11/20/19 00:00 100.5 97 26 148/66 (93) 100   


 


11/20/19 00:00        30


 


11/20/19 00:00      Mechanical Ventilator  


 


11/19/19 23:34  100      


 


11/19/19 23:08  94 28     30


 


11/19/19 21:18  97 27     30


 


11/19/19 20:00      Mechanical Ventilator  


 


11/19/19 20:00        30


 


11/19/19 20:00 99.1 102 20 126/49 (74) 100   


 


11/19/19 19:39  96      


 


11/19/19 19:22  97 29  100 Mechanical Ventilator  30





  95 29     30


 


11/19/19 18:55  103  149/72    


 


11/19/19 16:31  103 30     30


 


11/19/19 16:00 99.7 105 23 149/72 (97) 99   


 


11/19/19 16:00      Mechanical Ventilator  


 


11/19/19 16:00        30


 


11/19/19 15:36  100      


 


11/19/19 14:55  111 23     30


 


11/19/19 13:29  109 29  99 Mechanical Ventilator 45.0 30





  111 16     30


 


11/19/19 12:00 97.7 105 21 115/72 (86) 100   


 


11/19/19 12:00        30


 


11/19/19 12:00      Mechanical Ventilator  


 


11/19/19 12:00  100      


 


11/19/19 11:07  108 21     30

















Intake and Output  


 


 11/19/19 11/20/19





 19:00 07:00


 


Intake Total 2220 ml 1443 ml


 


Output Total 625 ml 


 


Balance 1595 ml 1443 ml


 


  


 


Free Water 600 ml 200 ml


 


IV Total 900 ml 703 ml


 


Tube Feeding 720 ml 540 ml


 


Output Urine Total 625 ml 


 


# Bowel Movements 3 1








Laboratory Tests


11/19/19 17:30: Lactic Acid Level 1.30


11/20/19 03:28: 


Urine Color Pale yellow, Urine Appearance Clear, Urine pH 8, Urine Specific 

Gravity 1.010, Urine Protein 3+H, Urine Glucose (UA) 1+H, Urine Ketones Negative

, Urine Blood Negative, Urine Nitrite Negative, Urine Bilirubin Negative, Urine 

Urobilinogen 1H, Urine Leukocyte Esterase Negative, Urine RBC 0-2, Urine WBC 0-2

, Urine Squamous Epithelial Cells Occasional, Urine Bacteria Occasional


11/20/19 03:50: 


White Blood Count 18.9H, Red Blood Count 2.96L, Hemoglobin 7.9L, Hematocrit 

24.6L, Mean Corpuscular Volume 83, Mean Corpuscular Hemoglobin 26.9L, Mean 

Corpuscular Hemoglobin Concent 32.3, Red Cell Distribution Width 14.6, Platelet 

Count 452H, Mean Platelet Volume 7.8, Neutrophils (%) (Auto) , Lymphocytes (%) (

Auto) , Monocytes (%) (Auto) , Eosinophils (%) (Auto) , Basophils (%) (Auto) , 

Differential Total Cells Counted 100, Neutrophils % (Manual) 62, Lymphocytes % (

Manual) 21, Monocytes % (Manual) 4, Eosinophils % (Manual) 11H, Basophils % (

Manual) 1, Band Neutrophils 1, Platelet Estimate IncreasedH, Platelet 

Morphology Normal, Erythrocyte Sedimentation Rate [Pending], Prothrombin Time 

10.7, Prothromb Time International Ratio 1.0, Activated Partial Thromboplast 

Time 30, Sodium Level 144#, Potassium Level 4.2, Chloride Level 108H, Carbon 

Dioxide Level 33H, Anion Gap 3L, Blood Urea Nitrogen 52H, Creatinine 1.3, 

Estimat Glomerular Filtration Rate 41.0, Glucose Level 168H, Calcium Level 9.0, 

Phosphorus Level 3.4, Magnesium Level 2.5H, Total Bilirubin 0.4, Aspartate 

Amino Transf (AST/SGOT) 17, Alanine Aminotransferase (ALT/SGPT) 20, Alkaline 

Phosphatase 102, C-Reactive Protein, Quantitative 8.9H, Total Protein 7.4, 

Albumin 2.1L, Globulin 5.3, Albumin/Globulin Ratio 0.4L, Lipase 102


Height (Feet):  5


Height (Inches):  5.00


Weight (Pounds):  133











Garrick Keith MD Nov 20, 2019 09:35

## 2019-11-20 NOTE — NUR
RESPIRATORY NOTE: Patient failed weaning at PS 8. Low tidal volumes achieved. Put her back 
on previous vent settings.

## 2019-11-20 NOTE — NEPHROLOGY PROGRESS NOTE
Assessment/Plan


Problem List:  


(1) Renal failure (ARF), acute on chronic


Assessment:  resolved





(2) Dehydration


(3) ARF (acute renal failure)


(4) Sepsis


(5) Acute metabolic encephalopathy


(6) Hyperglycemia due to type 2 diabetes mellitus


(7) UTI (urinary tract infection)


(8) Diabetic nephropathy


Assessment





Renal failure, Acute on Chronic resolved


Dehydration


Severe Anemia


Sepsis / Pneumonia / UTI


DM, OOC


Proteinuria , Diabetic Nephropathy


Acute encephalopathy


Plan


start D5w and increase Levemir


trached 11/14- 





on K and Mag IV  as needed





 Reglan


GT feeding


Stop IV fluid-


Lasix as needed


has PEG


Per order








previously 


Cr lowering 


Will order urine studies and monitor renal parameters


kidney YOANDY noted


lower iv fluids rate


WBCs rising


has casas again due to retention


Avoid Nephrotoxics








previously


Anemia salas


2D echo


24 H urine protein


Keep BP and BS in check


I am holding  her psych meds due to low MS


Per orders





Subjective


ROS Limited/Unobtainable:  Yes





Objective


Objective





Last 24 Hour Vital Signs








  Date Time  Temp Pulse Resp B/P (MAP) Pulse Ox O2 Delivery O2 Flow Rate FiO2


 


11/20/19 10:56  99 30     30


 


11/20/19 10:13  79  122/52    


 


11/20/19 09:23  79 27     30


 


11/20/19 08:53     95   


 


11/20/19 08:46  82 25     30


 


11/20/19 08:42  84 23     30


 


11/20/19 08:00      Mechanical Ventilator  


 


11/20/19 08:00        30


 


11/20/19 08:00 99.0 90 17 122/52 (75) 97   


 


11/20/19 07:02  93 21  100 Mechanical Ventilator  30





  95 18     30


 


11/20/19 05:18  88 27     30


 


11/20/19 04:00        30


 


11/20/19 04:00      Mechanical Ventilator  


 


11/20/19 04:00 98.1 86 22 125/58 (80) 100   


 


11/20/19 03:50  85      


 


11/20/19 03:02  85 26     30


 


11/20/19 01:47 99.8       


 


11/20/19 00:40  112 28     30


 


11/20/19 00:00 100.5 97 26 148/66 (93) 100   


 


11/20/19 00:00        30


 


11/20/19 00:00      Mechanical Ventilator  


 


11/19/19 23:34  100      


 


11/19/19 23:08  94 28     30


 


11/19/19 21:18  97 27     30


 


11/19/19 20:00      Mechanical Ventilator  


 


11/19/19 20:00        30


 


11/19/19 20:00 99.1 102 20 126/49 (74) 100   


 


11/19/19 19:39  96      


 


11/19/19 19:22  97 29  100 Mechanical Ventilator  30





  95 29     30


 


11/19/19 18:55  103  149/72    


 


11/19/19 16:31  103 30     30


 


11/19/19 16:00 99.7 105 23 149/72 (97) 99   


 


11/19/19 16:00      Mechanical Ventilator  


 


11/19/19 16:00        30


 


11/19/19 15:36  100      


 


11/19/19 14:55  111 23     30


 


11/19/19 13:29  109 29  99 Mechanical Ventilator 45.0 30





  111 16     30


 


11/19/19 12:00 97.7 105 21 115/72 (86) 100   


 


11/19/19 12:00        30


 


11/19/19 12:00      Mechanical Ventilator  


 


11/19/19 12:00  100      

















Intake and Output  


 


 11/19/19 11/20/19





 19:00 07:00


 


Intake Total 2220 ml 1543 ml


 


Output Total 625 ml 


 


Balance 1595 ml 1543 ml


 


  


 


Free Water 600 ml 200 ml


 


IV Total 900 ml 803 ml


 


Tube Feeding 720 ml 540 ml


 


Output Urine Total 625 ml 


 


# Bowel Movements 3 1








Laboratory Tests


11/19/19 17:30: Lactic Acid Level 1.30


11/20/19 03:28: 


Urine Color Pale yellow, Urine Appearance Clear, Urine pH 8, Urine Specific 

Gravity 1.010, Urine Protein 3+H, Urine Glucose (UA) 1+H, Urine Ketones Negative

, Urine Blood Negative, Urine Nitrite Negative, Urine Bilirubin Negative, Urine 

Urobilinogen 1H, Urine Leukocyte Esterase Negative, Urine RBC 0-2, Urine WBC 0-2

, Urine Squamous Epithelial Cells Occasional, Urine Bacteria Occasional


11/20/19 03:50: 


White Blood Count 18.9H, Red Blood Count 2.96L, Hemoglobin 7.9L, Hematocrit 

24.6L, Mean Corpuscular Volume 83, Mean Corpuscular Hemoglobin 26.9L, Mean 

Corpuscular Hemoglobin Concent 32.3, Red Cell Distribution Width 14.6, Platelet 

Count 452H, Mean Platelet Volume 7.8, Neutrophils (%) (Auto) , Lymphocytes (%) (

Auto) , Monocytes (%) (Auto) , Eosinophils (%) (Auto) , Basophils (%) (Auto) , 

Differential Total Cells Counted 100, Neutrophils % (Manual) 62, Lymphocytes % (

Manual) 21, Monocytes % (Manual) 4, Eosinophils % (Manual) 11H, Basophils % (

Manual) 1, Band Neutrophils 1, Platelet Estimate IncreasedH, Platelet 

Morphology Normal, Erythrocyte Sedimentation Rate 130H, Prothrombin Time 10.7, 

Prothromb Time International Ratio 1.0, Activated Partial Thromboplast Time 30, 

Sodium Level 144#, Potassium Level 4.2, Chloride Level 108H, Carbon Dioxide 

Level 33H, Anion Gap 3L, Blood Urea Nitrogen 52H, Creatinine 1.3, Estimat 

Glomerular Filtration Rate 41.0, Glucose Level 168H, Calcium Level 9.0, 

Phosphorus Level 3.4, Magnesium Level 2.5H, Total Bilirubin 0.4, Aspartate 

Amino Transf (AST/SGOT) 17, Alanine Aminotransferase (ALT/SGPT) 20, Alkaline 

Phosphatase 102, C-Reactive Protein, Quantitative 8.9H, Total Protein 7.4, 

Albumin 2.1L, Globulin 5.3, Albumin/Globulin Ratio 0.4L, Lipase 102


Height (Feet):  5


Height (Inches):  5.00


Weight (Pounds):  133


General Appearance:  no apparent distress


Cardiovascular:  normal rate


Respiratory/Chest:  decreased breath sounds


Abdomen:  distended


Objective


no change











Lukasz Vizcaino MD Nov 20, 2019 11:42

## 2019-11-20 NOTE — SURGERY PROGRESS NOTE
Surgery Progress Note


Subjective


Procedure Performed


tracheostomy


Additional Comments


wbc 18k


esr/crp noted elevated


exam stable


on tc


comfortable appearing





Objective





Last 24 Hour Vital Signs








  Date Time  Temp Pulse Resp B/P (MAP) Pulse Ox O2 Delivery O2 Flow Rate FiO2


 


11/20/19 16:53  100 30     30


 


11/20/19 15:26  100 17  100 Mechanical Ventilator  30


 


11/20/19 15:23  88 22     30


 


11/20/19 13:05  89 24     30


 


11/20/19 12:00        30


 


11/20/19 12:00 99.3 90 19 125/56 (79) 99   


 


11/20/19 12:00  83      


 


11/20/19 12:00      Mechanical Ventilator  


 


11/20/19 10:56  99 30     30


 


11/20/19 10:13  79  122/52    


 


11/20/19 09:23  79 27     30


 


11/20/19 08:53     95   


 


11/20/19 08:46  82 25     30


 


11/20/19 08:42  84 23     30


 


11/20/19 08:00  97      


 


11/20/19 08:00      Mechanical Ventilator  


 


11/20/19 08:00        30


 


11/20/19 08:00 99.0 90 17 122/52 (75) 97   


 


11/20/19 07:02  93 21  100 Mechanical Ventilator  30





  95 18     30


 


11/20/19 05:18  88 27     30


 


11/20/19 04:00        30


 


11/20/19 04:00      Mechanical Ventilator  


 


11/20/19 04:00 98.1 86 22 125/58 (80) 100   


 


11/20/19 03:50  85      


 


11/20/19 03:02  85 26     30


 


11/20/19 01:47 99.8       


 


11/20/19 00:40  112 28     30


 


11/20/19 00:00 100.5 97 26 148/66 (93) 100   


 


11/20/19 00:00        30


 


11/20/19 00:00      Mechanical Ventilator  


 


11/19/19 23:34  100      


 


11/19/19 23:08  94 28     30


 


11/19/19 21:18  97 27     30


 


11/19/19 20:00      Mechanical Ventilator  


 


11/19/19 20:00        30


 


11/19/19 20:00 99.1 102 20 126/49 (74) 100   


 


11/19/19 19:39  96      


 


11/19/19 19:22  97 29  100 Mechanical Ventilator  30





  95 29     30


 


11/19/19 18:55  103  149/72    








I&O











Intake and Output  


 


 11/19/19 11/20/19





 19:00 07:00


 


Intake Total 2220 ml 1543 ml


 


Output Total 625 ml 


 


Balance 1595 ml 1543 ml


 


  


 


Free Water 600 ml 200 ml


 


IV Total 900 ml 803 ml


 


Tube Feeding 720 ml 540 ml


 


Output Urine Total 625 ml 


 


# Bowel Movements 3 1








Dressing:  dry


Wound:  clean


Drains:  other


Cardiovascular:  RSR


Respiratory:  decreased breath sounds


Abdomen:  soft, present bowel sounds, non-distended


Extremities:  no tenderness, no cyanosis





Laboratory Tests








Test


  11/19/19


17:30 11/20/19


03:28 11/20/19


03:50


 


Lactic Acid Level


  1.30 mmol/L


(0.4-2.0) 


  


 


 


Urine Color  Pale yellow   


 


Urine Appearance  Clear   


 


Urine pH  8 (4.5-8.0)   


 


Urine Specific Gravity


  


  1.010


(1.005-1.035) 


 


 


Urine Protein


  


  3+ (NEGATIVE)


H 


 


 


Urine Glucose (UA)


  


  1+ (NEGATIVE)


H 


 


 


Urine Ketones


  


  Negative


(NEGATIVE) 


 


 


Urine Blood


  


  Negative


(NEGATIVE) 


 


 


Urine Nitrite


  


  Negative


(NEGATIVE) 


 


 


Urine Bilirubin


  


  Negative


(NEGATIVE) 


 


 


Urine Urobilinogen


  


  1 MG/DL


(0.0-1.0)  H 


 


 


Urine Leukocyte Esterase


  


  Negative


(NEGATIVE) 


 


 


Urine RBC


  


  0-2 /HPF (0 -


2) 


 


 


Urine WBC


  


  0-2 /HPF (0 -


2) 


 


 


Urine Squamous Epithelial


Cells 


  Occasional


/LPF 


 


 


Urine Bacteria


  


  Occasional


/HPF (NONE) 


 


 


White Blood Count


  


  


  18.9 K/UL


(4.8-10.8)  H


 


Red Blood Count


  


  


  2.96 M/UL


(4.20-5.40)  L


 


Hemoglobin


  


  


  7.9 G/DL


(12.0-16.0)  L


 


Hematocrit


  


  


  24.6 %


(37.0-47.0)  L


 


Mean Corpuscular Volume   83 FL (80-99)  


 


Mean Corpuscular Hemoglobin


  


  


  26.9 PG


(27.0-31.0)  L


 


Mean Corpuscular Hemoglobin


Concent 


  


  32.3 G/DL


(32.0-36.0)


 


Red Cell Distribution Width


  


  


  14.6 %


(11.6-14.8)


 


Platelet Count


  


  


  452 K/UL


(150-450)  H


 


Mean Platelet Volume


  


  


  7.8 FL


(6.5-10.1)


 


Neutrophils (%) (Auto)


  


  


  % (45.0-75.0)


 


 


Lymphocytes (%) (Auto)


  


  


  % (20.0-45.0)


 


 


Monocytes (%) (Auto)    % (1.0-10.0)  


 


Eosinophils (%) (Auto)    % (0.0-3.0)  


 


Basophils (%) (Auto)    % (0.0-2.0)  


 


Differential Total Cells


Counted 


  


  100  


 


 


Neutrophils % (Manual)   62 % (45-75)  


 


Lymphocytes % (Manual)   21 % (20-45)  


 


Monocytes % (Manual)   4 % (1-10)  


 


Eosinophils % (Manual)   11 % (0-3)  H


 


Basophils % (Manual)   1 % (0-2)  


 


Band Neutrophils   1 % (0-8)  


 


Platelet Estimate   Increased  H


 


Platelet Morphology   Normal  


 


Erythrocyte Sedimentation Rate


  


  


  130 MM/HR


(0-30)  H


 


Prothrombin Time


  


  


  10.7 SEC


(9.30-11.50)


 


Prothromb Time International


Ratio 


  


  1.0 (0.9-1.1)  


 


 


Activated Partial


Thromboplast Time 


  


  30 SEC (23-33)


 


 


Sodium Level


  


  


  144 MMOL/L


(136-145)  #


 


Potassium Level


  


  


  4.2 MMOL/L


(3.5-5.1)


 


Chloride Level


  


  


  108 MMOL/L


()  H


 


Carbon Dioxide Level


  


  


  33 MMOL/L


(21-32)  H


 


Anion Gap


  


  


  3 mmol/L


(5-15)  L


 


Blood Urea Nitrogen


  


  


  52 mg/dL


(7-18)  H


 


Creatinine


  


  


  1.3 MG/DL


(0.55-1.30)


 


Estimat Glomerular Filtration


Rate 


  


  41.0 mL/min


(>60)


 


Glucose Level


  


  


  168 MG/DL


()  H


 


Calcium Level


  


  


  9.0 MG/DL


(8.5-10.1)


 


Phosphorus Level


  


  


  3.4 MG/DL


(2.5-4.9)


 


Magnesium Level


  


  


  2.5 MG/DL


(1.8-2.4)  H


 


Total Bilirubin


  


  


  0.4 MG/DL


(0.2-1.0)


 


Aspartate Amino Transf


(AST/SGOT) 


  


  17 U/L (15-37)


 


 


Alanine Aminotransferase


(ALT/SGPT) 


  


  20 U/L (12-78)


 


 


Alkaline Phosphatase


  


  


  102 U/L


()


 


C-Reactive Protein,


Quantitative 


  


  8.9 mg/dL


(0.00-0.90)  H


 


Total Protein


  


  


  7.4 G/DL


(6.4-8.2)


 


Albumin


  


  


  2.1 G/DL


(3.4-5.0)  L


 


Globulin   5.3 g/dL  


 


Albumin/Globulin Ratio


  


  


  0.4 (1.0-2.7)


L


 


Lipase


  


  


  102 U/L


()











Plan


Problems:  


(1) Pressure injury of deep tissue


Assessment & Plan:  Pt presented on admission with DTPI R heel. Blood filled 

blister with marginal erythema noted to heel. Pt exhibited agitation when 

minimally palpated. (L)2.2cm x (W)2.1cm


L heel is blanchable .


Non-blanchable erythema without induration noted to sacral cleft. 


No other areas of skin concerns noted.





Tx.Plan:


Apply Betadine to R heel. Cover with Optifoam drsg. Change every 3 days and prn.


           


Apply Cavilon Skin Barrier to L heel. Cover with Optifoam drsg. Change every 7 

days and prn.


           


Apply Moisture Barrier Paste to buttocks. Cover sacral area with Optifoam drsg. 

Change every 3 days and prn.


           


Reposition at least every 2hours or as tolerated.


           


Off-load heels with pillow.





(2) Sepsis


Assessment & Plan:  Patient with low-grade fevers, anemia, leukocytosis 

persistent, elevated platelets, abnormal labs.


Etiology currently unknown and work-up in progress.  Labs noted and imaging 

that is available as noted.  


CT noted


US noted


Impression: 13 x 7 x 12.9 cm unilocular cystic mass, corresponding to lesion 

reported


on recent CT scan. Layering low level internal echoes likely represent bladder


debris.. This presumably represents a cystic ovarian lesion with debris or


hemorrhage, possibly neoplastic. Gynecological consultation is recommended


Antibiotics as per infectious disease 


local wound care as wounds do not seem to be the etiology of her sepsis


start feeds via peg


doing well


improving


cont with tube feeds


s/p trach


wean vent


supplementation for healing 


will discuss with ID about Abx - as per ID 


ESR/CRP elevated 


We will monitor and follow with recommendations


Thank you for allowing me to participate in patient's care














Radhames Blas Nov 20, 2019 17:03

## 2019-11-21 VITALS — SYSTOLIC BLOOD PRESSURE: 118 MMHG | DIASTOLIC BLOOD PRESSURE: 54 MMHG

## 2019-11-21 VITALS — DIASTOLIC BLOOD PRESSURE: 57 MMHG | SYSTOLIC BLOOD PRESSURE: 124 MMHG

## 2019-11-21 VITALS — SYSTOLIC BLOOD PRESSURE: 125 MMHG | DIASTOLIC BLOOD PRESSURE: 54 MMHG

## 2019-11-21 VITALS — DIASTOLIC BLOOD PRESSURE: 53 MMHG | SYSTOLIC BLOOD PRESSURE: 113 MMHG

## 2019-11-21 VITALS — SYSTOLIC BLOOD PRESSURE: 121 MMHG | DIASTOLIC BLOOD PRESSURE: 57 MMHG

## 2019-11-21 VITALS — DIASTOLIC BLOOD PRESSURE: 58 MMHG | SYSTOLIC BLOOD PRESSURE: 134 MMHG

## 2019-11-21 LAB
ADD MANUAL DIFF: YES
ALBUMIN SERPL-MCNC: 1.9 G/DL (ref 3.4–5)
ALBUMIN/GLOB SERPL: 0.4 {RATIO} (ref 1–2.7)
ALP SERPL-CCNC: 95 U/L (ref 46–116)
ALT SERPL-CCNC: 17 U/L (ref 12–78)
ANION GAP SERPL CALC-SCNC: 3 MMOL/L (ref 5–15)
AST SERPL-CCNC: 14 U/L (ref 15–37)
BILIRUB SERPL-MCNC: 0.4 MG/DL (ref 0.2–1)
BUN SERPL-MCNC: 43 MG/DL (ref 7–18)
CALCIUM SERPL-MCNC: 8.5 MG/DL (ref 8.5–10.1)
CHLORIDE SERPL-SCNC: 101 MMOL/L (ref 98–107)
CO2 SERPL-SCNC: 31 MMOL/L (ref 21–32)
CREAT SERPL-MCNC: 1.2 MG/DL (ref 0.55–1.3)
ERYTHROCYTE [DISTWIDTH] IN BLOOD BY AUTOMATED COUNT: 14.1 % (ref 11.6–14.8)
GLOBULIN SER-MCNC: 5.2 G/DL
HCT VFR BLD CALC: 22.7 % (ref 37–47)
HGB BLD-MCNC: 7.6 G/DL (ref 12–16)
MCV RBC AUTO: 81 FL (ref 80–99)
PLATELET # BLD: 398 K/UL (ref 150–450)
POTASSIUM SERPL-SCNC: 4.4 MMOL/L (ref 3.5–5.1)
RBC # BLD AUTO: 2.81 M/UL (ref 4.2–5.4)
SODIUM SERPL-SCNC: 135 MMOL/L (ref 136–145)
WBC # BLD AUTO: 19.7 K/UL (ref 4.8–10.8)

## 2019-11-21 RX ADMIN — INSULIN ASPART SCH UNITS: 100 INJECTION, SOLUTION INTRAVENOUS; SUBCUTANEOUS at 06:34

## 2019-11-21 RX ADMIN — HEPARIN SODIUM SCH UNITS: 5000 INJECTION INTRAVENOUS; SUBCUTANEOUS at 09:12

## 2019-11-21 RX ADMIN — IPRATROPIUM BROMIDE AND ALBUTEROL SULFATE SCH ML: .5; 3 SOLUTION RESPIRATORY (INHALATION) at 02:04

## 2019-11-21 RX ADMIN — CHLORHEXIDINE GLUCONATE SCH APPLIC: 213 SOLUTION TOPICAL at 20:25

## 2019-11-21 RX ADMIN — INSULIN ASPART SCH UNITS: 100 INJECTION, SOLUTION INTRAVENOUS; SUBCUTANEOUS at 23:33

## 2019-11-21 RX ADMIN — INSULIN ASPART SCH UNITS: 100 INJECTION, SOLUTION INTRAVENOUS; SUBCUTANEOUS at 11:38

## 2019-11-21 RX ADMIN — INSULIN ASPART SCH UNITS: 100 INJECTION, SOLUTION INTRAVENOUS; SUBCUTANEOUS at 11:37

## 2019-11-21 RX ADMIN — PANTOPRAZOLE SODIUM SCH MG: 40 INJECTION, POWDER, FOR SOLUTION INTRAVENOUS at 09:07

## 2019-11-21 RX ADMIN — DOCUSATE SODIUM SCH MG: 100 CAPSULE, LIQUID FILLED ORAL at 09:10

## 2019-11-21 RX ADMIN — MEROPENEM SCH MLS/HR: 1 INJECTION INTRAVENOUS at 09:46

## 2019-11-21 RX ADMIN — HEPARIN SODIUM SCH UNITS: 5000 INJECTION INTRAVENOUS; SUBCUTANEOUS at 20:26

## 2019-11-21 RX ADMIN — POLYETHYLENE GLYCOL 3350 SCH GM: 17 POWDER, FOR SOLUTION ORAL at 20:25

## 2019-11-21 RX ADMIN — INSULIN ASPART SCH UNITS: 100 INJECTION, SOLUTION INTRAVENOUS; SUBCUTANEOUS at 06:33

## 2019-11-21 RX ADMIN — INSULIN ASPART SCH UNITS: 100 INJECTION, SOLUTION INTRAVENOUS; SUBCUTANEOUS at 18:46

## 2019-11-21 RX ADMIN — IPRATROPIUM BROMIDE AND ALBUTEROL SULFATE SCH ML: .5; 3 SOLUTION RESPIRATORY (INHALATION) at 13:13

## 2019-11-21 RX ADMIN — MEROPENEM SCH MLS/HR: 1 INJECTION INTRAVENOUS at 21:38

## 2019-11-21 RX ADMIN — IPRATROPIUM BROMIDE AND ALBUTEROL SULFATE SCH ML: .5; 3 SOLUTION RESPIRATORY (INHALATION) at 19:11

## 2019-11-21 RX ADMIN — INSULIN DETEMIR SCH UNITS: 100 INJECTION, SOLUTION SUBCUTANEOUS at 18:44

## 2019-11-21 RX ADMIN — PANTOPRAZOLE SODIUM SCH MG: 40 INJECTION, POWDER, FOR SOLUTION INTRAVENOUS at 20:25

## 2019-11-21 RX ADMIN — DOCUSATE SODIUM SCH MG: 50 LIQUID ORAL at 18:42

## 2019-11-21 RX ADMIN — INSULIN ASPART SCH UNITS: 100 INJECTION, SOLUTION INTRAVENOUS; SUBCUTANEOUS at 18:45

## 2019-11-21 RX ADMIN — INSULIN DETEMIR SCH UNITS: 100 INJECTION, SOLUTION SUBCUTANEOUS at 09:12

## 2019-11-21 RX ADMIN — INSULIN ASPART SCH UNITS: 100 INJECTION, SOLUTION INTRAVENOUS; SUBCUTANEOUS at 00:07

## 2019-11-21 RX ADMIN — IPRATROPIUM BROMIDE AND ALBUTEROL SULFATE SCH ML: .5; 3 SOLUTION RESPIRATORY (INHALATION) at 07:35

## 2019-11-21 NOTE — SURGERY PROGRESS NOTE
Surgery Progress Note


Subjective


Procedure Performed


tracheostomy


Additional Comments


leukocytosis


esr/crp up


exam unchanged





Objective





Last 24 Hour Vital Signs








  Date Time  Temp Pulse Resp B/P (MAP) Pulse Ox O2 Delivery O2 Flow Rate FiO2


 


11/21/19 17:01  92 30     30


 


11/21/19 16:00 99.5 105 18 121/57 (78) 100   


 


11/21/19 16:00  106      


 


11/21/19 16:00      Mechanical Ventilator  


 


11/21/19 16:00        30


 


11/21/19 15:03  101 35     30


 


11/21/19 13:09  79 27  100 Mechanical Ventilator  30





  80 17     30


 


11/21/19 13:00        30


 


11/21/19 12:00 99.7 87 20 113/53 (73) 100   


 


11/21/19 12:00  82      


 


11/21/19 12:00      Mechanical Ventilator  


 


11/21/19 10:53     99   


 


11/21/19 10:48  83 33     30


 


11/21/19 10:15        30


 


11/21/19 09:40        30


 


11/21/19 09:35  89 31     30





        30


 


11/21/19 09:10  89  127/55    


 


11/21/19 08:00  91      


 


11/21/19 08:00        30


 


11/21/19 08:00 100.0 87 18 124/57 (79) 100   


 


11/21/19 08:00      Mechanical Ventilator  


 


11/21/19 07:19  84 28  100 Mechanical Ventilator  30





  84 19     30


 


11/21/19 05:11  97 31     30


 


11/21/19 04:00  83      


 


11/21/19 04:00 98.6 83 24 134/58 (83) 100   


 


11/21/19 04:00      Mechanical Ventilator  


 


11/21/19 04:00        30


 


11/21/19 03:30  89 30     30


 


11/21/19 01:30  83 26  100 Mechanical Ventilator  30





  86 25     30


 


11/21/19 00:00      Mechanical Ventilator  


 


11/21/19 00:00  87      


 


11/21/19 00:00 98.0 85 24 125/54 (77) 100   


 


11/20/19 23:12  85 23     30


 


11/20/19 21:30  87 22     30


 


11/20/19 20:00 98.2 86 24 125/66 (85) 100   


 


11/20/19 20:00      Mechanical Ventilator  


 


11/20/19 20:00        30


 


11/20/19 20:00  84      


 


11/20/19 19:30  85 24  100 Mechanical Ventilator  30





  88 18     30


 


11/20/19 18:39  100  120/58    








I&O











Intake and Output  


 


 11/20/19 11/21/19





 19:00 07:00


 


Intake Total 2015 ml 


 


Output Total 625 ml 650 ml


 


Balance 1390 ml -650 ml


 


  


 


Free Water 600 ml 


 


IV Total 755 ml 


 


Tube Feeding 660 ml 


 


Output Urine Total 625 ml 650 ml


 


# Bowel Movements 4 4








Dressing:  other


Wound:  other


Cardiovascular:  RSR


Respiratory:  decreased breath sounds


Abdomen:  soft, present bowel sounds


Extremities:  no cyanosis, other





Laboratory Tests








Test


  11/21/19


03:35


 


White Blood Count


  19.7 K/UL


(4.8-10.8)  H


 


Red Blood Count


  2.81 M/UL


(4.20-5.40)  L


 


Hemoglobin


  7.6 G/DL


(12.0-16.0)  L


 


Hematocrit


  22.7 %


(37.0-47.0)  L


 


Mean Corpuscular Volume 81 FL (80-99)  


 


Mean Corpuscular Hemoglobin


  27.1 PG


(27.0-31.0)


 


Mean Corpuscular Hemoglobin


Concent 33.6 G/DL


(32.0-36.0)


 


Red Cell Distribution Width


  14.1 %


(11.6-14.8)


 


Platelet Count


  398 K/UL


(150-450)


 


Mean Platelet Volume


  8.1 FL


(6.5-10.1)


 


Neutrophils (%) (Auto)


  % (45.0-75.0)


 


 


Lymphocytes (%) (Auto)


  % (20.0-45.0)


 


 


Monocytes (%) (Auto)  % (1.0-10.0)  


 


Eosinophils (%) (Auto)  % (0.0-3.0)  


 


Basophils (%) (Auto)  % (0.0-2.0)  


 


Differential Total Cells


Counted 100  


 


 


Neutrophils % (Manual) 58 % (45-75)  


 


Lymphocytes % (Manual) 25 % (20-45)  


 


Monocytes % (Manual) 4 % (1-10)  


 


Eosinophils % (Manual) 9 % (0-3)  H


 


Basophils % (Manual) 2 % (0-2)  


 


Myelocytes % 1 % (0-0)  H


 


Band Neutrophils 1 % (0-8)  


 


Platelet Estimate Adequate  


 


Platelet Morphology Normal  


 


Microcytosis 2+  


 


Sodium Level


  135 MMOL/L


(136-145)  L


 


Potassium Level


  4.4 MMOL/L


(3.5-5.1)


 


Chloride Level


  101 MMOL/L


()


 


Carbon Dioxide Level


  31 MMOL/L


(21-32)


 


Anion Gap


  3 mmol/L


(5-15)  L


 


Blood Urea Nitrogen


  43 mg/dL


(7-18)  H


 


Creatinine


  1.2 MG/DL


(0.55-1.30)


 


Estimat Glomerular Filtration


Rate 45.0 mL/min


(>60)


 


Glucose Level


  87 MG/DL


()


 


Calcium Level


  8.5 MG/DL


(8.5-10.1)


 


Total Bilirubin


  0.4 MG/DL


(0.2-1.0)


 


Aspartate Amino Transf


(AST/SGOT) 14 U/L (15-37)


L


 


Alanine Aminotransferase


(ALT/SGPT) 17 U/L (12-78)


 


 


Alkaline Phosphatase


  95 U/L


()


 


Total Protein


  7.1 G/DL


(6.4-8.2)


 


Albumin


  1.9 G/DL


(3.4-5.0)  L


 


Globulin 5.2 g/dL  


 


Albumin/Globulin Ratio


  0.4 (1.0-2.7)


L











Plan


Problems:  


(1) Pressure injury of deep tissue


Assessment & Plan:  Pt presented on admission with DTPI R heel. Blood filled 

blister with marginal erythema noted to heel. Pt exhibited agitation when 

minimally palpated. (L)2.2cm x (W)2.1cm


L heel is blanchable .


Non-blanchable erythema without induration noted to sacral cleft. 


No other areas of skin concerns noted.





Tx.Plan:


Apply Betadine to R heel. Cover with Optifoam drsg. Change every 3 days and prn.


           


Apply Cavilon Skin Barrier to L heel. Cover with Optifoam drsg. Change every 7 

days and prn.


           


Apply Moisture Barrier Paste to buttocks. Cover sacral area with Optifoam drsg. 

Change every 3 days and prn.


           


Reposition at least every 2hours or as tolerated.


           


Off-load heels with pillow.





(2) Sepsis


Assessment & Plan:  Patient with low-grade fevers, anemia, leukocytosis 

persistent, elevated platelets, abnormal labs.


Etiology currently unknown and work-up in progress.  Labs noted and imaging 

that is available as noted.  


CT noted


US noted


Impression: 13 x 7 x 12.9 cm unilocular cystic mass, corresponding to lesion 

reported


on recent CT scan. Layering low level internal echoes likely represent bladder


debris.. This presumably represents a cystic ovarian lesion with debris or


hemorrhage, possibly neoplastic. Gynecological consultation is recommended


Antibiotics as per infectious disease 


local wound care as wounds do not seem to be the etiology of her sepsis


start feeds via peg


doing well


improving


cont with tube feeds


s/p trach


wean vent


supplementation for healing 


will discuss with ID about Abx - as per ID 


ESR/CRP elevated 


We will monitor and follow with recommendations


Thank you for allowing me to participate in patient's care














Radhames Blas Nov 21, 2019 17:35

## 2019-11-21 NOTE — NEPHROLOGY PROGRESS NOTE
Assessment/Plan


Problem List:  


(1) Renal failure (ARF), acute on chronic


Assessment:  resolved





(2) Dehydration


(3) ARF (acute renal failure)


(4) Sepsis


(5) Acute metabolic encephalopathy


(6) Hyperglycemia due to type 2 diabetes mellitus


(7) UTI (urinary tract infection)


(8) Diabetic nephropathy


Assessment





Renal failure, Acute on Chronic resolved


Dehydration


Severe Anemia


Sepsis / Pneumonia / UTI


DM, OOC


Proteinuria , Diabetic Nephropathy


Acute encephalopathy


Plan





start D5w and increase Levemir


trached 11/14- 





on K and Mag IV  as needed





 Reglan


GT feeding


Stop IV fluid-


Lasix as needed


has PEG


Per order








previously 


Cr lowering 


Will order urine studies and monitor renal parameters


kidney YOANDY noted


lower iv fluids rate


WBCs rising


has casas again due to retention


Avoid Nephrotoxics








previously


Anemia salas


2D echo


24 H urine protein


Keep BP and BS in check


I am holding  her psych meds due to low MS


Per orders





Subjective


ROS Limited/Unobtainable:  Yes





Objective


Objective





Last 24 Hour Vital Signs








  Date Time  Temp Pulse Resp B/P (MAP) Pulse Ox O2 Delivery O2 Flow Rate FiO2


 


11/21/19 10:53     99   


 


11/21/19 10:48  83 33     30


 


11/21/19 10:15        30


 


11/21/19 09:40        30


 


11/21/19 09:35  89 31     30





        30


 


11/21/19 09:10  89  127/55    


 


11/21/19 08:00  91      


 


11/21/19 08:00        30


 


11/21/19 08:00 100.0 87 18 124/57 (79) 100   


 


11/21/19 08:00      Mechanical Ventilator  


 


11/21/19 07:19  84 28  100 Mechanical Ventilator  30





  84 19     30


 


11/21/19 05:11  97 31     30


 


11/21/19 04:00  83      


 


11/21/19 04:00 98.6 83 24 134/58 (83) 100   


 


11/21/19 04:00      Mechanical Ventilator  


 


11/21/19 04:00        30


 


11/21/19 03:30  89 30     30


 


11/21/19 01:30  83 26  100 Mechanical Ventilator  30





  86 25     30


 


11/21/19 00:00      Mechanical Ventilator  


 


11/21/19 00:00  87      


 


11/21/19 00:00 98.0 85 24 125/54 (77) 100   


 


11/20/19 23:12  85 23     30


 


11/20/19 21:30  87 22     30


 


11/20/19 20:00 98.2 86 24 125/66 (85) 100   


 


11/20/19 20:00      Mechanical Ventilator  


 


11/20/19 20:00        30


 


11/20/19 20:00  84      


 


11/20/19 19:30  85 24  100 Mechanical Ventilator  30





  88 18     30


 


11/20/19 18:39  100  120/58    


 


11/20/19 16:53  100 30     30


 


11/20/19 16:00  87      


 


11/20/19 16:00 99.3 106 21 120/58 (78) 99   


 


11/20/19 16:00        30


 


11/20/19 16:00      Mechanical Ventilator  


 


11/20/19 15:26  100 17  100 Mechanical Ventilator  30


 


11/20/19 15:23  88 22     30


 


11/20/19 13:05  89 24     30

















Intake and Output  


 


 11/20/19 11/21/19





 19:00 07:00


 


Intake Total 2015 ml 


 


Output Total 625 ml 650 ml


 


Balance 1390 ml -650 ml


 


  


 


Free Water 600 ml 


 


IV Total 755 ml 


 


Tube Feeding 660 ml 


 


Output Urine Total 625 ml 650 ml


 


# Bowel Movements 4 4








Laboratory Tests


11/21/19 03:35: 


White Blood Count 19.7H, Red Blood Count 2.81L, Hemoglobin 7.6L, Hematocrit 

22.7L, Mean Corpuscular Volume 81, Mean Corpuscular Hemoglobin 27.1, Mean 

Corpuscular Hemoglobin Concent 33.6, Red Cell Distribution Width 14.1, Platelet 

Count 398, Mean Platelet Volume 8.1, Neutrophils (%) (Auto) , Lymphocytes (%) (

Auto) , Monocytes (%) (Auto) , Eosinophils (%) (Auto) , Basophils (%) (Auto) , 

Differential Total Cells Counted 100, Neutrophils % (Manual) 58, Lymphocytes % (

Manual) 25, Monocytes % (Manual) 4, Eosinophils % (Manual) 9H, Basophils % (

Manual) 2, Myelocytes % 1H, Band Neutrophils 1, Platelet Estimate Adequate, 

Platelet Morphology Normal, Microcytosis 2+, Sodium Level 135L, Potassium Level 

4.4, Chloride Level 101, Carbon Dioxide Level 31, Anion Gap 3L, Blood Urea 

Nitrogen 43H, Creatinine 1.2, Estimat Glomerular Filtration Rate 45.0, Glucose 

Level 87, Calcium Level 8.5, Total Bilirubin 0.4, Aspartate Amino Transf (AST/

SGOT) 14L, Alanine Aminotransferase (ALT/SGPT) 17, Alkaline Phosphatase 95, 

Total Protein 7.1, Albumin 1.9L, Globulin 5.2, Albumin/Globulin Ratio 0.4L


Height (Feet):  5


Height (Inches):  5.00


Weight (Pounds):  160


General Appearance:  no apparent distress


Cardiovascular:  tachycardia


Respiratory/Chest:  decreased breath sounds


Abdomen:  distended, other - PEG


Objective


no change











Lukasz Vizcaino MD Nov 21, 2019 12:41

## 2019-11-21 NOTE — GENERAL PROGRESS NOTE
Assessment/Plan


Problem List:  


(1) Abnormal TSH


ICD Codes:  R79.89 - Other specified abnormal findings of blood chemistry


SNOMED:  516011775


(2) Hyperglycemia due to type 2 diabetes mellitus


ICD Codes:  E11.65 - Type 2 diabetes mellitus with hyperglycemia


SNOMED:  849358696365803, [id number]


Qualifiers:  


   Qualified Codes:  E11.65 - Type 2 diabetes mellitus with hyperglycemia; 

Z79.4 - Long term (current) use of insulin


(3) Acute metabolic encephalopathy


ICD Codes:  G93.41 - Metabolic encephalopathy


SNOMED:  36413277, 563815831


(4) ARF (acute renal failure)


ICD Codes:  N17.9 - Acute kidney failure, unspecified


SNOMED:  13206742


Qualifiers:  


   Qualified Codes:  N17.9 - Acute kidney failure, unspecified


(5) Healthcare associated bacterial pneumonia


ICD Codes:  J15.9 - Unspecified bacterial pneumonia


SNOMED:  887319692


Status:  stable, unchanged


Assessment/Plan:


continue Levemir 20 units bid  -  hold if TF is being held or glucose < 100 mg/

dL 


continue Novolog 15 units every 6 hrs in addition to sliding scale - hold if TF 

is being held or glucose < 100 mg/dL 


continue NISS every 6 hours 


hypoglycemia protocol in order





Subjective


ROS Limited/Unobtainable:  Yes


Allergies:  


Coded Allergies:  


     No Known Allergies (Unverified , 10/14/19)


Subjective











Item Value  Date Time


 


Bedside Blood Glucose 162 mg/dl H 11/21/19 1200


 


Bedside Blood Glucose 127 mg/dl H 11/21/19 0912


 


Bedside Blood Glucose 127 mg/dl H 11/21/19 0634


 


Bedside Blood Glucose 166 mg/dl H 11/21/19 0007


 


Bedside Blood Glucose 223 mg/dl H 11/20/19 1849











Objective





Last 24 Hour Vital Signs








  Date Time  Temp Pulse Resp B/P (MAP) Pulse Ox O2 Delivery O2 Flow Rate FiO2


 


11/21/19 17:01  92 30     30


 


11/21/19 16:00 99.5 105 18 121/57 (78) 100   


 


11/21/19 16:00  106      


 


11/21/19 16:00      Mechanical Ventilator  


 


11/21/19 16:00        30


 


11/21/19 15:03  101 35     30


 


11/21/19 13:09  79 27  100 Mechanical Ventilator  30





  80 17     30


 


11/21/19 13:00        30


 


11/21/19 12:00 99.7 87 20 113/53 (73) 100   


 


11/21/19 12:00  82      


 


11/21/19 12:00      Mechanical Ventilator  


 


11/21/19 10:53     99   


 


11/21/19 10:48  83 33     30


 


11/21/19 10:15        30


 


11/21/19 09:40        30


 


11/21/19 09:35  89 31     30





        30


 


11/21/19 09:10  89  127/55    


 


11/21/19 08:00  91      


 


11/21/19 08:00        30


 


11/21/19 08:00 100.0 87 18 124/57 (79) 100   


 


11/21/19 08:00      Mechanical Ventilator  


 


11/21/19 07:19  84 28  100 Mechanical Ventilator  30





  84 19     30


 


11/21/19 05:11  97 31     30


 


11/21/19 04:00  83      


 


11/21/19 04:00 98.6 83 24 134/58 (83) 100   


 


11/21/19 04:00      Mechanical Ventilator  


 


11/21/19 04:00        30


 


11/21/19 03:30  89 30     30


 


11/21/19 01:30  83 26  100 Mechanical Ventilator  30





  86 25     30


 


11/21/19 00:00      Mechanical Ventilator  


 


11/21/19 00:00  87      


 


11/21/19 00:00 98.0 85 24 125/54 (77) 100   


 


11/20/19 23:12  85 23     30


 


11/20/19 21:30  87 22     30


 


11/20/19 20:00 98.2 86 24 125/66 (85) 100   


 


11/20/19 20:00      Mechanical Ventilator  


 


11/20/19 20:00        30


 


11/20/19 20:00  84      


 


11/20/19 19:30  85 24  100 Mechanical Ventilator  30





  88 18     30


 


11/20/19 18:39  100  120/58    

















Intake and Output  


 


 11/20/19 11/21/19





 19:00 07:00


 


Intake Total 2015 ml 


 


Output Total 625 ml 650 ml


 


Balance 1390 ml -650 ml


 


  


 


Free Water 600 ml 


 


IV Total 755 ml 


 


Tube Feeding 660 ml 


 


Output Urine Total 625 ml 650 ml


 


# Bowel Movements 4 4








Laboratory Tests


11/21/19 03:35: 


White Blood Count 19.7H, Red Blood Count 2.81L, Hemoglobin 7.6L, Hematocrit 

22.7L, Mean Corpuscular Volume 81, Mean Corpuscular Hemoglobin 27.1, Mean 

Corpuscular Hemoglobin Concent 33.6, Red Cell Distribution Width 14.1, Platelet 

Count 398, Mean Platelet Volume 8.1, Neutrophils (%) (Auto) , Lymphocytes (%) (

Auto) , Monocytes (%) (Auto) , Eosinophils (%) (Auto) , Basophils (%) (Auto) , 

Differential Total Cells Counted 100, Neutrophils % (Manual) 58, Lymphocytes % (

Manual) 25, Monocytes % (Manual) 4, Eosinophils % (Manual) 9H, Basophils % (

Manual) 2, Myelocytes % 1H, Band Neutrophils 1, Platelet Estimate Adequate, 

Platelet Morphology Normal, Microcytosis 2+, Sodium Level 135L, Potassium Level 

4.4, Chloride Level 101, Carbon Dioxide Level 31, Anion Gap 3L, Blood Urea 

Nitrogen 43H, Creatinine 1.2, Estimat Glomerular Filtration Rate 45.0, Glucose 

Level 87, Calcium Level 8.5, Total Bilirubin 0.4, Aspartate Amino Transf (AST/

SGOT) 14L, Alanine Aminotransferase (ALT/SGPT) 17, Alkaline Phosphatase 95, 

Total Protein 7.1, Albumin 1.9L, Globulin 5.2, Albumin/Globulin Ratio 0.4L


Height (Feet):  5


Height (Inches):  5.00


Weight (Pounds):  160


General Appearance:  lethargic


Respiratory/Chest:  decreased breath sounds


Abdomen:  normal bowel sounds


Objective





Current Medications








 Medications


  (Trade)  Dose


 Ordered  Sig/Winnie


 Route


 PRN Reason  Start Time


 Stop Time Status Last Admin


Dose Admin


 


 Acetaminophen


  (Tylenol)  650 mg  Q4H  PRN


 GT


 Mild Pain/Temp > 100.5  11/21/19 11:45


 11/25/19 17:59   


 


 


 Acetaminophen


  (Tylenol)  650 mg  Q4H  PRN


 RECTAL


 TEMP>100.5  11/15/19 18:00


 12/2/19 17:59   


 


 


 Acetylcysteine


  (Mucomyst)  100 mg  TIDRT


 [facility]


   11/20/19 19:54


 12/20/19 19:53  11/21/19 13:13


 


 


 Albuterol/


 Ipratropium


  (Albuterol/


 Ipratropium)  3 ml  Q6HRT


 [facility]


   11/17/19 19:00


 11/22/19 18:59  11/21/19 13:13


 


 


 Amlodipine


 Besylate


  (Norvasc)  2.5 mg  BID


 GT


   11/21/19 18:00


 11/29/19 17:59   


 


 


 Chlorhexidine


 Gluconate


  (Mae-Hex 2%)  1 applic  DAILY@2000


 TOPIC


   11/15/19 20:00


 12/5/19 19:59  11/20/19 21:06


 


 


 Dextrose


  (Dextrose 50%)  25 ml  Q30M  PRN


 IV


 Hypoglycemia  11/16/19 13:00


 12/16/19 12:59   


 


 


 Dextrose


  (Dextrose 50%)  50 ml  Q30M  PRN


 IV


 Hypoglycemia  11/16/19 13:00


 12/16/19 12:59   


 


 


 Docusate Sodium


  (Colace)  100 mg  TWICE A  DAY


 GT


   11/21/19 18:00


 12/21/19 17:59   


 


 


 Folic Acid


  (Folate)  3 mg  DAILY


 GT


   11/16/19 09:00


 11/30/19 08:59  11/21/19 09:10


 


 


 Heparin Sodium


  (Porcine)


  (Heparin 5000


 units/ml)  5,000 units  EVERY 12  HOURS


 SUBQ


   11/18/19 21:00


 12/18/19 20:59  11/21/19 09:12


 


 


 Insulin Aspart


  (NovoLOG)    EVERY 6  HOURS


 SUBQ


   11/16/19 18:00


 12/16/19 17:59  11/21/19 11:37


 


 


 Insulin Aspart


  (NovoLOG)  15 units  EVERY 6  HOURS


 SUBQ


   11/19/19 12:00


 12/16/19 17:59  11/21/19 11:38


 


 


 Insulin Detemir


  (Levemir)  20 units  BID


 SUBQ


   11/19/19 18:00


 12/1/19 08:59  11/21/19 09:12


 


 


 Lactulose


  (Cephulac)  20 gm  Q8H  PRN


 GT


 Constipation  11/21/19 11:45


 12/3/19 17:59   


 


 


 Linezolid  300 ml @ 


 300 mls/hr  Q12HR


 IVPB


   11/20/19 11:00


 11/27/19 10:59  11/21/19 09:07


 


 


 Lorazepam


  (Ativan 2mg/ml


 1ml)  2 mg  ONCE  PRN


 IV


 prior to bone marrow biopsy  11/21/19 16:18


 11/22/19 23:59   


 


 


 Meropenem 1 gm/


 Sodium Chloride  55 ml @ 


 110 mls/hr  Q12H


 IVPB


   11/20/19 10:00


 11/25/19 09:59  11/21/19 09:46


 


 


 Metoclopramide HCl


  (Reglan)  10 mg  Q6H  PRN


 IVP


 Nausea & Vomiting  11/15/19 18:00


 12/6/19 17:59   


 


 


 Pantoprazole


  (Protonix)  40 mg  EVERY 12  HOURS


 IVP


   11/15/19 21:00


 12/3/19 08:59  11/21/19 09:07


 


 


 Polyethylene


 Glycol


  (Miralax)  17 gm  BEDTIME


 GT


   11/18/19 21:00


 12/18/19 20:59  11/20/19 21:07


 

















Riccardo Velez MD Nov 21, 2019 17:45

## 2019-11-21 NOTE — GENERAL PROGRESS NOTE
Assessment/Plan


Status:  stable, unchanged


Assessment/Plan:


S, O: On Trach,  , vent setting reviewed. No pressor. awake, limited following 

commands





PHYSICAL EXAMINATION:HEAD AND NECK:  Trached, site is clean .


CHEST:  Bronchial breathing sounds.ABDOMEN:  Soft.  No organomegaly.


MUSCULOSKELETAL:  Diffuse 1+ or 2+ nonpitting edema in all four contracted


extremities.  The patient is not following the commands.  Limited


evaluation.NEUROLOGY:  The patient is awake.  Not alert.  Not verbally


communicative.





LABORATORY DATA:  Labs dated Nov 14, 2019  reviewed





Meds: Reviewed and reconciled in the chart





Imaging:  CXR reviewed 





ASSESSMENT AND PLAN:


1. VDRF S/P trach


2. Sepsis.  secondary to healthcare-associated pneumonia or


3. Acute chf exacerbation - Diastolic


4. Chronic encephalomalacia.


3. Acute on chronic anemia.


4. Renal failure, age indeterminate.


6. Hyperthyroidism.


7. Hyperkalemia.


8. Diabetes type 2, uncontrolled with A1c of 10.


9. Psychiatric disorder.


10. GI and DVT prophylaxis.


11. PAH- Severe 





PLAN OF CARE:





Post PEG tube


Out patient followup for abnormal incidental imaging finding ( possibility of 

CA cant be excluded 


History of Acute drop in Hgb and observation of UGI bleeding, Will hold DAPT


Post Trach placement 


Sub Acute / LTAC placement 


Worsening leukocytosis, with negative blood culture. Likely secondary to 

malignancy.  Medically a comprehensive cancer workup is inappropriate.





Subjective


Allergies:  


Coded Allergies:  


     No Known Allergies (Unverified , 10/14/19)





Objective





Last 24 Hour Vital Signs








  Date Time  Temp Pulse Resp B/P (MAP) Pulse Ox O2 Delivery O2 Flow Rate FiO2


 


11/21/19 09:10  89  127/55    


 


11/21/19 07:19  84 28  100 Mechanical Ventilator  30





  84 19     30


 


11/21/19 05:11  97 31     30


 


11/21/19 04:00  83      


 


11/21/19 04:00 98.6 83 24 134/58 (83) 100   


 


11/21/19 04:00      Mechanical Ventilator  


 


11/21/19 04:00        30


 


11/21/19 03:30  89 30     30


 


11/21/19 01:30  83 26  100 Mechanical Ventilator  30





  86 25     30


 


11/21/19 00:00      Mechanical Ventilator  


 


11/21/19 00:00  87      


 


11/21/19 00:00 98.0 85 24 125/54 (77) 100   


 


11/20/19 23:12  85 23     30


 


11/20/19 21:30  87 22     30


 


11/20/19 20:00 98.2 86 24 125/66 (85) 100   


 


11/20/19 20:00      Mechanical Ventilator  


 


11/20/19 20:00        30


 


11/20/19 20:00  84      


 


11/20/19 19:30  85 24  100 Mechanical Ventilator  30





  88 18     30


 


11/20/19 18:39  100  120/58    


 


11/20/19 16:53  100 30     30


 


11/20/19 16:00  87      


 


11/20/19 16:00 99.3 106 21 120/58 (78) 99   


 


11/20/19 16:00        30


 


11/20/19 16:00      Mechanical Ventilator  


 


11/20/19 15:26  100 17  100 Mechanical Ventilator  30


 


11/20/19 15:23  88 22     30


 


11/20/19 13:05  89 24     30


 


11/20/19 12:00        30


 


11/20/19 12:00 99.3 90 19 125/56 (79) 99   


 


11/20/19 12:00  83      


 


11/20/19 12:00      Mechanical Ventilator  


 


11/20/19 10:56  99 30     30


 


11/20/19 10:13  79  122/52    

















Intake and Output  


 


 11/20/19 11/21/19





 19:00 07:00


 


Intake Total 2015 ml 


 


Output Total 625 ml 650 ml


 


Balance 1390 ml -650 ml


 


  


 


Free Water 600 ml 


 


IV Total 755 ml 


 


Tube Feeding 660 ml 


 


Output Urine Total 625 ml 650 ml


 


# Bowel Movements 4 4








Laboratory Tests


11/21/19 03:35: 


White Blood Count 19.7H, Red Blood Count 2.81L, Hemoglobin 7.6L, Hematocrit 

22.7L, Mean Corpuscular Volume 81, Mean Corpuscular Hemoglobin 27.1, Mean 

Corpuscular Hemoglobin Concent 33.6, Red Cell Distribution Width 14.1, Platelet 

Count 398, Mean Platelet Volume 8.1, Neutrophils (%) (Auto) , Lymphocytes (%) (

Auto) , Monocytes (%) (Auto) , Eosinophils (%) (Auto) , Basophils (%) (Auto) , 

Neutrophils % (Manual) [Pending], Lymphocytes % (Manual) [Pending], Platelet 

Estimate [Pending], Platelet Morphology [Pending], Sodium Level 135L, Potassium 

Level 4.4, Chloride Level 101, Carbon Dioxide Level 31, Anion Gap 3L, Blood 

Urea Nitrogen 43H, Creatinine 1.2, Estimat Glomerular Filtration Rate 45.0, 

Glucose Level 87, Calcium Level 8.5, Total Bilirubin 0.4, Aspartate Amino 

Transf (AST/SGOT) 14L, Alanine Aminotransferase (ALT/SGPT) 17, Alkaline 

Phosphatase 95, Total Protein 7.1, Albumin 1.9L, Globulin 5.2, Albumin/Globulin 

Ratio 0.4L


Height (Feet):  5


Height (Inches):  5.00


Weight (Pounds):  134











Garrick Keith MD Nov 21, 2019 09:28

## 2019-11-21 NOTE — HEMATOLOGY/ONC PROGRESS NOTE
Assessment/Plan


Assessment/Plan





Assessment and Recs:


# Anemia of chronic disease due to underlying chronic medical issues, 

multifactorial 


--> Anemia workup has been ordered, rule out gi bleed --> ferritin is 1400+


--> No evidence of hemolysis is noted, peripheral smear has been reviewed.


--> Hgb goal >7. Transfuse prn.


--> Epogen or iron at this time is not particularly indicated


--> Medications have been reviewed


--> low threshold for gi evaluation in case has occult +


--> bone marrow biopsy is not indicated given the other more likely causes (if 

recurrent anemia) first time here


--> hgb trend 8.4->8.5-->8.2->7.7-->7.9-->10.1-->9.1->7.8->7.2-->9-->8.4-->8->

7.8-->8.5 ->7.6


--> FOLIC ACID 1mg po daily started


--> s/p egd, no active gi bleed 


# Leukocytosis/elevated white blood cell count, unspecified likely related to 

underlying reaction v more likely infection


--> have reviewed peripheral smear and bandemia/neutrophilia noted


--> continue antibiotics if they have been started by ID team (VANC/ZOSYN)--> 

vanc/linda--> linda


--> wbc 39-->28k-->29k->31k-->23-->26k-->19k-->24k-->15.4-->16-->14-->23k-->14--

>17-->12.5->12.3-->12.6-->16-->18.2-->19


--> monitor for resolution


--> CT C/A/P Results reviewed


--> FOR INdium bone scan done -> Rim of activity within the pelvis, 

corresponding to expected location of the soft tissue component of the cystic 

pelvic mass described on recent imaging studies.


--> again consider gyn eval


--> as come down over last week, currently only 12k and s/p code, continue to 

monitor


--> would hold off on biopsy of bone


# Thrombocytosis - if plt count >400k, most usually is a reactive process and 

will improve once exacerbant removed as well


--> if plt count >600k, consider CML  will need to order Jak2 test, peripheral 

smear to be reviewed as well and note here if any myelocytes, metamyelocytes, 

blasts are noted


# Pelvic mass on ct and us -- 13 x 7 x 12.9 cm unilocular cystic mass, 

corresponding to lesion reported on recent CT scan. Layering low level internal 

echoes likely represent bladder debris.. This presumably represents a cystic 

ovarian lesion with debris or hemorrhage, possibly neoplastic.


--> CA-125 is 216! elevated


--> consider gyn eval


# Hypercalcemia - btw 10-11 range when corrected to alb


--> ivf have been started


--> if remains elevated, 7.9-->8.3


--> will get pth


# Sepsis from pneumonia.  


--> pulm consulted, abx started


# CHANCE (acute kidney injury) on ckd


--> cr 1.6-->2.7-->2.2->1.2->1.2


--> per renal


# UTI (urinary tract infection)


--> on abx per id


# Dehydration


--> on ivf


# Acute metabolic encephalopathy


--> likely due to pna


# Resp failure now intubated 11/4


--> s/p trach


# Dysphagia s/p peg++


# Dvt ppx with heparin sq





The timing of this note does not necessarily reflect the time of the patient 

was seen.





Greatly appreciate consultation.





Subjective


Constitutional:  Denies: no symptoms, chills, fever, malaise, weakness, other


Respiratory:  Denies: no symptoms, cough, shortness of breath, SOB with 

excertion, SOB at rest, sputum, wheezing, other


Gastrointestinal/Abdominal:  Denies: no symptoms, abdomen distended, abdominal 

pain, black stools, tarry stools, blood in stool, constipated, diarrhea, 

difficulty swallowing, nausea, poor appetite, poor fluid intake, rectal bleeding

, vomiting, other


Genitourinary:  Denies: no symptoms, burning, discharge, frequency, flank pain, 

hematuria, incontinence, pain, urgency, other


Neurologic/Psychiatric:  Denies: no symptoms, anxiety, depressed, emotional 

problems, headache, numbness, paresthesia, pre-existing deficit, seizure, 

tingling, tremors, weakness, other


Endocrine:  Denies: no symptoms, excessive sweating, flushing, intolerance to 

cold, intolerance to heat, increased hunger, increased thirst, increased urine, 

unexplained weight gain, unexplained weight loss, other


Allergies:  


Coded Allergies:  


     No Known Allergies (Unverified , 10/14/19)


Subjective


10/14: hgb has improved, no bleeding, labs reivewed


10/15: no events to report


10/16: a+ o x1, no bleeding or chills noted, no major changes, occult pending


10/17: no events noted, no bleeding, no chills, no hematemesis/hematochezia


10/18: remains confused, with abx, on nc


10/19: cr is worse, hgb 8.4, no bleeding, night sweats, chills


10/20: no f/c, no night sweats, labs noted, cr worse


10/21: no bleeding or chills, no night sweats, labs pending this am


10/22: pelvic mass noted on imaging, no bleeding reported, ordered ca125


10/23: no events, remains lethargic, is on abx, seen by surg


10/24: with raid response overnight, rr high as well as bp, vidal to icu


10/25: no events, no bleeding, to undergo a indium scan with id


10/26: comfortable, electrolytes reviewed, given mag and k


10/29: back on bipap with gt feeds, yousuf rn this am


10/30: remains very confused, no f/c, no bleeding, with urinary retention, 

folic acid started


10/31: sleeping, is on bipap, no events, wbc is higher this am


11/1: no events to report, no bleeding, wbc still high but better


11/3: no bleeding, no night sweats, s/p peg, alert


11/4: s/p code blue last night, now intubated, labs noted wbc lower


11/5: given k and mg, for endoscopy tomorrow given drop h/h, 1 unit prbc


11/6: is on vent, unresponsive, no bleeding noted, no chills wbc is 13k


11/14: remains in icu, s/p trach, wbc 12, on meropenem 


11/15: no bleeding, trying to pull off the trach, cbc reviewed


11/16: no Bms last two days, on vent, seen by gi, no bleeding


11/18: no bleeding, no night sweats, labs reviewed, seen by gi/surg


11/19: no acute events, vent/trach, sp egd, no active gi bleed, h/h stable  


11/20: no new events, no bleeding, on trach, no f/c, no night sweats


11/21: no major changes, no bleeding, remains altered, labs noted





Objective


Objective





Current Medications








 Medications


  (Trade)  Dose


 Ordered  Sig/Winnie


 Route


 PRN Reason  Start Time


 Stop Time Status Last Admin


Dose Admin


 


 Acetaminophen


  (Tylenol)  650 mg  Q4H  PRN


 GT


 Mild Pain/Temp > 100.5  11/21/19 11:45


 11/25/19 17:59   


 


 


 Acetaminophen


  (Tylenol)  650 mg  Q4H  PRN


 RECTAL


 TEMP>100.5  11/15/19 18:00


 12/2/19 17:59   


 


 


 Acetylcysteine


  (Mucomyst)  100 mg  TIDRT


 N


   11/20/19 19:54


 12/20/19 19:53  11/21/19 13:13


 


 


 Albuterol/


 Ipratropium


  (Albuterol/


 Ipratropium)  3 ml  Q6HRT


 N


   11/17/19 19:00


 11/22/19 18:59  11/21/19 13:13


 


 


 Amlodipine


 Besylate


  (Norvasc)  2.5 mg  BID


 GT


   11/21/19 18:00


 11/29/19 17:59   


 


 


 Chlorhexidine


 Gluconate


  (Mae-Hex 2%)  1 applic  DAILY@2000


 TOPIC


   11/15/19 20:00


 12/5/19 19:59  11/20/19 21:06


 


 


 Dextrose


  (Dextrose 50%)  25 ml  Q30M  PRN


 IV


 Hypoglycemia  11/16/19 13:00


 12/16/19 12:59   


 


 


 Dextrose


  (Dextrose 50%)  50 ml  Q30M  PRN


 IV


 Hypoglycemia  11/16/19 13:00


 12/16/19 12:59   


 


 


 Docusate Sodium


  (Colace)  100 mg  TWICE A  DAY


 GT


   11/21/19 18:00


 12/21/19 17:59   


 


 


 Folic Acid


  (Folate)  3 mg  DAILY


 GT


   11/16/19 09:00


 11/30/19 08:59  11/21/19 09:10


 


 


 Heparin Sodium


  (Porcine)


  (Heparin 5000


 units/ml)  5,000 units  EVERY 12  HOURS


 SUBQ


   11/18/19 21:00


 12/18/19 20:59  11/21/19 09:12


 


 


 Insulin Aspart


  (NovoLOG)    EVERY 6  HOURS


 SUBQ


   11/16/19 18:00


 12/16/19 17:59  11/21/19 11:37


 


 


 Insulin Aspart


  (NovoLOG)  15 units  EVERY 6  HOURS


 SUBQ


   11/19/19 12:00


 12/16/19 17:59  11/21/19 11:38


 


 


 Insulin Detemir


  (Levemir)  20 units  BID


 SUBQ


   11/19/19 18:00


 12/1/19 08:59  11/21/19 09:12


 


 


 Lactulose


  (Cephulac)  20 gm  Q8H  PRN


 GT


 Constipation  11/21/19 11:45


 12/3/19 17:59   


 


 


 Linezolid  300 ml @ 


 300 mls/hr  Q12HR


 IVPB


   11/20/19 11:00


 11/27/19 10:59  11/21/19 09:07


 


 


 Meropenem 1 gm/


 Sodium Chloride  55 ml @ 


 110 mls/hr  Q12H


 IVPB


   11/20/19 10:00


 11/25/19 09:59  11/21/19 09:46


 


 


 Metoclopramide HCl


  (Reglan)  10 mg  Q6H  PRN


 IVP


 Nausea & Vomiting  11/15/19 18:00


 12/6/19 17:59   


 


 


 Pantoprazole


  (Protonix)  40 mg  EVERY 12  HOURS


 IVP


   11/15/19 21:00


 12/3/19 08:59  11/21/19 09:07


 


 


 Polyethylene


 Glycol


  (Miralax)  17 gm  BEDTIME


 GT


   11/18/19 21:00


 12/18/19 20:59  11/20/19 21:07


 











Last 24 Hour Vital Signs








  Date Time  Temp Pulse Resp B/P (MAP) Pulse Ox O2 Delivery O2 Flow Rate FiO2


 


11/21/19 13:09  79 27  100 Mechanical Ventilator  30





  80 17     30


 


11/21/19 12:00 99.7 87 20 113/53 (73) 100   


 


11/21/19 12:00  82      


 


11/21/19 12:00      Mechanical Ventilator  


 


11/21/19 10:53     99   


 


11/21/19 10:48  83 33     30


 


11/21/19 10:15        30


 


11/21/19 09:40        30


 


11/21/19 09:35  89 31     30





        30


 


11/21/19 09:10  89  127/55    


 


11/21/19 08:00  91      


 


11/21/19 08:00        30


 


11/21/19 08:00 100.0 87 18 124/57 (79) 100   


 


11/21/19 08:00      Mechanical Ventilator  


 


11/21/19 07:19  84 28  100 Mechanical Ventilator  30





  84 19     30


 


11/21/19 05:11  97 31     30


 


11/21/19 04:00  83      


 


11/21/19 04:00 98.6 83 24 134/58 (83) 100   


 


11/21/19 04:00      Mechanical Ventilator  


 


11/21/19 04:00        30


 


11/21/19 03:30  89 30     30


 


11/21/19 01:30  83 26  100 Mechanical Ventilator  30





  86 25     30


 


11/21/19 00:00      Mechanical Ventilator  


 


11/21/19 00:00  87      


 


11/21/19 00:00 98.0 85 24 125/54 (77) 100   


 


11/20/19 23:12  85 23     30


 


11/20/19 21:30  87 22     30


 


11/20/19 20:00 98.2 86 24 125/66 (85) 100   


 


11/20/19 20:00      Mechanical Ventilator  


 


11/20/19 20:00        30


 


11/20/19 20:00  84      


 


11/20/19 19:30  85 24  100 Mechanical Ventilator  30





  88 18     30


 


11/20/19 18:39  100  120/58    


 


11/20/19 16:53  100 30     30


 


11/20/19 16:00  87      


 


11/20/19 16:00 99.3 106 21 120/58 (78) 99   


 


11/20/19 16:00        30


 


11/20/19 16:00      Mechanical Ventilator  


 


11/20/19 15:26  100 17  100 Mechanical Ventilator  30


 


11/20/19 15:23  88 22     30


 


11/20/19 13:05  89 24     30


 


11/20/19 12:00        30


 


11/20/19 12:00 99.3 90 19 125/56 (79) 99   


 


11/20/19 12:00  83      


 


11/20/19 12:00      Mechanical Ventilator  


 


11/20/19 10:56  99 30     30


 


11/20/19 10:13  79  122/52    


 


11/20/19 09:23  79 27     30


 


11/20/19 08:53     95   


 


11/20/19 08:46  82 25     30


 


11/20/19 08:42  84 23     30


 


11/20/19 08:00  97      


 


11/20/19 08:00      Mechanical Ventilator  


 


11/20/19 08:00        30


 


11/20/19 08:00 99.0 90 17 122/52 (75) 97   


 


11/20/19 07:02  93 21  100 Mechanical Ventilator  30





  95 18     30


 


11/20/19 05:18  88 27     30


 


11/20/19 04:00        30


 


11/20/19 04:00      Mechanical Ventilator  


 


11/20/19 04:00 98.1 86 22 125/58 (80) 100   


 


11/20/19 03:50  85      


 


11/20/19 03:02  85 26     30


 


11/20/19 01:47 99.8       


 


11/20/19 00:40  112 28     30


 


11/20/19 00:00 100.5 97 26 148/66 (93) 100   


 


11/20/19 00:00        30


 


11/20/19 00:00      Mechanical Ventilator  


 


11/19/19 23:34  100      


 


11/19/19 23:08  94 28     30


 


11/19/19 21:18  97 27     30


 


11/19/19 20:00      Mechanical Ventilator  


 


11/19/19 20:00        30


 


11/19/19 20:00 99.1 102 20 126/49 (74) 100   


 


11/19/19 19:39  96      


 


11/19/19 19:22  97 29  100 Mechanical Ventilator  30





  95 29     30


 


11/19/19 18:55  103  149/72    


 


11/19/19 16:31  103 30     30


 


11/19/19 16:00 99.7 105 23 149/72 (97) 99   


 


11/19/19 16:00      Mechanical Ventilator  


 


11/19/19 16:00        30


 


11/19/19 15:36  100      


 


11/19/19 14:55  111 23     30

















Intake and Output  


 


 11/20/19 11/21/19





 19:00 07:00


 


Intake Total 2015 ml 


 


Output Total 625 ml 650 ml


 


Balance 1390 ml -650 ml


 


  


 


Free Water 600 ml 


 


IV Total 755 ml 


 


Tube Feeding 660 ml 


 


Output Urine Total 625 ml 650 ml


 


# Bowel Movements 4 4











Labs








Test


  11/19/19


04:30 11/19/19


17:30 11/20/19


03:28 11/20/19


03:50


 


White Blood Count


  18.2 K/UL


(4.8-10.8) 


  


  18.9 K/UL


(4.8-10.8)


 


Red Blood Count


  3.11 M/UL


(4.20-5.40) 


  


  2.96 M/UL


(4.20-5.40)


 


Hemoglobin


  8.5 G/DL


(12.0-16.0) 


  


  7.9 G/DL


(12.0-16.0)


 


Hematocrit


  25.4 %


(37.0-47.0) 


  


  24.6 %


(37.0-47.0)


 


Mean Corpuscular Volume 82 FL (80-99)    83 FL (80-99) 


 


Mean Corpuscular Hemoglobin


  27.3 PG


(27.0-31.0) 


  


  26.9 PG


(27.0-31.0)


 


Mean Corpuscular Hemoglobin


Concent 33.4 G/DL


(32.0-36.0) 


  


  32.3 G/DL


(32.0-36.0)


 


Red Cell Distribution Width


  13.1 %


(11.6-14.8) 


  


  14.6 %


(11.6-14.8)


 


Platelet Count


  498 K/UL


(150-450) 


  


  452 K/UL


(150-450)


 


Mean Platelet Volume


  7.5 FL


(6.5-10.1) 


  


  7.8 FL


(6.5-10.1)


 


Neutrophils (%) (Auto)  % (45.0-75.0)     % (45.0-75.0) 


 


Lymphocytes (%) (Auto)  % (20.0-45.0)     % (20.0-45.0) 


 


Monocytes (%) (Auto)  % (1.0-10.0)     % (1.0-10.0) 


 


Eosinophils (%) (Auto)  % (0.0-3.0)     % (0.0-3.0) 


 


Basophils (%) (Auto)  % (0.0-2.0)     % (0.0-2.0) 


 


Differential Total Cells


Counted 100 


  


  


  100 


 


 


Neutrophils % (Manual) 73 % (45-75)    62 % (45-75) 


 


Lymphocytes % (Manual) 17 % (20-45)    21 % (20-45) 


 


Monocytes % (Manual) 8 % (1-10)    4 % (1-10) 


 


Eosinophils % (Manual) 1 % (0-3)    11 % (0-3) 


 


Basophils % (Manual) 1 % (0-2)    1 % (0-2) 


 


Band Neutrophils 0 % (0-8)    1 % (0-8) 


 


Platelet Estimate Adequate    Increased 


 


Platelet Morphology Normal    Normal 


 


Hypochromasia 1+    


 


Sodium Level


  154 MMOL/L


(136-145) 


  


  144 MMOL/L


(136-145)


 


Potassium Level


  5.2 MMOL/L


(3.5-5.1) 


  


  4.2 MMOL/L


(3.5-5.1)


 


Chloride Level


  115 MMOL/L


() 


  


  108 MMOL/L


()


 


Carbon Dioxide Level


  32 MMOL/L


(21-32) 


  


  33 MMOL/L


(21-32)


 


Anion Gap


  7 mmol/L


(5-15) 


  


  3 mmol/L


(5-15)


 


Blood Urea Nitrogen


  61 mg/dL


(7-18) 


  


  52 mg/dL


(7-18)


 


Creatinine


  1.5 MG/DL


(0.55-1.30) 


  


  1.3 MG/DL


(0.55-1.30)


 


Estimat Glomerular Filtration


Rate 34.7 mL/min


(>60) 


  


  41.0 mL/min


(>60)


 


Glucose Level


  211 MG/DL


() 


  


  168 MG/DL


()


 


Calcium Level


  9.1 MG/DL


(8.5-10.1) 


  


  9.0 MG/DL


(8.5-10.1)


 


Phosphorus Level


  2.9 MG/DL


(2.5-4.9) 


  


  3.4 MG/DL


(2.5-4.9)


 


Magnesium Level


  2.7 MG/DL


(1.8-2.4) 


  


  2.5 MG/DL


(1.8-2.4)


 


Total Bilirubin


  0.3 MG/DL


(0.2-1.0) 


  


  0.4 MG/DL


(0.2-1.0)


 


Aspartate Amino Transf


(AST/SGOT) 15 U/L (15-37) 


  


  


  17 U/L (15-37) 


 


 


Alanine Aminotransferase


(ALT/SGPT) 20 U/L (12-78) 


  


  


  20 U/L (12-78) 


 


 


Alkaline Phosphatase


  106 U/L


() 


  


  102 U/L


()


 


C-Reactive Protein,


Quantitative 8.3 mg/dL


(0.00-0.90) 


  


  8.9 mg/dL


(0.00-0.90)


 


Pro-B-Type Natriuretic Peptide


  54262 pg/mL


(0-125) 


  


  


 


 


Total Protein


  8.1 G/DL


(6.4-8.2) 


  


  7.4 G/DL


(6.4-8.2)


 


Albumin


  2.3 G/DL


(3.4-5.0) 


  


  2.1 G/DL


(3.4-5.0)


 


Globulin 5.8 g/dL    5.3 g/dL 


 


Albumin/Globulin Ratio 0.4 (1.0-2.7)    0.4 (1.0-2.7) 


 


Lactic Acid Level


  


  1.30 mmol/L


(0.4-2.0) 


  


 


 


Urine Color   Pale yellow  


 


Urine Appearance   Clear  


 


Urine pH   8 (4.5-8.0)  


 


Urine Specific Gravity


  


  


  1.010


(1.005-1.035) 


 


 


Urine Protein   3+ (NEGATIVE)  


 


Urine Glucose (UA)   1+ (NEGATIVE)  


 


Urine Ketones


  


  


  Negative


(NEGATIVE) 


 


 


Urine Blood


  


  


  Negative


(NEGATIVE) 


 


 


Urine Nitrite


  


  


  Negative


(NEGATIVE) 


 


 


Urine Bilirubin


  


  


  Negative


(NEGATIVE) 


 


 


Urine Urobilinogen


  


  


  1 MG/DL


(0.0-1.0) 


 


 


Urine Leukocyte Esterase


  


  


  Negative


(NEGATIVE) 


 


 


Urine RBC


  


  


  0-2 /HPF (0 -


2) 


 


 


Urine WBC


  


  


  0-2 /HPF (0 -


2) 


 


 


Urine Squamous Epithelial


Cells 


  


  Occasional


/LPF 


 


 


Urine Bacteria


  


  


  Occasional


/HPF (NONE) 


 


 


Erythrocyte Sedimentation Rate


  


  


  


  130 MM/HR


(0-30)


 


Prothrombin Time


  


  


  


  10.7 SEC


(9.30-11.50)


 


Prothromb Time International


Ratio 


  


  


  1.0 (0.9-1.1) 


 


 


Activated Partial


Thromboplast Time 


  


  


  30 SEC (23-33) 


 


 


Lipase


  


  


  


  102 U/L


()


 


Test


  11/21/19


03:35 


  


  


 


 


White Blood Count


  19.7 K/UL


(4.8-10.8) 


  


  


 


 


Red Blood Count


  2.81 M/UL


(4.20-5.40) 


  


  


 


 


Hemoglobin


  7.6 G/DL


(12.0-16.0) 


  


  


 


 


Hematocrit


  22.7 %


(37.0-47.0) 


  


  


 


 


Mean Corpuscular Volume 81 FL (80-99)    


 


Mean Corpuscular Hemoglobin


  27.1 PG


(27.0-31.0) 


  


  


 


 


Mean Corpuscular Hemoglobin


Concent 33.6 G/DL


(32.0-36.0) 


  


  


 


 


Red Cell Distribution Width


  14.1 %


(11.6-14.8) 


  


  


 


 


Platelet Count


  398 K/UL


(150-450) 


  


  


 


 


Mean Platelet Volume


  8.1 FL


(6.5-10.1) 


  


  


 


 


Neutrophils (%) (Auto)  % (45.0-75.0)    


 


Lymphocytes (%) (Auto)  % (20.0-45.0)    


 


Monocytes (%) (Auto)  % (1.0-10.0)    


 


Eosinophils (%) (Auto)  % (0.0-3.0)    


 


Basophils (%) (Auto)  % (0.0-2.0)    


 


Differential Total Cells


Counted 100 


  


  


  


 


 


Neutrophils % (Manual) 58 % (45-75)    


 


Lymphocytes % (Manual) 25 % (20-45)    


 


Monocytes % (Manual) 4 % (1-10)    


 


Eosinophils % (Manual) 9 % (0-3)    


 


Basophils % (Manual) 2 % (0-2)    


 


Myelocytes % 1 % (0-0)    


 


Band Neutrophils 1 % (0-8)    


 


Platelet Estimate Adequate    


 


Platelet Morphology Normal    


 


Microcytosis 2+    


 


Sodium Level


  135 MMOL/L


(136-145) 


  


  


 


 


Potassium Level


  4.4 MMOL/L


(3.5-5.1) 


  


  


 


 


Chloride Level


  101 MMOL/L


() 


  


  


 


 


Carbon Dioxide Level


  31 MMOL/L


(21-32) 


  


  


 


 


Anion Gap


  3 mmol/L


(5-15) 


  


  


 


 


Blood Urea Nitrogen


  43 mg/dL


(7-18) 


  


  


 


 


Creatinine


  1.2 MG/DL


(0.55-1.30) 


  


  


 


 


Estimat Glomerular Filtration


Rate 45.0 mL/min


(>60) 


  


  


 


 


Glucose Level


  87 MG/DL


() 


  


  


 


 


Calcium Level


  8.5 MG/DL


(8.5-10.1) 


  


  


 


 


Total Bilirubin


  0.4 MG/DL


(0.2-1.0) 


  


  


 


 


Aspartate Amino Transf


(AST/SGOT) 14 U/L (15-37) 


  


  


  


 


 


Alanine Aminotransferase


(ALT/SGPT) 17 U/L (12-78) 


  


  


  


 


 


Alkaline Phosphatase


  95 U/L


() 


  


  


 


 


Total Protein


  7.1 G/DL


(6.4-8.2) 


  


  


 


 


Albumin


  1.9 G/DL


(3.4-5.0) 


  


  


 


 


Globulin 5.2 g/dL    


 


Albumin/Globulin Ratio 0.4 (1.0-2.7)    








Height (Feet):  5


Height (Inches):  5.00


Weight (Pounds):  160


Objective


Physical Exam:


Vitals: reviewed


General Appearance:  NAD


HEENT:  normocephalic, atraumatic


Neck:  non-tender, normal alignment, trach++


Respiratory/Chest:  normal breath sounds bilaterally ++ vent


Cardiovascular/Chest: rrr


Abdomen:  normal bowel sounds, soft, nontender ++ peg


Extremities:  normal range of motion











Mike Pop MD Nov 21, 2019 13:53

## 2019-11-21 NOTE — PULMONOLOGY PROGRESS NOTE
Assessment/Plan


Problems:  


(1) Healthcare associated bacterial pneumonia


(2) UTI (urinary tract infection)


(3) Sepsis


(4) Dehydration


(5) CHANCE (acute kidney injury)


(6) Acute metabolic encephalopathy


(7) Hyperglycemia due to type 2 diabetes mellitus


(8) Renal failure (ARF), acute on chronic


(9) Aspiration pneumonia


(10) Abnormal TSH


(11) Pelvic mass in female


(12) PEG (percutaneous endoscopic gastrostomy) status


(13) Gastrostomy in place


(14) Tracheostomy in place


Assessment/Plan


VDRF


S/P trach


Pneumonia S/P treatment


E coli UTI S/P treatment


Encephalopathy


Hx CHF


HTN


CHANCE - RESOVLED


Pelvic mass/possible GYN malignancy vs cyst


Dysphagia S/P PEG





Plan:


-Continue ventilatory support ---> WEANING TRIALS (d/w RT, continue PS 12 and 

dec by 2 as able, if fatigues can return to AC, hopefully can attempt TC next 

few days)


-Optimize pulmonary hygiene/mobilize as tolerated


-Titrate down FiO2 to keep SaO2 > 90%


-Monitor WCt, JAK2 mutation negative @ CS, per Dr. Pop and Dr. Keith 

hold off BMBx @ this time


-Abx per ID, F/U Cx's 


-monitor volumes and renal function, F/U renal recs, PRN Lasix


-Strongly cnsider GYN evaluation 


-monitor encephalopathy, Consider neuro eval, ? LP


-NPO, GTF's


-DVT Px: Hep SQ


-FC, continue to discuss GOC, consider palliative care eval





D/W RN and RT





Subjective


Allergies:  


Coded Allergies:  


     No Known Allergies (Unverified , 10/14/19)


Subjective


GINO yas periods of PS better with10 secretions stable WCt still elevated





Objective





Last 24 Hour Vital Signs








  Date Time  Temp Pulse Resp B/P (MAP) Pulse Ox O2 Delivery O2 Flow Rate FiO2


 


11/21/19 15:03  101 35     30


 


11/21/19 13:09  79 27  100 Mechanical Ventilator  30





  80 17     30


 


11/21/19 12:00 99.7 87 20 113/53 (73) 100   


 


11/21/19 12:00  82      


 


11/21/19 12:00      Mechanical Ventilator  


 


11/21/19 10:53     99   


 


11/21/19 10:48  83 33     30


 


11/21/19 10:15        30


 


11/21/19 09:40        30


 


11/21/19 09:35  89 31     30





        30


 


11/21/19 09:10  89  127/55    


 


11/21/19 08:00  91      


 


11/21/19 08:00        30


 


11/21/19 08:00 100.0 87 18 124/57 (79) 100   


 


11/21/19 08:00      Mechanical Ventilator  


 


11/21/19 07:19  84 28  100 Mechanical Ventilator  30





  84 19     30


 


11/21/19 05:11  97 31     30


 


11/21/19 04:00  83      


 


11/21/19 04:00 98.6 83 24 134/58 (83) 100   


 


11/21/19 04:00      Mechanical Ventilator  


 


11/21/19 04:00        30


 


11/21/19 03:30  89 30     30


 


11/21/19 01:30  83 26  100 Mechanical Ventilator  30





  86 25     30


 


11/21/19 00:00      Mechanical Ventilator  


 


11/21/19 00:00  87      


 


11/21/19 00:00 98.0 85 24 125/54 (77) 100   


 


11/20/19 23:12  85 23     30


 


11/20/19 21:30  87 22     30


 


11/20/19 20:00 98.2 86 24 125/66 (85) 100   


 


11/20/19 20:00      Mechanical Ventilator  


 


11/20/19 20:00        30


 


11/20/19 20:00  84      


 


11/20/19 19:30  85 24  100 Mechanical Ventilator  30





  88 18     30


 


11/20/19 18:39  100  120/58    


 


11/20/19 16:53  100 30     30


 


11/20/19 16:00  87      


 


11/20/19 16:00 99.3 106 21 120/58 (78) 99   


 


11/20/19 16:00        30


 


11/20/19 16:00      Mechanical Ventilator  

















Intake and Output  


 


 11/20/19 11/21/19





 19:00 07:00


 


Intake Total 2015 ml 


 


Output Total 625 ml 650 ml


 


Balance 1390 ml -650 ml


 


  


 


Free Water 600 ml 


 


IV Total 755 ml 


 


Tube Feeding 660 ml 


 


Output Urine Total 625 ml 650 ml


 


# Bowel Movements 4 4








General Appearance:  no acute distress, cachetic


HEENT:  normocephalic, atraumatic, anicteric, mucous membranes moist, status 

post trach


Respiratory/Chest:  chest wall non-tender, lungs clear, normal breath sounds, 

no respiratory distress, no accessory muscle use


Cardiovascular:  normal peripheral pulses, normal rate, regular rhythm


Abdomen:  normal bowel sounds, soft, non tender, no organomegaly, non distended

, no mass


Extremities:  no cyanosis, no clubbing, no edema





Microbiology








 Date/Time


Source Procedure


Growth Status


 


 


 11/19/19 16:10


Blood Blood Culture - Preliminary


NO GROWTH AFTER 24 HOURS Resulted


 


 11/19/19 16:00


Blood Blood Culture - Preliminary


NO GROWTH AFTER 24 HOURS Resulted








Laboratory Tests


11/21/19 03:35: 


White Blood Count 19.7H, Red Blood Count 2.81L, Hemoglobin 7.6L, Hematocrit 

22.7L, Mean Corpuscular Volume 81, Mean Corpuscular Hemoglobin 27.1, Mean 

Corpuscular Hemoglobin Concent 33.6, Red Cell Distribution Width 14.1, Platelet 

Count 398, Mean Platelet Volume 8.1, Neutrophils (%) (Auto) , Lymphocytes (%) (

Auto) , Monocytes (%) (Auto) , Eosinophils (%) (Auto) , Basophils (%) (Auto) , 

Differential Total Cells Counted 100, Neutrophils % (Manual) 58, Lymphocytes % (

Manual) 25, Monocytes % (Manual) 4, Eosinophils % (Manual) 9H, Basophils % (

Manual) 2, Myelocytes % 1H, Band Neutrophils 1, Platelet Estimate Adequate, 

Platelet Morphology Normal, Microcytosis 2+, Sodium Level 135L, Potassium Level 

4.4, Chloride Level 101, Carbon Dioxide Level 31, Anion Gap 3L, Blood Urea 

Nitrogen 43H, Creatinine 1.2, Estimat Glomerular Filtration Rate 45.0, Glucose 

Level 87, Calcium Level 8.5, Total Bilirubin 0.4, Aspartate Amino Transf (AST/

SGOT) 14L, Alanine Aminotransferase (ALT/SGPT) 17, Alkaline Phosphatase 95, 

Total Protein 7.1, Albumin 1.9L, Globulin 5.2, Albumin/Globulin Ratio 0.4L





Current Medications








 Medications


  (Trade)  Dose


 Ordered  Sig/Winnie


 Route


 PRN Reason  Start Time


 Stop Time Status Last Admin


Dose Admin


 


 Acetaminophen


  (Tylenol)  650 mg  Q4H  PRN


 GT


 Mild Pain/Temp > 100.5  11/21/19 11:45


 11/25/19 17:59   


 


 


 Acetaminophen


  (Tylenol)  650 mg  Q4H  PRN


 RECTAL


 TEMP>100.5  11/15/19 18:00


 12/2/19 17:59   


 


 


 Acetylcysteine


  (Mucomyst)  100 mg  TIDRT


 HHN


   11/20/19 19:54


 12/20/19 19:53  11/21/19 13:13


 


 


 Albuterol/


 Ipratropium


  (Albuterol/


 Ipratropium)  3 ml  Q6HRT


 HHN


   11/17/19 19:00


 11/22/19 18:59  11/21/19 13:13


 


 


 Amlodipine


 Besylate


  (Norvasc)  2.5 mg  BID


 GT


   11/21/19 18:00


 11/29/19 17:59   


 


 


 Chlorhexidine


 Gluconate


  (Mae-Hex 2%)  1 applic  DAILY@2000


 TOPIC


   11/15/19 20:00


 12/5/19 19:59  11/20/19 21:06


 


 


 Dextrose


  (Dextrose 50%)  25 ml  Q30M  PRN


 IV


 Hypoglycemia  11/16/19 13:00


 12/16/19 12:59   


 


 


 Dextrose


  (Dextrose 50%)  50 ml  Q30M  PRN


 IV


 Hypoglycemia  11/16/19 13:00


 12/16/19 12:59   


 


 


 Docusate Sodium


  (Colace)  100 mg  TWICE A  DAY


 GT


   11/21/19 18:00


 12/21/19 17:59   


 


 


 Folic Acid


  (Folate)  3 mg  DAILY


 GT


   11/16/19 09:00


 11/30/19 08:59  11/21/19 09:10


 


 


 Heparin Sodium


  (Porcine)


  (Heparin 5000


 units/ml)  5,000 units  EVERY 12  HOURS


 SUBQ


   11/18/19 21:00


 12/18/19 20:59  11/21/19 09:12


 


 


 Insulin Aspart


  (NovoLOG)    EVERY 6  HOURS


 SUBQ


   11/16/19 18:00


 12/16/19 17:59  11/21/19 11:37


 


 


 Insulin Aspart


  (NovoLOG)  15 units  EVERY 6  HOURS


 SUBQ


   11/19/19 12:00


 12/16/19 17:59  11/21/19 11:38


 


 


 Insulin Detemir


  (Levemir)  20 units  BID


 SUBQ


   11/19/19 18:00


 12/1/19 08:59  11/21/19 09:12


 


 


 Lactulose


  (Cephulac)  20 gm  Q8H  PRN


 GT


 Constipation  11/21/19 11:45


 12/3/19 17:59   


 


 


 Linezolid  300 ml @ 


 300 mls/hr  Q12HR


 IVPB


   11/20/19 11:00


 11/27/19 10:59  11/21/19 09:07


 


 


 Meropenem 1 gm/


 Sodium Chloride  55 ml @ 


 110 mls/hr  Q12H


 IVPB


   11/20/19 10:00


 11/25/19 09:59  11/21/19 09:46


 


 


 Metoclopramide HCl


  (Reglan)  10 mg  Q6H  PRN


 IVP


 Nausea & Vomiting  11/15/19 18:00


 12/6/19 17:59   


 


 


 Pantoprazole


  (Protonix)  40 mg  EVERY 12  HOURS


 IVP


   11/15/19 21:00


 12/3/19 08:59  11/21/19 09:07


 


 


 Polyethylene


 Glycol


  (Miralax)  17 gm  BEDTIME


 GT


   11/18/19 21:00


 12/18/19 20:59  11/20/19 21:07


 

















Sammy Torres MD Nov 21, 2019 15:44

## 2019-11-21 NOTE — NUR
*-* INSURANCE *-*



UPDATED CLINICALS AND REVIEWS HAVE BEEN FAXED TO:





[facility]#[id number]

CM: Lance

#[phone]

Fax#[phone]

## 2019-11-21 NOTE — NUR
NURSE NOTES:

Pt received from [facility] Velez in stable condition with no cardiopulmonary distress noted. Pt 
is awake in bed, opens eyes spontaneously, obtunded, [id number] Shiley 8 AC 14  
FiO2 30% Peep 5. GT noted running Glucerna 1.6 at 60cc/hr. F/C noted draining yellow urine. 
Skin alterations noted. TU PICC noted. Bed in lowest position, alarm on, side rails up x 3 
and padded per seizure precaution, call light within reach. Will continue to monitor.

## 2019-11-21 NOTE — GENERAL PROGRESS NOTE
Assessment/Plan


Status:  stable, unchanged


Assessment/Plan:








Assessment/Plan


Problems:  


(1) PEG (percutaneous endoscopic gastrostomy) status


ICD Codes:  Z93.1 - Gastrostomy status


SNOMED:  222736020, 892823210


(2) Anemia


ICD Codes:  D64.9 - Anemia, unspecified


SNOMED:  001433752


Qualifiers:  


   Qualified Codes:  D64.9 - Anemia, unspecified


(3) Dehydration


ICD Codes:  E86.0 - Dehydration


SNOMED:  90572651, 5248164


Status:  unchanged





Assessment/Plan


Assessment


(1) pneumonia


(2) UTI


(3) Sepsis


(4) Dehydration


(5) CHANCE 


(6) Acute metabolic encephalopathy


(7) Hyperglycemia due to type 2 diabetes mellitus


(8) Renal failure (ARF), acute on chronic


(9) Aspiration pneumonia


(10) Abnormal TSH


(11) Pelvic mass in female


(12) Congestive Heart Failure


(13) Anemia


(14) Dysphagia - s/p GT





s/p EGD SUMMARY OF FINDINGS:


1. No evidence of any active upper GI bleeding at this time.


2. Gastritis status biopsy.





Recommendations


GTFs


prn transfusions


bowel regime


PPI and H2B


will follow up, consider colonoscopy if patient has recurrent GI bleed


still having fever>> fu ID





Subjective


ROS Limited/Unobtainable:  No


Allergies:  


Coded Allergies:  


     No Known Allergies (Unverified , 10/14/19)





Objective





Last 24 Hour Vital Signs








  Date Time  Temp Pulse Resp B/P (MAP) Pulse Ox O2 Delivery O2 Flow Rate FiO2


 


11/21/19 10:53     99   


 


11/21/19 10:48  83 33     30


 


11/21/19 10:15        30


 


11/21/19 09:40        30


 


11/21/19 09:35  89 31     30





        30


 


11/21/19 09:10  89  127/55    


 


11/21/19 08:00  91      


 


11/21/19 08:00        30


 


11/21/19 08:00 100.0 87 18 124/57 (79) 100   


 


11/21/19 08:00      Mechanical Ventilator  


 


11/21/19 07:19  84 28  100 Mechanical Ventilator  30





  84 19     30


 


11/21/19 05:11  97 31     30


 


11/21/19 04:00  83      


 


11/21/19 04:00 98.6 83 24 134/58 (83) 100   


 


11/21/19 04:00      Mechanical Ventilator  


 


11/21/19 04:00        30


 


11/21/19 03:30  89 30     30


 


11/21/19 01:30  83 26  100 Mechanical Ventilator  30





  86 25     30


 


11/21/19 00:00      Mechanical Ventilator  


 


11/21/19 00:00  87      


 


11/21/19 00:00 98.0 85 24 125/54 (77) 100   


 


11/20/19 23:12  85 23     30


 


11/20/19 21:30  87 22     30


 


11/20/19 20:00 98.2 86 24 125/66 (85) 100   


 


11/20/19 20:00      Mechanical Ventilator  


 


11/20/19 20:00        30


 


11/20/19 20:00  84      


 


11/20/19 19:30  85 24  100 Mechanical Ventilator  30





  88 18     30


 


11/20/19 18:39  100  120/58    


 


11/20/19 16:53  100 30     30


 


11/20/19 16:00  87      


 


11/20/19 16:00 99.3 106 21 120/58 (78) 99   


 


11/20/19 16:00        30


 


11/20/19 16:00      Mechanical Ventilator  


 


11/20/19 15:26  100 17  100 Mechanical Ventilator  30


 


11/20/19 15:23  88 22     30


 


11/20/19 13:05  89 24     30


 


11/20/19 12:00        30


 


11/20/19 12:00 99.3 90 19 125/56 (79) 99   


 


11/20/19 12:00  83      


 


11/20/19 12:00      Mechanical Ventilator  

















Intake and Output  


 


 11/20/19 11/21/19





 19:00 07:00


 


Intake Total 2015 ml 


 


Output Total 625 ml 650 ml


 


Balance 1390 ml -650 ml


 


  


 


Free Water 600 ml 


 


IV Total 755 ml 


 


Tube Feeding 660 ml 


 


Output Urine Total 625 ml 650 ml


 


# Bowel Movements 4 4








Laboratory Tests


11/21/19 03:35: 


White Blood Count 19.7H, Red Blood Count 2.81L, Hemoglobin 7.6L, Hematocrit 

22.7L, Mean Corpuscular Volume 81, Mean Corpuscular Hemoglobin 27.1, Mean 

Corpuscular Hemoglobin Concent 33.6, Red Cell Distribution Width 14.1, Platelet 

Count 398, Mean Platelet Volume 8.1, Neutrophils (%) (Auto) , Lymphocytes (%) (

Auto) , Monocytes (%) (Auto) , Eosinophils (%) (Auto) , Basophils (%) (Auto) , 

Differential Total Cells Counted 100, Neutrophils % (Manual) 58, Lymphocytes % (

Manual) 25, Monocytes % (Manual) 4, Eosinophils % (Manual) 9H, Basophils % (

Manual) 2, Myelocytes % 1H, Band Neutrophils 1, Platelet Estimate Adequate, 

Platelet Morphology Normal, Microcytosis 2+, Sodium Level 135L, Potassium Level 

4.4, Chloride Level 101, Carbon Dioxide Level 31, Anion Gap 3L, Blood Urea 

Nitrogen 43H, Creatinine 1.2, Estimat Glomerular Filtration Rate 45.0, Glucose 

Level 87, Calcium Level 8.5, Total Bilirubin 0.4, Aspartate Amino Transf (AST/

SGOT) 14L, Alanine Aminotransferase (ALT/SGPT) 17, Alkaline Phosphatase 95, 

Total Protein 7.1, Albumin 1.9L, Globulin 5.2, Albumin/Globulin Ratio 0.4L


Height (Feet):  5


Height (Inches):  5.00


Weight (Pounds):  160


General Appearance:  no apparent distress


EENT:  normal ENT inspection


Neck:  supple


Cardiovascular:  normal rate


Respiratory/Chest:  decreased breath sounds


Abdomen:  normal bowel sounds, non tender, soft


Extremities:  non-tender











Storm Turk MD Nov 21, 2019 11:39

## 2019-11-21 NOTE — CARDIOLOGY PROGRESS NOTE
Assessment/Plan


Assessment/Plan


1. acute congestive heart failure


2. Abnormal cardiac enzyme demand related due to infection and profound anemia 


3. Agitation 


4. Urinary tract infection.


5. Acute renal failure.


6. Profound anemia.


7. Diabetes mellitus.


8. History of hypertension.


9. History of mood disorder.


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 


11. arf 


12. pelvic mass


13  sepsis 


14. pneumonia 


15. hypernatremai  











off dapt due to suspected bleeding ? resume 


echo mild systolic dysfunction 


looks awake  


off  lasix and metolazone 


d5w was dcd as well





Subjective


ROS Limited/Unobtainable:  Yes





Objective





Last 24 Hour Vital Signs








  Date Time  Temp Pulse Resp B/P (MAP) Pulse Ox O2 Delivery O2 Flow Rate FiO2


 


11/21/19 19:12  88 25  100 Mechanical Ventilator  30





  86 20     30





        30


 


11/21/19 18:42  92  121/57    


 


11/21/19 17:01  92 30     30


 


11/21/19 16:00 99.5 105 18 121/57 (78) 100   


 


11/21/19 16:00  106      


 


11/21/19 16:00      Mechanical Ventilator  


 


11/21/19 16:00        30


 


11/21/19 15:03  101 35     30


 


11/21/19 13:09  79 27  100 Mechanical Ventilator  30





  80 17     30


 


11/21/19 13:00        30


 


11/21/19 12:00 99.7 87 20 113/53 (73) 100   


 


11/21/19 12:00  82      


 


11/21/19 12:00      Mechanical Ventilator  


 


11/21/19 10:53     99   


 


11/21/19 10:48  83 33     30


 


11/21/19 10:15        30


 


11/21/19 09:40        30


 


11/21/19 09:35  89 31     30





        30


 


11/21/19 09:10  89  127/55    


 


11/21/19 08:00  91      


 


11/21/19 08:00        30


 


11/21/19 08:00 100.0 87 18 124/57 (79) 100   


 


11/21/19 08:00      Mechanical Ventilator  


 


11/21/19 07:19  84 28  100 Mechanical Ventilator  30





  84 19     30


 


11/21/19 05:11  97 31     30


 


11/21/19 04:00  83      


 


11/21/19 04:00 98.6 83 24 134/58 (83) 100   


 


11/21/19 04:00      Mechanical Ventilator  


 


11/21/19 04:00        30


 


11/21/19 03:30  89 30     30


 


11/21/19 01:30  83 26  100 Mechanical Ventilator  30





  86 25     30


 


11/21/19 00:00      Mechanical Ventilator  


 


11/21/19 00:00  87      


 


11/21/19 00:00 98.0 85 24 125/54 (77) 100   


 


11/20/19 23:12  85 23     30


 


11/20/19 21:30  87 22     30


 


11/20/19 20:00 98.2 86 24 125/66 (85) 100   


 


11/20/19 20:00      Mechanical Ventilator  


 


11/20/19 20:00        30


 


11/20/19 20:00  84      








General Appearance:  no apparent distress, patient on isolation, isolation 

precautions


Neck:  supple


Cardiovascular:  normal rate


Extremities:  no swelling











Intake and Output  


 


 11/20/19 11/21/19





 19:00 07:00


 


Intake Total 2015 ml 


 


Output Total 625 ml 650 ml


 


Balance 1390 ml -650 ml


 


  


 


Free Water 600 ml 


 


IV Total 755 ml 


 


Tube Feeding 660 ml 


 


Output Urine Total 625 ml 650 ml


 


# Bowel Movements 4 4











Laboratory Tests








Test


  11/21/19


03:35


 


White Blood Count


  19.7 K/UL


(4.8-10.8)  H


 


Red Blood Count


  2.81 M/UL


(4.20-5.40)  L


 


Hemoglobin


  7.6 G/DL


(12.0-16.0)  L


 


Hematocrit


  22.7 %


(37.0-47.0)  L


 


Mean Corpuscular Volume 81 FL (80-99)  


 


Mean Corpuscular Hemoglobin


  27.1 PG


(27.0-31.0)


 


Mean Corpuscular Hemoglobin


Concent 33.6 G/DL


(32.0-36.0)


 


Red Cell Distribution Width


  14.1 %


(11.6-14.8)


 


Platelet Count


  398 K/UL


(150-450)


 


Mean Platelet Volume


  8.1 FL


(6.5-10.1)


 


Neutrophils (%) (Auto)


  % (45.0-75.0)


 


 


Lymphocytes (%) (Auto)


  % (20.0-45.0)


 


 


Monocytes (%) (Auto)  % (1.0-10.0)  


 


Eosinophils (%) (Auto)  % (0.0-3.0)  


 


Basophils (%) (Auto)  % (0.0-2.0)  


 


Differential Total Cells


Counted 100  


 


 


Neutrophils % (Manual) 58 % (45-75)  


 


Lymphocytes % (Manual) 25 % (20-45)  


 


Monocytes % (Manual) 4 % (1-10)  


 


Eosinophils % (Manual) 9 % (0-3)  H


 


Basophils % (Manual) 2 % (0-2)  


 


Myelocytes % 1 % (0-0)  H


 


Band Neutrophils 1 % (0-8)  


 


Platelet Estimate Adequate  


 


Platelet Morphology Normal  


 


Microcytosis 2+  


 


Sodium Level


  135 MMOL/L


(136-145)  L


 


Potassium Level


  4.4 MMOL/L


(3.5-5.1)


 


Chloride Level


  101 MMOL/L


()


 


Carbon Dioxide Level


  31 MMOL/L


(21-32)


 


Anion Gap


  3 mmol/L


(5-15)  L


 


Blood Urea Nitrogen


  43 mg/dL


(7-18)  H


 


Creatinine


  1.2 MG/DL


(0.55-1.30)


 


Estimat Glomerular Filtration


Rate 45.0 mL/min


(>60)


 


Glucose Level


  87 MG/DL


()


 


Calcium Level


  8.5 MG/DL


(8.5-10.1)


 


Total Bilirubin


  0.4 MG/DL


(0.2-1.0)


 


Aspartate Amino Transf


(AST/SGOT) 14 U/L (15-37)


L


 


Alanine Aminotransferase


(ALT/SGPT) 17 U/L (12-78)


 


 


Alkaline Phosphatase


  95 U/L


()


 


Total Protein


  7.1 G/DL


(6.4-8.2)


 


Albumin


  1.9 G/DL


(3.4-5.0)  L


 


Globulin 5.2 g/dL  


 


Albumin/Globulin Ratio


  0.4 (1.0-2.7)


L











Microbiology








 Date/Time


Source Procedure


Growth Status


 


 


 11/19/19 16:10


Blood Blood Culture - Preliminary


NO GROWTH AFTER 24 HOURS Resulted


 


 11/19/19 16:00


Blood Blood Culture - Preliminary


NO GROWTH AFTER 24 HOURS Resulted

















Rob May MD Nov 21, 2019 19:46

## 2019-11-21 NOTE — NUR
NURSE NOTES:

Report received from [facility]. Observed pt lying in the bed, opens eyes, flat-affect. On 
vent, Shiley 8, AC 14, , FIO2 30%, PEEP 5, tolerating well with current vent setting. 
GT intact, running Glucerna 1.2 at 60cc/hr, no residual noted. IV on TU PICC, intact, TKO. 
Bed in the lowest position. Side rails up x3. Will continue to monitor.

## 2019-11-21 NOTE — NUR
CASE MANAGEMENT: REVIEW



11/21/19



SI: SEPSIS D/T PNA. ACUTE CHF

RESPIRATORY FAILURE ~ INTUBATED 

99.7   82   20   113/53   100% ON VENT SUPPORT @ 30% FIO2

WBC 19.7  RBC 2.81 H/H 7.6/22.7 BUN 43 



IS: IV LINEZOLID BID

IV MEROPENEM BID

IV DEXTROSE @100ML/HR

HEPARIN SQ BID

NORVASC NG BID

IV PROTONIX Q12

ALBUTEROL HHN Q6HR

MUCOMYST HHN TID





**2W SD UNIT



PLAN:DISCHARGE TO [facility] WHEN MEDICALLY CLEARED

## 2019-11-22 VITALS — DIASTOLIC BLOOD PRESSURE: 60 MMHG | SYSTOLIC BLOOD PRESSURE: 111 MMHG

## 2019-11-22 VITALS — DIASTOLIC BLOOD PRESSURE: 51 MMHG | SYSTOLIC BLOOD PRESSURE: 123 MMHG

## 2019-11-22 VITALS — SYSTOLIC BLOOD PRESSURE: 108 MMHG | DIASTOLIC BLOOD PRESSURE: 54 MMHG

## 2019-11-22 VITALS — DIASTOLIC BLOOD PRESSURE: 54 MMHG | SYSTOLIC BLOOD PRESSURE: 105 MMHG

## 2019-11-22 VITALS — DIASTOLIC BLOOD PRESSURE: 58 MMHG | SYSTOLIC BLOOD PRESSURE: 116 MMHG

## 2019-11-22 LAB
ADD MANUAL DIFF: YES
ERYTHROCYTE [DISTWIDTH] IN BLOOD BY AUTOMATED COUNT: 14.1 % (ref 11.6–14.8)
HCT VFR BLD CALC: 24 % (ref 37–47)
HGB BLD-MCNC: 8.2 G/DL (ref 12–16)
MCV RBC AUTO: 79 FL (ref 80–99)
PLATELET # BLD: 403 K/UL (ref 150–450)
RBC # BLD AUTO: 3.03 M/UL (ref 4.2–5.4)
WBC # BLD AUTO: 22.1 K/UL (ref 4.8–10.8)

## 2019-11-22 PROCEDURE — 07DR3ZX EXTRACTION OF ILIAC BONE MARROW, PERCUTANEOUS APPROACH, DIAGNOSTIC: ICD-10-PCS

## 2019-11-22 RX ADMIN — INSULIN ASPART SCH UNITS: 100 INJECTION, SOLUTION INTRAVENOUS; SUBCUTANEOUS at 05:13

## 2019-11-22 RX ADMIN — IPRATROPIUM BROMIDE AND ALBUTEROL SULFATE SCH ML: .5; 3 SOLUTION RESPIRATORY (INHALATION) at 12:36

## 2019-11-22 RX ADMIN — INSULIN ASPART SCH UNITS: 100 INJECTION, SOLUTION INTRAVENOUS; SUBCUTANEOUS at 17:26

## 2019-11-22 RX ADMIN — IPRATROPIUM BROMIDE AND ALBUTEROL SULFATE SCH ML: .5; 3 SOLUTION RESPIRATORY (INHALATION) at 18:58

## 2019-11-22 RX ADMIN — INSULIN DETEMIR SCH UNITS: 100 INJECTION, SOLUTION SUBCUTANEOUS at 17:26

## 2019-11-22 RX ADMIN — INSULIN DETEMIR SCH UNITS: 100 INJECTION, SOLUTION SUBCUTANEOUS at 08:54

## 2019-11-22 RX ADMIN — HEPARIN SODIUM SCH UNITS: 5000 INJECTION INTRAVENOUS; SUBCUTANEOUS at 07:53

## 2019-11-22 RX ADMIN — IPRATROPIUM BROMIDE AND ALBUTEROL SULFATE SCH ML: .5; 3 SOLUTION RESPIRATORY (INHALATION) at 07:26

## 2019-11-22 RX ADMIN — INSULIN ASPART SCH UNITS: 100 INJECTION, SOLUTION INTRAVENOUS; SUBCUTANEOUS at 05:12

## 2019-11-22 RX ADMIN — IPRATROPIUM BROMIDE AND ALBUTEROL SULFATE SCH ML: .5; 3 SOLUTION RESPIRATORY (INHALATION) at 00:49

## 2019-11-22 RX ADMIN — DOCUSATE SODIUM SCH MG: 50 LIQUID ORAL at 16:49

## 2019-11-22 RX ADMIN — INSULIN ASPART SCH UNITS: 100 INJECTION, SOLUTION INTRAVENOUS; SUBCUTANEOUS at 17:27

## 2019-11-22 RX ADMIN — PANTOPRAZOLE SODIUM SCH MG: 40 INJECTION, POWDER, FOR SOLUTION INTRAVENOUS at 08:38

## 2019-11-22 RX ADMIN — INSULIN ASPART SCH UNITS: 100 INJECTION, SOLUTION INTRAVENOUS; SUBCUTANEOUS at 11:46

## 2019-11-22 RX ADMIN — MEROPENEM SCH MLS/HR: 1 INJECTION INTRAVENOUS at 10:12

## 2019-11-22 RX ADMIN — INSULIN ASPART SCH UNITS: 100 INJECTION, SOLUTION INTRAVENOUS; SUBCUTANEOUS at 11:47

## 2019-11-22 RX ADMIN — DOCUSATE SODIUM SCH MG: 50 LIQUID ORAL at 07:53

## 2019-11-22 NOTE — NUR
RESPIRATORY NOTE:

placed pt on CPAP PS12 fio2 30%. pt slightly tachypneic but will cont weaning on PS12. if pt 
tolerates, will decreased PS by 2 and cont weaning. RN notified. will cont to monitor.

## 2019-11-22 NOTE — NUR
DISCHARGE PLANNING: 



PATIENT HAS BEEN ACCEPTED TO 

[facility]

T: [phone] FOR NURSE TO NURSE REPORT 



ROOM# [id number]

SKILLED 



DAUGHTER (IBY) IS AWARE OF TRANSFER 



NO DISCHARGE ORDER AT THIS TIME

## 2019-11-22 NOTE — SURGERY PROGRESS NOTE
Surgery Progress Note


Subjective


Procedure Performed


tracheostomy


Symptoms:  other





Objective





Last 24 Hour Vital Signs








  Date Time  Temp Pulse Resp B/P (MAP) Pulse Ox O2 Delivery O2 Flow Rate FiO2


 


11/22/19 15:03  99 37     30


 


11/22/19 12:36  100 43  100 Mechanical Ventilator  30





  99 48     30


 


11/22/19 12:00        30


 


11/22/19 12:00      Mechanical Ventilator  


 


11/22/19 11:34  100      


 


11/22/19 11:22        30


 


11/22/19 10:35  115 45     30


 


11/22/19 09:17  106 39     30


 


11/22/19 09:16     94   


 


11/22/19 08:00      Mechanical Ventilator  


 


11/22/19 08:00  95      


 


11/22/19 08:00 97.3 90 23 116/58 (77) 100   


 


11/22/19 08:00        30


 


11/22/19 07:26  89 28  100 Mechanical Ventilator  30





  91 28     30


 


11/22/19 05:23  84 33     30


 


11/22/19 04:00      Mechanical Ventilator  


 


11/22/19 04:00        30


 


11/22/19 04:00 97.5 81 24 108/54 (72) 100   


 


11/22/19 04:00  83      


 


11/22/19 03:15  89 26     30


 


11/22/19 00:51  87 35  100 Mechanical Ventilator  30





  85 24     30





        30


 


11/22/19 00:00      Mechanical Ventilator  


 


11/22/19 00:00        30


 


11/22/19 00:00  101      


 


11/22/19 00:00 99.5 90 24 123/51 (75) 100   


 


11/21/19 22:39  91 28     30


 


11/21/19 20:36  95 29     30


 


11/21/19 20:00        30


 


11/21/19 20:00 98.1 87 24 118/54 (75) 100   


 


11/21/19 20:00      Mechanical Ventilator  


 


11/21/19 20:00  95      


 


11/21/19 19:12  88 25  100 Mechanical Ventilator  30





  86 20     30





        30


 


11/21/19 18:42  92  121/57    


 


11/21/19 17:01  92 30     30


 


11/21/19 16:00 99.5 105 18 121/57 (78) 100   


 


11/21/19 16:00  106      


 


11/21/19 16:00      Mechanical Ventilator  


 


11/21/19 16:00        30








I&O











Intake and Output  


 


 11/21/19 11/22/19





 19:00 07:00


 


Intake Total 1275 ml 1275 ml


 


Output Total 600 ml 550 ml


 


Balance 675 ml 725 ml


 


  


 


Free Water 200 ml 200 ml


 


IV Total 355 ml 355 ml


 


Tube Feeding 720 ml 720 ml


 


Output Urine Total 600 ml 550 ml


 


# Bowel Movements 3 5








Dressing:  dry


Wound:  other


Drains:  other


Cardiovascular:  RSR


Respiratory:  decreased breath sounds


Abdomen:  soft, present bowel sounds


Extremities:  no cyanosis





Laboratory Tests








Test


  11/22/19


10:20


 


White Blood Count


  22.1 K/UL


(4.8-10.8)  *H


 


Red Blood Count


  3.03 M/UL


(4.20-5.40)  L


 


Hemoglobin


  8.2 G/DL


(12.0-16.0)  L


 


Hematocrit


  24.0 %


(37.0-47.0)  L


 


Mean Corpuscular Volume


  79 FL (80-99)


L


 


Mean Corpuscular Hemoglobin


  27.0 PG


(27.0-31.0)


 


Mean Corpuscular Hemoglobin


Concent 34.1 G/DL


(32.0-36.0)


 


Red Cell Distribution Width


  14.1 %


(11.6-14.8)


 


Platelet Count


  403 K/UL


(150-450)


 


Mean Platelet Volume


  8.7 FL


(6.5-10.1)


 


Neutrophils (%) (Auto)


  % (45.0-75.0)


 


 


Lymphocytes (%) (Auto)


  % (20.0-45.0)


 


 


Monocytes (%) (Auto)  % (1.0-10.0)  


 


Eosinophils (%) (Auto)  % (0.0-3.0)  


 


Basophils (%) (Auto)  % (0.0-2.0)  


 


Differential Total Cells


Counted 100  


 


 


Neutrophils % (Manual) 87 % (45-75)  H


 


Lymphocytes % (Manual) 8 % (20-45)  L


 


Monocytes % (Manual) 3 % (1-10)  


 


Eosinophils % (Manual) 2 % (0-3)  


 


Basophils % (Manual) 0 % (0-2)  


 


Band Neutrophils 0 % (0-8)  


 


Platelet Estimate Adequate  


 


Platelet Morphology Normal  


 


Hypochromasia   


 


Anisocytosis 1+  











Plan


Problems:  


(1) Pressure injury of deep tissue


Assessment & Plan:  Pt presented on admission with DTPI R heel. Blood filled 

blister with marginal erythema noted to heel. Pt exhibited agitation when 

minimally palpated. (L)2.2cm x (W)2.1cm


L heel is blanchable .


Non-blanchable erythema without induration noted to sacral cleft. 


No other areas of skin concerns noted.





Tx.Plan:


Apply Betadine to R heel. Cover with Optifoam drsg. Change every 3 days and prn.


           


Apply Cavilon Skin Barrier to L heel. Cover with Optifoam drsg. Change every 7 

days and prn.


           


Apply Moisture Barrier Paste to buttocks. Cover sacral area with Optifoam drsg. 

Change every 3 days and prn.


           


Reposition at least every 2hours or as tolerated.


           


Off-load heels with pillow.





(2) Sepsis


Assessment & Plan:  Patient with low-grade fevers, anemia, leukocytosis 

persistent, elevated platelets, abnormal labs.


Etiology currently unknown and work-up in progress.  Labs noted and imaging 

that is available as noted.  


CT noted


US noted


Impression: 13 x 7 x 12.9 cm unilocular cystic mass, corresponding to lesion 

reported


on recent CT scan. Layering low level internal echoes likely represent bladder


debris.. This presumably represents a cystic ovarian lesion with debris or


hemorrhage, possibly neoplastic. Gynecological consultation is recommended


Antibiotics as per infectious disease 


local wound care as wounds do not seem to be the etiology of her sepsis


start feeds via peg


doing well


improving


cont with tube feeds


s/p trach


wean vent


supplementation for healing 


will discuss with ID about Abx - as per ID 


ESR/CRP elevated 


We will monitor and follow with recommendations


Thank you for allowing me to participate in patient's care














Radhames Blas Nov 22, 2019 15:25

## 2019-11-22 NOTE — INFECTIOUS DISEASES PROG NOTE
Assessment/Plan


Problems:  


(1) Leukocytosis


Assessment & Plan:  persistent , with no evidence of sepsis so far and negative 

blood cultures , could be reactive due to pelvic mass , continue  meropenem and 

add micafungin empirically for now pending repeated  blood culture ,  CXR  

showed no active infiltrates . stop zyvox . 





(2) Fever


Assessment & Plan:  suspect due to pelvic mass ,recurrent ,  with no evidence 

of sepsis or CLABSI , had negative repeated blood cultures on multiple 

occasions , urine and sputum only grew yeast which is colonization . restarted 

on antibiotics for now pending cultures 





(3) Respiratory failure


Assessment & Plan:  with maegan cardia and S/P code blue, was intubated and 

started on mechanical ventilation , S/P tracheostomy ,  pulmonary is following 





(4) UTI (urinary tract infection)


Assessment & Plan:  due to candida lusitaneae , S/P casas catheter removal , no 

specific therapy needed for now after changing her casas catheter 





(5) Hyperglycemia due to type 2 diabetes mellitus


Assessment & Plan:  recommend tight glycemic control to keep blood glucose 

between 100-140 





(6) ARF (acute renal failure)


Assessment & Plan:  due to the above, continue hydration and renally dosed meds 

, close  monitor of renal function 





(7) Pelvic mass in female


Assessment & Plan:  to the left of the uterus, could be the source of her fever 

and leukocytosis , rule out malignancy , may need surgical resection , 

recommend OB/GYN eval and follow up , oncology is following 








Subjective


ROS Limited/Unobtainable:  Yes


Allergies:  


Coded Allergies:  


     No Known Allergies (Unverified , 10/14/19)


Subjective


she was quiet, and comfortable,  has mild secretions as per respiratory 

therapist , has more  congestion, no diarrhea , continued on mechanical 

ventilation through her trach , no bleeding or draining from the trach site ,  

had low grade fever but no chills .





Objective


Vital Signs





Last 24 Hour Vital Signs








  Date Time  Temp Pulse Resp B/P (MAP) Pulse Ox O2 Delivery O2 Flow Rate FiO2


 


11/22/19 18:58  96 32  100 Mechanical Ventilator 60.0 30





  98 30     30


 


11/22/19 17:27  97  111/60    


 


11/22/19 16:53  111 40     30


 


11/22/19 16:13  100      


 


11/22/19 16:00        30


 


11/22/19 16:00      Mechanical Ventilator  


 


11/22/19 16:00 98.8 98 21 111/60 (77) 100   


 


11/22/19 15:03  99 37     30


 


11/22/19 12:36  100 43  100 Mechanical Ventilator  30





  99 48     30


 


11/22/19 12:00 97.5 75 20 105/54 (71) 100   


 


11/22/19 12:00        30


 


11/22/19 12:00      Mechanical Ventilator  


 


11/22/19 11:34  100      


 


11/22/19 11:22        30


 


11/22/19 10:35  115 45     30


 


11/22/19 09:17  106 39     30


 


11/22/19 09:16     94   


 


11/22/19 08:00      Mechanical Ventilator  


 


11/22/19 08:00  95      


 


11/22/19 08:00 97.3 90 23 116/58 (77) 100   


 


11/22/19 08:00        30


 


11/22/19 07:26  89 28  100 Mechanical Ventilator  30





  91 28     30


 


11/22/19 05:23  84 33     30


 


11/22/19 04:00      Mechanical Ventilator  


 


11/22/19 04:00        30


 


11/22/19 04:00 97.5 81 24 108/54 (72) 100   


 


11/22/19 04:00  83      


 


11/22/19 03:15  89 26     30


 


11/22/19 00:51  87 35  100 Mechanical Ventilator  30





  85 24     30





        30


 


11/22/19 00:00      Mechanical Ventilator  


 


11/22/19 00:00        30


 


11/22/19 00:00  101      


 


11/22/19 00:00 99.5 90 24 123/51 (75) 100   


 


11/21/19 22:39  91 28     30








Height (Feet):  5


Height (Inches):  5.00


Weight (Pounds):  160


General Appearance:  WD/WN, no acute distress


HEENT:  normocephalic, atraumatic, anicteric, mucous membranes moist, PERRL


Respiratory/Chest:  chest wall non-tender, no respiratory distress, no 

accessory muscle use, decreased breath sounds


Cardiovascular:  normal peripheral pulses, normal rate, regular rhythm, no 

gallop/murmur, no JVD


Abdomen:  normal bowel sounds, soft, non tender, no organomegaly, non distended

, no mass, no scars


Genitourinary:  normal external genitalia


Extremities:  no cyanosis, no clubbing


Skin:  no rash, no lesions


Neurologic/Psychiatric:  unresponsiveness


Lymphatic:  no neck adenopathy, no groin adenopathy


Musculoskeletal:  normal muscle bulk, no effusion





Laboratory Tests








Test


  11/22/19


10:20


 


White Blood Count


  22.1 K/UL


(4.8-10.8)  *H


 


Red Blood Count


  3.03 M/UL


(4.20-5.40)  L


 


Hemoglobin


  8.2 G/DL


(12.0-16.0)  L


 


Hematocrit


  24.0 %


(37.0-47.0)  L


 


Mean Corpuscular Volume


  79 FL (80-99)


L


 


Mean Corpuscular Hemoglobin


  27.0 PG


(27.0-31.0)


 


Mean Corpuscular Hemoglobin


Concent 34.1 G/DL


(32.0-36.0)


 


Red Cell Distribution Width


  14.1 %


(11.6-14.8)


 


Platelet Count


  403 K/UL


(150-450)


 


Mean Platelet Volume


  8.7 FL


(6.5-10.1)


 


Neutrophils (%) (Auto)


  % (45.0-75.0)


 


 


Lymphocytes (%) (Auto)


  % (20.0-45.0)


 


 


Monocytes (%) (Auto)  % (1.0-10.0)  


 


Eosinophils (%) (Auto)  % (0.0-3.0)  


 


Basophils (%) (Auto)  % (0.0-2.0)  


 


Differential Total Cells


Counted 100  


 


 


Neutrophils % (Manual) 87 % (45-75)  H


 


Lymphocytes % (Manual) 8 % (20-45)  L


 


Monocytes % (Manual) 3 % (1-10)  


 


Eosinophils % (Manual) 2 % (0-3)  


 


Basophils % (Manual) 0 % (0-2)  


 


Band Neutrophils 0 % (0-8)  


 


Platelet Estimate Adequate  


 


Platelet Morphology Normal  


 


Hypochromasia   


 


Anisocytosis 1+  

















Amie Burgos M.D. Nov 22, 2019 20:54

## 2019-11-22 NOTE — NUR
NURSE NOTES:

Pt is discharged. Telemonitor given back to tele unit. Pt transferred via gurney. No acute 
distress noted.

## 2019-11-22 NOTE — NEPHROLOGY PROGRESS NOTE
Assessment/Plan


Problem List:  


(1) Renal failure (ARF), acute on chronic


Assessment:  resolved





(2) Dehydration


(3) ARF (acute renal failure)


(4) Sepsis


(5) Acute metabolic encephalopathy


(6) Hyperglycemia due to type 2 diabetes mellitus


(7) UTI (urinary tract infection)


(8) Diabetic nephropathy


Assessment





Renal failure, Acute on Chronic resolved


Dehydration


Severe Anemia


Sepsis / Pneumonia / UTI


DM, OOC


Proteinuria , Diabetic Nephropathy


Acute encephalopathy


Plan


DC planning?


start D5w and increase Levemir


trached 11/14- 





on K and Mag IV  as needed





 Reglan


GT feeding


Stop IV fluid-


Lasix as needed


has PEG


Per order








previously 


Cr lowering 


Will order urine studies and monitor renal parameters


kidney YOANDY noted


lower iv fluids rate


WBCs rising


has casas again due to retention


Avoid Nephrotoxics








previously


Anemia salas


2D echo


24 H urine protein


Keep BP and BS in check


I am holding  her psych meds due to low MS


Per orders





Subjective


ROS Limited/Unobtainable:  Yes





Objective


Objective





Last 24 Hour Vital Signs








  Date Time  Temp Pulse Resp B/P (MAP) Pulse Ox O2 Delivery O2 Flow Rate FiO2


 


11/22/19 12:36  100 43  100 Mechanical Ventilator  30





  99 48     30


 


11/22/19 12:00        30


 


11/22/19 11:22        30


 


11/22/19 10:35  115 45     30


 


11/22/19 09:17  106 39     30


 


11/22/19 09:16     94   


 


11/22/19 08:00      Mechanical Ventilator  


 


11/22/19 08:00  95      


 


11/22/19 08:00 97.3 90 23 116/58 (77) 100   


 


11/22/19 08:00        30


 


11/22/19 07:26  89 28  100 Mechanical Ventilator  30





  91 28     30


 


11/22/19 05:23  84 33     30


 


11/22/19 04:00      Mechanical Ventilator  


 


11/22/19 04:00        30


 


11/22/19 04:00 97.5 81 24 108/54 (72) 100   


 


11/22/19 04:00  83      


 


11/22/19 03:15  89 26     30


 


11/22/19 00:51  87 35  100 Mechanical Ventilator  30





  85 24     30





        30


 


11/22/19 00:00      Mechanical Ventilator  


 


11/22/19 00:00        30


 


11/22/19 00:00  101      


 


11/22/19 00:00 99.5 90 24 123/51 (75) 100   


 


11/21/19 22:39  91 28     30


 


11/21/19 20:36  95 29     30


 


11/21/19 20:00        30


 


11/21/19 20:00 98.1 87 24 118/54 (75) 100   


 


11/21/19 20:00      Mechanical Ventilator  


 


11/21/19 20:00  95      


 


11/21/19 19:12  88 25  100 Mechanical Ventilator  30





  86 20     30





        30


 


11/21/19 18:42  92  121/57    


 


11/21/19 17:01  92 30     30


 


11/21/19 16:00 99.5 105 18 121/57 (78) 100   


 


11/21/19 16:00  106      


 


11/21/19 16:00      Mechanical Ventilator  


 


11/21/19 16:00        30


 


11/21/19 15:03  101 35     30

















Intake and Output  


 


 11/21/19 11/22/19





 19:00 07:00


 


Intake Total 1275 ml 1275 ml


 


Output Total 600 ml 550 ml


 


Balance 675 ml 725 ml


 


  


 


Free Water 200 ml 200 ml


 


IV Total 355 ml 355 ml


 


Tube Feeding 720 ml 720 ml


 


Output Urine Total 600 ml 550 ml


 


# Bowel Movements 3 5








Laboratory Tests


11/22/19 10:20: 


White Blood Count 22.1*H, Red Blood Count 3.03L, Hemoglobin 8.2L, Hematocrit 

24.0L, Mean Corpuscular Volume 79L, Mean Corpuscular Hemoglobin 27.0, Mean 

Corpuscular Hemoglobin Concent 34.1, Red Cell Distribution Width 14.1, Platelet 

Count 403, Mean Platelet Volume 8.7, Neutrophils (%) (Auto) , Lymphocytes (%) (

Auto) , Monocytes (%) (Auto) , Eosinophils (%) (Auto) , Basophils (%) (Auto) , 

Differential Total Cells Counted 100, Neutrophils % (Manual) 87H, Lymphocytes % 

(Manual) 8L, Monocytes % (Manual) 3, Eosinophils % (Manual) 2, Basophils % (

Manual) 0, Band Neutrophils 0, Platelet Estimate Adequate, Platelet Morphology 

Normal, Hypochromasia , Anisocytosis 1+


Height (Feet):  5


Height (Inches):  5.00


Weight (Pounds):  160


General Appearance:  no apparent distress


Cardiovascular:  normal rate


Respiratory/Chest:  decreased breath sounds


Abdomen:  distended


Objective


no change











Lukasz Vizcaino MD Nov 22, 2019 14:33

## 2019-11-22 NOTE — NUR
NURSE NOTES:

pt sleeping in the bed. SR on cardiac monitor. No signs of pain noted. G tube intact, no 
residual noted. Oral care given. Reposition done. Bed bath given. No distress noted at this 
time. Will continue to monitor.

## 2019-11-22 NOTE — NUR
NURSE NOTES:

Report received from ELIZABETH Roman. No acute distress noted at this time. Trach to vent Shiley 8, 
AC 14, , FIO2 30%, PEEP 5, no signs of sob. GT intact, running glucerna 1.2 at 
60cc/hr, no residual and flushing well. R UA PICC, intact and TKO. Bed in the lowest 
position. Side rails padded and up x3. Will continue to monitor. Awaiting for discharge 
transportation.

## 2019-11-22 NOTE — NUR
NURSE NOTES:

Observed pt sleeping in the bed. SR on cardiac monitor. T of 99.5 noted. Bowel movement x2 
noted. Bed bath given. Oral care given. Reposition done. No distress noted at this time. 
will continue to monitor.

## 2019-11-22 NOTE — GENERAL PROGRESS NOTE
Assessment/Plan


Status:  stable, unchanged


Assessment/Plan:


S, O: On Trach,  , vent setting reviewed. No pressor. awake, limited following 

commands





PHYSICAL EXAMINATION:HEAD AND NECK:  Trached, site is clean .


CHEST:  Bronchial breathing sounds.ABDOMEN:  Soft.  No organomegaly.


MUSCULOSKELETAL:  Diffuse 1+ or 2+ nonpitting edema in all four contracted


extremities.  The patient is not following the commands.  Limited


evaluation.NEUROLOGY:  The patient is awake.  Not alert.  Not verbally


communicative.





LABORATORY DATA:  Labs dated Nov 22, 2019  reviewed





Meds: Reviewed and reconciled in the chart





Imaging:  CXR reviewed 





ASSESSMENT AND PLAN:


1. VDRF S/P trach


2. Sepsis.  secondary to healthcare-associated pneumonia or


3. Acute chf exacerbation - Diastolic


4. Chronic encephalomalacia.


3. Acute on chronic anemia.


4. Renal failure, age indeterminate.


6. Hyperthyroidism.


7. Hyperkalemia.


8. Diabetes type 2, uncontrolled with A1c of 10.


9. Psychiatric disorder.


10. GI and DVT prophylaxis.


11. PAH- Severe 





PLAN OF CARE:





Post PEG tube


Out patient followup for abnormal incidental imaging finding ( possibility of 

CA cant be excluded 


History of Acute drop in Hgb and observation of UGI bleeding, Will hold DAPT


Post Trach placement 


Sub Acute / LTAC placement 


Worsening leukocytosis, with negative blood culture. Likely secondary to 

malignancy.  Medically a comprehensive INPATIENT cancer workup is inappropriate.


D/w Dr Burgos ok to DC with continuation of current IV abx with addition of anti

-fungal





Subjective


Allergies:  


Coded Allergies:  


     No Known Allergies (Unverified , 10/14/19)





Objective





Last 24 Hour Vital Signs








  Date Time  Temp Pulse Resp B/P (MAP) Pulse Ox O2 Delivery O2 Flow Rate FiO2


 


11/22/19 11:22        30


 


11/22/19 10:35  115 45     30


 


11/22/19 09:17  106 39     30


 


11/22/19 09:16     94   


 


11/22/19 08:00      Mechanical Ventilator  


 


11/22/19 08:00  95      


 


11/22/19 08:00 97.3 90 23 116/58 (77) 100   


 


11/22/19 08:00        30


 


11/22/19 07:26  89 28  100 Mechanical Ventilator  30





  91 28     30


 


11/22/19 05:23  84 33     30


 


11/22/19 04:00      Mechanical Ventilator  


 


11/22/19 04:00        30


 


11/22/19 04:00 97.5 81 24 108/54 (72) 100   


 


11/22/19 04:00  83      


 


11/22/19 03:15  89 26     30


 


11/22/19 00:51  87 35  100 Mechanical Ventilator  30





  85 24     30





        30


 


11/22/19 00:00      Mechanical Ventilator  


 


11/22/19 00:00        30


 


11/22/19 00:00  101      


 


11/22/19 00:00 99.5 90 24 123/51 (75) 100   


 


11/21/19 22:39  91 28     30


 


11/21/19 20:36  95 29     30


 


11/21/19 20:00        30


 


11/21/19 20:00 98.1 87 24 118/54 (75) 100   


 


11/21/19 20:00      Mechanical Ventilator  


 


11/21/19 20:00  95      


 


11/21/19 19:12  88 25  100 Mechanical Ventilator  30





  86 20     30





        30


 


11/21/19 18:42  92  121/57    


 


11/21/19 17:01  92 30     30


 


11/21/19 16:00 99.5 105 18 121/57 (78) 100   


 


11/21/19 16:00  106      


 


11/21/19 16:00      Mechanical Ventilator  


 


11/21/19 16:00        30


 


11/21/19 15:03  101 35     30


 


11/21/19 13:09  79 27  100 Mechanical Ventilator  30





  80 17     30


 


11/21/19 13:00        30


 


11/21/19 12:00 99.7 87 20 113/53 (73) 100   


 


11/21/19 12:00  82      


 


11/21/19 12:00      Mechanical Ventilator  

















Intake and Output  


 


 11/21/19 11/22/19





 19:00 07:00


 


Intake Total 1275 ml 1275 ml


 


Output Total 600 ml 550 ml


 


Balance 675 ml 725 ml


 


  


 


Free Water 200 ml 200 ml


 


IV Total 355 ml 355 ml


 


Tube Feeding 720 ml 720 ml


 


Output Urine Total 600 ml 550 ml


 


# Bowel Movements 3 5








Laboratory Tests


11/22/19 10:20: 


White Blood Count 22.1*H, Red Blood Count 3.03L, Hemoglobin 8.2L, Hematocrit 

24.0L, Mean Corpuscular Volume 79L, Mean Corpuscular Hemoglobin 27.0, Mean 

Corpuscular Hemoglobin Concent 34.1, Red Cell Distribution Width 14.1, Platelet 

Count 403, Mean Platelet Volume 8.7, Neutrophils (%) (Auto) , Lymphocytes (%) (

Auto) , Monocytes (%) (Auto) , Eosinophils (%) (Auto) , Basophils (%) (Auto) , 

Differential Total Cells Counted 100, Neutrophils % (Manual) 87H, Lymphocytes % 

(Manual) 8L, Monocytes % (Manual) 3, Eosinophils % (Manual) 2, Basophils % (

Manual) 0, Band Neutrophils 0, Platelet Estimate Adequate, Platelet Morphology 

Normal, Hypochromasia , Anisocytosis 1+


Height (Feet):  5


Height (Inches):  5.00


Weight (Pounds):  160











Garrick Keith MD Nov 22, 2019 11:55

## 2019-11-22 NOTE — NUR
RESPIRATORY NOTE:

pt tolerating weaning well. as of now, pt on CPAP PS8, titrated from PS12. pt is still 
slightly tachypneic, but RR and Spo2 values still WNL. pt not in distress. will keep on CPAP 
as tolerated. RN notified

## 2019-11-22 NOTE — NUR
RD ASSESSMENT & RECOMMENDATIONS

SEE CARE ACTIVITY FOR COMPLETE ASSESSMENT



DAILY ESTIMATED NEEDS:

Needs based on Critical Care/ 68kg 

22-28  kcals/kg 

9944-2918  total kcals

1.2-2  g protein/kg

  g total protein 

25-30  mL/kg

2299-2435  total fluid mLs



NUTRITION DIAGNOSIS:

* Swallowing difficulty R/T dysaphgia, poor dentition, dementia,

respiratory status as evidenced by on liquify pureed, NTL texture diet per

SLP rec-> now on NGT feeds-> now s/p PEG placement and trach placement.

* Increased kcal/prot needs R/T wound healing and sepsis as evidenced by

admitted w/ DTPI R heel and non-blanchable sacral erythema-> now R heel w/

reabsorbing blood blister and sacral wound resolved. , critically elev wbc

(24.0* -> 18.2), elev LA (5.8 -> wnl), elev CRP, now afebrile.

* Altered nutrition related lab values R/T CHF, diabetes, clinical

condition as evidenced by elev BNP (>27102 ->00406), A1C of 10.4, elev BGs

and POC glu (200's and 300's -> 105-210 improved), elev Na (154-> now

wnl), elev BUN (61->43 trend down).





CURRENT TF:Glucerna 1.2 @ 60ml/hr x 24hrs  







ENTERAL NUTRITION RECOMMENDATIONS:

Glucerna 1.2 @ 60ml/hr x 24hrs   to provide 1440ml, 1728kcal, 86g prot, 1159ml free water 



- Maintain current TF

- HOB over 30 degrees

- Water flush of 150ml q 6hrs







ADDITIONAL RECOMMENDATIONS:

* Calibrated bedscale wt w/ added P200 mattress  

* Wound healing: Continue Zaki 1pkt BID 

* Monitor BG control, need to LOWER TF for improved BG control 

      -> BGs currently improved from 200's-300's -> -210  

* Monitor lytes, replete as needed 

. 

.

## 2019-11-22 NOTE — HEMATOLOGY/ONC PROGRESS NOTE
Assessment/Plan


Assessment/Plan





Assessment and Recs:


# Anemia of chronic disease due to underlying chronic medical issues, 

multifactorial 


--> Anemia workup has been ordered, rule out gi bleed --> ferritin is 1400+


--> No evidence of hemolysis is noted, peripheral smear has been reviewed.


--> Hgb goal >7. Transfuse prn.


--> Epogen or iron at this time is not particularly indicated


--> Medications have been reviewed


--> low threshold for gi evaluation in case has occult +


--> bone marrow biopsy is not indicated given the other more likely causes (if 

recurrent anemia) first time here


--> hgb trend 8.4->8.5-->8.2->7.7-->7.9-->10.1-->9.1->7.8->7.2-->9-->8.4-->8->

7.8-->8.5 ->7.6


--> FOLIC ACID 1mg po daily started


--> s/p egd, no active gi bleed 


# Leukocytosis/elevated white blood cell count, unspecified likely related to 

underlying reaction v more likely infection


--> have reviewed peripheral smear and bandemia/neutrophilia noted


--> continue antibiotics if they have been started by ID team (VANC/ZOSYN)--> 

vanc/linda--> linda


--> wbc 39-->28k-->29k->31k-->23-->26k-->19k-->24k-->15.4-->16-->14-->23k-->14--

>17-->12.5->12.3-->12.6-->16-->18.2-->19


--> monitor for resolution


--> CT C/A/P Results reviewed


--> FOR INdium bone scan done -> Rim of activity within the pelvis, 

corresponding to expected location of the soft tissue component of the cystic 

pelvic mass described on recent imaging studies.


--> again consider gyn eval


--> as come down over last week, currently only 12k and s/p code, continue to 

monitor


--> have discussed with pcp, for bone marrow biopsy, consent obtained for 11/22


# Thrombocytosis - if plt count >400k, most usually is a reactive process and 

will improve once exacerbant removed as well


--> if plt count >600k, consider CML  will need to order Jak2 test, peripheral 

smear to be reviewed as well and note here if any myelocytes, metamyelocytes, 

blasts are noted


# Pelvic mass on ct and us -- 13 x 7 x 12.9 cm unilocular cystic mass, 

corresponding to lesion reported on recent CT scan. Layering low level internal 

echoes likely represent bladder debris.. This presumably represents a cystic 

ovarian lesion with debris or hemorrhage, possibly neoplastic.


--> CA-125 is 216! elevated


--> consider gyn eval


# Hypercalcemia - btw 10-11 range when corrected to alb


--> ivf have been started


--> if remains elevated, 7.9-->8.3


--> will get pth


# Sepsis from pneumonia.  


--> pulm consulted, abx started


# CHANCE (acute kidney injury) on ckd


--> cr 1.6-->2.7-->2.2->1.2->1.2


--> per renal


# UTI (urinary tract infection)


--> on abx per id


# Dehydration


--> on ivf


# Acute metabolic encephalopathy


--> likely due to pna


# Resp failure now intubated 11/4


--> s/p trach


# Dysphagia s/p peg++


# Dvt ppx with heparin sq





The timing of this note does not necessarily reflect the time of the patient 

was seen.





Greatly appreciate consultation.





Subjective


Constitutional:  Denies: no symptoms, chills, fever, malaise, weakness, other


HEENT:  Denies: no symptoms, eye pain, blurred vision, tearing, double vision, 

ear pain, ear discharge, nose pain, nose congestion, throat pain, throat 

swelling, mouth pain, mouth swelling, other


Respiratory:  Denies: no symptoms, cough, shortness of breath, SOB with 

excertion, SOB at rest, sputum, wheezing, other


Gastrointestinal/Abdominal:  Denies: no symptoms, abdomen distended, abdominal 

pain, black stools, tarry stools, blood in stool, constipated, diarrhea, 

difficulty swallowing, nausea, poor appetite, poor fluid intake, rectal bleeding

, vomiting, other


Genitourinary:  Denies: no symptoms, burning, discharge, frequency, flank pain, 

hematuria, incontinence, pain, urgency, other


Neurologic/Psychiatric:  Denies: no symptoms, anxiety, depressed, emotional 

problems, headache, numbness, paresthesia, pre-existing deficit, seizure, 

tingling, tremors, weakness, other


Endocrine:  Denies: no symptoms, excessive sweating, flushing, intolerance to 

cold, intolerance to heat, increased hunger, increased thirst, increased urine, 

unexplained weight gain, unexplained weight loss, other


Allergies:  


Coded Allergies:  


     No Known Allergies (Unverified , 10/14/19)


Subjective


10/14: hgb has improved, no bleeding, labs reivewed


10/15: no events to report


10/16: a+ o x1, no bleeding or chills noted, no major changes, occult pending


10/17: no events noted, no bleeding, no chills, no hematemesis/hematochezia


10/18: remains confused, with abx, on nc


10/19: cr is worse, hgb 8.4, no bleeding, night sweats, chills


10/20: no f/c, no night sweats, labs noted, cr worse


10/21: no bleeding or chills, no night sweats, labs pending this am


10/22: pelvic mass noted on imaging, no bleeding reported, ordered ca125


10/23: no events, remains lethargic, is on abx, seen by surg


10/24: with raid response overnight, rr high as well as bp, vidal to icu


10/25: no events, no bleeding, to undergo a indium scan with id


10/26: comfortable, electrolytes reviewed, given mag and k


10/29: back on bipap with gt feeds, dw rn this am


10/30: remains very confused, no f/c, no bleeding, with urinary retention, 

folic acid started


10/31: sleeping, is on bipap, no events, wbc is higher this am


11/1: no events to report, no bleeding, wbc still high but better


11/3: no bleeding, no night sweats, s/p peg, alert


11/4: s/p code blue last night, now intubated, labs noted wbc lower


11/5: given k and mg, for endoscopy tomorrow given drop h/h, 1 unit prbc


11/6: is on vent, unresponsive, no bleeding noted, no chills wbc is 13k


11/14: remains in icu, s/p trach, wbc 12, on meropenem 


11/15: no bleeding, trying to pull off the trach, cbc reviewed


11/16: no Bms last two days, on vent, seen by gi, no bleeding


11/18: no bleeding, no night sweats, labs reviewed, seen by gi/surg


11/19: no acute events, vent/trach, sp egd, no active gi bleed, h/h stable  


11/20: no new events, no bleeding, on trach, no f/c, no night sweats


11/21: no major changes, no bleeding, remains altered, labs noted


11/22: no f/c, no night sweats, no major changes, hgb 7.6, for bone marrow 

biopsy





Objective


Objective





Current Medications








 Medications


  (Trade)  Dose


 Ordered  Sig/Winnie


 Route


 PRN Reason  Start Time


 Stop Time Status Last Admin


Dose Admin


 


 Acetaminophen


  (Tylenol)  650 mg  Q4H  PRN


 GT


 Mild Pain/Temp > 100.5  11/21/19 11:45


 11/25/19 17:59  11/21/19 23:36


 


 


 Acetaminophen


  (Tylenol)  650 mg  Q4H  PRN


 RECTAL


 TEMP>100.5  11/15/19 18:00


 12/2/19 17:59   


 


 


 Acetylcysteine


  (Mucomyst)  100 mg  TIDRT


 [facility]


   11/20/19 19:54


 12/20/19 19:53  11/22/19 07:26


 


 


 Albuterol/


 Ipratropium


  (Albuterol/


 Ipratropium)  3 ml  Q6HRT


 [facility]


   11/17/19 19:00


 11/22/19 18:59  11/22/19 07:26


 


 


 Amlodipine


 Besylate


  (Norvasc)  2.5 mg  BID


 GT


   11/21/19 18:00


 11/29/19 17:59  11/21/19 18:42


 


 


 Chlorhexidine


 Gluconate


  (Mae-Hex 2%)  1 applic  DAILY@2000


 TOPIC


   11/15/19 20:00


 12/5/19 19:59  11/21/19 20:25


 


 


 Dextrose


  (Dextrose 50%)  25 ml  Q30M  PRN


 IV


 Hypoglycemia  11/16/19 13:00


 12/16/19 12:59   


 


 


 Dextrose


  (Dextrose 50%)  50 ml  Q30M  PRN


 IV


 Hypoglycemia  11/16/19 13:00


 12/16/19 12:59   


 


 


 Docusate Sodium


  (Colace)  100 mg  TWICE A  DAY


 GT


   11/21/19 18:00


 12/21/19 17:59  11/21/19 18:42


 


 


 Folic Acid


  (Folate)  3 mg  DAILY


 GT


   11/16/19 09:00


 11/30/19 08:59  11/21/19 09:10


 


 


 Heparin Sodium


  (Porcine)


  (Heparin 5000


 units/ml)  5,000 units  EVERY 12  HOURS


 SUBQ


   11/18/19 21:00


 12/18/19 20:59  11/21/19 09:12


 


 


 Insulin Aspart


  (NovoLOG)    EVERY 6  HOURS


 SUBQ


   11/16/19 18:00


 12/16/19 17:59  11/22/19 05:12


 


 


 Insulin Aspart


  (NovoLOG)  15 units  EVERY 6  HOURS


 SUBQ


   11/19/19 12:00


 12/16/19 17:59  11/22/19 05:13


 


 


 Insulin Detemir


  (Levemir)  20 units  BID


 SUBQ


   11/19/19 18:00


 12/1/19 08:59  11/21/19 18:44


 


 


 Lactulose


  (Cephulac)  20 gm  Q8H  PRN


 GT


 Constipation  11/21/19 11:45


 12/3/19 17:59   


 


 


 Linezolid  300 ml @ 


 300 mls/hr  Q12HR


 IVPB


   11/20/19 11:00


 11/27/19 10:59  11/21/19 20:25


 


 


 Lorazepam


  (Ativan 2mg/ml


 1ml)  2 mg  ONCE  PRN


 IV


 prior to bone marrow biopsy  11/21/19 16:18


 11/22/19 23:59   


 


 


 Meropenem 1 gm/


 Sodium Chloride  55 ml @ 


 110 mls/hr  Q12H


 IVPB


   11/20/19 10:00


 11/25/19 09:59  11/21/19 21:38


 


 


 Metoclopramide HCl


  (Reglan)  10 mg  Q6H  PRN


 IVP


 Nausea & Vomiting  11/15/19 18:00


 12/6/19 17:59   


 


 


 Pantoprazole


  (Protonix)  40 mg  EVERY 12  HOURS


 IVP


   11/15/19 21:00


 12/3/19 08:59  11/21/19 20:25


 


 


 Polyethylene


 Glycol


  (Miralax)  17 gm  BEDTIME


 GT


   11/18/19 21:00


 12/18/19 20:59  11/21/19 20:25


 











Last 24 Hour Vital Signs








  Date Time  Temp Pulse Resp B/P (MAP) Pulse Ox O2 Delivery O2 Flow Rate FiO2


 


11/22/19 07:26  89 28  100 Mechanical Ventilator  30





  91 28     30


 


11/22/19 05:23  84 33     30


 


11/22/19 04:00      Mechanical Ventilator  


 


11/22/19 04:00        30


 


11/22/19 04:00 97.5 81 24 108/54 (72) 100   


 


11/22/19 04:00  83      


 


11/22/19 03:15  89 26     30


 


11/22/19 00:51  87 35  100 Mechanical Ventilator  30





  85 24     30





        30


 


11/22/19 00:00      Mechanical Ventilator  


 


11/22/19 00:00        30


 


11/22/19 00:00  101      


 


11/22/19 00:00 99.5 90 24 123/51 (75) 100   


 


11/21/19 22:39  91 28     30


 


11/21/19 20:36  95 29     30


 


11/21/19 20:00        30


 


11/21/19 20:00 98.1 87 24 118/54 (75) 100   


 


11/21/19 20:00      Mechanical Ventilator  


 


11/21/19 20:00  95      


 


11/21/19 19:12  88 25  100 Mechanical Ventilator  30





  86 20     30





        30


 


11/21/19 18:42  92  121/57    


 


11/21/19 17:01  92 30     30


 


11/21/19 16:00 99.5 105 18 121/57 (78) 100   


 


11/21/19 16:00  106      


 


11/21/19 16:00      Mechanical Ventilator  


 


11/21/19 16:00        30


 


11/21/19 15:03  101 35     30


 


11/21/19 13:09  79 27  100 Mechanical Ventilator  30





  80 17     30


 


11/21/19 13:00        30


 


11/21/19 12:00 99.7 87 20 113/53 (73) 100   


 


11/21/19 12:00  82      


 


11/21/19 12:00      Mechanical Ventilator  


 


11/21/19 10:53     99   


 


11/21/19 10:48  83 33     30


 


11/21/19 10:15        30


 


11/21/19 09:40        30


 


11/21/19 09:35  89 31     30





        30


 


11/21/19 09:10  89  127/55    


 


11/21/19 08:00  91      


 


11/21/19 08:00        30


 


11/21/19 08:00 100.0 87 18 124/57 (79) 100   


 


11/21/19 08:00      Mechanical Ventilator  


 


11/21/19 07:19  84 28  100 Mechanical Ventilator  30





  84 19     30


 


11/21/19 05:11  97 31     30


 


11/21/19 04:00  83      


 


11/21/19 04:00 98.6 83 24 134/58 (83) 100   


 


11/21/19 04:00      Mechanical Ventilator  


 


11/21/19 04:00        30


 


11/21/19 03:30  89 30     30


 


11/21/19 01:30  83 26  100 Mechanical Ventilator  30





  86 25     30


 


11/21/19 00:00      Mechanical Ventilator  


 


11/21/19 00:00  87      


 


11/21/19 00:00 98.0 85 24 125/54 (77) 100   


 


11/20/19 23:12  85 23     30


 


11/20/19 21:30  87 22     30


 


11/20/19 20:00 98.2 86 24 125/66 (85) 100   


 


11/20/19 20:00      Mechanical Ventilator  


 


11/20/19 20:00        30


 


11/20/19 20:00  84      


 


11/20/19 19:30  85 24  100 Mechanical Ventilator  30





  88 18     30


 


11/20/19 18:39  100  120/58    


 


11/20/19 16:53  100 30     30


 


11/20/19 16:00  87      


 


11/20/19 16:00 99.3 106 21 120/58 (78) 99   


 


11/20/19 16:00        30


 


11/20/19 16:00      Mechanical Ventilator  


 


11/20/19 15:26  100 17  100 Mechanical Ventilator  30


 


11/20/19 15:23  88 22     30


 


11/20/19 13:05  89 24     30


 


11/20/19 12:00        30


 


11/20/19 12:00 99.3 90 19 125/56 (79) 99   


 


11/20/19 12:00  83      


 


11/20/19 12:00      Mechanical Ventilator  


 


11/20/19 10:56  99 30     30


 


11/20/19 10:13  79  122/52    


 


11/20/19 09:23  79 27     30


 


11/20/19 08:53     95   


 


11/20/19 08:46  82 25     30


 


11/20/19 08:42  84 23     30

















Intake and Output  


 


 11/21/19 11/22/19





 19:00 07:00


 


Intake Total 1275 ml 1275 ml


 


Output Total 600 ml 550 ml


 


Balance 675 ml 725 ml


 


  


 


Free Water 200 ml 200 ml


 


IV Total 355 ml 355 ml


 


Tube Feeding 720 ml 720 ml


 


Output Urine Total 600 ml 550 ml


 


# Bowel Movements 3 5











Labs








Test


  11/19/19


17:30 11/20/19


03:28 11/20/19


03:50 11/21/19


03:35


 


Lactic Acid Level


  1.30 mmol/L


(0.4-2.0) 


  


  


 


 


Urine Color  Pale yellow   


 


Urine Appearance  Clear   


 


Urine pH  8 (4.5-8.0)   


 


Urine Specific Gravity


  


  1.010


(1.005-1.035) 


  


 


 


Urine Protein  3+ (NEGATIVE)   


 


Urine Glucose (UA)  1+ (NEGATIVE)   


 


Urine Ketones


  


  Negative


(NEGATIVE) 


  


 


 


Urine Blood


  


  Negative


(NEGATIVE) 


  


 


 


Urine Nitrite


  


  Negative


(NEGATIVE) 


  


 


 


Urine Bilirubin


  


  Negative


(NEGATIVE) 


  


 


 


Urine Urobilinogen


  


  1 MG/DL


(0.0-1.0) 


  


 


 


Urine Leukocyte Esterase


  


  Negative


(NEGATIVE) 


  


 


 


Urine RBC


  


  0-2 /HPF (0 -


2) 


  


 


 


Urine WBC


  


  0-2 /HPF (0 -


2) 


  


 


 


Urine Squamous Epithelial


Cells 


  Occasional


/LPF 


  


 


 


Urine Bacteria


  


  Occasional


/HPF (NONE) 


  


 


 


White Blood Count


  


  


  18.9 K/UL


(4.8-10.8) 19.7 K/UL


(4.8-10.8)


 


Red Blood Count


  


  


  2.96 M/UL


(4.20-5.40) 2.81 M/UL


(4.20-5.40)


 


Hemoglobin


  


  


  7.9 G/DL


(12.0-16.0) 7.6 G/DL


(12.0-16.0)


 


Hematocrit


  


  


  24.6 %


(37.0-47.0) 22.7 %


(37.0-47.0)


 


Mean Corpuscular Volume   83 FL (80-99)  81 FL (80-99) 


 


Mean Corpuscular Hemoglobin


  


  


  26.9 PG


(27.0-31.0) 27.1 PG


(27.0-31.0)


 


Mean Corpuscular Hemoglobin


Concent 


  


  32.3 G/DL


(32.0-36.0) 33.6 G/DL


(32.0-36.0)


 


Red Cell Distribution Width


  


  


  14.6 %


(11.6-14.8) 14.1 %


(11.6-14.8)


 


Platelet Count


  


  


  452 K/UL


(150-450) 398 K/UL


(150-450)


 


Mean Platelet Volume


  


  


  7.8 FL


(6.5-10.1) 8.1 FL


(6.5-10.1)


 


Neutrophils (%) (Auto)    % (45.0-75.0)   % (45.0-75.0) 


 


Lymphocytes (%) (Auto)    % (20.0-45.0)   % (20.0-45.0) 


 


Monocytes (%) (Auto)    % (1.0-10.0)   % (1.0-10.0) 


 


Eosinophils (%) (Auto)    % (0.0-3.0)   % (0.0-3.0) 


 


Basophils (%) (Auto)    % (0.0-2.0)   % (0.0-2.0) 


 


Differential Total Cells


Counted 


  


  100 


  100 


 


 


Neutrophils % (Manual)   62 % (45-75)  58 % (45-75) 


 


Lymphocytes % (Manual)   21 % (20-45)  25 % (20-45) 


 


Monocytes % (Manual)   4 % (1-10)  4 % (1-10) 


 


Eosinophils % (Manual)   11 % (0-3)  9 % (0-3) 


 


Basophils % (Manual)   1 % (0-2)  2 % (0-2) 


 


Band Neutrophils   1 % (0-8)  1 % (0-8) 


 


Platelet Estimate   Increased  Adequate 


 


Platelet Morphology   Normal  Normal 


 


Erythrocyte Sedimentation Rate


  


  


  130 MM/HR


(0-30) 


 


 


Prothrombin Time


  


  


  10.7 SEC


(9.30-11.50) 


 


 


Prothromb Time International


Ratio 


  


  1.0 (0.9-1.1) 


  


 


 


Activated Partial


Thromboplast Time 


  


  30 SEC (23-33) 


  


 


 


Sodium Level


  


  


  144 MMOL/L


(136-145) 135 MMOL/L


(136-145)


 


Potassium Level


  


  


  4.2 MMOL/L


(3.5-5.1) 4.4 MMOL/L


(3.5-5.1)


 


Chloride Level


  


  


  108 MMOL/L


() 101 MMOL/L


()


 


Carbon Dioxide Level


  


  


  33 MMOL/L


(21-32) 31 MMOL/L


(21-32)


 


Anion Gap


  


  


  3 mmol/L


(5-15) 3 mmol/L


(5-15)


 


Blood Urea Nitrogen


  


  


  52 mg/dL


(7-18) 43 mg/dL


(7-18)


 


Creatinine


  


  


  1.3 MG/DL


(0.55-1.30) 1.2 MG/DL


(0.55-1.30)


 


Estimat Glomerular Filtration


Rate 


  


  41.0 mL/min


(>60) 45.0 mL/min


(>60)


 


Glucose Level


  


  


  168 MG/DL


() 87 MG/DL


()


 


Calcium Level


  


  


  9.0 MG/DL


(8.5-10.1) 8.5 MG/DL


(8.5-10.1)


 


Phosphorus Level


  


  


  3.4 MG/DL


(2.5-4.9) 


 


 


Magnesium Level


  


  


  2.5 MG/DL


(1.8-2.4) 


 


 


Total Bilirubin


  


  


  0.4 MG/DL


(0.2-1.0) 0.4 MG/DL


(0.2-1.0)


 


Aspartate Amino Transf


(AST/SGOT) 


  


  17 U/L (15-37) 


  14 U/L (15-37) 


 


 


Alanine Aminotransferase


(ALT/SGPT) 


  


  20 U/L (12-78) 


  17 U/L (12-78) 


 


 


Alkaline Phosphatase


  


  


  102 U/L


() 95 U/L


()


 


C-Reactive Protein,


Quantitative 


  


  8.9 mg/dL


(0.00-0.90) 


 


 


Total Protein


  


  


  7.4 G/DL


(6.4-8.2) 7.1 G/DL


(6.4-8.2)


 


Albumin


  


  


  2.1 G/DL


(3.4-5.0) 1.9 G/DL


(3.4-5.0)


 


Globulin   5.3 g/dL  5.2 g/dL 


 


Albumin/Globulin Ratio   0.4 (1.0-2.7)  0.4 (1.0-2.7) 


 


Lipase


  


  


  102 U/L


() 


 


 


Myelocytes %    1 % (0-0) 


 


Microcytosis    2+ 








Height (Feet):  5


Height (Inches):  5.00


Weight (Pounds):  160


Objective


Physical Exam:


Vitals: reviewed


General Appearance:  NAD


HEENT:  normocephalic, atraumatic


Neck:  non-tender, normal alignment, trach++


Respiratory/Chest:  normal breath sounds bilaterally ++ vent


Cardiovascular/Chest: rrr


Abdomen:  normal bowel sounds, soft, nontender ++ peg


Extremities:  normal range of motion











Mike Pop MD Nov 22, 2019 08:17

## 2019-11-22 NOTE — GENERAL PROGRESS NOTE
Assessment/Plan


Status:  stable, unchanged


Assessment/Plan:








Assessment/Plan


Problems:  


(1) PEG (percutaneous endoscopic gastrostomy) status


ICD Codes:  Z93.1 - Gastrostomy status


SNOMED:  500476467, [id number]


(2) Anemia


ICD Codes:  D64.9 - Anemia, unspecified


SNOMED:  027831595


Qualifiers:  


   Qualified Codes:  D64.9 - Anemia, unspecified


(3) Dehydration


ICD Codes:  E86.0 - Dehydration


SNOMED:  87239225, [id number]


Status:  unchanged





Assessment/Plan


Assessment


(1) pneumonia


(2) UTI


(3) Sepsis


(4) Dehydration


(5) CHANCE 


(6) Acute metabolic encephalopathy


(7) Hyperglycemia due to type 2 diabetes mellitus


(8) Renal failure (ARF), acute on chronic


(9) Aspiration pneumonia


(10) Abnormal TSH


(11) Pelvic mass in female


(12) Congestive Heart Failure


(13) Anemia


(14) Dysphagia - s/p GT





s/p EGD SUMMARY OF FINDINGS:


1. No evidence of any active upper GI bleeding at this time.


2. Gastritis status biopsy.





Recommendations


GTFs


prn transfusions


bowel regime


PPI and H2B


will follow up, consider colonoscopy if patient has recurrent GI bleed


still having fever>> fu ID





Subjective


ROS Limited/Unobtainable:  No


Allergies:  


Coded Allergies:  


     No Known Allergies (Unverified , 10/14/19)





Objective





Last 24 Hour Vital Signs








  Date Time  Temp Pulse Resp B/P (MAP) Pulse Ox O2 Delivery O2 Flow Rate FiO2


 


11/22/19 10:35  115 45     30


 


11/22/19 09:17  106 39     30


 


11/22/19 09:16     94   


 


11/22/19 08:00      Mechanical Ventilator  


 


11/22/19 08:00 97.3 90 23 116/58 (77) 100   


 


11/22/19 08:00        30


 


11/22/19 07:26  89 28  100 Mechanical Ventilator  30





  91 28     30


 


11/22/19 05:23  84 33     30


 


11/22/19 04:00      Mechanical Ventilator  


 


11/22/19 04:00        30


 


11/22/19 04:00 97.5 81 24 108/54 (72) 100   


 


11/22/19 04:00  83      


 


11/22/19 03:15  89 26     30


 


11/22/19 00:51  87 35  100 Mechanical Ventilator  30





  85 24     30





        30


 


11/22/19 00:00      Mechanical Ventilator  


 


11/22/19 00:00        30


 


11/22/19 00:00  101      


 


11/22/19 00:00 99.5 90 24 123/51 (75) 100   


 


11/21/19 22:39  91 28     30


 


11/21/19 20:36  95 29     30


 


11/21/19 20:00        30


 


11/21/19 20:00 98.1 87 24 118/54 (75) 100   


 


11/21/19 20:00      Mechanical Ventilator  


 


11/21/19 20:00  95      


 


11/21/19 19:12  88 25  100 Mechanical Ventilator  30





  86 20     30





        30


 


11/21/19 18:42  92  121/57    


 


11/21/19 17:01  92 30     30


 


11/21/19 16:00 99.5 105 18 121/57 (78) 100   


 


11/21/19 16:00  106      


 


11/21/19 16:00      Mechanical Ventilator  


 


11/21/19 16:00        30


 


11/21/19 15:03  101 35     30


 


11/21/19 13:09  79 27  100 Mechanical Ventilator  30





  80 17     30


 


11/21/19 13:00        30


 


11/21/19 12:00 99.7 87 20 113/53 (73) 100   


 


11/21/19 12:00  82      


 


11/21/19 12:00      Mechanical Ventilator  

















Intake and Output  


 


 11/21/19 11/22/19





 19:00 07:00


 


Intake Total 1275 ml 1275 ml


 


Output Total 600 ml 550 ml


 


Balance 675 ml 725 ml


 


  


 


Free Water 200 ml 200 ml


 


IV Total 355 ml 355 ml


 


Tube Feeding 720 ml 720 ml


 


Output Urine Total 600 ml 550 ml


 


# Bowel Movements 3 5








Laboratory Tests


11/22/19 10:20: 


White Blood Count 22.1*H, Red Blood Count 3.03L, Hemoglobin 8.2L, Hematocrit 

24.0L, Mean Corpuscular Volume 79L, Mean Corpuscular Hemoglobin 27.0, Mean 

Corpuscular Hemoglobin Concent 34.1, Red Cell Distribution Width 14.1, Platelet 

Count 403, Mean Platelet Volume 8.7, Neutrophils (%) (Auto) , Lymphocytes (%) (

Auto) , Monocytes (%) (Auto) , Eosinophils (%) (Auto) , Basophils (%) (Auto) , 

Neutrophils % (Manual) [Pending], Lymphocytes % (Manual) [Pending], Platelet 

Estimate [Pending], Platelet Morphology [Pending]


Height (Feet):  5


Height (Inches):  5.00


Weight (Pounds):  160


General Appearance:  alert


EENT:  normal ENT inspection


Neck:  supple


Cardiovascular:  normal rate


Respiratory/Chest:  decreased breath sounds


Abdomen:  normal bowel sounds, non tender, soft


Extremities:  non-tender











Storm Turk MD Nov 22, 2019 11:00

## 2019-11-22 NOTE — PATHOLOGY BONE BARROW
Bone Marrow Aspirate & Biopsy


.


PROCEDURE: Bone Marrow Aspirate and Biopsy





INDICATION: Leucocytosis





PROCEDURE : Ann Ordoñez M.D.





CONSENT: Consent was previously obtained from the patient's daughter. The risks 

and benefits were re-explained. The patient agreed to undergo the procedure.


A TIMEOUT WAS EXECUTED: Yes





PROCEDURE SUMMARY:


The patient was laid in the side position. The Left posterior iliac crest was 

prepped and draped in a sterile fashion. The crest of the posterior iliac was 

located, and the skin as well as surface of the bone was anesthetized with 2 % 

lidocaine. An aspirating needle was introduced, the bone marrow aspirate was 

obtained without any difficulty. This was withdrawn the coring needle was 

advanced into the bone cavity.  A bone marrow biopsy was obtained without any 

complications.





ESTIMATED BLOOD LOSS: Negligible





REPORTS TO FOLLOW











ANN ORDOÑEZ Nov 22, 2019 11:04

## 2019-11-22 NOTE — PULMONOLOGY PROGRESS NOTE
Assessment/Plan


Problems:  


(1) Healthcare associated bacterial pneumonia


(2) UTI (urinary tract infection)


(3) Sepsis


(4) Dehydration


(5) CHANCE (acute kidney injury)


(6) Acute metabolic encephalopathy


(7) Hyperglycemia due to type 2 diabetes mellitus


(8) Renal failure (ARF), acute on chronic


(9) Aspiration pneumonia


(10) Abnormal TSH


(11) Pelvic mass in female


(12) PEG (percutaneous endoscopic gastrostomy) status


(13) Gastrostomy in place


(14) Tracheostomy in place


Assessment/Plan


VDRF


S/P trach


Pneumonia S/P treatment


E coli UTI S/P treatment


Encephalopathy


Hx CHF


HTN


CHANCE - RESOVLED


Pelvic mass/possible GYN malignancy vs cyst


Dysphagia S/P PEG





Plan:


-Continue ventilatory support ---> WEANING TRIALS (d/w RT, continue PS 12 and 

dec by 2 as able, if fatigues can return to AC, hopefully can attempt TC next 

few days)


-Optimize pulmonary hygiene/mobilize as tolerated


-Titrate down FiO2 to keep SaO2 > 90%


-Monitor WCt, JAK2 mutation negative @ CS, F/U HEME recs --> plan for BMBx today


-Abx per ID, F/U Cx's 


-monitor volumes and renal function, F/U renal recs, PRN Lasix


-Strongly consider GYN evaluation 


-monitor encephalopathy, Consider neuro eval, ? LP


-NPO, GTF's


-DVT Px: Hep SQ


-FC, continue to discuss GOC, consider palliative care eval





D/W RN and RT





Subjective


Allergies:  


Coded Allergies:  


     No Known Allergies (Unverified , 10/14/19)


Subjective


GINO not being weaned no sig secretions plan for BM Bx O2 needs stable no change 

in MS yas TF's





Objective





Last 24 Hour Vital Signs








  Date Time  Temp Pulse Resp B/P (MAP) Pulse Ox O2 Delivery O2 Flow Rate FiO2


 


11/22/19 07:26  89 28  100 Mechanical Ventilator  30





  91 28     30


 


11/22/19 05:23  84 33     30


 


11/22/19 04:00      Mechanical Ventilator  


 


11/22/19 04:00        30


 


11/22/19 04:00 97.5 81 24 108/54 (72) 100   


 


11/22/19 04:00  83      


 


11/22/19 03:15  89 26     30


 


11/22/19 00:51  87 35  100 Mechanical Ventilator  30





  85 24     30





        30


 


11/22/19 00:00      Mechanical Ventilator  


 


11/22/19 00:00        30


 


11/22/19 00:00  101      


 


11/22/19 00:00 99.5 90 24 123/51 (75) 100   


 


11/21/19 22:39  91 28     30


 


11/21/19 20:36  95 29     30


 


11/21/19 20:00        30


 


11/21/19 20:00 98.1 87 24 118/54 (75) 100   


 


11/21/19 20:00      Mechanical Ventilator  


 


11/21/19 20:00  95      


 


11/21/19 19:12  88 25  100 Mechanical Ventilator  30





  86 20     30





        30


 


11/21/19 18:42  92  121/57    


 


11/21/19 17:01  92 30     30


 


11/21/19 16:00 99.5 105 18 121/57 (78) 100   


 


11/21/19 16:00  106      


 


11/21/19 16:00      Mechanical Ventilator  


 


11/21/19 16:00        30


 


11/21/19 15:03  101 35     30


 


11/21/19 13:09  79 27  100 Mechanical Ventilator  30





  80 17     30


 


11/21/19 13:00        30


 


11/21/19 12:00 99.7 87 20 113/53 (73) 100   


 


11/21/19 12:00  82      


 


11/21/19 12:00      Mechanical Ventilator  


 


11/21/19 10:53     99   


 


11/21/19 10:48  83 33     30


 


11/21/19 10:15        30


 


11/21/19 09:40        30


 


11/21/19 09:35  89 31     30





        30


 


11/21/19 09:10  89  127/55    

















Intake and Output  


 


 11/21/19 11/22/19





 19:00 07:00


 


Intake Total 1275 ml 1275 ml


 


Output Total 600 ml 550 ml


 


Balance 675 ml 725 ml


 


  


 


Free Water 200 ml 200 ml


 


IV Total 355 ml 355 ml


 


Tube Feeding 720 ml 720 ml


 


Output Urine Total 600 ml 550 ml


 


# Bowel Movements 3 5








General Appearance:  cachetic - minimally responsive


HEENT:  normocephalic, atraumatic, anicteric, mucous membranes moist, status 

post trach


Respiratory/Chest:  rhonchi


Cardiovascular:  normal peripheral pulses, normal rate, regular rhythm


Abdomen:  normal bowel sounds, soft, non tender, no organomegaly, non distended

, no mass, other - GT


Extremities:  no cyanosis, no clubbing, no edema





Microbiology








 Date/Time


Source Procedure


Growth Status


 


 


 11/19/19 16:10


Blood Blood Culture - Preliminary


NO GROWTH AFTER 48 HOURS Resulted


 


 11/19/19 16:00


Blood Blood Culture - Preliminary


NO GROWTH AFTER 48 HOURS Resulted











Current Medications








 Medications


  (Trade)  Dose


 Ordered  Sig/Winnie


 Route


 PRN Reason  Start Time


 Stop Time Status Last Admin


Dose Admin


 


 Acetaminophen


  (Tylenol)  650 mg  Q4H  PRN


 GT


 Mild Pain/Temp > 100.5  11/21/19 11:45


 11/25/19 17:59  11/21/19 23:36


 


 


 Acetaminophen


  (Tylenol)  650 mg  Q4H  PRN


 RECTAL


 TEMP>100.5  11/15/19 18:00


 12/2/19 17:59   


 


 


 Acetylcysteine


  (Mucomyst)  100 mg  TIDRT


 [facility]


   11/20/19 19:54


 12/20/19 19:53  11/22/19 07:26


 


 


 Albuterol/


 Ipratropium


  (Albuterol/


 Ipratropium)  3 ml  Q6HRT


 N


   11/17/19 19:00


 11/22/19 18:59  11/22/19 07:26


 


 


 Amlodipine


 Besylate


  (Norvasc)  2.5 mg  BID


 GT


   11/21/19 18:00


 11/29/19 17:59  11/21/19 18:42


 


 


 Chlorhexidine


 Gluconate


  (Mae-Hex 2%)  1 applic  DAILY@2000


 TOPIC


   11/15/19 20:00


 12/5/19 19:59  11/21/19 20:25


 


 


 Dextrose


  (Dextrose 50%)  25 ml  Q30M  PRN


 IV


 Hypoglycemia  11/16/19 13:00


 12/16/19 12:59   


 


 


 Dextrose


  (Dextrose 50%)  50 ml  Q30M  PRN


 IV


 Hypoglycemia  11/16/19 13:00


 12/16/19 12:59   


 


 


 Docusate Sodium


  (Colace)  100 mg  TWICE A  DAY


 GT


   11/21/19 18:00


 12/21/19 17:59  11/21/19 18:42


 


 


 Folic Acid


  (Folate)  3 mg  DAILY


 GT


   11/16/19 09:00


 11/30/19 08:59  11/21/19 09:10


 


 


 Heparin Sodium


  (Porcine)


  (Heparin 5000


 units/ml)  5,000 units  EVERY 12  HOURS


 SUBQ


   11/18/19 21:00


 12/18/19 20:59  11/21/19 09:12


 


 


 Insulin Aspart


  (NovoLOG)    EVERY 6  HOURS


 SUBQ


   11/16/19 18:00


 12/16/19 17:59  11/22/19 05:12


 


 


 Insulin Aspart


  (NovoLOG)  15 units  EVERY 6  HOURS


 SUBQ


   11/19/19 12:00


 12/16/19 17:59  11/22/19 05:13


 


 


 Insulin Detemir


  (Levemir)  20 units  BID


 SUBQ


   11/19/19 18:00


 12/1/19 08:59  11/21/19 18:44


 


 


 Lactulose


  (Cephulac)  20 gm  Q8H  PRN


 GT


 Constipation  11/21/19 11:45


 12/3/19 17:59   


 


 


 Linezolid  300 ml @ 


 300 mls/hr  Q12HR


 IVPB


   11/20/19 11:00


 11/27/19 10:59  11/21/19 20:25


 


 


 Lorazepam


  (Ativan 2mg/ml


 1ml)  2 mg  ONCE  PRN


 IV


 prior to bone marrow biopsy  11/21/19 16:18


 11/22/19 23:59   


 


 


 Meropenem 1 gm/


 Sodium Chloride  55 ml @ 


 110 mls/hr  Q12H


 IVPB


   11/20/19 10:00


 11/25/19 09:59  11/21/19 21:38


 


 


 Metoclopramide HCl


  (Reglan)  10 mg  Q6H  PRN


 IVP


 Nausea & Vomiting  11/15/19 18:00


 12/6/19 17:59   


 


 


 Pantoprazole


  (Protonix)  40 mg  EVERY 12  HOURS


 IVP


   11/15/19 21:00


 12/3/19 08:59  11/21/19 20:25


 


 


 Polyethylene


 Glycol


  (Miralax)  17 gm  BEDTIME


 GT


   11/18/19 21:00


 12/18/19 20:59  11/21/19 20:25


 

















Sammy Torres MD Nov 22, 2019 08:43

## 2019-11-22 NOTE — NUR
RESPIRATORY NOTE:

received pt on current vent settings with no signs of resp distress. pt is trached with 
shiley 8 in place with no redness or skin wounds seen around stoma. trach secured via trach 
tie/guard. alarms are set and audible with ambu bag and back up trach at bedside. will 
attempt to wean later today and cont. to monitor throughout the day.

## 2019-11-22 NOTE — NUR
RESPIRATORY NOTE:

pt placed back on AC for a little due to bedside procedure and desaturation. will cont to 
wean when procedure is finished. RN aware.

## 2019-11-22 NOTE — NUR
NURSE NOTES:

received patient report from rayne miller. patient is on bed asleep, noted to be obtunded. for 
bone marrow biopsy today per dr moser order. gtube running at prescribed rate. will 
follow plan of care.

## 2019-11-22 NOTE — NUR
NURSE NOTES:

report given to la of  [facility], patient is going to room 211-a. cardiac 
monitor removed, bleeding was stopped. packet endorsed to rayne miller. to sent patient with casas 
for retention per dr medeiros and with picc line for merrem and mycafungin.  time 
1800.

## 2019-11-22 NOTE — NUR
*-* INSURANCE *-*



UPDATED CLINICALS AND REVIEWS HAVE BEEN FAXED TO:





[facility]#[id number]

CM: Lance

#[phone]

Fax#[phone]

## 2019-11-22 NOTE — NUR
NURSE NOTES:

Report given to transportation personnel. No acute distress noted at this time. SR on 
cardiac monitor. VS WNL. Pt is ready to be discharged.

## 2019-11-22 NOTE — GENERAL PROGRESS NOTE
Assessment/Plan


Problem List:  


(1) Abnormal TSH


ICD Codes:  R79.89 - Other specified abnormal findings of blood chemistry


SNOMED:  755101191


(2) Hyperglycemia due to type 2 diabetes mellitus


ICD Codes:  E11.65 - Type 2 diabetes mellitus with hyperglycemia


SNOMED:  801331075659320, 69421985


Qualifiers:  


   Qualified Codes:  E11.65 - Type 2 diabetes mellitus with hyperglycemia; 

Z79.4 - Long term (current) use of insulin


(3) Acute metabolic encephalopathy


ICD Codes:  G93.41 - Metabolic encephalopathy


SNOMED:  31039755, 317773316


(4) ARF (acute renal failure)


ICD Codes:  N17.9 - Acute kidney failure, unspecified


SNOMED:  01384939


Qualifiers:  


   Qualified Codes:  N17.9 - Acute kidney failure, unspecified


(5) Healthcare associated bacterial pneumonia


ICD Codes:  J15.9 - Unspecified bacterial pneumonia


SNOMED:  574903026


Status:  stable, unchanged


Assessment/Plan:


continue Levemir 20 units bid  -  hold if TF is being held or glucose < 100 mg/

dL 


continue Novolog 15 units every 6 hrs in addition to sliding scale - hold if TF 

is being held or glucose < 100 mg/dL 


continue NISS every 6 hours 


hypoglycemia protocol in order





Subjective


ROS Limited/Unobtainable:  Yes


Allergies:  


Coded Allergies:  


     No Known Allergies (Unverified , 10/14/19)


Subjective


events noted 


in CHARLIE - on vent 


TF is tolerated 


glucose values are stable on high dose insulin 














Item Value  Date Time


 


Bedside Blood Glucose 174 mg/dl H 11/22/19 1147


 


Bedside Blood Glucose 105 mg/dl 11/22/19 0854


 


Bedside Blood Glucose 196 mg/dl H 11/22/19 0600


 


Bedside Blood Glucose 210 mg/dl H 11/22/19 0000


 


Bedside Blood Glucose 164 mg/dl H 11/21/19 1846











Objective





Last 24 Hour Vital Signs








  Date Time  Temp Pulse Resp B/P (MAP) Pulse Ox O2 Delivery O2 Flow Rate FiO2


 


11/22/19 12:36  100 43  100 Mechanical Ventilator  30





  99 48     30


 


11/22/19 11:22        30


 


11/22/19 10:35  115 45     30


 


11/22/19 09:17  106 39     30


 


11/22/19 09:16     94   


 


11/22/19 08:00      Mechanical Ventilator  


 


11/22/19 08:00  95      


 


11/22/19 08:00 97.3 90 23 116/58 (77) 100   


 


11/22/19 08:00        30


 


11/22/19 07:26  89 28  100 Mechanical Ventilator  30





  91 28     30


 


11/22/19 05:23  84 33     30


 


11/22/19 04:00      Mechanical Ventilator  


 


11/22/19 04:00        30


 


11/22/19 04:00 97.5 81 24 108/54 (72) 100   


 


11/22/19 04:00  83      


 


11/22/19 03:15  89 26     30


 


11/22/19 00:51  87 35  100 Mechanical Ventilator  30





  85 24     30





        30


 


11/22/19 00:00      Mechanical Ventilator  


 


11/22/19 00:00        30


 


11/22/19 00:00  101      


 


11/22/19 00:00 99.5 90 24 123/51 (75) 100   


 


11/21/19 22:39  91 28     30


 


11/21/19 20:36  95 29     30


 


11/21/19 20:00        30


 


11/21/19 20:00 98.1 87 24 118/54 (75) 100   


 


11/21/19 20:00      Mechanical Ventilator  


 


11/21/19 20:00  95      


 


11/21/19 19:12  88 25  100 Mechanical Ventilator  30





  86 20     30





        30


 


11/21/19 18:42  92  121/57    


 


11/21/19 17:01  92 30     30


 


11/21/19 16:00 99.5 105 18 121/57 (78) 100   


 


11/21/19 16:00  106      


 


11/21/19 16:00      Mechanical Ventilator  


 


11/21/19 16:00        30


 


11/21/19 15:03  101 35     30

















Intake and Output  


 


 11/21/19 11/22/19





 19:00 07:00


 


Intake Total 1275 ml 1275 ml


 


Output Total 600 ml 550 ml


 


Balance 675 ml 725 ml


 


  


 


Free Water 200 ml 200 ml


 


IV Total 355 ml 355 ml


 


Tube Feeding 720 ml 720 ml


 


Output Urine Total 600 ml 550 ml


 


# Bowel Movements 3 5








Laboratory Tests


11/22/19 10:20: 


White Blood Count 22.1*H, Red Blood Count 3.03L, Hemoglobin 8.2L, Hematocrit 

24.0L, Mean Corpuscular Volume 79L, Mean Corpuscular Hemoglobin 27.0, Mean 

Corpuscular Hemoglobin Concent 34.1, Red Cell Distribution Width 14.1, Platelet 

Count 403, Mean Platelet Volume 8.7, Neutrophils (%) (Auto) , Lymphocytes (%) (

Auto) , Monocytes (%) (Auto) , Eosinophils (%) (Auto) , Basophils (%) (Auto) , 

Differential Total Cells Counted 100, Neutrophils % (Manual) 87H, Lymphocytes % 

(Manual) 8L, Monocytes % (Manual) 3, Eosinophils % (Manual) 2, Basophils % (

Manual) 0, Band Neutrophils 0, Platelet Estimate Adequate, Platelet Morphology 

Normal, Hypochromasia , Anisocytosis 1+


Height (Feet):  5


Height (Inches):  5.00


Weight (Pounds):  160


General Appearance:  other - on vent


EENT:  other - tracheostomy


Cardiovascular:  normal rate


Respiratory/Chest:  decreased breath sounds


Abdomen:  normal bowel sounds, other - PEG


Objective





Current Medications








 Medications


  (Trade)  Dose


 Ordered  Sig/Winnie


 Route


 PRN Reason  Start Time


 Stop Time Status Last Admin


Dose Admin


 


 Acetaminophen


  (Tylenol)  650 mg  Q4H  PRN


 GT


 Mild Pain/Temp > 100.5  11/21/19 11:45


 11/25/19 17:59  11/21/19 23:36


 


 


 Acetaminophen


  (Tylenol)  650 mg  Q4H  PRN


 RECTAL


 TEMP>100.5  11/15/19 18:00


 12/2/19 17:59   


 


 


 Acetylcysteine


  (Mucomyst)  100 mg  TIDRT


 N


   11/20/19 19:54


 12/20/19 19:53  11/22/19 12:36


 


 


 Albuterol/


 Ipratropium


  (Albuterol/


 Ipratropium)  3 ml  Q6HRT


 [facility]


   11/17/19 19:00


 11/22/19 18:59  11/22/19 12:36


 


 


 Amlodipine


 Besylate


  (Norvasc)  2.5 mg  BID


 GT


   11/21/19 18:00


 11/29/19 17:59  11/21/19 18:42


 


 


 Chlorhexidine


 Gluconate


  (Mae-Hex 2%)  1 applic  DAILY@2000


 TOPIC


   11/15/19 20:00


 12/5/19 19:59  11/21/19 20:25


 


 


 Dextrose


  (Dextrose 50%)  25 ml  Q30M  PRN


 IV


 Hypoglycemia  11/16/19 13:00


 12/16/19 12:59   


 


 


 Dextrose


  (Dextrose 50%)  50 ml  Q30M  PRN


 IV


 Hypoglycemia  11/16/19 13:00


 12/16/19 12:59   


 


 


 Docusate Sodium


  (Colace)  100 mg  TWICE A  DAY


 GT


   11/21/19 18:00


 12/21/19 17:59  11/21/19 18:42


 


 


 Folic Acid


  (Folate)  3 mg  DAILY


 GT


   11/16/19 09:00


 11/30/19 08:59  11/22/19 08:35


 


 


 Heparin Sodium


  (Porcine)


  (Heparin 5000


 units/ml)  5,000 units  EVERY 12  HOURS


 SUBQ


   11/18/19 21:00


 12/18/19 20:59  11/21/19 09:12


 


 


 Insulin Aspart


  (NovoLOG)    EVERY 6  HOURS


 SUBQ


   11/16/19 18:00


 12/16/19 17:59  11/22/19 11:46


 


 


 Insulin Aspart


  (NovoLOG)  15 units  EVERY 6  HOURS


 SUBQ


   11/19/19 12:00


 12/16/19 17:59  11/22/19 11:47


 


 


 Insulin Detemir


  (Levemir)  20 units  BID


 SUBQ


   11/19/19 18:00


 12/1/19 08:59  11/22/19 08:54


 


 


 Lactulose


  (Cephulac)  20 gm  Q8H  PRN


 GT


 Constipation  11/21/19 11:45


 12/3/19 17:59   


 


 


 Linezolid  300 ml @ 


 300 mls/hr  Q12HR


 IVPB


   11/20/19 11:00


 11/27/19 10:59  11/22/19 08:52


 


 


 Lorazepam


  (Ativan 2mg/ml


 1ml)  2 mg  ONCE  PRN


 IV


 prior to bone marrow biopsy  11/21/19 16:18


 11/22/19 23:59  11/22/19 10:22


 


 


 Meropenem 1 gm/


 Sodium Chloride  55 ml @ 


 110 mls/hr  Q12H


 IVPB


   11/20/19 10:00


 11/25/19 09:59  11/22/19 10:12


 


 


 Metoclopramide HCl


  (Reglan)  10 mg  Q6H  PRN


 IVP


 Nausea & Vomiting  11/15/19 18:00


 12/6/19 17:59   


 


 


 Micafungin Sodium


 100 mg/Sodium


 Chloride  110 ml @ 


 110 mls/hr  Q24H


 IVPB


   11/22/19 12:00


 11/29/19 11:59  11/22/19 11:36


 


 


 Pantoprazole


  (Protonix)  40 mg  EVERY 12  HOURS


 IVP


   11/15/19 21:00


 12/3/19 08:59  11/22/19 08:38


 


 


 Polyethylene


 Glycol


  (Miralax)  17 gm  BEDTIME


 GT


   11/18/19 21:00


 12/18/19 20:59  11/21/19 20:25


 

















Riccardo Velez MD Nov 22, 2019 14:20

## 2019-11-24 NOTE — DISCHARGE SUMMARY
Discharge Summary


Discharge Summary


_


DATE OF ADMISSION: 10/14/2019


DATE OF DISCHARGE: 11/22/2019





DISCHARGED BY: Dr. Garrick Keith





CONSULTANTS: 


Dr. Amie May





BRIEF HOSPITAL COURSE:


Patient is a 64-year-old female with history of diabetes, high blood pressure 

and CHF presented to ED for complaints of shortness of breath.  She is a 

resident of skilled nursing home.  Per EMS, oxygen dropped to 90% on room air.  

She was placed on nonrebreather and was brought to ED for further evaluation.





Upon evaluation at ED, blood pressure was elevated to 176/84, heart rate 76, RR 

22, 90% oxygen saturation on room air.  Blood work showed WBC elevated to 39.4.

  Hemoglobin 8, hematocrit 25, platelet count 608.  BUN and creatinine were 

elevated.  Hemoglobin A1c 10.  Lactic acid was elevated to 5.  proBNP was 19,

814.  Troponin was negative.  Urinalysis showed 3+ leukocyte esterase, 20-30 

urine RBC, TNTC urine WBC.  She was given IV hydration.  She was started on 

broad-spectrum antibiotic for urinary tract infection.  Chest x-ray showed 

infiltrates in the right lower lung.  She was then admitted for evaluation of 

sepsis, healthcare associated pneumonia, kidney injury and UTI.





She was admitted to telemetry.  She was placed on nebulizer treatment.  Strict 

aspiration precaution.  Renal function was closely monitored.  Blood glucose 

was monitored.  She was started empirically on vancomycin and Zosyn.





Patient had low TSH but normal free T4 and free T3.  No need for thyroid 

medications.  Thyroglobulin is 251.  Urine culture showed growth reducing E. 

coli.  Blood glucose was monitored.  He was placed on sliding scale.  She was 

given meropenem.





She continued to elevation in white count.  She also had low-grade fever.  CT 

of the chest, abdomen and pelvis showed a pelvic mass.  There is concern for 

pelvic malignancy.  Indium scan showed rim of activity within the pelvis, 

corresponding to location of soft tissue component of the cystic pelvis mass 

seen on CT.





Patient has dysphasia.  On 11/1/2019, she underwent EGD with PEG tube 

placement.  She tolerated procedure well and was eventually started on tube 

feeding.  Strict aspiration precaution.  Patient head of bed elevated at all 

times.





Patient continued to be short of breath.  She was placed on BiPAP.  On 11/3/2019

, she was noted to be unresponsive while on BiPAP.  CODE BLUE was called and 

patient was orally intubated.  She was transferred to ICU.





There was a drop in hemoglobin.  On 11/6/2020, she underwent another EGD.  

There was no evidence of active GI bleed.  Patient had reasons why on dual 

antiplatelet therapy.  Antiplatelet therapy was discontinued due to bleeding.  

She was continued on diuresis.





Patient has been failing weaning attempts.  On 11/14/2019, she underwent 

tracheostomy placement.





Repeat blood cultures were negative.  Urine and sputum grew yeast which is 

colonized.  She completed antibiotic treatment.  She was monitored off 

antibiotics.  Fever was possibly due to pelvic mass.  CA-125 was elevated.





Patient was noted to have deep tissue pressure injury on the right heel since 

admission.


  Left heel was blanchable.  She was given local wound care.  Wound did not 

appear infected.





She underwent bone marrow biopsy on 11/22/2019.  Results still pending.





She was continued on weaning process.  She was cleared for discharge to [facility].





FINAL DIAGNOSES: 


Sepsis


Acute respiratory failure requiring intubation status post tracheostomy


Healthcare associated


E. coli UTI


Acute on chronic renal failure


Acute metabolic encephalopathy


Type 2 diabetes mellitus uncontrolled, with diabetic nephropathy


Pelvic mass possible malignancy


Hypernatremia/dehydration


Abnormal cardiac enzyme demand related to infection and anemia


Anemia required blood transfusion


Abnormal TSH


Deep tissue pressure injury, right heel, present on admission


Dysphagia status post PEG tube placement





DISPOSITION: DC to [facility]





I have been assigned to complete a discharge summary on this account, I was not 

involved with the patient's management.--CHARLENE Turner Jacqueline Robles NP Nov 24, 2019 15:46

## 2019-11-25 ENCOUNTER — HOSPITAL ENCOUNTER (INPATIENT)
Dept: HOSPITAL 72 - EMR | Age: 64
LOS: 7 days | Discharge: NURSING, REHAB OR LONG-TERM CARE | DRG: 720 | End: 2019-12-02
Payer: COMMERCIAL

## 2019-11-25 VITALS — WEIGHT: 180.44 LBS | BODY MASS INDEX: 29 KG/M2 | HEIGHT: 66 IN

## 2019-11-25 VITALS — DIASTOLIC BLOOD PRESSURE: 60 MMHG | SYSTOLIC BLOOD PRESSURE: 116 MMHG

## 2019-11-25 VITALS — DIASTOLIC BLOOD PRESSURE: 86 MMHG | SYSTOLIC BLOOD PRESSURE: 126 MMHG

## 2019-11-25 DIAGNOSIS — I50.9: ICD-10-CM

## 2019-11-25 DIAGNOSIS — D63.8: ICD-10-CM

## 2019-11-25 DIAGNOSIS — D63.1: ICD-10-CM

## 2019-11-25 DIAGNOSIS — I13.0: ICD-10-CM

## 2019-11-25 DIAGNOSIS — A41.9: Primary | ICD-10-CM

## 2019-11-25 DIAGNOSIS — Z99.11: ICD-10-CM

## 2019-11-25 DIAGNOSIS — E86.1: ICD-10-CM

## 2019-11-25 DIAGNOSIS — N39.0: ICD-10-CM

## 2019-11-25 DIAGNOSIS — Z79.82: ICD-10-CM

## 2019-11-25 DIAGNOSIS — G93.1: ICD-10-CM

## 2019-11-25 DIAGNOSIS — N83.9: ICD-10-CM

## 2019-11-25 DIAGNOSIS — N17.9: ICD-10-CM

## 2019-11-25 DIAGNOSIS — R79.89: ICD-10-CM

## 2019-11-25 DIAGNOSIS — L89.152: ICD-10-CM

## 2019-11-25 DIAGNOSIS — E43: ICD-10-CM

## 2019-11-25 DIAGNOSIS — J15.0: ICD-10-CM

## 2019-11-25 DIAGNOSIS — Z79.4: ICD-10-CM

## 2019-11-25 DIAGNOSIS — L89.612: ICD-10-CM

## 2019-11-25 DIAGNOSIS — J96.10: ICD-10-CM

## 2019-11-25 DIAGNOSIS — E86.0: ICD-10-CM

## 2019-11-25 DIAGNOSIS — Z43.1: ICD-10-CM

## 2019-11-25 DIAGNOSIS — N18.5: ICD-10-CM

## 2019-11-25 DIAGNOSIS — F09: ICD-10-CM

## 2019-11-25 DIAGNOSIS — I21.4: ICD-10-CM

## 2019-11-25 DIAGNOSIS — E87.8: ICD-10-CM

## 2019-11-25 DIAGNOSIS — I25.10: ICD-10-CM

## 2019-11-25 DIAGNOSIS — E87.6: ICD-10-CM

## 2019-11-25 DIAGNOSIS — G40.909: ICD-10-CM

## 2019-11-25 DIAGNOSIS — L89.616: ICD-10-CM

## 2019-11-25 DIAGNOSIS — E78.5: ICD-10-CM

## 2019-11-25 DIAGNOSIS — I70.203: ICD-10-CM

## 2019-11-25 DIAGNOSIS — R13.10: ICD-10-CM

## 2019-11-25 DIAGNOSIS — E11.22: ICD-10-CM

## 2019-11-25 DIAGNOSIS — Z43.0: ICD-10-CM

## 2019-11-25 DIAGNOSIS — I50.33: ICD-10-CM

## 2019-11-25 DIAGNOSIS — R65.21: ICD-10-CM

## 2019-11-25 DIAGNOSIS — E87.0: ICD-10-CM

## 2019-11-25 DIAGNOSIS — F03.90: ICD-10-CM

## 2019-11-25 LAB
ADD MANUAL DIFF: YES
ALBUMIN SERPL-MCNC: 2 G/DL (ref 3.4–5)
ALBUMIN/GLOB SERPL: 0.4 {RATIO} (ref 1–2.7)
ALP SERPL-CCNC: 118 U/L (ref 46–116)
ALT SERPL-CCNC: 1096 U/L (ref 12–78)
ANION GAP SERPL CALC-SCNC: 12 MMOL/L (ref 5–15)
APPEARANCE UR: (no result)
APTT BLD: 28 SEC (ref 23–33)
APTT PPP: (no result) S
AST SERPL-CCNC: 388 U/L (ref 15–37)
BILIRUB SERPL-MCNC: 0.6 MG/DL (ref 0.2–1)
BUN SERPL-MCNC: 112 MG/DL (ref 7–18)
CALCIUM SERPL-MCNC: 9 MG/DL (ref 8.5–10.1)
CHLORIDE SERPL-SCNC: 99 MMOL/L (ref 98–107)
CK MB SERPL-MCNC: 0.8 NG/ML (ref 0–3.6)
CK SERPL-CCNC: 47 U/L (ref 26–308)
CO2 SERPL-SCNC: 27 MMOL/L (ref 21–32)
CREAT SERPL-MCNC: 2 MG/DL (ref 0.55–1.3)
ERYTHROCYTE [DISTWIDTH] IN BLOOD BY AUTOMATED COUNT: 14.3 % (ref 11.6–14.8)
GLOBULIN SER-MCNC: 5.6 G/DL
GLUCOSE UR STRIP-MCNC: NEGATIVE MG/DL
HCT VFR BLD CALC: 22.5 % (ref 37–47)
HGB BLD-MCNC: 7.4 G/DL (ref 12–16)
INR PPP: 1.1 (ref 0.9–1.1)
KETONES UR QL STRIP: NEGATIVE
LEUKOCYTE ESTERASE UR QL STRIP: (no result)
MCV RBC AUTO: 79 FL (ref 80–99)
NITRITE UR QL STRIP: NEGATIVE
PH UR STRIP: 5 [PH] (ref 4.5–8)
PHOSPHATE SERPL-MCNC: 4.5 MG/DL (ref 2.5–4.9)
PLATELET # BLD: 334 K/UL (ref 150–450)
POTASSIUM SERPL-SCNC: 4.3 MMOL/L (ref 3.5–5.1)
PROT UR QL STRIP: (no result)
RBC # BLD AUTO: 2.83 M/UL (ref 4.2–5.4)
SODIUM SERPL-SCNC: 138 MMOL/L (ref 136–145)
SP GR UR STRIP: 1.02 (ref 1–1.03)
UROBILINOGEN UR-MCNC: NORMAL MG/DL (ref 0–1)
WBC # BLD AUTO: 24.2 K/UL (ref 4.8–10.8)

## 2019-11-25 PROCEDURE — 85651 RBC SED RATE NONAUTOMATED: CPT

## 2019-11-25 PROCEDURE — 82270 OCCULT BLOOD FECES: CPT

## 2019-11-25 PROCEDURE — 74176 CT ABD & PELVIS W/O CONTRAST: CPT

## 2019-11-25 PROCEDURE — 93925 LOWER EXTREMITY STUDY: CPT

## 2019-11-25 PROCEDURE — 94002 VENT MGMT INPAT INIT DAY: CPT

## 2019-11-25 PROCEDURE — 86920 COMPATIBILITY TEST SPIN: CPT

## 2019-11-25 PROCEDURE — 87086 URINE CULTURE/COLONY COUNT: CPT

## 2019-11-25 PROCEDURE — 80048 BASIC METABOLIC PNL TOTAL CA: CPT

## 2019-11-25 PROCEDURE — 93005 ELECTROCARDIOGRAM TRACING: CPT

## 2019-11-25 PROCEDURE — 82962 GLUCOSE BLOOD TEST: CPT

## 2019-11-25 PROCEDURE — 84484 ASSAY OF TROPONIN QUANT: CPT

## 2019-11-25 PROCEDURE — 96375 TX/PRO/DX INJ NEW DRUG ADDON: CPT

## 2019-11-25 PROCEDURE — 85007 BL SMEAR W/DIFF WBC COUNT: CPT

## 2019-11-25 PROCEDURE — 36430 TRANSFUSION BLD/BLD COMPNT: CPT

## 2019-11-25 PROCEDURE — 87181 SC STD AGAR DILUTION PER AGT: CPT

## 2019-11-25 PROCEDURE — 80053 COMPREHEN METABOLIC PANEL: CPT

## 2019-11-25 PROCEDURE — 96365 THER/PROPH/DIAG IV INF INIT: CPT

## 2019-11-25 PROCEDURE — 86850 RBC ANTIBODY SCREEN: CPT

## 2019-11-25 PROCEDURE — 80061 LIPID PANEL: CPT

## 2019-11-25 PROCEDURE — 76700 US EXAM ABDOM COMPLETE: CPT

## 2019-11-25 PROCEDURE — 85730 THROMBOPLASTIN TIME PARTIAL: CPT

## 2019-11-25 PROCEDURE — 83550 IRON BINDING TEST: CPT

## 2019-11-25 PROCEDURE — 74018 RADEX ABDOMEN 1 VIEW: CPT

## 2019-11-25 PROCEDURE — 82150 ASSAY OF AMYLASE: CPT

## 2019-11-25 PROCEDURE — 87040 BLOOD CULTURE FOR BACTERIA: CPT

## 2019-11-25 PROCEDURE — 99291 CRITICAL CARE FIRST HOUR: CPT

## 2019-11-25 PROCEDURE — 82550 ASSAY OF CK (CPK): CPT

## 2019-11-25 PROCEDURE — 82803 BLOOD GASES ANY COMBINATION: CPT

## 2019-11-25 PROCEDURE — 78266 GSTR EMPT IMG SM BWL&COLON: CPT

## 2019-11-25 PROCEDURE — 93306 TTE W/DOPPLER COMPLETE: CPT

## 2019-11-25 PROCEDURE — 81003 URINALYSIS AUTO W/O SCOPE: CPT

## 2019-11-25 PROCEDURE — 85610 PROTHROMBIN TIME: CPT

## 2019-11-25 PROCEDURE — 36600 WITHDRAWAL OF ARTERIAL BLOOD: CPT

## 2019-11-25 PROCEDURE — 94664 DEMO&/EVAL PT USE INHALER: CPT

## 2019-11-25 PROCEDURE — 85025 COMPLETE CBC W/AUTO DIFF WBC: CPT

## 2019-11-25 PROCEDURE — 83690 ASSAY OF LIPASE: CPT

## 2019-11-25 PROCEDURE — 87070 CULTURE OTHR SPECIMN AEROBIC: CPT

## 2019-11-25 PROCEDURE — 93970 EXTREMITY STUDY: CPT

## 2019-11-25 PROCEDURE — 83880 ASSAY OF NATRIURETIC PEPTIDE: CPT

## 2019-11-25 PROCEDURE — 71045 X-RAY EXAM CHEST 1 VIEW: CPT

## 2019-11-25 PROCEDURE — 86140 C-REACTIVE PROTEIN: CPT

## 2019-11-25 PROCEDURE — 82553 CREATINE MB FRACTION: CPT

## 2019-11-25 PROCEDURE — 83735 ASSAY OF MAGNESIUM: CPT

## 2019-11-25 PROCEDURE — 86900 BLOOD TYPING SEROLOGIC ABO: CPT

## 2019-11-25 PROCEDURE — 83540 ASSAY OF IRON: CPT

## 2019-11-25 PROCEDURE — 30233N1 TRANSFUSION OF NONAUTOLOGOUS RED BLOOD CELLS INTO PERIPHERAL VEIN, PERCUTANEOUS APPROACH: ICD-10-PCS

## 2019-11-25 PROCEDURE — 87081 CULTURE SCREEN ONLY: CPT

## 2019-11-25 PROCEDURE — 87205 SMEAR GRAM STAIN: CPT

## 2019-11-25 PROCEDURE — 83605 ASSAY OF LACTIC ACID: CPT

## 2019-11-25 PROCEDURE — 5A1955Z RESPIRATORY VENTILATION, GREATER THAN 96 CONSECUTIVE HOURS: ICD-10-PCS

## 2019-11-25 PROCEDURE — 94003 VENT MGMT INPAT SUBQ DAY: CPT

## 2019-11-25 PROCEDURE — 36415 COLL VENOUS BLD VENIPUNCTURE: CPT

## 2019-11-25 PROCEDURE — 94640 AIRWAY INHALATION TREATMENT: CPT

## 2019-11-25 PROCEDURE — 84100 ASSAY OF PHOSPHORUS: CPT

## 2019-11-25 PROCEDURE — 86901 BLOOD TYPING SEROLOGIC RH(D): CPT

## 2019-11-25 NOTE — EMERGENCY ROOM REPORT
History of Present Illness


General


Chief Complaint:  Abnormal Labs


Source:  Medical Record





Present Illness


HPI


Disclaimer: Please note that this report is being documented using DRAGON 

technology. This can lead to erroneous entry secondary to incorrect 

interpretation by the dictating instrument.





HPI: 64-year-old female with history of diabetes, CHF, hypertension recent 

admission for sepsis secondary pneumonia presents for evaluation of elevated 

white count, low hemoglobin and shortness of breath as well as abdominal 

distention and vomiting.  She presents from her nursing facility and is trach 

and vent dependent following her recent hospital admission.  Transfer paperwork 

states that the patient had an episode of emesis yesterday with KUB showing 

moderate rectal stool but that her abdomen is been more distended of late.  She 

does have a G-tube for feeds.  Also noted shortness of breath as well as low 

hemoglobin below 8, elevated white count of 22,000 and elevated BUN in the 80s.

  No reports of fever.  Patient cannot provide any history.


 


PMH: Hypertension, hyperlipidemia, sepsis secondary pneumonia, trach in G-tube 

dependent


 


PSH: Tracheostomy, gastrostomy


 


Allergies: None listed in medical chart


 


Social Hx: None reported


Allergies:  


Coded Allergies:  


     No Known Allergies (Unverified , 10/14/19)





Nursing Documentation-PMH


Past Medical History:  No History, Except For


Hx Cardiac Problems:  Yes


Hx Hypertension:  Yes


Hx Diabetes:  Yes - TYPE 2


Hx Cancer:  No


Hx Gastrointestinal Problems:  No


Hx Seizures:  Yes


Hx Dysphasia:  Yes





Review of Systems


All Other Systems:  limited - Could not obtain from patient





Physical Exam





Vital Signs








  Date Time  Temp Pulse Resp B/P (MAP) Pulse Ox O2 Delivery O2 Flow Rate FiO2


 


11/25/19 20:26  98 18 116/60 (78) 98 Trach Collar  


 


11/25/19 20:36        35





 





General: Sleeping comfortably, no acute distress


HEENT: NC/AT. EOMI. 


Neck: Trach in proper position.  No bleeding or surrounding erythema.  Supple, 

trachea midline


Chest Wall: No tenderness, no deformity


Cardiovascular: Borderline tachycardia.  S1 and S2 normal.  PICC line in place 

in the right upper extremity


Resp: Trach dependent.  Normal work of breathing.  Equal air entry bilaterally


Abdomen: Abdomen is soft but distended.  G-tube is in place.  Aspirated in the 

tube.  Nontender


Skin: Bandage over the right heel.  Otherwise intact.  No abrasions, laceration 

or rash over the exposed skin


MSK: Bandage over the right heel.  Normal tone and bulk. Moving all 

extremities.  No obvious deformity.


Neuro: Nonverbal.  Opens eyes spontaneously.  No spontaneous movement.





Procedures


Critical Care Time


Critical Care Time


Total critical care time: Approximately 45 minutes





Due to a high probability of clinically significant, life threatening 

deterioration, the patient required the highest level of preparedness to 

intervene emergently and I personally spent this critical care time directly 

and personally managing the patient. This critical care time included obtaining 

a history, examining the patient, pulse oximetry, ordering and reviewing studies

, ordering treatments, evaluating response to treatment and updating management 

plan as needed, frequent reassessment and discussion with other providers as 

well as arranging for ultimate disposition.  This critical to care time was 

performed to assess and manage the high probability of life-threatening 

deterioration that could result in multiorgan failure.  This critical care time 

is separate from the separately billable procedures and treating other patients.





Medical Decision Making


Diagnostic Impression:  


 Primary Impression:  


 CHF (congestive heart failure)


 Additional Impressions:  


 Renal failure (ARF), acute on chronic


 Tracheostomy in place


 Pleural effusion


 Anemia


 Elevated troponin level


ER Course


64-year-old female recently discharged following admission for sepsis secondary 

to pneumonia now trach and G-tube dependent presents for evaluation of 

abdominal distention and vomiting yesterday, leukocytosis, elevated BUN and low 

hemoglobin.  Patient will require full sepsis work-up including type and screen 

in preparation for possible transfusion.  Will obtain x-ray of the chest to 

evaluate for infiltrate given the reported shortness of breath however she is 

currently breathing comfortably and saturating 100%.  She is afebrile.  Abdomen 

is distended and requires CT scan to rule out obstruction, volvulus, impaction 

as she does have a known pelvic mass.  She will require admission.





Laboratory Tests








Test


  11/25/19


20:30 11/25/19


20:42


 


White Blood Count


  24.2 K/UL


(4.8-10.8)  *H 


 


 


Red Blood Count


  2.83 M/UL


(4.20-5.40)  L 


 


 


Hemoglobin


  7.4 G/DL


(12.0-16.0)  L 


 


 


Hematocrit


  22.5 %


(37.0-47.0)  L 


 


 


Mean Corpuscular Volume


  79 FL (80-99)


L 


 


 


Mean Corpuscular Hemoglobin


  26.0 PG


(27.0-31.0)  L 


 


 


Mean Corpuscular Hemoglobin


Concent 32.7 G/DL


(32.0-36.0) 


 


 


Red Cell Distribution Width


  14.3 %


(11.6-14.8) 


 


 


Platelet Count


  334 K/UL


(150-450) 


 


 


Mean Platelet Volume


  9.5 FL


(6.5-10.1) 


 


 


Neutrophils (%) (Auto)


  % (45.0-75.0)


  


 


 


Lymphocytes (%) (Auto)


  % (20.0-45.0)


  


 


 


Monocytes (%) (Auto)  % (1.0-10.0)   


 


Eosinophils (%) (Auto)  % (0.0-3.0)   


 


Basophils (%) (Auto)  % (0.0-2.0)   


 


Differential Total Cells


Counted 100  


  


 


 


Neutrophils % (Manual) 88 % (45-75)  H 


 


Lymphocytes % (Manual) 10 % (20-45)  L 


 


Monocytes % (Manual) 2 % (1-10)   


 


Eosinophils % (Manual) 0 % (0-3)   


 


Basophils % (Manual) 0 % (0-2)   


 


Band Neutrophils 0 % (0-8)   


 


Platelet Estimate Adequate   


 


Platelet Morphology Normal   


 


Hypochromasia 1+   


 


Anisocytosis 1+   


 


Macrocytosis 1+   


 


Prothrombin Time


  11.4 SEC


(9.30-11.50) 


 


 


Prothrombin Time INR 1.1 (0.9-1.1)   


 


PTT


  28 SEC (23-33)


  


 


 


Urine Color Patricia   


 


Urine Appearance Cloudy   


 


Urine pH 5 (4.5-8.0)   


 


Urine Specific Gravity


  1.020


(1.005-1.035) 


 


 


Urine Protein


  3+ (NEGATIVE)


H 


 


 


Urine Glucose (UA)


  Negative


(NEGATIVE) 


 


 


Urine Ketones


  Negative


(NEGATIVE) 


 


 


Urine Blood


  2+ (NEGATIVE)


H 


 


 


Urine Nitrite


  Negative


(NEGATIVE) 


 


 


Urine Bilirubin


  Negative


(NEGATIVE) 


 


 


Urine Ictotest


  Negative


(NEGATIVE) 


 


 


Urine Urobilinogen


  Normal MG/DL


(0.0-1.0) 


 


 


Urine Leukocyte Esterase


  2+ (NEGATIVE)


H 


 


 


Urine RBC


  2-4 /HPF (0 -


2)  H 


 


 


Urine WBC


  10-15 /HPF (0


- 2)  H 


 


 


Urine Squamous Epithelial


Cells Few /LPF


(NONE/OCC) 


 


 


Urine Calcium Oxalate Crystals


  Few /LPF


(NONE) 


 


 


Urine Bacteria


  Moderate /HPF


(NONE)  H 


 


 


Sodium Level


  138 MMOL/L


(136-145) 


 


 


Potassium Level


  4.3 MMOL/L


(3.5-5.1) 


 


 


Chloride Level


  99 MMOL/L


() 


 


 


Carbon Dioxide Level


  27 MMOL/L


(21-32) 


 


 


Anion Gap


  12 mmol/L


(5-15) 


 


 


Blood Urea Nitrogen


  112 mg/dL


(7-18)  H 


 


 


Creatinine


  2.0 MG/DL


(0.55-1.30)  H 


 


 


Estimate Glomerular


Filtration Rate 24.9 mL/min


(>60) 


 


 


Glucose Level


  122 MG/DL


()  H 


 


 


Lactic Acid Level


  1.10 mmol/L


(0.4-2.0) 


 


 


Calcium Level


  9.0 MG/DL


(8.5-10.1) 


 


 


Phosphorus Level


  4.5 MG/DL


(2.5-4.9) 


 


 


Magnesium Level


  3.7 MG/DL


(1.8-2.4)  H 


 


 


Total Bilirubin


  0.6 MG/DL


(0.2-1.0) 


 


 


Aspartate Amino Transferase


(AST) 388 U/L


(15-37)  H 


 


 


Alanine Aminotransferase (ALT)


  1096 U/L


(12-78)  H 


 


 


Alkaline Phosphatase


  118 U/L


()  H 


 


 


Total Creatine Kinase


  47 U/L


() 


 


 


Creatine Kinase MB


  0.8 NG/ML


(0.0-3.6) 


 


 


Creatine Kinase MB Relative


Index 1.7  


  


 


 


Troponin I


  2.170 ng/mL


(0.000-0.056) 


 


 


Pro-B-Type Natriuretic Peptide


  > 84174 pg/mL


(0-125)  H 


 


 


Total Protein


  7.6 G/DL


(6.4-8.2) 


 


 


Albumin


  2.0 G/DL


(3.4-5.0)  L 


 


 


Globulin 5.6 g/dL   


 


Albumin/Globulin Ratio


  0.4 (1.0-2.7)


L 


 


 


Arterial Blood pH


  


  7.548


(7.350-7.450)


 


Arterial Blood Partial


Pressure CO2 


  31.0 mmHg


(35.0-45.0)  L


 


Arterial Blood Partial


Pressure O2 


  103.8 mmHg


(75.0-100.0)  H


 


Arterial Blood HCO3


  


  26.4 mmol/L


(22.0-26.0)  H


 


Arterial Blood Oxygen


Saturation 


  95.7 %


()


 


Arterial Blood Base Excess  3.9 (-2-2)  H


 


Graham Test  Positive  








EKG Diagnostic Results


EKG Time:  20:50


Rate:  normal


Rhythm:  NSR


ST Segments:  no acute changes


Other Impression


Sinus rhythm, normal axis, normal intervals, nonspecific ST changes with T wave 

inversion in inferior leads.  Similar to prior EKG of 11/6/2019





Rhythm Strip Diag. Results


Rhythm Strip Time:  20:50


EP Interpretation:  yes


Rate:  90s


Rhythm:  NSR, no PVC's, no ectopy





Chest X-Ray Diagnostic Results


Chest X-Ray Diagnostic Results :  


   Chest X-Ray Ordered:  Yes


   # of Views/Limited/Complete:  1 View


   Indication:  Shortness of Breath


   EP Interpretation:  Yes


   Interpretation:  no consolidation, no pneumothorax, other - Bilateral 

interstitial edema and blunting of the costophrenic angle on the left side


   Impression:  Other - Bilateral stational edema,, left-sided effusion, no 

obvious infiltrate


   Electronically Signed by:  Electronically signed by Dr. Joe Todd





Other X-Ray Diagnostic Results


Other X-Ray Diagnostic Results :  


   X-Ray ordered:  KUB


   # of Views/Limited Vs Complete:  1 View


   Indication:  Swelling


   EP Interpretation:  Yes


   Interpretation:  nonspecific bowel gas


   Impression:  Other - Distended small bowel loops, no obvious volvulus or 

major obstruction appreciated


   Electronically Signed by:  Electronically signed by Dr. Joe Todd





CT/MRI/US Diagnostic Results


CT/MRI/US Diagnostic Results :  


   Impression


Preliminary Findings Only  See Final Report For Complete Findings 


CT ABDOMEN & PELVIS Without Contrast:





COMPARISON: CT chest abdomen pelvis dated 10/18/19, pelvic ultrasound dated 10/

21/19, radiographs dated 11/25/19 





There is a pelvic cystic lesion measuring up to 13.8 x 13.1 x 14 cm. This may 

be ovarian in etiology. This previously measured 12.2 x 13.9 x 11.8 cm.





Bilateral moderate to large pleural effusions.


The liver, gallbladder, kidneys, pancreas and spleen are unremarkable for a non-

infused exam.


Percutaneous gastrostomy tube seen in place. Fluid and air-filled colon with no 

obvious transition point. Mildly prominent fecal load within the colon.


The bladder contains a Charles catheter.











Radiologist: Delmer Mancuso M.D. Phone: [phone]














Study ready at 23:10 and initial results transmitted at 23:15








Reevaluation Time:  22:31





Last Vital Signs








  Date Time  Temp Pulse Resp B/P (MAP) Pulse Ox O2 Delivery O2 Flow Rate FiO2


 


11/25/19 20:40  95 20  99 Mechanical Ventilator  35


 


11/25/19 20:33    116/60    








Reevaluation Impression


No evidence of infiltrate on x-ray however there is a left-sided effusion and 

bilateral vascular congestion consistent with the patient's history of CHF.  

Troponin, creatinine, BUN and BN peptide are elevated consistent with acute CHF 

exacerbation and cardiorenal syndrome possibly symptomatic anemia with demand 

ischemia.  Will be treated with Lasix and the patient is being transfused.  P 

hemoglobin is 7.4 which is an acute drop of the patient's hemoglobin on 

discharge of 8.2.  Patient troponin is never been this high.  EKG is 

nonischemic.  She was given aspirin through her G-tube.  An abdominal x-ray 

showed dilated loops of bowel concerning for possible obstruction and a CT of 

the abdomen and pelvis without contrast was performed.  This confirms bilateral 

moderate-sized pleural effusions and redemonstrates the large abdominal mass 

seen on prior scans.  Indeterminant urinalysis though may represent an acute 

urinary tract infection.  Leukocytosis is confirmed at 24,000 with a left 

shift.  Will be treated with Zosyn.  She will require admission to the stepdown 

unit under her PMD, Dr Omer.


Disposition:  ADMITTED AS INPATIENT


Condition:  Serious











Joe Todd MD Nov 25, 2019 20:52

## 2019-11-25 NOTE — NUR
ER Nurse Note:



Pt brought in by ambulance from SNF c/o abnormal labs. Per staff member, pt 
presented with low H&H. Pt was recently discharged from [facility] for PNA. Pt 
nonverbal, unable to assess mental status. Pt has trach shiley 8 and on a vent 
with PEEP 5, PhV049%, tital volume 500ml, rate 18.  Pt has g-tube; residual 
checked, 0 ml. MD at pt side; labs being collected. Will continue to zebior.

## 2019-11-25 NOTE — NUR
ER Nurse Note:



All orders completed per ERMD orders; awaiting blood from blood bank. Skin 
assessed; redness with blanching on sacral. Pt has DTI on RT ball of foot; skin 
intact with bruise like discoloration. Dressing changed, clean dry intact. All 
safety measures met; will continue to montior.

## 2019-11-25 NOTE — DIAGNOSTIC IMAGING REPORT
Indication: Abdominal pain

 

Technique: Continuous helical transaxial imaging of the abdomen and pelvis was

obtained from the lung bases to the pubic symphysis. No intravenous contrast was

administered. Coronal 2-D reformats were also obtained. Automatic Exposure Control

was utilized.

 

 

Total Dose length Product (DLP):  1129.5 mGycm

 

CT Dose Index Volume (CTDIvol):   20.7 mGy

 

Comparison: none

 

Findings: Moderate bilateral pleural effusions are demonstrated with posterior basal

atelectasis. The heart appears somewhat prominent in size as well. There is artifact

limiting evaluation of the lower chest and upper abdomen. Gastrostomy is present.

Gallstones suspected. Aorta is moderately calcified. Charles catheter is present within

a nondistended bladder filled with primarily air. There is a large cystic focus just

above the bladder measuring 14 x 13 x 14 cm. The uterus is present posterior to this

mass. There is no evidence of bowel obstruction. The appendix is normal. Some fecal

retention in the rectal vault noted.

 

IMPRESSION:

 

Large cystic-appearing mass within the pelvis could be ovarian in nature and appears

relatively unchanged from the last examination.

 

Moderate bilateral pleural effusions with associated posterior basal atelectasis.

 

Gallstone suspected.

 

Charles catheter. Gastrostomy.

 

Atherosclerotic vascular disease.

 

Limited evaluation due to artifacts and nonadministration of IV and oral contrast.

 

[facility] has communicated the preliminary results to the Emergency

Department.  Their findings are largely concordant with this report.

The CT scanner at [facility] is accredited by the [employer] College of

Radiology and the scans are performed using dose optimization techniques as

appropriate to a performed exam including Automatic Exposure control.

## 2019-11-26 VITALS — SYSTOLIC BLOOD PRESSURE: 120 MMHG | DIASTOLIC BLOOD PRESSURE: 61 MMHG

## 2019-11-26 VITALS — DIASTOLIC BLOOD PRESSURE: 64 MMHG | SYSTOLIC BLOOD PRESSURE: 126 MMHG

## 2019-11-26 VITALS — SYSTOLIC BLOOD PRESSURE: 123 MMHG | DIASTOLIC BLOOD PRESSURE: 64 MMHG

## 2019-11-26 VITALS — DIASTOLIC BLOOD PRESSURE: 54 MMHG | SYSTOLIC BLOOD PRESSURE: 105 MMHG

## 2019-11-26 VITALS — SYSTOLIC BLOOD PRESSURE: 100 MMHG | DIASTOLIC BLOOD PRESSURE: 58 MMHG

## 2019-11-26 LAB
ADD MANUAL DIFF: YES
ANION GAP SERPL CALC-SCNC: 11 MMOL/L (ref 5–15)
BUN SERPL-MCNC: 101 MG/DL (ref 7–18)
CALCIUM SERPL-MCNC: 8.2 MG/DL (ref 8.5–10.1)
CHLORIDE SERPL-SCNC: 104 MMOL/L (ref 98–107)
CO2 SERPL-SCNC: 26 MMOL/L (ref 21–32)
CREAT SERPL-MCNC: 1.6 MG/DL (ref 0.55–1.3)
ERYTHROCYTE [DISTWIDTH] IN BLOOD BY AUTOMATED COUNT: 14.3 % (ref 11.6–14.8)
HCT VFR BLD CALC: 26.1 % (ref 37–47)
HGB BLD-MCNC: 8.9 G/DL (ref 12–16)
MCV RBC AUTO: 82 FL (ref 80–99)
PLATELET # BLD: 288 K/UL (ref 150–450)
POTASSIUM SERPL-SCNC: 3.5 MMOL/L (ref 3.5–5.1)
RBC # BLD AUTO: 3.19 M/UL (ref 4.2–5.4)
SODIUM SERPL-SCNC: 141 MMOL/L (ref 136–145)
WBC # BLD AUTO: 15.2 K/UL (ref 4.8–10.8)

## 2019-11-26 RX ADMIN — NITROGLYCERIN SCH INCH: 20 OINTMENT TOPICAL at 12:23

## 2019-11-26 RX ADMIN — METOPROLOL TARTRATE SCH MG: 25 TABLET, FILM COATED ORAL at 20:42

## 2019-11-26 RX ADMIN — NITROGLYCERIN SCH INCH: 20 OINTMENT TOPICAL at 18:25

## 2019-11-26 RX ADMIN — INSULIN ASPART SCH UNITS: 100 INJECTION, SOLUTION INTRAVENOUS; SUBCUTANEOUS at 21:00

## 2019-11-26 RX ADMIN — INSULIN ASPART SCH UNITS: 100 INJECTION, SOLUTION INTRAVENOUS; SUBCUTANEOUS at 18:02

## 2019-11-26 RX ADMIN — METOPROLOL TARTRATE SCH MG: 25 TABLET, FILM COATED ORAL at 10:15

## 2019-11-26 RX ADMIN — INSULIN ASPART SCH UNITS: 100 INJECTION, SOLUTION INTRAVENOUS; SUBCUTANEOUS at 12:22

## 2019-11-26 RX ADMIN — CHLORHEXIDINE GLUCONATE SCH APPLIC: 213 SOLUTION TOPICAL at 20:42

## 2019-11-26 RX ADMIN — DEXTROSE MONOHYDRATE SCH MLS/HR: 50 INJECTION, SOLUTION INTRAVENOUS at 14:28

## 2019-11-26 RX ADMIN — DEXTROSE MONOHYDRATE SCH MLS/HR: 50 INJECTION, SOLUTION INTRAVENOUS at 22:10

## 2019-11-26 NOTE — NUR
ER Nurse Note:



Pt no signs of distress, vent dependent. Pt tolerating blood well with no 
reactions. Pt arrived with casas cath; patent. Possible DTI on RT foot; foot 
being offloaded with pillow. All safety measures met; will continue to montior.

## 2019-11-26 NOTE — NUR
NURSE NOTES:

received pt  from Shawanda JOHNSON., pt is sleeping and unable to assess neuro. pt shows no pain at 
this moment. pt's Gtube is intact, clean, and dry.  feeding is running well at 20ml/hr.  
Foely cath is noted, draining well with gravity. PICC line is intact, patent, and clean. no 
dysrhythmia reported from previous shift, and pt cardiac monitor is on. bed at the lowest 
position, alarmed, and locked. call light within reach. will continue to monitor pt with 
plan of care.

## 2019-11-26 NOTE — DIAGNOSTIC IMAGING REPORT
Indication: Dyspnea

 

Comparison:  11/25/2019

 

A single view chest radiograph was obtained.

 

Findings:

 

Heart size remains normal. There is obscuring of the left hemidiaphragm. There is a

pulmonary vascular prominence and cephalization with suggestion of mild interstitial

edema. Tracheostomy and PICC line appear unchanged.

 

IMPRESSION:

 

Mild interstitial edema suspected. Correlate clinically. No change

## 2019-11-26 NOTE — NUR
NURSE NOTES:

spoke to Amadeo from [facility] and confirmed no polst at this moment.

## 2019-11-26 NOTE — DIAGNOSTIC IMAGING REPORT
Indication: Abdominal pain

 

Comparison:  None

 

Single view of the abdomen obtained 

 

Findings:

   

Bowel gas pattern is nonspecific. No mass, ectopic calcifications, or abnormal gas

collections are identified. The bones are unremarkable. Gastrostomy and Charles

catheter noted.

 

Impression: No acute findings

## 2019-11-26 NOTE — CONSULTATION
DATE OF CONSULTATION:  11/26/2019

INFECTIOUS DISEASE CONSULTATION



CONSULTING PHYSICIAN:  Michelle Doherty M.D.



REFERRING PHYSICIAN:  Steven Gonzalez M.D.



REASON FOR CONSULTATION:  Leukocytosis.



HISTORY OF PRESENT ILLNESS:  This is a 64-year-old lady with diabetes,

hypertension, and congestive heart failure, who came in with vomiting.

She was found to have pneumonia and leukocytosis, and an Infectious

Disease consultation has been obtained for antibiotics.



PAST MEDICAL HISTORY:

1. History of hypertension.

2. Diabetes.

3. Congestive heart failure.

4. Hyperlipidemia.

5. Status post respiratory failure, status post tracheostomy.

6. Status post G-tube placement.



SOCIAL HISTORY:  Unknown.



FAMILY HISTORY:  Unknown.



REVIEW OF SYSTEMS:  Unable to obtain currently.



MEDICATIONS:  As an inpatient, she is on Zosyn, nitroglycerin, insulin,

metoprolol, and vancomycin.



ALLERGIES:  No known drug allergies.



PHYSICAL EXAMINATION:

VITAL SIGNS:  Temperature 97.7, T-max of 99, pulse 71, respiratory rate 18,

and blood pressure 101/51.  O2 saturation of 100%.

HEENT:  Pupils are equally reactive to light and accommodation.  Mouth

appears clean without thrush.

NECK:  Supple.  No adenopathy.  No JVD.  Tracheostomy site is

clean.

CARDIOVASCULAR:  Regular rate and rhythm.  No murmurs.

LUNGS:  Clear to auscultation bilaterally.  No crackles.  No

wheezes.

ABDOMEN:  Soft and nontender.  G-tube site appears clean.

EXTREMITIES:  No cyanosis, no clubbing, no edema.



LABORATORY AND DIAGNOSTIC DATA:  White count 24.2, hemoglobin 7.4,

hematocrit 22.5, MCV 79, platelet count 334,000.  Sodium 138, potassium

4.3, chloride 99, bicarb 27, , creatinine 2.  Glucose 122.  Calcium

9.  Total bilirubin 0.6, , ALT 1096, and alkaline phosphatase 118.

CK of 47, CK-MB of 0.8.  Troponin 2.2.  Beta-natriuretic peptide more than

35,000.  Total protein 7.6.  Albumin of 2.  UA showing 10 to 15 white

cells.  Urine cultures are negative so far.  Blood cultures are pending.



CT abdomen and pelvis showing large cystic mass within the pelvis, could

be ovarian.  Moderate bilateral pleural effusions, posterior basal

atelectasis.  Gallstones noted.  Atherosclerotic vascular disease noted.

G-tube noted.



ASSESSMENT:  This is a 64-year-old lady with history of diabetes,

hypertension and hyperlipidemia, who comes in with leukocytosis and is

found to have,



1. Possible pneumonia.

2. Possible ovarian mass.

3. Urinary tract infection.

4. Respiratory failure.

5. Renal failure.

6. Elevated liver function tests.



PLAN:

1. Continue Zosyn.

2. Discontinue IV vancomycin.

3. We will order a HIDA scan.

4. We will order sputum cultures.

5. We will follow up cultures and adjust antibiotics accordingly.



I would like to thank Dr. Gonzalez for this consultation.









  ______________________________________________

  Michelle Doherty M.D.





DR:  CHRISTO

D:  11/26/2019 11:04

T:  11/26/2019 15:36

JOB#:  [id number]

CC:

## 2019-11-26 NOTE — NUR
NURSE NOTES:



Clarified orders with Dr. Gonzalez, 1 unit of PRBC already given in ER. Patient only needs 1 
additional unit. BNP of >41118 reported, NS @ 100 cc/hr discontinued. Also notified of 
patient's need for insulin orders.

## 2019-11-26 NOTE — HISTORY AND PHYSICAL REPORT
DATE OF ADMISSION:  11/25/2019

REASON FOR ADMISSION:  Anemia, leukocytosis, acute MI.



HISTORY OF PRESENT ILLNESS:  This is an unfortunate 64-year-old female,

brought in for abnormal laboratories.  The patient noted to have elevated

troponin as well as acute renal failure and leukocytosis, possible sepsis.

The patient now admitted for further care and management and further

evaluation.  The patient with recent discharge from the acute hospital

after prolonged hospital stay.



PAST MEDICAL HISTORY:  Notable for chronic respiratory failure, history of

sepsis, history of pneumonia, history of bone marrow biopsy, seizures,

diabetes, schizophrenia, anemia, CHF, hypertension, renal failure,

ventilator dependence.



MEDICATIONS:  Reviewed.



ALLERGIES:  Reviewed.



PHYSICAL EXAMINATION:

GENERAL:  Well-appearing female, chronically debilitated, chronically

obtunded.

HEENT:  Negative.  Tracheostomy in place.

LUNGS:  With coarse breath sounds.  Reduced breath sounds at both bases.

CARDIAC:  S1, S2.  Regular rate and rhythm.

ABDOMEN:  Soft.  G-tube in place.

EXTREMITIES:  With noted edema.

NEUROLOGICAL:  Poorly responsive.



LABORATORY DATA:  Reviewed.



IMPRESSION:  Acute MI, elevated troponin, possible non-STEMI, hypertension

per history, congestive heart failure, elevated natriuretic peptide, acute

on chronic renal failure, diabetes, psychiatric disorder, chronic

encephalopathy, seizure disorder, leukocytosis.



RECOMMENDATIONS:  Supportive care.  IV antibiotics.  Cardiology evaluation.

Renal evaluation.  Transfuse with noted anemia.  Hematology followup

_______.  Resume nursing home medications.  Prognosis is poor.  We will

try to stabilize and discharge.









  ______________________________________________

  Steven Gonzalez M.D.





DR:  MILLIE

D:  11/26/2019 08:46

T:  11/26/2019 14:24

JOB#:  [id number]

CC:

## 2019-11-26 NOTE — NUR
*-* INSURANCE *-*



DISCHARGE SUMMARY HAS BEEN FAXED TO:





[facility]#[id number]

CM: Lance

#[phone]

Fax#[phone]

## 2019-11-26 NOTE — NUR
NURSE NOTES:



Received report from HELADIO Zelaya RN. Patient awake in bed, nonverbal, unable to follow commands 
and make needs known. Trach to vent with settings of AC 18, , FiO2 35%, PEEP 5, no s/s 
of respiratory distress noted. GT feeding of Glucerna 1.2 started at 20 cc/hr, no residuals 
noted, HoB elevated. Charles catheter patent and draining well. Right upper arm PICC 
asymptomatic. No overt signs of bleeding noted. Bed locked in lowest position with padded 
side rails up x 3. All needs attended to. Will continue to monitor.

## 2019-11-26 NOTE — CONSULTATION
History of Present Illness


General


Date patient seen:  Nov 26, 2019


Chief Complaint:  Abnormal Labs





Present Illness


HPI


Is a 64-year-old female well-known to me from recent admission and surgery with 

history of diabetes, CHF, hypertension recent admission for sepsis secondary 

pneumonia presents for evaluation of elevated white count, low hemoglobin and 

shortness of breath as well as abdominal distention and vomiting.  She presents 

from her nursing facility and is trach and vent dependent following her recent 

hospital admission.  Transfer paperwork states that the patient had an episode 

of emesis yesterday with KUB showing moderate rectal stool but that her abdomen 

is been more distended of late.  She does have a G-tube for feeds.  Also noted 

shortness of breath as well as low hemoglobin below 8, elevated white count of 

22,000 and elevated BUN in the 80s.  No reports of fever.  Patient cannot 

provide any history.  Continues to have some problems as prior and requiring 

care and management.  Surgery called to evaluate and assist with care.  Patient 

seen, patient evaluated, chart reviewed


 


PMH: Hypertension, hyperlipidemia, sepsis secondary pneumonia, trach in G-tube 

dependent


 


PSH: Tracheostomy, gastrostomy


 


Allergies: None listed in medical chart


 


Social Hx: None reported


Allergies:  


Coded Allergies:  


     No Known Allergies (Unverified , 10/14/19)





Medication History


Scheduled


Amlodipine Besylate* (Amlodipine Besylate*), 2.5 MG ORAL BID, (Reported)


Ascorbic Acid* (Vitamin C*), 250 MG ORAL BID, (Reported)


Aspirin* (Aspirin*), 81 MG ORAL DAILY, (Reported)


Atorvastatin Calcium* (Atorvastatin Calcium*), 10 MG ORAL BEDTIME, (Reported)


Clopidogrel* (Clopidogrel*), 75 MG ORAL DAILY, (Reported)


Divalproex Sodium* (Depakote*), 500 MG PO Q12HR, (Reported)


Docusate Sodium* (Docusate Sodium*), 100 MG ORAL BID, (Reported)


Docusate Sodium* (Colace*), 100 MG GT TWICE A DAY, (Reported)


Donepezil Hcl* (Donepezil Hcl*), 5 MG ORAL DAILY, (Reported)


Folic Acid* (Folic Acid*), 3 MG ORAL DAILY, (Reported)


Furosemide* (Lasix*), 40 MG ORAL DAILY, (Reported)


Heparin Sod,Porcine/0.9 % NaCl (Heparin 6,000 Unit/3 ml-Ns Syr), 5,000 UNIT 

SUBQ EVERY 12 HOURS, (Reported)


Magnesium Hydroxide* (Milk Of Magnesia*), Unknown Dose ORAL DAILY, (Reported)


Metformin Hcl* (Metformin Hcl*), 1,000 MG ORAL BID, (Reported)


Olanzapine* (Zyprexa*), 10 MG ORAL DAILY, (Reported)


Pantoprazole* (Pantoprazole*), 40 MG GT EVERY 12 HOURS, (Reported)


Polyethylene Glycol 3350* (Polyethylene Glycol 3350*), 17 GM GT HS, (Reported)





Scheduled PRN


Acetaminophen* (Acetaminophen 325MG Tablet*), 325 MG ORAL Q4H PRN for For Pain, 

(Reported)


Diphenhydramine Hcl* (Benadryl*), 25 MG ORAL Q6H PRN for Itching, (Reported)





Miscellaneous Medications


Insulin Aspart (Novolog), Unknown Dose SQ, (Reported)


Insulin Detemir (Levemir Flexpen), Unknown Dose SUBQ, (Reported)


Multivitamin W-Minerals/Gin (Super Ginseng Multivit Cap), 1 EACH PO, (Reported)





Discontinued Medications


Amlodipine Besylate/Benazepril 2.5-10 (Amlodipine-Benazepril 2.5-10), 1 CAP 

ORAL DAILY, (Reported)


   Discontinued Reason: Medication dose changed


Heparin Sodium,Porcine/Ns/Pf (Heparin), 5,000 UNIT IV, (Reported)


   Discontinued Reason: Medication dose changed


Pantoprazole* (Pantoprazole*), 40 MG ORAL DAILY, (Reported)


   Discontinued Reason: Medication dose changed





Patient History


Limited by:  medical condition


History Provided By:  Medical Record, PMD


Healthcare decision maker





Resuscitation status


Full Code


Advanced Directive on File








Past Medical/Surgical History


Past Medical/Surgical History:  


(1) Aspiration pneumonia


(2) Abnormal TSH


(3) Diabetic nephropathy


(4) Pressure injury of deep tissue


(5) Pelvic mass in female


(6) Respiratory failure


(7) Encephalopathy


(8) Diabetes mellitus type II, controlled


(9) Fever


(10) Hypokalemia


(11) Diastolic heart failure


(12) Leukocytosis


(13) CHF (congestive heart failure)


(14) Pleural effusion


(15) Elevated troponin level


(16) Tracheostomy in place


(17) Anemia


(18) Renal failure (ARF), acute on chronic





Review of Systems


ROS Narrative


Cannot obtain given patient's current medical condition





Physical Exam


General Appearance:  no apparent distress


Lines, tubes and drains:  peripheral


HEENT:  mucous membranes moist


Neck:  trach


Respiratory/Chest:  no respiratory distress, no accessory muscle use, decreased 

breath sounds, other


Cardiovascular/Chest:  normal peripheral pulses, normal rate


Abdomen:  soft, no organomegaly, no mass


Extremities:  normal inspection


Skin Exam:  warm/dry


Neurologic:  alert, responsive





Last 24 Hour Vital Signs








  Date Time  Temp Pulse Resp B/P (MAP) Pulse Ox O2 Delivery O2 Flow Rate FiO2


 


11/26/19 12:39  78 16     35


 


11/26/19 12:23    122/54    


 


11/26/19 12:00      Mechanical Ventilator  


 


11/26/19 12:00        35


 


11/26/19 11:38  77      


 


11/26/19 11:04  82 18     35


 


11/26/19 10:15  71  101/51    


 


11/26/19 09:15  78 18     35


 


11/26/19 08:00      Mechanical Ventilator  


 


11/26/19 08:00        35


 


11/26/19 07:47  78      


 


11/26/19 07:17  79 18     35


 


11/26/19 04:58  90 18     35


 


11/26/19 04:00        35


 


11/26/19 04:00      Mechanical Ventilator  


 


11/26/19 03:27  89 19     35


 


11/26/19 03:23  89      


 


11/26/19 02:40      Mechanical Ventilator  


 


11/26/19 01:56  85      


 


11/26/19 01:15  92 20     35


 


11/26/19 01:00 97.7 86 18 126/64 100 Trach Collar  35


 


11/26/19 00:14 97.7 86 18 126/64 100 Trach Collar  35


 


11/25/19 23:19  98 33     35


 


11/25/19 22:17 97.9 86 18 126/86 100 Trach Collar  


 


11/25/19 20:40  95 20  99 Mechanical Ventilator  35


 


11/25/19 20:36  95 20     35


 


11/25/19 20:33  76 18 116/60 98 Trach Collar  


 


11/25/19 20:26 99.0 98 18 116/60 (78) 98 Trach Collar  

















Intake and Output  


 


 11/25/19 11/26/19





 18:59 06:59


 


Intake Total  360 ml


 


Output Total  2100 ml


 


Balance  -1740 ml


 


  


 


Intake IV Total  110 ml


 


Blood Product  250 ml


 


Output Urine Total  2100 ml


 


# Bowel Movements  3











Laboratory Tests








Test


  11/25/19


20:30 11/25/19


20:42 11/26/19


07:58 11/26/19


08:00


 


White Blood Count


  24.2 K/UL


(4.8-10.8)  *H 


  


  


 


 


Red Blood Count


  2.83 M/UL


(4.20-5.40)  L 


  


  


 


 


Hemoglobin


  7.4 G/DL


(12.0-16.0)  L 


  


  


 


 


Hematocrit


  22.5 %


(37.0-47.0)  L 


  


  


 


 


Mean Corpuscular Volume


  79 FL (80-99)


L 


  


  


 


 


Mean Corpuscular Hemoglobin


  26.0 PG


(27.0-31.0)  L 


  


  


 


 


Mean Corpuscular Hemoglobin


Concent 32.7 G/DL


(32.0-36.0) 


  


  


 


 


Red Cell Distribution Width


  14.3 %


(11.6-14.8) 


  


  


 


 


Platelet Count


  334 K/UL


(150-450) 


  


  


 


 


Mean Platelet Volume


  9.5 FL


(6.5-10.1) 


  


  


 


 


Neutrophils (%) (Auto)


  % (45.0-75.0)


  


  


  


 


 


Lymphocytes (%) (Auto)


  % (20.0-45.0)


  


  


  


 


 


Monocytes (%) (Auto)  % (1.0-10.0)     


 


Eosinophils (%) (Auto)  % (0.0-3.0)     


 


Basophils (%) (Auto)  % (0.0-2.0)     


 


Differential Total Cells


Counted 100  


  


  


  


 


 


Neutrophils % (Manual) 88 % (45-75)  H   


 


Lymphocytes % (Manual) 10 % (20-45)  L   


 


Monocytes % (Manual) 2 % (1-10)     


 


Eosinophils % (Manual) 0 % (0-3)     


 


Basophils % (Manual) 0 % (0-2)     


 


Band Neutrophils 0 % (0-8)     


 


Platelet Estimate Adequate     


 


Platelet Morphology Normal     


 


Hypochromasia 1+     


 


Anisocytosis 1+     


 


Macrocytosis 1+     


 


Prothrombin Time


  11.4 SEC


(9.30-11.50) 


  


  


 


 


Prothromb Time International


Ratio 1.1 (0.9-1.1)  


  


  


  


 


 


Activated Partial


Thromboplast Time 28 SEC (23-33)


  


  


  


 


 


Urine Color Patricia     


 


Urine Appearance Cloudy     


 


Urine pH 5 (4.5-8.0)     


 


Urine Specific Gravity


  1.020


(1.005-1.035) 


  


  


 


 


Urine Protein


  3+ (NEGATIVE)


H 


  


  


 


 


Urine Glucose (UA)


  Negative


(NEGATIVE) 


  


  


 


 


Urine Ketones


  Negative


(NEGATIVE) 


  


  


 


 


Urine Blood


  2+ (NEGATIVE)


H 


  


  


 


 


Urine Nitrite


  Negative


(NEGATIVE) 


  


  


 


 


Urine Bilirubin


  Negative


(NEGATIVE) 


  


  


 


 


Urine Ictotest


  Negative


(NEGATIVE) 


  


  


 


 


Urine Urobilinogen


  Normal MG/DL


(0.0-1.0) 


  


  


 


 


Urine Leukocyte Esterase


  2+ (NEGATIVE)


H 


  


  


 


 


Urine RBC


  2-4 /HPF (0 -


2)  H 


  


  


 


 


Urine WBC


  10-15 /HPF (0


- 2)  H 


  


  


 


 


Urine Squamous Epithelial


Cells Few /LPF


(NONE/OCC) 


  


  


 


 


Urine Calcium Oxalate Crystals


  Few /LPF


(NONE) 


  


  


 


 


Urine Bacteria


  Moderate /HPF


(NONE)  H 


  


  


 


 


Sodium Level


  138 MMOL/L


(136-145) 


  


  


 


 


Potassium Level


  4.3 MMOL/L


(3.5-5.1) 


  


  


 


 


Chloride Level


  99 MMOL/L


() 


  


  


 


 


Carbon Dioxide Level


  27 MMOL/L


(21-32) 


  


  


 


 


Anion Gap


  12 mmol/L


(5-15) 


  


  


 


 


Blood Urea Nitrogen


  112 mg/dL


(7-18)  H 


  


  


 


 


Creatinine


  2.0 MG/DL


(0.55-1.30)  H 


  


  


 


 


Estimat Glomerular Filtration


Rate 24.9 mL/min


(>60) 


  


  


 


 


Glucose Level


  122 MG/DL


()  H 


  


  


 


 


Lactic Acid Level


  1.10 mmol/L


(0.4-2.0) 


  


  


 


 


Calcium Level


  9.0 MG/DL


(8.5-10.1) 


  


  


 


 


Phosphorus Level


  4.5 MG/DL


(2.5-4.9) 


  


  


 


 


Magnesium Level


  3.7 MG/DL


(1.8-2.4)  H 


  


  


 


 


Total Bilirubin


  0.6 MG/DL


(0.2-1.0) 


  


  


 


 


Aspartate Amino Transf


(AST/SGOT) 388 U/L


(15-37)  H 


  


  


 


 


Alanine Aminotransferase


(ALT/SGPT) 1096 U/L


(12-78)  H 


  


  


 


 


Alkaline Phosphatase


  118 U/L


()  H 


  


  


 


 


Total Creatine Kinase


  47 U/L


() 


  


  


 


 


Creatine Kinase MB


  0.8 NG/ML


(0.0-3.6) 


  


  


 


 


Creatine Kinase MB Relative


Index 1.7  


  


  


  


 


 


Troponin I


  2.170 ng/mL


(0.000-0.056) 


  2.271 ng/mL


(0.000-0.056) 


 


 


Pro-B-Type Natriuretic Peptide


  > 33743 pg/mL


(0-125)  H 


  


  


 


 


Total Protein


  7.6 G/DL


(6.4-8.2) 


  


  


 


 


Albumin


  2.0 G/DL


(3.4-5.0)  L 


  


  


 


 


Globulin 5.6 g/dL     


 


Albumin/Globulin Ratio


  0.4 (1.0-2.7)


L 


  


  


 


 


Arterial Blood pH


  


  7.548


(7.350-7.450) 


  7.540


(7.350-7.450)


 


Arterial Blood Partial


Pressure CO2 


  31.0 mmHg


(35.0-45.0)  L 


  30.7 mmHg


(35.0-45.0)  L


 


Arterial Blood Partial


Pressure O2 


  103.8 mmHg


(75.0-100.0)  H 


  105.2 mmHg


(75.0-100.0)  H


 


Arterial Blood HCO3


  


  26.4 mmol/L


(22.0-26.0)  H 


  25.7 mmol/L


(22.0-26.0)


 


Arterial Blood Oxygen


Saturation 


  95.7 %


() 


  97.4 %


()


 


Arterial Blood Base Excess  3.9 (-2-2)  H  3.2 (-2-2)  H


 


Graham Test  Positive    Positive  











Microbiology








 Date/Time


Source Procedure


Growth Status


 


 


 11/25/19 20:30


Urine,Clean Catch Urine Culture - Preliminary


NO GROWTH Resulted


 


 11/25/19 22:40


Rectum  Received








Height (Feet):  5


Height (Inches):  6.00


Weight (Pounds):  145


Medications





Current Medications








 Medications


  (Trade)  Dose


 Ordered  Sig/Winnie


 Route


 PRN Reason  Start Time


 Stop Time Status Last Admin


Dose Admin


 


 Dextrose


  (Dextrose 50%)  25 ml  Q30M  PRN


 IV


 Hypoglycemia  11/26/19 10:00


 12/26/19 09:59   


 


 


 Dextrose


  (Dextrose 50%)  50 ml  Q30M  PRN


 IV


 Hypoglycemia  11/26/19 10:00


 12/26/19 09:59   


 


 


 Insulin Aspart


  (NovoLOG)    BEFORE MEALS AND  HS


 SUBQ


   11/26/19 11:30


 12/26/19 11:29   


 


 


 Metoprolol


 Tartrate


  (Lopressor)  25 mg  Q12HR


 GT


   11/26/19 10:15


 12/26/19 10:14   


 


 


 Morphine Sulfate


  (Morphine


 Sulfate)  2 mg  ONCE


 IVP


   11/26/19 16:00


 11/26/19 22:00   


 


 


 Nitroglycerin


  (Nitro-Bid)  1 inch  TID@0600,1200,1800


 TOPIC


   11/26/19 12:00


 12/26/19 11:59  11/26/19 12:23


 


 


 Piperacillin Sod/


 Tazobactam Sod


 3.375 gm/Sodium


 Chloride  110 ml @ 


 27.5 mls/hr  EVERY 8  HOURS


 IVPB


   11/26/19 14:00


 12/1/19 13:59  11/26/19 14:28


 











Assessment/Plan


Problem List:  


(1) Pressure injury of deep tissue


ICD Codes:  L89.96 - Pressure-induced deep tissue damage of unspecified site


SNOMED:  575967037


(2) Pelvic mass in female


Assessment & Plan:  Known history of pelvic mass.  Refer to prior notes from 

last admission.  No further treatment at this time.  Monitor.


Large cystic-appearing mass within the pelvis could be ovarian in nature and 

appears


relatively unchanged from the last examination.


 


Moderate bilateral pleural effusions with associated posterior basal 

atelectasis.


 


Gallstone suspected.


 


Charles catheter. Gastrostomy.


 


Atherosclerotic vascular disease.


ICD Codes:  R19.00 - Intra-abdominal and pelvic swelling, mass and lump, 

unspecified site


SNOMED:  27150108, 539616545


(3) Respiratory failure


Assessment & Plan:  Status post tracheostomy last admission.  Trachea site 

evaluated no signs of infection noted.  Leukocytosis unlikely related.  No 

bleeding noted.


Continue with daily trach care and management.  Will monitor.


ICD Codes:  J96.90 - Respiratory failure, unspecified, unspecified whether with 

hypoxia or hypercapnia


SNOMED:  402443520


(4) Leukocytosis


Assessment & Plan:  Leukocytosis.  Fevers.  Abnormal labs.


Abnormal LFTs


Renal insufficiency


Work-up in progress


Continue antibiotics as per infectious disease


DAILY ESTIMATED NEEDS:


Needs based on Critical care, wounds; 65.9kg 


22-30  kcals/kg 


1450- 1977  total kcals


1.25-2  g protein/kg


  g total protein 


25-30  mL/kg


1648- 1977  total fluid mLs





NUTRITION DIAGNOSIS:


1) Increased kcal and pro needs r/t wound healing AEB pt adm w/ sacral


wound stage 1, r heel DTI- WC eval pending


2) Swallowing difficulty r/t resp status as evidenced by pt is s/p trach


and PEG.





CURRENT TF:Glucerna 1.2 @ 60mL/hr x 24 hrs- held for scan  








ENTERAL NUTRITION RECOMMENDATIONS:


Glucerna 1.2 @60mL/hr x 24 hrs  to provide 1440 mL, 1728kcal, 86g pro, 1159mL 

free h2o 





- Maintain Glucerna 1.2 @ goal as tolerated to meet 100% est needs


- HOB >30 degrees


- Flush per MD: pt w/ CHF











ADDITIONAL RECOMMENDATIONS:


1) Maintain calibrated bedscale wt 


2) Rec bowel regimen- pt adm w/ impaction 


3) Wound healing: Rec Zaki in 4oz H2O BID via PEG 


        + Vit C 250mg qdaily 


4) Monitor lytes- (mg 3.7) 


5) F/up w/ H&P


ICD Codes:  D72.829 - Elevated white blood cell count, unspecified


SNOMED:  312042247, 811634011


(5) Abnormal LFTs


Assessment & Plan:  Leukocytosis, abnormal labs, elevated LFTs.  Renal 

insufficiency.


Ultrasound ordered of the abdomen.


Trend LFTs 


monitor meds


Exam fairly benign no Ruiz's


We will follow with serial exams


ICD Codes:  R94.5 - Abnormal results of liver function studies


SNOMED:  757232731











Radhames Blas Nov 26, 2019 15:03

## 2019-11-26 NOTE — NUR
*-* INSURANCE *-*



AVAILABLE CLINICALS HAVE BEEN FAXED TO:



[facility]: [facility]

#[phone]

Fax#[phone]

-------------------------------------------------------------------------------

Addendum: 11/26/19 at 1418 by JACKI COMBS CM

-------------------------------------------------------------------------------

REF# [id number]

## 2019-11-26 NOTE — NUR
NURSE NOTES:

left voice mail to obtain admitting orders from Dr. Gonzalez. will wait for the call back

## 2019-11-26 NOTE — NUR
NURSE NOTES:



Patient is s/p 1 unit blood transfusion, no s/s of transfusion reaction noted. VSS. Zosyn 
started after blood transfusion.

## 2019-11-26 NOTE — NUR
NURSE NOTES:Pt presented on admission with non-blanching erythema without induration sacrum. 
Pt did not exhibit any distress to indicate pain when sacrum palpated.

 Stable dry  eschar noted to R heel. Periwound without erythema or induration.(L)2.5cm x 
(W)3cm.

L heel is soft pink and blanchable.



Tx.plan: Apply Moisture Barrier Paste to Sacrum. Cover with Optifoam drsg. Change every 3 
days and prn.

            Apply Betadine to R heel. Cover with Optifoam drsg. Change every 3 days and prn.

            Apply Cavilon Skin Barrier to L heel. Cover with Optifoam drsg. Change every 7 
days and prn.

            APM/ANNELIESE Mattress overlay.

            Reposition at least every 2hours or as tolerated.

            Off-load heels with pillow.

## 2019-11-26 NOTE — DIAGNOSTIC IMAGING REPORT
Indication: Dyspnea

 

Comparison:  11/19/2019

 

A single view chest radiograph was obtained.

 

Findings:

 

Groundglass opacities and prominent pulmonary vascularity and interstitial densities

noted. There is a PICC line on the right and tracheostomy. Heart size is normal.

 

IMPRESSION:

 

Pulmonary edema

## 2019-11-26 NOTE — NUR
CASE MANAGEMENT: INITIAL REVIEW



64 YR OLD FEMALE BIBA [facility] 



CC: ABNORMAL LABS .  SOB . 



PMH: PELVIC MASS; TRACHEOSTOMY 





SI: CHF . ANEMIA . ACUTE RENAL FAILURE . PLEURAL EFFUSION . ELEVATED TROP

98.9   98   18   116/60   98% TRACH COLLAR

TROP (+) 2.170 WBC 24.2  RBC 2.83  H/H 7.4/22.5  CREAT 2.0 MG 3.7  

AST//1096  BNP>30307

ABG: pH 7.548  pCO2 31.0  pO2 103.8  HCO3 26.4





IS:      IVF NS BOLUS X2

IV LASIX X1

IV ZOSYN X1

IV VANCOMYCIN X1



\**: 2W STEP DOWN UNIT



DCP: RETURN TO [facility] WHEN MEDICALLY STABLE 



PLAN:

BL TRANSFUSION 

 CXRAY-Bilateral stational edema

KUB- Distended small bowel loops

SEPSIS WORK-UP

CT SCAN R/O OBSTRUCTION

CARDIO CONSULT 

*****************************************************************************



CASE MANAGEMENT:  REVIEW



11/26/19



SI: CHF . ANEMIA . ACUTE RENAL FAILURE . PLEURAL EFFUSION . ELEVATED TROP

PMH: PELVIC MASS; TRACHEOSTOMY 

97.2  88  18   123/64   98% TRACH COLLAR; FIO2 35

TROP 2.271 

          ABG: pH 7.540  pCO2 30.7  pO2 105.2





IS:     IV ZOSYN Q8HR

NITRO-BID TOP TID





\**: 2W STEP DOWN UNIT



DCP: RETURN TO [facility] WHEN MEDICALLY STABLE 



PLAN:

 BL TX

AM LAB

US ABD

WOUND CARE

## 2019-11-26 NOTE — NUR
NURSE NOTES:



Patient transported to nuclear medicine hepatobiliary scan with primary nurse and RT in 
stable condition.

## 2019-11-26 NOTE — NUR
NURSE NOTES:



Received phone call from Dr. Hardin, updated on patient's status. Dr. Hardin to place orders 
in computer. EKG faxed to Dr. Hardin's office.

## 2019-11-26 NOTE — NUR
NURSE NOTES:



Abdominal ultrasound done. GT feeding re-started at 20 cc/hr. HoB elevated. Will endorse to 
advance to goal of 60 cc/hr as tolerated.

## 2019-11-26 NOTE — NUR
RESPIRATORY NOTE: Received patient on vent settings on AC volume control, rate of 18, Vt of 
500, FiO2 of 35%, and Peep of 5. Patient is flat effect. Breath sounds are bilateral 
rhonchi. Suctioned small amounts of tan thick secretions. Alarms are audible and on and Vent 
is plugged into red outlet. Ambu bag is in the room. Will continue to monitor patient 
throughout the night.

## 2019-11-26 NOTE — NUR
RD ASSESSMENT & RECOMMENDATIONS

SEE CARE ACTIVITY FOR COMPLETE ASSESSMENT



DAILY ESTIMATED NEEDS:

Needs based on Critical care, wounds; 65.9kg 

22-30  kcals/kg 

1450- 1977  total kcals

1.25-2  g protein/kg

  g total protein 

25-30  mL/kg

1648- 1977  total fluid mLs



NUTRITION DIAGNOSIS:

1) Increased kcal and pro needs r/t wound healing AEB pt adm w/ sacral

wound stage 1, r heel DTI- WC eval pending

2) Swallowing difficulty r/t resp status as evidenced by pt is s/p trach

and PEG.



CURRENT TF:Glucerna 1.2 @ 60mL/hr x 24 hrs- held for scan  





ENTERAL NUTRITION RECOMMENDATIONS:

Glucerna 1.2 @60mL/hr x 24 hrs  to provide 1440 mL, 1728kcal, 86g pro, 1159mL free h2o 



- Maintain Glucerna 1.2 @ goal as tolerated to meet 100% est needs

- HOB >30 degrees

- Flush per MD: pt w/ CHF







ADDITIONAL RECOMMENDATIONS:

1) Maintain calibrated bedscale wt 

2) Rec bowel regimen- pt adm w/ impaction 

3) Wound healing: Rec Zaki in 4oz H2O BID via PEG 

        + Vit C 250mg qdaily 

4) Monitor lytes- (mg 3.7) 

5) F/up w/ H&P  

.

## 2019-11-26 NOTE — CONSULTATION
DATE OF CONSULTATION:  11/26/2019

NEPHROLOGY CONSULTATION



CONSULTING PHYSICIAN:  Luna Feliz M.D.



ATTENDING PHYSICIAN:  Steven Gonzalez M.D.



REASON FOR CONSULTATION:  Elevated BUN and creatinine.



HISTORY OF PRESENT ILLNESS:  This is an unfortunate 64-year-old female from

[facility], who has chronic kidney disease, not on

dialysis.  The patient is ventilator dependent from a subacute facility.

I am asked to see the patient for her kidney failure.



PAST MEDICAL HISTORY:

1. Ventilator-dependant respiratory failure.

2. Organic brain syndrome.

3. 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

4. Type 2 diabetes mellitus.

5. Anemia of chronic kidney disease.



MEDICATIONS:  Zosyn, intravenous fluids, sodium chloride, normal saline,

which was discontinued, intravenous vancomycin, vitamin C, baby aspirin,

Lasix IV, insulin sliding scale, metoprolol, intravenous morphine p.r.n.,

and nitroglycerin ointment.



ALLERGIES:  No known drug allergies.



FAMILY HISTORY:  Unable to obtain due to mental status.



SOCIAL HISTORY:  Unable to obtain due to mental status.



REVIEW OF SYSTEMS:  Unable to obtain due to mental status.



PHYSICAL EXAMINATION:

GENERAL:  This is an elderly, chronically ill appearing  female,

who is on ventilator.

VITAL SIGNS:  Blood pressure 122/54, pulse 78 and regular, respirations 18,

and temperature 98 Fahrenheit.

HEENT:  The head is normocephalic and atraumatic.  Pupils are equal, round,

and reactive to light.

NECK:  She has midline tracheostomy.

LUNGS:  Bilateral rhonchi.

HEART:  Regular rate and rhythm without rubs, murmurs, or gallops.

ABDOMEN:  Soft and nontender.  She has a G-tube.

EXTREMITIES:  She has advanced muscle wasting.  No clubbing, cyanosis, or

edema.

NEUROLOGIC:  There were no gross focal findings.  She is obtunded.



LABORATORY AND ANCILLARY DATA:  CBC shows white count 24,200 and hematocrit

22.5.  Chemistry as of yesterday, electrolytes within normal limits.  BUN

112, creatinine 2, magnesium 3.7, alkaline phosphatase 118, ALT 1096, and

.  Troponin level 2.17.  Chest x-ray mild interstitial edema.  CT

scan of abdomen and pelvis without contrast suspected gallstones, moderate

bilateral pleural effusion, large cystic appearing mass within the pelvis

could be ovarian in nature, Charles catheter, and gastrostomy.



ASSESSMENT:

1. Ventilator-dependant respiratory failure.

2. Organic brain syndrome.

3. 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

4. Type 2 diabetes mellitus.

5. Anemia of chronic kidney disease.

6. Chronic kidney disease stage 4 to 5.



PLAN:

1. Continue current medications.

2. Avoid nephrotoxic agents.



Thank you, Dr. Gonzalez, for letting me participate in the care of this

unfortunate patient.









  ______________________________________________

  Luna Feliz M.D.





DR:  BILL

D:  11/26/2019 15:35

T:  11/26/2019 21:35

JOB#:  [id number]

CC:

## 2019-11-26 NOTE — NUR
NURSE NOTES:



Patient returned from nuclear med scan with primary RN and RT in stable condition. Patient 
remains NPO. Ultrasound tech now at bedside for abdominal ultrasound

## 2019-11-26 NOTE — NUR
NURSE NOTES:

left voice mail to Dr. Gonzalez to obtain the admitting orders. will wait for call back.

## 2019-11-26 NOTE — NUR
NURSE NOTES:



Patient's troponin trending up to 2.271, previously 2.170. Dr. Gonzalez notified at bedside 
at 08:40. Per Dr. Gonzalez, Dr. Hardin notified of consult. Dr. Hardin's office called at 
08:58. Received call back from Dr. Sanderson (on call for Dr. Hardin) at 09:01, no new orders 
received, will notify Dr. Hardin.

## 2019-11-26 NOTE — CARDIOLOGY REPORT
--------------- APPROVED REPORT --------------





EXAM: Two-dimensional and M-mode echocardiogram with Doppler and color Doppler.



INDICATION

Congestive Heart Failure 



M-Mode DIMENSIONS 

IVSd0.8 (0.7-1.1cm)Left Atrium (MM)4.5 (1.6-4.0cm)

LVDd4.6 (3.5-5.6cm)Aortic Root2.6 (2.0-3.7cm)

PWd1.1 (0.7-1.1cm)Aortic Cusp Exc.1.7 (1.5-2.0cm)



LVDs3.4 (2.5-4.0cm)

PWs1.3 cm





Technically difficult study due to poor acoustical windows.

Normal left ventricular chamber size.

Hypokinesis of inferior and inferolateral wall  and distal posterior wall. Otherwise 

normal wall motion of other segments.

Left ventricular ejection fraction estimated to be 45-50 %.

No evidence of pericardial effusion.

Moderate pleural effusion. 

Mild left atrial enlargement.

Right cardiac chamber sizes are within normal limits.

Focal aortic valve sclerosis with adequate cusp excursion.

Thickened mitral valve leaflets with normal excursion.

Mitral annulus and aortic root calcification.

Normal pulmonic valve structure. 

Normal tricuspid valve structure. 

IVC at normal size with slight physiologic collapse.



A  color flow and spectral Doppler study was performed and revealed:

Severe mitral regurgitation.

Mitral inflow indicates moderate abnormal left ventricular diastolic function. 

Mild tricuspid regurgitation.

Tricuspid systolic velocities suggests peak right ventricular systolic pressure of 31 

mmHg.

Trace pulmonic regurgitation present.

## 2019-11-27 VITALS — SYSTOLIC BLOOD PRESSURE: 126 MMHG | DIASTOLIC BLOOD PRESSURE: 64 MMHG

## 2019-11-27 VITALS — DIASTOLIC BLOOD PRESSURE: 58 MMHG | SYSTOLIC BLOOD PRESSURE: 119 MMHG

## 2019-11-27 VITALS — DIASTOLIC BLOOD PRESSURE: 57 MMHG | SYSTOLIC BLOOD PRESSURE: 116 MMHG

## 2019-11-27 VITALS — SYSTOLIC BLOOD PRESSURE: 119 MMHG | DIASTOLIC BLOOD PRESSURE: 58 MMHG

## 2019-11-27 VITALS — DIASTOLIC BLOOD PRESSURE: 58 MMHG | SYSTOLIC BLOOD PRESSURE: 116 MMHG

## 2019-11-27 VITALS — DIASTOLIC BLOOD PRESSURE: 66 MMHG | SYSTOLIC BLOOD PRESSURE: 130 MMHG

## 2019-11-27 LAB
% IRON SATURATION: 16 % (ref 15–50)
ADD MANUAL DIFF: NO
ALBUMIN SERPL-MCNC: 1.9 G/DL (ref 3.4–5)
ALBUMIN/GLOB SERPL: 0.4 {RATIO} (ref 1–2.7)
ALP SERPL-CCNC: 95 U/L (ref 46–116)
ALT SERPL-CCNC: 551 U/L (ref 12–78)
AMYLASE SERPL-CCNC: 41 U/L (ref 25–115)
ANION GAP SERPL CALC-SCNC: 11 MMOL/L (ref 5–15)
APTT BLD: 26 SEC (ref 23–33)
AST SERPL-CCNC: 103 U/L (ref 15–37)
BASOPHILS NFR BLD AUTO: 1.1 % (ref 0–2)
BILIRUB SERPL-MCNC: 0.8 MG/DL (ref 0.2–1)
BUN SERPL-MCNC: 94 MG/DL (ref 7–18)
CALCIUM SERPL-MCNC: 8.7 MG/DL (ref 8.5–10.1)
CHLORIDE SERPL-SCNC: 106 MMOL/L (ref 98–107)
CHOLEST SERPL-MCNC: 179 MG/DL (ref ?–200)
CO2 SERPL-SCNC: 27 MMOL/L (ref 21–32)
CREAT SERPL-MCNC: 1.5 MG/DL (ref 0.55–1.3)
EOSINOPHIL NFR BLD AUTO: 4.5 % (ref 0–3)
ERYTHROCYTE [DISTWIDTH] IN BLOOD BY AUTOMATED COUNT: 14.5 % (ref 11.6–14.8)
GLOBULIN SER-MCNC: 4.5 G/DL
HCT VFR BLD CALC: 24.4 % (ref 37–47)
HDLC SERPL-MCNC: 25 MG/DL (ref 40–60)
HGB BLD-MCNC: 8.3 G/DL (ref 12–16)
INR PPP: 1.1 (ref 0.9–1.1)
IRON SERPL-MCNC: 26 UG/DL (ref 50–175)
LYMPHOCYTES NFR BLD AUTO: 7 % (ref 20–45)
MCV RBC AUTO: 82 FL (ref 80–99)
MONOCYTES NFR BLD AUTO: 4.1 % (ref 1–10)
NEUTROPHILS NFR BLD AUTO: 83.4 % (ref 45–75)
PLATELET # BLD: 266 K/UL (ref 150–450)
POTASSIUM SERPL-SCNC: 3.4 MMOL/L (ref 3.5–5.1)
RBC # BLD AUTO: 2.96 M/UL (ref 4.2–5.4)
SODIUM SERPL-SCNC: 144 MMOL/L (ref 136–145)
TIBC SERPL-MCNC: 161 UG/DL (ref 250–450)
TRIGL SERPL-MCNC: 362 MG/DL (ref 30–150)
UNSATURATED IRON BINDING: 135 UG/DL (ref 112–346)
WBC # BLD AUTO: 12.1 K/UL (ref 4.8–10.8)

## 2019-11-27 RX ADMIN — CHLORHEXIDINE GLUCONATE SCH APPLIC: 213 SOLUTION TOPICAL at 20:37

## 2019-11-27 RX ADMIN — NITROGLYCERIN SCH INCH: 20 OINTMENT TOPICAL at 17:42

## 2019-11-27 RX ADMIN — DEXTROSE MONOHYDRATE SCH MLS/HR: 50 INJECTION, SOLUTION INTRAVENOUS at 06:11

## 2019-11-27 RX ADMIN — Medication SCH MG: at 09:17

## 2019-11-27 RX ADMIN — DEXTROSE MONOHYDRATE SCH MLS/HR: 50 INJECTION, SOLUTION INTRAVENOUS at 22:06

## 2019-11-27 RX ADMIN — NITROGLYCERIN SCH INCH: 20 OINTMENT TOPICAL at 06:13

## 2019-11-27 RX ADMIN — METOPROLOL TARTRATE SCH MG: 25 TABLET, FILM COATED ORAL at 20:37

## 2019-11-27 RX ADMIN — METOPROLOL TARTRATE SCH MG: 25 TABLET, FILM COATED ORAL at 09:17

## 2019-11-27 RX ADMIN — INSULIN ASPART SCH UNITS: 100 INJECTION, SOLUTION INTRAVENOUS; SUBCUTANEOUS at 17:42

## 2019-11-27 RX ADMIN — INSULIN ASPART SCH UNITS: 100 INJECTION, SOLUTION INTRAVENOUS; SUBCUTANEOUS at 23:36

## 2019-11-27 RX ADMIN — DEXTROSE MONOHYDRATE SCH MLS/HR: 50 INJECTION, SOLUTION INTRAVENOUS at 14:11

## 2019-11-27 RX ADMIN — NITROGLYCERIN SCH INCH: 20 OINTMENT TOPICAL at 12:17

## 2019-11-27 RX ADMIN — INSULIN ASPART SCH UNITS: 100 INJECTION, SOLUTION INTRAVENOUS; SUBCUTANEOUS at 12:20

## 2019-11-27 RX ADMIN — INSULIN ASPART SCH UNITS: 100 INJECTION, SOLUTION INTRAVENOUS; SUBCUTANEOUS at 06:15

## 2019-11-27 NOTE — NUR
NURSE NOTES:

received pt in the bed, awake, vent dependent, vital signs stable, no  co pain, no SOB, skin 
warm and dry to touch, tolerate GT feeding well, PIC line on RT upper arm, dressing dry and 
intact, Charles catheter with yellow urine, bed in low position, HOB elevated.

## 2019-11-27 NOTE — DIAGNOSTIC IMAGING REPORT
Indication: Abnormal liver function tests. Abnormal renal function tests

 

Technique: Gray-scale and duplex images of the upper abdomen were obtained

 

Comparison: No comparison sonograms. Reference made to CT abdomen pelvis dated

11/25/2019

 

Findings: There is a large midline locular cystic mass, which is mostly anechoic but

does show some degree, in the lower abdomen/upper pelvis. This measures 11.7 x 13.5 x

13.4 cm. This corresponds to findings seen on recent CT scan.

 

There are bilateral pleural effusions incidentally noted

 

Gallbladder is unremarkable, without stones, wall thickening, nor pericholecystic

fluid. Common bile duct measures 2 mm in diameter.  No intrahepatic biliary ductal

dilatation.  Liver demonstrates normal echogenicity, no focal abnormality.   Portal

vein and hepatic veins are patent.   Pancreas is unremarkable.    Spleen is

unremarkable.  Left kidney measures 11.7 cm in length.  Right kidney measures 11.3 cm

length.  Both kidneys demonstrate normal echogenicity.  There is no hydronephrosis.  

Right kidney demonstrates an echogenic focus in the upper pole renal sinus, could

represent a small calcification. Left kidney demonstrates a small parapelvic cyst .

Non-aneurysmal abdominal aorta .

 

Impression: Unilocular large upper pelvic cystic mass, as described above and on

multiple prior exams.

 

Bilateral pleural effusions

 

Negative for gallstones or dilated bile ducts also suspected on recent CT scan are

either artifactual or sonographically occult

 

Echogenic focus in the upper pole right renal sinus, probably artifactual as no

calculi demonstrated on recent CT scans

 

Small left renal parapelvic cyst incidentally noted

## 2019-11-27 NOTE — SURGERY PROGRESS NOTE
Surgery Progress Note


Subjective


Additional Comments


no acute events





Objective





Last 24 Hour Vital Signs








  Date Time  Temp Pulse Resp B/P (MAP) Pulse Ox O2 Delivery O2 Flow Rate FiO2


 


11/27/19 17:42    119/58    


 


11/27/19 16:48  72 20     35


 


11/27/19 16:00  70      


 


11/27/19 16:00 97.5 78 24 119/58 (78) 99   


 


11/27/19 16:00        35


 


11/27/19 16:00      Mechanical Ventilator  


 


11/27/19 15:10  71 19     35


 


11/27/19 12:36  77 18     35


 


11/27/19 12:17    116/58    


 


11/27/19 12:00 97.9 78 19 116/57 (76) 100   


 


11/27/19 12:00  76      


 


11/27/19 12:00      Mechanical Ventilator  


 


11/27/19 12:00        35


 


11/27/19 10:49  77 18     35


 


11/27/19 09:18  69 18     35


 


11/27/19 09:17  73  116/58    


 


11/27/19 09:00        35


 


11/27/19 08:00 97.5 73 23 116/58 (77) 100   


 


11/27/19 08:00      Mechanical Ventilator  


 


11/27/19 07:43  77      


 


11/27/19 06:45  71 18     35


 


11/27/19 06:13    128/69    


 


11/27/19 05:13  79 26     35


 


11/27/19 04:00      Mechanical Ventilator  


 


11/27/19 04:00 98.2 77 20 130/66 (87) 99   


 


11/27/19 04:00        35


 


11/27/19 03:27  68      


 


11/27/19 03:06  72 18     35


 


11/27/19 00:32  79 19     35


 


11/27/19 00:00        35


 


11/27/19 00:00      Mechanical Ventilator  


 


11/27/19 00:00  72      


 


11/27/19 00:00 98.0 77 23 126/64 (84) 100   


 


11/26/19 22:48  76 18     35


 


11/26/19 21:09  81 18     35


 


11/26/19 20:42  72  105/65    


 


11/26/19 20:00      Mechanical Ventilator  


 


11/26/19 20:00 97.6 72 16 105/54 (71) 100   


 


11/26/19 20:00        35


 


11/26/19 19:35  71      








I&O











Intake and Output  


 


 11/26/19 11/27/19





 18:59 06:59


 


Intake Total 255.0 ml 560.0 ml


 


Output Total  1150 ml


 


Balance 255.0 ml -590.0 ml


 


  


 


Intake Free Water 50 ml 150 ml


 


IV Total 110.0 ml 110.0 ml


 


Tube Feeding 95 ml 300 ml


 


Output Urine Total  1150 ml


 


# Bowel Movements 2 








Dressing:  saturated


Wound:  other


Drains:  other


Cardiovascular:  RSR


Respiratory:  decreased breath sounds


Abdomen:  soft, present bowel sounds


Extremities:  no cyanosis





Laboratory Tests








Test


  11/27/19


00:20 11/27/19


03:00


 


Troponin I


  1.685 ng/mL


(0.000-0.056) 


 


 


White Blood Count


  


  12.1 K/UL


(4.8-10.8)  H


 


Red Blood Count


  


  2.96 M/UL


(4.20-5.40)  L


 


Hemoglobin


  


  8.3 G/DL


(12.0-16.0)  L


 


Hematocrit


  


  24.4 %


(37.0-47.0)  L


 


Mean Corpuscular Volume  82 FL (80-99)  


 


Mean Corpuscular Hemoglobin


  


  27.9 PG


(27.0-31.0)


 


Mean Corpuscular Hemoglobin


Concent 


  33.8 G/DL


(32.0-36.0)


 


Red Cell Distribution Width


  


  14.5 %


(11.6-14.8)


 


Platelet Count


  


  266 K/UL


(150-450)


 


Mean Platelet Volume


  


  9.0 FL


(6.5-10.1)


 


Neutrophils (%) (Auto)


  


  83.4 %


(45.0-75.0)  H


 


Lymphocytes (%) (Auto)


  


  7.0 %


(20.0-45.0)  L


 


Monocytes (%) (Auto)


  


  4.1 %


(1.0-10.0)


 


Eosinophils (%) (Auto)


  


  4.5 %


(0.0-3.0)  H


 


Basophils (%) (Auto)


  


  1.1 %


(0.0-2.0)


 


Erythrocyte Sedimentation Rate


  


  128 MM/HR


(0-30)  H


 


Prothrombin Time


  


  11.6 SEC


(9.30-11.50)  H


 


Prothromb Time International


Ratio 


  1.1 (0.9-1.1)  


 


 


Activated Partial


Thromboplast Time 


  26 SEC (23-33)


 


 


Sodium Level


  


  144 MMOL/L


(136-145)


 


Potassium Level


  


  3.4 MMOL/L


(3.5-5.1)  L


 


Chloride Level


  


  106 MMOL/L


()


 


Carbon Dioxide Level


  


  27 MMOL/L


(21-32)


 


Anion Gap


  


  11 mmol/L


(5-15)


 


Blood Urea Nitrogen


  


  94 mg/dL


(7-18)  H


 


Creatinine


  


  1.5 MG/DL


(0.55-1.30)  H


 


Estimat Glomerular Filtration


Rate 


  34.7 mL/min


(>60)


 


Glucose Level


  


  126 MG/DL


()  H


 


Calcium Level


  


  8.7 MG/DL


(8.5-10.1)


 


Iron Level


  


  26 ug/dL


()  L


 


Total Iron Binding Capacity


  


  161 ug/dL


(250-450)  L


 


Percent Iron Saturation  16 % (15-50)  


 


Unsaturated Iron Binding


  


  135 ug/dL


(112-346)


 


Total Bilirubin


  


  0.8 MG/DL


(0.2-1.0)


 


Aspartate Amino Transf


(AST/SGOT) 


  103 U/L


(15-37)  H


 


Alanine Aminotransferase


(ALT/SGPT) 


  551 U/L


(12-78)  H


 


Alkaline Phosphatase


  


  95 U/L


()


 


C-Reactive Protein,


Quantitative 


  7.5 mg/dL


(0.00-0.90)  H


 


Total Protein


  


  6.4 G/DL


(6.4-8.2)


 


Albumin


  


  1.9 G/DL


(3.4-5.0)  L


 


Globulin  4.5 g/dL  


 


Albumin/Globulin Ratio


  


  0.4 (1.0-2.7)


L


 


Triglycerides Level


  


  362 MG/DL


()  H


 


Cholesterol Level


  


  179 MG/DL (<


200)


 


LDL Cholesterol


  


  103 mg/dL


(<100)  H


 


HDL Cholesterol


  


  25 MG/DL


(40-60)  L


 


Cholesterol/HDL Ratio


  


  7.2 (3.3-4.4)


H


 


Amylase Level


  


  41 U/L


()


 


Lipase


  


  96 U/L


()











Plan


Problems:  


(1) Pressure injury of deep tissue


(2) Pelvic mass in female


Assessment & Plan:  Known history of pelvic mass.  Refer to prior notes from 

last admission.  No further treatment at this time.  Monitor.


Large cystic-appearing mass within the pelvis could be ovarian in nature and 

appears


relatively unchanged from the last examination.


 


Moderate bilateral pleural effusions with associated posterior basal 

atelectasis.


 


Gallstone suspected.


 


Charles catheter. Gastrostomy.


 


Atherosclerotic vascular disease.





(3) Respiratory failure


Assessment & Plan:  Status post tracheostomy last admission.  Trachea site 

evaluated no signs of infection noted.  Leukocytosis unlikely related.  No 

bleeding noted.


Continue with daily trach care and management.  Will monitor.





(4) Leukocytosis


Assessment & Plan:  Leukocytosis.  Fevers.  Abnormal labs.


Abnormal LFTs


Renal insufficiency


Work-up in progress


Continue antibiotics as per infectious disease


DAILY ESTIMATED NEEDS:


Needs based on Critical care, wounds; 65.9kg 


22-30  kcals/kg 


1450- 1977  total kcals


1.25-2  g protein/kg


  g total protein 


25-30  mL/kg


1648- 1977  total fluid mLs





NUTRITION DIAGNOSIS:


1) Increased kcal and pro needs r/t wound healing AEB pt adm w/ sacral


wound stage 1, r heel DTI- WC eval pending


2) Swallowing difficulty r/t resp status as evidenced by pt is s/p trach


and PEG.





CURRENT TF:Glucerna 1.2 @ 60mL/hr x 24 hrs- held for scan  








ENTERAL NUTRITION RECOMMENDATIONS:


Glucerna 1.2 @60mL/hr x 24 hrs  to provide 1440 mL, 1728kcal, 86g pro, 1159mL 

free h2o 





- Maintain Glucerna 1.2 @ goal as tolerated to meet 100% est needs


- HOB >30 degrees


- Flush per MD: pt w/ CHF











ADDITIONAL RECOMMENDATIONS:


1) Maintain calibrated bedscale wt 


2) Rec bowel regimen- pt adm w/ impaction 


3) Wound healing: Rec Zaki in 4oz H2O BID via PEG 


        + Vit C 250mg qdaily 


4) Monitor lytes- (mg 3.7) 


5) F/up w/ H&P  





(5) Abnormal LFTs


Assessment & Plan:  Leukocytosis, abnormal labs, elevated LFTs.  Renal 

insufficiency.


Ultrasound ordered of the abdomen.


Trend LFTs 


monitor meds


Exam fairly benign no Ruiz's


We will follow with serial exams














Radhames Blas Nov 27, 2019 19:00

## 2019-11-27 NOTE — NUR
*-* INSURANCE *-*



AVAILABLE CLINICALS HAVE BEEN FAXED TO:





CM: Lance

#[phone]

Fax#[phone]

REF# [id number]

## 2019-11-27 NOTE — INFECTIOUS DISEASES PROG NOTE
Assessment/Plan


Assessment/Plan


A;


1. Pulmonary edema/ Possible pneumonia.


2. ovarian mass.


3. Leukocytosis, improving


4. Respiratory failure, VDRF


5. Renal failure, CKD


6. Elevated liver function tests.


7. anemia





PLAN:


1. Continue Zosyn.


2.  HIDA scan: negative





Subjective


ROS Limited/Unobtainable:  Yes


Constitutional:  Denies: fever


Allergies:  


Coded Allergies:  


     No Known Allergies (Unverified , 10/14/19)





Objective


Vital Signs





Last 24 Hour Vital Signs








  Date Time  Temp Pulse Resp B/P (MAP) Pulse Ox O2 Delivery O2 Flow Rate FiO2


 


11/27/19 08:00 97.5 73 23 116/58 (77) 100   


 


11/27/19 06:45  71 18     35


 


11/27/19 06:13    128/69    


 


11/27/19 05:13  79 26     35


 


11/27/19 04:00      Mechanical Ventilator  


 


11/27/19 04:00 98.2 77 20 130/66 (87) 99   


 


11/27/19 04:00        35


 


11/27/19 03:27  68      


 


11/27/19 03:06  72 18     35


 


11/27/19 00:32  79 19     35


 


11/27/19 00:00        35


 


11/27/19 00:00      Mechanical Ventilator  


 


11/27/19 00:00  72      


 


11/27/19 00:00 98.0 77 23 126/64 (84) 100   


 


11/26/19 22:48  76 18     35


 


11/26/19 21:09  81 18     35


 


11/26/19 20:42  72  105/65    


 


11/26/19 20:00      Mechanical Ventilator  


 


11/26/19 20:00 97.6 72 16 105/54 (71) 100   


 


11/26/19 20:00        35


 


11/26/19 19:35  71      


 


11/26/19 18:46  77 18     35


 


11/26/19 18:25    108/55    


 


11/26/19 17:11  77 18     35


 


11/26/19 16:00 97.3 63 20 120/61 (80) 98   


 


11/26/19 16:00        35


 


11/26/19 16:00      Mechanical Ventilator  


 


11/26/19 15:27  78      


 


11/26/19 15:12  75 18     35


 


11/26/19 12:39  78 16     35


 


11/26/19 12:23    122/54    


 


11/26/19 12:00      Mechanical Ventilator  


 


11/26/19 12:00 97.3 79 18 100/58 (72) 99   


 


11/26/19 12:00        35


 


11/26/19 11:38  77      


 


11/26/19 11:04  82 18     35


 


11/26/19 10:15  71  101/51    


 


11/26/19 09:15  78 18     35








Height (Feet):  5


Height (Inches):  6.00


Weight (Pounds):  169


General Appearance:  no acute distress


HEENT:  status post trach


Respiratory/Chest:  lungs clear, other - onm ventilator


Cardiovascular:  normal rate, other - R arm PICC line


Abdomen:  soft, non tender, other - GT feeding


Extremities:  no edema


Neurologic/Psychiatric:  unresponsiveness, aphasia





Microbiology








 Date/Time


Source Procedure


Growth Status


 


 


 11/25/19 20:45


Blood Blood Culture - Preliminary


NO GROWTH AFTER 24 HOURS Resulted


 


 11/25/19 20:30


Blood Blood Culture - Preliminary


NO GROWTH AFTER 24 HOURS Resulted


 


 11/25/19 20:30


Urine,Clean Catch Urine Culture - Preliminary


NO GROWTH AFTER 24 HOURS Resulted


 


 11/25/19 22:40


Rectum  Received











Laboratory Tests








Test


  11/26/19


18:15 11/27/19


00:20 11/27/19


03:00


 


White Blood Count


  15.2 K/UL


(4.8-10.8)  H 


  12.1 K/UL


(4.8-10.8)  H


 


Red Blood Count


  3.19 M/UL


(4.20-5.40)  L 


  2.96 M/UL


(4.20-5.40)  L


 


Hemoglobin


  8.9 G/DL


(12.0-16.0)  L 


  8.3 G/DL


(12.0-16.0)  L


 


Hematocrit


  26.1 %


(37.0-47.0)  L 


  24.4 %


(37.0-47.0)  L


 


Mean Corpuscular Volume 82 FL (80-99)    82 FL (80-99)  


 


Mean Corpuscular Hemoglobin


  27.9 PG


(27.0-31.0) 


  27.9 PG


(27.0-31.0)


 


Mean Corpuscular Hemoglobin


Concent 34.0 G/DL


(32.0-36.0) 


  33.8 G/DL


(32.0-36.0)


 


Red Cell Distribution Width


  14.3 %


(11.6-14.8) 


  14.5 %


(11.6-14.8)


 


Platelet Count


  288 K/UL


(150-450) 


  266 K/UL


(150-450)


 


Mean Platelet Volume


  9.6 FL


(6.5-10.1) 


  9.0 FL


(6.5-10.1)


 


Neutrophils (%) (Auto)


  % (45.0-75.0)


  


  83.4 %


(45.0-75.0)  H


 


Lymphocytes (%) (Auto)


  % (20.0-45.0)


  


  7.0 %


(20.0-45.0)  L


 


Monocytes (%) (Auto)


   % (1.0-10.0)  


  


  4.1 %


(1.0-10.0)


 


Eosinophils (%) (Auto)


   % (0.0-3.0)  


  


  4.5 %


(0.0-3.0)  H


 


Basophils (%) (Auto)


   % (0.0-2.0)  


  


  1.1 %


(0.0-2.0)


 


Differential Total Cells


Counted 100  


  


  


 


 


Neutrophils % (Manual) 86 % (45-75)  H  


 


Lymphocytes % (Manual) 7 % (20-45)  L  


 


Monocytes % (Manual) 4 % (1-10)    


 


Eosinophils % (Manual) 2 % (0-3)    


 


Basophils % (Manual) 1 % (0-2)    


 


Band Neutrophils 0 % (0-8)    


 


Platelet Estimate Adequate    


 


Platelet Morphology Normal    


 


Hypochromasia 1+    


 


Anisocytosis 1+    


 


Sodium Level


  141 MMOL/L


(136-145) 


  144 MMOL/L


(136-145)


 


Potassium Level


  3.5 MMOL/L


(3.5-5.1) 


  3.4 MMOL/L


(3.5-5.1)  L


 


Chloride Level


  104 MMOL/L


() 


  106 MMOL/L


()


 


Carbon Dioxide Level


  26 MMOL/L


(21-32) 


  27 MMOL/L


(21-32)


 


Anion Gap


  11 mmol/L


(5-15) 


  11 mmol/L


(5-15)


 


Blood Urea Nitrogen


  101 mg/dL


(7-18)  H 


  94 mg/dL


(7-18)  H


 


Creatinine


  1.6 MG/DL


(0.55-1.30)  H 


  1.5 MG/DL


(0.55-1.30)  H


 


Estimat Glomerular Filtration


Rate 32.3 mL/min


(>60) 


  34.7 mL/min


(>60)


 


Glucose Level


  87 MG/DL


() 


  126 MG/DL


()  H


 


Calcium Level


  8.2 MG/DL


(8.5-10.1)  L 


  8.7 MG/DL


(8.5-10.1)


 


Magnesium Level


  3.3 MG/DL


(1.8-2.4)  H 


  


 


 


Troponin I


  2.028 ng/mL


(0.000-0.056) 1.685 ng/mL


(0.000-0.056) 


 


 


Erythrocyte Sedimentation Rate


  


  


  128 MM/HR


(0-30)  H


 


Prothrombin Time


  


  


  11.6 SEC


(9.30-11.50)  H


 


Prothromb Time International


Ratio 


  


  1.1 (0.9-1.1)  


 


 


Activated Partial


Thromboplast Time 


  


  26 SEC (23-33)


 


 


Iron Level


  


  


  26 ug/dL


()  L


 


Total Iron Binding Capacity


  


  


  161 ug/dL


(250-450)  L


 


Percent Iron Saturation   16 % (15-50)  


 


Unsaturated Iron Binding


  


  


  135 ug/dL


(112-346)


 


Total Bilirubin


  


  


  0.8 MG/DL


(0.2-1.0)


 


Aspartate Amino Transf


(AST/SGOT) 


  


  103 U/L


(15-37)  H


 


Alanine Aminotransferase


(ALT/SGPT) 


  


  551 U/L


(12-78)  H


 


Alkaline Phosphatase


  


  


  95 U/L


()


 


C-Reactive Protein,


Quantitative 


  


  7.5 mg/dL


(0.00-0.90)  H


 


Total Protein


  


  


  6.4 G/DL


(6.4-8.2)


 


Albumin


  


  


  1.9 G/DL


(3.4-5.0)  L


 


Globulin   4.5 g/dL  


 


Albumin/Globulin Ratio


  


  


  0.4 (1.0-2.7)


L


 


Triglycerides Level


  


  


  362 MG/DL


()  H


 


Cholesterol Level


  


  


  179 MG/DL (<


200)


 


LDL Cholesterol


  


  


  103 mg/dL


(<100)  H


 


HDL Cholesterol


  


  


  25 MG/DL


(40-60)  L


 


Cholesterol/HDL Ratio


  


  


  7.2 (3.3-4.4)


H


 


Amylase Level


  


  


  41 U/L


()


 


Lipase


  


  


  96 U/L


()











Current Medications








 Medications


  (Trade)  Dose


 Ordered  Sig/Winnie


 Route


 PRN Reason  Start Time


 Stop Time Status Last Admin


Dose Admin


 


 Ascorbic Acid


  (Vitamin C)  250 mg  DAILY


 ORAL


   11/27/19 09:00


 12/27/19 08:59   


 


 


 Chlorhexidine


 Gluconate


  (Mae-Hex 2%)  1 applic  DAILY@2000


 TOPIC


   11/26/19 20:00


 12/26/19 19:59  11/26/19 20:42


 


 


 Dextrose


  (Dextrose 50%)  25 ml  Q30M  PRN


 IV


 Hypoglycemia  11/26/19 10:00


 12/26/19 09:59   


 


 


 Dextrose


  (Dextrose 50%)  50 ml  Q30M  PRN


 IV


 Hypoglycemia  11/26/19 10:00


 12/26/19 09:59   


 


 


 Epoetin Olaf


  (Epoetin


 Olaf(ESRD on


 dialysis))  10,000 unit  MON-WED-FRI


 SUBQ


   11/27/19 21:00


 12/27/19 20:59   


 


 


 Insulin Aspart


  (NovoLOG)    BEFORE MEALS AND  HS


 SUBQ


   11/26/19 11:30


 12/26/19 11:29  11/27/19 06:15


 


 


 Lorazepam


  (Ativan 2mg/ml


 1ml)  1 mg  Q3H  PRN


 IV


 For Seizures  11/27/19 04:30


 12/4/19 04:29   


 


 


 Metoprolol


 Tartrate


  (Lopressor)  25 mg  Q12HR


 GT


   11/26/19 10:15


 12/26/19 10:14   


 


 


 Nitroglycerin


  (Nitro-Bid)  1 inch  TID@0600,1200,1800


 TOPIC


   11/26/19 12:00


 12/26/19 11:59  11/27/19 06:13


 


 


 Piperacillin Sod/


 Tazobactam Sod


 3.375 gm/Sodium


 Chloride  110 ml @ 


 27.5 mls/hr  EVERY 8  HOURS


 IVPB


   11/26/19 14:00


 12/1/19 13:59  11/27/19 06:11


 

















Renny Garay MD Nov 27, 2019 09:05

## 2019-11-27 NOTE — NUR
NURSE NOTES:

Report received from Marley OVIEDO RN. Observed pt sleeping in the bed. SR on cardiac monitor. 
Trach to vent, Shiley 8, AC 18, , FIO2 35%, PEEP 5, tolerating well with current vent 
setting. GT in tact and running Glucerna 1.2 at 40cc/hr. F/C intact, draining yellow urine. 
R UA PICC, intact and TKO. Bed in the lowest position. Side rails up and padded x3. Will 
continue to monitor.

## 2019-11-27 NOTE — DIAGNOSTIC IMAGING REPORT
Indications: Abnormal liver function tests, history of gallstone

 

Technique: IV administration 5.9 mCi 99 M technetium Choletec. Serial images obtained

over the abdomen for one hour

 

Comparison: None

 

Findings: Prompt tracer uptake within the liver. Extrahepatic bile ducts are seen at

10 minutes. Excretion into the duodenum demonstrated at 60 minutes.  Gallbladder

visualized at   10 minutes.

 

Impression: Negative

## 2019-11-27 NOTE — PULMONOLOGY PROGRESS NOTE
Assessment/Plan


Assessment/Plan


Pulmonary Progress Note





HPI:  This is an unfortunate 64-year-old female, admiytted with abnormal labs.  


The patient noted to have elevated troponin, acute renal failure and 

leukocytosis, possible sepsis.


The patient with recent discharge after prolonged hospital stay.





PAST MEDICAL HISTORY:  Notable for chronic respiratory failure, history of


sepsis, history of pneumonia, history of bone marrow biopsy, seizures,


diabetes, schizophrenia, anemia, CHF, hypertension, renal failure,


ventilator dependence.





PHYSICAL EXAMINATION:


VITAL SIGNS NOTED





GENERAL:  Well-appearing female, chronically debilitated, chronically


obtunded.


HEENT:  Negative.  Tracheostomy in place.


LUNGS:  With coarse breath sounds.  Reduced breath sounds at both bases.


CARDIAC:  S1, S2.  Regular rate and rhythm.


ABDOMEN:  Soft.  G-tube in place.


EXTREMITIES:  With noted edema.


NEUROLOGICAL:  Poorly responsive.





LABORATORY DATA:  Reviewed.


CXR: Noted





IMPRESSION:  Acute MI, elevated troponin, possible non-STEMI, hypertension


per history, congestive heart failure, elevated natriuretic peptide, acute


on chronic renal failure, diabetes, psychiatric disorder, chronic


encephalopathy, seizure disorder, leukocytosis.





RECOMMENDATIONS:  Supportive care.  IV antibiotics.  Cardiology evaluation.


Renal evaluation.  Transfuse with noted anemia.  Hematology followup


PTA medications.  Prognosis is poor.  We will try to stabilize and discharge.





Subjective


ROS Limited/Unobtainable:  No


Allergies:  


Coded Allergies:  


     No Known Allergies (Unverified , 10/14/19)





Objective





Last 24 Hour Vital Signs








  Date Time  Temp Pulse Resp B/P (MAP) Pulse Ox O2 Delivery O2 Flow Rate FiO2


 


11/27/19 12:36  77 18     35


 


11/27/19 12:17    116/58    


 


11/27/19 12:00 97.9 78 19 116/57 (76) 100   


 


11/27/19 12:00  76      


 


11/27/19 12:00      Mechanical Ventilator  


 


11/27/19 12:00        35


 


11/27/19 10:49  77 18     35


 


11/27/19 09:18  69 18     35


 


11/27/19 09:17  73  116/58    


 


11/27/19 09:00        35


 


11/27/19 08:00 97.5 73 23 116/58 (77) 100   


 


11/27/19 08:00      Mechanical Ventilator  


 


11/27/19 07:43  77      


 


11/27/19 06:45  71 18     35


 


11/27/19 06:13    128/69    


 


11/27/19 05:13  79 26     35


 


11/27/19 04:00      Mechanical Ventilator  


 


11/27/19 04:00 98.2 77 20 130/66 (87) 99   


 


11/27/19 04:00        35


 


11/27/19 03:27  68      


 


11/27/19 03:06  72 18     35


 


11/27/19 00:32  79 19     35


 


11/27/19 00:00        35


 


11/27/19 00:00      Mechanical Ventilator  


 


11/27/19 00:00  72      


 


11/27/19 00:00 98.0 77 23 126/64 (84) 100   


 


11/26/19 22:48  76 18     35


 


11/26/19 21:09  81 18     35


 


11/26/19 20:42  72  105/65    


 


11/26/19 20:00      Mechanical Ventilator  


 


11/26/19 20:00 97.6 72 16 105/54 (71) 100   


 


11/26/19 20:00        35


 


11/26/19 19:35  71      


 


11/26/19 18:46  77 18     35


 


11/26/19 18:25    108/55    


 


11/26/19 17:11  77 18     35


 


11/26/19 16:00 97.3 63 20 120/61 (80) 98   


 


11/26/19 16:00        35


 


11/26/19 16:00      Mechanical Ventilator  


 


11/26/19 15:27  78      


 


11/26/19 15:12  75 18     35

















Intake and Output  


 


 11/26/19 11/27/19





 19:00 07:00


 


Intake Total 275.0 ml 562.458 ml


 


Output Total 650 ml 500 ml


 


Balance -375.0 ml 62.458 ml


 


  


 


Intake Free Water 50 ml 150 ml


 


IV Total 110.0 ml 132.458 ml


 


Tube Feeding 115 ml 280 ml


 


Output Urine Total 650 ml 500 ml


 


# Bowel Movements 2 











Microbiology








 Date/Time


Source Procedure


Growth Status


 


 


 11/25/19 20:45


Blood Blood Culture - Preliminary


NO GROWTH AFTER 24 HOURS Resulted


 


 11/25/19 20:30


Blood Blood Culture - Preliminary


NO GROWTH AFTER 24 HOURS Resulted





 11/27/19 00:45


Sputum Induced Gram Stain - Final Resulted


 


 11/27/19 00:45


Sputum Induced Sputum Culture


Pending Resulted





 11/26/19 17:45


Sputum Gram Stain - Final Resulted


 


 11/26/19 17:45


Sputum Sputum Culture


Pending Resulted


 


 11/25/19 20:30


Urine,Clean Catch Urine Culture - Preliminary


NO GROWTH AFTER 24 HOURS Resulted


 


 11/25/19 22:40


Rectum  Received








Laboratory Tests


11/26/19 18:15: 


White Blood Count 15.2H, Red Blood Count 3.19L, Hemoglobin 8.9L, Hematocrit 

26.1L, Mean Corpuscular Volume 82, Mean Corpuscular Hemoglobin 27.9, Mean 

Corpuscular Hemoglobin Concent 34.0, Red Cell Distribution Width 14.3, Platelet 

Count 288, Mean Platelet Volume 9.6, Neutrophils (%) (Auto) , Lymphocytes (%) (

Auto) , Monocytes (%) (Auto) , Eosinophils (%) (Auto) , Basophils (%) (Auto) , 

Differential Total Cells Counted 100, Neutrophils % (Manual) 86H, Lymphocytes % 

(Manual) 7L, Monocytes % (Manual) 4, Eosinophils % (Manual) 2, Basophils % (

Manual) 1, Band Neutrophils 0, Platelet Estimate Adequate, Platelet Morphology 

Normal, Hypochromasia 1+, Anisocytosis 1+, Sodium Level 141, Potassium Level 3.5

, Chloride Level 104, Carbon Dioxide Level 26, Anion Gap 11, Blood Urea 

Nitrogen 101H, Creatinine 1.6H, Estimat Glomerular Filtration Rate 32.3, 

Glucose Level 87, Calcium Level 8.2L, Magnesium Level 3.3H, Troponin I 2.028H


11/27/19 00:20: Troponin I 1.685H


11/27/19 03:00: 


White Blood Count 12.1H, Red Blood Count 2.96L, Hemoglobin 8.3L, Hematocrit 

24.4L, Mean Corpuscular Volume 82, Mean Corpuscular Hemoglobin 27.9, Mean 

Corpuscular Hemoglobin Concent 33.8, Red Cell Distribution Width 14.5, Platelet 

Count 266, Mean Platelet Volume 9.0, Neutrophils (%) (Auto) 83.4H, Lymphocytes (

%) (Auto) 7.0L, Monocytes (%) (Auto) 4.1, Eosinophils (%) (Auto) 4.5H, 

Basophils (%) (Auto) 1.1, Sodium Level 144, Potassium Level 3.4L, Chloride 

Level 106, Carbon Dioxide Level 27, Anion Gap 11, Blood Urea Nitrogen 94H, 

Creatinine 1.5H, Estimat Glomerular Filtration Rate 34.7, Glucose Level 126H, 

Calcium Level 8.7, Erythrocyte Sedimentation Rate 128H, Prothrombin Time 11.6H, 

Prothromb Time International Ratio 1.1, Activated Partial Thromboplast Time 26, 

Iron Level 26L, Total Iron Binding Capacity 161L, Percent Iron Saturation 16, 

Unsaturated Iron Binding 135, Total Bilirubin 0.8, Aspartate Amino Transf (AST/

SGOT) 103H, Alanine Aminotransferase (ALT/SGPT) 551H, Alkaline Phosphatase 95, C

-Reactive Protein, Quantitative 7.5H, Total Protein 6.4, Albumin 1.9L, Globulin 

4.5, Albumin/Globulin Ratio 0.4L, Triglycerides Level 362H, Cholesterol Level 

179, LDL Cholesterol 103H, HDL Cholesterol 25L, Cholesterol/HDL Ratio 7.2H, 

Amylase Level 41, Lipase 96





Current Medications








 Medications


  (Trade)  Dose


 Ordered  Sig/Winnie


 Route


 PRN Reason  Start Time


 Stop Time Status Last Admin


Dose Admin


 


 Ascorbic Acid


  (Vitamin C)  250 mg  DAILY


 ORAL


   11/27/19 09:00


 12/27/19 08:59  11/27/19 09:17


 


 


 Chlorhexidine


 Gluconate


  (Mae-Hex 2%)  1 applic  DAILY@2000


 TOPIC


   11/26/19 20:00


 12/26/19 19:59  11/26/19 20:42


 


 


 Dextrose


  (Dextrose 50%)  25 ml  Q30M  PRN


 IV


 Hypoglycemia  11/26/19 10:00


 12/26/19 09:59   


 


 


 Dextrose


  (Dextrose 50%)  50 ml  Q30M  PRN


 IV


 Hypoglycemia  11/26/19 10:00


 12/26/19 09:59   


 


 


 Epoetin Olaf


  (Epoetin


 Olaf-EPBX(NON


 ESRD))  10,000 unit  MON-WED-FRI


 SUBQ


   11/27/19 21:00


 12/27/19 20:59   


 


 


 Insulin Aspart


  (NovoLOG)    BEFORE MEALS AND  HS


 SUBQ


   11/26/19 11:30


 12/26/19 11:29  11/27/19 12:20


 


 


 Lorazepam


  (Ativan 2mg/ml


 1ml)  1 mg  Q3H  PRN


 IV


 For Seizures  11/27/19 04:30


 12/4/19 04:29   


 


 


 Metoprolol


 Tartrate


  (Lopressor)  25 mg  Q12HR


 GT


   11/26/19 10:15


 12/26/19 10:14  11/27/19 09:17


 


 


 Nitroglycerin


  (Nitro-Bid)  1 inch  TID@0600,1200,1800


 TOPIC


   11/26/19 12:00


 12/26/19 11:59  11/27/19 12:17


 


 


 Piperacillin Sod/


 Tazobactam Sod


 3.375 gm/Sodium


 Chloride  110 ml @ 


 27.5 mls/hr  EVERY 8  HOURS


 IVPB


   11/26/19 14:00


 12/1/19 13:59  11/27/19 06:11


 

















Delmer Main MD Nov 27, 2019 13:45

## 2019-11-27 NOTE — NUR
NURSE NOTES:

notified Doctor on call for the Dr. Hardin regarding potassium 3.4. and received order 
potassium 30meq via G tube once. will carry on.

## 2019-11-27 NOTE — NUR
CASE MANAGEMENT:  REVIEW



11/27/19



SI: CHF . ANEMIA . ACUTE RENAL FAILURE . PLEURAL EFFUSION . ELEVATED TROP

97.5   73   23   116/58   100% MECH VENT;  FIO2 35

TROP 1.685  WBC 12.1  RBC 2.96  H/H 8.3/24.4 K+3.4 BUN 94 CREAT 1.5    





IS:     IV ZOSYN Q8HR

NITRO-BID TOP TID

NOVOLOG SQ AC&HS

LOPRESSOR GT BID



\**: 2W STEP DOWN UNIT



DCP: RETURN TO [facility] WHEN MEDICALLY STABLE 



PLAN:

VENOUS DUPLEX

SP CX-PENDING


-------------------------------------------------------------------------------

Addendum: 11/27/19 at 1534 by ROCIO BLACK LVN

-------------------------------------------------------------------------------

CASE MANAGEMENT:  REVIEW



11/27/19



SI: ACUTE MI . CHF . ANEMIA . ACUTE RENAL FAILURE . PLEURAL EFFUSION . ELEVATED TROP

97.5   73   23   116/58   100% MECH VENT;  FIO2 35

TROP 1.685  WBC 12.1  RBC 2.96  H/H 8.3/24.4 K+3.4 BUN 94 CREAT 1.5    





IS:     IV ZOSYN Q8HR

NITRO-BID TOP TID

NOVOLOG SQ AC&HS

LOPRESSOR GT BID



\**: 2W STEP DOWN UNIT



DCP: RETURN TO [facility] WHEN MEDICALLY STABLE 



PLAN:

VENOUS DUPLEX

SP CX-PENDING

## 2019-11-28 VITALS — DIASTOLIC BLOOD PRESSURE: 61 MMHG | SYSTOLIC BLOOD PRESSURE: 118 MMHG

## 2019-11-28 VITALS — DIASTOLIC BLOOD PRESSURE: 62 MMHG | SYSTOLIC BLOOD PRESSURE: 144 MMHG

## 2019-11-28 VITALS — DIASTOLIC BLOOD PRESSURE: 63 MMHG | SYSTOLIC BLOOD PRESSURE: 119 MMHG

## 2019-11-28 VITALS — SYSTOLIC BLOOD PRESSURE: 125 MMHG | DIASTOLIC BLOOD PRESSURE: 65 MMHG

## 2019-11-28 VITALS — DIASTOLIC BLOOD PRESSURE: 63 MMHG | SYSTOLIC BLOOD PRESSURE: 139 MMHG

## 2019-11-28 VITALS — SYSTOLIC BLOOD PRESSURE: 121 MMHG | DIASTOLIC BLOOD PRESSURE: 58 MMHG

## 2019-11-28 LAB
ADD MANUAL DIFF: NO
ANION GAP SERPL CALC-SCNC: 10 MMOL/L (ref 5–15)
BASOPHILS NFR BLD AUTO: 1 % (ref 0–2)
BUN SERPL-MCNC: 73 MG/DL (ref 7–18)
CALCIUM SERPL-MCNC: 8 MG/DL (ref 8.5–10.1)
CHLORIDE SERPL-SCNC: 110 MMOL/L (ref 98–107)
CO2 SERPL-SCNC: 26 MMOL/L (ref 21–32)
CREAT SERPL-MCNC: 1.5 MG/DL (ref 0.55–1.3)
EOSINOPHIL NFR BLD AUTO: 6.3 % (ref 0–3)
ERYTHROCYTE [DISTWIDTH] IN BLOOD BY AUTOMATED COUNT: 15.1 % (ref 11.6–14.8)
HCT VFR BLD CALC: 25 % (ref 37–47)
HGB BLD-MCNC: 8.2 G/DL (ref 12–16)
LYMPHOCYTES NFR BLD AUTO: 9.2 % (ref 20–45)
MCV RBC AUTO: 85 FL (ref 80–99)
MONOCYTES NFR BLD AUTO: 4.5 % (ref 1–10)
NEUTROPHILS NFR BLD AUTO: 78.9 % (ref 45–75)
PLATELET # BLD: 261 K/UL (ref 150–450)
POTASSIUM SERPL-SCNC: 3.9 MMOL/L (ref 3.5–5.1)
RBC # BLD AUTO: 2.95 M/UL (ref 4.2–5.4)
SODIUM SERPL-SCNC: 146 MMOL/L (ref 136–145)
WBC # BLD AUTO: 11.4 K/UL (ref 4.8–10.8)

## 2019-11-28 RX ADMIN — NITROGLYCERIN SCH INCH: 20 OINTMENT TOPICAL at 05:12

## 2019-11-28 RX ADMIN — SODIUM CHLORIDE AND POTASSIUM CHLORIDE SCH MLS/HR: 4.5; 1.49 INJECTION, SOLUTION INTRAVENOUS at 17:50

## 2019-11-28 RX ADMIN — SODIUM CHLORIDE AND POTASSIUM CHLORIDE SCH MLS/HR: 4.5; 1.49 INJECTION, SOLUTION INTRAVENOUS at 03:38

## 2019-11-28 RX ADMIN — Medication SCH MG: at 09:04

## 2019-11-28 RX ADMIN — INSULIN ASPART SCH UNITS: 100 INJECTION, SOLUTION INTRAVENOUS; SUBCUTANEOUS at 05:15

## 2019-11-28 RX ADMIN — DEXTROSE MONOHYDRATE SCH MLS/HR: 50 INJECTION, SOLUTION INTRAVENOUS at 21:07

## 2019-11-28 RX ADMIN — NITROGLYCERIN SCH INCH: 20 OINTMENT TOPICAL at 11:48

## 2019-11-28 RX ADMIN — METOPROLOL TARTRATE SCH MG: 25 TABLET, FILM COATED ORAL at 09:04

## 2019-11-28 RX ADMIN — INSULIN ASPART SCH UNITS: 100 INJECTION, SOLUTION INTRAVENOUS; SUBCUTANEOUS at 18:28

## 2019-11-28 RX ADMIN — DEXTROSE MONOHYDRATE SCH MLS/HR: 50 INJECTION, SOLUTION INTRAVENOUS at 05:12

## 2019-11-28 RX ADMIN — NITROGLYCERIN SCH INCH: 20 OINTMENT TOPICAL at 18:29

## 2019-11-28 RX ADMIN — DEXTROSE MONOHYDRATE SCH MLS/HR: 50 INJECTION, SOLUTION INTRAVENOUS at 13:54

## 2019-11-28 RX ADMIN — INSULIN ASPART SCH UNITS: 100 INJECTION, SOLUTION INTRAVENOUS; SUBCUTANEOUS at 12:30

## 2019-11-28 RX ADMIN — CHLORHEXIDINE GLUCONATE SCH APPLIC: 213 SOLUTION TOPICAL at 20:05

## 2019-11-28 RX ADMIN — METOPROLOL TARTRATE SCH MG: 25 TABLET, FILM COATED ORAL at 21:08

## 2019-11-28 NOTE — NUR
NURSE NOTES:

Received report from ELIZABETH Rachel. Pt is stable, SR, obtunded.  No signs or symptoms of distress 
or pain noted at this time. GT running Glucerna 1.2 @60ml/hr. GT site is intact, patent, and 
asymptomatic. Right upper arm PICC is intact, patent, and asymptomatic; running 0.45 NS KCL 
20 meq @75ml/hr. Pt appears to be resting comfortably at this time. Will continue care plan.

## 2019-11-28 NOTE — GENERAL PROGRESS NOTE
Assessment/Plan


Assessment/Plan:


IMPRESSION:  Acute MI, elevated troponin, possible non-STEMI, hypertension


per history, congestive heart failure, elevated natriuretic peptide, acute


on chronic renal failure, diabetes, psychiatric disorder, chronic


encephalopathy, seizure disorder, leukocytosis.








PLAN


care as is


vent


renal, ID, cards


monitor 


antibiotics'


stabilize


impression, plan, and exam edited and reviewed in detail


care discussed with RN





Subjective


ROS Limited/Unobtainable:  Yes


Allergies:  


Coded Allergies:  


     No Known Allergies (Unverified , 10/14/19)





Objective





Last 24 Hour Vital Signs








  Date Time  Temp Pulse Resp B/P (MAP) Pulse Ox O2 Delivery O2 Flow Rate FiO2


 


11/28/19 09:04  81  139/63    


 


11/28/19 08:50  81 23     35


 


11/28/19 08:26        35


 


11/28/19 08:00 97.7 78 18 139/63 (88) 99   


 


11/28/19 08:00      Mechanical Ventilator  


 


11/28/19 07:44  79      


 


11/28/19 07:08  80 22     35


 


11/28/19 05:28  74 20     35


 


11/28/19 05:12    120/56    


 


11/28/19 04:00        35


 


11/28/19 04:00  72      


 


11/28/19 04:00      Mechanical Ventilator  


 


11/28/19 04:00 98.1 75 22 121/58 (79) 99   


 


11/28/19 03:16  72 18     35


 


11/28/19 01:09  77 21     35


 


11/28/19 00:00 97.9 70 18 118/61 (80) 99   


 


11/28/19 00:00      Mechanical Ventilator  


 


11/28/19 00:00  69      


 


11/28/19 00:00        35


 


11/27/19 23:08  73 19     35


 


11/27/19 21:22  72 19     35


 


11/27/19 20:37  74  119/58    


 


11/27/19 20:00      Mechanical Ventilator  


 


11/27/19 20:00 97.7 74 18 119/58 (78) 99   


 


11/27/19 20:00  72      


 


11/27/19 20:00        35


 


11/27/19 19:17  73 18     35


 


11/27/19 17:42    119/58    


 


11/27/19 16:48  72 20     35


 


11/27/19 16:00  70      


 


11/27/19 16:00 97.5 78 24 119/58 (78) 99   


 


11/27/19 16:00        35


 


11/27/19 16:00      Mechanical Ventilator  


 


11/27/19 15:10  71 19     35


 


11/27/19 12:36  77 18     35


 


11/27/19 12:17    116/58    


 


11/27/19 12:00 97.9 78 19 116/57 (76) 100   


 


11/27/19 12:00  76      


 


11/27/19 12:00      Mechanical Ventilator  


 


11/27/19 12:00        35


 


11/27/19 10:49  77 18     35

















Intake and Output  


 


 11/27/19 11/28/19





 19:00 07:00


 


Intake Total 670.0 ml 1240.0 ml


 


Output Total 700 ml 600 ml


 


Balance -30.0 ml 640.0 ml


 


  


 


Intake Free Water 150 ml 190 ml


 


IV Total 110.0 ml 390.0 ml


 


Tube Feeding 410 ml 660 ml


 


Output Urine Total 700 ml 600 ml








Laboratory Tests


11/28/19 04:00: 


White Blood Count 11.4H, Red Blood Count 2.95L, Hemoglobin 8.2L, Hematocrit 

25.0L, Mean Corpuscular Volume 85, Mean Corpuscular Hemoglobin 27.7, Mean 

Corpuscular Hemoglobin Concent 32.7, Red Cell Distribution Width 15.1H, 

Platelet Count 261, Mean Platelet Volume 8.8, Neutrophils (%) (Auto) 78.9H, 

Lymphocytes (%) (Auto) 9.2L, Monocytes (%) (Auto) 4.5, Eosinophils (%) (Auto) 

6.3H, Basophils (%) (Auto) 1.0, Sodium Level 146H, Potassium Level 3.9, 

Chloride Level 110H, Carbon Dioxide Level 26, Anion Gap 10, Blood Urea Nitrogen 

73H, Creatinine 1.5H, Estimat Glomerular Filtration Rate 34.7, Glucose Level 242

#H, Calcium Level 8.0L


Height (Feet):  5


Height (Inches):  6.00


Weight (Pounds):  175


Objective


WDWN


NAD


trach, GT


clear breath sounds bilaterally without rhonchi or wheeze


[id number] without MRG


NABS nontender no HSM


no CC


mild edema


nonfocal











Steven Gonzalez MD Nov 28, 2019 10:36

## 2019-11-28 NOTE — NUR
NURSE NOTES:

Pt in low position, HOB above 30 degrees due to G-Tube feeding of glucerna 1.2 at  65/hr, pt 
was turned with night shift nurse, pt requires repositioning Q2 or less, pt has no 
Orientation, non-verbal, Picc line on RT UA 2 lumen asymptomatic and patent, pt receiving 
1/2 NS with 20mEq of K at 75/hr, labs are WNL, pt has a casas which shows its draining urine 
yellow to clear in color, sacral dressing intact, no s/s of distress or SOB, call light at 
bedside, bed alarm on with two rails up for safety. and troponin levels trending down.

## 2019-11-28 NOTE — SURGERY PROGRESS NOTE
Surgery Progress Note


Subjective


Additional Comments


labs noted


exam stable


ill appearing in CHARLIE





Objective





Last 24 Hour Vital Signs








  Date Time  Temp Pulse Resp B/P (MAP) Pulse Ox O2 Delivery O2 Flow Rate FiO2


 


11/28/19 12:01        35


 


11/28/19 12:01 98.1 74 18 119/63 (81) 99   


 


11/28/19 11:59      Mechanical Ventilator  


 


11/28/19 11:48    139/63    


 


11/28/19 11:00  76 22     35


 


11/28/19 09:04  81  139/63    


 


11/28/19 08:50  81 23     35


 


11/28/19 08:26        35


 


11/28/19 08:00 97.7 78 18 139/63 (88) 99   


 


11/28/19 08:00      Mechanical Ventilator  


 


11/28/19 07:44  79      


 


11/28/19 07:08  80 22     35


 


11/28/19 05:28  74 20     35


 


11/28/19 05:12    120/56    


 


11/28/19 04:00        35


 


11/28/19 04:00  72      


 


11/28/19 04:00      Mechanical Ventilator  


 


11/28/19 04:00 98.1 75 22 121/58 (79) 99   


 


11/28/19 03:16  72 18     35


 


11/28/19 01:09  77 21     35


 


11/28/19 00:00 97.9 70 18 118/61 (80) 99   


 


11/28/19 00:00      Mechanical Ventilator  


 


11/28/19 00:00  69      


 


11/28/19 00:00        35


 


11/27/19 23:08  73 19     35


 


11/27/19 21:22  72 19     35


 


11/27/19 20:37  74  119/58    


 


11/27/19 20:00      Mechanical Ventilator  


 


11/27/19 20:00 97.7 74 18 119/58 (78) 99   


 


11/27/19 20:00  72      


 


11/27/19 20:00        35


 


11/27/19 19:17  73 18     35


 


11/27/19 17:42    119/58    


 


11/27/19 16:48  72 20     35


 


11/27/19 16:00  70      


 


11/27/19 16:00 97.5 78 24 119/58 (78) 99   


 


11/27/19 16:00        35


 


11/27/19 16:00      Mechanical Ventilator  


 


11/27/19 15:10  71 19     35








I&O











Intake and Output  


 


 11/27/19 11/28/19





 19:00 07:00


 


Intake Total 670.0 ml 1240.0 ml


 


Output Total 700 ml 600 ml


 


Balance -30.0 ml 640.0 ml


 


  


 


Intake Free Water 150 ml 190 ml


 


IV Total 110.0 ml 390.0 ml


 


Tube Feeding 410 ml 660 ml


 


Output Urine Total 700 ml 600 ml








Dressing:  other


Wound:  other


Drains:  other


Cardiovascular:  RSR


Respiratory:  decreased breath sounds


Abdomen:  soft, present bowel sounds


Extremities:  no cyanosis





Laboratory Tests








Test


  11/28/19


04:00


 


White Blood Count


  11.4 K/UL


(4.8-10.8)  H


 


Red Blood Count


  2.95 M/UL


(4.20-5.40)  L


 


Hemoglobin


  8.2 G/DL


(12.0-16.0)  L


 


Hematocrit


  25.0 %


(37.0-47.0)  L


 


Mean Corpuscular Volume 85 FL (80-99)  


 


Mean Corpuscular Hemoglobin


  27.7 PG


(27.0-31.0)


 


Mean Corpuscular Hemoglobin


Concent 32.7 G/DL


(32.0-36.0)


 


Red Cell Distribution Width


  15.1 %


(11.6-14.8)  H


 


Platelet Count


  261 K/UL


(150-450)


 


Mean Platelet Volume


  8.8 FL


(6.5-10.1)


 


Neutrophils (%) (Auto)


  78.9 %


(45.0-75.0)  H


 


Lymphocytes (%) (Auto)


  9.2 %


(20.0-45.0)  L


 


Monocytes (%) (Auto)


  4.5 %


(1.0-10.0)


 


Eosinophils (%) (Auto)


  6.3 %


(0.0-3.0)  H


 


Basophils (%) (Auto)


  1.0 %


(0.0-2.0)


 


Sodium Level


  146 MMOL/L


(136-145)  H


 


Potassium Level


  3.9 MMOL/L


(3.5-5.1)


 


Chloride Level


  110 MMOL/L


()  H


 


Carbon Dioxide Level


  26 MMOL/L


(21-32)


 


Anion Gap


  10 mmol/L


(5-15)


 


Blood Urea Nitrogen


  73 mg/dL


(7-18)  H


 


Creatinine


  1.5 MG/DL


(0.55-1.30)  H


 


Estimat Glomerular Filtration


Rate 34.7 mL/min


(>60)


 


Glucose Level


  242 MG/DL


()  #H


 


Calcium Level


  8.0 MG/DL


(8.5-10.1)  L











Plan


Problems:  


(1) Pressure injury of deep tissue


(2) Pelvic mass in female


Assessment & Plan:  Known history of pelvic mass.  Refer to prior notes from 

last admission.  No further treatment at this time.  Monitor.


Large cystic-appearing mass within the pelvis could be ovarian in nature and 

appears


relatively unchanged from the last examination.


 


Moderate bilateral pleural effusions with associated posterior basal 

atelectasis.


 


Gallstone suspected.


 


Charles catheter. Gastrostomy.


 


Atherosclerotic vascular disease.





(3) Respiratory failure


Assessment & Plan:  Status post tracheostomy last admission.  Trachea site 

evaluated no signs of infection noted.  Leukocytosis unlikely related.  No 

bleeding noted.


Continue with daily trach care and management.  Will monitor.





(4) Leukocytosis


Assessment & Plan:  Leukocytosis.  Fevers.  Abnormal labs.


Abnormal LFTs


Renal insufficiency


Work-up in progress


Continue antibiotics as per infectious disease


DAILY ESTIMATED NEEDS:


Needs based on Critical care, wounds; 65.9kg 


22-30  kcals/kg 


1450- 1977  total kcals


1.25-2  g protein/kg


  g total protein 


25-30  mL/kg


1648- 1977  total fluid mLs





NUTRITION DIAGNOSIS:


1) Increased kcal and pro needs r/t wound healing AEB pt adm w/ sacral


wound stage 1, r heel DTI- WC eval pending


2) Swallowing difficulty r/t resp status as evidenced by pt is s/p trach


and PEG.





CURRENT TF:Glucerna 1.2 @ 60mL/hr x 24 hrs- held for scan  








ENTERAL NUTRITION RECOMMENDATIONS:


Glucerna 1.2 @60mL/hr x 24 hrs  to provide 1440 mL, 1728kcal, 86g pro, 1159mL 

free h2o 





- Maintain Glucerna 1.2 @ goal as tolerated to meet 100% est needs


- HOB >30 degrees


- Flush per MD: pt w/ CHF











ADDITIONAL RECOMMENDATIONS:


1) Maintain calibrated bedscale wt 


2) Rec bowel regimen- pt adm w/ impaction 


3) Wound healing: Rec Zaki in 4oz H2O BID via PEG 


        + Vit C 250mg qdaily 


4) Monitor lytes- (mg 3.7) 


5) F/up w/ H&P  





(5) Abnormal LFTs


Assessment & Plan:  Leukocytosis, abnormal labs, elevated LFTs.  Renal 

insufficiency.


Ultrasound ordered of the abdomen.


Trend LFTs 


monitor meds


Exam fairly benign no Ruiz's


We will follow with serial exams














Radhames Blas Nov 28, 2019 13:09

## 2019-11-28 NOTE — PROGRESS NOTE
DATE:  11/28/2019

CARDIOLOGY PROGRESS NOTE



SUBJECTIVE:  The patient does not appear to have any distress, on

ventilator support.



OBJECTIVE:

VITAL SIGNS:  Blood pressure 139/63, pulse 78, respirations 18, afebrile.

HEENT:  Thin secretions

LUNGS:  Bilateral breath sounds.

CARDIAC:  Regular rhythm and rate.  Normal S1, S2.

ABDOMEN:  Soft.

EXTREMITIES:  Trace edema.



LABORATORY AND DIAGNOSTIC DATA:  Gram-negative bacillus is growing from

sputum.  White count 11.4, hemoglobin 8.2.  Sodium 146, potassium 3.9,

chloride 110, bicarb 26, BUN 72, and creatinine 1.5.



IMPRESSION:

1. Acute myocardial infarction precipitated by severe hypovolemia,

dehydration, and sepsis.

2. Hypovolemia and dehydration with acute renal failure, improved.

3. Hypertriglyceridemia.

4. Respiratory failure with trach.

5. Hypernatremia.

6. Hyperchloremia.

7. Anemia, multifactorial.



PLAN:

1. Hypotonic IV fluids.

2. Maintain beta-blocker and nitrates.

3. Hold anti-platelet therapy due to low hemoglobin level and risk of GI

bleeding.

4. Await stool occult blood test.









  ______________________________________________

  Delmer Hardin M.D. DR:  JODEE

D:  11/28/2019 10:59

T:  11/28/2019 17:26

JOB#:  [id number]

CC:

## 2019-11-28 NOTE — CONSULTATION
DATE OF CONSULTATION:  11/26/2019

CARDIOLOGY CONSULTATION



CONSULTING PHYSICIAN:  Delmer Hardin M.D.



REQUESTING PHYSICIAN:  Steven Gonzalez M.D.



REASON FOR CONSULTATION:  Elevated troponin level.



HISTORY OF PRESENT ILLNESS:  This is a 64-year-old female with multiple

medical problems who is ventilator dependent.  She was sent to the

emergency room because of shortness of breath and congestion, and abnormal

lab studies.  She was also noted to have an elevated troponin level

prompting this consultation.  The patient apparently had some emesis

yesterday and rectal stool noted on KUB.  Abdominal distention followed

and transferred to this hospital.



PAST MEDICAL HISTORY:  From record review and includes hypertension,

congestive heart failure, type 2 diabetes mellitus, seizure disorder,

dysphagia with G-tube, respiratory failure with tracheostomy, and

hyperlipidemia.



ALLERGIES:  None.



MEDICATIONS:  Reviewed and reconciled.



SOCIAL HISTORY:  Not obtainable.



FAMILY HISTORY:  Unknown.



REVIEW OF SYSTEMS:  Not obtainable.  However, record review is performed

revealed the data outlined above.



PHYSICAL EXAMINATION:

VITAL SIGNS:  Blood pressure 116/60, pulse 98, respiratory rate 18, and no

fevers.

HEENT:  The patient is on a trach collar.  Thin secretions.

LUNGS:  Bilateral breath sounds.

CARDIAC:  Regular rhythm and rate.  Normal S1 and S2.  A 1/6 systolic

murmur at the base.

ABDOMEN:  Soft.  G-tube intact.

EXTREMITIES:  No edema.  Multiple wounds with dressings in place.



LABORATORY AND DIAGNOSTIC DATA:  White count 24.2 and hemoglobin 7.4.

Urinalysis with multiple white blood cells.  Sodium is 138, potassium 4.3,

bicarb 27, , and creatinine 2.  Lactic acid 1.1.  Glucose 122.

Total CK is 47.  Troponin 2.1.  ABG - 7.55, 31, and 103.  EKG - sinus

rhythm with nonspecific ST-T wave changes, unchanged from 11/06/2019.

Chest x-ray revealed _____ bilateral interstitial edema with left-sided

pleural effusion.



IMPRESSION:

1. Acute myocardial infarction, non-ST elevation type.

2. Sepsis.

3. Respiratory failure.

4. Acute on chronic renal failure.

5. Chronic diastolic congestive heart failure.

6. _____.

7. Baseline encephalopathy.

8. Severe anemia.



PLAN:

1. Respiratory hygiene.

2. Trach care.

3. Ventilator support as needed.

4. Transfusion of packed red blood cells.

5. Hydration.

6. Beta-blocker therapy.

7. Anti-platelet therapy.

8. DVT prophylaxis.









  ______________________________________________

  Delmer Hardin M.D.





DR:  REYNOLD

D:  11/28/2019 02:36

T:  11/28/2019 02:48

JOB#:  [id number]

CC:

## 2019-11-28 NOTE — INFECTIOUS DISEASES PROG NOTE
Assessment/Plan


Assessment/Plan


A;


1. Pulmonary edema/ Possible pneumonia.


2. ovarian mass.


3. Leukocytosis, improving


4. Respiratory failure, VDRF


5. Renal failure, CKD


6. Elevated liver function tests,HIDA scan: negative


7. anemia


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





PLAN:


1. Continue Zosyn.


2. will f/u sputum culture





Subjective


ROS Limited/Unobtainable:  Yes


Constitutional:  Denies: fever


Allergies:  


Coded Allergies:  


     No Known Allergies (Unverified , 10/14/19)





Objective


Vital Signs





Last 24 Hour Vital Signs








  Date Time  Temp Pulse Resp B/P (MAP) Pulse Ox O2 Delivery O2 Flow Rate FiO2


 


11/28/19 09:04  81  139/63    


 


11/28/19 08:50  81 23     35


 


11/28/19 08:26        35


 


11/28/19 08:00 97.7 78 18 139/63 (88) 99   


 


11/28/19 08:00      Mechanical Ventilator  


 


11/28/19 07:08  80 22     35


 


11/28/19 05:28  74 20     35


 


11/28/19 05:12    120/56    


 


11/28/19 04:00        35


 


11/28/19 04:00  72      


 


11/28/19 04:00      Mechanical Ventilator  


 


11/28/19 04:00 98.1 75 22 121/58 (79) 99   


 


11/28/19 03:16  72 18     35


 


11/28/19 01:09  77 21     35


 


11/28/19 00:00 97.9 70 18 118/61 (80) 99   


 


11/28/19 00:00      Mechanical Ventilator  


 


11/28/19 00:00  69      


 


11/28/19 00:00        35


 


11/27/19 23:08  73 19     35


 


11/27/19 21:22  72 19     35


 


11/27/19 20:37  74  119/58    


 


11/27/19 20:00      Mechanical Ventilator  


 


11/27/19 20:00 97.7 74 18 119/58 (78) 99   


 


11/27/19 20:00  72      


 


11/27/19 20:00        35


 


11/27/19 19:17  73 18     35


 


11/27/19 17:42    119/58    


 


11/27/19 16:48  72 20     35


 


11/27/19 16:00  70      


 


11/27/19 16:00 97.5 78 24 119/58 (78) 99   


 


11/27/19 16:00        35


 


11/27/19 16:00      Mechanical Ventilator  


 


11/27/19 15:10  71 19     35


 


11/27/19 12:36  77 18     35


 


11/27/19 12:17    116/58    


 


11/27/19 12:00 97.9 78 19 116/57 (76) 100   


 


11/27/19 12:00  76      


 


11/27/19 12:00      Mechanical Ventilator  


 


11/27/19 12:00        35


 


11/27/19 10:49  77 18     35








Height (Feet):  5


Height (Inches):  6.00


Weight (Pounds):  175


General Appearance:  no acute distress


HEENT:  status post trach


Respiratory/Chest:  rhonchi - bilaterally, other - on ventilator


Cardiovascular:  normal rate


Abdomen:  soft, non tender, other - GT feeding


Extremities:  no edema


Neurologic/Psychiatric:  aphasia





Microbiology








 Date/Time


Source Procedure


Growth Status


 


 


 11/25/19 20:45


Blood Blood Culture - Preliminary


NO GROWTH AFTER 48 HOURS Resulted


 


 11/25/19 20:30


Blood Blood Culture - Preliminary


NO GROWTH AFTER 48 HOURS Resulted





 11/27/19 00:45


Sputum Induced Gram Stain - Final Resulted


 


 11/27/19 00:45 Sputum Culture - Preliminary


Gram Negative Bacillus 1 Resulted





 11/26/19 17:45


Sputum Gram Stain - Final Resulted


 


 11/26/19 17:45 Sputum Culture - Preliminary


Gram Negative Bacillus 1 Resulted


 


 11/25/19 22:40


Nasal Nares MRSA Culture - Final


NO METHICILLIN RESISTANT STAPH AUREUS... Complete


 


 11/25/19 20:30


Urine,Clean Catch Urine Culture - Final


NO GROWTH AFTER 48 HOURS Complete


 


 11/25/19 22:40


Rectum VRE Culture - Final


Enterococcus Faecium - Vre Complete











Laboratory Tests








Test


  11/28/19


04:00


 


White Blood Count


  11.4 K/UL


(4.8-10.8)  H


 


Red Blood Count


  2.95 M/UL


(4.20-5.40)  L


 


Hemoglobin


  8.2 G/DL


(12.0-16.0)  L


 


Hematocrit


  25.0 %


(37.0-47.0)  L


 


Mean Corpuscular Volume 85 FL (80-99)  


 


Mean Corpuscular Hemoglobin


  27.7 PG


(27.0-31.0)


 


Mean Corpuscular Hemoglobin


Concent 32.7 G/DL


(32.0-36.0)


 


Red Cell Distribution Width


  15.1 %


(11.6-14.8)  H


 


Platelet Count


  261 K/UL


(150-450)


 


Mean Platelet Volume


  8.8 FL


(6.5-10.1)


 


Neutrophils (%) (Auto)


  78.9 %


(45.0-75.0)  H


 


Lymphocytes (%) (Auto)


  9.2 %


(20.0-45.0)  L


 


Monocytes (%) (Auto)


  4.5 %


(1.0-10.0)


 


Eosinophils (%) (Auto)


  6.3 %


(0.0-3.0)  H


 


Basophils (%) (Auto)


  1.0 %


(0.0-2.0)


 


Sodium Level


  146 MMOL/L


(136-145)  H


 


Potassium Level


  3.9 MMOL/L


(3.5-5.1)


 


Chloride Level


  110 MMOL/L


()  H


 


Carbon Dioxide Level


  26 MMOL/L


(21-32)


 


Anion Gap


  10 mmol/L


(5-15)


 


Blood Urea Nitrogen


  73 mg/dL


(7-18)  H


 


Creatinine


  1.5 MG/DL


(0.55-1.30)  H


 


Estimat Glomerular Filtration


Rate 34.7 mL/min


(>60)


 


Glucose Level


  242 MG/DL


()  #H


 


Calcium Level


  8.0 MG/DL


(8.5-10.1)  L











Current Medications








 Medications


  (Trade)  Dose


 Ordered  Sig/Winnie


 Route


 PRN Reason  Start Time


 Stop Time Status Last Admin


Dose Admin


 


 Ascorbic Acid


  (Vitamin C)  250 mg  DAILY


 ORAL


   11/27/19 09:00


 12/27/19 08:59  11/28/19 09:04


 


 


 Chlorhexidine


 Gluconate


  (Mae-Hex 2%)  1 applic  DAILY@2000


 TOPIC


   11/26/19 20:00


 12/26/19 19:59  11/27/19 20:37


 


 


 Dextrose


  (Dextrose 50%)  25 ml  Q30M  PRN


 IV


 Hypoglycemia  11/26/19 10:00


 12/26/19 09:59   


 


 


 Dextrose


  (Dextrose 50%)  50 ml  Q30M  PRN


 IV


 Hypoglycemia  11/26/19 10:00


 12/26/19 09:59   


 


 


 Epoetin Olaf


  (Epoetin


 Olaf-EPBX(NON


 ESRD))  10,000 unit  MON-WED-FRI


 SUBQ


   11/27/19 21:00


 12/27/19 20:59  11/27/19 20:37


 


 


 Insulin Aspart


  (NovoLOG)    Q6HR


 SUBQ


   11/28/19 00:00


 12/26/19 11:29  11/28/19 05:15


 


 


 Lorazepam


  (Ativan 2mg/ml


 1ml)  1 mg  Q3H  PRN


 IV


 For Seizures  11/27/19 04:30


 12/4/19 04:29   


 


 


 Metoprolol


 Tartrate


  (Lopressor)  25 mg  Q12HR


 GT


   11/26/19 10:15


 12/26/19 10:14  11/28/19 09:04


 


 


 Nitroglycerin


  (Nitro-Bid)  1 inch  TID@0600,1200,1800


 TOPIC


   11/26/19 12:00


 12/26/19 11:59  11/28/19 05:12


 


 


 Piperacillin Sod/


 Tazobactam Sod


 3.375 gm/Sodium


 Chloride  110 ml @ 


 27.5 mls/hr  EVERY 8  HOURS


 IVPB


   11/26/19 14:00


 12/1/19 13:59  11/28/19 05:12


 


 


 Sodium  1,000 ml @ 


 75 mls/hr  [id number]


 IV


   11/28/19 03:30


 12/28/19 03:29  11/28/19 03:38


 

















Renny Garay MD Nov 28, 2019 09:52

## 2019-11-28 NOTE — NUR
NURSE NOTES:

Observed pt sleeping in the bed. No acute distress noted at this time. SR on cardiac 
monitor. No residual noted from G tube. Oral care given. Reposition done. Will continue to 
monitor.

## 2019-11-28 NOTE — PROGRESS NOTE
DATE:  11/27/2019

CARDIOLOGY PROGRESS NOTE



SUBJECTIVE:  The patient remains noncommunicative.  He has repeat transfuse

packed red blood cells.  No signs of active bleeding noted.



OBJECTIVE:

VITAL SIGNS:  Blood pressure 116/57, heart rate 78, respiratory rate 19,

and afebrile.  Troponin levels are trending down from a peak of over 2.

LUNGS:  Bilateral breath sounds.  Few rhonchi.

HEART:  Regular rhythm and rate.  Normal S1 and S2 with a fourth heart

sound and a 1/6 systolic murmur at base.

ABDOMEN:  Soft and nontender with G-tube.

EXTREMITIES:  With trace edema and multiple wounds.



LABORATORY DATA:  White count 12.1 and hemoglobin 8.3.  Sodium 144,

potassium 3.4, BUN 94, creatinine 1.5, bicarb 27, and albumin 1.9.

Triglycerides 362.  .  Iron panel noted.



IMPRESSION:

1. Acute myocardial infarction.

2. Hypokalemia.

3. Acute renal failure.

4. Hypovolemia and dehydration.

5. Hypertriglyceridemia.

6. Severe protein-calorie malnutrition.

7. Respiratory failure with tracheostomy.

8. Sepsis.

9. Anemia of chronic disease.



PLAN:

1. Continue cautious hydration.

2. Antimicrobials.

3. Respiratory hygiene.

4. Topical nitrates and beta-blocker.

5. Hold anti-platelet therapy due to possible GI bleeding and dropping

hemoglobin level.

6. Potassium replacement.

7. Check magnesium.









  ______________________________________________

  Delmer Hardin M.D.





DR:  ASHKAN

D:  11/28/2019 02:44

T:  11/28/2019 03:05

JOB#:  [id number]

CC:

## 2019-11-29 VITALS — DIASTOLIC BLOOD PRESSURE: 79 MMHG | SYSTOLIC BLOOD PRESSURE: 154 MMHG

## 2019-11-29 VITALS — SYSTOLIC BLOOD PRESSURE: 143 MMHG | DIASTOLIC BLOOD PRESSURE: 64 MMHG

## 2019-11-29 VITALS — SYSTOLIC BLOOD PRESSURE: 140 MMHG | DIASTOLIC BLOOD PRESSURE: 71 MMHG

## 2019-11-29 VITALS — SYSTOLIC BLOOD PRESSURE: 146 MMHG | DIASTOLIC BLOOD PRESSURE: 50 MMHG

## 2019-11-29 VITALS — SYSTOLIC BLOOD PRESSURE: 141 MMHG | DIASTOLIC BLOOD PRESSURE: 75 MMHG

## 2019-11-29 VITALS — SYSTOLIC BLOOD PRESSURE: 138 MMHG | DIASTOLIC BLOOD PRESSURE: 72 MMHG

## 2019-11-29 LAB
ADD MANUAL DIFF: NO
ADD MANUAL DIFF: NO
ANION GAP SERPL CALC-SCNC: 6 MMOL/L (ref 5–15)
ANION GAP SERPL CALC-SCNC: 9 MMOL/L (ref 5–15)
BASOPHILS NFR BLD AUTO: 0.5 % (ref 0–2)
BASOPHILS NFR BLD AUTO: 0.7 % (ref 0–2)
BUN SERPL-MCNC: 52 MG/DL (ref 7–18)
BUN SERPL-MCNC: 55 MG/DL (ref 7–18)
CALCIUM SERPL-MCNC: 7.2 MG/DL (ref 8.5–10.1)
CALCIUM SERPL-MCNC: 8.1 MG/DL (ref 8.5–10.1)
CHLORIDE SERPL-SCNC: 109 MMOL/L (ref 98–107)
CHLORIDE SERPL-SCNC: 111 MMOL/L (ref 98–107)
CO2 SERPL-SCNC: 24 MMOL/L (ref 21–32)
CO2 SERPL-SCNC: 25 MMOL/L (ref 21–32)
CREAT SERPL-MCNC: 1.3 MG/DL (ref 0.55–1.3)
CREAT SERPL-MCNC: 1.3 MG/DL (ref 0.55–1.3)
EOSINOPHIL NFR BLD AUTO: 4.4 % (ref 0–3)
EOSINOPHIL NFR BLD AUTO: 6.2 % (ref 0–3)
ERYTHROCYTE [DISTWIDTH] IN BLOOD BY AUTOMATED COUNT: 15.8 % (ref 11.6–14.8)
ERYTHROCYTE [DISTWIDTH] IN BLOOD BY AUTOMATED COUNT: 15.9 % (ref 11.6–14.8)
HCT VFR BLD CALC: 24.5 % (ref 37–47)
HCT VFR BLD CALC: 29.5 % (ref 37–47)
HGB BLD-MCNC: 8 G/DL (ref 12–16)
HGB BLD-MCNC: 9.7 G/DL (ref 12–16)
LYMPHOCYTES NFR BLD AUTO: 11.2 % (ref 20–45)
LYMPHOCYTES NFR BLD AUTO: 13 % (ref 20–45)
MCV RBC AUTO: 85 FL (ref 80–99)
MCV RBC AUTO: 86 FL (ref 80–99)
MONOCYTES NFR BLD AUTO: 3 % (ref 1–10)
MONOCYTES NFR BLD AUTO: 3.3 % (ref 1–10)
NEUTROPHILS NFR BLD AUTO: 77.1 % (ref 45–75)
NEUTROPHILS NFR BLD AUTO: 80.7 % (ref 45–75)
PLATELET # BLD: 247 K/UL (ref 150–450)
PLATELET # BLD: 293 K/UL (ref 150–450)
POTASSIUM SERPL-SCNC: 4.9 MMOL/L (ref 3.5–5.1)
POTASSIUM SERPL-SCNC: 6.8 MMOL/L (ref 3.5–5.1)
RBC # BLD AUTO: 2.86 M/UL (ref 4.2–5.4)
RBC # BLD AUTO: 3.47 M/UL (ref 4.2–5.4)
SODIUM SERPL-SCNC: 139 MMOL/L (ref 136–145)
SODIUM SERPL-SCNC: 145 MMOL/L (ref 136–145)
WBC # BLD AUTO: 13.8 K/UL (ref 4.8–10.8)
WBC # BLD AUTO: 17 K/UL (ref 4.8–10.8)

## 2019-11-29 RX ADMIN — SODIUM CHLORIDE AND POTASSIUM CHLORIDE SCH MLS/HR: 4.5; 1.49 INJECTION, SOLUTION INTRAVENOUS at 20:19

## 2019-11-29 RX ADMIN — DEXTROSE MONOHYDRATE SCH MLS/HR: 50 INJECTION, SOLUTION INTRAVENOUS at 05:59

## 2019-11-29 RX ADMIN — SODIUM CHLORIDE AND POTASSIUM CHLORIDE SCH MLS/HR: 4.5; 1.49 INJECTION, SOLUTION INTRAVENOUS at 06:45

## 2019-11-29 RX ADMIN — Medication SCH MG: at 08:09

## 2019-11-29 RX ADMIN — INSULIN ASPART SCH UNITS: 100 INJECTION, SOLUTION INTRAVENOUS; SUBCUTANEOUS at 00:52

## 2019-11-29 RX ADMIN — EPOETIN ALFA-EPBX SCH UNIT: 3000 INJECTION, SOLUTION INTRAVENOUS; SUBCUTANEOUS at 20:07

## 2019-11-29 RX ADMIN — SODIUM CHLORIDE AND POTASSIUM CHLORIDE SCH MLS/HR: 4.5; 1.49 INJECTION, SOLUTION INTRAVENOUS at 06:10

## 2019-11-29 RX ADMIN — AMIKACIN SULFATE SCH MG: 500 INJECTION, SOLUTION INTRAMUSCULAR; INTRAVENOUS at 22:00

## 2019-11-29 RX ADMIN — INSULIN ASPART SCH UNITS: 100 INJECTION, SOLUTION INTRAVENOUS; SUBCUTANEOUS at 23:43

## 2019-11-29 RX ADMIN — NITROGLYCERIN SCH INCH: 20 OINTMENT TOPICAL at 12:19

## 2019-11-29 RX ADMIN — CHLORHEXIDINE GLUCONATE SCH APPLIC: 213 SOLUTION TOPICAL at 20:06

## 2019-11-29 RX ADMIN — NITROGLYCERIN SCH INCH: 20 OINTMENT TOPICAL at 06:00

## 2019-11-29 RX ADMIN — METOPROLOL TARTRATE SCH MG: 25 TABLET, FILM COATED ORAL at 20:07

## 2019-11-29 RX ADMIN — NITROGLYCERIN SCH INCH: 20 OINTMENT TOPICAL at 17:35

## 2019-11-29 RX ADMIN — INSULIN ASPART SCH UNITS: 100 INJECTION, SOLUTION INTRAVENOUS; SUBCUTANEOUS at 06:14

## 2019-11-29 RX ADMIN — INSULIN ASPART SCH UNITS: 100 INJECTION, SOLUTION INTRAVENOUS; SUBCUTANEOUS at 17:37

## 2019-11-29 RX ADMIN — METOPROLOL TARTRATE SCH MG: 25 TABLET, FILM COATED ORAL at 08:10

## 2019-11-29 RX ADMIN — INSULIN ASPART SCH UNITS: 100 INJECTION, SOLUTION INTRAVENOUS; SUBCUTANEOUS at 12:18

## 2019-11-29 NOTE — NUR
NURSE NOTES:

Patient noted to be diaphoretic with increase WOB and using accessory muscles. BG currently 
299 and O2 saturation noted at 95%. RT now at bedside working with patient. Pt repositioned 
and ABG ordered to ensure optimal gas exchange. After suctioning and repositioning WOB 
improving. Pt remains resting in bed; bed in lowest position, safety wheels engaged, bed 
alarm activated, side rails up x3 and padded per protocol, call light within reach. Will 
continue to monitor.

## 2019-11-29 NOTE — NUR
RESPIRATORY NOTE: Received patient on vent setting ACVC rate of 18, Vt 500, FiO2 35%, and 
Peep of 5. Breath sounds are bilateral rhonchi. When suctioned small amounts of tan thick 
secretions. Alarms are audible and on. Vent is plugged into red outlet. Will continue to 
monitor patient throughout the day.

## 2019-11-29 NOTE — NUR
NURSE NOTES:

Patient provided with oral care. Patient tolerated care well. Pt remains resting in bed; bed 
in lowest position, safety wheels engaged, bed alarm activated, side rails up x3 and padded 
per protocol, call light within reach. Will continue to monitor.

## 2019-11-29 NOTE — INFECTIOUS DISEASES PROG NOTE
Assessment/Plan


Assessment/Plan


antibiotics : zosyn





A


1. klebsiella pneumonia


2. leucocytosis increased


3. ovarian mass


4. renal failure improving


5. rectal VRE colonization





P


1. d/c zosyn


2. start inhaled amikacin


3. will follow up cultures





Subjective


ROS Limited/Unobtainable:  Yes


Allergies:  


Coded Allergies:  


     No Known Allergies (Unverified , 10/14/19)





Objective


Vital Signs





Last 24 Hour Vital Signs








  Date Time  Temp Pulse Resp B/P (MAP) Pulse Ox O2 Delivery O2 Flow Rate FiO2


 


11/29/19 09:12  73 22     35


 


11/29/19 08:10  78  143/64    


 


11/29/19 08:00      Mechanical Ventilator  


 


11/29/19 08:00        35


 


11/29/19 08:00 98.2 78 25 143/64 (90) 96   


 


11/29/19 06:53  85 28     35


 


11/29/19 06:00    138/72    


 


11/29/19 04:55  84 23     35


 


11/29/19 04:00        35


 


11/29/19 04:00 99.0 78 25 138/72 (94) 95   


 


11/29/19 04:00      Mechanical Ventilator  


 


11/29/19 03:37  79      


 


11/29/19 02:36  78 21     35


 


11/29/19 01:12  79 23     35


 


11/29/19 00:00 99.0 76 24 141/75 (97) 95   


 


11/29/19 00:00      Mechanical Ventilator  


 


11/29/19 00:00        35


 


11/28/19 23:37  79      


 


11/28/19 23:28  80 23     35


 


11/28/19 21:20  81 23     35


 


11/28/19 21:08  62  144/89    


 


11/28/19 20:00      Mechanical Ventilator  


 


11/28/19 20:00        35


 


11/28/19 20:00  83      


 


11/28/19 20:00 99.0 84 30 144/62 (89) 96   


 


11/28/19 19:29  83 26     35


 


11/28/19 18:29    125/65    


 


11/28/19 17:24  83 23     35


 


11/28/19 16:04        35


 


11/28/19 16:03 98.2 81 20 125/65 (85) 98   


 


11/28/19 15:45  79      


 


11/28/19 15:14      Mechanical Ventilator  


 


11/28/19 15:06  80 27     35


 


11/28/19 13:08  77 22     35


 


11/28/19 12:01        35


 


11/28/19 12:01 98.1 74 18 119/63 (81) 99   


 


11/28/19 11:59      Mechanical Ventilator  


 


11/28/19 11:48    139/63    


 


11/28/19 11:40  74      


 


11/28/19 11:00  76 22     35








Height (Feet):  5


Height (Inches):  6.00


Weight (Pounds):  175


HEENT:  status post trach


Respiratory/Chest:  lungs clear


Cardiovascular:  normal rate, regular rhythm, no gallop/murmur


Abdomen:  soft, non tender, other - GT


Extremities:  no edema, other - right arm PICC





Microbiology








 Date/Time


Source Procedure


Growth Status


 


 


 11/27/19 00:45


Sputum Induced Gram Stain - Final Resulted


 


 11/27/19 00:45 Sputum Culture - Preliminary


Klebsiella Pneumoniae Resulted





 11/26/19 17:45


Sputum Gram Stain - Final Resulted


 


 11/26/19 17:45 Sputum Culture - Preliminary


Klebsiella Pneumoniae Resulted











Laboratory Tests








Test


  11/29/19


03:00 11/29/19


06:15


 


White Blood Count


  13.8 K/UL


(4.8-10.8)  H 17.0 K/UL


(4.8-10.8)  H


 


Red Blood Count


  2.86 M/UL


(4.20-5.40)  L 3.47 M/UL


(4.20-5.40)  L


 


Hemoglobin


  8.0 G/DL


(12.0-16.0)  L 9.7 G/DL


(12.0-16.0)  L


 


Hematocrit


  24.5 %


(37.0-47.0)  L 29.5 %


(37.0-47.0)  L


 


Mean Corpuscular Volume 86 FL (80-99)   85 FL (80-99)  


 


Mean Corpuscular Hemoglobin


  28.1 PG


(27.0-31.0) 27.9 PG


(27.0-31.0)


 


Mean Corpuscular Hemoglobin


Concent 32.7 G/DL


(32.0-36.0) 32.8 G/DL


(32.0-36.0)


 


Red Cell Distribution Width


  15.8 %


(11.6-14.8)  H 15.9 %


(11.6-14.8)  H


 


Platelet Count


  247 K/UL


(150-450) 293 K/UL


(150-450)


 


Mean Platelet Volume


  8.5 FL


(6.5-10.1) 8.3 FL


(6.5-10.1)


 


Neutrophils (%) (Auto)


  77.1 %


(45.0-75.0)  H 80.7 %


(45.0-75.0)  H


 


Lymphocytes (%) (Auto)


  13.0 %


(20.0-45.0)  L 11.2 %


(20.0-45.0)  L


 


Monocytes (%) (Auto)


  3.3 %


(1.0-10.0) 3.0 %


(1.0-10.0)


 


Eosinophils (%) (Auto)


  6.2 %


(0.0-3.0)  H 4.4 %


(0.0-3.0)  H


 


Basophils (%) (Auto)


  0.5 %


(0.0-2.0) 0.7 %


(0.0-2.0)


 


Sodium Level


  139 MMOL/L


(136-145) 145 MMOL/L


(136-145)


 


Potassium Level


  6.8 MMOL/L


(3.5-5.1)  #*H 4.9 MMOL/L


(3.5-5.1)


 


Chloride Level


  109 MMOL/L


()  H 111 MMOL/L


()  H


 


Carbon Dioxide Level


  24 MMOL/L


(21-32) 25 MMOL/L


(21-32)


 


Anion Gap


  6 mmol/L


(5-15) 9 mmol/L


(5-15)


 


Blood Urea Nitrogen


  52 mg/dL


(7-18)  H 55 mg/dL


(7-18)  H


 


Creatinine


  1.3 MG/DL


(0.55-1.30) 1.3 MG/DL


(0.55-1.30)


 


Estimat Glomerular Filtration


Rate 41.0 mL/min


(>60) 41.0 mL/min


(>60)


 


Glucose Level


  215 MG/DL


()  H 257 MG/DL


()  H


 


Calcium Level


  7.2 MG/DL


(8.5-10.1)  L 8.1 MG/DL


(8.5-10.1)  L


 


Troponin I


  0.561 ng/mL


(0.000-0.056) 


 











Current Medications








 Medications


  (Trade)  Dose


 Ordered  Sig/Winnie


 Route


 PRN Reason  Start Time


 Stop Time Status Last Admin


Dose Admin


 


 Ascorbic Acid


  (Vitamin C)  250 mg  DAILY


 ORAL


   11/27/19 09:00


 12/27/19 08:59  11/29/19 08:09


 


 


 Chlorhexidine


 Gluconate


  (Mae-Hex 2%)  1 applic  DAILY@2000


 TOPIC


   11/26/19 20:00


 12/26/19 19:59  11/28/19 20:05


 


 


 Dextrose


  (Dextrose 50%)  25 ml  Q30M  PRN


 IV


 Hypoglycemia  11/28/19 12:15


 12/28/19 12:14   


 


 


 Dextrose


  (Dextrose 50%)  50 ml  Q30M  PRN


 IV


 Hypoglycemia  11/28/19 12:15


 12/28/19 12:14   


 


 


 Epoetin Olaf


  (Epoetin


 Olaf-EPBX(NON


 ESRD))  10,000 unit  MON-WED-FRI


 SUBQ


   11/27/19 21:00


 12/27/19 20:59  11/27/19 20:37


 


 


 Insulin Aspart


  (NovoLOG)    Q6HR


 SUBQ


   11/28/19 12:30


 12/28/19 12:29  11/29/19 06:14


 


 


 Lorazepam


  (Ativan 2mg/ml


 1ml)  1 mg  Q3H  PRN


 IV


 For Seizures  11/27/19 04:30


 12/4/19 04:29   


 


 


 Metoprolol


 Tartrate


  (Lopressor)  25 mg  Q12HR


 GT


   11/26/19 10:15


 12/26/19 10:14  11/29/19 08:10


 


 


 Nitroglycerin


  (Nitro-Bid)  1 inch  TID@0600,1200,1800


 TOPIC


   11/26/19 12:00


 12/26/19 11:59  11/29/19 06:00


 


 


 Piperacillin Sod/


 Tazobactam Sod


 3.375 gm/Sodium


 Chloride  110 ml @ 


 27.5 mls/hr  EVERY 8  HOURS


 IVPB


   11/26/19 14:00


 12/1/19 13:59  11/29/19 05:59


 


 


 Sodium  1,000 ml @ 


 75 mls/hr  [id number]


 IV


   11/28/19 03:30


 12/28/19 03:29  11/29/19 06:45


 

















Michelle Doherty MD Nov 29, 2019 10:08

## 2019-11-29 NOTE — NUR
*-* INSURANCE *-*



AVAILABLE CLINICALS HAVE BEEN FAXED TO:





CM: [facility]

#[phone]

Fax#[phone]

REF# [id number]

## 2019-11-29 NOTE — NUR
*-* INSURANCE *-*



ALL CLINICALS HAVE BEEN FAXED TO:



[facility] 

REF#[id number] - APPROVED 1 DAY 

CM:KATHY ERVIN#[phone]

FAX#[phone] REVIEWS/CLINICALS 

-------------------------------------------------------------------------------

Addendum: 12/02/19 at 1100 by JACKI COMBS CM

-------------------------------------------------------------------------------

WRONG PATIENT

-------------------------------------------------------------------------------

Addendum: 12/02/19 at 1101 by JACKI COMBS CM

-------------------------------------------------------------------------------

WRONG ACCOUNT

## 2019-11-29 NOTE — PULMONOLOGY PROGRESS NOTE
Assessment/Plan


Assessment/Plan


Pulmonary Progress Note





HPI:  This is an unfortunate 64-year-old female, admiytted with abnormal labs.  


The patient noted to have elevated troponin, acute renal failure and 

leukocytosis, possible sepsis.


The patient with recent discharge after prolonged hospital stay.





PAST MEDICAL HISTORY:  Notable for chronic respiratory failure, history of


sepsis, history of pneumonia, history of bone marrow biopsy, seizures,


diabetes, schizophrenia, anemia, CHF, hypertension, renal failure,


ventilator dependence.





PHYSICAL EXAMINATION:


VITAL SIGNS NOTED





GENERAL:  Well-appearing female, chronically debilitated, chronically


obtunded.


HEENT:  Negative.  Tracheostomy in place.


LUNGS:  With coarse breath sounds.  Reduced breath sounds at both bases.


CARDIAC:  S1, S2.  Regular rate and rhythm.


ABDOMEN:  Soft.  G-tube in place.


EXTREMITIES:  With noted edema.


NEUROLOGICAL:  Poorly responsive.





LABORATORY DATA:  Reviewed.


CXR: Noted





IMPRESSION:  Acute MI, elevated troponin, possible non-STEMI, hypertension


per history, congestive heart failure, elevated natriuretic peptide, acute


on chronic renal failure, diabetes, psychiatric disorder, chronic


encephalopathy, seizure disorder, leukocytosis.





RECOMMENDATIONS:  Supportive care.  IV antibiotics.  Cardiology evaluation.


Renal evaluation.  Transfuse with noted anemia.  Hematology followup


PTA medications.  Prognosis is poor.  We will try to stabilize and discharge.





Subjective


ROS Limited/Unobtainable:  No


Allergies:  


Coded Allergies:  


     No Known Allergies (Unverified , 10/14/19)





Objective





Last 24 Hour Vital Signs








  Date Time  Temp Pulse Resp B/P (MAP) Pulse Ox O2 Delivery O2 Flow Rate FiO2


 


11/29/19 10:32  72 19     35


 


11/29/19 09:12  73 22     35


 


11/29/19 08:10  78  143/64    


 


11/29/19 08:00      Mechanical Ventilator  


 


11/29/19 08:00        35


 


11/29/19 08:00 98.2 78 25 143/64 (90) 96   


 


11/29/19 07:40  79      


 


11/29/19 06:53  85 28     35


 


11/29/19 06:00    138/72    


 


11/29/19 04:55  84 23     35


 


11/29/19 04:00        35


 


11/29/19 04:00 99.0 78 25 138/72 (94) 95   


 


11/29/19 04:00      Mechanical Ventilator  


 


11/29/19 03:37  79      


 


11/29/19 02:36  78 21     35


 


11/29/19 01:12  79 23     35


 


11/29/19 00:00 99.0 76 24 141/75 (97) 95   


 


11/29/19 00:00      Mechanical Ventilator  


 


11/29/19 00:00        35


 


11/28/19 23:37  79      


 


11/28/19 23:28  80 23     35


 


11/28/19 21:20  81 23     35


 


11/28/19 21:08  62  144/89    


 


11/28/19 20:00      Mechanical Ventilator  


 


11/28/19 20:00        35


 


11/28/19 20:00  83      


 


11/28/19 20:00 99.0 84 30 144/62 (89) 96   


 


11/28/19 19:29  83 26     35


 


11/28/19 18:29    125/65    


 


11/28/19 17:24  83 23     35


 


11/28/19 16:04        35


 


11/28/19 16:03 98.2 81 20 125/65 (85) 98   


 


11/28/19 15:45  79      


 


11/28/19 15:14      Mechanical Ventilator  


 


11/28/19 15:06  80 27     35


 


11/28/19 13:08  77 22     35

















Intake and Output  


 


 11/28/19 11/29/19





 19:00 07:00


 


Intake Total 1712.5 ml 1740 ml


 


Output Total  1200 ml


 


Balance 1712.5 ml 540 ml


 


  


 


Intake Free Water 200 ml 120 ml


 


IV Total 852.5 ml 900 ml


 


Tube Feeding 660 ml 720 ml


 


Output Urine Total  1200 ml


 


# Bowel Movements  2











Microbiology








 Date/Time


Source Procedure


Growth Status


 


 


 11/27/19 00:45


Sputum Induced Gram Stain - Final Resulted


 


 11/27/19 00:45 Sputum Culture - Preliminary


Klebsiella Pneumoniae Resulted





 11/26/19 17:45


Sputum Gram Stain - Final Resulted


 


 11/26/19 17:45 Sputum Culture - Preliminary


Klebsiella Pneumoniae Resulted








Laboratory Tests


11/29/19 03:00: 


White Blood Count 13.8H, Red Blood Count 2.86L, Hemoglobin 8.0L, Hematocrit 

24.5L, Mean Corpuscular Volume 86, Mean Corpuscular Hemoglobin 28.1, Mean 

Corpuscular Hemoglobin Concent 32.7, Red Cell Distribution Width 15.8H, 

Platelet Count 247, Mean Platelet Volume 8.5, Neutrophils (%) (Auto) 77.1H, 

Lymphocytes (%) (Auto) 13.0L, Monocytes (%) (Auto) 3.3, Eosinophils (%) (Auto) 

6.2H, Basophils (%) (Auto) 0.5, Sodium Level 139, Potassium Level 6.8#*H, 

Chloride Level 109H, Carbon Dioxide Level 24, Anion Gap 6, Blood Urea Nitrogen 

52H, Creatinine 1.3, Estimat Glomerular Filtration Rate 41.0, Glucose Level 215H

, Calcium Level 7.2L, Troponin I 0.561H


11/29/19 06:15: 


White Blood Count 17.0H, Red Blood Count 3.47L, Hemoglobin 9.7L, Hematocrit 

29.5L, Mean Corpuscular Volume 85, Mean Corpuscular Hemoglobin 27.9, Mean 

Corpuscular Hemoglobin Concent 32.8, Red Cell Distribution Width 15.9H, 

Platelet Count 293, Mean Platelet Volume 8.3, Neutrophils (%) (Auto) 80.7H, 

Lymphocytes (%) (Auto) 11.2L, Monocytes (%) (Auto) 3.0, Eosinophils (%) (Auto) 

4.4H, Basophils (%) (Auto) 0.7, Sodium Level 145, Potassium Level 4.9, Chloride 

Level 111H, Carbon Dioxide Level 25, Anion Gap 9, Blood Urea Nitrogen 55H, 

Creatinine 1.3, Estimat Glomerular Filtration Rate 41.0, Glucose Level 257H, 

Calcium Level 8.1L





Current Medications








 Medications


  (Trade)  Dose


 Ordered  Sig/Winnie


 Route


 PRN Reason  Start Time


 Stop Time Status Last Admin


Dose Admin


 


 Amikacin Sulfate


  (Amikin)  500 mg  Q12HR@10,22


 INH


   11/29/19 22:00


 12/6/19 21:59   


 


 


 Ascorbic Acid


  (Vitamin C)  250 mg  DAILY


 ORAL


   11/27/19 09:00


 12/27/19 08:59  11/29/19 08:09


 


 


 Chlorhexidine


 Gluconate


  (Mae-Hex 2%)  1 applic  DAILY@2000


 TOPIC


   11/26/19 20:00


 12/26/19 19:59  11/28/19 20:05


 


 


 Dextrose


  (Dextrose 50%)  25 ml  Q30M  PRN


 IV


 Hypoglycemia  11/28/19 12:15


 12/28/19 12:14   


 


 


 Dextrose


  (Dextrose 50%)  50 ml  Q30M  PRN


 IV


 Hypoglycemia  11/28/19 12:15


 12/28/19 12:14   


 


 


 Epoetin Olaf


  (Epoetin


 Olaf-EPBX(NON


 ESRD))  10,000 unit  MON-WED-FRI


 SUBQ


   11/27/19 21:00


 12/27/19 20:59  11/27/19 20:37


 


 


 Insulin Aspart


  (NovoLOG)    Q6HR


 SUBQ


   11/28/19 12:30


 12/28/19 12:29  11/29/19 06:14


 


 


 Lorazepam


  (Ativan 2mg/ml


 1ml)  1 mg  Q3H  PRN


 IV


 For Seizures  11/27/19 04:30


 12/4/19 04:29   


 


 


 Metoprolol


 Tartrate


  (Lopressor)  25 mg  Q12HR


 GT


   11/26/19 10:15


 12/26/19 10:14  11/29/19 08:10


 


 


 Nitroglycerin


  (Nitro-Bid)  1 inch  TID@0600,1200,1800


 TOPIC


   11/26/19 12:00


 12/26/19 11:59  11/29/19 06:00


 


 


 Sodium  1,000 ml @ 


 75 mls/hr  [id number]


 IV


   11/28/19 03:30


 12/28/19 03:29  11/29/19 06:45


 

















Delmer Main MD Nov 29, 2019 12:06

## 2019-11-29 NOTE — NUR
NURSE NOTES:

left a message to dr pineda that patient has no dvt prophylaxis.awaits callback an dnew 
order.

## 2019-11-29 NOTE — NUR
NURSE NOTES:

Received critical value of trop. 0.561 from Juni in the lab,informed primary RN Rosie.No 
need to call MD result trending down.

## 2019-11-29 NOTE — NUR
CASE MANAGEMENT:  REVIEW



11/28/19



SI: ACUTE MI . CHF . ANEMIA . RESP. FAILURE . PULMONARY EDEMA . VDRF

97.7   78   18   139/63   99% MECH VENT;  FIO2 35

WBC 11.4  RBC 2.95  H/H 8.2/25.0  NA+146 CL-110 BUN 73 CREAT 1.5   CA+ 8.0





IS:     IV ZOSYN Q8HR

NITRO-BID TOP TID

NOVOLOG SQ AC&HS

LOPRESSOR GT BID



\**: 2W STEP DOWN UNIT



DCP: RETURN TO [facility] WHEN MEDICALLY STABLE 









*******************************************************************************



CASE MANAGEMENT:  REVIEW



11/29/19



SI: ACUTE MI . CHF . ANEMIA . ACUTE RENAL FAILURE . PLEURAL EFFUSION . ELEVATED TROP

99.0   78   25   138/72   95% MECH VENT;  FIO2 35

K+6.8 BUN 52   CA+7.2 TROP 0.561 WBC 17.0 H/H 9.7/29.5





IS:     IVF NS@75ML/HR

IV ZOSYN Q8HR

EPOETIN SQ QOD

NITRO-BID TOP TID

NOVOLOG SQ AC&HS

LOPRESSOR GT BID



\**: 2W STEP DOWN UNIT



DCP: RETURN TO [facility] WHEN MEDICALLY STABLE 



PLAN:

+ SP CX

+VRE

## 2019-11-29 NOTE — NUR
RESPIRATORY NOTE: Had to change patients trach. Upon observation the trach was unable to 
hold pressure and a constant leak was present in the balloon, which caused the patient to 
not retain the volumes set on the ventilator. After changing the trach, patient was able to 
retain the volumes volumes that were set on the ventilator. RT Poon assisted me in the trach 
change.

## 2019-11-29 NOTE — NUR
NURSE NOTES:

received patient report from tj miller. patient is on bed, asleep. noted to be obtunded. 
with picc line running1/2 ns with 20meqs kcl running @ 75ml/hr. wbc elevated. trop trending 
down. to collect stool for obs. wound care per order. will follow plan of care.

## 2019-11-29 NOTE — NUR
NURSE NOTES:

Received report from ELIZABETH Roman. No signs of cardiac or respiratory distress noted. Pt unable 
to make needs known at this time and assessed for pain using FLACC scale. No signs of pain 
noted at this time. GT noted, remains patent, no residual noted at this time. GT feeding 
currently running Glucerna 1.2 @60ml/hr. Charles catheter noted for retention and remains 
draining to gravity, yellow urine noted in collection bag. Right upper arm PICC is intact, 
patent, and asymptomatic; running 1/2 NS + KCL 20 meq @75ml/hr. Pt appears to be resting 
comfortably at this time. Will continue care plan. Will continue to monitor.

## 2019-11-29 NOTE — SURGERY PROGRESS NOTE
Surgery Progress Note


Subjective


Additional Comments


worsening leukocytosis


exam stable


cxr ordered


decreased bs





Objective





Last 24 Hour Vital Signs








  Date Time  Temp Pulse Resp B/P (MAP) Pulse Ox O2 Delivery O2 Flow Rate FiO2


 


11/29/19 16:00      Mechanical Ventilator  


 


11/29/19 16:00 97.9 83 26 140/71 (94) 96   


 


11/29/19 16:00  73      


 


11/29/19 16:00        35


 


11/29/19 14:41  75 24     35


 


11/29/19 12:43  74 19     35


 


11/29/19 12:19    143/64    


 


11/29/19 12:00      Mechanical Ventilator  


 


11/29/19 12:00 97.9 74 22 146/50 (82) 95   


 


11/29/19 12:00        35


 


11/29/19 11:49  76      


 


11/29/19 10:32  72 19     35


 


11/29/19 09:12  73 22     35


 


11/29/19 08:10  78  143/64    


 


11/29/19 08:00      Mechanical Ventilator  


 


11/29/19 08:00        35


 


11/29/19 08:00 98.2 78 25 143/64 (90) 96   


 


11/29/19 07:40  79      


 


11/29/19 06:53  85 28     35


 


11/29/19 06:00    138/72    


 


11/29/19 04:55  84 23     35


 


11/29/19 04:00        35


 


11/29/19 04:00 99.0 78 25 138/72 (94) 95   


 


11/29/19 04:00      Mechanical Ventilator  


 


11/29/19 03:37  79      


 


11/29/19 02:36  78 21     35


 


11/29/19 01:12  79 23     35


 


11/29/19 00:00 99.0 76 24 141/75 (97) 95   


 


11/29/19 00:00      Mechanical Ventilator  


 


11/29/19 00:00        35


 


11/28/19 23:37  79      


 


11/28/19 23:28  80 23     35


 


11/28/19 21:20  81 23     35


 


11/28/19 21:08  62  144/89    


 


11/28/19 20:00      Mechanical Ventilator  


 


11/28/19 20:00        35


 


11/28/19 20:00  83      


 


11/28/19 20:00 99.0 84 30 144/62 (89) 96   


 


11/28/19 19:29  83 26     35


 


11/28/19 18:29    125/65    


 


11/28/19 17:24  83 23     35








I&O











Intake and Output  


 


 11/28/19 11/29/19





 19:00 07:00


 


Intake Total 1712.5 ml 1740 ml


 


Output Total  1200 ml


 


Balance 1712.5 ml 540 ml


 


  


 


Intake Free Water 200 ml 120 ml


 


IV Total 852.5 ml 900 ml


 


Tube Feeding 660 ml 720 ml


 


Output Urine Total  1200 ml


 


# Bowel Movements  2








Dressing:  other


Wound:  other


Drains:  other


Cardiovascular:  RSR


Respiratory:  decreased breath sounds


Abdomen:  soft, present bowel sounds


Extremities:  no cyanosis, other





Laboratory Tests








Test


  11/29/19


03:00 11/29/19


06:15


 


White Blood Count


  13.8 K/UL


(4.8-10.8)  H 17.0 K/UL


(4.8-10.8)  H


 


Red Blood Count


  2.86 M/UL


(4.20-5.40)  L 3.47 M/UL


(4.20-5.40)  L


 


Hemoglobin


  8.0 G/DL


(12.0-16.0)  L 9.7 G/DL


(12.0-16.0)  L


 


Hematocrit


  24.5 %


(37.0-47.0)  L 29.5 %


(37.0-47.0)  L


 


Mean Corpuscular Volume 86 FL (80-99)   85 FL (80-99)  


 


Mean Corpuscular Hemoglobin


  28.1 PG


(27.0-31.0) 27.9 PG


(27.0-31.0)


 


Mean Corpuscular Hemoglobin


Concent 32.7 G/DL


(32.0-36.0) 32.8 G/DL


(32.0-36.0)


 


Red Cell Distribution Width


  15.8 %


(11.6-14.8)  H 15.9 %


(11.6-14.8)  H


 


Platelet Count


  247 K/UL


(150-450) 293 K/UL


(150-450)


 


Mean Platelet Volume


  8.5 FL


(6.5-10.1) 8.3 FL


(6.5-10.1)


 


Neutrophils (%) (Auto)


  77.1 %


(45.0-75.0)  H 80.7 %


(45.0-75.0)  H


 


Lymphocytes (%) (Auto)


  13.0 %


(20.0-45.0)  L 11.2 %


(20.0-45.0)  L


 


Monocytes (%) (Auto)


  3.3 %


(1.0-10.0) 3.0 %


(1.0-10.0)


 


Eosinophils (%) (Auto)


  6.2 %


(0.0-3.0)  H 4.4 %


(0.0-3.0)  H


 


Basophils (%) (Auto)


  0.5 %


(0.0-2.0) 0.7 %


(0.0-2.0)


 


Sodium Level


  139 MMOL/L


(136-145) 145 MMOL/L


(136-145)


 


Potassium Level


  6.8 MMOL/L


(3.5-5.1)  #*H 4.9 MMOL/L


(3.5-5.1)


 


Chloride Level


  109 MMOL/L


()  H 111 MMOL/L


()  H


 


Carbon Dioxide Level


  24 MMOL/L


(21-32) 25 MMOL/L


(21-32)


 


Anion Gap


  6 mmol/L


(5-15) 9 mmol/L


(5-15)


 


Blood Urea Nitrogen


  52 mg/dL


(7-18)  H 55 mg/dL


(7-18)  H


 


Creatinine


  1.3 MG/DL


(0.55-1.30) 1.3 MG/DL


(0.55-1.30)


 


Estimat Glomerular Filtration


Rate 41.0 mL/min


(>60) 41.0 mL/min


(>60)


 


Glucose Level


  215 MG/DL


()  H 257 MG/DL


()  H


 


Calcium Level


  7.2 MG/DL


(8.5-10.1)  L 8.1 MG/DL


(8.5-10.1)  L


 


Troponin I


  0.561 ng/mL


(0.000-0.056) 


 











Plan


Problems:  


(1) Pressure injury of deep tissue


(2) Pelvic mass in female


Assessment & Plan:  Known history of pelvic mass.  Refer to prior notes from 

last admission.  No further treatment at this time.  Monitor.


Large cystic-appearing mass within the pelvis could be ovarian in nature and 

appears


relatively unchanged from the last examination.


 


Moderate bilateral pleural effusions with associated posterior basal 

atelectasis.


 


Gallstone suspected.


 


Charles catheter. Gastrostomy.


 


Atherosclerotic vascular disease.





(3) Respiratory failure


Assessment & Plan:  Status post tracheostomy last admission.  Trachea site 

evaluated no signs of infection noted.  Leukocytosis unlikely related.  No 

bleeding noted.


Continue with daily trach care and management.  Will monitor.





(4) Leukocytosis


Assessment & Plan:  Leukocytosis.  Fevers.  Abnormal labs.


Abnormal LFTs


Renal insufficiency


Work-up in progress


Continue antibiotics as per infectious disease


DAILY ESTIMATED NEEDS:


Needs based on Critical care, wounds; 65.9kg 


22-30  kcals/kg 


1450- 1977  total kcals


1.25-2  g protein/kg


  g total protein 


25-30  mL/kg


1648- 1977  total fluid mLs





NUTRITION DIAGNOSIS:


1) Increased kcal and pro needs r/t wound healing AEB pt adm w/ sacral


wound stage 1, r heel DTI- WC eval pending


2) Swallowing difficulty r/t resp status as evidenced by pt is s/p trach


and PEG.





CURRENT TF:Glucerna 1.2 @ 60mL/hr x 24 hrs- held for scan  








ENTERAL NUTRITION RECOMMENDATIONS:


Glucerna 1.2 @60mL/hr x 24 hrs  to provide 1440 mL, 1728kcal, 86g pro, 1159mL 

free h2o 





- Maintain Glucerna 1.2 @ goal as tolerated to meet 100% est needs


- HOB >30 degrees


- Flush per MD: pt w/ CHF











ADDITIONAL RECOMMENDATIONS:


1) Maintain calibrated bedscale wt 


2) Rec bowel regimen- pt adm w/ impaction 


3) Wound healing: Rec Zaki in 4oz H2O BID via PEG 


        + Vit C 250mg qdaily 


4) Monitor lytes- (mg 3.7) 


5) F/up w/ H&P  





(5) Abnormal LFTs


Assessment & Plan:  Leukocytosis, abnormal labs, elevated LFTs.  Renal 

insufficiency.


Unilocular large upper pelvic cystic mass, as described above and on


multiple prior exams.


 


Bilateral pleural effusions


 


Negative for gallstones or dilated bile ducts also suspected on recent CT scan 

are


either artifactual or sonographically occult


 


Echogenic focus in the upper pole right renal sinus, probably artifactual as no


calculi demonstrated on recent CT scans


 


Small left renal parapelvic cyst incidentally noted


Trend LFTs 


HIDA negative 


monitor meds


Exam fairly benign no Ruiz's


We will follow with serial exams














Radhames Blas Nov 29, 2019 16:47

## 2019-11-29 NOTE — NUR
RD ASSESSMENT & RECOMMENDATIONS

SEE CARE ACTIVITY FOR COMPLETE ASSESSMENT



DAILY ESTIMATED NEEDS:

Needs based on Critical care, wounds; 65.9kg 

22-30  kcals/kg 

1450- 1977  total kcals

1.25-2  g protein/kg

  g total protein 

25-30  mL/kg

1648- 1977  total fluid mLs



NUTRITION DIAGNOSIS:

1) Increased kcal and pro needs r/t wound healing AEB pt adm w/ sacral

wound stage 1, r heel DTI- refer to WC eval.

2) Swallowing difficulty r/t resp status as evidenced by pt is s/p trach

and PEG.





CURRENT TF:Glucerna 1.2 @ 60mL/hr x 24 hrs 





ENTERAL NUTRITION RECOMMENDATIONS:

Glucerna 1.2 @60mL/hr x 24 hrs  to provide 1440 mL, 1728kcal, 86g pro, 1159mL free h2o 



- Maintain Glucerna 1.2 @ goal as tolerated to meet 100% est needs

- HOB >30 degrees

- Flush per MD





ADDITIONAL RECOMMENDATIONS:

1) Maintain calibrated bedscale wt 

2) Rec bowel regimen- pt adm w/ impaction/ BM noted (11/29) 

3) Wound healing: Rec Zaki in 4oz H2O BID via PEG 

        + Vit C 250mg qdaily 

4) Monitor potassium, need for TF change to NEPRO (6.8*-> now 4.9) 

5) Add hypoglycemics for improved glucose values 

.

## 2019-11-30 VITALS — DIASTOLIC BLOOD PRESSURE: 62 MMHG | SYSTOLIC BLOOD PRESSURE: 141 MMHG

## 2019-11-30 VITALS — SYSTOLIC BLOOD PRESSURE: 106 MMHG | DIASTOLIC BLOOD PRESSURE: 71 MMHG

## 2019-11-30 VITALS — DIASTOLIC BLOOD PRESSURE: 74 MMHG | SYSTOLIC BLOOD PRESSURE: 147 MMHG

## 2019-11-30 VITALS — DIASTOLIC BLOOD PRESSURE: 78 MMHG | SYSTOLIC BLOOD PRESSURE: 151 MMHG

## 2019-11-30 VITALS — DIASTOLIC BLOOD PRESSURE: 78 MMHG | SYSTOLIC BLOOD PRESSURE: 162 MMHG

## 2019-11-30 VITALS — SYSTOLIC BLOOD PRESSURE: 147 MMHG | DIASTOLIC BLOOD PRESSURE: 70 MMHG

## 2019-11-30 LAB
ADD MANUAL DIFF: NO
ADD MANUAL DIFF: YES
ALBUMIN SERPL-MCNC: 2.1 G/DL (ref 3.4–5)
ALBUMIN/GLOB SERPL: 0.4 {RATIO} (ref 1–2.7)
ALP SERPL-CCNC: 99 U/L (ref 46–116)
ALT SERPL-CCNC: 167 U/L (ref 12–78)
AMYLASE SERPL-CCNC: 47 U/L (ref 25–115)
ANION GAP SERPL CALC-SCNC: 6 MMOL/L (ref 5–15)
ANION GAP SERPL CALC-SCNC: 6 MMOL/L (ref 5–15)
APTT BLD: 25 SEC (ref 23–33)
AST SERPL-CCNC: 18 U/L (ref 15–37)
BASOPHILS NFR BLD AUTO: 0.9 % (ref 0–2)
BILIRUB SERPL-MCNC: 0.7 MG/DL (ref 0.2–1)
BUN SERPL-MCNC: 45 MG/DL (ref 7–18)
BUN SERPL-MCNC: 47 MG/DL (ref 7–18)
CALCIUM SERPL-MCNC: 8.1 MG/DL (ref 8.5–10.1)
CALCIUM SERPL-MCNC: 8.5 MG/DL (ref 8.5–10.1)
CHLORIDE SERPL-SCNC: 112 MMOL/L (ref 98–107)
CHLORIDE SERPL-SCNC: 112 MMOL/L (ref 98–107)
CO2 SERPL-SCNC: 25 MMOL/L (ref 21–32)
CO2 SERPL-SCNC: 25 MMOL/L (ref 21–32)
CREAT SERPL-MCNC: 1.1 MG/DL (ref 0.55–1.3)
CREAT SERPL-MCNC: 1.2 MG/DL (ref 0.55–1.3)
EOSINOPHIL NFR BLD AUTO: 3.5 % (ref 0–3)
ERYTHROCYTE [DISTWIDTH] IN BLOOD BY AUTOMATED COUNT: 16.1 % (ref 11.6–14.8)
ERYTHROCYTE [DISTWIDTH] IN BLOOD BY AUTOMATED COUNT: 16.1 % (ref 11.6–14.8)
GLOBULIN SER-MCNC: 4.7 G/DL
HCT VFR BLD CALC: 28.7 % (ref 37–47)
HCT VFR BLD CALC: 29.6 % (ref 37–47)
HGB BLD-MCNC: 9.2 G/DL (ref 12–16)
HGB BLD-MCNC: 9.8 G/DL (ref 12–16)
INR PPP: 1 (ref 0.9–1.1)
LYMPHOCYTES NFR BLD AUTO: 10.7 % (ref 20–45)
MCV RBC AUTO: 85 FL (ref 80–99)
MCV RBC AUTO: 86 FL (ref 80–99)
MONOCYTES NFR BLD AUTO: 2.7 % (ref 1–10)
NEUTROPHILS NFR BLD AUTO: 82.2 % (ref 45–75)
PLATELET # BLD: 311 K/UL (ref 150–450)
PLATELET # BLD: 325 K/UL (ref 150–450)
POTASSIUM SERPL-SCNC: 5.1 MMOL/L (ref 3.5–5.1)
POTASSIUM SERPL-SCNC: 5.2 MMOL/L (ref 3.5–5.1)
RBC # BLD AUTO: 3.34 M/UL (ref 4.2–5.4)
RBC # BLD AUTO: 3.47 M/UL (ref 4.2–5.4)
SODIUM SERPL-SCNC: 143 MMOL/L (ref 136–145)
SODIUM SERPL-SCNC: 143 MMOL/L (ref 136–145)
WBC # BLD AUTO: 17.6 K/UL (ref 4.8–10.8)
WBC # BLD AUTO: 19.5 K/UL (ref 4.8–10.8)

## 2019-11-30 RX ADMIN — NITROGLYCERIN SCH INCH: 20 OINTMENT TOPICAL at 11:32

## 2019-11-30 RX ADMIN — AMIKACIN SULFATE SCH MG: 500 INJECTION, SOLUTION INTRAMUSCULAR; INTRAVENOUS at 09:01

## 2019-11-30 RX ADMIN — INSULIN ASPART SCH UNITS: 100 INJECTION, SOLUTION INTRAVENOUS; SUBCUTANEOUS at 05:29

## 2019-11-30 RX ADMIN — INSULIN ASPART SCH UNITS: 100 INJECTION, SOLUTION INTRAVENOUS; SUBCUTANEOUS at 17:10

## 2019-11-30 RX ADMIN — NITROGLYCERIN SCH INCH: 20 OINTMENT TOPICAL at 17:07

## 2019-11-30 RX ADMIN — METOPROLOL TARTRATE SCH MG: 25 TABLET, FILM COATED ORAL at 20:05

## 2019-11-30 RX ADMIN — INSULIN ASPART SCH UNITS: 100 INJECTION, SOLUTION INTRAVENOUS; SUBCUTANEOUS at 11:35

## 2019-11-30 RX ADMIN — INSULIN ASPART SCH UNITS: 100 INJECTION, SOLUTION INTRAVENOUS; SUBCUTANEOUS at 23:16

## 2019-11-30 RX ADMIN — Medication SCH MG: at 08:44

## 2019-11-30 RX ADMIN — AMIKACIN SULFATE SCH MG: 500 INJECTION, SOLUTION INTRAMUSCULAR; INTRAVENOUS at 22:56

## 2019-11-30 RX ADMIN — CHLORHEXIDINE GLUCONATE SCH APPLIC: 213 SOLUTION TOPICAL at 20:05

## 2019-11-30 RX ADMIN — NITROGLYCERIN SCH INCH: 20 OINTMENT TOPICAL at 05:37

## 2019-11-30 RX ADMIN — METOPROLOL TARTRATE SCH MG: 25 TABLET, FILM COATED ORAL at 08:44

## 2019-11-30 NOTE — PULMONOLOGY PROGRESS NOTE
Assessment/Plan


Assessment/Plan


Pulmonary Progress Note





HPI:  This is an unfortunate 64-year-old female, admiytted with abnormal labs.  


The patient noted to have elevated troponin, acute renal failure and 

leukocytosis, possible sepsis.


The patient with recent discharge after prolonged hospital stay.





PAST MEDICAL HISTORY:  Notable for chronic respiratory failure, history of


sepsis, history of pneumonia, history of bone marrow biopsy, seizures,


diabetes, schizophrenia, anemia, CHF, hypertension, renal failure,


ventilator dependence.





PHYSICAL EXAMINATION:


VITAL SIGNS NOTED





GENERAL:  Well-appearing female, chronically debilitated, chronically


obtunded.


HEENT:  Negative.  Tracheostomy in place.


LUNGS:  With coarse breath sounds.  Reduced breath sounds at both bases.


CARDIAC:  S1, S2.  Regular rate and rhythm.


ABDOMEN:  Soft.  G-tube in place.


EXTREMITIES:  With noted edema.


NEUROLOGICAL:  Poorly responsive.





LABORATORY DATA:  Reviewed.


CXR: Noted





IMPRESSION:  Acute MI, elevated troponin, possible non-STEMI, hypertension


per history, congestive heart failure, elevated natriuretic peptide, acute


on chronic renal failure, diabetes, psychiatric disorder, chronic


encephalopathy, seizure disorder, leukocytosis.





RECOMMENDATIONS:  Supportive care.  IV antibiotics.  Cardiology evaluation.


Renal evaluation.  Transfuse with noted anemia.  Hematology followup


PTA medications.  Prognosis is poor.  We will try to stabilize and discharge.





Subjective


ROS Limited/Unobtainable:  No


Allergies:  


Coded Allergies:  


     No Known Allergies (Unverified , 10/14/19)





Objective





Last 24 Hour Vital Signs








  Date Time  Temp Pulse Resp B/P (MAP) Pulse Ox O2 Delivery O2 Flow Rate FiO2


 


11/30/19 13:07  73 19     60


 


11/30/19 12:00        50


 


11/30/19 12:00      Mechanical Ventilator  


 


11/30/19 12:00 98.1 79 26 141/62 (88) 98   


 


11/30/19 11:32    141/62    


 


11/30/19 11:23  79      


 


11/30/19 11:12  86 30     60


 


11/30/19 08:55  105 31  95 Mechanical Ventilator  60





  105 30     60


 


11/30/19 08:44  83  162/78    


 


11/30/19 08:00 98.4 83 30 162/78 (106) 96   


 


11/30/19 07:54  125      


 


11/30/19 07:27      Mechanical Ventilator  


 


11/30/19 07:25        50


 


11/30/19 07:00  91 28     40


 


11/30/19 05:37    159/68    


 


11/30/19 04:52  85 28     50


 


11/30/19 04:00        50


 


11/30/19 04:00      Mechanical Ventilator  


 


11/30/19 04:00 97.9 83 24 106/71 (83) 96   


 


11/30/19 03:50  83      


 


11/30/19 02:34  83 35     50


 


11/30/19 01:29  91 25     60


 


11/30/19 00:00      Mechanical Ventilator  


 


11/30/19 00:00 98.8 78 22 147/70 (95) 98   


 


11/29/19 23:54  79      


 


11/29/19 23:12  90 24  100 Mechanical Ventilator  60





  90 18     60


 


11/29/19 21:15  93 26     80


 


11/29/19 20:07  93  154/79    


 


11/29/19 20:00      Mechanical Ventilator  


 


11/29/19 20:00 97.5 93 25 154/79 (104) 99   


 


11/29/19 20:00        60


 


11/29/19 19:35  90      


 


11/29/19 19:20  82 20     35


 


11/29/19 17:35    140/71    


 


11/29/19 16:51  81 26     35


 


11/29/19 16:00      Mechanical Ventilator  


 


11/29/19 16:00 97.9 83 26 140/71 (94) 96   


 


11/29/19 16:00  73      


 


11/29/19 16:00        35


 


11/29/19 14:41  75 24     35

















Intake and Output  


 


 11/29/19 11/30/19





 19:00 07:00


 


Intake Total 1165 ml 1718.75 ml


 


Output Total 600 ml 600 ml


 


Balance 565 ml 1118.75 ml


 


  


 


Intake Free Water 70 ml 250 ml


 


IV Total 375 ml 748.75 ml


 


Tube Feeding 720 ml 720 ml


 


Output Urine Total 600 ml 600 ml


 


# Bowel Movements  2








Laboratory Tests


11/29/19 21:30: 


Arterial Blood pH 7.370, Arterial Blood Partial Pressure CO2 39.0, Arterial 

Blood Partial Pressure O2 139.0H, Arterial Blood HCO3 23.6, Arterial Blood 

Oxygen Saturation 98.3, Arterial Blood Base Excess -1.1, Graham Test Positive


11/30/19 01:43: Stool Occult Blood Negative


11/30/19 04:30: 


White Blood Count 17.6H, Red Blood Count 3.34L, Hemoglobin 9.2L, Hematocrit 

28.7L, Mean Corpuscular Volume 86, Mean Corpuscular Hemoglobin 27.6, Mean 

Corpuscular Hemoglobin Concent 32.2, Red Cell Distribution Width 16.1H, 

Platelet Count 311, Mean Platelet Volume 8.5, Neutrophils (%) (Auto) 82.2H, 

Lymphocytes (%) (Auto) 10.7L, Monocytes (%) (Auto) 2.7, Eosinophils (%) (Auto) 

3.5H, Basophils (%) (Auto) 0.9, Erythrocyte Sedimentation Rate 122H, 

Prothrombin Time 10.9, Prothromb Time International Ratio 1.0, Activated 

Partial Thromboplast Time 25, Sodium Level 143, Potassium Level 5.2H, Chloride 

Level 112H, Carbon Dioxide Level 25, Anion Gap 6, Blood Urea Nitrogen 47H, 

Creatinine 1.1, Estimat Glomerular Filtration Rate 49.7, Glucose Level 269H, 

Calcium Level 8.1L, Total Bilirubin 0.7, Aspartate Amino Transf (AST/SGOT) 18, 

Alanine Aminotransferase (ALT/SGPT) 167H, Alkaline Phosphatase 99, C-Reactive 

Protein, Quantitative 3.9H, Total Protein 6.8, Albumin 2.1L, Globulin 4.7, 

Albumin/Globulin Ratio 0.4L, Amylase Level 47, Lipase 170


11/30/19 06:14: 


White Blood Count 19.5H, Red Blood Count 3.47L, Hemoglobin 9.8L, Hematocrit 

29.6L, Mean Corpuscular Volume 85, Mean Corpuscular Hemoglobin 28.3, Mean 

Corpuscular Hemoglobin Concent 33.2, Red Cell Distribution Width 16.1H, 

Platelet Count 325, Mean Platelet Volume 8.0, Neutrophils (%) (Auto) , 

Lymphocytes (%) (Auto) , Monocytes (%) (Auto) , Eosinophils (%) (Auto) , 

Basophils (%) (Auto) , Sodium Level 143, Potassium Level 5.1, Chloride Level 

112H, Carbon Dioxide Level 25, Anion Gap 6, Blood Urea Nitrogen 45H, Creatinine 

1.2, Estimat Glomerular Filtration Rate 45.0, Glucose Level 277H, Calcium Level 

8.5, Differential Total Cells Counted 100, Neutrophils % (Manual) 85H, 

Lymphocytes % (Manual) 12L, Monocytes % (Manual) 0L, Eosinophils % (Manual) 3, 

Basophils % (Manual) 0, Band Neutrophils 0, Platelet Estimate Adequate, 

Platelet Morphology Normal, Red Blood Cell Morphology Normal





Current Medications








 Medications


  (Trade)  Dose


 Ordered  Sig/Winnie


 Route


 PRN Reason  Start Time


 Stop Time Status Last Admin


Dose Admin


 


 Amikacin Sulfate


  (Amikin)  500 mg  Q12HR@10,22


 INH


   11/29/19 22:00


 12/6/19 21:59  11/30/19 09:01


 


 


 Ascorbic Acid


  (Vitamin C)  250 mg  DAILY


 ORAL


   11/27/19 09:00


 12/27/19 08:59  11/30/19 08:44


 


 


 Chlorhexidine


 Gluconate


  (Mae-Hex 2%)  1 applic  DAILY@2000


 TOPIC


   11/26/19 20:00


 12/26/19 19:59  11/29/19 20:06


 


 


 Dextrose


  (Dextrose 50%)  25 ml  Q30M  PRN


 IV


 Hypoglycemia  11/28/19 12:15


 12/28/19 12:14   


 


 


 Dextrose


  (Dextrose 50%)  50 ml  Q30M  PRN


 IV


 Hypoglycemia  11/28/19 12:15


 12/28/19 12:14   


 


 


 Epoetin Olaf


  (Epoetin


 Olaf-EPBX(NON


 ESRD))  3,000 unit  MON-WED-FRI


 SUBQ


   11/29/19 21:00


 12/29/19 20:59  11/29/19 20:07


 


 


 Epoetin Olaf


  (Epoetin


 Olaf-EPBX(NON


 ESRD))  4,000 unit  MON-WED-FRI


 SUBQ


   11/29/19 21:00


 12/29/19 20:59  11/29/19 20:07


 


 


 Insulin Aspart


  (NovoLOG)    Q6HR


 SUBQ


   11/28/19 12:30


 12/28/19 12:29  11/30/19 11:35


 


 


 Lorazepam


  (Ativan 2mg/ml


 1ml)  1 mg  Q3H  PRN


 IV


 For Seizures  11/27/19 04:30


 12/4/19 04:29   


 


 


 Metoprolol


 Tartrate


  (Lopressor)  25 mg  Q12HR


 GT


   11/26/19 10:15


 12/26/19 10:14  11/30/19 08:44


 


 


 Nitroglycerin


  (Nitro-Bid)  1 inch  TID@0600,1200,1800


 TOPIC


   11/26/19 12:00


 12/26/19 11:59  11/30/19 11:32


 


 


 Sodium Chloride  1,000 ml @ 


 75 mls/hr  [id number]


 IV


   11/30/19 09:15


 12/30/19 09:14  11/30/19 09:31


 

















Delmer Main MD Nov 30, 2019 14:20

## 2019-11-30 NOTE — NUR
NURSE NOTES:

received patient report from starla miller. patient is on bed asleep. not in acute distress. on 
vent at prescribed rate. sp emergency trache changed last night.casas intact, draining. will 
continue plan of care.

## 2019-11-30 NOTE — DIAGNOSTIC IMAGING REPORT
EXAM:

  XR Chest, 1 View

 

CLINICAL HISTORY:

  F/U

 

TECHNIQUE:

  Frontal view of the chest.

 

COMPARISON:

  Chest x-ray 11/26/19

 

FINDINGS:

  Lungs:  Interval increase in bilateral airspace opacities and 

interstitial thickening.

  Pleural space:  New small right pleural effusion.  Probably small 

improving left pleural effusion.  No pneumothorax.

  Heart:  Unremarkable.  No cardiomegaly.

  Mediastinum:  Unremarkable.

  Bones/joints:  Unremarkable.

  Tubes, lines and devices:  Stable tracheostomy tube and right PICC line.

 

IMPRESSION:     

1.  Interval increase in bilateral airspace opacities and interstitial 

thickening.  Likely pulmonary edema.

2.  New small right pleural effusion.  Probably small improving left 

pleural effusion.

## 2019-11-30 NOTE — NUR
HAND-OFF: 

Report given to ELIZABETH Roman. Pt remains stable at this time. Endorsed pending K level via 
peripheral draw to oncoming shift.

## 2019-11-30 NOTE — NUR
NURSE NOTES:

Received report from ELIZABETH Roman. No signs of cardiac or respiratory distress noted. Pt unable 
to make needs known at this time and assessed for pain using FLACC scale. No signs of pain 
noted at this time. Pt is trach to vent Shiley 8 AC18  FiO2 60% and PEEP 5. GT noted, 
remains patent, no residual noted at this time. GT feeding currently running Glucerna 1.2 
@60ml/hr. Charles catheter noted for retention and remains draining to gravity, yellow urine 
noted in collection bag. Right upper arm PICC is intact, patent, and asymptomatic; running 
1/2 NS @75ml/hr. Pt appears to be resting comfortably at this time. Will continue care plan. 
Will continue to monitor.

## 2019-11-30 NOTE — NUR
NURSE NOTES:

Morning labs showed a K level of 5.2

Repeat labs ordered and drawn peripheral on opposite arm since pt is receiving 1/2 NS + 
20KCL at 75mL/hr. 

Fluids on hold pending lab results and MD orders. 

Will continue to monitor.

## 2019-11-30 NOTE — SURGERY PROGRESS NOTE
Surgery Progress Note


Subjective


Additional Comments


worsening leukocytosis


CXR noted


exam stable and comfortable appearing


micro with resistant organism





Objective





Last 24 Hour Vital Signs








  Date Time  Temp Pulse Resp B/P (MAP) Pulse Ox O2 Delivery O2 Flow Rate FiO2


 


11/30/19 13:07  73 19     60


 


11/30/19 12:00        50


 


11/30/19 12:00      Mechanical Ventilator  


 


11/30/19 12:00 98.1 79 26 141/62 (88) 98   


 


11/30/19 11:32    141/62    


 


11/30/19 11:23  79      


 


11/30/19 11:12  86 30     60


 


11/30/19 08:55  105 31  95 Mechanical Ventilator  60





  105 30     60


 


11/30/19 08:44  83  162/78    


 


11/30/19 08:00 98.4 83 30 162/78 (106) 96   


 


11/30/19 07:54  125      


 


11/30/19 07:27      Mechanical Ventilator  


 


11/30/19 07:25        50


 


11/30/19 07:00  91 28     40


 


11/30/19 05:37    159/68    


 


11/30/19 04:52  85 28     50


 


11/30/19 04:00        50


 


11/30/19 04:00      Mechanical Ventilator  


 


11/30/19 04:00 97.9 83 24 106/71 (83) 96   


 


11/30/19 03:50  83      


 


11/30/19 02:34  83 35     50


 


11/30/19 01:29  91 25     60


 


11/30/19 00:00      Mechanical Ventilator  


 


11/30/19 00:00 98.8 78 22 147/70 (95) 98   


 


11/29/19 23:54  79      


 


11/29/19 23:12  90 24  100 Mechanical Ventilator  60





  90 18     60


 


11/29/19 21:15  93 26     80


 


11/29/19 20:07  93  154/79    


 


11/29/19 20:00      Mechanical Ventilator  


 


11/29/19 20:00 97.5 93 25 154/79 (104) 99   


 


11/29/19 20:00        60


 


11/29/19 19:35  90      


 


11/29/19 19:20  82 20     35


 


11/29/19 17:35    140/71    


 


11/29/19 16:51  81 26     35


 


11/29/19 16:00      Mechanical Ventilator  


 


11/29/19 16:00 97.9 83 26 140/71 (94) 96   


 


11/29/19 16:00  73      


 


11/29/19 16:00        35


 


11/29/19 14:41  75 24     35








I&O











Intake and Output  


 


 11/29/19 11/30/19





 19:00 07:00


 


Intake Total 1165 ml 1718.75 ml


 


Output Total 600 ml 600 ml


 


Balance 565 ml 1118.75 ml


 


  


 


Intake Free Water 70 ml 250 ml


 


IV Total 375 ml 748.75 ml


 


Tube Feeding 720 ml 720 ml


 


Output Urine Total 600 ml 600 ml


 


# Bowel Movements  2








Dressing:  other


Wound:  other


Drains:  other


Cardiovascular:  RSR


Respiratory:  decreased breath sounds


Abdomen:  soft, present bowel sounds, non-distended


Extremities:  no cyanosis, other





Laboratory Tests








Test


  11/29/19


21:30 11/30/19


01:43 11/30/19


04:30 11/30/19


06:14


 


Arterial Blood pH


  7.370


(7.350-7.450) 


  


  


 


 


Arterial Blood Partial


Pressure CO2 39.0 mmHg


(35.0-45.0) 


  


  


 


 


Arterial Blood Partial


Pressure O2 139.0 mmHg


(75.0-100.0)  H 


  


  


 


 


Arterial Blood HCO3


  23.6 mmol/L


(22.0-26.0) 


  


  


 


 


Arterial Blood Oxygen


Saturation 98.3 %


() 


  


  


 


 


Arterial Blood Base Excess -1.1 (-2-2)     


 


Graham Test Positive     


 


Stool Occult Blood


  


  Negative


(NEGATIVE) 


  


 


 


White Blood Count


  


  


  17.6 K/UL


(4.8-10.8)  H 19.5 K/UL


(4.8-10.8)  H


 


Red Blood Count


  


  


  3.34 M/UL


(4.20-5.40)  L 3.47 M/UL


(4.20-5.40)  L


 


Hemoglobin


  


  


  9.2 G/DL


(12.0-16.0)  L 9.8 G/DL


(12.0-16.0)  L


 


Hematocrit


  


  


  28.7 %


(37.0-47.0)  L 29.6 %


(37.0-47.0)  L


 


Mean Corpuscular Volume   86 FL (80-99)   85 FL (80-99)  


 


Mean Corpuscular Hemoglobin


  


  


  27.6 PG


(27.0-31.0) 28.3 PG


(27.0-31.0)


 


Mean Corpuscular Hemoglobin


Concent 


  


  32.2 G/DL


(32.0-36.0) 33.2 G/DL


(32.0-36.0)


 


Red Cell Distribution Width


  


  


  16.1 %


(11.6-14.8)  H 16.1 %


(11.6-14.8)  H


 


Platelet Count


  


  


  311 K/UL


(150-450) 325 K/UL


(150-450)


 


Mean Platelet Volume


  


  


  8.5 FL


(6.5-10.1) 8.0 FL


(6.5-10.1)


 


Neutrophils (%) (Auto)


  


  


  82.2 %


(45.0-75.0)  H % (45.0-75.0)


 


 


Lymphocytes (%) (Auto)


  


  


  10.7 %


(20.0-45.0)  L % (20.0-45.0)


 


 


Monocytes (%) (Auto)


  


  


  2.7 %


(1.0-10.0)  % (1.0-10.0)  


 


 


Eosinophils (%) (Auto)


  


  


  3.5 %


(0.0-3.0)  H  % (0.0-3.0)  


 


 


Basophils (%) (Auto)


  


  


  0.9 %


(0.0-2.0)  % (0.0-2.0)  


 


 


Erythrocyte Sedimentation Rate


  


  


  122 MM/HR


(0-30)  H 


 


 


Prothrombin Time


  


  


  10.9 SEC


(9.30-11.50) 


 


 


Prothromb Time International


Ratio 


  


  1.0 (0.9-1.1)  


  


 


 


Activated Partial


Thromboplast Time 


  


  25 SEC (23-33)


  


 


 


Sodium Level


  


  


  143 MMOL/L


(136-145) 143 MMOL/L


(136-145)


 


Potassium Level


  


  


  5.2 MMOL/L


(3.5-5.1)  H 5.1 MMOL/L


(3.5-5.1)


 


Chloride Level


  


  


  112 MMOL/L


()  H 112 MMOL/L


()  H


 


Carbon Dioxide Level


  


  


  25 MMOL/L


(21-32) 25 MMOL/L


(21-32)


 


Anion Gap


  


  


  6 mmol/L


(5-15) 6 mmol/L


(5-15)


 


Blood Urea Nitrogen


  


  


  47 mg/dL


(7-18)  H 45 mg/dL


(7-18)  H


 


Creatinine


  


  


  1.1 MG/DL


(0.55-1.30) 1.2 MG/DL


(0.55-1.30)


 


Estimat Glomerular Filtration


Rate 


  


  49.7 mL/min


(>60) 45.0 mL/min


(>60)


 


Glucose Level


  


  


  269 MG/DL


()  H 277 MG/DL


()  H


 


Calcium Level


  


  


  8.1 MG/DL


(8.5-10.1)  L 8.5 MG/DL


(8.5-10.1)


 


Total Bilirubin


  


  


  0.7 MG/DL


(0.2-1.0) 


 


 


Aspartate Amino Transf


(AST/SGOT) 


  


  18 U/L (15-37)


  


 


 


Alanine Aminotransferase


(ALT/SGPT) 


  


  167 U/L


(12-78)  H 


 


 


Alkaline Phosphatase


  


  


  99 U/L


() 


 


 


C-Reactive Protein,


Quantitative 


  


  3.9 mg/dL


(0.00-0.90)  H 


 


 


Total Protein


  


  


  6.8 G/DL


(6.4-8.2) 


 


 


Albumin


  


  


  2.1 G/DL


(3.4-5.0)  L 


 


 


Globulin   4.7 g/dL   


 


Albumin/Globulin Ratio


  


  


  0.4 (1.0-2.7)


L 


 


 


Amylase Level


  


  


  47 U/L


() 


 


 


Lipase


  


  


  170 U/L


() 


 


 


Differential Total Cells


Counted 


  


  


  100  


 


 


Neutrophils % (Manual)    85 % (45-75)  H


 


Lymphocytes % (Manual)    12 % (20-45)  L


 


Monocytes % (Manual)    0 % (1-10)  L


 


Eosinophils % (Manual)    3 % (0-3)  


 


Basophils % (Manual)    0 % (0-2)  


 


Band Neutrophils    0 % (0-8)  


 


Platelet Estimate    Adequate  


 


Platelet Morphology    Normal  


 


Red Blood Cell Morphology    Normal  











Plan


Problems:  


(1) Pressure injury of deep tissue


(2) Pelvic mass in female


Assessment & Plan:  Known history of pelvic mass.  Refer to prior notes from 

last admission.  No further treatment at this time.  Monitor.


Large cystic-appearing mass within the pelvis could be ovarian in nature and 

appears


relatively unchanged from the last examination.


 


Moderate bilateral pleural effusions with associated posterior basal 

atelectasis.


 


Gallstone suspected.


 


Charles catheter. Gastrostomy.


 


Atherosclerotic vascular disease.





(3) Respiratory failure


Assessment & Plan:  Status post tracheostomy last admission.  Trachea site 

evaluated no signs of infection noted.  Leukocytosis unlikely related.  No 

bleeding noted.


Continue with daily trach care and management.  Will monitor.


1.  Interval increase in bilateral airspace opacities and interstitial 


thickening.  Likely pulmonary edema.


2.  New small right pleural effusion.  Probably small improving left 


pleural effusion.





(4) Leukocytosis


Assessment & Plan:  Leukocytosis.  Fevers.  Abnormal labs.


Abnormal LFTs improved


juliano/lip okay


micro noted


resistant organism 


Renal insufficiency


Work-up in progress


Continue antibiotics as per infectious disease


DAILY ESTIMATED NEEDS:


Needs based on Critical care, wounds; 65.9kg 


22-30  kcals/kg 


1450- 1977  total kcals


1.25-2  g protein/kg


  g total protein 


25-30  mL/kg


1648- 1977  total fluid mLs





NUTRITION DIAGNOSIS:


1) Increased kcal and pro needs r/t wound healing AEB pt adm w/ sacral


wound stage 1, r heel DTI- WC eval pending


2) Swallowing difficulty r/t resp status as evidenced by pt is s/p trach


and PEG.





CURRENT TF:Glucerna 1.2 @ 60mL/hr x 24 hrs- held for scan  








ENTERAL NUTRITION RECOMMENDATIONS:


Glucerna 1.2 @60mL/hr x 24 hrs  to provide 1440 mL, 1728kcal, 86g pro, 1159mL 

free h2o 





- Maintain Glucerna 1.2 @ goal as tolerated to meet 100% est needs


- HOB >30 degrees


- Flush per MD: pt w/ CHF











ADDITIONAL RECOMMENDATIONS:


1) Maintain calibrated bedscale wt 


2) Rec bowel regimen- pt adm w/ impaction 


3) Wound healing: Rec Zaki in 4oz H2O BID via PEG 


        + Vit C 250mg qdaily 


4) Monitor lytes- (mg 3.7) 


5) F/up w/ H&P  





(5) Abnormal LFTs


Assessment & Plan:  Leukocytosis, abnormal labs, elevated LFTs.  Renal 

insufficiency.


Unilocular large upper pelvic cystic mass, as described above and on


multiple prior exams.


 


Bilateral pleural effusions


 


Negative for gallstones or dilated bile ducts also suspected on recent CT scan 

are


either artifactual or sonographically occult


 


Echogenic focus in the upper pole right renal sinus, probably artifactual as no


calculi demonstrated on recent CT scans


 


Small left renal parapelvic cyst incidentally noted


Trend LFTs  - improved


HIDA negative 


monitor meds


Exam fairly benign no Ruiz's


We will follow with serial exams














Radhames Blas Nov 30, 2019 14:26

## 2019-11-30 NOTE — NUR
NURSE NOTES:

Patient provided with bed bath, linen change and oral care. Patient tolerated care well. Pt 
remains resting in bed; bed in lowest position, safety wheels engaged, bed alarm activated, 
side rails up x3 and padded per protocol, call light within reach. Will continue to monitor.

## 2019-12-01 VITALS — DIASTOLIC BLOOD PRESSURE: 51 MMHG | SYSTOLIC BLOOD PRESSURE: 114 MMHG

## 2019-12-01 VITALS — SYSTOLIC BLOOD PRESSURE: 152 MMHG | DIASTOLIC BLOOD PRESSURE: 72 MMHG

## 2019-12-01 VITALS — SYSTOLIC BLOOD PRESSURE: 154 MMHG | DIASTOLIC BLOOD PRESSURE: 72 MMHG

## 2019-12-01 VITALS — DIASTOLIC BLOOD PRESSURE: 71 MMHG | SYSTOLIC BLOOD PRESSURE: 149 MMHG

## 2019-12-01 VITALS — DIASTOLIC BLOOD PRESSURE: 78 MMHG | SYSTOLIC BLOOD PRESSURE: 140 MMHG

## 2019-12-01 VITALS — DIASTOLIC BLOOD PRESSURE: 76 MMHG | SYSTOLIC BLOOD PRESSURE: 156 MMHG

## 2019-12-01 RX ADMIN — CHLORHEXIDINE GLUCONATE SCH APPLIC: 213 SOLUTION TOPICAL at 20:08

## 2019-12-01 RX ADMIN — INSULIN ASPART SCH UNITS: 100 INJECTION, SOLUTION INTRAVENOUS; SUBCUTANEOUS at 05:16

## 2019-12-01 RX ADMIN — METOPROLOL TARTRATE SCH MG: 25 TABLET, FILM COATED ORAL at 20:11

## 2019-12-01 RX ADMIN — AMIKACIN SULFATE SCH MG: 500 INJECTION, SOLUTION INTRAMUSCULAR; INTRAVENOUS at 21:44

## 2019-12-01 RX ADMIN — Medication SCH MG: at 08:31

## 2019-12-01 RX ADMIN — METOPROLOL TARTRATE SCH MG: 25 TABLET, FILM COATED ORAL at 08:32

## 2019-12-01 RX ADMIN — INSULIN ASPART SCH UNITS: 100 INJECTION, SOLUTION INTRAVENOUS; SUBCUTANEOUS at 23:50

## 2019-12-01 RX ADMIN — INSULIN ASPART SCH UNITS: 100 INJECTION, SOLUTION INTRAVENOUS; SUBCUTANEOUS at 11:57

## 2019-12-01 RX ADMIN — AMIKACIN SULFATE SCH MG: 500 INJECTION, SOLUTION INTRAMUSCULAR; INTRAVENOUS at 10:18

## 2019-12-01 RX ADMIN — NITROGLYCERIN SCH INCH: 20 OINTMENT TOPICAL at 12:08

## 2019-12-01 RX ADMIN — NITROGLYCERIN SCH INCH: 20 OINTMENT TOPICAL at 05:14

## 2019-12-01 RX ADMIN — INSULIN ASPART SCH UNITS: 100 INJECTION, SOLUTION INTRAVENOUS; SUBCUTANEOUS at 17:31

## 2019-12-01 RX ADMIN — NITROGLYCERIN SCH INCH: 20 OINTMENT TOPICAL at 17:32

## 2019-12-01 NOTE — NUR
CASE MANAGEMENT: REVIEW 



11/30/2019



SI:NSTEMI. 

T 97.9 HR 81 RR 20 B/P 151/78 SATS 97% ON MECH VENT FIO2 60 

WBC 19.5  BUN 45  STOOL OB (-) 



IS:NS @ 75 mL/HR 

LOPRESSOR GT Q12H 

AMIKIN INH Q12H 

INSULIN SUBQ Q6H 

NITRO BID TOP TID 



***SDU*** 



12/01/2019



SI:NSTEMI. 

T 98 HR 83 RR 18 B/P 154/72 SATS 100% ON MECH VENT FIO2 60 

LABS: NONE TODAY 



IS:NS @ 75 mL/HR 

LOPRESSOR GT Q12H 

AMIKIN INH Q12H 

INSULIN SUBQ Q6H 

NITRO BID TOP TID 



***SDU***

## 2019-12-01 NOTE — NUR
NURSE NOTES:

Patient remains hemodynamically stable with no apparent s/s of pain and discomfort. HOB 
elevated to prevent aspiration, tolerate well feeding and no residual noted at this time and 
flushed as ordered.Will continue to  monitor.Call light within easy reach.

## 2019-12-01 NOTE — SURGERY PROGRESS NOTE
Surgery Progress Note


Subjective


Additional Comments


no acute events





Objective





Last 24 Hour Vital Signs








  Date Time  Temp Pulse Resp B/P (MAP) Pulse Ox O2 Delivery O2 Flow Rate FiO2


 


12/1/19 13:12  82 23     50


 


12/1/19 12:08    156/76    


 


12/1/19 12:00      Mechanical Ventilator  


 


12/1/19 12:00  80      


 


12/1/19 12:00        70


 


12/1/19 12:00 97.8 108 19 156/76 (102) 99   


 


12/1/19 10:30  72 18  100 Mechanical Ventilator  50





  67 18     50


 


12/1/19 09:21  76 18     50


 


12/1/19 08:32  74  140/72    


 


12/1/19 08:00 98.0 74 18 140/78 (98) 100   


 


12/1/19 08:00  81      


 


12/1/19 08:00        60


 


12/1/19 08:00      Mechanical Ventilator  


 


12/1/19 06:40  85 19     50


 


12/1/19 05:14    154/72    


 


12/1/19 05:00  86 22  100 Mechanical Ventilator  60





  78 18     60





        60


 


12/1/19 04:02  78      


 


12/1/19 04:00 98.0 83 18 154/72 (99) 100   


 


12/1/19 04:00      Mechanical Ventilator  


 


12/1/19 04:00        60


 


12/1/19 03:00  81 18  96 Mechanical Ventilator  50





  74 18     50





        50


 


12/1/19 01:42  74 18     50


 


12/1/19 01:00  72 20  96 Mechanical Ventilator  50





  80 19     50





        50


 


12/1/19 00:00 97.9 80 19 152/72 (98) 96   


 


12/1/19 00:00      Mechanical Ventilator  


 


11/30/19 23:40  77      


 


11/30/19 23:04  77 19  99 Mechanical Ventilator  50





  78 20     50


 


11/30/19 21:30  69 28     50


 


11/30/19 20:05  81  151/78    


 


11/30/19 20:00 97.7 81 18 151/78 (102) 97   


 


11/30/19 20:00      Mechanical Ventilator  


 


11/30/19 20:00  70 20  97 Mechanical Ventilator  50


 


11/30/19 20:00        60


 


11/30/19 19:41  81      


 


11/30/19 19:30  61 18     50


 


11/30/19 17:29  78 18     60


 


11/30/19 17:07    147/74    


 


11/30/19 16:00      Mechanical Ventilator  


 


11/30/19 16:00        60


 


11/30/19 15:46 97.9 81 18 147/74 (98) 99   


 


11/30/19 15:27  81      


 


11/30/19 15:11  77 23     60








I&O











Intake and Output  


 


 11/30/19 12/1/19





 19:00 07:00


 


Intake Total 1581.25 ml 1868.75 ml


 


Output Total 750 ml 500 ml


 


Balance 831.25 ml 1368.75 ml


 


  


 


Intake Free Water 90 ml 250 ml


 


IV Total 711.25 ml 898.75 ml


 


Tube Feeding 780 ml 720 ml


 


Output Urine Total 750 ml 500 ml


 


# Voids  2


 


# Bowel Movements 4 








Dressing:  other


Wound:  other


Drains:  other


Cardiovascular:  RSR


Respiratory:  decreased breath sounds


Abdomen:  soft, present bowel sounds


Extremities:  no cyanosis, other





Plan


Problems:  


(1) Pressure injury of deep tissue


(2) Pelvic mass in female


Assessment & Plan:  Known history of pelvic mass.  Refer to prior notes from 

last admission.  No further treatment at this time.  Monitor.


Large cystic-appearing mass within the pelvis could be ovarian in nature and 

appears


relatively unchanged from the last examination.


 


Moderate bilateral pleural effusions with associated posterior basal 

atelectasis.


 


Gallstone suspected.


 


Charles catheter. Gastrostomy.


 


Atherosclerotic vascular disease.





(3) Respiratory failure


Assessment & Plan:  Status post tracheostomy last admission.  Trachea site 

evaluated no signs of infection noted.  Leukocytosis unlikely related.  No 

bleeding noted.


Continue with daily trach care and management.  Will monitor.


1.  Interval increase in bilateral airspace opacities and interstitial 


thickening.  Likely pulmonary edema.


2.  New small right pleural effusion.  Probably small improving left 


pleural effusion.





(4) Leukocytosis


Assessment & Plan:  Leukocytosis.  Fevers.  Abnormal labs.


Abnormal LFTs improved


juliano/lip okay


micro noted


resistant organism 


Renal insufficiency


Work-up in progress


Continue antibiotics as per infectious disease


DAILY ESTIMATED NEEDS:


Needs based on Critical care, wounds; 65.9kg 


22-30  kcals/kg 


1450- 1977  total kcals


1.25-2  g protein/kg


  g total protein 


25-30  mL/kg


1648- 1977  total fluid mLs





NUTRITION DIAGNOSIS:


1) Increased kcal and pro needs r/t wound healing AEB pt adm w/ sacral


wound stage 1, r heel DTI- WC eval pending


2) Swallowing difficulty r/t resp status as evidenced by pt is s/p trach


and PEG.





CURRENT TF:Glucerna 1.2 @ 60mL/hr x 24 hrs- held for scan  








ENTERAL NUTRITION RECOMMENDATIONS:


Glucerna 1.2 @60mL/hr x 24 hrs  to provide 1440 mL, 1728kcal, 86g pro, 1159mL 

free h2o 





- Maintain Glucerna 1.2 @ goal as tolerated to meet 100% est needs


- HOB >30 degrees


- Flush per MD: pt w/ CHF











ADDITIONAL RECOMMENDATIONS:


1) Maintain calibrated bedscale wt 


2) Rec bowel regimen- pt adm w/ impaction 


3) Wound healing: Rec Zaki in 4oz H2O BID via PEG 


        + Vit C 250mg qdaily 


4) Monitor lytes- (mg 3.7) 


5) F/up w/ H&P  





(5) Abnormal LFTs


Assessment & Plan:  Leukocytosis, abnormal labs, elevated LFTs.  Renal 

insufficiency.


Unilocular large upper pelvic cystic mass, as described above and on


multiple prior exams.


 


Bilateral pleural effusions


 


Negative for gallstones or dilated bile ducts also suspected on recent CT scan 

are


either artifactual or sonographically occult


 


Echogenic focus in the upper pole right renal sinus, probably artifactual as no


calculi demonstrated on recent CT scans


 


Small left renal parapelvic cyst incidentally noted


Trend LFTs  - improved


HIDA negative 


monitor meds


Exam fairly benign no Ruiz's


We will follow with serial exams














Radhames Blas Dec 1, 2019 13:30

## 2019-12-01 NOTE — NUR
NURSE NOTES:

gave CHG bath to patient, oral care given. pt is resting on the bed no SOB noted. call light 
within reach. will continue to monitor pt with plan of care.

## 2019-12-01 NOTE — NUR
NURSE NOTES:

Spoke with [employer] in [employer] regarding mattress overlay that was ordered yesterday. 
Redd to follow up with company when they open at 0700 and then relay update to AM nurse.

## 2019-12-01 NOTE — INFECTIOUS DISEASES PROG NOTE
Assessment/Plan


Assessment/Plan


A;


1. Klebsiella pneumonia.


2. ovarian mass.


3. Leukocytosis, improving


4. Respiratory failure, VDRF


5. Renal failure, CKD


6. Elevated liver function tests,HIDA scan: negative


7. anemia


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





PLAN:


1. Continue Amikacin inhaler


2. will f/u CBC





Subjective


ROS Limited/Unobtainable:  Yes


Constitutional:  Denies: fever


Allergies:  


Coded Allergies:  


     No Known Allergies (Unverified , 10/14/19)





Objective


Vital Signs





Last 24 Hour Vital Signs








  Date Time  Temp Pulse Resp B/P (MAP) Pulse Ox O2 Delivery O2 Flow Rate FiO2


 


12/1/19 10:30  72 18  100 Mechanical Ventilator  50





  67 18     50


 


12/1/19 09:21  76 18     50





        


 


12/1/19 08:32  74  140/72    


 


12/1/19 08:00 98.0 74 18 140/78 (98) 100   


 


12/1/19 08:00  81      


 


12/1/19 08:00        60


 


12/1/19 08:00      Mechanical Ventilator  


 


12/1/19 06:40  85 19     50





        


 


12/1/19 05:14    154/72    


 


12/1/19 05:00  86 22  100 Mechanical Ventilator  60





  78 18     60





        60


 


12/1/19 04:02  78      


 


12/1/19 04:00 98.0 83 18 154/72 (99) 100   


 


12/1/19 04:00      Mechanical Ventilator  


 


12/1/19 04:00        60


 


12/1/19 03:00  81 18  96 Mechanical Ventilator  50





  74 18     50





        50


 


12/1/19 01:42  74 18     50


 


12/1/19 01:00  72 20  96 Mechanical Ventilator  50





  80 19     50





        50


 


12/1/19 00:00 97.9 80 19 152/72 (98) 96   


 


12/1/19 00:00      Mechanical Ventilator  


 


11/30/19 23:40  77      


 


11/30/19 23:04  77 19  99 Mechanical Ventilator  50





  78 20     50


 


11/30/19 21:30  69 28     50


 


11/30/19 20:05  81  151/78    


 


11/30/19 20:00 97.7 81 18 151/78 (102) 97   


 


11/30/19 20:00      Mechanical Ventilator  


 


11/30/19 20:00  70 20  97 Mechanical Ventilator  50


 


11/30/19 20:00        60


 


11/30/19 19:41  81      


 


11/30/19 19:30  61 18     50


 


11/30/19 17:29  78 18     60


 


11/30/19 17:07    147/74    


 


11/30/19 16:00      Mechanical Ventilator  


 


11/30/19 16:00        60


 


11/30/19 15:46 97.9 81 18 147/74 (98) 99   


 


11/30/19 15:27  81      


 


11/30/19 15:11  77 23     60


 


11/30/19 13:07  73 19     60


 


11/30/19 12:00        50


 


11/30/19 12:00      Mechanical Ventilator  


 


11/30/19 12:00 98.1 79 26 141/62 (88) 98   


 


11/30/19 11:32    141/62    


 


11/30/19 11:23  79      


 


11/30/19 11:12  86 30     60








Height (Feet):  5


Height (Inches):  6.00


Weight (Pounds):  185


General Appearance:  no acute distress


HEENT:  status post trach


Respiratory/Chest:  lungs clear, other - on ventilator


Cardiovascular:  normal rate, other - R arm PICC line


Skin:  ulcers, other - right heal & sacral


Neurologic/Psychiatric:  aphasia





Current Medications








 Medications


  (Trade)  Dose


 Ordered  Sig/Winnie


 Route


 PRN Reason  Start Time


 Stop Time Status Last Admin


Dose Admin


 


 Amikacin Sulfate


  (Amikin)  500 mg  Q12HR@10,22


 INH


   11/29/19 22:00


 12/6/19 21:59  12/1/19 10:18


 


 


 Ascorbic Acid


  (Vitamin C)  250 mg  DAILY


 ORAL


   11/27/19 09:00


 12/27/19 08:59  12/1/19 08:31


 


 


 Chlorhexidine


 Gluconate


  (Mae-Hex 2%)  1 applic  DAILY@2000


 TOPIC


   11/26/19 20:00


 12/26/19 19:59  11/30/19 20:05


 


 


 Dextrose


  (Dextrose 50%)  25 ml  Q30M  PRN


 IV


 Hypoglycemia  11/28/19 12:15


 12/28/19 12:14   


 


 


 Dextrose


  (Dextrose 50%)  50 ml  Q30M  PRN


 IV


 Hypoglycemia  11/28/19 12:15


 12/28/19 12:14   


 


 


 Epoetin Olaf


  (Epoetin


 Olaf-EPBX(NON


 ESRD))  3,000 unit  MON-WED-FRI


 SUBQ


   11/29/19 21:00


 12/29/19 20:59  11/29/19 20:07


 


 


 Epoetin Olaf


  (Epoetin


 Olaf-EPBX(NON


 ESRD))  4,000 unit  MON-WED-FRI


 SUBQ


   11/29/19 21:00


 12/29/19 20:59  11/29/19 20:07


 


 


 Insulin Aspart


  (NovoLOG)    Q6HR


 SUBQ


   11/28/19 12:30


 12/28/19 12:29  12/1/19 05:16


 


 


 Lorazepam


  (Ativan 2mg/ml


 1ml)  1 mg  Q3H  PRN


 IV


 For Seizures  11/27/19 04:30


 12/4/19 04:29   


 


 


 Metoprolol


 Tartrate


  (Lopressor)  25 mg  Q12HR


 GT


   11/26/19 10:15


 12/26/19 10:14  12/1/19 08:32


 


 


 Nitroglycerin


  (Nitro-Bid)  1 inch  TID@0600,1200,1800


 TOPIC


   11/26/19 12:00


 12/26/19 11:59  12/1/19 05:14


 


 


 Sodium Chloride  1,000 ml @ 


 75 mls/hr  [id number]


 IV


   11/30/19 09:15


 12/30/19 09:14  11/30/19 22:26


 

















Renny Garay MD Dec 1, 2019 11:02

## 2019-12-01 NOTE — NUR
NURSE NOTES:

received pt from Irena JOHNSON., pt is obtunded and open her eye with touch. no signs and 
symptoms of pain and SOB at this moment. right upper arm picc line is intact, clean, and 
patent.  no dysrhythmia reported from the previous shift. trach in place, O2sat is at 99%. 
Charles is in place and draining well with gravity. bed at the lowest position, alarmed, and 
locked. call light within reach. will continue to monitor pt with plan of care.

## 2019-12-01 NOTE — NUR
NURSE NOTES:

ADLs done,pt turned and repositioned,HOB elevated to prevent aspiration.Mouth care done and 
suctioned as tolerated.Will continue to monitor, no significant change in condition at this 
time

## 2019-12-01 NOTE — NUR
NURSE NOTES:

Patient received asleep , on trach/vent shiley 8, AC 18   Fio2 60% Peep 5 saturate at 
99%. Non verbally responsive,responsive to tactile stimuli.Afebrile and HOB elevated at 35 
degree to prevent aspiration.Running Glucerna 1.2 at 60cc/hr via GT and placement intact 
with no residual.Turned and repositioned for skin management and comfort .TU PICC line and 
no sign infiltrations, dressing clean and intact.Charles catheter in place draining yellow 
urine with no apparent sediments.Call light within easy reach and bed in low position.Will 
continue to monitor

## 2019-12-01 NOTE — NUR
NURSE NOTES:

Patient turned and repositioned.HOb elevated to prevent aspiration.Kept clean and dry, will 
continue same care plan

## 2019-12-01 NOTE — PULMONOLOGY PROGRESS NOTE
Assessment/Plan


Assessment/Plan


Pulmonary Progress Note





HPI:  This is an unfortunate 64-year-old female, admiytted with abnormal labs.  


The patient noted to have elevated troponin, acute renal failure and 

leukocytosis, possible sepsis.


The patient with recent discharge after prolonged hospital stay.





PAST MEDICAL HISTORY:  Notable for chronic respiratory failure, history of


sepsis, history of pneumonia, history of bone marrow biopsy, seizures,


diabetes, schizophrenia, anemia, CHF, hypertension, renal failure,


ventilator dependence.





Stable on Ventilator, no overnight issues





PHYSICAL EXAMINATION:


VITAL SIGNS NOTED





GENERAL:  Well-appearing female, chronically debilitated, chronically


obtunded.


HEENT:  Negative.  Tracheostomy in place.


LUNGS:  With coarse breath sounds.  Reduced breath sounds at both bases.


CARDIAC:  S1, S2.  Regular rate and rhythm.


ABDOMEN:  Soft.  G-tube in place.


EXTREMITIES:  With noted edema.


NEUROLOGICAL:  Poorly responsive.





LABORATORY DATA:  Reviewed.


CXR: Noted





IMPRESSION:  Acute MI, elevated troponin, possible non-STEMI, hypertension


per history, congestive heart failure, elevated natriuretic peptide, acute


on chronic renal failure, diabetes, psychiatric disorder, chronic


encephalopathy, seizure disorder, leukocytosis.





RECOMMENDATIONS:  Supportive care.  IV antibiotics.  Cardiology evaluation.


Renal evaluation.  Transfuse with noted anemia.  Hematology followup


PTA medications.  Prognosis is poor.  We will try to stabilize and discharge.





CXR:     


1.  Interval increase in bilateral airspace opacities and interstitial 


thickening.  Likely pulmonary edema.


2.  New small right pleural effusion.  Probably small improving left 


pleural effusion.





Subjective


ROS Limited/Unobtainable:  No


Allergies:  


Coded Allergies:  


     No Known Allergies (Unverified , 10/14/19)





Objective





Last 24 Hour Vital Signs








  Date Time  Temp Pulse Resp B/P (MAP) Pulse Ox O2 Delivery O2 Flow Rate FiO2


 


12/1/19 13:12  82 23     50


 


12/1/19 12:08    156/76    


 


12/1/19 12:00      Mechanical Ventilator  


 


12/1/19 12:00  80      


 


12/1/19 12:00        70


 


12/1/19 12:00 97.8 108 19 156/76 (102) 99   


 


12/1/19 10:30  72 18  100 Mechanical Ventilator  50





  67 18     50


 


12/1/19 09:21  76 18     50


 


12/1/19 08:32  74  140/72    


 


12/1/19 08:00 98.0 74 18 140/78 (98) 100   


 


12/1/19 08:00  81      


 


12/1/19 08:00        60


 


12/1/19 08:00      Mechanical Ventilator  


 


12/1/19 06:40  85 19     50


 


12/1/19 05:14    154/72    


 


12/1/19 05:00  86 22  100 Mechanical Ventilator  60





  78 18     60





        60


 


12/1/19 04:02  78      


 


12/1/19 04:00 98.0 83 18 154/72 (99) 100   


 


12/1/19 04:00      Mechanical Ventilator  


 


12/1/19 04:00        60


 


12/1/19 03:00  81 18  96 Mechanical Ventilator  50





  74 18     50





        50


 


12/1/19 01:42  74 18     50


 


12/1/19 01:00  72 20  96 Mechanical Ventilator  50





  80 19     50





        50


 


12/1/19 00:00 97.9 80 19 152/72 (98) 96   


 


12/1/19 00:00      Mechanical Ventilator  


 


11/30/19 23:40  77      


 


11/30/19 23:04  77 19  99 Mechanical Ventilator  50





  78 20     50


 


11/30/19 21:30  69 28     50


 


11/30/19 20:05  81  151/78    


 


11/30/19 20:00 97.7 81 18 151/78 (102) 97   


 


11/30/19 20:00      Mechanical Ventilator  


 


11/30/19 20:00  70 20  97 Mechanical Ventilator  50


 


11/30/19 20:00        60


 


11/30/19 19:41  81      


 


11/30/19 19:30  61 18     50


 


11/30/19 17:29  78 18     60


 


11/30/19 17:07    147/74    


 


11/30/19 16:00      Mechanical Ventilator  


 


11/30/19 16:00        60


 


11/30/19 15:46 97.9 81 18 147/74 (98) 99   


 


11/30/19 15:27  81      


 


11/30/19 15:11  77 23     60

















Intake and Output  


 


 11/30/19 12/1/19





 19:00 07:00


 


Intake Total 1581.25 ml 1868.75 ml


 


Output Total 750 ml 500 ml


 


Balance 831.25 ml 1368.75 ml


 


  


 


Intake Free Water 90 ml 250 ml


 


IV Total 711.25 ml 898.75 ml


 


Tube Feeding 780 ml 720 ml


 


Output Urine Total 750 ml 500 ml


 


# Voids  2


 


# Bowel Movements 4 











Current Medications








 Medications


  (Trade)  Dose


 Ordered  Sig/Winnie


 Route


 PRN Reason  Start Time


 Stop Time Status Last Admin


Dose Admin


 


 Amikacin Sulfate


  (Amikin)  500 mg  Q12HR@10,22


 INH


   11/29/19 22:00


 12/6/19 21:59  12/1/19 10:18


 


 


 Ascorbic Acid


  (Vitamin C)  250 mg  DAILY


 ORAL


   11/27/19 09:00


 12/27/19 08:59  12/1/19 08:31


 


 


 Chlorhexidine


 Gluconate


  (Mae-Hex 2%)  1 applic  DAILY@2000


 TOPIC


   11/26/19 20:00


 12/26/19 19:59  11/30/19 20:05


 


 


 Dextrose


  (Dextrose 50%)  25 ml  Q30M  PRN


 IV


 Hypoglycemia  11/28/19 12:15


 12/28/19 12:14   


 


 


 Dextrose


  (Dextrose 50%)  50 ml  Q30M  PRN


 IV


 Hypoglycemia  11/28/19 12:15


 12/28/19 12:14   


 


 


 Epoetin Olaf


  (Epoetin


 Olaf-EPBX(NON


 ESRD))  3,000 unit  MON-WED-FRI


 SUBQ


   11/29/19 21:00


 12/29/19 20:59  11/29/19 20:07


 


 


 Epoetin Olaf


  (Epoetin


 Olaf-EPBX(NON


 ESRD))  4,000 unit  MON-WED-FRI


 SUBQ


   11/29/19 21:00


 12/29/19 20:59  11/29/19 20:07


 


 


 Insulin Aspart


  (NovoLOG)    Q6HR


 SUBQ


   11/28/19 12:30


 12/28/19 12:29  12/1/19 11:57


 


 


 Lorazepam


  (Ativan 2mg/ml


 1ml)  1 mg  Q3H  PRN


 IV


 For Seizures  11/27/19 04:30


 12/4/19 04:29   


 


 


 Metoprolol


 Tartrate


  (Lopressor)  25 mg  Q12HR


 GT


   11/26/19 10:15


 12/26/19 10:14  12/1/19 08:32


 


 


 Nitroglycerin


  (Nitro-Bid)  1 inch  TID@0600,1200,1800


 TOPIC


   11/26/19 12:00


 12/26/19 11:59  12/1/19 12:08


 


 


 Sodium Chloride  1,000 ml @ 


 75 mls/hr  [id number]


 IV


   11/30/19 09:15


 12/30/19 09:14  12/1/19 11:56


 

















Delmer Main MD Dec 1, 2019 14:33

## 2019-12-01 NOTE — NUR
NURSE NOTES:

No significant change in condition at this time.Pt turned and repositioned for skin 
management.HOB elevated at 35 degree to prevent aspiration.Will continue to monitor

## 2019-12-01 NOTE — NUR
NURSE NOTES:

PICC line dressing changed per protocol. Patient tolerated care well. Pt remains resting in 
bed; bed in lowest position, safety wheels engaged, bed alarm activated, side rails up x3 
and padded per protocol, call light within reach. Will continue to monitor.

## 2019-12-01 NOTE — NUR
NURSE NOTES:

Pt turned and repositioned.Suctioned as tolerated with no apparent acute distress. No 
significant change in condition at this time.Will keep on close monitoring.

## 2019-12-01 NOTE — NUR
RESPIRATORY NOTE:

Received  pt on trach cuffed, Shiley 8.0, secured by trach guard/trach tie, with current 
vent settings: AC 18-500ml-50%FiO2- peep 5, saturates at 99%. Pt is trach dependent, 
obtunded, unable to follow simple commands. No SOB or resp distress noted. Alarms are set 
and audible, michelle tis plugged into the red outlet, ambu bag at bedside. Will continue to 
monitor.

## 2019-12-02 VITALS — SYSTOLIC BLOOD PRESSURE: 141 MMHG | DIASTOLIC BLOOD PRESSURE: 64 MMHG

## 2019-12-02 VITALS — SYSTOLIC BLOOD PRESSURE: 157 MMHG | DIASTOLIC BLOOD PRESSURE: 79 MMHG

## 2019-12-02 VITALS — SYSTOLIC BLOOD PRESSURE: 137 MMHG | DIASTOLIC BLOOD PRESSURE: 71 MMHG

## 2019-12-02 VITALS — DIASTOLIC BLOOD PRESSURE: 69 MMHG | SYSTOLIC BLOOD PRESSURE: 142 MMHG

## 2019-12-02 VITALS — DIASTOLIC BLOOD PRESSURE: 79 MMHG | SYSTOLIC BLOOD PRESSURE: 155 MMHG

## 2019-12-02 VITALS — SYSTOLIC BLOOD PRESSURE: 154 MMHG | DIASTOLIC BLOOD PRESSURE: 71 MMHG

## 2019-12-02 VITALS — DIASTOLIC BLOOD PRESSURE: 68 MMHG | SYSTOLIC BLOOD PRESSURE: 151 MMHG

## 2019-12-02 LAB
ADD MANUAL DIFF: NO
ALBUMIN SERPL-MCNC: 2 G/DL (ref 3.4–5)
ALBUMIN/GLOB SERPL: 0.4 {RATIO} (ref 1–2.7)
ALP SERPL-CCNC: 84 U/L (ref 46–116)
ALT SERPL-CCNC: 83 U/L (ref 12–78)
ANION GAP SERPL CALC-SCNC: 8 MMOL/L (ref 5–15)
AST SERPL-CCNC: 13 U/L (ref 15–37)
BASOPHILS NFR BLD AUTO: 1.2 % (ref 0–2)
BILIRUB SERPL-MCNC: 0.5 MG/DL (ref 0.2–1)
BUN SERPL-MCNC: 33 MG/DL (ref 7–18)
CALCIUM SERPL-MCNC: 8.5 MG/DL (ref 8.5–10.1)
CHLORIDE SERPL-SCNC: 112 MMOL/L (ref 98–107)
CO2 SERPL-SCNC: 24 MMOL/L (ref 21–32)
CREAT SERPL-MCNC: 0.8 MG/DL (ref 0.55–1.3)
EOSINOPHIL NFR BLD AUTO: 11.6 % (ref 0–3)
ERYTHROCYTE [DISTWIDTH] IN BLOOD BY AUTOMATED COUNT: 16.4 % (ref 11.6–14.8)
GLOBULIN SER-MCNC: 4.6 G/DL
HCT VFR BLD CALC: 27.7 % (ref 37–47)
HGB BLD-MCNC: 9 G/DL (ref 12–16)
LYMPHOCYTES NFR BLD AUTO: 17.1 % (ref 20–45)
MCV RBC AUTO: 86 FL (ref 80–99)
MONOCYTES NFR BLD AUTO: 3.7 % (ref 1–10)
NEUTROPHILS NFR BLD AUTO: 66.4 % (ref 45–75)
PLATELET # BLD: 279 K/UL (ref 150–450)
POTASSIUM SERPL-SCNC: 5.1 MMOL/L (ref 3.5–5.1)
RBC # BLD AUTO: 3.23 M/UL (ref 4.2–5.4)
SODIUM SERPL-SCNC: 144 MMOL/L (ref 136–145)
WBC # BLD AUTO: 13.8 K/UL (ref 4.8–10.8)

## 2019-12-02 RX ADMIN — METOPROLOL TARTRATE SCH MG: 25 TABLET, FILM COATED ORAL at 09:10

## 2019-12-02 RX ADMIN — Medication SCH MG: at 09:09

## 2019-12-02 RX ADMIN — METOPROLOL TARTRATE SCH MG: 25 TABLET, FILM COATED ORAL at 20:05

## 2019-12-02 RX ADMIN — INSULIN ASPART SCH UNITS: 100 INJECTION, SOLUTION INTRAVENOUS; SUBCUTANEOUS at 12:57

## 2019-12-02 RX ADMIN — NITROGLYCERIN SCH INCH: 20 OINTMENT TOPICAL at 12:57

## 2019-12-02 RX ADMIN — INSULIN ASPART SCH UNITS: 100 INJECTION, SOLUTION INTRAVENOUS; SUBCUTANEOUS at 05:36

## 2019-12-02 RX ADMIN — NITROGLYCERIN SCH INCH: 20 OINTMENT TOPICAL at 05:34

## 2019-12-02 RX ADMIN — CHLORHEXIDINE GLUCONATE SCH APPLIC: 213 SOLUTION TOPICAL at 19:47

## 2019-12-02 RX ADMIN — NITROGLYCERIN SCH INCH: 20 OINTMENT TOPICAL at 18:00

## 2019-12-02 RX ADMIN — AMIKACIN SULFATE SCH MG: 500 INJECTION, SOLUTION INTRAMUSCULAR; INTRAVENOUS at 09:44

## 2019-12-02 RX ADMIN — INSULIN ASPART SCH UNITS: 100 INJECTION, SOLUTION INTRAVENOUS; SUBCUTANEOUS at 18:00

## 2019-12-02 RX ADMIN — EPOETIN ALFA-EPBX SCH UNIT: 3000 INJECTION, SOLUTION INTRAVENOUS; SUBCUTANEOUS at 21:16

## 2019-12-02 NOTE — NUR
NURSE NOTES:

discharge package that is included medication list, face sheet, hx and diagnosis given to 
[facility] and RT.  all questions answered.

## 2019-12-02 NOTE — NUR
NURSE NOTES:

Pt. insulin held due to pt. about to be d/c and GTF stopped at 1600 for preparation to be 
d/c.

## 2019-12-02 NOTE — SURGERY PROGRESS NOTE
Surgery Progress Note


Subjective


Additional Comments


stable.


comfortable


exam okay





Objective





Last 24 Hour Vital Signs








  Date Time  Temp Pulse Resp B/P (MAP) Pulse Ox O2 Delivery O2 Flow Rate FiO2


 


12/2/19 16:41  74 24     40


 


12/2/19 16:00      Mechanical Ventilator  


 


12/2/19 16:00 97.5 80 22 154/71 (98) 93   


 


12/2/19 16:00        40


 


12/2/19 15:16  77      


 


12/2/19 15:08  87 22     40


 


12/2/19 13:11  84 23     40


 


12/2/19 12:57    157/79    


 


12/2/19 12:00        40


 


12/2/19 12:00 99.2 86 23 157/79 (105) 93   


 


12/2/19 12:00      Mechanical Ventilator  


 


12/2/19 11:26  79      


 


12/2/19 10:37  78 18     40


 


12/2/19 09:45  78 18  97 Mechanical Ventilator 55.0 40





  77 18  97   


 


12/2/19 09:10  78  155/79    


 


12/2/19 08:51  78 20     40


 


12/2/19 08:00        40


 


12/2/19 08:00 98.2 86 23 155/79 (104) 96   


 


12/2/19 08:00      Mechanical Ventilator  


 


12/2/19 07:42  80      


 


12/2/19 07:05  80 20     40


 


12/2/19 05:34    142/69    


 


12/2/19 05:11  84 18     40


 


12/2/19 04:00        40


 


12/2/19 04:00 97.7 97 19 142/69 (93) 99   


 


12/2/19 04:00      Mechanical Ventilator  


 


12/2/19 03:54  84      


 


12/2/19 02:32  50 23     40


 


12/2/19 01:08  82 21     40


 


12/2/19 00:00      Mechanical Ventilator  


 


12/2/19 00:00 98.4 71 18 137/71 (93) 98   


 


12/1/19 23:33  69 18     40


 


12/1/19 23:32  70      


 


12/1/19 21:44  82 18  100 Mechanical Ventilator  40





  83 18     40


 


12/1/19 20:11  68  114/51    


 


12/1/19 20:00        40


 


12/1/19 20:00 98.5 66 19 114/51 (72) 98   


 


12/1/19 20:00      Mechanical Ventilator  


 


12/1/19 19:44  77      


 


12/1/19 19:29  72 18     40








I&O











Intake and Output  


 


 12/1/19 12/2/19





 19:00 07:00


 


Intake Total 1805 ml 1723.75 ml


 


Output Total 750 ml 450 ml


 


Balance 1055 ml 1273.75 ml


 


  


 


Intake Free Water 260 ml 200 ml


 


IV Total 825 ml 803.75 ml


 


Tube Feeding 720 ml 720 ml


 


Output Urine Total 750 ml 450 ml








Dressing:  other


Wound:  other


Drains:  other


Cardiovascular:  RSR


Respiratory:  decreased breath sounds


Abdomen:  soft, present bowel sounds


Extremities:  no cyanosis





Laboratory Tests








Test


  12/2/19


03:00


 


White Blood Count


  13.8 K/UL


(4.8-10.8)  H


 


Red Blood Count


  3.23 M/UL


(4.20-5.40)  L


 


Hemoglobin


  9.0 G/DL


(12.0-16.0)  L


 


Hematocrit


  27.7 %


(37.0-47.0)  L


 


Mean Corpuscular Volume 86 FL (80-99)  


 


Mean Corpuscular Hemoglobin


  27.8 PG


(27.0-31.0)


 


Mean Corpuscular Hemoglobin


Concent 32.4 G/DL


(32.0-36.0)


 


Red Cell Distribution Width


  16.4 %


(11.6-14.8)  H


 


Platelet Count


  279 K/UL


(150-450)


 


Mean Platelet Volume


  8.0 FL


(6.5-10.1)


 


Neutrophils (%) (Auto)


  66.4 %


(45.0-75.0)


 


Lymphocytes (%) (Auto)


  17.1 %


(20.0-45.0)  L


 


Monocytes (%) (Auto)


  3.7 %


(1.0-10.0)


 


Eosinophils (%) (Auto)


  11.6 %


(0.0-3.0)  H


 


Basophils (%) (Auto)


  1.2 %


(0.0-2.0)


 


Sodium Level


  144 MMOL/L


(136-145)


 


Potassium Level


  5.1 MMOL/L


(3.5-5.1)


 


Chloride Level


  112 MMOL/L


()  H


 


Carbon Dioxide Level


  24 MMOL/L


(21-32)


 


Anion Gap


  8 mmol/L


(5-15)


 


Blood Urea Nitrogen


  33 mg/dL


(7-18)  H


 


Creatinine


  0.8 MG/DL


(0.55-1.30)


 


Estimat Glomerular Filtration


Rate > 60 mL/min


(>60)


 


Glucose Level


  195 MG/DL


()  H


 


Calcium Level


  8.5 MG/DL


(8.5-10.1)


 


Total Bilirubin


  0.5 MG/DL


(0.2-1.0)


 


Aspartate Amino Transf


(AST/SGOT) 13 U/L (15-37)


L


 


Alanine Aminotransferase


(ALT/SGPT) 83 U/L (12-78)


H


 


Alkaline Phosphatase


  84 U/L


()


 


Total Protein


  6.6 G/DL


(6.4-8.2)


 


Albumin


  2.0 G/DL


(3.4-5.0)  L


 


Globulin 4.6 g/dL  


 


Albumin/Globulin Ratio


  0.4 (1.0-2.7)


L











Plan


Problems:  


(1) Pressure injury of deep tissue


(2) Pelvic mass in female


Assessment & Plan:  Known history of pelvic mass.  Refer to prior notes from 

last admission.  No further treatment at this time.  Monitor.


Large cystic-appearing mass within the pelvis could be ovarian in nature and 

appears


relatively unchanged from the last examination.


 


Moderate bilateral pleural effusions with associated posterior basal 

atelectasis.


 


Gallstone suspected.


 


Charles catheter. Gastrostomy.


 


Atherosclerotic vascular disease.





(3) Respiratory failure


Assessment & Plan:  Status post tracheostomy last admission.  Trachea site 

evaluated no signs of infection noted.  Leukocytosis unlikely related.  No 

bleeding noted.


Continue with daily trach care and management.  Will monitor.


1.  Interval increase in bilateral airspace opacities and interstitial 


thickening.  Likely pulmonary edema.


2.  New small right pleural effusion.  Probably small improving left 


pleural effusion.





(4) Leukocytosis


Assessment & Plan:  Leukocytosis.  Fevers.  Abnormal labs.


Abnormal LFTs improved


juliano/lip okay


micro noted


resistant organism 


Renal insufficiency


Work-up in progress


Continue antibiotics as per infectious disease


DAILY ESTIMATED NEEDS:


Needs based on Critical care, wounds; 65.9kg 


22-30  kcals/kg 


1450- 1977  total kcals


1.25-2  g protein/kg


  g total protein 


25-30  mL/kg


1648- 1977  total fluid mLs





NUTRITION DIAGNOSIS:


1) Increased kcal and pro needs r/t wound healing AEB pt adm w/ sacral


wound stage 1, r heel DTI- WC eval pending


2) Swallowing difficulty r/t resp status as evidenced by pt is s/p trach


and PEG.





CURRENT TF:Glucerna 1.2 @ 60mL/hr x 24 hrs- held for scan  








ENTERAL NUTRITION RECOMMENDATIONS:


Glucerna 1.2 @60mL/hr x 24 hrs  to provide 1440 mL, 1728kcal, 86g pro, 1159mL 

free h2o 





- Maintain Glucerna 1.2 @ goal as tolerated to meet 100% est needs


- HOB >30 degrees


- Flush per MD: pt w/ CHF











ADDITIONAL RECOMMENDATIONS:


1) Maintain calibrated bedscale wt 


2) Rec bowel regimen- pt adm w/ impaction 


3) Wound healing: Rec Zaki in 4oz H2O BID via PEG 


        + Vit C 250mg qdaily 


4) Monitor lytes- (mg 3.7) 


5) F/up w/ H&P  





(5) Abnormal LFTs


Assessment & Plan:  Leukocytosis, abnormal labs, elevated LFTs.  Renal 

insufficiency.


Unilocular large upper pelvic cystic mass, as described above and on


multiple prior exams.


 


Bilateral pleural effusions


 


Negative for gallstones or dilated bile ducts also suspected on recent CT scan 

are


either artifactual or sonographically occult


 


Echogenic focus in the upper pole right renal sinus, probably artifactual as no


calculi demonstrated on recent CT scans


 


Small left renal parapelvic cyst incidentally noted


Trend LFTs  - improved


HIDA negative 


monitor meds


Exam fairly benign no Ruiz's


We will follow with serial exams














Radhames Blas Dec 2, 2019 18:17

## 2019-12-02 NOTE — PULMONOLOGY PROGRESS NOTE
Assessment/Plan


Assessment/Plan


IMPRESSION:  Acute MI, elevated troponin,   hypertension


per history, congestive heart failure, elevated natriuretic peptide, acute


on chronic renal failure, diabetes, psychiatric disorder, chronic


encephalopathy, seizure disorder, leukocytosis.





PLAN


inhaled amikacin


improved labs


on vent


obtain clearance


close SNF monitoring


proceed with dc planning


impression, plan, and exam edited and reviewed in detail


care discussed with RN





Subjective


ROS Limited/Unobtainable:  Yes


Allergies:  


Coded Allergies:  


     No Known Allergies (Unverified , 10/14/19)


Subjective


on vent





Objective





Last 24 Hour Vital Signs








  Date Time  Temp Pulse Resp B/P (MAP) Pulse Ox O2 Delivery O2 Flow Rate FiO2


 


12/2/19 08:51  78 20     40


 


12/2/19 08:00        40


 


12/2/19 08:00 98.2 86 23 155/79 (104) 96   


 


12/2/19 07:05  80 20     40


 


12/2/19 05:34    142/69    


 


12/2/19 05:11  84 18     40


 


12/2/19 04:00        40


 


12/2/19 04:00 97.7 97 19 142/69 (93) 99   


 


12/2/19 04:00      Mechanical Ventilator  


 


12/2/19 03:54  84      


 


12/2/19 02:32  50 23     40


 


12/2/19 01:08  82 21     40


 


12/2/19 00:00      Mechanical Ventilator  


 


12/2/19 00:00 98.4 71 18 137/71 (93) 98   


 


12/1/19 23:33  69 18     40


 


12/1/19 23:32  70      


 


12/1/19 21:44  82 18  100 Mechanical Ventilator  40





  83 18     40


 


12/1/19 20:11  68  114/51    


 


12/1/19 20:00        40


 


12/1/19 20:00 98.5 66 19 114/51 (72) 98   


 


12/1/19 20:00      Mechanical Ventilator  


 


12/1/19 19:44  77      


 


12/1/19 19:29  72 18     40


 


12/1/19 17:32    149/71    


 


12/1/19 17:00  84 22     40


 


12/1/19 16:00 97.6 84 19 149/71 (97) 98   


 


12/1/19 16:00        60


 


12/1/19 16:00  82      


 


12/1/19 16:00      Mechanical Ventilator  


 


12/1/19 14:30  75 18     40


 


12/1/19 13:12  82 23     50


 


12/1/19 12:08    156/76    


 


12/1/19 12:00      Mechanical Ventilator  


 


12/1/19 12:00  80      


 


12/1/19 12:00        70


 


12/1/19 12:00 97.8 108 19 156/76 (102) 99   


 


12/1/19 10:30  72 18  100 Mechanical Ventilator  50





  67 18     50


 


12/1/19 09:21  76 18     50

















Intake and Output  


 


 12/1/19 12/2/19





 18:59 06:59


 


Intake Total 1805 ml 1723.75 ml


 


Output Total 750 ml 450 ml


 


Balance 1055 ml 1273.75 ml


 


  


 


Intake Free Water 260 ml 200 ml


 


IV Total 825 ml 803.75 ml


 


Tube Feeding 720 ml 720 ml


 


Output Urine Total 750 ml 450 ml








Objective


WDWN


NAD


clear breath sounds bilaterally without rhonchi or wheeze


[id number] without MRG


NABS nontender no HSM


no CCE


reduced LOC


trach


GT


Laboratory Tests


12/2/19 03:00: 


White Blood Count 13.8H, Red Blood Count 3.23L, Hemoglobin 9.0L, Hematocrit 

27.7L, Mean Corpuscular Volume 86, Mean Corpuscular Hemoglobin 27.8, Mean 

Corpuscular Hemoglobin Concent 32.4, Red Cell Distribution Width 16.4H, 

Platelet Count 279, Mean Platelet Volume 8.0, Neutrophils (%) (Auto) 66.4, 

Lymphocytes (%) (Auto) 17.1L, Monocytes (%) (Auto) 3.7, Eosinophils (%) (Auto) 

11.6H, Basophils (%) (Auto) 1.2, Sodium Level 144, Potassium Level 5.1, 

Chloride Level 112H, Carbon Dioxide Level 24, Anion Gap 8, Blood Urea Nitrogen 

33H, Creatinine 0.8, Estimat Glomerular Filtration Rate > 60, Glucose Level 195H

, Calcium Level 8.5, Total Bilirubin 0.5, Aspartate Amino Transf (AST/SGOT) 13L

, Alanine Aminotransferase (ALT/SGPT) 83H, Alkaline Phosphatase 84, Total 

Protein 6.6, Albumin 2.0L, Globulin 4.6, Albumin/Globulin Ratio 0.4L





Current Medications








 Medications


  (Trade)  Dose


 Ordered  Sig/Winnie


 Route


 PRN Reason  Start Time


 Stop Time Status Last Admin


Dose Admin


 


 Amikacin Sulfate


  (Amikin)  500 mg  Q12HR@10,22


 INH


   11/29/19 22:00


 12/6/19 21:59  12/1/19 21:44


 


 


 Ascorbic Acid


  (Vitamin C)  250 mg  DAILY


 ORAL


   11/27/19 09:00


 12/27/19 08:59  12/1/19 08:31


 


 


 Chlorhexidine


 Gluconate


  (Mae-Hex 2%)  1 applic  DAILY@2000


 TOPIC


   11/26/19 20:00


 12/26/19 19:59  12/1/19 20:08


 


 


 Dextrose


  (Dextrose 50%)  25 ml  Q30M  PRN


 IV


 Hypoglycemia  11/28/19 12:15


 12/28/19 12:14   


 


 


 Dextrose


  (Dextrose 50%)  50 ml  Q30M  PRN


 IV


 Hypoglycemia  11/28/19 12:15


 12/28/19 12:14   


 


 


 Epoetin Olaf


  (Epoetin


 Olaf-EPBX(NON


 ESRD))  3,000 unit  MON-WED-FRI


 SUBQ


   11/29/19 21:00


 12/29/19 20:59  11/29/19 20:07


 


 


 Epoetin Olaf


  (Epoetin


 Olaf-EPBX(NON


 ESRD))  4,000 unit  MON-WED-FRI


 SUBQ


   11/29/19 21:00


 12/29/19 20:59  11/29/19 20:07


 


 


 Insulin Aspart


  (NovoLOG)    Q6HR


 SUBQ


   11/28/19 12:30


 12/28/19 12:29  12/2/19 05:36


 


 


 Lorazepam


  (Ativan 2mg/ml


 1ml)  1 mg  Q3H  PRN


 IV


 For Seizures  11/27/19 04:30


 12/4/19 04:29   


 


 


 Metoprolol


 Tartrate


  (Lopressor)  25 mg  Q12HR


 GT


   11/26/19 10:15


 12/26/19 10:14  12/1/19 20:11


 


 


 Nitroglycerin


  (Nitro-Bid)  1 inch  TID@0600,1200,1800


 TOPIC


   11/26/19 12:00


 12/26/19 11:59  12/2/19 05:34


 


 


 Sodium Chloride  1,000 ml @ 


 75 mls/hr  [id number]


 IV


   11/30/19 09:15


 12/30/19 09:14  12/2/19 01:17


 

















Steven Gonzalez MD Dec 2, 2019 08:58

## 2019-12-02 NOTE — NUR
NURSE NOTES:

report given to [facility]. pt is stable, V/s are stable. no SOB. O2sat at 98%. 
cardiac monitor off.

## 2019-12-02 NOTE — NUR
NURSE NOTES:

S/P PICC line at right upper arm removed with MD order. No s/sx of bleeding at the site.

## 2019-12-02 NOTE — DIAGNOSTIC IMAGING REPORT
Indication: Dyspnea

 

Comparison:  11/30/2019

 

A single view chest radiograph was obtained.

 

Findings:

 

PICC line and tracheostomy are stable. Interstitial edema appears worse although

comparison is somewhat problematic given the under exposed quality of the current

exam.

 

IMPRESSION:

 

Apparent worsening pulmonary edema

## 2019-12-02 NOTE — NUR
***DISCHARGE PLANNED:



PATIENT RETURNING TO

[facility].

T: [phone] FOR NURSE TO NURSE REPORT



ROOM#[id number]

SKILLED



[facility] PICKUP TIME @430PM

## 2019-12-02 NOTE — NUR
NURSE NOTES:

received pt from Trever RN., pt is resting on the bed, and trach is on O2sat is at 98%. pt is 
sleeping and arousable, pt is obtunded. no  SOB noted at this moment. cardiac monitor is on, 
waiting for the ambulance to  pt for Discharge. pt's bed at the lowest position, 
alarmed, and locked. will continue to monitor pt with plan of care. call light within reach.

## 2019-12-02 NOTE — NUR
NURSE NOTES:

Received bedside report from Morena JOHNSON. Pt. in bed, obtunded. No sign of distress. On mech. 
vent. AC18//Fi O2 of 40%/P5. No grimacing noted. F/C in placed patent/intact draining 
yellow colored urine. PICC line at right upper arm with 2 lumen in placed patent/intact 
running D5 1/2 NS at 75cc/hr. HOB elevated at all times. On GTF Glucerna 1.2 at 60cc/hr. 
Tolerating well. Bed in low position, locked. Call light within reach. Will cont. to 
monitor.

## 2019-12-02 NOTE — NUR
*-* INSURANCE *-*



AVAILABLE CLINICALS HAVE BEEN FAXED TO:





CM: Lance

#[phone]

Fax#[phone]

REF# [id number]

## 2019-12-03 NOTE — PROGRESS NOTE
DATE:  12/02/2019

CARDIOLOGY PROGRESS NOTE



SUBJECTIVE:  The patient's condition remains largely unchanged.  White

blood count has improved with inhaled amikacin.  She remains on ventilator

support.



OBJECTIVE:

VITAL SIGNS:  Blood pressure 142/69, heart rate 97, respirations 19,

afebrile.

LUNGS:  Moderate rhonchi.  Thin trach secretions.

CARDIAC:  Regular rhythm and rate.  Normal S1, S2.

ABDOMEN:  Soft.  G-tube intact.

EXTREMITIES:  No edema.



IMPRESSION:

1. Acute myocardial infarction.

2. Sepsis with shock, recovered.

3. Hypovolemia and dehydration, corrected.

4. Acute on chronic diastolic congestive heart failure, clinically

compensated.

5. Ventilator-dependent respiratory failure.

6. Advanced dementia.

7. Poor prognosis.



PLAN:

1. Ventilator support.

2. Continue beta-blocker and nitrate.

3. Add low-dose aspirin.

4. Monitor hemoglobin.

5. Off IV fluids.

6. Monitor volume status.

7. No need for chronic diuretic therapy.









  ______________________________________________

  Delmer Hardin M.D. DR:  MAURA

D:  12/03/2019 00:13

T:  12/03/2019 00:32

JOB#:  [id number]

CC:

## 2019-12-03 NOTE — PROGRESS NOTE
CARDIOLOGY PROGRESS NOTE



SUBJECTIVE:  The patient remains on ventilator support.  Secretions are

thin.  Monitored rhythm sinus.



OBJECTIVE:

VITAL SIGNS:  Blood pressure 156/76, pulse 82, and respirations 23.

LUNGS:  Coarse rhonchi.

HEART:  Regular rhythm and rate.  Normal S1, S2.

ABDOMEN:  Soft.  G-tube intact.

EXTREMITIES:  1+ dependent edema.



LABORATORY DATA:  Stool occult blood negative.  No new laboratories

today.



IMPRESSION:

1. Sepsis.

2. Leukocytosis.

3. Healthcare-acquired pneumonia.

4. Ventilator-dependent respiratory failure.

5. Anemia of chronic disease and chronic kidney disease.

6. Acute myocardial infarction.



PLAN:

1. Full antimicrobials per Infectious Disease consultant.  Ventilator

support without wean.

2. Continued beta-blocker and nitrates.

3. We will start anti-platelet therapy if no new signs of bleeding and

stable hemoglobin levels.

4. Adjust IV fluids based on clinical parameters.









  ______________________________________________

  Delmer Hardin M.D.





DR:  ASHKAN

D:  12/03/2019 00:09

T:  12/03/2019 00:31

JOB#:  [id number]

CC:

## 2019-12-03 NOTE — PROGRESS NOTE
DATE:  11/30/2019

CARDIOLOGY PROGRESS NOTE



Late entry for 11/30/2019.



SUBJECTIVE:  The patient remains on ventilator support via trach.

Monitored rhythm, sinus.  Stool occult blood was negative.



OBJECTIVE:

VITAL SIGNS:  Blood pressure 141/62, pulse 79, and respiratory rate 26.

LUNGS:  Diminished breath sounds with rhonchi.

CARDIAC:  Regular rhythm and rate.  Normal S1 and S2 with no new murmur.

ABDOMEN:  Soft with G-tube intact.

EXTREMITIES:  Trace dependent edema.



IMPRESSION:

1. Acute non-ST-elevation myocardial infarction.

2. Acute on chronic diastolic congestive heart failure.

3. Dehydration.

4. Hypernatremia.

5. Acute renal failure.

6. Ventilator-dependent respiratory failure.

7. Sepsis with recovered shock.

8. Anemia of chronic disease and chronic kidney disease requiring

transfusions.



PLAN:

1. Maintain current management.

2. I would start anti-platelet therapy if no other bleeding risks.

3. Not weanable.

4. Prognosis poor.

5. On beta-blocker and nitrate.









  ______________________________________________

  Delmer Hardin M.D.





DR:  ASHKAN

D:  12/03/2019 00:06

T:  12/03/2019 00:17

JOB#:  [id number]

CC:

## 2019-12-03 NOTE — PROGRESS NOTE
DATE:  12/03/2019

CARDIOLOGY PROGRESS NOTE



Late entry 11/29/2019.



SUBJECTIVE:  The patient's condition largely unchanged.  She remains on

antimicrobials.  Monitored rhythm is sinus.



OBJECTIVE:

VITAL SIGNS:  Blood pressure 142/60, pulse 78, and respirations 22.

LUNGS:  Coarse breath sounds.

HEART:  Regular rhythm and rate.  Normal S1 and S2 with a 1/6 systolic

apical murmur.

ABDOMEN:  Soft with a G-tube intact and there is dependent edema.



LABORATORY DATA:  White count 17 and hemoglobin 9.7.  Sodium is 145,

potassium 4.9, bicarbonate 25, BUN 55, and creatinine 1.3.  Troponin is

0.561.



IMPRESSION:

1. Acute myocardial infarction with decreasing troponin levels.

2. Sepsis with shock.

3. Hypovolemia and dehydration with acute renal failure, recovering.

4. Hypernatremia and hyperchloremia, resolved.

5. Respiratory failure with tracheostomy.



PLAN:

1. Continue cautious hydration.  Maintain nitrates and beta-blockade.

2. Transfuse for low hemoglobin level.

3. Hold anti-platelet therapy due to bleeding risk and low hemoglobin

levels.  Await stool occult blood test.









  ______________________________________________

  Delmre Hardin M.D. DR:  ANIA

D:  12/03/2019 00:03

T:  12/03/2019 00:14

JOB#:  [id number]

CC:

## 2019-12-03 NOTE — DISCHARGE SUMMARY
Discharge Summary


Discharge Summary


_


DATE OF ADMISSION: 11/25/2019





DATE OF DISCHARGE: 12/2/2019








DISCHARGED BY: Dr. Gonzalez





REASON FOR ADMISSION: 


64 years old female with past medical history of chronic respiratory failure, 

ventilator dependent with tracheostomy status, diabetes mellitus, hypertension, 

congestive heart failure, dysphagia, feeding by G-tube, recent admission for 

sepsis secondary to pneumonia , presented from the skilled nursing facility for 

evaluation due to abnormal labs,  including leukocytosis ,  anemia as well as 

shortness of breath,  abdominal distention and vomiting.


KUB revealed moderate rectal stool . 


Abdomen appeared to be more distended , according to the nursing staff. 


No reported fever or chills.


Hemoglobin was below 8 , WBC 22 . BUN 80.


 


 


Upon evaluation in ED,  vital signs were stable. 


Laboratory work-up demonstrated  significant leukocytosis WBC 24.2, hemoglobin 

7.4,  hematocrit 25.5 platelet count 334 ; stable INR.


, creatinine 2.0.  Glucose 122.  Magnesium 3.7.   ALT 1096.  

Lactic acid 1.1


Troponin  2.17.  Pro BNP > 35,000. 


Albumin 2.0.  


Urinalysis revealed +2 leukocyte esterase,  pyuria and moderate bacteria.


Chest x-ray demonstrated pulmonary edema.


Abdominal x-ray revealed no acute findings.


CT scan of the abdomen and pelvis demonstrated large cystic-appearing mass 

within the pelvis , could be ovarian in nature , which  appeared to be  

relatively unchanged from the last examination.  


Moderate bilateral pleural effusion with associated posterior basal 

atelectasis.  Gallstones .  Charles catheter.  Gastrostomy.  Atherosclerotic 

vascular disease.


In emergency department patient typed  and crossed , pancultured, started on 

empiric antibiotic and  admitted for further management. 





CONSULTANTS:


cardiologist Dr.Kattan TORRES specialist Dr. Doherty


nephrologist Dr. Feliz


surgery Dr. Blas


 


 


[facility] COURSE: 





Patient admitted to [facility].  


Ventilator support and tracheostomy care provided.  


Patient was followed up with ABGs, and ventilator  settings titrated based on 

ABG results.  


Patient started on empiric antibiotics as per ID specialist recommendation.  


 


Echocardiogram revealed ejection fraction of 45 to 50% with hypokinesis of the 

inferior and inferolateral wall and distal posterior wall ; otherwise normal 

wall motion of all other segments.  


No evidence of pericardial effusion.  Moderate pleural effusion.  


Severe mitral regurgitation.  


Right ventricular systolic pressure of 31.  


Troponin  was trending bowel , and the last troponin  0.561.  


Patient started on cautious hydration.  


No anticoagulation,  given anemia and possible GI bleeding. 


Patient was on beta-blocker and topical nitrates.  


Conservative management provided.





Renal parameters  and electrolytes were closely monitored.  


Electrolytes corrected as needed.


Nephrotoxic's were avoided.  


Prior to discharge BUN from 112 down to 33 and   creatinine from 2 down to 0.8.

  


Acute renal failure was likely precipitated by sepsis.  


Lipid panel revealed triglycerides of 362.   and total cholesterol 179. 


Statin continued. 


Arterial Doppler revealed heavily calcified bilateral lower  extremities.  

Severe ischemia at rest  right leg and moderate to severe ischemia left leg.  


Resume antiplatelet therapy  at the facility, providing hemoglobin remains 

stable. 





Hemoglobin and hematocrit were closely monitored with goal to keep hemoglobin 

above 7.  


While in the hospital patient undergone transfusion of 2 units of packed red 

blood cells.  


Stool for occult blood was negative.  


Anemia work-up was consistent with anemia of chronic disease.  


Prior to discharge hemoglobin 9 , hematocrit 27.7.





Antibiotic provided as per ID  specialist recommendation.  


Blood   and urine culture were negative.  


Sputum culture showed Klebsiella pneumonia Carbapenem resistant.  


Patient was followed-up with   chest x-ray. 


Leukocytosis was improving.  


ID specialist upon discharge recommended inhaled amikacin via HHN to continue 

for additional 3 days..


Leukocytosis trending down , and prior to discharge 13.8 , no fevers.





Blood sugar was managed with a sliding scale of insulin.


Patient with  known history of pelvic mass.  


Surgeon followed.  


No further treatment at this time.  Surgery recommended close monitoring. 


Pelvic mass possibly  ovarian in nature , and appeared to be relatively 

unchanged from the last examination.


Strict aspiration precaution maintained.  


G-tube feeding continued.  


Tube feeding formula with goal rate and protein supplements implemented as per 

registered dietician recommendation.  





LFT were closely monitored,  initially significantly elevated.  


Abdominal ultrasound revealed large upper pelvic cystic mass described on 

previous multiply examination.  Bilateral pleural effusion.  Negative for 

gallstones or dilated bile ducts.  


HIDA scan  was negative.  


AST down to normal,  ALT down to 83 prior to discharge.   


Patient clinically stabilized and was ready for discharge to the skilled 

nursing facility for continuation of care. 





FINAL DIAGNOSES: 


 


Healthcare acquired pneumonia  


Acute myocardial infarction


Hypokalemia


Acute kidney injury on chronic renal disease stage 4-5


Chronic respiratory failure ventilator dependent with tracheostomy status


Anemia of chronic disease and chronic kidney disease


Hypovolemia and dehydration


Severe protein calorie malnutrition


Congestive heart failure


Diabetes mellitus


Chronic encephalopathy


Advanced dementia


Seizure disorder  


Elevated LFT


Pelvic mass





DISCHARGE MEDICATIONS:


See Medication Reconciliation list.





DISCHARGE INSTRUCTIONS:


Patient was discharged to the skilled nursing facility. 


Follow up with medical doctor at the facility.





I have been assigned to dictate discharge summary for this account. 


I was not involved in the patient's management.











Yumiko Burnett NP Dec 3, 2019 09:35

## 2019-12-04 NOTE — DIAGNOSTIC IMAGING REPORT
--------------- APPROVED REPORT --------------





CPT Code: 73984



Present Symptoms

Shortness of breath 

Comments: Screening





BILATERAL: Imaging reveals a patent deep venous system bilaterally. There is no evidence 

of thrombus within the common femoral, superficial femoral, popliteal or tibial segments. 

The greater saphenous veins are within normal limits. Doppler indicates normal 

spontaneous flow within these segments.

## 2019-12-04 NOTE — DIAGNOSTIC IMAGING REPORT
--------------- APPROVED REPORT --------------





CPT Code: 94245



Symptoms

Comments: R/O PAD





RIGHT LEG: A moderate (50-60%) stenosis is seen in the common and superficial femoral 

arteries. Color flow duplex sonography reveals calcification throughout the popliteal 

artery. The tibioperoneal trunk was not well visualized.  The posterior tibial artery was 

not visualized due to possible occlusion. The distal anterior and dorsalis pedis arteries 

are also heavily calcified.  The distal Doppler tibial artery waveform analysis is ( 

monophasic) compatible with severe ischemia at rest.



LEFT LEG: A moderate (50-60%) stenosis is seen in the common femoral artery. Color flow 

duplex sonography reveals calcification throughout the superficial artery and the 

popliteal artery. The tibioperoneal trunk was not well visualized. The distal posterior 

tibial, anterior tibial and dorsalis pedis arteries are also heavily calcified.  The 

distal Doppler tibial artery waveform analysis is ( monophasic) compatible with moderate 

to severe ischemia at rest.

## 2019-12-04 NOTE — CARDIOLOGY REPORT
--------------- APPROVED REPORT --------------





EKG Measurement

Heart [id number]

MN [id number]

[id number]

[id number]

[id number]





Normal sinus rhythm



Abnormal ECG

## 2020-03-09 ENCOUNTER — HOSPITAL ENCOUNTER (INPATIENT)
Dept: HOSPITAL 72 - EMR | Age: 65
LOS: 5 days | DRG: 720 | End: 2020-03-14
Payer: COMMERCIAL

## 2020-03-09 VITALS — WEIGHT: 168.5 LBS | HEIGHT: 67 IN | BODY MASS INDEX: 26.45 KG/M2

## 2020-03-09 VITALS — SYSTOLIC BLOOD PRESSURE: 125 MMHG | DIASTOLIC BLOOD PRESSURE: 71 MMHG

## 2020-03-09 VITALS — SYSTOLIC BLOOD PRESSURE: 132 MMHG | DIASTOLIC BLOOD PRESSURE: 77 MMHG

## 2020-03-09 VITALS — DIASTOLIC BLOOD PRESSURE: 74 MMHG | SYSTOLIC BLOOD PRESSURE: 121 MMHG

## 2020-03-09 VITALS — DIASTOLIC BLOOD PRESSURE: 48 MMHG | SYSTOLIC BLOOD PRESSURE: 113 MMHG

## 2020-03-09 DIAGNOSIS — B95.5: ICD-10-CM

## 2020-03-09 DIAGNOSIS — E87.1: ICD-10-CM

## 2020-03-09 DIAGNOSIS — Z79.82: ICD-10-CM

## 2020-03-09 DIAGNOSIS — Z99.11: ICD-10-CM

## 2020-03-09 DIAGNOSIS — Z51.5: ICD-10-CM

## 2020-03-09 DIAGNOSIS — G93.49: ICD-10-CM

## 2020-03-09 DIAGNOSIS — Z66: ICD-10-CM

## 2020-03-09 DIAGNOSIS — J69.0: ICD-10-CM

## 2020-03-09 DIAGNOSIS — G40.909: ICD-10-CM

## 2020-03-09 DIAGNOSIS — L89.610: ICD-10-CM

## 2020-03-09 DIAGNOSIS — K92.2: ICD-10-CM

## 2020-03-09 DIAGNOSIS — E43: ICD-10-CM

## 2020-03-09 DIAGNOSIS — D64.9: ICD-10-CM

## 2020-03-09 DIAGNOSIS — K56.41: ICD-10-CM

## 2020-03-09 DIAGNOSIS — E78.5: ICD-10-CM

## 2020-03-09 DIAGNOSIS — N18.9: ICD-10-CM

## 2020-03-09 DIAGNOSIS — L89.890: ICD-10-CM

## 2020-03-09 DIAGNOSIS — N17.9: ICD-10-CM

## 2020-03-09 DIAGNOSIS — I50.30: ICD-10-CM

## 2020-03-09 DIAGNOSIS — Z43.0: ICD-10-CM

## 2020-03-09 DIAGNOSIS — D49.59: ICD-10-CM

## 2020-03-09 DIAGNOSIS — J96.11: ICD-10-CM

## 2020-03-09 DIAGNOSIS — L89.620: ICD-10-CM

## 2020-03-09 DIAGNOSIS — A41.9: Primary | ICD-10-CM

## 2020-03-09 DIAGNOSIS — I13.0: ICD-10-CM

## 2020-03-09 DIAGNOSIS — Z79.4: ICD-10-CM

## 2020-03-09 DIAGNOSIS — G93.40: ICD-10-CM

## 2020-03-09 DIAGNOSIS — Z43.1: ICD-10-CM

## 2020-03-09 DIAGNOSIS — N39.0: ICD-10-CM

## 2020-03-09 DIAGNOSIS — L89.220: ICD-10-CM

## 2020-03-09 DIAGNOSIS — L89.154: ICD-10-CM

## 2020-03-09 DIAGNOSIS — E11.22: ICD-10-CM

## 2020-03-09 DIAGNOSIS — M46.28: ICD-10-CM

## 2020-03-09 LAB
ADD MANUAL DIFF: YES
ALBUMIN SERPL-MCNC: 1.6 G/DL (ref 3.4–5)
ALBUMIN/GLOB SERPL: 0.3 {RATIO} (ref 1–2.7)
ALP SERPL-CCNC: 163 U/L (ref 46–116)
ALT SERPL-CCNC: 21 U/L (ref 12–78)
ANION GAP SERPL CALC-SCNC: 14 MMOL/L (ref 5–15)
APPEARANCE UR: CLEAR
APTT PPP: (no result) S
AST SERPL-CCNC: 13 U/L (ref 15–37)
BILIRUB SERPL-MCNC: 0.3 MG/DL (ref 0.2–1)
BUN SERPL-MCNC: 144 MG/DL (ref 7–18)
CALCIUM SERPL-MCNC: 9.7 MG/DL (ref 8.5–10.1)
CHLORIDE SERPL-SCNC: 84 MMOL/L (ref 98–107)
CK MB SERPL-MCNC: 2.4 NG/ML (ref 0–3.6)
CK SERPL-CCNC: 15 U/L (ref 26–308)
CO2 SERPL-SCNC: 24 MMOL/L (ref 21–32)
CREAT SERPL-MCNC: 1.8 MG/DL (ref 0.55–1.3)
ERYTHROCYTE [DISTWIDTH] IN BLOOD BY AUTOMATED COUNT: 17.3 % (ref 11.6–14.8)
GLOBULIN SER-MCNC: 5.6 G/DL
GLUCOSE UR STRIP-MCNC: NEGATIVE MG/DL
HCT VFR BLD CALC: 17.8 % (ref 37–47)
HGB BLD-MCNC: 5.9 G/DL (ref 12–16)
KETONES UR QL STRIP: NEGATIVE
LEUKOCYTE ESTERASE UR QL STRIP: (no result)
MCV RBC AUTO: 81 FL (ref 80–99)
NITRITE UR QL STRIP: NEGATIVE
PH UR STRIP: 5 [PH] (ref 4.5–8)
PLATELET # BLD: 458 K/UL (ref 150–450)
POTASSIUM SERPL-SCNC: 3.3 MMOL/L (ref 3.5–5.1)
PROT UR QL STRIP: (no result)
RBC # BLD AUTO: 2.18 M/UL (ref 4.2–5.4)
SODIUM SERPL-SCNC: 121 MMOL/L (ref 136–145)
SP GR UR STRIP: 1 (ref 1–1.03)
UROBILINOGEN UR-MCNC: NORMAL MG/DL (ref 0–1)
WBC # BLD AUTO: 35 K/UL (ref 4.8–10.8)

## 2020-03-09 PROCEDURE — 86900 BLOOD TYPING SEROLOGIC ABO: CPT

## 2020-03-09 PROCEDURE — 30233N1 TRANSFUSION OF NONAUTOLOGOUS RED BLOOD CELLS INTO PERIPHERAL VEIN, PERCUTANEOUS APPROACH: ICD-10-PCS

## 2020-03-09 PROCEDURE — 93970 EXTREMITY STUDY: CPT

## 2020-03-09 PROCEDURE — 99291 CRITICAL CARE FIRST HOUR: CPT

## 2020-03-09 PROCEDURE — 85025 COMPLETE CBC W/AUTO DIFF WBC: CPT

## 2020-03-09 PROCEDURE — 83605 ASSAY OF LACTIC ACID: CPT

## 2020-03-09 PROCEDURE — 86920 COMPATIBILITY TEST SPIN: CPT

## 2020-03-09 PROCEDURE — 36600 WITHDRAWAL OF ARTERIAL BLOOD: CPT

## 2020-03-09 PROCEDURE — 5A1955Z RESPIRATORY VENTILATION, GREATER THAN 96 CONSECUTIVE HOURS: ICD-10-PCS

## 2020-03-09 PROCEDURE — 83690 ASSAY OF LIPASE: CPT

## 2020-03-09 PROCEDURE — 87040 BLOOD CULTURE FOR BACTERIA: CPT

## 2020-03-09 PROCEDURE — 85730 THROMBOPLASTIN TIME PARTIAL: CPT

## 2020-03-09 PROCEDURE — 86140 C-REACTIVE PROTEIN: CPT

## 2020-03-09 PROCEDURE — 82803 BLOOD GASES ANY COMBINATION: CPT

## 2020-03-09 PROCEDURE — 87070 CULTURE OTHR SPECIMN AEROBIC: CPT

## 2020-03-09 PROCEDURE — 86850 RBC ANTIBODY SCREEN: CPT

## 2020-03-09 PROCEDURE — 82553 CREATINE MB FRACTION: CPT

## 2020-03-09 PROCEDURE — 93005 ELECTROCARDIOGRAM TRACING: CPT

## 2020-03-09 PROCEDURE — 74176 CT ABD & PELVIS W/O CONTRAST: CPT

## 2020-03-09 PROCEDURE — 87086 URINE CULTURE/COLONY COUNT: CPT

## 2020-03-09 PROCEDURE — 84484 ASSAY OF TROPONIN QUANT: CPT

## 2020-03-09 PROCEDURE — 87205 SMEAR GRAM STAIN: CPT

## 2020-03-09 PROCEDURE — 80202 ASSAY OF VANCOMYCIN: CPT

## 2020-03-09 PROCEDURE — 96366 THER/PROPH/DIAG IV INF ADDON: CPT

## 2020-03-09 PROCEDURE — 71045 X-RAY EXAM CHEST 1 VIEW: CPT

## 2020-03-09 PROCEDURE — 82150 ASSAY OF AMYLASE: CPT

## 2020-03-09 PROCEDURE — 85651 RBC SED RATE NONAUTOMATED: CPT

## 2020-03-09 PROCEDURE — 82962 GLUCOSE BLOOD TEST: CPT

## 2020-03-09 PROCEDURE — 86901 BLOOD TYPING SEROLOGIC RH(D): CPT

## 2020-03-09 PROCEDURE — 94664 DEMO&/EVAL PT USE INHALER: CPT

## 2020-03-09 PROCEDURE — 94003 VENT MGMT INPAT SUBQ DAY: CPT

## 2020-03-09 PROCEDURE — 85610 PROTHROMBIN TIME: CPT

## 2020-03-09 PROCEDURE — 96365 THER/PROPH/DIAG IV INF INIT: CPT

## 2020-03-09 PROCEDURE — 94002 VENT MGMT INPAT INIT DAY: CPT

## 2020-03-09 PROCEDURE — 82550 ASSAY OF CK (CPK): CPT

## 2020-03-09 PROCEDURE — 96375 TX/PRO/DX INJ NEW DRUG ADDON: CPT

## 2020-03-09 PROCEDURE — 85007 BL SMEAR W/DIFF WBC COUNT: CPT

## 2020-03-09 PROCEDURE — 80053 COMPREHEN METABOLIC PANEL: CPT

## 2020-03-09 PROCEDURE — 36415 COLL VENOUS BLD VENIPUNCTURE: CPT

## 2020-03-09 PROCEDURE — 80048 BASIC METABOLIC PNL TOTAL CA: CPT

## 2020-03-09 PROCEDURE — 81003 URINALYSIS AUTO W/O SCOPE: CPT

## 2020-03-09 PROCEDURE — 87081 CULTURE SCREEN ONLY: CPT

## 2020-03-09 RX ADMIN — DEXTROSE MONOHYDRATE SCH MLS/HR: 50 INJECTION, SOLUTION INTRAVENOUS at 22:15

## 2020-03-09 NOTE — NUR
ED Nurse Note:



Patient BIBA RA from [facility] and the [facility] due to abnormal lab (Na 119; BUN 
151). Pt is vent dependent. Has GT, patent and intact. A&Ox0, non verbal, 
arrousable to pain. Not in any distress. Afebrile. Pt placed on cardiac 
monitor.

## 2020-03-09 NOTE — EMERGENCY ROOM REPORT
History of Present Illness


General


Chief Complaint:  Abnormal Labs


Source:  Medical Record, EMS





Present Illness


HPI


This patient presents from a skilled nursing facility.  She is chronically ill 

and ventilator dependent.  She is nonverbal and nonresponsive at baseline.  She 

has a history of respiratory failure and is on a ventilator, congestive heart 

failure, diabetes, small bowel mass and pelvic mass.  The patient presents 

because laboratory work-up done at the skilled nursing facility showed an 

abnormal BUN.  There is no other history of present illness.  Per report there 

are no other complaints.


Allergies:  


Coded Allergies:  


     No Known Allergies (Unverified , 10/14/19)





Patient History


Past Medical History:  see triage record, old chart reviewed, DM, MI, CHF, GI 

bleed, renal disease, other - Malignancy, Respiratory failure (Trach/Vent)


Past Surgical History:  other - G-tube, Tracheostomy


Social History:  Denies: smoking, alcohol use, drug use


Last Menstrual Period:  na


Reviewed Nursing Documentation:  PMH: Agreed; PSxH: Agreed





Nursing Documentation-PMH


Past Medical History:  No History, Except For


Hx Cardiac Problems:  Yes


Hx Hypertension:  Yes


Hx Diabetes:  Yes


Hx Cancer:  No


Hx Gastrointestinal Problems:  No


Hx Neurological Problems:  Yes


Hx Seizures:  Yes


Hx Dysphasia:  Yes





Review of Systems


All Other Systems:  limited





Physical Exam





Vital Signs








  Date Time  Temp Pulse Resp B/P (MAP) Pulse Ox O2 Delivery O2 Flow Rate FiO2


 


3/9/20 17:33 97.7 101 18 115/59 (77) 99 Mechanical Ventilator  


 


3/9/20 17:57        40








Sp02 EP Interpretation:  reviewed, normal


General Appearance:  no apparent distress, alert, GCS 15, non-toxic, other - 

Pale, Chronically Ill


Head:  normocephalic, atraumatic


ENT:  no angioedema


Neck:  full range of motion, tracheotomy


Respiratory:  chest non-tender, lungs clear, normal breath sounds, other - On 

ventilator


Cardiovascular #1:  tachycardia, other - anasarca


Gastrointestinal:  normal bowel sounds, soft


Rectal:  deferred


Musculoskeletal:  normal inspection


Neurologic:  alert, other - At baseline, non-verbal.


Skin:  other - Pale, See RN skin exam





Medical Decision Making


Diagnostic Impression:  


 Primary Impression:  


 GI bleed


 Additional Impressions:  


 Anemia


 Leukocytosis


 Hyponatremia


 Azotemia


 Elevated troponin


ER Course


This patient is profoundly anemic with a hemoglobin of 5.9.  She also has a 

leukocytosis of 35,000.  She has azotemia and hyponatremia.  I suspect this is 

a GI bleed.  I am concerned this patient likely has a malignancy given the 

leukocytosis.  Further investigation into this patient's medical history did 

reveal that the patient has a small bowel malignancy and pelvic mass.  This is 

likely the etiology.  The patient is ventilator dependent and chronically ill.  

The patient is a very poor prognosis.  The patient was given a blood 

transfusion and Protonix IV and placed on Protonix drip and admitted to the ICU 

for further care.





This patient is critically ill.  This patient required complex medical decision-

making, aggressive intervention, extensive laboratory workup and monitoring.





Critical care time: 40 minutes.





Laboratory Tests








Test


  3/9/20


18:38 3/9/20


19:00


 


White Blood Count


  35.0 K/UL


(4.8-10.8)  *H 


 


 


Red Blood Count


  2.18 M/UL


(4.20-5.40)  L 


 


 


Hemoglobin


  5.9 G/DL


(12.0-16.0)  *L 


 


 


Hematocrit


  17.8 %


(37.0-47.0)  L 


 


 


Mean Corpuscular Volume 81 FL (80-99)   


 


Mean Corpuscular Hemoglobin


  26.8 PG


(27.0-31.0)  L 


 


 


Mean Corpuscular Hemoglobin


Concent 33.0 G/DL


(32.0-36.0) 


 


 


Red Cell Distribution Width


  17.3 %


(11.6-14.8)  H 


 


 


Platelet Count


  458 K/UL


(150-450)  H 


 


 


Mean Platelet Volume


  8.7 FL


(6.5-10.1) 


 


 


Neutrophils (%) (Auto)


  % (45.0-75.0)


  


 


 


Lymphocytes (%) (Auto)


  % (20.0-45.0)


  


 


 


Monocytes (%) (Auto)  % (1.0-10.0)   


 


Eosinophils (%) (Auto)  % (0.0-3.0)   


 


Basophils (%) (Auto)  % (0.0-2.0)   


 


Differential Total Cells


Counted 100  


  


 


 


Neutrophils % (Manual) 81 % (45-75)  H 


 


Lymphocytes % (Manual) 6 % (20-45)  L 


 


Monocytes % (Manual) 5 % (1-10)   


 


Eosinophils % (Manual) 2 % (0-3)   


 


Basophils % (Manual) 1 % (0-2)   


 


Band Neutrophils 5 % (0-8)   


 


Platelet Estimate Increased  H 


 


Platelet Morphology Normal   


 


Polychromasia 1+   


 


Hypochromasia 2+   


 


Anisocytosis 2+   


 


Sodium Level


  121 MMOL/L


(136-145)  L 


 


 


Potassium Level


  3.3 MMOL/L


(3.5-5.1)  L 


 


 


Chloride Level


  84 MMOL/L


()  L 


 


 


Carbon Dioxide Level


  24 MMOL/L


(21-32) 


 


 


Anion Gap


  14 mmol/L


(5-15) 


 


 


Blood Urea Nitrogen


  144 mg/dL


(7-18)  H 


 


 


Creatinine


  1.8 MG/DL


(0.55-1.30)  H 


 


 


Estimate Glomerular


Filtration Rate 28.1 mL/min


(>60) 


 


 


Glucose Level


  154 MG/DL


()  H 


 


 


Lactic Acid Level


  1.50 mmol/L


(0.4-2.0) 


 


 


Calcium Level


  9.7 MG/DL


(8.5-10.1) 


 


 


Total Bilirubin


  0.3 MG/DL


(0.2-1.0) 


 


 


Aspartate Amino Transferase


(AST) 13 U/L (15-37)


L 


 


 


Alanine Aminotransferase (ALT)


  21 U/L (12-78)


  


 


 


Alkaline Phosphatase


  163 U/L


()  H 


 


 


Total Creatine Kinase


  15 U/L


()  L 


 


 


Creatine Kinase MB


  2.4 NG/ML


(0.0-3.6) 


 


 


Creatine Kinase MB Relative


Index 16.0  


  


 


 


Troponin I


  0.092 ng/mL


(0.000-0.056) 


 


 


Total Protein


  7.2 G/DL


(6.4-8.2) 


 


 


Albumin


  1.6 G/DL


(3.4-5.0)  L 


 


 


Globulin 5.6 g/dL   


 


Albumin/Globulin Ratio


  0.3 (1.0-2.7)


L 


 


 


Urine Color  Pale yellow  


 


Urine Appearance  Clear  


 


Urine pH  5 (4.5-8.0)  


 


Urine Specific Gravity


  


  1.005


(1.005-1.035)


 


Urine Protein


  


  1+ (NEGATIVE)


H


 


Urine Glucose (UA)


  


  Negative


(NEGATIVE)


 


Urine Ketones


  


  Negative


(NEGATIVE)


 


Urine Blood


  


  1+ (NEGATIVE)


H


 


Urine Nitrite


  


  Negative


(NEGATIVE)


 


Urine Bilirubin


  


  Negative


(NEGATIVE)


 


Urine Urobilinogen


  


  Normal MG/DL


(0.0-1.0)


 


Urine Leukocyte Esterase


  


  2+ (NEGATIVE)


H


 


Urine RBC


  


  2-4 /HPF (0 -


2)  H


 


Urine WBC


  


  0-2 /HPF (0 -


2)


 


Urine Squamous Epithelial


Cells 


  Few /LPF


(NONE/OCC)


 


Urine Bacteria


  


  Few /HPF


(NONE)


 


Urine Yeast


  


  Moderate /HPF


(NONE)  H








EKG Diagnostic Results


Rate:  tachycardiac


Rhythm:  other - S. tachycardia


ST Segments:  other - NSST changes





Rhythm Strip Diag. Results


EP Interpretation:  yes


Rate:  100's


Rhythm:  no PVC's, no ectopy, other - S.tachycardia





Last Vital Signs








  Date Time  Temp Pulse Resp B/P (MAP) Pulse Ox O2 Delivery O2 Flow Rate FiO2


 


3/9/20 18:58        40


 


3/9/20 18:57 97.7 108 22 132/77 99 Mechanical Ventilator  








Disposition:  ADMITTED AS INPATIENT


Condition:  Critical


Referrals:  


Steven Gonzalez MD (PCP)











Kendra Gunderson DO Mar 9, 2020 21:47

## 2020-03-10 VITALS — SYSTOLIC BLOOD PRESSURE: 108 MMHG | DIASTOLIC BLOOD PRESSURE: 44 MMHG

## 2020-03-10 VITALS — DIASTOLIC BLOOD PRESSURE: 49 MMHG | SYSTOLIC BLOOD PRESSURE: 115 MMHG

## 2020-03-10 VITALS — DIASTOLIC BLOOD PRESSURE: 47 MMHG | SYSTOLIC BLOOD PRESSURE: 106 MMHG

## 2020-03-10 VITALS — DIASTOLIC BLOOD PRESSURE: 43 MMHG | SYSTOLIC BLOOD PRESSURE: 128 MMHG

## 2020-03-10 VITALS — SYSTOLIC BLOOD PRESSURE: 105 MMHG | DIASTOLIC BLOOD PRESSURE: 47 MMHG

## 2020-03-10 VITALS — SYSTOLIC BLOOD PRESSURE: 118 MMHG | DIASTOLIC BLOOD PRESSURE: 48 MMHG

## 2020-03-10 VITALS — DIASTOLIC BLOOD PRESSURE: 58 MMHG | SYSTOLIC BLOOD PRESSURE: 132 MMHG

## 2020-03-10 VITALS — SYSTOLIC BLOOD PRESSURE: 98 MMHG | DIASTOLIC BLOOD PRESSURE: 38 MMHG

## 2020-03-10 VITALS — DIASTOLIC BLOOD PRESSURE: 50 MMHG | SYSTOLIC BLOOD PRESSURE: 121 MMHG

## 2020-03-10 VITALS — DIASTOLIC BLOOD PRESSURE: 47 MMHG | SYSTOLIC BLOOD PRESSURE: 112 MMHG

## 2020-03-10 VITALS — DIASTOLIC BLOOD PRESSURE: 46 MMHG | SYSTOLIC BLOOD PRESSURE: 102 MMHG

## 2020-03-10 VITALS — DIASTOLIC BLOOD PRESSURE: 44 MMHG | SYSTOLIC BLOOD PRESSURE: 106 MMHG

## 2020-03-10 VITALS — DIASTOLIC BLOOD PRESSURE: 56 MMHG | SYSTOLIC BLOOD PRESSURE: 118 MMHG

## 2020-03-10 VITALS — SYSTOLIC BLOOD PRESSURE: 111 MMHG | DIASTOLIC BLOOD PRESSURE: 51 MMHG

## 2020-03-10 VITALS — SYSTOLIC BLOOD PRESSURE: 119 MMHG | DIASTOLIC BLOOD PRESSURE: 51 MMHG

## 2020-03-10 VITALS — DIASTOLIC BLOOD PRESSURE: 46 MMHG | SYSTOLIC BLOOD PRESSURE: 114 MMHG

## 2020-03-10 VITALS — DIASTOLIC BLOOD PRESSURE: 47 MMHG | SYSTOLIC BLOOD PRESSURE: 108 MMHG

## 2020-03-10 VITALS — DIASTOLIC BLOOD PRESSURE: 74 MMHG | SYSTOLIC BLOOD PRESSURE: 125 MMHG

## 2020-03-10 LAB
ADD MANUAL DIFF: YES
ERYTHROCYTE [DISTWIDTH] IN BLOOD BY AUTOMATED COUNT: 14.6 % (ref 11.6–14.8)
HCT VFR BLD CALC: 25.7 % (ref 37–47)
HGB BLD-MCNC: 9 G/DL (ref 12–16)
MCV RBC AUTO: 82 FL (ref 80–99)
PLATELET # BLD: 402 K/UL (ref 150–450)
RBC # BLD AUTO: 3.14 M/UL (ref 4.2–5.4)
WBC # BLD AUTO: 32.4 K/UL (ref 4.8–10.8)

## 2020-03-10 RX ADMIN — DIVALPROEX SODIUM SCH MG: 500 TABLET, DELAYED RELEASE ORAL at 09:37

## 2020-03-10 RX ADMIN — DEXTROSE MONOHYDRATE SCH MLS/HR: 50 INJECTION, SOLUTION INTRAVENOUS at 14:32

## 2020-03-10 RX ADMIN — DIVALPROEX SODIUM SCH MG: 500 TABLET, DELAYED RELEASE ORAL at 20:12

## 2020-03-10 RX ADMIN — Medication SCH MG: at 17:52

## 2020-03-10 RX ADMIN — DEXTROSE MONOHYDRATE SCH MLS/HR: 50 INJECTION, SOLUTION INTRAVENOUS at 03:39

## 2020-03-10 RX ADMIN — DEXTROSE MONOHYDRATE SCH MLS/HR: 50 INJECTION, SOLUTION INTRAVENOUS at 06:01

## 2020-03-10 RX ADMIN — PANTOPRAZOLE SODIUM SCH MG: 40 INJECTION, POWDER, FOR SOLUTION INTRAVENOUS at 20:13

## 2020-03-10 RX ADMIN — PANTOPRAZOLE SODIUM SCH MG: 40 INJECTION, POWDER, FOR SOLUTION INTRAVENOUS at 09:36

## 2020-03-10 RX ADMIN — DEXTROSE MONOHYDRATE SCH MLS/HR: 50 INJECTION, SOLUTION INTRAVENOUS at 21:44

## 2020-03-10 NOTE — NUR
RD ASSESSMENT & RECOMMENDATIONS

SEE CARE ACTIVITY FOR COMPLETE ASSESSMENT



DAILY ESTIMATED NEEDS:

Needs based on Critical care, wounds; 68kg 

22-30  kcals/kg 

6793-5311  total kcals

1.25-2  g protein/kg

  g total protein 

25-30  mL/kg

6525-4353  total fluid mLs



NUTRITION DIAGNOSIS:

1) Increased kcal and pro needs r/t wound healing as evidenced by pt adm

w/ advanced wounds, pending eval.

2) Swallowing difficulty r/t resp status as evidenced by pt is trach/vent

dep and PEG dep.



 

CURRENT TF:NPO 



 

ENTERAL NUTRITION RECOMMENDATIONS:

Glucerna 1.5 @ 50ml/hr x 24 hrs  to provide 1200ml, 1800kcal, 99g prot, 911ml free water  



- As medically appropriate, resume TF

- Initiate Glucerna 1.5 @ 20ml/hr x 6 hrs

- Advance 10ml q 4-6 hrs as tolerated to goal rate

- HOB over 30 degrees/ water flush per MD

- Rec adding Zaki BID for wound healing



 



ADDITIONAL RECOMMENDATIONS:

1) Calibrated bedscale wt 

2) Wound healing: add Vit C 500mg BID + ZnSO4 220mg QD x 10 days 

                         add Zaki 1pkt BID via PEG

                         f/up with WC eval 

3) Monitor lytes, replete as needed- low K 

4) Monitor renal fxn/ hydration status: low Na, , creat 1.8 

5) Monitor BGs, need for NISS: h/o DM 

6) Monitor ability to resume TF: possible GIB, monitor h/h

## 2020-03-10 NOTE — DIAGNOSTIC IMAGING REPORT
Indication: Shortness of breath

 

Technique: One view of the chest

 

Comparison: 12/2/2019

 

Findings: There is bilateral hazy parenchymal disease. This is similar in extent to

that seen previously may be a small amount of pleural fluid on the left. The heart

size is normal. Tracheostomy is again demonstrated.

 

Impression: Bilateral hazy infiltrates versus edema

 

Suspect small left pleural effusion. Tracheostomy

## 2020-03-10 NOTE — NUR
NURSE NOTES:

Pt had BM x1, formed/soft/brown. Pt was cleaned, gown/bed linens were changed. Pt was 
repositioned. Wound dressings have been changed. Bilateral lower extremities are elevated on 
pillows, off heels. VS remain stable.

## 2020-03-10 NOTE — NUR
NURSE NOTES:

Pt was seen for wound care consult by wound care nurse. Wound assessments and treatments 
were done with the wound care nurse. Wound photos were taken and uploaded. P200 mattress was 
placed under pt for pressure-release, lower extremities have been elevated, off-heels. VS 
remain stable. Pt is afebrile. Charles catheter continues to drain milky/cloudy with sediments 
dark yellow urine.

## 2020-03-10 NOTE — NUR
NURSE NOTES:

Pt was seen by Dr Gonzalez. Orders in place for venous duplex of lower extremities, if neg, 
apply SCDs for DVT prophylaxis. MD was notified of physical assessment finding, distended 
abdomen with mid-umbilical hard to touch. AM meds were administered. Oral care was done. Pt 
was repositioned. VS remain stable. Pt is afebrile.

## 2020-03-10 NOTE — CONSULTATION
History of Present Illness


General


Date patient seen:  Mar 10, 2020


Chief Complaint:  Abnormal Labs





Present Illness


HPI


This is a very pleasant 64-year-old female well-known to me from prior 

admissions with cared for for some time now in the past during admissions that 

is a skilled nursing facility patient presenting with abnormal labs and 

respiratory issues she is chronically ill and ventilator dependent.  She is 

nonverbal and nonresponsive at baseline.  She has a history of respiratory 

failure and is on a ventilator, congestive heart failure, diabetes, small bowel 

mass and pelvic mass.  The patient presents because laboratory work-up done at 

the skilled nursing facility showed an abnormal BUN.  There is no other history 

of present illness.  Per report there are no other complaints.  On admission 

surgery called to evaluate and assist with care given patient with severe 

anemia and likely GI bleed.  Patient seen, patient vital, chart reviewed


Allergies:  


Coded Allergies:  


     No Known Allergies (Unverified , 10/14/19)





Medication History


Scheduled


Aspirin* (Aspirin*), 81 MG GT DAILY, (Reported)


Atorvastatin Calcium* (Atorvastatin Calcium*), 20 MG GT BEDTIME, (Reported)


Clopidogrel Bisulfate* (Plavix*), 75 MG GT DAILY, (Reported)


Cran/Vitc/Mannose/Inulin/Brom (Uti-Stat Liquid), 30 ML GT BID, (Reported)


Docusate Sodium* (Colace*), 100 MG GT TWICE A DAY, (Reported)


Ferrous Sulfate (Ferrous Sulfate), 7.5 ML GT BID, (Reported)


Folic Acid* (Folic Acid*), 3 MG GT DAILY, (Reported)


Insulin Glargine (Lantus), 55 UNITS SUBQ BID, (Reported)


Insulin Lispro (Humalog), Unknown Dose SUBQ Q6HR, (Reported)


Multivitamin With Minerals (Biosupp), 15 ML GT DAILY, (Reported)


Pantoprazole* (Pantoprazole*), 40 MG GT ACBREAKFAST, (Reported)


Ellamore Oil/Omega-3 Fatty Acids (Fish Oil 500 mg Softgel), 1 EACH GT BID, (

Reported)


Vit C/Ascorbate Ca/Ascorb Sod (Vitamin C 500 Mg/15 Ml Liquid), 500 MG GT DAILY, 

(Reported)


Zinc Sulfate (Zinc Sulfate*), 220 MG GT DAILY, (Reported)





Scheduled PRN


Acetaminophen 160MG/5ML* (Acetaminophen*), 20 ML GT PRN PRN for For Pain, (

Reported)


Magnesium Hydroxide/Al Hydrox (Shaneka-Mox Antacid Suspension), 30 ML GT EVERY 4 

HOURS PRN for INDIGESTION, (Reported)





Discontinued Medications


Fish Oil (Fish Oil 1,000 mg Capsule), 500 MG GT BID, (Reported)


   Discontinued Reason: Therapy completed





Patient History


Limited by:  medical condition


History Provided By:  Medical Record, PMD


Healthcare decision maker





Resuscitation status


Full Code


Advanced Directive on File








Past Medical/Surgical History


Past Medical/Surgical History:  


(1) CHF (congestive heart failure)


(2) Tracheostomy in place


(3) Hypokalemia


(4) Fever


(5) Aspiration pneumonia


(6) Respiratory failure


(7) Encephalopathy


(8) Diabetic nephropathy


(9) Diastolic heart failure


(10) Diabetes mellitus type II, controlled


(11) Abnormal TSH


(12) Pelvic mass in female


(13) Renal failure (ARF), acute on chronic


(14) Pressure injury of deep tissue


(15) Abnormal LFTs


(16) Azotemia


(17) Anemia


(18) Hyponatremia


(19) Elevated troponin


(20) Leukocytosis


(21) GI bleed


(22) Wounds and injuries





Review of Systems


All Other Systems:  negative except mentioned in HPI


ROS Narrative


cannot obtain given medical condition





Physical Exam


General Appearance:  mild distress


Lines, tubes and drains:  other


HEENT:  normocephalic, mucous membranes moist


Neck:  normal inspection


Respiratory/Chest:  decreased breath sounds, on vent


Cardiovascular/Chest:  regularly irregular


Abdomen:  soft, no organomegaly, no mass, feeding tube


Extremities:  inflammation, slow capillary refill, other


Skin Exam:  warm/dry


Neurologic:  unresponsiveness





Last 24 Hour Vital Signs








  Date Time  Temp Pulse Resp B/P (MAP) Pulse Ox O2 Delivery O2 Flow Rate FiO2


 


3/10/20 16:00        40


 


3/10/20 16:00      Mechanical Ventilator  


 


3/10/20 16:00  95 20 102/46 (64) 100   


 


3/10/20 15:11  98 27     40


 


3/10/20 15:00  100 23 111/51 (71) 100   


 


3/10/20 14:00  101 23 119/51 (73)    


 


3/10/20 13:00 98.8 97 25 98/38 (58) 100   


 


3/10/20 12:06  103      


 


3/10/20 12:06  103 27 106/47 (66) 100   


 


3/10/20 12:00        40


 


3/10/20 12:00      Mechanical Ventilator  


 


3/10/20 11:02  106 23     40


 


3/10/20 11:00  108 21 115/49 (71) 100   


 


3/10/20 10:00  115 22 121/50 (73) 100   


 


3/10/20 09:53      Mechanical Ventilator  


 


3/10/20 09:37  125  134/81    


 


3/10/20 09:25  118 26     40


 


3/10/20 09:15  115      


 


3/10/20 08:15 98.7 131 30 128/43 (71) 100   


 


3/10/20 08:10 98.5 106 18 126/69 100 Mechanical Ventilator  40


 


3/10/20 08:00      Mechanical Ventilator  


 


3/10/20 08:00        40


 


3/10/20 07:35  121 31  100 Mechanical Ventilator  40


 


3/10/20 07:35  121 31     40


 


3/10/20 05:32 97.9 100 18 118/56 100 Mechanical Ventilator  40


 


3/10/20 05:30  87 24     40


 


3/10/20 03:31 98.2 103 20     


 


3/10/20 03:31 98.2 102 20 132/58 100 Mechanical Ventilator  40


 


3/10/20 02:40  89 22     40


 


3/10/20 01:30  87 19     40


 


3/10/20 01:27 98.4 105 22     


 


3/10/20 01:22 98.4 105 19     


 


3/10/20 01:17 98.3 103 17     


 


3/10/20 01:12 98.3 102 19     


 


3/10/20 01:12 98.3 102 19 125/74 100 Mechanical Ventilator  40


 


3/10/20 00:49 98.6 101 20     


 


3/10/20 00:33 98.1 100 20     


 


3/10/20 00:17 98.3 103 17     


 


3/9/20 23:33 98.2 100 20     


 


3/9/20 23:18 98.2 99 19 113/48 100 Mechanical Ventilator  40


 


3/9/20 23:18 98.2 99 19     


 


3/9/20 23:03 98.5 107 21     


 


3/9/20 22:58 98.5 102 20     


 


3/9/20 22:53 98.6 104 20     


 


3/9/20 22:48 98.6 104 19     


 


3/9/20 22:40  91 20     40


 


3/9/20 21:15 98.5 106 19 125/71 100 Mechanical Ventilator  40


 


3/9/20 20:35  87 24     40


 


3/9/20 19:10 98.0 105 20 121/74 100 Mechanical Ventilator  40


 


3/9/20 18:58        40


 


3/9/20 18:57 97.7 108 22 132/77 99 Mechanical Ventilator  40


 


3/9/20 18:55  89 21     40


 


3/9/20 17:57  92 22     40


 


3/9/20 17:33 97.7 101 18 115/59 (77) 99 Mechanical Ventilator  

















Intake and Output  


 


 3/9/20 3/10/20





 19:00 07:00


 


Intake Total  525 ml


 


Balance  525 ml


 


  


 


IV Total  25 ml


 


Blood Product  500 ml


 


# Voids 1 











Laboratory Tests








Test


  3/9/20


18:38 3/9/20


19:00 3/10/20


05:58


 


White Blood Count


  35.0 K/UL


(4.8-10.8)  *H 


  32.4 K/UL


(4.8-10.8)  *H


 


Red Blood Count


  2.18 M/UL


(4.20-5.40)  L 


  3.14 M/UL


(4.20-5.40)  L


 


Hemoglobin


  5.9 G/DL


(12.0-16.0)  *L 


  9.0 G/DL


(12.0-16.0)  #L


 


Hematocrit


  17.8 %


(37.0-47.0)  L 


  25.7 %


(37.0-47.0)  #L


 


Mean Corpuscular Volume 81 FL (80-99)    82 FL (80-99)  


 


Mean Corpuscular Hemoglobin


  26.8 PG


(27.0-31.0)  L 


  28.6 PG


(27.0-31.0)


 


Mean Corpuscular Hemoglobin


Concent 33.0 G/DL


(32.0-36.0) 


  35.0 G/DL


(32.0-36.0)


 


Red Cell Distribution Width


  17.3 %


(11.6-14.8)  H 


  14.6 %


(11.6-14.8)


 


Platelet Count


  458 K/UL


(150-450)  H 


  402 K/UL


(150-450)


 


Mean Platelet Volume


  8.7 FL


(6.5-10.1) 


  7.4 FL


(6.5-10.1)


 


Neutrophils (%) (Auto)


  % (45.0-75.0)


  


  % (45.0-75.0)


 


 


Lymphocytes (%) (Auto)


  % (20.0-45.0)


  


  % (20.0-45.0)


 


 


Monocytes (%) (Auto)  % (1.0-10.0)     % (1.0-10.0)  


 


Eosinophils (%) (Auto)  % (0.0-3.0)     % (0.0-3.0)  


 


Basophils (%) (Auto)  % (0.0-2.0)     % (0.0-2.0)  


 


Differential Total Cells


Counted 100  


  


  100  


 


 


Neutrophils % (Manual) 81 % (45-75)  H  84 % (45-75)  H


 


Lymphocytes % (Manual) 6 % (20-45)  L  6 % (20-45)  L


 


Monocytes % (Manual) 5 % (1-10)    7 % (1-10)  


 


Eosinophils % (Manual) 2 % (0-3)    3 % (0-3)  


 


Basophils % (Manual) 1 % (0-2)    0 % (0-2)  


 


Band Neutrophils 5 % (0-8)    0 % (0-8)  


 


Platelet Estimate Increased  H  Adequate  


 


Platelet Morphology Normal    Normal  


 


Polychromasia 1+    


 


Hypochromasia 2+    2+  


 


Anisocytosis 2+    


 


Sodium Level


  121 MMOL/L


(136-145)  L 


  


 


 


Potassium Level


  3.3 MMOL/L


(3.5-5.1)  L 


  


 


 


Chloride Level


  84 MMOL/L


()  L 


  


 


 


Carbon Dioxide Level


  24 MMOL/L


(21-32) 


  


 


 


Anion Gap


  14 mmol/L


(5-15) 


  


 


 


Blood Urea Nitrogen


  144 mg/dL


(7-18)  H 


  


 


 


Creatinine


  1.8 MG/DL


(0.55-1.30)  H 


  


 


 


Estimat Glomerular Filtration


Rate 28.1 mL/min


(>60) 


  


 


 


Glucose Level


  154 MG/DL


()  H 


  


 


 


Lactic Acid Level


  1.50 mmol/L


(0.4-2.0) 


  


 


 


Calcium Level


  9.7 MG/DL


(8.5-10.1) 


  


 


 


Total Bilirubin


  0.3 MG/DL


(0.2-1.0) 


  


 


 


Aspartate Amino Transf


(AST/SGOT) 13 U/L (15-37)


L 


  


 


 


Alanine Aminotransferase


(ALT/SGPT) 21 U/L (12-78)


  


  


 


 


Alkaline Phosphatase


  163 U/L


()  H 


  


 


 


Total Creatine Kinase


  15 U/L


()  L 


  


 


 


Creatine Kinase MB


  2.4 NG/ML


(0.0-3.6) 


  


 


 


Creatine Kinase MB Relative


Index 16.0  


  


  


 


 


Troponin I


  0.092 ng/mL


(0.000-0.056) 


  


 


 


Total Protein


  7.2 G/DL


(6.4-8.2) 


  


 


 


Albumin


  1.6 G/DL


(3.4-5.0)  L 


  


 


 


Globulin 5.6 g/dL    


 


Albumin/Globulin Ratio


  0.3 (1.0-2.7)


L 


  


 


 


Urine Color  Pale yellow   


 


Urine Appearance  Clear   


 


Urine pH  5 (4.5-8.0)   


 


Urine Specific Gravity


  


  1.005


(1.005-1.035) 


 


 


Urine Protein


  


  1+ (NEGATIVE)


H 


 


 


Urine Glucose (UA)


  


  Negative


(NEGATIVE) 


 


 


Urine Ketones


  


  Negative


(NEGATIVE) 


 


 


Urine Blood


  


  1+ (NEGATIVE)


H 


 


 


Urine Nitrite


  


  Negative


(NEGATIVE) 


 


 


Urine Bilirubin


  


  Negative


(NEGATIVE) 


 


 


Urine Urobilinogen


  


  Normal MG/DL


(0.0-1.0) 


 


 


Urine Leukocyte Esterase


  


  2+ (NEGATIVE)


H 


 


 


Urine RBC


  


  2-4 /HPF (0 -


2)  H 


 


 


Urine WBC


  


  0-2 /HPF (0 -


2) 


 


 


Urine Squamous Epithelial


Cells 


  Few /LPF


(NONE/OCC) 


 


 


Urine Bacteria


  


  Few /HPF


(NONE) 


 


 


Urine Yeast


  


  Moderate /HPF


(NONE)  H 


 











Microbiology








 Date/Time


Source Procedure


Growth Status


 


 


 3/9/20 19:00


Urine,Clean Catch Urine Culture - Preliminary Resulted








Height (Feet):  5


Height (Inches):  7.00


Weight (Pounds):  170


Medications





Current Medications








 Medications


  (Trade)  Dose


 Ordered  Sig/Winnie


 Route


 PRN Reason  Start Time


 Stop Time Status Last Admin


Dose Admin


 


 Acetaminophen


  (Tylenol)  325 mg  Q4H  PRN


 ORAL


 For Pain  3/9/20 22:15


 4/8/20 22:14   


 


 


 Albuterol/


 Ipratropium


  (Albuterol/


 Ipratropium)  3 ml  Q4H  PRN


 HHN


 Shortness of Breath  3/9/20 22:15


 3/14/20 22:14   


 


 


 Amlodipine


 Besylate


  (Norvasc)  2.5 mg  BID


 ORAL


   3/10/20 09:00


 4/9/20 08:59  3/10/20 09:37


 


 


 Divalproex Sodium


  (Depakote)  500 mg  Q12HR


 ORAL


   3/10/20 09:00


 4/9/20 08:59  3/10/20 09:37


 


 


 Pantoprazole


  (Protonix)  40 mg  EVERY 12  HOURS


 IVP


   3/10/20 09:00


 4/9/20 08:59  3/10/20 09:36


 


 


 Piperacillin Sod/


 Tazobactam Sod


 3.375 gm/Sodium


 Chloride  110 ml @ 


 27.5 mls/hr  EVERY 8  HOURS


 IVPB


   3/9/20 22:15


 3/14/20 22:14  3/10/20 14:32


 


 


 Sodium Chloride  1,000 ml @ 


 100 mls/hr  Q10H


 IV


   3/9/20 22:15


 4/8/20 22:14  3/10/20 09:36


 


 


 Vancomycin HCl


  (Vanco rx to


 dose)  1 ea  DAILY  PRN


 MISC


 Per rx protocol  3/9/20 22:15


 4/8/20 22:14   


 











Assessment/Plan


Problem List:  


(1) Leukocytosis


Assessment & Plan:  Significant leukocytosis.


Initiating work-up currently pending micro


Imaging ordered repeat labs ordered will follow


Thank you for let me participate in patient's care


ICD Codes:  D72.829 - Elevated white blood cell count, unspecified


SNOMED:  458089336, 686831472


(2) Wounds and injuries


Assessment & Plan:  DAILY ESTIMATED NEEDS:


Needs based on Critical care, wounds; 68kg 


22-30  kcals/kg 


0515-2592  total kcals


1.25-2  g protein/kg


  g total protein 


25-30  mL/kg


5587-9429  total fluid mLs





NUTRITION DIAGNOSIS:


1) Increased kcal and pro needs r/t wound healing as evidenced by pt adm


w/ advanced wounds, pending eval.


2) Swallowing difficulty r/t resp status as evidenced by pt is trach/vent


dep and PEG dep.





 


CURRENT TF:NPO 





 


ENTERAL NUTRITION RECOMMENDATIONS:


Glucerna 1.5 @ 50ml/hr x 24 hrs  to provide 1200ml, 1800kcal, 99g prot, 911ml 

free water  





- As medically appropriate, resume TF


- Initiate Glucerna 1.5 @ 20ml/hr x 6 hrs


- Advance 10ml q 4-6 hrs as tolerated to goal rate


- HOB over 30 degrees/ water flush per MD


- Rec adding Zaki BID for wound healing





 





ADDITIONAL RECOMMENDATIONS:


1) Calibrated bedscale wt 


2) Wound healing: add Vit C 500mg BID + ZnSO4 220mg QD x 10 days 


                         add Zaki 1pkt BID via PEG


                         f/up with WC eval 


3) Monitor lytes, replete as needed- low K 


4) Monitor renal fxn/ hydration status: low Na, , creat 1.8 


5) Monitor BGs, need for NISS: h/o DM 


6) Monitor ability to resume TF: possible GIB, monitor h/h


ICD Codes:  T14.90XA - Injury, unspecified, initial encounter


SNOMED:  241107053, 688022263


(3) GI bleed


Assessment & Plan:  GI bleed no active bleeding noted currently severe anemia 

transfuse responding well GI eval for possible scope upper and lower continue 

with ICU care for now trend labs will monitor closely no acute surgical 

intervention planned


ICD Codes:  K92.2 - Gastrointestinal hemorrhage, unspecified


SNOMED:  15693643


(4) Abnormal LFTs


ICD Codes:  R94.5 - Abnormal results of liver function studies


SNOMED:  169569178


(5) Pressure injury of deep tissue


ICD Codes:  L89.96 - Pressure-induced deep tissue damage of unspecified site


SNOMED:  876919141











Radhames Blas Mar 10, 2020 16:42

## 2020-03-10 NOTE — NUR
NURSE NOTES:

Received new admission from ER, transferred to ICU via [facility]. Pt has eyes closed, 
sqeezes eyes in response to deep touch, withdraws to pain. Pt was placed on Cardiac monitor, 
ST with heart rate fluctuating from 118-122. Bounding peripheral pulses noted. Temp 98.7F 
axillary. Pt is on trach to vent, Shiley 7.0 with vent settings AC18, , Peep 5.0, FIO2 
40% at 100%O2Sat. Skin integrity at trach-collar site is WNL. Stoma site is draining grey 
secretions. Abdomen is large, distended, mid-abdomen by belly button is hard to touch, with 
hypoactive bowel sounds noted on auscultation. Bladder was noted to be distended post void, 
Charles catheter 16F was placed per MD order, drained milky/cloudy oscar colored urine with 
moderate amount of sediments. Pt has peripheral IV access L-hand #22G, R-wrist #22G, and 
L-wrist #20G, all currently saline locked, patent/intact. IV fluid order is noted for NS at 
100ml/hour. Skin has multiple pressure ulcers, wound care consult has been ordered. HOB is 
at 30 degrees, three side rails up, bed locked, in lowest position, and call light is placed 
within easy reach. Will contact MD for new admission orders and follow plan of care per MD 
orders and protocol. 

-------------------------------------------------------------------------------

Addendum: 03/10/20 at 1546 by MARISA KOROMA RN

-------------------------------------------------------------------------------

Hand off report was received from Rody JOHNSON. Pt has no belongings; list was signed with 
the transferring nurse.

## 2020-03-10 NOTE — NUR
63 YO FEMALE BIBA FROM [facility] CONV TO ER



CC    ABNORMAL LABS NA [id number]



SI:   RESP FAILURE TRACH/VENT DEPENDENT,HYPONATREMIA,ANEMIA

T. 97.7  RR 18 B/P115/59

AC 18  FIO2 40% PEEP 5

WBC 35.0 H/H 5.9/17.8  K 3.3  CR 1.8 TROP 0.092





IS:     IV BOLUS NS X 1 LITER

PROTONIX IV

TYPE/CROSS PRBC'S

*******ADMITTED TO ICU******

***********ICU STATUS********



DCP RETURN TO [facility]

## 2020-03-10 NOTE — NUR
ED Nurse Note:



Pt seen sleeping in bed. On mechanical vent, tolerating well. Not in any 
distress. Safety and comfort provided. Will cont to monitor.

## 2020-03-10 NOTE — NUR
NOTE



Pt has admitted to ICU on 3/9/2020. Pt is vent dependent. There is no POLST/AD in the chart. 
GEORGE contacted pt's daughter, Aly Perez [phone]. Per Braydonlee ann, pt's son Speedy Alvarenga is the 
POA/point of contact. GEORGE left a vm to Speedy Alvarenga [phone] for call back. GEORGE will continue 
to F/U. 






-------------------------------------------------------------------------------

Signed:    03/10/20 at 1035 by YUE VAZQUEZ <Co-Signature Required>

-------------------------------------------------------------------------------

## 2020-03-10 NOTE — NUR
NURSE NOTES:

Venous duplex was done on lower extremities and resulted negative per technician at bedside. 
SCDs will be placed for DVT prophylaxis. Pt had BM x1, formed/brown/stool. Pt was cleaned, 
gown/bed linens were changed. Pt was repositioned. VS remain stable.

## 2020-03-10 NOTE — NUR
*-* INSURANCE *-*



ALL CLINICALS AND REVIEWS HAVE BEEN FAXED TO:



[facility]# [phone]

Fax# [phone]

## 2020-03-10 NOTE — DIAGNOSTIC IMAGING REPORT
Indication: Lateral leg pain

 

Technique: Grayscale and duplex images of the  bilateral lower extremity veins

 

Comparison: 11/27/2019

 

Findings: Bilaterally,   grayscale and duplex images demonstrate no evidence of

intraluminal thrombus. Normal phasic Doppler waveforms, demonstrating normal

augmentation response and no evidence of valvular insufficiency. Greater saphenous

vein(s) and tibial veins are patent. Normal compressibility.  No significant interim

change. Prominent lymph nodes are noted in both groins

 

Impression: Negative for evidence of lower extremity deep venous thrombosis 

bilaterally

## 2020-03-10 NOTE — HISTORY AND PHYSICAL REPORT
DATE OF ADMISSION:  03/09/2020

CHIEF COMPLAINT:  Sepsis, anemia, possible gastrointestinal bleed, and

chronic respiratory failure.



HISTORY OF PRESENT ILLNESS:  This is an unfortunate 64-year-old female.

She has a history of chronic respiratory failure, who is admitted with

complaints of abnormal laboratories and anemia.  According to staff at the

nursing home, she was having melena.  On evaluation in the emergency room,

the patient was tachycardic.  Laboratories were significant for white

count of 35,000 and hemoglobin 5.9.  The patient has been started on

transfusions.  She has been pancultured and is now admitted for further

evaluation and care.  The patient is chronically vent dependent.  She is

nonverbal.



PAST MEDICAL HISTORY:  Significant for history of encephalopathy, history

of pneumonia, chronic respiratory failure, diabetes, chronic kidney

disease, anemia, seizure disorder, hyperlipidemia, and congestive heart

failure.



PAST SURGICAL HISTORY:  Includes trach and a G-tube.



CURRENT MEDICATIONS:  Reconciled and reviewed.



ALLERGIES:  None.



FAMILY HISTORY:  Unknown.



SOCIAL HISTORY:  There is no known history of tobacco, ethanol, or drugs.



REVIEW OF SYSTEMS:  From the patient is unobtainable.



PHYSICAL EXAMINATION:

VITAL SIGNS:  Temp 97.7, pulse 101, respirations 18, and blood pressure

115/59.  The patient is a chronically ill-appearing female.  She is

nonverbal at baseline.

HEENT:  The pupils are equal, round, and reactive to light.  Oropharynx is

clear.

NECK:  Supple.  Trach site was midline without any discharge or erythema.

HEART:  Regular rate and rhythm.

LUNGS:  Clear.

ABDOMEN:  Soft, nontender, and nondistended.

EXTREMITIES:  Without clubbing or cyanosis.



LABORATORY DATA:  Labs showed sodium 141, potassium of 3.3, chloride 84,

bicarb 24, , and creatinine is 1.8.  White count was 35,000 and

hemoglobin is 5.9.  Chest x-ray showed bilateral infiltrates.



ASSESSMENT:  This is an unfortunate female with multiple medical problems

admitted with complaints of severe anemia, possibly secondary to

gastrointestinal bleed, leukocytosis secondary to sepsis.  She has a

history of encephalopathy, chronic respiratory failure, diabetes,

congestive heart failure, and seizure disorder.



PLAN:

1. Broad spectrum IV antibiotics.

2. Follow up pending cultures.

3. Transfuse as needed.

4. IV proton pump inhibitor.

5. Check stool for occult blood.

6. GI consultation for evaluation for possible gastrointestinal bleed.

7. Pulmonary evaluation for management of the patient's ventilator.

8. Aggressive fluid resuscitation.

9. Monitor electrolytes and renal function.

10. Replace electrolytes as needed.

11. We will check a venous duplex.

12. Continue seizure medications.

13. Continue monitoring of Accu-Cheks.

14. The patient's prognosis is currently guarded.









  ______________________________________________

  Arun Sanderson M.D.





DR:  MAGNO

D:  03/10/2020 21:34

T:  03/10/2020 23:10

JOB#:  [id number]

CC:

## 2020-03-10 NOTE — NUR
ED Nurse Note:





Zosyn 3.375 not given, because it is too close to the next dose. Pt has only 
one IV access and is receiving BT. Charge nurse aware and acknowledged. 


-------------------------------------------------------------------------------

Addendum: 03/10/20 at 0412 by AARRIOLNELSON

-------------------------------------------------------------------------------

ED Nurse Note:



Zosyn 3.375gm due at 2200 not given.

## 2020-03-10 NOTE — CRITICAL CARE PROGRESS NOTE
Assessment/Plan


Assessment/Plan


chronic respiratory failure


sepsis


leukocytosis


acute on chronic renal failure


chronic encephalopathy 


hypoxemia


severe PCM


anemia





PLAN


care noted


IV antibiotics


respiratory care


Ventilatory support


IV hydration


SNF meds


supportive care


suction


no wean


oxygen therapy


consider renal and ID to see


prognosis guarded


medications/laboratory data/nursing notes/ICU care reviewed in detail


note reviewed and edited


care discussed with RN and RT


ICU time spent >40   minutes





Critical Care - Subjective


Interval Events:


reviewed and discussed


in ICU


ROS Limited/Unobtainable:  Yes


Condition:  critical


EKG Rhythm:  Sinus Rhythm


Residuals:  minimal


Tube Feeding Tolerated:  yes


I&O:











Intake and Output  


 


 3/9/20 3/10/20





 19:00 07:00


 


Intake Total  525 ml


 


Balance  525 ml


 


  


 


Intake IV Total  25 ml


 


Blood Product  500 ml


 


# Voids 1 











Critical Care - Objective





Last 24 Hour Vital Signs








  Date Time  Temp Pulse Resp B/P (MAP) Pulse Ox O2 Delivery O2 Flow Rate FiO2


 


3/10/20 08:10 98.5 106 18 126/69 100 Mechanical Ventilator  40


 


3/10/20 05:32 97.9 100 18 118/56 100 Mechanical Ventilator  40


 


3/10/20 05:30  87 24     40


 


3/10/20 03:31 98.2 103 20     


 


3/10/20 03:31 98.2 102 20 132/58 100 Mechanical Ventilator  40


 


3/10/20 02:40  89 22     40


 


3/10/20 01:30  87 19     40


 


3/10/20 01:27 98.4 105 22     


 


3/10/20 01:22 98.4 105 19     


 


3/10/20 01:17 98.3 103 17     


 


3/10/20 01:12 98.3 102 19     


 


3/10/20 01:12 98.3 102 19 125/74 100 Mechanical Ventilator  40


 


3/10/20 00:49 98.6 101 20     


 


3/10/20 00:33 98.1 100 20     


 


3/10/20 00:17 98.3 103 17     


 


3/9/20 23:33 98.2 100 20     


 


3/9/20 23:18 98.2 99 19 113/48 100 Mechanical Ventilator  40


 


3/9/20 23:18 98.2 99 19     


 


3/9/20 23:03 98.5 107 21     


 


3/9/20 22:58 98.5 102 20     


 


3/9/20 22:53 98.6 104 20     


 


3/9/20 22:48 98.6 104 19     


 


3/9/20 22:40  91 20     40


 


3/9/20 21:15 98.5 106 19 125/71 100 Mechanical Ventilator  40


 


3/9/20 20:35  87 24     40


 


3/9/20 19:10 98.0 105 20 121/74 100 Mechanical Ventilator  40


 


3/9/20 18:58        40


 


3/9/20 18:57 97.7 108 22 132/77 99 Mechanical Ventilator  40


 


3/9/20 18:55  89 21     40


 


3/9/20 17:57  92 22     40


 


3/9/20 17:33 97.7 101 18 115/59 (77) 99 Mechanical Ventilator  








Labs:





Laboratory Tests








Test


  3/9/20


18:38 3/9/20


19:00 3/10/20


05:58


 


White Blood Count


  35.0 K/UL


(4.8-10.8)  *H 


  32.4 K/UL


(4.8-10.8)  *H


 


Red Blood Count


  2.18 M/UL


(4.20-5.40)  L 


  3.14 M/UL


(4.20-5.40)  L


 


Hemoglobin


  5.9 G/DL


(12.0-16.0)  *L 


  9.0 G/DL


(12.0-16.0)  #L


 


Hematocrit


  17.8 %


(37.0-47.0)  L 


  25.7 %


(37.0-47.0)  #L


 


Mean Corpuscular Volume 81 FL (80-99)    82 FL (80-99)  


 


Mean Corpuscular Hemoglobin


  26.8 PG


(27.0-31.0)  L 


  28.6 PG


(27.0-31.0)


 


Mean Corpuscular Hemoglobin


Concent 33.0 G/DL


(32.0-36.0) 


  35.0 G/DL


(32.0-36.0)


 


Red Cell Distribution Width


  17.3 %


(11.6-14.8)  H 


  14.6 %


(11.6-14.8)


 


Platelet Count


  458 K/UL


(150-450)  H 


  402 K/UL


(150-450)


 


Mean Platelet Volume


  8.7 FL


(6.5-10.1) 


  7.4 FL


(6.5-10.1)


 


Neutrophils (%) (Auto)


  % (45.0-75.0)


  


  % (45.0-75.0)


 


 


Lymphocytes (%) (Auto)


  % (20.0-45.0)


  


  % (20.0-45.0)


 


 


Monocytes (%) (Auto)  % (1.0-10.0)     % (1.0-10.0)  


 


Eosinophils (%) (Auto)  % (0.0-3.0)     % (0.0-3.0)  


 


Basophils (%) (Auto)  % (0.0-2.0)     % (0.0-2.0)  


 


Differential Total Cells


Counted 100  


  


  100  


 


 


Neutrophils % (Manual) 81 % (45-75)  H  84 % (45-75)  H


 


Lymphocytes % (Manual) 6 % (20-45)  L  6 % (20-45)  L


 


Monocytes % (Manual) 5 % (1-10)    7 % (1-10)  


 


Eosinophils % (Manual) 2 % (0-3)    3 % (0-3)  


 


Basophils % (Manual) 1 % (0-2)    0 % (0-2)  


 


Band Neutrophils 5 % (0-8)    0 % (0-8)  


 


Platelet Estimate Increased  H  Adequate  


 


Platelet Morphology Normal    Normal  


 


Polychromasia 1+    


 


Hypochromasia 2+    2+  


 


Anisocytosis 2+    


 


Sodium Level


  121 MMOL/L


(136-145)  L 


  


 


 


Potassium Level


  3.3 MMOL/L


(3.5-5.1)  L 


  


 


 


Chloride Level


  84 MMOL/L


()  L 


  


 


 


Carbon Dioxide Level


  24 MMOL/L


(21-32) 


  


 


 


Anion Gap


  14 mmol/L


(5-15) 


  


 


 


Blood Urea Nitrogen


  144 mg/dL


(7-18)  H 


  


 


 


Creatinine


  1.8 MG/DL


(0.55-1.30)  H 


  


 


 


Estimat Glomerular Filtration


Rate 28.1 mL/min


(>60) 


  


 


 


Glucose Level


  154 MG/DL


()  H 


  


 


 


Lactic Acid Level


  1.50 mmol/L


(0.4-2.0) 


  


 


 


Calcium Level


  9.7 MG/DL


(8.5-10.1) 


  


 


 


Total Bilirubin


  0.3 MG/DL


(0.2-1.0) 


  


 


 


Aspartate Amino Transf


(AST/SGOT) 13 U/L (15-37)


L 


  


 


 


Alanine Aminotransferase


(ALT/SGPT) 21 U/L (12-78)


  


  


 


 


Alkaline Phosphatase


  163 U/L


()  H 


  


 


 


Total Creatine Kinase


  15 U/L


()  L 


  


 


 


Creatine Kinase MB


  2.4 NG/ML


(0.0-3.6) 


  


 


 


Creatine Kinase MB Relative


Index 16.0  


  


  


 


 


Troponin I


  0.092 ng/mL


(0.000-0.056) 


  


 


 


Total Protein


  7.2 G/DL


(6.4-8.2) 


  


 


 


Albumin


  1.6 G/DL


(3.4-5.0)  L 


  


 


 


Globulin 5.6 g/dL    


 


Albumin/Globulin Ratio


  0.3 (1.0-2.7)


L 


  


 


 


Urine Color  Pale yellow   


 


Urine Appearance  Clear   


 


Urine pH  5 (4.5-8.0)   


 


Urine Specific Gravity


  


  1.005


(1.005-1.035) 


 


 


Urine Protein


  


  1+ (NEGATIVE)


H 


 


 


Urine Glucose (UA)


  


  Negative


(NEGATIVE) 


 


 


Urine Ketones


  


  Negative


(NEGATIVE) 


 


 


Urine Blood


  


  1+ (NEGATIVE)


H 


 


 


Urine Nitrite


  


  Negative


(NEGATIVE) 


 


 


Urine Bilirubin


  


  Negative


(NEGATIVE) 


 


 


Urine Urobilinogen


  


  Normal MG/DL


(0.0-1.0) 


 


 


Urine Leukocyte Esterase


  


  2+ (NEGATIVE)


H 


 


 


Urine RBC


  


  2-4 /HPF (0 -


2)  H 


 


 


Urine WBC


  


  0-2 /HPF (0 -


2) 


 


 


Urine Squamous Epithelial


Cells 


  Few /LPF


(NONE/OCC) 


 


 


Urine Bacteria


  


  Few /HPF


(NONE) 


 


 


Urine Yeast


  


  Moderate /HPF


(NONE)  H 


 








Objective:


WDWN


trach


clear breath sounds bilaterally without rhonchi or wheeze


[id number] without MRG


NABS nontender no HSM; Gt


no CC; mild edema


nonfocal


Micro:





Microbiology








 Date/Time


Source Procedure


Growth Status


 


 


 3/9/20 19:00


Urine,Clean Catch Urine Culture - Preliminary Resulted

















Steven Gonzalez MD Mar 10, 2020 09:07

## 2020-03-10 NOTE — NUR
NURSE NOTES:

No changed in patient condition. Vital signs are stable. Turned and repositioned. Will 
continue to monitor.

## 2020-03-10 NOTE — NUR
NURSE NOTES:

Patient response to stimuli, open eyes but does no track and is non-verbal. Trached Shiley 7 
to vent settings: AC:18, TV:500, FiO2:40%, PEEP:5, O2 sat:100%. Vital signs are stable: 
BP:106/44, HR:97. RESP:21, TEMP:99.4F axillary. NPO status/GT clamped; abdomen is distended 
and firm to the touch. Charles inserted prior shift and is in place ad draining. Numerous 
wounds noted; sacral/bilateral heels/left later foot/right lateral lower leg. IV sites; left 
hand 22g running NS @ 100ml/hr, left wrist 20g saline locked and right wrist 22g saline 
lock; all are patent, flushing and asymptomatic. Safety measures in place; bed low, locked 
and alarm is on. Patient is comfortable and stable. Will continue plan of care.

## 2020-03-10 NOTE — NUR
ED Nurse Note: Report received from ELIZABETH Lee. Pt lying in bed with no signs 
of distress. Respirations even and unlabored on ventilator. Pt in nonverbal at 
baseline. Vitals stable as documented. Pt awaiting transfer to ICU.

## 2020-03-10 NOTE — NUR
NURSE NOTES:

Pt was repositioned, oral care was done. VS remain stable. Pt is afebrile. Pt is resting 
with signs/symptoms of any distress noted.

## 2020-03-10 NOTE — NUR
ED Nurse Note:



Pt has noted with pressure sore on sacral x1; left foot redness x2; pressure 
sore on left heel x1; pressure sore with scab on right lower leg x1.

## 2020-03-10 NOTE — NUR
NURSE NOTES:WOUND CARE NOTES:Pt presented on admission with multiple pressure injuries. No 
evidence of skin breakdown under collar of tracheostomy collar.

Full thickness sacral pressure injury with undermined borders. (L)9.5cm x (W)11cm x 
(D)1.6cm, undermining clockwise9-4 by 1.3cm @2o'clock.Base of wound has 25% soft necrosis 
,20% slough, otherwise pink granulation noted. Epibole along borders at 9-2o'clock. 
Periwound there is an area that is indurated with non-blanchable erythema.

Unstageable pressure injury L femoral head.(L)1.5cm x (W)1.7cm. Base of wound has 90% 
slough,10% soft necrosis at center of wound bed. Borders are erythematous and moist. No odor 
or exudate noted.No evidence of skin breakdown periwound.

Unstageable pressure injury lateral R tibia.(L)10.2cm x (W)1.6cm. Base of wound is 100% 
necrotic with detached Borders. Borders are erythematous and moist. No odor or exudate 
noted. Marginal erythema periwound.

DTPI R hallux. Base of wound is maroon with marginal erythema.(L)0.6cm x (W)0.3cm.

Unstageable pressure injury R heel. (L)0.6cm x(W)0.3cm. Base of wound has 90% slough with 
surrounding 10% moist, erythema.Non-blanchable erythema with fluctuance periwound.

Unstageable pressure injury L heel(L)4.5cm x (W)3.2cm.Base of wound is 100% necrotic with 
semi-detached borders which are erythematous. No odor or exudate noted.Periwound is 
fluctuant but pale pink

DTPI dorsal L foot.(L)0.9cm x (W)2cm. Base of wound is purple with maroon borders.No 
erythema induration or fluctuance periwound.

Unstageable pressure injury L Hallux(L)1.1cm x (W)1.2cm. Base of wound has 100% slough. 
Erythematous borders. No erythema,induration or fluctuance periwound.

Resolving pressure injury distal/lateral L foot(L)1.4cm x (W)1.5cm. Pink epithelial with 
surrounding dry, brown borders.NO erythema,induration or fluctuance periwound. 

DTPI Lateral L 5th Metatarsal.(L)1.4cm x (W)0.9cm. Base of wound is maroon in center with 
surrounding purple borders. 



Tx.Plan: Cleanse Sacral wound with Saline. Loosely pack with TheraHoney infused Kerlix. 
Apply Moisture Barrier Paste

            periwound.Cover with Optifoam drsg Daily and prn.

            Cleanse wound L Femoral Head with Saline. Apply TheraHoney. Apply Moisture 
Barrier Paste periwound. Cover with

            Optifoam drsg Daily and prn.

            Apply Betadine to wound lateral R tibia. Cover with ABD pad. Wrap with Kerlix 
from base of toes every 3 days and

            prn.

            Apply Betadine to R hallux and R heel. Cover each site with Optifoam drsg every 
3 days and prn.

            Apply Betadine to L foot wound. Cover each wound with Optifoam drsg. Change 
every 3 days and prn.

            Reposition at least every 2hours or as tolerated. 

            Off-load heels with pillow.

            [employer]

## 2020-03-11 VITALS — SYSTOLIC BLOOD PRESSURE: 112 MMHG | DIASTOLIC BLOOD PRESSURE: 53 MMHG

## 2020-03-11 VITALS — SYSTOLIC BLOOD PRESSURE: 111 MMHG | DIASTOLIC BLOOD PRESSURE: 43 MMHG

## 2020-03-11 VITALS — DIASTOLIC BLOOD PRESSURE: 37 MMHG | SYSTOLIC BLOOD PRESSURE: 131 MMHG

## 2020-03-11 VITALS — DIASTOLIC BLOOD PRESSURE: 44 MMHG | SYSTOLIC BLOOD PRESSURE: 107 MMHG

## 2020-03-11 VITALS — SYSTOLIC BLOOD PRESSURE: 82 MMHG | DIASTOLIC BLOOD PRESSURE: 55 MMHG

## 2020-03-11 VITALS — DIASTOLIC BLOOD PRESSURE: 46 MMHG | SYSTOLIC BLOOD PRESSURE: 112 MMHG

## 2020-03-11 VITALS — DIASTOLIC BLOOD PRESSURE: 36 MMHG | SYSTOLIC BLOOD PRESSURE: 110 MMHG

## 2020-03-11 VITALS — SYSTOLIC BLOOD PRESSURE: 108 MMHG | DIASTOLIC BLOOD PRESSURE: 53 MMHG

## 2020-03-11 VITALS — SYSTOLIC BLOOD PRESSURE: 96 MMHG | DIASTOLIC BLOOD PRESSURE: 59 MMHG

## 2020-03-11 VITALS — SYSTOLIC BLOOD PRESSURE: 92 MMHG | DIASTOLIC BLOOD PRESSURE: 48 MMHG

## 2020-03-11 VITALS — SYSTOLIC BLOOD PRESSURE: 124 MMHG | DIASTOLIC BLOOD PRESSURE: 40 MMHG

## 2020-03-11 VITALS — DIASTOLIC BLOOD PRESSURE: 41 MMHG | SYSTOLIC BLOOD PRESSURE: 85 MMHG

## 2020-03-11 VITALS — DIASTOLIC BLOOD PRESSURE: 47 MMHG | SYSTOLIC BLOOD PRESSURE: 114 MMHG

## 2020-03-11 VITALS — SYSTOLIC BLOOD PRESSURE: 105 MMHG | DIASTOLIC BLOOD PRESSURE: 39 MMHG

## 2020-03-11 VITALS — DIASTOLIC BLOOD PRESSURE: 45 MMHG | SYSTOLIC BLOOD PRESSURE: 110 MMHG

## 2020-03-11 VITALS — DIASTOLIC BLOOD PRESSURE: 43 MMHG | SYSTOLIC BLOOD PRESSURE: 110 MMHG

## 2020-03-11 VITALS — DIASTOLIC BLOOD PRESSURE: 58 MMHG | SYSTOLIC BLOOD PRESSURE: 125 MMHG

## 2020-03-11 VITALS — SYSTOLIC BLOOD PRESSURE: 112 MMHG | DIASTOLIC BLOOD PRESSURE: 55 MMHG

## 2020-03-11 VITALS — SYSTOLIC BLOOD PRESSURE: 114 MMHG | DIASTOLIC BLOOD PRESSURE: 43 MMHG

## 2020-03-11 VITALS — DIASTOLIC BLOOD PRESSURE: 55 MMHG | SYSTOLIC BLOOD PRESSURE: 117 MMHG

## 2020-03-11 VITALS — SYSTOLIC BLOOD PRESSURE: 113 MMHG | DIASTOLIC BLOOD PRESSURE: 49 MMHG

## 2020-03-11 VITALS — SYSTOLIC BLOOD PRESSURE: 96 MMHG | DIASTOLIC BLOOD PRESSURE: 52 MMHG

## 2020-03-11 VITALS — DIASTOLIC BLOOD PRESSURE: 72 MMHG | SYSTOLIC BLOOD PRESSURE: 108 MMHG

## 2020-03-11 VITALS — SYSTOLIC BLOOD PRESSURE: 112 MMHG | DIASTOLIC BLOOD PRESSURE: 41 MMHG

## 2020-03-11 VITALS — SYSTOLIC BLOOD PRESSURE: 109 MMHG | DIASTOLIC BLOOD PRESSURE: 46 MMHG

## 2020-03-11 LAB
ADD MANUAL DIFF: YES
ALBUMIN SERPL-MCNC: 1.4 G/DL (ref 3.4–5)
ALBUMIN/GLOB SERPL: 0.3 {RATIO} (ref 1–2.7)
ALP SERPL-CCNC: 127 U/L (ref 46–116)
ALT SERPL-CCNC: 20 U/L (ref 12–78)
AMYLASE SERPL-CCNC: 54 U/L (ref 25–115)
ANION GAP SERPL CALC-SCNC: 17 MMOL/L (ref 5–15)
APTT BLD: 32 SEC (ref 23–33)
AST SERPL-CCNC: 22 U/L (ref 15–37)
BILIRUB SERPL-MCNC: 0.5 MG/DL (ref 0.2–1)
BUN SERPL-MCNC: 109 MG/DL (ref 7–18)
CALCIUM SERPL-MCNC: 9.1 MG/DL (ref 8.5–10.1)
CHLORIDE SERPL-SCNC: 100 MMOL/L (ref 98–107)
CO2 SERPL-SCNC: 19 MMOL/L (ref 21–32)
CREAT SERPL-MCNC: 1.6 MG/DL (ref 0.55–1.3)
ERYTHROCYTE [DISTWIDTH] IN BLOOD BY AUTOMATED COUNT: 15.5 % (ref 11.6–14.8)
GLOBULIN SER-MCNC: 5.1 G/DL
HCT VFR BLD CALC: 23 % (ref 37–47)
HGB BLD-MCNC: 8.1 G/DL (ref 12–16)
INR PPP: 1.2 (ref 0.9–1.1)
MCV RBC AUTO: 83 FL (ref 80–99)
PLATELET # BLD: 351 K/UL (ref 150–450)
POTASSIUM SERPL-SCNC: 3.2 MMOL/L (ref 3.5–5.1)
RBC # BLD AUTO: 2.75 M/UL (ref 4.2–5.4)
SODIUM SERPL-SCNC: 136 MMOL/L (ref 136–145)
WBC # BLD AUTO: 28 K/UL (ref 4.8–10.8)

## 2020-03-11 RX ADMIN — DEXTROSE MONOHYDRATE SCH MLS/HR: 50 INJECTION, SOLUTION INTRAVENOUS at 05:38

## 2020-03-11 RX ADMIN — PANTOPRAZOLE SODIUM SCH MG: 40 INJECTION, POWDER, FOR SOLUTION INTRAVENOUS at 09:05

## 2020-03-11 RX ADMIN — HUMAN INSULIN SCH MLS/HR: 100 INJECTION, SOLUTION SUBCUTANEOUS at 20:06

## 2020-03-11 RX ADMIN — ZINC SULFATE CAP 220 MG (50 MG ELEMENTAL ZN) SCH MG: 220 (50 ZN) CAP at 09:05

## 2020-03-11 RX ADMIN — Medication SCH MG: at 18:51

## 2020-03-11 RX ADMIN — DIVALPROEX SODIUM SCH MG: 500 TABLET, DELAYED RELEASE ORAL at 09:05

## 2020-03-11 RX ADMIN — DEXTROSE MONOHYDRATE SCH MLS/HR: 50 INJECTION, SOLUTION INTRAVENOUS at 21:04

## 2020-03-11 RX ADMIN — PANTOPRAZOLE SODIUM SCH MG: 40 INJECTION, POWDER, FOR SOLUTION INTRAVENOUS at 20:06

## 2020-03-11 RX ADMIN — DEXTROSE MONOHYDRATE SCH MLS/HR: 50 INJECTION, SOLUTION INTRAVENOUS at 14:00

## 2020-03-11 RX ADMIN — Medication SCH MG: at 09:05

## 2020-03-11 RX ADMIN — DIVALPROEX SODIUM SCH MG: 500 TABLET, DELAYED RELEASE ORAL at 20:06

## 2020-03-11 NOTE — NUR
NURSE NOTES:



Report received from ELIZABETH Sierra. Patient responds to physical stimuli, opens eyes but does 
no track and is non-verbal. Trach Shiley 7 to vent settings: AC:18, TV:500, FiO2:40%, 
PEEP:5, O2 sat:100%. NSR on cardiac monitor. NPO status/GT clamped; abdomen is distended and 
firm to the touch. Charles catheter in place, draining urine to urometer. Numerous wounds 
noted; sacral/bilateral heels/left later foot/right lateral lower leg. IV sites; left hand 
#22g running NS @ 100ml/hr, left wrist #20g TKO and right wrist #22g saline locked; all are 
patent, flushing and asymptomatic. Safety measures in place; bed locked and in lowest 
position, padded for seizure precautions; alarm is on. Will resume plan of care.

## 2020-03-11 NOTE — NUR
NURSE NOTES:

Patient is stable and sleeping. Turned and repositioned. Bed low, locked and alarm is on. 
Will continue to monitor.

## 2020-03-11 NOTE — SURGERY PROGRESS NOTE
Surgery Progress Note


Subjective


Additional Comments


no acute events


lab snoted


exam stable





Objective





Last 24 Hour Vital Signs








  Date Time  Temp Pulse Resp B/P (MAP) Pulse Ox O2 Delivery O2 Flow Rate FiO2


 


3/11/20 13:00 98.3 92 24 111/43 (65) 100   


 


3/11/20 12:34  93 24     40


 


3/11/20 12:00      Mechanical Ventilator  


 


3/11/20 12:00  92 20 111/43 (65) 100   


 


3/11/20 12:00        40


 


3/11/20 12:00  89      


 


3/11/20 11:29  94 23     40


 


3/11/20 11:00  95 20 110/43 (65) 100   


 


3/11/20 10:00  89 18 112/46 (68) 100   


 


3/11/20 09:11  93 20     40


 


3/11/20 09:05  95  125/58    


 


3/11/20 09:00  94 20 125/58 (80) 97   


 


3/11/20 08:00      Mechanical Ventilator  


 


3/11/20 08:00        40


 


3/11/20 08:00 98.1 92 19 117/55 (75) 99   


 


3/11/20 08:00  92      


 


3/11/20 07:40  87 18     40


 


3/11/20 07:00  88 18 96/59 (71) 100   


 


3/11/20 06:18  92  107/44 (65) 100   


 


3/11/20 06:00  89 19 85/41 (56)    


 


3/11/20 05:00  95 19 124/40 (68) 97   


 


3/11/20 04:59  98 19     40


 


3/11/20 04:00      Mechanical Ventilator  


 


3/11/20 04:00 98.4 87 18 112/55 (74) 100   


 


3/11/20 04:00        40


 


3/11/20 03:48  95 26     40


 


3/11/20 03:43  87      


 


3/11/20 03:00  93 19 96/52 (67) 100   


 


3/11/20 02:00  91 18 109/46 (67) 100   


 


3/11/20 01:21  94 24     40


 


3/11/20 01:00  94 18 113/49 (70) 100   


 


3/11/20 00:00        40


 


3/11/20 00:00      Mechanical Ventilator  


 


3/11/20 00:00 98.5 94 22 114/47 (69) 100   


 


3/10/20 23:46  90 18     40


 


3/10/20 23:30  91      


 


3/10/20 23:00  93 19 112/47 (68) 100   


 


3/10/20 22:00  95 19 108/44 (65) 100   


 


3/10/20 21:20  96 19     40


 


3/10/20 21:00  97 26 118/48 (71) 100   


 


3/10/20 20:00        40


 


3/10/20 20:00  92 19 106/44 (64) 100   


 


3/10/20 20:00      Mechanical Ventilator  


 


3/10/20 19:41  92 18     40


 


3/10/20 19:21  93      


 


3/10/20 19:00  94 19 105/47 (66) 100   


 


3/10/20 18:05  94 20 108/47 (67) 100   


 


3/10/20 17:09  95 19     40


 


3/10/20 17:00  101 21 114/46 (68) 100   


 


3/10/20 16:00  98      


 


3/10/20 16:00        40


 


3/10/20 16:00      Mechanical Ventilator  


 


3/10/20 16:00 98.8 95 20 102/46 (64) 100   


 


3/10/20 15:11  98 27     40


 


3/10/20 15:00  100 23 111/51 (71) 100   








I&O











Intake and Output  


 


 3/10/20 3/11/20





 19:00 07:00


 


Intake Total 1090.0 ml 1360.95 ml


 


Output Total 1300 ml 1060 ml


 


Balance -210.0 ml 300.95 ml


 


  


 


Intake Free Water 30 ml 


 


IV Total 1060.0 ml 1360.95 ml


 


Output Urine Total 1300 ml 1060 ml


 


# Voids 1 


 


# Bowel Movements 3 3








Dressing:  other


Wound:  other


Drains:  other


Cardiovascular:  RSR


Respiratory:  decreased breath sounds


Abdomen:  soft, present bowel sounds


Extremities:  no cyanosis





Laboratory Tests








Test


  3/11/20


05:20 3/11/20


09:15


 


Prothrombin Time


  12.6 SEC


(9.30-11.50)  H 


 


 


Prothromb Time International


Ratio 1.2 (0.9-1.1)


H 


 


 


Activated Partial


Thromboplast Time 32 SEC (23-33)


  


 


 


Sodium Level


  136 MMOL/L


(136-145) 


 


 


Potassium Level


  3.2 MMOL/L


(3.5-5.1)  L 


 


 


Chloride Level


  100 MMOL/L


() 


 


 


Carbon Dioxide Level


  19 MMOL/L


(21-32)  L 


 


 


Anion Gap


  17 mmol/L


(5-15)  H 


 


 


Blood Urea Nitrogen


  109 mg/dL


(7-18)  H 


 


 


Creatinine


  1.6 MG/DL


(0.55-1.30)  H 


 


 


Estimat Glomerular Filtration


Rate 32.3 mL/min


(>60) 


 


 


Glucose Level


  55 MG/DL


()  #L 


 


 


Lactic Acid Level


  0.60 mmol/L


(0.4-2.0) 


 


 


Calcium Level


  9.1 MG/DL


(8.5-10.1) 


 


 


Total Bilirubin


  0.5 MG/DL


(0.2-1.0) 


 


 


Aspartate Amino Transf


(AST/SGOT) 22 U/L (15-37)


  


 


 


Alanine Aminotransferase


(ALT/SGPT) 20 U/L (12-78)


  


 


 


Alkaline Phosphatase


  127 U/L


()  H 


 


 


C-Reactive Protein,


Quantitative 18.3 mg/dL


(0.00-0.90)  H 


 


 


Total Protein


  6.5 G/DL


(6.4-8.2) 


 


 


Albumin


  1.4 G/DL


(3.4-5.0)  L 


 


 


Globulin 5.1 g/dL   


 


Albumin/Globulin Ratio


  0.3 (1.0-2.7)


L 


 


 


Amylase Level


  54 U/L


() 


 


 


Lipase


  68 U/L


()  L 


 


 


Random Vancomycin Level 12.0 ug/mL   


 


White Blood Count


  


  28.0 K/UL


(4.8-10.8)  *H


 


Red Blood Count


  


  2.75 M/UL


(4.20-5.40)  L


 


Hemoglobin


  


  8.1 G/DL


(12.0-16.0)  L


 


Hematocrit


  


  23.0 %


(37.0-47.0)  L


 


Mean Corpuscular Volume  83 FL (80-99)  


 


Mean Corpuscular Hemoglobin


  


  29.5 PG


(27.0-31.0)


 


Mean Corpuscular Hemoglobin


Concent 


  35.4 G/DL


(32.0-36.0)


 


Red Cell Distribution Width


  


  15.5 %


(11.6-14.8)  H


 


Platelet Count


  


  351 K/UL


(150-450)


 


Mean Platelet Volume


  


  6.9 FL


(6.5-10.1)


 


Neutrophils (%) (Auto)


  


  % (45.0-75.0)


 


 


Lymphocytes (%) (Auto)


  


  % (20.0-45.0)


 


 


Monocytes (%) (Auto)   % (1.0-10.0)  


 


Eosinophils (%) (Auto)   % (0.0-3.0)  


 


Basophils (%) (Auto)   % (0.0-2.0)  


 


Differential Total Cells


Counted 


  100  


 


 


Neutrophils % (Manual)  94 % (45-75)  H


 


Lymphocytes % (Manual)  5 % (20-45)  L


 


Monocytes % (Manual)  0 % (1-10)  L


 


Eosinophils % (Manual)  1 % (0-3)  


 


Basophils % (Manual)  0 % (0-2)  


 


Band Neutrophils  0 % (0-8)  


 


Platelet Estimate  Adequate  


 


Platelet Morphology  Normal  


 


Hypochromasia  1+  


 


Erythrocyte Sedimentation Rate


  


  134 MM/HR


(0-30)  H











Plan


Problems:  


(1) Leukocytosis


Assessment & Plan:  Significant leukocytosis.


Initiating work-up currently pending micro


Imaging ordered repeat labs ordered will follow


Thank you for let me participate in patient's care





(2) Wounds and injuries


Assessment & Plan:  DAILY ESTIMATED NEEDS:


Needs based on Critical care, wounds; 68kg 


22-30  kcals/kg 


4071-5895  total kcals


1.25-2  g protein/kg


  g total protein 


25-30  mL/kg


[id number]  total fluid mLs





NUTRITION DIAGNOSIS:


1) Increased kcal and pro needs r/t wound healing as evidenced by pt adm


w/ advanced wounds, pending eval.


2) Swallowing difficulty r/t resp status as evidenced by pt is trach/vent


dep and PEG dep.





 


CURRENT TF:NPO 





 


ENTERAL NUTRITION RECOMMENDATIONS:


Glucerna 1.5 @ 50ml/hr x 24 hrs  to provide 1200ml, 1800kcal, 99g prot, 911ml 

free water  





- As medically appropriate, resume TF


- Initiate Glucerna 1.5 @ 20ml/hr x 6 hrs


- Advance 10ml q 4-6 hrs as tolerated to goal rate


- HOB over 30 degrees/ water flush per MD


- Rec adding Zaki BID for wound healing





 





ADDITIONAL RECOMMENDATIONS:


1) Calibrated bedscale wt 


2) Wound healing: add Vit C 500mg BID + ZnSO4 220mg QD x 10 days 


                         add Zaki 1pkt BID via PEG


                         f/up with WC eval 


3) Monitor lytes, replete as needed- low K 


4) Monitor renal fxn/ hydration status: low Na, , creat 1.8 


5) Monitor BGs, need for NISS: h/o DM 


6) Monitor ability to resume TF: possible GIB, monitor h/h 





(3) GI bleed


Assessment & Plan:  GI bleed no active bleeding noted currently severe anemia 

transfuse responding well GI eval for possible scope upper and lower continue 

with ICU care for now trend labs will monitor closely no acute surgical 

intervention planned





(4) Abnormal LFTs


(5) Pressure injury of deep tissue


Assessment & Plan:  Pt presented on admission with multiple pressure injuries. 

No evidence of skin breakdown under collar of tracheostomy collar.


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 (L)9.5cm 

x (W)11cm x (D)1.6cm, undermining clockwise9-4 by 1.3cm @2o'clock.Base of wound 

has 25% soft necrosis ,20% slough, otherwise pink granulation noted. Epibole 

along borders at 9-2o'clock. Periwound there is an area that is indurated with 

non-blanchable erythema.


Unstageable pressure injury L femoral head.(L)1.5cm x (W)1.7cm. Base of wound 

has 90% slough,10% soft necrosis at center of wound bed. Borders are 

erythematous and moist. No odor or exudate noted.No evidence of skin breakdown 

periwound.


Unstageable pressure injury lateral R tibia.(L)10.2cm x (W)1.6cm. Base of wound 

is 100% necrotic with detached Borders. Borders are erythematous and moist. No 

odor or exudate noted. Marginal erythema periwound.


DTPI R hallux. Base of wound is maroon with marginal erythema.(L)0.6cm x (W)

0.3cm.


Unstageable pressure injury R heel. (L)0.6cm x(W)0.3cm. Base of wound has 90% 

slough with surrounding 10% moist, erythema.Non-blanchable erythema with 

fluctuance periwound.


Unstageable pressure injury L heel(L)4.5cm x (W)3.2cm.Base of wound is 100% 

necrotic with semi-detached borders which are erythematous. No odor or exudate 

noted.Periwound is fluctuant but pale pink


DTPI dorsal L foot.(L)0.9cm x (W)2cm. Base of wound is purple with maroon 

borders.No erythema induration or fluctuance periwound.


Unstageable pressure injury L Hallux(L)1.1cm x (W)1.2cm. Base of wound has 100% 

slough. Erythematous borders. No erythema,induration or fluctuance periwound.


Resolving pressure injury distal/lateral L foot(L)1.4cm x (W)1.5cm. Pink 

epithelial with surrounding dry, brown borders.NO erythema,induration or 

fluctuance periwound. 


DTPI Lateral L 5th Metatarsal.(L)1.4cm x (W)0.9cm. Base of wound is maroon in 

center with surrounding purple borders. 





Tx.Plan: 


Cleanse Sacral wound with Saline. Loosely pack with TheraHoney infused Kerlix. 

Apply Moisture Barrier Paste


            periwound.Cover with Optifoam drsg Daily and prn.


            


Cleanse wound L Femoral Head with Saline. Apply TheraHoney. Apply Moisture 

Barrier Paste periwound. Cover with


            Optifoam drsg Daily and prn.


            


Apply Betadine to wound lateral R tibia. Cover with ABD pad. Wrap with Kerlix 

from base of toes every 3 days and


            prn.


            


Apply Betadine to R hallux and R heel. Cover each site with Optifoam drsg every 

3 days and prn.


            


Apply Betadine to L foot wound. Cover each wound with Optifoam drsg. Change 

every 3 days and prn.


            


Reposition at least every 2hours or as tolerated. 


            


Off-load heels with pillow.


            


APM/ANNELIESE Radhames Galeana Mar 11, 2020 14:34

## 2020-03-11 NOTE — NUR
NURSE NOTES:



Pt was transported to CT scan, accompanied by RT. Pt tolerated well and has now returned on 
the unit. Pt situated back in her room. Repositioned and oral care completed.

## 2020-03-11 NOTE — NUR
NURSE NOTES:



Blood sugar result was 62. Spoke to Dr Sanderson. New orders received and endorsed to oncoming 
nurse.

## 2020-03-11 NOTE — NUR
NURSE NOTES:



Dr Encarnacion at bedside assessing pt. Updated him on pt's current condition. CT of abdomen 
and pelvis ordered by Dr Sanderson; was given Barium sulfate in preparation of exam. Expected 
to be transported to CT at 10:30.

## 2020-03-11 NOTE — DIAGNOSTIC IMAGING REPORT
Indication: Shortness of breath

 

Technique: One view of the chest

 

Comparison: 3/9/2020

 

Findings: Bilateral interstitial and airspace infiltrates versus edema appears stable

to minimally improved. The heart size is normal. Tracheostomy again demonstrated

 

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

 

Other stable findings as noted

## 2020-03-11 NOTE — GENERAL PROGRESS NOTE
Assessment/Plan


Problem List:  


(1) Tracheostomy in place


ICD Codes:  Z93.0 - Tracheostomy status


SNOMED:  732820313


(2) Diabetes mellitus type II, controlled


ICD Codes:  E11.9 - Type 2 diabetes mellitus without complications


SNOMED:  18519369, 404425209


(3) Diastolic heart failure


ICD Codes:  I50.30 - Unspecified diastolic (congestive) heart failure


SNOMED:  006667363


(4) GI bleed


ICD Codes:  K92.2 - Gastrointestinal hemorrhage, unspecified


SNOMED:  07004751


(5) Diabetic nephropathy


ICD Codes:  E11.21 - Type 2 diabetes mellitus with diabetic nephropathy


SNOMED:  434301166


(6) Wounds and injuries


ICD Codes:  T14.90XA - Injury, unspecified, initial encounter


SNOMED:  232867660, 725939926


(7) Respiratory failure


ICD Codes:  J96.90 - Respiratory failure, unspecified, unspecified whether with 

hypoxia or hypercapnia


SNOMED:  870590004


(8) Aspiration pneumonia


ICD Codes:  J69.0 - Pneumonitis due to inhalation of food and vomit


SNOMED:  454430553


(9) CHF (congestive heart failure)


ICD Codes:  I50.9 - Heart failure, unspecified


SNOMED:  15890987


(10) Leukocytosis


ICD Codes:  D72.829 - Elevated white blood cell count, unspecified


SNOMED:  024546219, 755370012


(11) Hyponatremia


ICD Codes:  E87.1 - Hypo-osmolality and hyponatremia


SNOMED:  10664753


Status:  stable


Assessment/Plan:


monitor serial cbc


PPI iv


transfuse as needed


iv abx


follow up cultures


ct abd


gi eval pending


ivf





Subjective


ROS Limited/Unobtainable:  Yes


Constitutional:  Reports: malaise, weakness


HEENT:  Reports: no symptoms


Cardiovascular:  Reports: no symptoms


Respiratory:  Reports: cough, sputum


Gastrointestinal/Abdominal:  Reports: difficulty swallowing


Genitourinary:  Reports: no symptoms


Neurologic/Psychiatric:  Reports: pre-existing deficit


Endocrine:  Reports: no symptoms


Hematologic/Lymphatic:  Reports: anemia


Allergies:  


Coded Allergies:  


     No Known Allergies (Unverified , 10/14/19)


All Systems:  reviewed and negative except above


Subjective


no bleeding. noted. labs pending. abd distended. no fevers. on vent. poorly 

responsive. on ivf





Objective





Last 24 Hour Vital Signs








  Date Time  Temp Pulse Resp B/P (MAP) Pulse Ox O2 Delivery O2 Flow Rate FiO2


 


3/11/20 08:00      Mechanical Ventilator  


 


3/11/20 08:00        40


 


3/11/20 07:40  87 18     40


 


3/11/20 07:00  88 18 96/59 (71) 100   


 


3/11/20 06:18  92  107/44 (65) 100   


 


3/11/20 06:00  89 19 85/41 (56)    


 


3/11/20 05:00  95 19 124/40 (68) 97   


 


3/11/20 04:59  98 19     40


 


3/11/20 04:00      Mechanical Ventilator  


 


3/11/20 04:00 98.4 87 18 112/55 (74) 100   


 


3/11/20 04:00        40


 


3/11/20 03:48  95 26     40


 


3/11/20 03:43  87      


 


3/11/20 03:00  93 19 96/52 (67) 100   


 


3/11/20 02:00  91 18 109/46 (67) 100   


 


3/11/20 01:21  94 24     40


 


3/11/20 01:00  94 18 113/49 (70) 100   


 


3/11/20 00:00        40


 


3/11/20 00:00      Mechanical Ventilator  


 


3/11/20 00:00 98.5 94 22 114/47 (69) 100   


 


3/10/20 23:46  90 18     40


 


3/10/20 23:30  91      


 


3/10/20 23:00  93 19 112/47 (68) 100   


 


3/10/20 22:00  95 19 108/44 (65) 100   


 


3/10/20 21:20  96 19     40


 


3/10/20 21:00  97 26 118/48 (71) 100   


 


3/10/20 20:00        40


 


3/10/20 20:00  92 19 106/44 (64) 100   


 


3/10/20 20:00      Mechanical Ventilator  


 


3/10/20 19:41  92 18     40


 


3/10/20 19:21  93      


 


3/10/20 19:00  94 19 105/47 (66) 100   


 


3/10/20 18:05  94 20 108/47 (67) 100   


 


3/10/20 17:09  95 19     40


 


3/10/20 17:00  101 21 114/46 (68) 100   


 


3/10/20 16:00  98      


 


3/10/20 16:00        40


 


3/10/20 16:00      Mechanical Ventilator  


 


3/10/20 16:00 98.8 95 20 102/46 (64) 100   


 


3/10/20 15:11  98 27     40


 


3/10/20 15:00  100 23 111/51 (71) 100   


 


3/10/20 14:00  101 23 119/51 (73) 100   


 


3/10/20 13:06  103 24     40


 


3/10/20 13:00 98.8 97 25 98/38 (58) 100   


 


3/10/20 12:06  103      


 


3/10/20 12:06  103 27 106/47 (66) 100   


 


3/10/20 12:00        40


 


3/10/20 12:00      Mechanical Ventilator  


 


3/10/20 11:02  106 23     40


 


3/10/20 11:00  108 21 115/49 (71) 100   


 


3/10/20 10:00  115 22 121/50 (73) 100   


 


3/10/20 09:53      Mechanical Ventilator  


 


3/10/20 09:37  125  134/81    


 


3/10/20 09:25  118 26     40


 


3/10/20 09:15  115      

















Intake and Output  


 


 3/10/20 3/11/20





 19:00 07:00


 


Intake Total 1090.0 ml 1360.95 ml


 


Output Total 1300 ml 1060 ml


 


Balance -210.0 ml 300.95 ml


 


  


 


Intake Free Water 30 ml 


 


IV Total 1060.0 ml 1360.95 ml


 


Output Urine Total 1300 ml 1060 ml


 


# Voids 1 


 


# Bowel Movements 3 3








Laboratory Tests


3/11/20 05:20: 


Prothrombin Time 12.6H, Prothromb Time International Ratio 1.2H, Activated 

Partial Thromboplast Time 32, Sodium Level 136, Potassium Level 3.2L, Chloride 

Level 100, Carbon Dioxide Level 19L, Anion Gap 17H, Blood Urea Nitrogen 109H, 

Creatinine 1.6H, Estimat Glomerular Filtration Rate 32.3, Glucose Level 55#L, 

Lactic Acid Level 0.60, Calcium Level 9.1, Total Bilirubin 0.5, Aspartate Amino 

Transf (AST/SGOT) 22, Alanine Aminotransferase (ALT/SGPT) 20, Alkaline 

Phosphatase 127H, C-Reactive Protein, Quantitative 18.3H, Total Protein 6.5, 

Albumin 1.4L, Globulin 5.1, Albumin/Globulin Ratio 0.3L, Amylase Level 54, 

Lipase 68L, Random Vancomycin Level 12.0


Height (Feet):  5


Height (Inches):  7.00


Weight (Pounds):  168


General Appearance:  WD/WN, lethargic, confused


Neck:  supple


Cardiovascular:  normal rate, regular rhythm


Respiratory/Chest:  rhonchi - bilaterally


Abdomen:  normal bowel sounds, non tender, distended


Extremities:  non-tender


Neurologic:  disoriented, unresponsive, aphasia











Arun Sanderson MD Mar 11, 2020 08:28

## 2020-03-11 NOTE — NUR
NOTE



GEORGE did not receive a refugio back from pt's son Speedy Alvarenga [phone]. GEORGE left another vm for 
call back. 


-------------------------------------------------------------------------------

Signed:    03/11/20 at 1609 by YUE VAZQUEZ <Co-Signature Required>

-------------------------------------------------------------------------------

## 2020-03-11 NOTE — GENERAL PROGRESS NOTE
Assessment/Plan


Status:  stable


Assessment/Plan:


Assessment


- UGIB


- Anemia


- Sacral decub with osteo


- enlarging cystic ovarian neoplasm


- rectal stool impaction


- chronic trach / vent


- body edema


- leukocytosis / sepsis


- Azotemia


- Poor prognosis





Recommendations


- PPI


- abx


- transfuse PRN


- Follow labs


- laxative


- message left with family to discuss





Thank you


Bladimir Encarnacion MD





Subjective


Allergies:  


Coded Allergies:  


     No Known Allergies (Unverified , 10/14/19)





Objective





Last 24 Hour Vital Signs








  Date Time  Temp Pulse Resp B/P (MAP) Pulse Ox O2 Delivery O2 Flow Rate FiO2


 


3/11/20 19:07  87 18     40


 


3/11/20 19:00  87 19 105/39 (61) 100   


 


3/11/20 18:51  87  112/53    


 


3/11/20 18:00  90 18 112/53 (72) 100   


 


3/11/20 17:00  86 18 131/37 (68) 100   


 


3/11/20 16:56  86 18     40


 


3/11/20 16:00 99.2 86 18 108/53 (71) 100   


 


3/11/20 16:00      Mechanical Ventilator  


 


3/11/20 16:00        40


 


3/11/20 16:00  83      


 


3/11/20 15:26  89 18     40


 


3/11/20 15:00  91 19 92/48 (63) 100   


 


3/11/20 14:00  94 19 108/72 (84) 100   


 


3/11/20 13:00 98.3 92 24 111/43 (65) 100   


 


3/11/20 12:34  93 24     40


 


3/11/20 12:00      Mechanical Ventilator  


 


3/11/20 12:00  92 20 111/43 (65) 100   


 


3/11/20 12:00        40


 


3/11/20 12:00  89      


 


3/11/20 11:29  94 23     40


 


3/11/20 11:00  95 20 110/43 (65) 100   


 


3/11/20 10:00  89 18 112/46 (68) 100   


 


3/11/20 09:11  93 20     40


 


3/11/20 09:05  95  125/58    


 


3/11/20 09:00  94 20 125/58 (80) 97   


 


3/11/20 08:00      Mechanical Ventilator  


 


3/11/20 08:00        40


 


3/11/20 08:00 98.1 92 19 117/55 (75) 99   


 


3/11/20 08:00  92      


 


3/11/20 07:40  87 18     40


 


3/11/20 07:00  88 18 96/59 (71) 100   


 


3/11/20 06:18  92  107/44 (65) 100   


 


3/11/20 06:00  89 19 85/41 (56)    


 


3/11/20 05:00  95 19 124/40 (68) 97   


 


3/11/20 04:59  98 19     40


 


3/11/20 04:00      Mechanical Ventilator  


 


3/11/20 04:00 98.4 87 18 112/55 (74) 100   


 


3/11/20 04:00        40


 


3/11/20 03:48  95 26     40


 


3/11/20 03:43  87      


 


3/11/20 03:00  93 19 96/52 (67) 100   


 


3/11/20 02:00  91 18 109/46 (67) 100   


 


3/11/20 01:21  94 24     40


 


3/11/20 01:00  94 18 113/49 (70) 100   


 


3/11/20 00:00        40


 


3/11/20 00:00      Mechanical Ventilator  


 


3/11/20 00:00 98.5 94 22 114/47 (69) 100   


 


3/10/20 23:46  90 18     40


 


3/10/20 23:30  91      


 


3/10/20 23:00  93 19 112/47 (68) 100   


 


3/10/20 22:00  95 19 108/44 (65) 100   


 


3/10/20 21:20  96 19     40


 


3/10/20 21:00  97 26 118/48 (71) 100   


 


3/10/20 20:00        40


 


3/10/20 20:00  92 19 106/44 (64) 100   


 


3/10/20 20:00      Mechanical Ventilator  


 


3/10/20 19:41  92 18     40


 


3/10/20 19:21  93      

















Intake and Output  


 


 3/10/20 3/11/20





 19:00 07:00


 


Intake Total 1090.0 ml 1360.95 ml


 


Output Total 1300 ml 1060 ml


 


Balance -210.0 ml 300.95 ml


 


  


 


Intake Free Water 30 ml 


 


IV Total 1060.0 ml 1360.95 ml


 


Output Urine Total 1300 ml 1060 ml


 


# Voids 1 


 


# Bowel Movements 3 3








Laboratory Tests


3/11/20 05:20: 


Prothrombin Time 12.6H, Prothromb Time International Ratio 1.2H, Activated 

Partial Thromboplast Time 32, Sodium Level 136, Potassium Level 3.2L, Chloride 

Level 100, Carbon Dioxide Level 19L, Anion Gap 17H, Blood Urea Nitrogen 109H, 

Creatinine 1.6H, Estimat Glomerular Filtration Rate 32.3, Glucose Level 55#L, 

Lactic Acid Level 0.60, Calcium Level 9.1, Total Bilirubin 0.5, Aspartate Amino 

Transf (AST/SGOT) 22, Alanine Aminotransferase (ALT/SGPT) 20, Alkaline 

Phosphatase 127H, C-Reactive Protein, Quantitative 18.3H, Total Protein 6.5, 

Albumin 1.4L, Globulin 5.1, Albumin/Globulin Ratio 0.3L, Amylase Level 54, 

Lipase 68L, Random Vancomycin Level 12.0


3/11/20 09:15: 


White Blood Count 28.0*H, Red Blood Count 2.75L, Hemoglobin 8.1L, Hematocrit 

23.0L, Mean Corpuscular Volume 83, Mean Corpuscular Hemoglobin 29.5, Mean 

Corpuscular Hemoglobin Concent 35.4, Red Cell Distribution Width 15.5H, 

Platelet Count 351, Mean Platelet Volume 6.9, Neutrophils (%) (Auto) , 

Lymphocytes (%) (Auto) , Monocytes (%) (Auto) , Eosinophils (%) (Auto) , 

Basophils (%) (Auto) , Differential Total Cells Counted 100, Neutrophils % (

Manual) 94H, Lymphocytes % (Manual) 5L, Monocytes % (Manual) 0L, Eosinophils % (

Manual) 1, Basophils % (Manual) 0, Band Neutrophils 0, Platelet Estimate 

Adequate, Platelet Morphology Normal, Hypochromasia 1+, Erythrocyte 

Sedimentation Rate 134H


Height (Feet):  5


Height (Inches):  7.00


Weight (Pounds):  168











Bladimir Encarnacion MD Mar 11, 2020 19:15

## 2020-03-11 NOTE — NUR
NURSE NOTES:

Bed bath given, linens changed, wound care done, dressings changed, turned and repositioned. 
Suction and oral care provided.

## 2020-03-11 NOTE — DIAGNOSTIC IMAGING REPORT
Indication: Abdominal distention

 

Technique: Spiral acquisitions obtained through the abdomen and pelvis. Enteric

contrast given via gastrostomy. No IV contrast utilized,  per referring physician

request.. Multiplanar reconstructions were generated. Total dose length product 355

mGycm. CTDIvol(s) 6 mGy. Dose reduction achieved using automated exposure control

 

 

Comparison: 11/25/2019

 

Findings: Again demonstrated is a gastrostomy in good position. Mild to moderate

stool is seen slightly distending the rectum. There is increased attenuation of the

presacral fat. There is evidence of decubitus ulceration and destruction of the

coccyx. This is a new finding.

 

No evidence of colonic diverticulosis or diverticulitis. No small bowel distention or

small bowel wall thickening. The appendix is normal. No free or loculated

intraperitoneal gas or fluid. The distal esophagus and duodenum are unremarkable.

 

Again demonstrated is a large pelvic midline cystic mass. Current dimensions are 16.5

cm transverse by 15 cm AP by 16 cm craniocaudad. This does demonstrate a single tiny

satellite cyst along the superior posterior wall. Previous dimensions were 14 x 13 x

14 cm, so this has enlarged in the interim. The uterus is unremarkable. There is a

small amount of free pelvic fluid.

 

The bladder is empty, contains a Charles catheter. Gas bubbles within the bladder lumen

are probably related to the Charles catheter.

 

Lack of IV contrast limits assessment of the other solid organs. Gallbladder contains

some slightly hyperattenuating material, could indicate small gallstones or milk of

calcium bile. Liver, bile ducts, pancreas, spleen, adrenals, kidneys are

unremarkable. No renal or ureteral calculi, hydronephrosis, or hydroureter. There is

very slight fullness to the right renal collecting system and proximal right ureter,

however. No retroperitoneal or mesenteric mass or adenopathy.

 

There is diffuse edema of the subcutaneous fat.

 

Again demonstrated are large bilateral pleural effusions. There is extensive

consolidation and atelectasis of significant portions of both lower lobes. There is

some lingular consolidation and/or atelectasis. There is generalized mild

interstitial congestion.

 

The bones demonstrate degenerative spondylosis changes.

 

Impression: Enlarging pelvic cystic mass, as described. This most likely represents

an enlarging ovarian cystic neoplasm

 

Evidence of decubitus ulceration in the retrococcygeal region. There is evidence of

bony destruction, presumably due to osteomyelitis, involving the coccyx. This is a

new finding.

 

Slight rectal distention with stool, early fecal impaction possible. Presacral edema

may indicate stercoral colitis, but is more likely related to the adjacent decubitus

changes.

 

Large bilateral pleural effusions. Associated basilar pulmonary atelectasis and

consolidation. Mild interstitial congestion

 

Trace free pelvic fluid

 

Empty bladder with a Charles catheter

 

Gastrostomy in good position

 

Diffuse edema of the subcutaneous fat, mild

 

Degenerative spondylosis changes

 

Findings discussed by phone with Dr. Sanderson at the time of interpretation

 

The CT scanner at [facility] is accredited by the [employer] College of

Radiology and the scans are performed using protocols designed to limit radiation

exposure to as low as reasonably achievable to attain images of sufficient resolution

adequate for diagnostic evaluation.

## 2020-03-11 NOTE — NUR
NURSE NOTES:



Spoke with Dr Encarnacion and informed him of bright red blood coming from around the Gtube 
site. Water flushed, with no blood return. Will monitor as advised.

## 2020-03-11 NOTE — NUR
PAUL VAZQUEZ received a call back from pt's son Speedy Alvarenga [phone]. Speedy states both Speedy and Aly 
(pt's daughter) are POA. Speedy will be the primary point of contact. If Speedy cannot be 
reached on time, Aly Perez [phone] will be the secondary point of contact. Per 
Speedy, he has bad reception at where he currently resides. Speedy verifies pt having full code 
and [facility] 






-------------------------------------------------------------------------------

Signed:    03/11/20 at 1648 by YUE VAZQUEZ <Co-Signature Required>

-------------------------------------------------------------------------------

## 2020-03-11 NOTE — NUR
NURSE NOTES:

Patient is sleeping. Vital signs are stable; BP:114/47, HR:93, RESP: 31, O2:100%, TEMP:97.7F 
axillary. Safety measures in place; bed low, locked and alarm is on. Will continue to 
monitor.

## 2020-03-11 NOTE — NUR
RESPIRATORY NOTE:

Received pt on AC 18, 500VT, 40%, PEEP +5. Pt is trach-dependent w/ a cuffed, Shiley 7 XLT 
tube. Pt is obtunded/flat effect. B/S vickie. rhonchi, sxn small amounts of thick, 
tan-yellow/brown secretions. Vent plugged into red outlet, ambubag at bedside. Pt in no 
apparent distress at this time. Will continue to monitor pt.

## 2020-03-11 NOTE — PULMONOLGY CRITICAL CARE NOTE
Critical Care - Asmt/Plan


Assessment/Plan:


Pulmonary CCM Progress Note 





Assessment/Plan


chronic respiratory failure


sepsis


leukocytosis


acute on chronic renal failure


chronic encephalopathy 


hypoxemia


severe PCM


anemia





PLAN


care noted


IV antibiotics


respiratory care


Ventilatory support


IV hydration


SNF meds


supportive care


suction


no wean


oxygen therapy


consider renal and ID to see


prognosis guarded


medications/laboratory data/nursing notes/ICU care reviewed in detail


note reviewed and edited


care discussed with RN and RT


ICU time spent >40   minutes





Critical Care - Subjective


Interval Events:


reviewed and discussed


in ICU


ROS Limited/Unobtainable:  Yes


Condition:  critical


EKG Rhythm:  Sinus Rhythm


Residuals:  minimal


Tube Feeding Tolerated:  yes











Critical Care - Objective





Vital Signs Noted





Laboratory Tests Noted





Objective:


WDWN


trach


clear breath sounds bilaterally without rhonchi or wheeze


[id number] without MRG


NABS nontender no HSM; Gt


no CC; mild edema


nonfocal


Micro:





Microbiology








 Date/Time


Source Procedure


Growth Status


 


 


 3/9/20 19:00


Urine,Clean Catch Urine Culture - Preliminary Resulted











Critical Care - Objective





Last 24 Hour Vital Signs








  Date Time  Temp Pulse Resp B/P (MAP) Pulse Ox O2 Delivery O2 Flow Rate FiO2


 


3/11/20 18:00  90 18 112/53 (72) 100   


 


3/11/20 17:00  86 18 131/37 (68) 100   


 


3/11/20 16:56  86 18     40


 


3/11/20 16:00 99.2 86 18 108/53 (71) 100   


 


3/11/20 16:00      Mechanical Ventilator  


 


3/11/20 16:00        40


 


3/11/20 16:00  83      


 


3/11/20 15:26  89 18     40


 


3/11/20 15:00  91 19 92/48 (63) 100   


 


3/11/20 14:00  94 19 108/72 (84) 100   


 


3/11/20 13:00 98.3 92 24 111/43 (65) 100   


 


3/11/20 12:34  93 24     40


 


3/11/20 12:00      Mechanical Ventilator  


 


3/11/20 12:00  92 20 111/43 (65) 100   


 


3/11/20 12:00        40


 


3/11/20 12:00  89      


 


3/11/20 11:29  94 23     40


 


3/11/20 11:00  95 20 110/43 (65) 100   


 


3/11/20 10:00  89 18 112/46 (68) 100   


 


3/11/20 09:11  93 20     40


 


3/11/20 09:05  95  125/58    


 


3/11/20 09:00  94 20 125/58 (80) 97   


 


3/11/20 08:00      Mechanical Ventilator  


 


3/11/20 08:00        40


 


3/11/20 08:00 98.1 92 19 117/55 (75) 99   


 


3/11/20 08:00  92      


 


3/11/20 07:40  87 18     40


 


3/11/20 07:00  88 18 96/59 (71) 100   


 


3/11/20 06:18  92  107/44 (65) 100   


 


3/11/20 06:00  89 19 85/41 (56)    


 


3/11/20 05:00  95 19 124/40 (68) 97   


 


3/11/20 04:59  98 19     40


 


3/11/20 04:00      Mechanical Ventilator  


 


3/11/20 04:00 98.4 87 18 112/55 (74) 100   


 


3/11/20 04:00        40


 


3/11/20 03:48  95 26     40


 


3/11/20 03:43  87      


 


3/11/20 03:00  93 19 96/52 (67) 100   


 


3/11/20 02:00  91 18 109/46 (67) 100   


 


3/11/20 01:21  94 24     40


 


3/11/20 01:00  94 18 113/49 (70) 100   


 


3/11/20 00:00        40


 


3/11/20 00:00      Mechanical Ventilator  


 


3/11/20 00:00 98.5 94 22 114/47 (69) 100   


 


3/10/20 23:46  90 18     40


 


3/10/20 23:30  91      


 


3/10/20 23:00  93 19 112/47 (68) 100   


 


3/10/20 22:00  95 19 108/44 (65) 100   


 


3/10/20 21:20  96 19     40


 


3/10/20 21:00  97 26 118/48 (71) 100   


 


3/10/20 20:00        40


 


3/10/20 20:00  92 19 106/44 (64) 100   


 


3/10/20 20:00      Mechanical Ventilator  


 


3/10/20 19:41  92 18     40


 


3/10/20 19:21  93      


 


3/10/20 19:00  94 19 105/47 (66) 100   








Micro:





Microbiology








 Date/Time


Source Procedure


Growth Status


 


 


 3/9/20 19:00


Blood Blood Culture - Preliminary


NO GROWTH AFTER 24 HOURS Resulted


 


 3/9/20 18:38


Blood Blood Culture - Preliminary


NO GROWTH AFTER 24 HOURS Resulted


 


 3/9/20 19:00


Urine,Clean Catch Urine Culture - Preliminary Resulted











Critical Care - Subjective


ROS Limited/Unobtainable:  No


FI02:  40


Vent Support Breath Rate:  18


Vent Support Mode:  AC


Vent Tidal Volume:  500


Sputum Amount:  Small


PEEP:  5.0


PIP:  26


I&O:











Intake and Output  


 


 3/10/20 3/11/20





 19:00 07:00


 


Intake Total 1090.0 ml 1360.95 ml


 


Output Total 1300 ml 1060 ml


 


Balance -210.0 ml 300.95 ml


 


  


 


Intake Free Water 30 ml 


 


IV Total 1060.0 ml 1360.95 ml


 


Output Urine Total 1300 ml 1060 ml


 


# Voids 1 


 


# Bowel Movements 3 3

















Delmer Main MD Mar 11, 2020 18:40

## 2020-03-11 NOTE — NUR
NURSE NOTES:

Vital signs stable. Patient turned and repositioned. Skin assessed. Abdomen still distended 
and firm, no bleeding noted at GT site. Will continue to monitor.

## 2020-03-11 NOTE — NUR
NURSE NOTES:

Patient response to stimuli, open eyes but does no track and is non-verbal. Trached Shiley 7 
to vent settings: AC:18, TV:500, FiO2:40%, PEEP:5, O2 sat:100%. Vital signs are stable: 
BP:112/41, HR:82. RESP:18, TEMP:98.9F axillary. NPO status/GT clamped; abdomen is distended 
and firm to the touch. Charles is in place ad draining. Numerous wounds noted; 
sacral/bilateral heels/left later foot/right lateral lower leg. IV sites; left hand 22g 
running D5NS @ 100ml/hr, left wrist 20g saline locked; both are patent, flushing and 
asymptomatic. Safety measures in place; bed low, locked and alarm is on. Patient is 
comfortable and stable. Will continue plan of care.

## 2020-03-11 NOTE — NUR
*-* INSURANCE *-*



ALL CLINICALS AND REVIEWS HAVE BEEN FAXED TO:



[facility]# [phone]

Fax# [phone]

## 2020-03-12 VITALS — SYSTOLIC BLOOD PRESSURE: 131 MMHG | DIASTOLIC BLOOD PRESSURE: 54 MMHG

## 2020-03-12 VITALS — DIASTOLIC BLOOD PRESSURE: 56 MMHG | SYSTOLIC BLOOD PRESSURE: 121 MMHG

## 2020-03-12 VITALS — DIASTOLIC BLOOD PRESSURE: 51 MMHG | SYSTOLIC BLOOD PRESSURE: 124 MMHG

## 2020-03-12 VITALS — SYSTOLIC BLOOD PRESSURE: 125 MMHG | DIASTOLIC BLOOD PRESSURE: 45 MMHG

## 2020-03-12 VITALS — DIASTOLIC BLOOD PRESSURE: 54 MMHG | SYSTOLIC BLOOD PRESSURE: 145 MMHG

## 2020-03-12 VITALS — SYSTOLIC BLOOD PRESSURE: 121 MMHG | DIASTOLIC BLOOD PRESSURE: 50 MMHG

## 2020-03-12 VITALS — DIASTOLIC BLOOD PRESSURE: 57 MMHG | SYSTOLIC BLOOD PRESSURE: 121 MMHG

## 2020-03-12 VITALS — SYSTOLIC BLOOD PRESSURE: 122 MMHG | DIASTOLIC BLOOD PRESSURE: 45 MMHG

## 2020-03-12 VITALS — SYSTOLIC BLOOD PRESSURE: 133 MMHG | DIASTOLIC BLOOD PRESSURE: 49 MMHG

## 2020-03-12 VITALS — SYSTOLIC BLOOD PRESSURE: 115 MMHG | DIASTOLIC BLOOD PRESSURE: 43 MMHG

## 2020-03-12 VITALS — SYSTOLIC BLOOD PRESSURE: 114 MMHG | DIASTOLIC BLOOD PRESSURE: 44 MMHG

## 2020-03-12 VITALS — DIASTOLIC BLOOD PRESSURE: 44 MMHG | SYSTOLIC BLOOD PRESSURE: 125 MMHG

## 2020-03-12 VITALS — SYSTOLIC BLOOD PRESSURE: 136 MMHG | DIASTOLIC BLOOD PRESSURE: 46 MMHG

## 2020-03-12 VITALS — DIASTOLIC BLOOD PRESSURE: 48 MMHG | SYSTOLIC BLOOD PRESSURE: 126 MMHG

## 2020-03-12 VITALS — SYSTOLIC BLOOD PRESSURE: 117 MMHG | DIASTOLIC BLOOD PRESSURE: 46 MMHG

## 2020-03-12 VITALS — SYSTOLIC BLOOD PRESSURE: 137 MMHG | DIASTOLIC BLOOD PRESSURE: 54 MMHG

## 2020-03-12 VITALS — DIASTOLIC BLOOD PRESSURE: 63 MMHG | SYSTOLIC BLOOD PRESSURE: 123 MMHG

## 2020-03-12 VITALS — DIASTOLIC BLOOD PRESSURE: 54 MMHG | SYSTOLIC BLOOD PRESSURE: 126 MMHG

## 2020-03-12 VITALS — SYSTOLIC BLOOD PRESSURE: 130 MMHG | DIASTOLIC BLOOD PRESSURE: 52 MMHG

## 2020-03-12 VITALS — DIASTOLIC BLOOD PRESSURE: 96 MMHG | SYSTOLIC BLOOD PRESSURE: 123 MMHG

## 2020-03-12 VITALS — DIASTOLIC BLOOD PRESSURE: 48 MMHG | SYSTOLIC BLOOD PRESSURE: 121 MMHG

## 2020-03-12 VITALS — DIASTOLIC BLOOD PRESSURE: 46 MMHG | SYSTOLIC BLOOD PRESSURE: 125 MMHG

## 2020-03-12 VITALS — SYSTOLIC BLOOD PRESSURE: 121 MMHG | DIASTOLIC BLOOD PRESSURE: 45 MMHG

## 2020-03-12 VITALS — SYSTOLIC BLOOD PRESSURE: 126 MMHG | DIASTOLIC BLOOD PRESSURE: 51 MMHG

## 2020-03-12 LAB
ADD MANUAL DIFF: YES
ALBUMIN SERPL-MCNC: 1.4 G/DL (ref 3.4–5)
ALBUMIN/GLOB SERPL: 0.3 {RATIO} (ref 1–2.7)
ALP SERPL-CCNC: 113 U/L (ref 46–116)
ALT SERPL-CCNC: 20 U/L (ref 12–78)
ANION GAP SERPL CALC-SCNC: 14 MMOL/L (ref 5–15)
AST SERPL-CCNC: 17 U/L (ref 15–37)
BILIRUB SERPL-MCNC: 0.5 MG/DL (ref 0.2–1)
BUN SERPL-MCNC: 98 MG/DL (ref 7–18)
CALCIUM SERPL-MCNC: 8.6 MG/DL (ref 8.5–10.1)
CHLORIDE SERPL-SCNC: 104 MMOL/L (ref 98–107)
CO2 SERPL-SCNC: 22 MMOL/L (ref 21–32)
CREAT SERPL-MCNC: 1.6 MG/DL (ref 0.55–1.3)
ERYTHROCYTE [DISTWIDTH] IN BLOOD BY AUTOMATED COUNT: 16 % (ref 11.6–14.8)
GLOBULIN SER-MCNC: 4.8 G/DL
HCT VFR BLD CALC: 21.5 % (ref 37–47)
HGB BLD-MCNC: 7.5 G/DL (ref 12–16)
MCV RBC AUTO: 85 FL (ref 80–99)
PLATELET # BLD: 308 K/UL (ref 150–450)
POTASSIUM SERPL-SCNC: 3.2 MMOL/L (ref 3.5–5.1)
RBC # BLD AUTO: 2.53 M/UL (ref 4.2–5.4)
SODIUM SERPL-SCNC: 139 MMOL/L (ref 136–145)
WBC # BLD AUTO: 16.8 K/UL (ref 4.8–10.8)

## 2020-03-12 RX ADMIN — Medication SCH MG: at 09:13

## 2020-03-12 RX ADMIN — DEXTROSE MONOHYDRATE SCH MLS/HR: 50 INJECTION, SOLUTION INTRAVENOUS at 21:07

## 2020-03-12 RX ADMIN — DIVALPROEX SODIUM SCH MG: 125 CAPSULE ORAL at 09:14

## 2020-03-12 RX ADMIN — ZINC SULFATE CAP 220 MG (50 MG ELEMENTAL ZN) SCH MG: 220 (50 ZN) CAP at 09:13

## 2020-03-12 RX ADMIN — DIVALPROEX SODIUM SCH MG: 125 CAPSULE ORAL at 20:37

## 2020-03-12 RX ADMIN — HUMAN INSULIN SCH MLS/HR: 100 INJECTION, SOLUTION SUBCUTANEOUS at 06:29

## 2020-03-12 RX ADMIN — PANTOPRAZOLE SODIUM SCH MG: 40 INJECTION, POWDER, FOR SOLUTION INTRAVENOUS at 09:14

## 2020-03-12 RX ADMIN — Medication SCH MG: at 17:01

## 2020-03-12 RX ADMIN — DEXTROSE MONOHYDRATE SCH MLS/HR: 50 INJECTION, SOLUTION INTRAVENOUS at 06:29

## 2020-03-12 RX ADMIN — PANTOPRAZOLE SODIUM SCH MG: 40 INJECTION, POWDER, FOR SOLUTION INTRAVENOUS at 20:37

## 2020-03-12 RX ADMIN — HUMAN INSULIN SCH MLS/HR: 100 INJECTION, SOLUTION SUBCUTANEOUS at 16:30

## 2020-03-12 RX ADMIN — DEXTROSE MONOHYDRATE SCH MLS/HR: 50 INJECTION, SOLUTION INTRAVENOUS at 13:45

## 2020-03-12 NOTE — NUR
NURSE NOTES:

Vital signs stable. Turned and repositioned. No bleeding at GT site noted, abdomen remains 
distended and firm.

## 2020-03-12 NOTE — SURGERY PROGRESS NOTE
Surgery Progress Note


Subjective


Additional Comments


No acute events.  Ill-appearing.  Labs reviewed.  Imaging noted.





Objective





Last 24 Hour Vital Signs








  Date Time  Temp Pulse Resp B/P (MAP) Pulse Ox O2 Delivery O2 Flow Rate FiO2


 


3/12/20 19:02  87 24     40


 


3/12/20 19:00  88 23 145/54 (84) 99   


 


3/12/20 18:00 99.1 87 18 125/45 (71) 100   


 


3/12/20 17:06  85 20     40


 


3/12/20 17:01  87  124/51    


 


3/12/20 17:00  87 18 126/51 (76) 100   


 


3/12/20 16:00 98.7 87 18 124/51 (75) 100   


 


3/12/20 16:00      Mechanical Ventilator  





      Mechanical Ventilator  





      Mechanical Ventilator  


 


3/12/20 16:00  85      


 


3/12/20 16:00        40


 


3/12/20 15:00  85 18 121/57 (78) 100   


 


3/12/20 14:46  87 24     40


 


3/12/20 14:00  86 20 121/50 (73) 100   


 


3/12/20 13:00 99.1 85 20 121/48 (72) 100   


 


3/12/20 12:43  90 19     40


 


3/12/20 12:00      Mechanical Ventilator  





      Mechanical Ventilator  





      Mechanical Ventilator  


 


3/12/20 12:00  86 18 137/54 (81) 100   


 


3/12/20 12:00  86      


 


3/12/20 12:00        40


 


3/12/20 11:00  89 18 122/45 (70) 100   


 


3/12/20 10:52  102 28     40


 


3/12/20 10:00  88 18 131/54 (79) 100   


 


3/12/20 09:13  87  125/44    


 


3/12/20 09:00  85 19 125/44 (71) 100   


 


3/12/20 08:38  88 18     40


 


3/12/20 08:00        40


 


3/12/20 08:00      Mechanical Ventilator  





      Mechanical Ventilator  





      Mechanical Ventilator  


 


3/12/20 08:00  89      


 


3/12/20 08:00 98.8 89 18 125/46 (72) 100   


 


3/12/20 07:14  86 20     40


 


3/12/20 07:00  83 18 121/45 (70) 100   


 


3/12/20 06:00  86 17 126/48 (74) 97   


 


3/12/20 05:08  87 19     40


 


3/12/20 05:00  83 18 123/63 (83) 100   


 


3/12/20 04:00 98.6 89 21 123/96 (105) 100   


 


3/12/20 04:00        40


 


3/12/20 04:00      Mechanical Ventilator  


 


3/12/20 03:15  80      


 


3/12/20 03:02  87 20     40


 


3/12/20 03:00  89 20 121/56 (77) 99   


 


3/12/20 02:00  85 18 117/46 (69) 100   


 


3/12/20 01:05  85 19     40


 


3/12/20 01:00  85 16 115/43 (67) 100   


 


3/12/20 00:00      Mechanical Ventilator  


 


3/12/20 00:00 98.5 83 16 114/44 (67) 100   


 


3/12/20 00:00        40


 


3/11/20 23:27  84      


 


3/11/20 23:06  83 18     40


 


3/11/20 23:00  84 18 110/36 (60) 100   


 


3/11/20 22:11  86 17 114/43 (66) 100   


 


3/11/20 22:00  85 18 82/55 (64) 100   


 


3/11/20 21:00  88 17 110/45 (66) 100   


 


3/11/20 20:57  88 19     40


 


3/11/20 20:00      Mechanical Ventilator  


 


3/11/20 20:00        40


 


3/11/20 20:00 98.9 85 19 112/41 (64) 100   


 


3/11/20 19:29  90      








I&O











Intake and Output  


 


 3/11/20 3/12/20





 19:00 07:00


 


Intake Total 1907.38 ml 1265.93 ml


 


Output Total 915 ml 1135 ml


 


Balance 992.38 ml 130.93 ml


 


  


 


Intake Free Water 100 ml 


 


IV Total 1457.38 ml 1165.93 ml


 


Other 350 ml 100 ml


 


Output Urine Total 915 ml 1135 ml


 


# Bowel Movements  1








Dressing:  other


Wound:  other


Drains:  other


Cardiovascular:  RSR


Respiratory:  decreased breath sounds


Abdomen:  soft, present bowel sounds


Extremities:  no cyanosis





Laboratory Tests








Test


  3/12/20


03:20


 


White Blood Count


  16.8 K/UL


(4.8-10.8)  H


 


Red Blood Count


  2.53 M/UL


(4.20-5.40)  L


 


Hemoglobin


  7.5 G/DL


(12.0-16.0)  L


 


Hematocrit


  21.5 %


(37.0-47.0)  L


 


Mean Corpuscular Volume 85 FL (80-99)  


 


Mean Corpuscular Hemoglobin


  29.7 PG


(27.0-31.0)


 


Mean Corpuscular Hemoglobin


Concent 35.0 G/DL


(32.0-36.0)


 


Red Cell Distribution Width


  16.0 %


(11.6-14.8)  H


 


Platelet Count


  308 K/UL


(150-450)


 


Mean Platelet Volume


  6.7 FL


(6.5-10.1)


 


Neutrophils (%) (Auto)


  % (45.0-75.0)


 


 


Lymphocytes (%) (Auto)


  % (20.0-45.0)


 


 


Monocytes (%) (Auto)  % (1.0-10.0)  


 


Eosinophils (%) (Auto)  % (0.0-3.0)  


 


Basophils (%) (Auto)  % (0.0-2.0)  


 


Differential Total Cells


Counted 100  


 


 


Neutrophils % (Manual) 84 % (45-75)  H


 


Lymphocytes % (Manual) 3 % (20-45)  L


 


Monocytes % (Manual) 9 % (1-10)  


 


Eosinophils % (Manual) 1 % (0-3)  


 


Basophils % (Manual) 3 % (0-2)  H


 


Band Neutrophils 0 % (0-8)  


 


Platelet Estimate Adequate  


 


Platelet Morphology Normal  


 


Hypochromasia 3+  


 


Anisocytosis 1+  


 


Spherocytes 2+  


 


Sodium Level


  139 MMOL/L


(136-145)


 


Potassium Level


  3.2 MMOL/L


(3.5-5.1)  L


 


Chloride Level


  104 MMOL/L


()


 


Carbon Dioxide Level


  22 MMOL/L


(21-32)


 


Anion Gap


  14 mmol/L


(5-15)


 


Blood Urea Nitrogen


  98 mg/dL


(7-18)  H


 


Creatinine


  1.6 MG/DL


(0.55-1.30)  H


 


Estimat Glomerular Filtration


Rate 32.3 mL/min


(>60)


 


Glucose Level


  166 MG/DL


()  #H


 


Calcium Level


  8.6 MG/DL


(8.5-10.1)


 


Total Bilirubin


  0.5 MG/DL


(0.2-1.0)


 


Aspartate Amino Transf


(AST/SGOT) 17 U/L (15-37)


 


 


Alanine Aminotransferase


(ALT/SGPT) 20 U/L (12-78)


 


 


Alkaline Phosphatase


  113 U/L


()


 


Total Protein


  6.2 G/DL


(6.4-8.2)  L


 


Albumin


  1.4 G/DL


(3.4-5.0)  L


 


Globulin 4.8 g/dL  


 


Albumin/Globulin Ratio


  0.3 (1.0-2.7)


L











Plan


Problems:  


(1) Leukocytosis


Assessment & Plan:  Significant leukocytosis. -Improving


Initiating work-up currently micro noted


Imaging reviewed


will follow


Thank you for let me participate in patient's care





(2) Wounds and injuries


Assessment & Plan:  DAILY ESTIMATED NEEDS:


Needs based on Critical care, wounds; 68kg 


22-30  kcals/kg 


0692-4692  total kcals


1.25-2  g protein/kg


  g total protein 


25-30  mL/kg


9642-6282  total fluid mLs





NUTRITION DIAGNOSIS:


1) Increased kcal and pro needs r/t wound healing as evidenced by pt adm


w/ advanced wounds, pending eval.


2) Swallowing difficulty r/t resp status as evidenced by pt is trach/vent


dep and PEG dep.





 


CURRENT TF:NPO 





 


ENTERAL NUTRITION RECOMMENDATIONS:


Glucerna 1.5 @ 50ml/hr x 24 hrs  to provide 1200ml, 1800kcal, 99g prot, 911ml 

free water  





- As medically appropriate, resume TF


- Initiate Glucerna 1.5 @ 20ml/hr x 6 hrs


- Advance 10ml q 4-6 hrs as tolerated to goal rate


- HOB over 30 degrees/ water flush per MD


- Rec adding Zaki BID for wound healing





 





ADDITIONAL RECOMMENDATIONS:


1) Calibrated bedscale wt 


2) Wound healing: add Vit C 500mg BID + ZnSO4 220mg QD x 10 days 


                         add Zaki 1pkt BID via PEG


                         f/up with WC eval 


3) Monitor lytes, replete as needed- low K 


4) Monitor renal fxn/ hydration status: low Na, , creat 1.8 


5) Monitor BGs, need for NISS: h/o DM 


6) Monitor ability to resume TF: possible GIB, monitor h/h 





(3) GI bleed


Assessment & Plan:  GI bleed no active bleeding noted currently severe anemia 

transfuse responding well GI eval for possible scope upper and lower continue 

with ICU care for now trend labs will monitor closely no acute surgical 

intervention planned





(4) Abnormal LFTs


(5) Pressure injury of deep tissue


Assessment & Plan:  Pt presented on admission with multiple pressure injuries. 

No evidence of skin breakdown under collar of tracheostomy collar.


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 (L)9.5cm 

x (W)11cm x (D)1.6cm, undermining clockwise9-4 by 1.3cm @2o'clock.Base of wound 

has 25% soft necrosis ,20% slough, otherwise pink granulation noted. Epibole 

along borders at 9-2o'clock. Periwound there is an area that is indurated with 

non-blanchable erythema.


Unstageable pressure injury L femoral head.(L)1.5cm x (W)1.7cm. Base of wound 

has 90% slough,10% soft necrosis at center of wound bed. Borders are 

erythematous and moist. No odor or exudate noted.No evidence of skin breakdown 

periwound.


Unstageable pressure injury lateral R tibia.(L)10.2cm x (W)1.6cm. Base of wound 

is 100% necrotic with detached Borders. Borders are erythematous and moist. No 

odor or exudate noted. Marginal erythema periwound.


DTPI R hallux. Base of wound is maroon with marginal erythema.(L)0.6cm x (W)

0.3cm.


Unstageable pressure injury R heel. (L)0.6cm x(W)0.3cm. Base of wound has 90% 

slough with surrounding 10% moist, erythema.Non-blanchable erythema with 

fluctuance periwound.


Unstageable pressure injury L heel(L)4.5cm x (W)3.2cm.Base of wound is 100% 

necrotic with semi-detached borders which are erythematous. No odor or exudate 

noted.Periwound is fluctuant but pale pink


DTPI dorsal L foot.(L)0.9cm x (W)2cm. Base of wound is purple with maroon 

borders.No erythema induration or fluctuance periwound.


Unstageable pressure injury L Hallux(L)1.1cm x (W)1.2cm. Base of wound has 100% 

slough. Erythematous borders. No erythema,induration or fluctuance periwound.


Resolving pressure injury distal/lateral L foot(L)1.4cm x (W)1.5cm. Pink 

epithelial with surrounding dry, brown borders.NO erythema,induration or 

fluctuance periwound. 


DTPI Lateral L 5th Metatarsal.(L)1.4cm x (W)0.9cm. Base of wound is maroon in 

center with surrounding purple borders. 





Tx.Plan: 


Cleanse Sacral wound with Saline. Loosely pack with TheraHoney infused Kerlix. 

Apply Moisture Barrier Paste


            periwound.Cover with Optifoam drsg Daily and prn.


            


Cleanse wound L Femoral Head with Saline. Apply TheraHoney. Apply Moisture 

Barrier Paste periwound. Cover with


            Optifoam drsg Daily and prn.


            


Apply Betadine to wound lateral R tibia. Cover with ABD pad. Wrap with Kerlix 

from base of toes every 3 days and


            prn.


            


Apply Betadine to R hallux and R heel. Cover each site with Optifoam drsg every 

3 days and prn.


            


Apply Betadine to L foot wound. Cover each wound with Optifoam drsg. Change 

every 3 days and prn.


            


Reposition at least every 2hours or as tolerated. 


            


Off-load heels with pillow.


            


APM/ANNELIESE Radhames Galeana Mar 12, 2020 19:28

## 2020-03-12 NOTE — NUR
NURSE NOTES:

Pt repositioned, oral care provided, no acute distress noted, will continue to monitor.

## 2020-03-12 NOTE — NUR
RESPIRATORY NOTE:

Received pt on AC 18, 500VT, 40%, PEEP +5. Pt is trach-dependent w/ a cuffed, Shiley 7 XLT 
tube. Pt is obtunded/flat effect. B/S vickie. rhonchi, sxn small to moderate amounts of thick, 
pale-yellow to tan-yellow secretions. Vent plugged into red outlet, ambubag at bedside. Pt 
in no apparent distress at this time. Will continue to monitor pt.

## 2020-03-12 NOTE — NUR
HAND-OFF: 

Report given to ELIZABETH Melgoza. Pt still receiving PRBC transfusion at 150 cc/hr. No signs of 
distress noted.

## 2020-03-12 NOTE — NUR
CASE MANAGEMENT: REVIEW 



3/12/2020



SI:UGIB. SEPSIS. 

T 99.1 HR 85 RR 20 B/P 121/48 SATS 100% ON MECH VENT FIO2 40 

LABS: WBC 16.8 HGB 7.5 HCT 21.5 K 3.2 BUN 98 CR 1.6  



IS:DEXTROSE IV @ 100 ML/HR 

ZOSYN IV Q8H 

NORVASC PO Q8H 

DEPAKOTE GT Q12H 

VANCO IV QD 

3/11/2020 CT A/P Impression: Enlarging pelvic cystic mass, as described. enlarging ovarian 
cystic neoplasm

Slight rectal distention with stool, early fecal impaction possible. Large bilateral 
pleural effusions. Associated  basilar pulmonary atelectasis and consolidation. 

3/11/2020 CXR Impression: Stable or minimally improved bilateral parenchymal disease



**ICU***

## 2020-03-12 NOTE — NUR
*-* INSURANCE *-*



ALL CLINICALS AND REVIEWS HAVE BEEN FAXED TO:



[place] [facility] [place]# [phone]

Fax# [phone]

## 2020-03-12 NOTE — NUR
NURSE NOTES:

Patient asleep, eyes open to light shaking but nonverbal with trach. patient tolerating 
ventilator. VSS and afebrile following infusion of one unit PRBC. right hand PIV resumed 
D5NS at 100ml/Hr. casas catheter draining clear yellow urine. patient repositioned and oral 
care provided. will continue to monitor. bed locked lowest position call light within reach.

## 2020-03-12 NOTE — NUR
NURSE NOTES:

Dr Encarnacion at bedside assessing pt, care plan discussed, pt to remain NPO at this time. 

Pt also placed on PS8 and failed weaning trial per Aldo RT. Pt back on AC mode.

## 2020-03-12 NOTE — CRITICAL CARE PROGRESS NOTE
Assessment/Plan


Assessment/Plan


chronic respiratory failure


sepsis


leukocytosis


acute on chronic renal failure


chronic encephalopathy 


hypoxemia


severe PCM


anemia





PLAN


care noted


IV antibiotics


respiratory care


Ventilatory support


IV hydration


SNF meds


supportive care


suction


no wean planned


oxygen therapy and titrate


renal and ID follow up


prognosis poor for recovery 


medications/laboratory data/nursing notes/ICU care reviewed in detail


note reviewed and edited


care discussed with RN and RT


ICU time spent >40   minutes





Critical Care - Subjective


Interval Events:


events noted


wbc improved


bun improved


poor LOC


ICU care noted


ROS Limited/Unobtainable:  Yes


Condition:  critical


EKG Rhythm:  Sinus Rhythm


Residuals:  minimal


Tube Feeding Tolerated:  yes


I&O:











Intake and Output  


 


 3/11/20 3/12/20





 19:00 07:00


 


Intake Total 1907.38 ml 1265.93 ml


 


Output Total 915 ml 1135 ml


 


Balance 992.38 ml 130.93 ml


 


  


 


Intake Free Water 100 ml 


 


IV Total 1457.38 ml 1165.93 ml


 


Other 350 ml 100 ml


 


Output Urine Total 915 ml 1135 ml


 


# Bowel Movements  1











Critical Care - Objective





Last 24 Hour Vital Signs








  Date Time  Temp Pulse Resp B/P (MAP) Pulse Ox O2 Delivery O2 Flow Rate FiO2


 


3/12/20 07:14  86 20     40


 


3/12/20 07:00  83 18 121/45 (70) 100   


 


3/12/20 06:00  86 17 126/48 (74) 97   


 


3/12/20 05:08  87 19     40


 


3/12/20 05:00  83 18 123/63 (83) 100   


 


3/12/20 04:00 98.6 89 21 123/96 (105) 100   


 


3/12/20 04:00        40


 


3/12/20 04:00      Mechanical Ventilator  


 


3/12/20 03:15  80      


 


3/12/20 03:02  87 20     40


 


3/12/20 03:00  89 20 121/56 (77) 99   


 


3/12/20 02:00  85 18 117/46 (69) 100   


 


3/12/20 01:05  85 19     40


 


3/12/20 01:00  85 16 115/43 (67) 100   


 


3/12/20 00:00      Mechanical Ventilator  


 


3/12/20 00:00 98.5 83 16 114/44 (67) 100   


 


3/12/20 00:00        40


 


3/11/20 23:27  84      


 


3/11/20 23:06  83 18     40


 


3/11/20 23:00  84 18 110/36 (60) 100   


 


3/11/20 22:11  86 17 114/43 (66) 100   


 


3/11/20 22:00  85 18 82/55 (64) 100   


 


3/11/20 21:00  88 17 110/45 (66) 100   


 


3/11/20 20:57  88 19     40


 


3/11/20 20:00      Mechanical Ventilator  


 


3/11/20 20:00        40


 


3/11/20 20:00 98.9 85 19 112/41 (64) 100   


 


3/11/20 19:29  90      


 


3/11/20 19:07  87 18     40


 


3/11/20 19:00  87 19 105/39 (61) 100   


 


3/11/20 18:51  87  112/53    


 


3/11/20 18:00  90 18 112/53 (72) 100   


 


3/11/20 17:00  86 18 131/37 (68) 100   


 


3/11/20 16:56  86 18     40


 


3/11/20 16:00 99.2 86 18 108/53 (71) 100   


 


3/11/20 16:00      Mechanical Ventilator  


 


3/11/20 16:00        40


 


3/11/20 16:00  83      


 


3/11/20 15:26  89 18     40


 


3/11/20 15:00  91 19 92/48 (63) 100   


 


3/11/20 14:00  94 19 108/72 (84) 100   


 


3/11/20 13:00 98.3 92 24 111/43 (65) 100   


 


3/11/20 12:34  93 24     40


 


3/11/20 12:00      Mechanical Ventilator  


 


3/11/20 12:00  92 20 111/43 (65) 100   


 


3/11/20 12:00        40


 


3/11/20 12:00  89      


 


3/11/20 11:29  94 23     40


 


3/11/20 11:00  95 20 110/43 (65) 100   


 


3/11/20 10:00  89 18 112/46 (68) 100   


 


3/11/20 09:11  93 20     40


 


3/11/20 09:05  95  125/58    


 


3/11/20 09:00  94 20 125/58 (80) 97   








Labs:





Laboratory Tests








Test


  3/11/20


09:15 3/12/20


03:20


 


White Blood Count


  28.0 K/UL


(4.8-10.8)  *H 16.8 K/UL


(4.8-10.8)  H


 


Red Blood Count


  2.75 M/UL


(4.20-5.40)  L 2.53 M/UL


(4.20-5.40)  L


 


Hemoglobin


  8.1 G/DL


(12.0-16.0)  L 7.5 G/DL


(12.0-16.0)  L


 


Hematocrit


  23.0 %


(37.0-47.0)  L 21.5 %


(37.0-47.0)  L


 


Mean Corpuscular Volume 83 FL (80-99)   85 FL (80-99)  


 


Mean Corpuscular Hemoglobin


  29.5 PG


(27.0-31.0) 29.7 PG


(27.0-31.0)


 


Mean Corpuscular Hemoglobin


Concent 35.4 G/DL


(32.0-36.0) 35.0 G/DL


(32.0-36.0)


 


Red Cell Distribution Width


  15.5 %


(11.6-14.8)  H 16.0 %


(11.6-14.8)  H


 


Platelet Count


  351 K/UL


(150-450) 308 K/UL


(150-450)


 


Mean Platelet Volume


  6.9 FL


(6.5-10.1) 6.7 FL


(6.5-10.1)


 


Neutrophils (%) (Auto)


  % (45.0-75.0)


  % (45.0-75.0)


 


 


Lymphocytes (%) (Auto)


  % (20.0-45.0)


  % (20.0-45.0)


 


 


Monocytes (%) (Auto)  % (1.0-10.0)    % (1.0-10.0)  


 


Eosinophils (%) (Auto)  % (0.0-3.0)    % (0.0-3.0)  


 


Basophils (%) (Auto)  % (0.0-2.0)    % (0.0-2.0)  


 


Differential Total Cells


Counted 100  


  


 


 


Neutrophils % (Manual) 94 % (45-75)  H Pending  


 


Lymphocytes % (Manual) 5 % (20-45)  L Pending  


 


Monocytes % (Manual) 0 % (1-10)  L 


 


Eosinophils % (Manual) 1 % (0-3)   


 


Basophils % (Manual) 0 % (0-2)   


 


Band Neutrophils 0 % (0-8)   


 


Platelet Estimate Adequate   Pending  


 


Platelet Morphology Normal   Pending  


 


Hypochromasia 1+   


 


Erythrocyte Sedimentation Rate


  134 MM/HR


(0-30)  H 


 


 


Sodium Level


  


  139 MMOL/L


(136-145)


 


Potassium Level


  


  3.2 MMOL/L


(3.5-5.1)  L


 


Chloride Level


  


  104 MMOL/L


()


 


Carbon Dioxide Level


  


  22 MMOL/L


(21-32)


 


Anion Gap


  


  14 mmol/L


(5-15)


 


Blood Urea Nitrogen


  


  98 mg/dL


(7-18)  H


 


Creatinine


  


  1.6 MG/DL


(0.55-1.30)  H


 


Estimat Glomerular Filtration


Rate 


  32.3 mL/min


(>60)


 


Glucose Level


  


  166 MG/DL


()  #H


 


Calcium Level


  


  8.6 MG/DL


(8.5-10.1)


 


Total Bilirubin


  


  0.5 MG/DL


(0.2-1.0)


 


Aspartate Amino Transf


(AST/SGOT) 


  17 U/L (15-37)


 


 


Alanine Aminotransferase


(ALT/SGPT) 


  20 U/L (12-78)


 


 


Alkaline Phosphatase


  


  113 U/L


()


 


Total Protein


  


  6.2 G/DL


(6.4-8.2)  L


 


Albumin


  


  1.4 G/DL


(3.4-5.0)  L


 


Globulin  4.8 g/dL  


 


Albumin/Globulin Ratio


  


  0.3 (1.0-2.7)


L








Objective:


WDWN


trach


clear breath sounds bilaterally without rhonchi or wheeze


[id number] without MRG


NABS nontender no HSM; Gt


no CC; mild edema


nonfocal


Micro:





Microbiology








 Date/Time


Source Procedure


Growth Status


 


 


 3/9/20 19:00


Blood Blood Culture - Preliminary


NO GROWTH AFTER 48 HOURS Resulted


 


 3/9/20 18:38


Blood Blood Culture - Preliminary


NO GROWTH AFTER 48 HOURS Resulted


 


 3/9/20 20:15


Nasal Nares MRSA Culture - Final


NO METHICILLIN RESISTANT STAPH AUREUS... Complete


 


 3/9/20 19:00


Urine,Clean Catch Urine Culture - Preliminary


Strep Species, Beta Hemolytic


Mixed Urogenital Contaminants Resulted

















Steven Gonzalez MD Mar 12, 2020 08:40

## 2020-03-12 NOTE — NUR
NURSE NOTES:

Pt received from ELIZABETH Sierra. Pt is obtunded, unable to follow commands,  pt noted 
repeatedly shuts eyes tightly- unable to assess pupils at this time. Pt is noted in SR to 
cardiac monitor, bilat radial and dorsalis pedis pulses noted 3+. Right foot noted with 1+ 
pitting edema. Pt noted with trache to vent shiley 7 AC 18  FiO2 40% Peep 5. Lung 
sounds noted diminished to right upper lung. Pt currently NPO. Abd is round and large, noted 
firm to the left lower quadrant. Bowel sounds noted active to all quadrants. Pt has a F/C 
draining yellow, clear urine. Skin alterations noted, pt is on ANNELIESE mattress. Pt has a LH 22g 
and LW 20g IVs running D5NS at 100 cc/hr and zosyn. Dr Sanderson at bedside, care plan 
discussed. Dr Sanderson made aware that Hgb today is 7.5 and potassium 3.2. 40 meQ KDUR GT Once 
order obtained. Bed is in lowest position, alarm on, side rails up x 2 and padded per 
seizure precaution, call light within reach. Will continue to monitor. 

-------------------------------------------------------------------------------

Addendum: 03/12/20 at 1107 by Sarina Flynn RN

-------------------------------------------------------------------------------

(Pt noted repeatedly shutting eyes tightly)

-------------------------------------------------------------------------------

Addendum: 03/12/20 at 1110 by Sarina Flynn RN

-------------------------------------------------------------------------------

Amendment: No edema noted in right foot (wrong documentation).

## 2020-03-12 NOTE — GENERAL PROGRESS NOTE
Assessment/Plan


Status:  stable


Assessment/Plan:


Assessment


- UGIB


- Anemia


- Sacral decub with osteo


- enlarging cystic ovarian neoplasm


- rectal stool impaction


- chronic trach / vent


- body edema


- leukocytosis / sepsis


- Azotemia


- Poor prognosis





Recommendations


- PPI


- abx


- transfuse PRN


- Follow labs


- laxative


- message left x2 with family to discuss





Subjective


Allergies:  


Coded Allergies:  


     No Known Allergies (Unverified , 10/14/19)


Subjective


Above noted


d/w RN


in ICU on vent


left message last night and this am for son to call me





Objective





Last 24 Hour Vital Signs








  Date Time  Temp Pulse Resp B/P (MAP) Pulse Ox O2 Delivery O2 Flow Rate FiO2


 


3/12/20 09:13  87  125/44    


 


3/12/20 08:38  88 18     40


 


3/12/20 08:00  89      


 


3/12/20 07:14  86 20     40


 


3/12/20 07:00  83 18 121/45 (70) 100   


 


3/12/20 06:00  86 17 126/48 (74) 97   


 


3/12/20 05:08  87 19     40


 


3/12/20 05:00  83 18 123/63 (83) 100   


 


3/12/20 04:00 98.6 89 21 123/96 (105) 100   


 


3/12/20 04:00        40


 


3/12/20 04:00      Mechanical Ventilator  


 


3/12/20 03:15  80      


 


3/12/20 03:02  87 20     40


 


3/12/20 03:00  89 20 121/56 (77) 99   


 


3/12/20 02:00  85 18 117/46 (69) 100   


 


3/12/20 01:05  85 19     40


 


3/12/20 01:00  85 16 115/43 (67) 100   


 


3/12/20 00:00      Mechanical Ventilator  


 


3/12/20 00:00 98.5 83 16 114/44 (67) 100   


 


3/12/20 00:00        40


 


3/11/20 23:27  84      


 


3/11/20 23:06  83 18     40


 


3/11/20 23:00  84 18 110/36 (60) 100   


 


3/11/20 22:11  86 17 114/43 (66) 100   


 


3/11/20 22:00  85 18 82/55 (64) 100   


 


3/11/20 21:00  88 17 110/45 (66) 100   


 


3/11/20 20:57  88 19     40


 


3/11/20 20:00      Mechanical Ventilator  


 


3/11/20 20:00        40


 


3/11/20 20:00 98.9 85 19 112/41 (64) 100   


 


3/11/20 19:29  90      


 


3/11/20 19:07  87 18     40


 


3/11/20 19:00  87 19 105/39 (61) 100   


 


3/11/20 18:51  87  112/53    


 


3/11/20 18:00  90 18 112/53 (72) 100   


 


3/11/20 17:00  86 18 131/37 (68) 100   


 


3/11/20 16:56  86 18     40


 


3/11/20 16:00 99.2 86 18 108/53 (71) 100   


 


3/11/20 16:00      Mechanical Ventilator  


 


3/11/20 16:00        40


 


3/11/20 16:00  83      


 


3/11/20 15:26  89 18     40


 


3/11/20 15:00  91 19 92/48 (63) 100   


 


3/11/20 14:00  94 19 108/72 (84) 100   


 


3/11/20 13:00 98.3 92 24 111/43 (65) 100   


 


3/11/20 12:34  93 24     40


 


3/11/20 12:00      Mechanical Ventilator  


 


3/11/20 12:00  92 20 111/43 (65) 100   


 


3/11/20 12:00        40


 


3/11/20 12:00  89      


 


3/11/20 11:29  94 23     40


 


3/11/20 11:00  95 20 110/43 (65) 100   

















Intake and Output  


 


 3/11/20 3/12/20





 19:00 07:00


 


Intake Total 1907.38 ml 1265.93 ml


 


Output Total 915 ml 1135 ml


 


Balance 992.38 ml 130.93 ml


 


  


 


Intake Free Water 100 ml 


 


IV Total 1457.38 ml 1165.93 ml


 


Other 350 ml 100 ml


 


Output Urine Total 915 ml 1135 ml


 


# Bowel Movements  1








Laboratory Tests


3/12/20 03:20: 


White Blood Count 16.8H, Red Blood Count 2.53L, Hemoglobin 7.5L, Hematocrit 

21.5L, Mean Corpuscular Volume 85, Mean Corpuscular Hemoglobin 29.7, Mean 

Corpuscular Hemoglobin Concent 35.0, Red Cell Distribution Width 16.0H, 

Platelet Count 308, Mean Platelet Volume 6.7, Neutrophils (%) (Auto) , 

Lymphocytes (%) (Auto) , Monocytes (%) (Auto) , Eosinophils (%) (Auto) , 

Basophils (%) (Auto) , Differential Total Cells Counted 100, Neutrophils % (

Manual) 84H, Lymphocytes % (Manual) 3L, Monocytes % (Manual) 9, Eosinophils % (

Manual) 1, Basophils % (Manual) 3H, Band Neutrophils 0, Platelet Estimate 

Adequate, Platelet Morphology Normal, Hypochromasia 3+, Anisocytosis 1+, 

Spherocytes 2+, Sodium Level 139, Potassium Level 3.2L, Chloride Level 104, 

Carbon Dioxide Level 22, Anion Gap 14, Blood Urea Nitrogen 98H, Creatinine 1.6H

, Estimat Glomerular Filtration Rate 32.3, Glucose Level 166#H, Calcium Level 

8.6, Total Bilirubin 0.5, Aspartate Amino Transf (AST/SGOT) 17, Alanine 

Aminotransferase (ALT/SGPT) 20, Alkaline Phosphatase 113, Total Protein 6.2L, 

Albumin 1.4L, Globulin 4.8, Albumin/Globulin Ratio 0.3L


Height (Feet):  5


Height (Inches):  7.00


Weight (Pounds):  168


Objective


Elderly woman


on vent, (+) Trach


NCAT


coarse BS


RR 


abd soft, (+) GT,(+) pelvic fullness


(+) edema











Bladimir Encarnacion MD Mar 12, 2020 10:25

## 2020-03-12 NOTE — CONSULTATION
DATE OF CONSULTATION:  03/11/2020

GASTROENTEROLOGY CONSULTATION



CHIEF COMPLAINT:  I was asked to see this patient by Dr. Arun Sanderson for

evaluation of gastrointestinal bleeding.



HISTORY OF PRESENT ILLNESS:  The patient is a debilitated 64-year-old

woman, who is a long-term respiratory failure patient with tracheostomy

and ventilator, who comes in to the hospital due to anemia and abnormal

laboratories.  At the nursing home, the patient was noted to have melena

and therefore was brought to the hospital.  She was tachycardic and had a

high white count.  She was transfused, cultured, and admitted to the ICU.

She is nonverbal and unable to provide any history.  The patient was noted

to have some red blood around the gastrostomy site, but the gastrostomy

itself flushed clear.  I have left a message to the patient's family to

discuss the gastrointestinal issues.  The patient has a host of other

findings during this admission including a sacral decubitus ulcer with

osteomyelitis as well as an enlarging cystic ovarian neoplasm.



PAST MEDICAL HISTORY:  History of encephalopathy, pneumonia, respiratory

failure, tracheostomy, diabetes, renal disease, anemia, seizure disorder,

hyperlipidemia, congestive heart failure, bedbound state, decubitus

ulceration, osteomyelitis, enlarging cystic ovarian neoplasm, and

bilateral pleural effusions.



PAST SURGICAL HISTORY:  Status post tracheostomy and gastrostomy.



MEDICATIONS:  See the chart list for details.



ALLERGIES:  None.



FAMILY HISTORY:  Unavailable.



SOCIAL HISTORY:  Unavailable.



REVIEW OF SYSTEMS:  Unobtainable.



PHYSICAL EXAMINATION:

GENERAL:  A debilitated, nonverbal, noncommunicative elderly woman in the

intensive care unit.

HEENT:  Normocephalic and atraumatic.  There is a tracheostomy in place.

CHEST:  Revealed coarse breath sounds.

CARDIOVASCULAR:  Revealed a regular rhythm.

ABDOMEN:  Soft with fullness in the pelvis indicative of mass in the area.

Gastrostomy tube was in good position.

EXTREMITIES:  Revealed edema and contractures.



LABORATORY DATA:  Noted.



ASSESSMENT:  This patient has a host of medical problems, some of which are

chronic and progressive and they all confirm her poor prognosis.  These

include sacral decubitus ulceration, osteomyelitis, sepsis, renal failure,

enlarging ovarian cystic neoplasm.  As such, her prognosis is extremely

poor.  I will place the patient on proton pump inhibitor and give her

transfusions as necessary.  I have left a message to the patient's family

to discuss the level of care with them.  In the meantime, I will hold tube

feedings for tonight until her GI management and further evaluation.

Other measures are per other consultants including antibiotics, IV fluids,

and her follow-up laboratory parameters.



RECOMMENDATIONS:  Per above discussion and per orders written in the

chart.



Thank you for asking me to participate in the care of this patient.









  ______________________________________________

  Bladimir Encarnacion M.D.





DR:  TON

D:  03/11/2020 19:21

T:  03/12/2020 04:03

JOB#:  [id number]

CC:

## 2020-03-12 NOTE — NUR
NURSE NOTES:

Bed bath given, linens changed, wound care done, oral care done. Turned and repositioned. 
Patient is comfortable.

## 2020-03-12 NOTE — NUR
NURSE NOTES:

Pre-transfusion VS noted and charted. 1 unit PRBC transfusion verified with ELIZABETH Chávez and 
initiated. Pt in no acute distress.

## 2020-03-13 VITALS — DIASTOLIC BLOOD PRESSURE: 48 MMHG | SYSTOLIC BLOOD PRESSURE: 141 MMHG

## 2020-03-13 VITALS — DIASTOLIC BLOOD PRESSURE: 28 MMHG | SYSTOLIC BLOOD PRESSURE: 153 MMHG

## 2020-03-13 VITALS — DIASTOLIC BLOOD PRESSURE: 45 MMHG | SYSTOLIC BLOOD PRESSURE: 137 MMHG

## 2020-03-13 VITALS — SYSTOLIC BLOOD PRESSURE: 134 MMHG | DIASTOLIC BLOOD PRESSURE: 47 MMHG

## 2020-03-13 VITALS — DIASTOLIC BLOOD PRESSURE: 71 MMHG | SYSTOLIC BLOOD PRESSURE: 148 MMHG

## 2020-03-13 VITALS — SYSTOLIC BLOOD PRESSURE: 122 MMHG | DIASTOLIC BLOOD PRESSURE: 76 MMHG

## 2020-03-13 VITALS — SYSTOLIC BLOOD PRESSURE: 151 MMHG | DIASTOLIC BLOOD PRESSURE: 60 MMHG

## 2020-03-13 VITALS — SYSTOLIC BLOOD PRESSURE: 121 MMHG | DIASTOLIC BLOOD PRESSURE: 79 MMHG

## 2020-03-13 VITALS — SYSTOLIC BLOOD PRESSURE: 159 MMHG | DIASTOLIC BLOOD PRESSURE: 77 MMHG

## 2020-03-13 VITALS — SYSTOLIC BLOOD PRESSURE: 145 MMHG | DIASTOLIC BLOOD PRESSURE: 49 MMHG

## 2020-03-13 VITALS — DIASTOLIC BLOOD PRESSURE: 54 MMHG | SYSTOLIC BLOOD PRESSURE: 141 MMHG

## 2020-03-13 VITALS — DIASTOLIC BLOOD PRESSURE: 53 MMHG | SYSTOLIC BLOOD PRESSURE: 137 MMHG

## 2020-03-13 VITALS — SYSTOLIC BLOOD PRESSURE: 124 MMHG | DIASTOLIC BLOOD PRESSURE: 45 MMHG

## 2020-03-13 VITALS — SYSTOLIC BLOOD PRESSURE: 145 MMHG | DIASTOLIC BLOOD PRESSURE: 65 MMHG

## 2020-03-13 VITALS — SYSTOLIC BLOOD PRESSURE: 155 MMHG | DIASTOLIC BLOOD PRESSURE: 56 MMHG

## 2020-03-13 VITALS — DIASTOLIC BLOOD PRESSURE: 57 MMHG | SYSTOLIC BLOOD PRESSURE: 153 MMHG

## 2020-03-13 VITALS — SYSTOLIC BLOOD PRESSURE: 150 MMHG | DIASTOLIC BLOOD PRESSURE: 87 MMHG

## 2020-03-13 VITALS — SYSTOLIC BLOOD PRESSURE: 144 MMHG | DIASTOLIC BLOOD PRESSURE: 50 MMHG

## 2020-03-13 VITALS — SYSTOLIC BLOOD PRESSURE: 138 MMHG | DIASTOLIC BLOOD PRESSURE: 54 MMHG

## 2020-03-13 VITALS — SYSTOLIC BLOOD PRESSURE: 156 MMHG | DIASTOLIC BLOOD PRESSURE: 44 MMHG

## 2020-03-13 VITALS — SYSTOLIC BLOOD PRESSURE: 134 MMHG | DIASTOLIC BLOOD PRESSURE: 57 MMHG

## 2020-03-13 VITALS — SYSTOLIC BLOOD PRESSURE: 154 MMHG | DIASTOLIC BLOOD PRESSURE: 58 MMHG

## 2020-03-13 VITALS — SYSTOLIC BLOOD PRESSURE: 159 MMHG | DIASTOLIC BLOOD PRESSURE: 52 MMHG

## 2020-03-13 VITALS — SYSTOLIC BLOOD PRESSURE: 156 MMHG | DIASTOLIC BLOOD PRESSURE: 67 MMHG

## 2020-03-13 LAB
ADD MANUAL DIFF: YES
ALBUMIN SERPL-MCNC: 1.7 G/DL (ref 3.4–5)
ALBUMIN/GLOB SERPL: 0.3 {RATIO} (ref 1–2.7)
ALP SERPL-CCNC: 118 U/L (ref 46–116)
ALT SERPL-CCNC: 20 U/L (ref 12–78)
ANION GAP SERPL CALC-SCNC: 18 MMOL/L (ref 5–15)
AST SERPL-CCNC: 11 U/L (ref 15–37)
BILIRUB SERPL-MCNC: 0.5 MG/DL (ref 0.2–1)
BUN SERPL-MCNC: 71 MG/DL (ref 7–18)
CALCIUM SERPL-MCNC: 8.9 MG/DL (ref 8.5–10.1)
CHLORIDE SERPL-SCNC: 110 MMOL/L (ref 98–107)
CO2 SERPL-SCNC: 18 MMOL/L (ref 21–32)
CREAT SERPL-MCNC: 1.6 MG/DL (ref 0.55–1.3)
ERYTHROCYTE [DISTWIDTH] IN BLOOD BY AUTOMATED COUNT: 15.6 % (ref 11.6–14.8)
GLOBULIN SER-MCNC: 5.1 G/DL
HCT VFR BLD CALC: 29.7 % (ref 37–47)
HGB BLD-MCNC: 9.9 G/DL (ref 12–16)
MCV RBC AUTO: 88 FL (ref 80–99)
PLATELET # BLD: 343 K/UL (ref 150–450)
POTASSIUM SERPL-SCNC: 3.5 MMOL/L (ref 3.5–5.1)
RBC # BLD AUTO: 3.38 M/UL (ref 4.2–5.4)
SODIUM SERPL-SCNC: 145 MMOL/L (ref 136–145)
WBC # BLD AUTO: 17.5 K/UL (ref 4.8–10.8)

## 2020-03-13 RX ADMIN — INSULIN ASPART SCH UNITS: 100 INJECTION, SOLUTION INTRAVENOUS; SUBCUTANEOUS at 23:58

## 2020-03-13 RX ADMIN — PANTOPRAZOLE SODIUM SCH MG: 40 INJECTION, POWDER, FOR SOLUTION INTRAVENOUS at 20:38

## 2020-03-13 RX ADMIN — DIVALPROEX SODIUM SCH MG: 125 CAPSULE ORAL at 09:11

## 2020-03-13 RX ADMIN — DEXTROSE MONOHYDRATE SCH MLS/HR: 50 INJECTION, SOLUTION INTRAVENOUS at 21:16

## 2020-03-13 RX ADMIN — Medication SCH MG: at 18:01

## 2020-03-13 RX ADMIN — HUMAN INSULIN SCH MLS/HR: 100 INJECTION, SOLUTION SUBCUTANEOUS at 02:04

## 2020-03-13 RX ADMIN — PANTOPRAZOLE SODIUM SCH MG: 40 INJECTION, POWDER, FOR SOLUTION INTRAVENOUS at 09:11

## 2020-03-13 RX ADMIN — DEXTROSE MONOHYDRATE SCH MLS/HR: 50 INJECTION, SOLUTION INTRAVENOUS at 14:26

## 2020-03-13 RX ADMIN — Medication SCH MG: at 09:11

## 2020-03-13 RX ADMIN — DEXTROSE MONOHYDRATE SCH MLS/HR: 50 INJECTION, SOLUTION INTRAVENOUS at 05:13

## 2020-03-13 RX ADMIN — ZINC SULFATE CAP 220 MG (50 MG ELEMENTAL ZN) SCH MG: 220 (50 ZN) CAP at 09:11

## 2020-03-13 RX ADMIN — INSULIN ASPART SCH UNITS: 100 INJECTION, SOLUTION INTRAVENOUS; SUBCUTANEOUS at 18:04

## 2020-03-13 RX ADMIN — DIVALPROEX SODIUM SCH MG: 125 CAPSULE ORAL at 20:39

## 2020-03-13 NOTE — NUR
NURSE NOTES:

Dr Vyas at bedside assessing p and made aware pt's urine appears with sedimentation 
today. Order received to culture urine.

## 2020-03-13 NOTE — CRITICAL CARE PROGRESS NOTE
Assessment/Plan


Assessment/Plan


chronic respiratory failure


sepsis


leukocytosis


acute on chronic renal failure


chronic encephalopathy 


hypoxemia


severe PCM


anemia





PLAN


care noted


IV antibiotics


respiratory care


Ventilatory support


IV hydration


SNF meds


supportive care


suction


no wean planned


oxygen therapy and titrate


renal and ID follow up


prognosis poor for recovery 


medications/laboratory data/nursing notes/ICU care reviewed in detail


note reviewed and edited


care discussed with RN and RT


ICU time spent >40   minutes





Critical Care - Subjective


Interval Events:


overall same


in ICU 


obtunded


labs reviewed


Condition:  critical


EKG Rhythm:  Sinus Rhythm


Residuals:  minimal


Tube Feeding Tolerated:  yes


I&O:











Intake and Output  


 


 3/12/20 3/13/20





 19:00 07:00


 


Intake Total 1320.0 ml 1010 ml


 


Output Total 850 ml 495 ml


 


Balance 470.0 ml 515 ml


 


  


 


IV Total 1320.0 ml 1010 ml


 


Output Urine Total 850 ml 495 ml


 


# Bowel Movements 2 1











Critical Care - Objective





Last 24 Hour Vital Signs








  Date Time  Temp Pulse Resp B/P (MAP) Pulse Ox O2 Delivery O2 Flow Rate FiO2


 


3/13/20 07:00  97 23 137/53 (81) 100   


 


3/13/20 06:30  93 25     40


 


3/13/20 06:00  90 19 137/45 (75) 100   


 


3/13/20 05:15  92 22     40


 


3/13/20 05:00  93 22 156/44 (81) 100   


 


3/13/20 04:00  77      


 


3/13/20 04:00        40


 


3/13/20 04:00      Mechanical Ventilator  





      Mechanical Ventilator  





      Mechanical Ventilator  


 


3/13/20 04:00 98.0 96 28 134/47 (76) 100   


 


3/13/20 03:15  94 23     40


 


3/13/20 03:00  89 18 124/45 (71) 100   


 


3/13/20 02:00  91 21 122/76 (91) 100   


 


3/13/20 01:00  96 21 121/79 (93) 100   


 


3/13/20 00:56  97 27     40


 


3/13/20 00:00      Mechanical Ventilator  





      Mechanical Ventilator  





      Mechanical Ventilator  


 


3/13/20 00:00 98.3 94 23 145/49 (81) 100   


 


3/12/20 23:00  96 27 136/46 (76) 100   


 


3/12/20 22:42  86 18     40


 


3/12/20 22:00  93 22 133/49 (77) 99   


 


3/12/20 21:19  87 23     40


 


3/12/20 21:00  86 22 130/52 (78) 100   


 


3/12/20 20:00        40


 


3/12/20 20:00  87      


 


3/12/20 20:00 99.6 87 20 126/54 (78) 100   


 


3/12/20 20:00      Mechanical Ventilator  





      Mechanical Ventilator  





      Mechanical Ventilator  


 


3/12/20 19:02  87 24     40


 


3/12/20 19:00  88 23 145/54 (84) 99   


 


3/12/20 18:00 99.1 87 18 125/45 (71) 100   


 


3/12/20 17:06  85 20     40


 


3/12/20 17:01  87  124/51    


 


3/12/20 17:00  87 18 126/51 (76) 100   


 


3/12/20 16:00 98.7 87 18 124/51 (75) 100   


 


3/12/20 16:00      Mechanical Ventilator  





      Mechanical Ventilator  





      Mechanical Ventilator  


 


3/12/20 16:00  85      


 


3/12/20 16:00        40


 


3/12/20 15:00  85 18 121/57 (78) 100   


 


3/12/20 14:46  87 24     40


 


3/12/20 14:00  86 20 121/50 (73) 100   


 


3/12/20 13:00 99.1 85 20 121/48 (72) 100   


 


3/12/20 12:43  90 19     40


 


3/12/20 12:00      Mechanical Ventilator  





      Mechanical Ventilator  





      Mechanical Ventilator  


 


3/12/20 12:00  86 18 137/54 (81) 100   


 


3/12/20 12:00  86      


 


3/12/20 12:00        40


 


3/12/20 11:00  89 18 122/45 (70) 100   


 


3/12/20 10:52  102 28     40


 


3/12/20 10:00  88 18 131/54 (79) 100   


 


3/12/20 09:13  87  125/44    


 


3/12/20 09:00  85 19 125/44 (71) 100   


 


3/12/20 08:38  88 18     40








Labs:





Labs








Test


  3/11/20


05:20 3/11/20


09:15 3/12/20


03:20


 


Prothrombin Time


  12.6 SEC


(9.30-11.50) 


  


 


 


Prothromb Time International


Ratio 1.2 (0.9-1.1) 


  


  


 


 


Activated Partial


Thromboplast Time 32 SEC (23-33) 


  


  


 


 


Sodium Level


  136 MMOL/L


(136-145) 


  139 MMOL/L


(136-145)


 


Potassium Level


  3.2 MMOL/L


(3.5-5.1) 


  3.2 MMOL/L


(3.5-5.1)


 


Chloride Level


  100 MMOL/L


() 


  104 MMOL/L


()


 


Carbon Dioxide Level


  19 MMOL/L


(21-32) 


  22 MMOL/L


(21-32)


 


Anion Gap


  17 mmol/L


(5-15) 


  14 mmol/L


(5-15)


 


Blood Urea Nitrogen


  109 mg/dL


(7-18) 


  98 mg/dL


(7-18)


 


Creatinine


  1.6 MG/DL


(0.55-1.30) 


  1.6 MG/DL


(0.55-1.30)


 


Estimat Glomerular Filtration


Rate 32.3 mL/min


(>60) 


  32.3 mL/min


(>60)


 


Glucose Level


  55 MG/DL


() 


  166 MG/DL


()


 


Lactic Acid Level


  0.60 mmol/L


(0.4-2.0) 


  


 


 


Calcium Level


  9.1 MG/DL


(8.5-10.1) 


  8.6 MG/DL


(8.5-10.1)


 


Total Bilirubin


  0.5 MG/DL


(0.2-1.0) 


  0.5 MG/DL


(0.2-1.0)


 


Aspartate Amino Transf


(AST/SGOT) 22 U/L (15-37) 


  


  17 U/L (15-37) 


 


 


Alanine Aminotransferase


(ALT/SGPT) 20 U/L (12-78) 


  


  20 U/L (12-78) 


 


 


Alkaline Phosphatase


  127 U/L


() 


  113 U/L


()


 


C-Reactive Protein,


Quantitative 18.3 mg/dL


(0.00-0.90) 


  


 


 


Total Protein


  6.5 G/DL


(6.4-8.2) 


  6.2 G/DL


(6.4-8.2)


 


Albumin


  1.4 G/DL


(3.4-5.0) 


  1.4 G/DL


(3.4-5.0)


 


Globulin 5.1 g/dL   4.8 g/dL 


 


Albumin/Globulin Ratio 0.3 (1.0-2.7)   0.3 (1.0-2.7) 


 


Amylase Level


  54 U/L


() 


  


 


 


Lipase


  68 U/L


() 


  


 


 


Random Vancomycin Level 12.0 ug/mL   


 


White Blood Count


  


  28.0 K/UL


(4.8-10.8) 16.8 K/UL


(4.8-10.8)


 


Red Blood Count


  


  2.75 M/UL


(4.20-5.40) 2.53 M/UL


(4.20-5.40)


 


Hemoglobin


  


  8.1 G/DL


(12.0-16.0) 7.5 G/DL


(12.0-16.0)


 


Hematocrit


  


  23.0 %


(37.0-47.0) 21.5 %


(37.0-47.0)


 


Mean Corpuscular Volume  83 FL (80-99)  85 FL (80-99) 


 


Mean Corpuscular Hemoglobin


  


  29.5 PG


(27.0-31.0) 29.7 PG


(27.0-31.0)


 


Mean Corpuscular Hemoglobin


Concent 


  35.4 G/DL


(32.0-36.0) 35.0 G/DL


(32.0-36.0)


 


Red Cell Distribution Width


  


  15.5 %


(11.6-14.8) 16.0 %


(11.6-14.8)


 


Platelet Count


  


  351 K/UL


(150-450) 308 K/UL


(150-450)


 


Mean Platelet Volume


  


  6.9 FL


(6.5-10.1) 6.7 FL


(6.5-10.1)


 


Neutrophils (%) (Auto)   % (45.0-75.0)   % (45.0-75.0) 


 


Lymphocytes (%) (Auto)   % (20.0-45.0)   % (20.0-45.0) 


 


Monocytes (%) (Auto)   % (1.0-10.0)   % (1.0-10.0) 


 


Eosinophils (%) (Auto)   % (0.0-3.0)   % (0.0-3.0) 


 


Basophils (%) (Auto)   % (0.0-2.0)   % (0.0-2.0) 


 


Differential Total Cells


Counted 


  100 


  100 


 


 


Neutrophils % (Manual)  94 % (45-75)  84 % (45-75) 


 


Lymphocytes % (Manual)  5 % (20-45)  3 % (20-45) 


 


Monocytes % (Manual)  0 % (1-10)  9 % (1-10) 


 


Eosinophils % (Manual)  1 % (0-3)  1 % (0-3) 


 


Basophils % (Manual)  0 % (0-2)  3 % (0-2) 


 


Band Neutrophils  0 % (0-8)  0 % (0-8) 


 


Platelet Estimate  Adequate  Adequate 


 


Platelet Morphology  Normal  Normal 


 


Hypochromasia  1+  3+ 


 


Erythrocyte Sedimentation Rate


  


  134 MM/HR


(0-30) 


 


 


Anisocytosis   1+ 


 


Spherocytes   2+ 








Objective:


WDWN


trach


clear breath sounds bilaterally without rhonchi or wheeze


[id number] without MRG


NABS nontender no HSM; Gt


no CC; mild edema


nonfocal











Steven Gonzalez MD Mar 13, 2020 08:31

## 2020-03-13 NOTE — NUR
HAND-OFF: 

Report given to ELIZABETH Mattson. Pt in no acute distress at this time. -115 bpm. 
Breathing is unlabored. Pt resting.

## 2020-03-13 NOTE — NUR
NURSE NOTES:

Pt received from ELIZABETH Melgoza. Pt is obtunded, unable to follow commands,  pt noted 
repeatedly shutting eyes tightly- unable to assess pupils at this time. Pt is in SR to 
cardiac monitor, bilat radial pulses 3+ and dorsalis pedis pulses noted 2+. Pt noted with 
trache to vent shiley 7 AC 18  FiO2 40% Peep 5. Lung sounds noted with rhonchi to all 
lung lobes. Pt currently NPO. Abd is round and large, noted firm to the left lower quadrant. 
Bowel sounds noted hypoactive to all quadrants. Pt has a F/C draining yellow urine. Skin 
alterations noted, pt is on ANNELIESE mattress. Pt has a RH 22g running D5NS at 100 cc/hr. Bed is 
in lowest position, alarm on, side rails up x 2 and padded per seizure precaution, call 
light within reach. Will continue to monitor. 

-------------------------------------------------------------------------------

Addendum: 03/13/20 at 1038 by Sarina Flynn RN

-------------------------------------------------------------------------------

Late entry: SCDs on bilat lower extremities

## 2020-03-13 NOTE — NUR
NURSE NOTES:

Pt repositioned and cleaned, 1 brown, soft BM noted. Pt in no acute distress at this time. 
Order received from Dr. Gonzalez to begin pt on average insulin Novolog sliding scale Q 6 hrs 
and accuchecks Q 6 Hrs.

## 2020-03-13 NOTE — NUR
NURSE NOTES:

100 cc of NaHCO3 given IVP. Pt's breathing appears less labored. HR now 110, RR 24-28. No 
acute distress noted. 

-------------------------------------------------------------------------------

Addendum: 03/13/20 at 1817 by Sarina Flynn RN

-------------------------------------------------------------------------------

Pt is also opening eyes spontaneously at this time.

## 2020-03-13 NOTE — NUR
NURSE NOTES:

Patient awake nonverbal tolerating ventilator with small amount of clear white secretions 
noted. VSS and afebrile, no acute distress noted. bed bath provided with sacral optifoam 
dressing changed, oral care provided. small soft brown bowel movement noted. casas catheter 
draining clear white urine. will continue to monitor. bed locked lowest position side rails 
padded.

## 2020-03-13 NOTE — NUR
RD ASSESSMENT & RECOMMENDATIONS

SEE CARE ACTIVITY FOR COMPLETE ASSESSMENT



DAILY ESTIMATED NEEDS:

Needs based on Critical care, wounds; 68kg 

22-30  kcals/kg 

3048-9707  total kcals

1.25-2  g protein/kg

  g total protein 

25-30  mL/kg

6510-4633  total fluid mLs



NUTRITION DIAGNOSIS:

1) Increased kcal and pro needs r/t wound healing as evidenced by pt adm

w/ advanced wounds, including stage 4 sacral, refer to WC eval.

2) Swallowing difficulty r/t resp status as evidenced by pt is trach/vent

dep and PEG dep.



CURRENT TF:Glucerna 1.5 @50ml/hr 



ENTERAL NUTRITION RECOMMENDATIONS:

Glucerna 1.5 @ 50ml/hr x 24 hrs  

to provide 1200ml, 1800kcal, 99g prot, 911ml free water  



- As medically appropriate, resume TF

- Initiate Glucerna 1.5 @ 20ml/hr x 6 hrs

- Advance 10ml q 4-6 hrs as tolerated to goal rate

- HOB over 30 degrees/ water flush per MD

- Rec Zaki BID for wound healing





ADDITIONAL RECOMMENDATIONS:

1) Calibrated bedscale wt 

2) Wound healing: add Vit C 500mg BID + ZnSO4 220mg QD x 10 days 

     add Zaki 1pkt BID via PEG 

3) Monitor lytes, replete as needed- low K 

4) Monitor renal fxn/ hydration status:  (now 98), creat 1.6 

5) Monitor BGs, need for NISS: h/o DM 

6) Monitor ability to resume TF: possible GIB, monitor h/h

## 2020-03-13 NOTE — NUR
NURSE NOTES:

Pt repositioned, oral care provided, no acute distress noted. Dr Gonzalez made aware  of pt's 
labs today ( Sodium 145). Received order to d/c D5NS fluid and start pt on NS at 100 
cc/hr IV.

## 2020-03-13 NOTE — NUR
NURSE NOTES:

Received call back from Dr Gonzalez with new order for vent settings (AC 24), ABGs in the am 
tomorrow, and 2 amps of NaHCO3 (total of 100 cc) IVP once now.

## 2020-03-13 NOTE — GENERAL PROGRESS NOTE
Assessment/Plan


Problem List:  


(1) Tracheostomy in place


ICD Codes:  Z93.0 - Tracheostomy status


SNOMED:  672931962


(2) Diabetes mellitus type II, controlled


ICD Codes:  E11.9 - Type 2 diabetes mellitus without complications


SNOMED:  66774808, 881828730


(3) Diastolic heart failure


ICD Codes:  I50.30 - Unspecified diastolic (congestive) heart failure


SNOMED:  995186896


(4) GI bleed


ICD Codes:  K92.2 - Gastrointestinal hemorrhage, unspecified


SNOMED:  32355281


(5) Diabetic nephropathy


ICD Codes:  E11.21 - Type 2 diabetes mellitus with diabetic nephropathy


SNOMED:  870501077


(6) Wounds and injuries


ICD Codes:  T14.90XA - Injury, unspecified, initial encounter


SNOMED:  206867445, 167930554


(7) Respiratory failure


ICD Codes:  J96.90 - Respiratory failure, unspecified, unspecified whether with 

hypoxia or hypercapnia


SNOMED:  449320489


(8) Aspiration pneumonia


ICD Codes:  J69.0 - Pneumonitis due to inhalation of food and vomit


SNOMED:  380487251


(9) CHF (congestive heart failure)


ICD Codes:  I50.9 - Heart failure, unspecified


SNOMED:  09937295


(10) Leukocytosis


ICD Codes:  D72.829 - Elevated white blood cell count, unspecified


SNOMED:  629678466, 951182643


(11) Hyponatremia


ICD Codes:  E87.1 - Hypo-osmolality and hyponatremia


SNOMED:  35180296


Status:  stable


Assessment/Plan:


monitor serial cbc


PPI iv


transfuse as needed


iv abx


follow up cultures


ct abd noted


ivf





Subjective


ROS Limited/Unobtainable:  Yes


Constitutional:  Reports: malaise, weakness


HEENT:  Reports: no symptoms


Cardiovascular:  Reports: no symptoms


Respiratory:  Reports: cough, shortness of breath, sputum


Gastrointestinal/Abdominal:  Reports: tarry stools, blood in stool, difficulty 

swallowing


Genitourinary:  Reports: no symptoms


Neurologic/Psychiatric:  Reports: pre-existing deficit


Endocrine:  Reports: no symptoms


Hematologic/Lymphatic:  Reports: anemia


Allergies:  


Coded Allergies:  


     No Known Allergies (Unverified , 10/14/19)


All Systems:  reviewed and negative except above


Subjective


no bleeding. h/h better after transfusion.  no fevers. on vent. poorly 

responsive. on ivf. on iv abx. cultures noted.





Objective





Last 24 Hour Vital Signs








  Date Time  Temp Pulse Resp B/P (MAP) Pulse Ox O2 Delivery O2 Flow Rate FiO2


 


3/13/20 14:00  115 30 141/54 (83) 100   


 


3/13/20 13:00  99 23 159/52 (87) 100   


 


3/13/20 12:00      Mechanical Ventilator  





      Mechanical Ventilator  





      Mechanical Ventilator  


 


3/13/20 12:00  98      


 


3/13/20 12:00 99.3 97 25 155/56 (89) 100   


 


3/13/20 12:00        40


 


3/13/20 11:00  99 25 144/50 (81) 100   


 


3/13/20 10:40  100 29     40


 


3/13/20 10:00  101 23 153/28 (69) 100   


 


3/13/20 09:10  99  154/58    


 


3/13/20 09:00  97 25 154/58 (90) 100   


 


3/13/20 08:45  100 26     40


 


3/13/20 08:00        40


 


3/13/20 08:00 98.9 97 26 156/67 (96) 100   


 


3/13/20 08:00  94      


 


3/13/20 08:00      Mechanical Ventilator  





      Mechanical Ventilator  





      Mechanical Ventilator  


 


3/13/20 07:00  97 23 137/53 (81) 100   


 


3/13/20 06:30  93 25     40


 


3/13/20 06:00  90 19 137/45 (75) 100   


 


3/13/20 05:15  92 22     40


 


3/13/20 05:00  93 22 156/44 (81) 100   


 


3/13/20 04:00  77      


 


3/13/20 04:00        40


 


3/13/20 04:00      Mechanical Ventilator  





      Mechanical Ventilator  





      Mechanical Ventilator  


 


3/13/20 04:00 98.0 96 28 134/47 (76) 100   


 


3/13/20 03:15  94 23     40


 


3/13/20 03:00  89 18 124/45 (71) 100   


 


3/13/20 02:00  91 21 122/76 (91) 100   


 


3/13/20 01:00  96 21 121/79 (93) 100   


 


3/13/20 00:56  97 27     40


 


3/13/20 00:00      Mechanical Ventilator  





      Mechanical Ventilator  





      Mechanical Ventilator  


 


3/13/20 00:00 98.3 94 23 145/49 (81) 100   


 


3/12/20 23:00  96 27 136/46 (76) 100   


 


3/12/20 22:42  86 18     40


 


3/12/20 22:00  93 22 133/49 (77) 99   


 


3/12/20 21:19  87 23     40


 


3/12/20 21:00  86 22 130/52 (78) 100   


 


3/12/20 20:00        40


 


3/12/20 20:00  87      


 


3/12/20 20:00 99.6 87 20 126/54 (78) 100   


 


3/12/20 20:00      Mechanical Ventilator  





      Mechanical Ventilator  





      Mechanical Ventilator  


 


3/12/20 19:02  87 24     40


 


3/12/20 19:00  88 23 145/54 (84) 99   


 


3/12/20 18:00 99.1 87 18 125/45 (71) 100   


 


3/12/20 17:06  85 20     40


 


3/12/20 17:01  87  124/51    


 


3/12/20 17:00  87 18 126/51 (76) 100   


 


3/12/20 16:00 98.7 87 18 124/51 (75) 100   


 


3/12/20 16:00      Mechanical Ventilator  





      Mechanical Ventilator  





      Mechanical Ventilator  


 


3/12/20 16:00  85      


 


3/12/20 16:00        40


 


3/12/20 15:00  85 18 121/57 (78) 100   


 


3/12/20 14:46  87 24     40

















Intake and Output  


 


 3/12/20 3/13/20





 19:00 07:00


 


Intake Total 1320.0 ml 1010 ml


 


Output Total 850 ml 495 ml


 


Balance 470.0 ml 515 ml


 


  


 


IV Total 1320.0 ml 1010 ml


 


Output Urine Total 850 ml 495 ml


 


# Bowel Movements 2 1








Laboratory Tests


3/13/20 09:45: 


White Blood Count 17.5H, Red Blood Count 3.38L, Hemoglobin 9.9#L, Hematocrit 

29.7#L, Mean Corpuscular Volume 88, Mean Corpuscular Hemoglobin 29.4, Mean 

Corpuscular Hemoglobin Concent 33.5, Red Cell Distribution Width 15.6H, 

Platelet Count 343, Mean Platelet Volume 6.4L, Neutrophils (%) (Auto) , 

Lymphocytes (%) (Auto) , Monocytes (%) (Auto) , Eosinophils (%) (Auto) , 

Basophils (%) (Auto) , Differential Total Cells Counted 100, Neutrophils % (

Manual) 93H, Lymphocytes % (Manual) 3L, Monocytes % (Manual) 3, Eosinophils % (

Manual) 1, Basophils % (Manual) 0, Band Neutrophils 0, Platelet Estimate 

Adequate, Platelet Morphology Normal, Anisocytosis 1+, Sodium Level 145, 

Potassium Level 3.5, Chloride Level 110H, Carbon Dioxide Level 18L, Anion Gap 

18H, Blood Urea Nitrogen 71H, Creatinine 1.6H, Estimat Glomerular Filtration 

Rate 32.3, Glucose Level 381#H, Calcium Level 8.9, Total Bilirubin 0.5, 

Aspartate Amino Transf (AST/SGOT) 11L, Alanine Aminotransferase (ALT/SGPT) 20, 

Alkaline Phosphatase 118H, Total Protein 6.8, Albumin 1.7L, Globulin 5.1, 

Albumin/Globulin Ratio 0.3L, Vancomycin Level Trough 15.5H


Height (Feet):  5


Height (Inches):  7.00


Weight (Pounds):  167


General Appearance:  WD/WN, confused


Neck:  supple


Cardiovascular:  normal rate, regular rhythm


Respiratory/Chest:  no respiratory distress, no accessory muscle use, rhonchi - 

bilaterally


Abdomen:  normal bowel sounds, non tender, soft, no organomegaly, no mass


Extremities:  normal range of motion, non-tender


Edema:  mild edema


Neurologic:  disoriented











Arun Sanderson MD Mar 13, 2020 14:12

## 2020-03-13 NOTE — NUR
NURSE NOTES:

Dr Encarnacion at bedside assessing pt. Instructed me to start tube feeding per RD 
recommendation.

## 2020-03-13 NOTE — NUR
SI:   RESP FAILURE TRACH/VENT DEPENDENT,GI BLEED

T. 98.3  RR 29 B/P 144/50

AC 18  FIO2 40% PEEP 5

WBC 17.5 BUN 71 CR 1.6 ALK PHOS 118





IS:    IVF NS @ 100ML/HR

VANCO IV

ZOSYN IV

PROTONIX IV

ALB HHN

*******ICU STATUS*******

## 2020-03-13 NOTE — NUR
NURSE NOTES:

Left message for Dr Gonzalez and Kin regarding pt's HR (120-130s) and RR (30s). Awaiting 
call back.

-------------------------------------------------------------------------------

Addendum: 03/13/20 at 1716 by Sarina Flynn RN

-------------------------------------------------------------------------------

Pt repositioned, oral care provided also.

## 2020-03-13 NOTE — NUR
NURSE NOTES:

patient asleep, obtunded and opens eyes to light pain but nonverbal with shiley 7 trach. 
patient tolerating ventilator with oxygen saturation 100%. VSS and afebrile. Abdomen round 
soft and nontender. casas catheter draining clear yellow urine. patient repositioned and 
oral care provided. will continue to monitor.

## 2020-03-13 NOTE — CONSULTATION
DATE OF CONSULTATION:  03/13/2020

INFECTIOUS DISEASES CONSULTATION



CONSULTING PHYSICIAN:  Michelle Doherty M.D.



REFERRING PHYSICIAN:  Steven Gonzalez M.D.



REASON FOR CONSULTATION:  Pneumonia and leukocytosis.



HISTORY OF PRESENTING ILLNESS:  This is a 64-year-old lady with history of

respiratory failure, status post tracheostomy, encephalopathy, diabetes,

chronic kidney disease, seizure disorder, congestive heart failure, who

came into the emergency room because she had melena, who was found to be

tachycardic and had leukocytosis and an Infectious Diseases consultation

has been obtained for antibiotics.



PAST MEDICAL HISTORY:

1. History of pneumonia.

2. Respiratory failure.

3. Diabetes.

4. Chronic kidney disease.

5. Seizures.

6. Hyperlipidemia.

7. Congestive heart failure.

8. History of G-tube placement.



SOCIAL HISTORY:  No history of smoking, alcohol, or drug use.



FAMILY HISTORY:  Unknown.



REVIEW OF SYSTEMS:  Unable to obtain currently.



MEDICATIONS:  As an inpatient, the patient is on Depakote, zinc sulfate,

ascorbic acid, amlodipine, Protonix, Tylenol, IV vancomycin, Zosyn,

albuterol and ipratropium.



ALLERGIES:  No known drug allergies.



PHYSICAL EXAMINATION:

VITAL SIGNS:  Temperature of 98.9, T-max of 99.6, pulse of 97, respiratory

rate 23, blood pressure 137/53, O2 saturation 100%.

HEENT:  Pupils equally reactive to light and accommodation.  Mouth appears

clean without thrush.

NECK:  Supple.  No adenopathy.  No JVD.  Tracheostomy site is

clean.

CARDIOVASCULAR:  Regular rate and rhythm.  No murmurs.

LUNGS:  Clear to auscultation bilaterally.  No crackles.  No

wheezes.

ABDOMEN:  Soft and nontender.  G-tube site appears clean.

EXTREMITIES:  No cyanosis, no clubbing, no edema.



LABORATORY AND DIAGNOSTIC DATA:  White count of 35 on 03/09/2020, white

count 17.5 today, hemoglobin 9.9, hematocrit 29.7, MCV 88, platelet count

343.  Sodium 145, potassium 3.5, chloride 110, bicarb 18, BUN 71,

creatinine 1.6, glucose 381, calcium 8.9.  Total bilirubin 0.5, AST 11,

ALT 20, alkaline phosphatase 118.  Total protein 6.8, albumin 1.7.

Amylase of 54, lipase of 68.  UA showing 0 to 2 white cells.  Rectal swab

was positive for VRE.  Nasal swab was negative for MRSA.  Urine cultures

grew Streptococcus Group B and mixed contaminants.  Blood cultures are

negative.



CT abdomen and pelvis showing an enlarging ovarian cystic neoplasm,

decubitus ulceration in the retrococcygeal region with evidence of bony

destruction presumably due to osteomyelitis involving the coccyx, _____

colitis, large bilateral pleural effusion, basilar pulmonary atelectasis

and consolidation, stable or minimally improved bilateral parenchymal

disease.  Ultrasound of the legs showed negative for DVT.



ASSESSMENT:  This is a 64-year-old lady with history of pneumonia,

respiratory failure, diabetes, seizures, who comes in with and was found

to have:



1. Bilateral pneumonia.

2. Leukocytosis is improving.

3. Possible history of ovarian mass.

4. Coccygeal osteomyelitis.

5. Streptococcus urinary tract infection.



PLAN:

1. Continue IV vancomycin and Zosyn.

2. We will order sputum cultures.

3. We will follow up cultures and adjust antibiotics accordingly.



I would like to thank, Dr. Gonzalez for this consultation.









  ______________________________________________

  Michelle Doherty M.D. DR:  ARLET

D:  03/13/2020 11:10

T:  03/14/2020 05:35

JOB#:  [id number]

CC:  Steven Gonzalez M.D.; Fax#:  [phone]

## 2020-03-13 NOTE — NUR
NURSE NOTES:

Received call back from Dr Gonzalez with orders for morphine 2mg IVP q 3hr PRN severe pain. 
ABG results will be relayed to him as soon as they become available.

## 2020-03-13 NOTE — GENERAL PROGRESS NOTE
Assessment/Plan


Status:  stable


Assessment/Plan:


Assessment


- UGIB - resolving


- Anemia


- Sacral decub with osteo


- enlarging cystic ovarian neoplasm


- rectal stool impaction


- chronic trach / vent


- body edema


- leukocytosis / sepsis


- Azotemia


- Poor prognosis





Recommendations


- PPI


- abx


- transfuse PRN


- Follow labs


- laxative


- Begin TF


- no plans for endoscopy per family directive





Subjective


Allergies:  


Coded Allergies:  


     No Known Allergies (Unverified , 10/14/19)


Subjective


received message from son


family not interested in EGD at this time


they request feeds and supportive Rx


TF started





Objective





Last 24 Hour Vital Signs








  Date Time  Temp Pulse Resp B/P (MAP) Pulse Ox O2 Delivery O2 Flow Rate FiO2


 


3/13/20 21:44  110 30     50


 


3/13/20 19:51  113 30     50


 


3/13/20 19:00  114 32 153/57 (89) 100   


 


3/13/20 18:01  111  138/54    


 


3/13/20 18:00  110 41 150/87 (108) 100   


 


3/13/20 18:00      Mechanical Ventilator  





      Mechanical Ventilator  





      Mechanical Ventilator  


 


3/13/20 17:19        50


 


3/13/20 17:15        50


 


3/13/20 17:00  116 32 138/54 (82) 97   


 


3/13/20 16:54  118 31     40


 


3/13/20 16:00      Mechanical Ventilator  





      Mechanical Ventilator  





      Mechanical Ventilator  


 


3/13/20 16:00 98.6 123 33 145/65 (91) 97   


 


3/13/20 16:00        40


 


3/13/20 16:00  124      


 


3/13/20 15:00  129 33 151/60 (90) 98   


 


3/13/20 14:59  127 32     40


 


3/13/20 14:00  115 30 141/54 (83) 100   


 


3/13/20 13:15  124 34     40


 


3/13/20 13:00  99 23 159/52 (87) 100   


 


3/13/20 12:00      Mechanical Ventilator  





      Mechanical Ventilator  





      Mechanical Ventilator  


 


3/13/20 12:00  98      


 


3/13/20 12:00 99.3 97 25 155/56 (89) 100   


 


3/13/20 12:00        40


 


3/13/20 11:00  99 25 144/50 (81) 100   


 


3/13/20 10:40  100 29     40


 


3/13/20 10:00  101 23 153/28 (69) 100   


 


3/13/20 09:10  99  154/58    


 


3/13/20 09:00  97 25 154/58 (90) 100   


 


3/13/20 08:45  100 26     40


 


3/13/20 08:00        40


 


3/13/20 08:00 98.9 97 26 156/67 (96) 100   


 


3/13/20 08:00  94      


 


3/13/20 08:00      Mechanical Ventilator  





      Mechanical Ventilator  





      Mechanical Ventilator  


 


3/13/20 07:00  97 23 137/53 (81) 100   


 


3/13/20 06:30  93 25     40


 


3/13/20 06:00  90 19 137/45 (75) 100   


 


3/13/20 05:15  92 22     40


 


3/13/20 05:00  93 22 156/44 (81) 100   


 


3/13/20 04:00  77      


 


3/13/20 04:00        40


 


3/13/20 04:00      Mechanical Ventilator  





      Mechanical Ventilator  





      Mechanical Ventilator  


 


3/13/20 04:00 98.0 96 28 134/47 (76) 100   


 


3/13/20 03:15  94 23     40


 


3/13/20 03:00  89 18 124/45 (71) 100   


 


3/13/20 02:00  91 21 122/76 (91) 100   


 


3/13/20 01:00  96 21 121/79 (93) 100   


 


3/13/20 00:56  97 27     40


 


3/13/20 00:00      Mechanical Ventilator  





      Mechanical Ventilator  





      Mechanical Ventilator  


 


3/13/20 00:00 98.3 94 23 145/49 (81) 100   


 


3/12/20 23:00  96 27 136/46 (76) 100   


 


3/12/20 22:42  86 18     40


 


3/12/20 22:00  93 22 133/49 (77) 99   

















Intake and Output  


 


 3/12/20 3/13/20





 18:59 06:59


 


Intake Total 1385.93 ml 1010 ml


 


Output Total 885 ml 515 ml


 


Balance 500.93 ml 495 ml


 


  


 


IV Total 1385.93 ml 1010 ml


 


Output Urine Total 885 ml 515 ml


 


# Bowel Movements 2 1








Laboratory Tests


3/13/20 09:45: 


White Blood Count 17.5H, Red Blood Count 3.38L, Hemoglobin 9.9#L, Hematocrit 

29.7#L, Mean Corpuscular Volume 88, Mean Corpuscular Hemoglobin 29.4, Mean 

Corpuscular Hemoglobin Concent 33.5, Red Cell Distribution Width 15.6H, 

Platelet Count 343, Mean Platelet Volume 6.4L, Neutrophils (%) (Auto) , 

Lymphocytes (%) (Auto) , Monocytes (%) (Auto) , Eosinophils (%) (Auto) , 

Basophils (%) (Auto) , Differential Total Cells Counted 100, Neutrophils % (

Manual) 93H, Lymphocytes % (Manual) 3L, Monocytes % (Manual) 3, Eosinophils % (

Manual) 1, Basophils % (Manual) 0, Band Neutrophils 0, Platelet Estimate 

Adequate, Platelet Morphology Normal, Anisocytosis 1+, Sodium Level 145, 

Potassium Level 3.5, Chloride Level 110H, Carbon Dioxide Level 18L, Anion Gap 

18H, Blood Urea Nitrogen 71H, Creatinine 1.6H, Estimat Glomerular Filtration 

Rate 32.3, Glucose Level 381#H, Calcium Level 8.9, Total Bilirubin 0.5, 

Aspartate Amino Transf (AST/SGOT) 11L, Alanine Aminotransferase (ALT/SGPT) 20, 

Alkaline Phosphatase 118H, Total Protein 6.8, Albumin 1.7L, Globulin 5.1, 

Albumin/Globulin Ratio 0.3L, Vancomycin Level Trough 15.5H


3/13/20 17:03: 


Arterial Blood pH 7.280L, Arterial Blood Partial Pressure CO2 35.9, Arterial 

Blood Partial Pressure O2 78.9, Arterial Blood HCO3 16.5*L, Arterial Blood 

Oxygen Saturation 94.2L, Arterial Blood Base Excess -9.4*L, Graham Test Positive


Height (Feet):  5


Height (Inches):  7.00


Weight (Pounds):  167


Objective


Elderly woman


on vent, (+) Trach


NCAT


coarse BS


RR 


abd soft, (+) GT,(+) pelvic fullness


(+) edema











Bladimir Encarnacion MD Mar 13, 2020 21:58

## 2020-03-13 NOTE — NUR
NURSE NOTES:

Endorsement received from ELIZABETH Branch. Patient withdraws to pain. Trache to vent. Shiley 7.0, 
AC 24, 500, PEEP 5, 50% FiO2. GT patent and intact. Receiving Glucerna 1.5 at 30 ml/hr with 
goal of 50ml/hr. No residual. Increased to 40ml/hr. With right hand g22, receiving NS 
100ml/hr. Charles catheter in place. SCDs on. On P200 mattress. On seizure precautions. Head 
of bed elevated. Bed alarm on. Bed locked and in low position. Call light within reach.

## 2020-03-13 NOTE — NUR
NURSE NOTES:

Patient asleep, nonverbal, opens eyes spontaneously with light touch. VSS and afebrile, no 
acute distress noted. patient tolerating Shiley 7 trach: AC 18,  PEEP5 FiO2 40% with 
oxygen sautration 100%. right hand PIV running D5NS at 100, clean dry patent. sacral, 
bilateral heel Optifoam right lateral tibia, left femoral head dressing clean dry intact. 
Abdomen round nontender with Gtube clamped patent, dressing clean dry intact. Charles catheter 
draining yellow urine. will continue to monitor. bed locked lowest position call light 
within reach.

## 2020-03-13 NOTE — SURGERY PROGRESS NOTE
Surgery Progress Note


Subjective


Additional Comments


leukocytosis


anemia 


ill appearing





Objective





Last 24 Hour Vital Signs








  Date Time  Temp Pulse Resp B/P (MAP) Pulse Ox O2 Delivery O2 Flow Rate FiO2


 


3/13/20 11:00  99 25 144/50 (81) 100   


 


3/13/20 10:40  100 29     40


 


3/13/20 10:00  101 23 153/28 (69) 100   


 


3/13/20 09:10  99  154/58    


 


3/13/20 09:00  97 25 154/58 (90) 100   


 


3/13/20 08:45  100 26     40


 


3/13/20 08:00        40


 


3/13/20 08:00 98.9 97 26 156/67 (96) 100   


 


3/13/20 08:00  94      


 


3/13/20 08:00      Mechanical Ventilator  





      Mechanical Ventilator  





      Mechanical Ventilator  


 


3/13/20 07:00  97 23 137/53 (81) 100   


 


3/13/20 06:30  93 25     40


 


3/13/20 06:00  90 19 137/45 (75) 100   


 


3/13/20 05:15  92 22     40


 


3/13/20 05:00  93 22 156/44 (81) 100   


 


3/13/20 04:00  77      


 


3/13/20 04:00        40


 


3/13/20 04:00      Mechanical Ventilator  





      Mechanical Ventilator  





      Mechanical Ventilator  


 


3/13/20 04:00 98.0 96 28 134/47 (76) 100   


 


3/13/20 03:15  94 23     40


 


3/13/20 03:00  89 18 124/45 (71) 100   


 


3/13/20 02:00  91 21 122/76 (91) 100   


 


3/13/20 01:00  96 21 121/79 (93) 100   


 


3/13/20 00:56  97 27     40


 


3/13/20 00:00      Mechanical Ventilator  





      Mechanical Ventilator  





      Mechanical Ventilator  


 


3/13/20 00:00 98.3 94 23 145/49 (81) 100   


 


3/12/20 23:00  96 27 136/46 (76) 100   


 


3/12/20 22:42  86 18     40


 


3/12/20 22:00  93 22 133/49 (77) 99   


 


3/12/20 21:19  87 23     40


 


3/12/20 21:00  86 22 130/52 (78) 100   


 


3/12/20 20:00        40


 


3/12/20 20:00  87      


 


3/12/20 20:00 99.6 87 20 126/54 (78) 100   


 


3/12/20 20:00      Mechanical Ventilator  





      Mechanical Ventilator  





      Mechanical Ventilator  


 


3/12/20 19:02  87 24     40


 


3/12/20 19:00  88 23 145/54 (84) 99   


 


3/12/20 18:00 99.1 87 18 125/45 (71) 100   


 


3/12/20 17:06  85 20     40


 


3/12/20 17:01  87  124/51    


 


3/12/20 17:00  87 18 126/51 (76) 100   


 


3/12/20 16:00 98.7 87 18 124/51 (75) 100   


 


3/12/20 16:00      Mechanical Ventilator  





      Mechanical Ventilator  





      Mechanical Ventilator  


 


3/12/20 16:00  85      


 


3/12/20 16:00        40


 


3/12/20 15:00  85 18 121/57 (78) 100   


 


3/12/20 14:46  87 24     40


 


3/12/20 14:00  86 20 121/50 (73) 100   


 


3/12/20 13:00 99.1 85 20 121/48 (72) 100   


 


3/12/20 12:43  90 19     40








I&O











Intake and Output  


 


 3/12/20 3/13/20





 19:00 07:00


 


Intake Total 1320.0 ml 1010 ml


 


Output Total 850 ml 495 ml


 


Balance 470.0 ml 515 ml


 


  


 


IV Total 1320.0 ml 1010 ml


 


Output Urine Total 850 ml 495 ml


 


# Bowel Movements 2 1








Dressing:  other


Wound:  other


Drains:  other


Cardiovascular:  RSR


Respiratory:  decreased breath sounds


Abdomen:  soft, non-distended, decreased bowel sounds


Extremities:  no tenderness, no cyanosis, other





Laboratory Tests








Test


  3/13/20


09:45


 


White Blood Count


  17.5 K/UL


(4.8-10.8)  H


 


Red Blood Count


  3.38 M/UL


(4.20-5.40)  L


 


Hemoglobin


  9.9 G/DL


(12.0-16.0)  #L


 


Hematocrit


  29.7 %


(37.0-47.0)  #L


 


Mean Corpuscular Volume 88 FL (80-99)  


 


Mean Corpuscular Hemoglobin


  29.4 PG


(27.0-31.0)


 


Mean Corpuscular Hemoglobin


Concent 33.5 G/DL


(32.0-36.0)


 


Red Cell Distribution Width


  15.6 %


(11.6-14.8)  H


 


Platelet Count


  343 K/UL


(150-450)


 


Mean Platelet Volume


  6.4 FL


(6.5-10.1)  L


 


Neutrophils (%) (Auto)


  % (45.0-75.0)


 


 


Lymphocytes (%) (Auto)


  % (20.0-45.0)


 


 


Monocytes (%) (Auto)  % (1.0-10.0)  


 


Eosinophils (%) (Auto)  % (0.0-3.0)  


 


Basophils (%) (Auto)  % (0.0-2.0)  


 


Differential Total Cells


Counted 100  


 


 


Neutrophils % (Manual) 93 % (45-75)  H


 


Lymphocytes % (Manual) 3 % (20-45)  L


 


Monocytes % (Manual) 3 % (1-10)  


 


Eosinophils % (Manual) 1 % (0-3)  


 


Basophils % (Manual) 0 % (0-2)  


 


Band Neutrophils 0 % (0-8)  


 


Platelet Estimate Adequate  


 


Platelet Morphology Normal  


 


Anisocytosis 1+  


 


Sodium Level


  145 MMOL/L


(136-145)


 


Potassium Level


  3.5 MMOL/L


(3.5-5.1)


 


Chloride Level


  110 MMOL/L


()  H


 


Carbon Dioxide Level


  18 MMOL/L


(21-32)  L


 


Anion Gap


  18 mmol/L


(5-15)  H


 


Blood Urea Nitrogen


  71 mg/dL


(7-18)  H


 


Creatinine


  1.6 MG/DL


(0.55-1.30)  H


 


Estimat Glomerular Filtration


Rate 32.3 mL/min


(>60)


 


Glucose Level


  381 MG/DL


()  #H


 


Calcium Level


  8.9 MG/DL


(8.5-10.1)


 


Total Bilirubin


  0.5 MG/DL


(0.2-1.0)


 


Aspartate Amino Transf


(AST/SGOT) 11 U/L (15-37)


L


 


Alanine Aminotransferase


(ALT/SGPT) 20 U/L (12-78)


 


 


Alkaline Phosphatase


  118 U/L


()  H


 


Total Protein


  6.8 G/DL


(6.4-8.2)


 


Albumin


  1.7 G/DL


(3.4-5.0)  L


 


Globulin 5.1 g/dL  


 


Albumin/Globulin Ratio


  0.3 (1.0-2.7)


L


 


Vancomycin Level Trough


  15.5 ug/mL


(5.0-12.0)  H











Plan


Problems:  


(1) Leukocytosis


Assessment & Plan:  Significant leukocytosis. -Improving


Initiating work-up currently micro noted


Imaging reviewed


will follow


Thank you for let me participate in patient's care





(2) Wounds and injuries


Assessment & Plan:  DAILY ESTIMATED NEEDS:


Needs based on Critical care, wounds; 68kg 


22-30  kcals/kg 


9564-3595  total kcals


1.25-2  g protein/kg


  g total protein 


25-30  mL/kg


6717-5056  total fluid mLs





NUTRITION DIAGNOSIS:


1) Increased kcal and pro needs r/t wound healing as evidenced by pt adm


w/ advanced wounds, pending eval.


2) Swallowing difficulty r/t resp status as evidenced by pt is trach/vent


dep and PEG dep.





 


CURRENT TF:NPO 





 


ENTERAL NUTRITION RECOMMENDATIONS:


Glucerna 1.5 @ 50ml/hr x 24 hrs  to provide 1200ml, 1800kcal, 99g prot, 911ml 

free water  





- As medically appropriate, resume TF


- Initiate Glucerna 1.5 @ 20ml/hr x 6 hrs


- Advance 10ml q 4-6 hrs as tolerated to goal rate


- HOB over 30 degrees/ water flush per MD


- Rec adding Zaki BID for wound healing





 





ADDITIONAL RECOMMENDATIONS:


1) Calibrated bedscale wt 


2) Wound healing: add Vit C 500mg BID + ZnSO4 220mg QD x 10 days 


                         add Zaki 1pkt BID via PEG


                         f/up with WC eval 


3) Monitor lytes, replete as needed- low K 


4) Monitor renal fxn/ hydration status: low Na, , creat 1.8 


5) Monitor BGs, need for NISS: h/o DM 


6) Monitor ability to resume TF: possible GIB, monitor h/h 





(3) GI bleed


Assessment & Plan:  GI bleed no active bleeding noted currently severe anemia 

transfuse responding well GI eval for possible scope upper and lower continue 

with ICU care for now trend labs will monitor closely no acute surgical 

intervention planned





(4) Abnormal LFTs


(5) Pressure injury of deep tissue


Assessment & Plan:  Pt presented on admission with multiple pressure injuries. 

No evidence of skin breakdown under collar of tracheostomy collar.


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 (L)9.5cm 

x (W)11cm x (D)1.6cm, undermining clockwise9-4 by 1.3cm @2o'clock.Base of wound 

has 25% soft necrosis ,20% slough, otherwise pink granulation noted. Epibole 

along borders at 9-2o'clock. Periwound there is an area that is indurated with 

non-blanchable erythema.


Unstageable pressure injury L femoral head.(L)1.5cm x (W)1.7cm. Base of wound 

has 90% slough,10% soft necrosis at center of wound bed. Borders are 

erythematous and moist. No odor or exudate noted.No evidence of skin breakdown 

periwound.


Unstageable pressure injury lateral R tibia.(L)10.2cm x (W)1.6cm. Base of wound 

is 100% necrotic with detached Borders. Borders are erythematous and moist. No 

odor or exudate noted. Marginal erythema periwound.


DTPI R hallux. Base of wound is maroon with marginal erythema.(L)0.6cm x (W)

0.3cm.


Unstageable pressure injury R heel. (L)0.6cm x(W)0.3cm. Base of wound has 90% 

slough with surrounding 10% moist, erythema.Non-blanchable erythema with 

fluctuance periwound.


Unstageable pressure injury L heel(L)4.5cm x (W)3.2cm.Base of wound is 100% 

necrotic with semi-detached borders which are erythematous. No odor or exudate 

noted.Periwound is fluctuant but pale pink


DTPI dorsal L foot.(L)0.9cm x (W)2cm. Base of wound is purple with maroon 

borders.No erythema induration or fluctuance periwound.


Unstageable pressure injury L Hallux(L)1.1cm x (W)1.2cm. Base of wound has 100% 

slough. Erythematous borders. No erythema,induration or fluctuance periwound.


Resolving pressure injury distal/lateral L foot(L)1.4cm x (W)1.5cm. Pink 

epithelial with surrounding dry, brown borders.NO erythema,induration or 

fluctuance periwound. 


DTPI Lateral L 5th Metatarsal.(L)1.4cm x (W)0.9cm. Base of wound is maroon in 

center with surrounding purple borders. 





Tx.Plan: 


Cleanse Sacral wound with Saline. Loosely pack with TheraHoney infused Kerlix. 

Apply Moisture Barrier Paste


            periwound.Cover with Optifoam drsg Daily and prn.


            


Cleanse wound L Femoral Head with Saline. Apply TheraHoney. Apply Moisture 

Barrier Paste periwound. Cover with


            Optifoam drsg Daily and prn.


            


Apply Betadine to wound lateral R tibia. Cover with ABD pad. Wrap with Kerlix 

from base of toes every 3 days and


            prn.


            


Apply Betadine to R hallux and R heel. Cover each site with Optifoam drsg every 

3 days and prn.


            


Apply Betadine to L foot wound. Cover each wound with Optifoam drsg. Change 

every 3 days and prn.


            


Reposition at least every 2hours or as tolerated. 


            


Off-load heels with pillow.


            


APM/ANNELIESE Radhames Galeana Mar 13, 2020 12:38

## 2020-03-14 VITALS — DIASTOLIC BLOOD PRESSURE: 64 MMHG | SYSTOLIC BLOOD PRESSURE: 162 MMHG

## 2020-03-14 VITALS — DIASTOLIC BLOOD PRESSURE: 61 MMHG | SYSTOLIC BLOOD PRESSURE: 158 MMHG

## 2020-03-14 VITALS — SYSTOLIC BLOOD PRESSURE: 160 MMHG | DIASTOLIC BLOOD PRESSURE: 60 MMHG

## 2020-03-14 VITALS — SYSTOLIC BLOOD PRESSURE: 143 MMHG | DIASTOLIC BLOOD PRESSURE: 50 MMHG

## 2020-03-14 VITALS — DIASTOLIC BLOOD PRESSURE: 63 MMHG | SYSTOLIC BLOOD PRESSURE: 161 MMHG

## 2020-03-14 VITALS — SYSTOLIC BLOOD PRESSURE: 172 MMHG | DIASTOLIC BLOOD PRESSURE: 71 MMHG

## 2020-03-14 VITALS — SYSTOLIC BLOOD PRESSURE: 162 MMHG | DIASTOLIC BLOOD PRESSURE: 70 MMHG

## 2020-03-14 VITALS — DIASTOLIC BLOOD PRESSURE: 59 MMHG | SYSTOLIC BLOOD PRESSURE: 147 MMHG

## 2020-03-14 VITALS — SYSTOLIC BLOOD PRESSURE: 147 MMHG | DIASTOLIC BLOOD PRESSURE: 55 MMHG

## 2020-03-14 VITALS — SYSTOLIC BLOOD PRESSURE: 177 MMHG | DIASTOLIC BLOOD PRESSURE: 61 MMHG

## 2020-03-14 VITALS — DIASTOLIC BLOOD PRESSURE: 71 MMHG | SYSTOLIC BLOOD PRESSURE: 166 MMHG

## 2020-03-14 VITALS — SYSTOLIC BLOOD PRESSURE: 168 MMHG | DIASTOLIC BLOOD PRESSURE: 76 MMHG

## 2020-03-14 VITALS — DIASTOLIC BLOOD PRESSURE: 65 MMHG | SYSTOLIC BLOOD PRESSURE: 151 MMHG

## 2020-03-14 VITALS — DIASTOLIC BLOOD PRESSURE: 79 MMHG | SYSTOLIC BLOOD PRESSURE: 163 MMHG

## 2020-03-14 VITALS — DIASTOLIC BLOOD PRESSURE: 78 MMHG | SYSTOLIC BLOOD PRESSURE: 177 MMHG

## 2020-03-14 VITALS — DIASTOLIC BLOOD PRESSURE: 80 MMHG | SYSTOLIC BLOOD PRESSURE: 166 MMHG

## 2020-03-14 LAB
ADD MANUAL DIFF: YES
ANION GAP SERPL CALC-SCNC: 16 MMOL/L (ref 5–15)
BUN SERPL-MCNC: 78 MG/DL (ref 7–18)
CALCIUM SERPL-MCNC: 9.1 MG/DL (ref 8.5–10.1)
CHLORIDE SERPL-SCNC: 114 MMOL/L (ref 98–107)
CO2 SERPL-SCNC: 20 MMOL/L (ref 21–32)
CREAT SERPL-MCNC: 1.8 MG/DL (ref 0.55–1.3)
ERYTHROCYTE [DISTWIDTH] IN BLOOD BY AUTOMATED COUNT: 15.5 % (ref 11.6–14.8)
HCT VFR BLD CALC: 32.2 % (ref 37–47)
HGB BLD-MCNC: 10.8 G/DL (ref 12–16)
MCV RBC AUTO: 88 FL (ref 80–99)
PLATELET # BLD: 432 K/UL (ref 150–450)
POTASSIUM SERPL-SCNC: 3.6 MMOL/L (ref 3.5–5.1)
RBC # BLD AUTO: 3.66 M/UL (ref 4.2–5.4)
SODIUM SERPL-SCNC: 150 MMOL/L (ref 136–145)
WBC # BLD AUTO: 26.2 K/UL (ref 4.8–10.8)

## 2020-03-14 RX ADMIN — DIVALPROEX SODIUM SCH MG: 125 CAPSULE ORAL at 09:33

## 2020-03-14 RX ADMIN — PANTOPRAZOLE SODIUM SCH MG: 40 INJECTION, POWDER, FOR SOLUTION INTRAVENOUS at 09:33

## 2020-03-14 RX ADMIN — DEXTROSE MONOHYDRATE SCH MLS/HR: 50 INJECTION, SOLUTION INTRAVENOUS at 05:09

## 2020-03-14 RX ADMIN — Medication SCH MG: at 09:34

## 2020-03-14 RX ADMIN — INSULIN ASPART SCH UNITS: 100 INJECTION, SOLUTION INTRAVENOUS; SUBCUTANEOUS at 05:15

## 2020-03-14 RX ADMIN — ZINC SULFATE CAP 220 MG (50 MG ELEMENTAL ZN) SCH MG: 220 (50 ZN) CAP at 09:34

## 2020-03-14 NOTE — GENERAL PROGRESS NOTE
Assessment/Plan


Problem List:  


(1) Tracheostomy in place


ICD Codes:  Z93.0 - Tracheostomy status


SNOMED:  712679693


(2) Diabetes mellitus type II, controlled


ICD Codes:  E11.9 - Type 2 diabetes mellitus without complications


SNOMED:  24026735, 390729121


(3) Diastolic heart failure


ICD Codes:  I50.30 - Unspecified diastolic (congestive) heart failure


SNOMED:  529988100


(4) GI bleed


ICD Codes:  K92.2 - Gastrointestinal hemorrhage, unspecified


SNOMED:  53923509


(5) Diabetic nephropathy


ICD Codes:  E11.21 - Type 2 diabetes mellitus with diabetic nephropathy


SNOMED:  979487470


(6) Wounds and injuries


ICD Codes:  T14.90XA - Injury, unspecified, initial encounter


SNOMED:  858545170, 558279583


(7) Respiratory failure


ICD Codes:  J96.90 - Respiratory failure, unspecified, unspecified whether with 

hypoxia or hypercapnia


SNOMED:  948323452


(8) Aspiration pneumonia


ICD Codes:  J69.0 - Pneumonitis due to inhalation of food and vomit


SNOMED:  686025816


(9) CHF (congestive heart failure)


ICD Codes:  I50.9 - Heart failure, unspecified


SNOMED:  47257831


(10) Leukocytosis


ICD Codes:  D72.829 - Elevated white blood cell count, unspecified


SNOMED:  445373053, 464377581


(11) Hyponatremia


ICD Codes:  E87.1 - Hypo-osmolality and hyponatremia


SNOMED:  29757168


(12) Azotemia


ICD Codes:  R79.89 - Other specified abnormal findings of blood chemistry


SNOMED:  708186776


(13) Renal failure (ARF), acute on chronic


ICD Codes:  N17.9 - Acute kidney failure, unspecified; N18.9 - Chronic kidney 

disease, unspecified


SNOMED:  944983497


Status:  stable, not improved


Assessment/Plan:


monitor serial cbc


PPI iv


transfuse as needed


iv abx


follow up cultures


ct abd noted


ivf adjusted


vent support


resp rx


skin/wound care





Subjective


ROS Limited/Unobtainable:  No


Constitutional:  Reports: malaise, weakness


HEENT:  Reports: no symptoms


Cardiovascular:  Reports: no symptoms


Respiratory:  Reports: shortness of breath, sputum


Gastrointestinal/Abdominal:  Reports: difficulty swallowing


Genitourinary:  Reports: no symptoms


Neurologic/Psychiatric:  Reports: pre-existing deficit


Endocrine:  Reports: no symptoms


Hematologic/Lymphatic:  Reports: no symptoms


Allergies:  


Coded Allergies:  


     No Known Allergies (Unverified , 10/14/19)


All Systems:  reviewed and negative except above


Subjective


no bleeding. h/h stable. wbc up. Na trending up  no fevers. on vent. poorly 

responsive. on ivf. on iv abx. cultures noted.





Objective





Last 24 Hour Vital Signs








  Date Time  Temp Pulse Resp B/P (MAP) Pulse Ox O2 Delivery O2 Flow Rate FiO2


 


3/14/20 09:34  116  168/76    


 


3/14/20 09:18  112 35     40


 


3/14/20 07:29  117 33     40


 


3/14/20 06:00  119 33 163/79 (107) 98   


 


3/14/20 05:45  116 28     50


 


3/14/20 05:00  111 32 166/71 (102) 98   


 


3/14/20 04:00 98.8 110 30 162/64 (96) 99   


 


3/14/20 04:00  109      


 


3/14/20 04:00      Mechanical Ventilator  





      Mechanical Ventilator  





      Mechanical Ventilator  


 


3/14/20 04:00        50


 


3/14/20 03:27  110 28     50


 


3/14/20 02:00  117 34 177/61 (99) 100   


 


3/14/20 01:09  112 29     50


 


3/14/20 01:00  112 34 147/55 (85) 100   


 


3/14/20 00:00        50


 


3/14/20 00:00      Mechanical Ventilator  





      Mechanical Ventilator  





      Mechanical Ventilator  


 


3/14/20 00:00 99.0 108 17 151/65 (93) 100   


 


3/14/20 00:00  110      


 


3/13/20 23:00  107 27 159/77 (104) 100   


 


3/13/20 22:49  100 26     50


 


3/13/20 22:00  102 24 141/48 (79) 100   


 


3/13/20 21:44  110 30     50


 


3/13/20 21:00  106 31 134/57 (82) 100   


 


3/13/20 20:00  110      


 


3/13/20 20:00 99.2 114 36 148/71 (96) 100   


 


3/13/20 20:00      Mechanical Ventilator  





      Mechanical Ventilator  





      Mechanical Ventilator  


 


3/13/20 20:00        50


 


3/13/20 19:51  113 30     50


 


3/13/20 19:00  114 32 153/57 (89) 100   


 


3/13/20 18:01  111  138/54    


 


3/13/20 18:00  110 41 150/87 (108) 100   


 


3/13/20 18:00      Mechanical Ventilator  





      Mechanical Ventilator  





      Mechanical Ventilator  


 


3/13/20 17:19        50


 


3/13/20 17:15        50


 


3/13/20 17:00  116 32 138/54 (82) 97   


 


3/13/20 16:54  118 31     40


 


3/13/20 16:00      Mechanical Ventilator  





      Mechanical Ventilator  





      Mechanical Ventilator  


 


3/13/20 16:00 98.6 123 33 145/65 (91) 97   


 


3/13/20 16:00        40


 


3/13/20 16:00  124      


 


3/13/20 15:00  129 33 151/60 (90) 98   


 


3/13/20 14:59  127 32     40


 


3/13/20 14:00  115 30 141/54 (83) 100   


 


3/13/20 13:15  124 34     40


 


3/13/20 13:00  99 23 159/52 (87) 100   


 


3/13/20 12:00      Mechanical Ventilator  





      Mechanical Ventilator  





      Mechanical Ventilator  


 


3/13/20 12:00  98      


 


3/13/20 12:00 99.3 97 25 155/56 (89) 100   


 


3/13/20 12:00        40


 


3/13/20 11:00  99 25 144/50 (81) 100   


 


3/13/20 10:40  100 29     40


 


3/13/20 10:00  101 23 153/28 (69) 100   

















Intake and Output  


 


 3/13/20 3/14/20





 19:00 07:00


 


Intake Total 1762.416 ml 1787.5 ml


 


Output Total 470 ml 230 ml


 


Balance 1292.416 ml 1557.5 ml


 


  


 


Intake Free Water 60 ml 


 


IV Total 1432.416 ml 1237.5 ml


 


Tube Feeding 270 ml 490 ml


 


Other  60 ml


 


Output Urine Total 470 ml 230 ml


 


# Voids 1 


 


# Bowel Movements 2 








Laboratory Tests


3/13/20 17:03: 


Arterial Blood pH 7.280L, Arterial Blood Partial Pressure CO2 35.9, Arterial 

Blood Partial Pressure O2 78.9, Arterial Blood HCO3 16.5*L, Arterial Blood 

Oxygen Saturation 94.2L, Arterial Blood Base Excess -9.4*L, Graham Test Positive


3/14/20 05:18: 


White Blood Count 26.2*H, Red Blood Count 3.66L, Hemoglobin 10.8L, Hematocrit 

32.2L, Mean Corpuscular Volume 88, Mean Corpuscular Hemoglobin 29.6, Mean 

Corpuscular Hemoglobin Concent 33.5, Red Cell Distribution Width 15.5H, 

Platelet Count 432, Mean Platelet Volume 6.2L, Neutrophils (%) (Auto) , 

Lymphocytes (%) (Auto) , Monocytes (%) (Auto) , Eosinophils (%) (Auto) , 

Basophils (%) (Auto) , Differential Total Cells Counted 100, Neutrophils % (

Manual) 93H, Lymphocytes % (Manual) 5L, Monocytes % (Manual) 2, Eosinophils % (

Manual) 0, Basophils % (Manual) 0, Band Neutrophils 0, Platelet Estimate 

Adequate, Platelet Morphology Normal, Anisocytosis 1+, Sodium Level 150H, 

Potassium Level 3.6, Chloride Level 114H, Carbon Dioxide Level 20L, Anion Gap 

16H, Blood Urea Nitrogen 78H, Creatinine 1.8H, Estimat Glomerular Filtration 

Rate 28.1, Glucose Level 385H, Calcium Level 9.1


Height (Feet):  5


Height (Inches):  7.00


Weight (Pounds):  168


Objective


General Appearance:  WD/WN, confused


Neck:  supple


Cardiovascular:  normal rate, regular rhythm


Respiratory/Chest:  no respiratory distress, no accessory muscle use, rhonchi - 

bilaterally


Abdomen:  normal bowel sounds, non tender, soft, no organomegaly, no mass


Extremities:  normal range of motion, non-tender


Edema:  mild edema


Neurologic:  disoriented











Arun Sanderson MD Mar 14, 2020 09:58

## 2020-03-14 NOTE — NUR
NURSE NOTES:

Patient pronounced by FARHAN Steward RN charge nurse.  Ropoa Hernandez and Dr Encarnacion were 
notified of patient expiring. Daughter was contacted.  [employer] called [phone] and 
gave release of body.   called, spoke to Tata BLACK who also stated that body can be 
released.

## 2020-03-14 NOTE — NUR
RESPIRATORY NOTES

Per doctor's orders, PT has been removed from ventilator and placed on comfort measures. 

Trach collar - 28%.

## 2020-03-14 NOTE — CRITICAL CARE PROGRESS NOTE
Assessment/Plan


Assessment/Plan


chronic respiratory failure


sepsis


leukocytosis


acute on chronic renal failure


chronic encephalopathy 


hypoxemia


severe PCM


anemia


metabolic acidosis


acidemia


tachycardia





PLAN


care noted


IV antibiotics


respiratory care


Ventilatory support- increased AC rate; check ABG


bicarb as needed


IV hydration


SNF meds


supportive care


suction


oxygen therapy and titrate


renal and ID follow up


prognosis poor for recovery 


medications/laboratory data/nursing notes/ICU care reviewed in detail


note reviewed and edited


care discussed with RN and RT


ICU time spent >40   minutes





Critical Care - Subjective


Interval Events:


care noted


tachycardic


acidemic


appears more in distress


ROS Limited/Unobtainable:  Yes


Condition:  critical


EKG Rhythm:  Sinus Tachycardia


Residuals:  minimal


Tube Feeding Tolerated:  yes


I&O:











Intake and Output  


 


 3/13/20 3/14/20





 19:00 07:00


 


Intake Total 1762.416 ml 1787.5 ml


 


Output Total 470 ml 230 ml


 


Balance 1292.416 ml 1557.5 ml


 


  


 


Intake Free Water 60 ml 


 


IV Total 1432.416 ml 1237.5 ml


 


Tube Feeding 270 ml 490 ml


 


Other  60 ml


 


Output Urine Total 470 ml 230 ml


 


# Voids 1 


 


# Bowel Movements 2 











Critical Care - Objective





Last 24 Hour Vital Signs








  Date Time  Temp Pulse Resp B/P (MAP) Pulse Ox O2 Delivery O2 Flow Rate FiO2


 


3/14/20 07:29  117 33     40


 


3/14/20 06:00  119 33 163/79 (107) 98   


 


3/14/20 05:45  116 28     50


 


3/14/20 05:00  111 32 166/71 (102) 98   


 


3/14/20 04:00 98.8 110 30 162/64 (96) 99   


 


3/14/20 04:00  109      


 


3/14/20 04:00      Mechanical Ventilator  





      Mechanical Ventilator  





      Mechanical Ventilator  


 


3/14/20 04:00        50


 


3/14/20 03:27  110 28     50


 


3/14/20 02:00  117 34 177/61 (99) 100   


 


3/14/20 01:09  112 29     50


 


3/14/20 01:00  112 34 147/55 (85) 100   


 


3/14/20 00:00        50


 


3/14/20 00:00      Mechanical Ventilator  





      Mechanical Ventilator  





      Mechanical Ventilator  


 


3/14/20 00:00 99.0 108 17 151/65 (93) 100   


 


3/14/20 00:00  110      


 


3/13/20 23:00  107 27 159/77 (104) 100   


 


3/13/20 22:49  100 26     50


 


3/13/20 22:00  102 24 141/48 (79) 100   


 


3/13/20 21:44  110 30     50


 


3/13/20 21:00  106 31 134/57 (82) 100   


 


3/13/20 20:00  110      


 


3/13/20 20:00 99.2 114 36 148/71 (96) 100   


 


3/13/20 20:00      Mechanical Ventilator  





      Mechanical Ventilator  





      Mechanical Ventilator  


 


3/13/20 20:00        50


 


3/13/20 19:51  113 30     50


 


3/13/20 19:00  114 32 153/57 (89) 100   


 


3/13/20 18:01  111  138/54    


 


3/13/20 18:00  110 41 150/87 (108) 100   


 


3/13/20 18:00      Mechanical Ventilator  





      Mechanical Ventilator  





      Mechanical Ventilator  


 


3/13/20 17:19        50


 


3/13/20 17:15        50


 


3/13/20 17:00  116 32 138/54 (82) 97   


 


3/13/20 16:54  118 31     40


 


3/13/20 16:00      Mechanical Ventilator  





      Mechanical Ventilator  





      Mechanical Ventilator  


 


3/13/20 16:00 98.6 123 33 145/65 (91) 97   


 


3/13/20 16:00        40


 


3/13/20 16:00  124      


 


3/13/20 15:00  129 33 151/60 (90) 98   


 


3/13/20 14:59  127 32     40


 


3/13/20 14:00  115 30 141/54 (83) 100   


 


3/13/20 13:15  124 34     40


 


3/13/20 13:00  99 23 159/52 (87) 100   


 


3/13/20 12:00      Mechanical Ventilator  





      Mechanical Ventilator  





      Mechanical Ventilator  


 


3/13/20 12:00  98      


 


3/13/20 12:00 99.3 97 25 155/56 (89) 100   


 


3/13/20 12:00        40


 


3/13/20 11:00  99 25 144/50 (81) 100   


 


3/13/20 10:40  100 29     40


 


3/13/20 10:00  101 23 153/28 (69) 100   


 


3/13/20 09:10  99  154/58    


 


3/13/20 09:00  97 25 154/58 (90) 100   








Labs:





Labs








Test


  3/11/20


09:15 3/12/20


03:20 3/13/20


09:45 3/13/20


17:03


 


White Blood Count


  28.0 K/UL


(4.8-10.8) 16.8 K/UL


(4.8-10.8) 17.5 K/UL


(4.8-10.8) 


 


 


Red Blood Count


  2.75 M/UL


(4.20-5.40) 2.53 M/UL


(4.20-5.40) 3.38 M/UL


(4.20-5.40) 


 


 


Hemoglobin


  8.1 G/DL


(12.0-16.0) 7.5 G/DL


(12.0-16.0) 9.9 G/DL


(12.0-16.0) 


 


 


Hematocrit


  23.0 %


(37.0-47.0) 21.5 %


(37.0-47.0) 29.7 %


(37.0-47.0) 


 


 


Mean Corpuscular Volume 83 FL (80-99)  85 FL (80-99)  88 FL (80-99)  


 


Mean Corpuscular Hemoglobin


  29.5 PG


(27.0-31.0) 29.7 PG


(27.0-31.0) 29.4 PG


(27.0-31.0) 


 


 


Mean Corpuscular Hemoglobin


Concent 35.4 G/DL


(32.0-36.0) 35.0 G/DL


(32.0-36.0) 33.5 G/DL


(32.0-36.0) 


 


 


Red Cell Distribution Width


  15.5 %


(11.6-14.8) 16.0 %


(11.6-14.8) 15.6 %


(11.6-14.8) 


 


 


Platelet Count


  351 K/UL


(150-450) 308 K/UL


(150-450) 343 K/UL


(150-450) 


 


 


Mean Platelet Volume


  6.9 FL


(6.5-10.1) 6.7 FL


(6.5-10.1) 6.4 FL


(6.5-10.1) 


 


 


Neutrophils (%) (Auto)  % (45.0-75.0)   % (45.0-75.0)   % (45.0-75.0)  


 


Lymphocytes (%) (Auto)  % (20.0-45.0)   % (20.0-45.0)   % (20.0-45.0)  


 


Monocytes (%) (Auto)  % (1.0-10.0)   % (1.0-10.0)   % (1.0-10.0)  


 


Eosinophils (%) (Auto)  % (0.0-3.0)   % (0.0-3.0)   % (0.0-3.0)  


 


Basophils (%) (Auto)  % (0.0-2.0)   % (0.0-2.0)   % (0.0-2.0)  


 


Differential Total Cells


Counted 100 


  100 


  100 


  


 


 


Neutrophils % (Manual) 94 % (45-75)  84 % (45-75)  93 % (45-75)  


 


Lymphocytes % (Manual) 5 % (20-45)  3 % (20-45)  3 % (20-45)  


 


Monocytes % (Manual) 0 % (1-10)  9 % (1-10)  3 % (1-10)  


 


Eosinophils % (Manual) 1 % (0-3)  1 % (0-3)  1 % (0-3)  


 


Basophils % (Manual) 0 % (0-2)  3 % (0-2)  0 % (0-2)  


 


Band Neutrophils 0 % (0-8)  0 % (0-8)  0 % (0-8)  


 


Platelet Estimate Adequate  Adequate  Adequate  


 


Platelet Morphology Normal  Normal  Normal  


 


Hypochromasia 1+  3+   


 


Erythrocyte Sedimentation Rate


  134 MM/HR


(0-30) 


  


  


 


 


Anisocytosis  1+  1+  


 


Spherocytes  2+   


 


Sodium Level


  


  139 MMOL/L


(136-145) 145 MMOL/L


(136-145) 


 


 


Potassium Level


  


  3.2 MMOL/L


(3.5-5.1) 3.5 MMOL/L


(3.5-5.1) 


 


 


Chloride Level


  


  104 MMOL/L


() 110 MMOL/L


() 


 


 


Carbon Dioxide Level


  


  22 MMOL/L


(21-32) 18 MMOL/L


(21-32) 


 


 


Anion Gap


  


  14 mmol/L


(5-15) 18 mmol/L


(5-15) 


 


 


Blood Urea Nitrogen


  


  98 mg/dL


(7-18) 71 mg/dL


(7-18) 


 


 


Creatinine


  


  1.6 MG/DL


(0.55-1.30) 1.6 MG/DL


(0.55-1.30) 


 


 


Estimat Glomerular Filtration


Rate 


  32.3 mL/min


(>60) 32.3 mL/min


(>60) 


 


 


Glucose Level


  


  166 MG/DL


() 381 MG/DL


() 


 


 


Calcium Level


  


  8.6 MG/DL


(8.5-10.1) 8.9 MG/DL


(8.5-10.1) 


 


 


Total Bilirubin


  


  0.5 MG/DL


(0.2-1.0) 0.5 MG/DL


(0.2-1.0) 


 


 


Aspartate Amino Transf


(AST/SGOT) 


  17 U/L (15-37) 


  11 U/L (15-37) 


  


 


 


Alanine Aminotransferase


(ALT/SGPT) 


  20 U/L (12-78) 


  20 U/L (12-78) 


  


 


 


Alkaline Phosphatase


  


  113 U/L


() 118 U/L


() 


 


 


Total Protein


  


  6.2 G/DL


(6.4-8.2) 6.8 G/DL


(6.4-8.2) 


 


 


Albumin


  


  1.4 G/DL


(3.4-5.0) 1.7 G/DL


(3.4-5.0) 


 


 


Globulin  4.8 g/dL  5.1 g/dL  


 


Albumin/Globulin Ratio  0.3 (1.0-2.7)  0.3 (1.0-2.7)  


 


Vancomycin Level Trough


  


  


  15.5 ug/mL


(5.0-12.0) 


 


 


Arterial Blood pH


  


  


  


  7.280


(7.350-7.450)


 


Arterial Blood Partial


Pressure CO2 


  


  


  35.9 mmHg


(35.0-45.0)


 


Arterial Blood Partial


Pressure O2 


  


  


  78.9 mmHg


(75.0-100.0)


 


Arterial Blood HCO3


  


  


  


  16.5 mmol/L


(22.0-26.0)


 


Arterial Blood Oxygen


Saturation 


  


  


  94.2 %


()


 


Arterial Blood Base Excess    -9.4 (-2-2) 


 


Graham Test    Positive 


 


Test


  3/14/20


05:18 


  


  


 


 


White Blood Count


  26.2 K/UL


(4.8-10.8) 


  


  


 


 


Red Blood Count


  3.66 M/UL


(4.20-5.40) 


  


  


 


 


Hemoglobin


  10.8 G/DL


(12.0-16.0) 


  


  


 


 


Hematocrit


  32.2 %


(37.0-47.0) 


  


  


 


 


Mean Corpuscular Volume 88 FL (80-99)    


 


Mean Corpuscular Hemoglobin


  29.6 PG


(27.0-31.0) 


  


  


 


 


Mean Corpuscular Hemoglobin


Concent 33.5 G/DL


(32.0-36.0) 


  


  


 


 


Red Cell Distribution Width


  15.5 %


(11.6-14.8) 


  


  


 


 


Platelet Count


  432 K/UL


(150-450) 


  


  


 


 


Mean Platelet Volume


  6.2 FL


(6.5-10.1) 


  


  


 


 


Neutrophils (%) (Auto)  % (45.0-75.0)    


 


Lymphocytes (%) (Auto)  % (20.0-45.0)    


 


Monocytes (%) (Auto)  % (1.0-10.0)    


 


Eosinophils (%) (Auto)  % (0.0-3.0)    


 


Basophils (%) (Auto)  % (0.0-2.0)    


 


Sodium Level


  150 MMOL/L


(136-145) 


  


  


 


 


Potassium Level


  3.6 MMOL/L


(3.5-5.1) 


  


  


 


 


Chloride Level


  114 MMOL/L


() 


  


  


 


 


Carbon Dioxide Level


  20 MMOL/L


(21-32) 


  


  


 


 


Anion Gap


  16 mmol/L


(5-15) 


  


  


 


 


Blood Urea Nitrogen


  78 mg/dL


(7-18) 


  


  


 


 


Creatinine


  1.8 MG/DL


(0.55-1.30) 


  


  


 


 


Estimat Glomerular Filtration


Rate 28.1 mL/min


(>60) 


  


  


 


 


Glucose Level


  385 MG/DL


() 


  


  


 


 


Calcium Level


  9.1 MG/DL


(8.5-10.1) 


  


  


 








Objective:


WDWN


trach


some distress


reduced breath sounds bilaterally without rhonchi or wheeze


[id number] tachy  without MRG


NABS nontender no HSM; Gt


no CC; mild edema


nonfocal


poor LOC


Micro:





Microbiology








 Date/Time


Source Procedure


Growth Status


 


 


 3/13/20 11:30


Indwelling Cath Urine Culture - Preliminary


NO GROWTH AFTER 24 HOURS Resulted








Accucheck:  343











Steven Gonzalez MD Mar 14, 2020 08:56

## 2020-03-14 NOTE — NUR
NURSE NOTES:

Patient is resting in bed and appears obtunded, only responds to deep pain stimulus.  
Patient is being monitored on the cardiac monitor with VSS trache to vent, Shiley 7.0, AC 
24, 500, PEEP 5, 50% FiO2.  GT is patent and intact running Glucerna 1.5 at 50 ml/hr which 
is goal.  No residual. Patient with RH 22g, receiving NS 100ml/hr. Charles catheter in place 
draining dark YL urine to gravity.  Safety measures are in place with bed locked in the 
lowest position, P200 mattress, on seizure precautions with HOB elevated. Call light within 
reach. Will continue to monitor and follow plan of care.

## 2020-03-14 NOTE — NUR
NURSE NOTES:

Pt report received from ABRAHAM JOHNSON. pt remains stable. pt is obtunded neuro wise. pt is on 
cardiac monitor showing ST, no cardiac abnormalities noted. pt is trach to vent sating at 
99%, no resp distress noted. pt bed is low, locked, armed, bed rails up times 3, call light 
within reach. will follow plan of care.

## 2020-03-14 NOTE — SURGERY PROGRESS NOTE
Surgery Progress Note


Subjective


Additional Comments


spoke with daughter Aly Perez and her brother Speedy Alvarenga.  they have 

discussed her care and have agreed that she would want comfort measures.  She 

would not want respiratory support and would want comfort measures.  in 

discussing care plan family requested for extubation understanding it would be 

terminal as well as comfort measures with morphine prn.





Objective





Last 24 Hour Vital Signs








  Date Time  Temp Pulse Resp B/P (MAP) Pulse Ox O2 Delivery O2 Flow Rate FiO2


 


3/14/20 11:00  114 33     40


 


3/14/20 09:34  116  168/76    


 


3/14/20 09:18  112 35     40


 


3/14/20 09:00  115 32 162/70 (100) 99   


 


3/14/20 08:00  115      


 


3/14/20 08:00 98.5 114 31 168/76 (106) 99   


 


3/14/20 08:00        50


 


3/14/20 07:29  117 33     40


 


3/14/20 07:00  116 33 166/80 (108) 98   


 


3/14/20 06:00  119 33 163/79 (107) 98   


 


3/14/20 05:45  116 28     50


 


3/14/20 05:00  111 32 166/71 (102) 98   


 


3/14/20 04:00 98.8 110 30 162/64 (96) 99   


 


3/14/20 04:00  109      


 


3/14/20 04:00      Mechanical Ventilator  





      Mechanical Ventilator  





      Mechanical Ventilator  


 


3/14/20 04:00        50


 


3/14/20 03:27  110 28     50


 


3/14/20 02:00  117 34 177/61 (99) 100   


 


3/14/20 01:09  112 29     50


 


3/14/20 01:00  112 34 147/55 (85) 100   


 


3/14/20 00:00        50


 


3/14/20 00:00      Mechanical Ventilator  





      Mechanical Ventilator  





      Mechanical Ventilator  


 


3/14/20 00:00 99.0 108 17 151/65 (93) 100   


 


3/14/20 00:00  110      


 


3/13/20 23:00  107 27 159/77 (104) 100   


 


3/13/20 22:49  100 26     50


 


3/13/20 22:00  102 24 141/48 (79) 100   


 


3/13/20 21:44  110 30     50


 


3/13/20 21:00  106 31 134/57 (82) 100   


 


3/13/20 20:00  110      


 


3/13/20 20:00 99.2 114 36 148/71 (96) 100   


 


3/13/20 20:00      Mechanical Ventilator  





      Mechanical Ventilator  





      Mechanical Ventilator  


 


3/13/20 20:00        50


 


3/13/20 19:51  113 30     50


 


3/13/20 19:00  114 32 153/57 (89) 100   


 


3/13/20 18:01  111  138/54    


 


3/13/20 18:00  110 41 150/87 (108) 100   


 


3/13/20 18:00      Mechanical Ventilator  





      Mechanical Ventilator  





      Mechanical Ventilator  


 


3/13/20 17:19        50


 


3/13/20 17:15        50


 


3/13/20 17:00  116 32 138/54 (82) 97   


 


3/13/20 16:54  118 31     40


 


3/13/20 16:00      Mechanical Ventilator  





      Mechanical Ventilator  





      Mechanical Ventilator  


 


3/13/20 16:00 98.6 123 33 145/65 (91) 97   


 


3/13/20 16:00        40


 


3/13/20 16:00  124      


 


3/13/20 15:00  129 33 151/60 (90) 98   


 


3/13/20 14:59  127 32     40


 


3/13/20 14:00  115 30 141/54 (83) 100   


 


3/13/20 13:15  124 34     40


 


3/13/20 13:00  99 23 159/52 (87) 100   








I&O











Intake and Output  


 


 3/13/20 3/14/20





 19:00 07:00


 


Intake Total 1762.416 ml 1837.5 ml


 


Output Total 470 ml 250 ml


 


Balance 1292.416 ml 1587.5 ml


 


  


 


Intake Free Water 60 ml 


 


IV Total 1432.416 ml 1237.5 ml


 


Tube Feeding 270 ml 540 ml


 


Other  60 ml


 


Output Urine Total 470 ml 250 ml


 


# Voids 1 


 


# Bowel Movements 2 








Dressing:  other


Wound:  other


Drains:  other


Cardiovascular:  other


Respiratory:  other


Abdomen:  other


Extremities:  other





Laboratory Tests








Test


  3/13/20


17:03 3/14/20


05:18 3/14/20


11:32


 


Arterial Blood pH


  7.280


(7.350-7.450) 


  7.352


(7.350-7.450)


 


Arterial Blood Partial


Pressure CO2 35.9 mmHg


(35.0-45.0) 


  32.7 mmHg


(35.0-45.0)  L


 


Arterial Blood Partial


Pressure O2 78.9 mmHg


(75.0-100.0) 


  63.9 mmHg


(75.0-100.0)  L


 


Arterial Blood HCO3


  16.5 mmol/L


(22.0-26.0)  *L 


  17.7 mmol/L


(22.0-26.0)  *L


 


Arterial Blood Oxygen


Saturation 94.2 %


()  L 


  91.5 %


()  L


 


Arterial Blood Base Excess -9.4 (-2-2)  *L  -6.9 (-2-2)  L


 


Graham Test Positive    Positive  


 


White Blood Count


  


  26.2 K/UL


(4.8-10.8)  *H 


 


 


Red Blood Count


  


  3.66 M/UL


(4.20-5.40)  L 


 


 


Hemoglobin


  


  10.8 G/DL


(12.0-16.0)  L 


 


 


Hematocrit


  


  32.2 %


(37.0-47.0)  L 


 


 


Mean Corpuscular Volume  88 FL (80-99)   


 


Mean Corpuscular Hemoglobin


  


  29.6 PG


(27.0-31.0) 


 


 


Mean Corpuscular Hemoglobin


Concent 


  33.5 G/DL


(32.0-36.0) 


 


 


Red Cell Distribution Width


  


  15.5 %


(11.6-14.8)  H 


 


 


Platelet Count


  


  432 K/UL


(150-450) 


 


 


Mean Platelet Volume


  


  6.2 FL


(6.5-10.1)  L 


 


 


Neutrophils (%) (Auto)


  


  % (45.0-75.0)


  


 


 


Lymphocytes (%) (Auto)


  


  % (20.0-45.0)


  


 


 


Monocytes (%) (Auto)   % (1.0-10.0)   


 


Eosinophils (%) (Auto)   % (0.0-3.0)   


 


Basophils (%) (Auto)   % (0.0-2.0)   


 


Differential Total Cells


Counted 


  100  


  


 


 


Neutrophils % (Manual)  93 % (45-75)  H 


 


Lymphocytes % (Manual)  5 % (20-45)  L 


 


Monocytes % (Manual)  2 % (1-10)   


 


Eosinophils % (Manual)  0 % (0-3)   


 


Basophils % (Manual)  0 % (0-2)   


 


Band Neutrophils  0 % (0-8)   


 


Platelet Estimate  Adequate   


 


Platelet Morphology  Normal   


 


Anisocytosis  1+   


 


Sodium Level


  


  150 MMOL/L


(136-145)  H 


 


 


Potassium Level


  


  3.6 MMOL/L


(3.5-5.1) 


 


 


Chloride Level


  


  114 MMOL/L


()  H 


 


 


Carbon Dioxide Level


  


  20 MMOL/L


(21-32)  L 


 


 


Anion Gap


  


  16 mmol/L


(5-15)  H 


 


 


Blood Urea Nitrogen


  


  78 mg/dL


(7-18)  H 


 


 


Creatinine


  


  1.8 MG/DL


(0.55-1.30)  H 


 


 


Estimat Glomerular Filtration


Rate 


  28.1 mL/min


(>60) 


 


 


Glucose Level


  


  385 MG/DL


()  H 


 


 


Calcium Level


  


  9.1 MG/DL


(8.5-10.1) 


 











Plan


Problems:  


(1) Leukocytosis


Assessment & Plan:  Significant leukocytosis. -Improving


Initiating work-up currently micro noted


Imaging reviewed


will follow


Thank you for let me participate in patient's care





(2) Wounds and injuries


Assessment & Plan:  DAILY ESTIMATED NEEDS:


Needs based on Critical care, wounds; 68kg 


22-30  kcals/kg 


2987-7973  total kcals


1.25-2  g protein/kg


  g total protein 


25-30  mL/kg


2987-2386  total fluid mLs





NUTRITION DIAGNOSIS:


1) Increased kcal and pro needs r/t wound healing as evidenced by pt adm


w/ advanced wounds, pending eval.


2) Swallowing difficulty r/t resp status as evidenced by pt is trach/vent


dep and PEG dep.





 


CURRENT TF:NPO 





 


ENTERAL NUTRITION RECOMMENDATIONS:


Glucerna 1.5 @ 50ml/hr x 24 hrs  to provide 1200ml, 1800kcal, 99g prot, 911ml 

free water  





- As medically appropriate, resume TF


- Initiate Glucerna 1.5 @ 20ml/hr x 6 hrs


- Advance 10ml q 4-6 hrs as tolerated to goal rate


- HOB over 30 degrees/ water flush per MD


- Rec adding Zaki BID for wound healing





 





ADDITIONAL RECOMMENDATIONS:


1) Calibrated bedscale wt 


2) Wound healing: add Vit C 500mg BID + ZnSO4 220mg QD x 10 days 


                         add Zaki 1pkt BID via PEG


                         f/up with WC eval 


3) Monitor lytes, replete as needed- low K 


4) Monitor renal fxn/ hydration status: low Na, , creat 1.8 


5) Monitor BGs, need for NISS: h/o DM 


6) Monitor ability to resume TF: possible GIB, monitor h/h 





(3) GI bleed


Assessment & Plan:  GI bleed no active bleeding noted currently severe anemia 

transfuse responding well GI eval for possible scope upper and lower continue 

with ICU care for now trend labs will monitor closely no acute surgical 

intervention planned





(4) Abnormal LFTs


(5) Pressure injury of deep tissue


Assessment & Plan:  Pt presented on admission with multiple pressure injuries. 

No evidence of skin breakdown under collar of tracheostomy collar.


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 (L)9.5cm 

x (W)11cm x (D)1.6cm, undermining clockwise9-4 by 1.3cm @2o'clock.Base of wound 

has 25% soft necrosis ,20% slough, otherwise pink granulation noted. Epibole 

along borders at 9-2o'clock. Periwound there is an area that is indurated with 

non-blanchable erythema.


Unstageable pressure injury L femoral head.(L)1.5cm x (W)1.7cm. Base of wound 

has 90% slough,10% soft necrosis at center of wound bed. Borders are 

erythematous and moist. No odor or exudate noted.No evidence of skin breakdown 

periwound.


Unstageable pressure injury lateral R tibia.(L)10.2cm x (W)1.6cm. Base of wound 

is 100% necrotic with detached Borders. Borders are erythematous and moist. No 

odor or exudate noted. Marginal erythema periwound.


DTPI R hallux. Base of wound is maroon with marginal erythema.(L)0.6cm x (W)

0.3cm.


Unstageable pressure injury R heel. (L)0.6cm x(W)0.3cm. Base of wound has 90% 

slough with surrounding 10% moist, erythema.Non-blanchable erythema with 

fluctuance periwound.


Unstageable pressure injury L heel(L)4.5cm x (W)3.2cm.Base of wound is 100% 

necrotic with semi-detached borders which are erythematous. No odor or exudate 

noted.Periwound is fluctuant but pale pink


DTPI dorsal L foot.(L)0.9cm x (W)2cm. Base of wound is purple with maroon 

borders.No erythema induration or fluctuance periwound.


Unstageable pressure injury L Hallux(L)1.1cm x (W)1.2cm. Base of wound has 100% 

slough. Erythematous borders. No erythema,induration or fluctuance periwound.


Resolving pressure injury distal/lateral L foot(L)1.4cm x (W)1.5cm. Pink 

epithelial with surrounding dry, brown borders.NO erythema,induration or 

fluctuance periwound. 


DTPI Lateral L 5th Metatarsal.(L)1.4cm x (W)0.9cm. Base of wound is maroon in 

center with surrounding purple borders. 





Tx.Plan: 


Cleanse Sacral wound with Saline. Loosely pack with TheraHoney infused Kerlix. 

Apply Moisture Barrier Paste


            periwound.Cover with Optifoam drsg Daily and prn.


            


Cleanse wound L Femoral Head with Saline. Apply TheraHoney. Apply Moisture 

Barrier Paste periwound. Cover with


            Optifoam drsg Daily and prn.


            


Apply Betadine to wound lateral R tibia. Cover with ABD pad. Wrap with Kerlix 

from base of toes every 3 days and


            prn.


            


Apply Betadine to R hallux and R heel. Cover each site with Optifoam drsg every 

3 days and prn.


            


Apply Betadine to L foot wound. Cover each wound with Optifoam drsg. Change 

every 3 days and prn.


            


Reposition at least every 2hours or as tolerated. 


            


Off-load heels with pillow.


            


APM/ANNEL[facility] Mattress





Additional Comments


extubate as per family request


comfort measures











Radhames Blas Mar 14, 2020 12:48

## 2020-03-14 NOTE — GENERAL PROGRESS NOTE
Assessment/Plan


Status:  stable, not improved


Assessment/Plan:


Assessment


- UGIB - resolving


- Anemia


- Sacral decub with osteo


- enlarging cystic ovarian neoplasm


- rectal stool impaction


- chronic trach / vent


- body edema


- leukocytosis / sepsis


- Azotemia


- Poor prognosis





Recommendations


- PPI


- abx


- transfuse PRN


- Follow labs


- laxative


- Continue TF and add water flushes


- no plans for endoscopy per family directive





Subjective


Allergies:  


Coded Allergies:  


     No Known Allergies (Unverified , 10/14/19)


Subjective


tolerating TF


d/w RN


higher WBC and Na today





Objective





Last 24 Hour Vital Signs








  Date Time  Temp Pulse Resp B/P (MAP) Pulse Ox O2 Delivery O2 Flow Rate FiO2


 


3/14/20 16:00  108 31 143/50 (81) 98   


 


3/14/20 16:00        50


 


3/14/20 16:00  100      


 


3/14/20 15:06  110 32     40


 


3/14/20 15:00  108 27 160/60 (93) 98   


 


3/14/20 14:00 99.2 110 19 147/59 (88) 98   


 


3/14/20 13:05  111 33     40


 


3/14/20 13:00  113 27 158/61 (93) 99   


 


3/14/20 12:00  113 28 161/63 (95) 98   


 


3/14/20 12:00  114      


 


3/14/20 12:00        50


 


3/14/20 12:00      Mechanical Ventilator  





      Mechanical Ventilator  





      Mechanical Ventilator  


 


3/14/20 11:00  114 33     40


 


3/14/20 11:00  115 26 177/78 (111) 98   


 


3/14/20 10:00  113 29 172/71 (104) 100   


 


3/14/20 09:34  116  168/76    


 


3/14/20 09:18  112 35     40


 


3/14/20 09:00  115 32 162/70 (100) 99   


 


3/14/20 08:00  115      


 


3/14/20 08:00      Mechanical Ventilator  





      Mechanical Ventilator  





      Mechanical Ventilator  


 


3/14/20 08:00 98.5 114 31 168/76 (106) 99   


 


3/14/20 08:00        50


 


3/14/20 07:29  117 33     40


 


3/14/20 07:00  116 33 166/80 (108) 98   


 


3/14/20 06:00  119 33 163/79 (107) 98   


 


3/14/20 05:45  116 28     50


 


3/14/20 05:00  111 32 166/71 (102) 98   


 


3/14/20 04:00 98.8 110 30 162/64 (96) 99   


 


3/14/20 04:00  109      


 


3/14/20 04:00      Mechanical Ventilator  





      Mechanical Ventilator  





      Mechanical Ventilator  


 


3/14/20 04:00        50


 


3/14/20 03:27  110 28     50


 


3/14/20 02:00  117 34 177/61 (99) 100   


 


3/14/20 01:09  112 29     50


 


3/14/20 01:00  112 34 147/55 (85) 100   


 


3/14/20 00:00        50


 


3/14/20 00:00      Mechanical Ventilator  





      Mechanical Ventilator  





      Mechanical Ventilator  


 


3/14/20 00:00 99.0 108 17 151/65 (93) 100   


 


3/14/20 00:00  110      


 


3/13/20 23:00  107 27 159/77 (104) 100   


 


3/13/20 22:49  100 26     50


 


3/13/20 22:00  102 24 141/48 (79) 100   


 


3/13/20 21:44  110 30     50


 


3/13/20 21:00  106 31 134/57 (82) 100   


 


3/13/20 20:00  110      


 


3/13/20 20:00 99.2 114 36 148/71 (96) 100   


 


3/13/20 20:00      Mechanical Ventilator  





      Mechanical Ventilator  





      Mechanical Ventilator  


 


3/13/20 20:00        50


 


3/13/20 19:51  113 30     50


 


3/13/20 19:00  114 32 153/57 (89) 100   


 


3/13/20 18:01  111  138/54    


 


3/13/20 18:00  110 41 150/87 (108) 100   


 


3/13/20 18:00      Mechanical Ventilator  





      Mechanical Ventilator  





      Mechanical Ventilator  


 


3/13/20 17:19        50


 


3/13/20 17:15        50

















Intake and Output  


 


 3/13/20 3/14/20





 19:00 07:00


 


Intake Total 1762.416 ml 1837.5 ml


 


Output Total 470 ml 250 ml


 


Balance 1292.416 ml 1587.5 ml


 


  


 


Intake Free Water 60 ml 


 


IV Total 1432.416 ml 1237.5 ml


 


Tube Feeding 270 ml 540 ml


 


Other  60 ml


 


Output Urine Total 470 ml 250 ml


 


# Voids 1 


 


# Bowel Movements 2 








Laboratory Tests


3/14/20 05:18: 


White Blood Count 26.2*H, Red Blood Count 3.66L, Hemoglobin 10.8L, Hematocrit 

32.2L, Mean Corpuscular Volume 88, Mean Corpuscular Hemoglobin 29.6, Mean 

Corpuscular Hemoglobin Concent 33.5, Red Cell Distribution Width 15.5H, 

Platelet Count 432, Mean Platelet Volume 6.2L, Neutrophils (%) (Auto) , 

Lymphocytes (%) (Auto) , Monocytes (%) (Auto) , Eosinophils (%) (Auto) , 

Basophils (%) (Auto) , Differential Total Cells Counted 100, Neutrophils % (

Manual) 93H, Lymphocytes % (Manual) 5L, Monocytes % (Manual) 2, Eosinophils % (

Manual) 0, Basophils % (Manual) 0, Band Neutrophils 0, Platelet Estimate 

Adequate, Platelet Morphology Normal, Anisocytosis 1+, Sodium Level 150H, 

Potassium Level 3.6, Chloride Level 114H, Carbon Dioxide Level 20L, Anion Gap 

16H, Blood Urea Nitrogen 78H, Creatinine 1.8H, Estimat Glomerular Filtration 

Rate 28.1, Glucose Level 385H, Calcium Level 9.1


3/14/20 11:32: 


Arterial Blood pH 7.352, Arterial Blood Partial Pressure CO2 32.7L, Arterial 

Blood Partial Pressure O2 63.9L, Arterial Blood HCO3 17.7*L, Arterial Blood 

Oxygen Saturation 91.5L, Arterial Blood Base Excess -6.9L, Graham Test Positive


Height (Feet):  5


Height (Inches):  7.00


Weight (Pounds):  168


Objective


Elderly woman


on vent, (+) Trach


NCAT


coarse BS


RR 


abd soft, (+) GT,(+) pelvic fullness


(+) edema











Bladimir Encarnacion MD Mar 14, 2020 17:13

## 2020-03-14 NOTE — NUR
NURSE NOTES:

Patient received from ELIZABETH Banks. Patient appears obtunded, does not respond to voice only 
to deep pain stimulus.  Patient is currently non responsive to any stimulus expect deep 
pain.  Patient is being monitored on the cardiac monitor with VS  /80 RR 33 and 
SPO2 99% with trache to vent. Patient with Shiley 7.0, AC 24, 500, PEEP 5, 50% FiO2.  
Patient with GT that is patent and intact running Glucerna 1.5 at 50 ml/hr which is goal.  
No residual. Patient with RH 22g, receiving NS 100ml/hr. Charles catheter in place draining 
dark YL urine to gravity.  Safety measures are in place with bed locked in the lowest 
position, P200 mattress, on seizure precautions with HOB elevated. Call light within reach. 
Will continue to monitor and follow plan of care.

## 2020-03-14 NOTE — NUR
NURSE NOTES:

Dr Gonzalez contacted.  Family wishes to make patient DNR/DNI with comfort measures only.  Dr Blas spoke to patient's son and daughter to confirm code status request and change.  
Per both son/daughter, wishes to have patient  peacefully.  Will leave only pain meds 
on order and prepare for removal of ventilator.

## 2020-03-14 NOTE — NUR
1710DEATH PRONOUNCEMENT:

No Code.  Called to pronounce patient.  Absence of spontaneous respirations, no cardiac or 
breath sounds on auscultation.  Pupils fixed and dilated.  No carotid pulse or chest 
movement.  Patient  at 1710.  DR  notified PER .



Family was notified at .1730

## 2020-03-14 NOTE — NUR
NURSE NOTES:

Patient's daughter called. As per her MD called her brother regarding the code status. She 
said her brother and herself are both the decision makers for the patient and wishes for the 
doctor to speak to her. Patient's daughter left her number: Aly [phone]. Informed her 
that will inform the morning shift nurse.

## 2020-03-14 NOTE — NUR
NURSE NOTES:

Patient was removed from ventilator.  Patient was given IV Morphine prior to the removal.  
Patient is now with a T piece and on cool mist.  Patient does not appear in any acute 
distress.

## 2020-03-16 NOTE — DISCHARGE SUMMARY
Discharge Summary


Discharge Summary


_


DEATH SUMMARY





DATE OF ADMISSION: 3/9/2020





DATE OF EXPIRATION: 3/14/2020








 





REASON FOR ADMISSION: 


64 years old female with past medical history of diabetes mellitus, congestive 

heart failure, myocardial infarction, renal disease, chronic respiratory 

failure ventilator dependent, chronic kidney disease, seizure disorder,   small 

bowel mass and pelvic mass, presented from the skilled nursing facility due to 

abnormal  labs.


Laboratory work-up revealed significant leukocytosis WBC 35, hemoglobin 5.9, 

hematocrit 17.8.  Platelet count 458.  


Sodium 121,  potassium 3.3.  


, creatinine 1.8 .


Glucose 154. 


Lactic acid 1.5.  


Troponin   0.092 .EKG revealed sinus tachycardia , no acute ischemic changes. 


Albumin 1.6.


Urinalysis revealed +2 leukocyte esterase, no pyuria and moderate  yeast.  


Chest x-ray demonstrated bilateral hazy infiltrates versus edema.  


In emergency department patient received  IV Protonix,  1 L of fluid . 


Patient pancultured and received  empiric antibiotics .


Patient was admitted  to intensive care unit.  





CONSULTANTS:


pulmonary Dr. Gonzalez


ID specialist Dr. Doherty


GI specialist Dr. Encarnacion


surgery Dr. Blas





 


[facility] COURSE:


Patient admitted and started on the IV Protonix drip.  


Patient initially was kept n.p.o.


Patient started on IV fluids and broad-spectrum antibiotics.  


Patient received blood transfusion .


Patient received total of 3 units of packed red blood cells while in the 

hospital.  





Venous duplex bilateral lower extremity revealed no evidence of acute DVT.  





Blood cultures were negative.  


Urine culture revealed sStrep agalactiae group B and mixed urogenital 

contaminants.  


Sputum culture showed Acinetobacter pulmonary and Stenotrophomonas maltophilia.

  


Repeated urine culture showed yeast.  


Antibiotic regimen was optimized as per ID specialist recommendation





Ventilator support and tracheostomy care provided.  


Respiratory care provided.  


SNF medication continued.  


Patient suctioned as needed.  


Patient ventilator was followed-up with ABG.  


Ventilator settings titrated as needed.  





Hemoglobin and hematocrit  improved after blood transfusion.


Strict aspiration precautions maintained.  


Tube feeding resumed.  Water flushes added.  


Per family directives there was no plan for endoscopy.





CT scan of abdomen and pelvis demonstrated enlarging pelvic cystic mass,  most 

likely representing


an enlarging ovarian cystic neoplasm


Evidence of decubitus ulceration in the retrococcygeal region with  evidence of 

bony destruction, presumably due to osteomyelitis, 


involving the coccyx. This was a new finding.


Slight rectal distention with stool, early fecal impaction possible. Presacral 

edema


may indicate stercoral colitis, but is more likely related to the adjacent 

decubitus


changes.


Large bilateral pleural effusions. Associated basilar pulmonary atelectasis and


consolidation. Mild interstitial congestion


 


Wound care provided as per surgeon recommendation.  Nutritional support 

provided.





Overall prognosis was poor.  


Family   decided to change code to DNR/DNI on 3/ 14 .


Decision was made to remove patient from ventilator.  


Patient was on tracheostomy collar  with  cool mist aerosol.


Comfort measures provided.   


Patient  condition  was rapidly deteriorated , and patient  was pronounced at 17

:10 on  3/14/20.


Cause of death: cardiopulmonary arrest








FINAL DIAGNOSES: 


Sepsis


Bilateral pneumonia


Coccygeal osteomyelitis


Chronic respiratory failure ventilator dependent with tracheostomy status


Streptococcal UTI


Upper GI bleeding


Acute on chronic renal failure


Anemia


Enlarged existing ovarian neoplasm 


Fecal impaction


Metabolic acidosis


Chronic encephalopathy


Multiply pressure injury present on admission, including stage IV sacral decub








 


 





I have been assigned to dictate discharge summary for this account. 


I was not involved in the patient's management.











Yumiko Burnett NP Mar 16, 2020 11:08

## 2020-03-16 NOTE — NUR
*-* INSURANCE *-*



ALL CLINICALS AND REVIEWS HAVE BEEN FAXED TO:



[facility]# [phone]

Fax# [phone]

## 2020-03-24 NOTE — NUR
POST DC  NOTE 



SW left vms to pt's son Speedy Alvarenga [phone] and pt's daughter Aly Perez [phone] 
for call back asap. 






-------------------------------------------------------------------------------

Signed:    03/24/20 at 1237 by YUE VAZQUEZ <Co-Signature Required>

-------------------------------------------------------------------------------

## 2020-07-27 NOTE — NUR
Discharge instructions given and patient voiced understanding. Speech is rapid and patient is excited to leave. She denies being suicidal or homicidal, or having hallucinations. Belongings returned. Patient left for home picked up by family at 56. RESPIRATORY NOTES:

Weaning started per . Placed patient on PS+10 PEEP+5 FIO2 30 %. Patient tolerating 
well. Will continue to monitor.

## 2021-05-05 NOTE — NEPHROLOGY PROGRESS NOTE
Assessment/Plan


Problem List:  


(1) Renal failure (ARF), acute on chronic


Assessment:  resolved





(2) Dehydration


(3) ARF (acute renal failure)


(4) Sepsis


(5) Acute metabolic encephalopathy


(6) Hyperglycemia due to type 2 diabetes mellitus


(7) UTI (urinary tract infection)


(8) Diabetic nephropathy


Assessment





Renal failure, Acute on Chronic resolved


Dehydration


Severe Anemia


Sepsis / Pneumonia / UTI


DM, OOC


Proteinuria , Diabetic Nephropathy


Acute encephalopathy


Plan





trached 11/14- day 1 post





on K and Mag IV  as needed





 Reglan


GT feeding


Stop IV fluid-


Lasix as needed


has PEG


Per order








previously 


Cr lowering 


Will order urine studies and monitor renal parameters


kidney YOANDY noted


lower iv fluids rate


WBCs rising


has casas again due to retention


Avoid Nephrotoxics








previously


Anemia salas


2D echo


24 H urine protein


Keep BP and BS in check


I am holding  her psych meds due to low MS


Per orders





Subjective


ROS Limited/Unobtainable:  Yes





Objective


Objective





Last 24 Hour Vital Signs








  Date Time  Temp Pulse Resp B/P (MAP) Pulse Ox O2 Delivery O2 Flow Rate FiO2


 


11/15/19 09:05        30


 


11/15/19 09:04  86 24     30





        30


 


11/15/19 09:04     100   


 


11/15/19 08:54  84  129/49    


 


11/15/19 08:00      Mechanical Ventilator  


 


11/15/19 08:00 98.9 86 23 127/49 (75) 100   


 


11/15/19 07:02  80 17  100 Mechanical Ventilator  30





  80 18     30


 


11/15/19 07:00  80 21 110/39 (62) 100   


 


11/15/19 06:00  83 17 109/39 (62) 100   


 


11/15/19 05:30  85 23     30


 


11/15/19 05:00  80 16 126/47 (73) 100   


 


11/15/19 04:00  70      


 


11/15/19 04:00 98.8 81 19 118/47 (70) 100   


 


11/15/19 04:00        30


 


11/15/19 04:00      Mechanical Ventilator  


 


11/15/19 03:00  78 14 123/48 (73) 100   


 


11/15/19 02:38  85 23     30


 


11/15/19 02:00  76 14 123/51 (75) 100   


 


11/15/19 01:52  78 18  100 Mechanical Ventilator  30





  80 14     30


 


11/15/19 01:00  78 18 125/44 (71) 100   


 


11/15/19 00:00  78 14 123/43 (69) 100   


 


11/15/19 00:00  82      


 


11/15/19 00:00      Mechanical Ventilator  


 


11/14/19 23:00  82 20 129/51 (77) 100   


 


11/14/19 22:58  81 14     30


 


11/14/19 22:00  82 15 131/45 (73) 100   


 


11/14/19 21:23  88 23     30


 


11/14/19 21:00  83 14 130/47 (74) 100   


 


11/14/19 20:00      Mechanical Ventilator  


 


11/14/19 20:00  87      


 


11/14/19 20:00 98.5 80 14 116/49 (71) 99   


 


11/14/19 20:00        30


 


11/14/19 19:52  75 14  100 Mechanical Ventilator  30





  79 14     30


 


11/14/19 19:00  82 19 121/46 (71) 100   


 


11/14/19 18:00  84 19 121/52 (75) 100   


 


11/14/19 18:00  84  121/52    


 


11/14/19 17:15  85 26     30


 


11/14/19 17:00  90 18 138/58 (84) 100   


 


11/14/19 16:00        30


 


11/14/19 16:00  79      


 


11/14/19 16:00 98.9 82 18 127/51 (76) 100   


 


11/14/19 16:00      Mechanical Ventilator  


 


11/14/19 15:11  81 14     30


 


11/14/19 15:00  80 18 120/44 (69) 100   


 


11/14/19 14:00  85 16 123/50 (74) 100   


 


11/14/19 13:09  87 14     30


 


11/14/19 13:00  96 18 124/54 (77) 99   


 


11/14/19 12:30  105 19 143/60 (87) 99   


 


11/14/19 12:15  117 19 172/62 (98) 99   


 


11/14/19 12:00      Mechanical Ventilator  


 


11/14/19 12:00 99.0 121 17 174/71 (105) 98   


 


11/14/19 12:00  120      


 


11/14/19 11:30  114 15 170/71 (104) 99   


 


11/14/19 11:02  101 18     30


 


11/14/19 11:00  100 18 147/52 (83) 100   


 


11/14/19 10:45  99 20 159/53 (88) 100   


 


11/14/19 10:30  97 18 142/56 (84) 100   


 


11/14/19 10:15  89 18 152/53 (86) 100   


 


11/14/19 10:12  84 14  100   


 


11/14/19 10:10  88 14  100   


 


11/14/19 10:00  86 18 152/53 (86) 100   


 


11/14/19 10:00        30

















Intake and Output  


 


 11/14/19 11/15/19





 18:59 06:59


 


Intake Total 275 ml 570 ml


 


Output Total 1285 ml 1305 ml


 


Balance -1010 ml -735 ml


 


  


 


Free Water 60 ml 30 ml


 


IV Total 55 ml 


 


Tube Feeding 160 ml 540 ml


 


Output Urine Total 1285 ml 1305 ml








Laboratory Tests


11/15/19 05:30: 


White Blood Count 12.6H, Red Blood Count 3.03L, Hemoglobin 8.0L, Hematocrit 

25.1L, Mean Corpuscular Volume 83, Mean Corpuscular Hemoglobin 26.5L, Mean 

Corpuscular Hemoglobin Concent 32.1, Red Cell Distribution Width 14.4, Platelet 

Count 459H, Mean Platelet Volume 8.1, Neutrophils (%) (Auto) 63.5, Lymphocytes (

%) (Auto) 14.9L, Monocytes (%) (Auto) 8.3, Eosinophils (%) (Auto) 11.2H, 

Basophils (%) (Auto) 2.1H, Sodium Level 147H, Potassium Level 3.9, Chloride 

Level 105, Carbon Dioxide Level 35H, Anion Gap 7, Blood Urea Nitrogen 44H, 

Creatinine 1.2, Estimat Glomerular Filtration Rate 45.0, Glucose Level 188H, 

Calcium Level 9.7, Total Bilirubin 0.4, Aspartate Amino Transf (AST/SGOT) 10L, 

Alanine Aminotransferase (ALT/SGPT) 13, Alkaline Phosphatase 87, Total Protein 

7.3, Albumin 2.1L, Globulin 5.2, Albumin/Globulin Ratio 0.4L


Height (Feet):  5


Height (Inches):  5.00


Weight (Pounds):  130


General Appearance:  no apparent distress


EENT:  other - trached


Respiratory/Chest:  decreased breath sounds


Abdomen:  distended


Objective


no change











Lukasz Vizcaino MD Nov 15, 2019 09:32 My Jessica message sent with negative STI results

## 2022-01-18 NOTE — NUR
NURSE NOTES:

Report received from ELIZABETH Dunne Writer called and spoke to patient. She states that she has had some time to think about the proposed procedure for her kidney stones that was discussed at her last visit. She has numerous questions including the expectations for the procedure, anesthesia, etc.  She states she would like a call from the doctor before proceeding with procedure although she believes she would like to try to get it scheduled for Friday if possible. Writer advised that Dr. Crow was in surgery today but I would send him a message to let him know she would like a call and have the nurses remind him tomorrow if he had not reached out to her by then.      Routed to Dr. Crow to please call patient when able.

## 2022-03-11 NOTE — NUR
HAND-OFF: 

Report given to Kyler JOHNSON. Endorsed to AM nurse to follow pt with distended abdomen voided 
200cc urine, pads soak and wet with urine x1, bladder scanned done with 999+ urine in the 
bladder confirmed with 2 bladder scanner. inserted casas #16 Niger[place] with 200cc orange color 
with sediments, rechecked bladder still with 999+ urine in the bladder. removed #16 Nigerien 
casas and inserted #18 Niger[place] casas with 100cc output. abdomen distended. repositioned 
patient. GT intact ob glucerna 1.2 at 60cc/hr with 5cc residual. loosen the abdominal 
binder. patient in bed no s/s of acute distress noted. no moaning no facial grimaces. High Dose Vitamin A Counseling: Side effects reviewed, pt to contact office should one occur.

## 2023-01-10 NOTE — NUR
NURSE NOTES:



TURNED AND REPOSITIONED PATIENT. NO SIGNS OF DISTRESS. WILL CONTINUE TO MONITOR. Opioid Counseling: I discussed with the patient the potential side effects of opioids including but not limited to addiction, altered mental status, and depression. I stressed avoiding alcohol, benzodiazepines, muscle relaxants and sleep aids unless specifically okayed by a physician. The patient verbalized understanding of the proper use and possible adverse effects of opioids. All of the patient's questions and concerns were addressed. They were instructed to flush the remaining pills down the toilet if they did not need them for pain.

## 2024-04-13 NOTE — HEMATOLOGY/ONC PROGRESS NOTE
Problem: At Risk for Falls  Goal: Patient does not fall  Outcome: Monitoring/Evaluating progress  Goal: Patient takes action to control fall-related risks  Outcome: Monitoring/Evaluating progress     Problem: At Risk for Injury Due to Fall  Goal: Patient does not fall  Outcome: Monitoring/Evaluating progress  Goal: Takes action to control condition specific risks  Outcome: Monitoring/Evaluating progress  Goal: Verbalizes understanding of fall-related injury personal risks  Description: Document education using the patient education activity  Outcome: Monitoring/Evaluating progress     Problem: Potential for injury, Restraints  Goal: # Remains free from injury  Outcome: Monitoring/Evaluating progress  Goal: Verbalizes criteria for restraint discontinuation  Description: Document on Patient Education Activity  Outcome: Monitoring/Evaluating progress     Problem: Impaired Physical Mobility  Goal: Functional status is maintained or returned to baseline during hospitalization  Outcome: Monitoring/Evaluating progress  Goal: Tolerates activity for discharge setting with no abnormal symptoms  Outcome: Monitoring/Evaluating progress     Problem: Pain  Goal: Acceptable pain level achieved/maintained at rest using appropriate pain scale for the patient  Outcome: Monitoring/Evaluating progress  Goal: Acceptable pain level achieved/maintained with activity using appropriate pain scale for the patient  Outcome: Monitoring/Evaluating progress  Goal: Acceptable pain level achieved/maintained without oversedation  Outcome: Monitoring/Evaluating progress     Problem: Skin Integrity Alteration  Goal: Skin remains intact with no new/deterioration of wound or pressure injury  Outcome: Monitoring/Evaluating progress  Goal: Participates in wound care activities  Outcome: Monitoring/Evaluating progress  Goal: Comfort optimized with pressure injury prevention strategies guided by patient/family preference. (Hospice)  Outcome:  Assessment/Plan


Assessment/Plan





Assessment and Recs:


# Anemia of chronic disease due to underlying chronic medical issues, 

multifactorial 


--> Anemia workup has been ordered, rule out gi bleed --> ferritin is 1400+


--> No evidence of hemolysis is noted, peripheral smear has been reviewed.


--> Hgb goal >7. Transfuse prn.


--> Epogen or iron at this time is not particularly indicated


--> Medications have been reviewed


--> low threshold for gi evaluation in case has occult +


--> bone marrow biopsy is not indicated given the other more likely causes (if 

recurrent anemia) first time here


# Hypercalcemia - btw 10-11 range when corrected to alb


--> ivf have been started


--> if remains elevated, will get pth


# Leukocytosis/elevated white blood cell count, unspecified likely related to 

underlying reaction v more likely infection


--> have reviewed peripheral smear and bandemia/neutrophilia noted


--> continue antibiotics if they have been started by ID team


--> wbc 39-->28k


--> monitor for resolution


# Sepsis from pneumonia.  


--> pulm consulted, abx started


# CHANCE (acute kidney injury) on ckd


--> per renal


# UTI (urinary tract infection)


--> on abx per id


# Dehydration


--> on ivf


# Acute metabolic encephalopathy


--> likely due to pna





The timing of this note does not necessarily reflect the time of the patient 

was seen.





Greatly appreciate consultation.





Subjective


HEENT:  Denies: no symptoms, eye pain, blurred vision, tearing, double vision, 

ear pain, ear discharge, nose pain, nose congestion, throat pain, throat 

swelling, mouth pain, mouth swelling, other


Gastrointestinal/Abdominal:  Denies: no symptoms, abdomen distended, abdominal 

pain, black stools, tarry stools, blood in stool, constipated, diarrhea, 

difficulty swallowing, nausea, poor appetite, poor fluid intake, rectal bleeding

, vomiting, other


Genitourinary:  Denies: no symptoms, burning, discharge, frequency, flank pain, 

hematuria, incontinence, pain, urgency, other


Neurologic/Psychiatric:  Denies: no symptoms, anxiety, depressed, emotional 

problems, headache, numbness, paresthesia, pre-existing deficit, seizure, 

tingling, tremors, weakness, other


Endocrine:  Denies: no symptoms, excessive sweating, flushing, intolerance to 

cold, intolerance to heat, increased hunger, increased thirst, increased urine, 

unexplained weight gain, unexplained weight loss, other


Allergies:  


Coded Allergies:  


     No Known Allergies (Unverified , 10/14/19)


Subjective


10/14: hgb has improved, no bleeding, labs reivewed





Objective


Objective





Current Medications








 Medications


  (Trade)  Dose


 Ordered  Sig/Winnie


 Route


 PRN Reason  Start Time


 Stop Time Status Last Admin


Dose Admin


 


 Acetaminophen


  (Tylenol)  650 mg  PRN  PRN


 RECTAL


 TEMP>100.5  10/14/19 03:00


     


 


 


 Albuterol/


 Ipratropium


  (Albuterol/


 Ipratropium)  3 ml  Q4H  PRN


 HHN


 Shortness of Breath  10/14/19 07:30


 10/19/19 07:29   


 


 


 Aspirin


  (ASA)  81 mg  DAILY


 ORAL


   10/15/19 09:00


 11/14/19 08:59  10/15/19 08:59


 


 


 Atorvastatin


 Calcium


  (Lipitor)  10 mg  BEDTIME


 ORAL


   10/14/19 21:00


 11/13/19 20:59  10/14/19 21:15


 


 


 Clopidogrel


 Bisulfate


  (Plavix)  75 mg  DAILY


 ORAL


   10/15/19 09:00


 11/14/19 08:59  10/15/19 08:59


 


 


 Dextrose


  (Dextrose 50%)  25 ml  Q30M  PRN


 IV


 Hypoglycemia  10/14/19 07:00


 11/13/19 06:59   


 


 


 Dextrose


  (Dextrose 50%)  50 ml  Q30M  PRN


 IV


 Hypoglycemia  10/14/19 07:00


 11/13/19 06:59   


 


 


 Divalproex Sodium


  (Depakote)  500 mg  Q12HR


 ORAL


   10/15/19 11:00


 11/14/19 10:59  10/15/19 11:33


 


 


 Docusate Sodium


  (Colace)  100 mg  THREE TIMES A  DAY


 ORAL


   10/14/19 13:00


 11/13/19 12:59  10/15/19 08:59


 


 


 Donepezil HCl


  (Aricept)  5 mg  DAILY


 ORAL


   10/15/19 11:00


 11/14/19 10:59  10/15/19 11:33


 


 


 Enoxaparin Sodium


  (Lovenox)  30 mg  DAILY


 SUBQ


   10/14/19 09:00


 11/13/19 08:59  10/15/19 09:02


 


 


 Insulin Aspart


  (NovoLOG)    BEFORE MEALS AND  HS


 SUBQ


   10/14/19 11:30


 11/13/19 11:29  10/14/19 17:36


 


 


 Insulin Aspart


  (NovoLOG)  6 units  BEFORE  MEALS


 SUBQ


   10/15/19 11:30


 11/13/19 11:29  10/15/19 11:48


 


 


 Insulin Detemir


  (Levemir)  18 units  DAILY


 SUBQ


   10/15/19 09:00


 11/13/19 10:29  10/15/19 09:21


 


 


 Olanzapine


  (ZyPREXA)  5 mg  DAILY


 ORAL


   10/15/19 11:00


 11/14/19 10:59   


 


 


 Pantoprazole


  (Protonix)  40 mg  BID


 ORAL


   10/14/19 18:00


 11/13/19 08:59  10/15/19 08:59


 


 


 Piperacillin Sod/


 Tazobactam Sod


 3.375 gm/Sodium


 Chloride  110 ml @ 


 27.5 mls/hr  EVERY 8  HOURS


 IVPB


   10/14/19 14:00


 10/19/19 13:59  10/15/19 06:09


 


 


 Sodium Chloride  1,000 ml @ 


 75 mls/hr  [id number]


 IV


   10/15/19 07:30


 11/13/19 07:29  10/15/19 06:47


 


 


 Vancomycin HCl


  (Vanco rx to


 dose)  1 ea  DAILY  PRN


 MISC


 Per rx protocol  10/14/19 07:30


 11/13/19 07:29   


 


 


 Vancomycin HCl


 750 mg/Sodium


 Chloride  275 ml @ 


 183.333


 mls/hr  Q24H


 IVPB


   10/15/19 09:00


 10/19/19 08:59  10/15/19 08:59


 











Last 24 Hour Vital Signs








  Date Time  Temp Pulse Resp B/P (MAP) Pulse Ox O2 Delivery O2 Flow Rate FiO2


 


10/15/19 11:38  88      


 


10/15/19 08:52     98 Nasal Cannula 2.0 28


 


10/15/19 08:51  84 20  98 Nasal Cannula 2.0 28


 


10/15/19 08:00 98.0 85 19 139/66 (90) 99   


 


10/15/19 07:45  85      


 


10/15/19 04:00  81      


 


10/15/19 04:00 98.0 57 18 103/57 (72) 96   


 


10/15/19 00:00  83      


 


10/15/19 00:00 99.1 83 18 140/72 (94) 99   


 


10/14/19 21:00      Nasal Cannula 2.0 


 


10/14/19 20:00  86      


 


10/14/19 20:00 98.2 86 20 139/77 (97) 99   


 


10/14/19 19:49  78 20  96 Nasal Cannula 2.0 28


 


10/14/19 19:49     96 Nasal Cannula 2.0 28


 


10/14/19 16:00  77      


 


10/14/19 16:00 96.7 81 18 121/74 (90) 98   


 


10/14/19 12:00 96.8 67 18 145/65 (91) 94   


 


10/14/19 12:00  66      


 


10/14/19 09:00      Nasal Cannula 2.0 


 


10/14/19 08:00 98.3 68 20 120/64 (82) 98   


 


10/14/19 08:00  56      


 


10/14/19 05:44      Nasal Cannula 2.0 


 


10/14/19 05:00  67      


 


10/14/19 04:45 97.6 66 20 122/51 (74) 99   


 


10/14/19 04:30 99.4 64 21 122/65 99 Room Air 2.0 28


 


10/14/19 04:15 99.4 64 21 122/65 99 Room Air 2.0 28


 


10/14/19 03:25 99.4 64 21 118/37 99 Room Air  


 


10/14/19 01:39 99.4       


 


10/14/19 01:33 98.1 75 36 176/84 95 Room Air  


 


10/14/19 01:33  75 36   Room Air 2.0 28


 


10/14/19 01:29  80 30  96 Nasal Cannula 2.0 28





  76 30  100   


 


10/14/19 01:29  76 30  100 Nasal Cannula 2.0 28


 


10/14/19 00:54 98.1 76 22 176/84 (114) 90 Room Air  

















Intake and Output  


 


 10/14/19 10/15/19





 19:00 07:00


 


Intake Total 360 ml 


 


Output Total 300 ml 700 ml


 


Balance 60 ml -700 ml


 


  


 


Intake Oral 360 ml 


 


Output Urine Total 300 ml 700 ml











Labs








Test


  10/14/19


01:14 10/14/19


01:15 10/14/19


03:00 10/14/19


11:25


 


Hemoglobin A1c


  10.4 %


(4.3-6.0) 


  


  


 


 


White Blood Count


  


  39.4 K/UL


(4.8-10.8) 


  33.2 K/UL


(4.8-10.8)


 


Red Blood Count


  


  3.04 M/UL


(4.20-5.40) 


  2.52 M/UL


(4.20-5.40)


 


Hemoglobin


  


  8.1 G/DL


(12.0-16.0) 


  6.7 G/DL


(12.0-16.0)


 


Hematocrit


  


  25.6 %


(37.0-47.0) 


  21.1 %


(37.0-47.0)


 


Mean Corpuscular Volume  84 FL (80-99)   84 FL (80-99) 


 


Mean Corpuscular Hemoglobin


  


  26.7 PG


(27.0-31.0) 


  26.8 PG


(27.0-31.0)


 


Mean Corpuscular Hemoglobin


Concent 


  31.8 G/DL


(32.0-36.0) 


  31.9 G/DL


(32.0-36.0)


 


Red Cell Distribution Width


  


  12.3 %


(11.6-14.8) 


  11.1 %


(11.6-14.8)


 


Platelet Count


  


  608 K/UL


(150-450) 


  450 K/UL


(150-450)


 


Mean Platelet Volume


  


  8.2 FL


(6.5-10.1) 


  8.4 FL


(6.5-10.1)


 


Neutrophils (%) (Auto)   % (45.0-75.0)    % (45.0-75.0) 


 


Lymphocytes (%) (Auto)   % (20.0-45.0)    % (20.0-45.0) 


 


Monocytes (%) (Auto)   % (1.0-10.0)    % (1.0-10.0) 


 


Eosinophils (%) (Auto)   % (0.0-3.0)    % (0.0-3.0) 


 


Basophils (%) (Auto)   % (0.0-2.0)    % (0.0-2.0) 


 


Differential Total Cells


Counted 


  100 


  


  100 


 


 


Neutrophils % (Manual)  91 % (45-75)   93 % (45-75) 


 


Lymphocytes % (Manual)  5 % (20-45)   6 % (20-45) 


 


Monocytes % (Manual)  4 % (1-10)   0 % (1-10) 


 


Eosinophils % (Manual)  0 % (0-3)   0 % (0-3) 


 


Basophils % (Manual)  0 % (0-2)   0 % (0-2) 


 


Band Neutrophils  0 % (0-8)   0 % (0-8) 


 


Platelet Estimate  Increased   Adequate 


 


Platelet Morphology  Normal   Normal 


 


Hypochromasia  2+   


 


Anisocytosis  1+   


 


Urine Color  Yellow   


 


Urine Appearance  Turbid   


 


Urine pH  5 (4.5-8.0)   


 


Urine Specific Gravity


  


  1.020


(1.005-1.035) 


  


 


 


Urine Protein  3+ (NEGATIVE)   


 


Urine Glucose (UA)  2+ (NEGATIVE)   


 


Urine Ketones


  


  Negative


(NEGATIVE) 


  


 


 


Urine Blood  3+ (NEGATIVE)   


 


Urine Nitrite


  


  Negative


(NEGATIVE) 


  


 


 


Urine Bilirubin


  


  Negative


(NEGATIVE) 


  


 


 


Urine Urobilinogen


  


  Normal MG/DL


(0.0-1.0) 


  


 


 


Urine Leukocyte Esterase  3+ (NEGATIVE)   


 


Urine RBC


  


  20-30 /HPF (0


- 2) 


  


 


 


Urine WBC


  


  Tntc /HPF (0 -


2) 


  


 


 


Urine Squamous Epithelial


Cells 


  Few /LPF


(NONE/OCC) 


  


 


 


Urine Bacteria


  


  Moderate /HPF


(NONE) 


  


 


 


Sodium Level


  


  137 MMOL/L


(136-145) 


  135 MMOL/L


(136-145)


 


Potassium Level


  


  5.7 MMOL/L


(3.5-5.1) 


  6.1 MMOL/L


(3.5-5.1)


 


Chloride Level


  


  101 MMOL/L


() 


  104 MMOL/L


()


 


Carbon Dioxide Level


  


  19 MMOL/L


(21-32) 


  22 MMOL/L


(21-32)


 


Anion Gap


  


  17 mmol/L


(5-15) 


  8 mmol/L


(5-15)


 


Blood Urea Nitrogen


  


  52 mg/dL


(7-18) 


  52 mg/dL


(7-18)


 


Creatinine


  


  2.0 MG/DL


(0.55-1.30) 


  2.0 MG/DL


(0.55-1.30)


 


Estimat Glomerular Filtration


Rate 


  24.9 mL/min


(>60) 


  24.9 mL/min


(>60)


 


Glucose Level


  


  451 MG/DL


() 


  391 MG/DL


()


 


Lactic Acid Level


  


  5.80 mmol/L


(0.4-2.0) 2.90 mmol/L


(0.66-2.22) 


 


 


Calcium Level


  


  9.4 MG/DL


(8.5-10.1) 


  8.3 MG/DL


(8.5-10.1)


 


Total Bilirubin


  


  0.4 MG/DL


(0.2-1.0) 


  0.3 MG/DL


(0.2-1.0)


 


Aspartate Amino Transf


(AST/SGOT) 


  22 U/L (15-37) 


  


  67 U/L (15-37) 


 


 


Alanine Aminotransferase


(ALT/SGPT) 


  26 U/L (12-78) 


  


  58 U/L (12-78) 


 


 


Alkaline Phosphatase


  


  119 U/L


() 


  106 U/L


()


 


Total Creatine Kinase


  


  23 U/L


() 


  


 


 


Creatine Kinase MB


  


  0.9 NG/ML


(0.0-3.6) 


  


 


 


Creatine Kinase MB Relative


Index 


  3.9 


  


  


 


 


Troponin I


  


  0.048 ng/mL


(0.000-0.056) 


  


 


 


Pro-B-Type Natriuretic Peptide


  


  75896 pg/mL


(0-125) 


  87477 pg/mL


(0-125)


 


Total Protein


  


  7.9 G/DL


(6.4-8.2) 


  6.9 G/DL


(6.4-8.2)


 


Albumin


  


  1.8 G/DL


(3.4-5.0) 


  1.6 G/DL


(3.4-5.0)


 


Globulin  6.1 g/dL   5.3 g/dL 


 


Albumin/Globulin Ratio  0.3 (1.0-2.7)   0.3 (1.0-2.7) 


 


Myelocytes %    1 % (0-0) 


 


Red Blood Cell Morphology    Normal 


 


Uric Acid


  


  


  


  10.0 MG/DL


(2.6-7.2)


 


Phosphorus Level


  


  


  


  4.4 MG/DL


(2.5-4.9)


 


Magnesium Level


  


  


  


  2.8 MG/DL


(1.8-2.4)


 


Iron Level


  


  


  


  29 ug/dL


()


 


Total Iron Binding Capacity


  


  


  


  141 ug/dL


(250-450)


 


Percent Iron Saturation    21 % (15-50) 


 


Unsaturated Iron Binding


  


  


  


  112 ug/dL


(112-346)


 


Ferritin


  


  


  


  1443 NG/ML


(8-388)


 


C-Reactive Protein,


Quantitative 


  


  


  28.7 mg/dL


(0.00-0.90)


 


Triglycerides Level


  


  


  


  178 MG/DL


()


 


Cholesterol Level


  


  


  


  107 MG/DL (<


200)


 


LDL Cholesterol


  


  


  


  59 mg/dL


(<100)


 


HDL Cholesterol


  


  


  


  19 MG/DL


(40-60)


 


Cholesterol/HDL Ratio    5.6 (3.3-4.4) 


 


Vitamin B12 Level


  


  


  


  1061 PG/ML


(193-986)


 


Folate


  


  


  


  7.2 NG/ML


(8.6-58.9)


 


Thyroid Stimulating Hormone


(TSH) 


  


  


  0.018 uiU/mL


(0.358-3.740)


 


Digoxin Level


  


  


  


  < 0.2 NG/ML


(0.9-2.0)


 


Test


  10/14/19


11:29 10/15/19


05:30 


  


 


 


Urine Random Sodium


  25 mmol/L


() 


  


  


 


 


White Blood Count


  


  28.1 K/UL


(4.8-10.8) 


  


 


 


Red Blood Count


  


  3.14 M/UL


(4.20-5.40) 


  


 


 


Hemoglobin


  


  9.1 G/DL


(12.0-16.0) 


  


 


 


Hematocrit


  


  26.2 %


(37.0-47.0) 


  


 


 


Mean Corpuscular Volume  84 FL (80-99)   


 


Mean Corpuscular Hemoglobin


  


  28.9 PG


(27.0-31.0) 


  


 


 


Mean Corpuscular Hemoglobin


Concent 


  34.6 G/DL


(32.0-36.0) 


  


 


 


Red Cell Distribution Width


  


  12.0 %


(11.6-14.8) 


  


 


 


Platelet Count


  


  498 K/UL


(150-450) 


  


 


 


Mean Platelet Volume


  


  8.4 FL


(6.5-10.1) 


  


 


 


Neutrophils (%) (Auto)   % (45.0-75.0)   


 


Lymphocytes (%) (Auto)   % (20.0-45.0)   


 


Monocytes (%) (Auto)   % (1.0-10.0)   


 


Eosinophils (%) (Auto)   % (0.0-3.0)   


 


Basophils (%) (Auto)   % (0.0-2.0)   


 


Differential Total Cells


Counted 


  100 


  


  


 


 


Neutrophils % (Manual)  88 % (45-75)   


 


Lymphocytes % (Manual)  5 % (20-45)   


 


Monocytes % (Manual)  7 % (1-10)   


 


Eosinophils % (Manual)  0 % (0-3)   


 


Basophils % (Manual)  0 % (0-2)   


 


Band Neutrophils  0 % (0-8)   


 


Platelet Estimate  Adequate   


 


Platelet Morphology  Normal   


 


Hypochromasia  1+   


 


Sodium Level


  


  142 MMOL/L


(136-145) 


  


 


 


Potassium Level


  


  4.1 MMOL/L


(3.5-5.1) 


  


 


 


Chloride Level


  


  109 MMOL/L


() 


  


 


 


Carbon Dioxide Level


  


  23 MMOL/L


(21-32) 


  


 


 


Anion Gap


  


  10 mmol/L


(5-15) 


  


 


 


Blood Urea Nitrogen


  


  44 mg/dL


(7-18) 


  


 


 


Creatinine


  


  1.7 MG/DL


(0.55-1.30) 


  


 


 


Estimat Glomerular Filtration


Rate 


  30.1 mL/min


(>60) 


  


 


 


Glucose Level


  


  78 MG/DL


() 


  


 


 


Lactic Acid Level


  


  0.90 mmol/L


(0.4-2.0) 


  


 


 


Uric Acid


  


  9.1 MG/DL


(2.6-7.2) 


  


 


 


Calcium Level


  


  8.5 MG/DL


(8.5-10.1) 


  


 


 


Phosphorus Level


  


  3.7 MG/DL


(2.5-4.9) 


  


 


 


Magnesium Level


  


  2.7 MG/DL


(1.8-2.4) 


  


 


 


Total Bilirubin


  


  0.9 MG/DL


(0.2-1.0) 


  


 


 


Gamma Glutamyl Transpeptidase  29 U/L (5-85)   


 


Aspartate Amino Transf


(AST/SGOT) 


  69 U/L (15-37) 


  


  


 


 


Alanine Aminotransferase


(ALT/SGPT) 


  88 U/L (12-78) 


  


  


 


 


Alkaline Phosphatase


  


  105 U/L


() 


  


 


 


Total Creatine Kinase


  


  26 U/L


() 


  


 


 


Troponin I


  


  0.407 ng/mL


(0.000-0.056) 


  


 


 


C-Reactive Protein,


Quantitative 


  22.7 mg/dL


(0.00-0.90) 


  


 


 


Pro-B-Type Natriuretic Peptide


  


  64324 pg/mL


(0-125) 


  


 


 


Total Protein


  


  6.6 G/DL


(6.4-8.2) 


  


 


 


Albumin


  


  1.5 G/DL


(3.4-5.0) 


  


 


 


Globulin  5.1 g/dL   


 


Albumin/Globulin Ratio  0.3 (1.0-2.7)   


 


Free Thyroxine


  


  1.08 NG/DL


(0.76-1.46) 


  


 


 


Free Triiodothyronine


  


  1.6 pg/mL


(2.3-4.2) 


  


 








Height (Feet):  5


Height (Inches):  5.00


Weight (Pounds):  150


Objective





Physical Exam:


Vitals: reviewed


General Appearance:  NAD


HEENT:  normocephalic, atraumatic


Neck:  non-tender, normal alignment


Respiratory/Chest:  normal breath sounds bilaterally


Cardiovascular/Chest: rrr


Abdomen:  normal bowel sounds, soft, nontender


Extremities:  normal range of motion











Mike Pop MD Oct 15, 2019 14:03 Monitoring/Evaluating progress  Goal: Wound care provided to promote comfort needs (Hospice)  Outcome: Monitoring/Evaluating progress     Problem: Communication Impairment  Goal: Ability to express needs and understand communication  Outcome: Monitoring/Evaluating progress     Problem: Breathing Pattern Ineffective  Goal: Air exchange is effective, demonstrated by Sp02 sat of greater then or = 92% (or as ordered)  Outcome: Monitoring/Evaluating progress  Goal: Respiratory pattern is quiet and regular without report of SOB  Outcome: Monitoring/Evaluating progress  Goal: Breathing pattern demonstrates minimal apnea during sleep with appropriate use of airway pressure support devices  Outcome: Monitoring/Evaluating progress  Goal: Verbalizes/demonstrates effective breathing management strategies  Description: Document education using the patient education activity.   Outcome: Monitoring/Evaluating progress  Goal: Minimize respiratory effort related to dyspnea/shortness of breath (Hospice)  Outcome: Monitoring/Evaluating progress     Problem: Pneumonia  Goal: S/S of acute pneumonia are resolved  Description: If acute pneumonia is present, monitor for resolution of fever, cough, secretions and other test values based on presentation.  Outcome: Monitoring/Evaluating progress  Goal: Verbalizes understanding of pneumonia, treatment, and ongoing prevention  Description: Document on Patient Education Activity  Outcome: Monitoring/Evaluating progress     Problem: Artificial Airway Management  Goal: # Maintains effective artificial airway  Outcome: Monitoring/Evaluating progress  Goal: # Maintains skin integrity around the airway  Outcome: Monitoring/Evaluating progress  Goal: # Tolerates Activity/ADL without s/s of intolerance  Description: Monitor patient tolerance of the artificial airway while they perform activities and ADLs include bathing, showering, shaving, and eating.  Outcome: Monitoring/Evaluating progress  Goal:  Verbalizes understanding of artifical airway function and care  Description: Document on Patient Education Activity  Outcome: Monitoring/Evaluating progress  Goal: Demonstrates ability to manage Trach: site care, suctioning, trach chahal care & inner cannula care if needed.  Description: Document on Patient Education Activity    Outcome: Monitoring/Evaluating progress  Goal: Demonstrates ability to perform Trach cuff maintenance  Description: Document on Patient Education Activity    Outcome: Monitoring/Evaluating progress  Goal: Demonstrates ability to manage Laryngectomy: stoma care, suctioning, and humidification  Description: Document on Patient Education Activity  Outcome: Monitoring/Evaluating progress  Goal: Demonstrates ability to manage communication (speaking valve or cork insertion)  Description: Document on Patient Education Activity  Outcome: Monitoring/Evaluating progress     Problem: Respiratory Impairment - Respiratory Therapy 253  Goal: Demonstrates optimal level of respiratory function 1732  Outcome: Monitoring/Evaluating progress     Problem: Mental Status, Alterations (Non-Delirium)  Goal: Mental Status is maintained/improved from status at baseline  Outcome: Monitoring/Evaluating progress     Problem: Alcohol Withdrawal Management  Goal: # No alcohol-related delirium or seizures  Outcome: Monitoring/Evaluating progress  Goal: # Verbalizes understanding of alcohol withdrawal and health effects of excessive alcohol intake  Description: Documented on patient education activity  Outcome: Monitoring/Evaluating progress     Problem: Hemodialysis  Goal: Fistula/graft intact as evidenced by presence of bruit & thrill  Outcome: Monitoring/Evaluating progress  Goal: Vascular access device site free of signs & symptoms of infection  Outcome: Monitoring/Evaluating progress  Goal: Dialysis: Safe, effective, and comfortable hemodialysis treatment (Hemodialysis nurse only)  Outcome: Monitoring/Evaluating  progress  Goal: Dialysis: Free of complications related to initiation/termination of dialysis (Hemodialysis nurse only)  Outcome: Monitoring/Evaluating progress  Goal: Verbalizes understanding of hemodialysis care  Description: Document on Patient Education Activity  Outcome: Monitoring/Evaluating progress

## 2024-05-31 NOTE — INFECTIOUS DISEASES PROG NOTE
[facility]  Dermatology  [phone]    WOUND CARE INSTRUCTIONS      Begin in 24 - 48 hours    1. Cleanse wound gently with tap water and Q-tip once daily, or wash when showering/washing face using fingertips to gently cleanse area.     If there is drainage, remove as much as possible.   2. Apply Vaseline-do not let the wound dry out.  Reapply Vaseline as needed.  DO NOT LET THE WOUND DRY OUT. Do NOT let the wound scab over.   3.  Cover the wound with a clean Band-Aid or non-stick dressing, unless instructed differently.    4.  If there is any oozing or bleeding, apply firm pressure for 10-20 minutes or more. Do NOT stop holding pressure or peek under the gauze while holding pressure. Holding pressure for a solid 10-20 minutes will help form a clot to control bleeding. If you lift up too soon you can disrupt the clot forming process and start oozing again.    5. In about 10-14 days (sometimes sooner or later) you will see a sticky yellowish-whitish substance begin forming in the wound. THIS IS A NORMAL PART OF HEALING.  This is part of the inflammatory healing process and is cells and other substances the body is sending to start to close the wound.  Pus is liquid and this substance is not. If you disrupt it, it will come out in pieces, not as a liquid. The edges of the wound will also be red at this time. THIS IS A NORMAL PART OF HEALING. The redness and yellowish sticky substances is a sign that the wound is starting to fill in from the bottom up and the outside in. The blood supply has increased to the edges of the wound.      Continue to wash the wound as normal and cover with Vaseline and a bandage.  Red dispersed areas will typically begin to appear throughout the wound base as the tissue begins to fill in from the bottom up and the outsides in.  Continue applying Vaseline and a bandage daily and DO NOT LET THE WOUND DRY OUT. Do NOT let a scab form - scabs will delay  Assessment/Plan


Assessment/Plan


antibiotics : inhaled amikacin





A


1. klebsiella pneumonia


2. leucocytosis improving


3. ovarian mass


4. renal failure improving


5. rectal VRE colonization





P


1. continue inhaled amikacin 3 more days


2. will follow up cultures





Subjective


ROS Limited/Unobtainable:  Yes


Allergies:  


Coded Allergies:  


     No Known Allergies (Unverified , 10/14/19)





Objective


Vital Signs





Last 24 Hour Vital Signs








  Date Time  Temp Pulse Resp B/P (MAP) Pulse Ox O2 Delivery O2 Flow Rate FiO2


 


12/2/19 10:37  78 18     40


 


12/2/19 09:45  78 18  97 Mechanical Ventilator 55.0 40





  77 18  97   


 


12/2/19 09:10  78  155/79    


 


12/2/19 08:51  78 20     40


 


12/2/19 08:00        40


 


12/2/19 08:00 98.2 86 23 155/79 (104) 96   


 


12/2/19 07:05  80 20     40


 


12/2/19 05:34    142/69    


 


12/2/19 05:11  84 18     40


 


12/2/19 04:00        40


 


12/2/19 04:00 97.7 97 19 142/69 (93) 99   


 


12/2/19 04:00      Mechanical Ventilator  


 


12/2/19 03:54  84      


 


12/2/19 02:32  50 23     40


 


12/2/19 01:08  82 21     40


 


12/2/19 00:00      Mechanical Ventilator  


 


12/2/19 00:00 98.4 71 18 137/71 (93) 98   


 


12/1/19 23:33  69 18     40


 


12/1/19 23:32  70      


 


12/1/19 21:44  82 18  100 Mechanical Ventilator  40





  83 18     40


 


12/1/19 20:11  68  114/51    


 


12/1/19 20:00        40


 


12/1/19 20:00 98.5 66 19 114/51 (72) 98   


 


12/1/19 20:00      Mechanical Ventilator  


 


12/1/19 19:44  77      


 


12/1/19 19:29  72 18     40


 


12/1/19 17:32    149/71    


 


12/1/19 17:00  84 22     40


 


12/1/19 16:00 97.6 84 19 149/71 (97) 98   


 


12/1/19 16:00        60


 


12/1/19 16:00  82      


 


12/1/19 16:00      Mechanical Ventilator  


 


12/1/19 14:30  75 18     40


 


12/1/19 13:12  82 23     50


 


12/1/19 12:08    156/76    


 


12/1/19 12:00      Mechanical Ventilator  


 


12/1/19 12:00  80      


 


12/1/19 12:00        70


 


12/1/19 12:00 97.8 108 19 156/76 (102) 99   








Height (Feet):  5


Height (Inches):  6.00


Weight (Pounds):  180


HEENT:  status post trach


Respiratory/Chest:  lungs clear


Cardiovascular:  normal rate, regular rhythm, no gallop/murmur


Abdomen:  soft, non tender, other - GT


Extremities:  no edema, other - right arm PICC





Laboratory Tests








Test


  12/2/19


03:00


 


White Blood Count


  13.8 K/UL


(4.8-10.8)  H


 


Red Blood Count


  3.23 M/UL


(4.20-5.40)  L


 


Hemoglobin


  9.0 G/DL


(12.0-16.0)  L


 


Hematocrit


  27.7 %


(37.0-47.0)  L


 


Mean Corpuscular Volume 86 FL (80-99)  


 


Mean Corpuscular Hemoglobin


  27.8 PG


(27.0-31.0)


 


Mean Corpuscular Hemoglobin


Concent 32.4 G/DL


(32.0-36.0)


 


Red Cell Distribution Width


  16.4 %


(11.6-14.8)  H


 


Platelet Count


  279 K/UL


(150-450)


 


Mean Platelet Volume


  8.0 FL


(6.5-10.1)


 


Neutrophils (%) (Auto)


  66.4 %


(45.0-75.0)


 


Lymphocytes (%) (Auto)


  17.1 %


(20.0-45.0)  L


 


Monocytes (%) (Auto)


  3.7 %


(1.0-10.0)


 


Eosinophils (%) (Auto)


  11.6 %


(0.0-3.0)  H


 


Basophils (%) (Auto)


  1.2 %


(0.0-2.0)


 


Sodium Level


  144 MMOL/L


(136-145)


 


Potassium Level


  5.1 MMOL/L


(3.5-5.1)


 


Chloride Level


  112 MMOL/L


()  H


 


Carbon Dioxide Level


  24 MMOL/L


(21-32)


 


Anion Gap


  8 mmol/L


(5-15)


 


Blood Urea Nitrogen


  33 mg/dL


(7-18)  H


 


Creatinine


  0.8 MG/DL


(0.55-1.30)


 


Estimat Glomerular Filtration


Rate > 60 mL/min


(>60)


 


Glucose Level


  195 MG/DL


()  H


 


Calcium Level


  8.5 MG/DL


(8.5-10.1)


 


Total Bilirubin


  0.5 MG/DL


(0.2-1.0)


 


Aspartate Amino Transf


(AST/SGOT) 13 U/L (15-37)


L


 


Alanine Aminotransferase


(ALT/SGPT) 83 U/L (12-78)


H


 


Alkaline Phosphatase


  84 U/L


()


 


Total Protein


  6.6 G/DL


(6.4-8.2)


 


Albumin


  2.0 G/DL


(3.4-5.0)  L


 


Globulin 4.6 g/dL  


 


Albumin/Globulin Ratio


  0.4 (1.0-2.7)


L











Current Medications








 Medications


  (Trade)  Dose


 Ordered  Sig/Winnie


 Route


 PRN Reason  Start Time


 Stop Time Status Last Admin


Dose Admin


 


 Amikacin Sulfate


  (Amikin)  500 mg  Q12HR@10,22


 INH


   11/29/19 22:00


 12/6/19 21:59  12/2/19 09:44


 


 


 Ascorbic Acid


  (Vitamin C)  250 mg  DAILY


 ORAL


   11/27/19 09:00


 12/27/19 08:59  12/2/19 09:09


 


 


 Chlorhexidine


 Gluconate


  (Mae-Hex 2%)  1 applic  DAILY@2000


 TOPIC


   11/26/19 20:00


 12/26/19 19:59  12/1/19 20:08


 


 


 Dextrose


  (Dextrose 50%)  25 ml  Q30M  PRN


 IV


 Hypoglycemia  11/28/19 12:15


 12/28/19 12:14   


 


 


 Dextrose


  (Dextrose 50%)  50 ml  Q30M  PRN


 IV


 Hypoglycemia  11/28/19 12:15


 12/28/19 12:14   


 


 


 Epoetin Olaf


  (Epoetin


 Olaf-EPBX(NON


 ESRD))  3,000 unit  MON-WED-FRI


 SUBQ


   11/29/19 21:00


 12/29/19 20:59  11/29/19 20:07


 


 


 Epoetin Olaf


  (Epoetin


 Olaf-EPBX(NON


 ESRD))  4,000 unit  MON-WED-FRI


 SUBQ


   11/29/19 21:00


 12/29/19 20:59  11/29/19 20:07


 


 


 Insulin Aspart


  (NovoLOG)    Q6HR


 SUBQ


   11/28/19 12:30


 12/28/19 12:29  12/2/19 05:36


 


 


 Lorazepam


  (Ativan 2mg/ml


 1ml)  1 mg  Q3H  PRN


 IV


 For Seizures  11/27/19 04:30


 12/4/19 04:29   


 


 


 Metoprolol


 Tartrate


  (Lopressor)  25 mg  Q12HR


 GT


   11/26/19 10:15


 12/26/19 10:14  12/2/19 09:10


 


 


 Nitroglycerin


  (Nitro-Bid)  1 inch  TID@0600,1200,1800


 TOPIC


   11/26/19 12:00


 12/26/19 11:59  12/2/19 05:34


 


 


 Sodium Chloride  1,000 ml @ 


 75 mls/hr  [id number]


 IV


   11/30/19 09:15


 12/30/19 09:14  12/2/19 01:17


 

















Michelle Doherty MD Dec 2, 2019 11:05 healing, can cause bleeding and more scarring.     Redness that spreads or streaks of red, LIQUID pus, or fever with associated sharp unrelieved pain is an indication of an infection and you should contact our office. If our office is closed, please contact your primary care doctor or go to a walk-in clinic.     You may shower, however reapply vaseline and bandaid.      You will be notified of your biopsy within 5-7 days.  If it has been greater then 10 days and you have not been contacted please call [phone]

## 2025-04-07 NOTE — NUR
NURSE NOTES:

Repositioned patient. No distress/SOB noted. Still on O2 2L/min via NC and O2 Sat 100% on 
the monitor. Subjective   History of Present Illness  47-year-old female who presents for evaluation of left shoulder pain after a fall.  The patient reports that a couple days ago she slipped in the rain and fell striking her left shoulder.  She can move the left hand and exhibits normal sensation and strength left hand.  She denies pain in the left hand wrist forearm or elbow.  The pain is localized to the posterior aspect of the left shoulder.      Review of Systems   Constitutional:  Negative for chills, fatigue and fever.   HENT:  Negative for congestion, ear pain, postnasal drip, sinus pressure and sore throat.    Eyes:  Negative for pain, redness and visual disturbance.   Respiratory:  Negative for cough, chest tightness and shortness of breath.    Cardiovascular:  Negative for chest pain, palpitations and leg swelling.   Gastrointestinal:  Negative for abdominal pain, anal bleeding, blood in stool, diarrhea, nausea and vomiting.   Endocrine: Negative for polydipsia and polyuria.   Genitourinary:  Negative for difficulty urinating, dysuria, frequency and urgency.   Musculoskeletal:  Positive for arthralgias. Negative for back pain and neck pain.   Skin:  Negative for pallor and rash.   Allergic/Immunologic: Negative for environmental allergies and immunocompromised state.   Neurological:  Negative for dizziness, weakness and headaches.   Hematological:  Negative for adenopathy.   Psychiatric/Behavioral:  Negative for confusion, self-injury and suicidal ideas. The patient is not nervous/anxious.    All other systems reviewed and are negative.      Past Medical History:   Diagnosis Date    Anxiety     Chronic back pain     prn NSAIDS, denies steroid injections    Chronic bronchitis     daily Spiriva, no tobacco use    Depression     meds + monthly therapy sessions    Dyspepsia     Dyspnea on exertion     Fatigue     Heartburn     no meds, no prior eval    Hypertension     Joint pain     Morbid obesity with BMI of 50.0-59.9,  adult     Spinal arthritis     Type 2 diabetes mellitus     new dx 2018, A1c >6       Allergies   Allergen Reactions    Amoxicillin Other (See Comments)     N/V       Past Surgical History:   Procedure Laterality Date    ABSCESS DRAINAGE  2001    buttocks     SECTION  2006, 2010       Family History   Problem Relation Age of Onset    Hypertension Mother     Hypertension Father     Diabetes Father     Diabetes Maternal Grandfather     Heart disease Maternal Grandfather     Diabetes Paternal Grandmother     Heart disease Paternal Grandmother     Breast cancer Maternal Grandmother     Cervical cancer Maternal Grandmother     Cervical cancer Maternal Aunt        Social History     Socioeconomic History    Marital status:    Tobacco Use    Smoking status: Never     Passive exposure: Current    Smokeless tobacco: Never   Vaping Use    Vaping status: Never Used   Substance and Sexual Activity    Alcohol use: No    Drug use: No    Sexual activity: Yes     Partners: Male           Objective   Physical Exam  Vitals and nursing note reviewed.   Constitutional:       General: She is not in acute distress.     Appearance: Normal appearance. She is well-developed. She is not toxic-appearing or diaphoretic.   HENT:      Head: Normocephalic and atraumatic.      Right Ear: External ear normal.      Left Ear: External ear normal.      Nose: Nose normal.   Eyes:      General: Lids are normal.      Pupils: Pupils are equal, round, and reactive to light.   Neck:      Trachea: No tracheal deviation.   Cardiovascular:      Rate and Rhythm: Normal rate and regular rhythm.      Pulses: No decreased pulses.      Heart sounds: Normal heart sounds. No murmur heard.     No friction rub. No gallop.   Pulmonary:      Effort: Pulmonary effort is normal. No respiratory distress.      Breath sounds: Normal breath sounds. No decreased breath sounds, wheezing, rhonchi or rales.   Abdominal:      General: Bowel sounds are normal.       Palpations: Abdomen is soft.      Tenderness: There is no abdominal tenderness. There is no guarding or rebound.   Musculoskeletal:         General: No deformity. Normal range of motion.      Left shoulder: Tenderness and bony tenderness present. Decreased range of motion.      Cervical back: Normal range of motion and neck supple.   Lymphadenopathy:      Cervical: No cervical adenopathy.   Skin:     General: Skin is warm and dry.      Findings: No rash.   Neurological:      Mental Status: She is alert and oriented to person, place, and time.      Cranial Nerves: No cranial nerve deficit.      Sensory: No sensory deficit.   Psychiatric:         Speech: Speech normal.         Behavior: Behavior normal.         Thought Content: Thought content normal.         Judgment: Judgment normal.         Procedures           ED Course  ED Course as of 04/08/25 0021   Mon Apr 07, 2025   1901 X-ray of left shoulder interpreted by me shows acromion fracture of the left shoulder.  No significant displacement. [NS]      ED Course User Index  [NS] Angel Hurtado MD                                                       Medical Decision Making  Differential includes shoulder dislocation shoulder fracture.    X-ray left shoulder interpreted by myself shows an acromion fracture.  Radiologist interprets the x-ray as no acute fracture.    A sling will be provided, pain medication will be provided, and the patient will be referred to orthopedic surgery for outpatient follow-up.    Problems Addressed:  Closed nondisplaced fracture of acromial process of left scapula, initial encounter: complicated acute illness or injury with systemic symptoms    Amount and/or Complexity of Data Reviewed  Independent Historian: spouse     Details:  provides additional information.  External Data Reviewed: radiology.  Radiology: ordered and independent interpretation performed. Decision-making details documented in ED  Course.    Risk  Prescription drug management.        Final diagnoses:   Closed nondisplaced fracture of acromial process of left scapula, initial encounter       ED Disposition  ED Disposition       ED Disposition   Discharge    Condition   Stable    Comment   --               Lucius Razo MD  [address] [id number] [phone]    Schedule an appointment as soon as possible for a visit            Medication List        New Prescriptions      oxyCODONE-acetaminophen 5-325 MG per tablet  Commonly known as: PERCOCET  Take 1 tablet by mouth Every 6 (Six) Hours As Needed for Moderate Pain or Severe Pain for up to 12 doses.               Where to Get Your Medications        These medications were sent to [facility] [id number] - [place], [place] - [address] AT [facility] [address] - [phone]  - [phone] FX  [address], [facility] [address]      Phone: [phone]   oxyCODONE-acetaminophen 5-325 MG per tablet            Angel Hurtado MD  04/08/25 0021